# Patient Record
Sex: FEMALE | Race: WHITE | NOT HISPANIC OR LATINO | Employment: OTHER | URBAN - METROPOLITAN AREA
[De-identification: names, ages, dates, MRNs, and addresses within clinical notes are randomized per-mention and may not be internally consistent; named-entity substitution may affect disease eponyms.]

---

## 2017-01-26 ENCOUNTER — HOSPITAL ENCOUNTER (OUTPATIENT)
Dept: OTHER | Facility: HOSPITAL | Age: 57
Discharge: HOME | End: 2017-01-26
Attending: INTERNAL MEDICINE | Admitting: INTERNAL MEDICINE

## 2017-01-26 LAB
ALBUMIN SERPL-MCNC: 3.4 G/DL (ref 3.5–4.8)
ALBUMIN/GLOB SERPL: 0.9 {RATIO} (ref 1–1.7)
ALP SERPL-CCNC: 194 IU/L (ref 32–91)
ALT SERPL-CCNC: 51 IU/L (ref 14–54)
ANION GAP SERPL CALC-SCNC: 13 MMOL/L (ref 10–20)
AST SERPL-CCNC: 54 IU/L (ref 15–41)
BASOPHILS # BLD AUTO: 0.1 10*3/UL (ref 0–0.2)
BASOPHILS NFR BLD AUTO: 1 % (ref 0–2)
BILIRUB SERPL-MCNC: 0.5 MG/DL (ref 0.3–1.2)
BUN SERPL-MCNC: 15 MG/DL (ref 8–20)
BUN/CREAT SERPL: 16.7 (ref 5.4–26.2)
CALCIUM SERPL-MCNC: 9.3 MG/DL (ref 8.9–10.3)
CHLORIDE SERPL-SCNC: 100 MMOL/L (ref 101–111)
CK SERPL-CCNC: 76 IU/L (ref 38–234)
CONV CO2: 29 MMOL/L (ref 22–32)
CONV TOTAL PROTEIN: 7.1 G/DL (ref 6.1–7.9)
CREAT UR-MCNC: 0.9 MG/DL (ref 0.4–1)
CRP SERPL-MCNC: 0.38 MG/DL (ref 0–0.7)
DIFFERENTIAL METHOD BLD: (no result)
EOSINOPHIL # BLD AUTO: 0.4 10*3/UL (ref 0–0.3)
EOSINOPHIL # BLD AUTO: 5 % (ref 0–3)
ERYTHROCYTE [DISTWIDTH] IN BLOOD BY AUTOMATED COUNT: 13.5 % (ref 11.5–14.5)
ERYTHROCYTE [SEDIMENTATION RATE] IN BLOOD BY WESTERGREN METHOD: 29 MM/HR (ref 0–30)
GLOBULIN UR ELPH-MCNC: 3.7 G/DL (ref 2.5–3.8)
GLUCOSE SERPL-MCNC: 105 MG/DL (ref 65–99)
HCT VFR BLD AUTO: 38.7 % (ref 35–49)
HGB BLD-MCNC: 12.6 G/DL (ref 12–15)
LYMPHOCYTES # BLD AUTO: 3.1 10*3/UL (ref 0.8–4.8)
LYMPHOCYTES NFR BLD AUTO: 39 % (ref 18–42)
MCH RBC QN AUTO: 29.9 PG (ref 26–32)
MCHC RBC AUTO-ENTMCNC: 32.6 G/DL (ref 32–36)
MCV RBC AUTO: 91.7 FL (ref 80–94)
MONOCYTES # BLD AUTO: 1.9 10*3/UL (ref 0.1–1.3)
MONOCYTES NFR BLD AUTO: 23 % (ref 2–11)
NEUTROPHILS # BLD AUTO: 2.5 10*3/UL (ref 2.3–8.6)
NEUTROPHILS NFR BLD AUTO: 32 % (ref 50–75)
NRBC BLD AUTO-RTO: 0 /100{WBCS}
NRBC/RBC NFR BLD MANUAL: 0 10*3/UL
PLATELET # BLD AUTO: 409 10*3/UL (ref 150–450)
PMV BLD AUTO: 10.2 FL (ref 7.4–10.4)
POTASSIUM SERPL-SCNC: 4 MMOL/L (ref 3.6–5.1)
RBC # BLD AUTO: 4.22 10*6/UL (ref 4–5.4)
SODIUM SERPL-SCNC: 138 MMOL/L (ref 136–144)
WBC # BLD AUTO: 8 10*3/UL (ref 4.5–11.5)

## 2017-01-27 LAB
CENTROMERE B AB SER-ACNC: NEGATIVE
ENA RNP AB SER-ACNC: NEGATIVE
HAV IGM SERPL QL IA: NONREACTIVE
HBV CORE IGM SERPL QL IA: NONREACTIVE
HBV SURFACE AG SERPL QL IA: NONREACTIVE
HCV AB SER DONR QL: NORMAL
HCV AB SER DONR QL: NORMAL

## 2017-02-01 ENCOUNTER — HOSPITAL ENCOUNTER (OUTPATIENT)
Dept: CARDIOLOGY | Facility: HOSPITAL | Age: 57
Discharge: HOME | End: 2017-02-01
Attending: INTERNAL MEDICINE | Admitting: INTERNAL MEDICINE

## 2017-02-07 ENCOUNTER — HOSPITAL ENCOUNTER (OUTPATIENT)
Dept: CT IMAGING | Facility: HOSPITAL | Age: 57
Discharge: HOME | End: 2017-02-07
Attending: INTERNAL MEDICINE | Admitting: INTERNAL MEDICINE

## 2017-02-15 ENCOUNTER — HOSPITAL ENCOUNTER (OUTPATIENT)
Dept: CARDIOLOGY | Facility: HOSPITAL | Age: 57
Discharge: HOME | End: 2017-02-15
Attending: INTERNAL MEDICINE | Admitting: INTERNAL MEDICINE

## 2017-04-04 ENCOUNTER — CONVERSION ENCOUNTER (OUTPATIENT)
Dept: FAMILY MEDICINE CLINIC | Facility: CLINIC | Age: 57
End: 2017-04-04

## 2017-04-04 LAB
ALBUMIN SERPL-MCNC: 4.2 G/DL (ref 3.6–5.1)
ALBUMIN/GLOB SERPL: ABNORMAL {RATIO} (ref 1–2.5)
ALP SERPL-CCNC: 256 UNITS/L (ref 33–130)
ALT SERPL-CCNC: 60 UNITS/L (ref 6–29)
AST SERPL-CCNC: 53 UNITS/L (ref 10–35)
BILIRUB SERPL-MCNC: 0.4 MG/DL (ref 0.2–1.2)
BUN SERPL-MCNC: 20 MG/DL (ref 7–25)
BUN/CREAT SERPL: ABNORMAL (ref 6–22)
CALCIUM SERPL-MCNC: 9.9 MG/DL (ref 8.6–10.4)
CHLORIDE SERPL-SCNC: 104 MMOL/L (ref 98–110)
CO2 CONTENT VENOUS: 28 MMOL/L (ref 20–31)
CONV TOTAL PROTEIN: 7.5 G/DL (ref 6.1–8.1)
CREAT UR-MCNC: 0.79 MG/DL (ref 0.5–1.05)
GLOBULIN UR ELPH-MCNC: ABNORMAL G/DL (ref 1.9–3.7)
GLUCOSE SERPL-MCNC: 91 MG/DL (ref 65–99)
POTASSIUM SERPL-SCNC: 4.3 MMOL/L (ref 3.5–5.3)
SODIUM SERPL-SCNC: 142 MMOL/L (ref 135–146)

## 2017-04-26 ENCOUNTER — HOSPITAL ENCOUNTER (OUTPATIENT)
Dept: WOUND CARE | Facility: HOSPITAL | Age: 57
Discharge: HOME | End: 2017-04-26
Attending: NURSE PRACTITIONER | Admitting: NURSE PRACTITIONER

## 2017-05-03 ENCOUNTER — HOSPITAL ENCOUNTER (OUTPATIENT)
Dept: WOUND CARE | Facility: HOSPITAL | Age: 57
Discharge: HOME | End: 2017-05-03
Attending: NURSE PRACTITIONER | Admitting: NURSE PRACTITIONER

## 2017-05-10 ENCOUNTER — HOSPITAL ENCOUNTER (OUTPATIENT)
Dept: WOUND CARE | Facility: HOSPITAL | Age: 57
Discharge: HOME | End: 2017-05-10
Attending: NURSE PRACTITIONER | Admitting: NURSE PRACTITIONER

## 2017-05-17 ENCOUNTER — HOSPITAL ENCOUNTER (OUTPATIENT)
Dept: WOUND CARE | Facility: HOSPITAL | Age: 57
Discharge: HOME | End: 2017-05-17
Attending: NURSE PRACTITIONER | Admitting: NURSE PRACTITIONER

## 2017-05-24 ENCOUNTER — HOSPITAL ENCOUNTER (OUTPATIENT)
Dept: WOUND CARE | Facility: HOSPITAL | Age: 57
Discharge: HOME | End: 2017-05-24
Attending: NURSE PRACTITIONER | Admitting: NURSE PRACTITIONER

## 2017-05-31 ENCOUNTER — HOSPITAL ENCOUNTER (OUTPATIENT)
Dept: WOUND CARE | Facility: HOSPITAL | Age: 57
Discharge: HOME | End: 2017-05-31
Attending: NURSE PRACTITIONER | Admitting: NURSE PRACTITIONER

## 2017-06-07 ENCOUNTER — HOSPITAL ENCOUNTER (OUTPATIENT)
Dept: WOUND CARE | Facility: HOSPITAL | Age: 57
Discharge: HOME | End: 2017-06-07
Attending: NURSE PRACTITIONER | Admitting: NURSE PRACTITIONER

## 2017-06-21 ENCOUNTER — HOSPITAL ENCOUNTER (OUTPATIENT)
Dept: WOUND CARE | Facility: HOSPITAL | Age: 57
Discharge: HOME | End: 2017-06-21
Attending: NURSE PRACTITIONER | Admitting: NURSE PRACTITIONER

## 2017-08-15 ENCOUNTER — HOSPITAL ENCOUNTER (OUTPATIENT)
Dept: LAB | Facility: HOSPITAL | Age: 57
Discharge: HOME | End: 2017-08-15
Attending: INTERNAL MEDICINE | Admitting: INTERNAL MEDICINE

## 2017-08-15 LAB
ALBUMIN SERPL-MCNC: 3 G/DL (ref 3.5–4.8)
ALBUMIN/GLOB SERPL: 0.8 {RATIO} (ref 1–1.7)
ALP SERPL-CCNC: 160 IU/L (ref 32–91)
ALT SERPL-CCNC: 30 IU/L (ref 14–54)
ANION GAP SERPL CALC-SCNC: 12.9 MMOL/L (ref 10–20)
AST SERPL-CCNC: 32 IU/L (ref 15–41)
BASOPHILS # BLD AUTO: 0.1 10*3/UL (ref 0–0.2)
BASOPHILS NFR BLD AUTO: 1 % (ref 0–2)
BILIRUB SERPL-MCNC: 0.5 MG/DL (ref 0.3–1.2)
BILIRUB UR QL STRIP: NEGATIVE MG/DL
BUN SERPL-MCNC: 6 MG/DL (ref 8–20)
BUN/CREAT SERPL: 8.6 (ref 5.4–26.2)
CALCIUM SERPL-MCNC: 9.2 MG/DL (ref 8.9–10.3)
CASTS URNS QL MICRO: NORMAL /[LPF]
CHLORIDE SERPL-SCNC: 101 MMOL/L (ref 101–111)
COLOR UR: YELLOW
CONV BACTERIA IN URINE MICRO: NEGATIVE
CONV CLARITY OF URINE: CLEAR
CONV CO2: 31 MMOL/L (ref 22–32)
CONV HYALINE CASTS IN URINE MICRO: 5 /[LPF] (ref 0–5)
CONV PROTEIN IN URINE BY AUTOMATED TEST STRIP: NEGATIVE MG/DL
CONV SMALL ROUND CELLS: NORMAL /[HPF]
CONV TOTAL PROTEIN: 6.6 G/DL (ref 6.1–7.9)
CONV UROBILINOGEN IN URINE BY AUTOMATED TEST STRIP: 1 MG/DL
CREAT UR-MCNC: 0.7 MG/DL (ref 0.4–1)
CRP SERPL-MCNC: 1.37 MG/DL (ref 0–0.7)
CULTURE INDICATED?: NORMAL
DIFFERENTIAL METHOD BLD: (no result)
EOSINOPHIL # BLD AUTO: 0.3 10*3/UL (ref 0–0.3)
EOSINOPHIL # BLD AUTO: 3 % (ref 0–3)
ERYTHROCYTE [DISTWIDTH] IN BLOOD BY AUTOMATED COUNT: 13.4 % (ref 11.5–14.5)
ERYTHROCYTE [SEDIMENTATION RATE] IN BLOOD BY WESTERGREN METHOD: 41 MM/HR (ref 0–30)
GLOBULIN UR ELPH-MCNC: 3.6 G/DL (ref 2.5–3.8)
GLUCOSE SERPL-MCNC: 96 MG/DL (ref 65–99)
GLUCOSE UR QL: NEGATIVE MG/DL
HCT VFR BLD AUTO: 33.8 % (ref 35–49)
HGB BLD-MCNC: 11.2 G/DL (ref 12–15)
HGB UR QL STRIP: NEGATIVE
KETONES UR QL STRIP: NEGATIVE MG/DL
LEUKOCYTE ESTERASE UR QL STRIP: NEGATIVE
LYMPHOCYTES # BLD AUTO: 3.5 10*3/UL (ref 0.8–4.8)
LYMPHOCYTES NFR BLD AUTO: 29 % (ref 18–42)
MCH RBC QN AUTO: 29.9 PG (ref 26–32)
MCHC RBC AUTO-ENTMCNC: 33 G/DL (ref 32–36)
MCV RBC AUTO: 90.5 FL (ref 80–94)
MONOCYTES # BLD AUTO: 2.2 10*3/UL (ref 0.1–1.3)
MONOCYTES NFR BLD AUTO: 18 % (ref 2–11)
NEUTROPHILS # BLD AUTO: 5.8 10*3/UL (ref 2.3–8.6)
NEUTROPHILS NFR BLD AUTO: 49 % (ref 50–75)
NITRITE UR QL STRIP: NEGATIVE
NRBC BLD AUTO-RTO: 0 /100{WBCS}
NRBC/RBC NFR BLD MANUAL: 0 10*3/UL
PH UR STRIP.AUTO: 7 [PH] (ref 4.5–8)
PLATELET # BLD AUTO: 347 10*3/UL (ref 150–450)
PMV BLD AUTO: 9.4 FL (ref 7.4–10.4)
POTASSIUM SERPL-SCNC: 3.9 MMOL/L (ref 3.6–5.1)
RBC # BLD AUTO: 3.74 10*6/UL (ref 4–5.4)
RBC #/AREA URNS HPF: 1 /[HPF] (ref 0–3)
SODIUM SERPL-SCNC: 141 MMOL/L (ref 136–144)
SP GR UR: 1.01 (ref 1–1.03)
SPERM URNS QL MICRO: NORMAL /[HPF]
SQUAMOUS SPT QL MICRO: 1 /[HPF] (ref 0–5)
UNIDENT CRYS URNS QL MICRO: NORMAL /[HPF]
WBC # BLD AUTO: 11.9 10*3/UL (ref 4.5–11.5)
WBC #/AREA URNS HPF: 2 /[HPF] (ref 0–5)
YEAST SPEC QL WET PREP: NORMAL /[HPF]

## 2017-08-30 ENCOUNTER — HOSPITAL ENCOUNTER (OUTPATIENT)
Dept: FAMILY MEDICINE CLINIC | Facility: CLINIC | Age: 57
Setting detail: SPECIMEN
Discharge: HOME | End: 2017-08-30
Attending: HOSPITALIST | Admitting: HOSPITALIST

## 2017-08-30 LAB
BACTERIA SPEC AEROBE CULT: NORMAL
Lab: NORMAL
MICRO REPORT STATUS: NORMAL
SPECIMEN SOURCE: NORMAL

## 2017-08-31 ENCOUNTER — HOSPITAL ENCOUNTER (OUTPATIENT)
Dept: FAMILY MEDICINE CLINIC | Facility: CLINIC | Age: 57
Setting detail: SPECIMEN
Discharge: HOME | End: 2017-08-31
Attending: HOSPITALIST | Admitting: HOSPITALIST

## 2017-09-01 ENCOUNTER — HOSPITAL ENCOUNTER (OUTPATIENT)
Dept: FAMILY MEDICINE CLINIC | Facility: CLINIC | Age: 57
Setting detail: SPECIMEN
Discharge: HOME | End: 2017-09-01
Attending: HOSPITALIST | Admitting: HOSPITALIST

## 2017-09-06 ENCOUNTER — HOSPITAL ENCOUNTER (OUTPATIENT)
Dept: LAB | Facility: HOSPITAL | Age: 57
Discharge: HOME | End: 2017-09-06
Attending: HOSPITALIST | Admitting: HOSPITALIST

## 2017-09-21 ENCOUNTER — OFFICE (AMBULATORY)
Dept: URBAN - METROPOLITAN AREA CLINIC 64 | Facility: CLINIC | Age: 57
End: 2017-09-21

## 2017-09-21 VITALS
DIASTOLIC BLOOD PRESSURE: 67 MMHG | WEIGHT: 176 LBS | SYSTOLIC BLOOD PRESSURE: 115 MMHG | HEART RATE: 72 BPM | HEIGHT: 65 IN

## 2017-09-21 DIAGNOSIS — R10.13 EPIGASTRIC PAIN: ICD-10-CM

## 2017-09-21 DIAGNOSIS — K21.9 GASTRO-ESOPHAGEAL REFLUX DISEASE WITHOUT ESOPHAGITIS: ICD-10-CM

## 2017-09-21 DIAGNOSIS — R11.2 NAUSEA WITH VOMITING, UNSPECIFIED: ICD-10-CM

## 2017-09-21 PROCEDURE — 99214 OFFICE O/P EST MOD 30 MIN: CPT | Performed by: NURSE PRACTITIONER

## 2017-09-21 RX ORDER — ONDANSETRON HYDROCHLORIDE 4 MG/1
24 TABLET, FILM COATED ORAL
Qty: 45 | Refills: 5 | Status: COMPLETED
Start: 2017-09-21 | End: 2018-03-14

## 2017-09-25 ENCOUNTER — HOSPITAL ENCOUNTER (OUTPATIENT)
Dept: LAB | Facility: HOSPITAL | Age: 57
Discharge: HOME | End: 2017-09-25
Attending: NURSE PRACTITIONER | Admitting: NURSE PRACTITIONER

## 2017-09-27 ENCOUNTER — ON CAMPUS - OUTPATIENT (AMBULATORY)
Dept: URBAN - METROPOLITAN AREA HOSPITAL 2 | Facility: HOSPITAL | Age: 57
End: 2017-09-27

## 2017-09-27 VITALS
TEMPERATURE: 97.8 F | OXYGEN SATURATION: 96 % | SYSTOLIC BLOOD PRESSURE: 117 MMHG | HEART RATE: 73 BPM | RESPIRATION RATE: 18 BRPM | HEART RATE: 91 BPM | HEART RATE: 85 BPM | DIASTOLIC BLOOD PRESSURE: 74 MMHG | WEIGHT: 172 LBS | SYSTOLIC BLOOD PRESSURE: 130 MMHG | HEIGHT: 65 IN | DIASTOLIC BLOOD PRESSURE: 72 MMHG | OXYGEN SATURATION: 99 % | HEART RATE: 72 BPM | OXYGEN SATURATION: 97 % | DIASTOLIC BLOOD PRESSURE: 71 MMHG | SYSTOLIC BLOOD PRESSURE: 144 MMHG | RESPIRATION RATE: 16 BRPM | SYSTOLIC BLOOD PRESSURE: 136 MMHG | DIASTOLIC BLOOD PRESSURE: 85 MMHG

## 2017-09-27 DIAGNOSIS — T18.2XXA FOREIGN BODY IN STOMACH, INITIAL ENCOUNTER: ICD-10-CM

## 2017-09-27 DIAGNOSIS — R11.2 NAUSEA WITH VOMITING, UNSPECIFIED: ICD-10-CM

## 2017-09-27 DIAGNOSIS — K21.9 GASTRO-ESOPHAGEAL REFLUX DISEASE WITHOUT ESOPHAGITIS: ICD-10-CM

## 2017-09-27 PROCEDURE — 43235 EGD DIAGNOSTIC BRUSH WASH: CPT | Performed by: INTERNAL MEDICINE

## 2017-09-27 RX ORDER — ERYTHROMYCIN 250 MG/1
1000 TABLET, FILM COATED ORAL
Qty: 40 | Refills: 0 | Status: COMPLETED
Start: 2017-09-27 | End: 2017-10-24

## 2017-09-27 RX ADMIN — PROPOFOL: 10 INJECTION, EMULSION INTRAVENOUS at 09:38

## 2017-10-24 ENCOUNTER — OFFICE (AMBULATORY)
Dept: URBAN - METROPOLITAN AREA CLINIC 64 | Facility: CLINIC | Age: 57
End: 2017-10-24

## 2017-10-24 VITALS
SYSTOLIC BLOOD PRESSURE: 121 MMHG | DIASTOLIC BLOOD PRESSURE: 62 MMHG | HEIGHT: 65 IN | WEIGHT: 168 LBS | HEART RATE: 72 BPM

## 2017-10-24 DIAGNOSIS — K31.84 GASTROPARESIS: ICD-10-CM

## 2017-10-24 DIAGNOSIS — K21.9 GASTRO-ESOPHAGEAL REFLUX DISEASE WITHOUT ESOPHAGITIS: ICD-10-CM

## 2017-10-24 PROCEDURE — 99213 OFFICE O/P EST LOW 20 MIN: CPT | Performed by: INTERNAL MEDICINE

## 2017-10-24 RX ORDER — ERYTHROMYCIN 250 MG/1
500 TABLET, FILM COATED ORAL
Qty: 60 | Refills: 5 | Status: COMPLETED
Start: 2017-10-24 | End: 2018-03-14

## 2017-11-02 ENCOUNTER — HOSPITAL ENCOUNTER (OUTPATIENT)
Dept: FAMILY MEDICINE CLINIC | Facility: CLINIC | Age: 57
Setting detail: SPECIMEN
Discharge: HOME | End: 2017-11-02
Attending: HOSPITALIST | Admitting: HOSPITALIST

## 2017-11-02 LAB
ALBUMIN SERPL-MCNC: 3.5 G/DL (ref 3.5–4.8)
ALBUMIN/GLOB SERPL: 1 {RATIO} (ref 1–1.7)
ALP SERPL-CCNC: 280 IU/L (ref 32–91)
ALT SERPL-CCNC: 39 IU/L (ref 14–54)
ANION GAP SERPL CALC-SCNC: 15.2 MMOL/L (ref 10–20)
AST SERPL-CCNC: 48 IU/L (ref 15–41)
BASOPHILS # BLD AUTO: 0 10*3/UL (ref 0–0.2)
BASOPHILS NFR BLD AUTO: 1 % (ref 0–2)
BILIRUB SERPL-MCNC: 0.5 MG/DL (ref 0.3–1.2)
BUN SERPL-MCNC: 15 MG/DL (ref 8–20)
BUN/CREAT SERPL: 13.6 (ref 5.4–26.2)
CALCIUM SERPL-MCNC: 9.9 MG/DL (ref 8.9–10.3)
CHLORIDE SERPL-SCNC: 98 MMOL/L (ref 101–111)
CONV CO2: 28 MMOL/L (ref 22–32)
CONV TOTAL PROTEIN: 7 G/DL (ref 6.1–7.9)
CREAT UR-MCNC: 1.1 MG/DL (ref 0.4–1)
DIFFERENTIAL METHOD BLD: (no result)
EOSINOPHIL # BLD AUTO: 0.2 10*3/UL (ref 0–0.3)
EOSINOPHIL # BLD AUTO: 3 % (ref 0–3)
ERYTHROCYTE [DISTWIDTH] IN BLOOD BY AUTOMATED COUNT: 13.7 % (ref 11.5–14.5)
GLOBULIN UR ELPH-MCNC: 3.5 G/DL (ref 2.5–3.8)
GLUCOSE SERPL-MCNC: 111 MG/DL (ref 65–99)
HCT VFR BLD AUTO: 34.6 % (ref 35–49)
HGB BLD-MCNC: 11.5 G/DL (ref 12–15)
LYMPHOCYTES # BLD AUTO: 1.8 10*3/UL (ref 0.8–4.8)
LYMPHOCYTES NFR BLD AUTO: 25 % (ref 18–42)
MCH RBC QN AUTO: 30.5 PG (ref 26–32)
MCHC RBC AUTO-ENTMCNC: 33.1 G/DL (ref 32–36)
MCV RBC AUTO: 92.1 FL (ref 80–94)
MONOCYTES # BLD AUTO: 1.5 10*3/UL (ref 0.1–1.3)
MONOCYTES NFR BLD AUTO: 20 % (ref 2–11)
NEUTROPHILS # BLD AUTO: 3.7 10*3/UL (ref 2.3–8.6)
NEUTROPHILS NFR BLD AUTO: 51 % (ref 50–75)
NRBC BLD AUTO-RTO: 0 /100{WBCS}
NRBC/RBC NFR BLD MANUAL: 0 10*3/UL
PLATELET # BLD AUTO: 360 10*3/UL (ref 150–450)
PMV BLD AUTO: 9.8 FL (ref 7.4–10.4)
POTASSIUM SERPL-SCNC: 4.2 MMOL/L (ref 3.6–5.1)
RBC # BLD AUTO: 3.76 10*6/UL (ref 4–5.4)
SODIUM SERPL-SCNC: 137 MMOL/L (ref 136–144)
WBC # BLD AUTO: 7.3 10*3/UL (ref 4.5–11.5)

## 2018-02-19 ENCOUNTER — HOSPITAL ENCOUNTER (OUTPATIENT)
Dept: LAB | Facility: HOSPITAL | Age: 58
Discharge: HOME | End: 2018-02-19
Attending: INTERNAL MEDICINE | Admitting: INTERNAL MEDICINE

## 2018-02-19 LAB
ALBUMIN SERPL-MCNC: 3.3 G/DL (ref 3.5–4.8)
ALBUMIN/GLOB SERPL: 0.9 {RATIO} (ref 1–1.7)
ALP SERPL-CCNC: 297 IU/L (ref 32–91)
ALT SERPL-CCNC: 30 IU/L (ref 14–54)
ANION GAP SERPL CALC-SCNC: 13.4 MMOL/L (ref 10–20)
AST SERPL-CCNC: 36 IU/L (ref 15–41)
BASOPHILS # BLD AUTO: 0.1 10*3/UL (ref 0–0.2)
BASOPHILS NFR BLD AUTO: 1 % (ref 0–2)
BILIRUB SERPL-MCNC: 0.2 MG/DL (ref 0.3–1.2)
BILIRUB UR QL STRIP: NEGATIVE MG/DL
BUN SERPL-MCNC: 15 MG/DL (ref 8–20)
BUN/CREAT SERPL: 10.7 (ref 5.4–26.2)
CALCIUM SERPL-MCNC: 9.4 MG/DL (ref 8.9–10.3)
CASTS URNS QL MICRO: ABNORMAL /[LPF]
CHLORIDE SERPL-SCNC: 99 MMOL/L (ref 101–111)
COLOR UR: YELLOW
CONV BACTERIA IN URINE MICRO: NEGATIVE
CONV CLARITY OF URINE: CLEAR
CONV CO2: 30 MMOL/L (ref 22–32)
CONV HYALINE CASTS IN URINE MICRO: 3 /[LPF] (ref 0–5)
CONV PROTEIN IN URINE BY AUTOMATED TEST STRIP: NEGATIVE MG/DL
CONV SMALL ROUND CELLS: ABNORMAL /[HPF]
CONV TOTAL PROTEIN: 7.1 G/DL (ref 6.1–7.9)
CONV UROBILINOGEN IN URINE BY AUTOMATED TEST STRIP: 0.2 MG/DL
CREAT UR-MCNC: 1.4 MG/DL (ref 0.4–1)
CRP SERPL-MCNC: 1.61 MG/DL (ref 0–0.7)
CULTURE INDICATED?: ABNORMAL
DIFFERENTIAL METHOD BLD: (no result)
EOSINOPHIL # BLD AUTO: 0.9 10*3/UL (ref 0–0.3)
EOSINOPHIL # BLD AUTO: 8 % (ref 0–3)
ERYTHROCYTE [DISTWIDTH] IN BLOOD BY AUTOMATED COUNT: 13.3 % (ref 11.5–14.5)
ERYTHROCYTE [SEDIMENTATION RATE] IN BLOOD BY WESTERGREN METHOD: 49 MM/HR (ref 0–30)
GLOBULIN UR ELPH-MCNC: 3.8 G/DL (ref 2.5–3.8)
GLUCOSE SERPL-MCNC: 104 MG/DL (ref 65–99)
GLUCOSE UR QL: NEGATIVE MG/DL
HCT VFR BLD AUTO: 35.8 % (ref 35–49)
HGB BLD-MCNC: 11.6 G/DL (ref 12–15)
HGB UR QL STRIP: NEGATIVE
KETONES UR QL STRIP: NEGATIVE MG/DL
LEUKOCYTE ESTERASE UR QL STRIP: ABNORMAL
LYMPHOCYTES # BLD AUTO: 3.4 10*3/UL (ref 0.8–4.8)
LYMPHOCYTES NFR BLD AUTO: 30 % (ref 18–42)
MCH RBC QN AUTO: 29.6 PG (ref 26–32)
MCHC RBC AUTO-ENTMCNC: 32.4 G/DL (ref 32–36)
MCV RBC AUTO: 91.2 FL (ref 80–94)
MONOCYTES # BLD AUTO: 1.7 10*3/UL (ref 0.1–1.3)
MONOCYTES NFR BLD AUTO: 15 % (ref 2–11)
NEUTROPHILS # BLD AUTO: 5.2 10*3/UL (ref 2.3–8.6)
NEUTROPHILS NFR BLD AUTO: 46 % (ref 50–75)
NITRITE UR QL STRIP: NEGATIVE
NRBC BLD AUTO-RTO: 0 /100{WBCS}
NRBC/RBC NFR BLD MANUAL: 0 10*3/UL
PH UR STRIP.AUTO: 5.5 [PH] (ref 4.5–8)
PLATELET # BLD AUTO: 474 10*3/UL (ref 150–450)
PMV BLD AUTO: 8.8 FL (ref 7.4–10.4)
POTASSIUM SERPL-SCNC: 4.4 MMOL/L (ref 3.6–5.1)
RBC # BLD AUTO: 3.92 10*6/UL (ref 4–5.4)
RBC #/AREA URNS HPF: 1 /[HPF] (ref 0–3)
SODIUM SERPL-SCNC: 138 MMOL/L (ref 136–144)
SP GR UR: 1.02 (ref 1–1.03)
SPERM URNS QL MICRO: ABNORMAL /[HPF]
SQUAMOUS SPT QL MICRO: 0 /[HPF] (ref 0–5)
UNIDENT CRYS URNS QL MICRO: ABNORMAL /[HPF]
WBC # BLD AUTO: 11.3 10*3/UL (ref 4.5–11.5)
WBC #/AREA URNS HPF: 3 /[HPF] (ref 0–5)
YEAST SPEC QL WET PREP: ABNORMAL /[HPF]

## 2018-02-26 ENCOUNTER — HOSPITAL ENCOUNTER (OUTPATIENT)
Dept: LAB | Facility: HOSPITAL | Age: 58
Discharge: HOME | End: 2018-02-26
Attending: INTERNAL MEDICINE | Admitting: INTERNAL MEDICINE

## 2018-02-28 LAB
INTERPRETATION UR IFE-IMP: NORMAL
PROT PATTERN SERPL IFE-IMP: NORMAL

## 2018-03-14 ENCOUNTER — OFFICE (AMBULATORY)
Dept: URBAN - METROPOLITAN AREA CLINIC 64 | Facility: CLINIC | Age: 58
End: 2018-03-14

## 2018-03-14 VITALS
SYSTOLIC BLOOD PRESSURE: 108 MMHG | DIASTOLIC BLOOD PRESSURE: 58 MMHG | HEIGHT: 65 IN | HEART RATE: 70 BPM | WEIGHT: 171 LBS

## 2018-03-14 DIAGNOSIS — R74.8 ABNORMAL LEVELS OF OTHER SERUM ENZYMES: ICD-10-CM

## 2018-03-14 DIAGNOSIS — K59.00 CONSTIPATION, UNSPECIFIED: ICD-10-CM

## 2018-03-14 DIAGNOSIS — K21.9 GASTRO-ESOPHAGEAL REFLUX DISEASE WITHOUT ESOPHAGITIS: ICD-10-CM

## 2018-03-14 DIAGNOSIS — Z80.0 FAMILY HISTORY OF MALIGNANT NEOPLASM OF DIGESTIVE ORGANS: ICD-10-CM

## 2018-03-14 PROCEDURE — 99214 OFFICE O/P EST MOD 30 MIN: CPT | Performed by: INTERNAL MEDICINE

## 2018-03-14 RX ORDER — LUBIPROSTONE 24 UG/1
CAPSULE, GELATIN COATED ORAL
Qty: 30 | Refills: 10 | Status: COMPLETED
End: 2018-03-14

## 2018-03-14 RX ORDER — MESALAMINE 800 MG/1
TABLET, DELAYED RELEASE ORAL
Qty: 180 | Refills: 10 | Status: COMPLETED
End: 2018-03-14

## 2018-04-18 ENCOUNTER — HOSPITAL ENCOUNTER (OUTPATIENT)
Dept: CT IMAGING | Facility: HOSPITAL | Age: 58
Discharge: HOME | End: 2018-04-18
Attending: INTERNAL MEDICINE | Admitting: INTERNAL MEDICINE

## 2018-05-18 ENCOUNTER — OFFICE (AMBULATORY)
Dept: URBAN - METROPOLITAN AREA CLINIC 64 | Facility: CLINIC | Age: 58
End: 2018-05-18

## 2018-05-18 VITALS
HEIGHT: 65 IN | SYSTOLIC BLOOD PRESSURE: 110 MMHG | HEART RATE: 74 BPM | DIASTOLIC BLOOD PRESSURE: 62 MMHG | WEIGHT: 175 LBS

## 2018-05-18 DIAGNOSIS — R74.8 ABNORMAL LEVELS OF OTHER SERUM ENZYMES: ICD-10-CM

## 2018-05-18 DIAGNOSIS — K59.00 CONSTIPATION, UNSPECIFIED: ICD-10-CM

## 2018-05-18 PROCEDURE — 99212 OFFICE O/P EST SF 10 MIN: CPT | Performed by: INTERNAL MEDICINE

## 2018-07-25 ENCOUNTER — CLINICAL SUPPORT (OUTPATIENT)
Dept: ONCOLOGY | Facility: HOSPITAL | Age: 58
End: 2018-07-25

## 2018-07-25 ENCOUNTER — HOSPITAL ENCOUNTER (OUTPATIENT)
Dept: ONCOLOGY | Facility: CLINIC | Age: 58
Setting detail: INFUSION SERIES
Discharge: HOME | End: 2018-07-25
Attending: INTERNAL MEDICINE | Admitting: INTERNAL MEDICINE

## 2018-07-25 ENCOUNTER — HOSPITAL ENCOUNTER (OUTPATIENT)
Dept: ONCOLOGY | Facility: HOSPITAL | Age: 58
Discharge: HOME | End: 2018-07-25
Attending: INTERNAL MEDICINE | Admitting: INTERNAL MEDICINE

## 2018-07-25 LAB
ALBUMIN SERPL-MCNC: 3.1 G/DL (ref 3.5–4.8)
ALBUMIN SERPL-MCNC: 3.3 G/DL (ref 3.5–4.8)
ALP SERPL-CCNC: 326 IU/L (ref 32–91)
ALPHA1 GLOB FLD ELPH-MCNC: 0.3 GM/DL (ref 0.1–0.4)
ALPHA2 GLOB SERPL ELPH-MCNC: 0.8 GM/DL (ref 0.5–1)
ALT SERPL-CCNC: 40 IU/L (ref 14–54)
AST SERPL-CCNC: 57 IU/L (ref 15–41)
B-GLOBULIN SERPL ELPH-MCNC: 1 GM/DL (ref 0.7–1.4)
BILIRUB DIRECT SERPL-MCNC: 0 MG/DL (ref 0.1–0.5)
BILIRUB SERPL-MCNC: 0.2 MG/DL (ref 0.3–1.2)
CONV TOTAL PROTEIN: 6 G/DL (ref 6.1–7.9)
CONV TOTAL PROTEIN: 6.4 G/DL (ref 6.1–7.9)
GAMMA GLOB SERPL ELPH-MCNC: 0.8 GM/DL (ref 0.6–1.6)
INSULIN SERPL-ACNC: ABNORMAL U[IU]/ML
IRON SATN MFR SERPL: 38 % (ref 15–50)
IRON SERPL-MCNC: 51 UG/DL (ref 28–170)
LDH SERPL-CCNC: 194 IU/L (ref 98–192)
TIBC SERPL-MCNC: 136 UG/DL (ref 228–428)

## 2018-07-25 NOTE — PROGRESS NOTES
PATIENTS ONCOLOGY RECORD LOCATED IN [facility]      Subjective     Name:  ENRIQUE MISHRA     Date:  2018  Address:  [address] [place] [address]  Home: [phone]  :  1960 AGE:  57 y.o.        RECORDS OBTAINED:  Patients Oncology Record is located in [facility]

## 2018-07-27 LAB
HAPTOGLOB SERPL-MCNC: 179 MG/DL (ref 36–195)
PROT PATTERN SERPL IFE-IMP: NORMAL

## 2018-08-01 ENCOUNTER — HOSPITAL ENCOUNTER (OUTPATIENT)
Dept: ONCOLOGY | Facility: HOSPITAL | Age: 58
Discharge: HOME | End: 2018-08-01
Attending: INTERNAL MEDICINE | Admitting: INTERNAL MEDICINE

## 2018-08-01 ENCOUNTER — HOSPITAL ENCOUNTER (OUTPATIENT)
Dept: ONCOLOGY | Facility: CLINIC | Age: 58
Setting detail: INFUSION SERIES
Discharge: HOME | End: 2018-08-01
Attending: INTERNAL MEDICINE | Admitting: INTERNAL MEDICINE

## 2018-08-01 ENCOUNTER — CLINICAL SUPPORT (OUTPATIENT)
Dept: ONCOLOGY | Facility: HOSPITAL | Age: 58
End: 2018-08-01

## 2018-08-01 NOTE — PROGRESS NOTES
PATIENTS ONCOLOGY RECORD LOCATED IN [facility]      Subjective     Name:  ENRIQUE MISHRA     Date:  2018  Address:  [address] [place] [address]  Home: [phone]  :  1960 AGE:  57 y.o.        RECORDS OBTAINED:  Patients Oncology Record is located in [facility]

## 2018-08-08 ENCOUNTER — HOSPITAL ENCOUNTER (OUTPATIENT)
Dept: ONCOLOGY | Facility: CLINIC | Age: 58
Setting detail: INFUSION SERIES
Discharge: HOME | End: 2018-08-08
Attending: INTERNAL MEDICINE | Admitting: INTERNAL MEDICINE

## 2018-08-08 ENCOUNTER — CLINICAL SUPPORT (OUTPATIENT)
Dept: ONCOLOGY | Facility: HOSPITAL | Age: 58
End: 2018-08-08

## 2018-08-08 NOTE — PROGRESS NOTES
PATIENTS ONCOLOGY RECORD LOCATED IN [facility]      Subjective     Name:  ENRIQUE MISHRA     Date:  2018  Address:  [address] [place] [address]  Home: [phone]  :  1960 AGE:  57 y.o.        RECORDS OBTAINED:  Patients Oncology Record is located in [facility]

## 2018-08-15 ENCOUNTER — HOSPITAL ENCOUNTER (OUTPATIENT)
Dept: ONCOLOGY | Facility: CLINIC | Age: 58
Setting detail: INFUSION SERIES
Discharge: HOME | End: 2018-08-15
Attending: INTERNAL MEDICINE | Admitting: INTERNAL MEDICINE

## 2018-08-15 ENCOUNTER — CLINICAL SUPPORT (OUTPATIENT)
Dept: ONCOLOGY | Facility: HOSPITAL | Age: 58
End: 2018-08-15

## 2018-08-15 NOTE — PROGRESS NOTES
PATIENTS ONCOLOGY RECORD LOCATED IN [facility]      Subjective     Name:  ENRIQUE MISHRA     Date:  08/15/2018  Address:  [address] [place] [address]  Home: [phone]  :  1960 AGE:  57 y.o.        RECORDS OBTAINED:  Patients Oncology Record is located in [facility]

## 2018-08-22 ENCOUNTER — HOSPITAL ENCOUNTER (OUTPATIENT)
Dept: ONCOLOGY | Facility: CLINIC | Age: 58
Setting detail: INFUSION SERIES
Discharge: HOME | End: 2018-08-22
Attending: INTERNAL MEDICINE | Admitting: INTERNAL MEDICINE

## 2018-08-22 ENCOUNTER — CLINICAL SUPPORT (OUTPATIENT)
Dept: ONCOLOGY | Facility: HOSPITAL | Age: 58
End: 2018-08-22

## 2018-08-22 NOTE — PROGRESS NOTES
PATIENTS ONCOLOGY RECORD LOCATED IN [facility]      Subjective     Name:  ENRIQUE MISHRA     Date:  2018  Address:  [address] [place] [address]  Home: [phone]  :  1960 AGE:  57 y.o.        RECORDS OBTAINED:  Patients Oncology Record is located in [facility]

## 2018-08-23 ENCOUNTER — CLINICAL SUPPORT (OUTPATIENT)
Dept: ONCOLOGY | Facility: HOSPITAL | Age: 58
End: 2018-08-23

## 2018-08-23 ENCOUNTER — HOSPITAL ENCOUNTER (OUTPATIENT)
Dept: ONCOLOGY | Facility: CLINIC | Age: 58
Setting detail: INFUSION SERIES
Discharge: HOME | End: 2018-08-23
Attending: INTERNAL MEDICINE | Admitting: INTERNAL MEDICINE

## 2018-08-23 NOTE — PROGRESS NOTES
PATIENTS ONCOLOGY RECORD LOCATED IN [facility]      Subjective     Name:  ENRIQUE MISHRA     Date:  2018  Address:  [address] [place] [address]  Home: [phone]  :  1960 AGE:  57 y.o.        RECORDS OBTAINED:  Patients Oncology Record is located in [facility]

## 2018-08-29 ENCOUNTER — HOSPITAL ENCOUNTER (OUTPATIENT)
Dept: ONCOLOGY | Facility: CLINIC | Age: 58
Setting detail: INFUSION SERIES
Discharge: HOME | End: 2018-08-29
Attending: INTERNAL MEDICINE | Admitting: INTERNAL MEDICINE

## 2018-08-29 ENCOUNTER — CLINICAL SUPPORT (OUTPATIENT)
Dept: ONCOLOGY | Facility: HOSPITAL | Age: 58
End: 2018-08-29

## 2018-08-29 NOTE — PROGRESS NOTES
PATIENTS ONCOLOGY RECORD LOCATED IN [facility]      Subjective     Name:  ENRIQUE MISHRA     Date:  2018  Address:  [address] [place] [address]  Home: [phone]  :  1960 AGE:  57 y.o.        RECORDS OBTAINED:  Patients Oncology Record is located in [facility]

## 2018-09-12 ENCOUNTER — CLINICAL SUPPORT (OUTPATIENT)
Dept: ONCOLOGY | Facility: HOSPITAL | Age: 58
End: 2018-09-12

## 2018-09-12 ENCOUNTER — HOSPITAL ENCOUNTER (OUTPATIENT)
Dept: ONCOLOGY | Facility: CLINIC | Age: 58
Setting detail: INFUSION SERIES
Discharge: HOME | End: 2018-09-12
Attending: INTERNAL MEDICINE | Admitting: INTERNAL MEDICINE

## 2018-09-12 NOTE — PROGRESS NOTES
PATIENTS ONCOLOGY RECORD LOCATED IN [facility]      Subjective     Name:  ENRIQUE MISHRA     Date:  2018  Address:  [address] [place] [address]  Home: [phone]  :  1960 AGE:  57 y.o.        RECORDS OBTAINED:  Patients Oncology Record is located in [facility]

## 2018-09-19 ENCOUNTER — HOSPITAL ENCOUNTER (OUTPATIENT)
Dept: LAB | Facility: HOSPITAL | Age: 58
Discharge: HOME | End: 2018-09-19
Attending: INTERNAL MEDICINE | Admitting: INTERNAL MEDICINE

## 2018-09-19 ENCOUNTER — HOSPITAL ENCOUNTER (OUTPATIENT)
Dept: ONCOLOGY | Facility: CLINIC | Age: 58
Setting detail: INFUSION SERIES
Discharge: HOME | End: 2018-09-19
Attending: INTERNAL MEDICINE | Admitting: INTERNAL MEDICINE

## 2018-09-19 ENCOUNTER — CLINICAL SUPPORT (OUTPATIENT)
Dept: ONCOLOGY | Facility: HOSPITAL | Age: 58
End: 2018-09-19

## 2018-09-19 LAB
ALBUMIN SERPL-MCNC: 3.4 G/DL (ref 3.5–4.8)
ALBUMIN/GLOB SERPL: 1 {RATIO} (ref 1–1.7)
ALP SERPL-CCNC: 239 IU/L (ref 32–91)
ALT SERPL-CCNC: 40 IU/L (ref 14–54)
ANION GAP SERPL CALC-SCNC: 13.1 MMOL/L (ref 10–20)
AST SERPL-CCNC: 38 IU/L (ref 15–41)
BASOPHILS # BLD AUTO: 0.1 10*3/UL (ref 0–0.2)
BASOPHILS NFR BLD AUTO: 1 % (ref 0–2)
BILIRUB SERPL-MCNC: 0.3 MG/DL (ref 0.3–1.2)
BILIRUB UR QL STRIP: NEGATIVE MG/DL
BUN SERPL-MCNC: 15 MG/DL (ref 8–20)
BUN/CREAT SERPL: 15 (ref 5.4–26.2)
CALCIUM SERPL-MCNC: 9 MG/DL (ref 8.9–10.3)
CASTS URNS QL MICRO: NORMAL /[LPF]
CHLORIDE SERPL-SCNC: 101 MMOL/L (ref 101–111)
COLOR UR: YELLOW
CONV BACTERIA IN URINE MICRO: NEGATIVE
CONV CLARITY OF URINE: CLEAR
CONV CO2: 27 MMOL/L (ref 22–32)
CONV HYALINE CASTS IN URINE MICRO: 2 /[LPF] (ref 0–5)
CONV PROTEIN IN URINE BY AUTOMATED TEST STRIP: NEGATIVE MG/DL
CONV SMALL ROUND CELLS: NORMAL /[HPF]
CONV TOTAL PROTEIN: 6.7 G/DL (ref 6.1–7.9)
CONV UROBILINOGEN IN URINE BY AUTOMATED TEST STRIP: 0.2 MG/DL
CREAT UR-MCNC: 1 MG/DL (ref 0.4–1)
CRP SERPL-MCNC: 0.44 MG/DL (ref 0–0.7)
CULTURE INDICATED?: NORMAL
DIFFERENTIAL METHOD BLD: (no result)
EOSINOPHIL # BLD AUTO: 0.4 10*3/UL (ref 0–0.3)
EOSINOPHIL # BLD AUTO: 5 % (ref 0–3)
ERYTHROCYTE [DISTWIDTH] IN BLOOD BY AUTOMATED COUNT: 13.7 % (ref 11.5–14.5)
ERYTHROCYTE [SEDIMENTATION RATE] IN BLOOD BY WESTERGREN METHOD: 25 MM/HR (ref 0–30)
GLOBULIN UR ELPH-MCNC: 3.3 G/DL (ref 2.5–3.8)
GLUCOSE SERPL-MCNC: 96 MG/DL (ref 65–99)
GLUCOSE UR QL: NEGATIVE MG/DL
HCT VFR BLD AUTO: 35.1 % (ref 35–49)
HGB BLD-MCNC: 11.4 G/DL (ref 12–15)
HGB UR QL STRIP: NEGATIVE
KETONES UR QL STRIP: NEGATIVE MG/DL
LEUKOCYTE ESTERASE UR QL STRIP: NEGATIVE
LYMPHOCYTES # BLD AUTO: 3.2 10*3/UL (ref 0.8–4.8)
LYMPHOCYTES NFR BLD AUTO: 39 % (ref 18–42)
MCH RBC QN AUTO: 30 PG (ref 26–32)
MCHC RBC AUTO-ENTMCNC: 32.4 G/DL (ref 32–36)
MCV RBC AUTO: 92.4 FL (ref 80–94)
MONOCYTES # BLD AUTO: 1.3 10*3/UL (ref 0.1–1.3)
MONOCYTES NFR BLD AUTO: 16 % (ref 2–11)
NEUTROPHILS # BLD AUTO: 3.1 10*3/UL (ref 2.3–8.6)
NEUTROPHILS NFR BLD AUTO: 39 % (ref 50–75)
NITRITE UR QL STRIP: NEGATIVE
NRBC BLD AUTO-RTO: 0 /100{WBCS}
NRBC/RBC NFR BLD MANUAL: 0 10*3/UL
PH UR STRIP.AUTO: 5.5 [PH] (ref 4.5–8)
PLATELET # BLD AUTO: 318 10*3/UL (ref 150–450)
PMV BLD AUTO: 9.6 FL (ref 7.4–10.4)
POTASSIUM SERPL-SCNC: 4.1 MMOL/L (ref 3.6–5.1)
RBC # BLD AUTO: 3.8 10*6/UL (ref 4–5.4)
RBC #/AREA URNS HPF: 3 /[HPF] (ref 0–3)
SODIUM SERPL-SCNC: 137 MMOL/L (ref 136–144)
SP GR UR: 1.01 (ref 1–1.03)
SPERM URNS QL MICRO: NORMAL /[HPF]
SQUAMOUS SPT QL MICRO: 1 /[HPF] (ref 0–5)
UNIDENT CRYS URNS QL MICRO: NORMAL /[HPF]
WBC # BLD AUTO: 8.1 10*3/UL (ref 4.5–11.5)
WBC #/AREA URNS HPF: 1 /[HPF] (ref 0–5)
YEAST SPEC QL WET PREP: NORMAL /[HPF]

## 2018-09-19 NOTE — PROGRESS NOTES
PATIENTS ONCOLOGY RECORD LOCATED IN [facility]      Subjective     Name:  ENRIQUE MISHRA     Date:  2018  Address:  [address] [place] [address]  Home: [phone]  :  1960 AGE:  58 y.o.        RECORDS OBTAINED:  Patients Oncology Record is located in [facility]

## 2018-09-21 LAB
INTERPRETATION UR IFE-IMP: NORMAL
PROT PATTERN SERPL IFE-IMP: NORMAL

## 2018-09-27 ENCOUNTER — CLINICAL SUPPORT (OUTPATIENT)
Dept: ONCOLOGY | Facility: HOSPITAL | Age: 58
End: 2018-09-27

## 2018-09-27 ENCOUNTER — HOSPITAL ENCOUNTER (OUTPATIENT)
Dept: ONCOLOGY | Facility: CLINIC | Age: 58
Setting detail: INFUSION SERIES
Discharge: HOME | End: 2018-09-27
Attending: INTERNAL MEDICINE | Admitting: INTERNAL MEDICINE

## 2018-09-27 NOTE — PROGRESS NOTES
PATIENTS ONCOLOGY RECORD LOCATED IN [facility]      Subjective     Name:  ENRIQUE MISHRA     Date:  2018  Address:  [address] [place] [address]  Home: [phone]  :  1960 AGE:  58 y.o.        RECORDS OBTAINED:  Patients Oncology Record is located in [facility]

## 2018-11-07 ENCOUNTER — CLINICAL SUPPORT (OUTPATIENT)
Dept: ONCOLOGY | Facility: HOSPITAL | Age: 58
End: 2018-11-07

## 2018-11-07 ENCOUNTER — HOSPITAL ENCOUNTER (OUTPATIENT)
Dept: ONCOLOGY | Facility: CLINIC | Age: 58
Setting detail: INFUSION SERIES
Discharge: HOME | End: 2018-11-07
Attending: INTERNAL MEDICINE | Admitting: INTERNAL MEDICINE

## 2018-11-07 NOTE — PROGRESS NOTES
PATIENTS ONCOLOGY RECORD LOCATED IN [facility]      Subjective     Name:  ENRIQUE MISHRA     Date:  2018  Address:  [address] [place] [address]  Home: [phone]  :  1960 AGE:  58 y.o.        RECORDS OBTAINED:  Patients Oncology Record is located in [facility]

## 2018-11-16 ENCOUNTER — HOSPITAL ENCOUNTER (OUTPATIENT)
Dept: ONCOLOGY | Facility: CLINIC | Age: 58
Setting detail: INFUSION SERIES
Discharge: HOME | End: 2018-11-16
Attending: INTERNAL MEDICINE | Admitting: INTERNAL MEDICINE

## 2018-11-16 ENCOUNTER — CLINICAL SUPPORT (OUTPATIENT)
Dept: ONCOLOGY | Facility: HOSPITAL | Age: 58
End: 2018-11-16

## 2018-11-16 NOTE — PROGRESS NOTES
PATIENTS ONCOLOGY RECORD LOCATED IN [facility]      Subjective     Name:  ENRIQUE MISHRA     Date:  2018  Address:  [address] [place] [address]  Home: [unfilled]  :  1960 AGE:  58 y.o.        RECORDS OBTAINED:  Patients Oncology Record is located in [facility]

## 2018-11-29 ENCOUNTER — HOSPITAL ENCOUNTER (OUTPATIENT)
Dept: MAMMOGRAPHY | Facility: HOSPITAL | Age: 58
Discharge: HOME | End: 2018-11-29
Attending: INTERNAL MEDICINE | Admitting: INTERNAL MEDICINE

## 2018-12-06 ENCOUNTER — CLINICAL SUPPORT (OUTPATIENT)
Dept: ONCOLOGY | Facility: HOSPITAL | Age: 58
End: 2018-12-06

## 2018-12-06 ENCOUNTER — HOSPITAL ENCOUNTER (OUTPATIENT)
Dept: ONCOLOGY | Facility: CLINIC | Age: 58
Setting detail: INFUSION SERIES
Discharge: HOME | End: 2018-12-06
Attending: INTERNAL MEDICINE | Admitting: INTERNAL MEDICINE

## 2018-12-20 ENCOUNTER — HOSPITAL ENCOUNTER (OUTPATIENT)
Dept: ONCOLOGY | Facility: CLINIC | Age: 58
Setting detail: INFUSION SERIES
Discharge: HOME | End: 2018-12-20
Attending: INTERNAL MEDICINE | Admitting: INTERNAL MEDICINE

## 2018-12-20 ENCOUNTER — CLINICAL SUPPORT (OUTPATIENT)
Dept: ONCOLOGY | Facility: HOSPITAL | Age: 58
End: 2018-12-20

## 2018-12-20 NOTE — PROGRESS NOTES
PATIENTS ONCOLOGY RECORD LOCATED IN [facility]      Subjective     Name:  ENRIQUE MISHRA     Date:  2018  Address:  [address] [place] [address]  Home: [unfilled]  :  1960 AGE:  58 y.o.        RECORDS OBTAINED:  Patients Oncology Record is located in [facility]

## 2019-01-04 ENCOUNTER — ON CAMPUS - OUTPATIENT (AMBULATORY)
Dept: URBAN - METROPOLITAN AREA HOSPITAL 85 | Facility: HOSPITAL | Age: 59
End: 2019-01-04
Payer: COMMERCIAL

## 2019-01-04 ENCOUNTER — HOSPITAL ENCOUNTER (OUTPATIENT)
Dept: GASTROENTEROLOGY | Facility: HOSPITAL | Age: 59
Setting detail: HOSPITAL OUTPATIENT SURGERY
Discharge: HOME | End: 2019-01-04
Attending: INTERNAL MEDICINE | Admitting: INTERNAL MEDICINE

## 2019-01-04 DIAGNOSIS — Z80.0 FAMILY HISTORY OF MALIGNANT NEOPLASM OF DIGESTIVE ORGANS: ICD-10-CM

## 2019-01-04 DIAGNOSIS — K62.3 RECTAL PROLAPSE: ICD-10-CM

## 2019-01-04 PROCEDURE — G0105 COLORECTAL SCRN; HI RISK IND: HCPCS | Performed by: INTERNAL MEDICINE

## 2019-01-09 ENCOUNTER — HOSPITAL ENCOUNTER (OUTPATIENT)
Dept: ONCOLOGY | Facility: CLINIC | Age: 59
Setting detail: INFUSION SERIES
Discharge: HOME | End: 2019-01-09
Attending: INTERNAL MEDICINE | Admitting: INTERNAL MEDICINE

## 2019-01-09 ENCOUNTER — CLINICAL SUPPORT (OUTPATIENT)
Dept: ONCOLOGY | Facility: HOSPITAL | Age: 59
End: 2019-01-09

## 2019-01-21 ENCOUNTER — HOSPITAL ENCOUNTER (OUTPATIENT)
Dept: ONCOLOGY | Facility: CLINIC | Age: 59
Setting detail: INFUSION SERIES
Discharge: HOME | End: 2019-01-21
Attending: INTERNAL MEDICINE | Admitting: INTERNAL MEDICINE

## 2019-01-21 ENCOUNTER — HOSPITAL ENCOUNTER (OUTPATIENT)
Dept: ONCOLOGY | Facility: HOSPITAL | Age: 59
Discharge: HOME | End: 2019-01-21
Attending: INTERNAL MEDICINE | Admitting: INTERNAL MEDICINE

## 2019-01-21 ENCOUNTER — CLINICAL SUPPORT (OUTPATIENT)
Dept: ONCOLOGY | Facility: HOSPITAL | Age: 59
End: 2019-01-21

## 2019-01-21 NOTE — PROGRESS NOTES
PATIENTS ONCOLOGY RECORD LOCATED IN [facility]      Subjective     Name:  ENRIQUE MISHRA     Date:  2019  Address:  [address] [place] [address]  Home: [unfilled]  :  1960 AGE:  58 y.o.        RECORDS OBTAINED:  Patients Oncology Record is located in [facility]

## 2019-01-22 LAB — CYTOLOGY SPECIMEN: NORMAL

## 2019-02-07 ENCOUNTER — HOSPITAL ENCOUNTER (OUTPATIENT)
Dept: ONCOLOGY | Facility: CLINIC | Age: 59
Setting detail: INFUSION SERIES
Discharge: HOME | End: 2019-02-07
Attending: INTERNAL MEDICINE | Admitting: INTERNAL MEDICINE

## 2019-02-14 ENCOUNTER — HOSPITAL ENCOUNTER (OUTPATIENT)
Dept: LAB | Facility: HOSPITAL | Age: 59
Discharge: HOME | End: 2019-02-14
Attending: HOSPITALIST | Admitting: HOSPITALIST

## 2019-02-14 LAB
BASOPHILS # BLD AUTO: 0 10*3/UL (ref 0–0.2)
BASOPHILS NFR BLD AUTO: 0 % (ref 0–2)
CONV ANISOCYTES: SLIGHT
CONV POIKILOCYTOSIS IN BLOOD BY LIGHT MICROSCOPY: SLIGHT
CONV SMEAR REVIEW: (no result)
DIFFERENTIAL METHOD BLD: (no result)
EOSINOPHIL # BLD AUTO: 0 % (ref 0–3)
EOSINOPHIL # BLD AUTO: 0 10*3/UL (ref 0–0.3)
ERYTHROCYTE [DISTWIDTH] IN BLOOD BY AUTOMATED COUNT: 13.1 % (ref 11.5–14.5)
HCT VFR BLD AUTO: 36.4 % (ref 35–49)
HGB BLD-MCNC: 11.8 G/DL (ref 12–15)
LYMPHOCYTES # BLD AUTO: 2.7 10*3/UL (ref 0.8–4.8)
LYMPHOCYTES NFR BLD AUTO: 9 % (ref 18–42)
Lab: NORMAL
MCH RBC QN AUTO: 30.5 PG (ref 26–32)
MCHC RBC AUTO-ENTMCNC: 32.5 G/DL (ref 32–36)
MCV RBC AUTO: 93.9 FL (ref 80–94)
MONOCYTES # BLD AUTO: 2.4 10*3/UL (ref 0.1–1.3)
MONOCYTES NFR BLD AUTO: 8 % (ref 2–11)
NEUTROPHILS # BLD AUTO: 24.6 10*3/UL (ref 2.3–8.6)
NEUTROPHILS NFR BLD AUTO: 83 % (ref 50–75)
NRBC BLD AUTO-RTO: 0 /100{WBCS}
PLATELET # BLD AUTO: 297 10*3/UL (ref 150–450)
PMV BLD AUTO: 9.6 FL (ref 7.4–10.4)
RBC # BLD AUTO: 3.88 10*6/UL (ref 4–5.4)
WBC # BLD AUTO: 29.7 10*3/UL (ref 4.5–11.5)

## 2019-03-05 NOTE — PROGRESS NOTES
PATIENTS ONCOLOGY RECORD LOCATED IN [facility]      Subjective     Name:  ENRIQUE MISHRA     Date:  2018  Address:  [address] [place] [address]  Home: [unfilled]  :  1960 AGE:  58 y.o.        RECORDS OBTAINED:  Patients Oncology Record is located in [facility]   Please follow up with the diabetic educator, Lilly Hawkins, in [place] on Thursday 3/7/2019 at 10: 00 am

## 2019-03-13 ENCOUNTER — HOSPITAL ENCOUNTER (OUTPATIENT)
Dept: ONCOLOGY | Facility: HOSPITAL | Age: 59
Discharge: HOME | End: 2019-03-13
Attending: INTERNAL MEDICINE | Admitting: INTERNAL MEDICINE

## 2019-03-13 ENCOUNTER — CLINICAL SUPPORT (OUTPATIENT)
Dept: ONCOLOGY | Facility: HOSPITAL | Age: 59
End: 2019-03-13

## 2019-03-13 ENCOUNTER — HOSPITAL ENCOUNTER (OUTPATIENT)
Dept: ONCOLOGY | Facility: CLINIC | Age: 59
Setting detail: INFUSION SERIES
Discharge: HOME | End: 2019-03-13
Attending: INTERNAL MEDICINE | Admitting: INTERNAL MEDICINE

## 2019-03-13 NOTE — PROGRESS NOTES
PATIENTS ONCOLOGY RECORD LOCATED IN [facility]      Subjective     Name:  ENRIQUE MISHRA     Date:  2019  Address:  [address] [place] [address]  Home: [unfilled]  :  1960 AGE:  58 y.o.        RECORDS OBTAINED:  Patients Oncology Record is located in [facility]

## 2019-03-25 ENCOUNTER — CLINICAL SUPPORT (OUTPATIENT)
Dept: ONCOLOGY | Facility: HOSPITAL | Age: 59
End: 2019-03-25

## 2019-03-25 ENCOUNTER — HOSPITAL ENCOUNTER (OUTPATIENT)
Dept: ONCOLOGY | Facility: CLINIC | Age: 59
Setting detail: INFUSION SERIES
Discharge: HOME | End: 2019-03-25
Attending: INTERNAL MEDICINE | Admitting: INTERNAL MEDICINE

## 2019-03-25 NOTE — PROGRESS NOTES
PATIENTS ONCOLOGY RECORD LOCATED IN [facility]      Subjective     Name:  ENRIQUE MISHRA     Date:  2019  Address:  [address] [place] [address]  Home: [unfilled]  :  1960 AGE:  58 y.o.        RECORDS OBTAINED:  Patients Oncology Record is located in [facility]

## 2019-04-12 ENCOUNTER — CLINICAL SUPPORT (OUTPATIENT)
Dept: ONCOLOGY | Facility: HOSPITAL | Age: 59
End: 2019-04-12

## 2019-04-12 ENCOUNTER — HOSPITAL ENCOUNTER (OUTPATIENT)
Dept: ONCOLOGY | Facility: CLINIC | Age: 59
Setting detail: INFUSION SERIES
Discharge: HOME | End: 2019-04-12
Attending: INTERNAL MEDICINE | Admitting: INTERNAL MEDICINE

## 2019-04-12 NOTE — PROGRESS NOTES
PATIENTS ONCOLOGY RECORD LOCATED IN [facility]      Subjective     Name:  ENRIQUE MISHRA     Date:  2019  Address:  [address] [place] [address]  Home: [unfilled]  :  1960 AGE:  58 y.o.        RECORDS OBTAINED:  Patients Oncology Record is located in [facility]

## 2019-04-18 ENCOUNTER — OFFICE (AMBULATORY)
Dept: URBAN - METROPOLITAN AREA CLINIC 64 | Facility: CLINIC | Age: 59
End: 2019-04-18

## 2019-04-18 VITALS
WEIGHT: 177 LBS | DIASTOLIC BLOOD PRESSURE: 57 MMHG | HEIGHT: 65 IN | HEART RATE: 85 BPM | SYSTOLIC BLOOD PRESSURE: 95 MMHG

## 2019-04-18 DIAGNOSIS — Z15.09 GENETIC SUSCEPTIBILITY TO OTHER MALIGNANT NEOPLASM: ICD-10-CM

## 2019-04-18 DIAGNOSIS — K59.00 CONSTIPATION, UNSPECIFIED: ICD-10-CM

## 2019-04-18 PROCEDURE — 99214 OFFICE O/P EST MOD 30 MIN: CPT | Performed by: INTERNAL MEDICINE

## 2019-04-18 RX ORDER — DICYCLOMINE HYDROCHLORIDE 20 MG/1
40 TABLET ORAL
Qty: 60 | Refills: 11 | Status: COMPLETED
Start: 2019-04-18 | End: 2020-07-14

## 2019-05-16 ENCOUNTER — CLINICAL SUPPORT (OUTPATIENT)
Dept: ONCOLOGY | Facility: HOSPITAL | Age: 59
End: 2019-05-16

## 2019-05-16 ENCOUNTER — HOSPITAL ENCOUNTER (OUTPATIENT)
Dept: ONCOLOGY | Facility: CLINIC | Age: 59
Setting detail: INFUSION SERIES
Discharge: HOME | End: 2019-05-16
Attending: INTERNAL MEDICINE | Admitting: INTERNAL MEDICINE

## 2019-05-16 NOTE — PROGRESS NOTES
PATIENTS ONCOLOGY RECORD LOCATED IN [facility]      Subjective     Name:  ENRIQUE MISHRA     Date:  2019  Address:  [address] [place] [address]  Home: [unfilled]  :  1960 AGE:  58 y.o.        RECORDS OBTAINED:  Patients Oncology Record is located in [facility]

## 2019-05-22 ENCOUNTER — HOSPITAL ENCOUNTER (OUTPATIENT)
Dept: LAB | Facility: HOSPITAL | Age: 59
Discharge: HOME | End: 2019-05-22
Attending: INTERNAL MEDICINE | Admitting: INTERNAL MEDICINE

## 2019-05-22 LAB
ALBUMIN SERPL-MCNC: 3.5 G/DL (ref 3.5–4.8)
ALBUMIN/GLOB SERPL: 1.1 {RATIO} (ref 1–1.7)
ALP SERPL-CCNC: 266 IU/L (ref 32–91)
ALT SERPL-CCNC: 46 IU/L (ref 14–54)
ANION GAP SERPL CALC-SCNC: 14.3 MMOL/L (ref 10–20)
AST SERPL-CCNC: 60 IU/L (ref 15–41)
BASOPHILS # BLD AUTO: 0.1 10*3/UL (ref 0–0.2)
BASOPHILS NFR BLD AUTO: 1 % (ref 0–2)
BILIRUB SERPL-MCNC: 0.7 MG/DL (ref 0.3–1.2)
BILIRUB UR QL STRIP: NEGATIVE MG/DL
BUN SERPL-MCNC: 14 MG/DL (ref 8–20)
BUN/CREAT SERPL: 15.6 (ref 5.4–26.2)
CALCIUM SERPL-MCNC: 9 MG/DL (ref 8.9–10.3)
CASTS URNS QL MICRO: NORMAL /[LPF]
CHLORIDE SERPL-SCNC: 101 MMOL/L (ref 101–111)
COLOR UR: YELLOW
CONV BACTERIA IN URINE MICRO: NEGATIVE
CONV CLARITY OF URINE: CLEAR
CONV CO2: 28 MMOL/L (ref 22–32)
CONV HYALINE CASTS IN URINE MICRO: 0 /[LPF] (ref 0–5)
CONV PROTEIN IN URINE BY AUTOMATED TEST STRIP: NEGATIVE MG/DL
CONV SMALL ROUND CELLS: NORMAL /[HPF]
CONV TOTAL PROTEIN: 6.8 G/DL (ref 6.1–7.9)
CONV UROBILINOGEN IN URINE BY AUTOMATED TEST STRIP: 0.2 MG/DL
CREAT UR-MCNC: 0.9 MG/DL (ref 0.4–1)
CRP SERPL-MCNC: 0.35 MG/DL (ref 0–0.7)
CULTURE INDICATED?: NORMAL
DIFFERENTIAL METHOD BLD: (no result)
EOSINOPHIL # BLD AUTO: 0.5 10*3/UL (ref 0–0.3)
EOSINOPHIL # BLD AUTO: 5 % (ref 0–3)
ERYTHROCYTE [DISTWIDTH] IN BLOOD BY AUTOMATED COUNT: 12.8 % (ref 11.5–14.5)
ERYTHROCYTE [SEDIMENTATION RATE] IN BLOOD BY WESTERGREN METHOD: 23 MM/HR (ref 0–30)
GLOBULIN UR ELPH-MCNC: 3.3 G/DL (ref 2.5–3.8)
GLUCOSE SERPL-MCNC: 102 MG/DL (ref 65–99)
GLUCOSE UR QL: NEGATIVE MG/DL
HCT VFR BLD AUTO: 38.4 % (ref 35–49)
HGB BLD-MCNC: 12.5 G/DL (ref 12–15)
HGB UR QL STRIP: NEGATIVE
KETONES UR QL STRIP: NEGATIVE MG/DL
LEUKOCYTE ESTERASE UR QL STRIP: NEGATIVE
LYMPHOCYTES # BLD AUTO: 2.5 10*3/UL (ref 0.8–4.8)
LYMPHOCYTES NFR BLD AUTO: 27 % (ref 18–42)
MCH RBC QN AUTO: 30.8 PG (ref 26–32)
MCHC RBC AUTO-ENTMCNC: 32.6 G/DL (ref 32–36)
MCV RBC AUTO: 94.6 FL (ref 80–94)
MONOCYTES # BLD AUTO: 1.6 10*3/UL (ref 0.1–1.3)
MONOCYTES NFR BLD AUTO: 18 % (ref 2–11)
NEUTROPHILS # BLD AUTO: 4.5 10*3/UL (ref 2.3–8.6)
NEUTROPHILS NFR BLD AUTO: 49 % (ref 50–75)
NITRITE UR QL STRIP: NEGATIVE
NRBC BLD AUTO-RTO: 0 /100{WBCS}
NRBC/RBC NFR BLD MANUAL: 0 10*3/UL
PH UR STRIP.AUTO: 6 [PH] (ref 4.5–8)
PLATELET # BLD AUTO: 344 10*3/UL (ref 150–450)
PMV BLD AUTO: 9.1 FL (ref 7.4–10.4)
POTASSIUM SERPL-SCNC: 4.3 MMOL/L (ref 3.6–5.1)
RBC # BLD AUTO: 4.06 10*6/UL (ref 4–5.4)
RBC #/AREA URNS HPF: 2 /[HPF] (ref 0–3)
SODIUM SERPL-SCNC: 139 MMOL/L (ref 136–144)
SP GR UR: 1.01 (ref 1–1.03)
SPERM URNS QL MICRO: NORMAL /[HPF]
SQUAMOUS SPT QL MICRO: 0 /[HPF] (ref 0–5)
UNIDENT CRYS URNS QL MICRO: NORMAL /[HPF]
WBC # BLD AUTO: 9.2 10*3/UL (ref 4.5–11.5)
WBC #/AREA URNS HPF: 0 /[HPF] (ref 0–5)
YEAST SPEC QL WET PREP: NORMAL /[HPF]

## 2019-05-29 ENCOUNTER — HOSPITAL ENCOUNTER (OUTPATIENT)
Dept: GASTROENTEROLOGY | Facility: HOSPITAL | Age: 59
Discharge: HOME | End: 2019-05-29
Attending: INTERNAL MEDICINE | Admitting: INTERNAL MEDICINE

## 2019-05-29 ENCOUNTER — ON CAMPUS - OUTPATIENT (AMBULATORY)
Dept: URBAN - METROPOLITAN AREA HOSPITAL 85 | Facility: HOSPITAL | Age: 59
End: 2019-05-29

## 2019-05-29 DIAGNOSIS — K31.7 POLYP OF STOMACH AND DUODENUM: ICD-10-CM

## 2019-05-29 DIAGNOSIS — Z15.09 GENETIC SUSCEPTIBILITY TO OTHER MALIGNANT NEOPLASM: ICD-10-CM

## 2019-05-29 PROCEDURE — 43251 EGD REMOVE LESION SNARE: CPT | Performed by: INTERNAL MEDICINE

## 2019-05-31 ENCOUNTER — CONVERSION ENCOUNTER (OUTPATIENT)
Dept: FAMILY MEDICINE CLINIC | Facility: CLINIC | Age: 59
End: 2019-05-31

## 2019-05-31 ENCOUNTER — HOSPITAL ENCOUNTER (OUTPATIENT)
Dept: FAMILY MEDICINE CLINIC | Facility: CLINIC | Age: 59
Setting detail: SPECIMEN
Discharge: HOME | End: 2019-05-31
Attending: HOSPITALIST | Admitting: HOSPITALIST

## 2019-05-31 LAB
ALBUMIN SERPL-MCNC: 3.7 G/DL (ref 3.5–4.8)
ALBUMIN/GLOB SERPL: 1.1 {RATIO} (ref 1–1.7)
ALP SERPL-CCNC: 200 IU/L (ref 32–91)
ALT SERPL-CCNC: 28 IU/L (ref 14–54)
ANION GAP SERPL CALC-SCNC: 14.6 MMOL/L (ref 10–20)
AST SERPL-CCNC: 30 IU/L (ref 15–41)
BILIRUB SERPL-MCNC: 0.3 MG/DL (ref 0.3–1.2)
BUN SERPL-MCNC: 10 MG/DL (ref 8–20)
BUN/CREAT SERPL: 12.5 (ref 5.4–26.2)
CALCIUM SERPL-MCNC: 9.2 MG/DL (ref 8.9–10.3)
CHLORIDE SERPL-SCNC: 102 MMOL/L (ref 101–111)
CHOLEST SERPL-MCNC: 141 MG/DL
CHOLEST/HDLC SERPL: 2.6 {RATIO}
CONV CO2: 28 MMOL/L (ref 22–32)
CONV LDL CHOLESTEROL DIRECT: 79 MG/DL (ref 0–100)
CONV TOTAL PROTEIN: 7.1 G/DL (ref 6.1–7.9)
CREAT UR-MCNC: 0.8 MG/DL (ref 0.4–1)
GLOBULIN UR ELPH-MCNC: 3.4 G/DL (ref 2.5–3.8)
GLUCOSE SERPL-MCNC: 101 MG/DL (ref 65–99)
HDLC SERPL-MCNC: 54 MG/DL
LDLC/HDLC SERPL: 1.5 {RATIO}
LIPID INTERPRETATION: NORMAL
POTASSIUM SERPL-SCNC: 4.6 MMOL/L (ref 3.6–5.1)
SODIUM SERPL-SCNC: 140 MMOL/L (ref 136–144)
T3 SERPL-MCNC: 2.45 NG/ML (ref 0.87–1.78)
T4 FREE SERPL-MCNC: 0.88 NG/DL (ref 0.58–1.64)
TRIGL SERPL-MCNC: 64 MG/DL
TSH SERPL-ACNC: <0.02 UIU/ML (ref 0.34–5.6)
VLDLC SERPL CALC-MCNC: 7.8 MG/DL

## 2019-06-04 VITALS
BODY MASS INDEX: 28.79 KG/M2 | WEIGHT: 173 LBS | SYSTOLIC BLOOD PRESSURE: 110 MMHG | DIASTOLIC BLOOD PRESSURE: 74 MMHG | RESPIRATION RATE: 8 BRPM | HEART RATE: 68 BPM

## 2019-06-06 NOTE — PROGRESS NOTES
Vital Signs:    Patient Profile:    58 Years Old Female  Height:     65 inches (165.10 cm)  Weight:     173 pounds  BMI:        28.79     Temp:       99.0 degrees F oral  Pulse rate: 68 / minute  Pulse rhythm:   regular  Resp:       8 per minute  BP Sittin / 74  (left arm)    Cuff size:  large      Problems: Active problems were reviewed with the patient during this visit.  Medications: Medications were reviewed with the patient during this visit.  Allergies: Allergies were reviewed with the patient during this visit.        Vitals Entered By: [facility]      Visit Type:  Follow-up Visit  Referring Provider:  Sean Baig MD  Primary Provider:  Sean Baig MD      History of Present Illness:         The patient comes in today for a follow-up visit.  here for med check.  Recently diagnosed with Monahan Syndrome.           When questioned about other issues, patient has concerns about nothing in particular.           The ROS is negative for chest pain, SOA, Dizzyness, headache, fever, chills, nausea, vomiting, abdominal pain and bowel problems.        Past Medical History:     Reviewed history from 2016 and no changes required:        Hematologic / Lymphatic Hx: von Willebrand disease        Immunologic Hx: MRSA        Dermatologic Hx: hamartoma        Bipolar disorder        Breast Cancer        Depression        Deep vein thrombosis        GERD        Hypertension        Hypothyroidism        Ulcerative colitis        Heart murmur        Chronic pain    Family History Summary:      Reviewed history Last on 2019 and no changes required:2019      General Comments - FH:  FH Heart Disease  FH Kidney Disease  FH Colon Cancer  Family History of Melanoma      Social History:     Reviewed history from 2018 and no changes required:        Patient is a former smoker.        Passive Smoke: N        Alcohol Use: N        Drug Use: N        HIV/High Risk: N        Regular Exercise: Y         Hx Domestic Abuse: N        Judaism Affecting Care: N                Risk Factors:     Smoked Tobacco Use:  Former smoker     Cigarettes:  Yes        Year quit:  1994        Years Since Last Quit:  25  Smokeless Tobacco Use:  Never  Passive smoke exposure:  no  Drug use:  no  HIV high-risk behavior:  no  Caffeine use:  1 drinks per day  Alcohol use:  no  Exercise:  yes     Times per week:  3     Type of Exercise:  walk  Seatbelt use:  100 %  Sun Exposure:  rarely    Previous Tobacco Use: Signed On - 01/16/2019  Smoked Tobacco Use:  Former smoker     Cigarettes:  Yes        Year quit:  1994        Years Since Last Quit:  25 years, 4 months, 30 days  Smokeless Tobacco Use:  Never     Counseled to quit/cut down:  yes  Passive smoke exposure:  no  Drug use:  no  HIV high-risk behavior:  no  Caffeine use:  1 drinks per day    Previous Alcohol Use: Signed On - 11/28/2018  Alcohol use:  no  Exercise:  yes     Times per week:  3     Type of Exercise:  walk  Seatbelt use:  100 %  Sun Exposure:  rarely    Colonoscopy History:     Date of Last Colonoscopy:  06/19/2012    Mammogram History:     Date of Last Mammogram:  06/19/2012        Physical Exam   Weight:  168  BP:  110/74 mm HG    Medication List:  HYDROXYZINE HCL 25 MG TABS (HYDROXYZINE HCL) TAKE ONE TABLET BY MOUTH THREE TIMES A DAY AS NEEDED FOR ITCHING  HYDROXYCHLOROQUINE SULFATE 200 MG ORAL TABLET (HYDROXYCHLOROQUINE SULFATE) Take one (1) tablet by mouth twice a day  NEURONTIN 100 MG ORAL CAPSULE (GABAPENTIN) take 1-2 pills three times a day  COLESTIPOL MICRONIZED 1 GM TAB (COLESTIPOL HCL) TAKE TWO TABLETS BY MOUTH TWO TIMES A DAY  ZYRTEC ALLERGY 10 MG ORAL CAPSULE (CETIRIZINE HCL) take 1 tablet bid  BISOPROLOL-HCTZ 10-6.25 MG TAB (BISOPROLOL-HYDROCHLOROTHIAZIDE) TAKE ONE TABLET BY MOUTH DAILY  PROMETHAZINE HCL 25 MG ORAL TABLET (PROMETHAZINE HCL) take one every 4 hours as needed  PENNSAID 2 % TRANSDERMAL SOLUTION (DICLOFENAC SODIUM) apply dime size to affected  area twice daily  LISINOPRIL 5 MG ORAL TABLET (LISINOPRIL) TAKE ONE TABLET BY MOUTH DAILY  NIFEDIPINE ER 60 MG TAB, 24 HR (NIFEDIPINE) TAKE ONE TABLET BY MOUTH DAILY  ELIDEL 1 % EXTERNAL CREAM (PIMECROLIMUS) AAA prn  DEXILANT 60 MG ORAL CAPSULE DELAYED RELEASE (DEXLANSOPRAZOLE) 1 tab daily  MS CONTIN 30 MG ORAL TABLET EXTENDED RELEASE (MORPHINE SULFATE) take one twice a day    G89.29  ONDANSETRON HCL 4 MG ORAL TABLET (ONDANSETRON HCL) Take 1 tab by mouth every 6 hours as needed for nausea and/or vomiting  PROAIR  (90 Base) MCG/ACT INHALATION AEROSOL SOLUTION (ALBUTEROL SULFATE) 2 puffs 4 times daily and as needed  CYTOMEL 25 MCG ORAL TABLET (LIOTHYRONINE SODIUM) TAKE ONE TABLET BY MOUTH TWICE A DAY  CYCLOBENZAPRINE 10 MG TABLET (CYCLOBENZAPRINE HCL) TAKE ONE TABLET BY MOUTH TWICE A DAY  LEVOTHYROXINE 150 MCG TABLET (LEVOTHYROXINE SODIUM) TAKE ONE TABLET BY MOUTH DAILY  SEROQUEL  MG ORAL TABLET EXTENDED RELEASE 24 HOUR (QUETIAPINE FUMARATE) take 1 tablet by mouth once a day  NORCO  MG ORAL TABLET (HYDROCODONE-ACETAMINOPHEN) take one tablet every 4 hours as needed for pain      Surgical History   adhesionolysis(2011)  splenectomy  mastectomy - modified radical right  hysterectomy  laminectomy  oophorectomy bilateral  back surgery  Total Hip Arthroplasty right  right knee replacement 2015  rt wrist distal radius osteomies  Breast implant/removal  lasik  cervical hardware c4-c6  gallbladder  ,    Risk Factors  Tobacco Use: Former smoker  Passive smoke exposure: no  Exercise: yes  Exercise: 3 times per week  Type of Exercise: walk  Illicit Drug use: no      Physical Examination   General Appearance   In no acute distress.  Alert & oriented.  Behavior and affect appropriate to situation  HEENT   PERRLA, EOMI, TM's normal.  Pharynx clear  Cardiovascular   Regular rate and rhythm  Lungs   Clear to auscultation  Abdomen   Soft, non-tender.  Bowel sounds present.  No hepatosplenomegaly        Problem # 1:   Chronic pain (ICD-338.29) (VKE04-U16.29)  stable for the most part with occ breakthrough pain.    Problem # 2:  Monahan Syndrome  following with Dr. Jones    Problem # 3:  BIPOLAR AFFECTIVE DISORDER (ICD-296.80) (WGB63-F16.9)  continue meds    Problem # 4:  HYPOTHYROIDISM (ICD-244.9) (YNB59-C44.9)    Her updated medication list for this problem includes:     Cytomel 25 Mcg Oral Tablet (Liothyronine sodium) ..... Take one tablet by mouth twice a day     Levothyroxine 150 Mcg Tablet (Levothyroxine sodium) ..... Take one tablet by mouth daily    Labs Reviewed:  TSH: <0.01 mIU/L (05/07/2015)     Chol: 147 (05/07/2015)   HDL: 40 (05/07/2015)   LDL: 89 (05/07/2015)       Orders:  [facility] LIPID PANEL (LIPID)  [facility] COMPREHENSIVE METABOLIC PANEL (CMP) (MPC)  FM THYROID STIMULATING HORMONE (TSH) (TSH)  [facility] T4 THYROXINE FREE (FT4)  H T3 TRIODOTHYRONINE TOTAL *not to be ordered w/ T3 FREE (T3)        Patient Instructions:  1)  During this office visit we discussed the pertinent aspects of the visit and the treatment recommendations.  Patient was given the opportunity to ask questions and discuss other concerns.  2)  Please schedule a follow-up appointment as needed.                Medication Administration    Orders Added:  1)  Ofc Vst, Est Level III [59636]  2)  H LIPID PANEL [LIPID]  3)  [facility] COMPREHENSIVE METABOLIC PANEL (CMP) [MPC]  4)  H THYROID STIMULATING HORMONE (TSH) [TSH]  5)  [facility] T4 THYROXINE FREE [FT4]  6)  H T3 TRIODOTHYRONINE TOTAL *not to be ordered w/ T3 FREE [T3]        Electronically signed by Sean Baig MD on 05/31/2019 at 3:19 PM  ________________________________________________________________________       Disclaimer: Converted Note message may not contain all data elements that existed in the legacy source system. Please see [facility] for the original note details.

## 2019-06-13 ENCOUNTER — HOSPITAL ENCOUNTER (OUTPATIENT)
Dept: ONCOLOGY | Facility: CLINIC | Age: 59
Setting detail: INFUSION SERIES
Discharge: HOME | End: 2019-06-13
Attending: INTERNAL MEDICINE | Admitting: INTERNAL MEDICINE

## 2019-06-28 ENCOUNTER — DOCUMENTATION (OUTPATIENT)
Dept: FAMILY MEDICINE CLINIC | Facility: CLINIC | Age: 59
End: 2019-06-28

## 2019-06-28 RX ORDER — LISINOPRIL 5 MG/1
TABLET ORAL
Qty: 90 TABLET | Refills: 1 | Status: SHIPPED | OUTPATIENT
Start: 2019-06-28 | End: 2019-06-28 | Stop reason: SDUPTHER

## 2019-06-28 RX ORDER — LISINOPRIL 5 MG/1
TABLET ORAL
Qty: 90 TABLET | Refills: 1 | Status: SHIPPED | OUTPATIENT
Start: 2019-06-28 | End: 2020-07-13 | Stop reason: SDUPTHER

## 2019-07-17 NOTE — TELEPHONE ENCOUNTER
Previously denied due to showing not prescribed by out providers.  This medication is a medication that Dr. Jones has prescribed for the patient.  Rx sent for the patient.

## 2019-07-19 ENCOUNTER — TELEPHONE (OUTPATIENT)
Dept: ONCOLOGY | Facility: CLINIC | Age: 59
End: 2019-07-19

## 2019-07-19 NOTE — TELEPHONE ENCOUNTER
Received VM from pt's , Brian stating we had denied pt's calcium supplement and he was not sure why. I looked into this. Refill was originally denied because MA did not think it was prescribed by one of our providers, however Liliana Saenz RN sent over new rx for pt because Dr. Jones prescribes this for pt. I called pt and let her know rx was sent to her pharmacy. She showed v/u.

## 2019-07-22 RX ORDER — HYDROCODONE BITARTRATE AND ACETAMINOPHEN 10; 325 MG/1; MG/1
TABLET ORAL
COMMUNITY
Start: 2011-12-21 | End: 2019-07-22 | Stop reason: SDUPTHER

## 2019-07-22 RX ORDER — HYDROCODONE BITARTRATE AND ACETAMINOPHEN 10; 325 MG/1; MG/1
1 TABLET ORAL EVERY 4 HOURS PRN
Qty: 100 TABLET | Refills: 0 | Status: SHIPPED | OUTPATIENT
Start: 2019-07-22 | End: 2019-10-15 | Stop reason: SDUPTHER

## 2019-07-29 ENCOUNTER — TELEPHONE (OUTPATIENT)
Dept: ONCOLOGY | Facility: CLINIC | Age: 59
End: 2019-07-29

## 2019-07-29 ENCOUNTER — OFFICE VISIT (OUTPATIENT)
Dept: ONCOLOGY | Facility: CLINIC | Age: 59
End: 2019-07-29

## 2019-07-29 ENCOUNTER — APPOINTMENT (OUTPATIENT)
Dept: LAB | Facility: HOSPITAL | Age: 59
End: 2019-07-29

## 2019-07-29 VITALS
TEMPERATURE: 98 F | WEIGHT: 177.2 LBS | RESPIRATION RATE: 16 BRPM | BODY MASS INDEX: 30.25 KG/M2 | DIASTOLIC BLOOD PRESSURE: 76 MMHG | HEART RATE: 91 BPM | HEIGHT: 64 IN | SYSTOLIC BLOOD PRESSURE: 117 MMHG

## 2019-07-29 DIAGNOSIS — Z12.31 ENCOUNTER FOR SCREENING MAMMOGRAM FOR MALIGNANT NEOPLASM OF BREAST: ICD-10-CM

## 2019-07-29 DIAGNOSIS — E53.8 VITAMIN B12 DEFICIENCY: ICD-10-CM

## 2019-07-29 DIAGNOSIS — Z85.3 HISTORY OF BREAST CANCER: Primary | ICD-10-CM

## 2019-07-29 PROCEDURE — G0463 HOSPITAL OUTPT CLINIC VISIT: HCPCS | Performed by: INTERNAL MEDICINE

## 2019-07-29 PROCEDURE — 99214 OFFICE O/P EST MOD 30 MIN: CPT | Performed by: INTERNAL MEDICINE

## 2019-07-29 RX ORDER — ALPRAZOLAM 0.25 MG/1
TABLET ORAL AS NEEDED
COMMUNITY
End: 2020-04-15

## 2019-07-29 RX ORDER — CYANOCOBALAMIN 1000 UG/ML
1000 INJECTION, SOLUTION INTRAMUSCULAR; SUBCUTANEOUS
Status: CANCELLED | OUTPATIENT
Start: 2019-11-04

## 2019-07-29 RX ORDER — ONDANSETRON 4 MG/1
4 TABLET, FILM COATED ORAL EVERY 8 HOURS PRN
COMMUNITY
Start: 2015-01-30 | End: 2021-07-06 | Stop reason: SDUPTHER

## 2019-07-29 RX ORDER — CYANOCOBALAMIN 1000 UG/ML
1000 INJECTION, SOLUTION INTRAMUSCULAR; SUBCUTANEOUS
Status: CANCELLED | OUTPATIENT
Start: 2019-08-29

## 2019-07-29 RX ORDER — CYANOCOBALAMIN 1000 UG/ML
1000 INJECTION, SOLUTION INTRAMUSCULAR; SUBCUTANEOUS
Status: CANCELLED | OUTPATIENT
Start: 2019-07-31

## 2019-07-29 RX ORDER — CYANOCOBALAMIN 1000 UG/ML
1000 INJECTION, SOLUTION INTRAMUSCULAR; SUBCUTANEOUS
Status: CANCELLED | OUTPATIENT
Start: 2019-12-02

## 2019-07-29 RX ORDER — CYCLOBENZAPRINE HCL 10 MG
TABLET ORAL EVERY 12 HOURS
COMMUNITY
Start: 2016-11-17 | End: 2019-12-11 | Stop reason: SDUPTHER

## 2019-07-29 RX ORDER — HYDROXYZINE HYDROCHLORIDE 25 MG/1
25 TABLET, FILM COATED ORAL EVERY 8 HOURS PRN
COMMUNITY
Start: 2017-12-31 | End: 2021-07-06 | Stop reason: SDUPTHER

## 2019-07-29 NOTE — TELEPHONE ENCOUNTER
Pt was here today for MD visit. Dr. Jones states that it does not look like radiologist ever made a comment about pt's pancreas in the CT report from 3/2019 so she asked that I call and see if an addendum can be made. I called over to CT dept and spoke to April. She took down the request and my phone number in case any other information is needed from radiologist.

## 2019-07-29 NOTE — PROGRESS NOTES
Hematology-Oncology Follow-up Note       Tracie Gross  1960    Primary Care Physician: Sean Baig MD  Referring Physician: Sean Baig,*    Reason For Visit:  Chief Complaint   Patient presents with   • Follow-up     hx of right breast cancer   • Follow-up     burdick syndrome       HPI:   This is a 58-year-old female who was diagnosed with right breast cancer in 1985 when she was 32 years old.  Patient was originally seen on 7/25/18.  Please refer to the details of that note for more information.   • 7/25/18 - CBC:  WBC 13.6, hemoglobin 11.7, platelet count 392,000.  Differentials 65% neutrophils, 15% lymphocytes.  There was mild monocytosis at 16.  Eosinophils were 2.3 and basophils were 0.3.  B12 292.  Ferritin 88.  Folate 13.8.  AST slightly high at 57.  Alkaline phosphatase was high at 326.  .  Haptoglobin 179, normal.  SPEP with DYLAN did not show any monoclonal protein.  Flow cytometry did not show any evidence of acute leukemia or lymphoproliferative disease.  There was monocytosis measured at 21%.  Reticulocyte count normal 1.02.  Peripheral smear showed absolute monocytosis at 19%, thrombocytosis and macrocytosis.  BCR-abl was negative.  Methylmalonic acid level was elevated to 420.    • 8/1/18 - PET/CT scan showed multiple small bilateral lymph nodes in the neck without metabolic activity or change from prior CT scans and are likely due to SLE.  There were unchanged bilateral level 1 axillary lymph nodes without metabolic activity.  Multiple mediastinal nodes were also seen.  The largest 11 mm, unchanged from prior imaging with no hypermetabolic activity.  There was no evidence of metastatic disease in the abdomen or pelvis.  There was no focal lesion within the liver or biliary system.  Multiple small retroperitoneal and external iliac nodes, bilateral inguinal nodes similar to prior imaging with no PET activity.    • 8/8/18 - RICHIE-2 analysis with no mutation  identified.    • 11/29/18 - Bone density revealed osteoporosis.  Patient is currently on Fosamax.   • 12/3/18 - Bone marrow aspiration and biopsy showed normocellular bone marrow at 40%.  There was adequate iron stores and no evidence of malignancy was seen.  Flow cytometry was negative.  Cytogenetics showed normal female karyotype.  Blasts were less than 3% of nucleated cells.  There was no significant dyspoiesis.   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     • 1/22/19 - Urine cytology was negative for malignant cells.    • 2/28/19 - Patient seen by Dermatology for her annual skin evaluation.   • 3/13/19 - CT scan of the chest, abdomen and pelvis:  CT scan of chest showed chronic changes.  • 5/29/2019 patient had an EGD with polypectomy performed by Dr. Conway.  Dr. Conway has recommended follow-up MRI of the pancreas in 2 years.  And also EGD and colonoscopy in 2 years.    Subjective:   Pt is here today for a follow up appointment. She states she had a CT scan done in March 2019. She states she saw Dr. Conway and he did a upper scope. She believes he would like her to have another EGD/colonscopy in 2 years. She states she has two children. Her first child has recently finished treatments for colon cancer and her second child has recently been diagnosed with Monahan Syndrome. She states she has an older sister who tested positive for Monahan Syndrome. Her sister has 3 children, 2  have been tested and they have both came back negative for Monahan. She also states she has a twin sister that was tested and she was negative for Monahan Syndrome. Pt states she had her last mammogram around October 2018.  She denies breast lumps, nipple discharge or skin discoloration.  She also denies breast pain.          The following portions of the patient's history were reviewed and  updated as appropriate: allergies, current medications, past family history, past medical history, past social history, past surgical history and problem list.     Past Medical History:   Diagnosis Date   • Arthritis    • Asthma    • Bipolar affective disorder (CMS/HCC)    • Breast cancer (CMS/HCC)     RT   • Colitis    • Depression    • H/O breast reconstruction     SEVERAL   • H/O laminectomy    • Hamartoma (CMS/HCC)    • Hx of bilateral oophorectomy    • Hypertension    • Hypothyroidism    • Kienbock's disease, right     WRIST   • Lupus    • Monahan syndrome    • Raynaud disease    • Scleroderma (CMS/HCC)    • Von Willebrand disease (CMS/HCC)        Past Surgical History:   Procedure Laterality Date   • BREAST RECONSTRUCTION Right    • BREAST RECONSTRUCTION Right    • CHOLECYSTECTOMY     • COLONOSCOPY     • HYSTERECTOMY      PARTIAL   • LASIK      LASIK/ LASIK ENHANCEMENT   • MASTECTOMY RADICAL Right    • OOPHORECTOMY Bilateral          Current Outpatient Medications:   •  Alendronate Sodium 70 MG effervescent tablet, Take 1 tablet by mouth 1 (One) Time Per Week., Disp: , Rfl:   •  ALPRAZolam (XANAX) 0.25 MG tablet, Take  by mouth As Needed. PRN, Disp: , Rfl:   •  bisoprolol-hydrochlorothiazide (ZIAC) 10-6.25 MG per tablet, Take 1 tablet by mouth Daily., Disp: , Rfl:   •  Calcium 600-200 MG-UNIT per tablet, Take 1 tablet by mouth 2 (Two) Times a Day., Disp: 60 tablet, Rfl: 3  •  colestipol (COLESTID) 1 g tablet, Take 2 g by mouth 2 (Two) Times a Day., Disp: , Rfl:   •  cyclobenzaprine (FLEXERIL) 10 MG tablet, Every 12 (Twelve) Hours. PRN, Disp: , Rfl:   •  dexlansoprazole (DEXILANT) 60 MG capsule, Take 60 mg by mouth Daily., Disp: , Rfl:   •  ferrous sulfate 325 (65 FE) MG tablet, Take 325 mg by mouth Daily With Breakfast., Disp: , Rfl:   •  HYDROcodone-acetaminophen (NORCO)  MG per tablet, Take 1 tablet by mouth Every 4 (Four) Hours As Needed for Moderate Pain ., Disp: 100 tablet, Rfl: 0  •   hydroxychloroquine (PLAQUENIL) 200 MG tablet, Take 200 mg by mouth 2 (Two) Times a Day., Disp: , Rfl:   •  hydrOXYzine (ATARAX) 25 MG tablet, Every 8 (Eight) Hours., Disp: , Rfl:   •  lamoTRIgine (LaMICtal) 150 MG tablet, Take 150 mg by mouth Daily., Disp: , Rfl:   •  levothyroxine (SYNTHROID, LEVOTHROID) 150 MCG tablet, Take 150 mcg by mouth Daily., Disp: , Rfl:   •  liothyronine (CYTOMEL) 25 MCG tablet, Take 25 mcg by mouth 2 (Two) Times a Day., Disp: , Rfl:   •  lisinopril (PRINIVIL,ZESTRIL) 5 MG tablet, TAKE ONE TABLET BY MOUTH DAILY, Disp: 90 tablet, Rfl: 1  •  Methylnaltrexone Bromide (RELISTOR) 150 MG tablet, Take 2 tablets by mouth 2 (Two) Times a Day., Disp: , Rfl:   •  Morphine Sulfate ER 30 MG tablet extended-release 12 hour, Take 1 tablet by mouth 2 (Two) Times a Day., Disp: 60 each, Rfl: 0  •  ondansetron (ZOFRAN) 4 MG tablet, ONDANSETRON HCL 4 MG TABS, Disp: , Rfl:   •  QUETIAPINE FUMARATE ER PO, Take  by mouth., Disp: , Rfl:   •  ranitidine (ZANTAC) 300 MG capsule, Take 300 mg by mouth Every Evening., Disp: , Rfl:   •  Unable to find, 1 each 1 (One) Time. NEFEDIPINE ER 60MG, Disp: , Rfl:   •  Unable to find, 1 each 1 (One) Time. DEXAMETASONE 0.25 CREME, Disp: , Rfl:   •  vitamin B-12 (CYANOCOBALAMIN) 1000 MCG tablet, Take 1,000 mcg by mouth Every 30 (Thirty) Days., Disp: , Rfl:     Allergies   Allergen Reactions   • Penicillins Shortness Of Breath   • Aspirin Other (See Comments)     VONWILLENBRAND DISEASE    • Baclofen Hives       Family History   Problem Relation Age of Onset   • Colon cancer Mother    • Heart disease Mother    • Kidney disease Father    • Heart disease Father    • Colon cancer Sister    • Diabetes Sister    • Heart disease Sister    • Von Willebrand disease Sister    • Von Willebrand disease Brother    • Von Willebrand disease Son    • Diabetes Paternal Grandmother    • Colon cancer Other    • Colon cancer Son    • Von Willebrand disease Son        Cancer-related family history  "includes Colon cancer in her mother, other, sister, and son.    Social History     Tobacco Use   • Smoking status: Former Smoker     Last attempt to quit:      Years since quittin.5   • Smokeless tobacco: Never Used   Substance Use Topics   • Alcohol use: Yes     Comment: RARE   • Drug use: No         ROS:     Review of Systems   Constitutional: Negative for chills and fever.   HENT: Negative for ear pain, mouth sores, nosebleeds and sore throat.    Eyes: Negative for photophobia and visual disturbance.   Respiratory: Negative for wheezing and stridor.    Cardiovascular: Negative for chest pain and palpitations.   Gastrointestinal: Negative for abdominal pain, diarrhea, nausea and vomiting.   Endocrine: Negative for cold intolerance and heat intolerance.   Genitourinary: Negative for dysuria and hematuria.   Musculoskeletal: Negative for joint swelling and neck stiffness.   Skin: Negative for color change and rash.   Neurological: Negative for seizures and syncope.   Hematological: Negative for adenopathy.        No obvious bleeding   Psychiatric/Behavioral: Negative for agitation, confusion and hallucinations.       Objective:    Vitals:    19 1233   BP: 117/76   Pulse: 91   Resp: 16   Temp: 98 °F (36.7 °C)   Weight: 80.4 kg (177 lb 3.2 oz)   Height: 162.6 cm (64\")   PainSc:   2   PainLoc: Generalized       (1) Restricted in physically strenuous activity, ambulatory and able to do work of light nature    Physical Exam:     Physical Exam   Constitutional: She is oriented to person, place, and time. No distress.   HENT:   Head: Normocephalic and atraumatic.   Eyes: Conjunctivae and EOM are normal. Right eye exhibits no discharge. Left eye exhibits no discharge. No scleral icterus.   Neck: Normal range of motion. Neck supple. No thyromegaly present.   Cardiovascular: Normal rate, regular rhythm and normal heart sounds. Exam reveals no gallop and no friction rub.   Pulmonary/Chest: Effort normal. No " stridor. No respiratory distress. She has no wheezes.   Abdominal: Soft. Bowel sounds are normal. She exhibits no mass. There is no tenderness. There is no rebound and no guarding.   Musculoskeletal: Normal range of motion. She exhibits no tenderness.   Lymphadenopathy:     She has no cervical adenopathy.   Neurological: She is alert and oriented to person, place, and time. She exhibits normal muscle tone.   Skin: Skin is warm. No rash noted. She is not diaphoretic. No erythema.   Psychiatric: She has a normal mood and affect. Her behavior is normal.   Nursing note and vitals reviewed.    Right chest wall exam was normal.  Left breast exam showed chronic scar, there was no nipple discharge, skin discoloration or palpable lumps.  Bilateral axilla were negative    Lab Results - Last 18 Months   Lab Units 05/22/19  1851 02/14/19  1332 12/03/18  1235   WBC 10*3/uL 9.2 29.7* 12.1*   HEMOGLOBIN g/dL 12.5 11.8* 11.6*   HEMATOCRIT % 38.4 36.4 35.0   PLATELETS 10*3/uL 344 297 398   MCV fL 94.6* 93.9 92.4     Lab Results - Last 18 Months   Lab Units 05/31/19  1459 05/22/19  1851 09/19/18  1525   SODIUM mmol/L 140 139 137   POTASSIUM mmol/L 4.6 4.3 4.1   CHLORIDE mmol/L 102 101 101   TOTAL CO2 mmol/L 28 28 27   BUN mg/dL 10 14 15   CREATININE mg/dl 0.8 0.9 1.0   CALCIUM mg/dL 9.2 9.0 9.0   BILIRUBIN mg/dL 0.3 0.7 0.3   ALK PHOS IU/L 200* 266* 239*   ALT (SGPT) IU/L 28 46 40   AST (SGOT) IU/L 30 60* 38   GLUCOSE mg/dL 101* 102* 96       Assessment/Plan     Assessment:  1. 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  She also has DICER1 gene as well as TSC2 with variants of unknown significance.   2. History of right breast cancer, status post right mastectomy followed by axillary lymph node dissection and right chest wall reconstruction in 1985 at the age of 32.  3. Elevated liver function tests, pruritus, but no significant pathology  found on both the PET scan, MRCP, except for post cholecystectomy, biliary ductal dilatation.  On Colestipol for pruritus.   4. She has peripheral lymphadenopathy involving the neck, axilla, mediastinum and retroperitoneum that are not PET avid.  This lymphadenopathy may be due to chronic inflammatory disease [lupus].    5. Strong family history of colon cancer and personal history of breast cancer.   6. Systemic lupus erythematosus, scleroderma, Raynaud’s phenomenon.   7. Normocytic anemia, multifactorial, underlying autoimmune disease, relative iron deficiency and B12 deficiency.  On iron and B12 supplementation.    8. Persistent monocytosis, status post bone marrow aspiration and biopsy which was essentially normal.    9. Osteoporosis.  On Fosamax.  Prolia declined by her insurance company.   10. Urine cytology was negative as of January 2019.    11. Last Dermatology appointment was 2/28/19.  12. Status post complete hysterectomy reducing her risk for ovarian and uterine cancer.           Plan:  Plan is for continued observation.  She will follow up with GI for her GI endoscopies due in May,2020.  I have also requested for radiologist to review and comment on her pancreatic imaging.  So far her CT scans of the chest, abdomen and pelvis have not shown any evidence of progressive disease.  I will plan to see her again in four months.  She will follow up with the rest of her physicians for her ongoing medical needs.  Patient will be due for unilateral left screening mammogram in November 2019.  She will continue monthly breast self-exam and call for lumps nipple discharge, skin discoloration.  Breast exam today was unremarkable  Urine cytology will be done in January 2020  Dermatology appointment will be due February 2020  CT scans of the chest abdomen and pelvis will be due March 2020    All her questions have been answered to the best of my abilities          No orders of the defined types were placed in this  encounter.               Thank you very much for providing the opportunity to participate in this patient’s care. Please do not hesitate to call if there are any other questions

## 2019-07-31 ENCOUNTER — HOSPITAL ENCOUNTER (OUTPATIENT)
Dept: ONCOLOGY | Facility: HOSPITAL | Age: 59
Discharge: HOME | End: 2019-07-31
Admitting: NURSE PRACTITIONER

## 2019-07-31 VITALS
BODY MASS INDEX: 30.56 KG/M2 | HEIGHT: 64 IN | TEMPERATURE: 98.1 F | SYSTOLIC BLOOD PRESSURE: 128 MMHG | WEIGHT: 179 LBS | HEART RATE: 87 BPM | DIASTOLIC BLOOD PRESSURE: 78 MMHG | RESPIRATION RATE: 18 BRPM

## 2019-07-31 DIAGNOSIS — E53.8 VITAMIN B12 DEFICIENCY: Primary | ICD-10-CM

## 2019-07-31 PROCEDURE — 25010000002 CYANOCOBALAMIN PER 1000 MCG: Performed by: INTERNAL MEDICINE

## 2019-07-31 PROCEDURE — 96372 THER/PROPH/DIAG INJ SC/IM: CPT | Performed by: INTERNAL MEDICINE

## 2019-07-31 RX ORDER — CYANOCOBALAMIN 1000 UG/ML
1000 INJECTION, SOLUTION INTRAMUSCULAR; SUBCUTANEOUS
Status: DISCONTINUED | OUTPATIENT
Start: 2019-07-31 | End: 2019-08-01 | Stop reason: HOSPADM

## 2019-07-31 RX ADMIN — CYANOCOBALAMIN 1000 MCG: 1000 INJECTION, SOLUTION INTRAMUSCULAR; SUBCUTANEOUS at 13:23

## 2019-08-06 ENCOUNTER — TELEPHONE (OUTPATIENT)
Dept: ONCOLOGY | Facility: CLINIC | Age: 59
End: 2019-08-06

## 2019-08-06 NOTE — TELEPHONE ENCOUNTER
Phoned patient to clarify preference for mammogram to be done.  Order read  OP facility, previous mammograms at [facility].  Per patient she wants to change them to F now.  Processed order through [facility].

## 2019-08-07 ENCOUNTER — TRANSCRIBE ORDERS (OUTPATIENT)
Dept: LAB | Facility: HOSPITAL | Age: 59
End: 2019-08-07

## 2019-08-07 ENCOUNTER — LAB (OUTPATIENT)
Dept: LAB | Facility: HOSPITAL | Age: 59
End: 2019-08-07

## 2019-08-07 DIAGNOSIS — M25.561 RIGHT KNEE PAIN, UNSPECIFIED CHRONICITY: ICD-10-CM

## 2019-08-07 DIAGNOSIS — M25.561 RIGHT KNEE PAIN, UNSPECIFIED CHRONICITY: Primary | ICD-10-CM

## 2019-08-07 LAB
CRP SERPL-MCNC: 0.43 MG/DL (ref 0–0.7)
ERYTHROCYTE [SEDIMENTATION RATE] IN BLOOD: 26 MM/HR (ref 0–30)

## 2019-08-07 PROCEDURE — 85652 RBC SED RATE AUTOMATED: CPT | Performed by: ORTHOPAEDIC SURGERY

## 2019-08-07 PROCEDURE — 36415 COLL VENOUS BLD VENIPUNCTURE: CPT

## 2019-08-07 PROCEDURE — 86140 C-REACTIVE PROTEIN: CPT | Performed by: ORTHOPAEDIC SURGERY

## 2019-08-09 ENCOUNTER — TELEPHONE (OUTPATIENT)
Dept: ONCOLOGY | Facility: CLINIC | Age: 59
End: 2019-08-09

## 2019-08-09 NOTE — TELEPHONE ENCOUNTER
Received message from patient stating she needs to change her 8-28-19 B12 appt.  Attempted to contact patient and had to LM on VM asking her to call back. mercedes Aguillona

## 2019-08-10 RX ORDER — NIFEDIPINE 60 MG/1
TABLET, EXTENDED RELEASE ORAL
Qty: 90 TABLET | Refills: 0 | Status: SHIPPED | OUTPATIENT
Start: 2019-08-10 | End: 2019-11-06 | Stop reason: SDUPTHER

## 2019-08-12 RX ORDER — METHYLPREDNISOLONE 4 MG/1
TABLET ORAL
Qty: 1 EACH | Refills: 0 | Status: SHIPPED | OUTPATIENT
Start: 2019-08-12 | End: 2019-12-30

## 2019-08-21 ENCOUNTER — TELEPHONE (OUTPATIENT)
Dept: ONCOLOGY | Facility: CLINIC | Age: 59
End: 2019-08-21

## 2019-08-21 NOTE — TELEPHONE ENCOUNTER
Received call from pt stating that she needs to reschedule her b12 injection.  Appt rescheduled and pt v/u of new date and time.

## 2019-08-29 ENCOUNTER — HOSPITAL ENCOUNTER (OUTPATIENT)
Dept: ONCOLOGY | Facility: HOSPITAL | Age: 59
Discharge: HOME | End: 2019-08-29
Admitting: NURSE PRACTITIONER

## 2019-08-29 VITALS
BODY MASS INDEX: 30.39 KG/M2 | HEIGHT: 64 IN | HEART RATE: 74 BPM | SYSTOLIC BLOOD PRESSURE: 136 MMHG | WEIGHT: 178 LBS | TEMPERATURE: 98.1 F | RESPIRATION RATE: 18 BRPM | DIASTOLIC BLOOD PRESSURE: 76 MMHG

## 2019-08-29 DIAGNOSIS — E53.8 VITAMIN B12 DEFICIENCY: Primary | ICD-10-CM

## 2019-08-29 PROCEDURE — 25010000002 CYANOCOBALAMIN PER 1000 MCG: Performed by: INTERNAL MEDICINE

## 2019-08-29 PROCEDURE — 96372 THER/PROPH/DIAG INJ SC/IM: CPT | Performed by: INTERNAL MEDICINE

## 2019-08-29 RX ORDER — CYANOCOBALAMIN 1000 UG/ML
1000 INJECTION, SOLUTION INTRAMUSCULAR; SUBCUTANEOUS
Status: DISCONTINUED | OUTPATIENT
Start: 2019-08-29 | End: 2019-08-30 | Stop reason: HOSPADM

## 2019-08-29 RX ADMIN — CYANOCOBALAMIN 1000 MCG: 1000 INJECTION, SOLUTION INTRAMUSCULAR; SUBCUTANEOUS at 15:40

## 2019-09-01 RX ORDER — BISOPROLOL FUMARATE AND HYDROCHLOROTHIAZIDE 10; 6.25 MG/1; MG/1
TABLET ORAL
Qty: 90 TABLET | Refills: 1 | Status: SHIPPED | OUTPATIENT
Start: 2019-09-01 | End: 2020-03-16 | Stop reason: SDUPTHER

## 2019-09-09 RX ORDER — MONTELUKAST SODIUM 4 MG/1
TABLET, CHEWABLE ORAL
Qty: 120 TABLET | Refills: 4 | Status: SHIPPED | OUTPATIENT
Start: 2019-09-09 | End: 2020-03-18 | Stop reason: SDUPTHER

## 2019-09-10 ENCOUNTER — OFFICE VISIT (OUTPATIENT)
Dept: FAMILY MEDICINE CLINIC | Facility: CLINIC | Age: 59
End: 2019-09-10

## 2019-09-10 VITALS
WEIGHT: 174 LBS | BODY MASS INDEX: 30.83 KG/M2 | RESPIRATION RATE: 16 BRPM | SYSTOLIC BLOOD PRESSURE: 98 MMHG | HEART RATE: 76 BPM | TEMPERATURE: 98.2 F | DIASTOLIC BLOOD PRESSURE: 60 MMHG | HEIGHT: 63 IN

## 2019-09-10 DIAGNOSIS — M15.9 OSTEOARTHRITIS, GENERALIZED: ICD-10-CM

## 2019-09-10 DIAGNOSIS — F31.9 BIPOLAR AFFECTIVE DISORDER, REMISSION STATUS UNSPECIFIED (HCC): ICD-10-CM

## 2019-09-10 DIAGNOSIS — G89.29 OTHER CHRONIC PAIN: Primary | ICD-10-CM

## 2019-09-10 PROBLEM — Z79.899 OTHER LONG TERM (CURRENT) DRUG THERAPY: Status: ACTIVE | Noted: 2018-11-28

## 2019-09-10 PROBLEM — Z80.8 FAMILY HISTORY OF MELANOMA: Status: ACTIVE | Noted: 2019-09-10

## 2019-09-10 PROBLEM — M19.90 ARTHRITIS: Status: ACTIVE | Noted: 2019-09-10

## 2019-09-10 PROBLEM — I10 HYPERTENSION: Status: ACTIVE | Noted: 2019-09-10

## 2019-09-10 PROBLEM — F32.A DEPRESSION: Status: ACTIVE | Noted: 2019-09-10

## 2019-09-10 PROBLEM — K21.9 GASTROESOPHAGEAL REFLUX DISEASE: Status: ACTIVE | Noted: 2019-09-10

## 2019-09-10 PROBLEM — Z80.0 FAMILY HISTORY OF MALIGNANT NEOPLASM OF COLON: Status: ACTIVE | Noted: 2019-09-10

## 2019-09-10 PROBLEM — G47.30 SLEEP APNEA: Status: ACTIVE | Noted: 2019-09-10

## 2019-09-10 PROBLEM — I73.00 RAYNAUD'S DISEASE: Status: ACTIVE | Noted: 2018-11-28

## 2019-09-10 PROBLEM — R01.1 HEART MURMUR: Status: ACTIVE | Noted: 2019-09-10

## 2019-09-10 PROBLEM — K51.90 ULCERATIVE COLITIS: Status: ACTIVE | Noted: 2019-09-10

## 2019-09-10 PROBLEM — C50.919 BREAST CANCER: Status: ACTIVE | Noted: 2019-09-10

## 2019-09-10 PROCEDURE — 99213 OFFICE O/P EST LOW 20 MIN: CPT | Performed by: HOSPITALIST

## 2019-09-10 RX ORDER — GABAPENTIN 300 MG/1
300 CAPSULE ORAL 3 TIMES DAILY
Qty: 90 CAPSULE | Refills: 3 | Status: SHIPPED | OUTPATIENT
Start: 2019-09-10 | End: 2020-01-06

## 2019-09-10 NOTE — PROGRESS NOTES
"Rooming Tab(CC,VS,Pt Hx,Fall Screen)    Patient Care Team:  Sean Baig MD as PCP - General (Internal Medicine)  Sean Baig MD as PCP - Family Medicine    Chief Complaint   Patient presents with   • Knee Pain   • Back Pain   • Foot Pain   • Wrist Pain       Subjective     History was provided by the patient.  Here for ongoing problems with pain and breakthrough now several times a week.  Usually in the feet and legs.      Patient states other than the above problems, they are doing ok overall and has no other significant complaints    I have reviewed and updated her medications, medical/family/social history and problem list during today's office visit.       Problem List Tab  Medications Tab  Synopsis Tab  Chart Review Tab  Care Everywhere Tab  Immunizations Tab  Patient History Tab    Social History     Tobacco Use   • Smoking status: Former Smoker     Packs/day: 0.25     Years: 7.00     Pack years: 1.75     Start date: 2019     Last attempt to quit: 1994     Years since quittin.7   • Smokeless tobacco: Never Used   • Tobacco comment: Off and on for  those years   Substance Use Topics   • Alcohol use: Yes     Comment: Rarely       Review of Systems   Constitutional: Negative for chills and fever.   Respiratory: Negative for chest tightness and shortness of breath.    Cardiovascular: Negative for chest pain.   Gastrointestinal: Negative for abdominal pain.   Musculoskeletal: Negative for arthralgias and myalgias.   Neurological: Negative for headache.       Objective     Rooming Tab(CC,VS,Pt Hx,Fall Screen)  BP 98/60 (BP Location: Left arm, Patient Position: Sitting, Cuff Size: Adult)   Pulse 76   Temp 98.2 °F (36.8 °C) (Oral)   Resp 16   Ht 160 cm (63\")   Wt 78.9 kg (174 lb)   BMI 30.82 kg/m²     Wt Readings from Last 4 Encounters:   09/10/19 78.9 kg (174 lb)   19 80.7 kg (178 lb)   19 81.2 kg (179 lb)   19 80.4 kg (177 lb 3.2 oz)       Body mass index is " 30.82 kg/m².    Physical Exam   Constitutional: She appears well-developed and well-nourished.   HENT:   Head: Normocephalic.   Cardiovascular: Normal rate and regular rhythm.   No murmur heard.  Pulmonary/Chest: Breath sounds normal. She has no wheezes. She has no rales.   Abdominal: Soft. She exhibits no distension. There is no tenderness.   Musculoskeletal:   Degen. changes   Skin: Skin is warm and dry.        Statin Choice Calculator  Data Reviewed:                     Assessment/Plan   Order Review Tab  Health Maintenance Tab  Patient Plan/Order Tab  Diagnoses and all orders for this visit:    1. Other chronic pain (Primary)    2. Osteoarthritis, generalized    3. Bipolar affective disorder, remission status unspecified (CMS/HCC)    Other orders  -     gabapentin (NEURONTIN) 300 MG capsule; Take 1 capsule by mouth 3 (Three) Times a Day.  Dispense: 90 capsule; Refill: 3    They were informed of the diagnosis and treatment plan and directed to f/u for any further problems or concerns.  They were given the opportunity to ask questions related to the visit.    No orders of the defined types were placed in this encounter.    Wrapup Tab  Return in about 6 months (around 3/10/2020), or if symptoms worsen or fail to improve.

## 2019-09-12 ENCOUNTER — OFFICE VISIT (OUTPATIENT)
Dept: RHEUMATOLOGY | Facility: CLINIC | Age: 59
End: 2019-09-12

## 2019-09-12 VITALS
WEIGHT: 175 LBS | BODY MASS INDEX: 31.01 KG/M2 | HEART RATE: 61 BPM | DIASTOLIC BLOOD PRESSURE: 79 MMHG | SYSTOLIC BLOOD PRESSURE: 114 MMHG | HEIGHT: 63 IN

## 2019-09-12 DIAGNOSIS — M25.50 ARTHRALGIA, UNSPECIFIED JOINT: ICD-10-CM

## 2019-09-12 DIAGNOSIS — M34.9 SCLERODERMA (HCC): ICD-10-CM

## 2019-09-12 DIAGNOSIS — Z79.899 LONG-TERM USE OF HIGH-RISK MEDICATION: Primary | ICD-10-CM

## 2019-09-12 PROCEDURE — 99214 OFFICE O/P EST MOD 30 MIN: CPT | Performed by: INTERNAL MEDICINE

## 2019-09-12 NOTE — PROGRESS NOTES
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  She takes Plaquenil 200 mg p.o. twice daily compliantly, she is up-to-date with her eye exams. The last one was done 8 months ago and was normal.    The patient has arthralgias every now and then, denies any swelling, no major morning stiffness, except in her right knee.  She started physical therapy about 2 weeks ago, had 8 sessions so far and has been doing a lot better.    She has episodes of Raynaud's at least 3 times a week which she is able to manage conservatively, denies fingertip ulcers.  She has not noticed any major progression of the skin thickening she has not seeing a significant difference in the telangiectasis.  She has difficulty swallowing liquids and solids every once in a while but this is not significant, she has fecal incontinence that she attributes to her history of ulcerative colitis.  She has nausea at times.  Although her eyes are still dry, she feels better.  Denies shortness of breath or cough.    She had a recent ESR CRP that are normal.  She is going to have laboratories done in the following weeks with her hematologist oncologist, request to have labs done at that time.    No changes in social family history.    Active Ambulatory Problems     Diagnosis Date Noted   • Vitamin B12 deficiency 07/29/2019   • Arthritis 09/10/2019   • Sleep apnea 09/10/2019   • Bipolar affective disorder (CMS/[facility]) 09/10/2019   • Depression 09/10/2019   • Breast cancer (CMS/[facility]) 09/10/2019   • Chronic pain 09/10/2019   • Dyslipidemia 05/07/2015   • Family history of malignant neoplasm of colon 09/10/2019   • Family history of melanoma 09/10/2019   • Gastroesophageal reflux disease 09/10/2019   • Heart murmur 09/10/2019   • Hypertension 09/10/2019   • Hypothyroidism 11/14/2011   • Osteoarthritis, generalized 09/29/2016   • Other long term (current) drug therapy 11/28/2018   • Raynaud's disease 11/28/2018   • Ulcerative colitis  (CMS/[facility]) 09/10/2019     Resolved Ambulatory Problems     Diagnosis Date Noted   • No Resolved Ambulatory Problems     Past Medical History:   Diagnosis Date   • Arthritis    • Asthma    • Bipolar affective disorder (CMS/HCC)    • Breast cancer (CMS/HCC)    • Colitis    • Depression    • GERD (gastroesophageal reflux disease) 1993   • H/O breast reconstruction    • H/O laminectomy    • Hamartoma (CMS/HCC)    • Hx of bilateral oophorectomy    • Hypertension    • Hypothyroidism    • Inflammatory bowel disease 1992   • Kienbock's disease, right    • Low back pain 1991   • Lupus    • Monahan syndrome    • Osteopenia 2019   • Pneumonia    • Raynaud disease    • Scleroderma (CMS/HCC)    • Von Willebrand disease (CMS/HCC)      Physical examination:  Vitals:    09/12/19 0835   BP: 114/79   Pulse: 61     GENERAL: Well-developed, well-nourished in no acute distress. Alert and oriented x3.  HEENT: Normocephalic, atraumatic. Pupils are equal, round, and reactive to light. Extraocular muscles are intact. Mucous membranes are pink and moist. Nostrils are clear.   NECK: Supple without lymphadenopathy.  LUNGS: Clear to auscultation bilaterally.  HEART: Regular rate and rhythm without murmur, rub or gallop.  CHEST: Respirations easy and unlabored.  EXTREMITIES: No cyanosis, edema or clubbing.  SKIN: Warm, dry and intact.  Skin: Telangiectases, sclerodactyly, skin thickening proximally and somewhat distally to the MCP joints.    MSK: sclerodactyly,  tenderness in the right wrist and 2 MCPs on the right hand, no synovitis.  No friction tendon rubs.    Assessment:  #1 scleroderma.  Unchanged.  Doing well on Plaquenil..Continue with the same treatment with no changes continue with conservative measures for Raynaud's.  Keep up-to-date with her eye exams.    #2 Raynaud's. unchanged.  As above    3.  Secondary Sjogren's syndrome.  Continue with conservative measures only.    4. elevated liver function tests, followed by PCP    5. Hero  syndrome, recently diagnosed and followed by hematology oncology    Plan:  Continue with Plaquenil 200 g 1 tab p.o. twice daily  Labs to be done in November as per her request  RTC in 6 months or sooner if needed with laboratories to be done prior to her next appointment

## 2019-09-15 RX ORDER — CEFUROXIME AXETIL 500 MG/1
500 TABLET ORAL 2 TIMES DAILY
Qty: 20 TABLET | Refills: 0 | Status: SHIPPED | OUTPATIENT
Start: 2019-09-15 | End: 2019-09-25

## 2019-09-26 RX ORDER — CYANOCOBALAMIN 1000 UG/ML
INJECTION, SOLUTION INTRAMUSCULAR; SUBCUTANEOUS
Refills: 10 | OUTPATIENT
Start: 2019-09-26

## 2019-10-15 RX ORDER — HYDROCODONE BITARTRATE AND ACETAMINOPHEN 10; 325 MG/1; MG/1
1 TABLET ORAL EVERY 4 HOURS PRN
Qty: 100 TABLET | Refills: 0 | Status: SHIPPED | OUTPATIENT
Start: 2019-10-15 | End: 2019-12-26 | Stop reason: SDUPTHER

## 2019-10-16 DIAGNOSIS — Z79.899 LONG-TERM USE OF HIGH-RISK MEDICATION: Primary | ICD-10-CM

## 2019-10-16 RX ORDER — HYDROXYCHLOROQUINE SULFATE 200 MG/1
200 TABLET, FILM COATED ORAL 2 TIMES DAILY
Qty: 180 TABLET | Refills: 1 | Status: SHIPPED | OUTPATIENT
Start: 2019-10-16 | End: 2020-03-13 | Stop reason: SDUPTHER

## 2019-10-20 RX ORDER — LEVOTHYROXINE SODIUM 0.15 MG/1
TABLET ORAL
Qty: 30 TABLET | Refills: 6 | Status: SHIPPED | OUTPATIENT
Start: 2019-10-20 | End: 2020-05-15 | Stop reason: HOSPADM

## 2019-11-04 ENCOUNTER — HOSPITAL ENCOUNTER (OUTPATIENT)
Dept: ONCOLOGY | Facility: HOSPITAL | Age: 59
Discharge: HOME | End: 2019-11-04
Admitting: NURSE PRACTITIONER

## 2019-11-04 VITALS
RESPIRATION RATE: 18 BRPM | HEIGHT: 63 IN | SYSTOLIC BLOOD PRESSURE: 145 MMHG | WEIGHT: 179 LBS | DIASTOLIC BLOOD PRESSURE: 72 MMHG | TEMPERATURE: 98.1 F | HEART RATE: 89 BPM | BODY MASS INDEX: 31.71 KG/M2

## 2019-11-04 DIAGNOSIS — E53.8 VITAMIN B12 DEFICIENCY: Primary | ICD-10-CM

## 2019-11-04 PROCEDURE — 25010000002 CYANOCOBALAMIN PER 1000 MCG: Performed by: INTERNAL MEDICINE

## 2019-11-04 PROCEDURE — 96372 THER/PROPH/DIAG INJ SC/IM: CPT | Performed by: INTERNAL MEDICINE

## 2019-11-04 RX ORDER — CYANOCOBALAMIN 1000 UG/ML
1000 INJECTION, SOLUTION INTRAMUSCULAR; SUBCUTANEOUS
Status: DISCONTINUED | OUTPATIENT
Start: 2019-11-04 | End: 2019-11-05 | Stop reason: HOSPADM

## 2019-11-04 RX ADMIN — CYANOCOBALAMIN 1000 MCG: 1000 INJECTION, SOLUTION INTRAMUSCULAR; SUBCUTANEOUS at 14:41

## 2019-11-06 RX ORDER — NIFEDIPINE 60 MG/1
TABLET, EXTENDED RELEASE ORAL
Qty: 90 TABLET | Refills: 0 | Status: SHIPPED | OUTPATIENT
Start: 2019-11-06 | End: 2020-02-03 | Stop reason: SDUPTHER

## 2019-11-08 ENCOUNTER — TELEPHONE (OUTPATIENT)
Dept: FAMILY MEDICINE CLINIC | Facility: CLINIC | Age: 59
End: 2019-11-08

## 2019-11-08 NOTE — TELEPHONE ENCOUNTER
VM MESSAGE.  Mague with [facility] is requesting a call back regarding clarification on patient's medications. Please call [phone]. Thank you.

## 2019-11-13 ENCOUNTER — APPOINTMENT (OUTPATIENT)
Dept: MAMMOGRAPHY | Facility: HOSPITAL | Age: 59
End: 2019-11-13

## 2019-11-13 ENCOUNTER — HOSPITAL ENCOUNTER (OUTPATIENT)
Dept: MAMMOGRAPHY | Facility: HOSPITAL | Age: 59
Discharge: HOME | End: 2019-11-13
Admitting: INTERNAL MEDICINE

## 2019-11-13 DIAGNOSIS — Z12.31 ENCOUNTER FOR SCREENING MAMMOGRAM FOR MALIGNANT NEOPLASM OF BREAST: ICD-10-CM

## 2019-11-13 DIAGNOSIS — Z85.3 HISTORY OF BREAST CANCER: ICD-10-CM

## 2019-11-13 PROCEDURE — 77063 BREAST TOMOSYNTHESIS BI: CPT

## 2019-11-13 PROCEDURE — 77067 SCR MAMMO BI INCL CAD: CPT

## 2019-11-15 ENCOUNTER — HOSPITAL ENCOUNTER (OUTPATIENT)
Dept: OTHER | Facility: HOSPITAL | Age: 59
Discharge: HOME | End: 2019-11-15

## 2019-11-15 DIAGNOSIS — Z00.6 EXAMINATION FOR NORMAL COMPARISON OR CONTROL IN CLINICAL RESEARCH: ICD-10-CM

## 2019-11-18 ENCOUNTER — OFFICE VISIT (OUTPATIENT)
Dept: ONCOLOGY | Facility: CLINIC | Age: 59
End: 2019-11-18

## 2019-11-18 ENCOUNTER — APPOINTMENT (OUTPATIENT)
Dept: LAB | Facility: HOSPITAL | Age: 59
End: 2019-11-18

## 2019-11-18 ENCOUNTER — HOSPITAL ENCOUNTER (OUTPATIENT)
Dept: ONCOLOGY | Facility: HOSPITAL | Age: 59
Setting detail: INFUSION SERIES
Discharge: HOME | End: 2019-11-18

## 2019-11-18 VITALS
SYSTOLIC BLOOD PRESSURE: 104 MMHG | DIASTOLIC BLOOD PRESSURE: 67 MMHG | HEIGHT: 63 IN | HEART RATE: 85 BPM | WEIGHT: 180 LBS | TEMPERATURE: 98.5 F | BODY MASS INDEX: 31.89 KG/M2 | RESPIRATION RATE: 18 BRPM

## 2019-11-18 DIAGNOSIS — Z15.09 LYNCH SYNDROME: ICD-10-CM

## 2019-11-18 DIAGNOSIS — Z85.3 HISTORY OF BREAST CANCER: Primary | ICD-10-CM

## 2019-11-18 DIAGNOSIS — Z80.0 FAMILY HISTORY OF MALIGNANT NEOPLASM OF COLON: Primary | ICD-10-CM

## 2019-11-18 LAB
ALBUMIN SERPL-MCNC: 3.8 G/DL (ref 3.5–5.2)
ALBUMIN/GLOB SERPL: 1.4 G/DL
ALP SERPL-CCNC: 245 U/L (ref 39–117)
ALT SERPL W P-5'-P-CCNC: 35 U/L (ref 1–33)
ANION GAP SERPL CALCULATED.3IONS-SCNC: 9 MMOL/L (ref 5–15)
AST SERPL-CCNC: 39 U/L (ref 1–32)
BASOPHILS # BLD AUTO: 0.02 10*3/MM3 (ref 0–0.2)
BASOPHILS NFR BLD AUTO: 0.2 % (ref 0–1.5)
BILIRUB SERPL-MCNC: 0.2 MG/DL (ref 0.2–1.2)
BUN BLD-MCNC: 20 MG/DL (ref 8–23)
BUN/CREAT SERPL: 24.7 (ref 7–25)
CALCIUM SPEC-SCNC: 9.1 MG/DL (ref 8.6–10.5)
CHLORIDE SERPL-SCNC: 105 MMOL/L (ref 98–107)
CO2 SERPL-SCNC: 28 MMOL/L (ref 22–29)
CREAT BLD-MCNC: 0.81 MG/DL (ref 0.57–1)
DEPRECATED RDW RBC AUTO: 43.3 FL (ref 37–54)
EOSINOPHIL # BLD AUTO: 0.36 10*3/MM3 (ref 0–0.4)
EOSINOPHIL NFR BLD AUTO: 4.3 % (ref 0.3–6.2)
ERYTHROCYTE [DISTWIDTH] IN BLOOD BY AUTOMATED COUNT: 13.1 % (ref 12.3–15.4)
GFR SERPL CREATININE-BSD FRML MDRD: 72 ML/MIN/1.73
GLOBULIN UR ELPH-MCNC: 2.8 GM/DL
GLUCOSE BLD-MCNC: 100 MG/DL (ref 65–99)
HCT VFR BLD AUTO: 36.6 % (ref 34–46.6)
HGB BLD-MCNC: 12.1 G/DL (ref 12–15.9)
LYMPHOCYTES # BLD AUTO: 2.28 10*3/MM3 (ref 0.7–3.1)
LYMPHOCYTES NFR BLD AUTO: 27.2 % (ref 19.6–45.3)
MCH RBC QN AUTO: 30.9 PG (ref 26.6–33)
MCHC RBC AUTO-ENTMCNC: 33.1 G/DL (ref 31.5–35.7)
MCV RBC AUTO: 93.4 FL (ref 79–97)
MONOCYTES # BLD AUTO: 1.45 10*3/MM3 (ref 0.1–0.9)
MONOCYTES NFR BLD AUTO: 17.3 % (ref 5–12)
NEUTROPHILS # BLD AUTO: 4.28 10*3/MM3 (ref 1.7–7)
NEUTROPHILS NFR BLD AUTO: 51 % (ref 42.7–76)
PLATELET # BLD AUTO: 308 10*3/MM3 (ref 140–450)
PMV BLD AUTO: 10.8 FL (ref 6–12)
POTASSIUM BLD-SCNC: 4.5 MMOL/L (ref 3.5–5.2)
PROT SERPL-MCNC: 6.6 G/DL (ref 6–8.5)
RBC # BLD AUTO: 3.92 10*6/MM3 (ref 3.77–5.28)
SODIUM BLD-SCNC: 142 MMOL/L (ref 136–145)
WBC NRBC COR # BLD: 8.39 10*3/MM3 (ref 3.4–10.8)

## 2019-11-18 PROCEDURE — 90674 CCIIV4 VAC NO PRSV 0.5 ML IM: CPT | Performed by: INTERNAL MEDICINE

## 2019-11-18 PROCEDURE — 99215 OFFICE O/P EST HI 40 MIN: CPT | Performed by: INTERNAL MEDICINE

## 2019-11-18 PROCEDURE — 25010000002 INFLUENZA VAC SUBUNIT QUAD 0.5 ML SUSPENSION PREFILLED SYRINGE: Performed by: INTERNAL MEDICINE

## 2019-11-18 PROCEDURE — G0008 ADMIN INFLUENZA VIRUS VAC: HCPCS | Performed by: INTERNAL MEDICINE

## 2019-11-18 PROCEDURE — 85025 COMPLETE CBC W/AUTO DIFF WBC: CPT | Performed by: INTERNAL MEDICINE

## 2019-11-18 PROCEDURE — 80053 COMPREHEN METABOLIC PANEL: CPT | Performed by: NURSE PRACTITIONER

## 2019-11-18 RX ORDER — POLYETHYLENE GLYCOL 3350
GRANULES (GRAM) MISCELLANEOUS
COMMUNITY
End: 2020-04-15

## 2019-11-18 RX ADMIN — INFLUENZA A VIRUS A/IDAHO/07/2018 (H1N1) ANTIGEN (MDCK CELL DERIVED, PROPIOLACTONE INACTIVATED, INFLUENZA A VIRUS A/INDIANA/08/2018 (H3N2) ANTIGEN (MDCK CELL DERIVED, PROPIOLACTONE INACTIVATED), INFLUENZA B VIRUS B/SINGAPORE/INFTT-16-0610/2016 ANTIGEN (MDCK CELL DERIVED, PROPIOLACTONE INACTIVATED), INFLUENZA B VIRUS B/IOWA/06/2017 ANTIGEN (MDCK CELL DERIVED, PROPIOLACTONE INACTIVATED) 0.5 ML: 15; 15; 15; 15 INJECTION, SUSPENSION INTRAMUSCULAR at 14:05

## 2019-11-18 NOTE — PROGRESS NOTES
Hematology/Oncology Outpatient Follow Up    PATIENT NAME:Tracie Gross  :1960  MRN: [id number]  PRIMARY CARE PHYSICIAN: Sean Baig MD  REFERRING PHYSICIAN: Sean Baig,*    Chief Complaint   Patient presents with   • Follow-up     Monahan syndrome        HISTORY OF PRESENT ILLNESS:   This is a 58-year-old female who was diagnosed with right breast cancer in  when she was 32 years old.  Patient was originally seen on 18.  Please refer to the details of that note for more information.   · 18 - CBC:  WBC 13.6, hemoglobin 11.7, platelet count 392,000.  Differentials 65% neutrophils, 15% lymphocytes.  There was mild monocytosis at 16.  Eosinophils were 2.3 and basophils were 0.3.  B12 292.  Ferritin 88.  Folate 13.8.  AST slightly high at 57.  Alkaline phosphatase was high at 326.  .  Haptoglobin 179, normal.  SPEP with DYLAN did not show any monoclonal protein.  Flow cytometry did not show any evidence of acute leukemia or lymphoproliferative disease.  There was monocytosis measured at 21%.  Reticulocyte count normal 1.02.  Peripheral smear showed absolute monocytosis at 19%, thrombocytosis and macrocytosis.  BCR-abl was negative.  Methylmalonic acid level was elevated to 420.    · 18 - PET/CT scan showed multiple small bilateral lymph nodes in the neck without metabolic activity or change from prior CT scans and are likely due to SLE.  There were unchanged bilateral level 1 axillary lymph nodes without metabolic activity.  Multiple mediastinal nodes were also seen.  The largest 11 mm, unchanged from prior imaging with no hypermetabolic activity.  There was no evidence of metastatic disease in the abdomen or pelvis.  There was no focal lesion within the liver or biliary system.  Multiple small retroperitoneal and external iliac nodes, bilateral inguinal nodes similar to prior imaging with no PET activity.    · 18 - RICHIE-2 analysis with no mutation identified.     · 18 - Bone density revealed osteoporosis.  Patient is currently on Fosamax.   · 12/3/18 - Bone marrow aspiration and biopsy showed normocellular bone marrow at 40%.  There was adequate iron stores and no evidence of malignancy was seen.  Flow cytometry was negative.  Cytogenetics showed normal female karyotype.  Blasts were less than 3% of nucleated cells.  There was no significant dyspoiesis.   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     · 19 - Urine cytology was negative for malignant cells.    · 19 - Patient seen by Dermatology for her annual skin evaluation.   · 3/13/19 - CT scan of the chest, abdomen and pelvis:  CT scan of chest showed chronic changes.  · 2019 patient had an EGD with polypectomy performed by Dr. Conway.  Dr. Conway has recommended follow-up MRI of the pancreas in 2 years.  And also EGD and colonoscopy in 2 years.  · 19-left screening mammogram was negative follow-up in 1 year was recommended    Past Medical History:   Diagnosis Date   • Arthritis    • Asthma    • Bipolar affective disorder (CMS/HCC)    • Breast cancer (CMS/HCC)     RT   • Colitis    • Depression    • GERD (gastroesophageal reflux disease)    • H/O breast reconstruction     SEVERAL   • H/O laminectomy    • Hamartoma (CMS/HCC)    • Hx of bilateral oophorectomy    • Hypertension    • Hypothyroidism    • Inflammatory bowel disease    • Kienbock's disease, right     WRIST   • Low back pain    • Lupus (CMS/HCC)    • Monahan syndrome    • Osteopenia    • Pneumonia    • Raynaud disease    • Scleroderma (CMS/HCC)    • Von Willebrand disease (CMS/HCC)        Past Surgical History:   Procedure Laterality Date   • BREAST BIOPSY     • BREAST LUMPECTOMY     • BREAST RECONSTRUCTION Right    • BREAST RECONSTRUCTION Right    •  SECTION   1978, 1980   • CHOLECYSTECTOMY     • COLONOSCOPY     • COSMETIC SURGERY  [id number]   • EYE SURGERY  2000   • HYSTERECTOMY      PARTIAL   • JOINT REPLACEMENT  2012,2015   • LASIK      LASIK/ LASIK ENHANCEMENT   • MASTECTOMY RADICAL Right    • OOPHORECTOMY Bilateral    • TUBAL ABDOMINAL LIGATION  1981         Current Outpatient Medications:   •  Alendronate Sodium 70 MG effervescent tablet, Take 1 tablet by mouth 1 (One) Time Per Week., Disp: , Rfl:   •  ALPRAZolam (XANAX) 0.25 MG tablet, Take  by mouth As Needed. PRN, Disp: , Rfl:   •  bisoprolol-hydrochlorothiazide (ZIAC) 10-6.25 MG per tablet, TAKE ONE TABLET BY MOUTH DAILY, Disp: 90 tablet, Rfl: 1  •  Calcium-Vitamin D 600-200 MG-UNIT per tablet, TAKE ONE TABLET BY MOUTH TWICE A DAY, Disp: 60 tablet, Rfl: 2  •  colestipol (COLESTID) 1 g tablet, TAKE TWO TABLETS BY MOUTH TWO TIMES A DAY, Disp: 120 tablet, Rfl: 4  •  cyclobenzaprine (FLEXERIL) 10 MG tablet, Every 12 (Twelve) Hours. PRN, Disp: , Rfl:   •  dexlansoprazole (DEXILANT) 60 MG capsule, Take 60 mg by mouth Daily., Disp: , Rfl:   •  ferrous sulfate 325 (65 FE) MG tablet, Take 325 mg by mouth Daily With Breakfast., Disp: , Rfl:   •  gabapentin (NEURONTIN) 300 MG capsule, Take 1 capsule by mouth 3 (Three) Times a Day., Disp: 90 capsule, Rfl: 3  •  HYDROcodone-acetaminophen (NORCO)  MG per tablet, Take 1 tablet by mouth Every 4 (Four) Hours As Needed for Moderate Pain ., Disp: 100 tablet, Rfl: 0  •  hydroxychloroquine (PLAQUENIL) 200 MG tablet, Take 1 tablet by mouth 2 (Two) Times a Day., Disp: 180 tablet, Rfl: 1  •  hydrOXYzine (ATARAX) 25 MG tablet, Every 8 (Eight) Hours., Disp: , Rfl:   •  lamoTRIgine (LaMICtal) 150 MG tablet, Take 150 mg by mouth Daily., Disp: , Rfl:   •  levothyroxine (SYNTHROID, LEVOTHROID) 150 MCG tablet, TAKE ONE TABLET BY MOUTH DAILY, Disp: 30 tablet, Rfl: 6  •  liothyronine (CYTOMEL) 25 MCG tablet, Take 25 mcg by mouth 2 (Two) Times a Day., Disp: , Rfl:   •  lisinopril  (PRINIVIL,ZESTRIL) 5 MG tablet, TAKE ONE TABLET BY MOUTH DAILY, Disp: 90 tablet, Rfl: 1  •  Methylnaltrexone Bromide (RELISTOR) 150 MG tablet, Take 2 tablets by mouth 2 (Two) Times a Day., Disp: , Rfl:   •  Morphine Sulfate ER 30 MG tablet extended-release 12 hour, Take 1 tablet by mouth 2 (Two) Times a Day., Disp: 60 each, Rfl: 0  •  NIFEdipine XL (PROCARDIA XL) 60 MG 24 hr tablet, TAKE ONE TABLET BY MOUTH DAILY, Disp: 90 tablet, Rfl: 0  •  ondansetron (ZOFRAN) 4 MG tablet, ONDANSETRON HCL 4 MG TABS, Disp: , Rfl:   •  Polyethylene Glycol 3350 granules, , Disp: , Rfl:   •  QUETIAPINE FUMARATE ER PO, Take  by mouth., Disp: , Rfl:   •  ranitidine (ZANTAC) 300 MG capsule, Take 300 mg by mouth Every Evening., Disp: , Rfl:   •  Unable to find, 1 each 1 (One) Time. NEFEDIPINE ER 60MG, Disp: , Rfl:   •  vitamin B-12 (CYANOCOBALAMIN) 1000 MCG tablet, Take 1,000 mcg by mouth Every 30 (Thirty) Days., Disp: , Rfl:   •  methylPREDNISolone (MEDROL) 4 MG tablet, Take as directed on package instructions., Disp: 1 each, Rfl: 0  •  Unable to find, 1 each 1 (One) Time. DEXAMETASONE 0.25 CREME, Disp: , Rfl:     Allergies   Allergen Reactions   • Penicillins Shortness Of Breath   • Aspirin Other (See Comments)     VONWILLENBRAND DISEASE    • Baclofen Hives   • Tape  [Adhesive Tape] Rash       Family History   Problem Relation Age of Onset   • Colon cancer Mother    • Heart disease Mother    • Cancer Mother    • Kidney disease Father    • Heart disease Father    • Depression Father    • Hyperlipidemia Father    • Vision loss Father    • Colon cancer Sister    • Diabetes Sister    • Heart disease Sister    • Von Willebrand disease Sister    • Von Willebrand disease Brother    • Von Willebrand disease Son    • Other Son    • Breast cancer Son    • Diabetes Paternal Grandmother    • Stroke Paternal Grandmother    • Colon cancer Other    • Colon cancer Son    • Von Willebrand disease Son    • Other Son    • Breast cancer Son    • Cancer  Sister    • Vision loss Sister    • Other Sister    • Stroke Sister    • Cancer Paternal Aunt    • Cancer Maternal Aunt    • Cancer Maternal Aunt    • Hyperlipidemia Sister    • Thyroid disease Sister    • Thyroid disease Sister        Cancer-related family history includes Breast cancer in her son and son; Cancer in her maternal aunt, maternal aunt, mother, paternal aunt, and sister; Colon cancer in her mother, sister, son, and another family member.    Social History     Tobacco Use   • Smoking status: Former Smoker     Packs/day: 0.25     Years: 7.00     Pack years: 1.75     Start date: 2019     Last attempt to quit: 1994     Years since quittin.8   • Smokeless tobacco: Never Used   • Tobacco comment: Off and on for  those years   Substance Use Topics   • Alcohol use: Yes     Frequency: Monthly or less     Drinks per session: 1 or 2     Binge frequency: Never     Comment: Rarely   • Drug use: No       I have reviewed the history of present illness, past medical history, family history, social history, lab results, all notes and other records since the patient was last seen on 11/15/2019.    SUBJECTIVE:  The patient is here for a follow up appointment.  The patient states that she is doing well.  She denies any new complaints.  The patient states that she has her mammogram last week.            REVIEW OF SYSTEMS:  Review of Systems   Constitutional: Negative for chills and fever.   HENT: Negative for ear pain, mouth sores, nosebleeds and sore throat.    Eyes: Negative for photophobia and visual disturbance.   Respiratory: Negative for wheezing and stridor.    Cardiovascular: Negative for chest pain and palpitations.   Gastrointestinal: Negative for abdominal pain, diarrhea, nausea and vomiting.   Endocrine: Negative for cold intolerance and heat intolerance.   Genitourinary: Negative for dysuria and hematuria.   Musculoskeletal: Negative for joint swelling and neck stiffness.   Skin: Negative for color  "change and rash.   Neurological: Negative for seizures and syncope.   Hematological: Negative for adenopathy.        No obvious bleeding   Psychiatric/Behavioral: Negative for agitation, confusion and hallucinations.       OBJECTIVE:    Vitals:    11/18/19 1217   BP: 104/67   Pulse: 85   Resp: 18   Temp: 98.5 °F (36.9 °C)   Weight: 81.6 kg (180 lb)   Height: 160 cm (63\")   PainSc: 0-No pain       ECOG  (1) Restricted in physically strenuous activity, ambulatory and able to do work of light nature    Physical Exam   Constitutional: She is oriented to person, place, and time. No distress.   HENT:   Head: Normocephalic and atraumatic.   Eyes: Conjunctivae and EOM are normal. Right eye exhibits no discharge. Left eye exhibits no discharge. No scleral icterus.   Neck: Normal range of motion. Neck supple. No thyromegaly present.   Cardiovascular: Normal rate, regular rhythm and normal heart sounds. Exam reveals no gallop and no friction rub.   Pulmonary/Chest: Effort normal. No stridor. No respiratory distress. She has no wheezes.   Abdominal: Soft. Bowel sounds are normal. She exhibits no mass. There is no tenderness. There is no rebound and no guarding.   Musculoskeletal: Normal range of motion. She exhibits no tenderness.   Lymphadenopathy:     She has no cervical adenopathy.   Neurological: She is alert and oriented to person, place, and time. She exhibits normal muscle tone.   Skin: Skin is warm. No rash noted. She is not diaphoretic. No erythema.   Psychiatric: She has a normal mood and affect. Her behavior is normal.   Nursing note and vitals reviewed.      RECENT LABS  WBC   Date Value Ref Range Status   11/18/2019 8.39 3.40 - 10.80 10*3/mm3 Final     RBC   Date Value Ref Range Status   11/18/2019 3.92 3.77 - 5.28 10*6/mm3 Final     Hemoglobin   Date Value Ref Range Status   11/18/2019 12.1 12.0 - 15.9 g/dL Final     Hematocrit   Date Value Ref Range Status   11/18/2019 36.6 34.0 - 46.6 % Final     MCV   Date " Value Ref Range Status   11/18/2019 93.4 79.0 - 97.0 fL Final     MCH   Date Value Ref Range Status   11/18/2019 30.9 26.6 - 33.0 pg Final     MCHC   Date Value Ref Range Status   11/18/2019 33.1 31.5 - 35.7 g/dL Final     RDW   Date Value Ref Range Status   11/18/2019 13.1 12.3 - 15.4 % Final     RDW-SD   Date Value Ref Range Status   11/18/2019 43.3 37.0 - 54.0 fl Final     MPV   Date Value Ref Range Status   11/18/2019 10.8 6.0 - 12.0 fL Final     Platelets   Date Value Ref Range Status   11/18/2019 308 140 - 450 10*3/mm3 Final     Neutrophil %   Date Value Ref Range Status   11/18/2019 51.0 42.7 - 76.0 % Final     Lymphocyte %   Date Value Ref Range Status   11/18/2019 27.2 19.6 - 45.3 % Final     Monocyte %   Date Value Ref Range Status   11/18/2019 17.3 (H) 5.0 - 12.0 % Final     Eosinophil %   Date Value Ref Range Status   11/18/2019 4.3 0.3 - 6.2 % Final     Basophil %   Date Value Ref Range Status   11/18/2019 0.2 0.0 - 1.5 % Final     Neutrophils, Absolute   Date Value Ref Range Status   11/18/2019 4.28 1.70 - 7.00 10*3/mm3 Final     Lymphocytes, Absolute   Date Value Ref Range Status   11/18/2019 2.28 0.70 - 3.10 10*3/mm3 Final     Monocytes, Absolute   Date Value Ref Range Status   11/18/2019 1.45 (H) 0.10 - 0.90 10*3/mm3 Final     Eosinophils, Absolute   Date Value Ref Range Status   11/18/2019 0.36 0.00 - 0.40 10*3/mm3 Final     Basophils, Absolute   Date Value Ref Range Status   11/18/2019 0.02 0.00 - 0.20 10*3/mm3 Final     nRBC   Date Value Ref Range Status   05/22/2019 0 0 /100[WBCs] Final       Lab Results   Component Value Date    GLUCOSE 101 (H) 05/31/2019    BUN 10 05/31/2019    CREATININE 0.8 05/31/2019    EGFRIFNONA 96 04/04/2017    EGFRIFAFRI 83 04/04/2017    BCR 12.5 05/31/2019    K 4.6 05/31/2019    CO2 28 05/31/2019    CALCIUM 9.2 05/31/2019    ALBUMIN 3.7 05/31/2019    LABIL2 1.1 05/31/2019    AST 30 05/31/2019    ALT 28 05/31/2019         Assessment/Plan     History of breast cancer  -  CBC & Differential  - Comprehensive Metabolic Panel  - CBC Auto Differential      ASSESSMENT:    1. 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  She also has DICER1 gene as well as TSC2 with variants of unknown significance.   2. History of right breast cancer, status post right mastectomy followed by axillary lymph node dissection and right chest wall reconstruction in 1985 at the age of 32.  3. Elevated liver function tests, pruritus, but no significant pathology found on both the PET scan, MRCP, except for post cholecystectomy, biliary ductal dilatation.  On Colestipol for pruritus.   4. She has peripheral lymphadenopathy involving the neck, axilla, mediastinum and retroperitoneum that are not PET avid.  This lymphadenopathy may be due to chronic inflammatory disease [lupus].    5. Strong family history of colon cancer and personal history of breast cancer.   6. Systemic lupus erythematosus, scleroderma, Raynaud’s phenomenon.   7. Normocytic anemia, multifactorial, underlying autoimmune disease, relative iron deficiency and B12 deficiency.  On iron and B12 supplementation.    8. Persistent monocytosis, status post bone marrow aspiration and biopsy which was essentially normal.    9. Osteoporosis.  On Fosamax.  Prolia declined by her insurance company.   10. Urine cytology was negative as of January 2019.    11. Last Dermatology appointment was 2/28/19.  12. Status post complete hysterectomy reducing her risk for ovarian and uterine cancer.     PLANS:     Plan is for continued observation.  She will follow up with GI for her GI endoscopies due in May,2020.  I have also requested for radiologist to review and comment on her pancreatic imaging.  So far her CT scans of the chest, abdomen and pelvis have not shown any evidence of progressive disease.  I will plan to see her again in four months.  She will follow up with the rest  of her physicians for her ongoing medical needs.  Patient's screening mammogram in November 2019 was benign.  She will continue monthly breast self-exam and call for lumps nipple discharge, skin discoloration.  Breast exam today was unremarkable  Urine cytology will be done in January 2020  Dermatology appointment will be due February 2020  CT scans of the chest abdomen and pelvis will be due March 2020  Breast MRI will be due 5/19. Will schedule after breast exam at the next visit.  All above have been ordered today  Follow up in may 2020. Follow up with GI then.       I have reviewed labs results, imaging, vitals, and medications with the patient today. Will follow up in 6 months with me.      Patient verbalized understanding and is in agreement of the above plan.    Part of this document was scribed by Liliana Saenz, RN, BSN.

## 2019-11-22 ENCOUNTER — APPOINTMENT (OUTPATIENT)
Dept: MAMMOGRAPHY | Facility: HOSPITAL | Age: 59
End: 2019-11-22

## 2019-12-02 ENCOUNTER — HOSPITAL ENCOUNTER (OUTPATIENT)
Dept: ONCOLOGY | Facility: HOSPITAL | Age: 59
Discharge: HOME | End: 2019-12-02
Admitting: NURSE PRACTITIONER

## 2019-12-02 VITALS
TEMPERATURE: 98 F | DIASTOLIC BLOOD PRESSURE: 69 MMHG | HEART RATE: 69 BPM | WEIGHT: 185 LBS | HEIGHT: 63 IN | SYSTOLIC BLOOD PRESSURE: 129 MMHG | BODY MASS INDEX: 32.78 KG/M2 | RESPIRATION RATE: 18 BRPM

## 2019-12-02 DIAGNOSIS — E53.8 VITAMIN B12 DEFICIENCY: Primary | ICD-10-CM

## 2019-12-02 PROCEDURE — 25010000002 CYANOCOBALAMIN PER 1000 MCG: Performed by: INTERNAL MEDICINE

## 2019-12-02 PROCEDURE — 96372 THER/PROPH/DIAG INJ SC/IM: CPT | Performed by: INTERNAL MEDICINE

## 2019-12-02 RX ORDER — CYANOCOBALAMIN 1000 UG/ML
1000 INJECTION, SOLUTION INTRAMUSCULAR; SUBCUTANEOUS
Status: DISCONTINUED | OUTPATIENT
Start: 2019-12-02 | End: 2019-12-03 | Stop reason: HOSPADM

## 2019-12-02 RX ADMIN — CYANOCOBALAMIN 1000 MCG: 1000 INJECTION, SOLUTION INTRAMUSCULAR; SUBCUTANEOUS at 15:49

## 2019-12-11 ENCOUNTER — OFFICE VISIT (OUTPATIENT)
Dept: FAMILY MEDICINE CLINIC | Facility: CLINIC | Age: 59
End: 2019-12-11

## 2019-12-11 VITALS
BODY MASS INDEX: 31.72 KG/M2 | RESPIRATION RATE: 8 BRPM | WEIGHT: 179 LBS | TEMPERATURE: 98.3 F | HEART RATE: 76 BPM | SYSTOLIC BLOOD PRESSURE: 140 MMHG | DIASTOLIC BLOOD PRESSURE: 70 MMHG

## 2019-12-11 DIAGNOSIS — F31.9 BIPOLAR AFFECTIVE DISORDER, REMISSION STATUS UNSPECIFIED (HCC): ICD-10-CM

## 2019-12-11 DIAGNOSIS — E03.9 ACQUIRED HYPOTHYROIDISM: ICD-10-CM

## 2019-12-11 DIAGNOSIS — G89.29 OTHER CHRONIC PAIN: ICD-10-CM

## 2019-12-11 DIAGNOSIS — M79.2 NEURALGIA: Primary | ICD-10-CM

## 2019-12-11 PROBLEM — I10 BENIGN ESSENTIAL HYPERTENSION: Status: ACTIVE | Noted: 2019-12-11

## 2019-12-11 PROBLEM — Z96.659 ARTIFICIAL KNEE JOINT PRESENT: Status: ACTIVE | Noted: 2019-12-11

## 2019-12-11 PROBLEM — M19.91 PRIMARY OSTEOARTHRITIS: Status: ACTIVE | Noted: 2019-12-11

## 2019-12-11 PROCEDURE — 99213 OFFICE O/P EST LOW 20 MIN: CPT | Performed by: HOSPITALIST

## 2019-12-11 RX ORDER — CYCLOBENZAPRINE HCL 10 MG
10 TABLET ORAL 3 TIMES DAILY PRN
Qty: 100 TABLET | Refills: 3 | Status: SHIPPED | OUTPATIENT
Start: 2019-12-11 | End: 2020-12-09 | Stop reason: SDUPTHER

## 2019-12-11 NOTE — PROGRESS NOTES
Rooming Tab(CC,VS,Pt Hx,Fall Screen)    Patient Care Team:  Sean Baig MD as PCP - General (Internal Medicine)  Sean Baig MD as PCP - Family Medicine    Chief Complaint   Patient presents with   • Follow-up     meds       Subjective     History was provided by the patient.  Here for f/u on the neuropathy and chronic pain.  Neurontin seems to be helping    Patient states other than the above problems, they are doing ok overall and has no other significant complaints    I have reviewed and updated her medications, medical/family/social history and problem list during today's office visit.       Problem List Tab  Medications Tab  Synopsis Tab  Chart Review Tab  Care Everywhere Tab  Immunizations Tab  Patient History Tab    Social History     Tobacco Use   • Smoking status: Former Smoker     Packs/day: 0.25     Years: 7.00     Pack years: 1.75     Start date: 2019     Last attempt to quit:      Years since quittin.9   • Smokeless tobacco: Never Used   • Tobacco comment: Off and on for  those years   Substance Use Topics   • Alcohol use: Yes     Frequency: Monthly or less     Drinks per session: 1 or 2     Binge frequency: Never     Comment: Rarely       Review of Systems   Constitutional: Negative for chills and fever.   Respiratory: Negative for chest tightness and shortness of breath.    Cardiovascular: Negative for chest pain.   Gastrointestinal: Negative for abdominal pain.   Musculoskeletal: Negative for arthralgias and myalgias.   Neurological: Negative for headache.       Objective     Rooming Tab(CC,VS,Pt Hx,Fall Screen)  /70 (BP Location: Left arm, Patient Position: Sitting, Cuff Size: Adult)   Pulse 76   Temp 98.3 °F (36.8 °C) (Oral)   Resp 8   Wt 81.2 kg (179 lb)   LMP 1983   BMI 31.72 kg/m²     Wt Readings from Last 4 Encounters:   19 81.2 kg (179 lb)   19 83.9 kg (185 lb)   19 81.6 kg (180 lb)   19 81.2 kg (179 lb)       Body  mass index is 31.72 kg/m².    Physical Exam   Constitutional: She appears well-developed and well-nourished.   HENT:   Head: Normocephalic.   Cardiovascular: Normal rate and regular rhythm.   Murmur heard.  Pulmonary/Chest: Breath sounds normal. She has no wheezes. She has no rales.   Abdominal: Soft. She exhibits no distension. There is no tenderness.   Skin: Skin is warm and dry.        Statin Choice Calculator  Data Reviewed:    Mammo Screening Modified With Tomosynthesis Left With Cad    Result Date: 11/15/2019  Impression: No mammographic signs of malignancy and the left breast. Recommend routine mammographic screening.  BI-RADS ASSESSMENT: BI-RADS 1. Negative.  The patient's information is entered into a computerized reminder system with a targeted due date for the next mammogram.  Note:  It has been reported that there is approximately a 15% false negative in mammography.  Therefore, management of a palpable abnormality should not be deferred because of a negative mammogram.    Electronically Signed By-Michelle Arreola On:11/15/2019 1:17 PM This report was finalized on [id number] by  Michelle Arreola, .            Lab Results   Component Value Date    BUN 20 11/18/2019    CREATININE 0.81 11/18/2019    EGFRIFNONA 72 11/18/2019     11/18/2019    K 4.5 11/18/2019     11/18/2019    CALCIUM 9.1 11/18/2019    ALBUMIN 3.80 11/18/2019    BILITOT 0.2 11/18/2019    ALKPHOS 245 (H) 11/18/2019    AST 39 (H) 11/18/2019    ALT 35 (H) 11/18/2019    WBC 8.39 11/18/2019    RBC 3.92 11/18/2019    HCT 36.6 11/18/2019    MCV 93.4 11/18/2019    MCH 30.9 11/18/2019        Assessment/Plan   Order Review Tab  Health Maintenance Tab  Patient Plan/Order Tab  Diagnoses and all orders for this visit:    1. Neuralgia (Primary)    2. Acquired hypothyroidism    3. Bipolar affective disorder, remission status unspecified (CMS/HCC)    4. Other chronic pain    Other orders  -     cyclobenzaprine (FLEXERIL) 10 MG tablet; Take 1  tablet by mouth 3 (Three) Times a Day As Needed for Muscle Spasms. PRN  Dispense: 100 tablet; Refill: 3        We went over all of their concerns they brought up during this office visit and they were informed of the diagnosis and treatment plan.  They were directed to f/u for any further problems or concerns.  They were given the opportunity to ask questions related to the visit.  I reinforced the need to keep up on their routine health maintenance items and healthy lifestyle.    Can continue to increase the dose to ~2400 mg/day.  Told to take two at bedtime.      No orders of the defined types were placed in this encounter.    Wrapup Tab  Return if symptoms worsen or fail to improve.

## 2019-12-18 RX ORDER — LIOTHYRONINE SODIUM 25 UG/1
TABLET ORAL
Qty: 60 TABLET | Refills: 5 | Status: ON HOLD | OUTPATIENT
Start: 2019-12-18 | End: 2020-05-15 | Stop reason: SDUPTHER

## 2019-12-26 ENCOUNTER — TRANSCRIBE ORDERS (OUTPATIENT)
Dept: ADMINISTRATIVE | Facility: HOSPITAL | Age: 59
End: 2019-12-26

## 2019-12-26 DIAGNOSIS — M25.561 RIGHT KNEE PAIN, UNSPECIFIED CHRONICITY: Primary | ICD-10-CM

## 2019-12-26 RX ORDER — CYANOCOBALAMIN 1000 UG/ML
1000 INJECTION, SOLUTION INTRAMUSCULAR; SUBCUTANEOUS
Status: CANCELLED | OUTPATIENT
Start: 2019-12-30

## 2019-12-27 RX ORDER — HYDROCODONE BITARTRATE AND ACETAMINOPHEN 10; 325 MG/1; MG/1
1 TABLET ORAL EVERY 4 HOURS PRN
Qty: 100 TABLET | Refills: 0 | Status: SHIPPED | OUTPATIENT
Start: 2019-12-27 | End: 2020-04-15

## 2019-12-30 ENCOUNTER — HOSPITAL ENCOUNTER (OUTPATIENT)
Dept: ONCOLOGY | Facility: HOSPITAL | Age: 59
Discharge: HOME | End: 2019-12-30
Admitting: INTERNAL MEDICINE

## 2019-12-30 ENCOUNTER — HOSPITAL ENCOUNTER (OUTPATIENT)
Dept: NUCLEAR MEDICINE | Facility: HOSPITAL | Age: 59
Discharge: HOME | End: 2019-12-30

## 2019-12-30 ENCOUNTER — APPOINTMENT (OUTPATIENT)
Dept: PREADMISSION TESTING | Facility: HOSPITAL | Age: 59
End: 2019-12-30

## 2019-12-30 VITALS
RESPIRATION RATE: 18 BRPM | HEIGHT: 55 IN | WEIGHT: 181 LBS | BODY MASS INDEX: 41.89 KG/M2 | DIASTOLIC BLOOD PRESSURE: 62 MMHG | SYSTOLIC BLOOD PRESSURE: 112 MMHG | HEART RATE: 73 BPM | TEMPERATURE: 98.2 F

## 2019-12-30 VITALS
TEMPERATURE: 97.9 F | DIASTOLIC BLOOD PRESSURE: 76 MMHG | RESPIRATION RATE: 14 BRPM | BODY MASS INDEX: 29.82 KG/M2 | HEIGHT: 65 IN | SYSTOLIC BLOOD PRESSURE: 134 MMHG | HEART RATE: 91 BPM | WEIGHT: 179 LBS | OXYGEN SATURATION: 96 %

## 2019-12-30 DIAGNOSIS — E53.8 VITAMIN B12 DEFICIENCY: Primary | ICD-10-CM

## 2019-12-30 DIAGNOSIS — M25.561 RIGHT KNEE PAIN, UNSPECIFIED CHRONICITY: ICD-10-CM

## 2019-12-30 LAB
ANION GAP SERPL CALCULATED.3IONS-SCNC: 8 MMOL/L (ref 5–15)
BASOPHILS # BLD AUTO: 0.1 10*3/MM3 (ref 0–0.2)
BASOPHILS NFR BLD AUTO: 1.3 % (ref 0–1.5)
BUN BLD-MCNC: 18 MG/DL (ref 8–23)
BUN/CREAT SERPL: 22.8 (ref 7–25)
CALCIUM SPEC-SCNC: 9.8 MG/DL (ref 8.6–10.5)
CHLORIDE SERPL-SCNC: 103 MMOL/L (ref 98–107)
CO2 SERPL-SCNC: 31 MMOL/L (ref 22–29)
CREAT BLD-MCNC: 0.79 MG/DL (ref 0.57–1)
DEPRECATED RDW RBC AUTO: 42.4 FL (ref 37–54)
EOSINOPHIL # BLD AUTO: 0.5 10*3/MM3 (ref 0–0.4)
EOSINOPHIL NFR BLD AUTO: 4.1 % (ref 0.3–6.2)
ERYTHROCYTE [DISTWIDTH] IN BLOOD BY AUTOMATED COUNT: 13 % (ref 12.3–15.4)
GFR SERPL CREATININE-BSD FRML MDRD: 74 ML/MIN/1.73
GLUCOSE BLD-MCNC: 117 MG/DL (ref 65–99)
HCT VFR BLD AUTO: 36.6 % (ref 34–46.6)
HGB BLD-MCNC: 12.2 G/DL (ref 12–15.9)
LYMPHOCYTES # BLD AUTO: 2.6 10*3/MM3 (ref 0.7–3.1)
LYMPHOCYTES NFR BLD AUTO: 23.2 % (ref 19.6–45.3)
MCH RBC QN AUTO: 30.8 PG (ref 26.6–33)
MCHC RBC AUTO-ENTMCNC: 33.4 G/DL (ref 31.5–35.7)
MCV RBC AUTO: 92.4 FL (ref 79–97)
MONOCYTES # BLD AUTO: 1.4 10*3/MM3 (ref 0.1–0.9)
MONOCYTES NFR BLD AUTO: 12.2 % (ref 5–12)
NEUTROPHILS # BLD AUTO: 6.6 10*3/MM3 (ref 1.7–7)
NEUTROPHILS NFR BLD AUTO: 59.2 % (ref 42.7–76)
NRBC BLD AUTO-RTO: 0.2 /100 WBC (ref 0–0.2)
PLATELET # BLD AUTO: 355 10*3/MM3 (ref 140–450)
PMV BLD AUTO: 9.1 FL (ref 6–12)
POTASSIUM BLD-SCNC: 4.6 MMOL/L (ref 3.5–5.2)
RBC # BLD AUTO: 3.95 10*6/MM3 (ref 3.77–5.28)
SODIUM BLD-SCNC: 142 MMOL/L (ref 136–145)
WBC NRBC COR # BLD: 11.2 10*3/MM3 (ref 3.4–10.8)

## 2019-12-30 PROCEDURE — 78315 BONE IMAGING 3 PHASE: CPT

## 2019-12-30 PROCEDURE — 96372 THER/PROPH/DIAG INJ SC/IM: CPT | Performed by: INTERNAL MEDICINE

## 2019-12-30 PROCEDURE — 0 TECHNETIUM MEDRONATE KIT: Performed by: ORTHOPAEDIC SURGERY

## 2019-12-30 PROCEDURE — 80048 BASIC METABOLIC PNL TOTAL CA: CPT | Performed by: ORTHOPAEDIC SURGERY

## 2019-12-30 PROCEDURE — 87081 CULTURE SCREEN ONLY: CPT | Performed by: ORTHOPAEDIC SURGERY

## 2019-12-30 PROCEDURE — 36415 COLL VENOUS BLD VENIPUNCTURE: CPT

## 2019-12-30 PROCEDURE — 85025 COMPLETE CBC W/AUTO DIFF WBC: CPT | Performed by: ORTHOPAEDIC SURGERY

## 2019-12-30 PROCEDURE — 93005 ELECTROCARDIOGRAM TRACING: CPT

## 2019-12-30 PROCEDURE — A9503 TC99M MEDRONATE: HCPCS | Performed by: ORTHOPAEDIC SURGERY

## 2019-12-30 PROCEDURE — 25010000002 CYANOCOBALAMIN PER 1000 MCG: Performed by: INTERNAL MEDICINE

## 2019-12-30 RX ORDER — NEOMYCIN/POLYMYXIN B/PRAMOXINE 3.5-10K-1
1 CREAM (GRAM) TOPICAL 2 TIMES DAILY
COMMUNITY

## 2019-12-30 RX ORDER — CYANOCOBALAMIN 1000 UG/ML
1000 INJECTION, SOLUTION INTRAMUSCULAR; SUBCUTANEOUS
Status: DISCONTINUED | OUTPATIENT
Start: 2019-12-30 | End: 2019-12-31 | Stop reason: HOSPADM

## 2019-12-30 RX ORDER — TC 99M MEDRONATE 20 MG/10ML
25.9 INJECTION, POWDER, LYOPHILIZED, FOR SOLUTION INTRAVENOUS
Status: COMPLETED | OUTPATIENT
Start: 2019-12-30 | End: 2019-12-30

## 2019-12-30 RX ADMIN — TC 99M MEDRONATE 25.9 MILLICURIE: 20 INJECTION, POWDER, LYOPHILIZED, FOR SOLUTION INTRAVENOUS at 10:15

## 2019-12-30 RX ADMIN — CYANOCOBALAMIN 1000 MCG: 1000 INJECTION, SOLUTION INTRAMUSCULAR; SUBCUTANEOUS at 14:38

## 2019-12-31 ENCOUNTER — APPOINTMENT (OUTPATIENT)
Dept: PREADMISSION TESTING | Facility: HOSPITAL | Age: 59
End: 2019-12-31

## 2019-12-31 LAB — MRSA SPEC QL CULT: NORMAL

## 2020-01-01 PROCEDURE — 93010 ELECTROCARDIOGRAM REPORT: CPT | Performed by: INTERNAL MEDICINE

## 2020-01-06 ENCOUNTER — ANESTHESIA EVENT (OUTPATIENT)
Dept: PERIOP | Facility: HOSPITAL | Age: 60
End: 2020-01-06

## 2020-01-06 RX ORDER — GABAPENTIN 300 MG/1
CAPSULE ORAL
Qty: 90 CAPSULE | Refills: 2 | Status: SHIPPED | OUTPATIENT
Start: 2020-01-06 | End: 2020-01-15 | Stop reason: SDUPTHER

## 2020-01-07 ENCOUNTER — ANESTHESIA (OUTPATIENT)
Dept: PERIOP | Facility: HOSPITAL | Age: 60
End: 2020-01-07

## 2020-01-07 ENCOUNTER — HOSPITAL ENCOUNTER (OUTPATIENT)
Facility: HOSPITAL | Age: 60
Setting detail: HOSPITAL OUTPATIENT SURGERY
Discharge: HOME | End: 2020-01-07
Attending: ORTHOPAEDIC SURGERY | Admitting: ORTHOPAEDIC SURGERY

## 2020-01-07 ENCOUNTER — TELEPHONE (OUTPATIENT)
Dept: FAMILY MEDICINE CLINIC | Facility: CLINIC | Age: 60
End: 2020-01-07

## 2020-01-07 VITALS
OXYGEN SATURATION: 93 % | TEMPERATURE: 97.7 F | HEART RATE: 75 BPM | HEIGHT: 64 IN | WEIGHT: 180.56 LBS | SYSTOLIC BLOOD PRESSURE: 107 MMHG | RESPIRATION RATE: 15 BRPM | DIASTOLIC BLOOD PRESSURE: 50 MMHG | BODY MASS INDEX: 30.83 KG/M2

## 2020-01-07 DIAGNOSIS — M23.352 OTHER MENISCUS DERANGEMENTS, POSTERIOR HORN OF LATERAL MENISCUS, LEFT KNEE: Primary | ICD-10-CM

## 2020-01-07 DIAGNOSIS — G89.4 CHRONIC PAIN SYNDROME: Primary | ICD-10-CM

## 2020-01-07 PROCEDURE — 25010000002 DEXAMETHASONE PER 1 MG: Performed by: ANESTHESIOLOGIST ASSISTANT

## 2020-01-07 PROCEDURE — 25010000002 ONDANSETRON PER 1 MG: Performed by: ANESTHESIOLOGIST ASSISTANT

## 2020-01-07 PROCEDURE — 25010000002 PROPOFOL 10 MG/ML EMULSION: Performed by: ANESTHESIOLOGIST ASSISTANT

## 2020-01-07 PROCEDURE — 25010000002 KETOROLAC TROMETHAMINE PER 15 MG: Performed by: ANESTHESIOLOGIST ASSISTANT

## 2020-01-07 PROCEDURE — 25010000002 FENTANYL CITRATE (PF) 100 MCG/2ML SOLUTION: Performed by: ANESTHESIOLOGIST ASSISTANT

## 2020-01-07 PROCEDURE — 25010000002 MORPHINE PER 10 MG: Performed by: ORTHOPAEDIC SURGERY

## 2020-01-07 RX ORDER — LORAZEPAM 2 MG/ML
1 INJECTION INTRAMUSCULAR
Status: DISCONTINUED | OUTPATIENT
Start: 2020-01-07 | End: 2020-01-07 | Stop reason: HOSPADM

## 2020-01-07 RX ORDER — OXYCODONE HYDROCHLORIDE 5 MG/1
5 TABLET ORAL ONCE AS NEEDED
Status: DISCONTINUED | OUTPATIENT
Start: 2020-01-07 | End: 2020-01-07 | Stop reason: HOSPADM

## 2020-01-07 RX ORDER — OXYCODONE HYDROCHLORIDE 5 MG/1
10 TABLET ORAL EVERY 4 HOURS PRN
Status: DISCONTINUED | OUTPATIENT
Start: 2020-01-07 | End: 2020-01-07 | Stop reason: HOSPADM

## 2020-01-07 RX ORDER — PROMETHAZINE HYDROCHLORIDE 25 MG/1
25 SUPPOSITORY RECTAL ONCE AS NEEDED
Status: DISCONTINUED | OUTPATIENT
Start: 2020-01-07 | End: 2020-01-07 | Stop reason: HOSPADM

## 2020-01-07 RX ORDER — ONDANSETRON 2 MG/ML
4 INJECTION INTRAMUSCULAR; INTRAVENOUS ONCE AS NEEDED
Status: COMPLETED | OUTPATIENT
Start: 2020-01-07 | End: 2020-01-07

## 2020-01-07 RX ORDER — FENTANYL CITRATE 50 UG/ML
50 INJECTION, SOLUTION INTRAMUSCULAR; INTRAVENOUS
Status: DISCONTINUED | OUTPATIENT
Start: 2020-01-07 | End: 2020-01-07 | Stop reason: HOSPADM

## 2020-01-07 RX ORDER — PROMETHAZINE HYDROCHLORIDE 25 MG/1
25 TABLET ORAL ONCE AS NEEDED
Status: DISCONTINUED | OUTPATIENT
Start: 2020-01-07 | End: 2020-01-07 | Stop reason: HOSPADM

## 2020-01-07 RX ORDER — PROPOFOL 10 MG/ML
VIAL (ML) INTRAVENOUS AS NEEDED
Status: DISCONTINUED | OUTPATIENT
Start: 2020-01-07 | End: 2020-01-07 | Stop reason: SURG

## 2020-01-07 RX ORDER — LIDOCAINE HYDROCHLORIDE 10 MG/ML
INJECTION, SOLUTION EPIDURAL; INFILTRATION; INTRACAUDAL; PERINEURAL AS NEEDED
Status: DISCONTINUED | OUTPATIENT
Start: 2020-01-07 | End: 2020-01-07 | Stop reason: SURG

## 2020-01-07 RX ORDER — CLINDAMYCIN PHOSPHATE 900 MG/50ML
900 INJECTION, SOLUTION INTRAVENOUS ONCE
Status: COMPLETED | OUTPATIENT
Start: 2020-01-07 | End: 2020-01-07

## 2020-01-07 RX ORDER — SODIUM CHLORIDE, SODIUM LACTATE, POTASSIUM CHLORIDE, CALCIUM CHLORIDE 600; 310; 30; 20 MG/100ML; MG/100ML; MG/100ML; MG/100ML
9 INJECTION, SOLUTION INTRAVENOUS CONTINUOUS PRN
Status: DISCONTINUED | OUTPATIENT
Start: 2020-01-07 | End: 2020-01-07 | Stop reason: HOSPADM

## 2020-01-07 RX ORDER — KETOROLAC TROMETHAMINE 30 MG/ML
INJECTION, SOLUTION INTRAMUSCULAR; INTRAVENOUS AS NEEDED
Status: DISCONTINUED | OUTPATIENT
Start: 2020-01-07 | End: 2020-01-07 | Stop reason: SURG

## 2020-01-07 RX ORDER — SODIUM CHLORIDE 0.9 % (FLUSH) 0.9 %
10 SYRINGE (ML) INJECTION EVERY 12 HOURS SCHEDULED
Status: DISCONTINUED | OUTPATIENT
Start: 2020-01-07 | End: 2020-01-07 | Stop reason: HOSPADM

## 2020-01-07 RX ORDER — DEXAMETHASONE SODIUM PHOSPHATE 4 MG/ML
INJECTION, SOLUTION INTRA-ARTICULAR; INTRALESIONAL; INTRAMUSCULAR; INTRAVENOUS; SOFT TISSUE AS NEEDED
Status: DISCONTINUED | OUTPATIENT
Start: 2020-01-07 | End: 2020-01-07 | Stop reason: SURG

## 2020-01-07 RX ORDER — ONDANSETRON 2 MG/ML
INJECTION INTRAMUSCULAR; INTRAVENOUS AS NEEDED
Status: DISCONTINUED | OUTPATIENT
Start: 2020-01-07 | End: 2020-01-07 | Stop reason: SURG

## 2020-01-07 RX ORDER — ACETAMINOPHEN 650 MG/1
650 SUPPOSITORY RECTAL ONCE AS NEEDED
Status: DISCONTINUED | OUTPATIENT
Start: 2020-01-07 | End: 2020-01-07 | Stop reason: HOSPADM

## 2020-01-07 RX ORDER — ACETAMINOPHEN 325 MG/1
650 TABLET ORAL ONCE AS NEEDED
Status: DISCONTINUED | OUTPATIENT
Start: 2020-01-07 | End: 2020-01-07 | Stop reason: HOSPADM

## 2020-01-07 RX ORDER — EPHEDRINE SULFATE/0.9% NACL/PF 25 MG/5 ML
SYRINGE (ML) INTRAVENOUS AS NEEDED
Status: DISCONTINUED | OUTPATIENT
Start: 2020-01-07 | End: 2020-01-07 | Stop reason: SURG

## 2020-01-07 RX ORDER — SODIUM CHLORIDE 0.9 % (FLUSH) 0.9 %
10 SYRINGE (ML) INJECTION AS NEEDED
Status: DISCONTINUED | OUTPATIENT
Start: 2020-01-07 | End: 2020-01-07 | Stop reason: HOSPADM

## 2020-01-07 RX ORDER — PROMETHAZINE HYDROCHLORIDE 25 MG/ML
6.25 INJECTION, SOLUTION INTRAMUSCULAR; INTRAVENOUS ONCE AS NEEDED
Status: DISCONTINUED | OUTPATIENT
Start: 2020-01-07 | End: 2020-01-07 | Stop reason: HOSPADM

## 2020-01-07 RX ORDER — HYDROCODONE BITARTRATE AND ACETAMINOPHEN 7.5; 325 MG/1; MG/1
1 TABLET ORAL EVERY 4 HOURS PRN
Qty: 20 TABLET | Refills: 0 | Status: SHIPPED | OUTPATIENT
Start: 2020-01-07 | End: 2020-01-15

## 2020-01-07 RX ORDER — FENTANYL CITRATE 50 UG/ML
INJECTION, SOLUTION INTRAMUSCULAR; INTRAVENOUS AS NEEDED
Status: DISCONTINUED | OUTPATIENT
Start: 2020-01-07 | End: 2020-01-07 | Stop reason: SURG

## 2020-01-07 RX ADMIN — DEXAMETHASONE SODIUM PHOSPHATE 4 MG: 4 INJECTION, SOLUTION INTRAMUSCULAR; INTRAVENOUS at 10:52

## 2020-01-07 RX ADMIN — KETOROLAC TROMETHAMINE 30 MG: 30 INJECTION, SOLUTION INTRAMUSCULAR at 11:14

## 2020-01-07 RX ADMIN — FENTANYL CITRATE 50 MCG: 50 INJECTION, SOLUTION INTRAMUSCULAR; INTRAVENOUS at 10:46

## 2020-01-07 RX ADMIN — PROPOFOL 200 MG: 10 INJECTION, EMULSION INTRAVENOUS at 10:47

## 2020-01-07 RX ADMIN — ONDANSETRON 4 MG: 2 INJECTION INTRAMUSCULAR; INTRAVENOUS at 11:14

## 2020-01-07 RX ADMIN — FENTANYL CITRATE 50 MCG: 50 INJECTION, SOLUTION INTRAMUSCULAR; INTRAVENOUS at 11:57

## 2020-01-07 RX ADMIN — Medication 10 MG: at 11:07

## 2020-01-07 RX ADMIN — ONDANSETRON 4 MG: 2 INJECTION INTRAMUSCULAR; INTRAVENOUS at 11:38

## 2020-01-07 RX ADMIN — LIDOCAINE HYDROCHLORIDE 50 MG: 10 INJECTION, SOLUTION EPIDURAL; INFILTRATION; INTRACAUDAL; PERINEURAL at 10:47

## 2020-01-07 RX ADMIN — CLINDAMYCIN PHOSPHATE 900 MG: 900 INJECTION, SOLUTION INTRAVENOUS at 10:30

## 2020-01-07 RX ADMIN — SODIUM CHLORIDE, SODIUM LACTATE, POTASSIUM CHLORIDE, AND CALCIUM CHLORIDE 9 ML/HR: 600; 310; 30; 20 INJECTION, SOLUTION INTRAVENOUS at 10:30

## 2020-01-07 RX ADMIN — FENTANYL CITRATE 50 MCG: 50 INJECTION, SOLUTION INTRAMUSCULAR; INTRAVENOUS at 11:25

## 2020-01-07 RX ADMIN — Medication 10 MG: at 10:58

## 2020-01-07 NOTE — ANESTHESIA POSTPROCEDURE EVALUATION
Patient: Tracie Gross    Procedure Summary     Date:  01/07/20 Room / Location:  [facility] OR 11 / [facility] MAIN OR    Anesthesia Start:  1043 Anesthesia Stop:  1126    Procedure:  LEFT KNEE SCOPE with partial lateral meniscectomy (Left Knee) Diagnosis:       Complex tear of lateral meniscus of left knee as current injury      (Complex tear of lateral meniscus of left knee as current injury [S83.272A])    Surgeon:  Chau Perez MD Provider:  Agustin Everett MD    Anesthesia Type:  general ASA Status:  3          Anesthesia Type: general    Vitals  Vitals Value Taken Time   /51 1/7/2020 12:03 PM   Temp 97.9 °F (36.6 °C) 1/7/2020 12:03 PM   Pulse 78 1/7/2020 12:04 PM   Resp 10 1/7/2020 12:03 PM   SpO2 94 % 1/7/2020 12:04 PM   Vitals shown include unvalidated device data.        Post Anesthesia Care and Evaluation    Patient location during evaluation: PACU  Patient participation: complete - patient participated  Level of consciousness: awake  Pain scale: See nurse's notes for pain score.  Pain management: adequate  Airway patency: patent  Anesthetic complications: No anesthetic complications  PONV Status: none  Cardiovascular status: acceptable  Respiratory status: acceptable  Hydration status: acceptable    Comments: Patient seen and examined postoperatively; vital signs stable; SpO2 greater than or equal to 90%; cardiopulmonary status stable; nausea/vomiting adequately controlled; pain adequately controlled; no apparent anesthesia complications; patient discharged from anesthesia care when discharge criteria were met

## 2020-01-07 NOTE — OP NOTE
KNEE ARTHROSCOPY  Procedure Report    Patient Name:  Tracie Gross  YOB: 1960    Date of Surgery:  1/7/2020         Pre-op Diagnosis: Left lateral meniscal tear and DJD    Postop diagnosis: Same    Indication: 59-year-old white female with painful left knee refractory to conservative care she was to have this debrided for pain relief      Procedure/CPT® Codes:      Procedure(s):  LEFT KNEE SCOPE with partial lateral meniscectomy    Staff:  Surgeon(s):  Chau Perez MD         Anesthesia: General    Estimated Blood Loss: 0    Implants:    Nothing was implanted during the procedure    Specimen:          0        Findings: Grade 2 patella, normal ACL, grade 2 medial femoral condyle, normal medial meniscus, lateral meniscus had a complex tear from 2:00 to 3:00, there is a small grade 3 defect on the lateral femoral condyle    Complications: 0    Description of Procedure: Patient was seen in holding room and obtain consent.  Received preop antibiotic Cleocin 900 mg.  Patient received anesthesia then was patient's supine with the operative extremity in the arthroscopic leg evans and the nonoperative extremity in the Peterson leg evans with hip knee flexed mildly.  The operative knee was injected sterilely with 40 ml 0.025% Marcaine with epinephrine containing 2 mg of morphine.  The operative extremity was then prepped and draped sterilely.  The incisions were injected each with 2 ml of quarter percent Marcaine with epinephrine..  The vertical incisions were made lateral incision at the joint line 5 mm lateral to patella tendon 5 mm superior to the joint line.  The lateral outflow portal incision was made in the suprapatellar pouch for a formal meter plastic outflow cannula.  The blunt trocar was used to introduce a 30 degree scope through the lateral joint line portal.  Pressure is set at 65 mmHg with the irrigating fluid being normal saline with 1 ml of 1:1000 epinephrine per 3 L bag.  The medial  compartment was located and then a 18-gauge spinal needle was used to locate the medial portal about 5 mm medial to the patella tendon and 5 mm.  Superior to the joint line under direct visualization.  Probe was placed in the medial portal was used to probe the intra-articular structures with the findings as mentioned  Above.  A biting basket and a 4.5 mm angled shaver was used to debride the lateral meniscus to a smooth edge.  The knee was then thoroughly irrigated.  The portals were then closed with 4-0 Monocryl subcuticular suture Steri-Strips and Mastisol.  Sterile dressings were applied.  Patient was left stable in the operating room.    Date: 1/7/2020  Time: 11:25 AM

## 2020-01-07 NOTE — ANESTHESIA PROCEDURE NOTES
Airway  Date/Time: 1/7/2020 10:48 AM    General Information and Staff    Patient location during procedure: OR    Indications and Patient Condition  Indications for airway management: airway protection    Preoxygenated: yes  Mask difficulty assessment: 0 - not attempted    Final Airway Details  Final airway type: supraglottic airway      Successful airway: classic  Size 4    Number of attempts at approach: 1  Assessment: lips, teeth, and gum same as pre-op

## 2020-01-07 NOTE — ANESTHESIA PREPROCEDURE EVALUATION
Anesthesia Evaluation     Patient summary reviewed and Nursing notes reviewed   NPO Solid Status: > 8 hours  NPO Liquid Status: > 8 hours           Airway   Mallampati: I  TM distance: >3 FB  Neck ROM: full  No difficulty expected  Dental - normal exam     Pulmonary - normal exam   (+) asthma,sleep apnea,   Cardiovascular - normal exam    (+) hypertension, valvular problems/murmurs,       Neuro/Psych  (+) psychiatric history,     GI/Hepatic/Renal/Endo    (+)  GERD,      Musculoskeletal     Abdominal    Substance History      OB/GYN          Other   arthritis,    history of cancer        Phys Exam Other: Recent gum graft right lower jaw                Anesthesia Plan    ASA 3     general     intravenous induction     Anesthetic plan, all risks, benefits, and alternatives have been provided, discussed and informed consent has been obtained with: patient.    Plan discussed with CAA.

## 2020-01-13 RX ORDER — ALENDRONATE SODIUM 70 MG/1
TABLET ORAL
Qty: 4 TABLET | Refills: 3 | Status: SHIPPED | OUTPATIENT
Start: 2020-01-13 | End: 2020-05-13

## 2020-01-15 ENCOUNTER — OFFICE VISIT (OUTPATIENT)
Dept: FAMILY MEDICINE CLINIC | Facility: CLINIC | Age: 60
End: 2020-01-15

## 2020-01-15 VITALS
BODY MASS INDEX: 30.73 KG/M2 | DIASTOLIC BLOOD PRESSURE: 70 MMHG | SYSTOLIC BLOOD PRESSURE: 110 MMHG | TEMPERATURE: 98.7 F | RESPIRATION RATE: 16 BRPM | HEART RATE: 84 BPM | HEIGHT: 64 IN | WEIGHT: 180 LBS

## 2020-01-15 DIAGNOSIS — Z98.890 S/P LEFT KNEE SURGERY: Primary | ICD-10-CM

## 2020-01-15 DIAGNOSIS — G89.4 CHRONIC PAIN SYNDROME: ICD-10-CM

## 2020-01-15 PROCEDURE — 99213 OFFICE O/P EST LOW 20 MIN: CPT | Performed by: FAMILY MEDICINE

## 2020-01-15 RX ORDER — GABAPENTIN 300 MG/1
600 CAPSULE ORAL 3 TIMES DAILY
Qty: 270 CAPSULE | Refills: 3 | Status: SHIPPED | OUTPATIENT
Start: 2020-01-15 | End: 2020-04-15 | Stop reason: SDUPTHER

## 2020-01-15 NOTE — PROGRESS NOTES
Chief Complaint   Patient presents with   • knee surgery     fu      HPI  Tracie Gross is a 59 y.o. female that presents for knee surgery follow-up.    Patient had arthroscopic surgery on L knee 8 days ago. Denies pain, swelling. No gait difficulties or fever in regards to L knee. R knee continues to be problem    Dr Baig recommended patient see Dr Shah (ortho) for second opinion regarding R knee replacement. She has not done well w/ her replacement. Unable to straighten completely (limited to 20 degrees flexion) and can flex to 90 degrees. Goal is to get around zoo w/ grandkids. Limited by limited ROM and increased edema on feet. Dr Shah has ordered bone scan so far.     Pain controlled w/ morphine and Norco. Currently on morphine 30 Q12. Norco is not used daily. Taking gabapentin 300 TID    Review of Systems   Constitutional: Negative for fever and unexpected weight loss.   HENT: Negative for congestion.    Eyes: Negative for visual disturbance.   Respiratory: Negative for shortness of breath.    Cardiovascular: Negative for chest pain.   Gastrointestinal: Negative for abdominal pain.   Musculoskeletal: Positive for arthralgias, gait problem and joint swelling.     The following portions of the patient's history were reviewed and updated as appropriate: problem list, past medical history, past surgical history, allergies, current medications    Problem List Tab  Patient History Tab  Immunizations Tab  Medications Tab  Chart Review Tab  Care Everywhere Tab  Synopsis Tab    PE  Vitals:    01/15/20 1112   BP: 110/70   Pulse: 84   Resp: 16   Temp: 98.7 °F (37.1 °C)     Body mass index is 30.9 kg/m².  General: Well nourished, NAD  Head: AT/NC  Eyes: EOMI, anicteric sclera  Resp: CTAB, SCR, BS equal  CV: RRR w/o m/r/g; 2+ pulses  GI: Soft, NT/ND, +BS  MSK: L knee is healing well. Good ROM and not appreciable edema. R knee ROM limited- straightens to about 20 degrees flexion and can only flex to 90 degrees. It  exhibits chronic edema.  Skin: Warm, dry, intact  Neuro: Alert and oriented. No focal deficits  Psych: Appropriate mood and affect    Imaging  Nm Bone Scan 3 Phase    Result Date: 12/30/2019  Asymmetric increased right pharmaceutical uptake in the distal right femur/proximal tibia surrounding the right knee prosthesis, best depicted on delayed phase imaging, with a pattern of degenerative uptake is demonstrated within the left knee. No significant hyperemic changes are seen surrounding the right knee are identified on the flow phase.  Electronically Signed By-Dr. Barb Millan MD On:12/30/2019 2:13 PM This report was finalized on [id number] by Dr. Barb Millan MD.    Mammo Screening Modified With Tomosynthesis Left With Cad    Result Date: 11/15/2019  No mammographic signs of malignancy and the left breast. Recommend routine mammographic screening.  BI-RADS ASSESSMENT: BI-RADS 1. Negative.  The patient's information is entered into a computerized reminder system with a targeted due date for the next mammogram.  Note:  It has been reported that there is approximately a 15% false negative in mammography.  Therefore, management of a palpable abnormality should not be deferred because of a negative mammogram.    Electronically Signed By-Michelle Arreola On:11/15/2019 1:17 PM This report was finalized on [id number] by  Michelle Arreola, .      Assessment/Plan   Tracie Gross is a 59 y.o. female that presents for   Chief Complaint   Patient presents with   • knee surgery     fu      Tracie was seen today for knee surgery.    Diagnoses and all orders for this visit:    S/P left knee surgery   - Monitor   - Cont ortho f/u   - Pt also obtaining second opinion regarding R knee limitations    Chronic pain syndrome: discussed need to wean narcotics and goal of getting these down. She is maintained on morphine BID w/ [place] PRN. Patient rarely uses Norco (not everyday). We discussed need to wean and add other non-narcotic  medications help control her pain. Will consider pain mgmt if needed. Today, will increase gabapentin as below (300 to 600). Will consider duloxetine in future.  -     gabapentin (NEURONTIN) 300 MG capsule; Take 2 capsules by mouth 3 (Three) Times a Day.   - Cont other home pain medications    F/U in 3 months for various medical problems

## 2020-01-28 ENCOUNTER — HOSPITAL ENCOUNTER (OUTPATIENT)
Dept: ONCOLOGY | Facility: HOSPITAL | Age: 60
Discharge: HOME | End: 2020-01-28
Admitting: NURSE PRACTITIONER

## 2020-01-28 VITALS
HEART RATE: 87 BPM | RESPIRATION RATE: 18 BRPM | HEIGHT: 64 IN | WEIGHT: 181 LBS | BODY MASS INDEX: 30.9 KG/M2 | SYSTOLIC BLOOD PRESSURE: 135 MMHG | DIASTOLIC BLOOD PRESSURE: 78 MMHG | TEMPERATURE: 97.9 F

## 2020-01-28 DIAGNOSIS — E53.8 VITAMIN B12 DEFICIENCY: Primary | ICD-10-CM

## 2020-01-28 PROCEDURE — 96372 THER/PROPH/DIAG INJ SC/IM: CPT | Performed by: INTERNAL MEDICINE

## 2020-01-28 PROCEDURE — 25010000002 CYANOCOBALAMIN PER 1000 MCG: Performed by: INTERNAL MEDICINE

## 2020-01-28 RX ORDER — CYANOCOBALAMIN 1000 UG/ML
1000 INJECTION, SOLUTION INTRAMUSCULAR; SUBCUTANEOUS
Status: DISCONTINUED | OUTPATIENT
Start: 2020-01-28 | End: 2020-01-29 | Stop reason: HOSPADM

## 2020-01-28 RX ORDER — CYANOCOBALAMIN 1000 UG/ML
1000 INJECTION, SOLUTION INTRAMUSCULAR; SUBCUTANEOUS
Status: CANCELLED | OUTPATIENT
Start: 2020-01-28

## 2020-01-28 RX ORDER — CYANOCOBALAMIN 1000 UG/ML
1000 INJECTION, SOLUTION INTRAMUSCULAR; SUBCUTANEOUS
Status: CANCELLED | OUTPATIENT
Start: 2020-02-25

## 2020-01-28 RX ORDER — CYANOCOBALAMIN 1000 UG/ML
1000 INJECTION, SOLUTION INTRAMUSCULAR; SUBCUTANEOUS
Status: CANCELLED | OUTPATIENT
Start: 2020-03-24

## 2020-01-28 RX ADMIN — CYANOCOBALAMIN 1000 MCG: 1000 INJECTION, SOLUTION INTRAMUSCULAR; SUBCUTANEOUS at 15:34

## 2020-02-03 RX ORDER — NIFEDIPINE 60 MG/1
60 TABLET, EXTENDED RELEASE ORAL DAILY
Qty: 90 TABLET | Refills: 0 | Status: SHIPPED | OUTPATIENT
Start: 2020-02-03 | End: 2020-04-29

## 2020-02-04 DIAGNOSIS — G89.4 CHRONIC PAIN SYNDROME: Primary | ICD-10-CM

## 2020-02-04 DIAGNOSIS — G89.4 CHRONIC PAIN SYNDROME: ICD-10-CM

## 2020-02-04 RX ORDER — MORPHINE SULFATE 15 MG/1
15 TABLET, FILM COATED, EXTENDED RELEASE ORAL 3 TIMES DAILY
Qty: 90 TABLET | Refills: 0 | Status: SHIPPED | OUTPATIENT
Start: 2020-02-04 | End: 2020-03-10 | Stop reason: SDUPTHER

## 2020-02-05 NOTE — TELEPHONE ENCOUNTER
Please let patient know that I have put in to wean her morphine. Let her know that she may use more of her Norco while making this change. Please let me know if there are issues

## 2020-02-12 RX ORDER — FERROUS SULFATE 325(65) MG
TABLET ORAL
Qty: 30 TABLET | Refills: 2 | Status: SHIPPED | OUTPATIENT
Start: 2020-02-12 | End: 2020-05-18

## 2020-02-25 ENCOUNTER — HOSPITAL ENCOUNTER (OUTPATIENT)
Dept: ONCOLOGY | Facility: HOSPITAL | Age: 60
Discharge: HOME | End: 2020-02-25
Admitting: NURSE PRACTITIONER

## 2020-02-25 DIAGNOSIS — E53.8 VITAMIN B12 DEFICIENCY: Primary | ICD-10-CM

## 2020-02-25 PROCEDURE — 25010000002 CYANOCOBALAMIN PER 1000 MCG: Performed by: NURSE PRACTITIONER

## 2020-02-25 PROCEDURE — 96372 THER/PROPH/DIAG INJ SC/IM: CPT

## 2020-02-25 RX ORDER — CYANOCOBALAMIN 1000 UG/ML
1000 INJECTION, SOLUTION INTRAMUSCULAR; SUBCUTANEOUS
Status: DISCONTINUED | OUTPATIENT
Start: 2020-02-25 | End: 2020-02-26 | Stop reason: HOSPADM

## 2020-02-25 RX ADMIN — CYANOCOBALAMIN 1000 MCG: 1000 INJECTION, SOLUTION INTRAMUSCULAR at 14:08

## 2020-03-10 ENCOUNTER — TELEPHONE (OUTPATIENT)
Dept: FAMILY MEDICINE CLINIC | Facility: CLINIC | Age: 60
End: 2020-03-10

## 2020-03-10 ENCOUNTER — LAB (OUTPATIENT)
Dept: LAB | Facility: HOSPITAL | Age: 60
End: 2020-03-10

## 2020-03-10 DIAGNOSIS — G89.4 CHRONIC PAIN SYNDROME: ICD-10-CM

## 2020-03-10 DIAGNOSIS — Z79.899 LONG-TERM USE OF HIGH-RISK MEDICATION: ICD-10-CM

## 2020-03-10 DIAGNOSIS — M34.9 SCLERODERMA (HCC): ICD-10-CM

## 2020-03-10 DIAGNOSIS — M25.50 ARTHRALGIA, UNSPECIFIED JOINT: ICD-10-CM

## 2020-03-10 LAB
ACANTHOCYTES BLD QL SMEAR: ABNORMAL
ALBUMIN SERPL-MCNC: 4 G/DL (ref 3.5–5.2)
ALBUMIN/GLOB SERPL: 1.4 G/DL
ALP SERPL-CCNC: 181 U/L (ref 39–117)
ALT SERPL W P-5'-P-CCNC: 40 U/L (ref 1–33)
ANION GAP SERPL CALCULATED.3IONS-SCNC: 10.8 MMOL/L (ref 5–15)
AST SERPL-CCNC: 27 U/L (ref 1–32)
BILIRUB SERPL-MCNC: 0.2 MG/DL (ref 0.2–1.2)
BUN BLD-MCNC: 23 MG/DL (ref 8–23)
BUN/CREAT SERPL: 28.8 (ref 7–25)
CALCIUM SPEC-SCNC: 9.1 MG/DL (ref 8.6–10.5)
CHLORIDE SERPL-SCNC: 101 MMOL/L (ref 98–107)
CO2 SERPL-SCNC: 28.2 MMOL/L (ref 22–29)
CREAT BLD-MCNC: 0.8 MG/DL (ref 0.57–1)
CRP SERPL-MCNC: 0.28 MG/DL (ref 0–0.5)
DEPRECATED RDW RBC AUTO: 41.8 FL (ref 37–54)
EOSINOPHIL # BLD MANUAL: 0.19 10*3/MM3 (ref 0–0.4)
EOSINOPHIL NFR BLD MANUAL: 2 % (ref 0.3–6.2)
ERYTHROCYTE [DISTWIDTH] IN BLOOD BY AUTOMATED COUNT: 12.9 % (ref 12.3–15.4)
ERYTHROCYTE [SEDIMENTATION RATE] IN BLOOD: 20 MM/HR (ref 0–30)
GFR SERPL CREATININE-BSD FRML MDRD: 73 ML/MIN/1.73
GLOBULIN UR ELPH-MCNC: 2.8 GM/DL
GLUCOSE BLD-MCNC: 75 MG/DL (ref 65–99)
HCT VFR BLD AUTO: 37.8 % (ref 34–46.6)
HGB BLD-MCNC: 12.6 G/DL (ref 12–15.9)
HOWELL-JOLLY BOD BLD QL SMEAR: PRESENT
LYMPHOCYTES # BLD MANUAL: 1.9 10*3/MM3 (ref 0.7–3.1)
LYMPHOCYTES NFR BLD MANUAL: 13 % (ref 5–12)
LYMPHOCYTES NFR BLD MANUAL: 20 % (ref 19.6–45.3)
MCH RBC QN AUTO: 30.4 PG (ref 26.6–33)
MCHC RBC AUTO-ENTMCNC: 33.3 G/DL (ref 31.5–35.7)
MCV RBC AUTO: 91.3 FL (ref 79–97)
MONOCYTES # BLD AUTO: 1.24 10*3/MM3 (ref 0.1–0.9)
NEUTROPHILS # BLD AUTO: 6.19 10*3/MM3 (ref 1.7–7)
NEUTROPHILS NFR BLD MANUAL: 65 % (ref 42.7–76)
PLAT MORPH BLD: NORMAL
PLATELET # BLD AUTO: 339 10*3/MM3 (ref 140–450)
PMV BLD AUTO: 11.2 FL (ref 6–12)
POIKILOCYTOSIS BLD QL SMEAR: ABNORMAL
POTASSIUM BLD-SCNC: 4.5 MMOL/L (ref 3.5–5.2)
PROT SERPL-MCNC: 6.8 G/DL (ref 6–8.5)
RBC # BLD AUTO: 4.14 10*6/MM3 (ref 3.77–5.28)
SODIUM BLD-SCNC: 140 MMOL/L (ref 136–145)
TARGETS BLD QL SMEAR: ABNORMAL
WBC MORPH BLD: NORMAL
WBC NRBC COR # BLD: 9.52 10*3/MM3 (ref 3.4–10.8)

## 2020-03-10 PROCEDURE — 85652 RBC SED RATE AUTOMATED: CPT

## 2020-03-10 PROCEDURE — 36415 COLL VENOUS BLD VENIPUNCTURE: CPT

## 2020-03-10 PROCEDURE — 85007 BL SMEAR W/DIFF WBC COUNT: CPT

## 2020-03-10 PROCEDURE — 85027 COMPLETE CBC AUTOMATED: CPT

## 2020-03-10 PROCEDURE — 86140 C-REACTIVE PROTEIN: CPT

## 2020-03-10 PROCEDURE — 80053 COMPREHEN METABOLIC PANEL: CPT

## 2020-03-10 RX ORDER — MORPHINE SULFATE 15 MG/1
15 TABLET, FILM COATED, EXTENDED RELEASE ORAL 2 TIMES DAILY
Qty: 60 TABLET | Refills: 0 | Status: SHIPPED | OUTPATIENT
Start: 2020-03-10 | End: 2020-04-15

## 2020-03-10 NOTE — TELEPHONE ENCOUNTER
Spouse called requesting refill for patients     Morphine (MS CONTIN) 15 MG 12 hr tablet        none

## 2020-03-11 NOTE — TELEPHONE ENCOUNTER
Gave patient message. She states since last refill of Morphine, she is waking up in terrible pain 3-4 times/week in her back and shoulder. Some days are bad as well. She understood at her upcoming appointment in April that you were going to start the wean then and states the wean wasn't discussed at her last visit. She is also asking if the timing and weather is playing a role with her pain. She states at times it has been rough.    Pt states there was discussion of changing her bipolar meds and you were going to contact her psychiatrist about this as well.

## 2020-03-11 NOTE — TELEPHONE ENCOUNTER
Please let the patient know that I did put in to wean her morphine c/w the goals that we discussed at her last visit. I would recommend her increase her gabapentin to 900mg TID and use more Norco if needed to tolerate this wean. Please call if she is having significant issues w/ this move. Thank you

## 2020-03-12 ENCOUNTER — OFFICE VISIT (OUTPATIENT)
Dept: RHEUMATOLOGY | Facility: CLINIC | Age: 60
End: 2020-03-12

## 2020-03-12 ENCOUNTER — TELEPHONE (OUTPATIENT)
Dept: RHEUMATOLOGY | Facility: CLINIC | Age: 60
End: 2020-03-12

## 2020-03-12 VITALS
DIASTOLIC BLOOD PRESSURE: 74 MMHG | BODY MASS INDEX: 30.9 KG/M2 | SYSTOLIC BLOOD PRESSURE: 106 MMHG | HEIGHT: 64 IN | HEART RATE: 87 BPM | WEIGHT: 181 LBS

## 2020-03-12 DIAGNOSIS — M15.9 OSTEOARTHRITIS OF MULTIPLE JOINTS, UNSPECIFIED OSTEOARTHRITIS TYPE: ICD-10-CM

## 2020-03-12 DIAGNOSIS — Z79.899 LONG-TERM USE OF HIGH-RISK MEDICATION: ICD-10-CM

## 2020-03-12 DIAGNOSIS — R94.5 LIVER FUNCTION ABNORMALITY: ICD-10-CM

## 2020-03-12 DIAGNOSIS — M35.00 SECONDARY SJOGREN'S SYNDROME (HCC): ICD-10-CM

## 2020-03-12 DIAGNOSIS — M34.9 SCLERODERMA (HCC): Primary | ICD-10-CM

## 2020-03-12 PROCEDURE — 99214 OFFICE O/P EST MOD 30 MIN: CPT | Performed by: INTERNAL MEDICINE

## 2020-03-12 NOTE — TELEPHONE ENCOUNTER
I gave pt your message and she voiced understanding.     She did want me to let you know that last month when she p/u her Morphine RX, she did not even look at the bottle and just took it bid. So she feels that may account for the pain she has been having. Please let me know if there are any further instructions for her.

## 2020-03-12 NOTE — TELEPHONE ENCOUNTER
We certainly discussed the need to wean her narcotics at her last visit. Nonetheless, she may take the morphine 15mg TID instead of BID and I will fill it early when she runs out. We can further discuss narcotic wean at her next visit. I am also happy to refer her to pain mgmt. I would still recommend increasing gabapentin to 900mg TID. The discussion w/ psychiatry would be necessary if we decided to add duloxetine to her pain regimen but we did not do this at her last visit

## 2020-03-12 NOTE — PROGRESS NOTES
HPI:  This is a very pleasant 59 female with limited systemic scleroderma who comes today in follow-up.  She was seen in the office 9/12/2019.  The patient takes Plaquenil 200 mg twice daily compliantly, she keeps up-to-date with her regular eye exams, the last one was this past October 2019 and was normal.  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    The patient has pain in her right wrist at times as well as her right knee, the right knee was replaced and  has been painful for a long time.  She has stiffness the last 4 to 5 minutes.  Denies joint swelling.  She has not noticed major progression of her scleroderma, she has noticed a few more telangiectasias in the left side of the face.  There is a new onset of erythematous rash in both elbows with fine scales.  The patient denies fever or chills, no night sweats, no weight loss, she has dry eyes, she denies shortness of breath at times only.  No nausea vomiting, no heartburn.  No urinary fecal incontinence.  All other systems reviewed and they were negative.    Laboratories done for this visit dated 3/10/2020 show a ESR of 20, CRP 0.28, he has normal white cell count, hemoglobin and platelet count, normal creatinine function, there is a slight elevation in ALT as well as alkaline phosphatase which is not new.    Social and family history reviewed and unchanged.    rheumatology history:  1. Limited systemic Scleroderma. Diagnosed  2014  Plaquenil since diagnosis            1. HRCT chest date  02/ 2017 (-)  for ILD     2. PFT date                -FVC              -DLCO              -FVC/DLCO     3. ECHO date    02/2017           RVSP 36 mmhg     4. BNP                    ProBnP     5.Aldolase     01/2017  7.9            CK 01/2017 76     6. SBP <90  yes/no        2.Avascular necrosis of the femoral head, and refers that she had the same problem in one of the  carpal bones.      3. Cervical radiculopathy         CONSIDERATIONS IN HER CASE:   1.Ulcerative colitis  2. Von Willebrand's and          Past Medical History:   Diagnosis Date   • Arthritis    • Asthma    • Bipolar affective disorder (CMS/HCC)    • Breast cancer (CMS/HCC)     RT   • Colitis    • Depression    • GERD (gastroesophageal reflux disease) 1993   • H/O breast reconstruction     SEVERAL   • H/O laminectomy    • Hamartoma (CMS/HCC)    • Hx of bilateral oophorectomy    • Hypertension    • Hypothyroidism    • Inflammatory bowel disease 1992   • Kienbock's disease, right     WRIST   • Low back pain 1991   • Lupus (CMS/HCC)    • Monahan syndrome    • Osteopenia 2019   • Pneumonia    • Raynaud disease    • Scleroderma (CMS/HCC)    • Von Willebrand disease (CMS/HCC)        Current Outpatient Medications   Medication Sig Dispense Refill   • alendronate (FOSAMAX) 70 MG tablet TAKE 1 TABLET BY MOUTH ONCE WEEKLY BEFORE BREAKFAST, ON AN EMPTY STOMACH: REMAIN UPRIGHT FOR 60 MINUTES:TAKE WITH 8 OUNCES OF WATER 4 tablet 3   • ALPRAZolam (XANAX) 0.25 MG tablet Take  by mouth As Needed. PRN     • bisoprolol-hydrochlorothiazide (ZIAC) 10-6.25 MG per tablet TAKE ONE TABLET BY MOUTH DAILY 90 tablet 1   • Calcium-Vitamin D 600-200 MG-UNIT per tablet TAKE ONE TABLET BY MOUTH TWICE A DAY 60 tablet 2   • colestipol (COLESTID) 1 g tablet TAKE TWO TABLETS BY MOUTH TWO TIMES A  tablet 4   • cyclobenzaprine (FLEXERIL) 10 MG tablet Take 1 tablet by mouth 3 (Three) Times a Day As Needed for Muscle Spasms.  tablet 3   • dexlansoprazole (DEXILANT) 60 MG capsule Take 60 mg by mouth Daily.     • ferrous sulfate 325 (65 FE) MG tablet TAKE ONE TABLET BY MOUTH DAILY 30 tablet 2   • Flaxseed, Linseed, (FLAXSEED OIL MAX STR) 1300 MG capsule Take 1 tablet by mouth 2 (Two) Times a Day.     • gabapentin (NEURONTIN) 300 MG capsule Take 2 capsules by mouth 3 (Three) Times a Day. 270 capsule 3   • HYDROcodone-acetaminophen (NORCO)   MG per tablet Take 1 tablet by mouth Every 4 (Four) Hours As Needed for Moderate Pain . 100 tablet 0   • hydroxychloroquine (PLAQUENIL) 200 MG tablet Take 1 tablet by mouth 2 (Two) Times a Day. 180 tablet 1   • hydrOXYzine (ATARAX) 25 MG tablet Every 8 (Eight) Hours.     • lamoTRIgine (LaMICtal) 150 MG tablet Take 150 mg by mouth Every Night.     • levothyroxine (SYNTHROID, LEVOTHROID) 150 MCG tablet TAKE ONE TABLET BY MOUTH DAILY 30 tablet 6   • liothyronine (CYTOMEL) 25 MCG tablet TAKE ONE TABLET BY MOUTH TWICE A DAY 60 tablet 5   • lisinopril (PRINIVIL,ZESTRIL) 5 MG tablet TAKE ONE TABLET BY MOUTH DAILY 90 tablet 1   • Methylnaltrexone Bromide (RELISTOR) 150 MG tablet Take 2 tablets by mouth 2 (Two) Times a Day.     • Morphine (MS CONTIN) 15 MG 12 hr tablet Take 1 tablet by mouth 2 (Two) Times a Day. 60 tablet 0   • NIFEdipine XL (PROCARDIA XL) 60 MG 24 hr tablet Take 1 tablet by mouth Daily. 90 tablet 0   • ondansetron (ZOFRAN) 4 MG tablet ONDANSETRON HCL 4 MG TABS     • Polyethylene Glycol 3350 granules      • QUETIAPINE FUMARATE ER PO Take 100 mg by mouth Every Night.     • ranitidine (ZANTAC) 300 MG capsule Take 300 mg by mouth Every Evening.     • vitamin B-12 (CYANOCOBALAMIN) 1000 MCG tablet Take 1,000 mcg by mouth Every 30 (Thirty) Days.       No current facility-administered medications for this visit.        Physical exam:    Vitals:    03/12/20 1328   BP: 106/74   Pulse: 87     GENERAL: Well-developed, well-nourished in no acute distress. Alert and oriented x3.  HEENT: Normocephalic, atraumatic. Pupils are equal, round, and reactive to light. Extraocular muscles are intact. Mucous membranes are pink and moist. Nostrils are clear.   NECK: Supple without lymphadenopathy.  LUNGS: Clear to auscultation bilaterally.  HEART: Regular rate and rhythm without murmur, rub or gallop.  CHEST: Respirations easy and unlabored.  EXTREMITIES: No cyanosis, edema or clubbing.  SKIN: Warm, multiple telangiectases in the  face and also in the hands.  She has a sclerodactyly.  There are 2 skin thickening proximally to the MCP joints.    MSK minimal tenderness in the right wrist.  There is no joint tenderness, otherwise, no synovitis.  Right knee increase in size compared to the left.  There is tenderness with flexion extension.    Assessment:  1.  Limited systemic scleroderma, has remained stable.  Currently on Plaquenil, no side effects and tolerating the medication well.  Minor progression of scleroderma.  She is scheduled to have a CT of the chest in a month or so.  We will repeat echocardiogram.    2.  Long-term high-risk medications.  The patient is on medications for management of inflammatory arthritis.  Monitoring for side effects.  Recommend to keep her today with vaccinations and age-appropriate cancer screening.     Cancer screening:  Colonoscopy  YES 2019   ;PAP  11/2017   Mammogram;2019  Bone health: calcium and vitamin D:  YES Calcium, DXA scan;  YES 11/29/18  Vaccines: Flu:  2019  ;PNA: 10/2017 ;Zoster:  YES 2nd done 9/2018 had both doses  X-rays of the chest, Hands,  Feet: CXR 8/9/17 CT Chest 4/18/18  Hepatitis panel, HIV, QTB/PPD;  HEP 1/26/17    3.  Secondary Sjogren's syndrome.  She is on Restasis and using mouth spray with good results.    4.  Elevated liver function test.  Unchanged.  Continue to follow PCP.    5.  Monahan syndrome.  Followed by hematology oncology.    Plan:  Continue with Plaquenil 200 mg 1 tablet p.o. twice daily  CT of the chest (has been already ordered by hematology oncology)  Update echo  RTC 6 months or sooner if needed.      Orders Placed This Encounter   Procedures   • Comprehensive Metabolic Panel     Standing Status:   Future   • Sedimentation Rate     Standing Status:   Future   • C-reactive Protein     Standing Status:   Future   • Adult Transthoracic Echo Complete W/ Cont if Necessary Per Protocol     Patient with scleroderma, please evaluate for pulmonary arterial hypertension.      Standing Status:   Future     Standing Expiration Date:   3/12/2021     Order Specific Question:   Reason for exam?     Answer:   Dyspnea   • CBC With Manual Differential     Standing Status:   Future

## 2020-03-12 NOTE — TELEPHONE ENCOUNTER
Please check to see if echo requires a precert. Pt would like to have this at [facility] [place] by [facility]

## 2020-03-13 RX ORDER — HYDROXYCHLOROQUINE SULFATE 200 MG/1
200 TABLET, FILM COATED ORAL 2 TIMES DAILY
Qty: 180 TABLET | Refills: 1 | Status: SHIPPED | OUTPATIENT
Start: 2020-03-13 | End: 2020-03-31

## 2020-03-13 NOTE — TELEPHONE ENCOUNTER
That would have been a 50% reduction in her narcotic and I am sure she would have felt notable pain. I would recommend we try going slower

## 2020-03-13 NOTE — TELEPHONE ENCOUNTER
Gave pt message and told her she could take the Morphine tid if needed for pain. She voiced understanding.

## 2020-03-16 ENCOUNTER — TELEPHONE (OUTPATIENT)
Dept: FAMILY MEDICINE CLINIC | Facility: CLINIC | Age: 60
End: 2020-03-16

## 2020-03-16 RX ORDER — BISOPROLOL FUMARATE AND HYDROCHLOROTHIAZIDE 10; 6.25 MG/1; MG/1
1 TABLET ORAL DAILY
Qty: 90 TABLET | Refills: 1 | Status: SHIPPED | OUTPATIENT
Start: 2020-03-16 | End: 2020-09-11

## 2020-03-16 NOTE — TELEPHONE ENCOUNTER
PTS  IS CALLING IN REGARDS TO PTS RX bisoprolol-hydrochlorothiazide (ZIAC) 10-6.25 MG per tablet    PHARMACY STATED THIS MEDICATION WAS DENIED AND WAS TOLD TO CALL .     PLEASE CALL AND ADVISE [phone]

## 2020-03-18 RX ORDER — MONTELUKAST SODIUM 4 MG/1
2 TABLET, CHEWABLE ORAL 2 TIMES DAILY
Qty: 120 TABLET | Refills: 4 | Status: SHIPPED | OUTPATIENT
Start: 2020-03-18 | End: 2020-08-27

## 2020-03-23 NOTE — TELEPHONE ENCOUNTER
Echo - we are not currently precerting non-emergent radiology testing due to Coronavirus.  Please let patient know that this has been postponed.

## 2020-03-24 ENCOUNTER — HOSPITAL ENCOUNTER (OUTPATIENT)
Dept: ONCOLOGY | Facility: HOSPITAL | Age: 60
Discharge: HOME | End: 2020-03-24
Admitting: NURSE PRACTITIONER

## 2020-03-24 DIAGNOSIS — E53.8 VITAMIN B12 DEFICIENCY: Primary | ICD-10-CM

## 2020-03-24 PROCEDURE — 96372 THER/PROPH/DIAG INJ SC/IM: CPT

## 2020-03-24 PROCEDURE — 25010000002 CYANOCOBALAMIN PER 1000 MCG: Performed by: NURSE PRACTITIONER

## 2020-03-24 RX ORDER — CYANOCOBALAMIN 1000 UG/ML
1000 INJECTION, SOLUTION INTRAMUSCULAR; SUBCUTANEOUS
Status: DISCONTINUED | OUTPATIENT
Start: 2020-03-24 | End: 2020-03-25 | Stop reason: HOSPADM

## 2020-03-24 RX ADMIN — CYANOCOBALAMIN 1000 MCG: 1000 INJECTION INTRAMUSCULAR; SUBCUTANEOUS at 14:45

## 2020-03-31 DIAGNOSIS — Z79.899 LONG-TERM USE OF HIGH-RISK MEDICATION: ICD-10-CM

## 2020-03-31 RX ORDER — HYDROXYCHLOROQUINE SULFATE 200 MG/1
TABLET, FILM COATED ORAL
Qty: 180 TABLET | Refills: 1 | Status: SHIPPED | OUTPATIENT
Start: 2020-03-31 | End: 2020-12-09 | Stop reason: SDUPTHER

## 2020-04-08 ENCOUNTER — HOSPITAL ENCOUNTER (OUTPATIENT)
Dept: PET IMAGING | Facility: HOSPITAL | Age: 60
End: 2020-04-08

## 2020-04-13 ENCOUNTER — APPOINTMENT (OUTPATIENT)
Dept: LAB | Facility: HOSPITAL | Age: 60
End: 2020-04-13

## 2020-04-15 ENCOUNTER — OFFICE VISIT (OUTPATIENT)
Dept: FAMILY MEDICINE CLINIC | Facility: CLINIC | Age: 60
End: 2020-04-15

## 2020-04-15 VITALS
SYSTOLIC BLOOD PRESSURE: 130 MMHG | RESPIRATION RATE: 16 BRPM | WEIGHT: 184 LBS | DIASTOLIC BLOOD PRESSURE: 80 MMHG | BODY MASS INDEX: 31.41 KG/M2 | TEMPERATURE: 98 F | HEART RATE: 81 BPM | HEIGHT: 64 IN

## 2020-04-15 DIAGNOSIS — Z15.09 LYNCH SYNDROME: ICD-10-CM

## 2020-04-15 DIAGNOSIS — M34.9 SCLERODERMA (HCC): ICD-10-CM

## 2020-04-15 DIAGNOSIS — E03.9 HYPOTHYROIDISM, UNSPECIFIED TYPE: ICD-10-CM

## 2020-04-15 DIAGNOSIS — K21.9 GASTROESOPHAGEAL REFLUX DISEASE, ESOPHAGITIS PRESENCE NOT SPECIFIED: ICD-10-CM

## 2020-04-15 DIAGNOSIS — M32.9 SLE (SYSTEMIC LUPUS ERYTHEMATOSUS RELATED SYNDROME) (HCC): ICD-10-CM

## 2020-04-15 DIAGNOSIS — G89.4 CHRONIC PAIN SYNDROME: Primary | ICD-10-CM

## 2020-04-15 PROBLEM — M54.16 LUMBAR RADICULOPATHY: Status: ACTIVE | Noted: 2020-04-15

## 2020-04-15 PROBLEM — Z47.89 ENCOUNTER FOR OTHER ORTHOPEDIC AFTERCARE: Status: ACTIVE | Noted: 2020-04-15

## 2020-04-15 PROBLEM — M23.359 DERANGEMENT OF POSTERIOR HORN OF LATERAL MENISCUS: Status: ACTIVE | Noted: 2020-04-15

## 2020-04-15 PROBLEM — D68.00 VON WILLEBRAND DISEASE: Status: ACTIVE | Noted: 2020-04-15

## 2020-04-15 PROCEDURE — 99214 OFFICE O/P EST MOD 30 MIN: CPT | Performed by: FAMILY MEDICINE

## 2020-04-15 RX ORDER — CELECOXIB 100 MG/1
200 CAPSULE ORAL DAILY
Qty: 90 CAPSULE | Refills: 1 | Status: SHIPPED | OUTPATIENT
Start: 2020-04-15 | End: 2020-07-14

## 2020-04-15 RX ORDER — FAMOTIDINE 20 MG/1
20 TABLET, FILM COATED ORAL 2 TIMES DAILY PRN
Qty: 120 TABLET | Refills: 5 | Status: SHIPPED | OUTPATIENT
Start: 2020-04-15

## 2020-04-15 RX ORDER — GABAPENTIN 300 MG/1
CAPSULE ORAL
Qty: 270 CAPSULE | Refills: 3 | Status: SHIPPED | OUTPATIENT
Start: 2020-04-15 | End: 2020-05-13

## 2020-04-15 RX ORDER — OXYCODONE HYDROCHLORIDE 10 MG/1
10 TABLET ORAL EVERY 6 HOURS PRN
Qty: 120 TABLET | Refills: 0 | Status: SHIPPED | OUTPATIENT
Start: 2020-04-15 | End: 2020-09-09 | Stop reason: SDUPTHER

## 2020-04-15 RX ORDER — CLINDAMYCIN PHOSPHATE 10 UG/ML
1 LOTION TOPICAL NIGHTLY PRN
COMMUNITY
Start: 2020-03-05

## 2020-04-15 NOTE — PROGRESS NOTES
Chief Complaint   Patient presents with   • chronic pain syndrome     HPI  Tracie Gross is a 59 y.o. female that presents for f/u of chronic pain    Chronic pain: patient reports more back pain and slightly more bilateral knee pain. Her morphine has been reduced from 30mg BID to 15mg BID. She is also taking Norco 10 and using 0-2 daily. 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 CT ordered for May 4th to f/u Monahan syndrome. Follows w/ GI- Man    GERD: follows w/ GI- Dr Conway. She is taking dexilant 60mg daily. She has discontinued her ranitidine due to media concerns.     Hypothyroidism: taking levothyroxine 150mcg daily. Concerned for hair thinning. No labs in 1 year.    Review of Systems   Constitutional: Negative for chills, fever and unexpected weight loss.   HENT: Negative for congestion and rhinorrhea.    Eyes: Negative for visual disturbance.   Respiratory: Negative for cough and shortness of breath.    Cardiovascular: Negative for chest pain and palpitations.   Gastrointestinal: Negative for abdominal pain and vomiting.   Musculoskeletal: Positive for arthralgias and back pain.   Skin: Negative for rash and skin lesions.     The following portions of the patient's history were reviewed and updated as appropriate: problem list, past medical history, past surgical history, allergies, current medications    Problem List Tab  Patient History Tab  Immunizations Tab  Medications Tab  Chart Review Tab  Care Everywhere Tab  Synopsis Tab    PE  Vitals:    04/15/20 1536   BP: 130/80   Pulse: 81   Resp: 16   Temp: 98 °F (36.7 °C)     Body mass index is 31.58 kg/m².  General: Well nourished, NAD  Head: AT/NC  Eyes: EOMI, anicteric sclera  Resp: CTAB, SCR, BS equal  CV: RRR w/o m/r/g; 2+ pulses  GI: Soft, NT/ND, +BS  MSK: Gait difficulties due to arthritis and chronic pain  Skin: Warm, dry, intact.  Skin thickening of her digits consistent  with history of scleroderma.  Neuro: Alert and oriented. No focal deficits  Psych: Appropriate mood and affect    Imaging  Nm Bone Scan 3 Phase    Result Date: 12/30/2019  Asymmetric increased right pharmaceutical uptake in the distal right femur/proximal tibia surrounding the right knee prosthesis, best depicted on delayed phase imaging, with a pattern of degenerative uptake is demonstrated within the left knee. No significant hyperemic changes are seen surrounding the right knee are identified on the flow phase.  Electronically Signed By-Dr. Barb Millan MD On:12/30/2019 2:13 PM This report was finalized on [id number] by Dr. Barb Millan MD.      Assessment/Plan   Tracie Gross is a 59 y.o. female that presents for   Chief Complaint   Patient presents with   • chronic pain syndrome     Tracie was seen today for chronic pain syndrome.    Diagnoses and all orders for this visit:    Chronic pain syndrome: Discussed her chronic pain regimen in detail.  She does report increased pain today but does seem interested in trying to wean her narcotics.  Will opt to switch her morphine 15 twice daily and Norco 10 as needed to oxycodone 10 mg every 6 as needed.  This will provide 60 morphine equivalents daily, which is more than the 30-50 that she had been requiring.  Nonetheless, the every 6 dosing will allow her to wean at home.  Would also like to change her gabapentin dosing to 600 in the morning and 1200 at night and attempt to decrease sedation during the day and increase sleep at night.  We will also add Celebrex as below  -     gabapentin (NEURONTIN) 300 MG capsule; Take 600mg in AM and 1200mg at night  -     oxyCODONE (ROXICODONE) 10 MG tablet; Take 1 tablet by mouth Every 6 (Six) Hours As Needed for Severe Pain .  -     celecoxib (CeleBREX) 100 MG capsule; Take 2 capsules by mouth Daily.    SLE (systemic lupus erythematosus related syndrome) (CMS/HCC): Based on March lab work, appears to be well controlled as  inflammatory markers were normal.   -Continue rheumatology follow-up   -Continue home Plaquenil   -We will add Celebrex as above    Monahan syndrome: Upcoming CT in May for screening   -Continue oncology [id number] [place]    Gastroesophageal reflux disease, esophagitis presence not specified: Patient concerned about ranitidine use given recent media reports.  Will switch to famotidine  -     famotidine (PEPCID) 20 MG tablet; Take 1 tablet by mouth 2 (Two) Times a Day As Needed for Heartburn.   -Continue home Dexilant 60 mg daily   -Continue GI [id number]    Scleroderma (CMS/HCC)   -Continue rheumatology follow-up    Hypothyroidism, unspecified type  -     TSH; Future  -     T4, Free; Future  -Continue home levothyroxine 150 mcg daily for now    Follow-up in 3 months for multiple medical issues

## 2020-04-21 ENCOUNTER — APPOINTMENT (OUTPATIENT)
Dept: ONCOLOGY | Facility: HOSPITAL | Age: 60
End: 2020-04-21

## 2020-04-28 ENCOUNTER — HOSPITAL ENCOUNTER (OUTPATIENT)
Dept: ONCOLOGY | Facility: HOSPITAL | Age: 60
Discharge: HOME | End: 2020-04-28
Admitting: INTERNAL MEDICINE

## 2020-04-28 VITALS
SYSTOLIC BLOOD PRESSURE: 137 MMHG | RESPIRATION RATE: 18 BRPM | TEMPERATURE: 99.6 F | HEART RATE: 79 BPM | BODY MASS INDEX: 31.92 KG/M2 | DIASTOLIC BLOOD PRESSURE: 74 MMHG | HEIGHT: 64 IN | WEIGHT: 187 LBS

## 2020-04-28 DIAGNOSIS — E53.8 VITAMIN B12 DEFICIENCY: Primary | ICD-10-CM

## 2020-04-28 PROCEDURE — 25010000002 CYANOCOBALAMIN PER 1000 MCG: Performed by: INTERNAL MEDICINE

## 2020-04-28 PROCEDURE — 96372 THER/PROPH/DIAG INJ SC/IM: CPT

## 2020-04-28 RX ORDER — CYANOCOBALAMIN 1000 UG/ML
1000 INJECTION, SOLUTION INTRAMUSCULAR; SUBCUTANEOUS
Status: DISCONTINUED | OUTPATIENT
Start: 2020-04-28 | End: 2020-04-29 | Stop reason: HOSPADM

## 2020-04-28 RX ADMIN — CYANOCOBALAMIN 1000 MCG: 1000 INJECTION INTRAMUSCULAR; SUBCUTANEOUS at 14:24

## 2020-04-29 RX ORDER — NIFEDIPINE 60 MG/1
TABLET, EXTENDED RELEASE ORAL
Qty: 90 TABLET | Refills: 0 | Status: SHIPPED | OUTPATIENT
Start: 2020-04-29 | End: 2020-07-28

## 2020-05-04 ENCOUNTER — HOSPITAL ENCOUNTER (OUTPATIENT)
Dept: PET IMAGING | Facility: HOSPITAL | Age: 60
Discharge: HOME | End: 2020-05-04
Admitting: NURSE PRACTITIONER

## 2020-05-04 DIAGNOSIS — Z15.09 LYNCH SYNDROME: ICD-10-CM

## 2020-05-04 LAB — CREAT BLDA-MCNC: 0.6 MG/DL (ref 0.6–1.3)

## 2020-05-04 PROCEDURE — 74176 CT ABD & PELVIS W/O CONTRAST: CPT

## 2020-05-04 PROCEDURE — 71250 CT THORAX DX C-: CPT

## 2020-05-04 PROCEDURE — 82565 ASSAY OF CREATININE: CPT

## 2020-05-05 ENCOUNTER — TELEPHONE (OUTPATIENT)
Dept: ONCOLOGY | Facility: CLINIC | Age: 60
End: 2020-05-05

## 2020-05-05 NOTE — TELEPHONE ENCOUNTER
PT HAS A MYCHART VISIT WITH DR ANDERS ON 5/7/20 PT WANTS TO KNOW IF SHE IS SUPPOSED TO HAVE LABS DRAWN BEFORE THEN ?     PT CONTACT # [phone]

## 2020-05-05 NOTE — TELEPHONE ENCOUNTER
PT HAS A MYCHART VISIT WITH DR ANDERS ON 5/7/20 PT WANTS TO KNOW IF SHE IS SUPPOSED TO HAVE LABS DRAWN BEFORE THEN ?      PT CONTACT # [phone]

## 2020-05-05 NOTE — TELEPHONE ENCOUNTER
Returned pt's call and informed she will have visit with Dr Jones and decision will be made if labs needed and pt will be scheduled for labs if needed.  Pt v/u and agreement.

## 2020-05-07 ENCOUNTER — TELEMEDICINE (OUTPATIENT)
Dept: ONCOLOGY | Facility: CLINIC | Age: 60
End: 2020-05-07

## 2020-05-07 DIAGNOSIS — Z15.09 LYNCH SYNDROME: ICD-10-CM

## 2020-05-07 DIAGNOSIS — Z85.3 HISTORY OF BREAST CANCER: Primary | ICD-10-CM

## 2020-05-07 DIAGNOSIS — D64.9 ANEMIA, UNSPECIFIED TYPE: ICD-10-CM

## 2020-05-07 DIAGNOSIS — E53.8 VITAMIN B12 DEFICIENCY: ICD-10-CM

## 2020-05-07 PROCEDURE — 99214 OFFICE O/P EST MOD 30 MIN: CPT | Performed by: INTERNAL MEDICINE

## 2020-05-07 NOTE — PROGRESS NOTES
Hematology/Oncology Outpatient Follow Up    PATIENT NAME:Tracie Gross  :1960  MRN: [id number]  PRIMARY CARE PHYSICIAN: Floyd Silva MD  REFERRING PHYSICIAN: Floyd Silva, *    Chief Complaint   Patient presents with   • Follow-up   • Breast Cancer     burdick syndrome       This was an audio and video enabled telemedicine encounter. Consented      HISTORY OF PRESENT ILLNESS:   This is a 58-year-old female who was diagnosed with right breast cancer in  when she was 32 years old.  Patient was originally seen on 18.  Please refer to the details of that note for more information.   · 18 - CBC:  WBC 13.6, hemoglobin 11.7, platelet count 392,000.  Differentials 65% neutrophils, 15% lymphocytes.  There was mild monocytosis at 16.  Eosinophils were 2.3 and basophils were 0.3.  B12 292.  Ferritin 88.  Folate 13.8.  AST slightly high at 57.  Alkaline phosphatase was high at 326.  .  Haptoglobin 179, normal.  SPEP with DYLAN did not show any monoclonal protein.  Flow cytometry did not show any evidence of acute leukemia or lymphoproliferative disease.  There was monocytosis measured at 21%.  Reticulocyte count normal 1.02.  Peripheral smear showed absolute monocytosis at 19%, thrombocytosis and macrocytosis.  BCR-abl was negative.  Methylmalonic acid level was elevated to 420.    · 18 - PET/CT scan showed multiple small bilateral lymph nodes in the neck without metabolic activity or change from prior CT scans and are likely due to SLE.  There were unchanged bilateral level 1 axillary lymph nodes without metabolic activity.  Multiple mediastinal nodes were also seen.  The largest 11 mm, unchanged from prior imaging with no hypermetabolic activity.  There was no evidence of metastatic disease in the abdomen or pelvis.  There was no focal lesion within the liver or biliary system.  Multiple small retroperitoneal and external iliac nodes, bilateral inguinal nodes similar to prior  imaging with no PET activity.    · 8/8/18 - RICHIE-2 analysis with no mutation identified.    · 11/29/18 - Bone density revealed osteoporosis.  Patient is currently on Fosamax.   · 12/3/18 - Bone marrow aspiration and biopsy showed normocellular bone marrow at 40%.  There was adequate iron stores and no evidence of malignancy was seen.  Flow cytometry was negative.  Cytogenetics showed normal female karyotype.  Blasts were less than 3% of nucleated cells.  There was no significant dyspoiesis.   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     · 1/22/19 - Urine cytology was negative for malignant cells.    · 2/28/19 - Patient seen by Dermatology for her annual skin evaluation.   · 3/13/19 - CT scan of the chest, abdomen and pelvis:  CT scan of chest showed chronic changes.  · 5/29/2019 patient had an EGD with polypectomy performed by Dr. Conway.  Dr. Conway has recommended follow-up MRI of the pancreas in 2 years.  And also EGD and colonoscopy in 2 years.  · 11/13/19-left screening mammogram was negative follow-up in 1 year was recommended  · 5/4/2020 patient had a CT scan of the chest, abdomen and pelvis multiple borderline enlarged mediastinal lymph nodes the largest measuring 2.2 cm in the subcarinal space unchanged from prior.  Pancreas is normal.  There are few retroperitoneal mesenteric and pelvic lymph node enlargement which are similar to prior exam.  All are subcentimeter in size.    Past Medical History:   Diagnosis Date   • Arthritis    • Asthma    • Bipolar affective disorder (CMS/HCC)    • Breast cancer (CMS/HCC)     RT   • Colitis    • Depression    • GERD (gastroesophageal reflux disease) 1993   • H/O breast reconstruction     SEVERAL   • H/O laminectomy    • Hamartoma (CMS/HCC)    • Hx of bilateral oophorectomy    • Hypertension    • Hypothyroidism    •  Inflammatory bowel disease    • Kienbock's disease, right     WRIST   • Low back pain    • Lupus (CMS/HCC)    • Monahan syndrome    • Osteopenia    • Pneumonia    • Raynaud disease    • Scleroderma (CMS/HCC)    • Von Willebrand disease (CMS/HCC)        Past Surgical History:   Procedure Laterality Date   • ARM DEBRIDEMENT Right     X 3   • BACK SURGERY      cervical fusion   • BACK SURGERY      lumbar decomp   • BREAST BIOPSY     • BREAST LUMPECTOMY     • BREAST RECONSTRUCTION Right    • BREAST RECONSTRUCTION Right    • CARDIAC CATHETERIZATION      no stents placed   •  SECTION  ,    • CHOLECYSTECTOMY     • COLONOSCOPY     • COSMETIC SURGERY  [id number]   • ENDOSCOPY     • EXPLORATORY LAPAROTOMY      scar tissue removal   • EYE SURGERY     • FINGER SURGERY Right     ring finger mass excision   • HYSTERECTOMY      PARTIAL   • JOINT REPLACEMENT Right ,    hip and knee   • KNEE ARTHROSCOPY Left 2020    Procedure: LEFT KNEE SCOPE with partial lateral meniscectomy;  Surgeon: Chau Perez MD;  Location: [facility] OR;  Service: Orthopedics   • LASIK      LASIK/ LASIK ENHANCEMENT   • MASTECTOMY RADICAL Right    • OOPHORECTOMY Bilateral    • SPLENECTOMY     • TUBAL ABDOMINAL LIGATION     • WRIST SURGERY Right     distal radius head removal (bone dead)  plates and screws decomp lunate         Current Outpatient Medications:   •  alendronate (FOSAMAX) 70 MG tablet, TAKE 1 TABLET BY MOUTH ONCE WEEKLY BEFORE BREAKFAST, ON AN EMPTY STOMACH: REMAIN UPRIGHT FOR 60 MINUTES:TAKE WITH 8 OUNCES OF WATER, Disp: 4 tablet, Rfl: 3  •  bisoprolol-hydrochlorothiazide (ZIAC) 10-6.25 MG per tablet, Take 1 tablet by mouth Daily., Disp: 90 tablet, Rfl: 1  •  Calcium-Vitamin D 600-200 MG-UNIT per tablet, TAKE ONE TABLET BY MOUTH TWICE A DAY, Disp: 60 tablet, Rfl: 2  •  celecoxib (CeleBREX) 100 MG capsule, Take 2 capsules by mouth Daily., Disp: 90 capsule, Rfl: 1  •  clindamycin (CLEOCIN T) 1 %  lotion, , Disp: , Rfl:   •  colestipol (COLESTID) 1 g tablet, Take 2 tablets by mouth 2 (Two) Times a Day., Disp: 120 tablet, Rfl: 4  •  cyclobenzaprine (FLEXERIL) 10 MG tablet, Take 1 tablet by mouth 3 (Three) Times a Day As Needed for Muscle Spasms. PRN, Disp: 100 tablet, Rfl: 3  •  dexlansoprazole (DEXILANT) 60 MG capsule, Take 60 mg by mouth Daily., Disp: , Rfl:   •  famotidine (PEPCID) 20 MG tablet, Take 1 tablet by mouth 2 (Two) Times a Day As Needed for Heartburn., Disp: 120 tablet, Rfl: 5  •  ferrous sulfate 325 (65 FE) MG tablet, TAKE ONE TABLET BY MOUTH DAILY, Disp: 30 tablet, Rfl: 2  •  Flaxseed, Linseed, (FLAXSEED OIL MAX STR) 1300 MG capsule, Take 1 tablet by mouth 2 (Two) Times a Day., Disp: , Rfl:   •  gabapentin (NEURONTIN) 300 MG capsule, Take 600mg in AM and 1200mg at night, Disp: 270 capsule, Rfl: 3  •  hydroxychloroquine (PLAQUENIL) 200 MG tablet, TAKE ONE TABLET BY MOUTH TWICE A DAY, Disp: 180 tablet, Rfl: 1  •  hydrOXYzine (ATARAX) 25 MG tablet, Every 8 (Eight) Hours., Disp: , Rfl:   •  lamoTRIgine (LaMICtal) 150 MG tablet, Take 150 mg by mouth Every Night., Disp: , Rfl:   •  levothyroxine (SYNTHROID, LEVOTHROID) 150 MCG tablet, TAKE ONE TABLET BY MOUTH DAILY, Disp: 30 tablet, Rfl: 6  •  liothyronine (CYTOMEL) 25 MCG tablet, TAKE ONE TABLET BY MOUTH TWICE A DAY, Disp: 60 tablet, Rfl: 5  •  lisinopril (PRINIVIL,ZESTRIL) 5 MG tablet, TAKE ONE TABLET BY MOUTH DAILY, Disp: 90 tablet, Rfl: 1  •  Methylnaltrexone Bromide (RELISTOR) 150 MG tablet, Take 2 tablets by mouth 2 (Two) Times a Day., Disp: , Rfl:   •  NIFEdipine XL (PROCARDIA XL) 60 MG 24 hr tablet, TAKE ONE TABLET BY MOUTH DAILY, Disp: 90 tablet, Rfl: 0  •  ondansetron (ZOFRAN) 4 MG tablet, ONDANSETRON HCL 4 MG TABS, Disp: , Rfl:   •  oxyCODONE (ROXICODONE) 10 MG tablet, Take 1 tablet by mouth Every 6 (Six) Hours As Needed for Severe Pain ., Disp: 120 tablet, Rfl: 0  •  QUETIAPINE FUMARATE ER PO, Take 100 mg by mouth Every Night., Disp: ,  Rfl:   •  vitamin B-12 (CYANOCOBALAMIN) 1000 MCG tablet, Take 1,000 mcg by mouth Every 30 (Thirty) Days., Disp: , Rfl:     Allergies   Allergen Reactions   • Aspirin Other (See Comments)     VONWILLENBRAND DISEASE    • Penicillins Shortness Of Breath   • Baclofen Hives   • Tape  [Adhesive Tape] Rash       Family History   Problem Relation Age of Onset   • Colon cancer Mother    • Heart disease Mother    • Cancer Mother    • Kidney disease Father    • Heart disease Father    • Depression Father    • Hyperlipidemia Father    • Vision loss Father    • Colon cancer Sister    • Diabetes Sister    • Heart disease Sister    • Von Willebrand disease Sister    • Von Willebrand disease Brother    • Von Willebrand disease Son    • Breast cancer Son    • Other Son         burdick syndrome   • Diabetes Paternal Grandmother    • Stroke Paternal Grandmother    • Colon cancer Other    • Colon cancer Son    • Von Willebrand disease Son    • Other Son         burdick syndrome   • Breast cancer Son    • Cancer Sister    • Vision loss Sister    • Other Sister    • Stroke Sister    • Cancer Paternal Aunt    • Cancer Maternal Aunt    • Cancer Maternal Aunt    • Hyperlipidemia Sister    • Thyroid disease Sister    • Thyroid disease Sister        Cancer-related family history includes Breast cancer in her son and son; Cancer in her maternal aunt, maternal aunt, mother, paternal aunt, and sister; Colon cancer in her mother, sister, son, and another family member.    Social History     Tobacco Use   • Smoking status: Former Smoker     Packs/day: 0.25     Years: 7.00     Pack years: 1.75     Last attempt to quit:      Years since quittin.3   • Smokeless tobacco: Never Used   • Tobacco comment: Off and on for  those years   Substance Use Topics   • Alcohol use: Yes     Frequency: Monthly or less     Drinks per session: 1 or 2     Binge frequency: Never     Comment: Rarely   • Drug use: No       I have reviewed and confirmed the accuracy  of the patient's history:  as entered by the MA/LPN/RN. Suzan Dorene Jones MD 05/07/20       SUBJECTIVE:  The patient is here for a follow up appointment.  Patient has no specific complaints today.  She denies chest pain, shortness of breath, nausea, vomiting.  She tries to be isolated due to the pandemic          REVIEW OF SYSTEMS:  Review of Systems   Constitutional: Negative for chills and fever.   HENT: Negative for ear pain, mouth sores, nosebleeds and sore throat.    Eyes: Negative for photophobia and visual disturbance.   Respiratory: Negative for wheezing and stridor.    Cardiovascular: Negative for chest pain and palpitations.   Gastrointestinal: Negative for abdominal pain, diarrhea, nausea and vomiting.   Endocrine: Negative for cold intolerance and heat intolerance.   Genitourinary: Negative for dysuria and hematuria.   Musculoskeletal: Negative for joint swelling and neck stiffness.   Skin: Negative for color change and rash.   Neurological: Negative for seizures and syncope.   Hematological: Negative for adenopathy.        No obvious bleeding   Psychiatric/Behavioral: Negative for agitation, confusion and hallucinations.       OBJECTIVE:    There were no vitals filed for this visit.    ECOG  (1) Restricted in physically strenuous activity, ambulatory and able to do work of light nature    Physical Exam   Constitutional: She is oriented to person, place, and time. She appears well-developed and well-nourished.   HENT:   Head: Normocephalic.   Right Ear: External ear normal.   Nose: Nose normal.   Eyes: Pupils are equal, round, and reactive to light.   Neck: Normal range of motion.   Pulmonary/Chest: Effort normal.   Musculoskeletal: Normal range of motion.   Neurological: She is alert and oriented to person, place, and time.   Psychiatric: She has a normal mood and affect. Her behavior is normal. Judgment and thought content normal.       RECENT LABS  WBC   Date Value Ref Range Status   03/10/2020  9.52 3.40 - 10.80 10*3/mm3 Final     RBC   Date Value Ref Range Status   03/10/2020 4.14 3.77 - 5.28 10*6/mm3 Final     Hemoglobin   Date Value Ref Range Status   03/10/2020 12.6 12.0 - 15.9 g/dL Final     Hematocrit   Date Value Ref Range Status   03/10/2020 37.8 34.0 - 46.6 % Final     MCV   Date Value Ref Range Status   03/10/2020 91.3 79.0 - 97.0 fL Final     MCH   Date Value Ref Range Status   03/10/2020 30.4 26.6 - 33.0 pg Final     MCHC   Date Value Ref Range Status   03/10/2020 33.3 31.5 - 35.7 g/dL Final     RDW   Date Value Ref Range Status   03/10/2020 12.9 12.3 - 15.4 % Final     RDW-SD   Date Value Ref Range Status   03/10/2020 41.8 37.0 - 54.0 fl Final     MPV   Date Value Ref Range Status   03/10/2020 11.2 6.0 - 12.0 fL Final     Platelets   Date Value Ref Range Status   03/10/2020 339 140 - 450 10*3/mm3 Final     Neutrophil %   Date Value Ref Range Status   12/30/2019 59.2 42.7 - 76.0 % Final     Lymphocyte %   Date Value Ref Range Status   12/30/2019 23.2 19.6 - 45.3 % Final     Monocyte %   Date Value Ref Range Status   12/30/2019 12.2 (H) 5.0 - 12.0 % Final     Eosinophil %   Date Value Ref Range Status   12/30/2019 4.1 0.3 - 6.2 % Final     Basophil %   Date Value Ref Range Status   12/30/2019 1.3 0.0 - 1.5 % Final     Neutrophils Absolute   Date Value Ref Range Status   03/10/2020 6.19 1.70 - 7.00 10*3/mm3 Final     Neutrophils, Absolute   Date Value Ref Range Status   12/30/2019 6.60 1.70 - 7.00 10*3/mm3 Final     Lymphocytes, Absolute   Date Value Ref Range Status   12/30/2019 2.60 0.70 - 3.10 10*3/mm3 Final     Monocytes, Absolute   Date Value Ref Range Status   12/30/2019 1.40 (H) 0.10 - 0.90 10*3/mm3 Final     Eosinophils Absolute   Date Value Ref Range Status   03/10/2020 0.19 0.00 - 0.40 10*3/mm3 Final     Eosinophils, Absolute   Date Value Ref Range Status   12/30/2019 0.50 (H) 0.00 - 0.40 10*3/mm3 Final     Basophils, Absolute   Date Value Ref Range Status   12/30/2019 0.10 0.00 - 0.20  10*3/mm3 Final     nRBC   Date Value Ref Range Status   12/30/2019 0.2 0.0 - 0.2 /100 WBC Final       Lab Results   Component Value Date    GLUCOSE 75 03/10/2020    BUN 23 03/10/2020    CREATININE 0.60 05/04/2020    EGFRIFNONA 73 03/10/2020    EGFRIFAFRI 83 04/04/2017    BCR 28.8 (H) 03/10/2020    K 4.5 03/10/2020    CO2 28.2 03/10/2020    CALCIUM 9.1 03/10/2020    ALBUMIN 4.00 03/10/2020    LABIL2 1.1 05/31/2019    AST 27 03/10/2020    ALT 40 (H) 03/10/2020         Assessment/Plan     There are no diagnoses linked to this encounter.    ASSESSMENT:    1. 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  She also has DICER1 gene as well as TSC2 with variants of unknown significance.   2. History of right breast cancer, status post right mastectomy followed by axillary lymph node dissection and right chest wall reconstruction in 1985 at the age of 32.  3. Elevated liver function tests, pruritus, but no significant pathology found on both the PET scan, MRCP, except for post cholecystectomy, biliary ductal dilatation.  On Colestipol for pruritus.   4. She has peripheral lymphadenopathy involving the neck, axilla, mediastinum and retroperitoneum that are not PET avid.  This lymphadenopathy may be due to chronic inflammatory disease [lupus].    5. Strong family history of colon cancer and personal history of breast cancer.   6. Systemic lupus erythematosus, scleroderma, Raynaud’s phenomenon.   7. Normocytic anemia, multifactorial, underlying autoimmune disease, relative iron deficiency and B12 deficiency.  On iron and B12 supplementation.    8. Persistent monocytosis, status post bone marrow aspiration and biopsy which was essentially normal.    9. Osteoporosis.  On Fosamax.  Prolia declined by her insurance company.   10. Urine cytology was negative as of January 2019.    11. Last Dermatology appointment was 2/2020  12. Status post  complete hysterectomy reducing her risk for ovarian and uterine cancer.     PLANS:     · Plan is for continued observation.  She will follow up with GI for her GI endoscopies due in May,2020.        Referral will be given to patient  · Patient's screening mammogram in November 2019 was benign.  She will continue monthly breast self-exam and call for lumps nipple discharge, skin discoloration.    · Urine cytology will be due in January 2020: Will reorder and have patient complete testing today  · Dermatology appointment will be due February 2021  · CT scans of the chest abdomen and pelvis done May 4, 2020 is stable  · Breast MRI will be due 5/2020. Will schedule   · All above have been ordered today  · Follow up in 3 months. Breast exam then  · Patient to come in now for urine cytology, CBC with differential, B12 level and iron studies       I have reviewed labs results, imaging, vitals, and medications with the patient today. Will follow up in 3 months with me.      Patient verbalized understanding and is in agreement of the above plan.  I spent more than 25 minutes discussing with patient management recommendations, reviewing imaging studies, lab results, progress notes and formulating management decisions.  Time was also spent answering all his/ her questions to the best of my abilities.

## 2020-05-13 ENCOUNTER — APPOINTMENT (OUTPATIENT)
Dept: CT IMAGING | Facility: HOSPITAL | Age: 60
End: 2020-05-13

## 2020-05-13 ENCOUNTER — HOSPITAL ENCOUNTER (OUTPATIENT)
Facility: HOSPITAL | Age: 60
Setting detail: OBSERVATION
Discharge: HOME | End: 2020-05-15
Attending: EMERGENCY MEDICINE | Admitting: FAMILY MEDICINE

## 2020-05-13 ENCOUNTER — APPOINTMENT (OUTPATIENT)
Dept: GENERAL RADIOLOGY | Facility: HOSPITAL | Age: 60
End: 2020-05-13

## 2020-05-13 ENCOUNTER — TELEPHONE (OUTPATIENT)
Dept: FAMILY MEDICINE CLINIC | Facility: CLINIC | Age: 60
End: 2020-05-13

## 2020-05-13 DIAGNOSIS — R07.9 CHEST PAIN, UNSPECIFIED TYPE: Primary | ICD-10-CM

## 2020-05-13 LAB
ALBUMIN SERPL-MCNC: 4.2 G/DL (ref 3.5–5.2)
ALBUMIN/GLOB SERPL: 1.3 G/DL
ALP SERPL-CCNC: 140 U/L (ref 39–117)
ALT SERPL W P-5'-P-CCNC: 21 U/L (ref 1–33)
ANION GAP SERPL CALCULATED.3IONS-SCNC: 13 MMOL/L (ref 5–15)
AST SERPL-CCNC: 29 U/L (ref 1–32)
BASOPHILS # BLD AUTO: 0.1 10*3/MM3 (ref 0–0.2)
BASOPHILS NFR BLD AUTO: 0.7 % (ref 0–1.5)
BILIRUB SERPL-MCNC: 0.2 MG/DL (ref 0.2–1.2)
BUN BLD-MCNC: 19 MG/DL (ref 8–23)
BUN/CREAT SERPL: 22.9 (ref 7–25)
CALCIUM SPEC-SCNC: 9.5 MG/DL (ref 8.6–10.5)
CHLORIDE SERPL-SCNC: 103 MMOL/L (ref 98–107)
CK SERPL-CCNC: 65 U/L (ref 20–180)
CO2 SERPL-SCNC: 28 MMOL/L (ref 22–29)
CREAT BLD-MCNC: 0.83 MG/DL (ref 0.57–1)
D DIMER PPP FEU-MCNC: 4.29 MCGFEU/ML (ref 0.17–0.59)
DEPRECATED RDW RBC AUTO: 43.3 FL (ref 37–54)
EOSINOPHIL # BLD AUTO: 0.4 10*3/MM3 (ref 0–0.4)
EOSINOPHIL NFR BLD AUTO: 4 % (ref 0.3–6.2)
ERYTHROCYTE [DISTWIDTH] IN BLOOD BY AUTOMATED COUNT: 13.1 % (ref 12.3–15.4)
GFR SERPL CREATININE-BSD FRML MDRD: 70 ML/MIN/1.73
GLOBULIN UR ELPH-MCNC: 3.3 GM/DL
GLUCOSE BLD-MCNC: 112 MG/DL (ref 65–99)
HCT VFR BLD AUTO: 41.4 % (ref 34–46.6)
HGB BLD-MCNC: 14.1 G/DL (ref 12–15.9)
LYMPHOCYTES # BLD AUTO: 3.3 10*3/MM3 (ref 0.7–3.1)
LYMPHOCYTES NFR BLD AUTO: 30.4 % (ref 19.6–45.3)
MAGNESIUM SERPL-MCNC: 2 MG/DL (ref 1.6–2.4)
MCH RBC QN AUTO: 32.2 PG (ref 26.6–33)
MCHC RBC AUTO-ENTMCNC: 34.1 G/DL (ref 31.5–35.7)
MCV RBC AUTO: 94.4 FL (ref 79–97)
MONOCYTES # BLD AUTO: 1.6 10*3/MM3 (ref 0.1–0.9)
MONOCYTES NFR BLD AUTO: 14.8 % (ref 5–12)
NEUTROPHILS # BLD AUTO: 5.4 10*3/MM3 (ref 1.7–7)
NEUTROPHILS NFR BLD AUTO: 50.1 % (ref 42.7–76)
NRBC BLD AUTO-RTO: 0.1 /100 WBC (ref 0–0.2)
NT-PROBNP SERPL-MCNC: 213.9 PG/ML (ref 5–900)
PLATELET # BLD AUTO: 316 10*3/MM3 (ref 140–450)
PMV BLD AUTO: 9.3 FL (ref 6–12)
POTASSIUM BLD-SCNC: 4.4 MMOL/L (ref 3.5–5.2)
PROT SERPL-MCNC: 7.5 G/DL (ref 6–8.5)
RBC # BLD AUTO: 4.39 10*6/MM3 (ref 3.77–5.28)
SODIUM BLD-SCNC: 144 MMOL/L (ref 136–145)
TROPONIN T SERPL-MCNC: <0.01 NG/ML (ref 0–0.03)
TSH SERPL DL<=0.05 MIU/L-ACNC: 0.01 UIU/ML (ref 0.27–4.2)
WBC NRBC COR # BLD: 10.8 10*3/MM3 (ref 3.4–10.8)

## 2020-05-13 PROCEDURE — 99284 EMERGENCY DEPT VISIT MOD MDM: CPT

## 2020-05-13 PROCEDURE — G0378 HOSPITAL OBSERVATION PER HR: HCPCS

## 2020-05-13 PROCEDURE — 93005 ELECTROCARDIOGRAM TRACING: CPT | Performed by: EMERGENCY MEDICINE

## 2020-05-13 PROCEDURE — 83735 ASSAY OF MAGNESIUM: CPT | Performed by: EMERGENCY MEDICINE

## 2020-05-13 PROCEDURE — 71275 CT ANGIOGRAPHY CHEST: CPT

## 2020-05-13 PROCEDURE — 82550 ASSAY OF CK (CPK): CPT | Performed by: EMERGENCY MEDICINE

## 2020-05-13 PROCEDURE — 36415 COLL VENOUS BLD VENIPUNCTURE: CPT

## 2020-05-13 PROCEDURE — 83880 ASSAY OF NATRIURETIC PEPTIDE: CPT | Performed by: EMERGENCY MEDICINE

## 2020-05-13 PROCEDURE — 85025 COMPLETE CBC W/AUTO DIFF WBC: CPT | Performed by: EMERGENCY MEDICINE

## 2020-05-13 PROCEDURE — 71045 X-RAY EXAM CHEST 1 VIEW: CPT

## 2020-05-13 PROCEDURE — 80053 COMPREHEN METABOLIC PANEL: CPT | Performed by: EMERGENCY MEDICINE

## 2020-05-13 PROCEDURE — 84443 ASSAY THYROID STIM HORMONE: CPT | Performed by: EMERGENCY MEDICINE

## 2020-05-13 PROCEDURE — 0 IOPAMIDOL PER 1 ML: Performed by: EMERGENCY MEDICINE

## 2020-05-13 PROCEDURE — 85379 FIBRIN DEGRADATION QUANT: CPT | Performed by: EMERGENCY MEDICINE

## 2020-05-13 PROCEDURE — 84484 ASSAY OF TROPONIN QUANT: CPT | Performed by: EMERGENCY MEDICINE

## 2020-05-13 RX ORDER — GABAPENTIN 600 MG/1
1200 TABLET ORAL NIGHTLY
COMMUNITY
End: 2021-07-06

## 2020-05-13 RX ORDER — GABAPENTIN 300 MG/1
600 CAPSULE ORAL EVERY MORNING
COMMUNITY
End: 2021-04-06 | Stop reason: SDUPTHER

## 2020-05-13 RX ORDER — ASPIRIN 325 MG
325 TABLET ORAL ONCE
Status: COMPLETED | OUTPATIENT
Start: 2020-05-13 | End: 2020-05-13

## 2020-05-13 RX ORDER — SODIUM CHLORIDE 0.9 % (FLUSH) 0.9 %
10 SYRINGE (ML) INJECTION AS NEEDED
Status: DISCONTINUED | OUTPATIENT
Start: 2020-05-13 | End: 2020-05-15 | Stop reason: HOSPADM

## 2020-05-13 RX ORDER — ALENDRONATE SODIUM 70 MG/1
70 TABLET ORAL
COMMUNITY
End: 2020-06-18

## 2020-05-13 RX ADMIN — ASPIRIN 325 MG ORAL TABLET 325 MG: 325 PILL ORAL at 21:53

## 2020-05-13 RX ADMIN — SODIUM CHLORIDE 1000 ML: 900 INJECTION, SOLUTION INTRAVENOUS at 22:37

## 2020-05-13 RX ADMIN — IOPAMIDOL 72 ML: 755 INJECTION, SOLUTION INTRAVENOUS at 20:52

## 2020-05-13 RX ADMIN — NITROGLYCERIN 1 INCH: 20 OINTMENT TOPICAL at 21:53

## 2020-05-13 NOTE — TELEPHONE ENCOUNTER
Brian the patients spouse called in requesting to speak with the doctor about  Some issue the patient is having.    Please call back to advise    Patient call back [phone]

## 2020-05-14 ENCOUNTER — APPOINTMENT (OUTPATIENT)
Dept: NUCLEAR MEDICINE | Facility: HOSPITAL | Age: 60
End: 2020-05-14

## 2020-05-14 ENCOUNTER — TELEPHONE (OUTPATIENT)
Dept: ONCOLOGY | Facility: CLINIC | Age: 60
End: 2020-05-14

## 2020-05-14 PROBLEM — M34.9 SCLERODERMA (HCC): Status: ACTIVE | Noted: 2020-05-14

## 2020-05-14 LAB
ALBUMIN SERPL-MCNC: 4.5 G/DL (ref 3.5–5.2)
ALBUMIN/GLOB SERPL: 1.2 G/DL
ALP SERPL-CCNC: 123 U/L (ref 39–117)
ALT SERPL W P-5'-P-CCNC: 21 U/L (ref 1–33)
ANION GAP SERPL CALCULATED.3IONS-SCNC: 15 MMOL/L (ref 5–15)
AST SERPL-CCNC: 31 U/L (ref 1–32)
B PERT DNA SPEC QL NAA+PROBE: NOT DETECTED
BASOPHILS # BLD AUTO: 0.1 10*3/MM3 (ref 0–0.2)
BASOPHILS NFR BLD AUTO: 0.7 % (ref 0–1.5)
BH CV NUCLEAR PRIOR STUDY: 3
BH CV STRESS BP STAGE 1: NORMAL
BH CV STRESS BP STAGE 2: NORMAL
BH CV STRESS BP STAGE 3: NORMAL
BH CV STRESS COMMENTS STAGE 1: NORMAL
BH CV STRESS COMMENTS STAGE 2: NORMAL
BH CV STRESS DOSE REGADENOSON STAGE 1: 0.4
BH CV STRESS DURATION MIN STAGE 1: 0
BH CV STRESS DURATION MIN STAGE 2: 4
BH CV STRESS DURATION SEC STAGE 1: 10
BH CV STRESS DURATION SEC STAGE 2: 0
BH CV STRESS HR STAGE 1: 91
BH CV STRESS HR STAGE 2: 91
BH CV STRESS HR STAGE 3: 84
BH CV STRESS PROTOCOL 1: NORMAL
BH CV STRESS RECOVERY BP: NORMAL MMHG
BH CV STRESS RECOVERY HR: 89 BPM
BH CV STRESS STAGE 1: 1
BH CV STRESS STAGE 2: 2
BH CV STRESS STAGE 3: 3
BILIRUB SERPL-MCNC: 0.4 MG/DL (ref 0.2–1.2)
BUN BLD-MCNC: 17 MG/DL (ref 8–23)
BUN/CREAT SERPL: 19.3 (ref 7–25)
C PNEUM DNA NPH QL NAA+NON-PROBE: NOT DETECTED
CALCIUM SPEC-SCNC: 9.8 MG/DL (ref 8.6–10.5)
CHLORIDE SERPL-SCNC: 103 MMOL/L (ref 98–107)
CO2 SERPL-SCNC: 25 MMOL/L (ref 22–29)
CREAT BLD-MCNC: 0.88 MG/DL (ref 0.57–1)
DEPRECATED RDW RBC AUTO: 40.7 FL (ref 37–54)
EOSINOPHIL # BLD AUTO: 0 10*3/MM3 (ref 0–0.4)
EOSINOPHIL NFR BLD AUTO: 0.4 % (ref 0.3–6.2)
ERYTHROCYTE [DISTWIDTH] IN BLOOD BY AUTOMATED COUNT: 12.9 % (ref 12.3–15.4)
ERYTHROCYTE [SEDIMENTATION RATE] IN BLOOD: 18 MM/HR (ref 0–30)
FLUAV H1 2009 PAND RNA NPH QL NAA+PROBE: NOT DETECTED
FLUAV H1 HA GENE NPH QL NAA+PROBE: NOT DETECTED
FLUAV H3 RNA NPH QL NAA+PROBE: NOT DETECTED
FLUAV SUBTYP SPEC NAA+PROBE: NOT DETECTED
FLUBV RNA ISLT QL NAA+PROBE: NOT DETECTED
GFR SERPL CREATININE-BSD FRML MDRD: 65 ML/MIN/1.73
GLOBULIN UR ELPH-MCNC: 3.9 GM/DL
GLUCOSE BLD-MCNC: 123 MG/DL (ref 65–99)
HADV DNA SPEC NAA+PROBE: NOT DETECTED
HCOV 229E RNA SPEC QL NAA+PROBE: NOT DETECTED
HCOV HKU1 RNA SPEC QL NAA+PROBE: NOT DETECTED
HCOV NL63 RNA SPEC QL NAA+PROBE: NOT DETECTED
HCOV OC43 RNA SPEC QL NAA+PROBE: NOT DETECTED
HCT VFR BLD AUTO: 43.6 % (ref 34–46.6)
HGB BLD-MCNC: 14.8 G/DL (ref 12–15.9)
HMPV RNA NPH QL NAA+NON-PROBE: NOT DETECTED
HPIV1 RNA SPEC QL NAA+PROBE: NOT DETECTED
HPIV2 RNA SPEC QL NAA+PROBE: NOT DETECTED
HPIV3 RNA NPH QL NAA+PROBE: NOT DETECTED
HPIV4 P GENE NPH QL NAA+PROBE: NOT DETECTED
LV EF NUC BP: 59 %
LYMPHOCYTES # BLD AUTO: 1.4 10*3/MM3 (ref 0.7–3.1)
LYMPHOCYTES NFR BLD AUTO: 11.9 % (ref 19.6–45.3)
M PNEUMO IGG SER IA-ACNC: NOT DETECTED
MAXIMAL PREDICTED HEART RATE: 159 BPM
MCH RBC QN AUTO: 31.4 PG (ref 26.6–33)
MCHC RBC AUTO-ENTMCNC: 33.8 G/DL (ref 31.5–35.7)
MCV RBC AUTO: 92.6 FL (ref 79–97)
MONOCYTES # BLD AUTO: 0.1 10*3/MM3 (ref 0.1–0.9)
MONOCYTES NFR BLD AUTO: 1.1 % (ref 5–12)
NEUTROPHILS # BLD AUTO: 10.5 10*3/MM3 (ref 1.7–7)
NEUTROPHILS NFR BLD AUTO: 85.9 % (ref 42.7–76)
NRBC BLD AUTO-RTO: 0.1 /100 WBC (ref 0–0.2)
PERCENT MAX PREDICTED HR: 57.23 %
PLATELET # BLD AUTO: 314 10*3/MM3 (ref 140–450)
PMV BLD AUTO: 9.3 FL (ref 6–12)
POTASSIUM BLD-SCNC: 4.3 MMOL/L (ref 3.5–5.2)
PROCALCITONIN SERPL-MCNC: 0.04 NG/ML (ref 0.1–0.25)
PROCALCITONIN SERPL-MCNC: 0.04 NG/ML (ref 0.1–0.25)
PROT SERPL-MCNC: 8.4 G/DL (ref 6–8.5)
RBC # BLD AUTO: 4.71 10*6/MM3 (ref 3.77–5.28)
RHINOVIRUS RNA SPEC NAA+PROBE: NOT DETECTED
RSV RNA NPH QL NAA+NON-PROBE: NOT DETECTED
SARS-COV-2 RNA RESP QL NAA+PROBE: NOT DETECTED
SODIUM BLD-SCNC: 143 MMOL/L (ref 136–145)
STRESS BASELINE BP: NORMAL MMHG
STRESS BASELINE HR: 71 BPM
STRESS PERCENT HR: 67 %
STRESS POST PEAK BP: NORMAL MMHG
STRESS POST PEAK HR: 91 BPM
STRESS TARGET HR: 135 BPM
T3FREE SERPL-MCNC: 3.04 PG/ML (ref 2–4.4)
T4 FREE SERPL-MCNC: 0.67 NG/DL (ref 0.93–1.7)
TROPONIN T SERPL-MCNC: <0.01 NG/ML (ref 0–0.03)
WBC NRBC COR # BLD: 12.2 10*3/MM3 (ref 3.4–10.8)

## 2020-05-14 PROCEDURE — 84481 FREE ASSAY (FT-3): CPT | Performed by: FAMILY MEDICINE

## 2020-05-14 PROCEDURE — 78452 HT MUSCLE IMAGE SPECT MULT: CPT | Performed by: INTERNAL MEDICINE

## 2020-05-14 PROCEDURE — 78452 HT MUSCLE IMAGE SPECT MULT: CPT

## 2020-05-14 PROCEDURE — G0378 HOSPITAL OBSERVATION PER HR: HCPCS

## 2020-05-14 PROCEDURE — 93017 CV STRESS TEST TRACING ONLY: CPT

## 2020-05-14 PROCEDURE — 93005 ELECTROCARDIOGRAM TRACING: CPT | Performed by: FAMILY MEDICINE

## 2020-05-14 PROCEDURE — 87635 SARS-COV-2 COVID-19 AMP PRB: CPT | Performed by: INTERNAL MEDICINE

## 2020-05-14 PROCEDURE — 85025 COMPLETE CBC W/AUTO DIFF WBC: CPT | Performed by: FAMILY MEDICINE

## 2020-05-14 PROCEDURE — 80053 COMPREHEN METABOLIC PANEL: CPT | Performed by: FAMILY MEDICINE

## 2020-05-14 PROCEDURE — 96372 THER/PROPH/DIAG INJ SC/IM: CPT

## 2020-05-14 PROCEDURE — 99219 PR INITIAL OBSERVATION CARE/DAY 50 MINUTES: CPT | Performed by: FAMILY MEDICINE

## 2020-05-14 PROCEDURE — 96374 THER/PROPH/DIAG INJ IV PUSH: CPT

## 2020-05-14 PROCEDURE — 63710000001 DEXAMETHASONE PER 0.25 MG: Performed by: INTERNAL MEDICINE

## 2020-05-14 PROCEDURE — 63710000001 ONDANSETRON PER 8 MG: Performed by: FAMILY MEDICINE

## 2020-05-14 PROCEDURE — 84145 PROCALCITONIN (PCT): CPT | Performed by: FAMILY MEDICINE

## 2020-05-14 PROCEDURE — 25010000002 ENOXAPARIN PER 10 MG: Performed by: FAMILY MEDICINE

## 2020-05-14 PROCEDURE — 85652 RBC SED RATE AUTOMATED: CPT | Performed by: FAMILY MEDICINE

## 2020-05-14 PROCEDURE — 25010000002 REGADENOSON 0.4 MG/5ML SOLUTION: Performed by: FAMILY MEDICINE

## 2020-05-14 PROCEDURE — 0 TECHNETIUM SESTAMIBI: Performed by: FAMILY MEDICINE

## 2020-05-14 PROCEDURE — 25010000002 MORPHINE PER 10 MG: Performed by: FAMILY MEDICINE

## 2020-05-14 PROCEDURE — 84439 ASSAY OF FREE THYROXINE: CPT | Performed by: FAMILY MEDICINE

## 2020-05-14 PROCEDURE — 93018 CV STRESS TEST I&R ONLY: CPT | Performed by: NURSE PRACTITIONER

## 2020-05-14 PROCEDURE — 84484 ASSAY OF TROPONIN QUANT: CPT | Performed by: EMERGENCY MEDICINE

## 2020-05-14 PROCEDURE — A9500 TC99M SESTAMIBI: HCPCS | Performed by: FAMILY MEDICINE

## 2020-05-14 PROCEDURE — 0099U HC BIOFIRE FILMARRAY RESP PANEL 1: CPT | Performed by: FAMILY MEDICINE

## 2020-05-14 RX ORDER — BISOPROLOL FUMARATE AND HYDROCHLOROTHIAZIDE 10; 6.25 MG/1; MG/1
1 TABLET ORAL DAILY
Status: DISCONTINUED | OUTPATIENT
Start: 2020-05-14 | End: 2020-05-15 | Stop reason: HOSPADM

## 2020-05-14 RX ORDER — ACETAMINOPHEN 325 MG/1
650 TABLET ORAL EVERY 6 HOURS PRN
Status: DISCONTINUED | OUTPATIENT
Start: 2020-05-14 | End: 2020-05-15 | Stop reason: HOSPADM

## 2020-05-14 RX ORDER — MORPHINE SULFATE 4 MG/ML
1 INJECTION, SOLUTION INTRAMUSCULAR; INTRAVENOUS
Status: DISCONTINUED | OUTPATIENT
Start: 2020-05-14 | End: 2020-05-14

## 2020-05-14 RX ORDER — GABAPENTIN 400 MG/1
1200 CAPSULE ORAL NIGHTLY
Status: DISCONTINUED | OUTPATIENT
Start: 2020-05-14 | End: 2020-05-15 | Stop reason: HOSPADM

## 2020-05-14 RX ORDER — SODIUM CHLORIDE 0.9 % (FLUSH) 0.9 %
10 SYRINGE (ML) INJECTION EVERY 12 HOURS SCHEDULED
Status: DISCONTINUED | OUTPATIENT
Start: 2020-05-14 | End: 2020-05-15 | Stop reason: HOSPADM

## 2020-05-14 RX ORDER — FERROUS SULFATE TAB EC 324 MG (65 MG FE EQUIVALENT) 324 (65 FE) MG
324 TABLET DELAYED RESPONSE ORAL
Status: DISCONTINUED | OUTPATIENT
Start: 2020-05-14 | End: 2020-05-15 | Stop reason: HOSPADM

## 2020-05-14 RX ORDER — QUETIAPINE FUMARATE 50 MG/1
100 TABLET, EXTENDED RELEASE ORAL NIGHTLY
Status: DISCONTINUED | OUTPATIENT
Start: 2020-05-14 | End: 2020-05-15 | Stop reason: HOSPADM

## 2020-05-14 RX ORDER — DEXAMETHASONE 4 MG/1
4 TABLET ORAL EVERY 8 HOURS SCHEDULED
Status: DISCONTINUED | OUTPATIENT
Start: 2020-05-14 | End: 2020-05-15 | Stop reason: HOSPADM

## 2020-05-14 RX ORDER — LEVOTHYROXINE SODIUM 0.15 MG/1
150 TABLET ORAL DAILY
Status: DISCONTINUED | OUTPATIENT
Start: 2020-05-14 | End: 2020-05-15

## 2020-05-14 RX ORDER — HYDROXYZINE HYDROCHLORIDE 25 MG/1
25 TABLET, FILM COATED ORAL EVERY 8 HOURS PRN
Status: DISCONTINUED | OUTPATIENT
Start: 2020-05-14 | End: 2020-05-15 | Stop reason: HOSPADM

## 2020-05-14 RX ORDER — LAMOTRIGINE 100 MG/1
100 TABLET ORAL NIGHTLY
Status: DISCONTINUED | OUTPATIENT
Start: 2020-05-14 | End: 2020-05-15 | Stop reason: HOSPADM

## 2020-05-14 RX ORDER — CHOLESTYRAMINE LIGHT 4 G/5.7G
1 POWDER, FOR SUSPENSION ORAL DAILY
Status: DISCONTINUED | OUTPATIENT
Start: 2020-05-14 | End: 2020-05-15 | Stop reason: HOSPADM

## 2020-05-14 RX ORDER — ONDANSETRON 4 MG/1
4 TABLET, FILM COATED ORAL EVERY 8 HOURS PRN
Status: DISCONTINUED | OUTPATIENT
Start: 2020-05-14 | End: 2020-05-15 | Stop reason: HOSPADM

## 2020-05-14 RX ORDER — MORPHINE SULFATE 4 MG/ML
2 INJECTION, SOLUTION INTRAMUSCULAR; INTRAVENOUS
Status: DISCONTINUED | OUTPATIENT
Start: 2020-05-14 | End: 2020-05-15 | Stop reason: HOSPADM

## 2020-05-14 RX ORDER — GABAPENTIN 300 MG/1
600 CAPSULE ORAL EVERY MORNING
Status: DISCONTINUED | OUTPATIENT
Start: 2020-05-14 | End: 2020-05-15 | Stop reason: HOSPADM

## 2020-05-14 RX ORDER — OXYCODONE HYDROCHLORIDE 5 MG/1
10 TABLET ORAL EVERY 6 HOURS PRN
Status: DISCONTINUED | OUTPATIENT
Start: 2020-05-14 | End: 2020-05-15 | Stop reason: HOSPADM

## 2020-05-14 RX ORDER — LISINOPRIL 5 MG/1
5 TABLET ORAL DAILY
Status: DISCONTINUED | OUTPATIENT
Start: 2020-05-14 | End: 2020-05-15 | Stop reason: HOSPADM

## 2020-05-14 RX ORDER — CYCLOBENZAPRINE HCL 10 MG
10 TABLET ORAL 3 TIMES DAILY PRN
Status: DISCONTINUED | OUTPATIENT
Start: 2020-05-14 | End: 2020-05-15 | Stop reason: HOSPADM

## 2020-05-14 RX ORDER — PANTOPRAZOLE SODIUM 40 MG/1
40 TABLET, DELAYED RELEASE ORAL
Status: DISCONTINUED | OUTPATIENT
Start: 2020-05-14 | End: 2020-05-15 | Stop reason: HOSPADM

## 2020-05-14 RX ORDER — HYDROXYCHLOROQUINE SULFATE 200 MG/1
200 TABLET, FILM COATED ORAL EVERY 12 HOURS SCHEDULED
Status: DISCONTINUED | OUTPATIENT
Start: 2020-05-14 | End: 2020-05-15 | Stop reason: HOSPADM

## 2020-05-14 RX ORDER — BENZONATATE 100 MG/1
200 CAPSULE ORAL 3 TIMES DAILY PRN
Status: DISCONTINUED | OUTPATIENT
Start: 2020-05-14 | End: 2020-05-15 | Stop reason: HOSPADM

## 2020-05-14 RX ORDER — NIFEDIPINE 60 MG/1
60 TABLET, EXTENDED RELEASE ORAL DAILY
Status: DISCONTINUED | OUTPATIENT
Start: 2020-05-14 | End: 2020-05-15 | Stop reason: HOSPADM

## 2020-05-14 RX ORDER — NITROGLYCERIN 0.4 MG/1
0.4 TABLET SUBLINGUAL
Status: DISCONTINUED | OUTPATIENT
Start: 2020-05-14 | End: 2020-05-15 | Stop reason: HOSPADM

## 2020-05-14 RX ORDER — SODIUM CHLORIDE 0.9 % (FLUSH) 0.9 %
10 SYRINGE (ML) INJECTION AS NEEDED
Status: DISCONTINUED | OUTPATIENT
Start: 2020-05-14 | End: 2020-05-15 | Stop reason: HOSPADM

## 2020-05-14 RX ORDER — AZITHROMYCIN 250 MG/1
500 TABLET, FILM COATED ORAL
Status: DISCONTINUED | OUTPATIENT
Start: 2020-05-14 | End: 2020-05-15 | Stop reason: HOSPADM

## 2020-05-14 RX ORDER — LIOTHYRONINE SODIUM 25 UG/1
25 TABLET ORAL DAILY
Status: DISCONTINUED | OUTPATIENT
Start: 2020-05-14 | End: 2020-05-14

## 2020-05-14 RX ADMIN — DEXAMETHASONE 4 MG: 4 TABLET ORAL at 21:53

## 2020-05-14 RX ADMIN — AZITHROMYCIN MONOHYDRATE 500 MG: 250 TABLET ORAL at 10:25

## 2020-05-14 RX ADMIN — Medication 10 ML: at 10:34

## 2020-05-14 RX ADMIN — OXYCODONE HYDROCHLORIDE 10 MG: 5 TABLET ORAL at 16:29

## 2020-05-14 RX ADMIN — DEXAMETHASONE 4 MG: 4 TABLET ORAL at 13:54

## 2020-05-14 RX ADMIN — QUETIAPINE FUMARATE 100 MG: 50 TABLET, EXTENDED RELEASE ORAL at 21:54

## 2020-05-14 RX ADMIN — NIFEDIPINE 60 MG: 60 TABLET, FILM COATED, EXTENDED RELEASE ORAL at 09:55

## 2020-05-14 RX ADMIN — Medication 10 ML: at 21:55

## 2020-05-14 RX ADMIN — CHOLESTYRAMINE 4 G: 4 POWDER, FOR SUSPENSION ORAL at 09:55

## 2020-05-14 RX ADMIN — LAMOTRIGINE 100 MG: 100 TABLET ORAL at 21:54

## 2020-05-14 RX ADMIN — BISOPROLOL FUMARATE AND HYDROCHLOROTHIAZIDE 1 TABLET: 6.25; 1 TABLET ORAL at 10:24

## 2020-05-14 RX ADMIN — REGADENOSON 0.4 MG: 0.08 INJECTION, SOLUTION INTRAVENOUS at 08:45

## 2020-05-14 RX ADMIN — PANTOPRAZOLE SODIUM 40 MG: 40 TABLET, DELAYED RELEASE ORAL at 16:30

## 2020-05-14 RX ADMIN — TECHNETIUM TC 99M SESTAMIBI 1 DOSE: 1 INJECTION INTRAVENOUS at 06:30

## 2020-05-14 RX ADMIN — LEVOTHYROXINE SODIUM 150 MCG: 150 TABLET ORAL at 09:55

## 2020-05-14 RX ADMIN — TECHNETIUM TC 99M SESTAMIBI 1 DOSE: 1 INJECTION INTRAVENOUS at 08:45

## 2020-05-14 RX ADMIN — PANTOPRAZOLE SODIUM 40 MG: 40 TABLET, DELAYED RELEASE ORAL at 09:55

## 2020-05-14 RX ADMIN — GABAPENTIN 600 MG: 300 CAPSULE ORAL at 09:54

## 2020-05-14 RX ADMIN — HYDROXYCHLOROQUINE SULFATE 200 MG: 200 TABLET ORAL at 09:55

## 2020-05-14 RX ADMIN — ONDANSETRON HYDROCHLORIDE 4 MG: 4 TABLET, FILM COATED ORAL at 17:05

## 2020-05-14 RX ADMIN — METHYLNALTREXONE BROMIDE 300 MG: 150 TABLET ORAL at 09:55

## 2020-05-14 RX ADMIN — GABAPENTIN 1200 MG: 400 CAPSULE ORAL at 21:53

## 2020-05-14 RX ADMIN — LIOTHYRONINE SODIUM 25 MCG: 25 TABLET ORAL at 09:54

## 2020-05-14 RX ADMIN — ENOXAPARIN SODIUM 40 MG: 40 INJECTION SUBCUTANEOUS at 16:29

## 2020-05-14 RX ADMIN — Medication: at 10:33

## 2020-05-14 RX ADMIN — MORPHINE SULFATE 1 MG: 4 INJECTION INTRAVENOUS at 03:42

## 2020-05-14 RX ADMIN — HYDROXYCHLOROQUINE SULFATE 200 MG: 200 TABLET ORAL at 22:53

## 2020-05-14 RX ADMIN — LISINOPRIL 5 MG: 5 TABLET ORAL at 09:56

## 2020-05-14 RX ADMIN — FERROUS SULFATE TAB EC 324 MG (65 MG FE EQUIVALENT) 324 MG: 324 (65 FE) TABLET DELAYED RESPONSE at 09:54

## 2020-05-14 NOTE — PLAN OF CARE
Problem: Patient Care Overview  Goal: Plan of Care Review  Outcome: Ongoing (interventions implemented as appropriate)  Flowsheets (Taken 5/14/2020 7020)  Progress: no change  Plan of Care Reviewed With: patient  Outcome Summary: Pt did not sleep through the night, c/o pressure to chest and exhibited dry cough with no production. Called MD and administered a order or morphine to pt for pressure and breathing. Pt states it has helped the pressure and not SOA.

## 2020-05-14 NOTE — PLAN OF CARE
Problem: Patient Care Overview  Goal: Plan of Care Review  Outcome: Ongoing (interventions implemented as appropriate)  Flowsheets  Taken 5/14/2020 1611  Progress: improving  Outcome Summary: Pt. transfer from [facility] today to rule out covid-patient denies any pain at this time. VSS, still awaiting covid-19 results.  Taken 5/14/2020 1144  Plan of Care Reviewed With: patient  Goal: Individualization and Mutuality  Outcome: Ongoing (interventions implemented as appropriate)  Goal: Discharge Needs Assessment  Outcome: Ongoing (interventions implemented as appropriate)  Goal: Interprofessional Rounds/Family Conf  Outcome: Ongoing (interventions implemented as appropriate)     Problem: Pain, Chronic (Adult)  Goal: Identify Related Risk Factors and Signs and Symptoms  Outcome: Ongoing (interventions implemented as appropriate)  Goal: Acceptable Pain/Comfort Level and Functional Ability  Outcome: Ongoing (interventions implemented as appropriate)     Problem: Cardiac: ACS (Acute Coronary Syndrome) (Adult)  Goal: Signs and Symptoms of Listed Potential Problems Will be Absent, Minimized or Managed (Cardiac: ACS)  Outcome: Ongoing (interventions implemented as appropriate)

## 2020-05-14 NOTE — CONSULTS
Group: [facility]         CONSULT NOTE    Patient Identification:  Tracie Gross  59 y.o.  female  1960  [id number]            Requesting physician: Attending physician    Reason for Consultation: Dyspnea      History of Present Illness:  59 y.o. female w/ complex history of scleroderma, lupus, GERD, and HTN who presents with chest pressure. Patient notes that symptoms of substernal chest pressure began 4-5 days ago. Symptoms were accompanied by SOB, L shoulder pain, and fatigue. She also started with dry cough yesterday. Chest pressure/SOB was relieved by rest and exacerbated by activity. Because of persistent symptoms, she called my office yesterday for further recommendations. Given the nature of the complaints and evaluation by phone at 5PM, I directed her to the ED for further evaluation and mgmt.     D-dimer was positive and CT w/ PE protocol was performed.   CT scan was negative for PE  Alveolar disease with mosaic pattern which was not present on CAT scan 2 weeks ago     Past medical history significant for restrictive lung disease PFTs on 2/7/2017 showed FEV1 1.88 L which is 74% predicted, no change with bronchodilators, FEV1/FVC ratio 87, TLC 75%, RV 74%, DLCO 75%    Assessment:  Dyspnea cough    CT scan of the chest negative for pulmonary embolism  D-dimer elevated   alveolar lung disease with mild groundglass/mosaic pattern need to rule out  infection etiology such as COVID-19 or pulmonary edema    History of interstitial lung disease with moderate restrictive defect  Past medical history significant for restrictive lung disease PFTs on 2/7/2017 showed FEV1 1.88 L which is 74% predicted, no change with bronchodilators, FEV1/FVC ratio 87, TLC 75%, RV 74%, DLCO 75%    Need to rule out pulmonary hypertension since patient has history of scleroderma last 2D echo was in 2018        Recommendations:  Transfer to respiratory isolation with strict PPE  Rule out COVID-19  Once test results  available will need 2D echo rule out pulmonary hypertension due to scleroderma  Start empiric steroids with p.o. Decadron  Empiric antibiotics for acute bronchitis with azithromycin   DVT prophylaxis with Lovenox      Review of Sytems:  Review of Systems   HENT: Positive for congestion, rhinorrhea and sneezing.    Respiratory: Positive for cough and shortness of breath. Negative for apnea, choking, chest tightness, wheezing and stridor.    Cardiovascular: Negative for leg swelling.       Past Medical History:  Past Medical History:   Diagnosis Date   • Arthritis    • Asthma    • Bipolar affective disorder (CMS/HCC)    • Breast cancer (CMS/HCC)     RT   • Colitis    • Depression    • GERD (gastroesophageal reflux disease)    • H/O breast reconstruction     SEVERAL   • H/O laminectomy    • Hamartoma (CMS/HCC)    • Hx of bilateral oophorectomy    • Hypertension    • Hypothyroidism    • Inflammatory bowel disease    • Kienbock's disease, right     WRIST   • Low back pain    • Lupus (CMS/HCC)    • Monahan syndrome    • Osteopenia    • Pneumonia    • Raynaud disease    • Scleroderma (CMS/HCC)    • Von Willebrand disease (CMS/HCC)        Past Surgical History:  Past Surgical History:   Procedure Laterality Date   • ARM DEBRIDEMENT Right     X 3   • BACK SURGERY      cervical fusion   • BACK SURGERY      lumbar decomp   • BREAST BIOPSY     • BREAST LUMPECTOMY     • BREAST RECONSTRUCTION Right    • BREAST RECONSTRUCTION Right    • CARDIAC CATHETERIZATION      no stents placed   •  SECTION  ,    • CHOLECYSTECTOMY     • COLONOSCOPY     • COSMETIC SURGERY  [id number]   • ENDOSCOPY     • EXPLORATORY LAPAROTOMY      scar tissue removal   • EYE SURGERY     • FINGER SURGERY Right     ring finger mass excision   • HYSTERECTOMY      PARTIAL   • JOINT REPLACEMENT Right ,    hip and knee   • KNEE ARTHROSCOPY Left 2020    Procedure: LEFT KNEE SCOPE with partial lateral meniscectomy;   Surgeon: Chau Perez MD;  Location: [facility] MAIN OR;  Service: Orthopedics   • LASIK      LASIK/ LASIK ENHANCEMENT   • MASTECTOMY RADICAL Right    • OOPHORECTOMY Bilateral    • SPLENECTOMY     • TUBAL ABDOMINAL LIGATION  1981   • WRIST SURGERY Right     distal radius head removal (bone dead)  plates and screws decomp lunate        Home Meds:  Medications Prior to Admission   Medication Sig Dispense Refill Last Dose   • alendronate (FOSAMAX) 70 MG tablet Take 70 mg by mouth Every 7 (Seven) Days. Tuesday   Past Week at Unknown time   • bisoprolol-hydrochlorothiazide (ZIAC) 10-6.25 MG per tablet Take 1 tablet by mouth Daily. 90 tablet 1 Past Week at Unknown time   • Calcium-Vitamin D 600-200 MG-UNIT per tablet TAKE ONE TABLET BY MOUTH TWICE A DAY 60 tablet 2 Past Week at Unknown time   • celecoxib (CeleBREX) 100 MG capsule Take 2 capsules by mouth Daily. (Patient taking differently: Take 100 mg by mouth 2 (Two) Times a Day.) 90 capsule 1 Past Week at Unknown time   • clindamycin (CLEOCIN T) 1 % lotion Apply  topically to the appropriate area as directed Every Night. Use on face   Past Week at Unknown time   • colestipol (COLESTID) 1 g tablet Take 2 tablets by mouth 2 (Two) Times a Day. 120 tablet 4 Past Week at Unknown time   • Cyanocobalamin (VITAMIN B-12 IJ) Inject  as directed Every 30 (Thirty) Days.   Past Week at Unknown time   • cyclobenzaprine (FLEXERIL) 10 MG tablet Take 1 tablet by mouth 3 (Three) Times a Day As Needed for Muscle Spasms.  tablet 3 Past Week at Unknown time   • dexlansoprazole (DEXILANT) 60 MG capsule Take 60 mg by mouth Daily.   Past Week at Unknown time   • famotidine (PEPCID) 20 MG tablet Take 1 tablet by mouth 2 (Two) Times a Day As Needed for Heartburn. 120 tablet 5 Past Week at Unknown time   • ferrous sulfate 325 (65 FE) MG tablet TAKE ONE TABLET BY MOUTH DAILY 30 tablet 2 Past Week at Unknown time   • Flaxseed, Linseed, (FLAXSEED OIL MAX STR) 1300 MG capsule Take 1 tablet by  mouth 2 (Two) Times a Day.   Past Week at Unknown time   • gabapentin (NEURONTIN) 300 MG capsule Take 600 mg by mouth Every Morning.   Past Week at Unknown time   • gabapentin (NEURONTIN) 600 MG tablet Take 1,200 mg by mouth Every Night.   Past Week at Unknown time   • hydroxychloroquine (PLAQUENIL) 200 MG tablet TAKE ONE TABLET BY MOUTH TWICE A  tablet 1 Past Week at Unknown time   • hydrOXYzine (ATARAX) 25 MG tablet Take 25 mg by mouth Every 8 (Eight) Hours As Needed for Itching.   Past Week at Unknown time   • lamoTRIgine (LaMICtal) 150 MG tablet Take 150 mg by mouth Every Night.   Past Week at Unknown time   • levothyroxine (SYNTHROID, LEVOTHROID) 150 MCG tablet TAKE ONE TABLET BY MOUTH DAILY 30 tablet 6 Past Week at Unknown time   • liothyronine (CYTOMEL) 25 MCG tablet TAKE ONE TABLET BY MOUTH TWICE A DAY 60 tablet 5 Past Week at Unknown time   • lisinopril (PRINIVIL,ZESTRIL) 5 MG tablet TAKE ONE TABLET BY MOUTH DAILY 90 tablet 1 Past Week at Unknown time   • Methylnaltrexone Bromide (RELISTOR) 150 MG tablet Take 2 tablets by mouth Daily.   Past Week at Unknown time   • NIFEdipine XL (PROCARDIA XL) 60 MG 24 hr tablet TAKE ONE TABLET BY MOUTH DAILY 90 tablet 0 Past Week at Unknown time   • ondansetron (ZOFRAN) 4 MG tablet Take 4 mg by mouth Every 8 (Eight) Hours As Needed.   Past Week at Unknown time   • oxyCODONE (ROXICODONE) 10 MG tablet Take 1 tablet by mouth Every 6 (Six) Hours As Needed for Severe Pain . 120 tablet 0 Past Week at Unknown time   • QUEtiapine fumarate ER (SEROquel XR) 50 MG tablet sustained-release 24 hour tablet Take 100 mg by mouth Every Night.   Past Week at Unknown time       Allergies:  Allergies   Allergen Reactions   • Aspirin Other (See Comments)     VONWILLENBRAND DISEASE    • Penicillins Shortness Of Breath   • Baclofen Hives   • Tape  [Adhesive Tape] Rash       Social History:   Social History     Socioeconomic History   • Marital status:      Spouse name: Brian   •  "Number of children: 2   • Years of education: Not on file   • Highest education level: Not on file   Tobacco Use   • Smoking status: Former Smoker     Packs/day: 0.25     Years: 7.00     Pack years: 1.75     Last attempt to quit:      Years since quittin.3   • Smokeless tobacco: Never Used   • Tobacco comment: Off and on for  those years   Substance and Sexual Activity   • Alcohol use: Yes     Frequency: Monthly or less     Drinks per session: 1 or 2     Binge frequency: Never     Comment: Rarely   • Drug use: No   • Sexual activity: Defer       Family History:  Family History   Problem Relation Age of Onset   • Colon cancer Mother    • Heart disease Mother    • Cancer Mother    • Kidney disease Father    • Heart disease Father    • Depression Father    • Hyperlipidemia Father    • Vision loss Father    • Colon cancer Sister    • Diabetes Sister    • Heart disease Sister    • Von Willebrand disease Sister    • Von Willebrand disease Brother    • Von Willebrand disease Son    • Breast cancer Son    • Other Son         burdick syndrome   • Diabetes Paternal Grandmother    • Stroke Paternal Grandmother    • Colon cancer Other    • Colon cancer Son    • Von Willebrand disease Son    • Other Son         burdick syndrome   • Breast cancer Son    • Cancer Sister    • Vision loss Sister    • Other Sister    • Stroke Sister    • Cancer Paternal Aunt    • Cancer Maternal Aunt    • Cancer Maternal Aunt    • Hyperlipidemia Sister    • Thyroid disease Sister    • Thyroid disease Sister        Physical Exam:  /81 (BP Location: Left arm, Patient Position: Lying)   Pulse 79   Temp 97.9 °F (36.6 °C) (Oral)   Resp 17   Ht 162.6 cm (64\")   Wt 83.6 kg (184 lb 4.9 oz)   LMP 1983   SpO2 99%   BMI 31.64 kg/m²  Body mass index is 31.64 kg/m². 99% 83.6 kg (184 lb 4.9 oz)  Physical Exam   Cardiovascular:   Murmur heard.  Pulmonary/Chest: No respiratory distress. She has wheezes. She has rales. She exhibits no " tenderness.   Abdominal: She exhibits no distension. There is no tenderness.   Musculoskeletal: She exhibits no edema.       LABS:  Lab Results   Component Value Date    CALCIUM 9.5 05/13/2020     Results from last 7 days   Lab Units 05/13/20  1911   MAGNESIUM mg/dL 2.0   SODIUM mmol/L 144   POTASSIUM mmol/L 4.4   CHLORIDE mmol/L 103   CO2 mmol/L 28.0   BUN mg/dL 19   CREATININE mg/dL 0.83   GLUCOSE mg/dL 112*   CALCIUM mg/dL 9.5   WBC 10*3/mm3 10.80   HEMOGLOBIN g/dL 14.1   PLATELETS 10*3/mm3 316   ALT (SGPT) U/L 21   AST (SGOT) U/L 29   PROBNP pg/mL 213.9     Lab Results   Component Value Date    CKTOTAL 65 05/13/2020    TROPONINT <0.010 05/14/2020     Results from last 7 days   Lab Units 05/14/20  0331 05/14/20  0031 05/13/20 1911   CK TOTAL U/L  --   --  65   TROPONIN T ng/mL <0.010 <0.010 <0.010                             Lab Results   Component Value Date    TSH 0.007 (L) 05/13/2020     Estimated Creatinine Clearance: 74.5 mL/min (by C-G formula based on SCr of 0.83 mg/dL).         Imaging:  Imaging Results (Last 24 Hours)     Procedure Component Value Units Date/Time    CT Chest Pulmonary Embolism [[id number]] Collected:  05/13/20 2100     Updated:  05/13/20 2112    Narrative:          DATE OF EXAM:  5/13/2020 8:39 PM     PROCEDURE:  CT CHEST PULMONARY EMBOLISM-     INDICATIONS:   Rest pain short of breath elevated d-dimer     COMPARISON:   05/13/2020. Chest CT 05/04/2020 and prior.     TECHNIQUE:  Routine transaxial slices were obtained through chest after  administration of intravenous 72 ml of Isovue 370. Reconstructed coronal  and sagittal images were also obtained. Automated exposure control and  iterative reconstruction methods were used.      FINDINGS:  Opacification of the pulmonary arteries appears diagnostic. No definite  pulmonary artery filling defect is visualized at this time to indicate  an acute pulmonary embolism.     The heart appears mildly enlarged. There is mild atherosclerosis of  the  coronary arteries and aorta. No pericardial effusion. There is motion  artifact in the ascending aorta. No definite acute aortic abnormality is  identified. There are postsurgical changes in the right axilla, as  before. There is evidence of bilateral mastectomies. Left axillary lymph  nodes appear similar to the most recent prior exam. There appear to be  enlarged mediastinal and hilar lymph nodes, similar to the recent prior  chest CT. These were noted to be chronic and present on prior chest  imaging as well. A subcarinal lymph node again measures approximately 12  mm in short axis diameter.     There are postoperative changes of splenectomy and cholecystectomy. No  definite acute abnormality is visualized in the upper abdomen on this  exam.     There appears to have been interval development of mosaic attenuation of  the pulmonary parenchyma the recent prior chest CT. The central airways  appear patent. No pleural effusion or pneumothorax is seen. There are  linear opacities in the lung bases with elevation of the right diaphragm  and mild chronic consolidation in the inferior right lung. No  significant new focal consolidation is seen. There is suspicion for mild  septal thickening.     Degenerative changes are present in the thoracic spine. No definite  acute osseous abnormality is seen.        Impression:       1.No definite findings of acute pulmonary embolism.  2.New mosaic attenuation of the pulmonary parenchyma with suspicion for  mild septal thickening. These findings are nonspecific but may represent  mild edema as there appears to be mild cardiomegaly. A developing  infectious or inflammatory process could also have this appearance.  3.Mediastinal and hilar adenopathy, similar to the recent prior chest  CT. This appears to have been chronic on prior chest imaging.  4.Suspected scarring in the lung bases, as before.     Electronically Signed By-DR. Tee Guillen MD On:5/13/2020 9:10 PM  This report  was finalized on [id number] by DR. Tee Guillen MD.    XR Chest 1 View [[id number]] Collected:  05/13/20 1950     Updated:  05/13/20 1954    Narrative:       DATE OF EXAM:  5/13/2020 7:15 PM     PROCEDURE:  XR CHEST 1 VW-     INDICATIONS:  Chest pain     COMPARISON:  08/09/2017     TECHNIQUE:   Single radiographic view of the chest was obtained.     FINDINGS:  There is slight elevation of the right diaphragm with chronic bibasilar  opacities, right greater than left. Surgical clips are seen in the right  axilla. No definite effusion or pneumothorax. No definite new focal or  diffuse infiltrate is identified.  Heart size and mediastinal contours  appear within normal limits.  No acute osseous abnormality is identified  on this limited single view. Multiple surgical clips are seen in the  upper abdomen.       Impression:       1.Suspected chronic opacities in the lung bases, right greater than  left.  2.No definite radiographic findings of acute cardiopulmonary  abnormality.     Electronically Signed By-DR. Tee Guillen MD On:5/13/2020 7:51 PM  This report was finalized on [id number] by DR. Tee Guillen MD.            Current Meds:   SCHEDULE    azithromycin 500 mg Oral Q24H   bisoprolol-hydrochlorothiazide 1 tablet Oral Daily   cholestyramine light 1 packet Oral Daily   enoxaparin 40 mg Subcutaneous Q24H   ferrous sulfate 324 mg Oral Daily With Breakfast   gabapentin 1,200 mg Oral Nightly   gabapentin 600 mg Oral QAM   hydroxychloroquine 200 mg Oral Q12H   lamoTRIgine 150 mg Oral Nightly   levothyroxine 150 mcg Oral Daily   liothyronine 25 mcg Oral Daily   lisinopril 5 mg Oral Daily   Methylnaltrexone Bromide 2 tablet Oral Daily   NIFEdipine XL 60 mg Oral Daily   pantoprazole 40 mg Oral BID AC   Pharmacy Consult  Does not apply BID   QUEtiapine fumarate  mg Oral Nightly   sodium chloride 10 mL Intravenous Q12H     Infusions     PRNs  benzonatate  •  cyclobenzaprine  •  hydrOXYzine  •  [DISCONTINUED]  morphine **OR** morphine  •  nitroglycerin  •  ondansetron  •  oxyCODONE  •  [COMPLETED] Insert peripheral IV **AND** sodium chloride  •  sodium chloride        Isaac Vazquez MD  5/14/2020  09:34      Much of this encounter note is an electronic transcription/translation of spoken language to printed text using Dragon Software.

## 2020-05-14 NOTE — H&P
Patient Care Team:  Floyd Silva MD as PCP - General (Internal Medicine)    Chief complaint chest pressure    HPI  Tracie Gross is a 59 y.o. female w/ complex history of scleroderma, lupus, GERD, and HTN who presents with chest pressure. Patient notes that symptoms of substernal chest pressure began 4-5 days ago. Symptoms were accompanied by SOB, L shoulder pain, and fatigue. She also started with dry cough yesterday. Chest pressure/SOB was relieved by rest and exacerbated by activity. Because of persistent symptoms, she called my office yesterday for further recommendations. Given the nature of the complaints and evaluation by phone at 5PM, I directed her to the ED for further evaluation and mgmt.    Upon arrival to the ED, patient was evaluated w/ numerous labs. WBC# was normal and troponin negative. EKG w/o acute ischemic change. D-dimer was positive and CT w/ PE protocol was performed. Imaging was negative for PE but revealed mosaic pattern thought to potentially represent infectious etiology. Patient was subsequently admitted for chest pain r/o.    Overnight, patient had relief of chest pressure w/ administration of morphine. She did not appreciate improvement w/ nitro patch, reportedly. Patient does not increasing dry cough through the night and did have to be placed on nasal cannula. Patient denies fever, congestion, N/V, abdominal pain, leg swelling, sick contacts. No further complaints this AM. She has already been injected for nuclear    Review of Systems  Review of Systems   Constitutional: Positive for fatigue. Negative for chills, fever and unexpected weight loss.   HENT: Negative for congestion, rhinorrhea and sore throat.    Eyes: Negative for visual disturbance.   Respiratory: Positive for cough, chest tightness and shortness of breath.    Cardiovascular: Positive for chest pain. Negative for palpitations.   Gastrointestinal: Negative for abdominal pain, constipation, diarrhea, nausea and  vomiting.   Genitourinary: Negative for difficulty urinating and dysuria.   Musculoskeletal: Positive for arthralgias and back pain. Negative for joint swelling.   Skin: Negative for rash and skin lesions.   Neurological: Negative for weakness and headache.   Psychiatric/Behavioral: Negative for depressed mood. The patient is not nervous/anxious.        History  Past Medical History:   Diagnosis Date   • Arthritis    • Asthma    • Bipolar affective disorder (CMS/HCC)    • Breast cancer (CMS/HCC)     RT   • Colitis    • Depression    • GERD (gastroesophageal reflux disease)    • H/O breast reconstruction     SEVERAL   • H/O laminectomy    • Hamartoma (CMS/HCC)    • Hx of bilateral oophorectomy    • Hypertension    • Hypothyroidism    • Inflammatory bowel disease    • Kienbock's disease, right     WRIST   • Low back pain    • Lupus (CMS/HCC)    • Monahan syndrome    • Osteopenia    • Pneumonia    • Raynaud disease    • Scleroderma (CMS/HCC)    • Von Willebrand disease (CMS/HCC)      Past Surgical History:   Procedure Laterality Date   • ARM DEBRIDEMENT Right     X 3   • BACK SURGERY      cervical fusion   • BACK SURGERY      lumbar decomp   • BREAST BIOPSY     • BREAST LUMPECTOMY     • BREAST RECONSTRUCTION Right    • BREAST RECONSTRUCTION Right    • CARDIAC CATHETERIZATION      no stents placed   •  SECTION  ,    • CHOLECYSTECTOMY     • COLONOSCOPY     • COSMETIC SURGERY  [id number]   • ENDOSCOPY     • EXPLORATORY LAPAROTOMY      scar tissue removal   • EYE SURGERY     • FINGER SURGERY Right     ring finger mass excision   • HYSTERECTOMY      PARTIAL   • JOINT REPLACEMENT Right ,    hip and knee   • KNEE ARTHROSCOPY Left 2020    Procedure: LEFT KNEE SCOPE with partial lateral meniscectomy;  Surgeon: Chau Perez MD;  Location: [facility] OR;  Service: Orthopedics   • LASIK      LASIK/ LASIK ENHANCEMENT   • MASTECTOMY RADICAL Right    • OOPHORECTOMY Bilateral    •  SPLENECTOMY     • TUBAL ABDOMINAL LIGATION  1981   • WRIST SURGERY Right     distal radius head removal (bone dead)  plates and screws decomp lunate     Aspirin; Penicillins; Baclofen; and Tape  [adhesive tape]  Medications Prior to Admission   Medication Sig Dispense Refill Last Dose   • alendronate (FOSAMAX) 70 MG tablet Take 70 mg by mouth Every 7 (Seven) Days. Tuesday   Past Week at Unknown time   • bisoprolol-hydrochlorothiazide (ZIAC) 10-6.25 MG per tablet Take 1 tablet by mouth Daily. 90 tablet 1 Past Week at Unknown time   • Calcium-Vitamin D 600-200 MG-UNIT per tablet TAKE ONE TABLET BY MOUTH TWICE A DAY 60 tablet 2 Past Week at Unknown time   • celecoxib (CeleBREX) 100 MG capsule Take 2 capsules by mouth Daily. (Patient taking differently: Take 100 mg by mouth 2 (Two) Times a Day.) 90 capsule 1 Past Week at Unknown time   • clindamycin (CLEOCIN T) 1 % lotion Apply  topically to the appropriate area as directed Every Night. Use on face   Past Week at Unknown time   • colestipol (COLESTID) 1 g tablet Take 2 tablets by mouth 2 (Two) Times a Day. 120 tablet 4 Past Week at Unknown time   • Cyanocobalamin (VITAMIN B-12 IJ) Inject  as directed Every 30 (Thirty) Days.   Past Week at Unknown time   • cyclobenzaprine (FLEXERIL) 10 MG tablet Take 1 tablet by mouth 3 (Three) Times a Day As Needed for Muscle Spasms.  tablet 3 Past Week at Unknown time   • dexlansoprazole (DEXILANT) 60 MG capsule Take 60 mg by mouth Daily.   Past Week at Unknown time   • famotidine (PEPCID) 20 MG tablet Take 1 tablet by mouth 2 (Two) Times a Day As Needed for Heartburn. 120 tablet 5 Past Week at Unknown time   • ferrous sulfate 325 (65 FE) MG tablet TAKE ONE TABLET BY MOUTH DAILY 30 tablet 2 Past Week at Unknown time   • Flaxseed, Linseed, (FLAXSEED OIL MAX STR) 1300 MG capsule Take 1 tablet by mouth 2 (Two) Times a Day.   Past Week at Unknown time   • gabapentin (NEURONTIN) 300 MG capsule Take 600 mg by mouth Every Morning.    Past Week at Unknown time   • gabapentin (NEURONTIN) 600 MG tablet Take 1,200 mg by mouth Every Night.   Past Week at Unknown time   • hydroxychloroquine (PLAQUENIL) 200 MG tablet TAKE ONE TABLET BY MOUTH TWICE A  tablet 1 Past Week at Unknown time   • hydrOXYzine (ATARAX) 25 MG tablet Take 25 mg by mouth Every 8 (Eight) Hours As Needed for Itching.   Past Week at Unknown time   • lamoTRIgine (LaMICtal) 150 MG tablet Take 150 mg by mouth Every Night.   Past Week at Unknown time   • levothyroxine (SYNTHROID, LEVOTHROID) 150 MCG tablet TAKE ONE TABLET BY MOUTH DAILY 30 tablet 6 Past Week at Unknown time   • liothyronine (CYTOMEL) 25 MCG tablet TAKE ONE TABLET BY MOUTH TWICE A DAY 60 tablet 5 Past Week at Unknown time   • lisinopril (PRINIVIL,ZESTRIL) 5 MG tablet TAKE ONE TABLET BY MOUTH DAILY 90 tablet 1 Past Week at Unknown time   • Methylnaltrexone Bromide (RELISTOR) 150 MG tablet Take 2 tablets by mouth Daily.   Past Week at Unknown time   • NIFEdipine XL (PROCARDIA XL) 60 MG 24 hr tablet TAKE ONE TABLET BY MOUTH DAILY 90 tablet 0 Past Week at Unknown time   • ondansetron (ZOFRAN) 4 MG tablet Take 4 mg by mouth Every 8 (Eight) Hours As Needed.   Past Week at Unknown time   • oxyCODONE (ROXICODONE) 10 MG tablet Take 1 tablet by mouth Every 6 (Six) Hours As Needed for Severe Pain . 120 tablet 0 Past Week at Unknown time   • QUEtiapine fumarate ER (SEROquel XR) 50 MG tablet sustained-release 24 hour tablet Take 100 mg by mouth Every Night.   Past Week at Unknown time     Social History     Socioeconomic History   • Marital status:      Spouse name: Brian   • Number of children: 2   • Years of education: Not on file   • Highest education level: Not on file   Tobacco Use   • Smoking status: Former Smoker     Packs/day: 0.25     Years: 7.00     Pack years: 1.75     Last attempt to quit:      Years since quittin.3   • Smokeless tobacco: Never Used   • Tobacco comment: Off and on for  those years    Substance and Sexual Activity   • Alcohol use: Yes     Frequency: Monthly or less     Drinks per session: 1 or 2     Binge frequency: Never     Comment: Rarely   • Drug use: No   • Sexual activity: Defer     Family History   Problem Relation Age of Onset   • Colon cancer Mother    • Heart disease Mother    • Cancer Mother    • Kidney disease Father    • Heart disease Father    • Depression Father    • Hyperlipidemia Father    • Vision loss Father    • Colon cancer Sister    • Diabetes Sister    • Heart disease Sister    • Von Willebrand disease Sister    • Von Willebrand disease Brother    • Von Willebrand disease Son    • Breast cancer Son    • Other Son         burdick syndrome   • Diabetes Paternal Grandmother    • Stroke Paternal Grandmother    • Colon cancer Other    • Colon cancer Son    • Von Willebrand disease Son    • Other Son         burdick syndrome   • Breast cancer Son    • Cancer Sister    • Vision loss Sister    • Other Sister    • Stroke Sister    • Cancer Paternal Aunt    • Cancer Maternal Aunt    • Cancer Maternal Aunt    • Hyperlipidemia Sister    • Thyroid disease Sister    • Thyroid disease Sister        Vital Signs:   Temp:  [97.8 °F (36.6 °C)-98 °F (36.7 °C)] 97.9 °F (36.6 °C)  Heart Rate:  [] 79  Resp:  [16-17] 17  BP: (109-138)/(51-94) 135/81    Physical Exam:  General: NAD  Head: AT/NC  Eyes: PERRL, EOMI, anicteric sclera  ENT: MMM w/o erythema.   Neck: Supple, no thyromegaly or LAD  Lungs: CTAB, SCR, BS equal  Heart: RRR w/o m/r/g, 2+ pulses, no edema  Abd: Soft, NT/ND, Bowel sounds positive  Ext: Digits exhibit thickening/sclerosis c/w hx of scleroderma  Skin: Warm, dry, intact  Neuro: A&O. CN grossly intact, no focal deficits  Psych: Appropriate mood and affect    Results Review:   Lab Results (last 24 hours)     Procedure Component Value Units Date/Time    Procalcitonin [[id number]]  (Abnormal) Collected:  05/14/20 0331    Specimen:  Blood Updated:  05/14/20 0939     Procalcitonin  "0.04 ng/mL     Narrative:       As a Marker for Sepsis (Non-Neonates):   1. <0.5 ng/mL represents a low risk of severe sepsis and/or septic shock.  1. >2 ng/mL represents a high risk of severe sepsis and/or septic shock.    As a Marker for Lower Respiratory Tract Infections that require antibiotic therapy:  PCT on Admission     Antibiotic Therapy             6-12 Hrs later  > 0.5                Strongly Recommended            >0.25 - <0.5         Recommended  0.1 - 0.25           Discouraged                   Remeasure/reassess PCT  <0.1                 Strongly Discouraged          Remeasure/reassess PCT      As 28 day mortality risk marker: \"Change in Procalcitonin Result\" (> 80 % or <=80 %) if Day 0 (or Day 1) and Day 4 values are available. Refer to http://www.Algoregopct-calculator.com/   Change in PCT <=80 %   A decrease of PCT levels below or equal to 80 % defines a positive change in PCT test result representing a higher risk for 28-day all-cause mortality of patients diagnosed with severe sepsis or septic shock.  Change in PCT > 80 %   A decrease of PCT levels of more than 80 % defines a negative change in PCT result representing a lower risk for 28-day all-cause mortality of patients diagnosed with severe sepsis or septic shock.                Results may be falsely decreased if patient taking Biotin.     Troponin [[id number]]  (Normal) Collected:  05/14/20 0331    Specimen:  Blood Updated:  05/14/20 0511     Troponin T <0.010 ng/mL     Narrative:       Troponin T Reference Range:  <= 0.03 ng/mL-   Negative for AMI  >0.03 ng/mL-     Abnormal for myocardial necrosis.  Clinicians would have to utilize clinical acumen, EKG, Troponin and serial changes to determine if it is an Acute Myocardial Infarction or myocardial injury due to an underlying chronic condition.       Results may be falsely decreased if patient taking Biotin.      Troponin [[id number]]  (Normal) Collected:  05/14/20 0031    Specimen:  Blood " Updated:  05/14/20 0059     Troponin T <0.010 ng/mL     Narrative:       Troponin T Reference Range:  <= 0.03 ng/mL-   Negative for AMI  >0.03 ng/mL-     Abnormal for myocardial necrosis.  Clinicians would have to utilize clinical acumen, EKG, Troponin and serial changes to determine if it is an Acute Myocardial Infarction or myocardial injury due to an underlying chronic condition.       Results may be falsely decreased if patient taking Biotin.      Comprehensive Metabolic Panel [[id number]]  (Abnormal) Collected:  05/13/20 1911    Specimen:  Blood Updated:  05/13/20 1955     Glucose 112 mg/dL      BUN 19 mg/dL      Creatinine 0.83 mg/dL      Sodium 144 mmol/L      Potassium 4.4 mmol/L      Chloride 103 mmol/L      CO2 28.0 mmol/L      Calcium 9.5 mg/dL      Total Protein 7.5 g/dL      Albumin 4.20 g/dL      ALT (SGPT) 21 U/L      AST (SGOT) 29 U/L      Comment: Specimen hemolyzed.  Results may be affected.        Alkaline Phosphatase 140 U/L      Total Bilirubin 0.2 mg/dL      eGFR Non African Amer 70 mL/min/1.73      Globulin 3.3 gm/dL      A/G Ratio 1.3 g/dL      BUN/Creatinine Ratio 22.9     Anion Gap 13.0 mmol/L     Narrative:       GFR Normal >60  Chronic Kidney Disease <60  Kidney Failure <15      Troponin [[id number]]  (Normal) Collected:  05/13/20 1911    Specimen:  Blood Updated:  05/13/20 1954     Troponin T <0.010 ng/mL     Narrative:       Troponin T Reference Range:  <= 0.03 ng/mL-   Negative for AMI  >0.03 ng/mL-     Abnormal for myocardial necrosis.  Clinicians would have to utilize clinical acumen, EKG, Troponin and serial changes to determine if it is an Acute Myocardial Infarction or myocardial injury due to an underlying chronic condition.       Results may be falsely decreased if patient taking Biotin.      BNP [[id number]]  (Normal) Collected:  05/13/20 1911    Specimen:  Blood Updated:  05/13/20 1954     proBNP 213.9 pg/mL     Narrative:       Among patients with dyspnea, NT-proBNP is highly  sensitive for the detection of acute congestive heart failure. In addition NT-proBNP of <300 pg/ml effectively rules out acute congestive heart failure with 99% negative predictive value.    Results may be falsely decreased if patient taking Biotin.      TSH [[id number]]  (Abnormal) Collected:  05/13/20 1911    Specimen:  Blood Updated:  05/13/20 1954     TSH 0.007 uIU/mL      Comment: TSH results may be falsely decreased if patient taking Biotin.       CK [[id number]]  (Normal) Collected:  05/13/20 1911    Specimen:  Blood Updated:  05/13/20 1950     Creatine Kinase 65 U/L     Magnesium [[id number]]  (Normal) Collected:  05/13/20 1911    Specimen:  Blood Updated:  05/13/20 1950     Magnesium 2.0 mg/dL     D-dimer, Quantitative [[id number]]  (Abnormal) Collected:  05/13/20 1911    Specimen:  Blood Updated:  05/13/20 1927     D-Dimer, Quantitative 4.29 MCGFEU/mL     Narrative:       Reference Range  --------------------------------------------------------------------     < 0.50   Negative Predictive Value  0.50-0.59   Indeterminate    >= 0.60   Probable VTE             A very low percentage of patients with DVT may yield D-Dimer results   below the cut-off of 0.50 MCGFEU/mL.  This is known to be more   prevalent in patients with distal DVT.             Results of this test should always be interpreted in conjunction with   the patient's medical history, clinical presentation and other   findings.  Clinical diagnosis should not be based on the result of   [place] D-Dimer alone.    CBC & Differential [[id number]] Collected:  05/13/20 1911    Specimen:  Blood Updated:  05/13/20 1917    Narrative:       The following orders were created for panel order CBC & Differential.  Procedure                               Abnormality         Status                     ---------                               -----------         ------                     CBC Auto Differential[[id number]]        Abnormal            Final result                  Please view results for these tests on the individual orders.    CBC Auto Differential [[id number]]  (Abnormal) Collected:  05/13/20 1911    Specimen:  Blood Updated:  05/13/20 1917     WBC 10.80 10*3/mm3      RBC 4.39 10*6/mm3      Hemoglobin 14.1 g/dL      Hematocrit 41.4 %      MCV 94.4 fL      MCH 32.2 pg      MCHC 34.1 g/dL      RDW 13.1 %      RDW-SD 43.3 fl      MPV 9.3 fL      Platelets 316 10*3/mm3      Neutrophil % 50.1 %      Lymphocyte % 30.4 %      Monocyte % 14.8 %      Eosinophil % 4.0 %      Basophil % 0.7 %      Neutrophils, Absolute 5.40 10*3/mm3      Lymphocytes, Absolute 3.30 10*3/mm3      Monocytes, Absolute 1.60 10*3/mm3      Eosinophils, Absolute 0.40 10*3/mm3      Basophils, Absolute 0.10 10*3/mm3      nRBC 0.1 /100 WBC         Imaging Results (Last 24 Hours)     Procedure Component Value Units Date/Time    CT Chest Pulmonary Embolism [[id number]] Collected:  05/13/20 2100     Updated:  05/13/20 2112    Narrative:          DATE OF EXAM:  5/13/2020 8:39 PM     PROCEDURE:  CT CHEST PULMONARY EMBOLISM-     INDICATIONS:   Rest pain short of breath elevated d-dimer     COMPARISON:   05/13/2020. Chest CT 05/04/2020 and prior.     TECHNIQUE:  Routine transaxial slices were obtained through chest after  administration of intravenous 72 ml of Isovue 370. Reconstructed coronal  and sagittal images were also obtained. Automated exposure control and  iterative reconstruction methods were used.      FINDINGS:  Opacification of the pulmonary arteries appears diagnostic. No definite  pulmonary artery filling defect is visualized at this time to indicate  an acute pulmonary embolism.     The heart appears mildly enlarged. There is mild atherosclerosis of the  coronary arteries and aorta. No pericardial effusion. There is motion  artifact in the ascending aorta. No definite acute aortic abnormality is  identified. There are postsurgical changes in the right axilla, as  before. There is evidence of  bilateral mastectomies. Left axillary lymph  nodes appear similar to the most recent prior exam. There appear to be  enlarged mediastinal and hilar lymph nodes, similar to the recent prior  chest CT. These were noted to be chronic and present on prior chest  imaging as well. A subcarinal lymph node again measures approximately 12  mm in short axis diameter.     There are postoperative changes of splenectomy and cholecystectomy. No  definite acute abnormality is visualized in the upper abdomen on this  exam.     There appears to have been interval development of mosaic attenuation of  the pulmonary parenchyma the recent prior chest CT. The central airways  appear patent. No pleural effusion or pneumothorax is seen. There are  linear opacities in the lung bases with elevation of the right diaphragm  and mild chronic consolidation in the inferior right lung. No  significant new focal consolidation is seen. There is suspicion for mild  septal thickening.     Degenerative changes are present in the thoracic spine. No definite  acute osseous abnormality is seen.        Impression:       1.No definite findings of acute pulmonary embolism.  2.New mosaic attenuation of the pulmonary parenchyma with suspicion for  mild septal thickening. These findings are nonspecific but may represent  mild edema as there appears to be mild cardiomegaly. A developing  infectious or inflammatory process could also have this appearance.  3.Mediastinal and hilar adenopathy, similar to the recent prior chest  CT. This appears to have been chronic on prior chest imaging.  4.Suspected scarring in the lung bases, as before.     Electronically Signed By-DR. Tee Guillen MD On:5/13/2020 9:10 PM  This report was finalized on [id number] by DR. Tee Guillen MD.    XR Chest 1 View [[id number]] Collected:  05/13/20 1950     Updated:  05/13/20 1954    Narrative:       DATE OF EXAM:  5/13/2020 7:15 PM     PROCEDURE:  XR CHEST 1 VW-      INDICATIONS:  Chest pain     COMPARISON:  08/09/2017     TECHNIQUE:   Single radiographic view of the chest was obtained.     FINDINGS:  There is slight elevation of the right diaphragm with chronic bibasilar  opacities, right greater than left. Surgical clips are seen in the right  axilla. No definite effusion or pneumothorax. No definite new focal or  diffuse infiltrate is identified.  Heart size and mediastinal contours  appear within normal limits.  No acute osseous abnormality is identified  on this limited single view. Multiple surgical clips are seen in the  upper abdomen.       Impression:       1.Suspected chronic opacities in the lung bases, right greater than  left.  2.No definite radiographic findings of acute cardiopulmonary  abnormality.     Electronically Signed By-DR. Tee Guillen MD On:5/13/2020 7:51 PM  This report was finalized on [id number] by DR. Tee Guillen MD.        ECG 12 Lead   Preliminary Result   HEART RATE= 68  bpm   RR Interval= 884  ms   PA Interval= 182  ms   P Horizontal Axis= -7  deg   P Front Axis= 45  deg   QRSD Interval= 101  ms   QT Interval= 413  ms   QRS Axis= -5  deg   T Wave Axis= 21  deg   - NORMAL ECG -   Sinus rhythm   When compared with ECG of 14-May-2020 3:15:41,   No significant change   Electronically Signed By:    Date and Time of Study: 2020-05-14 09:28:02      ECG 12 Lead   Preliminary Result   HEART RATE= 67  bpm   RR Interval= 892  ms   PA Interval= 209  ms   P Horizontal Axis= 5  deg   P Front Axis= 49  deg   QRSD Interval= 101  ms   QT Interval= 411  ms   QRS Axis= 0  deg   T Wave Axis=   deg   - ABNORMAL ECG -   Sinus rhythm   Lateral infarct, age indeterminate   Electronically Signed By:    Date and Time of Study: 2020-05-14 03:15:41      ECG 12 Lead   Preliminary Result   HEART RATE= 90  bpm   RR Interval= 668  ms   PA Interval= 209  ms   P Horizontal Axis=   deg   P Front Axis= -25  deg   QRSD Interval= 100  ms   QT Interval= 378  ms   QRS Axis=  "-21  deg   T Wave Axis= -3  deg   - ABNORMAL ECG -   Sinus rhythm   Lateral infarct, old   When compared with ECG of 13-May-2020 18:58:59,   No significant change   Electronically Signed By:    Date and Time of Study: 2020-05-13 19:35:00      ECG 12 Lead   Preliminary Result   HEART RATE= 93  bpm   RR Interval= 648  ms   NE Interval= 204  ms   P Horizontal Axis= 25  deg   P Front Axis= 37  deg   QRSD Interval= 99  ms   QT Interval= 372  ms   QRS Axis= 20  deg   T Wave Axis= 31  deg   - ABNORMAL ECG -   Sinus rhythm   Ventricular premature complex   Lateral infarct, age indeterminate   When compared with ECG of 30-Dec-2019 11:25:35,   Significant rate increase   New or worsened ischemia or infarction   Electronically Signed By:    Date and Time of Study: 2020-05-13 18:58:59           Assessment/Plan:  Tracie Gross is a 59 y.o. female w/ complex history of scleroderma, lupus, GERD, and HTN who presents with chest pressure.    Principal Problem:    Chest pain: unclear etiology. History is highly concerning for cardiac etiology given complaint of acute chest pressure w/ pain radiating to the L shoulder, accompanied by SOB and fatigue.  However, troponin and EKG unremarkable. Would also consider lung pathology given unusual findings on CT and recent complaint of cough. Procalcitonin did recently return negative however.   - Nuclear stress pending   - Consider CARDS consult pending nuclear result   - Pulmonology consulted given unusual \"mosaic\" pattern on CT w/ normal WBC#, negative procal, and complex PMH. Appreciate recommendations   -RVP and COVID testing ordered   - Azithromycin and benzonatate   - Supplemental O2 PRN   - Morphine PRN pain      Bipolar affective disorder (CMS/HCC)   - Cont home lamotrigine and quetiapine 100mg daily       Chronic pain   - Cont home oxycodone 10mg Q6 PRN and gabapentin 600/1200mg   - Morphine added for acute pain as above      Gastroesophageal reflux disease   - Home Dexilant " substituted for pantoprazole 40 BID      Hypothyroidism: TSH severely depressed on admission   - Free T3 and T4 ordered   - Hold home liothyronine   - Cont home levothyroxine 150mcg for now      SLE (systemic lupus erythematosus related syndrome) (CMS/HCC)   -Cont home hydroxychloroquine 200mg BID      Scleroderma (CMS/HCC)   -Cont home nifedipine 60mg daily      HTN   - Cont home bisoprolol/HCTZ 10/6.25mg daily, lisinopril 5mg daily, and nifedipine as above    FENGI  - NPO for nuclear; heart healthy after  - Cont home relistor    PPX: Lovenox  Full Code    Dispo: Admit for further evaluation and mgmt of chest pressure      Floyd Silva MD  05/14/20  09:58

## 2020-05-14 NOTE — ED PROVIDER NOTES
Subjective   Chief complaint chest tightness shortness of breath fatigue    History of present illness this is a 59-year-old female who presents the ER with a 4-day history of intermittent tightness in her chest and shortness of breath and fatigue.  This is worse with exertion and better with rest moderate degree.  There is no neck arm or jaw pain.  No fever chills sweats cough congestion no leg pain or swelling out of the ordinary.  No recent long car ride plane or immobilization no foreign travels or ill exposures.          Review of Systems   Constitutional: Positive for fatigue. Negative for chills and fever.   HENT: Negative for congestion and sinus pressure.    Eyes: Negative for photophobia and visual disturbance.   Respiratory: Positive for chest tightness and shortness of breath. Negative for cough.    Cardiovascular: Positive for chest pain. Negative for leg swelling.   Gastrointestinal: Negative for abdominal pain and vomiting.   Endocrine: Negative for cold intolerance and heat intolerance.   Genitourinary: Negative for difficulty urinating and dysuria.   Musculoskeletal: Positive for myalgias. Negative for arthralgias and back pain.   Skin: Negative for color change and pallor.   Neurological: Negative for dizziness and light-headedness.   Psychiatric/Behavioral: Negative for agitation and behavioral problems.       Past Medical History:   Diagnosis Date   • Arthritis    • Asthma    • Bipolar affective disorder (CMS/HCC)    • Breast cancer (CMS/HCC)     RT   • Colitis    • Depression    • GERD (gastroesophageal reflux disease) 1993   • H/O breast reconstruction     SEVERAL   • H/O laminectomy    • Hamartoma (CMS/HCC)    • Hx of bilateral oophorectomy    • Hypertension    • Hypothyroidism    • Inflammatory bowel disease 1992   • Kienbock's disease, right     WRIST   • Low back pain 1991   • Lupus (CMS/HCC)    • Monahan syndrome    • Osteopenia 2019   • Pneumonia    • Raynaud disease    • Scleroderma  (CMS/HCC)    • Von Willebrand disease (CMS/HCC)        Allergies   Allergen Reactions   • Aspirin Other (See Comments)     VONWILLENBRAND DISEASE    • Penicillins Shortness Of Breath   • Baclofen Hives   • Tape  [Adhesive Tape] Rash       Past Surgical History:   Procedure Laterality Date   • ARM DEBRIDEMENT Right     X 3   • BACK SURGERY      cervical fusion   • BACK SURGERY      lumbar decomp   • BREAST BIOPSY     • BREAST LUMPECTOMY     • BREAST RECONSTRUCTION Right    • BREAST RECONSTRUCTION Right    • CARDIAC CATHETERIZATION      no stents placed   •  SECTION  ,    • CHOLECYSTECTOMY     • COLONOSCOPY     • COSMETIC SURGERY  [id number]   • ENDOSCOPY     • EXPLORATORY LAPAROTOMY      scar tissue removal   • EYE SURGERY     • FINGER SURGERY Right     ring finger mass excision   • HYSTERECTOMY      PARTIAL   • JOINT REPLACEMENT Right ,    hip and knee   • KNEE ARTHROSCOPY Left 2020    Procedure: LEFT KNEE SCOPE with partial lateral meniscectomy;  Surgeon: Chau Perez MD;  Location: [facility] MAIN OR;  Service: Orthopedics   • LASIK      LASIK/ LASIK ENHANCEMENT   • MASTECTOMY RADICAL Right    • OOPHORECTOMY Bilateral    • SPLENECTOMY     • TUBAL ABDOMINAL LIGATION     • WRIST SURGERY Right     distal radius head removal (bone dead)  plates and screws decomp lunate       Family History   Problem Relation Age of Onset   • Colon cancer Mother    • Heart disease Mother    • Cancer Mother    • Kidney disease Father    • Heart disease Father    • Depression Father    • Hyperlipidemia Father    • Vision loss Father    • Colon cancer Sister    • Diabetes Sister    • Heart disease Sister    • Von Willebrand disease Sister    • Von Willebrand disease Brother    • Von Willebrand disease Son    • Breast cancer Son    • Other Son         burdick syndrome   • Diabetes Paternal Grandmother    • Stroke Paternal Grandmother    • Colon cancer Other    • Colon cancer Son    • Von Willebrand  disease Son    • Other Son         burdick syndrome   • Breast cancer Son    • Cancer Sister    • Vision loss Sister    • Other Sister    • Stroke Sister    • Cancer Paternal Aunt    • Cancer Maternal Aunt    • Cancer Maternal Aunt    • Hyperlipidemia Sister    • Thyroid disease Sister    • Thyroid disease Sister        Social History     Socioeconomic History   • Marital status:      Spouse name: Brian   • Number of children: 2   • Years of education: Not on file   • Highest education level: Not on file   Tobacco Use   • Smoking status: Former Smoker     Packs/day: 0.25     Years: 7.00     Pack years: 1.75     Last attempt to quit:      Years since quittin.3   • Smokeless tobacco: Never Used   • Tobacco comment: Off and on for  those years   Substance and Sexual Activity   • Alcohol use: Yes     Frequency: Monthly or less     Drinks per session: 1 or 2     Binge frequency: Never     Comment: Rarely   • Drug use: No   • Sexual activity: Defer     Prior to Admission medications    Medication Sig Start Date End Date Taking? Authorizing Provider   alendronate (FOSAMAX) 70 MG tablet Take 70 mg by mouth Every 7 (Seven) Days. Tuesday   Yes Chuck Lester MD   bisoprolol-hydrochlorothiazide (ZIAC) 10-6.25 MG per tablet Take 1 tablet by mouth Daily. 3/16/20  Yes Floyd Silva MD   Calcium-Vitamin D 600-200 MG-UNIT per tablet TAKE ONE TABLET BY MOUTH TWICE A DAY 11/15/19  Yes Suzan Jones MD   celecoxib (CeleBREX) 100 MG capsule Take 2 capsules by mouth Daily.  Patient taking differently: Take 100 mg by mouth 2 (Two) Times a Day. 4/15/20  Yes Floyd Silva MD   clindamycin (CLEOCIN T) 1 % lotion Apply  topically to the appropriate area as directed Every Night. Use on face 3/5/20  Yes Chuck Lester MD   colestipol (COLESTID) 1 g tablet Take 2 tablets by mouth 2 (Two) Times a Day. 3/18/20  Yes Floyd Silva MD   Cyanocobalamin (VITAMIN B-12 IJ) Inject  as  directed Every 30 (Thirty) Days.   Yes Chuck Lester MD   cyclobenzaprine (FLEXERIL) 10 MG tablet Take 1 tablet by mouth 3 (Three) Times a Day As Needed for Muscle Spasms. PRN 12/11/19  Yes Sean Baig MD   dexlansoprazole (DEXILANT) 60 MG capsule Take 60 mg by mouth Daily.   Yes Emergency, Nurse AZUCENA Vickers   famotidine (PEPCID) 20 MG tablet Take 1 tablet by mouth 2 (Two) Times a Day As Needed for Heartburn. 4/15/20  Yes Floyd Silva MD   ferrous sulfate 325 (65 FE) MG tablet TAKE ONE TABLET BY MOUTH DAILY 2/12/20  Yes Kellee Park APRN   Flaxseed, Linseed, (FLAXSEED OIL MAX STR) 1300 MG capsule Take 1 tablet by mouth 2 (Two) Times a Day.   Yes Chuck Lester MD   gabapentin (NEURONTIN) 300 MG capsule Take 600 mg by mouth Every Morning.   Yes ProviderChuck MD   gabapentin (NEURONTIN) 600 MG tablet Take 1,200 mg by mouth Every Night.   Yes Chuck Lester MD   hydroxychloroquine (PLAQUENIL) 200 MG tablet TAKE ONE TABLET BY MOUTH TWICE A DAY 3/31/20  Yes Yanna Esposito MD   hydrOXYzine (ATARAX) 25 MG tablet Take 25 mg by mouth Every 8 (Eight) Hours As Needed for Itching. 12/31/17  Yes Chuck Lester MD   lamoTRIgine (LaMICtal) 150 MG tablet Take 150 mg by mouth Every Night.   Yes Emergency, Nurse AZUCENA Vickers   levothyroxine (SYNTHROID, LEVOTHROID) 150 MCG tablet TAKE ONE TABLET BY MOUTH DAILY 10/20/19  Yes Sean Baig MD   liothyronine (CYTOMEL) 25 MCG tablet TAKE ONE TABLET BY MOUTH TWICE A DAY 12/18/19  Yes Sean Baig MD   lisinopril (PRINIVIL,ZESTRIL) 5 MG tablet TAKE ONE TABLET BY MOUTH DAILY 6/28/19  Yes Sean Baig MD   Methylnaltrexone Bromide (RELISTOR) 150 MG tablet Take 2 tablets by mouth 2 (Two) Times a Day.   Yes Emergency, Nurse Epic, RN   NIFEdipine XL (PROCARDIA XL) 60 MG 24 hr tablet TAKE ONE TABLET BY MOUTH DAILY 4/29/20  Yes Floyd Silva MD   ondansetron (ZOFRAN) 4 MG tablet Take 4 mg by  mouth Every 8 (Eight) Hours As Needed. 1/30/15  Yes Provider, MD Chuck   oxyCODONE (ROXICODONE) 10 MG tablet Take 1 tablet by mouth Every 6 (Six) Hours As Needed for Severe Pain . 4/15/20  Yes Floyd Silva MD   QUEtiapine fumarate ER (SEROquel XR) 50 MG tablet sustained-release 24 hour tablet Take 100 mg by mouth Every Night.   Yes Emergency, Nurse Epic, RN   alendronate (FOSAMAX) 70 MG tablet TAKE 1 TABLET BY MOUTH ONCE WEEKLY BEFORE BREAKFAST, ON AN EMPTY STOMACH: REMAIN UPRIGHT FOR 60 MINUTES:TAKE WITH 8 OUNCES OF WATER 1/13/20 5/13/20  Pat Lake APRN   gabapentin (NEURONTIN) 300 MG capsule Take 600mg in AM and 1200mg at night 4/15/20 5/13/20  Floyd Silva MD   vitamin B-12 (CYANOCOBALAMIN) 1000 MCG tablet Take 1,000 mcg by mouth Every 30 (Thirty) Days.  5/13/20  Emergency, Nurse Epic, RN       Objective   Physical Exam  59-year-old female awake alert no acute distress HEENT extraocular muscles intact pupils equal round reactive mouth clear neck supple no adenopathy no JVD no bruits lungs clear no retractions heart regular without murmur abdomen soft without tenderness no masses extremities pulses are equal upper and lower extremities no edema cords or Homans sign or evidence of DVT.  Patient's awake alert follows commands no facial asymmetry normal speech without focal weakness  Procedures           ED Course      Results for orders placed or performed during the hospital encounter of 05/13/20   Comprehensive Metabolic Panel   Result Value Ref Range    Glucose 112 (H) 65 - 99 mg/dL    BUN 19 8 - 23 mg/dL    Creatinine 0.83 0.57 - 1.00 mg/dL    Sodium 144 136 - 145 mmol/L    Potassium 4.4 3.5 - 5.2 mmol/L    Chloride 103 98 - 107 mmol/L    CO2 28.0 22.0 - 29.0 mmol/L    Calcium 9.5 8.6 - 10.5 mg/dL    Total Protein 7.5 6.0 - 8.5 g/dL    Albumin 4.20 3.50 - 5.20 g/dL    ALT (SGPT) 21 1 - 33 U/L    AST (SGOT) 29 1 - 32 U/L    Alkaline Phosphatase 140 (H) 39 - 117 U/L    Total  Bilirubin 0.2 0.2 - 1.2 mg/dL    eGFR Non African Amer 70 >60 mL/min/1.73    Globulin 3.3 gm/dL    A/G Ratio 1.3 g/dL    BUN/Creatinine Ratio 22.9 7.0 - 25.0    Anion Gap 13.0 5.0 - 15.0 mmol/L   D-dimer, Quantitative   Result Value Ref Range    D-Dimer, Quantitative 4.29 (H) 0.17 - 0.59 MCGFEU/mL   Troponin   Result Value Ref Range    Troponin T <0.010 0.000 - 0.030 ng/mL   BNP   Result Value Ref Range    proBNP 213.9 5.0 - 900.0 pg/mL   CK   Result Value Ref Range    Creatine Kinase 65 20 - 180 U/L   Magnesium   Result Value Ref Range    Magnesium 2.0 1.6 - 2.4 mg/dL   TSH   Result Value Ref Range    TSH 0.007 (L) 0.270 - 4.200 uIU/mL   CBC Auto Differential   Result Value Ref Range    WBC 10.80 3.40 - 10.80 10*3/mm3    RBC 4.39 3.77 - 5.28 10*6/mm3    Hemoglobin 14.1 12.0 - 15.9 g/dL    Hematocrit 41.4 34.0 - 46.6 %    MCV 94.4 79.0 - 97.0 fL    MCH 32.2 26.6 - 33.0 pg    MCHC 34.1 31.5 - 35.7 g/dL    RDW 13.1 12.3 - 15.4 %    RDW-SD 43.3 37.0 - 54.0 fl    MPV 9.3 6.0 - 12.0 fL    Platelets 316 140 - 450 10*3/mm3    Neutrophil % 50.1 42.7 - 76.0 %    Lymphocyte % 30.4 19.6 - 45.3 %    Monocyte % 14.8 (H) 5.0 - 12.0 %    Eosinophil % 4.0 0.3 - 6.2 %    Basophil % 0.7 0.0 - 1.5 %    Neutrophils, Absolute 5.40 1.70 - 7.00 10*3/mm3    Lymphocytes, Absolute 3.30 (H) 0.70 - 3.10 10*3/mm3    Monocytes, Absolute 1.60 (H) 0.10 - 0.90 10*3/mm3    Eosinophils, Absolute 0.40 0.00 - 0.40 10*3/mm3    Basophils, Absolute 0.10 0.00 - 0.20 10*3/mm3    nRBC 0.1 0.0 - 0.2 /100 WBC     Xr Chest 1 View    Result Date: 5/13/2020  1.Suspected chronic opacities in the lung bases, right greater than left. 2.No definite radiographic findings of acute cardiopulmonary abnormality.  Electronically Signed By-DR. Tee Guillen MD On:5/13/2020 7:51 PM This report was finalized on [id number] by DR. Tee Guillen MD.    Ct Chest Pulmonary Embolism    Result Date: 5/13/2020  1.No definite findings of acute pulmonary embolism. 2.New mosaic  attenuation of the pulmonary parenchyma with suspicion for mild septal thickening. These findings are nonspecific but may represent mild edema as there appears to be mild cardiomegaly. A developing infectious or inflammatory process could also have this appearance. 3.Mediastinal and hilar adenopathy, similar to the recent prior chest CT. This appears to have been chronic on prior chest imaging. 4.Suspected scarring in the lung bases, as before.  Electronically Signed By-DR. Tee Guillen MD On:5/13/2020 9:10 PM This report was finalized on [id number] by DR. Tee Guillen MD.    Medications   sodium chloride 0.9 % flush 10 mL (has no administration in time range)   aspirin tablet 325 mg (has no administration in time range)   nitroglycerin (NITROSTAT) ointment 1 inch (has no administration in time range)   iopamidol (ISOVUE-370) 76 % injection 100 mL (72 mL Intravenous Given 5/13/20 2052)          EKG my interpretation normal sinus rhythm rate of 93 normal axis and partially patient does have significantly inverted T waves in the lead V1 no acute ST elevation  no significant change from previous EKG abnormal                                  MDM  Number of Diagnoses or Management Options  Chest pain, unspecified type:   Diagnosis management comments: Medical decision making.  Patient IV established placed on a monitor and had the above exam and evaluation.  Patient CBC and electrolytes unremarkable TSH is 0.007 patient's BMP CK proBNP magnesium troponin all normal.  Chest x-ray chronic looking changes.  D-dimer was elevated.  CT scan revealed no pulmonary embolism there is some septal thickening mosaic attenuation in the pulmonary parenchyma which is changed from previous CT scan.  Patient repeat exam was resting comfortably sats 98% on room air normal vital signs he is not allergic to aspirin she had initially not taken that because she had von Willebrand's but she was okay taking an aspirin she was given  1 aspirin p.o. 1 inch Nitropaste she was resting comfortably without pain or discomfort lying in the bed.  Her symptoms been ongoing for 4 days or exertional in nature.  Has an abnormal EKG.  Dr. Myers has been paged for Dr. dawson and the patient be placed in the hospital for further care stable otherwise unremarkable improved ER course I see no evidence of pulmonary embolism aortic dissection or acute myocardial infarction currently.  Dr. Myers did call back.       Amount and/or Complexity of Data Reviewed  Clinical lab tests: reviewed  Tests in the radiology section of CPT®: reviewed  Tests in the medicine section of CPT®: reviewed        Final diagnoses:   Chest pain, unspecified type            Chau Lazo MD  05/13/20 4288       Chau Lazo MD  05/13/20 0342

## 2020-05-14 NOTE — PROGRESS NOTES
Discharge Planning Assessment  [facility]     Patient Name: Tracie Gross  MRN: [id number]  Today's Date: 5/14/2020    Admit Date: 5/13/2020    Discharge Needs Assessment     Row Name 05/14/20 1104       Living Environment    Lives With  spouse    Current Living Arrangements  home/apartment/condo    Primary Care Provided by  self    Provides Primary Care For  no one    Family Caregiver if Needed  spouse    Quality of Family Relationships  helpful;supportive    Able to Return to Prior Arrangements  yes       Resource/Environmental Concerns    Resource/Environmental Concerns  none    Transportation Concerns  car, none       Transition Planning    Patient/Family Anticipates Transition to  home with family    Patient/Family Anticipated Services at Transition  none    Transportation Anticipated  family or friend will provide       Discharge Needs Assessment    Readmission Within the Last 30 Days  no previous admission in last 30 days    Concerns to be Addressed  denies needs/concerns at this time    Equipment Currently Used at Home  none    Anticipated Changes Related to Illness  none    Equipment Needed After Discharge  none        Discharge Plan     Row Name 05/14/20 1104       Plan    Plan  D/C Plan: Home with family    Patient/Family in Agreement with Plan  yes    Plan Comments  Due to COVID-19 pandemic,  working off site. Called and spoke to patient in room. Patient IADLs and drives. Pharmacy verified. Denies any difficulty affording meds. PCP EMANI Silva. Denies any d/c needs at this time. Barrier to D/C: COVID-19 pending, stress test pending.           Expected Discharge Date and Time     Expected Discharge Date Expected Discharge Time    May 15, 2020         Demographic Summary     Row Name 05/14/20 1103       General Information    Admission Type  observation    Arrived From  emergency department    Referral Source  admission list    Reason for Consult  discharge planning    Preferred Language  English      Used During This Interaction  no        Functional Status     Row Name 05/14/20 1103       Functional Status    Usual Activity Tolerance  good    Current Activity Tolerance  good       Functional Status, IADL    Medications  independent    Meal Preparation  independent    Housekeeping  independent    Laundry  independent    Shopping  independent       Mental Status Summary    Recent Changes in Mental Status/Cognitive Functioning  no changes        Edelmira Hunter

## 2020-05-15 ENCOUNTER — APPOINTMENT (OUTPATIENT)
Dept: CARDIOLOGY | Facility: HOSPITAL | Age: 60
End: 2020-05-15

## 2020-05-15 ENCOUNTER — TELEPHONE (OUTPATIENT)
Dept: FAMILY MEDICINE CLINIC | Facility: CLINIC | Age: 60
End: 2020-05-15

## 2020-05-15 ENCOUNTER — APPOINTMENT (OUTPATIENT)
Dept: LAB | Facility: HOSPITAL | Age: 60
End: 2020-05-15

## 2020-05-15 ENCOUNTER — READMISSION MANAGEMENT (OUTPATIENT)
Dept: CALL CENTER | Facility: HOSPITAL | Age: 60
End: 2020-05-15

## 2020-05-15 VITALS
SYSTOLIC BLOOD PRESSURE: 120 MMHG | HEART RATE: 74 BPM | WEIGHT: 180 LBS | TEMPERATURE: 97.9 F | OXYGEN SATURATION: 98 % | RESPIRATION RATE: 18 BRPM | DIASTOLIC BLOOD PRESSURE: 74 MMHG | BODY MASS INDEX: 30.73 KG/M2 | HEIGHT: 64 IN

## 2020-05-15 LAB
ANION GAP SERPL CALCULATED.3IONS-SCNC: 16 MMOL/L (ref 5–15)
BASOPHILS # BLD AUTO: 0 10*3/MM3 (ref 0–0.2)
BASOPHILS NFR BLD AUTO: 0.3 % (ref 0–1.5)
BH CV ECHO MEAS - ACS: 2 CM
BH CV ECHO MEAS - AO MAX PG (FULL): 3.1 MMHG
BH CV ECHO MEAS - AO MAX PG: 7.6 MMHG
BH CV ECHO MEAS - AO MEAN PG (FULL): 1.2 MMHG
BH CV ECHO MEAS - AO MEAN PG: 3.6 MMHG
BH CV ECHO MEAS - AO ROOT AREA (BSA CORRECTED): 1.5
BH CV ECHO MEAS - AO ROOT AREA: 6.6 CM^2
BH CV ECHO MEAS - AO ROOT DIAM: 2.9 CM
BH CV ECHO MEAS - AO V2 MAX: 137.9 CM/SEC
BH CV ECHO MEAS - AO V2 MEAN: 87.4 CM/SEC
BH CV ECHO MEAS - AO V2 VTI: 28.6 CM
BH CV ECHO MEAS - AORTIC HR: 66.8 BPM
BH CV ECHO MEAS - AORTIC R-R: 0.9 SEC
BH CV ECHO MEAS - ASC AORTA: 3.7 CM
BH CV ECHO MEAS - AVA(I,A): 3.3 CM^2
BH CV ECHO MEAS - AVA(I,D): 3.3 CM^2
BH CV ECHO MEAS - AVA(V,A): 2.8 CM^2
BH CV ECHO MEAS - AVA(V,D): 2.8 CM^2
BH CV ECHO MEAS - BSA(HAYCOCK): 1.9 M^2
BH CV ECHO MEAS - BSA: 1.9 M^2
BH CV ECHO MEAS - BZI_BMI: 30.9 KILOGRAMS/M^2
BH CV ECHO MEAS - BZI_METRIC_HEIGHT: 162.6 CM
BH CV ECHO MEAS - BZI_METRIC_WEIGHT: 81.6 KG
BH CV ECHO MEAS - CI(AO): 6.7 L/MIN/M^2
BH CV ECHO MEAS - CI(LVOT): 3.4 L/MIN/M^2
BH CV ECHO MEAS - CO(AO): 12.6 L/MIN
BH CV ECHO MEAS - CO(LVOT): 6.4 L/MIN
BH CV ECHO MEAS - EDV(CUBED): 150.5 ML
BH CV ECHO MEAS - EDV(MOD-SP4): 79.1 ML
BH CV ECHO MEAS - EDV(TEICH): 136.5 ML
BH CV ECHO MEAS - EF(CUBED): 71.9 %
BH CV ECHO MEAS - EF(MOD-BP): 65 %
BH CV ECHO MEAS - EF(MOD-SP4): 65.3 %
BH CV ECHO MEAS - EF(TEICH): 63.1 %
BH CV ECHO MEAS - ESV(CUBED): 42.3 ML
BH CV ECHO MEAS - ESV(MOD-SP4): 27.5 ML
BH CV ECHO MEAS - ESV(TEICH): 50.3 ML
BH CV ECHO MEAS - FS: 34.5 %
BH CV ECHO MEAS - IVS/LVPW: 0.75
BH CV ECHO MEAS - IVSD: 0.63 CM
BH CV ECHO MEAS - LA DIMENSION(2D): 4.1 CM
BH CV ECHO MEAS - LV DIASTOLIC VOL/BSA (35-75): 42.3 ML/M^2
BH CV ECHO MEAS - LV MASS(C)D: 135.1 GRAMS
BH CV ECHO MEAS - LV MASS(C)DI: 72.3 GRAMS/M^2
BH CV ECHO MEAS - LV MAX PG: 4.5 MMHG
BH CV ECHO MEAS - LV MEAN PG: 2.5 MMHG
BH CV ECHO MEAS - LV SYSTOLIC VOL/BSA (12-30): 14.7 ML/M^2
BH CV ECHO MEAS - LV V1 MAX: 105.8 CM/SEC
BH CV ECHO MEAS - LV V1 MEAN: 74.7 CM/SEC
BH CV ECHO MEAS - LV V1 VTI: 26.2 CM
BH CV ECHO MEAS - LVIDD: 5.3 CM
BH CV ECHO MEAS - LVIDS: 3.5 CM
BH CV ECHO MEAS - LVOT AREA: 3.6 CM^2
BH CV ECHO MEAS - LVOT DIAM: 2.2 CM
BH CV ECHO MEAS - LVPWD: 0.84 CM
BH CV ECHO MEAS - MR MAX PG: 97.7 MMHG
BH CV ECHO MEAS - MR MAX VEL: 494.3 CM/SEC
BH CV ECHO MEAS - MV A MAX VEL: 82.1 CM/SEC
BH CV ECHO MEAS - MV DEC SLOPE: 565.8 CM/SEC^2
BH CV ECHO MEAS - MV DEC TIME: 0.21 SEC
BH CV ECHO MEAS - MV E MAX VEL: 120.4 CM/SEC
BH CV ECHO MEAS - MV E/A: 1.5
BH CV ECHO MEAS - MV MAX PG: 4.4 MMHG
BH CV ECHO MEAS - MV MEAN PG: 2 MMHG
BH CV ECHO MEAS - MV V2 MAX: 104.6 CM/SEC
BH CV ECHO MEAS - MV V2 MEAN: 66.8 CM/SEC
BH CV ECHO MEAS - MV V2 VTI: 29.8 CM
BH CV ECHO MEAS - MVA(VTI): 3.2 CM^2
BH CV ECHO MEAS - PA ACC TIME: 0.09 SEC
BH CV ECHO MEAS - PA MAX PG (FULL): 2 MMHG
BH CV ECHO MEAS - PA MAX PG: 4.3 MMHG
BH CV ECHO MEAS - PA MEAN PG (FULL): 0.92 MMHG
BH CV ECHO MEAS - PA MEAN PG: 2.5 MMHG
BH CV ECHO MEAS - PA PR(ACCEL): 37.9 MMHG
BH CV ECHO MEAS - PA V2 MAX: 103.4 CM/SEC
BH CV ECHO MEAS - PA V2 MEAN: 74 CM/SEC
BH CV ECHO MEAS - PA V2 VTI: 24.8 CM
BH CV ECHO MEAS - PULM A REVS DUR: 0.13 SEC
BH CV ECHO MEAS - PULM A REVS VEL: 34.6 CM/SEC
BH CV ECHO MEAS - PULM DIAS VEL: 50.4 CM/SEC
BH CV ECHO MEAS - PULM S/D: 1.1
BH CV ECHO MEAS - PULM SYS VEL: 57.7 CM/SEC
BH CV ECHO MEAS - PVA(I,A): 3.3 CM^2
BH CV ECHO MEAS - PVA(I,D): 3.3 CM^2
BH CV ECHO MEAS - PVA(V,A): 3 CM^2
BH CV ECHO MEAS - PVA(V,D): 3 CM^2
BH CV ECHO MEAS - QP/QS: 0.87
BH CV ECHO MEAS - RAP SYSTOLE: 3 MMHG
BH CV ECHO MEAS - RV MAX PG: 2.3 MMHG
BH CV ECHO MEAS - RV MEAN PG: 1.5 MMHG
BH CV ECHO MEAS - RV V1 MAX: 75.8 CM/SEC
BH CV ECHO MEAS - RV V1 MEAN: 60.1 CM/SEC
BH CV ECHO MEAS - RV V1 VTI: 20.1 CM
BH CV ECHO MEAS - RVDD: 2.1 CM
BH CV ECHO MEAS - RVOT AREA: 4.1 CM^2
BH CV ECHO MEAS - RVOT DIAM: 2.3 CM
BH CV ECHO MEAS - RVSP: 18.6 MMHG
BH CV ECHO MEAS - SI(AO): 100.8 ML/M^2
BH CV ECHO MEAS - SI(CUBED): 57.8 ML/M^2
BH CV ECHO MEAS - SI(LVOT): 51 ML/M^2
BH CV ECHO MEAS - SI(MOD-SP4): 27.6 ML/M^2
BH CV ECHO MEAS - SI(TEICH): 46.1 ML/M^2
BH CV ECHO MEAS - SV(AO): 188.5 ML
BH CV ECHO MEAS - SV(CUBED): 108.2 ML
BH CV ECHO MEAS - SV(LVOT): 95.3 ML
BH CV ECHO MEAS - SV(MOD-SP4): 51.7 ML
BH CV ECHO MEAS - SV(RVOT): 82.6 ML
BH CV ECHO MEAS - SV(TEICH): 86.2 ML
BH CV ECHO MEAS - TR MAX VEL: 197.3 CM/SEC
BUN BLD-MCNC: 21 MG/DL (ref 8–23)
BUN/CREAT SERPL: 27.3 (ref 7–25)
CALCIUM SPEC-SCNC: 9.1 MG/DL (ref 8.6–10.5)
CHLORIDE SERPL-SCNC: 101 MMOL/L (ref 98–107)
CO2 SERPL-SCNC: 24 MMOL/L (ref 22–29)
CREAT BLD-MCNC: 0.77 MG/DL (ref 0.57–1)
DEPRECATED RDW RBC AUTO: 41.6 FL (ref 37–54)
EOSINOPHIL # BLD AUTO: 0 10*3/MM3 (ref 0–0.4)
EOSINOPHIL NFR BLD AUTO: 0.3 % (ref 0.3–6.2)
ERYTHROCYTE [DISTWIDTH] IN BLOOD BY AUTOMATED COUNT: 12.9 % (ref 12.3–15.4)
GFR SERPL CREATININE-BSD FRML MDRD: 76 ML/MIN/1.73
GLUCOSE BLD-MCNC: 189 MG/DL (ref 65–99)
HCT VFR BLD AUTO: 39.3 % (ref 34–46.6)
HGB BLD-MCNC: 14.1 G/DL (ref 12–15.9)
LARGE PLATELETS: NORMAL
LYMPHOCYTES # BLD AUTO: 1.7 10*3/MM3 (ref 0.7–3.1)
LYMPHOCYTES NFR BLD AUTO: 15.1 % (ref 19.6–45.3)
MCH RBC QN AUTO: 32.7 PG (ref 26.6–33)
MCHC RBC AUTO-ENTMCNC: 35.8 G/DL (ref 31.5–35.7)
MCV RBC AUTO: 91.4 FL (ref 79–97)
MONOCYTES # BLD AUTO: 0.2 10*3/MM3 (ref 0.1–0.9)
MONOCYTES NFR BLD AUTO: 1.8 % (ref 5–12)
NEUTROPHILS # BLD AUTO: 9.3 10*3/MM3 (ref 1.7–7)
NEUTROPHILS NFR BLD AUTO: 82.5 % (ref 42.7–76)
NRBC BLD AUTO-RTO: 0.1 /100 WBC (ref 0–0.2)
PLATELET # BLD AUTO: 299 10*3/MM3 (ref 140–450)
PMV BLD AUTO: 9.7 FL (ref 6–12)
POTASSIUM BLD-SCNC: 4.5 MMOL/L (ref 3.5–5.2)
RBC # BLD AUTO: 4.3 10*6/MM3 (ref 3.77–5.28)
RBC MORPH BLD: NORMAL
SODIUM BLD-SCNC: 141 MMOL/L (ref 136–145)
WBC MORPH BLD: NORMAL
WBC NRBC COR # BLD: 11.3 10*3/MM3 (ref 3.4–10.8)

## 2020-05-15 PROCEDURE — 85025 COMPLETE CBC W/AUTO DIFF WBC: CPT | Performed by: FAMILY MEDICINE

## 2020-05-15 PROCEDURE — 63710000001 DEXAMETHASONE PER 0.25 MG: Performed by: INTERNAL MEDICINE

## 2020-05-15 PROCEDURE — 85007 BL SMEAR W/DIFF WBC COUNT: CPT | Performed by: FAMILY MEDICINE

## 2020-05-15 PROCEDURE — 93306 TTE W/DOPPLER COMPLETE: CPT

## 2020-05-15 PROCEDURE — 99217 PR OBSERVATION CARE DISCHARGE MANAGEMENT: CPT | Performed by: FAMILY MEDICINE

## 2020-05-15 PROCEDURE — G0378 HOSPITAL OBSERVATION PER HR: HCPCS

## 2020-05-15 PROCEDURE — 93306 TTE W/DOPPLER COMPLETE: CPT | Performed by: INTERNAL MEDICINE

## 2020-05-15 PROCEDURE — 80048 BASIC METABOLIC PNL TOTAL CA: CPT | Performed by: FAMILY MEDICINE

## 2020-05-15 RX ORDER — AZITHROMYCIN 500 MG/1
500 TABLET, FILM COATED ORAL DAILY
Qty: 2 TABLET | Refills: 0 | Status: SHIPPED | OUTPATIENT
Start: 2020-05-15 | End: 2020-05-17

## 2020-05-15 RX ORDER — LEVOTHYROXINE SODIUM 0.1 MG/1
200 TABLET ORAL DAILY
Status: DISCONTINUED | OUTPATIENT
Start: 2020-05-15 | End: 2020-05-15 | Stop reason: HOSPADM

## 2020-05-15 RX ORDER — LEVOTHYROXINE SODIUM 0.1 MG/1
200 TABLET ORAL DAILY
Status: DISCONTINUED | OUTPATIENT
Start: 2020-05-15 | End: 2020-05-15

## 2020-05-15 RX ORDER — LEVOTHYROXINE SODIUM 0.2 MG/1
200 TABLET ORAL DAILY
Qty: 90 TABLET | Refills: 1 | Status: SHIPPED | OUTPATIENT
Start: 2020-05-16 | End: 2021-01-07 | Stop reason: SDUPTHER

## 2020-05-15 RX ORDER — BENZONATATE 200 MG/1
200 CAPSULE ORAL 3 TIMES DAILY PRN
Qty: 30 CAPSULE | Refills: 1 | Status: SHIPPED | OUTPATIENT
Start: 2020-05-15 | End: 2020-07-06 | Stop reason: ALTCHOICE

## 2020-05-15 RX ORDER — DEXAMETHASONE 4 MG/1
TABLET ORAL
Qty: 18 TABLET | Refills: 0 | Status: SHIPPED | OUTPATIENT
Start: 2020-05-15 | End: 2020-07-06

## 2020-05-15 RX ORDER — LIOTHYRONINE SODIUM 25 UG/1
25 TABLET ORAL DAILY
Qty: 60 TABLET | Refills: 5
Start: 2020-05-15 | End: 2020-09-09 | Stop reason: SDUPTHER

## 2020-05-15 RX ADMIN — LISINOPRIL 5 MG: 5 TABLET ORAL at 08:04

## 2020-05-15 RX ADMIN — LEVOTHYROXINE SODIUM 200 MCG: 100 TABLET ORAL at 08:04

## 2020-05-15 RX ADMIN — BISOPROLOL FUMARATE AND HYDROCHLOROTHIAZIDE 1 TABLET: 6.25; 1 TABLET ORAL at 09:24

## 2020-05-15 RX ADMIN — HYDROXYZINE HYDROCHLORIDE 25 MG: 25 TABLET, FILM COATED ORAL at 13:06

## 2020-05-15 RX ADMIN — GABAPENTIN 600 MG: 300 CAPSULE ORAL at 05:39

## 2020-05-15 RX ADMIN — METHYLNALTREXONE BROMIDE 300 MG: 150 TABLET ORAL at 08:04

## 2020-05-15 RX ADMIN — OXYCODONE HYDROCHLORIDE 10 MG: 5 TABLET ORAL at 00:18

## 2020-05-15 RX ADMIN — PANTOPRAZOLE SODIUM 40 MG: 40 TABLET, DELAYED RELEASE ORAL at 05:40

## 2020-05-15 RX ADMIN — FERROUS SULFATE TAB EC 324 MG (65 MG FE EQUIVALENT) 324 MG: 324 (65 FE) TABLET DELAYED RESPONSE at 08:05

## 2020-05-15 RX ADMIN — AZITHROMYCIN MONOHYDRATE 500 MG: 250 TABLET ORAL at 08:04

## 2020-05-15 RX ADMIN — CHOLESTYRAMINE 4 G: 4 POWDER, FOR SUSPENSION ORAL at 08:05

## 2020-05-15 RX ADMIN — DEXAMETHASONE 4 MG: 4 TABLET ORAL at 05:40

## 2020-05-15 RX ADMIN — BENZONATATE 200 MG: 100 CAPSULE ORAL at 00:18

## 2020-05-15 RX ADMIN — NIFEDIPINE 60 MG: 60 TABLET, FILM COATED, EXTENDED RELEASE ORAL at 08:11

## 2020-05-15 RX ADMIN — HYDROXYCHLOROQUINE SULFATE 200 MG: 200 TABLET ORAL at 08:04

## 2020-05-15 RX ADMIN — DEXAMETHASONE 4 MG: 4 TABLET ORAL at 13:06

## 2020-05-15 RX ADMIN — Medication 10 ML: at 08:05

## 2020-05-15 NOTE — PLAN OF CARE
Problem: Patient Care Overview  Goal: Plan of Care Review  Outcome: Ongoing (interventions implemented as appropriate)  Flowsheets (Taken 5/15/2020 0519)  Progress: improving  Plan of Care Reviewed With: patient  Outcome Summary: Pt had trouble sleeping most of the night. Was transferred after covid tests came back negative. Pt will have a 2D echo today.

## 2020-05-15 NOTE — PROGRESS NOTES
"Daily Progress Note        Chest pain    Bipolar affective disorder (CMS/HCC)    Chronic pain    Gastroesophageal reflux disease    Hypothyroidism    SLE (systemic lupus erythematosus related syndrome) (CMS/HCC)    Scleroderma (CMS/HCC)      Assessment:  Dyspnea cough improving improved       CT scan of the chest negative for pulmonary embolism  D-dimer elevated   alveolar lung disease with mild groundglass/mosaic pattern    pulmonary edema     History of interstitial lung disease with moderate restrictive defect  Past medical history significant for restrictive lung disease PFTs on 2/7/2017 showed FEV1 1.88 L which is 74% predicted, no change with bronchodilators, FEV1/FVC ratio 87, TLC 75%, RV 74%, DLCO 75%     Need to rule out pulmonary hypertension since patient has history of scleroderma last 2D echo was in 2018     COVID-19 test is negative on 5/14/2020     Recommendations:   2D echo rule out pulmonary hypertension due to scleroderma   steroids wean p.o. Decadron  Empiric antibiotics for acute bronchitis with azithromycin             LOS: 0 days     Subjective         Objective     Vital signs for last 24 hours:  Vitals:    05/14/20 2233 05/15/20 0300 05/15/20 0757 05/15/20 1057   BP: 122/82 141/81 125/71 125/71   BP Location: Left arm Left arm Left arm    Patient Position: Sitting Lying Lying    Pulse: 70 65 64    Resp: 18 16 16    Temp: 98.5 °F (36.9 °C) 97.8 °F (36.6 °C)     TempSrc: Oral Oral     SpO2: 96% 93% 93%    Weight:  81.7 kg (180 lb 1.9 oz)  81.6 kg (180 lb)   Height:    162.6 cm (64\")       Intake/Output last 3 shifts:  I/O last 3 completed shifts:  In: 540 [P.O.:540]  Out: 500 [Urine:500]  Intake/Output this shift:  I/O this shift:  In: 480 [P.O.:480]  Out: 500 [Urine:500]      Radiology  Imaging Results (Last 24 Hours)     ** No results found for the last 24 hours. **          Labs:  Results from last 7 days   Lab Units 05/15/20  0410   WBC 10*3/mm3 11.30*   HEMOGLOBIN g/dL 14.1   HEMATOCRIT % " 39.3   PLATELETS 10*3/mm3 299     Results from last 7 days   Lab Units 05/15/20  0410 05/14/20  1746   SODIUM mmol/L 141 143   POTASSIUM mmol/L 4.5 4.3   CHLORIDE mmol/L 101 103   CO2 mmol/L 24.0 25.0   BUN mg/dL 21 17   CREATININE mg/dL 0.77 0.88   CALCIUM mg/dL 9.1 9.8   BILIRUBIN mg/dL  --  0.4   ALK PHOS U/L  --  123*   ALT (SGPT) U/L  --  21   AST (SGOT) U/L  --  31   GLUCOSE mg/dL 189* 123*         Results from last 7 days   Lab Units 05/14/20 1746 05/13/20 1911   ALBUMIN g/dL 4.50 4.20     Results from last 7 days   Lab Units 05/14/20 1746 05/14/20 0331 05/14/20 0031 05/13/20 1911   CK TOTAL U/L  --   --   --  65   TROPONIN T ng/mL <0.010 <0.010 <0.010 <0.010         Results from last 7 days   Lab Units 05/13/20 1911   MAGNESIUM mg/dL 2.0         Results from last 7 days   Lab Units 05/14/20 0331 05/14/20 0031 05/13/20 1911   TSH uIU/mL  --   --  0.007*   T3 FREE pg/mL 3.04  --   --    FREE T4 ng/dL  --  0.67*  --            Meds:   SCHEDULE    azithromycin 500 mg Oral Q24H   bisoprolol-hydrochlorothiazide 1 tablet Oral Daily   cholestyramine light 1 packet Oral Daily   dexamethasone 4 mg Oral Q8H   enoxaparin 40 mg Subcutaneous Q24H   ferrous sulfate 324 mg Oral Daily With Breakfast   gabapentin 1,200 mg Oral Nightly   gabapentin 600 mg Oral QAM   hydroxychloroquine 200 mg Oral Q12H   lamoTRIgine 100 mg Oral Nightly   levothyroxine 200 mcg Oral Daily   lisinopril 5 mg Oral Daily   Methylnaltrexone Bromide 2 tablet Oral Daily   NIFEdipine XL 60 mg Oral Daily   pantoprazole 40 mg Oral BID AC   Pharmacy Consult  Does not apply BID   QUEtiapine fumarate  mg Oral Nightly   sodium chloride 10 mL Intravenous Q12H     Infusions     PRNs  •  acetaminophen  •  benzonatate  •  cyclobenzaprine  •  hydrOXYzine  •  [DISCONTINUED] morphine **OR** morphine  •  nitroglycerin  •  ondansetron  •  oxyCODONE  •  [COMPLETED] Insert peripheral IV **AND** sodium chloride  •  sodium chloride    Physical  Exam:  Physical Exam   Cardiovascular:   Murmur heard.  Pulmonary/Chest: No respiratory distress. She has no wheezes. She has no rales. She exhibits no tenderness.   Abdominal: She exhibits no distension. There is no tenderness.   Musculoskeletal: She exhibits no edema.       ROS  Review of Systems   HENT: Positive for congestion. Negative for rhinorrhea and sneezing.    Respiratory: Negative for apnea, cough, choking, chest tightness, shortness of breath, wheezing and stridor.    Cardiovascular: Negative for leg swelling.             Total time spent with patient greater than: 1 Hour

## 2020-05-15 NOTE — DISCHARGE SUMMARY
Date of Discharge:  5/15/2020    Discharge Diagnosis: chest pressure likely secondary to acute bronchitis    Presenting Problem/History of Present Illness  Active Hospital Problems    Diagnosis  POA   • **Chest pain [R07.9]  Yes     Priority: Medium   • Scleroderma (CMS/HCC) [M34.9]  Yes   • SLE (systemic lupus erythematosus related syndrome) (CMS/HCC) [M32.9]  Yes   • Bipolar affective disorder (CMS/[facility]) [F31.9]  Yes   • Chronic pain [G89.29]  Yes   • Gastroesophageal reflux disease [K21.9]  Yes   • Hypothyroidism [E03.9]  Yes      Resolved Hospital Problems   No resolved problems to display.      Please see admission H&P for full details. In brief, patient called her PCP w/ complaints of acute-onset chest pressure, SOB, pain radiating to the L shoulder, and fatigue. Due to concern for cardiac etiology, patient was directed to ED where initial work-up was negative. She was nonetheless admitted for chest pain rule-out.    Hospital Course  Patient is a 59 y.o. female w/ complex PMH including scleroderma, lupus, GERD, and HTN that presented with chest pressure and SOB. Initial evaluation revealed new mosaic pattern of lung tissue on CT chest. There was concern for infectious etiology vs pulmonary HTN. RVP, COVID-19, and procalcitonin all returned negative. Echo was ordered to further assess pulmonary HTN. Nuclear stress was ordered to further investigate cardiac etiology and was found to be negative. Ultimately, cause for patient's symptoms was unclear but had resolved after 2 hospital days w/ little intervention aside from azithromycin and dexamethasone. Etiology felt to be acute bronchitis but echocardiogram interpretation was pending at the time of discharge to further assess pulmonary hypertension.    While inpatient, thyroid labs were performed and did reveal severely depressed TSH and low free T4 w/ normal free T3. The patient's liothyronine was reduced from 25mg BID to 25mg daily and the her levothyroxine was  increased from 150mcg to 200mcg daily. Labs will need to be followed-up in a couple months.    Procedures Performed     Consults:   Consults     Date and Time Order Name Status Description    5/14/2020 0920 Inpatient Pulmonology Consult Completed     5/13/2020 2146 Family Medicine Consult Completed           Pertinent Test Results:   Lab Results (last 48 hours)     Procedure Component Value Units Date/Time    CBC & Differential [[id number]] Collected:  05/15/20 0410    Specimen:  Blood Updated:  05/15/20 0639    Narrative:       The following orders were created for panel order CBC & Differential.  Procedure                               Abnormality         Status                     ---------                               -----------         ------                     CBC Auto Differential[[id number]]        Abnormal            Final result                 Please view results for these tests on the individual orders.    CBC Auto Differential [[id number]]  (Abnormal) Collected:  05/15/20 0410    Specimen:  Blood Updated:  05/15/20 0639     WBC 11.30 10*3/mm3      RBC 4.30 10*6/mm3      Hemoglobin 14.1 g/dL      Hematocrit 39.3 %      MCV 91.4 fL      MCH 32.7 pg      MCHC 35.8 g/dL      RDW 12.9 %      RDW-SD 41.6 fl      MPV 9.7 fL      Platelets 299 10*3/mm3      Neutrophil % 82.5 %      Lymphocyte % 15.1 %      Monocyte % 1.8 %      Eosinophil % 0.3 %      Basophil % 0.3 %      Neutrophils, Absolute 9.30 10*3/mm3      Lymphocytes, Absolute 1.70 10*3/mm3      Monocytes, Absolute 0.20 10*3/mm3      Eosinophils, Absolute 0.00 10*3/mm3      Basophils, Absolute 0.00 10*3/mm3      nRBC 0.1 /100 WBC     Scan Slide [[id number]] Collected:  05/15/20 0410    Specimen:  Blood Updated:  05/15/20 0639     RBC Morphology Normal     WBC Morphology Normal     Large Platelets Slight/1+    Basic Metabolic Panel [[id number]]  (Abnormal) Collected:  05/15/20 0410    Specimen:  Blood Updated:  05/15/20 0516     Glucose 189 mg/dL      BUN  21 mg/dL      Creatinine 0.77 mg/dL      Sodium 141 mmol/L      Potassium 4.5 mmol/L      Chloride 101 mmol/L      CO2 24.0 mmol/L      Calcium 9.1 mg/dL      eGFR Non African Amer 76 mL/min/1.73      BUN/Creatinine Ratio 27.3     Anion Gap 16.0 mmol/L     Narrative:       GFR Normal >60  Chronic Kidney Disease <60  Kidney Failure <15      Sedimentation Rate [[id number]]  (Normal) Collected:  05/14/20 1746    Specimen:  Blood Updated:  05/14/20 1844     Sed Rate 18 mm/hr     Comprehensive Metabolic Panel [[id number]]  (Abnormal) Collected:  05/14/20 1746    Specimen:  Blood Updated:  05/14/20 1836     Glucose 123 mg/dL      BUN 17 mg/dL      Creatinine 0.88 mg/dL      Sodium 143 mmol/L      Potassium 4.3 mmol/L      Chloride 103 mmol/L      CO2 25.0 mmol/L      Calcium 9.8 mg/dL      Total Protein 8.4 g/dL      Albumin 4.50 g/dL      ALT (SGPT) 21 U/L      AST (SGOT) 31 U/L      Alkaline Phosphatase 123 U/L      Total Bilirubin 0.4 mg/dL      eGFR Non African Amer 65 mL/min/1.73      Globulin 3.9 gm/dL      A/G Ratio 1.2 g/dL      BUN/Creatinine Ratio 19.3     Anion Gap 15.0 mmol/L     Narrative:       GFR Normal >60  Chronic Kidney Disease <60  Kidney Failure <15      Troponin [[id number]]  (Normal) Collected:  05/14/20 1746    Specimen:  Blood Updated:  05/14/20 1836     Troponin T <0.010 ng/mL     Narrative:       Troponin T Reference Range:  <= 0.03 ng/mL-   Negative for AMI  >0.03 ng/mL-     Abnormal for myocardial necrosis.  Clinicians would have to utilize clinical acumen, EKG, Troponin and serial changes to determine if it is an Acute Myocardial Infarction or myocardial injury due to an underlying chronic condition.       Results may be falsely decreased if patient taking Biotin.      SARS-CoV-2 PCR ([facility] PERFORMED), NP SWAB IN TRANSPORT MEDIA - Swab, Nasopharynx [[id number]]  (Normal) Collected:  05/14/20 1026    Specimen:  Swab from Nasopharynx Updated:  05/14/20 1814     COVID19 Not Detected     CBC & Differential [[id number]] Collected:  05/14/20 1746    Specimen:  Blood Updated:  05/14/20 1810    Narrative:       The following orders were created for panel order CBC & Differential.  Procedure                               Abnormality         Status                     ---------                               -----------         ------                     CBC Auto Differential[[id number]]        Abnormal            Final result                 Please view results for these tests on the individual orders.    CBC Auto Differential [[id number]]  (Abnormal) Collected:  05/14/20 1746    Specimen:  Blood Updated:  05/14/20 1810     WBC 12.20 10*3/mm3      RBC 4.71 10*6/mm3      Hemoglobin 14.8 g/dL      Hematocrit 43.6 %      MCV 92.6 fL      MCH 31.4 pg      MCHC 33.8 g/dL      RDW 12.9 %      RDW-SD 40.7 fl      MPV 9.3 fL      Platelets 314 10*3/mm3      Neutrophil % 85.9 %      Lymphocyte % 11.9 %      Monocyte % 1.1 %      Eosinophil % 0.4 %      Basophil % 0.7 %      Neutrophils, Absolute 10.50 10*3/mm3      Lymphocytes, Absolute 1.40 10*3/mm3      Monocytes, Absolute 0.10 10*3/mm3      Eosinophils, Absolute 0.00 10*3/mm3      Basophils, Absolute 0.10 10*3/mm3      nRBC 0.1 /100 WBC     T3, Free [[id number]]  (Normal) Collected:  05/14/20 0331    Specimen:  Blood Updated:  05/14/20 1632     T3, Free 3.04 pg/mL     Narrative:       Results may be falsely increased if patient taking Biotin.      Procalcitonin [[id number]]  (Abnormal) Collected:  05/14/20 1347    Specimen:  Blood Updated:  05/14/20 1508     Procalcitonin 0.04 ng/mL     Narrative:       As a Marker for Sepsis (Non-Neonates):   1. <0.5 ng/mL represents a low risk of severe sepsis and/or septic shock.  1. >2 ng/mL represents a high risk of severe sepsis and/or septic shock.    As a Marker for Lower Respiratory Tract Infections that require antibiotic therapy:  PCT on Admission     Antibiotic Therapy             6-12 Hrs later  > 0.5                 "Strongly Recommended            >0.25 - <0.5         Recommended  0.1 - 0.25           Discouraged                   Remeasure/reassess PCT  <0.1                 Strongly Discouraged          Remeasure/reassess PCT      As 28 day mortality risk marker: \"Change in Procalcitonin Result\" (> 80 % or <=80 %) if Day 0 (or Day 1) and Day 4 values are available. Refer to [email or web address]   Change in PCT <=80 %   A decrease of PCT levels below or equal to 80 % defines a positive change in PCT test result representing a higher risk for 28-day all-cause mortality of patients diagnosed with severe sepsis or septic shock.  Change in PCT > 80 %   A decrease of PCT levels of more than 80 % defines a negative change in PCT result representing a lower risk for 28-day all-cause mortality of patients diagnosed with severe sepsis or septic shock.                Results may be falsely decreased if patient taking Biotin.     Respiratory Panel, PCR - Swab, Nasopharynx [[id number]]  (Normal) Collected:  05/14/20 1037    Specimen:  Swab from Nasopharynx Updated:  05/14/20 1151     ADENOVIRUS, PCR Not Detected     Coronavirus 229E Not Detected     Coronavirus HKU1 Not Detected     Coronavirus NL63 Not Detected     Coronavirus OC43 Not Detected     Human Metapneumovirus Not Detected     Human Rhinovirus/Enterovirus Not Detected     Influenza B PCR Not Detected     Parainfluenza Virus 1 Not Detected     Parainfluenza Virus 2 Not Detected     Parainfluenza Virus 3 Not Detected     Parainfluenza Virus 4 Not Detected     Bordetella pertussis pcr Not Detected     Influenza A H1 2009 PCR Not Detected     Chlamydophila pneumoniae PCR Not Detected     Mycoplasma pneumo by PCR Not Detected     Influenza A PCR Not Detected     Influenza A H3 Not Detected     Influenza A H1 Not Detected     RSV, PCR Not Detected    Narrative:       The coronavirus on the RVP is NOT COVID-19 and is NOT indicative of infection with COVID-19.     T4, " "Free [[id number]]  (Abnormal) Collected:  05/14/20 0031    Specimen:  Blood Updated:  05/14/20 1115     Free T4 0.67 ng/dL     Narrative:       Results may be falsely increased if patient taking Biotin.      Procalcitonin [[id number]]  (Abnormal) Collected:  05/14/20 0331    Specimen:  Blood Updated:  05/14/20 0939     Procalcitonin 0.04 ng/mL     Narrative:       As a Marker for Sepsis (Non-Neonates):   1. <0.5 ng/mL represents a low risk of severe sepsis and/or septic shock.  1. >2 ng/mL represents a high risk of severe sepsis and/or septic shock.    As a Marker for Lower Respiratory Tract Infections that require antibiotic therapy:  PCT on Admission     Antibiotic Therapy             6-12 Hrs later  > 0.5                Strongly Recommended            >0.25 - <0.5         Recommended  0.1 - 0.25           Discouraged                   Remeasure/reassess PCT  <0.1                 Strongly Discouraged          Remeasure/reassess PCT      As 28 day mortality risk marker: \"Change in Procalcitonin Result\" (> 80 % or <=80 %) if Day 0 (or Day 1) and Day 4 values are available. Refer to [email or web address]   Change in PCT <=80 %   A decrease of PCT levels below or equal to 80 % defines a positive change in PCT test result representing a higher risk for 28-day all-cause mortality of patients diagnosed with severe sepsis or septic shock.  Change in PCT > 80 %   A decrease of PCT levels of more than 80 % defines a negative change in PCT result representing a lower risk for 28-day all-cause mortality of patients diagnosed with severe sepsis or septic shock.                Results may be falsely decreased if patient taking Biotin.     Troponin [[id number]]  (Normal) Collected:  05/14/20 0331    Specimen:  Blood Updated:  05/14/20 0511     Troponin T <0.010 ng/mL     Narrative:       Troponin T Reference Range:  <= 0.03 ng/mL-   Negative for AMI  >0.03 ng/mL-     Abnormal for myocardial necrosis.  Clinicians " would have to utilize clinical acumen, EKG, Troponin and serial changes to determine if it is an Acute Myocardial Infarction or myocardial injury due to an underlying chronic condition.       Results may be falsely decreased if patient taking Biotin.      Troponin [[id number]]  (Normal) Collected:  05/14/20 0031    Specimen:  Blood Updated:  05/14/20 0059     Troponin T <0.010 ng/mL     Narrative:       Troponin T Reference Range:  <= 0.03 ng/mL-   Negative for AMI  >0.03 ng/mL-     Abnormal for myocardial necrosis.  Clinicians would have to utilize clinical acumen, EKG, Troponin and serial changes to determine if it is an Acute Myocardial Infarction or myocardial injury due to an underlying chronic condition.       Results may be falsely decreased if patient taking Biotin.      Comprehensive Metabolic Panel [[id number]]  (Abnormal) Collected:  05/13/20 1911    Specimen:  Blood Updated:  05/13/20 1955     Glucose 112 mg/dL      BUN 19 mg/dL      Creatinine 0.83 mg/dL      Sodium 144 mmol/L      Potassium 4.4 mmol/L      Chloride 103 mmol/L      CO2 28.0 mmol/L      Calcium 9.5 mg/dL      Total Protein 7.5 g/dL      Albumin 4.20 g/dL      ALT (SGPT) 21 U/L      AST (SGOT) 29 U/L      Comment: Specimen hemolyzed.  Results may be affected.        Alkaline Phosphatase 140 U/L      Total Bilirubin 0.2 mg/dL      eGFR Non African Amer 70 mL/min/1.73      Globulin 3.3 gm/dL      A/G Ratio 1.3 g/dL      BUN/Creatinine Ratio 22.9     Anion Gap 13.0 mmol/L     Narrative:       GFR Normal >60  Chronic Kidney Disease <60  Kidney Failure <15      Troponin [[id number]]  (Normal) Collected:  05/13/20 1911    Specimen:  Blood Updated:  05/13/20 1954     Troponin T <0.010 ng/mL     Narrative:       Troponin T Reference Range:  <= 0.03 ng/mL-   Negative for AMI  >0.03 ng/mL-     Abnormal for myocardial necrosis.  Clinicians would have to utilize clinical acumen, EKG, Troponin and serial changes to determine if it is an Acute Myocardial  Infarction or myocardial injury due to an underlying chronic condition.       Results may be falsely decreased if patient taking Biotin.      BNP [[id number]]  (Normal) Collected:  05/13/20 1911    Specimen:  Blood Updated:  05/13/20 1954     proBNP 213.9 pg/mL     Narrative:       Among patients with dyspnea, NT-proBNP is highly sensitive for the detection of acute congestive heart failure. In addition NT-proBNP of <300 pg/ml effectively rules out acute congestive heart failure with 99% negative predictive value.    Results may be falsely decreased if patient taking Biotin.      TSH [[id number]]  (Abnormal) Collected:  05/13/20 1911    Specimen:  Blood Updated:  05/13/20 1954     TSH 0.007 uIU/mL      Comment: TSH results may be falsely decreased if patient taking Biotin.       CK [[id number]]  (Normal) Collected:  05/13/20 1911    Specimen:  Blood Updated:  05/13/20 1950     Creatine Kinase 65 U/L     Magnesium [[id number]]  (Normal) Collected:  05/13/20 1911    Specimen:  Blood Updated:  05/13/20 1950     Magnesium 2.0 mg/dL     D-dimer, Quantitative [[id number]]  (Abnormal) Collected:  05/13/20 1911    Specimen:  Blood Updated:  05/13/20 1927     D-Dimer, Quantitative 4.29 MCGFEU/mL     Narrative:       Reference Range  --------------------------------------------------------------------     < 0.50   Negative Predictive Value  0.50-0.59   Indeterminate    >= 0.60   Probable VTE             A very low percentage of patients with DVT may yield D-Dimer results   below the cut-off of 0.50 MCGFEU/mL.  This is known to be more   prevalent in patients with distal DVT.             Results of this test should always be interpreted in conjunction with   the patient's medical history, clinical presentation and other   findings.  Clinical diagnosis should not be based on the result of   [place] D-Dimer alone.    CBC & Differential [[id number]] Collected:  05/13/20 1911    Specimen:  Blood Updated:  05/13/20 1917    Narrative:        The following orders were created for panel order CBC & Differential.  Procedure                               Abnormality         Status                     ---------                               -----------         ------                     CBC Auto Differential[[id number]]        Abnormal            Final result                 Please view results for these tests on the individual orders.    CBC Auto Differential [[id number]]  (Abnormal) Collected:  05/13/20 1911    Specimen:  Blood Updated:  05/13/20 1917     WBC 10.80 10*3/mm3      RBC 4.39 10*6/mm3      Hemoglobin 14.1 g/dL      Hematocrit 41.4 %      MCV 94.4 fL      MCH 32.2 pg      MCHC 34.1 g/dL      RDW 13.1 %      RDW-SD 43.3 fl      MPV 9.3 fL      Platelets 316 10*3/mm3      Neutrophil % 50.1 %      Lymphocyte % 30.4 %      Monocyte % 14.8 %      Eosinophil % 4.0 %      Basophil % 0.7 %      Neutrophils, Absolute 5.40 10*3/mm3      Lymphocytes, Absolute 3.30 10*3/mm3      Monocytes, Absolute 1.60 10*3/mm3      Eosinophils, Absolute 0.40 10*3/mm3      Basophils, Absolute 0.10 10*3/mm3      nRBC 0.1 /100 WBC         Imaging Results (Last 7 Days)     Procedure Component Value Units Date/Time    CT Chest Pulmonary Embolism [[id number]] Collected:  05/13/20 2100     Updated:  05/13/20 2112    Narrative:          DATE OF EXAM:  5/13/2020 8:39 PM     PROCEDURE:  CT CHEST PULMONARY EMBOLISM-     INDICATIONS:   Rest pain short of breath elevated d-dimer     COMPARISON:   05/13/2020. Chest CT 05/04/2020 and prior.     TECHNIQUE:  Routine transaxial slices were obtained through chest after  administration of intravenous 72 ml of Isovue 370. Reconstructed coronal  and sagittal images were also obtained. Automated exposure control and  iterative reconstruction methods were used.      FINDINGS:  Opacification of the pulmonary arteries appears diagnostic. No definite  pulmonary artery filling defect is visualized at this time to indicate  an acute pulmonary  embolism.     The heart appears mildly enlarged. There is mild atherosclerosis of the  coronary arteries and aorta. No pericardial effusion. There is motion  artifact in the ascending aorta. No definite acute aortic abnormality is  identified. There are postsurgical changes in the right axilla, as  before. There is evidence of bilateral mastectomies. Left axillary lymph  nodes appear similar to the most recent prior exam. There appear to be  enlarged mediastinal and hilar lymph nodes, similar to the recent prior  chest CT. These were noted to be chronic and present on prior chest  imaging as well. A subcarinal lymph node again measures approximately 12  mm in short axis diameter.     There are postoperative changes of splenectomy and cholecystectomy. No  definite acute abnormality is visualized in the upper abdomen on this  exam.     There appears to have been interval development of mosaic attenuation of  the pulmonary parenchyma the recent prior chest CT. The central airways  appear patent. No pleural effusion or pneumothorax is seen. There are  linear opacities in the lung bases with elevation of the right diaphragm  and mild chronic consolidation in the inferior right lung. No  significant new focal consolidation is seen. There is suspicion for mild  septal thickening.     Degenerative changes are present in the thoracic spine. No definite  acute osseous abnormality is seen.        Impression:       1.No definite findings of acute pulmonary embolism.  2.New mosaic attenuation of the pulmonary parenchyma with suspicion for  mild septal thickening. These findings are nonspecific but may represent  mild edema as there appears to be mild cardiomegaly. A developing  infectious or inflammatory process could also have this appearance.  3.Mediastinal and hilar adenopathy, similar to the recent prior chest  CT. This appears to have been chronic on prior chest imaging.  4.Suspected scarring in the lung bases, as before.      Electronically Signed By-DR. Tee Guillen MD On:5/13/2020 9:10 PM  This report was finalized on [id number] by DR. Tee Guillen MD.    XR Chest 1 View [[id number]] Collected:  05/13/20 1950     Updated:  05/13/20 1954    Narrative:       DATE OF EXAM:  5/13/2020 7:15 PM     PROCEDURE:  XR CHEST 1 VW-     INDICATIONS:  Chest pain     COMPARISON:  08/09/2017     TECHNIQUE:   Single radiographic view of the chest was obtained.     FINDINGS:  There is slight elevation of the right diaphragm with chronic bibasilar  opacities, right greater than left. Surgical clips are seen in the right  axilla. No definite effusion or pneumothorax. No definite new focal or  diffuse infiltrate is identified.  Heart size and mediastinal contours  appear within normal limits.  No acute osseous abnormality is identified  on this limited single view. Multiple surgical clips are seen in the  upper abdomen.       Impression:       1.Suspected chronic opacities in the lung bases, right greater than  left.  2.No definite radiographic findings of acute cardiopulmonary  abnormality.     Electronically Signed By-DR. Tee Guillen MD On:5/13/2020 7:51 PM  This report was finalized on [id number] by DR. Tee Guillen MD.        ECG 12 Lead   Preliminary Result   HEART RATE= 68  bpm   RR Interval= 884  ms   FL Interval= 182  ms   P Horizontal Axis= -7  deg   P Front Axis= 45  deg   QRSD Interval= 101  ms   QT Interval= 413  ms   QRS Axis= -5  deg   T Wave Axis= 21  deg   - NORMAL ECG -   Sinus rhythm   When compared with ECG of 14-May-2020 3:15:41,   No significant change   Electronically Signed By:    Date and Time of Study: 2020-05-14 09:28:02      ECG 12 Lead   Preliminary Result   HEART RATE= 67  bpm   RR Interval= 892  ms   FL Interval= 209  ms   P Horizontal Axis= 5  deg   P Front Axis= 49  deg   QRSD Interval= 101  ms   QT Interval= 411  ms   QRS Axis= 0  deg   T Wave Axis=   deg   - ABNORMAL ECG -   Sinus rhythm   Lateral infarct, age  indeterminate   Electronically Signed By:    Date and Time of Study: 2020-05-14 03:15:41      ECG 12 Lead   Preliminary Result   HEART RATE= 90  bpm   RR Interval= 668  ms   MO Interval= 209  ms   P Horizontal Axis=   deg   P Front Axis= -25  deg   QRSD Interval= 100  ms   QT Interval= 378  ms   QRS Axis= -21  deg   T Wave Axis= -3  deg   - ABNORMAL ECG -   Sinus rhythm   Lateral infarct, old   When compared with ECG of 13-May-2020 18:58:59,   No significant change   Electronically Signed By:    Date and Time of Study: 2020-05-13 19:35:00      ECG 12 Lead   Preliminary Result   HEART RATE= 93  bpm   RR Interval= 648  ms   MO Interval= 204  ms   P Horizontal Axis= 25  deg   P Front Axis= 37  deg   QRSD Interval= 99  ms   QT Interval= 372  ms   QRS Axis= 20  deg   T Wave Axis= 31  deg   - ABNORMAL ECG -   Sinus rhythm   Ventricular premature complex   Lateral infarct, age indeterminate   When compared with ECG of 30-Dec-2019 11:25:35,   Significant rate increase   New or worsened ischemia or infarction   Electronically Signed By:    Date and Time of Study: 2020-05-13 18:58:59          Condition on Discharge: Stable, improved    Vital Signs  Temp:  [97.8 °F (36.6 °C)-98.6 °F (37 °C)] 97.9 °F (36.6 °C)  Heart Rate:  [63-82] 74  Resp:  [16-18] 18  BP: (120-141)/(71-82) 120/74    Physical Exam:  Please see daily note from 5/15/20    Discharge Disposition  Home or Self Care    Discharge Medications     Discharge Medications      New Medications      Instructions Start Date   azithromycin 500 MG tablet  Commonly known as:  ZITHROMAX   500 mg, Oral, Daily      benzonatate 200 MG capsule  Commonly known as:  TESSALON   200 mg, Oral, 3 Times Daily PRN      dexamethasone 4 MG tablet  Commonly known as:  DECADRON   Take 3x/day for 3 days, then 2x/day for 3 days, then 1x/day for 3 days         Changes to Medications      Instructions Start Date   celecoxib 100 MG capsule  Commonly known as:  CeleBREX  What changed:    · how much  to take  · when to take this   200 mg, Oral, Daily      levothyroxine 200 MCG tablet  Commonly known as:  SYNTHROID, LEVOTHROID  What changed:    · medication strength  · how much to take   200 mcg, Oral, Daily   Start Date:  May 16, 2020     liothyronine 25 MCG tablet  Commonly known as:  CYTOMEL  What changed:  when to take this   25 mcg, Oral, Daily         Continue These Medications      Instructions Start Date   alendronate 70 MG tablet  Commonly known as:  FOSAMAX   70 mg, Oral, Every 7 Days, Tuesday       bisoprolol-hydrochlorothiazide 10-6.25 MG per tablet  Commonly known as:  ZIAC   1 tablet, Oral, Daily      Calcium-Vitamin D 600-200 MG-UNIT per tablet   TAKE ONE TABLET BY MOUTH TWICE A DAY      clindamycin 1 % lotion  Commonly known as:  CLEOCIN T   Topical, Nightly, Use on face      colestipol 1 g tablet  Commonly known as:  COLESTID   2 g, Oral, 2 Times Daily      cyclobenzaprine 10 MG tablet  Commonly known as:  FLEXERIL   10 mg, Oral, 3 Times Daily PRN, PRN      Dexilant 60 MG capsule  Generic drug:  dexlansoprazole   60 mg, Oral, Daily      famotidine 20 MG tablet  Commonly known as:  PEPCID   20 mg, Oral, 2 Times Daily PRN      ferrous sulfate 325 (65 FE) MG tablet   TAKE ONE TABLET BY MOUTH DAILY      Flaxseed Oil Max Str 1300 MG capsule   1 tablet, Oral, 2 Times Daily      gabapentin 300 MG capsule  Commonly known as:  NEURONTIN   600 mg, Oral, Every Morning      gabapentin 600 MG tablet  Commonly known as:  NEURONTIN   1,200 mg, Oral, Nightly      hydroxychloroquine 200 MG tablet  Commonly known as:  PLAQUENIL   TAKE ONE TABLET BY MOUTH TWICE A DAY      hydrOXYzine 25 MG tablet  Commonly known as:  ATARAX   25 mg, Oral, Every 8 Hours PRN      lamoTRIgine 150 MG tablet  Commonly known as:  LaMICtal   150 mg, Oral, Nightly      lisinopril 5 MG tablet  Commonly known as:  PRINIVIL,ZESTRIL   TAKE ONE TABLET BY MOUTH DAILY      NIFEdipine XL 60 MG 24 hr tablet  Commonly known as:  PROCARDIA XL   TAKE  ONE TABLET BY MOUTH DAILY      ondansetron 4 MG tablet  Commonly known as:  ZOFRAN   4 mg, Oral, Every 8 Hours PRN      oxyCODONE 10 MG tablet  Commonly known as:  ROXICODONE   10 mg, Oral, Every 6 Hours PRN      QUEtiapine fumarate ER 50 MG tablet sustained-release 24 hour tablet  Commonly known as:  SEROquel XR   100 mg, Oral, Nightly      Relistor 150 MG tablet  Generic drug:  Methylnaltrexone Bromide   2 tablets, Oral, Daily      VITAMIN B-12 IJ   Injection, Every 30 Days             Discharge Diet: Regular diet    Activity at Discharge: As tolerated    Follow-up Appointments  Future Appointments   Date Time Provider Department Center   5/20/2020  1:00 PM [facility] MRI 1 [facility] MRI [facility]   5/26/2020  2:00 PM RN REVIEW ROOM  [facility] CC NA [facility]   7/6/2020  3:30 PM Floyd Silva MD [facility] None   8/10/2020  1:55 PM LAB MD [facility] ONC LAB NA [facility]   8/10/2020  2:00 PM Suzan Jones MD [facility] ONC NA [facility]   9/15/2020  1:40 PM Yanna Esposito MD [facility] NA None     Test Results Pending at Discharge    Floyd Silva MD  05/15/20  14:41    Time Spent: 45 minutes

## 2020-05-15 NOTE — SIGNIFICANT NOTE
05/15/20 1455   Discharge of Care   Discharge Mode wheel chair   Discharge Destination home   Discharged Accompanied by spouse   Discharge Contact Information if Applicable [phone]   Discharge Teaching Done  Yes   Learning Method Explanation;Teach Back     Pt called spouse Brian to provide transportation home for pt.

## 2020-05-15 NOTE — DISCHARGE INSTRUCTIONS
You were admitted due to chest pressure and SOB. There was concern that this could be due to your heart. However, all of your heart tests returned normal. A CT scan of your lungs was concerning for some abnormal lung tissue. This could be due to a viral infection that was not picked up our tests or elevated blood pressure in your lungs. It is important that you pick-up new prescriptions from your pharmacy and take them as prescribed. This will include 3 medicines for your breathing/cough. Depending on the results of your echocardiogram, you may need to follow-up sooner with your PCP than the previously planned 2 months.    Changes were made to your thyroid medications due to abnormal thyroid levels. Your liothyronine has been decreased to 25mg daily and your levothyroxine has been increased to 200mcg daily.

## 2020-05-15 NOTE — TELEPHONE ENCOUNTER
ERMA FROM [facility] STATES THAT azithromycin (ZITHROMAX) 500 MG tablet INTERACTS WITH PLAQUENIL THAT PATIENT IS ALREADY TAKEN.      PLEASE ADVISE

## 2020-05-15 NOTE — PROGRESS NOTES
LOS: 0 days   Patient Care Team:  Floyd Silva MD as PCP - General (Internal Medicine)    Chief Complaint: chest pressure and SOB    Subjective:  Tracie Gross is a 59 y.o. female w/ complex history of scleroderma, lupus, GERD, and HTN who presents with chest pressure.     No acute events reported overnight. Patient states that SOB and chest pressure have resolved. She has no new complaints this AM and is ready to get home.    Review of Systems   Constitutional: Negative for chills, fever and unexpected weight loss.   HENT: Negative for congestion, rhinorrhea and sore throat.    Eyes: Negative for visual disturbance.   Respiratory: Positive for cough. Negative for chest tightness and shortness of breath.    Cardiovascular: Negative for chest pain and palpitations.   Gastrointestinal: Negative for abdominal pain, constipation, diarrhea, nausea and vomiting.   Endocrine: Positive for cold intolerance.   Genitourinary: Negative for difficulty urinating and dysuria.   Musculoskeletal: Negative for arthralgias and joint swelling.   Skin: Negative for rash and skin lesions.   Neurological: Negative for weakness and headache.   Psychiatric/Behavioral: Negative for depressed mood. The patient is not nervous/anxious.        Vital Signs  Temp:  [97.8 °F (36.6 °C)-98.6 °F (37 °C)] 97.8 °F (36.6 °C)  Heart Rate:  [63-82] 65  Resp:  [16-18] 16  BP: (122-141)/(77-84) 141/81     Physical Exam:  General: NAD  Head: AT/NC  Eyes: PERRL, EOMI, anicteric sclera  ENT: MMM w/o erythema.   Neck: Supple, no thyromegaly or LAD  Lungs: CTAB, SCR, BS equal  Heart: RRR w/o m/r/g, 2+ pulses, no edema  Abd: Soft, NT/ND, Bowel sounds positive  Ext: Digits exhibit thickening/sclerosis c/w hx of scleroderma  Skin: Warm, dry, intact  Neuro: A&O. CN grossly intact, no focal deficits  Psych: Appropriate mood and affect    Results Review   Lab Results (last 24 hours)     Procedure Component Value Units Date/Time    CBC & Differential  [[id number]] Collected:  05/15/20 0410    Specimen:  Blood Updated:  05/15/20 0639    Narrative:       The following orders were created for panel order CBC & Differential.  Procedure                               Abnormality         Status                     ---------                               -----------         ------                     CBC Auto Differential[[id number]]        Abnormal            Final result                 Please view results for these tests on the individual orders.    CBC Auto Differential [[id number]]  (Abnormal) Collected:  05/15/20 0410    Specimen:  Blood Updated:  05/15/20 0639     WBC 11.30 10*3/mm3      RBC 4.30 10*6/mm3      Hemoglobin 14.1 g/dL      Hematocrit 39.3 %      MCV 91.4 fL      MCH 32.7 pg      MCHC 35.8 g/dL      RDW 12.9 %      RDW-SD 41.6 fl      MPV 9.7 fL      Platelets 299 10*3/mm3      Neutrophil % 82.5 %      Lymphocyte % 15.1 %      Monocyte % 1.8 %      Eosinophil % 0.3 %      Basophil % 0.3 %      Neutrophils, Absolute 9.30 10*3/mm3      Lymphocytes, Absolute 1.70 10*3/mm3      Monocytes, Absolute 0.20 10*3/mm3      Eosinophils, Absolute 0.00 10*3/mm3      Basophils, Absolute 0.00 10*3/mm3      nRBC 0.1 /100 WBC     Scan Slide [[id number]] Collected:  05/15/20 0410    Specimen:  Blood Updated:  05/15/20 0639     RBC Morphology Normal     WBC Morphology Normal     Large Platelets Slight/1+    Basic Metabolic Panel [[id number]]  (Abnormal) Collected:  05/15/20 0410    Specimen:  Blood Updated:  05/15/20 0516     Glucose 189 mg/dL      BUN 21 mg/dL      Creatinine 0.77 mg/dL      Sodium 141 mmol/L      Potassium 4.5 mmol/L      Chloride 101 mmol/L      CO2 24.0 mmol/L      Calcium 9.1 mg/dL      eGFR Non African Amer 76 mL/min/1.73      BUN/Creatinine Ratio 27.3     Anion Gap 16.0 mmol/L     Narrative:       GFR Normal >60  Chronic Kidney Disease <60  Kidney Failure <15      Sedimentation Rate [[id number]]  (Normal) Collected:  05/14/20 1746    Specimen:  Blood  Updated:  05/14/20 1844     Sed Rate 18 mm/hr     Comprehensive Metabolic Panel [[id number]]  (Abnormal) Collected:  05/14/20 1746    Specimen:  Blood Updated:  05/14/20 1836     Glucose 123 mg/dL      BUN 17 mg/dL      Creatinine 0.88 mg/dL      Sodium 143 mmol/L      Potassium 4.3 mmol/L      Chloride 103 mmol/L      CO2 25.0 mmol/L      Calcium 9.8 mg/dL      Total Protein 8.4 g/dL      Albumin 4.50 g/dL      ALT (SGPT) 21 U/L      AST (SGOT) 31 U/L      Alkaline Phosphatase 123 U/L      Total Bilirubin 0.4 mg/dL      eGFR Non African Amer 65 mL/min/1.73      Globulin 3.9 gm/dL      A/G Ratio 1.2 g/dL      BUN/Creatinine Ratio 19.3     Anion Gap 15.0 mmol/L     Narrative:       GFR Normal >60  Chronic Kidney Disease <60  Kidney Failure <15      Troponin [[id number]]  (Normal) Collected:  05/14/20 1746    Specimen:  Blood Updated:  05/14/20 1836     Troponin T <0.010 ng/mL     Narrative:       Troponin T Reference Range:  <= 0.03 ng/mL-   Negative for AMI  >0.03 ng/mL-     Abnormal for myocardial necrosis.  Clinicians would have to utilize clinical acumen, EKG, Troponin and serial changes to determine if it is an Acute Myocardial Infarction or myocardial injury due to an underlying chronic condition.       Results may be falsely decreased if patient taking Biotin.      SARS-CoV-2 PCR ([facility] IN-HOUSE PERFORMED), NP SWAB IN TRANSPORT MEDIA - Swab, Nasopharynx [[id number]]  (Normal) Collected:  05/14/20 1026    Specimen:  Swab from Nasopharynx Updated:  05/14/20 1814     COVID19 Not Detected    CBC & Differential [[id number]] Collected:  05/14/20 1746    Specimen:  Blood Updated:  05/14/20 1810    Narrative:       The following orders were created for panel order CBC & Differential.  Procedure                               Abnormality         Status                     ---------                               -----------         ------                     CBC Auto Differential[[id number]]        Abnormal             "Final result                 Please view results for these tests on the individual orders.    CBC Auto Differential [[id number]]  (Abnormal) Collected:  05/14/20 1746    Specimen:  Blood Updated:  05/14/20 1810     WBC 12.20 10*3/mm3      RBC 4.71 10*6/mm3      Hemoglobin 14.8 g/dL      Hematocrit 43.6 %      MCV 92.6 fL      MCH 31.4 pg      MCHC 33.8 g/dL      RDW 12.9 %      RDW-SD 40.7 fl      MPV 9.3 fL      Platelets 314 10*3/mm3      Neutrophil % 85.9 %      Lymphocyte % 11.9 %      Monocyte % 1.1 %      Eosinophil % 0.4 %      Basophil % 0.7 %      Neutrophils, Absolute 10.50 10*3/mm3      Lymphocytes, Absolute 1.40 10*3/mm3      Monocytes, Absolute 0.10 10*3/mm3      Eosinophils, Absolute 0.00 10*3/mm3      Basophils, Absolute 0.10 10*3/mm3      nRBC 0.1 /100 WBC     T3, Free [[id number]]  (Normal) Collected:  05/14/20 0331    Specimen:  Blood Updated:  05/14/20 1632     T3, Free 3.04 pg/mL     Narrative:       Results may be falsely increased if patient taking Biotin.      Procalcitonin [[id number]]  (Abnormal) Collected:  05/14/20 1347    Specimen:  Blood Updated:  05/14/20 1508     Procalcitonin 0.04 ng/mL     Narrative:       As a Marker for Sepsis (Non-Neonates):   1. <0.5 ng/mL represents a low risk of severe sepsis and/or septic shock.  1. >2 ng/mL represents a high risk of severe sepsis and/or septic shock.    As a Marker for Lower Respiratory Tract Infections that require antibiotic therapy:  PCT on Admission     Antibiotic Therapy             6-12 Hrs later  > 0.5                Strongly Recommended            >0.25 - <0.5         Recommended  0.1 - 0.25           Discouraged                   Remeasure/reassess PCT  <0.1                 Strongly Discouraged          Remeasure/reassess PCT      As 28 day mortality risk marker: \"Change in Procalcitonin Result\" (> 80 % or <=80 %) if Day 0 (or Day 1) and Day 4 values are available. Refer to http://www.[facility]-pct-calculator.com/   Change in PCT <=80 % "   A decrease of PCT levels below or equal to 80 % defines a positive change in PCT test result representing a higher risk for 28-day all-cause mortality of patients diagnosed with severe sepsis or septic shock.  Change in PCT > 80 %   A decrease of PCT levels of more than 80 % defines a negative change in PCT result representing a lower risk for 28-day all-cause mortality of patients diagnosed with severe sepsis or septic shock.                Results may be falsely decreased if patient taking Biotin.     Respiratory Panel, PCR - Swab, Nasopharynx [[id number]]  (Normal) Collected:  05/14/20 1037    Specimen:  Swab from Nasopharynx Updated:  05/14/20 1151     ADENOVIRUS, PCR Not Detected     Coronavirus 229E Not Detected     Coronavirus HKU1 Not Detected     Coronavirus NL63 Not Detected     Coronavirus OC43 Not Detected     Human Metapneumovirus Not Detected     Human Rhinovirus/Enterovirus Not Detected     Influenza B PCR Not Detected     Parainfluenza Virus 1 Not Detected     Parainfluenza Virus 2 Not Detected     Parainfluenza Virus 3 Not Detected     Parainfluenza Virus 4 Not Detected     Bordetella pertussis pcr Not Detected     Influenza A H1 2009 PCR Not Detected     Chlamydophila pneumoniae PCR Not Detected     Mycoplasma pneumo by PCR Not Detected     Influenza A PCR Not Detected     Influenza A H3 Not Detected     Influenza A H1 Not Detected     RSV, PCR Not Detected    Narrative:       The coronavirus on the RVP is NOT COVID-19 and is NOT indicative of infection with COVID-19.     T4, Free [[id number]]  (Abnormal) Collected:  05/14/20 0031    Specimen:  Blood Updated:  05/14/20 1115     Free T4 0.67 ng/dL     Narrative:       Results may be falsely increased if patient taking Biotin.      Procalcitonin [[id number]]  (Abnormal) Collected:  05/14/20 0331    Specimen:  Blood Updated:  05/14/20 0939     Procalcitonin 0.04 ng/mL     Narrative:       As a Marker for Sepsis (Non-Neonates):   1. <0.5 ng/mL  "represents a low risk of severe sepsis and/or septic shock.  1. >2 ng/mL represents a high risk of severe sepsis and/or septic shock.    As a Marker for Lower Respiratory Tract Infections that require antibiotic therapy:  PCT on Admission     Antibiotic Therapy             6-12 Hrs later  > 0.5                Strongly Recommended            >0.25 - <0.5         Recommended  0.1 - 0.25           Discouraged                   Remeasure/reassess PCT  <0.1                 Strongly Discouraged          Remeasure/reassess PCT      As 28 day mortality risk marker: \"Change in Procalcitonin Result\" (> 80 % or <=80 %) if Day 0 (or Day 1) and Day 4 values are available. Refer to http://www.[facility] Centerpct-calculator.com/   Change in PCT <=80 %   A decrease of PCT levels below or equal to 80 % defines a positive change in PCT test result representing a higher risk for 28-day all-cause mortality of patients diagnosed with severe sepsis or septic shock.  Change in PCT > 80 %   A decrease of PCT levels of more than 80 % defines a negative change in PCT result representing a lower risk for 28-day all-cause mortality of patients diagnosed with severe sepsis or septic shock.                Results may be falsely decreased if patient taking Biotin.         Imaging Results (Last 24 Hours)     ** No results found for the last 24 hours. **          Assessment/Plan:  Tracie Gross is a 59 y.o. female w/ complex history of scleroderma, lupus, GERD, and HTN who presents with chest pressure.     Principal Problem:    Chest pain: unclear etiology. History is highly concerning for cardiac etiology given complaint of acute chest pressure w/ pain radiating to the L shoulder, accompanied by SOB and fatigue but nuclear stress reassuring. Procalcitonin, RVP and COVID testing negative. Pulm concerned about possible pulmonary HTN given hx scleroderma. She has responded well to low dose dexamethasone. Stable on RA              - Pulmonology consulted, " appreciate recs               -- Dexamethasone 4mg TID              - Azithromycin and benzonatate     Bipolar affective disorder (CMS/HCC)              - Cont home lamotrigine and quetiapine 100mg daily       Chronic pain              - Cont home oxycodone 10mg Q6 PRN and gabapentin 600/1200mg       Gastroesophageal reflux disease              - Home Dexilant substituted for pantoprazole 40 BID       Hypothyroidism: TSH severely depressed on admission. Free T3 normal, free T4 quite low. Pattern unusual. Will try reducing liothyronine and increasing levothyroxine. Consider absorption issue w/ home colestipol. Consider endo referral as outpatient in future              - Decrease home liothyronine 25mg to daily              - Increase home levothyroxine to 200mcg for now       SLE (systemic lupus erythematosus related syndrome) (CMS/HCC)              -Cont home hydroxychloroquine 200mg BID       Scleroderma (CMS/HCC)              -Cont home nifedipine 60mg daily       HTN              - Cont home bisoprolol/HCTZ 10/6.25mg daily, lisinopril 5mg daily, and nifedipine as above     FENGI  - Regular diet  - Cont home relistor     PPX: Lovenox  Full Code     Dispo: Plan to discharge home today following echocardiogram and input form pulmonology- Dr George Silva MD  05/15/20  07:27

## 2020-05-15 NOTE — OUTREACH NOTE
Prep Survey      Responses   [facility] patient discharged from?  [facility]   Is LACE score < 7 ?  No   Eligibility  [facility]   [facility]   Date of Admission  05/13/20   Date of Discharge  05/15/20   Discharge Disposition  Home or Self Care   Discharge diagnosis  Chest pain sec to bronchitis   COVID-19 Test Status  Negative   Does the patient have one of the following disease processes/diagnoses(primary or secondary)?  Other   Does the patient have Home health ordered?  No   Is there a DME ordered?  No   Prep survey completed?  Yes          Jackie Winkler RN

## 2020-05-16 ENCOUNTER — READMISSION MANAGEMENT (OUTPATIENT)
Dept: CALL CENTER | Facility: HOSPITAL | Age: 60
End: 2020-05-16

## 2020-05-16 NOTE — OUTREACH NOTE
Medical Week 1 Survey      Responses   [facility] patient discharged from?  [facility]   COVID-19 Test Status  Negative   Does the patient have one of the following disease processes/diagnoses(primary or secondary)?  Other   Is there a successful TCM telephone encounter documented?  No   Week 1 attempt successful?  Yes   Call start time  0952   Call end time  0956   Discharge diagnosis  Chest pain sec to bronchitis   Meds reviewed with patient/caregiver?  Yes   Is the patient having any side effects they believe may be caused by any medication additions or changes?  No   Does the patient have all medications ordered at discharge?  Yes   Is the patient taking all medications as directed (includes completed medication regime)?  Yes   Does the patient have a primary care provider?   Yes   Does the patient have an appointment with their PCP within 7 days of discharge?  No   What is preventing the patient from scheduling follow up appointments within 7 days of discharge?  Haven't had time   Nursing Interventions  Educated patient on importance of making appointment   Has the patient kept scheduled appointments due by today?  N/A   Has home health visited the patient within 72 hours of discharge?  N/A   Pulse Ox monitoring  None   Psychosocial issues?  No   Did the patient receive a copy of their discharge instructions?  Yes   Nursing interventions  Reviewed instructions with patient   What is the patient's perception of their health status since discharge?  Improving   Is the patient/caregiver able to teach back signs and symptoms related to disease process for when to call PCP?  Yes   Is the patient/caregiver able to teach back signs and symptoms related to disease process for when to call 911?  Yes   Is the patient/caregiver able to teach back the hierarchy of who to call/visit for symptoms/problems? PCP, Specialist, Home health nurse, Urgent Care, ED, 911  Yes   Week 1 call completed?  Yes          Hussein Morillo,  RN

## 2020-05-17 ENCOUNTER — READMISSION MANAGEMENT (OUTPATIENT)
Dept: CALL CENTER | Facility: HOSPITAL | Age: 60
End: 2020-05-17

## 2020-05-17 NOTE — PROGRESS NOTES
Continued Stay Note  JESI Murphy     Patient Name: Tracie Gross  MRN: [id number]  Today's Date: 5/17/2020    Admit Date: 5/13/2020    Discharge Plan     Row Name 05/17/20 1801       Plan    Final Discharge Disposition Code  01 - home or self-care            Expected Discharge Date and Time     Expected Discharge Date Expected Discharge Time    May 15, 2020             Callie Ferrell RN

## 2020-05-17 NOTE — OUTREACH NOTE
Medical Week 1 Survey      Responses   [facility] patient discharged from?  [facility]   COVID-19 Test Status  Negative   Does the patient have one of the following disease processes/diagnoses(primary or secondary)?  Other   Is there a successful TCM telephone encounter documented?  No   Week 1 attempt successful?  No          Stefany Hutchinson LPN

## 2020-05-18 ENCOUNTER — TRANSITIONAL CARE MANAGEMENT TELEPHONE ENCOUNTER (OUTPATIENT)
Dept: CALL CENTER | Facility: HOSPITAL | Age: 60
End: 2020-05-18

## 2020-05-18 RX ORDER — FERROUS SULFATE 325(65) MG
TABLET ORAL
Qty: 30 TABLET | Refills: 1 | Status: SHIPPED | OUTPATIENT
Start: 2020-05-18 | End: 2020-08-03

## 2020-05-18 NOTE — OUTREACH NOTE
Call Center TCM Note      Responses   [facility] patient discharged from?  [facility]   COVID-19 Test Status  Negative   Does the patient have one of the following disease processes/diagnoses(primary or secondary)?  Other   TCM attempt successful?  Yes   Call start time  0932   Call end time  0934   Discharge diagnosis  Chest pain sec to bronchitis   Meds reviewed with patient/caregiver?  Yes   Is the patient having any side effects they believe may be caused by any medication additions or changes?  No   Does the patient have all medications ordered at discharge?  Yes   Is the patient taking all medications as directed (includes completed medication regime)?  Yes   Does the patient have a primary care provider?   Yes   Does the patient have an appointment with their PCP within 7 days of discharge?  No   Comments  she reports talking to PCP office and July appt is good   Has home health visited the patient within 72 hours of discharge?  N/A   Did the patient receive a copy of their discharge instructions?  Yes   Nursing interventions  Reviewed instructions with patient   What is the patient's perception of their health status since discharge?  Improving   Is the patient/caregiver able to teach back signs and symptoms related to disease process for when to call PCP?  Yes   Is the patient/caregiver able to teach back signs and symptoms related to disease process for when to call 911?  Yes   Is the patient/caregiver able to teach back the hierarchy of who to call/visit for symptoms/problems? PCP, Specialist, Home health nurse, Urgent Care, ED, 911  Yes   Wrap up additional comments  she is feeling better, no questions/concerns          Diamond Merlos RN    5/18/2020, 09:35

## 2020-05-19 PROCEDURE — 93010 ELECTROCARDIOGRAM REPORT: CPT | Performed by: INTERNAL MEDICINE

## 2020-05-20 ENCOUNTER — HOSPITAL ENCOUNTER (OUTPATIENT)
Dept: MRI IMAGING | Facility: HOSPITAL | Age: 60
Discharge: HOME | End: 2020-05-20
Admitting: INTERNAL MEDICINE

## 2020-05-20 DIAGNOSIS — Z85.3 HISTORY OF BREAST CANCER: ICD-10-CM

## 2020-05-20 DIAGNOSIS — Z15.09 LYNCH SYNDROME: ICD-10-CM

## 2020-05-20 LAB — CREAT BLDA-MCNC: 1.1 MG/DL (ref 0.6–1.3)

## 2020-05-20 PROCEDURE — 82565 ASSAY OF CREATININE: CPT

## 2020-05-20 PROCEDURE — 25010000002 GADOTERIDOL PER 1 ML: Performed by: INTERNAL MEDICINE

## 2020-05-20 PROCEDURE — C8937 CAD BREAST MRI: HCPCS

## 2020-05-20 PROCEDURE — A9579 GAD-BASE MR CONTRAST NOS,1ML: HCPCS | Performed by: INTERNAL MEDICINE

## 2020-05-20 PROCEDURE — C8908 MRI W/O FOL W/CONT, BREAST,: HCPCS

## 2020-05-20 RX ADMIN — GADOTERIDOL 20 ML: 279.3 INJECTION, SOLUTION INTRAVENOUS at 13:45

## 2020-05-22 ENCOUNTER — TELEPHONE (OUTPATIENT)
Dept: ONCOLOGY | Facility: CLINIC | Age: 60
End: 2020-05-22

## 2020-05-22 RX ORDER — CYANOCOBALAMIN 1000 UG/ML
1000 INJECTION, SOLUTION INTRAMUSCULAR; SUBCUTANEOUS
Status: CANCELLED | OUTPATIENT
Start: 2020-05-26

## 2020-05-22 NOTE — TELEPHONE ENCOUNTER
Suzan Jones MD     Pt called wanting to get labs done when she comes in on 05/26.    Can you add the pt a lab appt?    Thanks

## 2020-05-26 ENCOUNTER — HOSPITAL ENCOUNTER (OUTPATIENT)
Dept: ONCOLOGY | Facility: HOSPITAL | Age: 60
Discharge: HOME | End: 2020-05-26
Admitting: NURSE PRACTITIONER

## 2020-05-26 ENCOUNTER — LAB (OUTPATIENT)
Dept: LAB | Facility: HOSPITAL | Age: 60
End: 2020-05-26

## 2020-05-26 DIAGNOSIS — E53.8 VITAMIN B12 DEFICIENCY: Primary | ICD-10-CM

## 2020-05-26 DIAGNOSIS — E53.8 VITAMIN B12 DEFICIENCY: ICD-10-CM

## 2020-05-26 DIAGNOSIS — Z15.09 LYNCH SYNDROME: ICD-10-CM

## 2020-05-26 DIAGNOSIS — D64.9 ANEMIA, UNSPECIFIED TYPE: ICD-10-CM

## 2020-05-26 LAB
BASOPHILS # BLD AUTO: 0.02 10*3/MM3 (ref 0–0.2)
BASOPHILS NFR BLD AUTO: 0.1 % (ref 0–1.5)
DEPRECATED RDW RBC AUTO: 47.8 FL (ref 37–54)
EOSINOPHIL # BLD AUTO: 0.57 10*3/MM3 (ref 0–0.4)
EOSINOPHIL NFR BLD AUTO: 2.7 % (ref 0.3–6.2)
ERYTHROCYTE [DISTWIDTH] IN BLOOD BY AUTOMATED COUNT: 14.7 % (ref 12.3–15.4)
FERRITIN SERPL-MCNC: 269.6 NG/ML (ref 13–150)
HCT VFR BLD AUTO: 38.3 % (ref 34–46.6)
HGB BLD-MCNC: 12.6 G/DL (ref 12–15.9)
IRON 24H UR-MRATE: 45 MCG/DL (ref 37–145)
IRON SATN MFR SERPL: 15 % (ref 20–50)
LYMPHOCYTES # BLD AUTO: 5.53 10*3/MM3 (ref 0.7–3.1)
LYMPHOCYTES NFR BLD AUTO: 26.1 % (ref 19.6–45.3)
MCH RBC QN AUTO: 31.1 PG (ref 26.6–33)
MCHC RBC AUTO-ENTMCNC: 32.9 G/DL (ref 31.5–35.7)
MCV RBC AUTO: 94.6 FL (ref 79–97)
MONOCYTES # BLD AUTO: 2.88 10*3/MM3 (ref 0.1–0.9)
MONOCYTES NFR BLD AUTO: 13.6 % (ref 5–12)
NEUTROPHILS # BLD AUTO: 12.2 10*3/MM3 (ref 1.7–7)
NEUTROPHILS NFR BLD AUTO: 57.5 % (ref 42.7–76)
PLATELET # BLD AUTO: 242 10*3/MM3 (ref 140–450)
PMV BLD AUTO: 10.3 FL (ref 6–12)
RBC # BLD AUTO: 4.05 10*6/MM3 (ref 3.77–5.28)
TIBC SERPL-MCNC: 310 MCG/DL (ref 298–536)
TRANSFERRIN SERPL-MCNC: 208 MG/DL (ref 200–360)
VIT B12 BLD-MCNC: >2000 PG/ML (ref 211–946)
WBC NRBC COR # BLD: 21.2 10*3/MM3 (ref 3.4–10.8)

## 2020-05-26 PROCEDURE — 82728 ASSAY OF FERRITIN: CPT | Performed by: INTERNAL MEDICINE

## 2020-05-26 PROCEDURE — 84466 ASSAY OF TRANSFERRIN: CPT | Performed by: INTERNAL MEDICINE

## 2020-05-26 PROCEDURE — 25010000002 CYANOCOBALAMIN PER 1000 MCG: Performed by: NURSE PRACTITIONER

## 2020-05-26 PROCEDURE — 82607 VITAMIN B-12: CPT | Performed by: INTERNAL MEDICINE

## 2020-05-26 PROCEDURE — 96372 THER/PROPH/DIAG INJ SC/IM: CPT

## 2020-05-26 PROCEDURE — 36415 COLL VENOUS BLD VENIPUNCTURE: CPT

## 2020-05-26 PROCEDURE — 85025 COMPLETE CBC W/AUTO DIFF WBC: CPT

## 2020-05-26 PROCEDURE — 83540 ASSAY OF IRON: CPT | Performed by: INTERNAL MEDICINE

## 2020-05-26 RX ORDER — CYANOCOBALAMIN 1000 UG/ML
1000 INJECTION, SOLUTION INTRAMUSCULAR; SUBCUTANEOUS
Status: DISCONTINUED | OUTPATIENT
Start: 2020-05-26 | End: 2020-05-27 | Stop reason: HOSPADM

## 2020-05-26 RX ADMIN — CYANOCOBALAMIN 1000 MCG: 1000 INJECTION, SOLUTION INTRAMUSCULAR at 13:59

## 2020-05-27 ENCOUNTER — READMISSION MANAGEMENT (OUTPATIENT)
Dept: CALL CENTER | Facility: HOSPITAL | Age: 60
End: 2020-05-27

## 2020-05-27 ENCOUNTER — TELEPHONE (OUTPATIENT)
Dept: FAMILY MEDICINE CLINIC | Facility: CLINIC | Age: 60
End: 2020-05-27

## 2020-05-27 NOTE — TELEPHONE ENCOUNTER
Patient is calling because when she saw her Oncologist, her white blood count was elevated and she was told to contact her PCP.  She states that she still does not have any energy.     Please advise.    [facility] [address] confirmed.    Patient call back [phone]

## 2020-05-27 NOTE — PROGRESS NOTES
Pt states that she was recently in the hospital with URI and was on an antibiotic for 4 days and a steroid.  She is still having issues with SOA but denies any fevers or cough.

## 2020-05-27 NOTE — OUTREACH NOTE
"Medical Week 2 Survey      Responses   [facility] patient discharged from?  [facility]   COVID-19 Test Status  Negative   Does the patient have one of the following disease processes/diagnoses(primary or secondary)?  Other   Week 2 attempt successful?  Yes   Call start time  1747   Discharge diagnosis  Chest pain sec to bronchitis   Call end time  1757   Meds reviewed with patient/caregiver?  Yes   Is the patient having any side effects they believe may be caused by any medication additions or changes?  No   Does the patient have all medications ordered at discharge?  Yes   Is the patient taking all medications as directed (includes completed medication regime)?  Yes   Does the patient have a primary care provider?   Yes   Does the patient have an appointment with their PCP within 7 days of discharge?  Greater than 7 days   Has the patient kept scheduled appointments due by today?  N/A   Has home health visited the patient within 72 hours of discharge?  N/A   Pulse Ox monitoring  None   Psychosocial issues?  No   Did the patient receive a copy of their discharge instructions?  Yes   Nursing interventions  Reviewed instructions with patient   What is the patient's perception of their health status since discharge?  Improving   Is the patient/caregiver able to teach back signs and symptoms related to disease process for when to call PCP?  Yes   Is the patient/caregiver able to teach back signs and symptoms related to disease process for when to call 911?  Yes   Is the patient/caregiver able to teach back the hierarchy of who to call/visit for symptoms/problems? PCP, Specialist, Home health nurse, Urgent Care, ED, 911  Yes   Additional teach back comments  Patient states she is doing \"ok\" just waiting to hear back from her PCP.  She had a complaint regarding her MRI and email sent to Patient Relations regarding this.   Week 2 Call Completed?  Yes          Stefany Hutchinson LPN  "

## 2020-05-28 ENCOUNTER — HOSPITAL ENCOUNTER (OUTPATIENT)
Dept: GENERAL RADIOLOGY | Facility: HOSPITAL | Age: 60
Discharge: HOME | End: 2020-05-28
Admitting: FAMILY MEDICINE

## 2020-05-28 ENCOUNTER — LAB (OUTPATIENT)
Dept: LAB | Facility: HOSPITAL | Age: 60
End: 2020-05-28

## 2020-05-28 DIAGNOSIS — D72.829 LEUKOCYTOSIS, UNSPECIFIED TYPE: Primary | ICD-10-CM

## 2020-05-28 DIAGNOSIS — D72.829 LEUKOCYTOSIS, UNSPECIFIED TYPE: ICD-10-CM

## 2020-05-28 DIAGNOSIS — E03.9 HYPOTHYROIDISM, UNSPECIFIED TYPE: ICD-10-CM

## 2020-05-28 PROCEDURE — 81001 URINALYSIS AUTO W/SCOPE: CPT

## 2020-05-28 PROCEDURE — 71046 X-RAY EXAM CHEST 2 VIEWS: CPT

## 2020-05-28 PROCEDURE — 84443 ASSAY THYROID STIM HORMONE: CPT

## 2020-05-29 ENCOUNTER — TELEPHONE (OUTPATIENT)
Dept: FAMILY MEDICINE CLINIC | Facility: CLINIC | Age: 60
End: 2020-05-29

## 2020-05-29 DIAGNOSIS — R53.83 FATIGUE, UNSPECIFIED TYPE: Primary | ICD-10-CM

## 2020-05-29 LAB
BACTERIA UR QL AUTO: NORMAL /HPF
BILIRUB UR QL STRIP: NEGATIVE
CLARITY UR: CLEAR
COLOR UR: YELLOW
GLUCOSE UR STRIP-MCNC: NEGATIVE MG/DL
HGB UR QL STRIP.AUTO: NEGATIVE
HYALINE CASTS UR QL AUTO: NORMAL /LPF
KETONES UR QL STRIP: NEGATIVE
LEUKOCYTE ESTERASE UR QL STRIP.AUTO: ABNORMAL
NITRITE UR QL STRIP: NEGATIVE
PH UR STRIP.AUTO: 6 [PH] (ref 5–8)
PROT UR QL STRIP: NEGATIVE
RBC # UR: NORMAL /HPF
REF LAB TEST METHOD: NORMAL
SP GR UR STRIP: 1.01 (ref 1–1.03)
SQUAMOUS #/AREA URNS HPF: NORMAL /HPF
TSH SERPL DL<=0.05 MIU/L-ACNC: <0.005 UIU/ML (ref 0.27–4.2)
UROBILINOGEN UR QL STRIP: ABNORMAL
WBC UR QL AUTO: NORMAL /HPF

## 2020-06-01 ENCOUNTER — LAB (OUTPATIENT)
Dept: LAB | Facility: HOSPITAL | Age: 60
End: 2020-06-01

## 2020-06-01 DIAGNOSIS — E03.9 HYPOTHYROIDISM, UNSPECIFIED TYPE: ICD-10-CM

## 2020-06-01 DIAGNOSIS — R53.83 FATIGUE, UNSPECIFIED TYPE: ICD-10-CM

## 2020-06-01 LAB
BASOPHILS # BLD AUTO: 0.06 10*3/MM3 (ref 0–0.2)
BASOPHILS NFR BLD AUTO: 0.6 % (ref 0–1.5)
DEPRECATED RDW RBC AUTO: 43.2 FL (ref 37–54)
EOSINOPHIL # BLD AUTO: 0.31 10*3/MM3 (ref 0–0.4)
EOSINOPHIL NFR BLD AUTO: 3.2 % (ref 0.3–6.2)
ERYTHROCYTE [DISTWIDTH] IN BLOOD BY AUTOMATED COUNT: 12.8 % (ref 12.3–15.4)
ERYTHROCYTE [SEDIMENTATION RATE] IN BLOOD: 28 MM/HR (ref 0–30)
HCT VFR BLD AUTO: 34.8 % (ref 34–46.6)
HGB BLD-MCNC: 11.9 G/DL (ref 12–15.9)
IMM GRANULOCYTES # BLD AUTO: 0.04 10*3/MM3 (ref 0–0.05)
IMM GRANULOCYTES NFR BLD AUTO: 0.4 % (ref 0–0.5)
LYMPHOCYTES # BLD AUTO: 2 10*3/MM3 (ref 0.7–3.1)
LYMPHOCYTES NFR BLD AUTO: 20.4 % (ref 19.6–45.3)
MCH RBC QN AUTO: 31.9 PG (ref 26.6–33)
MCHC RBC AUTO-ENTMCNC: 34.2 G/DL (ref 31.5–35.7)
MCV RBC AUTO: 93.3 FL (ref 79–97)
MONOCYTES # BLD AUTO: 1.25 10*3/MM3 (ref 0.1–0.9)
MONOCYTES NFR BLD AUTO: 12.7 % (ref 5–12)
NEUTROPHILS # BLD AUTO: 6.15 10*3/MM3 (ref 1.7–7)
NEUTROPHILS NFR BLD AUTO: 62.7 % (ref 42.7–76)
NRBC BLD AUTO-RTO: 0 /100 WBC (ref 0–0.2)
PLATELET # BLD AUTO: 277 10*3/MM3 (ref 140–450)
PMV BLD AUTO: 11.3 FL (ref 6–12)
RBC # BLD AUTO: 3.73 10*6/MM3 (ref 3.77–5.28)
WBC NRBC COR # BLD: 9.81 10*3/MM3 (ref 3.4–10.8)

## 2020-06-01 PROCEDURE — 86140 C-REACTIVE PROTEIN: CPT

## 2020-06-01 PROCEDURE — 84443 ASSAY THYROID STIM HORMONE: CPT

## 2020-06-01 PROCEDURE — 85025 COMPLETE CBC W/AUTO DIFF WBC: CPT

## 2020-06-01 PROCEDURE — 84439 ASSAY OF FREE THYROXINE: CPT

## 2020-06-01 PROCEDURE — 80053 COMPREHEN METABOLIC PANEL: CPT

## 2020-06-01 PROCEDURE — 84481 FREE ASSAY (FT-3): CPT

## 2020-06-01 PROCEDURE — 85652 RBC SED RATE AUTOMATED: CPT

## 2020-06-02 ENCOUNTER — TELEPHONE (OUTPATIENT)
Dept: FAMILY MEDICINE CLINIC | Facility: CLINIC | Age: 60
End: 2020-06-02

## 2020-06-02 ENCOUNTER — READMISSION MANAGEMENT (OUTPATIENT)
Dept: CALL CENTER | Facility: HOSPITAL | Age: 60
End: 2020-06-02

## 2020-06-02 LAB
ALBUMIN SERPL-MCNC: 3.8 G/DL (ref 3.5–5.2)
ALBUMIN/GLOB SERPL: 1.5 G/DL
ALP SERPL-CCNC: 145 U/L (ref 39–117)
ALT SERPL W P-5'-P-CCNC: 63 U/L (ref 1–33)
ANION GAP SERPL CALCULATED.3IONS-SCNC: 9.6 MMOL/L (ref 5–15)
AST SERPL-CCNC: 27 U/L (ref 1–32)
BILIRUB SERPL-MCNC: 0.2 MG/DL (ref 0.2–1.2)
BUN BLD-MCNC: 18 MG/DL (ref 8–23)
BUN/CREAT SERPL: 20 (ref 7–25)
CALCIUM SPEC-SCNC: 9.5 MG/DL (ref 8.6–10.5)
CHLORIDE SERPL-SCNC: 106 MMOL/L (ref 98–107)
CO2 SERPL-SCNC: 27.4 MMOL/L (ref 22–29)
CREAT BLD-MCNC: 0.9 MG/DL (ref 0.57–1)
CRP SERPL-MCNC: 1.12 MG/DL (ref 0–0.5)
GFR SERPL CREATININE-BSD FRML MDRD: 64 ML/MIN/1.73
GLOBULIN UR ELPH-MCNC: 2.5 GM/DL
GLUCOSE BLD-MCNC: 129 MG/DL (ref 65–99)
POTASSIUM BLD-SCNC: 5.4 MMOL/L (ref 3.5–5.2)
PROT SERPL-MCNC: 6.3 G/DL (ref 6–8.5)
SODIUM BLD-SCNC: 143 MMOL/L (ref 136–145)
T3FREE SERPL-MCNC: 5.57 PG/ML (ref 2–4.4)
T4 FREE SERPL-MCNC: 1.53 NG/DL (ref 0.93–1.7)
TSH SERPL DL<=0.05 MIU/L-ACNC: <0.005 UIU/ML (ref 0.27–4.2)

## 2020-06-02 NOTE — OUTREACH NOTE
Medical Week 3 Survey      Responses   [facility] patient discharged from?  [facility]   COVID-19 Test Status  Negative   Does the patient have one of the following disease processes/diagnoses(primary or secondary)?  Other   Week 3 attempt successful?  Yes   Call start time  1850   Call end time  1853   Meds reviewed with patient/caregiver?  Yes   Is the patient having any side effects they believe may be caused by any medication additions or changes?  No   Does the patient have all medications ordered at discharge?  Yes   Is the patient taking all medications as directed (includes completed medication regime)?  Yes   Does the patient have a primary care provider?   Yes   Comments regarding PCP  PATIENT DOES NOT HAVE AN APPOINTMENT WITH HER PCP UNTIL JULY, BUT HAS TALKED WITH HIM BY PHONE   Has the patient kept scheduled appointments due by today?  Yes   Has home health visited the patient within 72 hours of discharge?  N/A   Pulse Ox monitoring  None   Did the patient receive a copy of their discharge instructions?  Yes   Nursing interventions  Reviewed instructions with patient   What is the patient's perception of their health status since discharge?  Improving   Is the patient/caregiver able to teach back signs and symptoms related to disease process for when to call PCP?  Yes   Is the patient/caregiver able to teach back signs and symptoms related to disease process for when to call 911?  Yes   Is the patient/caregiver able to teach back the hierarchy of who to call/visit for symptoms/problems? PCP, Specialist, Home health nurse, Urgent Care, ED, 911  Yes   Week 3 Call Completed?  Yes          Brittany Shetty LPN

## 2020-06-09 ENCOUNTER — READMISSION MANAGEMENT (OUTPATIENT)
Dept: CALL CENTER | Facility: HOSPITAL | Age: 60
End: 2020-06-09

## 2020-06-10 DIAGNOSIS — R60.0 LOCALIZED EDEMA: ICD-10-CM

## 2020-06-10 DIAGNOSIS — R60.9 SWELLING: Primary | ICD-10-CM

## 2020-06-10 NOTE — OUTREACH NOTE
Medical Week 4 Survey      Responses   [facility] patient discharged from?  [facility]   COVID-19 Test Status  Negative   Does the patient have one of the following disease processes/diagnoses(primary or secondary)?  Other   Week 4 attempt successful?  No          Stefany Hutchinson LPN

## 2020-06-16 ENCOUNTER — HOSPITAL ENCOUNTER (OUTPATIENT)
Dept: CARDIOLOGY | Facility: HOSPITAL | Age: 60
Discharge: HOME | End: 2020-06-16
Admitting: FAMILY MEDICINE

## 2020-06-16 DIAGNOSIS — R60.0 LOCALIZED EDEMA: ICD-10-CM

## 2020-06-16 DIAGNOSIS — R60.9 SWELLING: ICD-10-CM

## 2020-06-16 LAB
BH CV LOWER VASCULAR LEFT COMMON FEMORAL AUGMENT: NORMAL
BH CV LOWER VASCULAR LEFT COMMON FEMORAL COMPETENT: NORMAL
BH CV LOWER VASCULAR LEFT COMMON FEMORAL COMPRESS: NORMAL
BH CV LOWER VASCULAR LEFT COMMON FEMORAL PHASIC: NORMAL
BH CV LOWER VASCULAR LEFT COMMON FEMORAL SPONT: NORMAL
BH CV LOWER VASCULAR LEFT DISTAL FEMORAL COMPRESS: NORMAL
BH CV LOWER VASCULAR LEFT GASTRONEMIUS COMPRESS: NORMAL
BH CV LOWER VASCULAR LEFT GREATER SAPH AK COMPRESS: NORMAL
BH CV LOWER VASCULAR LEFT GREATER SAPH BK COMPRESS: NORMAL
BH CV LOWER VASCULAR LEFT LESSER SAPH COMPRESS: NORMAL
BH CV LOWER VASCULAR LEFT MID FEMORAL AUGMENT: NORMAL
BH CV LOWER VASCULAR LEFT MID FEMORAL COMPETENT: NORMAL
BH CV LOWER VASCULAR LEFT MID FEMORAL COMPRESS: NORMAL
BH CV LOWER VASCULAR LEFT MID FEMORAL PHASIC: NORMAL
BH CV LOWER VASCULAR LEFT MID FEMORAL SPONT: NORMAL
BH CV LOWER VASCULAR LEFT PERONEAL COMPRESS: NORMAL
BH CV LOWER VASCULAR LEFT POPLITEAL AUGMENT: NORMAL
BH CV LOWER VASCULAR LEFT POPLITEAL COMPETENT: NORMAL
BH CV LOWER VASCULAR LEFT POPLITEAL COMPRESS: NORMAL
BH CV LOWER VASCULAR LEFT POPLITEAL PHASIC: NORMAL
BH CV LOWER VASCULAR LEFT POPLITEAL SPONT: NORMAL
BH CV LOWER VASCULAR LEFT POSTERIOR TIBIAL COMPRESS: NORMAL
BH CV LOWER VASCULAR LEFT PROXIMAL FEMORAL COMPRESS: NORMAL
BH CV LOWER VASCULAR LEFT SAPHENOFEMORAL JUNCTION AUGMENT: NORMAL
BH CV LOWER VASCULAR LEFT SAPHENOFEMORAL JUNCTION COMPETENT: NORMAL
BH CV LOWER VASCULAR LEFT SAPHENOFEMORAL JUNCTION COMPRESS: NORMAL
BH CV LOWER VASCULAR LEFT SAPHENOFEMORAL JUNCTION PHASIC: NORMAL
BH CV LOWER VASCULAR LEFT SAPHENOFEMORAL JUNCTION SPONT: NORMAL
BH CV LOWER VASCULAR RIGHT COMMON FEMORAL AUGMENT: NORMAL
BH CV LOWER VASCULAR RIGHT COMMON FEMORAL COMPETENT: NORMAL
BH CV LOWER VASCULAR RIGHT COMMON FEMORAL COMPRESS: NORMAL
BH CV LOWER VASCULAR RIGHT COMMON FEMORAL PHASIC: NORMAL
BH CV LOWER VASCULAR RIGHT COMMON FEMORAL SPONT: NORMAL

## 2020-06-16 PROCEDURE — 93971 EXTREMITY STUDY: CPT

## 2020-06-16 NOTE — PROGRESS NOTES
Spoke with pt. Swelling is still there. Not getting better. Sometimes it is not as bad but then it gets worse the more the day goes on. No pattern to the swelling as far as when the swelling is down. Sometimes it isgood in the mornings then gets worse as the day goes on other times she wakes up and it very swollen. Anything further the patient needs to do?

## 2020-06-17 DIAGNOSIS — R60.0 BILATERAL LEG EDEMA: Primary | ICD-10-CM

## 2020-06-18 RX ORDER — ALENDRONATE SODIUM 70 MG/1
TABLET ORAL
Qty: 4 TABLET | Refills: 2 | Status: SHIPPED | OUTPATIENT
Start: 2020-06-18 | End: 2020-09-28

## 2020-06-23 ENCOUNTER — HOSPITAL ENCOUNTER (OUTPATIENT)
Dept: ONCOLOGY | Facility: HOSPITAL | Age: 60
Setting detail: INFUSION SERIES
Discharge: HOME | End: 2020-06-23

## 2020-06-23 VITALS
RESPIRATION RATE: 18 BRPM | DIASTOLIC BLOOD PRESSURE: 88 MMHG | WEIGHT: 200 LBS | TEMPERATURE: 97.5 F | HEIGHT: 64 IN | SYSTOLIC BLOOD PRESSURE: 135 MMHG | HEART RATE: 90 BPM | BODY MASS INDEX: 34.15 KG/M2

## 2020-06-23 DIAGNOSIS — E53.8 VITAMIN B12 DEFICIENCY: Primary | ICD-10-CM

## 2020-06-23 PROCEDURE — 96372 THER/PROPH/DIAG INJ SC/IM: CPT

## 2020-06-23 PROCEDURE — 25010000002 CYANOCOBALAMIN PER 1000 MCG: Performed by: INTERNAL MEDICINE

## 2020-06-23 RX ORDER — CYANOCOBALAMIN 1000 UG/ML
1000 INJECTION, SOLUTION INTRAMUSCULAR; SUBCUTANEOUS
Status: DISCONTINUED | OUTPATIENT
Start: 2020-06-23 | End: 2020-06-24 | Stop reason: HOSPADM

## 2020-06-23 RX ADMIN — CYANOCOBALAMIN 1000 MCG: 1000 INJECTION INTRAMUSCULAR; SUBCUTANEOUS at 14:47

## 2020-07-02 DIAGNOSIS — E03.9 HYPOTHYROIDISM, UNSPECIFIED TYPE: Primary | ICD-10-CM

## 2020-07-03 ENCOUNTER — LAB (OUTPATIENT)
Dept: LAB | Facility: HOSPITAL | Age: 60
End: 2020-07-03

## 2020-07-03 DIAGNOSIS — E03.9 HYPOTHYROIDISM, UNSPECIFIED TYPE: ICD-10-CM

## 2020-07-03 LAB
ALBUMIN SERPL-MCNC: 4.5 G/DL (ref 3.5–5.2)
ALBUMIN/GLOB SERPL: 2 G/DL
ALP SERPL-CCNC: 91 U/L (ref 39–117)
ALT SERPL W P-5'-P-CCNC: 22 U/L (ref 1–33)
ANION GAP SERPL CALCULATED.3IONS-SCNC: 8.3 MMOL/L (ref 5–15)
AST SERPL-CCNC: 28 U/L (ref 1–32)
BASOPHILS # BLD AUTO: 0.12 10*3/MM3 (ref 0–0.2)
BASOPHILS NFR BLD AUTO: 1.3 % (ref 0–1.5)
BILIRUB SERPL-MCNC: 0.2 MG/DL (ref 0.2–1.2)
BUN SERPL-MCNC: 24 MG/DL (ref 8–23)
BUN/CREAT SERPL: 22.9 (ref 7–25)
CALCIUM SPEC-SCNC: 9.6 MG/DL (ref 8.6–10.5)
CHLORIDE SERPL-SCNC: 100 MMOL/L (ref 98–107)
CO2 SERPL-SCNC: 29.7 MMOL/L (ref 22–29)
CREAT SERPL-MCNC: 1.05 MG/DL (ref 0.57–1)
CRP SERPL-MCNC: 0.46 MG/DL (ref 0–0.5)
DEPRECATED RDW RBC AUTO: 42.5 FL (ref 37–54)
EOSINOPHIL # BLD AUTO: 0.39 10*3/MM3 (ref 0–0.4)
EOSINOPHIL NFR BLD AUTO: 4.1 % (ref 0.3–6.2)
ERYTHROCYTE [DISTWIDTH] IN BLOOD BY AUTOMATED COUNT: 12.6 % (ref 12.3–15.4)
ERYTHROCYTE [SEDIMENTATION RATE] IN BLOOD: 11 MM/HR (ref 0–30)
GFR SERPL CREATININE-BSD FRML MDRD: 53 ML/MIN/1.73
GLOBULIN UR ELPH-MCNC: 2.3 GM/DL
GLUCOSE SERPL-MCNC: 80 MG/DL (ref 65–99)
HCT VFR BLD AUTO: 38 % (ref 34–46.6)
HGB BLD-MCNC: 12.9 G/DL (ref 12–15.9)
IMM GRANULOCYTES # BLD AUTO: 0.04 10*3/MM3 (ref 0–0.05)
IMM GRANULOCYTES NFR BLD AUTO: 0.4 % (ref 0–0.5)
LYMPHOCYTES # BLD AUTO: 3.61 10*3/MM3 (ref 0.7–3.1)
LYMPHOCYTES NFR BLD AUTO: 38 % (ref 19.6–45.3)
MCH RBC QN AUTO: 31.4 PG (ref 26.6–33)
MCHC RBC AUTO-ENTMCNC: 33.9 G/DL (ref 31.5–35.7)
MCV RBC AUTO: 92.5 FL (ref 79–97)
MONOCYTES # BLD AUTO: 1.68 10*3/MM3 (ref 0.1–0.9)
MONOCYTES NFR BLD AUTO: 17.7 % (ref 5–12)
NEUTROPHILS NFR BLD AUTO: 3.67 10*3/MM3 (ref 1.7–7)
NEUTROPHILS NFR BLD AUTO: 38.5 % (ref 42.7–76)
NRBC BLD AUTO-RTO: 0 /100 WBC (ref 0–0.2)
PLATELET # BLD AUTO: 268 10*3/MM3 (ref 140–450)
PMV BLD AUTO: 11.7 FL (ref 6–12)
POTASSIUM SERPL-SCNC: 4.4 MMOL/L (ref 3.5–5.2)
PROT SERPL-MCNC: 6.8 G/DL (ref 6–8.5)
RBC # BLD AUTO: 4.11 10*6/MM3 (ref 3.77–5.28)
SODIUM SERPL-SCNC: 138 MMOL/L (ref 136–145)
T3FREE SERPL-MCNC: 4.35 PG/ML (ref 2–4.4)
T4 FREE SERPL-MCNC: 1.61 NG/DL (ref 0.93–1.7)
TSH SERPL DL<=0.05 MIU/L-ACNC: <0.005 UIU/ML (ref 0.27–4.2)
WBC # BLD AUTO: 9.51 10*3/MM3 (ref 3.4–10.8)

## 2020-07-03 PROCEDURE — 80053 COMPREHEN METABOLIC PANEL: CPT | Performed by: FAMILY MEDICINE

## 2020-07-03 PROCEDURE — 36415 COLL VENOUS BLD VENIPUNCTURE: CPT | Performed by: FAMILY MEDICINE

## 2020-07-03 PROCEDURE — 84439 ASSAY OF FREE THYROXINE: CPT | Performed by: FAMILY MEDICINE

## 2020-07-03 PROCEDURE — 84481 FREE ASSAY (FT-3): CPT

## 2020-07-03 PROCEDURE — 84443 ASSAY THYROID STIM HORMONE: CPT | Performed by: FAMILY MEDICINE

## 2020-07-03 PROCEDURE — 86140 C-REACTIVE PROTEIN: CPT | Performed by: FAMILY MEDICINE

## 2020-07-03 PROCEDURE — 85025 COMPLETE CBC W/AUTO DIFF WBC: CPT | Performed by: FAMILY MEDICINE

## 2020-07-03 PROCEDURE — 85652 RBC SED RATE AUTOMATED: CPT | Performed by: FAMILY MEDICINE

## 2020-07-06 ENCOUNTER — OFFICE VISIT (OUTPATIENT)
Dept: FAMILY MEDICINE CLINIC | Facility: CLINIC | Age: 60
End: 2020-07-06

## 2020-07-06 VITALS
OXYGEN SATURATION: 99 % | DIASTOLIC BLOOD PRESSURE: 84 MMHG | RESPIRATION RATE: 16 BRPM | HEART RATE: 91 BPM | TEMPERATURE: 98.4 F | BODY MASS INDEX: 34.31 KG/M2 | SYSTOLIC BLOOD PRESSURE: 130 MMHG | WEIGHT: 201 LBS | HEIGHT: 64 IN

## 2020-07-06 DIAGNOSIS — E03.9 HYPOTHYROIDISM, UNSPECIFIED TYPE: Primary | ICD-10-CM

## 2020-07-06 DIAGNOSIS — M32.9 SLE (SYSTEMIC LUPUS ERYTHEMATOSUS RELATED SYNDROME) (HCC): ICD-10-CM

## 2020-07-06 DIAGNOSIS — M15.9 OSTEOARTHRITIS, GENERALIZED: ICD-10-CM

## 2020-07-06 PROCEDURE — 99214 OFFICE O/P EST MOD 30 MIN: CPT | Performed by: FAMILY MEDICINE

## 2020-07-06 RX ORDER — LAMOTRIGINE 200 MG/1
TABLET ORAL
COMMUNITY
Start: 2020-06-17 | End: 2020-07-06

## 2020-07-06 NOTE — PROGRESS NOTES
Chief Complaint   Patient presents with   • Hypothyroidism     HPI  Tracie Gross is a 59 y.o. female that presents for f/u of multiple medical problems    Hypothyroidism: maintained on levothyroxine 200mcg daily and liothyronine 25mcg daily.  She has been feeling fine on this regimen but her thyroid function test have been difficult to manage.    Fatigue: patient reports difficulty getting to sleep at night- asleep by 1:30-2A and up at 7A. Subsequently having daytime somnolence. Pain has been keeping her awake. This is mostly L neck and shoulder pain. She is taking oxycodone 10mg Q6 but avoid nighttime dose. Also taking gabapentin 600 qAM and 1200mg nightly and quetiapine 50-100mg nightly. Seroquel is managed by Dr Nicolas, who she last saw 4 weeks ago    SLE: inflammatory markers reviewed today and unremarkable. Maintained on Celebrex 100mg BID, hydroxychloroquine 200 BID.  Still having joint pain likely degenerative component.  Patient previously following with Dr. Bowers but she is no longer practicing.  She will need a new rheumatologist    Joint pain: spinous process tenderness to thoracic spine new in past couple months.  She continues to have chronic knee pain as well.  Patient is status post right knee replacement that has not gone well.  Patient has seen orthopedics for left knee repair in the past.  They have previously performed steroid injection with moderate relief    Review of Systems   Constitutional: Positive for fatigue. Negative for chills, fever and unexpected weight loss.   HENT: Negative for congestion, rhinorrhea and sore throat.    Eyes: Negative for visual disturbance.   Respiratory: Negative for cough and shortness of breath.    Cardiovascular: Negative for chest pain and palpitations.   Gastrointestinal: Negative for abdominal pain, constipation, diarrhea, nausea and vomiting.   Genitourinary: Negative for difficulty urinating and dysuria.   Musculoskeletal: Positive for arthralgias, back  pain, gait problem and joint swelling.   Skin: Positive for rash. Negative for skin lesions.   Neurological: Negative for weakness and headache.   Psychiatric/Behavioral: Negative for depressed mood. The patient is not nervous/anxious.      The following portions of the patient's history were reviewed and updated as appropriate: problem list, past medical history, past surgical history, allergies, current medications    Problem List Tab  Patient History Tab  Immunizations Tab  Medications Tab  Chart Review Tab  Care Everywhere Tab  Synopsis Tab    PE  Vitals:    07/06/20 1537   BP: 130/84   Pulse: 91   Resp: 16   Temp: 98.4 °F (36.9 °C)   SpO2: 99%     Body mass index is 34.5 kg/m².  General: Well nourished, NAD  Head: AT/NC  Eyes: EOMI, anicteric sclera  ENT: MMM w/o erythema  Neck: Supple, no thyromegaly or LAD  Resp: CTAB, SCR, BS equal  CV: RRR w/o m/r/g; 2+ pulses  GI: Soft, NT/ND, +BS.  Obese  MSK: FROM, no deformity, trace LE edema. +thoracic spinous process tenderness  Skin: Warm, dry, intact  Neuro: Alert and oriented. No focal deficits  Psych: Appropriate mood and affect    Imaging  Ct Abdomen Pelvis Without Contrast    Result Date: 5/4/2020   1. There are lymph nodes within the chest, abdomen, and pelvis which are stable and presumed to be benign. 2. Chronic changes in the lungs as described. 3. No definite metastatic disease. 4. Postsurgical changes.  Electronically Signed ByAbdon Soria On:5/4/2020 1:22 PM This report was finalized on [id number] by  Terrance Soria .    Ct Chest Without Contrast    Result Date: 5/4/2020   1. There are lymph nodes within the chest, abdomen, and pelvis which are stable and presumed to be benign. 2. Chronic changes in the lungs as described. 3. No definite metastatic disease. 4. Postsurgical changes.  Electronically Signed ByAbdon Soria On:5/4/2020 1:22 PM This report was finalized on [id number] by  Charleen Mccracken    Xr Chest 1 View    Result Date: 5/13/2020  1.Suspected  chronic opacities in the lung bases, right greater than left. 2.No definite radiographic findings of acute cardiopulmonary abnormality.  Electronically Signed By-DR. Tee Guillen MD On:5/13/2020 7:51 PM This report was finalized on [id number] by DR. Tee Guillen MD.    Ct Chest Pulmonary Embolism    Result Date: 5/13/2020  1.No definite findings of acute pulmonary embolism. 2.New mosaic attenuation of the pulmonary parenchyma with suspicion for mild septal thickening. These findings are nonspecific but may represent mild edema as there appears to be mild cardiomegaly. A developing infectious or inflammatory process could also have this appearance. 3.Mediastinal and hilar adenopathy, similar to the recent prior chest CT. This appears to have been chronic on prior chest imaging. 4.Suspected scarring in the lung bases, as before.  Electronically Signed By-DR. Tee Guillen MD On:5/13/2020 9:10 PM This report was finalized on [id number] by DR. Tee Guillen MD.    Mri Breast Bilateral With & Without Contrast    Result Date: 5/20/2020  IMPRESSION : No MRI evidence of malignancy.   ASSESSMENT: BI-RADS: Category 2-benign findings.  BI-RADS category Key: Category 0-incomplete-needs additional imaging and/or prior images for comparison. Category 1-negative. Category 2-benign findings. Category 3-probably benign-short interval follow-up suggested. Category 4-suspicious abnormality-biopsy should be considered. Category 5-highly suggestive for malignancy-appropriate action should be taken. Category 6-known biopsy-proven malignancy-appropriate action should be taken.   Electronically Signed By-Simran Riojas On:5/20/2020 2:52 PM This report was finalized on [id number] by  Simran Riojas, .    Xr Chest Pa & Lateral    Result Date: 5/28/2020  1. No acute cardiopulmonary abnormality. 2. Chronic mild elevation of the right hemidiaphragm with linear scarring or atelectasis.  Electronically Signed By-Piotr Yu  On:5/28/2020 5:04 PM This report was finalized on [id number] by  Piotr Yu, .      Assessment/Plan   Tracie Gross is a 59 y.o. female that presents for   Chief Complaint   Patient presents with   • Hypothyroidism     Tracie was seen today for hypothyroidism.    Diagnoses and all orders for this visit:    Hypothyroidism, unspecified type: Thyroid labs have been difficult to manage.  We have weaned liothyronine from 25 mcg twice a day to once a day due to elevated levels of free T3.  Levothyroxine was increased from 150 to 200 mcg daily due to low free T4.  Both those levels have now normalized however TSH is still undetectable.  -     C-reactive Protein; Future  -     Sedimentation Rate; Future  -     Comprehensive Metabolic Panel; Future  -     T4, Free; Future  -     TSH; Future  - Labs reviewed  - Discontinue liothyronine 25 daily  - Continue levothyroxine 200 mcg daily    Osteoarthritis, generalized: Patient complains of neck and low back pain and has thoracic spinous process tenderness on exam today.  Will obtain full spine x-rays and refer to physical therapy.  Her left knee is also troublesome.  She has received steroid injection in the past this was last done several months ago.  Will repeat today  -     XR Spine Cervical Complete 4 or 5 View; Future  -     XR Spine Lumbar Complete With Flex & Ext; Future  -     XR Spine Thoracic 3 View; Future  -     Ambulatory Referral to Physical Therapy Evaluate and treat  - 80 mg Depo-Medrol to the left knee (see separate procedure note)  - Continue oxycodone 10 mg every 6 as needed    SLE (systemic lupus erythematosus related syndrome) (CMS/[facility]): Inflammatory markers reviewed today and normal.  Given normal inflammatory markers, will not change her medication regimen at this time.  She will need a new rheumatologist.  Patient prefers to stay in the [facility] system.  -     Ambulatory Referral to Rheumatology  -     C-reactive Protein; Future  -     Sedimentation  Rate; Future  -     Comprehensive Metabolic Panel; Future  -     T4, Free; Future  - Continue home Celebrex 100 mg twice daily and hydroxychloroquine 200 mg twice daily  -     TSH; Future    F/u 2 months

## 2020-07-08 PROCEDURE — 20610 DRAIN/INJ JOINT/BURSA W/O US: CPT | Performed by: FAMILY MEDICINE

## 2020-07-08 RX ORDER — LIDOCAINE HYDROCHLORIDE 10 MG/ML
2 INJECTION, SOLUTION INFILTRATION; PERINEURAL
Status: COMPLETED | OUTPATIENT
Start: 2020-07-08 | End: 2020-07-08

## 2020-07-08 RX ORDER — METHYLPREDNISOLONE ACETATE 80 MG/ML
80 INJECTION, SUSPENSION INTRA-ARTICULAR; INTRALESIONAL; INTRAMUSCULAR; SOFT TISSUE
Status: COMPLETED | OUTPATIENT
Start: 2020-07-08 | End: 2020-07-08

## 2020-07-08 RX ORDER — BUPIVACAINE HYDROCHLORIDE 5 MG/ML
2 INJECTION, SOLUTION PERINEURAL
Status: COMPLETED | OUTPATIENT
Start: 2020-07-08 | End: 2020-07-08

## 2020-07-08 RX ADMIN — BUPIVACAINE HYDROCHLORIDE 2 ML: 5 INJECTION, SOLUTION PERINEURAL at 06:29

## 2020-07-08 RX ADMIN — METHYLPREDNISOLONE ACETATE 80 MG: 80 INJECTION, SUSPENSION INTRA-ARTICULAR; INTRALESIONAL; INTRAMUSCULAR; SOFT TISSUE at 06:29

## 2020-07-08 RX ADMIN — LIDOCAINE HYDROCHLORIDE 2 ML: 10 INJECTION, SOLUTION INFILTRATION; PERINEURAL at 06:29

## 2020-07-08 NOTE — PROGRESS NOTES
Procedure   Arthrocentesis  Date/Time: 7/8/2020 6:29 AM  Performed by: Floyd Silva MD  Authorized by: Floyd Silva MD   Indications: pain   Body area: knee  Joint: left knee  Local anesthesia used: no    Anesthesia:  Local anesthesia used: no    Sedation:  Patient sedated: no    Needle size: 22 G  Ultrasound guidance: no  Approach: anterior  Aspirate amount: 0 mL  Patient tolerance: Patient tolerated the procedure well with no immediate complications

## 2020-07-13 RX ORDER — LISINOPRIL 5 MG/1
5 TABLET ORAL DAILY
Qty: 90 TABLET | Refills: 1 | Status: SHIPPED | OUTPATIENT
Start: 2020-07-13 | End: 2020-11-12

## 2020-07-13 NOTE — TELEPHONE ENCOUNTER
Caller: Brian Gross    Relationship: Emergency Contact    Best call back number:[phone] (H   Medication needed:   Requested Prescriptions     Pending Prescriptions Disp Refills   • lisinopril (PRINIVIL,ZESTRIL) 5 MG tablet 90 tablet 1     Sig: Take 1 tablet by mouth Daily.       When do you need the refill by:TODAY   What details did the patient provide when requesting the medication:TRYING TO GET FILLED SINCE June 16  Does the patient have less than a 3 day supply:  [x] Yes  [] No    What is the patient's preferred pharmacy: [facility] [address] [place] [address] [address] [place] [address] - [phone] [facility] [phone]

## 2020-07-14 ENCOUNTER — OFFICE (AMBULATORY)
Dept: URBAN - METROPOLITAN AREA CLINIC 64 | Facility: CLINIC | Age: 60
End: 2020-07-14
Payer: COMMERCIAL

## 2020-07-14 ENCOUNTER — TREATMENT (OUTPATIENT)
Dept: PHYSICAL THERAPY | Facility: CLINIC | Age: 60
End: 2020-07-14

## 2020-07-14 VITALS — WEIGHT: 200 LBS | HEIGHT: 65 IN

## 2020-07-14 DIAGNOSIS — G89.29 CHRONIC MIDLINE LOW BACK PAIN WITHOUT SCIATICA: ICD-10-CM

## 2020-07-14 DIAGNOSIS — K62.3 RECTAL PROLAPSE: ICD-10-CM

## 2020-07-14 DIAGNOSIS — G89.4 CHRONIC PAIN SYNDROME: ICD-10-CM

## 2020-07-14 DIAGNOSIS — M15.9 OSTEOARTHRITIS, GENERALIZED: Primary | ICD-10-CM

## 2020-07-14 DIAGNOSIS — R15.9 FULL INCONTINENCE OF FECES: ICD-10-CM

## 2020-07-14 DIAGNOSIS — M54.2 PAIN, NECK: ICD-10-CM

## 2020-07-14 DIAGNOSIS — M25.512 ACUTE PAIN OF LEFT SHOULDER: ICD-10-CM

## 2020-07-14 DIAGNOSIS — Z15.09 GENETIC SUSCEPTIBILITY TO OTHER MALIGNANT NEOPLASM: ICD-10-CM

## 2020-07-14 DIAGNOSIS — M54.50 CHRONIC MIDLINE LOW BACK PAIN WITHOUT SCIATICA: ICD-10-CM

## 2020-07-14 DIAGNOSIS — M32.9 SYSTEMIC LUPUS ERYTHEMATOSUS, UNSPECIFIED: ICD-10-CM

## 2020-07-14 PROCEDURE — 99213 OFFICE O/P EST LOW 20 MIN: CPT | Performed by: INTERNAL MEDICINE

## 2020-07-14 PROCEDURE — 97163 PT EVAL HIGH COMPLEX 45 MIN: CPT | Performed by: PHYSICAL THERAPIST

## 2020-07-14 RX ORDER — CELECOXIB 100 MG/1
CAPSULE ORAL
Qty: 90 CAPSULE | Refills: 0 | Status: SHIPPED | OUTPATIENT
Start: 2020-07-14 | End: 2020-08-27

## 2020-07-14 NOTE — PROGRESS NOTES
Physical Therapy Initial Evaluation and Plan of Care    Patient: Tracie Gross   : 1960  Diagnosis/ICD-10 Code:  Osteoarthritis, generalized [M15.9]  Referring practitioner: Floyd Silva MD  Date of Initial Visit: 2020  Today's Date: 2020  Patient seen for 1 sessions           Subjective Questionnaire: Oswestry: 40% limited      Subjective Evaluation    History of Present Illness  Mechanism of injury: Pt reports long hx of back & neck issues with more recent L shoulder pain. Pt states she saw MD 2 weeks ago and was told the neck was causing the shoulder pain. Pt was referred for OPPT. Pt has not had any recent imaging done. Pt has had recent L knee injex 2 weeks ago, but no injex in these other areas. RMD in 4 weeks. Pt denies n/t or weakness in UEs, but feels some weakness in legs. Pt denies bowel/bladder dysfct, visual changes or dizziness.     PMH: bipolar affective disorder, allergies meds/adhesive tape & seasonal/environmental, arthritis multi jt, asthma/SOB, back injury, cancer R breast , depression, OP, GERD, lupus, scleroderma, reynaud's disease, bipolar, inflammatory bowel disease, HTN, and Von Willebrand,     Meds & Surg hx: see med hx (includes VADMI R, TKA R, wrist plates)    Pain: 4/10 current, 2/10 at best, 8/10 at worst    Aggravating/functional factors: standing, walking, sleep, pushing/pulling, heavy lifting, driving long distance, rising, house work (sweeper), bending, twisting, stairs (more from knees)    Relieving factors: sitting    Social Hx: lives with spouse, stairs w/rails, disabled    Hand dominance: right    Patient Goals  Patient goals for therapy: decreased pain, improved balance, increased strength, independence with ADLs/IADLs, return to sport/leisure activities and increased motion  Patient goal: walking to take grandchildren to zoo      Objective          Neurological Testing     Sensation   Cervical/Thoracic   Left   Intact: light touch    Right   Intact:  light touch    Comments   Left light touch: B UE/LE dermatomes.     Reflexes   Left   Brachioradialis (C6): normal (2+)  Triceps (C7): normal (2+)  Patellar (L4): normal (2+)  Achilles (S1): normal (2+)  Park's reflex: negative    Right   Brachioradialis (C6): normal (2+)  Triceps (C7): normal (2+)  Patellar (L4): absent (0)  Achilles (S1): trace (1+)  Park's reflex: negative    Active Range of Motion   Cervical/Thoracic Spine   Cervical    Flexion: 40 degrees   Extension: 35 degrees   Left lateral flexion: 12 degrees with pain  Right lateral flexion: 20 degrees   Left rotation: 35 degrees with pain  Right rotation: 50 degrees     Lumbar   Extension: 10 degrees   Left lateral flexion: 27 degrees   Right lateral flexion: 15 degrees       Vitals: HR 83, SpO2 96%    Knee ext la L/28 R  Shoulder flex 123 L/140 R  R Wrist flex/ext 30 surgery with plate put in it (bone was necrotic)    Palpation: TTP L cerv/UT    Posture: head fwd & lat tilted, rounded shoulders,     Strength: UEs grossly 4+ to 5 except R wrist flex/ext 3- (did not test finger flex/abd), LEs grossly 4- to 4 (R side weaker than L)    Balance: SLS <2 s L/3 s R on level ground, SBA, UE to set up & catch self     Gait: I; no AD, trunk forward & lat flex R, trendelenburg (each hip drops with stance on opposite side), lacks TKE, reduced step length & gt speed    Special Tests: Spurling's L (+) for L cerv to upper arm pain; deferred further testing due to time constraints     Flexibility: tight hams/hip flexors noted with standing/walking; will assess further in future visits prn    Assessment & Plan     Assessment  Impairments: abnormal coordination, abnormal gait, abnormal muscle firing, abnormal muscle tone, abnormal or restricted ROM, activity intolerance, impaired balance, impaired physical strength, lacks appropriate home exercise program, pain with function, safety issue and weight-bearing intolerance  Assessment details: The patient is a 59  y.o. female who presents to physical therapy today for OA generalized, LBP & neck/L shoulder pain. Upon initial evaluation, the patient demonstrates the above & the following impairments: pain, reduced posture, SI/IS dysfunction, decreased ROM/flexibility, strength, gait, balance and function. Due to these impairments, the patient is unable to/limited with: standing, walking, sleep, pushing/pulling, heavy lifting, driving long distance, rising, house work (sweeper), bending, twisting, and stairs (more from knees). The patient would benefit from skilled PT services to address functional limitations and impairments and to improve patient quality of life.      Barriers to therapy: bipolar, allergies, arthritis multi jt, asthma/SOB, back issues, cancer R breast 1984, depression, OP, GERD, lupus, scleroderma, reynaud's disease, bipolar, inflammatory bowel disease, HTN could all affect PT Rx/progress/outcomes if exacerbated, unregulated or if cancer recurs   Prognosis: good  Functional Limitations: carrying objects, lifting, sleeping, walking, pulling, pushing, uncomfortable because of pain, moving in bed, sitting, standing, stooping, reaching behind back, reaching overhead and unable to perform repetitive tasks  Goals  Plan Goals: STGs in 4 weeks:  Decrease pain to 6/10 on average  Increase trunk/knee ext/shld flex ROM by 5-10 degrees   Increase LE strength to /5    LTGs by discharge  Increase trunk/LE ROM to WFL/WNL  Increase LE strength to 5/5   Pt will be able to ascend/descend stairs reciprocally with or without use of rail(s) and with minimal difficulty or pain  Pt will be able to sit/drive/ride for 30-60 mins without difficulty or pain  Pt will be able to stand 30-60 mins for basic ADLs without difficulty or pain  Pt will be able to walk 30-60 mins for grocery shopping without difficulty, pain or LOB  Pt will be able to wash/dress/groom without difficulty or increased pain  Pt will be able to lift/carry laundry  baskets, pots/pans, garbage or grocery bags without difficulty or increased pain      Plan  Therapy options: will be seen for skilled physical therapy services  Planned modality interventions: cryotherapy, thermotherapy (hydrocollator packs), electrical stimulation/[place]an stimulation and ultrasound  Other planned modality interventions: Dry Needling  Planned therapy interventions: manual therapy, neuromuscular re-education, postural training, soft tissue mobilization, spinal/joint mobilization, strengthening, stretching, therapeutic activities, transfer training, abdominal trunk stabilization, ADL retraining, body mechanics training, home exercise program, gait training, functional ROM exercises and flexibility  Duration in visits: 20  Treatment plan discussed with: patient        History # of Personal Factors and/or Comorbidities: HIGH (3+)  Examination of Body System(s): # of elements: HIGH (4+)  Clinical Presentation: UNSTABLE   Clinical Decision Making: HIGH      Timed:         Manual Therapy:         mins  41024;     Therapeutic Exercise:   2      mins  51374;     Neuromuscular Walter:        mins  37724;    Therapeutic Activity:          mins  46733;     Gait Training:           mins  57659;     Ultrasound:          mins  91099;    Ionto                                   mins   93786  Self Care                            mins   61536  Canalith Repos         mins 05571      Un-Timed:  Electrical Stimulation:         mins  84141 ( );  Dry Needling          mins self-pay  Traction          mins 94399  Low Eval          Mins  96851  Mod Eval          Mins  59444  High Eval                       38     Mins  33812  Re-Eval                               mins  36393        Timed Treatment:  2 mins  Total Treatment time:  40   mins    PT SIGNATURE: Megan Singleton PT   [place] Lic# [id number]  DATE TREATMENT INITIATED: 7/14/2020    Medicare Initial Certification  Certification Period: 10/12/2020  I certify that the  therapy services are furnished while this patient is under my care.  The services outlined above are required by this patient, and will be reviewed every 90 days.     PHYSICIAN: Floyd Silva MD      DATE:     Please sign and return via fax to [phone]. Thank you, [facility].

## 2020-07-20 ENCOUNTER — TREATMENT (OUTPATIENT)
Dept: PHYSICAL THERAPY | Facility: CLINIC | Age: 60
End: 2020-07-20

## 2020-07-20 DIAGNOSIS — M25.512 ACUTE PAIN OF LEFT SHOULDER: ICD-10-CM

## 2020-07-20 DIAGNOSIS — G89.29 CHRONIC MIDLINE LOW BACK PAIN WITHOUT SCIATICA: ICD-10-CM

## 2020-07-20 DIAGNOSIS — M54.2 PAIN, NECK: ICD-10-CM

## 2020-07-20 DIAGNOSIS — M54.50 CHRONIC MIDLINE LOW BACK PAIN WITHOUT SCIATICA: ICD-10-CM

## 2020-07-20 DIAGNOSIS — M15.9 OSTEOARTHRITIS, GENERALIZED: Primary | ICD-10-CM

## 2020-07-20 PROCEDURE — 97140 MANUAL THERAPY 1/> REGIONS: CPT | Performed by: PHYSICAL THERAPIST

## 2020-07-20 PROCEDURE — 97110 THERAPEUTIC EXERCISES: CPT | Performed by: PHYSICAL THERAPIST
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
## 2020-07-21 ENCOUNTER — HOSPITAL ENCOUNTER (OUTPATIENT)
Dept: ONCOLOGY | Facility: HOSPITAL | Age: 60
Setting detail: INFUSION SERIES
Discharge: HOME | End: 2020-07-21

## 2020-07-21 VITALS — HEIGHT: 64 IN | WEIGHT: 202.2 LBS | BODY MASS INDEX: 34.52 KG/M2

## 2020-07-21 DIAGNOSIS — E53.8 VITAMIN B12 DEFICIENCY: Primary | ICD-10-CM

## 2020-07-21 PROCEDURE — 25010000002 CYANOCOBALAMIN PER 1000 MCG: Performed by: INTERNAL MEDICINE

## 2020-07-21 PROCEDURE — 96372 THER/PROPH/DIAG INJ SC/IM: CPT

## 2020-07-21 RX ORDER — CYANOCOBALAMIN 1000 UG/ML
1000 INJECTION, SOLUTION INTRAMUSCULAR; SUBCUTANEOUS
Status: DISCONTINUED | OUTPATIENT
Start: 2020-07-21 | End: 2020-07-22 | Stop reason: HOSPADM

## 2020-07-21 RX ADMIN — CYANOCOBALAMIN 1000 MCG: 1000 INJECTION INTRAMUSCULAR; SUBCUTANEOUS at 14:34

## 2020-07-23 ENCOUNTER — HOSPITAL ENCOUNTER (OUTPATIENT)
Dept: GENERAL RADIOLOGY | Facility: HOSPITAL | Age: 60
Discharge: HOME | End: 2020-07-23

## 2020-07-23 ENCOUNTER — HOSPITAL ENCOUNTER (OUTPATIENT)
Dept: GENERAL RADIOLOGY | Facility: HOSPITAL | Age: 60
Discharge: HOME | End: 2020-07-23
Admitting: FAMILY MEDICINE

## 2020-07-23 DIAGNOSIS — M15.9 OSTEOARTHRITIS, GENERALIZED: ICD-10-CM

## 2020-07-23 PROCEDURE — 72114 X-RAY EXAM L-S SPINE BENDING: CPT

## 2020-07-23 PROCEDURE — 72050 X-RAY EXAM NECK SPINE 4/5VWS: CPT

## 2020-07-23 PROCEDURE — 72072 X-RAY EXAM THORAC SPINE 3VWS: CPT

## 2020-07-27 ENCOUNTER — TREATMENT (OUTPATIENT)
Dept: PHYSICAL THERAPY | Facility: CLINIC | Age: 60
End: 2020-07-27

## 2020-07-27 DIAGNOSIS — M15.9 OSTEOARTHRITIS, GENERALIZED: Primary | ICD-10-CM

## 2020-07-27 DIAGNOSIS — M54.50 CHRONIC MIDLINE LOW BACK PAIN WITHOUT SCIATICA: ICD-10-CM

## 2020-07-27 DIAGNOSIS — M54.2 PAIN, NECK: ICD-10-CM

## 2020-07-27 DIAGNOSIS — M25.512 ACUTE PAIN OF LEFT SHOULDER: ICD-10-CM

## 2020-07-27 DIAGNOSIS — G89.29 CHRONIC MIDLINE LOW BACK PAIN WITHOUT SCIATICA: ICD-10-CM

## 2020-07-27 PROCEDURE — 97140 MANUAL THERAPY 1/> REGIONS: CPT | Performed by: PHYSICAL THERAPIST

## 2020-07-27 PROCEDURE — 97110 THERAPEUTIC EXERCISES: CPT | Performed by: PHYSICAL THERAPIST

## 2020-07-27 NOTE — PROGRESS NOTES
Physical Therapy Daily Progress Note    VISIT#: 3/5 auth    Subjective   Tracie Gross reports she can now move the neck a little more and thinks she can raise the arm a little more. Pt notes her mid thor region hurts with standing and cooking. LB has been feeling better. Pt states she had xrays taken, but hasn't been able to see results on my chart yet.     Pain Rating (0-10): 2    Objective Xrays 7/23/20: Lumb showed slight destroscoliosis & evidence for degen change; cerv showed post surgical changes & underlying degen changes C6-7, B facet arthopathy, narrowing IVF C3-4 thru C4-5 & C6-7 on R with mild IVF narrowing at same areas on L; thor some underlying degen changes    See Exercise, Manual, and Modality Logs for complete treatment.     Patient Education: cues for therex; discussed limiting reps or making modifications with exs prn to avoid increased pain; discussed how to adjust resistance with Tband exs at home prn      Assessment/Plan Pt with some tailbone discomfort with marches. Added belt with some of the stretches so pt could see how to do it with the belt/towel/strap. Pt gets easily confused with stretches and requires VC/TC & demo for proper performance. Pt tolerated Tband retr fairly well and seemed to understand tband ext for home. Pt declined heat/ice at end of session.     Progress per Plan of Care and Progress strengthening /stabilization /functional activity        Timed:         Manual Therapy:   13      mins  51992;     Therapeutic Exercise:    28     mins  62556;     Neuromuscular Walter:        mins  07239;    Therapeutic Activity:         mins  66133;     Gait Training:           mins  18365;     Ultrasound:          mins  41236;    Ionto                                   mins   84069  Self Care                            mins   22765  Canalith Repos                   mins  27731    Un-Timed:  Electrical Stimulation:         mins  19902 ( );  Dry Needling          mins  self-pay  Traction          mins 99349  Low Eval          Mins  33705  Mod Eval          Mins  28608  High Eval                            Mins  38545  Re-Eval                               mins  46331    Timed Treatment:  41    mins   Total Treatment:     41   mins    Megan Singleton PT  [place] License # [id number]  Physical Therapist

## 2020-07-28 RX ORDER — NIFEDIPINE 60 MG/1
TABLET, EXTENDED RELEASE ORAL
Qty: 90 TABLET | Refills: 0 | Status: SHIPPED | OUTPATIENT
Start: 2020-07-28 | End: 2020-10-23

## 2020-08-03 RX ORDER — FERROUS SULFATE 325(65) MG
TABLET ORAL
Qty: 30 TABLET | Refills: 0 | Status: SHIPPED | OUTPATIENT
Start: 2020-08-03 | End: 2020-08-27

## 2020-08-04 ENCOUNTER — TREATMENT (OUTPATIENT)
Dept: PHYSICAL THERAPY | Facility: CLINIC | Age: 60
End: 2020-08-04

## 2020-08-04 DIAGNOSIS — M54.50 CHRONIC MIDLINE LOW BACK PAIN WITHOUT SCIATICA: ICD-10-CM

## 2020-08-04 DIAGNOSIS — M15.9 OSTEOARTHRITIS, GENERALIZED: Primary | ICD-10-CM

## 2020-08-04 DIAGNOSIS — M25.512 ACUTE PAIN OF LEFT SHOULDER: ICD-10-CM

## 2020-08-04 DIAGNOSIS — G89.29 CHRONIC MIDLINE LOW BACK PAIN WITHOUT SCIATICA: ICD-10-CM

## 2020-08-04 DIAGNOSIS — M54.2 PAIN, NECK: ICD-10-CM

## 2020-08-04 PROCEDURE — 97110 THERAPEUTIC EXERCISES: CPT | Performed by: PHYSICAL THERAPIST

## 2020-08-04 PROCEDURE — 97140 MANUAL THERAPY 1/> REGIONS: CPT | Performed by: PHYSICAL THERAPIST

## 2020-08-04 NOTE — PROGRESS NOTES
Physical Therapy Daily Progress Note    VISIT#: 4    Subjective Evaluation    History of Present Illness    Subjective comment: Pt reports a 1/10 today, but arm pain woke her up from sleep last night. States anterior shldr pain about the size of a half dollar. States she has been doing HEP. Had a bad headadche ranging from posterior neck to posterior shoulder after last visit       Objective     See Exercise and Manual Logs for complete treatment.     Patient Education: UT stretching, chin tucks to relieve upper neck tension    Assessment & Plan     Assessment  Assessment details: Mrs. Gross tolerated new ex well. Tightness noted in UT along w/ anterior shldr pain. Educated on chin tucks and stretching UT and levator to relieve tension in upper C-spine. Added cable rows, sit to stands and modified dead bugs. No increased neck or shoulder pain w/ ex.    Plan  Plan details: Next visit, ask about headaches. Add scapular muscle strengthening exs.      Progress per Plan of Care            Timed:         Manual Therapy:    13     mins  22896;     Therapeutic Exercise:   30      mins  17965;     Neuromuscular Walter:        mins  77877;    Therapeutic Activity:          mins  34172;     Gait Training:           mins  86212;     Ultrasound:          mins  14413;    Ionto                                   mins   49558  Self Care                            mins   64219  Canalith Repos                   mins  06770    Un-Timed:  Electrical Stimulation:         mins  37454 ( );  Dry Needling          mins self-pay  Traction          mins 79251  Low Eval          Mins  08564  Mod Eval          Mins  81903  High Eval                            Mins  13029  Re-Eval                               mins  64661      Timed Treatment:   43   mins   Total Treatment:    45    mins    Tanmay Vaughan Student PT    Megan Singleton, PT  Physical Therapist    I was present in the room guiding the student, directing the service, and making the  skilled judgement for all services rendered.

## 2020-08-05 NOTE — PROGRESS NOTES
Hematology/Oncology Outpatient Follow Up    PATIENT NAME:Tracie Gross  :1960  MRN: [id number]  PRIMARY CARE PHYSICIAN: Floyd Silva MD  REFERRING PHYSICIAN: Floyd Silva, *    Chief Complaint   Patient presents with   • Follow-up     history of breast cancer       HISTORY OF PRESENT ILLNESS:   This is a 58-year-old female who was diagnosed with right breast cancer in  when she was 32 years old.  Patient was originally seen on 18.  Please refer to the details of that note for more information.   · 18 - CBC:  WBC 13.6, hemoglobin 11.7, platelet count 392,000.  Differentials 65% neutrophils, 15% lymphocytes.  There was mild monocytosis at 16.  Eosinophils were 2.3 and basophils were 0.3.  B12 292.  Ferritin 88.  Folate 13.8.  AST slightly high at 57.  Alkaline phosphatase was high at 326.  .  Haptoglobin 179, normal.  SPEP with DYLAN did not show any monoclonal protein.  Flow cytometry did not show any evidence of acute leukemia or lymphoproliferative disease.  There was monocytosis measured at 21%.  Reticulocyte count normal 1.02.  Peripheral smear showed absolute monocytosis at 19%, thrombocytosis and macrocytosis.  BCR-abl was negative.  Methylmalonic acid level was elevated to 420.    · 18 - PET/CT scan showed multiple small bilateral lymph nodes in the neck without metabolic activity or change from prior CT scans and are likely due to SLE.  There were unchanged bilateral level 1 axillary lymph nodes without metabolic activity.  Multiple mediastinal nodes were also seen.  The largest 11 mm, unchanged from prior imaging with no hypermetabolic activity.  There was no evidence of metastatic disease in the abdomen or pelvis.  There was no focal lesion within the liver or biliary system.  Multiple small retroperitoneal and external iliac nodes, bilateral inguinal nodes similar to prior imaging with no PET activity.    · 18 - RICHIE-2 analysis with no mutation  identified.    · 11/29/18 - Bone density revealed osteoporosis.  Patient is currently on Fosamax.   · 12/3/18 - Bone marrow aspiration and biopsy showed normocellular bone marrow at 40%.  There was adequate iron stores and no evidence of malignancy was seen.  Flow cytometry was negative.  Cytogenetics showed normal female karyotype.  Blasts were less than 3% of nucleated cells.  There was no significant dyspoiesis.   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     · 1/22/19 - Urine cytology was negative for malignant cells.    · 2/28/19 - Patient seen by Dermatology for her annual skin evaluation.   · 3/13/19 - CT scan of the chest, abdomen and pelvis:  CT scan of chest showed chronic changes.  · 5/29/2019 patient had an EGD with polypectomy performed by Dr. Conway.  Dr. Conway has recommended follow-up MRI of the pancreas in 2 years.  And also EGD and colonoscopy in 2 years.  · 11/13/19-left screening mammogram was negative follow-up in 1 year was recommended  · 5/4/2020 patient had a CT scan of the chest, abdomen and pelvis multiple borderline enlarged mediastinal lymph nodes the largest measuring 2.2 cm in the subcarinal space unchanged from prior.  Pancreas is normal.  There are few retroperitoneal mesenteric and pelvic lymph node enlargement which are similar to prior exam.  All are subcentimeter in size.  · 5/20/2020 patient had bilateral breast MRI did not show any evidence of malignancy.    Past Medical History:   Diagnosis Date   • Arthritis    • Asthma    • Bipolar affective disorder (CMS/HCC)    • Breast cancer (CMS/HCC)     RT   • Colitis    • Depression    • GERD (gastroesophageal reflux disease) 1993   • H/O breast reconstruction     SEVERAL   • H/O laminectomy    • Hamartoma (CMS/HCC)    • Hx of bilateral oophorectomy    • Hypertension    •  Hypothyroidism    • Inflammatory bowel disease    • Kienbock's disease, right     WRIST   • Low back pain    • Lupus (CMS/HCC)    • Monahan syndrome    • Osteopenia    • Pneumonia    • Raynaud disease    • Scleroderma (CMS/HCC)    • Von Willebrand disease (CMS/HCC)        Past Surgical History:   Procedure Laterality Date   • ARM DEBRIDEMENT Right     X 3   • BACK SURGERY      Vetas- cervical fusion   • BACK SURGERY      lumbar decomp   • BREAST BIOPSY     • BREAST LUMPECTOMY     • BREAST RECONSTRUCTION Right    • BREAST RECONSTRUCTION Right    • CARDIAC CATHETERIZATION      no stents placed   •  SECTION  1980   • CHOLECYSTECTOMY     • COLONOSCOPY     • COSMETIC SURGERY  [id number]   • ENDOSCOPY     • EXPLORATORY LAPAROTOMY      scar tissue removal   • EYE SURGERY     • FINGER SURGERY Right     ring finger mass excision   • HYSTERECTOMY      PARTIAL   • JOINT REPLACEMENT Right ,    hip and knee   • KNEE ARTHROSCOPY Left 2020    Procedure: LEFT KNEE SCOPE with partial lateral meniscectomy;  Surgeon: Chau Perez MD;  Location: [facility] OR;  Service: Orthopedics   • LASIK      LASIK/ LASIK ENHANCEMENT   • MASTECTOMY RADICAL Right    • OOPHORECTOMY Bilateral    • SPLENECTOMY     • TUBAL ABDOMINAL LIGATION     • WRIST SURGERY Right     distal radius head removal (bone dead)  plates and screws decomp lunate         Current Outpatient Medications:   •  alendronate (FOSAMAX) 70 MG tablet, TAKE 1 TABLET BY MOUTH ONCE WEEKLY BEFORE BREAKFAST, ON AN EMPTY STOMACH: REMAIN UPRIGHT FOR 30 MINUTES:TAKE WITH 8 OUNCES OF WATER (Patient taking differently: 70 mg Every 7 (Seven) Days. ), Disp: 4 tablet, Rfl: 2  •  bisoprolol-hydrochlorothiazide (ZIAC) 10-6.25 MG per tablet, Take 1 tablet by mouth Daily. (Patient taking differently: Take 1 tablet by mouth Daily. Hold DOS), Disp: 90 tablet, Rfl: 1  •  Calcium-Vitamin D 600-200 MG-UNIT per tablet, TAKE ONE TABLET BY MOUTH TWICE  A DAY, Disp: 60 tablet, Rfl: 2  •  celecoxib (CeleBREX) 100 MG capsule, TAKE TWO CAPSULES BY MOUTH DAILY (Patient taking differently: Take 100 mg by mouth 2 (Two) Times a Day.), Disp: 90 capsule, Rfl: 0  •  clindamycin (CLEOCIN T) 1 % lotion, Apply  topically to the appropriate area as directed Every Night. Use on face, Disp: , Rfl:   •  colestipol (COLESTID) 1 g tablet, Take 2 tablets by mouth 2 (Two) Times a Day., Disp: 120 tablet, Rfl: 4  •  Cyanocobalamin (VITAMIN B-12 IJ), Inject  as directed Every 30 (Thirty) Days., Disp: , Rfl:   •  cyclobenzaprine (FLEXERIL) 10 MG tablet, Take 1 tablet by mouth 3 (Three) Times a Day As Needed for Muscle Spasms. PRN, Disp: 100 tablet, Rfl: 3  •  dexlansoprazole (DEXILANT) 60 MG capsule, Take 60 mg by mouth Daily., Disp: , Rfl:   •  famotidine (PEPCID) 20 MG tablet, Take 1 tablet by mouth 2 (Two) Times a Day As Needed for Heartburn., Disp: 120 tablet, Rfl: 5  •  ferrous sulfate 325 (65 FE) MG tablet, TAKE ONE TABLET BY MOUTH DAILY, Disp: 30 tablet, Rfl: 0  •  Flaxseed, Linseed, (FLAXSEED OIL MAX STR) 1300 MG capsule, Take 1 tablet by mouth 2 (Two) Times a Day., Disp: , Rfl:   •  gabapentin (NEURONTIN) 300 MG capsule, Take 600 mg by mouth Every Morning., Disp: , Rfl:   •  gabapentin (NEURONTIN) 600 MG tablet, Take 1,200 mg by mouth Every Night., Disp: , Rfl:   •  hydroxychloroquine (PLAQUENIL) 200 MG tablet, TAKE ONE TABLET BY MOUTH TWICE A DAY (Patient taking differently: Take 200 mg by mouth 2 (Two) Times a Day.), Disp: 180 tablet, Rfl: 1  •  hydrOXYzine (ATARAX) 25 MG tablet, Take 25 mg by mouth Every 8 (Eight) Hours As Needed for Itching., Disp: , Rfl:   •  lamoTRIgine (LaMICtal) 150 MG tablet, Take 150 mg by mouth Every Night., Disp: , Rfl:   •  levothyroxine (SYNTHROID, LEVOTHROID) 200 MCG tablet, Take 1 tablet by mouth Daily., Disp: 90 tablet, Rfl: 1  •  lisinopril (PRINIVIL,ZESTRIL) 5 MG tablet, Take 1 tablet by mouth Daily., Disp: 90 tablet, Rfl: 1  •  Methylnaltrexone  Bromide (RELISTOR) 150 MG tablet, Take 2 tablets by mouth Daily., Disp: , Rfl:   •  NIFEdipine XL (PROCARDIA XL) 60 MG 24 hr tablet, TAKE ONE TABLET BY MOUTH DAILY (Patient taking differently: Take 60 mg by mouth Daily.), Disp: 90 tablet, Rfl: 0  •  ondansetron (ZOFRAN) 4 MG tablet, Take 4 mg by mouth Every 8 (Eight) Hours As Needed., Disp: , Rfl:   •  oxyCODONE (ROXICODONE) 10 MG tablet, Take 1 tablet by mouth Every 6 (Six) Hours As Needed for Severe Pain ., Disp: 120 tablet, Rfl: 0  •  QUEtiapine (SEROquel) 100 MG tablet, Take 100 mg by mouth Every Night., Disp: , Rfl:   •  liothyronine (CYTOMEL) 25 MCG tablet, Take 1 tablet by mouth Daily., Disp: 60 tablet, Rfl: 5    Allergies   Allergen Reactions   • Aspirin Other (See Comments)     VONWILLENBRAND DISEASE    • Penicillins Shortness Of Breath   • Baclofen Hives   • Tape  [Adhesive Tape] Rash       Family History   Problem Relation Age of Onset   • Colon cancer Mother    • Heart disease Mother    • Cancer Mother    • Kidney disease Father    • Heart disease Father    • Depression Father    • Hyperlipidemia Father    • Vision loss Father    • Colon cancer Sister    • Diabetes Sister    • Heart disease Sister    • Von Willebrand disease Sister    • Von Willebrand disease Brother    • Von Willebrand disease Son    • Breast cancer Son    • Other Son         burdick syndrome   • Diabetes Paternal Grandmother    • Stroke Paternal Grandmother    • Colon cancer Other    • Colon cancer Son    • Von Willebrand disease Son    • Other Son         burdick syndrome   • Breast cancer Son    • Cancer Sister    • Vision loss Sister    • Other Sister    • Stroke Sister    • Cancer Paternal Aunt    • Cancer Maternal Aunt    • Cancer Maternal Aunt    • Hyperlipidemia Sister    • Thyroid disease Sister    • Thyroid disease Sister        Cancer-related family history includes Breast cancer in her son and son; Cancer in her maternal aunt, maternal aunt, mother, paternal aunt, and sister;  Colon cancer in her mother, sister, son, and another family member.    Social History     Tobacco Use   • Smoking status: Former Smoker     Packs/day: 0.25     Years: 7.00     Pack years: 1.75     Start date:      Last attempt to quit:      Years since quittin.6   • Smokeless tobacco: Never Used   • Tobacco comment: Off and on for  those years   Substance Use Topics   • Alcohol use: Yes     Frequency: Monthly or less     Drinks per session: 1 or 2     Binge frequency: Never     Comment: Rarely   • Drug use: No       I have reviewed and confirmed the accuracy of the patient's history:  as entered by the MA/LPN/RN. Suzan Jones MD 08/10/20       SUBJECTIVE:  The patient is here for a follow up appointment.  He denies any specific complaints today.  Patient is scheduled for colonoscopy and EGD this week.  She denies any changes to her bowel habits.  She denies any abdominal issues also she denies any breast symptoms today.          REVIEW OF SYSTEMS:  Review of Systems   Constitutional: Negative for chills and fever.   HENT: Negative for ear pain, mouth sores, nosebleeds and sore throat.    Eyes: Negative for photophobia and visual disturbance.   Respiratory: Negative for wheezing and stridor.    Cardiovascular: Negative for chest pain and palpitations.   Gastrointestinal: Negative for abdominal pain, diarrhea, nausea and vomiting.   Endocrine: Negative for cold intolerance and heat intolerance.   Genitourinary: Negative for dysuria and hematuria.   Musculoskeletal: Negative for joint swelling and neck stiffness.   Skin: Negative for color change and rash.   Neurological: Negative for seizures and syncope.   Hematological: Negative for adenopathy.        No obvious bleeding   Psychiatric/Behavioral: Negative for agitation, confusion and hallucinations.   ROS is as documented above    OBJECTIVE:    Vitals:    08/10/20 1409   BP: 153/95   Pulse: 88   Resp: 18   Temp: 98.9 °F (37.2 °C)   TempSrc:  "Oral   Weight: 92.1 kg (203 lb)   Height: 162.6 cm (64\")   PainSc: 0-No pain       ECOG  (1) Restricted in physically strenuous activity, ambulatory and able to do work of light nature    Physical Exam   Constitutional: She is oriented to person, place, and time. She appears well-developed and well-nourished. No distress.   HENT:   Head: Normocephalic and atraumatic.   Right Ear: External ear normal.   Nose: Nose normal.   Eyes: Pupils are equal, round, and reactive to light. Conjunctivae and EOM are normal. Right eye exhibits no discharge. Left eye exhibits no discharge. No scleral icterus.   Neck: Normal range of motion. Neck supple. No thyromegaly present.   Cardiovascular: Normal rate, regular rhythm and normal heart sounds. Exam reveals no gallop and no friction rub.   Pulmonary/Chest: Effort normal. No stridor. No respiratory distress. She has no wheezes. Left breast exhibits no inverted nipple, no mass, no nipple discharge, no skin change and no tenderness. No breast swelling, tenderness, discharge or bleeding.   Right chest wall does not reveal any palpable masses.  Bilateral axilla was negative   Abdominal: Soft. Bowel sounds are normal. She exhibits no mass. There is no tenderness. There is no rebound and no guarding.   Musculoskeletal: Normal range of motion. She exhibits no tenderness.   Lymphadenopathy:     She has no cervical adenopathy.   Neurological: She is alert and oriented to person, place, and time. She exhibits normal muscle tone.   Skin: Skin is warm. No rash noted. She is not diaphoretic. No erythema.   Psychiatric: She has a normal mood and affect. Her behavior is normal. Judgment and thought content normal.   Nursing note and vitals reviewed.      RECENT LABS  WBC   Date Value Ref Range Status   08/10/2020 10.98 (H) 3.40 - 10.80 10*3/mm3 Final     RBC   Date Value Ref Range Status   08/10/2020 4.37 3.77 - 5.28 10*6/mm3 Final     Hemoglobin   Date Value Ref Range Status   08/10/2020 13.9 " 12.0 - 15.9 g/dL Final     Hematocrit   Date Value Ref Range Status   08/10/2020 41.8 34.0 - 46.6 % Final     MCV   Date Value Ref Range Status   08/10/2020 95.7 79.0 - 97.0 fL Final     MCH   Date Value Ref Range Status   08/10/2020 31.8 26.6 - 33.0 pg Final     MCHC   Date Value Ref Range Status   08/10/2020 33.3 31.5 - 35.7 g/dL Final     RDW   Date Value Ref Range Status   08/10/2020 13.5 12.3 - 15.4 % Final     RDW-SD   Date Value Ref Range Status   08/10/2020 45.7 37.0 - 54.0 fl Final     MPV   Date Value Ref Range Status   08/10/2020 11.4 6.0 - 12.0 fL Final     Platelets   Date Value Ref Range Status   08/10/2020 171 140 - 450 10*3/mm3 Final     Neutrophil %   Date Value Ref Range Status   08/10/2020 55.2 42.7 - 76.0 % Final     Lymphocyte %   Date Value Ref Range Status   08/10/2020 27.9 19.6 - 45.3 % Final     Monocyte %   Date Value Ref Range Status   08/10/2020 12.8 (H) 5.0 - 12.0 % Final     Eosinophil %   Date Value Ref Range Status   08/10/2020 3.6 0.3 - 6.2 % Final     Basophil %   Date Value Ref Range Status   08/10/2020 0.5 0.0 - 1.5 % Final     Immature Grans %   Date Value Ref Range Status   07/03/2020 0.4 0.0 - 0.5 % Final     Neutrophils, Absolute   Date Value Ref Range Status   08/10/2020 6.06 1.70 - 7.00 10*3/mm3 Final     Lymphocytes, Absolute   Date Value Ref Range Status   08/10/2020 3.06 0.70 - 3.10 10*3/mm3 Final     Monocytes, Absolute   Date Value Ref Range Status   08/10/2020 1.41 (H) 0.10 - 0.90 10*3/mm3 Final     Eosinophils, Absolute   Date Value Ref Range Status   08/10/2020 0.40 0.00 - 0.40 10*3/mm3 Final     Basophils, Absolute   Date Value Ref Range Status   08/10/2020 0.05 0.00 - 0.20 10*3/mm3 Final     Immature Grans, Absolute   Date Value Ref Range Status   07/03/2020 0.04 0.00 - 0.05 10*3/mm3 Final     nRBC   Date Value Ref Range Status   07/03/2020 0.0 0.0 - 0.2 /100 WBC Final       Lab Results   Component Value Date    GLUCOSE 80 07/03/2020    BUN 24 (H) 07/03/2020     CREATININE 1.05 (H) 07/03/2020    EGFRIFNONA 53 (L) 07/03/2020    EGFRIFAFRI 83 04/04/2017    BCR 22.9 07/03/2020    K 4.4 07/03/2020    CO2 29.7 (H) 07/03/2020    CALCIUM 9.6 07/03/2020    ALBUMIN 4.50 07/03/2020    LABIL2 1.1 05/31/2019    AST 28 07/03/2020    ALT 22 07/03/2020         Assessment/Plan     Anemia, unspecified type   - CBC & Differential  - Cytology, Urine  - Cytology, Urine    Von Willebrand disease (CMS/HCC)  - Cytology, Urine  - Cytology, Urine    Family history of malignant neoplasm of colon  - Cytology, Urine  - Cytology, Urine    Breast screening  - Mammo Screening Modified With Tomosynthesis Left With CAD    Encounter for screening mammogram for malignant neoplasm of breast   - Mammo Screening Modified With Tomosynthesis Left With CAD    Osteoporosis, unspecified osteoporosis type, unspecified pathological fracture presence  - DEXA Bone Density Axial      ASSESSMENT:    1. 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  She also has DICER1 gene as well as TSC2 with variants of unknown significance.   2. History of right breast cancer, status post right mastectomy followed by axillary lymph node dissection and right chest wall reconstruction in 1985 at the age of 32.  3. Elevated liver function tests, pruritus, but no significant pathology found on both the PET scan, MRCP, except for post cholecystectomy, biliary ductal dilatation.  On Colestipol for pruritus.   4. She has peripheral lymphadenopathy involving the neck, axilla, mediastinum and retroperitoneum that are not PET avid.  This lymphadenopathy may be due to chronic inflammatory disease [lupus].    5. Strong family history of colon cancer and personal history of breast cancer.   6. Systemic lupus erythematosus, scleroderma, Raynaud’s phenomenon.   7. Normocytic anemia, multifactorial, underlying autoimmune disease, relative iron deficiency and B12  deficiency.  On iron and B12 supplementation.  Stable   8. Persistent monocytosis, status post bone marrow aspiration and biopsy which was essentially normal.  Stable monocytosis   9. Osteoporosis.  On Fosamax.  Prolia declined by her insurance company.   10. Urine cytology was negative as of January 2019.  Scheduled today for urine cytology   11. Last Dermatology appointment was 2/2020: Patient to follow-up February 2021  12. Status post complete hysterectomy reducing her risk for ovarian and uterine cancer.     PLANS:     · Plan is for continued observation.  She will follow up with GI for her GI endoscopies  scheduled for August 14, 2020         · Patient's screening mammogram is due November 2020 .  We will go ahead and schedule.  ·  She will continue monthly breast self-exam and call for lumps nipple discharge, skin discoloration.    · Urine cytology was due in January 2020: Will reorder today  · Dermatology appointment will be due February 2021  · CT scans of the chest abdomen and pelvis done May 4, 2020 is stable  · Follow-up with GI for pancreatic cancer screening.  MRI pancreas alternating with EUS on a yearly basis  · Breast MRI will be due 5/2021.          I have reviewed labs results, imaging, vitals, and medications with the patient today. Will follow up in 6 months with me.      Patient verbalized understanding and is in agreement of the above plan.     nl

## 2020-08-07 RX ORDER — QUETIAPINE FUMARATE 100 MG/1
100 TABLET, FILM COATED ORAL NIGHTLY
COMMUNITY
End: 2022-01-14 | Stop reason: SDUPTHER

## 2020-08-10 ENCOUNTER — LAB (OUTPATIENT)
Dept: LAB | Facility: HOSPITAL | Age: 60
End: 2020-08-10

## 2020-08-10 ENCOUNTER — OFFICE VISIT (OUTPATIENT)
Dept: ONCOLOGY | Facility: CLINIC | Age: 60
End: 2020-08-10

## 2020-08-10 VITALS
BODY MASS INDEX: 34.66 KG/M2 | HEIGHT: 64 IN | RESPIRATION RATE: 18 BRPM | SYSTOLIC BLOOD PRESSURE: 153 MMHG | WEIGHT: 203 LBS | HEART RATE: 88 BPM | DIASTOLIC BLOOD PRESSURE: 95 MMHG | TEMPERATURE: 98.9 F

## 2020-08-10 DIAGNOSIS — D64.9 ANEMIA, UNSPECIFIED TYPE: ICD-10-CM

## 2020-08-10 DIAGNOSIS — Z80.0 FAMILY HISTORY OF MALIGNANT NEOPLASM OF COLON: ICD-10-CM

## 2020-08-10 DIAGNOSIS — D64.9 ANEMIA, UNSPECIFIED TYPE: Primary | ICD-10-CM

## 2020-08-10 DIAGNOSIS — D68.00 VON WILLEBRAND DISEASE (HCC): ICD-10-CM

## 2020-08-10 DIAGNOSIS — Z12.39 BREAST SCREENING: ICD-10-CM

## 2020-08-10 DIAGNOSIS — Z12.31 ENCOUNTER FOR SCREENING MAMMOGRAM FOR MALIGNANT NEOPLASM OF BREAST: ICD-10-CM

## 2020-08-10 DIAGNOSIS — M81.0 OSTEOPOROSIS, UNSPECIFIED OSTEOPOROSIS TYPE, UNSPECIFIED PATHOLOGICAL FRACTURE PRESENCE: ICD-10-CM

## 2020-08-10 LAB
BASOPHILS # BLD AUTO: 0.05 10*3/MM3 (ref 0–0.2)
BASOPHILS NFR BLD AUTO: 0.5 % (ref 0–1.5)
DEPRECATED RDW RBC AUTO: 45.7 FL (ref 37–54)
EOSINOPHIL # BLD AUTO: 0.4 10*3/MM3 (ref 0–0.4)
EOSINOPHIL NFR BLD AUTO: 3.6 % (ref 0.3–6.2)
ERYTHROCYTE [DISTWIDTH] IN BLOOD BY AUTOMATED COUNT: 13.5 % (ref 12.3–15.4)
HCT VFR BLD AUTO: 41.8 % (ref 34–46.6)
HGB BLD-MCNC: 13.9 G/DL (ref 12–15.9)
LYMPHOCYTES # BLD AUTO: 3.06 10*3/MM3 (ref 0.7–3.1)
LYMPHOCYTES NFR BLD AUTO: 27.9 % (ref 19.6–45.3)
MCH RBC QN AUTO: 31.8 PG (ref 26.6–33)
MCHC RBC AUTO-ENTMCNC: 33.3 G/DL (ref 31.5–35.7)
MCV RBC AUTO: 95.7 FL (ref 79–97)
MONOCYTES # BLD AUTO: 1.41 10*3/MM3 (ref 0.1–0.9)
MONOCYTES NFR BLD AUTO: 12.8 % (ref 5–12)
NEUTROPHILS NFR BLD AUTO: 55.2 % (ref 42.7–76)
NEUTROPHILS NFR BLD AUTO: 6.06 10*3/MM3 (ref 1.7–7)
PLATELET # BLD AUTO: 171 10*3/MM3 (ref 140–450)
PMV BLD AUTO: 11.4 FL (ref 6–12)
RBC # BLD AUTO: 4.37 10*6/MM3 (ref 3.77–5.28)
WBC # BLD AUTO: 10.98 10*3/MM3 (ref 3.4–10.8)

## 2020-08-10 PROCEDURE — 85025 COMPLETE CBC W/AUTO DIFF WBC: CPT

## 2020-08-10 PROCEDURE — 99214 OFFICE O/P EST MOD 30 MIN: CPT | Performed by: INTERNAL MEDICINE

## 2020-08-10 PROCEDURE — 36415 COLL VENOUS BLD VENIPUNCTURE: CPT

## 2020-08-10 PROCEDURE — 88108 CYTOPATH CONCENTRATE TECH: CPT | Performed by: INTERNAL MEDICINE

## 2020-08-11 LAB
LAB AP CASE REPORT: NORMAL
LAB AP NON-GYN INTERPRETATION: NORMAL
PATH REPORT.FINAL DX SPEC: NORMAL
PATH REPORT.GROSS SPEC: NORMAL

## 2020-08-12 ENCOUNTER — TREATMENT (OUTPATIENT)
Dept: PHYSICAL THERAPY | Facility: CLINIC | Age: 60
End: 2020-08-12

## 2020-08-12 ENCOUNTER — LAB (OUTPATIENT)
Dept: LAB | Facility: HOSPITAL | Age: 60
End: 2020-08-12

## 2020-08-12 DIAGNOSIS — M25.512 ACUTE PAIN OF LEFT SHOULDER: ICD-10-CM

## 2020-08-12 DIAGNOSIS — M54.50 CHRONIC MIDLINE LOW BACK PAIN WITHOUT SCIATICA: ICD-10-CM

## 2020-08-12 DIAGNOSIS — M15.9 OSTEOARTHRITIS, GENERALIZED: Primary | ICD-10-CM

## 2020-08-12 DIAGNOSIS — M54.2 PAIN, NECK: ICD-10-CM

## 2020-08-12 DIAGNOSIS — G89.29 CHRONIC MIDLINE LOW BACK PAIN WITHOUT SCIATICA: ICD-10-CM

## 2020-08-12 PROCEDURE — 97110 THERAPEUTIC EXERCISES: CPT | Performed by: PHYSICAL THERAPIST

## 2020-08-12 PROCEDURE — U0002 COVID-19 LAB TEST NON-CDC: HCPCS

## 2020-08-12 PROCEDURE — C9803 HOPD COVID-19 SPEC COLLECT: HCPCS

## 2020-08-12 PROCEDURE — U0004 COV-19 TEST NON-CDC HGH THRU: HCPCS

## 2020-08-12 NOTE — PROGRESS NOTES
Physical Therapy Daily Progress Note/Progress Note       Patient: Tracie Gross   : 1960  Diagnosis/ICD-10 Code:  Osteoarthritis, generalized [M15.9]  Referring practitioner: Floyd Silva MD  Date of Initial Visit: 2020  Today's Date: 2020  Patient seen for 5 sessions    Subjective Questionnaire: Oswestry 38% limited (was 40% limited at eval)     Subjective   Tracie Gross reports 40% improved since began PT. Pt reports continued pain at the cerv/L shld/ MB/LB. Pt reports the neck is worse than the back. The shoulder is more sore today after having her hair done with tilting the head back.     Pt continues to have difficulty standing, walking, stairs, sleeping, turning the head and reaching up and out.     Pain averages 4/10 and can reach 6/10 at worst.     Objective     Active Range of Motion   Cervical/Thoracic Spine   Cervical     Flexion: 50 degrees   Extension: 40 degrees   Left lateral flexion: 23 degrees with pain  Right lateral flexion: 30 degrees   Left rotation: 60 degrees with discomfort  Right rotation: 70 degrees      Lumbar   Extension: 10 degrees   Left lateral flexion: 27 degrees   Right lateral flexion: 35 degrees       Knee ext la L/12 R  Shoulder flex 145 L/145 R  Shoulder scaption 70 L/155 R  R Wrist flex/ext 30 surgery with plate put in it (bone was necrotic)      Strength: UEs grossly 5 except R wrist flex/ext 3- (did not test finger flex/abd),     L UE:  Shoulder flex 4+  Shoulder abd 3-  Shoulder IR/ER 4-   Elbow flex/ext 5    Hip flex 4-4+ L/4+ R  Knee ext 5- B   Ankle DF 5 B      Balance: SLS 5 s L/4 s R on level ground, SBA, UE to set up & catch self      Gait: I; no AD, trunk forward & lat flex R, trendelenburg (each hip drops with stance on opposite side), lacks TKE, reduced step length & gt speed     Special Tests: Spurling's L mild discomfort L UT, Pierce Nakul & Cross arm mild discomfort ant L shld, Neers painful L    See Exercise Log for complete  treatment.     Assessment/Plan Pt is demonstrating improved mobility, strength and some improved function overall. Special tests indicate some irritation with impingement testing. Spurling's was not as provocative today as at eval. Pt is still limited in all areas and still c/o pain, neck/L shld > MB/LB. Pt is making gradual progress toward treatment goals with 8 goals met or partially met. All others are progressing except stairs which is more due to knee pain than back pain. Continue with current interventions/POC for the remaining 15 visits on original POC after today pending further authorization.     Goals  Plan Goals: STGs in 4 weeks:  Decrease pain to 6/10 on average Met  Increase trunk/knee ext/shld flex ROM by 5-10 degrees Partially Met  Increase LE strength to 4+/5 Partially Met    LTGs by discharge  Increase trunk/LE ROM to WFL/WNL Partially Met  Increase LE strength to 5/5 Partially Met  Pt will be able to ascend/descend stairs reciprocally with or without use of rail(s) and with minimal difficulty or pain Not Met mod difficulty and one step at a time due to knees more than the back  Pt will be able to sit/drive/ride for 30-60 mins without difficulty or pain Met  Pt will be able to stand 30-60 mins for basic ADLs without difficulty or pain Progressing 15-20'  Pt will be able to walk 30-60 mins for grocery shopping without difficulty, pain or LOB Progressing 15-20'  Pt will be able to wash/dress/groom without difficulty or increased pain Met  Pt will be able to lift/carry laundry baskets, pots/pans, garbage or grocery bags without difficulty or increased pain Partially Met as long as not very heavy    Progress per Plan of Care and Progress strengthening /stabilization /functional activity         Timed:         Manual Therapy:         mins  59531;     Therapeutic Exercise:   15      mins  50639;     Neuromuscular Walter:        mins  44294;    Therapeutic Activity:         mins  11023;     Gait Training:            mins  30668;     Ultrasound:          mins  53721;    Ionto                                   mins   23610  Self Care                            mins   28175  Canalith Repos                   mins  36112    Un-Timed:  Electrical Stimulation:        mins  74817 ( );  Dry Needling          mins self-pay  Traction          mins 13373  Low Eval          Mins  51744  Mod Eval          Mins  18201  High Eval                            Mins  36433  Re-Eval                              mins  69563    Timed Treatment:  15    mins   Total Treatment:     50   mins    Megan Singleton PT  [place] License # [id number]  Physical Therapist

## 2020-08-13 ENCOUNTER — ANESTHESIA EVENT (OUTPATIENT)
Dept: GASTROENTEROLOGY | Facility: HOSPITAL | Age: 60
End: 2020-08-13

## 2020-08-13 LAB
REF LAB TEST METHOD: NORMAL
SARS-COV-2 RNA RESP QL NAA+PROBE: NOT DETECTED

## 2020-08-14 ENCOUNTER — HOSPITAL ENCOUNTER (OUTPATIENT)
Facility: HOSPITAL | Age: 60
Setting detail: HOSPITAL OUTPATIENT SURGERY
Discharge: HOME | End: 2020-08-14
Attending: INTERNAL MEDICINE | Admitting: INTERNAL MEDICINE

## 2020-08-14 ENCOUNTER — ON CAMPUS - OUTPATIENT (AMBULATORY)
Dept: URBAN - METROPOLITAN AREA HOSPITAL 85 | Facility: HOSPITAL | Age: 60
End: 2020-08-14
Payer: COMMERCIAL

## 2020-08-14 ENCOUNTER — ANESTHESIA (OUTPATIENT)
Dept: GASTROENTEROLOGY | Facility: HOSPITAL | Age: 60
End: 2020-08-14

## 2020-08-14 VITALS
WEIGHT: 206.35 LBS | RESPIRATION RATE: 20 BRPM | TEMPERATURE: 98.2 F | DIASTOLIC BLOOD PRESSURE: 81 MMHG | HEIGHT: 64 IN | SYSTOLIC BLOOD PRESSURE: 145 MMHG | BODY MASS INDEX: 35.23 KG/M2 | OXYGEN SATURATION: 92 % | HEART RATE: 86 BPM

## 2020-08-14 VITALS — HEART RATE: 90 BPM | OXYGEN SATURATION: 100 % | SYSTOLIC BLOOD PRESSURE: 130 MMHG | DIASTOLIC BLOOD PRESSURE: 62 MMHG

## 2020-08-14 DIAGNOSIS — Z80.0 FAMILY HISTORY OF MALIGNANT NEOPLASM OF DIGESTIVE ORGANS: ICD-10-CM

## 2020-08-14 DIAGNOSIS — Z15.09 GENETIC SUSCEPTIBILITY TO OTHER MALIGNANT NEOPLASM: ICD-10-CM

## 2020-08-14 DIAGNOSIS — R11.2 NAUSEA WITH VOMITING, UNSPECIFIED: ICD-10-CM

## 2020-08-14 DIAGNOSIS — K62.3 RECTAL PROLAPSE: ICD-10-CM

## 2020-08-14 DIAGNOSIS — K62.6 ULCER OF ANUS AND RECTUM: ICD-10-CM

## 2020-08-14 PROCEDURE — 25010000002 PROPOFOL 10 MG/ML EMULSION: Performed by: ANESTHESIOLOGY

## 2020-08-14 PROCEDURE — 45378 DIAGNOSTIC COLONOSCOPY: CPT | Performed by: INTERNAL MEDICINE

## 2020-08-14 PROCEDURE — 43235 EGD DIAGNOSTIC BRUSH WASH: CPT | Performed by: INTERNAL MEDICINE

## 2020-08-14 RX ORDER — SODIUM CHLORIDE 0.9 % (FLUSH) 0.9 %
10 SYRINGE (ML) INJECTION EVERY 12 HOURS SCHEDULED
Status: DISCONTINUED | OUTPATIENT
Start: 2020-08-14 | End: 2020-08-14 | Stop reason: HOSPADM

## 2020-08-14 RX ORDER — SODIUM CHLORIDE 0.9 % (FLUSH) 0.9 %
10 SYRINGE (ML) INJECTION AS NEEDED
Status: DISCONTINUED | OUTPATIENT
Start: 2020-08-14 | End: 2020-08-14 | Stop reason: HOSPADM

## 2020-08-14 RX ORDER — SODIUM CHLORIDE 0.9 % (FLUSH) 0.9 %
3 SYRINGE (ML) INJECTION EVERY 12 HOURS SCHEDULED
Status: DISCONTINUED | OUTPATIENT
Start: 2020-08-14 | End: 2020-08-14 | Stop reason: HOSPADM

## 2020-08-14 RX ORDER — ONDANSETRON 2 MG/ML
4 INJECTION INTRAMUSCULAR; INTRAVENOUS ONCE AS NEEDED
Status: DISCONTINUED | OUTPATIENT
Start: 2020-08-14 | End: 2020-08-14 | Stop reason: HOSPADM

## 2020-08-14 RX ORDER — PROPOFOL 10 MG/ML
VIAL (ML) INTRAVENOUS AS NEEDED
Status: DISCONTINUED | OUTPATIENT
Start: 2020-08-14 | End: 2020-08-14 | Stop reason: SURG

## 2020-08-14 RX ORDER — MAGNESIUM CARB/ALUMINUM HYDROX 105-160MG
296 TABLET,CHEWABLE ORAL ONCE
Status: DISCONTINUED | OUTPATIENT
Start: 2020-08-14 | End: 2020-08-14 | Stop reason: HOSPADM

## 2020-08-14 RX ORDER — SODIUM CHLORIDE 9 MG/ML
9 INJECTION, SOLUTION INTRAVENOUS CONTINUOUS PRN
Status: DISCONTINUED | OUTPATIENT
Start: 2020-08-14 | End: 2020-08-14 | Stop reason: HOSPADM

## 2020-08-14 RX ADMIN — PROPOFOL 25 MG: 10 INJECTION, EMULSION INTRAVENOUS at 09:50

## 2020-08-14 RX ADMIN — PROPOFOL 25 MG: 10 INJECTION, EMULSION INTRAVENOUS at 09:43

## 2020-08-14 RX ADMIN — PROPOFOL 50 MG: 10 INJECTION, EMULSION INTRAVENOUS at 09:34

## 2020-08-14 RX ADMIN — SODIUM CHLORIDE 9 ML/HR: 900 INJECTION, SOLUTION INTRAVENOUS at 08:57

## 2020-08-14 RX ADMIN — PROPOFOL 25 MG: 10 INJECTION, EMULSION INTRAVENOUS at 09:46

## 2020-08-14 RX ADMIN — PROPOFOL 25 MG: 10 INJECTION, EMULSION INTRAVENOUS at 09:31

## 2020-08-14 RX ADMIN — PROPOFOL 50 MG: 10 INJECTION, EMULSION INTRAVENOUS at 09:30

## 2020-08-14 RX ADMIN — PROPOFOL 25 MG: 10 INJECTION, EMULSION INTRAVENOUS at 09:40

## 2020-08-14 RX ADMIN — PROPOFOL 25 MG: 10 INJECTION, EMULSION INTRAVENOUS at 09:37

## 2020-08-14 RX ADMIN — PROPOFOL 100 MG: 10 INJECTION, EMULSION INTRAVENOUS at 09:28

## 2020-08-14 NOTE — ANESTHESIA PREPROCEDURE EVALUATION
Anesthesia Evaluation     Patient summary reviewed and Nursing notes reviewed   NPO Solid Status: > 8 hours  NPO Liquid Status: > 8 hours           Airway   Mallampati: II  TM distance: >3 FB  Neck ROM: limited  No difficulty expected  Dental - normal exam         Pulmonary - normal exam   (+) pneumonia , asthma,sleep apnea,   Cardiovascular - normal exam    (+) hypertension, valvular problems/murmurs,       Neuro/Psych  (+) numbness, psychiatric history,     GI/Hepatic/Renal/Endo    (+) obesity,  GERD,      Musculoskeletal     Abdominal  - normal exam    Bowel sounds: normal.   Substance History      OB/GYN          Other   arthritis,    history of cancer                  Anesthesia Plan    ASA 3     MAC     intravenous induction     Anesthetic plan, all risks, benefits, and alternatives have been provided, discussed and informed consent has been obtained with: patient.

## 2020-08-14 NOTE — H&P
Patient Care Team:  Floyd Silva MD as PCP - General (Internal Medicine)    Chief complaint: Gastroesophageal reflux, dysphagia, scleroderma, Monahan syndrome    Subjective .     History of present illness: Gastroesophageal reflux, dysphagia, scleroderma, Monahan syndrome    Review of Systems      Endo History:  EGD colonoscopy       Past Medical History:  Past Medical History:   Diagnosis Date   • Arthritis    • Asthma    • Bipolar affective disorder (CMS/HCC)    • Breast cancer (CMS/HCC)     RT   • Colitis    • Depression    • GERD (gastroesophageal reflux disease)    • H/O breast reconstruction     SEVERAL   • H/O laminectomy    • Hamartoma (CMS/HCC)    • Hx of bilateral oophorectomy    • Hypertension    • Hypothyroidism    • Inflammatory bowel disease    • Kienbock's disease, right     WRIST   • Low back pain    • Lupus (CMS/HCC)    • Monahan syndrome    • Osteopenia    • Pneumonia    • Raynaud disease    • Scleroderma (CMS/HCC)    • Von Willebrand disease (CMS/HCC)        Past Surgical History:  Past Surgical History:   Procedure Laterality Date   • ARM DEBRIDEMENT Right     X 3   • BACK SURGERY      Vetas- cervical fusion   • BACK SURGERY      lumbar decomp   • BREAST BIOPSY     • BREAST LUMPECTOMY     • BREAST RECONSTRUCTION Right    • BREAST RECONSTRUCTION Right    • CARDIAC CATHETERIZATION      no stents placed   •  SECTION  ,    • CHOLECYSTECTOMY     • COLONOSCOPY     • COSMETIC SURGERY  [id number]   • ENDOSCOPY     • EXPLORATORY LAPAROTOMY      scar tissue removal   • EYE SURGERY     • FINGER SURGERY Right     ring finger mass excision   • HYSTERECTOMY      PARTIAL   • JOINT REPLACEMENT Right ,    hip and knee   • KNEE ARTHROSCOPY Left 2020    Procedure: LEFT KNEE SCOPE with partial lateral meniscectomy;  Surgeon: Chau Perez MD;  Location: [facility] OR;  Service: Orthopedics   • LASIK      LASIK/ LASIK ENHANCEMENT   • MASTECTOMY RADICAL  Right    • OOPHORECTOMY Bilateral    • SPLENECTOMY     • TUBAL ABDOMINAL LIGATION     • WRIST SURGERY Right     distal radius head removal (bone dead)  plates and screws decomp lunate       Social History:  Social History     Tobacco Use   • Smoking status: Former Smoker     Packs/day: 0.25     Years: 7.00     Pack years: 1.75     Start date:      Last attempt to quit:      Years since quittin.6   • Smokeless tobacco: Never Used   • Tobacco comment: Off and on for  those years   Substance Use Topics   • Alcohol use: Yes     Frequency: Monthly or less     Drinks per session: 1 or 2     Binge frequency: Never     Comment: Rarely   • Drug use: No       Family History:  Family History   Problem Relation Age of Onset   • Colon cancer Mother    • Heart disease Mother    • Cancer Mother    • Kidney disease Father    • Heart disease Father    • Depression Father    • Hyperlipidemia Father    • Vision loss Father    • Colon cancer Sister    • Diabetes Sister    • Heart disease Sister    • Von Willebrand disease Sister    • Von Willebrand disease Brother    • Von Willebrand disease Son    • Breast cancer Son    • Other Son         burdick syndrome   • Diabetes Paternal Grandmother    • Stroke Paternal Grandmother    • Colon cancer Other    • Colon cancer Son    • Von Willebrand disease Son    • Other Son         burdick syndrome   • Breast cancer Son    • Cancer Sister    • Vision loss Sister    • Other Sister    • Stroke Sister    • Cancer Paternal Aunt    • Cancer Maternal Aunt    • Cancer Maternal Aunt    • Hyperlipidemia Sister    • Thyroid disease Sister    • Thyroid disease Sister        Medications:  No medications prior to admission.       Scheduled Meds:  Continuous Infusions:  No current facility-administered medications for this encounter.   PRN Meds:.    ALLERGIES:  Aspirin; Penicillins; Baclofen; and Tape  [adhesive tape]        Objective     Vital Signs:        Physical Exam:      General  Appearance:    Awake and alert, in no acute distress   Head:    Normocephalic, without obvious abnormality, atraumatic   Eyes:            Conjunctivae normal, anicteric sclera   Ears:    Ears appear intact with no abnormalities noted   Throat:   No oral lesions, no thrush, oral mucosa moist   Neck:   No adenopathy, supple, no thyromegaly, no JVD   Lungs:     Clear to auscultation bilaterally, respirations regular, even and unlabored    Heart:    Regular rhythm and normal rate, normal S1 and S2, no            murmur, no gallop, no rub   Chest Wall:    No abnormalities observed   Abdomen:     Normal bowel sounds, soft, non-tender, no rebound or guarding, non-distended, no hepatosplenomegaly   Rectal:     Deferred   Extremities:   Moves all extremities well, no edema, no cyanosis, no             redness   Pulses:   Pulses palpable and equal bilaterally   Skin:   No bleeding, bruising or rash, no jaundice   Lymph nodes:   No palpable adenopathy   Neurologic:   Cranial nerves 2 - 12 grossly intact, no asterixis, sensation intact       Results Review:   I reviewed the patient's labs and imaging.  Lab Results (last 24 hours)     ** No results found for the last 24 hours. **          Imaging Results (Last 24 Hours)     ** No results found for the last 24 hours. **             ASSESSMENT:    Gastroesophageal reflux, dysphagia, Monahan syndrome      Active Problems:    * No active hospital problems. *       PLAN:    EGD and colonoscopy      I discussed the patients findings and my recommendations with the patient.  Cayden Conway MD  08/14/20  07:00

## 2020-08-14 NOTE — OP NOTE
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  Normal EGD  2.  Rectal ulcer consistent with rectal prolapse    Procedure/CPT® Codes:        Staff:  Surgeon(s):  Cayden Conway MD         Anesthesia: Monitored Anesthesia Care    Implants:    Nothing was implanted during the procedure    Specimen:        See Below    Complications:  None    Description of Procedure:  Informed consent was obtained for the procedure, including sedation.  Risks of perforation, hemorrhage, adverse drug reaction and aspiration were discussed.  The patient was brought into the endoscopy suite. Continuous cardiopulmonary monitoring was performed. The patient was placed in the left lateral decubitus position.  The bite block was inserted into the patient's mouth. After adequate sedation was attained, the Olympus gastroscope was inserted into the patient's mouth and advanced to the second portion of the duodenum without difficulty.  Circumferential examination was performed. A retroflex exam was performed in the patient's stomach.  On completion of the exam, the bowel was decompressed, the scope was removed from the patient, the patient tolerated the procedure well, there were no immediate post-operative complications.  Blood loss was minimal.    Examination of the esophagus: Normal  Examination of the stomach: Normal  Examination of the duodenum: Normal    Subsequently,  the Olympus colonoscope was inserted into the patient's rectum and advanced to the level of the cecum and terminal ileum without difficulty.  The bowel prep was good.  Copious irrigation with the endoscopic .   Circumferential examination of the patient's colon was performed on scope withdrawal.  The cecum, ascending colon, and hepatic flexure were examined twice.  The transverse colon, splenic flexure, descending colon, and rectum were examined.  A retroflex exam was performed in the rectum and revealed a solitary rectal ulcer suggestive of rectal prolapse.  The bowel was decompressed, the scope was withdrawn from the patient, and the patient tolerated the procedure well. There were no immediate post-operative complications.  Blood loss was minimal.    Findings:   Terminal ileum: Normal  Colon: Rectal ulcer consistent with rectal prolapse    Impression:  Normal EGD.  Rectal ulcer consistent with rectal prolapse    Recommendations:  1.  Schedule endoscopic ultrasound  2.  Plan EGD and MRI of the pancreas in 1 year  3.  Colonoscopy in 1 year   4.  Follow-up in my office as needed      Cayden Conway MD     Date: 8/14/2020  Time: 10:01

## 2020-08-14 NOTE — DISCHARGE INSTRUCTIONS
A responsible adult should stay with you and you should rest quietly for the rest of the day.    Do not drink alcohol, drive, operate any heavy machinery or power tools or make any legal/important decisions for the next 24 hours.     Progress your diet as tolerated.  If you begin to experience severe pain, increased shortness of breath, racing heartbeat or a fever above 101 F, seek immediate medical attention.     Follow up with MD as instructed. Call office for results in 3 to 5 days if needed.    Recommendations:  1.  Schedule endoscopic ultrasound  2.  Plan EGD and MRI of the pancreas in 1 year  3.  Colonoscopy in 1 year   4.  Follow-up in my office as needed

## 2020-08-14 NOTE — ANESTHESIA POSTPROCEDURE EVALUATION
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        Post Anesthesia Care and Evaluation    Patient location during evaluation: PACU  Patient participation: complete - patient participated  Level of consciousness: awake  Pain scale: See nurse's notes for pain score.  Pain management: adequate  Airway patency: patent  Anesthetic complications: No anesthetic complications  PONV Status: none  Cardiovascular status: acceptable  Respiratory status: acceptable  Hydration status: acceptable    Comments: Patient seen and examined postoperatively; vital signs stable; SpO2 greater than or equal to 90%; cardiopulmonary status stable; nausea/vomiting adequately controlled; pain adequately controlled; no apparent anesthesia complications; patient discharged from anesthesia care when discharge criteria were met

## 2020-08-27 DIAGNOSIS — G89.4 CHRONIC PAIN SYNDROME: ICD-10-CM

## 2020-08-27 RX ORDER — FERROUS SULFATE 325(65) MG
TABLET ORAL
Qty: 30 TABLET | Refills: 0 | Status: SHIPPED | OUTPATIENT
Start: 2020-08-27 | End: 2020-09-28

## 2020-08-27 RX ORDER — CELECOXIB 100 MG/1
CAPSULE ORAL
Qty: 90 CAPSULE | Refills: 0 | Status: SHIPPED | OUTPATIENT
Start: 2020-08-27 | End: 2020-10-12

## 2020-08-27 RX ORDER — MONTELUKAST SODIUM 4 MG/1
TABLET, CHEWABLE ORAL
Qty: 120 TABLET | Refills: 3 | Status: SHIPPED | OUTPATIENT
Start: 2020-08-27 | End: 2021-01-15 | Stop reason: SDUPTHER

## 2020-09-03 ENCOUNTER — LAB (OUTPATIENT)
Dept: LAB | Facility: HOSPITAL | Age: 60
End: 2020-09-03

## 2020-09-03 DIAGNOSIS — M32.9 SLE (SYSTEMIC LUPUS ERYTHEMATOSUS RELATED SYNDROME) (HCC): ICD-10-CM

## 2020-09-03 DIAGNOSIS — E03.9 HYPOTHYROIDISM, UNSPECIFIED TYPE: ICD-10-CM

## 2020-09-03 LAB — ERYTHROCYTE [SEDIMENTATION RATE] IN BLOOD: 8 MM/HR (ref 0–30)

## 2020-09-03 PROCEDURE — 80053 COMPREHEN METABOLIC PANEL: CPT

## 2020-09-03 PROCEDURE — 86140 C-REACTIVE PROTEIN: CPT

## 2020-09-03 PROCEDURE — 85652 RBC SED RATE AUTOMATED: CPT

## 2020-09-03 PROCEDURE — 84443 ASSAY THYROID STIM HORMONE: CPT

## 2020-09-03 PROCEDURE — 84439 ASSAY OF FREE THYROXINE: CPT

## 2020-09-04 LAB
ALBUMIN SERPL-MCNC: 4.4 G/DL (ref 3.5–5.2)
ALBUMIN/GLOB SERPL: 1.6 G/DL
ALP SERPL-CCNC: 90 U/L (ref 39–117)
ALT SERPL W P-5'-P-CCNC: 23 U/L (ref 1–33)
ANION GAP SERPL CALCULATED.3IONS-SCNC: 12.2 MMOL/L (ref 5–15)
AST SERPL-CCNC: 38 U/L (ref 1–32)
BILIRUB SERPL-MCNC: 0.3 MG/DL (ref 0–1.2)
BUN SERPL-MCNC: 21 MG/DL (ref 8–23)
BUN/CREAT SERPL: 16.7 (ref 7–25)
CALCIUM SPEC-SCNC: 9.9 MG/DL (ref 8.6–10.5)
CHLORIDE SERPL-SCNC: 100 MMOL/L (ref 98–107)
CO2 SERPL-SCNC: 29.8 MMOL/L (ref 22–29)
CREAT SERPL-MCNC: 1.26 MG/DL (ref 0.57–1)
CRP SERPL-MCNC: 0.53 MG/DL (ref 0–0.5)
GFR SERPL CREATININE-BSD FRML MDRD: 43 ML/MIN/1.73
GLOBULIN UR ELPH-MCNC: 2.7 GM/DL
GLUCOSE SERPL-MCNC: 82 MG/DL (ref 65–99)
POTASSIUM SERPL-SCNC: 4.6 MMOL/L (ref 3.5–5.2)
PROT SERPL-MCNC: 7.1 G/DL (ref 6–8.5)
SODIUM SERPL-SCNC: 142 MMOL/L (ref 136–145)
T4 FREE SERPL-MCNC: 0.88 NG/DL (ref 0.93–1.7)
TSH SERPL DL<=0.05 MIU/L-ACNC: 0.22 UIU/ML (ref 0.27–4.2)

## 2020-09-09 ENCOUNTER — OFFICE VISIT (OUTPATIENT)
Dept: FAMILY MEDICINE CLINIC | Facility: CLINIC | Age: 60
End: 2020-09-09

## 2020-09-09 VITALS
OXYGEN SATURATION: 96 % | SYSTOLIC BLOOD PRESSURE: 118 MMHG | BODY MASS INDEX: 35.3 KG/M2 | WEIGHT: 206.8 LBS | TEMPERATURE: 97.7 F | DIASTOLIC BLOOD PRESSURE: 78 MMHG | HEART RATE: 85 BPM | RESPIRATION RATE: 16 BRPM | HEIGHT: 64 IN

## 2020-09-09 DIAGNOSIS — G89.4 CHRONIC PAIN SYNDROME: ICD-10-CM

## 2020-09-09 DIAGNOSIS — G47.00 INSOMNIA, UNSPECIFIED TYPE: ICD-10-CM

## 2020-09-09 DIAGNOSIS — F31.9 BIPOLAR AFFECTIVE DISORDER, REMISSION STATUS UNSPECIFIED (HCC): ICD-10-CM

## 2020-09-09 DIAGNOSIS — E03.9 HYPOTHYROIDISM, UNSPECIFIED TYPE: ICD-10-CM

## 2020-09-09 DIAGNOSIS — K62.3 RECTAL PROLAPSE: ICD-10-CM

## 2020-09-09 DIAGNOSIS — M32.9 SLE (SYSTEMIC LUPUS ERYTHEMATOSUS RELATED SYNDROME) (HCC): Primary | ICD-10-CM

## 2020-09-09 DIAGNOSIS — Z15.09 LYNCH SYNDROME: ICD-10-CM

## 2020-09-09 PROCEDURE — 99214 OFFICE O/P EST MOD 30 MIN: CPT | Performed by: FAMILY MEDICINE

## 2020-09-09 RX ORDER — OXYCODONE HYDROCHLORIDE 10 MG/1
10 TABLET ORAL EVERY 6 HOURS PRN
Qty: 120 TABLET | Refills: 0 | Status: SHIPPED | OUTPATIENT
Start: 2020-09-09 | End: 2021-02-24 | Stop reason: SDUPTHER

## 2020-09-09 RX ORDER — LAMOTRIGINE 200 MG/1
200 TABLET ORAL NIGHTLY
COMMUNITY
Start: 2020-08-16

## 2020-09-09 RX ORDER — ALPRAZOLAM 0.5 MG/1
0.5 TABLET ORAL NIGHTLY PRN
Qty: 30 TABLET | Refills: 2 | Status: SHIPPED | OUTPATIENT
Start: 2020-09-09 | End: 2021-01-25

## 2020-09-09 NOTE — PROGRESS NOTES
Chief Complaint   Patient presents with   • Hypothyroidism     2 month follow up      HPI  Tracie Gross is a 60 y.o. female that presents for f/u of multiple medical problems    SLE/scleroderma: met w/ new rheumatologist today. No changes to medication at this time.  Reasonably controlled she is still on hydroxychloroquine 200 BID, celecoxib 200mg daily, nifedipine 60    Rectal prolapse: More bleeding recently and pain up to 6/10. GI agrees that something should be done. She feels the looser the stool, the great the prolapse. Patient is scheduling an appt w/ colorectal surgeon- Dr Rider ([facility]) regarding surgical correction    Monahan syndrome: recent EGD and colonoscopy w/ GI- Man. Repeat in 1 year. Plan for endoscopic US per oncology recommendation per Karen. Plan for alternating MRI abd w/ EGD w/ EUS annually (each every other year). Colonoscopy annually    Chronic pain: pain reasonable at this time, rated 5/10. L ankle and L lower back bothering her the most at this time. Averaging less than one oxycodone daily and gabapentin 600 qam and 1200 qpm.    Bipolar: follows w/ psychiatry- Steven. She has recently doubled quetiapine at night to aid w/ sleep but patient unable to tolerate. Has not tolerated Ambien, Lunesta, trazodone.     Review of Systems   Constitutional: Negative for chills, fever and unexpected weight loss.   HENT: Negative for congestion and rhinorrhea.    Eyes: Negative for visual disturbance.   Respiratory: Negative for cough and shortness of breath.    Cardiovascular: Negative for chest pain and palpitations.   Gastrointestinal: Negative for abdominal pain and vomiting.   Genitourinary: Negative for dysuria.   Musculoskeletal: Positive for arthralgias and gait problem.   Skin: Positive for color change. Negative for rash.     The following portions of the patient's history were reviewed and updated as appropriate: problem list, past medical history, past surgical history, allergies, current  medication    Problem List Tab  Patient History Tab  Immunizations Tab  Medications Tab  Chart Review Tab  Care Everywhere Tab  Synopsis Tab    PE  Vitals:    09/09/20 1601   BP: 118/78   Pulse: 85   Resp: 16   Temp: 97.7 °F (36.5 °C)   SpO2: 96%     Body mass index is 35.5 kg/m².  General: Well nourished, NAD  Head: AT/NC  Eyes: EOMI, anicteric sclera  Resp: CTAB, SCR, BS equal  CV: RRR w/o m/r/g; 2+ pulses  GI: Soft, NT/ND, +BS.  Obese  MSK: FROM, no deformity, swelling ankles  Skin: Warm, dry, intact  Neuro: Alert and oriented. No focal deficits  Psych: Appropriate mood and affect    Imaging  Xr Spine Cervical Complete 4 Or 5 View    Result Date: 7/23/2020  1.Postsurgical changes are noted. There are underlying degenerative changes as well.  Electronically Signed By-Alen Rasmussen On:7/23/2020 3:50 PM This report was finalized on [id number] by  Alen Rasmussen, .    Xr Spine Thoracic 3 View    Result Date: 7/23/2020  1.There are some underlying degenerative changes.  Electronically Signed ByAlysia Rasmussen On:7/23/2020 4:06 PM This report was finalized on [id number] by  Alen Rasmussen, .    Xr Spine Lumbar Complete With Flex & Ext    Result Date: 7/23/2020  1.Slight dextroscoliosis. 2.Evidence for degenerative change.  Electronically Signed By-Alen Rasmussen On:7/23/2020 4:05 PM This report was finalized on [id number] by  Alen Rasmussen, .    Xr Chest 1 View    Result Date: 5/13/2020  1.Suspected chronic opacities in the lung bases, right greater than left. 2.No definite radiographic findings of acute cardiopulmonary abnormality.  Electronically Signed By-DR. Tee Guillen MD On:5/13/2020 7:51 PM This report was finalized on [id number] by DR. Tee Guillen MD.    Ct Chest Pulmonary Embolism    Result Date: 5/13/2020  1.No definite findings of acute pulmonary embolism. 2.New mosaic attenuation of the pulmonary parenchyma with suspicion for mild septal thickening. These findings are nonspecific but may represent mild  edema as there appears to be mild cardiomegaly. A developing infectious or inflammatory process could also have this appearance. 3.Mediastinal and hilar adenopathy, similar to the recent prior chest CT. This appears to have been chronic on prior chest imaging. 4.Suspected scarring in the lung bases, as before.  Electronically Signed By-DR. Tee Guillen MD On:5/13/2020 9:10 PM This report was finalized on [id number] by DR. Tee Guillen MD.    Mri Breast Bilateral With & Without Contrast    Result Date: 5/20/2020  IMPRESSION : No MRI evidence of malignancy.   ASSESSMENT: BI-RADS: Category 2-benign findings.  BI-RADS category Key: Category 0-incomplete-needs additional imaging and/or prior images for comparison. Category 1-negative. Category 2-benign findings. Category 3-probably benign-short interval follow-up suggested. Category 4-suspicious abnormality-biopsy should be considered. Category 5-highly suggestive for malignancy-appropriate action should be taken. Category 6-known biopsy-proven malignancy-appropriate action should be taken.   Electronically Signed By-Simran Riojas On:5/20/2020 2:52 PM This report was finalized on [id number] by  Simran Riojas, .    Xr Chest Pa & Lateral    Result Date: 5/28/2020  1. No acute cardiopulmonary abnormality. 2. Chronic mild elevation of the right hemidiaphragm with linear scarring or atelectasis.  Electronically Signed By-Piotr Yu On:5/28/2020 5:04 PM This report was finalized on [id number] by  Piotr Yu, .      Assessment/Plan   Tracie Gross is a 60 y.o. female that presents for   Chief Complaint   Patient presents with   • Hypothyroidism     2 month follow up      Tracie was seen today for hypothyroidism.    Diagnoses and all orders for this visit:    SLE (systemic lupus erythematosus related syndrome) (CMS/[facility])   -Continue rheumatology follow-up   -Continue home hydroxychloroquine 200 BID, celecoxib 200mg daily, nifedipine 60    Chronic pain syndrome:  Patient doing well with reducing her narcotics and using as needed.  -     oxyCODONE (ROXICODONE) 10 MG tablet; Take 1 tablet by mouth Every 6 (Six) Hours As Needed for Severe Pain .  - Continue home gabapentin 600 every morning and 1200 every afternoon  - Celebrex as above  - Flexeril as needed    Hypothyroidism, unspecified type   -Recent thyroid labs reviewed   -Continue levothyroxine 200 mcg daily    Rectal prolapse   -Continue follow-up with colorectal surgeon    Monahan syndrome   -Continue GI (Man) and oncology (Karen) follow-up    --Plan to alternate MRI abdomen and EGD with EUS every other year    Bipolar affective disorder, remission status unspecified (CMS/[facility])   -Continue psychiatry [id number]   -Continue Lamictal 200 nightly per psychiatry   -Recommend trialing 150 mg Seroquel nightly    Insomnia, unspecified type  -     ALPRAZolam (XANAX) 0.5 MG tablet; Take 1 tablet by mouth At Night As Needed for Sleep.  - Recommend trialing 150 mg Seroquel nightly    Follow-up in 3 months for Medicare wellness visit

## 2020-09-11 RX ORDER — BISOPROLOL FUMARATE AND HYDROCHLOROTHIAZIDE 10; 6.25 MG/1; MG/1
TABLET ORAL
Qty: 90 TABLET | Refills: 0 | Status: SHIPPED | OUTPATIENT
Start: 2020-09-11 | End: 2020-12-08

## 2020-09-18 ENCOUNTER — TELEPHONE (OUTPATIENT)
Dept: ONCOLOGY | Facility: CLINIC | Age: 60
End: 2020-09-18

## 2020-09-18 NOTE — TELEPHONE ENCOUNTER
Caller: ENRIQUE MISHRA     Relationship to patient: SELF    Best call back number:[phone]    Type of visit: B12 INJECTON    PT NEEDS TO BE SCHEDULED FOR A B-12 INJECTION. PLESE REACH OUT TO PT WHEN POSSIBLE

## 2020-09-28 RX ORDER — ALENDRONATE SODIUM 70 MG/1
TABLET ORAL
Qty: 4 TABLET | Refills: 1 | Status: SHIPPED | OUTPATIENT
Start: 2020-09-28 | End: 2020-11-30

## 2020-09-28 RX ORDER — FERROUS SULFATE 325(65) MG
TABLET ORAL
Qty: 30 TABLET | Refills: 0 | Status: SHIPPED | OUTPATIENT
Start: 2020-09-28 | End: 2020-10-28

## 2020-10-10 DIAGNOSIS — G89.4 CHRONIC PAIN SYNDROME: ICD-10-CM

## 2020-10-12 RX ORDER — CELECOXIB 100 MG/1
CAPSULE ORAL
Qty: 90 CAPSULE | Refills: 1 | Status: SHIPPED | OUTPATIENT
Start: 2020-10-12 | End: 2021-01-07

## 2020-10-23 RX ORDER — NIFEDIPINE 60 MG/1
60 TABLET, EXTENDED RELEASE ORAL DAILY
Qty: 90 TABLET | Refills: 2 | Status: SHIPPED | OUTPATIENT
Start: 2020-10-23 | End: 2021-07-19

## 2020-10-28 RX ORDER — FERROUS SULFATE 325(65) MG
TABLET ORAL
Qty: 30 TABLET | Refills: 0 | Status: SHIPPED | OUTPATIENT
Start: 2020-10-28 | End: 2020-11-30

## 2020-11-12 RX ORDER — LISINOPRIL 5 MG/1
TABLET ORAL
Qty: 90 TABLET | Refills: 3 | Status: SHIPPED | OUTPATIENT
Start: 2020-11-12 | End: 2021-11-22

## 2020-11-23 ENCOUNTER — DOCUMENTATION (OUTPATIENT)
Dept: PHYSICAL THERAPY | Facility: CLINIC | Age: 60
End: 2020-11-23

## 2020-11-30 RX ORDER — FERROUS SULFATE 325(65) MG
TABLET ORAL
Qty: 30 TABLET | Refills: 5 | Status: SHIPPED | OUTPATIENT
Start: 2020-11-30 | End: 2021-06-02

## 2020-11-30 RX ORDER — ALENDRONATE SODIUM 70 MG/1
TABLET ORAL
Qty: 4 TABLET | Refills: 5 | Status: SHIPPED | OUTPATIENT
Start: 2020-11-30 | End: 2021-04-06

## 2020-12-07 ENCOUNTER — HOSPITAL ENCOUNTER (OUTPATIENT)
Dept: MAMMOGRAPHY | Facility: HOSPITAL | Age: 60
Discharge: HOME | End: 2020-12-07

## 2020-12-07 ENCOUNTER — HOSPITAL ENCOUNTER (OUTPATIENT)
Dept: BONE DENSITY | Facility: HOSPITAL | Age: 60
Discharge: HOME | End: 2020-12-07

## 2020-12-07 DIAGNOSIS — M81.0 OSTEOPOROSIS, UNSPECIFIED OSTEOPOROSIS TYPE, UNSPECIFIED PATHOLOGICAL FRACTURE PRESENCE: ICD-10-CM

## 2020-12-07 DIAGNOSIS — Z12.39 BREAST SCREENING: ICD-10-CM

## 2020-12-07 DIAGNOSIS — Z12.31 ENCOUNTER FOR SCREENING MAMMOGRAM FOR MALIGNANT NEOPLASM OF BREAST: ICD-10-CM

## 2020-12-07 PROCEDURE — 77067 SCR MAMMO BI INCL CAD: CPT

## 2020-12-07 PROCEDURE — 77063 BREAST TOMOSYNTHESIS BI: CPT

## 2020-12-07 PROCEDURE — 77080 DXA BONE DENSITY AXIAL: CPT

## 2020-12-08 RX ORDER — BISOPROLOL FUMARATE AND HYDROCHLOROTHIAZIDE 10; 6.25 MG/1; MG/1
TABLET ORAL
Qty: 90 TABLET | Refills: 3 | Status: SHIPPED | OUTPATIENT
Start: 2020-12-08 | End: 2021-11-24

## 2020-12-09 ENCOUNTER — LAB (OUTPATIENT)
Dept: FAMILY MEDICINE CLINIC | Facility: CLINIC | Age: 60
End: 2020-12-09

## 2020-12-09 ENCOUNTER — OFFICE VISIT (OUTPATIENT)
Dept: FAMILY MEDICINE CLINIC | Facility: CLINIC | Age: 60
End: 2020-12-09

## 2020-12-09 VITALS
OXYGEN SATURATION: 97 % | HEIGHT: 64 IN | HEART RATE: 91 BPM | WEIGHT: 211 LBS | DIASTOLIC BLOOD PRESSURE: 80 MMHG | RESPIRATION RATE: 16 BRPM | TEMPERATURE: 97.8 F | SYSTOLIC BLOOD PRESSURE: 130 MMHG | BODY MASS INDEX: 36.02 KG/M2

## 2020-12-09 DIAGNOSIS — Z15.09 LYNCH SYNDROME: ICD-10-CM

## 2020-12-09 DIAGNOSIS — F31.9 BIPOLAR AFFECTIVE DISORDER, REMISSION STATUS UNSPECIFIED (HCC): ICD-10-CM

## 2020-12-09 DIAGNOSIS — M32.9 SLE (SYSTEMIC LUPUS ERYTHEMATOSUS RELATED SYNDROME) (HCC): ICD-10-CM

## 2020-12-09 DIAGNOSIS — K51.919 ULCERATIVE COLITIS WITH COMPLICATION, UNSPECIFIED LOCATION (HCC): ICD-10-CM

## 2020-12-09 DIAGNOSIS — B35.1 ONYCHOMYCOSIS: ICD-10-CM

## 2020-12-09 DIAGNOSIS — I10 ESSENTIAL HYPERTENSION: ICD-10-CM

## 2020-12-09 DIAGNOSIS — M81.0 OSTEOPOROSIS, UNSPECIFIED OSTEOPOROSIS TYPE, UNSPECIFIED PATHOLOGICAL FRACTURE PRESENCE: Primary | ICD-10-CM

## 2020-12-09 DIAGNOSIS — K62.3 RECTAL PROLAPSE: ICD-10-CM

## 2020-12-09 DIAGNOSIS — M34.9 SCLERODERMA (HCC): ICD-10-CM

## 2020-12-09 DIAGNOSIS — E03.9 HYPOTHYROIDISM, UNSPECIFIED TYPE: ICD-10-CM

## 2020-12-09 LAB
DEPRECATED RDW RBC AUTO: 47.1 FL (ref 37–54)
ERYTHROCYTE [DISTWIDTH] IN BLOOD BY AUTOMATED COUNT: 13.1 % (ref 12.3–15.4)
HCT VFR BLD AUTO: 39.8 % (ref 34–46.6)
HGB BLD-MCNC: 13.1 G/DL (ref 12–15.9)
MCH RBC QN AUTO: 32.4 PG (ref 26.6–33)
MCHC RBC AUTO-ENTMCNC: 32.9 G/DL (ref 31.5–35.7)
MCV RBC AUTO: 98.5 FL (ref 79–97)
PLATELET # BLD AUTO: 290 10*3/MM3 (ref 140–450)
PMV BLD AUTO: 11.9 FL (ref 6–12)
RBC # BLD AUTO: 4.04 10*6/MM3 (ref 3.77–5.28)
WBC # BLD AUTO: 9.3 10*3/MM3 (ref 3.4–10.8)

## 2020-12-09 PROCEDURE — 85027 COMPLETE CBC AUTOMATED: CPT | Performed by: FAMILY MEDICINE

## 2020-12-09 PROCEDURE — 85652 RBC SED RATE AUTOMATED: CPT | Performed by: FAMILY MEDICINE

## 2020-12-09 PROCEDURE — 80053 COMPREHEN METABOLIC PANEL: CPT | Performed by: FAMILY MEDICINE

## 2020-12-09 PROCEDURE — 84443 ASSAY THYROID STIM HORMONE: CPT | Performed by: FAMILY MEDICINE

## 2020-12-09 PROCEDURE — 86140 C-REACTIVE PROTEIN: CPT | Performed by: FAMILY MEDICINE

## 2020-12-09 PROCEDURE — 99214 OFFICE O/P EST MOD 30 MIN: CPT | Performed by: FAMILY MEDICINE

## 2020-12-09 PROCEDURE — 84481 FREE ASSAY (FT-3): CPT | Performed by: FAMILY MEDICINE

## 2020-12-09 PROCEDURE — 84439 ASSAY OF FREE THYROXINE: CPT | Performed by: FAMILY MEDICINE

## 2020-12-09 PROCEDURE — 36415 COLL VENOUS BLD VENIPUNCTURE: CPT | Performed by: FAMILY MEDICINE

## 2020-12-09 RX ORDER — CYCLOBENZAPRINE HCL 10 MG
10 TABLET ORAL 3 TIMES DAILY PRN
Qty: 100 TABLET | Refills: 3 | Status: SHIPPED | OUTPATIENT
Start: 2020-12-09 | End: 2021-08-08

## 2020-12-09 RX ORDER — HYDROXYCHLOROQUINE SULFATE 200 MG/1
200 TABLET, FILM COATED ORAL 2 TIMES DAILY
Qty: 180 TABLET | Refills: 3 | Status: SHIPPED | OUTPATIENT
Start: 2020-12-09 | End: 2021-04-06 | Stop reason: SDUPTHER

## 2020-12-09 NOTE — PROGRESS NOTES
Chief Complaint   Patient presents with   • Lupus     3mo     HPI  Tracie Gross is a 60 y.o. female that presents for f/u of multiple medical problems    Osteoporosis: recently repeat DEXA scan that revealed osteoporosis. Still on Fosamax and has been on this for 1.5 years. Also taking Ca/VitD.    Hypothyroidism: patient has had some abnormal labs in the past year and medication has been adjusted. Currently on levothyroxine 200 daily and no liothyronine. She notes recent weight gain.    SLE/scleroderma: following w/ rheumatology and has f/u in 3 months. Maintained on Celebrex 100 BID, Plaquenil 200 BID, nifedipine 60. L shoulder and wrist have been more bothersome. Pain managed w/ celebrex 100 BID, gabapentin 600/1200, and oxycodone 10 PRN- hasn't taken anything for the last week.     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 Last EGD/colonoscopy 8/2020, obtaining annually. She reports bloody stool a few weeks ago that has since improved. No associated abdominal pain or vomiting. Now improved. Unclear if this is due to rectal prolapse or IBD.    HTN: 130/80 today. Maintained on Ziac 10/6.25mg, lisinopril 5, and nifedipine 60 daily.  No lightheadedness/dizziness, chest pain or shortness of breath    Bipolar: maintained on quetiapine 100, lamotrigine 200. Follows w/ psych- Steven. No depression or garret. Well controlled.     Review of Systems   Constitutional: Positive for unexpected weight gain. Negative for chills and fever.   HENT: Negative for congestion.    Eyes: Negative for visual disturbance.   Respiratory: Negative for cough and shortness of breath.    Cardiovascular: Negative for chest pain and palpitations.   Gastrointestinal: Negative for abdominal pain and vomiting.   Genitourinary: Negative for dysuria.   Musculoskeletal: Positive for arthralgias and back pain.   Skin: Positive  for dry skin and pallor.   Neurological: Negative for dizziness and light-headedness.   Psychiatric/Behavioral: Negative for depressed mood and stress. The patient is not nervous/anxious.      The following portions of the patient's history were reviewed and updated as appropriate: problem list, past medical history, past surgical history, allergies, current medications    Problem List Tab  Patient History Tab  Immunizations Tab  Medications Tab  Chart Review Tab  Care Everywhere Tab  Synopsis Tab    PE  Vitals:    12/09/20 1443   BP: 130/80   Pulse: 91   Resp: 16   Temp: 97.8 °F (36.6 °C)   SpO2: 97%     Body mass index is 36.22 kg/m².  General: Well nourished, NAD  Head: AT/NC  Eyes: EOMI, anicteric sclera  Resp: CTAB, SCR, BS equal  CV: RRR w/o m/r/g; 2+ pulses  GI: Soft, NT/ND, +BS.  Obese  MSK: FROM, no edema.  Sclerotic digits with no ulceration  Skin: Warm, dry, intact.  Pale digits.  Left great toe with darkening of the nail  Neuro: Alert and oriented. No focal deficits  Psych: Appropriate mood and affect    Hands w/ scleroderma  Imaging  Dexa Bone Density Axial    Result Date: 12/7/2020  Osteoporosis.  Copies of the computerized graph sheet can be obtained in the [facility] PACS or from Epic via the [employer] information department.  Electronically Signed By-Michelle Arreola MD On:12/7/2020 12:14 PM This report was finalized on [id number] by  Michelle Arreola MD.    Mammo Screening Modified With Tomosynthesis Left With Cad    Result Date: 12/7/2020  No mammographic signs of malignancy in the left breast. Recommend routine screening with mammogram and breast MRI, given her increased risk for breast cancer.  BI-RADS ASSESSMENT: BI-RADS 1. Negative.  The patient's information is entered into a computerized reminder system with a targeted due date for the next mammogram.  Note:  It has been reported that there is approximately a 15% false negative in mammography.  Therefore, management of a palpable  abnormality should not be deferred because of a negative mammogram.    Electronically Signed By-Michelle Arreola MD On:12/7/2020 2:29 PM This report was finalized on [id number] by  Michelle Arreola MD.      Assessment/Plan   Tracie Gross is a 60 y.o. female that presents for   Chief Complaint   Patient presents with   • Lupus     3mo     Diagnoses and all orders for this visit:    1. Osteoporosis, unspecified osteoporosis type, unspecified pathological fracture presence (Primary)   -Continue home Fosamax    2. Hypothyroidism, unspecified type  -     T4, Free  -     TSH  - Continue home levothyroxine 200 mcg daily    3. SLE (systemic lupus erythematosus related syndrome) (CMS/HCC)  -     Start Diclofenac Sodium (VOLTAREN) 1 % gel gel; Apply 4 g topically to the appropriate area as directed 4 (Four) Times a Day As Needed (joint pain).  Dispense: 100 g; Refill: 3  -     Comprehensive Metabolic Panel  -     C-reactive Protein  -     Sedimentation Rate  -     Continue hydroxychloroquine (PLAQUENIL) 200 MG tablet; Take 1 tablet by mouth 2 (Two) Times a Day. Indications: Systemic Lupus Erythematosus  Dispense: 180 tablet; Refill: 3  -     Continue cyclobenzaprine (FLEXERIL) 10 MG tablet; Take 1 tablet by mouth 3 (Three) Times a Day As Needed for Muscle Spasms. PRN  Dispense: 100 tablet; Refill: 3  - Continue rheumatology follow-up    4. Scleroderma (CMS/HCC)  -     Diclofenac Sodium (VOLTAREN) 1 % gel gel; Apply 4 g topically to the appropriate area as directed 4 (Four) Times a Day As Needed (joint pain).  Dispense: 100 g; Refill: 3  -     Comprehensive Metabolic Panel  -     C-reactive Protein  -     Sedimentation Rate  -     hydroxychloroquine (PLAQUENIL) 200 MG tablet; Take 1 tablet by mouth 2 (Two) Times a Day. Indications: Systemic Lupus Erythematosus  Dispense: 180 tablet; Refill: 3  -     cyclobenzaprine (FLEXERIL) 10 MG tablet; Take 1 tablet by mouth 3 (Three) Times a Day As Needed for Muscle Spasms. PRN  Dispense:  100 tablet; Refill: 3  - Continue home nifedipine  - Continue rheumatology follow-up    5. Rectal prolapse  -     CBC (No Diff)  - Continue follow-up with colorectal surgery as well as GI  - Continue PT per colorectal surgery    6. Bipolar affective disorder, remission status unspecified (CMS/HCC)   -Continue psychiatry follow-up   -Continue home quetiapine 100 nightly and Lamictal 200 daily    7. Essential hypertension: 130/80 today   -Continue home lisinopril 5, nifedipine 60, Ziac 10/6.25    8. Ulcerative colitis with complication, unspecified location (CMS/HCC)  -     CBC (No Diff)  - Continue GI [id number]    9. Monahan syndrome: Last EGD/colonoscopy 8/2020   -Continue GI [id number]   -Continue oncology [id number]    10. Onychomycosis  -     Start ciclopirox (PENLAC) 8 % solution; Apply  topically to the appropriate area as directed Every Night.  Dispense: 6 mL; Refill: 1

## 2020-12-10 LAB
ALBUMIN SERPL-MCNC: 4.5 G/DL (ref 3.5–5.2)
ALBUMIN/GLOB SERPL: 1.6 G/DL
ALP SERPL-CCNC: 76 U/L (ref 39–117)
ALT SERPL W P-5'-P-CCNC: 21 U/L (ref 1–33)
ANION GAP SERPL CALCULATED.3IONS-SCNC: 12.2 MMOL/L (ref 5–15)
AST SERPL-CCNC: 35 U/L (ref 1–32)
BILIRUB SERPL-MCNC: 0.3 MG/DL (ref 0–1.2)
BUN SERPL-MCNC: 24 MG/DL (ref 8–23)
BUN/CREAT SERPL: 21.6 (ref 7–25)
CALCIUM SPEC-SCNC: 9.8 MG/DL (ref 8.6–10.5)
CHLORIDE SERPL-SCNC: 101 MMOL/L (ref 98–107)
CO2 SERPL-SCNC: 27.8 MMOL/L (ref 22–29)
CREAT SERPL-MCNC: 1.11 MG/DL (ref 0.57–1)
CRP SERPL-MCNC: 0.46 MG/DL (ref 0–0.5)
ERYTHROCYTE [SEDIMENTATION RATE] IN BLOOD: 10 MM/HR (ref 0–30)
GFR SERPL CREATININE-BSD FRML MDRD: 50 ML/MIN/1.73
GLOBULIN UR ELPH-MCNC: 2.8 GM/DL
GLUCOSE SERPL-MCNC: 86 MG/DL (ref 65–99)
POTASSIUM SERPL-SCNC: 4.4 MMOL/L (ref 3.5–5.2)
PROT SERPL-MCNC: 7.3 G/DL (ref 6–8.5)
SODIUM SERPL-SCNC: 141 MMOL/L (ref 136–145)
T4 FREE SERPL-MCNC: 1.7 NG/DL (ref 0.93–1.7)
TSH SERPL DL<=0.05 MIU/L-ACNC: 0.01 UIU/ML (ref 0.27–4.2)

## 2020-12-10 NOTE — TELEPHONE ENCOUNTER
----- Message from Suzan Jones MD sent at 12/9/2020  6:15 PM EST -----  Her bone density continues to show osteoporosis despite being on Fosamax.  Please schedule follow-up visit to further discuss.  She should continue Os-Darrian D 600 mg twice a day

## 2020-12-10 NOTE — TELEPHONE ENCOUNTER
I called and spoke to the patient. I moved her follow up to 1/21/20 with Dr Jones to discuss bone scan and ongoing osteoporosis. She is to continue calcium supplements, and she requested a refill. I pended the RX.

## 2020-12-11 LAB — T3FREE SERPL-MCNC: 2.79 PG/ML (ref 2–4.4)

## 2020-12-14 RX ORDER — VALACYCLOVIR HYDROCHLORIDE 1 G/1
1000 TABLET, FILM COATED ORAL 3 TIMES DAILY
Qty: 21 TABLET | Refills: 0 | Status: SHIPPED | OUTPATIENT
Start: 2020-12-14 | End: 2020-12-21

## 2021-01-06 DIAGNOSIS — G89.4 CHRONIC PAIN SYNDROME: ICD-10-CM

## 2021-01-07 ENCOUNTER — LAB (OUTPATIENT)
Dept: LAB | Facility: HOSPITAL | Age: 61
End: 2021-01-07

## 2021-01-07 DIAGNOSIS — R50.9 FEVER, UNSPECIFIED FEVER CAUSE: ICD-10-CM

## 2021-01-07 DIAGNOSIS — R50.9 FEVER, UNSPECIFIED FEVER CAUSE: Primary | ICD-10-CM

## 2021-01-07 PROCEDURE — U0004 COV-19 TEST NON-CDC HGH THRU: HCPCS

## 2021-01-07 PROCEDURE — C9803 HOPD COVID-19 SPEC COLLECT: HCPCS

## 2021-01-07 RX ORDER — CELECOXIB 100 MG/1
CAPSULE ORAL
Qty: 90 CAPSULE | Refills: 1 | Status: SHIPPED | OUTPATIENT
Start: 2021-01-07 | End: 2021-04-05

## 2021-01-07 RX ORDER — PREDNISONE 20 MG/1
TABLET ORAL
Qty: 15 TABLET | Refills: 0 | Status: SHIPPED | OUTPATIENT
Start: 2021-01-07 | End: 2021-01-21

## 2021-01-08 PROBLEM — U07.1 COVID-19: Status: ACTIVE | Noted: 2021-01-08

## 2021-01-08 LAB — SARS-COV-2 ORF1AB RESP QL NAA+PROBE: DETECTED

## 2021-01-08 RX ORDER — DIPHENHYDRAMINE HYDROCHLORIDE 50 MG/ML
50 INJECTION INTRAMUSCULAR; INTRAVENOUS AS NEEDED
Status: CANCELLED | OUTPATIENT
Start: 2021-01-11

## 2021-01-08 RX ORDER — AZITHROMYCIN 500 MG/1
500 TABLET, FILM COATED ORAL DAILY
Qty: 3 TABLET | Refills: 0 | Status: SHIPPED | OUTPATIENT
Start: 2021-01-08 | End: 2021-01-21

## 2021-01-08 RX ORDER — METHYLPREDNISOLONE SODIUM SUCCINATE 125 MG/2ML
125 INJECTION, POWDER, LYOPHILIZED, FOR SOLUTION INTRAMUSCULAR; INTRAVENOUS AS NEEDED
Status: CANCELLED | OUTPATIENT
Start: 2021-01-11

## 2021-01-08 RX ORDER — LEVOTHYROXINE SODIUM 0.2 MG/1
200 TABLET ORAL DAILY
Qty: 90 TABLET | Refills: 1 | Status: SHIPPED | OUTPATIENT
Start: 2021-01-08 | End: 2021-03-31 | Stop reason: SDUPTHER

## 2021-01-08 RX ORDER — EPINEPHRINE 1 MG/ML
0.3 INJECTION, SOLUTION INTRAMUSCULAR; SUBCUTANEOUS AS NEEDED
Status: CANCELLED | OUTPATIENT
Start: 2021-01-11

## 2021-01-08 RX ORDER — SODIUM CHLORIDE 9 MG/ML
250 INJECTION, SOLUTION INTRAVENOUS ONCE
Status: CANCELLED | OUTPATIENT
Start: 2021-01-11

## 2021-01-08 NOTE — PROGRESS NOTES
Spoke with pt She said if you think it is best she will do the infusion. Not sure how I go about ordering this do you? I dont mind ordering as long as you let me know exactly what to order Please advise

## 2021-01-08 NOTE — PROGRESS NOTES
Spoke with pt and let her know that ambulatory care will be calling her to schedule infusion. Faxed paper work down with face sheet .

## 2021-01-11 ENCOUNTER — HOSPITAL ENCOUNTER (OUTPATIENT)
Dept: INFUSION THERAPY | Facility: HOSPITAL | Age: 61
Discharge: HOME | End: 2021-01-11
Admitting: FAMILY MEDICINE

## 2021-01-11 VITALS
TEMPERATURE: 98.1 F | HEART RATE: 55 BPM | SYSTOLIC BLOOD PRESSURE: 150 MMHG | RESPIRATION RATE: 16 BRPM | DIASTOLIC BLOOD PRESSURE: 81 MMHG | OXYGEN SATURATION: 98 %

## 2021-01-11 DIAGNOSIS — U07.1 COVID-19: ICD-10-CM

## 2021-01-11 DIAGNOSIS — U07.1 COVID-19 VIRUS DETECTED: Primary | ICD-10-CM

## 2021-01-11 PROCEDURE — M0239 BAMLANIVIMAB-XXXX INFUSION: HCPCS | Performed by: FAMILY MEDICINE

## 2021-01-11 PROCEDURE — 96365 THER/PROPH/DIAG IV INF INIT: CPT

## 2021-01-11 PROCEDURE — 25010000006 BAMLANIVIMAB 700 MG/20ML SOLUTION 20 ML VIAL: Performed by: FAMILY MEDICINE

## 2021-01-11 PROCEDURE — 36415 COLL VENOUS BLD VENIPUNCTURE: CPT

## 2021-01-11 RX ORDER — EPINEPHRINE 0.1 MG/ML
SYRINGE (ML) INJECTION
Status: DISCONTINUED
Start: 2021-01-11 | End: 2021-01-11 | Stop reason: WASHOUT

## 2021-01-11 RX ORDER — DIPHENHYDRAMINE HYDROCHLORIDE 50 MG/ML
50 INJECTION INTRAMUSCULAR; INTRAVENOUS AS NEEDED
Status: CANCELLED | OUTPATIENT
Start: 2021-01-11

## 2021-01-11 RX ORDER — EPINEPHRINE 1 MG/ML
0.3 INJECTION, SOLUTION, CONCENTRATE INTRAVENOUS AS NEEDED
Status: DISCONTINUED | OUTPATIENT
Start: 2021-01-11 | End: 2021-01-13 | Stop reason: HOSPADM

## 2021-01-11 RX ORDER — EPINEPHRINE 1 MG/ML
0.3 INJECTION, SOLUTION, CONCENTRATE INTRAVENOUS AS NEEDED
Status: CANCELLED | OUTPATIENT
Start: 2021-01-11

## 2021-01-11 RX ORDER — SODIUM CHLORIDE 9 MG/ML
250 INJECTION, SOLUTION INTRAVENOUS ONCE
Status: CANCELLED | OUTPATIENT
Start: 2021-01-11

## 2021-01-11 RX ORDER — DIPHENHYDRAMINE HYDROCHLORIDE 50 MG/ML
50 INJECTION INTRAMUSCULAR; INTRAVENOUS AS NEEDED
Status: DISCONTINUED | OUTPATIENT
Start: 2021-01-11 | End: 2021-01-13 | Stop reason: HOSPADM

## 2021-01-11 RX ORDER — METHYLPREDNISOLONE SODIUM SUCCINATE 125 MG/2ML
125 INJECTION, POWDER, LYOPHILIZED, FOR SOLUTION INTRAMUSCULAR; INTRAVENOUS AS NEEDED
Status: CANCELLED | OUTPATIENT
Start: 2021-01-11

## 2021-01-11 RX ORDER — SODIUM CHLORIDE 9 MG/ML
250 INJECTION, SOLUTION INTRAVENOUS ONCE
Status: DISCONTINUED | OUTPATIENT
Start: 2021-01-11 | End: 2021-01-13 | Stop reason: HOSPADM

## 2021-01-11 RX ORDER — METHYLPREDNISOLONE SODIUM SUCCINATE 125 MG/2ML
125 INJECTION, POWDER, LYOPHILIZED, FOR SOLUTION INTRAMUSCULAR; INTRAVENOUS AS NEEDED
Status: DISCONTINUED | OUTPATIENT
Start: 2021-01-11 | End: 2021-01-13 | Stop reason: HOSPADM

## 2021-01-11 RX ADMIN — SODIUM CHLORIDE 700 MG: 9 INJECTION, SOLUTION INTRAVENOUS at 09:05

## 2021-01-15 RX ORDER — MONTELUKAST SODIUM 4 MG/1
2 TABLET, CHEWABLE ORAL 2 TIMES DAILY
Qty: 120 TABLET | Refills: 3 | Status: SHIPPED | OUTPATIENT
Start: 2021-01-15 | End: 2021-07-12

## 2021-01-15 NOTE — TELEPHONE ENCOUNTER
Pharmacist requesting a refill of colestipol (COLESTID) 1 g tablet for the patient.     [facility] - [place], [place] - [address] [place] [address] [address] [facility] [address] - [phone]  - [phone] FX

## 2021-01-21 ENCOUNTER — OFFICE VISIT (OUTPATIENT)
Dept: ONCOLOGY | Facility: CLINIC | Age: 61
End: 2021-01-21

## 2021-01-21 ENCOUNTER — LAB (OUTPATIENT)
Dept: LAB | Facility: HOSPITAL | Age: 61
End: 2021-01-21

## 2021-01-21 VITALS
BODY MASS INDEX: 36.74 KG/M2 | DIASTOLIC BLOOD PRESSURE: 66 MMHG | SYSTOLIC BLOOD PRESSURE: 105 MMHG | HEIGHT: 64 IN | RESPIRATION RATE: 18 BRPM | HEART RATE: 79 BPM | TEMPERATURE: 97.3 F | WEIGHT: 215.2 LBS

## 2021-01-21 DIAGNOSIS — R92.8 OTHER ABNORMAL AND INCONCLUSIVE FINDINGS ON DIAGNOSTIC IMAGING OF BREAST: ICD-10-CM

## 2021-01-21 DIAGNOSIS — D68.00 VON WILLEBRAND DISEASE (HCC): ICD-10-CM

## 2021-01-21 DIAGNOSIS — D64.9 ANEMIA, UNSPECIFIED TYPE: ICD-10-CM

## 2021-01-21 DIAGNOSIS — Z80.0 FAMILY HISTORY OF MALIGNANT NEOPLASM OF COLON: Primary | ICD-10-CM

## 2021-01-21 DIAGNOSIS — Z15.09 LYNCH SYNDROME: ICD-10-CM

## 2021-01-21 PROCEDURE — 99214 OFFICE O/P EST MOD 30 MIN: CPT | Performed by: INTERNAL MEDICINE

## 2021-01-21 NOTE — PROGRESS NOTES
Hematology/Oncology Outpatient Follow Up    PATIENT NAME:Tracie Gross  :1960  MRN: [id number]  PRIMARY CARE PHYSICIAN: Floyd Silva MD  REFERRING PHYSICIAN: Floyd Silva, *    Chief Complaint   Patient presents with   • Follow-up     History of breast cancer       HISTORY OF PRESENT ILLNESS:     This is a 58-year-old female who was diagnosed with right breast cancer in  when she was 32 years old.  Patient was originally seen on 18.  Please refer to the details of that note for more information.   · 18 - CBC:  WBC 13.6, hemoglobin 11.7, platelet count 392,000.  Differentials 65% neutrophils, 15% lymphocytes.  There was mild monocytosis at 16.  Eosinophils were 2.3 and basophils were 0.3.  B12 292.  Ferritin 88.  Folate 13.8.  AST slightly high at 57.  Alkaline phosphatase was high at 326.  .  Haptoglobin 179, normal.  SPEP with DYLAN did not show any monoclonal protein.  Flow cytometry did not show any evidence of acute leukemia or lymphoproliferative disease.  There was monocytosis measured at 21%.  Reticulocyte count normal 1.02.  Peripheral smear showed absolute monocytosis at 19%, thrombocytosis and macrocytosis.  BCR-abl was negative.  Methylmalonic acid level was elevated to 420.    · 18 - PET/CT scan showed multiple small bilateral lymph nodes in the neck without metabolic activity or change from prior CT scans and are likely due to SLE.  There were unchanged bilateral level 1 axillary lymph nodes without metabolic activity.  Multiple mediastinal nodes were also seen.  The largest 11 mm, unchanged from prior imaging with no hypermetabolic activity.  There was no evidence of metastatic disease in the abdomen or pelvis.  There was no focal lesion within the liver or biliary system.  Multiple small retroperitoneal and external iliac nodes, bilateral inguinal nodes similar to prior imaging with no PET activity.    · 18 - RICHIE-2 analysis with no mutation  identified.    · 11/29/18 - Bone density revealed osteoporosis.  Patient is currently on Fosamax.   · 12/3/18 - Bone marrow aspiration and biopsy showed normocellular bone marrow at 40%.  There was adequate iron stores and no evidence of malignancy was seen.  Flow cytometry was negative.  Cytogenetics showed normal female karyotype.  Blasts were less than 3% of nucleated cells.  There was no significant dyspoiesis.   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     · 1/22/19 - Urine cytology was negative for malignant cells.    · 2/28/19 - Patient seen by Dermatology for her annual skin evaluation.   · 3/13/19 - CT scan of the chest, abdomen and pelvis:  CT scan of chest showed chronic changes.  · 5/29/2019 patient had an EGD with polypectomy performed by Dr. Conway.  Dr. Conway has recommended follow-up MRI of the pancreas in 2 years.  And also EGD and colonoscopy in 2 years.  · 11/13/19-left screening mammogram was negative follow-up in 1 year was recommended  · 5/4/2020 patient had a CT scan of the chest, abdomen and pelvis multiple borderline enlarged mediastinal lymph nodes the largest measuring 2.2 cm in the subcarinal space unchanged from prior.  Pancreas is normal.  There are few retroperitoneal mesenteric and pelvic lymph node enlargement which are similar to prior exam.  All are subcentimeter in size.  · 5/20/2020 patient had bilateral breast MRI did not show any evidence of malignancy.  · 12/7/2020 patient DEXA scan showed persistent and worsened osteoporosis    Past Medical History:   Diagnosis Date   • Arthritis    • Asthma    • Bipolar affective disorder (CMS/HCC)    • Breast cancer (CMS/HCC)     RT   • GERD (gastroesophageal reflux disease) 1993   • H/O breast reconstruction     SEVERAL   • H/O laminectomy    • Hamartoma (CMS/HCC)    • Hx of bilateral  oophorectomy    • Hypertension    • Hypothyroidism    • Inflammatory bowel disease    • Kienbock's disease, right     WRIST   • Low back pain    • Lupus (CMS/HCC)    • Monahan syndrome    • Osteoporosis    • Raynaud disease    • Scleroderma (CMS/HCC)    • Von Willebrand disease (CMS/HCC)        Past Surgical History:   Procedure Laterality Date   • ARM DEBRIDEMENT Right     X 3   • BACK SURGERY      Vetas- cervical fusion   • BACK SURGERY      lumbar decomp   • BREAST BIOPSY     • BREAST LUMPECTOMY     • BREAST RECONSTRUCTION Right    • BREAST RECONSTRUCTION Right    • CARDIAC CATHETERIZATION      no stents placed   •  SECTION  1980   • CHOLECYSTECTOMY     • COLONOSCOPY     • COLONOSCOPY N/A 2020    Procedure: COLONOSCOPY;  Surgeon: Cayden Conway MD;  Location: [facility] ENDOSCOPY;  Service: Gastroenterology;  Laterality: N/A;  rectal ulcer   • COSMETIC SURGERY  [id number]   • ENDOSCOPY     • ENDOSCOPY N/A 2020    Procedure: ESOPHAGOGASTRODUODENOSCOPY;  Surgeon: Cayden Conway MD;  Location: [facility] ENDOSCOPY;  Service: Gastroenterology;  Laterality: N/A;  Normal EGD   • EXPLORATORY LAPAROTOMY      scar tissue removal   • EYE SURGERY     • FINGER SURGERY Right     ring finger mass excision   • HYSTERECTOMY      PARTIAL   • JOINT REPLACEMENT Right     hip and knee   • KNEE ARTHROSCOPY Left 2020    Procedure: LEFT KNEE SCOPE with partial lateral meniscectomy;  Surgeon: Chau Perez MD;  Location: [facility] MAIN OR;  Service: Orthopedics   • LASIK      LASIK/ LASIK ENHANCEMENT   • MASTECTOMY RADICAL Right    • OOPHORECTOMY Bilateral    • SPLENECTOMY     • TUBAL ABDOMINAL LIGATION     • WRIST SURGERY Right     distal radius head removal (bone dead)  plates and screws decomp lunate         Current Outpatient Medications:   •  alendronate (FOSAMAX) 70 MG tablet, TAKE 1 TABLET BY MOUTH ONCE WEEKLY ON AN EMPTY STOMACH BEFORE BREAKFAST. REMAIN UPRIGHT FOR 30  MINUTES & TAKE WITH 8 OUNCES OF WATER, Disp: 4 tablet, Rfl: 5  •  ALPRAZolam (XANAX) 0.5 MG tablet, Take 1 tablet by mouth At Night As Needed for Sleep., Disp: 30 tablet, Rfl: 2  •  bisoprolol-hydrochlorothiazide (ZIAC) 10-6.25 MG per tablet, TAKE ONE TABLET BY MOUTH DAILY, Disp: 90 tablet, Rfl: 3  •  Calcium-Vitamin D 600-200 MG-UNIT per tablet, Take 1 tablet by mouth 2 (Two) Times a Day., Disp: 60 tablet, Rfl: 0  •  celecoxib (CeleBREX) 100 MG capsule, TAKE TWO CAPSULES BY MOUTH DAILY, Disp: 90 capsule, Rfl: 1  •  ciclopirox (PENLAC) 8 % solution, Apply  topically to the appropriate area as directed Every Night., Disp: 6 mL, Rfl: 1  •  clindamycin (CLEOCIN T) 1 % lotion, Apply  topically to the appropriate area as directed Every Night. Use on face, Disp: , Rfl:   •  colestipol (COLESTID) 1 g tablet, Take 2 tablets by mouth 2 (Two) Times a Day., Disp: 120 tablet, Rfl: 3  •  cyclobenzaprine (FLEXERIL) 10 MG tablet, Take 1 tablet by mouth 3 (Three) Times a Day As Needed for Muscle Spasms. PRN, Disp: 100 tablet, Rfl: 3  •  dexlansoprazole (DEXILANT) 60 MG capsule, Take 60 mg by mouth Daily., Disp: , Rfl:   •  Diclofenac Sodium (VOLTAREN) 1 % gel gel, Apply 4 g topically to the appropriate area as directed 4 (Four) Times a Day As Needed (joint pain)., Disp: 100 g, Rfl: 3  •  famotidine (PEPCID) 20 MG tablet, Take 1 tablet by mouth 2 (Two) Times a Day As Needed for Heartburn., Disp: 120 tablet, Rfl: 5  •  ferrous sulfate 325 (65 FE) MG tablet, TAKE ONE TABLET BY MOUTH DAILY, Disp: 30 tablet, Rfl: 5  •  Flaxseed, Linseed, (FLAXSEED OIL MAX STR) 1300 MG capsule, Take 1 tablet by mouth 2 (Two) Times a Day., Disp: , Rfl:   •  gabapentin (NEURONTIN) 300 MG capsule, Take 600 mg by mouth Every Morning., Disp: , Rfl:   •  gabapentin (NEURONTIN) 600 MG tablet, Take 1,200 mg by mouth Every Night., Disp: , Rfl:   •  hydroxychloroquine (PLAQUENIL) 200 MG tablet, Take 1 tablet by mouth 2 (Two) Times a Day. Indications: Systemic  Lupus Erythematosus, Disp: 180 tablet, Rfl: 3  •  hydrOXYzine (ATARAX) 25 MG tablet, Take 25 mg by mouth Every 8 (Eight) Hours As Needed for Itching., Disp: , Rfl:   •  lamoTRIgine (LaMICtal) 200 MG tablet, Take 1 tablet by mouth Daily., Disp: , Rfl:   •  levothyroxine (SYNTHROID, LEVOTHROID) 200 MCG tablet, Take 1 tablet by mouth Daily., Disp: 90 tablet, Rfl: 1  •  lisinopril (PRINIVIL,ZESTRIL) 5 MG tablet, TAKE ONE TABLET BY MOUTH DAILY, Disp: 90 tablet, Rfl: 3  •  Methylnaltrexone Bromide (RELISTOR) 150 MG tablet, Take 2 tablets by mouth Daily., Disp: , Rfl:   •  NIFEdipine XL (PROCARDIA XL) 60 MG 24 hr tablet, Take 1 tablet by mouth Daily., Disp: 90 tablet, Rfl: 2  •  ondansetron (ZOFRAN) 4 MG tablet, Take 4 mg by mouth Every 8 (Eight) Hours As Needed., Disp: , Rfl:   •  oxyCODONE (ROXICODONE) 10 MG tablet, Take 1 tablet by mouth Every 6 (Six) Hours As Needed for Severe Pain ., Disp: 120 tablet, Rfl: 0  •  QUEtiapine (SEROquel) 100 MG tablet, Take 100 mg by mouth Every Night., Disp: , Rfl:     Allergies   Allergen Reactions   • Aspirin Other (See Comments)     VONWILLENBRAND DISEASE    • Penicillins Shortness Of Breath   • Baclofen Hives   • Tape  [Adhesive Tape] Rash       Family History   Problem Relation Age of Onset   • Colon cancer Mother    • Heart disease Mother    • Cancer Mother    • Kidney disease Father    • Heart disease Father    • Depression Father    • Hyperlipidemia Father    • Vision loss Father    • Colon cancer Sister    • Diabetes Sister    • Heart disease Sister    • Von Willebrand disease Sister    • Von Willebrand disease Brother    • Von Willebrand disease Son    • Breast cancer Son    • Other Son         burdick syndrome   • Diabetes Paternal Grandmother    • Stroke Paternal Grandmother    • Colon cancer Other    • Colon cancer Son    • Von Willebrand disease Son    • Other Son         burdick syndrome   • Breast cancer Son    • Cancer Sister    • Vision loss Sister    • Other Sister    •  Stroke Sister    • Cancer Paternal Aunt    • Cancer Maternal Aunt    • Cancer Maternal Aunt    • Hyperlipidemia Sister    • Thyroid disease Sister    • Thyroid disease Sister        Cancer-related family history includes Breast cancer in her son and son; Cancer in her maternal aunt, maternal aunt, mother, paternal aunt, and sister; Colon cancer in her mother, sister, son, and another family member.    Social History     Tobacco Use   • Smoking status: Former Smoker     Packs/day: 0.25     Years: 7.00     Pack years: 1.75     Start date:      Quit date:      Years since quittin.0   • Smokeless tobacco: Never Used   • Tobacco comment: Off and on for  those years   Substance Use Topics   • Alcohol use: Yes     Frequency: Monthly or less     Drinks per session: 1 or 2     Binge frequency: Never     Comment: Rarely   • Drug use: No       I have reviewed and confirmed the accuracy of the patient's history:  as entered by the MA/LPN/RN. Suzan Jones MD 21       SUBJECTIVE:    Has no new issues          REVIEW OF SYSTEMS:  Review of Systems   Constitutional: Negative for chills and fever.   HENT: Negative for ear pain, mouth sores, nosebleeds and sore throat.    Eyes: Negative for photophobia and visual disturbance.   Respiratory: Negative for wheezing and stridor.    Cardiovascular: Negative for chest pain and palpitations.   Gastrointestinal: Negative for abdominal pain, diarrhea, nausea and vomiting.   Endocrine: Negative for cold intolerance and heat intolerance.   Genitourinary: Negative for dysuria and hematuria.   Musculoskeletal: Negative for joint swelling and neck stiffness.   Skin: Negative for color change and rash.   Neurological: Negative for seizures and syncope.   Hematological: Negative for adenopathy.        No obvious bleeding   Psychiatric/Behavioral: Negative for agitation, confusion and hallucinations.     I have reviewed and confirmed the accuracy of the ROS as documented  "by the MA/LPN/RN Suzan Jones MD      OBJECTIVE:    Vitals:    01/21/21 1316   BP: 105/66   Pulse: 79   Resp: 18   Temp: 97.3 °F (36.3 °C)   Weight: 97.6 kg (215 lb 3.2 oz)   Height: 162.6 cm (64\")   PainSc: 0-No pain       ECOG  (1) Restricted in physically strenuous activity, ambulatory and able to do work of light nature    Physical Exam   Constitutional: She is oriented to person, place, and time. She appears well-developed. No distress.   HENT:   Head: Normocephalic and atraumatic.   Right Ear: External ear normal.   Nose: Nose normal.   Eyes: Pupils are equal, round, and reactive to light. Conjunctivae are normal. Right eye exhibits no discharge. Left eye exhibits no discharge. No scleral icterus.   Neck: Normal range of motion. Neck supple. No thyromegaly present.   Cardiovascular: Normal rate, regular rhythm and normal heart sounds. Exam reveals no gallop and no friction rub.   Pulmonary/Chest: Effort normal. No stridor. No respiratory distress. She has no wheezes. Left breast exhibits no inverted nipple, no mass, no nipple discharge, no skin change and no tenderness. No breast swelling, tenderness, discharge or bleeding.   Right chest wall does not reveal any palpable masses.  Bilateral axilla was negative   Abdominal: Soft. Bowel sounds are normal. She exhibits no mass. There is no abdominal tenderness. There is no rebound and no guarding.   Musculoskeletal: Normal range of motion. No tenderness.   Lymphadenopathy:     She has no cervical adenopathy.   Neurological: She is alert and oriented to person, place, and time. She exhibits normal muscle tone.   Skin: Skin is warm. No rash noted. She is not diaphoretic. No erythema.   Psychiatric: Her behavior is normal. Judgment and thought content normal.   Nursing note and vitals reviewed.    I have reexamined the patient and the results are consistent with the previously documented exam. Suzan Jones MD     RECENT LABS    WBC   Date Value " Ref Range Status   12/09/2020 9.30 3.40 - 10.80 10*3/mm3 Final     RBC   Date Value Ref Range Status   12/09/2020 4.04 3.77 - 5.28 10*6/mm3 Final     Hemoglobin   Date Value Ref Range Status   12/09/2020 13.1 12.0 - 15.9 g/dL Final     Hematocrit   Date Value Ref Range Status   12/09/2020 39.8 34.0 - 46.6 % Final     MCV   Date Value Ref Range Status   12/09/2020 98.5 (H) 79.0 - 97.0 fL Final     MCH   Date Value Ref Range Status   12/09/2020 32.4 26.6 - 33.0 pg Final     MCHC   Date Value Ref Range Status   12/09/2020 32.9 31.5 - 35.7 g/dL Final     RDW   Date Value Ref Range Status   12/09/2020 13.1 12.3 - 15.4 % Final     RDW-SD   Date Value Ref Range Status   12/09/2020 47.1 37.0 - 54.0 fl Final     MPV   Date Value Ref Range Status   12/09/2020 11.9 6.0 - 12.0 fL Final     Platelets   Date Value Ref Range Status   12/09/2020 290 140 - 450 10*3/mm3 Final     Neutrophil %   Date Value Ref Range Status   08/10/2020 55.2 42.7 - 76.0 % Final     Lymphocyte %   Date Value Ref Range Status   08/10/2020 27.9 19.6 - 45.3 % Final     Monocyte %   Date Value Ref Range Status   08/10/2020 12.8 (H) 5.0 - 12.0 % Final     Eosinophil %   Date Value Ref Range Status   08/10/2020 3.6 0.3 - 6.2 % Final     Basophil %   Date Value Ref Range Status   08/10/2020 0.5 0.0 - 1.5 % Final     Immature Grans %   Date Value Ref Range Status   07/03/2020 0.4 0.0 - 0.5 % Final     Neutrophils, Absolute   Date Value Ref Range Status   08/10/2020 6.06 1.70 - 7.00 10*3/mm3 Final     Lymphocytes, Absolute   Date Value Ref Range Status   08/10/2020 3.06 0.70 - 3.10 10*3/mm3 Final     Monocytes, Absolute   Date Value Ref Range Status   08/10/2020 1.41 (H) 0.10 - 0.90 10*3/mm3 Final     Eosinophils, Absolute   Date Value Ref Range Status   08/10/2020 0.40 0.00 - 0.40 10*3/mm3 Final     Basophils, Absolute   Date Value Ref Range Status   08/10/2020 0.05 0.00 - 0.20 10*3/mm3 Final     Immature Grans, Absolute   Date Value Ref Range Status    07/03/2020 0.04 0.00 - 0.05 10*3/mm3 Final     [facility]   Date Value Ref Range Status   07/03/2020 0.0 0.0 - 0.2 /100 WBC Final       Lab Results   Component Value Date    GLUCOSE 86 12/09/2020    BUN 24 (H) 12/09/2020    CREATININE 1.11 (H) 12/09/2020    EGFRIFNONA 50 (L) 12/09/2020    EGFRIFAFRI 83 04/04/2017    BCR 21.6 12/09/2020    K 4.4 12/09/2020    CO2 27.8 12/09/2020    CALCIUM 9.8 12/09/2020    ALBUMIN 4.50 12/09/2020    LABIL2 1.1 05/31/2019    AST 35 (H) 12/09/2020    ALT 21 12/09/2020         Assessment/Plan     Family history of malignant neoplasm of colon  - MRI Breast Bilateral With & Without Contrast    Other abnormal and inconclusive findings on diagnostic imaging of breast   - MRI Breast Bilateral With & Without Contrast    Von Willebrand disease (CMS/HCC)  - Cytology, Urine    Anemia, unspecified type   - Cytology, Urine    Monahan syndrome  - Cytology, Urine      ASSESSMENT:    1. 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  She also has DICER1 gene as well as TSC2 with variants of unknown significance.   2. History of right breast cancer, status post right mastectomy followed by axillary lymph node dissection and right chest wall reconstruction in 1985 at the age of 32.  3. Elevated liver function tests, pruritus, but no significant pathology found on both the PET scan, MRCP, except for post cholecystectomy, biliary ductal dilatation.  On Colestipol for pruritus.   4. She has peripheral lymphadenopathy involving the neck, axilla, mediastinum and retroperitoneum that are not PET avid.  This lymphadenopathy may be due to chronic inflammatory disease [lupus].    5. Strong family history of colon cancer and personal history of breast cancer.   6. Systemic lupus erythematosus, scleroderma, Raynaud’s phenomenon.   7. Normocytic anemia, multifactorial, underlying autoimmune disease, relative iron deficiency and B12  deficiency.  On iron and B12 supplementation.  Stable   8. Persistent monocytosis, status post bone marrow aspiration and biopsy which was essentially normal.  Stable monocytosis   9. Progressive osteoporosis.  On Fosamax and recent DEXA scan from 12/7/2020 shows progression.  Prolia will be recommended  10. Urine cytology was negative as of January 2019.  Scheduled today for urine cytology   11. Last Dermatology appointment was 2/2020: Patient to follow-up February 2021  12. Status post complete hysterectomy reducing her risk for ovarian and uterine cancer.  13. Assessment has been reviewed and updated     PLANS:     · Plan is for continued surveillance for Monahan syndrome diagnosis.    To follow-up with GI for pancreatic cancer screening.  Reviewed last EGD and colonoscopy completed 8/14/2020  · Reauthorize Prolia due to progressive osteoporosis  · Continue calcium and vitamin D  · Bone density will be repeated December 2022    · Patient's screening mammogram will be due December 7, 2021  · Bilateral breast MRI will be due in May 2021: We will go ahead and schedule  ·  She will continue monthly breast self-exam and call for lumps nipple discharge, skin discoloration.    · Urine cytology was due in August 2021:   · Dermatology appointment will be due February 2021  · CT scans of the chest abdomen and pelvis done May 4, 2020 is stable  · Follow-up with GI for pancreatic cancer screening.  MRI pancreas alternating with EUS on a yearly basis  · Breast MRI will be due 5/2021.          I have reviewed labs results, imaging, vitals, and medications with the patient today. Will follow up in 6 months with me.      Patient verbalized understanding and is in agreement of the above plan.

## 2021-01-24 DIAGNOSIS — G47.00 INSOMNIA, UNSPECIFIED TYPE: ICD-10-CM

## 2021-01-25 RX ORDER — ALPRAZOLAM 0.5 MG/1
TABLET ORAL
Qty: 30 TABLET | Refills: 1 | Status: SHIPPED | OUTPATIENT
Start: 2021-01-25 | End: 2021-02-28 | Stop reason: SDUPTHER

## 2021-01-29 ENCOUNTER — HOSPITAL ENCOUNTER (OUTPATIENT)
Dept: ONCOLOGY | Facility: HOSPITAL | Age: 61
Setting detail: INFUSION SERIES
Discharge: HOME | End: 2021-01-29

## 2021-01-29 VITALS
BODY MASS INDEX: 36.97 KG/M2 | SYSTOLIC BLOOD PRESSURE: 104 MMHG | TEMPERATURE: 97.7 F | HEART RATE: 82 BPM | DIASTOLIC BLOOD PRESSURE: 58 MMHG | RESPIRATION RATE: 18 BRPM | WEIGHT: 215.4 LBS

## 2021-01-29 DIAGNOSIS — D68.00 VON WILLEBRAND DISEASE (HCC): ICD-10-CM

## 2021-01-29 DIAGNOSIS — M81.0 OSTEOPOROSIS, UNSPECIFIED OSTEOPOROSIS TYPE, UNSPECIFIED PATHOLOGICAL FRACTURE PRESENCE: Primary | ICD-10-CM

## 2021-01-29 DIAGNOSIS — D64.9 ANEMIA, UNSPECIFIED TYPE: ICD-10-CM

## 2021-01-29 DIAGNOSIS — Z15.09 LYNCH SYNDROME: ICD-10-CM

## 2021-01-29 DIAGNOSIS — C50.919 MALIGNANT NEOPLASM OF FEMALE BREAST, UNSPECIFIED ESTROGEN RECEPTOR STATUS, UNSPECIFIED LATERALITY, UNSPECIFIED SITE OF BREAST (HCC): ICD-10-CM

## 2021-01-29 LAB
ALBUMIN SERPL-MCNC: 3.9 G/DL (ref 3.5–5.2)
ALBUMIN/GLOB SERPL: 1.4 G/DL
ALP SERPL-CCNC: 107 U/L (ref 39–117)
ALT SERPL W P-5'-P-CCNC: 16 U/L (ref 1–33)
ANION GAP SERPL CALCULATED.3IONS-SCNC: 8 MMOL/L (ref 5–15)
AST SERPL-CCNC: 25 U/L (ref 1–32)
BASOPHILS # BLD AUTO: 0.05 10*3/MM3 (ref 0–0.2)
BASOPHILS NFR BLD AUTO: 0.5 % (ref 0–1.5)
BILIRUB SERPL-MCNC: 0.2 MG/DL (ref 0–1.2)
BUN SERPL-MCNC: 22 MG/DL (ref 8–23)
BUN/CREAT SERPL: 18.2 (ref 7–25)
CALCIUM SPEC-SCNC: 9.2 MG/DL (ref 8.6–10.5)
CHLORIDE SERPL-SCNC: 106 MMOL/L (ref 98–107)
CO2 SERPL-SCNC: 29 MMOL/L (ref 22–29)
CREAT SERPL-MCNC: 1.21 MG/DL (ref 0.57–1)
DEPRECATED RDW RBC AUTO: 51 FL (ref 37–54)
EOSINOPHIL # BLD AUTO: 0.41 10*3/MM3 (ref 0–0.4)
EOSINOPHIL NFR BLD AUTO: 4 % (ref 0.3–6.2)
ERYTHROCYTE [DISTWIDTH] IN BLOOD BY AUTOMATED COUNT: 14.4 % (ref 12.3–15.4)
GFR SERPL CREATININE-BSD FRML MDRD: 45 ML/MIN/1.73
GLOBULIN UR ELPH-MCNC: 2.8 GM/DL
GLUCOSE SERPL-MCNC: 92 MG/DL (ref 65–99)
HCT VFR BLD AUTO: 37.9 % (ref 34–46.6)
HGB BLD-MCNC: 12.4 G/DL (ref 12–15.9)
LYMPHOCYTES # BLD AUTO: 1.74 10*3/MM3 (ref 0.7–3.1)
LYMPHOCYTES NFR BLD AUTO: 16.9 % (ref 19.6–45.3)
MAGNESIUM SERPL-MCNC: 1.9 MG/DL (ref 1.6–2.4)
MCH RBC QN AUTO: 32.5 PG (ref 26.6–33)
MCHC RBC AUTO-ENTMCNC: 32.7 G/DL (ref 31.5–35.7)
MCV RBC AUTO: 99.5 FL (ref 79–97)
MONOCYTES # BLD AUTO: 1.53 10*3/MM3 (ref 0.1–0.9)
MONOCYTES NFR BLD AUTO: 14.9 % (ref 5–12)
NEUTROPHILS NFR BLD AUTO: 6.56 10*3/MM3 (ref 1.7–7)
NEUTROPHILS NFR BLD AUTO: 63.7 % (ref 42.7–76)
PHOSPHATE SERPL-MCNC: 3.3 MG/DL (ref 2.5–4.5)
PLATELET # BLD AUTO: 287 10*3/MM3 (ref 140–450)
PMV BLD AUTO: 11.2 FL (ref 6–12)
POTASSIUM SERPL-SCNC: 4.3 MMOL/L (ref 3.5–5.2)
PROT SERPL-MCNC: 6.7 G/DL (ref 6–8.5)
RBC # BLD AUTO: 3.81 10*6/MM3 (ref 3.77–5.28)
SODIUM SERPL-SCNC: 143 MMOL/L (ref 136–145)
WBC # BLD AUTO: 10.29 10*3/MM3 (ref 3.4–10.8)

## 2021-01-29 PROCEDURE — 83735 ASSAY OF MAGNESIUM: CPT | Performed by: INTERNAL MEDICINE

## 2021-01-29 PROCEDURE — 25010000002 DENOSUMAB 60 MG/ML SOLUTION PREFILLED SYRINGE: Performed by: INTERNAL MEDICINE

## 2021-01-29 PROCEDURE — 36415 COLL VENOUS BLD VENIPUNCTURE: CPT

## 2021-01-29 PROCEDURE — 84100 ASSAY OF PHOSPHORUS: CPT | Performed by: INTERNAL MEDICINE

## 2021-01-29 PROCEDURE — 96372 THER/PROPH/DIAG INJ SC/IM: CPT

## 2021-01-29 PROCEDURE — 88108 CYTOPATH CONCENTRATE TECH: CPT | Performed by: INTERNAL MEDICINE

## 2021-01-29 PROCEDURE — 85025 COMPLETE CBC W/AUTO DIFF WBC: CPT | Performed by: INTERNAL MEDICINE

## 2021-01-29 PROCEDURE — 80053 COMPREHEN METABOLIC PANEL: CPT | Performed by: INTERNAL MEDICINE

## 2021-01-29 RX ADMIN — DENOSUMAB 60 MG: 60 INJECTION SUBCUTANEOUS at 10:59

## 2021-01-29 NOTE — PROGRESS NOTES
Pt. Here at clinic for C1 prolia injection.   Pt. Education performed and handouts given.   Pt. Is currently taking a calcium plus D but needed a refill.  Medication refilled and routed to Dr. Jones for signing.   Pt. Tolerated treatment well. Pt. Discharged from clinic with no complaints and AVS was given.

## 2021-01-29 NOTE — PATIENT INSTRUCTIONS
Bone Health  Bones protect organs, store calcium, anchor muscles, and support the whole body. Keeping your bones strong is important, especially as you get older. You can take actions to help keep your bones strong and healthy.  Why is keeping my bones healthy important?    Keeping your bones healthy is important because your body constantly replaces bone cells. Cells get old, and new cells take their place. As we age, we lose bone cells because the body may not be able to make enough new cells to replace the old cells. The amount of bone cells and bone tissue you have is referred to as bone mass. The higher your bone mass, the stronger your bones.  The aging process leads to an overall loss of bone mass in the body, which can increase the likelihood of:  · Joint pain and stiffness.  · Broken bones.  · A condition in which the bones become weak and brittle (osteoporosis).  A large decline in bone mass occurs in older adults. In women, it occurs about the time of menopause.  What actions can I take to keep my bones healthy?  Good health habits are important for maintaining healthy bones. This includes eating nutritious foods and exercising regularly. To have healthy bones, you need to get enough of the right minerals and vitamins. Most nutrition experts recommend getting these nutrients from the foods that you eat. In some cases, taking supplements may also be recommended. Doing certain types of exercise is also important for bone health.  What are the nutritional recommendations for healthy bones?    Eating a well-balanced diet with plenty of calcium and vitamin D will help to protect your bones. Nutritional recommendations vary from person to person. Ask your health care provider what is healthy for you. Here are some general guidelines.  Get enough calcium  Calcium is the most important (essential) mineral for bone health. Most people can get enough calcium from their diet, but supplements may be recommended for  people who are at risk for osteoporosis. Good sources of calcium include:  · Dairy products, such as low-fat or nonfat milk, cheese, and yogurt.  · Dark green leafy vegetables, such as bok fausto and broccoli.  · Calcium-fortified foods, such as orange juice, cereal, bread, soy beverages, and tofu products.  · Nuts, such as almonds.  Follow these recommended amounts for daily calcium intake:  · Children, age 1-3: 700 mg.  · Children, age 4-8: 1,000 mg.  · Children, age 9-13: 1,300 mg.  · Teens, age 14-18: 1,300 mg.  · Adults, age 19-50: 1,000 mg.  · Adults, age 51-70:  ? Men: 1,000 mg.  ? Women: 1,200 mg.  · Adults, age 71 or older: 1,200 mg.  · Pregnant and breastfeeding females:  ? Teens: 1,300 mg.  ? Adults: 1,000 mg.  Get enough vitamin D  Vitamin D is the most essential vitamin for bone health. It helps the body absorb calcium. Sunlight stimulates the skin to make vitamin D, so be sure to get enough sunlight. If you live in a cold climate or you do not get outside often, your health care provider may recommend that you take vitamin D supplements. Good sources of vitamin D in your diet include:  · Egg yolks.  · Saltwater fish.  · Milk and cereal fortified with vitamin D.  Follow these recommended amounts for daily vitamin D intake:  · Children and teens, age 1-18: 600 international units.  · Adults, age 50 or younger: 400-800 international units.  · Adults, age 51 or older: 800-1,000 international units.  Get other important nutrients  Other nutrients that are important for bone health include:  · Phosphorus. This mineral is found in meat, poultry, dairy foods, nuts, and legumes. The recommended daily intake for adult men and adult women is 700 mg.  · Magnesium. This mineral is found in seeds, nuts, dark green vegetables, and legumes. The recommended daily intake for adult men is 400-420 mg. For adult women, it is 310-320 mg.  · Vitamin K. This vitamin is found in green leafy vegetables. The recommended daily  intake is 120 mg for adult men and 90 mg for adult women.  What type of physical activity is best for building and maintaining healthy bones?  Weight-bearing and strength-building activities are important for building and maintaining healthy bones. Weight-bearing activities cause muscles and bones to work against gravity. Strength-building activities increase the strength of the muscles that support bones. Weight-bearing and muscle-building activities include:  · Walking and hiking.  · Jogging and running.  · Dancing.  · Gym exercises.  · Lifting weights.  · Tennis and racquetball.  · Climbing stairs.  · Aerobics.  Adults should get at least 30 minutes of moderate physical activity on most days. Children should get at least 60 minutes of moderate physical activity on most days. Ask your health care provider what type of exercise is best for you.  How can I find out if my bone mass is low?  Bone mass can be measured with an X-ray test called a bone mineral density (BMD) test. This test is recommended for all women who are age 65 or older. It may also be recommended for:  · Men who are age 70 or older.  · People who are at risk for osteoporosis because of:  ? Having bones that break easily.  ? Having a long-term disease that weakens bones, such as kidney disease or rheumatoid arthritis.  ? Having menopause earlier than normal.  ? Taking medicine that weakens bones, such as steroids, thyroid hormones, or hormone treatment for breast cancer or prostate cancer.  ? Smoking.  ? Drinking three or more alcoholic drinks a day.  If you find that you have a low bone mass, you may be able to prevent osteoporosis or further bone loss by changing your diet and lifestyle.  Where can I find more information?  For more information, check out the following websites:  · National Osteoporosis Foundation: www.nof.org/patients  · National Institutes of Health: www.bones.nih.gov  · International Osteoporosis Foundation:  www.iofbonehealth.org  Summary  · The aging process leads to an overall loss of bone mass in the body, which can increase the likelihood of broken bones and osteoporosis.  · Eating a well-balanced diet with plenty of calcium and vitamin D will help to protect your bones.  · Weight-bearing and strength-building activities are also important for building and maintaining strong bones.  · Bone mass can be measured with an X-ray test called a bone mineral density (BMD) test.  This information is not intended to replace advice given to you by your health care provider. Make sure you discuss any questions you have with your health care provider.  Document Revised: 01/14/2019 Document Reviewed: 01/14/2019  [employer] Patient Education © 2020 [employer]    Preventing Osteoporosis, Adult  Osteoporosis is a condition that causes the bones to lose density. This means that the bones become thinner, and the normal spaces in bone tissue become larger. Low bone density can make the bones weak and cause them to break more easily.  Osteoporosis cannot always be prevented, but you can take steps to lower your risk of developing this condition.  How can this condition affect me?  If you develop osteoporosis, you will be more likely to break bones in your wrist, spine, or hip. Even a minor accident or injury can be enough to break weak bones. The bones will also be slower to heal. Osteoporosis can cause other problems as well, such as a stooped posture or trouble with movement.  Osteoporosis can occur with aging. As you get older, you may lose bone tissue more quickly, or it may be replaced more slowly. Osteoporosis is more likely to develop if you have poor nutrition or do not get enough calcium or vitamin D. Other lifestyle factors can also play a role. By eating a well-balanced diet and making lifestyle changes, you can help keep your bones strong and healthy, lowering your chances of developing osteoporosis.  What can increase my  risk?  The following factors may make you more likely to develop osteoporosis:  · Having a family history of the condition.  · Having poor nutrition or not getting enough calcium or vitamin D.  · Using certain medicines, such as steroid medicines or antiseizure medicines.  · Being any of the following:  ? 50 years of age or older.  ? Female.  ? A woman who has gone through menopause (is postmenopausal).  ? White () or of  descent.  · Smoking or having a history of smoking.  · Not being physically active (being sedentary).  · Having a small body frame.  What actions can I take to prevent this?    Get enough calcium    · Make sure you get enough calcium every day. Calcium is the most important mineral for bone health. Most people can get enough calcium from their diet, but supplements may be recommended for people who are at risk for osteoporosis. Follow these guidelines:  ? If you are age 50 or younger, aim to get 1,000 mg of calcium every day.  ? If you are older than age 50, aim to get 1,200 mg of calcium every day.  · Good sources of calcium include:  ? Dairy products, such as low-fat or nonfat milk, cheese, and yogurt.  ? Dark green leafy vegetables, such as bok fausto and broccoli.  ? Foods that have had calcium added to them (calcium-fortified foods), such as orange juice, cereal, bread, soy beverages, and tofu products.  ? Nuts, such as almonds.  · Check nutrition labels to see how much calcium is in a food or drink.  Get enough vitamin D  · Try to get enough vitamin D every day. Vitamin D is the most essential vitamin for bone health. It helps the body absorb calcium. Follow these guidelines for how much vitamin D to get from food:  ? If you are age 70 or younger, aim to get at least 600 international units (IU) every day. Your health care provider may suggest more.  ? If you are older than age 70, aim to get at least 800 international units every day. Your health care provider may suggest  more.  · Good sources of vitamin D in your diet include:  ? Egg yolks.  ? Oily fish, such as salmon, sardines, and tuna.  ? Milk and cereal fortified with vitamin D.  · Your body also makes vitamin D when you are out in the sun. Exposing the bare skin on your face, arms, legs, or back to the sun for no more than 30 minutes a day, 2 times a week is more than enough. Beyond that, make sure you use sunblock to protect your skin from sunburn, which increases your risk for skin cancer.  Exercise  · Stay active and get exercise every day.  · Ask your health care provider what types of exercise are best for you. Weight-bearing and strength-building activities are important for building and maintaining healthy bones. Some examples of these types of activities include:  ? Walking and hiking.  ? Jogging and running.  ? Dancing.  ? Gym exercises.  ? Lifting weights.  ? Tennis and racquetball.  ? Climbing stairs.  ? Aerobics.  Make other lifestyle changes  · Do not use any products that contain nicotine or tobacco, such as cigarettes, e-cigarettes, and chewing tobacco. If you need help quitting, ask your health care provider.  · Lose weight if you are overweight.  · If you drink alcohol:  ? Limit how much you use to:  § 0-1 drink a day for nonpregnant women.  § 0-2 drinks a day for men.  ? Be aware of how much alcohol is in your drink. In the [place] one drink equals one 12 oz bottle of beer (355 mL), one 5 oz glass of wine (148 mL), or one 1½ oz glass of hard liquor (44 mL).  Where to find support  If you need help making changes to prevent osteoporosis, talk with your health care provider. You can ask for a referral to a diet and nutrition specialist (dietitian) and a physical therapist.  Where to find more information  Learn more about osteoporosis from:  · NIH Osteoporosis and Related Bone Diseases National Resource Center: www.bones.nih.gov  · [place] Office on Women's Health: www.womenshealth.gov  · National Osteoporosis  Foundation: www.nof.org  Summary  · Osteoporosis is a condition that causes weak bones that are more likely to break.  · Eat a healthy diet, making sure you get enough calcium and vitamin D, and stay active by getting regular exercise to help prevent osteoporosis.  · Other ways to reduce your risk of osteoporosis include maintaining a healthy weight and avoiding alcohol and products that contain nicotine or tobacco.  This information is not intended to replace advice given to you by your health care provider. Make sure you discuss any questions you have with your health care provider.  Document Revised: 07/17/2020 Document Reviewed: 07/17/2020  [employer] Patient Education © 2020 [employer]

## 2021-02-01 LAB
LAB AP CASE REPORT: NORMAL
LAB AP CLINICAL INFORMATION: NORMAL
LAB AP NON-GYN INTERPRETATION: NORMAL
PATH REPORT.GROSS SPEC: NORMAL

## 2021-02-08 ENCOUNTER — LAB (OUTPATIENT)
Dept: LAB | Facility: HOSPITAL | Age: 61
End: 2021-02-08

## 2021-02-08 DIAGNOSIS — Z20.822 ENCOUNTER FOR SCREENING LABORATORY TESTING FOR COVID-19 VIRUS: Primary | ICD-10-CM

## 2021-02-08 DIAGNOSIS — Z20.822 ENCOUNTER FOR SCREENING LABORATORY TESTING FOR COVID-19 VIRUS: ICD-10-CM

## 2021-02-08 PROCEDURE — U0004 COV-19 TEST NON-CDC HGH THRU: HCPCS

## 2021-02-08 PROCEDURE — C9803 HOPD COVID-19 SPEC COLLECT: HCPCS

## 2021-02-09 LAB — SARS-COV-2 ORF1AB RESP QL NAA+PROBE: DETECTED

## 2021-02-24 DIAGNOSIS — G89.4 CHRONIC PAIN SYNDROME: ICD-10-CM

## 2021-02-24 RX ORDER — OXYCODONE HYDROCHLORIDE 10 MG/1
10 TABLET ORAL EVERY 6 HOURS PRN
Qty: 120 TABLET | Refills: 0 | Status: SHIPPED | OUTPATIENT
Start: 2021-02-24 | End: 2021-06-15 | Stop reason: SDUPTHER

## 2021-02-28 DIAGNOSIS — G47.00 INSOMNIA, UNSPECIFIED TYPE: ICD-10-CM

## 2021-03-02 RX ORDER — ALPRAZOLAM 0.5 MG/1
0.5 TABLET ORAL
Qty: 30 TABLET | Refills: 2 | Status: SHIPPED | OUTPATIENT
Start: 2021-03-02 | End: 2021-03-31 | Stop reason: SDUPTHER

## 2021-03-31 DIAGNOSIS — G47.00 INSOMNIA, UNSPECIFIED TYPE: ICD-10-CM

## 2021-04-02 RX ORDER — LEVOTHYROXINE SODIUM 0.2 MG/1
200 TABLET ORAL DAILY
Qty: 90 TABLET | Refills: 1 | Status: SHIPPED | OUTPATIENT
Start: 2021-04-02 | End: 2021-12-13

## 2021-04-02 RX ORDER — ALPRAZOLAM 0.5 MG/1
0.5 TABLET ORAL
Qty: 30 TABLET | Refills: 2 | Status: SHIPPED | OUTPATIENT
Start: 2021-04-02 | End: 2021-07-06 | Stop reason: SDUPTHER

## 2021-04-03 DIAGNOSIS — G89.4 CHRONIC PAIN SYNDROME: ICD-10-CM

## 2021-04-05 RX ORDER — CELECOXIB 100 MG/1
CAPSULE ORAL
Qty: 90 CAPSULE | Refills: 1 | Status: SHIPPED | OUTPATIENT
Start: 2021-04-05 | End: 2021-07-06

## 2021-04-06 ENCOUNTER — LAB (OUTPATIENT)
Dept: FAMILY MEDICINE CLINIC | Facility: CLINIC | Age: 61
End: 2021-04-06

## 2021-04-06 ENCOUNTER — OFFICE VISIT (OUTPATIENT)
Dept: FAMILY MEDICINE CLINIC | Facility: CLINIC | Age: 61
End: 2021-04-06

## 2021-04-06 VITALS
RESPIRATION RATE: 19 BRPM | SYSTOLIC BLOOD PRESSURE: 126 MMHG | WEIGHT: 215.8 LBS | DIASTOLIC BLOOD PRESSURE: 92 MMHG | OXYGEN SATURATION: 97 % | HEART RATE: 93 BPM | HEIGHT: 64 IN | BODY MASS INDEX: 36.84 KG/M2 | TEMPERATURE: 97.8 F

## 2021-04-06 DIAGNOSIS — C50.919 MALIGNANT NEOPLASM OF FEMALE BREAST, UNSPECIFIED ESTROGEN RECEPTOR STATUS, UNSPECIFIED LATERALITY, UNSPECIFIED SITE OF BREAST (HCC): ICD-10-CM

## 2021-04-06 DIAGNOSIS — M25.562 CHRONIC PAIN OF LEFT KNEE: ICD-10-CM

## 2021-04-06 DIAGNOSIS — Z00.00 MEDICARE ANNUAL WELLNESS VISIT, SUBSEQUENT: Primary | ICD-10-CM

## 2021-04-06 DIAGNOSIS — G89.29 CHRONIC PAIN OF LEFT KNEE: ICD-10-CM

## 2021-04-06 DIAGNOSIS — I10 ESSENTIAL HYPERTENSION: ICD-10-CM

## 2021-04-06 DIAGNOSIS — Z15.09 LYNCH SYNDROME: ICD-10-CM

## 2021-04-06 DIAGNOSIS — B35.1 ONYCHOMYCOSIS: ICD-10-CM

## 2021-04-06 DIAGNOSIS — E03.9 HYPOTHYROIDISM, UNSPECIFIED TYPE: ICD-10-CM

## 2021-04-06 DIAGNOSIS — M32.9 SLE (SYSTEMIC LUPUS ERYTHEMATOSUS RELATED SYNDROME) (HCC): ICD-10-CM

## 2021-04-06 DIAGNOSIS — M79.7 FIBROMYALGIA: ICD-10-CM

## 2021-04-06 DIAGNOSIS — E55.9 VITAMIN D DEFICIENCY, UNSPECIFIED: ICD-10-CM

## 2021-04-06 DIAGNOSIS — M34.9 SCLERODERMA (HCC): ICD-10-CM

## 2021-04-06 PROBLEM — U07.1 COVID-19 VIRUS DETECTED: Status: RESOLVED | Noted: 2021-01-11 | Resolved: 2021-04-06

## 2021-04-06 PROCEDURE — 82306 VITAMIN D 25 HYDROXY: CPT | Performed by: FAMILY MEDICINE

## 2021-04-06 PROCEDURE — 1170F FXNL STATUS ASSESSED: CPT | Performed by: FAMILY MEDICINE

## 2021-04-06 PROCEDURE — 90471 IMMUNIZATION ADMIN: CPT | Performed by: FAMILY MEDICINE

## 2021-04-06 PROCEDURE — 80061 LIPID PANEL: CPT | Performed by: FAMILY MEDICINE

## 2021-04-06 PROCEDURE — 85652 RBC SED RATE AUTOMATED: CPT | Performed by: FAMILY MEDICINE

## 2021-04-06 PROCEDURE — 84481 FREE ASSAY (FT-3): CPT | Performed by: FAMILY MEDICINE

## 2021-04-06 PROCEDURE — 83036 HEMOGLOBIN GLYCOSYLATED A1C: CPT | Performed by: FAMILY MEDICINE

## 2021-04-06 PROCEDURE — 90715 TDAP VACCINE 7 YRS/> IM: CPT | Performed by: FAMILY MEDICINE

## 2021-04-06 PROCEDURE — 85025 COMPLETE CBC W/AUTO DIFF WBC: CPT | Performed by: FAMILY MEDICINE

## 2021-04-06 PROCEDURE — 80053 COMPREHEN METABOLIC PANEL: CPT | Performed by: FAMILY MEDICINE

## 2021-04-06 PROCEDURE — 1160F RVW MEDS BY RX/DR IN RCRD: CPT | Performed by: FAMILY MEDICINE

## 2021-04-06 PROCEDURE — 84439 ASSAY OF FREE THYROXINE: CPT | Performed by: FAMILY MEDICINE

## 2021-04-06 PROCEDURE — G0439 PPPS, SUBSEQ VISIT: HCPCS | Performed by: FAMILY MEDICINE

## 2021-04-06 PROCEDURE — 86140 C-REACTIVE PROTEIN: CPT | Performed by: FAMILY MEDICINE

## 2021-04-06 PROCEDURE — 99214 OFFICE O/P EST MOD 30 MIN: CPT | Performed by: FAMILY MEDICINE

## 2021-04-06 PROCEDURE — 20610 DRAIN/INJ JOINT/BURSA W/O US: CPT | Performed by: FAMILY MEDICINE

## 2021-04-06 PROCEDURE — 82607 VITAMIN B-12: CPT | Performed by: FAMILY MEDICINE

## 2021-04-06 PROCEDURE — 84443 ASSAY THYROID STIM HORMONE: CPT | Performed by: FAMILY MEDICINE

## 2021-04-06 PROCEDURE — 36415 COLL VENOUS BLD VENIPUNCTURE: CPT | Performed by: FAMILY MEDICINE

## 2021-04-06 RX ORDER — LIDOCAINE HYDROCHLORIDE 10 MG/ML
2 INJECTION, SOLUTION INFILTRATION; PERINEURAL
Status: COMPLETED | OUTPATIENT
Start: 2021-04-06 | End: 2021-04-06

## 2021-04-06 RX ORDER — HYDROXYCHLOROQUINE SULFATE 200 MG/1
200 TABLET, FILM COATED ORAL 2 TIMES DAILY
Qty: 180 TABLET | Refills: 3 | Status: SHIPPED | OUTPATIENT
Start: 2021-04-06 | End: 2022-06-20 | Stop reason: SDUPTHER

## 2021-04-06 RX ORDER — BUPIVACAINE HYDROCHLORIDE 5 MG/ML
2 INJECTION, SOLUTION PERINEURAL
Status: COMPLETED | OUTPATIENT
Start: 2021-04-06 | End: 2021-04-06

## 2021-04-06 RX ORDER — METHYLPREDNISOLONE ACETATE 80 MG/ML
80 INJECTION, SUSPENSION INTRA-ARTICULAR; INTRALESIONAL; INTRAMUSCULAR; SOFT TISSUE
Status: COMPLETED | OUTPATIENT
Start: 2021-04-06 | End: 2021-04-06

## 2021-04-06 RX ADMIN — LIDOCAINE HYDROCHLORIDE 2 ML: 10 INJECTION, SOLUTION INFILTRATION; PERINEURAL at 16:03

## 2021-04-06 RX ADMIN — BUPIVACAINE HYDROCHLORIDE 2 ML: 5 INJECTION, SOLUTION PERINEURAL at 16:03

## 2021-04-06 RX ADMIN — METHYLPREDNISOLONE ACETATE 80 MG: 80 INJECTION, SUSPENSION INTRA-ARTICULAR; INTRALESIONAL; INTRAMUSCULAR; SOFT TISSUE at 16:03

## 2021-04-06 NOTE — PROGRESS NOTES
Procedure   Arthrocentesis    Date/Time: 4/6/2021 4:03 PM  Performed by: Floyd Silva MD  Authorized by: Floyd Silva MD   Indications: pain   Body area: knee  Joint: left knee  Local anesthesia used: no    Anesthesia:  Local anesthesia used: no    Sedation:  Patient sedated: no    Preparation: Patient was prepped and draped in the usual sterile fashion.  Needle size: 22 G  Ultrasound guidance: no  Approach: anterior  Aspirate amount: 0 mL  Patient tolerance: patient tolerated the procedure well with no immediate complications

## 2021-04-06 NOTE — PROGRESS NOTES
The ABCs of the Annual Wellness Visit  Subsequent Medicare Wellness Visit    Chief Complaint   Patient presents with   • Medicare Wellness-subsequent       Subjective   History of Present Illness:  Tracie Gross is a 60 y.o. female who presents for a Subsequent Medicare Wellness Visit.    Onychomycosis: patient was given ciclopirox 8% solution.  She has been using this for the last 3 months and feels that it is starting to improve the nail to the left great toe    Fibromyalgia: patient last seen in 9/2020 by rheumatology (Martin). Dr Salazar was concerned for fibromyalgia and is interested in starting Cymbalta.  However, this medication was not started and wanted to have pain we will discuss it further with me today    SLE/scleroderma: patient follows w/ rheum (Martin). She recently referred to pulmonology and PFTs due to concern for restrictive lung disease 2/2 scleroderma. Patient endorses some SOB, mostly w/ exertion/activity. Patient was given inhaler (NOS) by pulmonology (Draw) but she was too concerned to start this. Maintained on hydroxychloroquine 200 BID, Celebrex 200 daily, nifedipine 60 daily, and oxycodone 10 PRN    Knee pain: Patient continues to complain of bilateral knee pain.  She has had the right knee replaced and still has considerable pain and limited range of motion.  Patient reports improvement to L knee pain w/ steroid injection last July.  She is interested in repeat injection today    HTN: 126/92 today.  Maintained on Ziac 10/6.25 mg daily.  Well-controlled at this time    Breast cancer: follows regularly w/ oncology- Karen.  Recent mammogram from December negative.  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 Plan for EGD and either MRI or EUS of pancreas plus colonoscopy 8/2021.     Hypothyroidism: maintained on levothyroxine 200mcg daily. Patient has had recent weight gain but no further  complaints/concerns.      HEALTH RISK ASSESSMENT    Recent Hospitalizations:  No hospitalization(s) within the last year.    Current Medical Providers:  Patient Care Team:  Floyd Silva MD as PCP - General (Internal Medicine)    Smoking Status:  Social History     Tobacco Use   Smoking Status Former Smoker   • Packs/day: 0.25   • Years: 7.00   • Pack years: 1.75   • Start date:    • Quit date:    • Years since quittin.2   Smokeless Tobacco Never Used   Tobacco Comment    Off and on for  those years     Alcohol Consumption:  Social History     Substance and Sexual Activity   Alcohol Use Yes    Comment: Rarely   1 drink 2x/month    Depression Screen:   PHQ-2/PHQ-9 Depression Screening 2021   Little interest or pleasure in doing things 0   Feeling down, depressed, or hopeless 0   Total Score 0     Fall Risk Screen:  STEADI Fall Risk Assessment has not been completed.  No falls in the last year    Health Habits and Functional and Cognitive Screening:  No flowsheet data found.  Totally independent in all ADLs and IADLs    Does the patient have evidence of cognitive impairment? No    Asprin use counseling:Does not need ASA (and currently is not on it)    Age-appropriate Screening Schedule:  Refer to the list below for future screening recommendations based on patient's age, sex and/or medical conditions. Orders for these recommended tests are listed in the plan section. The patient has been provided with a written plan.    Health Maintenance   Topic Date Due   • PAP SMEAR  2021 (Originally 2018)   • INFLUENZA VACCINE  2021   • MAMMOGRAM  2021   • DXA SCAN  2022   • COLONOSCOPY  2030   • TDAP/TD VACCINES (2 - Td) 2031   • ZOSTER VACCINE  Completed          The following portions of the patient's history were reviewed and updated as appropriate: allergies, current medications, past family history, past medical history, past social history, past surgical  history and problem list.    Outpatient Medications Prior to Visit   Medication Sig Dispense Refill   • ALPRAZolam (XANAX) 0.5 MG tablet Take 1 tablet by mouth every night at bedtime. 30 tablet 2   • bisoprolol-hydrochlorothiazide (ZIAC) 10-6.25 MG per tablet TAKE ONE TABLET BY MOUTH DAILY 90 tablet 3   • celecoxib (CeleBREX) 100 MG capsule TAKE TWO CAPSULES BY MOUTH DAILY 90 capsule 1   • ciclopirox (PENLAC) 8 % solution Apply  topically to the appropriate area as directed Every Night. 6 mL 1   • clindamycin (CLEOCIN T) 1 % lotion Apply  topically to the appropriate area as directed Every Night. Use on face     • colestipol (COLESTID) 1 g tablet Take 2 tablets by mouth 2 (Two) Times a Day. 120 tablet 3   • cyclobenzaprine (FLEXERIL) 10 MG tablet Take 1 tablet by mouth 3 (Three) Times a Day As Needed for Muscle Spasms.  tablet 3   • Denosumab (PROLIA SC) Inject  under the skin into the appropriate area as directed.     • dexlansoprazole (DEXILANT) 60 MG capsule Take 60 mg by mouth Daily.     • Diclofenac Sodium (VOLTAREN) 1 % gel gel Apply 4 g topically to the appropriate area as directed 4 (Four) Times a Day As Needed (joint pain). 100 g 3   • famotidine (PEPCID) 20 MG tablet Take 1 tablet by mouth 2 (Two) Times a Day As Needed for Heartburn. 120 tablet 5   • ferrous sulfate 325 (65 FE) MG tablet TAKE ONE TABLET BY MOUTH DAILY 30 tablet 5   • Flaxseed, Linseed, (FLAXSEED OIL MAX STR) 1300 MG capsule Take 1 tablet by mouth 2 (Two) Times a Day.     • gabapentin (NEURONTIN) 600 MG tablet Take 1,200 mg by mouth Every Night.     • hydrOXYzine (ATARAX) 25 MG tablet Take 25 mg by mouth Every 8 (Eight) Hours As Needed for Itching.     • lamoTRIgine (LaMICtal) 200 MG tablet Take 1 tablet by mouth Daily.     • levothyroxine (SYNTHROID, LEVOTHROID) 200 MCG tablet Take 1 tablet by mouth Daily. 90 tablet 1   • lisinopril (PRINIVIL,ZESTRIL) 5 MG tablet TAKE ONE TABLET BY MOUTH DAILY 90 tablet 3   • Methylnaltrexone  Bromide (RELISTOR) 150 MG tablet Take 2 tablets by mouth Daily.     • NIFEdipine XL (PROCARDIA XL) 60 MG 24 hr tablet Take 1 tablet by mouth Daily. 90 tablet 2   • ondansetron (ZOFRAN) 4 MG tablet Take 4 mg by mouth Every 8 (Eight) Hours As Needed.     • oxyCODONE (ROXICODONE) 10 MG tablet Take 1 tablet by mouth Every 6 (Six) Hours As Needed for Severe Pain . 120 tablet 0   • QUEtiapine (SEROquel) 100 MG tablet Take 100 mg by mouth Every Night.     • Calcium-Vitamin D 600-200 MG-UNIT per tablet Take 1 tablet by mouth 2 (Two) Times a Day. 60 tablet 1   • hydroxychloroquine (PLAQUENIL) 200 MG tablet Take 1 tablet by mouth 2 (Two) Times a Day. Indications: Systemic Lupus Erythematosus 180 tablet 3   • alendronate (FOSAMAX) 70 MG tablet TAKE 1 TABLET BY MOUTH ONCE WEEKLY ON AN EMPTY STOMACH BEFORE BREAKFAST. REMAIN UPRIGHT FOR 30 MINUTES & TAKE WITH 8 OUNCES OF WATER 4 tablet 5   • gabapentin (NEURONTIN) 300 MG capsule Take 600 mg by mouth Every Morning.       No facility-administered medications prior to visit.       Patient Active Problem List   Diagnosis   • Vitamin B12 deficiency   • Arthritis   • Sleep apnea   • Bipolar affective disorder (CMS/HCC)   • Depression   • Breast cancer (CMS/HCC)   • Chronic pain   • Dyslipidemia   • Family history of malignant neoplasm of colon   • Family history of melanoma   • Gastroesophageal reflux disease   • Heart murmur   • Hypertension   • Hypothyroidism   • Osteoarthritis, generalized   • Other long term (current) drug therapy   • Raynaud's disease   • Ulcerative colitis (CMS/HCC)   • Artificial knee joint present   • Neuralgia   • Primary osteoarthritis   • Presence of artificial hip joint, right   • Lumbar radiculopathy   • Encounter for other orthopedic aftercare   • Derangement of posterior horn of lateral meniscus   • Von Willebrand disease (CMS/HCC)   • SLE (systemic lupus erythematosus related syndrome) (CMS/HCC)   • Chest pain   • Scleroderma (CMS/HCC)   • Hero  "syndrome   • Rectal prolapse   • Osteoporosis   • COVID-19   • Fibromyalgia   • Onychomycosis       Advanced Care Planning:  ACP discussion was held with the patient during this visit. Patient has an advance directive in EMR which is still valid.     Review of Systems   Constitutional: Positive for unexpected weight change. Negative for chills and fatigue.   HENT: Negative for congestion and rhinorrhea.    Eyes: Negative for visual disturbance.   Respiratory: Negative for cough and shortness of breath.    Cardiovascular: Negative for chest pain and palpitations.   Gastrointestinal: Negative for abdominal pain, constipation, diarrhea, nausea and vomiting.   Genitourinary: Negative for difficulty urinating and dysuria.   Musculoskeletal: Positive for arthralgias, back pain, gait problem and myalgias. Negative for joint swelling.   Skin: Positive for rash.   Neurological: Negative for weakness and headaches.   Psychiatric/Behavioral: Negative for dysphoric mood. The patient is not nervous/anxious.      Compared to one year ago, the patient feels her physical health is the same.  Compared to one year ago, the patient feels her mental health is the same.    Reviewed chart for potential of high risk medication in the elderly: yes  Reviewed chart for potential of harmful drug interactions in the elderly:yes    Objective    Vitals:    04/06/21 1501   BP: 126/92   BP Location: Left arm   Patient Position: Sitting   Cuff Size: Adult   Pulse: 93   Resp: 19   Temp: 97.8 °F (36.6 °C)   TempSrc: Temporal   SpO2: 97%   Weight: 97.9 kg (215 lb 12.8 oz)   Height: 162.6 cm (64\")       Body mass index is 37.04 kg/m².  Discussed the patient's BMI with her. The BMI is above average; BMI management plan is completed.    Physical Exam  Constitutional:       General: She is not in acute distress.     Appearance: She is well-developed. She is obese.   HENT:      Head: Normocephalic and atraumatic.      Right Ear: Tympanic membrane and " external ear normal. There is no impacted cerumen.      Left Ear: Tympanic membrane and external ear normal. There is no impacted cerumen.      Nose: Nose normal.      Mouth/Throat:      Mouth: Mucous membranes are moist.      Pharynx: No oropharyngeal exudate or posterior oropharyngeal erythema.   Eyes:      General: No scleral icterus.        Right eye: No discharge.         Left eye: No discharge.      Extraocular Movements: Extraocular movements intact.      Conjunctiva/sclera: Conjunctivae normal.      Pupils: Pupils are equal, round, and reactive to light.   Neck:      Thyroid: No thyromegaly.      Vascular: No carotid bruit.   Cardiovascular:      Rate and Rhythm: Normal rate and regular rhythm.      Heart sounds: Normal heart sounds. No murmur heard.     Pulmonary:      Effort: Pulmonary effort is normal. No respiratory distress.      Breath sounds: Normal breath sounds. No wheezing or rales.   Abdominal:      General: Bowel sounds are normal. There is no distension.      Palpations: Abdomen is soft.      Tenderness: There is no abdominal tenderness.   Musculoskeletal:         General: Swelling and deformity present.      Cervical back: Normal range of motion and neck supple.      Right lower leg: Edema present.      Left lower leg: Edema present.   Lymphadenopathy:      Cervical: No cervical adenopathy.   Skin:     General: Skin is warm and dry.      Capillary Refill: Capillary refill takes less than 2 seconds.      Findings: Lesion and rash present.      Comments: Sclerotic digits with Gottron's papules to bilateral hands.  Thickening of the nail of the left great toe consistent with onychomycosis   Neurological:      Mental Status: She is alert and oriented to person, place, and time.      Cranial Nerves: No cranial nerve deficit.      Sensory: No sensory deficit.   Psychiatric:         Behavior: Behavior normal.         Thought Content: Thought content normal.           Assessment/Plan   Medicare Risks  and Personalized Health Plan  CMS Preventative Services Quick Reference  Advance Directive Discussion  Breast Cancer/Mammogram Screening  Cardiovascular risk  Chronic Pain   Colon Cancer Screening  Dementia/Memory   Depression/Dysphoria  Diabetic Lab Screening   Fall Risk  Immunizations Discussed/Encouraged (specific immunizations; adacel Tdap )  Obesity/Overweight   Osteoprorosis Risk  Polypharmacy    The above risks/problems have been discussed with the patient.  Pertinent information has been shared with the patient in the After Visit Summary.  Follow up plans and orders are seen below in the Assessment/Plan Section.    Diagnoses and all orders for this visit:    1. Medicare annual wellness visit, subsequent (Primary)  -     Calcium-Vitamin D 600-200 MG-UNIT per tablet; Take 1 tablet by mouth 2 (Two) Times a Day.  Dispense: 60 tablet; Refill: 1  -     Tdap Vaccine Greater Than or Equal To 8yo IM  -     CBC & Differential  -     Comprehensive Metabolic Panel  -     Hemoglobin A1c  -     Lipid Panel  -     TSH  -     T4, Free  -     Vitamin D 25 Hydroxy  -     Vitamin B12  -     T3, free  -  Counseled regarding diet, exercise, weight loss, and preventative health maintenance items/immunizations below    2. Scleroderma (CMS/[facility])  3. SLE (systemic lupus erythematosus related syndrome) (CMS/[facility]): Patient is currently following with Dr. Salazar of  rheumatology.  Recent concern raised for fibromyalgia.  See discussion below.  Appreciate rheumatology recommendations regarding her scleroderma and SLE.  Gottron's papules appreciated today, consider dermatomyositis versus fibromyalgia given numerous other autoimmune diseases.  -     Continue hydroxychloroquine (PLAQUENIL) 200 MG tablet; Take 1 tablet by mouth 2 (Two) Times a Day. Indications: Systemic Lupus Erythematosus  Dispense: 180 tablet; Refill: 3  -     C-reactive Protein  -     Sedimentation Rate  - Continue home Celebrex 100 twice daily    4. Chronic pain of  left knee  -     Arthrocentesis  - Continue home Celebrex 100 twice daily  - Continue home gabapentin 1200 mg nightly    5. Onychomycosis   -Continue home Penlac    6. Fibromyalgia: Recent concern raised for fibromyalgia by rheumatologist. Gottron's papules appreciated today, consider dermatomyositis versus fibromyalgia given numerous other autoimmune diseases.  Nonetheless, Cymbalta was discussed but ultimately did not feel that it will be helpful for this patient.  She is on numerous medications already, including NSAID and gabapentin as well as as needed oxycodone.  She has numerous illnesses including lupus, scleroderma and severe degenerative arthritis causing chronic pain.  I am not confident that adding duloxetine will be helpful for this patient who is already taking far more medications than she desires   -Continue home Celebrex 100 twice daily and gabapentin 1200 nightly    7. Malignant neoplasm of female breast, unspecified estrogen receptor status, unspecified laterality, unspecified site of breast (CMS/[facility]): 12/2020 mammogram unremarkable   -Upcoming MRI breast per oncology   -Continue oncology [id number]    8. Essential hypertension: 126/92 today  -     Comprehensive Metabolic Panel  - Continue home Ziac 10/6.25 mg daily, nifedipine 60 and lisinopril 5 daily    9. 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 Hypothyroidism, unspecified type  -     TSH  -     T4, Free  -     T3, free  - Continue levothyroxine 200 mcg daily for now    11. Vitamin D deficiency, unspecified   -     Vitamin D 25 Hydroxy  - Continue home calcium/vitamin D supplement daily    Follow Up:  Return in about 3 months (around 7/6/2021) for Recheck- 30min.     An After Visit Summary and PPPS were given to the patient.     Preventative  Colonoscopy: 8/2020, due 8/2021- follows w/  Man  Mammogram: 12/2020- follows w/ Karen. MRI scheduled  Pap smear: s/p hysterectomy  DEXA: 12/2020 w/ osteoporosis. Maintained on Prolia  Shingles: Completed 10/2018  Pneumonia: completed Prevnar 11/2017  Tdap: 2010, ordered today  Influenza: 11/2020, recommended  COVID: completed 3/2021    73 minutes spent with patient and documentation today.  Over 55 minutes spent directly in front of the patient

## 2021-04-07 LAB
25(OH)D3 SERPL-MCNC: 38.4 NG/ML
ALBUMIN SERPL-MCNC: 4.6 G/DL (ref 3.5–5.2)
ALBUMIN/GLOB SERPL: 1.6 G/DL
ALP SERPL-CCNC: 162 U/L (ref 39–117)
ALT SERPL W P-5'-P-CCNC: 97 U/L (ref 1–33)
ANION GAP SERPL CALCULATED.3IONS-SCNC: 10.7 MMOL/L (ref 5–15)
AST SERPL-CCNC: 73 U/L (ref 1–32)
BASOPHILS # BLD AUTO: 0.11 10*3/MM3 (ref 0–0.2)
BASOPHILS NFR BLD AUTO: 1.1 % (ref 0–1.5)
BILIRUB SERPL-MCNC: 0.3 MG/DL (ref 0–1.2)
BUN SERPL-MCNC: 19 MG/DL (ref 8–23)
BUN/CREAT SERPL: 18.6 (ref 7–25)
CALCIUM SPEC-SCNC: 10.3 MG/DL (ref 8.6–10.5)
CHLORIDE SERPL-SCNC: 98 MMOL/L (ref 98–107)
CHOLEST SERPL-MCNC: 181 MG/DL (ref 0–200)
CO2 SERPL-SCNC: 31.3 MMOL/L (ref 22–29)
CREAT SERPL-MCNC: 1.02 MG/DL (ref 0.57–1)
CRP SERPL-MCNC: 0.43 MG/DL (ref 0–0.5)
DEPRECATED RDW RBC AUTO: 41.8 FL (ref 37–54)
EOSINOPHIL # BLD AUTO: 0.32 10*3/MM3 (ref 0–0.4)
EOSINOPHIL NFR BLD AUTO: 3.2 % (ref 0.3–6.2)
ERYTHROCYTE [DISTWIDTH] IN BLOOD BY AUTOMATED COUNT: 12.2 % (ref 12.3–15.4)
ERYTHROCYTE [SEDIMENTATION RATE] IN BLOOD: 12 MM/HR (ref 0–30)
GFR SERPL CREATININE-BSD FRML MDRD: 55 ML/MIN/1.73
GLOBULIN UR ELPH-MCNC: 2.9 GM/DL
GLUCOSE SERPL-MCNC: 99 MG/DL (ref 65–99)
HBA1C MFR BLD: 5.4 % (ref 3.5–5.6)
HCT VFR BLD AUTO: 42.9 % (ref 34–46.6)
HDLC SERPL-MCNC: 62 MG/DL (ref 40–60)
HGB BLD-MCNC: 14.5 G/DL (ref 12–15.9)
IMM GRANULOCYTES # BLD AUTO: 0.03 10*3/MM3 (ref 0–0.05)
IMM GRANULOCYTES NFR BLD AUTO: 0.3 % (ref 0–0.5)
LDLC SERPL CALC-MCNC: 99 MG/DL (ref 0–100)
LDLC/HDLC SERPL: 1.55 {RATIO}
LYMPHOCYTES # BLD AUTO: 2.99 10*3/MM3 (ref 0.7–3.1)
LYMPHOCYTES NFR BLD AUTO: 29.5 % (ref 19.6–45.3)
MCH RBC QN AUTO: 32.4 PG (ref 26.6–33)
MCHC RBC AUTO-ENTMCNC: 33.8 G/DL (ref 31.5–35.7)
MCV RBC AUTO: 96 FL (ref 79–97)
MONOCYTES # BLD AUTO: 1.3 10*3/MM3 (ref 0.1–0.9)
MONOCYTES NFR BLD AUTO: 12.8 % (ref 5–12)
NEUTROPHILS NFR BLD AUTO: 5.37 10*3/MM3 (ref 1.7–7)
NEUTROPHILS NFR BLD AUTO: 53.1 % (ref 42.7–76)
NRBC BLD AUTO-RTO: 0.1 /100 WBC (ref 0–0.2)
PLATELET # BLD AUTO: 345 10*3/MM3 (ref 140–450)
PMV BLD AUTO: 11.9 FL (ref 6–12)
POTASSIUM SERPL-SCNC: 4.6 MMOL/L (ref 3.5–5.2)
PROT SERPL-MCNC: 7.5 G/DL (ref 6–8.5)
RBC # BLD AUTO: 4.47 10*6/MM3 (ref 3.77–5.28)
SODIUM SERPL-SCNC: 140 MMOL/L (ref 136–145)
T3FREE SERPL-MCNC: 2.43 PG/ML (ref 2–4.4)
T4 FREE SERPL-MCNC: 1.44 NG/DL (ref 0.93–1.7)
TRIGL SERPL-MCNC: 115 MG/DL (ref 0–150)
TSH SERPL DL<=0.05 MIU/L-ACNC: 0.02 UIU/ML (ref 0.27–4.2)
VIT B12 BLD-MCNC: 924 PG/ML (ref 211–946)
VLDLC SERPL-MCNC: 20 MG/DL (ref 5–40)
WBC # BLD AUTO: 10.12 10*3/MM3 (ref 3.4–10.8)

## 2021-05-05 DIAGNOSIS — R74.8 ELEVATED LIVER ENZYMES: Primary | ICD-10-CM

## 2021-05-07 ENCOUNTER — LAB (OUTPATIENT)
Dept: LAB | Facility: HOSPITAL | Age: 61
End: 2021-05-07

## 2021-05-07 DIAGNOSIS — R74.8 ELEVATED LIVER ENZYMES: ICD-10-CM

## 2021-05-07 LAB
ALBUMIN SERPL-MCNC: 4.1 G/DL (ref 3.5–5.2)
ALBUMIN/GLOB SERPL: 1.4 G/DL
ALP SERPL-CCNC: 117 U/L (ref 39–117)
ALT SERPL W P-5'-P-CCNC: 30 U/L (ref 1–33)
ANION GAP SERPL CALCULATED.3IONS-SCNC: 8.8 MMOL/L (ref 5–15)
AST SERPL-CCNC: 30 U/L (ref 1–32)
BILIRUB SERPL-MCNC: 0.2 MG/DL (ref 0–1.2)
BUN SERPL-MCNC: 26 MG/DL (ref 8–23)
BUN/CREAT SERPL: 21.5 (ref 7–25)
CALCIUM SPEC-SCNC: 9.8 MG/DL (ref 8.6–10.5)
CHLORIDE SERPL-SCNC: 103 MMOL/L (ref 98–107)
CO2 SERPL-SCNC: 31.2 MMOL/L (ref 22–29)
CREAT SERPL-MCNC: 1.21 MG/DL (ref 0.57–1)
GFR SERPL CREATININE-BSD FRML MDRD: 45 ML/MIN/1.73
GLOBULIN UR ELPH-MCNC: 3 GM/DL
GLUCOSE SERPL-MCNC: 76 MG/DL (ref 65–99)
POTASSIUM SERPL-SCNC: 5.1 MMOL/L (ref 3.5–5.2)
PROT SERPL-MCNC: 7.1 G/DL (ref 6–8.5)
SODIUM SERPL-SCNC: 143 MMOL/L (ref 136–145)

## 2021-05-07 PROCEDURE — 80053 COMPREHEN METABOLIC PANEL: CPT

## 2021-05-10 ENCOUNTER — HOSPITAL ENCOUNTER (OUTPATIENT)
Dept: MRI IMAGING | Facility: HOSPITAL | Age: 61
Discharge: HOME | End: 2021-05-10
Admitting: INTERNAL MEDICINE

## 2021-05-10 DIAGNOSIS — R92.8 OTHER ABNORMAL AND INCONCLUSIVE FINDINGS ON DIAGNOSTIC IMAGING OF BREAST: ICD-10-CM

## 2021-05-10 DIAGNOSIS — Z80.0 FAMILY HISTORY OF MALIGNANT NEOPLASM OF COLON: ICD-10-CM

## 2021-05-10 PROCEDURE — C8908 MRI W/O FOL W/CONT, BREAST,: HCPCS

## 2021-05-10 PROCEDURE — C8937 CAD BREAST MRI: HCPCS

## 2021-05-10 PROCEDURE — A9579 GAD-BASE MR CONTRAST NOS,1ML: HCPCS | Performed by: INTERNAL MEDICINE

## 2021-05-10 PROCEDURE — 25010000002 GADOTERIDOL PER 1 ML: Performed by: INTERNAL MEDICINE

## 2021-05-10 RX ADMIN — GADOTERIDOL 20 ML: 279.3 INJECTION, SOLUTION INTRAVENOUS at 14:40

## 2021-06-02 RX ORDER — FERROUS SULFATE 325(65) MG
TABLET ORAL
Qty: 90 TABLET | Refills: 4 | Status: SHIPPED | OUTPATIENT
Start: 2021-06-02 | End: 2022-09-28 | Stop reason: SDUPTHER

## 2021-06-03 ENCOUNTER — TELEPHONE (OUTPATIENT)
Dept: FAMILY MEDICINE CLINIC | Facility: CLINIC | Age: 61
End: 2021-06-03

## 2021-06-03 NOTE — TELEPHONE ENCOUNTER
Caller: Tracie Gross    Relationship to patient: Self    Best call back number: [phone]    Chief complaint: COUGH AND FEVER    Type of visit: SAME DAY    Requested date: TODAY    Additional notes:ATTEMPTED TO WARM TRANSFER

## 2021-06-11 RX ORDER — METHYLPREDNISOLONE 4 MG/1
TABLET ORAL
Qty: 21 TABLET | Refills: 0 | Status: SHIPPED | OUTPATIENT
Start: 2021-06-11 | End: 2021-07-06

## 2021-06-11 RX ORDER — BENZONATATE 100 MG/1
200 CAPSULE ORAL 3 TIMES DAILY PRN
Qty: 40 CAPSULE | Refills: 1 | Status: SHIPPED | OUTPATIENT
Start: 2021-06-11 | End: 2021-07-06

## 2021-06-15 DIAGNOSIS — G89.4 CHRONIC PAIN SYNDROME: ICD-10-CM

## 2021-06-16 RX ORDER — OXYCODONE HYDROCHLORIDE 10 MG/1
10 TABLET ORAL EVERY 6 HOURS PRN
Qty: 120 TABLET | Refills: 0 | Status: SHIPPED | OUTPATIENT
Start: 2021-06-16 | End: 2021-08-07 | Stop reason: SDUPTHER

## 2021-06-29 DIAGNOSIS — G89.4 CHRONIC PAIN SYNDROME: ICD-10-CM

## 2021-06-30 RX ORDER — GABAPENTIN 300 MG/1
CAPSULE ORAL
Qty: 360 CAPSULE | Refills: 2 | Status: SHIPPED | OUTPATIENT
Start: 2021-06-30 | End: 2022-02-17

## 2021-07-03 DIAGNOSIS — G89.4 CHRONIC PAIN SYNDROME: ICD-10-CM

## 2021-07-06 ENCOUNTER — OFFICE VISIT (OUTPATIENT)
Dept: FAMILY MEDICINE CLINIC | Facility: CLINIC | Age: 61
End: 2021-07-06

## 2021-07-06 ENCOUNTER — LAB (OUTPATIENT)
Dept: FAMILY MEDICINE CLINIC | Facility: CLINIC | Age: 61
End: 2021-07-06

## 2021-07-06 VITALS
HEIGHT: 64 IN | BODY MASS INDEX: 37.9 KG/M2 | WEIGHT: 222 LBS | RESPIRATION RATE: 16 BRPM | OXYGEN SATURATION: 98 % | TEMPERATURE: 98.2 F | DIASTOLIC BLOOD PRESSURE: 80 MMHG | HEART RATE: 88 BPM | SYSTOLIC BLOOD PRESSURE: 132 MMHG

## 2021-07-06 DIAGNOSIS — N18.30 STAGE 3 CHRONIC KIDNEY DISEASE, UNSPECIFIED WHETHER STAGE 3A OR 3B CKD (HCC): ICD-10-CM

## 2021-07-06 DIAGNOSIS — M15.9 OSTEOARTHRITIS, GENERALIZED: ICD-10-CM

## 2021-07-06 DIAGNOSIS — M34.9 SCLERODERMA (HCC): ICD-10-CM

## 2021-07-06 DIAGNOSIS — I10 ESSENTIAL HYPERTENSION: ICD-10-CM

## 2021-07-06 DIAGNOSIS — R63.5 WEIGHT GAIN: Primary | ICD-10-CM

## 2021-07-06 DIAGNOSIS — J30.9 ALLERGIC RHINITIS, UNSPECIFIED SEASONALITY, UNSPECIFIED TRIGGER: ICD-10-CM

## 2021-07-06 DIAGNOSIS — F31.9 BIPOLAR AFFECTIVE DISORDER, REMISSION STATUS UNSPECIFIED (HCC): ICD-10-CM

## 2021-07-06 DIAGNOSIS — G47.00 INSOMNIA, UNSPECIFIED TYPE: ICD-10-CM

## 2021-07-06 DIAGNOSIS — E03.9 HYPOTHYROIDISM, UNSPECIFIED TYPE: ICD-10-CM

## 2021-07-06 PROCEDURE — 84439 ASSAY OF FREE THYROXINE: CPT | Performed by: FAMILY MEDICINE

## 2021-07-06 PROCEDURE — 36415 COLL VENOUS BLD VENIPUNCTURE: CPT | Performed by: FAMILY MEDICINE

## 2021-07-06 PROCEDURE — 80053 COMPREHEN METABOLIC PANEL: CPT | Performed by: FAMILY MEDICINE

## 2021-07-06 PROCEDURE — 99214 OFFICE O/P EST MOD 30 MIN: CPT | Performed by: FAMILY MEDICINE

## 2021-07-06 PROCEDURE — 84443 ASSAY THYROID STIM HORMONE: CPT | Performed by: FAMILY MEDICINE

## 2021-07-06 RX ORDER — ALPRAZOLAM 1 MG/1
1 TABLET ORAL NIGHTLY PRN
Qty: 30 TABLET | Refills: 2 | Status: SHIPPED | OUTPATIENT
Start: 2021-07-06 | End: 2021-09-07 | Stop reason: SDUPTHER

## 2021-07-06 RX ORDER — ONDANSETRON 4 MG/1
4 TABLET, FILM COATED ORAL EVERY 8 HOURS PRN
Qty: 30 TABLET | Refills: 3 | Status: SHIPPED | OUTPATIENT
Start: 2021-07-06

## 2021-07-06 RX ORDER — CETIRIZINE HYDROCHLORIDE 10 MG/1
10 TABLET ORAL DAILY
Qty: 90 TABLET | Refills: 1 | Status: SHIPPED | OUTPATIENT
Start: 2021-07-06 | End: 2022-03-09

## 2021-07-06 RX ORDER — FLUTICASONE PROPIONATE 50 MCG
2 SPRAY, SUSPENSION (ML) NASAL DAILY
Qty: 16 G | Refills: 3 | Status: SHIPPED | OUTPATIENT
Start: 2021-07-06 | End: 2022-04-19

## 2021-07-06 RX ORDER — HYDROXYZINE HYDROCHLORIDE 25 MG/1
25 TABLET, FILM COATED ORAL EVERY 8 HOURS PRN
Qty: 30 TABLET | Refills: 3 | Status: SHIPPED | OUTPATIENT
Start: 2021-07-06

## 2021-07-06 RX ORDER — CELECOXIB 100 MG/1
CAPSULE ORAL
Qty: 90 CAPSULE | Refills: 1 | Status: SHIPPED | OUTPATIENT
Start: 2021-07-06 | End: 2021-10-05

## 2021-07-06 NOTE — PROGRESS NOTES
Chief Complaint   Patient presents with   • Lupus     HPI  Tracie Gross is a 60 y.o. female that presents for   Chief Complaint   Patient presents with   • Lupus     Weight gain: patient reports slowly increasing weight. She notes this occurred when her Seroquel was increased to 200 about 18 months ago. This was done for sleep. From a bipolar standpoint, she was well controlled on 100mg. She has tried melatonin, Benadryl, Lunesta, Ambien, and trazodone w/o success.     Scleroderma: patient has f/u w/ rheumatology ( Rheumatology). They have requested pulmonary function tests and will not see her until this is completed. Will order today. Joint pain stable. She is maintained nifedipine 60 daily but continues to have issue w/ Raynaud's    CKD3: last eGFR 45. Maintained on Celebrex 100 BID.     HTN: 132/80 today. Maintainedon on Ziac 10/6.25 mg daily, nifedipine 60 and lisinopril 5 daily. No LH/dizziness, CP or SOB    OA: patient w/ chronic knee pain. S/p R knee replacement. Had L knee injection at last visit (3 months ago) that was helpful. Maintained on Celebrex 100 BID.     Review of Systems   Constitutional: Positive for unexpected weight gain. Negative for fever and unexpected weight loss.   HENT: Positive for congestion and rhinorrhea.    Respiratory: Negative for cough and shortness of breath.    Cardiovascular: Negative for chest pain and palpitations.   Musculoskeletal: Positive for arthralgias (Stable) and gait problem.   Skin: Positive for color change.   Neurological: Negative for dizziness and light-headedness.   Psychiatric/Behavioral: Positive for sleep disturbance.     The following portions of the patient's history were reviewed and updated as appropriate: problem list, past medical history, past surgical history, allergies, current medication    Problem List Tab  Patient History Tab  Immunizations Tab  Medications Tab  Chart Review Tab  Care Everywhere Tab  Synopsis Tab    PE  Vitals:    07/06/21  1557   BP: 132/80   Pulse: 88   Resp: 16   Temp: 98.2 °F (36.8 °C)   SpO2: 98%     Body mass index is 38.11 kg/m².  General: Obese, NAD  Head: AT/NC  Eyes: EOMI, anicteric sclera  Resp: CTAB, SCR, BS equal  CV: RRR w/o m/r/g; 2+ pulses  GI: Soft, NT/ND, +BS  MSK: FROM, no deformity, no edema  Skin: Warm, dry, intact.  Stable, thickened, sclerotic digits  Neuro: Alert and oriented. No focal deficits  Psych: Appropriate mood and affect    Imaging  MRI Breast Bilateral With & Without Contrast    Result Date: 5/10/2021  No MRI signs of malignancy in either breast.  Recommend continued annual screening mammography and breast MRI.   ASSESSMENT: BI-RADS 2. Benign findings.    Electronically Signed By-Michelle Arreola MD On:5/10/2021 4:41 PM This report was finalized on [id number] by  Michelle Arreola MD.      Assessment/Plan   Tracie Gross is a 60 y.o. female that presents for   Chief Complaint   Patient presents with   • Lupus     Diagnoses and all orders for this visit:    1. Weight gain (Primary): Felt to be secondary to increased dose of Seroquel.  This was done purely for sleep and not bipolar.  Will reduce Seroquel back to 100 mg daily and increase Xanax to 1 mg nightly as needed.  We will also repeat thyroid labs as below  -     Increase ALPRAZolam (XANAX) 1 MG tablet; Take 1 tablet by mouth At Night As Needed for Anxiety or Sleep.  Dispense: 30 tablet; Refill: 2  - Reduce Seroquel to 100 mg daily  - TSH and free T4 ordered today    2. Insomnia, unspecified type  -     Increase ALPRAZolam (XANAX) 1 MG tablet; Take 1 tablet by mouth At Night As Needed for Anxiety or Sleep.  Dispense: 30 tablet; Refill: 2    3. Bipolar affective disorder, remission status unspecified (CMS/HCC)   -Reduce Seroquel to 100 mg daily   -Continue home lamotrigine 200 mg daily    4. Scleroderma (CMS/HCC)  -     Full Pulmonary Function Test With Bronchodilator & ABG; Future  - Continue home nifedipine 60 daily, hydroxychloroquine 200 twice  daily, Celebrex 100 twice daily for now    5. Stage 3 chronic kidney disease, unspecified whether stage 3a or 3b CKD (CMS/[facility]): Most recent EGFR 45.  Currently maintained on Celebrex 100 twice daily.  Will repeat kidney function today.  If any worse, we will have to reduce Celebrex to daily  -     Comprehensive Metabolic Panel    6. Essential hypertension: 132/80 today   -Continue home Ziac 10/6.25 daily, lisinopril 5, nifedipine 60 daily    7. Hypothyroidism, unspecified type  -     TSH  -     T4, Free  - Continue home levothyroxine 200 mcg daily for now    8. Osteoarthritis, generalized   -Consider repeat steroid injection in 1 month   -Continue Celebrex 100 twice daily as needed   -Continue oxycodone 10 mg every 6 as needed    9. Allergic rhinitis, unspecified seasonality, unspecified trigger  -     Start cetirizine (zyrTEC) 10 MG tablet; Take 1 tablet by mouth Daily.  Dispense: 90 tablet; Refill: 1  -     Start fluticasone (FLONASE) 50 MCG/ACT nasal spray; 2 sprays into the nostril(s) as directed by provider Daily.  Dispense: 16 g; Refill: 3    Other orders  -     hydrOXYzine (ATARAX) 25 MG tablet; Take 1 tablet by mouth Every 8 (Eight) Hours As Needed for Itching.  Dispense: 30 tablet; Refill: 3  -     ondansetron (ZOFRAN) 4 MG tablet; Take 1 tablet by mouth Every 8 (Eight) Hours As Needed for Nausea or Vomiting.  Dispense: 30 tablet; Refill: 3     Return in about 3 months (around 10/6/2021) for Recheck- 30min.

## 2021-07-07 LAB
ALBUMIN SERPL-MCNC: 4.2 G/DL (ref 3.5–5.2)
ALBUMIN/GLOB SERPL: 1.6 G/DL
ALP SERPL-CCNC: 140 U/L (ref 39–117)
ALT SERPL W P-5'-P-CCNC: 32 U/L (ref 1–33)
ANION GAP SERPL CALCULATED.3IONS-SCNC: 8.5 MMOL/L (ref 5–15)
AST SERPL-CCNC: 27 U/L (ref 1–32)
BILIRUB SERPL-MCNC: 0.2 MG/DL (ref 0–1.2)
BUN SERPL-MCNC: 19 MG/DL (ref 8–23)
BUN/CREAT SERPL: 19 (ref 7–25)
CALCIUM SPEC-SCNC: 9.5 MG/DL (ref 8.6–10.5)
CHLORIDE SERPL-SCNC: 104 MMOL/L (ref 98–107)
CO2 SERPL-SCNC: 29.5 MMOL/L (ref 22–29)
CREAT SERPL-MCNC: 1 MG/DL (ref 0.57–1)
GFR SERPL CREATININE-BSD FRML MDRD: 56 ML/MIN/1.73
GLOBULIN UR ELPH-MCNC: 2.6 GM/DL
GLUCOSE SERPL-MCNC: 89 MG/DL (ref 65–99)
POTASSIUM SERPL-SCNC: 4.7 MMOL/L (ref 3.5–5.2)
PROT SERPL-MCNC: 6.8 G/DL (ref 6–8.5)
SODIUM SERPL-SCNC: 142 MMOL/L (ref 136–145)
T4 FREE SERPL-MCNC: 1.38 NG/DL (ref 0.93–1.7)
TSH SERPL DL<=0.05 MIU/L-ACNC: 0.01 UIU/ML (ref 0.27–4.2)

## 2021-07-12 ENCOUNTER — TRANSCRIBE ORDERS (OUTPATIENT)
Dept: ADMINISTRATIVE | Facility: HOSPITAL | Age: 61
End: 2021-07-12

## 2021-07-12 DIAGNOSIS — Z01.818 OTHER SPECIFIED PRE-OPERATIVE EXAMINATION: Primary | ICD-10-CM

## 2021-07-12 RX ORDER — MONTELUKAST SODIUM 4 MG/1
TABLET, CHEWABLE ORAL
Qty: 180 TABLET | Refills: 2 | Status: SHIPPED | OUTPATIENT
Start: 2021-07-12 | End: 2021-11-24

## 2021-07-19 RX ORDER — NIFEDIPINE 60 MG/1
TABLET, EXTENDED RELEASE ORAL
Qty: 90 TABLET | Refills: 1 | Status: SHIPPED | OUTPATIENT
Start: 2021-07-19 | End: 2021-10-12 | Stop reason: SDUPTHER

## 2021-07-20 ENCOUNTER — APPOINTMENT (OUTPATIENT)
Dept: LAB | Facility: HOSPITAL | Age: 61
End: 2021-07-20

## 2021-07-21 ENCOUNTER — LAB (OUTPATIENT)
Dept: LAB | Facility: HOSPITAL | Age: 61
End: 2021-07-21

## 2021-07-21 DIAGNOSIS — Z01.818 OTHER SPECIFIED PRE-OPERATIVE EXAMINATION: ICD-10-CM

## 2021-07-21 LAB — SARS-COV-2 ORF1AB RESP QL NAA+PROBE: NOT DETECTED

## 2021-07-21 PROCEDURE — C9803 HOPD COVID-19 SPEC COLLECT: HCPCS

## 2021-07-21 PROCEDURE — U0004 COV-19 TEST NON-CDC HGH THRU: HCPCS

## 2021-07-22 ENCOUNTER — TRANSCRIBE ORDERS (OUTPATIENT)
Dept: ADMINISTRATIVE | Facility: HOSPITAL | Age: 61
End: 2021-07-22

## 2021-07-22 DIAGNOSIS — K86.89 ATROPHY OF PANCREAS: Primary | ICD-10-CM

## 2021-07-23 ENCOUNTER — HOSPITAL ENCOUNTER (OUTPATIENT)
Dept: RESPIRATORY THERAPY | Facility: HOSPITAL | Age: 61
Discharge: HOME | End: 2021-07-23
Admitting: FAMILY MEDICINE

## 2021-07-23 VITALS — RESPIRATION RATE: 17 BRPM | HEART RATE: 83 BPM | OXYGEN SATURATION: 98 %

## 2021-07-23 DIAGNOSIS — M34.9 SCLERODERMA (HCC): ICD-10-CM

## 2021-07-23 LAB
ARTERIAL PATENCY WRIST A: POSITIVE
ATMOSPHERIC PRESS: ABNORMAL MM[HG]
BASE EXCESS BLDA CALC-SCNC: 2.9 MMOL/L (ref 0–3)
BDY SITE: ABNORMAL
CO2 BLDA-SCNC: 30.4 MMOL/L (ref 22–29)
HCO3 BLDA-SCNC: 28.9 MMOL/L (ref 21–28)
HEMODILUTION: NO
INHALED O2 CONCENTRATION: 21 %
MODALITY: ABNORMAL
PCO2 BLDA: 48.5 MM HG (ref 35–48)
PH BLDA: 7.38 PH UNITS (ref 7.35–7.45)
PO2 BLDA: 75.5 MM HG (ref 83–108)
SAO2 % BLDCOA: 94.5 % (ref 94–98)

## 2021-07-23 PROCEDURE — 94729 DIFFUSING CAPACITY: CPT

## 2021-07-23 PROCEDURE — 94060 EVALUATION OF WHEEZING: CPT

## 2021-07-23 PROCEDURE — 94726 PLETHYSMOGRAPHY LUNG VOLUMES: CPT

## 2021-07-23 PROCEDURE — 82803 BLOOD GASES ANY COMBINATION: CPT

## 2021-07-23 PROCEDURE — 36600 WITHDRAWAL OF ARTERIAL BLOOD: CPT

## 2021-07-23 PROCEDURE — A9270 NON-COVERED ITEM OR SERVICE: HCPCS | Performed by: FAMILY MEDICINE

## 2021-07-23 PROCEDURE — 63710000001 ALBUTEROL SULFATE HFA 108 (90 BASE) MCG/ACT AEROSOL SOLUTION 6.7 G INHALER: Performed by: FAMILY MEDICINE

## 2021-07-23 RX ORDER — ALBUTEROL SULFATE 90 UG/1
2 AEROSOL, METERED RESPIRATORY (INHALATION) ONCE
Status: COMPLETED | OUTPATIENT
Start: 2021-07-23 | End: 2021-07-23

## 2021-07-23 RX ADMIN — ALBUTEROL SULFATE 2 PUFF: 108 AEROSOL, METERED RESPIRATORY (INHALATION) at 13:30

## 2021-07-26 NOTE — PROCEDURES
Pulmonary Function Test Interpretation  Tracie Gross  [id number]    07/26/21  16:15 EDT    Spirometry  Spirometry demonstrates no airway obstruction.    Post Bronchodilator  Following the inhalation of a bronchodilator, there is no significant change in airway mechanics.    MVV  The reduction in maximum voluntary ventilation reflects the reduced FEV1.    Lung Volume  Lung volumes are consistent with mild restrictive lung disease.    Diffusion  The diffusing capacity for carbon monoxide, a reflection of alveolar-capillary gas transport, is substantially reduced.    Study Comparison  N/A    Further Work-Up  N/A        Study date: 7/23/2021

## 2021-07-30 ENCOUNTER — HOSPITAL ENCOUNTER (OUTPATIENT)
Dept: ONCOLOGY | Facility: HOSPITAL | Age: 61
Setting detail: INFUSION SERIES
Discharge: HOME | End: 2021-07-30

## 2021-07-30 VITALS
TEMPERATURE: 97.5 F | WEIGHT: 220 LBS | HEIGHT: 64 IN | HEART RATE: 69 BPM | SYSTOLIC BLOOD PRESSURE: 107 MMHG | BODY MASS INDEX: 37.56 KG/M2 | RESPIRATION RATE: 18 BRPM | DIASTOLIC BLOOD PRESSURE: 67 MMHG

## 2021-07-30 DIAGNOSIS — M81.0 OSTEOPOROSIS, UNSPECIFIED OSTEOPOROSIS TYPE, UNSPECIFIED PATHOLOGICAL FRACTURE PRESENCE: Primary | ICD-10-CM

## 2021-07-30 DIAGNOSIS — C50.919 MALIGNANT NEOPLASM OF FEMALE BREAST, UNSPECIFIED ESTROGEN RECEPTOR STATUS, UNSPECIFIED LATERALITY, UNSPECIFIED SITE OF BREAST (HCC): ICD-10-CM

## 2021-07-30 LAB
ALBUMIN SERPL-MCNC: 3.8 G/DL (ref 3.5–5.2)
ALBUMIN/GLOB SERPL: 1.4 G/DL
ALP SERPL-CCNC: 222 U/L (ref 39–117)
ALT SERPL W P-5'-P-CCNC: 72 U/L (ref 1–33)
ANION GAP SERPL CALCULATED.3IONS-SCNC: 8 MMOL/L (ref 5–15)
AST SERPL-CCNC: 40 U/L (ref 1–32)
BASOPHILS # BLD AUTO: 0.06 10*3/MM3 (ref 0–0.2)
BASOPHILS NFR BLD AUTO: 0.7 % (ref 0–1.5)
BILIRUB SERPL-MCNC: 0.2 MG/DL (ref 0–1.2)
BUN SERPL-MCNC: 23 MG/DL (ref 8–23)
BUN/CREAT SERPL: 18.9 (ref 7–25)
CALCIUM SPEC-SCNC: 9.2 MG/DL (ref 8.6–10.5)
CHLORIDE SERPL-SCNC: 103 MMOL/L (ref 98–107)
CO2 SERPL-SCNC: 31 MMOL/L (ref 22–29)
CREAT SERPL-MCNC: 1.22 MG/DL (ref 0.57–1)
DEPRECATED RDW RBC AUTO: 47.5 FL (ref 37–54)
EOSINOPHIL # BLD AUTO: 0.54 10*3/MM3 (ref 0–0.4)
EOSINOPHIL NFR BLD AUTO: 6.3 % (ref 0.3–6.2)
ERYTHROCYTE [DISTWIDTH] IN BLOOD BY AUTOMATED COUNT: 13.8 % (ref 12.3–15.4)
GFR SERPL CREATININE-BSD FRML MDRD: 45 ML/MIN/1.73
GLOBULIN UR ELPH-MCNC: 2.7 GM/DL
GLUCOSE SERPL-MCNC: 86 MG/DL (ref 65–99)
HCT VFR BLD AUTO: 38.9 % (ref 34–46.6)
HGB BLD-MCNC: 12.5 G/DL (ref 12–15.9)
LYMPHOCYTES # BLD AUTO: 3.1 10*3/MM3 (ref 0.7–3.1)
LYMPHOCYTES NFR BLD AUTO: 35.9 % (ref 19.6–45.3)
MAGNESIUM SERPL-MCNC: 1.7 MG/DL (ref 1.6–2.4)
MCH RBC QN AUTO: 31.3 PG (ref 26.6–33)
MCHC RBC AUTO-ENTMCNC: 32.1 G/DL (ref 31.5–35.7)
MCV RBC AUTO: 97.5 FL (ref 79–97)
MONOCYTES # BLD AUTO: 1.19 10*3/MM3 (ref 0.1–0.9)
MONOCYTES NFR BLD AUTO: 13.8 % (ref 5–12)
NEUTROPHILS NFR BLD AUTO: 3.74 10*3/MM3 (ref 1.7–7)
NEUTROPHILS NFR BLD AUTO: 43.3 % (ref 42.7–76)
PHOSPHATE SERPL-MCNC: 3.1 MG/DL (ref 2.5–4.5)
PLATELET # BLD AUTO: 289 10*3/MM3 (ref 140–450)
PMV BLD AUTO: 11.6 FL (ref 6–12)
POTASSIUM SERPL-SCNC: 4.3 MMOL/L (ref 3.5–5.2)
PROT SERPL-MCNC: 6.5 G/DL (ref 6–8.5)
RBC # BLD AUTO: 3.99 10*6/MM3 (ref 3.77–5.28)
SODIUM SERPL-SCNC: 142 MMOL/L (ref 136–145)
WBC # BLD AUTO: 8.63 10*3/MM3 (ref 3.4–10.8)

## 2021-07-30 PROCEDURE — 25010000002 DENOSUMAB 60 MG/ML SOLUTION PREFILLED SYRINGE: Performed by: INTERNAL MEDICINE

## 2021-07-30 PROCEDURE — 85025 COMPLETE CBC W/AUTO DIFF WBC: CPT | Performed by: INTERNAL MEDICINE

## 2021-07-30 PROCEDURE — 80053 COMPREHEN METABOLIC PANEL: CPT | Performed by: INTERNAL MEDICINE

## 2021-07-30 PROCEDURE — 83735 ASSAY OF MAGNESIUM: CPT | Performed by: INTERNAL MEDICINE

## 2021-07-30 PROCEDURE — 84100 ASSAY OF PHOSPHORUS: CPT | Performed by: INTERNAL MEDICINE

## 2021-07-30 PROCEDURE — 96372 THER/PROPH/DIAG INJ SC/IM: CPT

## 2021-07-30 RX ADMIN — DENOSUMAB 60 MG: 60 INJECTION SUBCUTANEOUS at 11:32

## 2021-07-30 NOTE — PROGRESS NOTES
Pt here for Prolia with out complaints she admits taking her calcium plus vitamin D as listed on medication record. She is questioning a consult by I DR Conway, Dr. 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 Pt states all that is ordered is a CT of abdomen. Inbox sent to Dr. Jones's assistants to follow up with [facility].

## 2021-08-03 ENCOUNTER — TELEPHONE (OUTPATIENT)
Dept: ONCOLOGY | Facility: CLINIC | Age: 61
End: 2021-08-03

## 2021-08-03 NOTE — TELEPHONE ENCOUNTER
----- Message from Lashawn Brooks RN sent at 8/3/2021  4:11 PM EDT -----    ----- Message -----  From: Suzan Jones MD  Sent: 8/3/2021  11:49 AM EDT  To: Lashawn Brooks RN    Her creatinine remains elevated.  She needs to follow-up with her PCP.  Also her liver function tests have increased compared to the last labs that she had.  She is to follow-up with GI.  Patient is already established with GI.

## 2021-08-07 DIAGNOSIS — M32.9 SLE (SYSTEMIC LUPUS ERYTHEMATOSUS RELATED SYNDROME) (HCC): ICD-10-CM

## 2021-08-07 DIAGNOSIS — M34.9 SCLERODERMA (HCC): ICD-10-CM

## 2021-08-07 DIAGNOSIS — G89.4 CHRONIC PAIN SYNDROME: ICD-10-CM

## 2021-08-08 RX ORDER — CYCLOBENZAPRINE HCL 10 MG
TABLET ORAL
Qty: 100 TABLET | Refills: 3 | Status: SHIPPED | OUTPATIENT
Start: 2021-08-08 | End: 2022-05-09

## 2021-08-09 ENCOUNTER — HOSPITAL ENCOUNTER (OUTPATIENT)
Dept: MRI IMAGING | Facility: HOSPITAL | Age: 61
Discharge: HOME | End: 2021-08-09
Admitting: INTERNAL MEDICINE

## 2021-08-09 DIAGNOSIS — K86.89 ATROPHY OF PANCREAS: ICD-10-CM

## 2021-08-09 PROCEDURE — 25010000002 GADOTERIDOL PER 1 ML: Performed by: INTERNAL MEDICINE

## 2021-08-09 PROCEDURE — 74183 MRI ABD W/O CNTR FLWD CNTR: CPT

## 2021-08-09 PROCEDURE — A9579 GAD-BASE MR CONTRAST NOS,1ML: HCPCS | Performed by: INTERNAL MEDICINE

## 2021-08-09 RX ORDER — OXYCODONE HYDROCHLORIDE 10 MG/1
10 TABLET ORAL EVERY 6 HOURS PRN
Qty: 120 TABLET | Refills: 0 | Status: SHIPPED | OUTPATIENT
Start: 2021-08-09 | End: 2022-01-18 | Stop reason: SDUPTHER

## 2021-08-09 RX ADMIN — GADOTERIDOL 20 ML: 279.3 INJECTION, SOLUTION INTRAVENOUS at 12:59

## 2021-08-23 ENCOUNTER — APPOINTMENT (OUTPATIENT)
Dept: LAB | Facility: HOSPITAL | Age: 61
End: 2021-08-23

## 2021-08-24 ENCOUNTER — TELEPHONE (OUTPATIENT)
Dept: ONCOLOGY | Facility: CLINIC | Age: 61
End: 2021-08-24

## 2021-08-24 ENCOUNTER — APPOINTMENT (OUTPATIENT)
Dept: LAB | Facility: HOSPITAL | Age: 61
End: 2021-08-24

## 2021-08-24 NOTE — TELEPHONE ENCOUNTER
Caller: Tracie Gross    Relationship to patient: Self    Best call back number: [phone]    Chief complaint: HAS A FEVER AND BODY ACHES    Type of visit: LAB AND FOLLOW UP    Requested date: PLEASE CALL TO R/S.    If rescheduling, when is the original appointment: 08/24    Additional notes: HUB UNABLE TO REACH NON-CLINICAL.

## 2021-08-26 ENCOUNTER — TELEPHONE (OUTPATIENT)
Dept: ONCOLOGY | Facility: CLINIC | Age: 61
End: 2021-08-26

## 2021-09-07 DIAGNOSIS — R63.5 WEIGHT GAIN: ICD-10-CM

## 2021-09-07 DIAGNOSIS — G47.00 INSOMNIA, UNSPECIFIED TYPE: ICD-10-CM

## 2021-09-08 RX ORDER — ALPRAZOLAM 1 MG/1
1 TABLET ORAL NIGHTLY PRN
Qty: 30 TABLET | Refills: 2 | Status: SHIPPED | OUTPATIENT
Start: 2021-09-08 | End: 2022-01-18 | Stop reason: SDUPTHER

## 2021-09-15 ENCOUNTER — LAB (OUTPATIENT)
Dept: LAB | Facility: HOSPITAL | Age: 61
End: 2021-09-15

## 2021-09-15 LAB — SARS-COV-2 ORF1AB RESP QL NAA+PROBE: NOT DETECTED

## 2021-09-15 PROCEDURE — C9803 HOPD COVID-19 SPEC COLLECT: HCPCS

## 2021-09-15 PROCEDURE — U0004 COV-19 TEST NON-CDC HGH THRU: HCPCS

## 2021-09-16 ENCOUNTER — ANESTHESIA EVENT (OUTPATIENT)
Dept: GASTROENTEROLOGY | Facility: HOSPITAL | Age: 61
End: 2021-09-16

## 2021-09-16 RX ORDER — SODIUM CHLORIDE 0.9 % (FLUSH) 0.9 %
10 SYRINGE (ML) INJECTION EVERY 12 HOURS SCHEDULED
Status: CANCELLED | OUTPATIENT
Start: 2021-09-16

## 2021-09-16 RX ORDER — SODIUM CHLORIDE 0.9 % (FLUSH) 0.9 %
10 SYRINGE (ML) INJECTION AS NEEDED
Status: CANCELLED | OUTPATIENT
Start: 2021-09-16

## 2021-09-16 RX ORDER — SODIUM CHLORIDE 9 MG/ML
9 INJECTION, SOLUTION INTRAVENOUS CONTINUOUS PRN
Status: CANCELLED | OUTPATIENT
Start: 2021-09-16

## 2021-09-17 ENCOUNTER — ANESTHESIA (OUTPATIENT)
Dept: GASTROENTEROLOGY | Facility: HOSPITAL | Age: 61
End: 2021-09-17

## 2021-09-17 ENCOUNTER — HOSPITAL ENCOUNTER (OUTPATIENT)
Facility: HOSPITAL | Age: 61
Setting detail: HOSPITAL OUTPATIENT SURGERY
Discharge: HOME | End: 2021-09-17
Attending: INTERNAL MEDICINE | Admitting: INTERNAL MEDICINE

## 2021-09-17 ENCOUNTER — ON CAMPUS - OUTPATIENT (AMBULATORY)
Dept: URBAN - METROPOLITAN AREA HOSPITAL 85 | Facility: HOSPITAL | Age: 61
End: 2021-09-17
Payer: COMMERCIAL

## 2021-09-17 VITALS
SYSTOLIC BLOOD PRESSURE: 138 MMHG | BODY MASS INDEX: 37.15 KG/M2 | OXYGEN SATURATION: 96 % | TEMPERATURE: 97.7 F | DIASTOLIC BLOOD PRESSURE: 66 MMHG | HEART RATE: 87 BPM | HEIGHT: 64 IN | RESPIRATION RATE: 13 BRPM | WEIGHT: 217.59 LBS

## 2021-09-17 DIAGNOSIS — R15.9 FULL INCONTINENCE OF FECES: ICD-10-CM

## 2021-09-17 DIAGNOSIS — R15.9 FECAL INCONTINENCE: ICD-10-CM

## 2021-09-17 DIAGNOSIS — K21.9 GASTRO-ESOPHAGEAL REFLUX DISEASE WITHOUT ESOPHAGITIS: ICD-10-CM

## 2021-09-17 DIAGNOSIS — K62.3 RECTAL PROLAPSE: ICD-10-CM

## 2021-09-17 DIAGNOSIS — Z15.09 GENETIC SUSCEPTIBILITY TO OTHER MALIGNANT NEOPLASM: ICD-10-CM

## 2021-09-17 DIAGNOSIS — Z15.09 LYNCH SYNDROME: ICD-10-CM

## 2021-09-17 DIAGNOSIS — K63.5 POLYP OF COLON: ICD-10-CM

## 2021-09-17 PROCEDURE — 25010000002 FENTANYL CITRATE (PF) 100 MCG/2ML SOLUTION: Performed by: ANESTHESIOLOGY

## 2021-09-17 PROCEDURE — 45385 COLONOSCOPY W/LESION REMOVAL: CPT | Performed by: INTERNAL MEDICINE

## 2021-09-17 PROCEDURE — 25010000002 PROPOFOL 10 MG/ML EMULSION: Performed by: ANESTHESIOLOGY

## 2021-09-17 PROCEDURE — 43235 EGD DIAGNOSTIC BRUSH WASH: CPT | Performed by: INTERNAL MEDICINE

## 2021-09-17 PROCEDURE — 88305 TISSUE EXAM BY PATHOLOGIST: CPT | Performed by: INTERNAL MEDICINE

## 2021-09-17 RX ORDER — PROPOFOL 10 MG/ML
VIAL (ML) INTRAVENOUS AS NEEDED
Status: DISCONTINUED | OUTPATIENT
Start: 2021-09-17 | End: 2021-09-17 | Stop reason: SURG

## 2021-09-17 RX ORDER — ONDANSETRON 2 MG/ML
4 INJECTION INTRAMUSCULAR; INTRAVENOUS ONCE AS NEEDED
Status: DISCONTINUED | OUTPATIENT
Start: 2021-09-17 | End: 2021-09-17 | Stop reason: HOSPADM

## 2021-09-17 RX ORDER — SODIUM CHLORIDE 0.9 % (FLUSH) 0.9 %
3 SYRINGE (ML) INJECTION EVERY 12 HOURS SCHEDULED
Status: CANCELLED | OUTPATIENT
Start: 2021-09-17

## 2021-09-17 RX ORDER — FENTANYL CITRATE 50 UG/ML
INJECTION, SOLUTION INTRAMUSCULAR; INTRAVENOUS AS NEEDED
Status: DISCONTINUED | OUTPATIENT
Start: 2021-09-17 | End: 2021-09-17 | Stop reason: SURG

## 2021-09-17 RX ORDER — SODIUM CHLORIDE 0.9 % (FLUSH) 0.9 %
3-10 SYRINGE (ML) INJECTION AS NEEDED
Status: CANCELLED | OUTPATIENT
Start: 2021-09-17

## 2021-09-17 RX ORDER — SODIUM CHLORIDE 9 MG/ML
INJECTION, SOLUTION INTRAVENOUS CONTINUOUS PRN
Status: DISCONTINUED | OUTPATIENT
Start: 2021-09-17 | End: 2021-09-17 | Stop reason: SURG

## 2021-09-17 RX ORDER — MAGNESIUM CARB/ALUMINUM HYDROX 105-160MG
296 TABLET,CHEWABLE ORAL ONCE
Status: DISCONTINUED | OUTPATIENT
Start: 2021-09-17 | End: 2021-09-17 | Stop reason: HOSPADM

## 2021-09-17 RX ORDER — SODIUM CHLORIDE 9 MG/ML
10 INJECTION, SOLUTION INTRAVENOUS ONCE
Status: COMPLETED | OUTPATIENT
Start: 2021-09-17 | End: 2021-09-17

## 2021-09-17 RX ADMIN — PROPOFOL 50 MG: 10 INJECTION, EMULSION INTRAVENOUS at 11:53

## 2021-09-17 RX ADMIN — PROPOFOL 50 MG: 10 INJECTION, EMULSION INTRAVENOUS at 12:00

## 2021-09-17 RX ADMIN — FENTANYL CITRATE 50 MCG: 50 INJECTION, SOLUTION INTRAMUSCULAR; INTRAVENOUS at 11:44

## 2021-09-17 RX ADMIN — SODIUM CHLORIDE: 0.9 INJECTION, SOLUTION INTRAVENOUS at 11:32

## 2021-09-17 RX ADMIN — PROPOFOL 100 MG: 10 INJECTION, EMULSION INTRAVENOUS at 11:44

## 2021-09-17 RX ADMIN — PROPOFOL 50 MG: 10 INJECTION, EMULSION INTRAVENOUS at 11:48

## 2021-09-17 RX ADMIN — SODIUM CHLORIDE 10 ML/HR: 9 INJECTION, SOLUTION INTRAVENOUS at 11:22

## 2021-09-17 NOTE — DISCHARGE INSTRUCTIONS
A responsible adult should stay with you and you should rest quietly for the rest of the day.    Do not drink alcohol, drive, operate any heavy machinery or power tools or make any legal/important decisions for the next 24 hours.     Progress your diet as tolerated.  If you begin to experience severe pain, increased shortness of breath, racing heartbeat or a fever above 101 F, seek immediate medical attention.     Follow up with MD as instructed. Call office for results in 3 to 5 days if needed. [phone]    Findings:   Terminal ileum: Normal  Colon: 6 mm polyp in the sigmoid colon removed with cold snare polypectomy     Impression:  1.  Normal EGD  2.  Sigmoid colon polyp     Recommendations:  1.  High-fiber diet  2.  Repeat colonoscopy in 1 year  3.  Follow my office as needed

## 2021-09-17 NOTE — H&P
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     History of present illness:      Tracie Gross is a 61 y.o. female who presents today for Procedure(s):  COLONOSCOPY  ESOPHAGOGASTRODUODENOSCOPY for the indications listed below.     GERD, Monahan syndrome, family history of colon cancer    The updated Patient Profile was reviewed prior to the procedure, in conjunction with the Physical Exam, including medical conditions, surgical procedures, medications, allergies, family history and social history.     Pre-operatively, I reviewed the indication(s) for the procedure, the risks of the procedure [including but not limited to: unexpected bleeding possibly requiring hospitalization and/or unplanned repeat procedures, perforation possibly requiring surgical treatment, missed lesions and complications of sedation/MAC (also explained by anesthesia staff)].     I have evaluated the patient for risks associated with the planned anesthesia and the procedure to be performed and find the patient an acceptable candidate for IV sedation.    Multiple opportunities were provided for any questions or concerns, and all questions were answered satisfactorily before any anesthesia was administered. We will proceed with the planned procedure.    Past Medical History:  Past Medical History:   Diagnosis Date   • Arthritis    • Asthma    • Bipolar affective disorder (CMS/HCC)    • Breast cancer (CMS/HCC)     RT   • GERD (gastroesophageal reflux disease) 1993   • H/O breast reconstruction     SEVERAL   • H/O laminectomy    • Hamartoma (CMS/HCC)    • Hx of bilateral oophorectomy    • Hypertension    • Hypothyroidism    • Inflammatory bowel disease 1992   • Kienbock's disease, right     WRIST   • Low back pain 1991   • Lupus (CMS/HCC)    • Monahan syndrome    • Osteoporosis    • Raynaud disease    • Scleroderma  (CMS/HCC)    • Von Willebrand disease (CMS/HCC)        Past Surgical History:  Past Surgical History:   Procedure Laterality Date   • ARM DEBRIDEMENT Right     X 3   • BACK SURGERY      [facility]- cervical fusion   • BACK SURGERY      lumbar decomp   • BREAST BIOPSY     • BREAST LUMPECTOMY     • BREAST RECONSTRUCTION Right    • BREAST RECONSTRUCTION Right    • CARDIAC CATHETERIZATION      no stents placed   •  SECTION  ,    • CHOLECYSTECTOMY     • COLONOSCOPY     • COLONOSCOPY N/A 2020    Procedure: COLONOSCOPY;  Surgeon: Cayden Conway MD;  Location: [facility] ENDOSCOPY;  Service: Gastroenterology;  Laterality: N/A;  rectal ulcer   • COSMETIC SURGERY  [id number]   • ENDOSCOPY     • ENDOSCOPY N/A 2020    Procedure: ESOPHAGOGASTRODUODENOSCOPY;  Surgeon: Cayden Conway MD;  Location: [facility] ENDOSCOPY;  Service: Gastroenterology;  Laterality: N/A;  Normal EGD   • EXPLORATORY LAPAROTOMY      scar tissue removal   • EYE SURGERY     • FINGER SURGERY Right     ring finger mass excision   • HYSTERECTOMY      PARTIAL   • JOINT REPLACEMENT Right ,    hip and knee   • KNEE ARTHROSCOPY Left 2020    Procedure: LEFT KNEE SCOPE with partial lateral meniscectomy;  Surgeon: Chau Perez MD;  Location: [facility] MAIN OR;  Service: Orthopedics   • LASIK      LASIK/ LASIK ENHANCEMENT   • MASTECTOMY RADICAL Right    • OOPHORECTOMY Bilateral    • SPLENECTOMY     • TUBAL ABDOMINAL LIGATION     • WRIST SURGERY Right     distal radius head removal (bone dead)  plates and screws decomp lunate       Social History:  Social History     Tobacco Use   • Smoking status: Former Smoker     Packs/day: 0.25     Years: 7.00     Pack years: 1.75     Start date:      Quit date:      Years since quittin.7   • Smokeless tobacco: Never Used   • Tobacco comment: Off and on for  those years   Vaping Use   • Vaping Use: Never used   Substance Use Topics   • Alcohol use: Yes     Comment:  "Rarely   • Drug use: No       Family History:  Family History   Problem Relation Age of Onset   • Colon cancer Mother    • Heart disease Mother    • Cancer Mother    • Kidney disease Father    • Heart disease Father    • Depression Father    • Hyperlipidemia Father    • Vision loss Father    • Colon cancer Sister    • Diabetes Sister    • Heart disease Sister    • Von Willebrand disease Sister    • Von Willebrand disease Brother    • Von Willebrand disease Son    • Breast cancer Son    • Other Son         burdick syndrome   • Diabetes Paternal Grandmother    • Stroke Paternal Grandmother    • Colon cancer Other    • Colon cancer Son    • Von Willebrand disease Son    • Other Son         burdick syndrome   • Breast cancer Son    • Cancer Sister    • Vision loss Sister    • Other Sister    • Stroke Sister    • Cancer Paternal Aunt    • Cancer Maternal Aunt    • Cancer Maternal Aunt    • Hyperlipidemia Sister    • Thyroid disease Sister    • Thyroid disease Sister        Medications:  No medications prior to admission.       Scheduled Meds:  Continuous Infusions:No current facility-administered medications for this encounter.    PRN Meds:.    ALLERGIES:  Aspirin, Penicillins, Baclofen, and Tape  [adhesive tape]    ROS:  The following systems were reviewed and negative;   Constitution:  No fevers, chills, no unintentional weight loss  Skin: no rash, no jaundice  Eyes:  No blurry vision, no eye pain  HENT:  No change in hearing or smell  Resp:  No dyspnea or cough  CV:  No chest pain or palpitations  :  No dysuria, hematuria  Musculoskeletal:  No leg cramps or arthralgias  Neuro:  No tremor, no numbness  Psych:  No depression or confsuion    Objective     Vital Signs:   Vitals:    09/10/21 1508   Weight: 99.8 kg (220 lb)   Height: 162.6 cm (64\")       Physical Exam:       General Appearance:    Awake and alert, in no acute distress   Head:    Normocephalic, without obvious abnormality, atraumatic   Throat:   No oral " lesions, no thrush, oral mucosa moist   Lungs  Cardiac:  Abdomen:  Extremities:     Respirations regular, even and unlabored    Regular rate and rhythm, no murmur, gallop, rub    Non-distended, good bowel sounds, non tender, no masses     No edema, pulses 2+   Skin:   No rash, no jaundice       Results Review:  Lab Results (last 24 hours)     ** No results found for the last 24 hours. **          Imaging Results (Last 24 Hours)     ** No results found for the last 24 hours. **           I reviewed the patient's labs and imaging.    ASSESSMENT AND PLAN:  GERD, Monahan syndrome, family history of colon cancer    Active Problems:    * No active hospital problems. *       Procedure(s):  COLONOSCOPY  ESOPHAGOGASTRODUODENOSCOPY      I discussed the patients findings and my recommendations with the patient.    Cayden Conway MD  09/17/21  07:44 EDT

## 2021-09-17 NOTE — OP NOTE
COLONOSCOPY, ESOPHAGOGASTRODUODENOSCOPY Procedure Report    Patient Name:  Tracie Gross  YOB: 1960    Date of Surgery:  9/17/2021     Pre-Op Diagnosis:  Rectal prolapse [K62.3]  Fecal incontinence [R15.9]  Monahan syndrome [Z15.09]   GERD       Post-Op Diagnosis Codes:     * Rectal prolapse [K62.3]     * Fecal incontinence [R15.9]     * Monahan syndrome [Z15.09]    Post-Op Diagnosis:  1.  Normal EGD  2.  Sigmoid colon polyp status post cold snare polypectomy    Procedure/CPT® Codes:        Staff:  Surgeon(s):  Cayden Conway MD         Anesthesia: Monitored Anesthesia Care    Implants:    Nothing was implanted during the procedure    Specimen:        See Below    Complications:  None    Description of Procedure:  Informed consent was obtained for the procedure, including sedation.  Risks of perforation, hemorrhage, adverse drug reaction and aspiration were discussed.  The patient was brought into the endoscopy suite. Continuous cardiopulmonary monitoring was performed. The patient was placed in the left lateral decubitus position.  The bite block was inserted into the patient's mouth. After adequate sedation was attained, the Olympus gastroscope was inserted into the patient's mouth and advanced to the second portion of the duodenum without difficulty.  Circumferential examination was performed. A retroflex exam was performed in the patient's stomach.  On completion of the exam, the bowel was decompressed, the scope was removed from the patient, the patient tolerated the procedure well, there were no immediate post-operative complications.  There was no blood loss.      Examination of the esophagus: Normal  Examination of the stomach: Normal  Examination of the duodenum: Normal    Subsequently,  the Olympus colonoscope was inserted into the patient's rectum and advanced to the level of the cecum and terminal ileum without difficulty.  The bowel prep was excellent.  Circumferential examination of  the patient's colon was performed on scope withdrawal.  The cecum, ascending colon, and hepatic flexure were examined twice.  The transverse colon, splenic flexure, descending colon, and rectum were examined.  A retroflex exam was performed in the rectum.  The bowel was decompressed, the scope was withdrawn from the patient, and the patient tolerated the procedure well. There were no immediate post-operative complications.  There was no blood loss.     Findings:   Terminal ileum: Normal  Colon: 6 mm polyp in the sigmoid colon removed with cold snare polypectomy    Impression:  1.  Normal EGD  2.  Sigmoid colon polyp    Recommendations:  1.  High-fiber diet  2.  Repeat colonoscopy in 1 year  3.  Follow my office as needed      Cayden Conway MD     Date: 9/17/2021  Time: 12:09 EDT

## 2021-09-17 NOTE — ANESTHESIA POSTPROCEDURE EVALUATION
Patient: Tracie Gross    Procedure Summary     Date: 09/17/21 Room / Location: [facility] ENDOSCOPY 1 / [facility] ENDOSCOPY    Anesthesia Start: 1139 Anesthesia Stop: 1209    Procedures:       COLONOSCOPY WITH POLYPECTOMY X 1 (N/A )      ESOPHAGOGASTRODUODENOSCOPY (N/A ) Diagnosis:       Rectal prolapse      Fecal incontinence      Monahan syndrome      (Rectal prolapse [K62.3])      (Fecal incontinence [R15.9])      (Monahan syndrome [Z15.09])    Surgeons: Cayden Conway MD Provider: Tristin Golden DO    Anesthesia Type: MAC ASA Status: 3          Anesthesia Type: MAC    Vitals  No vitals data found for the desired time range.          Post Anesthesia Care and Evaluation    Patient location during evaluation: PACU  Patient participation: complete - patient participated  Level of consciousness: awake  Pain scale: See nurse's notes for pain score.  Pain management: adequate  Airway patency: patent  Anesthetic complications: No anesthetic complications  PONV Status: none  Cardiovascular status: acceptable  Respiratory status: acceptable  Hydration status: acceptable    Comments: Patient seen and examined postoperatively; vital signs stable; SpO2 greater than or equal to 90%; cardiopulmonary status stable; nausea/vomiting adequately controlled; pain adequately controlled; no apparent anesthesia complications; patient discharged from anesthesia care when discharge criteria were met

## 2021-09-17 NOTE — ANESTHESIA PREPROCEDURE EVALUATION
Anesthesia Evaluation     Patient summary reviewed and Nursing notes reviewed   NPO Solid Status: > 8 hours  NPO Liquid Status: > 2 hours           Airway   Mallampati: I  TM distance: >3 FB  Neck ROM: full  No difficulty expected  Dental - normal exam   (+) partials    Pulmonary - normal exam   (+) asthma,sleep apnea,   Cardiovascular - normal exam    (+) hypertension well controlled 2 medications or greater, valvular problems/murmurs,       Neuro/Psych  (+) numbness, psychiatric history Depression,     GI/Hepatic/Renal/Endo    (+)  GERD well controlled,  renal disease,     Musculoskeletal (-) negative ROS    Abdominal  - normal exam    Bowel sounds: normal.   Substance History - negative use     OB/GYN negative ob/gyn ROS         Other                        Anesthesia Plan    ASA 3     MAC     intravenous induction     Anesthetic plan, all risks, benefits, and alternatives have been provided, discussed and informed consent has been obtained with: patient.    Plan discussed with CAA and CRNA.

## 2021-09-20 LAB
LAB AP CASE REPORT: NORMAL
PATH REPORT.FINAL DX SPEC: NORMAL
PATH REPORT.GROSS SPEC: NORMAL

## 2021-09-28 ENCOUNTER — TELEPHONE (OUTPATIENT)
Dept: ONCOLOGY | Facility: OTHER | Age: 61
End: 2021-09-28

## 2021-09-28 ENCOUNTER — APPOINTMENT (OUTPATIENT)
Dept: LAB | Facility: HOSPITAL | Age: 61
End: 2021-09-28

## 2021-09-28 NOTE — TELEPHONE ENCOUNTER
Caller: Tracie Gross    Relationship to patient: Self    Best call back number: [phone]    Chief complaint: GISEL.    Type of visit: LAB/FOLLOW UP 1    If rescheduling, when is the original appointment: 9/28/2021    Additional notes: PATIENT OVERSLEPT & CANNOT GET THERE IN TIME, TRIED TO WT BUT NO CONNECTION.

## 2021-10-01 ENCOUNTER — TELEPHONE (OUTPATIENT)
Dept: ONCOLOGY | Facility: CLINIC | Age: 61
End: 2021-10-01

## 2021-10-01 NOTE — TELEPHONE ENCOUNTER
S/w Pt who stated it was fall break during her 10/7 appt, she would have her grandchildren. Moved appt to 10/14 @ 3. Pt v/u

## 2021-10-01 NOTE — TELEPHONE ENCOUNTER
Caller: Tracie Gross    Relationship to patient: Self    Best call back number: [phone]    Chief complaint: R/S F/U APPT FROM 10/07    Type of visit: F/U AND LAB    Requested date: 10/14, PREFERRED     AND OR CAN DO 10/11, 10/13, 10/15    CAN DO LATE MORNING OR AFTERNOON    If rescheduling, when is the original appointment: 10/07    Additional notes:  PLEASE CALL PT TO ADVISE NEW APPT DATE AND TIME AND IF CANNOT R/S FOR ONE OF THOSE DAYS TO CALL AND WORK OUT BEST DAY TO R/S    CAN LEAVE VOICEMAIL WITH INFORMATION IF MISSES THE CALL

## 2021-10-05 DIAGNOSIS — G89.4 CHRONIC PAIN SYNDROME: ICD-10-CM

## 2021-10-05 RX ORDER — CELECOXIB 100 MG/1
CAPSULE ORAL
Qty: 90 CAPSULE | Refills: 1 | Status: SHIPPED | OUTPATIENT
Start: 2021-10-05 | End: 2022-01-04

## 2021-10-07 ENCOUNTER — APPOINTMENT (OUTPATIENT)
Dept: LAB | Facility: HOSPITAL | Age: 61
End: 2021-10-07

## 2021-10-08 ENCOUNTER — TELEPHONE (OUTPATIENT)
Dept: FAMILY MEDICINE CLINIC | Facility: CLINIC | Age: 61
End: 2021-10-08

## 2021-10-08 DIAGNOSIS — Z00.00 MEDICARE ANNUAL WELLNESS VISIT, SUBSEQUENT: ICD-10-CM

## 2021-10-08 NOTE — TELEPHONE ENCOUNTER
----- Message from Floyd Silva MD sent at 10/6/2021  7:36 AM EDT -----  Regarding: FW: Prescription Question  Contact: [phone]    ----- Message -----  From: Lashawn Brooks RN  Sent: 10/5/2021   4:46 PM EDT  To: Floyd Silva MD  Subject: FW: Prescription Question                          ----- Message -----  From: Tracie Gross  Sent: 10/4/2021  12:11 AM EDT  To: [facility]  Subject: Prescription Question                            Would you please send a refill for my calcium 600-VIT D3 to [facility] on [address] in [place].  Thanks

## 2021-10-12 ENCOUNTER — OFFICE VISIT (OUTPATIENT)
Dept: FAMILY MEDICINE CLINIC | Facility: CLINIC | Age: 61
End: 2021-10-12

## 2021-10-12 ENCOUNTER — LAB (OUTPATIENT)
Dept: FAMILY MEDICINE CLINIC | Facility: CLINIC | Age: 61
End: 2021-10-12

## 2021-10-12 VITALS
TEMPERATURE: 97.3 F | BODY MASS INDEX: 37.56 KG/M2 | DIASTOLIC BLOOD PRESSURE: 84 MMHG | WEIGHT: 220 LBS | HEART RATE: 76 BPM | HEIGHT: 64 IN | SYSTOLIC BLOOD PRESSURE: 124 MMHG

## 2021-10-12 DIAGNOSIS — G47.00 INSOMNIA, UNSPECIFIED TYPE: ICD-10-CM

## 2021-10-12 DIAGNOSIS — M34.9 SCLERODERMA (HCC): ICD-10-CM

## 2021-10-12 DIAGNOSIS — I73.00 RAYNAUD'S DISEASE WITHOUT GANGRENE: Primary | ICD-10-CM

## 2021-10-12 DIAGNOSIS — M15.9 OSTEOARTHRITIS, GENERALIZED: ICD-10-CM

## 2021-10-12 DIAGNOSIS — F31.9 BIPOLAR AFFECTIVE DISORDER, REMISSION STATUS UNSPECIFIED (HCC): ICD-10-CM

## 2021-10-12 LAB
ALBUMIN SERPL-MCNC: 4.6 G/DL (ref 3.5–5.2)
ALBUMIN/GLOB SERPL: 1.6 G/DL
ALP SERPL-CCNC: 128 U/L (ref 39–117)
ALT SERPL W P-5'-P-CCNC: 29 U/L (ref 1–33)
ANION GAP SERPL CALCULATED.3IONS-SCNC: 15 MMOL/L (ref 5–15)
AST SERPL-CCNC: 33 U/L (ref 1–32)
BILIRUB SERPL-MCNC: 0.2 MG/DL (ref 0–1.2)
BUN SERPL-MCNC: 17 MG/DL (ref 8–23)
BUN/CREAT SERPL: 13.4 (ref 7–25)
CALCIUM SPEC-SCNC: 10.3 MG/DL (ref 8.6–10.5)
CHLORIDE SERPL-SCNC: 105 MMOL/L (ref 98–107)
CO2 SERPL-SCNC: 27 MMOL/L (ref 22–29)
CREAT SERPL-MCNC: 1.27 MG/DL (ref 0.57–1)
CRP SERPL-MCNC: 0.57 MG/DL (ref 0–0.5)
DEPRECATED RDW RBC AUTO: 41.2 FL (ref 37–54)
ERYTHROCYTE [DISTWIDTH] IN BLOOD BY AUTOMATED COUNT: 12.2 % (ref 12.3–15.4)
ERYTHROCYTE [SEDIMENTATION RATE] IN BLOOD: 21 MM/HR (ref 0–30)
GFR SERPL CREATININE-BSD FRML MDRD: 42 ML/MIN/1.73
GLOBULIN UR ELPH-MCNC: 2.9 GM/DL
GLUCOSE SERPL-MCNC: 83 MG/DL (ref 65–99)
HCT VFR BLD AUTO: 39.1 % (ref 34–46.6)
HGB BLD-MCNC: 13.3 G/DL (ref 12–15.9)
MCH RBC QN AUTO: 31.5 PG (ref 26.6–33)
MCHC RBC AUTO-ENTMCNC: 34 G/DL (ref 31.5–35.7)
MCV RBC AUTO: 92.7 FL (ref 79–97)
PLATELET # BLD AUTO: 323 10*3/MM3 (ref 140–450)
PMV BLD AUTO: 11.7 FL (ref 6–12)
POTASSIUM SERPL-SCNC: 4.1 MMOL/L (ref 3.5–5.2)
PROT SERPL-MCNC: 7.5 G/DL (ref 6–8.5)
RBC # BLD AUTO: 4.22 10*6/MM3 (ref 3.77–5.28)
SODIUM SERPL-SCNC: 147 MMOL/L (ref 136–145)
WBC # BLD AUTO: 9.65 10*3/MM3 (ref 3.4–10.8)

## 2021-10-12 PROCEDURE — G0008 ADMIN INFLUENZA VIRUS VAC: HCPCS | Performed by: FAMILY MEDICINE

## 2021-10-12 PROCEDURE — 85027 COMPLETE CBC AUTOMATED: CPT | Performed by: FAMILY MEDICINE

## 2021-10-12 PROCEDURE — 90686 IIV4 VACC NO PRSV 0.5 ML IM: CPT | Performed by: FAMILY MEDICINE

## 2021-10-12 PROCEDURE — 80053 COMPREHEN METABOLIC PANEL: CPT | Performed by: FAMILY MEDICINE

## 2021-10-12 PROCEDURE — 36415 COLL VENOUS BLD VENIPUNCTURE: CPT | Performed by: FAMILY MEDICINE

## 2021-10-12 PROCEDURE — 85652 RBC SED RATE AUTOMATED: CPT | Performed by: FAMILY MEDICINE

## 2021-10-12 PROCEDURE — 86140 C-REACTIVE PROTEIN: CPT | Performed by: FAMILY MEDICINE

## 2021-10-12 PROCEDURE — 99214 OFFICE O/P EST MOD 30 MIN: CPT | Performed by: FAMILY MEDICINE

## 2021-10-12 RX ORDER — NIFEDIPINE 90 MG/1
90 TABLET, EXTENDED RELEASE ORAL DAILY
Qty: 90 TABLET | Refills: 1 | Status: SHIPPED | OUTPATIENT
Start: 2021-10-12 | End: 2022-04-12

## 2021-10-12 NOTE — PROGRESS NOTES
Chief Complaint   Patient presents with   • 3 month follow up     HPI  Tracie Gross is a 61 y.o. female that presents for   Chief Complaint   Patient presents with   • 3 month follow up     L knee pain: s/p R knee replacement but still having issues/pain. She is maintained on Celebrex 200 daily and diclofenac cream. These things are helpful but doesn't totally alleviate pain. We did a steroid injection of that knee in April w/ good results. This provided relief for nearly 5 months. She is interested in doing this again today. Patient will use oxycodone 10mg anywhere from zero to 2-3x/day depending on activity. Interested in stopping Relistor 300 daily given limited narcotic use    Foot discomfort: patient reports feet get cold but sweat. She further reports numbness in bilateral feet as well over the last few months. Patient feels this does feel similar to the Raynaud's that she gets in her hands. She is maintained on nifedipine 60 daily and gabapentin 1200mg nightly. Patient had to discontinue 600mg gabapentin in the morning due to side effects. She has f/u w/ rheumatology in December    Insomnia: Seroquel was reduced to 100mg daily at last visit due to concern for weight gain. Xanax 1mg nightly PRN is working well. NO complaints regarding sleep.    Bipolar: seroquel was reduced to 100mg daily at last visit. Mood has been stable and she reports nothing has changed in regards to mood since the medication change. Still taking lamotrigine 200mg daily.    Scleroderma: patient follows w/ rheumatology and PFTs were recently performed revealing potential concern for pulmonary involvement. She has pulmonary f/u in 2 weeks w/ Dr Draw. Patient reports mild SOB w/ activity.     Review of Systems   Constitutional: Negative for fever and unexpected weight loss.   Respiratory: Positive for shortness of breath. Negative for cough.    Cardiovascular: Negative for chest pain and palpitations.   Musculoskeletal: Positive for  arthralgias.   Skin: Positive for color change.   Neurological: Positive for numbness.   Psychiatric/Behavioral: Negative for sleep disturbance and depressed mood. The patient is not nervous/anxious.      The following portions of the patient's history were reviewed and updated as appropriate: problem list, past medical history, past surgical history, allergies, current medications, past social history and past family history.    Problem List Tab  Patient History Tab  Immunizations Tab  Medications Tab  Chart Review Tab  Care Everywhere Tab  Synopsis Tab    PE  Vitals:    10/12/21 0849   BP: 124/84   Pulse: 76   Temp: 97.3 °F (36.3 °C)     Body mass index is 37.76 kg/m².  General: Obese, NAD  Head: AT/NC  Eyes: EOMI, anicteric sclera  Resp: CTAB, SCR, BS equal  CV: RRR w/o m/r/g; 2+ pulses  GI: Soft, NT/ND, +BS  MSK: FROM, thickened/sclerotic digits bilaterally  Skin: Warm, dry, intact  Neuro: Alert and oriented. No focal deficits  Psych: Appropriate mood and affect    Imaging  MRI Abdomen With & Without Contrast    Result Date: 8/9/2021   1. Mild pancreatic atrophy. There are no suspicious of pancreatic lesions or evidence of active pancreatic inflammation. 2. Pancreatic duct caliber is within normal limits, 3 mm or less. No evidence of pancreatic divisum. 3. Intrahepatic and extra hepatic biliary ductal dilation with tapered narrowing new level ampulla, raising the possibility of a biliary stenosis. No high-grade biliary stricture or choledocholithiasis. 4. Cholecystectomy, splenectomy.   Electronically Signed By-Barb Millan MD On:8/9/2021 1:22 PM This report was finalized on [id number] by  Barb Millan MD.      Assessment/Plan   Tracie Gross is a 61 y.o. female that presents for   Chief Complaint   Patient presents with   • 3 month follow up     Diagnoses and all orders for this visit:    1. Raynaud's disease without gangrene (Primary): Felt to be etiology of foot pain  -     Increase NIFEdipine XL  (PROCARDIA XL) 90 MG 24 hr tablet; Take 1 tablet by mouth Daily.  Dispense: 90 tablet; Refill: 1  - Recommend wearing socks to bed    2. Osteoarthritis, generalized  -     Arthrocentesis  - **Reduce Celebrex to 100 mg daily based on BMP today  - Start gabapentin 300 in AM  - Continue gabapentin 1200 mg nightly    3. Bipolar affective disorder, remission status unspecified (HCC)   -Continue home lamotrigine 200 daily and Seroquel 100 nightly    4. Scleroderma (HCC): Recent PFTs reviewed with concern for reduced diffusion  -     CBC (No Diff)  -     Comprehensive Metabolic Panel  -     C-reactive Protein  -     Sedimentation Rate  - Continue rheumatology and pulmonology follow-up    5. Insomnia, unspecified type   -Continue home Xanax 1 mg nightly and Seroquel 100 mg nightly  Other orders  -     FluLaval/Fluarix >6 Months ([id number])     Return in about 3 months (around 1/12/2022) for Recheck- 30min.

## 2021-10-14 ENCOUNTER — OFFICE VISIT (OUTPATIENT)
Dept: ONCOLOGY | Facility: CLINIC | Age: 61
End: 2021-10-14

## 2021-10-14 ENCOUNTER — APPOINTMENT (OUTPATIENT)
Dept: LAB | Facility: HOSPITAL | Age: 61
End: 2021-10-14

## 2021-10-14 VITALS
SYSTOLIC BLOOD PRESSURE: 129 MMHG | HEIGHT: 64 IN | TEMPERATURE: 97.1 F | RESPIRATION RATE: 18 BRPM | HEART RATE: 61 BPM | BODY MASS INDEX: 37.76 KG/M2 | DIASTOLIC BLOOD PRESSURE: 84 MMHG

## 2021-10-14 DIAGNOSIS — C50.919 MALIGNANT NEOPLASM OF FEMALE BREAST, UNSPECIFIED ESTROGEN RECEPTOR STATUS, UNSPECIFIED LATERALITY, UNSPECIFIED SITE OF BREAST (HCC): Primary | ICD-10-CM

## 2021-10-14 DIAGNOSIS — D68.00 VON WILLEBRAND DISEASE (HCC): ICD-10-CM

## 2021-10-14 DIAGNOSIS — Z12.31 ENCOUNTER FOR SCREENING MAMMOGRAM FOR MALIGNANT NEOPLASM OF BREAST: ICD-10-CM

## 2021-10-14 DIAGNOSIS — Z15.09 LYNCH SYNDROME: ICD-10-CM

## 2021-10-14 PROBLEM — E66.9 CLASS 2 OBESITY: Status: ACTIVE | Noted: 2021-10-14

## 2021-10-14 PROBLEM — D84.9 IMMUNOCOMPROMISED: Status: ACTIVE | Noted: 2021-10-14

## 2021-10-14 PROBLEM — D84.9 IMMUNODEFICIENCY DISORDER: Status: ACTIVE | Noted: 2021-10-14

## 2021-10-14 PROBLEM — E66.812 CLASS 2 OBESITY: Status: ACTIVE | Noted: 2021-10-14

## 2021-10-14 PROCEDURE — 20610 DRAIN/INJ JOINT/BURSA W/O US: CPT | Performed by: FAMILY MEDICINE

## 2021-10-14 PROCEDURE — 88108 CYTOPATH CONCENTRATE TECH: CPT | Performed by: INTERNAL MEDICINE

## 2021-10-14 PROCEDURE — 99214 OFFICE O/P EST MOD 30 MIN: CPT | Performed by: INTERNAL MEDICINE

## 2021-10-14 RX ORDER — QUETIAPINE FUMARATE 100 MG/1
100 TABLET, FILM COATED ORAL NIGHTLY
COMMUNITY

## 2021-10-14 RX ORDER — LISINOPRIL 5 MG/1
TABLET ORAL EVERY 24 HOURS
COMMUNITY
End: 2022-01-14

## 2021-10-14 RX ORDER — ALBUTEROL SULFATE 90 UG/1
AEROSOL, METERED RESPIRATORY (INHALATION) EVERY 6 HOURS SCHEDULED
COMMUNITY
End: 2022-01-14 | Stop reason: SDUPTHER

## 2021-10-14 RX ORDER — PIMECROLIMUS 10 MG/G
CREAM TOPICAL EVERY 12 HOURS SCHEDULED
COMMUNITY
End: 2022-01-14 | Stop reason: SDUPTHER

## 2021-10-14 RX ORDER — LAMOTRIGINE 150 MG/1
TABLET ORAL EVERY 12 HOURS SCHEDULED
COMMUNITY
End: 2022-01-14 | Stop reason: SDUPTHER

## 2021-10-14 RX ORDER — BUPIVACAINE HYDROCHLORIDE 5 MG/ML
2 INJECTION, SOLUTION PERINEURAL
Status: COMPLETED | OUTPATIENT
Start: 2021-10-14 | End: 2021-10-14

## 2021-10-14 RX ORDER — DESOXIMETASONE 2.5 MG/ML
SPRAY TOPICAL EVERY 12 HOURS SCHEDULED
COMMUNITY
End: 2022-05-19

## 2021-10-14 RX ORDER — TRIAMCINOLONE ACETONIDE 40 MG/ML
20 INJECTION, SUSPENSION INTRA-ARTICULAR; INTRAMUSCULAR
Status: COMPLETED | OUTPATIENT
Start: 2021-10-14 | End: 2021-10-14

## 2021-10-14 RX ORDER — DEXTROMETHORPHAN HYDROBROMIDE AND PROMETHAZINE HYDROCHLORIDE 15; 6.25 MG/5ML; MG/5ML
SOLUTION ORAL EVERY 6 HOURS SCHEDULED
COMMUNITY
End: 2022-05-19

## 2021-10-14 RX ORDER — MONTELUKAST SODIUM 4 MG/1
TABLET, CHEWABLE ORAL EVERY 12 HOURS SCHEDULED
COMMUNITY
End: 2022-01-14 | Stop reason: SDUPTHER

## 2021-10-14 RX ORDER — RANITIDINE 300 MG/1
CAPSULE ORAL
COMMUNITY
End: 2022-05-18

## 2021-10-14 RX ORDER — HYDROXYCHLOROQUINE SULFATE 200 MG/1
TABLET, FILM COATED ORAL EVERY 24 HOURS
COMMUNITY
End: 2022-01-14 | Stop reason: SDUPTHER

## 2021-10-14 RX ORDER — MORPHINE SULFATE 60 MG/1
CAPSULE, EXTENDED RELEASE ORAL EVERY 24 HOURS
COMMUNITY
End: 2022-01-14 | Stop reason: SDUPTHER

## 2021-10-14 RX ORDER — ALPRAZOLAM 0.5 MG/1
TABLET ORAL EVERY 8 HOURS SCHEDULED
COMMUNITY
End: 2022-01-14 | Stop reason: SDUPTHER

## 2021-10-14 RX ORDER — LIDOCAINE HYDROCHLORIDE 10 MG/ML
2 INJECTION, SOLUTION INFILTRATION; PERINEURAL
Status: COMPLETED | OUTPATIENT
Start: 2021-10-14 | End: 2021-10-14

## 2021-10-14 RX ORDER — DEXLANSOPRAZOLE 60 MG/1
CAPSULE, DELAYED RELEASE ORAL EVERY 24 HOURS
COMMUNITY
End: 2022-01-14 | Stop reason: SDUPTHER

## 2021-10-14 RX ORDER — NIFEDIPINE 60 MG/1
TABLET, EXTENDED RELEASE ORAL EVERY 24 HOURS
COMMUNITY
End: 2022-01-14 | Stop reason: SDUPTHER

## 2021-10-14 RX ORDER — LIOTHYRONINE SODIUM 25 UG/1
TABLET ORAL EVERY 24 HOURS
COMMUNITY
End: 2022-01-14 | Stop reason: SDUPTHER

## 2021-10-14 RX ORDER — AMOXICILLIN AND CLAVULANATE POTASSIUM 875; 125 MG/1; MG/1
TABLET, FILM COATED ORAL EVERY 12 HOURS SCHEDULED
COMMUNITY
End: 2022-01-14 | Stop reason: SDUPTHER

## 2021-10-14 RX ORDER — MESALAMINE 800 MG/1
TABLET, DELAYED RELEASE ORAL
COMMUNITY
End: 2022-01-14 | Stop reason: SDUPTHER

## 2021-10-14 RX ORDER — BISOPROLOL FUMARATE AND HYDROCHLOROTHIAZIDE 10; 6.25 MG/1; MG/1
TABLET ORAL EVERY 24 HOURS
COMMUNITY
End: 2022-01-14 | Stop reason: SDUPTHER

## 2021-10-14 RX ORDER — LEVOTHYROXINE SODIUM 0.15 MG/1
TABLET ORAL EVERY 24 HOURS
COMMUNITY
End: 2022-01-14 | Stop reason: SDUPTHER

## 2021-10-14 RX ORDER — HYDROCODONE BITARTRATE AND ACETAMINOPHEN 10; 300 MG/1; MG/1
TABLET ORAL EVERY 6 HOURS SCHEDULED
COMMUNITY
End: 2022-01-14 | Stop reason: SDUPTHER

## 2021-10-14 RX ORDER — LUBIPROSTONE 24 UG/1
CAPSULE ORAL EVERY 12 HOURS SCHEDULED
COMMUNITY
End: 2022-04-14

## 2021-10-14 RX ADMIN — BUPIVACAINE HYDROCHLORIDE 2 ML: 5 INJECTION, SOLUTION PERINEURAL at 14:46

## 2021-10-14 RX ADMIN — LIDOCAINE HYDROCHLORIDE 2 ML: 10 INJECTION, SOLUTION INFILTRATION; PERINEURAL at 14:46

## 2021-10-14 RX ADMIN — TRIAMCINOLONE ACETONIDE 20 MG: 40 INJECTION, SUSPENSION INTRA-ARTICULAR; INTRAMUSCULAR at 14:46

## 2021-10-14 NOTE — PROGRESS NOTES
Hematology/Oncology Outpatient Follow Up    PATIENT NAME:Tracie Gross  :1960  MRN: [id number]  PRIMARY CARE PHYSICIAN: Floyd Silva MD  REFERRING PHYSICIAN: Floyd Silva, *    Chief Complaint   Patient presents with   • Follow-up     History of breast cancer, family history of malignant neoplasm of colon       HISTORY OF PRESENT ILLNESS:     This is a 58-year-old female who was diagnosed with right breast cancer in  when she was 32 years old.  Patient was originally seen on 18.  Please refer to the details of that note for more information.   · 18 - CBC:  WBC 13.6, hemoglobin 11.7, platelet count 392,000.  Differentials 65% neutrophils, 15% lymphocytes.  There was mild monocytosis at 16.  Eosinophils were 2.3 and basophils were 0.3.  B12 292.  Ferritin 88.  Folate 13.8.  AST slightly high at 57.  Alkaline phosphatase was high at 326.  .  Haptoglobin 179, normal.  SPEP with DYLAN did not show any monoclonal protein.  Flow cytometry did not show any evidence of acute leukemia or lymphoproliferative disease.  There was monocytosis measured at 21%.  Reticulocyte count normal 1.02.  Peripheral smear showed absolute monocytosis at 19%, thrombocytosis and macrocytosis.  BCR-abl was negative.  Methylmalonic acid level was elevated to 420.    · 18 - PET/CT scan showed multiple small bilateral lymph nodes in the neck without metabolic activity or change from prior CT scans and are likely due to SLE.  There were unchanged bilateral level 1 axillary lymph nodes without metabolic activity.  Multiple mediastinal nodes were also seen.  The largest 11 mm, unchanged from prior imaging with no hypermetabolic activity.  There was no evidence of metastatic disease in the abdomen or pelvis.  There was no focal lesion within the liver or biliary system.  Multiple small retroperitoneal and external iliac nodes, bilateral inguinal nodes similar to prior imaging with no PET activity.     · 8/8/18 - RICHIE-2 analysis with no mutation identified.    · 11/29/18 - Bone density revealed osteoporosis.  Patient is currently on Fosamax.   · 12/3/18 - Bone marrow aspiration and biopsy showed normocellular bone marrow at 40%.  There was adequate iron stores and no evidence of malignancy was seen.  Flow cytometry was negative.  Cytogenetics showed normal female karyotype.  Blasts were less than 3% of nucleated cells.  There was no significant dyspoiesis.   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     · 1/22/19 - Urine cytology was negative for malignant cells.    · 2/28/19 - Patient seen by Dermatology for her annual skin evaluation.   · 3/13/19 - CT scan of the chest, abdomen and pelvis:  CT scan of chest showed chronic changes.  · 5/29/2019 patient had an EGD with polypectomy performed by Dr. Conway.  Dr. Conway has recommended follow-up MRI of the pancreas in 2 years.  And also EGD and colonoscopy in 2 years.  · 11/13/19-left screening mammogram was negative follow-up in 1 year was recommended  · 5/4/2020 patient had a CT scan of the chest, abdomen and pelvis multiple borderline enlarged mediastinal lymph nodes the largest measuring 2.2 cm in the subcarinal space unchanged from prior.  Pancreas is normal.  There are few retroperitoneal mesenteric and pelvic lymph node enlargement which are similar to prior exam.  All are subcentimeter in size.  · 5/20/2020 patient had bilateral breast MRI did not show any evidence of malignancy.  · 12/7/2020 patient DEXA scan showed persistent and worsened osteoporosis    Past Medical History:   Diagnosis Date   • Arthritis    • Asthma    • Bipolar affective disorder (HCC)    • Breast cancer (HCC)     RT   • GERD (gastroesophageal reflux disease) 1993   • H/O breast reconstruction     SEVERAL   • H/O laminectomy    •  Hamartoma (HCC)    • Hx of bilateral oophorectomy    • Hypertension    • Hypothyroidism    • Inflammatory bowel disease    • Kienbock's disease, right     WRIST   • Low back pain    • Lupus (HCC)    • Monahan syndrome    • Osteoporosis    • Raynaud disease    • Scleroderma (HCC)    • Von Willebrand disease (HCC)        Past Surgical History:   Procedure Laterality Date   • ARM DEBRIDEMENT Right     X 3   • BACK SURGERY      [facility]etas- cervical fusion   • BACK SURGERY      lumbar decomp   • BREAST BIOPSY     • BREAST LUMPECTOMY     • BREAST RECONSTRUCTION Right    • BREAST RECONSTRUCTION Right    • CARDIAC CATHETERIZATION      no stents placed   •  SECTION  ,    • CHOLECYSTECTOMY     • COLONOSCOPY     • COLONOSCOPY N/A 2020    Procedure: COLONOSCOPY;  Surgeon: Cayden Conway MD;  Location: [facility] ENDOSCOPY;  Service: Gastroenterology;  Laterality: N/A;  rectal ulcer   • COLONOSCOPY N/A 2021    Procedure: COLONOSCOPY WITH POLYPECTOMY X 1;  Surgeon: Cayden Conway MD;  Location: [facility] ENDOSCOPY;  Service: Gastroenterology;  Laterality: N/A;  Post: COLON POLYP   • COSMETIC SURGERY  [id number]   • ENDOSCOPY     • ENDOSCOPY N/A 2020    Procedure: ESOPHAGOGASTRODUODENOSCOPY;  Surgeon: Cayden Conway MD;  Location: [facility] ENDOSCOPY;  Service: Gastroenterology;  Laterality: N/A;  Normal EGD   • ENDOSCOPY N/A 2021    Procedure: ESOPHAGOGASTRODUODENOSCOPY;  Surgeon: Cayden Conway MD;  Location: [facility] ENDOSCOPY;  Service: Gastroenterology;  Laterality: N/A;  Post: normal egd   • EXPLORATORY LAPAROTOMY      scar tissue removal   • EYE SURGERY     • FINGER SURGERY Right     ring finger mass excision   • HYSTERECTOMY      PARTIAL   • JOINT REPLACEMENT Right     hip and knee   • KNEE ARTHROSCOPY Left 2020    Procedure: LEFT KNEE SCOPE with partial lateral meniscectomy;  Surgeon: Chau Perez MD;  Location: [facility] MAIN OR;  Service:  Orthopedics   • LASIK      LASIK/ LASIK ENHANCEMENT   • MASTECTOMY RADICAL Right    • OOPHORECTOMY Bilateral    • SPLENECTOMY     • TUBAL ABDOMINAL LIGATION  1981   • WRIST SURGERY Right     distal radius head removal (bone dead)  plates and screws decomp lunate         Current Outpatient Medications:   •  albuterol sulfate HFA (ProAir HFA) 108 (90 Base) MCG/ACT inhaler, Every 6 (Six) Hours., Disp: , Rfl:   •  ALPRAZolam (Xanax) 0.5 MG tablet, Every 8 (Eight) Hours., Disp: , Rfl:   •  ALPRAZolam (XANAX) 1 MG tablet, Take 1 tablet by mouth At Night As Needed for Anxiety or Sleep., Disp: 30 tablet, Rfl: 2  •  amoxicillin-clavulanate (AUGMENTIN) 875-125 MG per tablet, Every 12 (Twelve) Hours., Disp: , Rfl:   •  bisoprolol-hydrochlorothiazide (ZIAC) 10-6.25 MG per tablet, TAKE ONE TABLET BY MOUTH DAILY (Patient taking differently: Take 1 tablet by mouth Daily.), Disp: 90 tablet, Rfl: 3  •  bisoprolol-hydrochlorothiazide (ZIAC) 10-6.25 MG per tablet, Daily., Disp: , Rfl:   •  Calcium-Vitamin D 600-200 MG-UNIT per tablet, Take 1 tablet by mouth 2 (Two) Times a Day., Disp: 60 tablet, Rfl: 1  •  celecoxib (CeleBREX) 100 MG capsule, TAKE TWO CAPSULES BY MOUTH DAILY, Disp: 90 capsule, Rfl: 1  •  cetirizine (zyrTEC) 10 MG tablet, Take 1 tablet by mouth Daily., Disp: 90 tablet, Rfl: 1  •  ciclopirox (PENLAC) 8 % solution, Apply  topically to the appropriate area as directed Every Night., Disp: 6 mL, Rfl: 1  •  clindamycin (CLEOCIN T) 1 % lotion, Apply  topically to the appropriate area as directed Every Night. Use on face, Disp: , Rfl:   •  colestipol (COLESTID) 1 g tablet, TAKE TWO TABLETS BY MOUTH TWICE A DAY (Patient taking differently: Take 2 g by mouth 2 (Two) Times a Day.), Disp: 180 tablet, Rfl: 2  •  colestipol (COLESTID) 1 g tablet, Every 12 (Twelve) Hours., Disp: , Rfl:   •  cyclobenzaprine (FLEXERIL) 10 MG tablet, TAKE ONE TABLET BY MOUTH THREE TIMES A DAY AS NEEDED FOR MUSCLE SPASMS AS NEEDED (Patient taking  differently: Take 10 mg by mouth 3 (Three) Times a Day As Needed.), Disp: 100 tablet, Rfl: 3  •  Denosumab (PROLIA SC), Inject  under the skin into the appropriate area as directed., Disp: , Rfl:   •  Desoximetasone 0.25 % liquid, Every 12 (Twelve) Hours., Disp: , Rfl:   •  dexlansoprazole (DEXILANT) 60 MG capsule, Take 60 mg by mouth Daily., Disp: , Rfl:   •  dexlansoprazole (Dexilant) 60 MG capsule, Daily., Disp: , Rfl:   •  Diclofenac Sodium (VOLTAREN) 1 % gel gel, Apply 4 g topically to the appropriate area as directed 4 (Four) Times a Day As Needed (joint pain)., Disp: 100 g, Rfl: 3  •  famotidine (PEPCID) 20 MG tablet, Take 1 tablet by mouth 2 (Two) Times a Day As Needed for Heartburn., Disp: 120 tablet, Rfl: 5  •  FeroSul 325 (65 Fe) MG tablet, TAKE ONE TABLET BY MOUTH DAILY (Patient taking differently: Take 325 mg by mouth Daily With Breakfast.), Disp: 90 tablet, Rfl: 4  •  Flaxseed, Linseed, (FLAXSEED OIL MAX STR) 1300 MG capsule, Take 1 tablet by mouth 2 (Two) Times a Day., Disp: , Rfl:   •  fluticasone (FLONASE) 50 MCG/ACT nasal spray, 2 sprays into the nostril(s) as directed by provider Daily., Disp: 16 g, Rfl: 3  •  gabapentin (NEURONTIN) 300 MG capsule, TAKE 2 CAPSULES BY MOUTH IN THE MORNING AND TAKE 4 CAPSULES BY MOUTH AT NIGHT., Disp: 360 capsule, Rfl: 2  •  HYDROcodone-Acetaminophen (XODOL )  MG per tablet, Every 6 (Six) Hours., Disp: , Rfl:   •  hydroxychloroquine (PLAQUENIL) 200 MG tablet, Take 1 tablet by mouth 2 (Two) Times a Day. Indications: Systemic Lupus Erythematosus, Disp: 180 tablet, Rfl: 3  •  hydroxychloroquine (Plaquenil) 200 MG tablet, Daily., Disp: , Rfl:   •  hydrOXYzine (ATARAX) 25 MG tablet, Take 1 tablet by mouth Every 8 (Eight) Hours As Needed for Itching., Disp: 30 tablet, Rfl: 3  •  lamoTRIgine (LaMICtal) 150 MG tablet, Every 12 (Twelve) Hours., Disp: , Rfl:   •  lamoTRIgine (LaMICtal) 200 MG tablet, Take 200 mg by mouth Daily., Disp: , Rfl:   •  levothyroxine  (SYNTHROID, LEVOTHROID) 150 MCG tablet, Daily., Disp: , Rfl:   •  levothyroxine (SYNTHROID, LEVOTHROID) 200 MCG tablet, Take 1 tablet by mouth Daily., Disp: 90 tablet, Rfl: 1  •  liothyronine (CYTOMEL) 25 MCG tablet, Daily., Disp: , Rfl:   •  lisinopril (PRINIVIL,ZESTRIL) 5 MG tablet, TAKE ONE TABLET BY MOUTH DAILY (Patient taking differently: Take 5 mg by mouth Daily.), Disp: 90 tablet, Rfl: 3  •  lisinopril (PRINIVIL,ZESTRIL) 5 MG tablet, Daily., Disp: , Rfl:   •  lubiprostone (Amitiza) 24 MCG capsule, Every 12 (Twelve) Hours., Disp: , Rfl:   •  mesalamine (Asacol HD) 800 MG EC tablet, , Disp: , Rfl:   •  Morphine (CROW) 60 MG 24 hr capsule, Daily., Disp: , Rfl:   •  NIFEdipine XL (Nifedical XL) 60 MG 24 hr tablet, Daily., Disp: , Rfl:   •  NIFEdipine XL (PROCARDIA XL) 90 MG 24 hr tablet, Take 1 tablet by mouth Daily., Disp: 90 tablet, Rfl: 1  •  ondansetron (ZOFRAN) 4 MG tablet, Take 1 tablet by mouth Every 8 (Eight) Hours As Needed for Nausea or Vomiting., Disp: 30 tablet, Rfl: 3  •  oxyCODONE (ROXICODONE) 10 MG tablet, Take 1 tablet by mouth Every 6 (Six) Hours As Needed for Severe Pain ., Disp: 120 tablet, Rfl: 0  •  pimecrolimus (Elidel) 1 % cream, Every 12 (Twelve) Hours., Disp: , Rfl:   •  promethazine-dextromethorphan (PROMETHAZINE-DM) 6.25-15 MG/5ML solution, Every 6 (Six) Hours., Disp: , Rfl:   •  QUEtiapine (SEROquel) 100 MG tablet, Take 100 mg by mouth Every Night., Disp: , Rfl:   •  QUEtiapine (SEROquel) 100 MG tablet, Every 12 (Twelve) Hours., Disp: , Rfl:   •  raNITIdine (ZANTAC) 300 MG capsule, 1 cap(s), Disp: , Rfl:   No current facility-administered medications for this visit.    Allergies   Allergen Reactions   • Aspirin Other (See Comments)     VONWILLENBRAND DISEASE    • Penicillins Anaphylaxis   • Baclofen Hives   • Tape  [Adhesive Tape] Rash       Family History   Problem Relation Age of Onset   • Colon cancer Mother    • Heart disease Mother    • Cancer Mother    • Kidney disease Father     • Heart disease Father    • Depression Father    • Hyperlipidemia Father    • Vision loss Father    • Colon cancer Sister    • Diabetes Sister    • Heart disease Sister    • Von Willebrand disease Sister    • Von Willebrand disease Brother    • Von Willebrand disease Son    • Breast cancer Son    • Other Son         burdick syndrome   • Diabetes Paternal Grandmother    • Stroke Paternal Grandmother    • Colon cancer Other    • Colon cancer Son    • Von Willebrand disease Son    • Other Son         burdick syndrome   • Breast cancer Son    • Cancer Sister    • Vision loss Sister    • Other Sister    • Stroke Sister    • Cancer Paternal Aunt    • Cancer Maternal Aunt    • Cancer Maternal Aunt    • Hyperlipidemia Sister    • Thyroid disease Sister    • Thyroid disease Sister        Cancer-related family history includes Breast cancer in her son and son; Cancer in her maternal aunt, maternal aunt, mother, paternal aunt, and sister; Colon cancer in her mother, sister, son, and another family member.    Social History     Tobacco Use   • Smoking status: Former Smoker     Packs/day: 0.25     Years: 7.00     Pack years: 1.75     Start date:      Quit date:      Years since quittin.8   • Smokeless tobacco: Never Used   • Tobacco comment: Off and on for  those years   Vaping Use   • Vaping Use: Never used   Substance Use Topics   • Alcohol use: Yes     Comment: Rarely   • Drug use: No       I have reviewed and confirmed the accuracy of the patient's history:  as entered by the MA/LPN/RN. Suzan Jones MD 10/14/21       SUBJECTIVE:    She is here today for routine follow-up.          REVIEW OF SYSTEMS:  Review of Systems   Constitutional: Negative for chills and fever.   HENT: Negative for ear pain, mouth sores, nosebleeds and sore throat.    Eyes: Negative for photophobia and visual disturbance.   Respiratory: Negative for wheezing and stridor.    Cardiovascular: Negative for chest pain and palpitations.  "  Gastrointestinal: Negative for abdominal pain, diarrhea, nausea and vomiting.   Endocrine: Negative for cold intolerance and heat intolerance.   Genitourinary: Negative for dysuria and hematuria.   Musculoskeletal: Negative for joint swelling and neck stiffness.   Skin: Negative for color change and rash.   Neurological: Negative for seizures and syncope.   Hematological: Negative for adenopathy.        No obvious bleeding   Psychiatric/Behavioral: Negative for agitation, confusion and hallucinations.     I have reviewed and confirmed the accuracy of the ROS as documented by the MA/LPN/RN Suzanania Jones MD      OBJECTIVE:    Vitals:    10/14/21 1532   BP: 129/84   Pulse: 61   Resp: 18   Temp: 97.1 °F (36.2 °C)   TempSrc: Infrared   Height: 162.6 cm (64\")   PainSc: 0-No pain       ECOG  (1) Restricted in physically strenuous activity, ambulatory and able to do work of light nature    Physical Exam   Constitutional: She is oriented to person, place, and time. She appears well-developed. No distress.   HENT:   Head: Normocephalic and atraumatic.   Right Ear: External ear normal.   Nose: Nose normal.   Eyes: Pupils are equal, round, and reactive to light. Conjunctivae are normal. Right eye exhibits no discharge. Left eye exhibits no discharge. No scleral icterus.   Neck: No thyromegaly present.   Cardiovascular: Normal rate, regular rhythm and normal heart sounds. Exam reveals no gallop and no friction rub.   Pulmonary/Chest: Effort normal. No stridor. No respiratory distress. She has no wheezes. Left breast exhibits no inverted nipple, no mass, no nipple discharge, no skin change and no tenderness. No breast swelling, tenderness, discharge or bleeding.   Right chest wall does not reveal any palpable masses.  Bilateral axilla was negative   Abdominal: Soft. Bowel sounds are normal. She exhibits no mass. There is no abdominal tenderness. There is no rebound and no guarding.   Musculoskeletal: Normal range of " motion. No tenderness.   Lymphadenopathy:     She has no cervical adenopathy.   Neurological: She is alert and oriented to person, place, and time. She exhibits normal muscle tone.   Skin: Skin is warm. No rash noted. She is not diaphoretic. No erythema.   Psychiatric: Her behavior is normal. Judgment and thought content normal.   Nursing note and vitals reviewed.    I have reexamined the patient and the results are consistent with the previously documented exam. Suzanania Jones MD     RECENT LABS    WBC   Date Value Ref Range Status   10/12/2021 9.65 3.40 - 10.80 10*3/mm3 Final     RBC   Date Value Ref Range Status   10/12/2021 4.22 3.77 - 5.28 10*6/mm3 Final     Hemoglobin   Date Value Ref Range Status   10/12/2021 13.3 12.0 - 15.9 g/dL Final     Hematocrit   Date Value Ref Range Status   10/12/2021 39.1 34.0 - 46.6 % Final     MCV   Date Value Ref Range Status   10/12/2021 92.7 79.0 - 97.0 fL Final     MCH   Date Value Ref Range Status   10/12/2021 31.5 26.6 - 33.0 pg Final     MCHC   Date Value Ref Range Status   10/12/2021 34.0 31.5 - 35.7 g/dL Final     RDW   Date Value Ref Range Status   10/12/2021 12.2 (L) 12.3 - 15.4 % Final     RDW-SD   Date Value Ref Range Status   10/12/2021 41.2 37.0 - 54.0 fl Final     MPV   Date Value Ref Range Status   10/12/2021 11.7 6.0 - 12.0 fL Final     Platelets   Date Value Ref Range Status   10/12/2021 323 140 - 450 10*3/mm3 Final     Neutrophil %   Date Value Ref Range Status   07/30/2021 43.3 42.7 - 76.0 % Final     Lymphocyte %   Date Value Ref Range Status   07/30/2021 35.9 19.6 - 45.3 % Final     Monocyte %   Date Value Ref Range Status   07/30/2021 13.8 (H) 5.0 - 12.0 % Final     Eosinophil %   Date Value Ref Range Status   07/30/2021 6.3 (H) 0.3 - 6.2 % Final     Basophil %   Date Value Ref Range Status   07/30/2021 0.7 0.0 - 1.5 % Final     Immature Grans %   Date Value Ref Range Status   04/06/2021 0.3 0.0 - 0.5 % Final     Neutrophils, Absolute   Date Value  Ref Range Status   07/30/2021 3.74 1.70 - 7.00 10*3/mm3 Final     Lymphocytes, Absolute   Date Value Ref Range Status   07/30/2021 3.10 0.70 - 3.10 10*3/mm3 Final     Monocytes, Absolute   Date Value Ref Range Status   07/30/2021 1.19 (H) 0.10 - 0.90 10*3/mm3 Final     Eosinophils, Absolute   Date Value Ref Range Status   07/30/2021 0.54 (H) 0.00 - 0.40 10*3/mm3 Final     Basophils, Absolute   Date Value Ref Range Status   07/30/2021 0.06 0.00 - 0.20 10*3/mm3 Final     Immature Grans, Absolute   Date Value Ref Range Status   04/06/2021 0.03 0.00 - 0.05 10*3/mm3 Final     nRBC   Date Value Ref Range Status   04/06/2021 0.1 0.0 - 0.2 /100 WBC Final       Lab Results   Component Value Date    GLUCOSE 83 10/12/2021    BUN 17 10/12/2021    CREATININE 1.27 (H) 10/12/2021    EGFRIFNONA 42 (L) 10/12/2021    EGFRIFAFRI 83 04/04/2017    BCR 13.4 10/12/2021    K 4.1 10/12/2021    CO2 27.0 10/12/2021    CALCIUM 10.3 10/12/2021    ALBUMIN 4.60 10/12/2021    LABIL2 1.1 05/31/2019    AST 33 (H) 10/12/2021    ALT 29 10/12/2021           ASSESSMENT:    1. 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  She also has DICER1 gene as well as TSC2 with variants of unknown significance.   2. History of right breast cancer, status post right mastectomy followed by axillary lymph node dissection and right chest wall reconstruction in 1985 at the age of 32.  3. Elevated liver function tests, pruritus, but no significant pathology found on both the PET scan, MRCP, except for post cholecystectomy, biliary ductal dilatation.  On Colestipol for pruritus.   4. She has peripheral lymphadenopathy involving the neck, axilla, mediastinum and retroperitoneum that are not PET avid.  This lymphadenopathy may be due to chronic inflammatory disease [lupus].    5. Strong family history of colon cancer and personal history of breast cancer.   6. Systemic lupus  erythematosus, scleroderma, Raynaud’s phenomenon.   7. Normocytic anemia, multifactorial, underlying autoimmune disease, relative iron deficiency and B12 deficiency.  On iron and B12 supplementation.  Stable   8. Persistent monocytosis, status post bone marrow aspiration and biopsy which was essentially normal.  Stable monocytosis   9. Progressive osteoporosis.  On Fosamax and recent DEXA scan from 12/7/2020 shows progression.  Patient is currently on Prolia  10. Urine cytology was negative as of January 2019.  Scheduled today for urine cytology   11. Last Dermatology appointment was 2/2020: Patient to follow-up February 2021  12. Status post complete hysterectomy reducing her risk for ovarian and uterine cancer.  13. Assessment has been reviewed and updated     PLANS:     · Plan is for continued surveillance for Monahan syndrome diagnosis.    To follow-up with GI for pancreatic cancer screening.  Reviewed last EGD and colonoscopy completed 8/14/2020.  Patient also had pancreatic MRI completed by Dr. Conway.  Patient to have EUS as well per history  · Continue Prolia: This will be done every 6 months  · She will continue calcium and vitamin D  · Bone density is due December 2022  · MRI of the abdomen completed 8/9/2021 the pancreas appeared mildly atrophic.  Followed by Dr. Conway  · Bone density will be repeated December 2022    · Patient's screening mammogram will be due December 7, 2021.  We will go ahead and schedule at this time  · Bilateral breast MRI was done May 2021.  Reviewed this was normal.  MRI of the breast will be due May 2022  ·  She will continue monthly breast self-exam and call for lumps nipple discharge, skin discoloration.    · Urine cytology was due in August 2021: Will order today  · Dermatology appointment will be due February 2021  · CT scans of the chest abdomen and pelvis done May 4, 2020 is stable  · Follow-up with GI for pancreatic cancer screening.  MRI pancreas alternating with EUS on  a yearly basis  ·          I have reviewed labs results, imaging, vitals, and medications with the patient today. Will follow up in 6 months with me.      Patient verbalized understanding and is in agreement of the above plan.    I spent 30 total minutes, face-to-face, caring for Tracie today.  90% of this time involved counseling and/or coordination of care as documented within this note.

## 2021-10-14 NOTE — PROGRESS NOTES
Procedure   Arthrocentesis    Date/Time: 10/14/2021 2:46 PM  Performed by: Floyd Sivla MD  Authorized by: Floyd Silva MD   Indications: pain   Body area: knee  Joint: left knee  Local anesthesia used: no    Anesthesia:  Local anesthesia used: no    Sedation:  Patient sedated: no    Preparation: Patient was prepped and draped in the usual sterile fashion.  Needle size: 22 G  Ultrasound guidance: no  Approach: anterior  Aspirate amount: 0 mL  Patient tolerance: patient tolerated the procedure well with no immediate complications

## 2021-10-27 ENCOUNTER — TRANSCRIBE ORDERS (OUTPATIENT)
Dept: ADMINISTRATIVE | Facility: HOSPITAL | Age: 61
End: 2021-10-27

## 2021-10-27 DIAGNOSIS — R06.02 SHORTNESS OF BREATH: Primary | ICD-10-CM

## 2021-10-27 DIAGNOSIS — J84.115 RESPIRATORY BRONCHIOLITIS INTERSTITIAL LUNG DISEASE (HCC): ICD-10-CM

## 2021-11-05 ENCOUNTER — HOSPITAL ENCOUNTER (OUTPATIENT)
Dept: CT IMAGING | Facility: HOSPITAL | Age: 61
Discharge: HOME | End: 2021-11-05

## 2021-11-05 ENCOUNTER — HOSPITAL ENCOUNTER (OUTPATIENT)
Dept: CARDIOLOGY | Facility: HOSPITAL | Age: 61
Discharge: HOME | End: 2021-11-05

## 2021-11-05 VITALS
HEIGHT: 64 IN | SYSTOLIC BLOOD PRESSURE: 125 MMHG | BODY MASS INDEX: 37.56 KG/M2 | DIASTOLIC BLOOD PRESSURE: 82 MMHG | WEIGHT: 220 LBS

## 2021-11-05 DIAGNOSIS — J84.115 RESPIRATORY BRONCHIOLITIS INTERSTITIAL LUNG DISEASE (HCC): ICD-10-CM

## 2021-11-05 DIAGNOSIS — R06.02 SHORTNESS OF BREATH: ICD-10-CM

## 2021-11-05 LAB
BH CV ECHO MEAS - ACS: 2 CM
BH CV ECHO MEAS - AO MAX PG (FULL): 2.4 MMHG
BH CV ECHO MEAS - AO MAX PG: 7.5 MMHG
BH CV ECHO MEAS - AO MEAN PG (FULL): 1 MMHG
BH CV ECHO MEAS - AO MEAN PG: 3.6 MMHG
BH CV ECHO MEAS - AO ROOT AREA (BSA CORRECTED): 1.5
BH CV ECHO MEAS - AO ROOT AREA: 7 CM^2
BH CV ECHO MEAS - AO ROOT DIAM: 3 CM
BH CV ECHO MEAS - AO V2 MAX: 136.5 CM/SEC
BH CV ECHO MEAS - AO V2 MEAN: 87.2 CM/SEC
BH CV ECHO MEAS - AO V2 VTI: 25.9 CM
BH CV ECHO MEAS - AORTIC HR: 77.3 BPM
BH CV ECHO MEAS - AORTIC R-R: 0.78 SEC
BH CV ECHO MEAS - ASC AORTA: 2.5 CM
BH CV ECHO MEAS - AVA(I,A): 3.6 CM^2
BH CV ECHO MEAS - AVA(I,D): 3.6 CM^2
BH CV ECHO MEAS - AVA(V,A): 3.1 CM^2
BH CV ECHO MEAS - AVA(V,D): 3.1 CM^2
BH CV ECHO MEAS - BSA(HAYCOCK): 2.2 M^2
BH CV ECHO MEAS - BSA: 2 M^2
BH CV ECHO MEAS - BZI_BMI: 37.8 KILOGRAMS/M^2
BH CV ECHO MEAS - BZI_METRIC_HEIGHT: 162.6 CM
BH CV ECHO MEAS - BZI_METRIC_WEIGHT: 99.8 KG
BH CV ECHO MEAS - CI(AO): 6.9 L/MIN/M^2
BH CV ECHO MEAS - CI(LVOT): 3.6 L/MIN/M^2
BH CV ECHO MEAS - CO(AO): 14.1 L/MIN
BH CV ECHO MEAS - CO(LVOT): 7.2 L/MIN
BH CV ECHO MEAS - EDV(CUBED): 127.2 ML
BH CV ECHO MEAS - EDV(MOD-SP4): 89.2 ML
BH CV ECHO MEAS - EDV(TEICH): 119.9 ML
BH CV ECHO MEAS - EF(CUBED): 70 %
BH CV ECHO MEAS - EF(MOD-BP): 70 %
BH CV ECHO MEAS - EF(MOD-SP4): 69.8 %
BH CV ECHO MEAS - EF(TEICH): 61.3 %
BH CV ECHO MEAS - ESV(CUBED): 38.2 ML
BH CV ECHO MEAS - ESV(MOD-SP4): 26.9 ML
BH CV ECHO MEAS - ESV(TEICH): 46.3 ML
BH CV ECHO MEAS - FS: 33.1 %
BH CV ECHO MEAS - IVS/LVPW: 0.63
BH CV ECHO MEAS - IVSD: 0.7 CM
BH CV ECHO MEAS - LA DIMENSION(2D): 3.2 CM
BH CV ECHO MEAS - LV DIASTOLIC VOL/BSA (35-75): 43.8 ML/M^2
BH CV ECHO MEAS - LV MASS(C)D: 161.2 GRAMS
BH CV ECHO MEAS - LV MASS(C)DI: 79.1 GRAMS/M^2
BH CV ECHO MEAS - LV MAX PG: 5.1 MMHG
BH CV ECHO MEAS - LV MEAN PG: 2.5 MMHG
BH CV ECHO MEAS - LV SYSTOLIC VOL/BSA (12-30): 13.2 ML/M^2
BH CV ECHO MEAS - LV V1 MAX: 113 CM/SEC
BH CV ECHO MEAS - LV V1 MEAN: 73.4 CM/SEC
BH CV ECHO MEAS - LV V1 VTI: 25.4 CM
BH CV ECHO MEAS - LVIDD: 5 CM
BH CV ECHO MEAS - LVIDS: 3.4 CM
BH CV ECHO MEAS - LVOT AREA: 3.7 CM^2
BH CV ECHO MEAS - LVOT DIAM: 2.2 CM
BH CV ECHO MEAS - LVPWD: 1.1 CM
BH CV ECHO MEAS - MR MAX PG: 79.7 MMHG
BH CV ECHO MEAS - MR MAX VEL: 446.5 CM/SEC
BH CV ECHO MEAS - MV A MAX VEL: 87.9 CM/SEC
BH CV ECHO MEAS - MV DEC SLOPE: 638.2 CM/SEC^2
BH CV ECHO MEAS - MV DEC TIME: 0.16 SEC
BH CV ECHO MEAS - MV E MAX VEL: 100.1 CM/SEC
BH CV ECHO MEAS - MV E/A: 1.1
BH CV ECHO MEAS - MV MAX PG: 5.4 MMHG
BH CV ECHO MEAS - MV MEAN PG: 2.7 MMHG
BH CV ECHO MEAS - MV V2 MAX: 116.1 CM/SEC
BH CV ECHO MEAS - MV V2 MEAN: 77.7 CM/SEC
BH CV ECHO MEAS - MV V2 VTI: 26.9 CM
BH CV ECHO MEAS - MVA(VTI): 3.5 CM^2
BH CV ECHO MEAS - PA ACC TIME: 0.09 SEC
BH CV ECHO MEAS - PA MAX PG (FULL): 0.96 MMHG
BH CV ECHO MEAS - PA MAX PG: 3.1 MMHG
BH CV ECHO MEAS - PA MEAN PG (FULL): 0.7 MMHG
BH CV ECHO MEAS - PA MEAN PG: 2 MMHG
BH CV ECHO MEAS - PA PR(ACCEL): 37.9 MMHG
BH CV ECHO MEAS - PA V2 MAX: 88.4 CM/SEC
BH CV ECHO MEAS - PA V2 MEAN: 68 CM/SEC
BH CV ECHO MEAS - PA V2 VTI: 23 CM
BH CV ECHO MEAS - PULM A REVS DUR: 0.12 SEC
BH CV ECHO MEAS - PULM A REVS VEL: 19.1 CM/SEC
BH CV ECHO MEAS - PULM DIAS VEL: 33.8 CM/SEC
BH CV ECHO MEAS - PULM S/D: 1.3
BH CV ECHO MEAS - PULM SYS VEL: 43.3 CM/SEC
BH CV ECHO MEAS - PVA(I,A): 3.1 CM^2
BH CV ECHO MEAS - PVA(I,D): 3.1 CM^2
BH CV ECHO MEAS - PVA(V,A): 2.8 CM^2
BH CV ECHO MEAS - PVA(V,D): 2.8 CM^2
BH CV ECHO MEAS - QP/QS: 0.76
BH CV ECHO MEAS - RAP SYSTOLE: 3 MMHG
BH CV ECHO MEAS - RV MAX PG: 2.2 MMHG
BH CV ECHO MEAS - RV MEAN PG: 1.3 MMHG
BH CV ECHO MEAS - RV V1 MAX: 73.6 CM/SEC
BH CV ECHO MEAS - RV V1 MEAN: 55.4 CM/SEC
BH CV ECHO MEAS - RV V1 VTI: 21.3 CM
BH CV ECHO MEAS - RVDD: 2.6 CM
BH CV ECHO MEAS - RVOT AREA: 3.3 CM^2
BH CV ECHO MEAS - RVOT DIAM: 2.1 CM
BH CV ECHO MEAS - RVSP: 30.3 MMHG
BH CV ECHO MEAS - SI(AO): 89.3 ML/M^2
BH CV ECHO MEAS - SI(CUBED): 43.7 ML/M^2
BH CV ECHO MEAS - SI(LVOT): 46 ML/M^2
BH CV ECHO MEAS - SI(MOD-SP4): 30.6 ML/M^2
BH CV ECHO MEAS - SI(TEICH): 36.1 ML/M^2
BH CV ECHO MEAS - SV(AO): 181.8 ML
BH CV ECHO MEAS - SV(CUBED): 89 ML
BH CV ECHO MEAS - SV(LVOT): 93.7 ML
BH CV ECHO MEAS - SV(MOD-SP4): 62.3 ML
BH CV ECHO MEAS - SV(RVOT): 70.9 ML
BH CV ECHO MEAS - SV(TEICH): 73.5 ML
BH CV ECHO MEAS - TR MAX VEL: 261.3 CM/SEC

## 2021-11-05 PROCEDURE — 93306 TTE W/DOPPLER COMPLETE: CPT

## 2021-11-05 PROCEDURE — 71250 CT THORAX DX C-: CPT

## 2021-11-05 PROCEDURE — 93306 TTE W/DOPPLER COMPLETE: CPT | Performed by: INTERNAL MEDICINE

## 2021-11-22 RX ORDER — LISINOPRIL 5 MG/1
TABLET ORAL
Qty: 90 TABLET | Refills: 3 | Status: SHIPPED | OUTPATIENT
Start: 2021-11-22 | End: 2022-11-03

## 2021-11-24 RX ORDER — BISOPROLOL FUMARATE AND HYDROCHLOROTHIAZIDE 10; 6.25 MG/1; MG/1
TABLET ORAL
Qty: 90 TABLET | Refills: 1 | Status: SHIPPED | OUTPATIENT
Start: 2021-11-24 | End: 2022-05-09

## 2021-11-24 RX ORDER — MONTELUKAST SODIUM 4 MG/1
TABLET, CHEWABLE ORAL
Qty: 120 TABLET | Refills: 1 | Status: SHIPPED | OUTPATIENT
Start: 2021-11-24 | End: 2022-01-21

## 2021-12-07 ENCOUNTER — LAB (OUTPATIENT)
Dept: LAB | Facility: HOSPITAL | Age: 61
End: 2021-12-07

## 2021-12-07 LAB — SARS-COV-2 ORF1AB RESP QL NAA+PROBE: NOT DETECTED

## 2021-12-07 PROCEDURE — U0004 COV-19 TEST NON-CDC HGH THRU: HCPCS

## 2021-12-07 PROCEDURE — C9803 HOPD COVID-19 SPEC COLLECT: HCPCS

## 2021-12-08 DIAGNOSIS — Z00.00 MEDICARE ANNUAL WELLNESS VISIT, SUBSEQUENT: ICD-10-CM

## 2021-12-09 ENCOUNTER — HOSPITAL ENCOUNTER (OUTPATIENT)
Facility: HOSPITAL | Age: 61
Setting detail: HOSPITAL OUTPATIENT SURGERY
Discharge: HOME | End: 2021-12-09
Attending: INTERNAL MEDICINE | Admitting: INTERNAL MEDICINE

## 2021-12-09 ENCOUNTER — ANESTHESIA (OUTPATIENT)
Dept: GASTROENTEROLOGY | Facility: HOSPITAL | Age: 61
End: 2021-12-09

## 2021-12-09 ENCOUNTER — ON CAMPUS - OUTPATIENT (AMBULATORY)
Dept: URBAN - METROPOLITAN AREA HOSPITAL 85 | Facility: HOSPITAL | Age: 61
End: 2021-12-09

## 2021-12-09 ENCOUNTER — ANESTHESIA EVENT (OUTPATIENT)
Dept: GASTROENTEROLOGY | Facility: HOSPITAL | Age: 61
End: 2021-12-09

## 2021-12-09 VITALS
HEART RATE: 74 BPM | WEIGHT: 221.8 LBS | BODY MASS INDEX: 37.87 KG/M2 | TEMPERATURE: 97.8 F | DIASTOLIC BLOOD PRESSURE: 69 MMHG | OXYGEN SATURATION: 96 % | RESPIRATION RATE: 16 BRPM | HEIGHT: 64 IN | SYSTOLIC BLOOD PRESSURE: 133 MMHG

## 2021-12-09 DIAGNOSIS — K31.84 GASTROPARESIS: ICD-10-CM

## 2021-12-09 DIAGNOSIS — T18.2XXA FOREIGN BODY IN STOMACH, INITIAL ENCOUNTER: ICD-10-CM

## 2021-12-09 DIAGNOSIS — R74.01 ELEVATION OF LEVELS OF LIVER TRANSAMINASE LEVELS: ICD-10-CM

## 2021-12-09 DIAGNOSIS — K44.9 DIAPHRAGMATIC HERNIA WITHOUT OBSTRUCTION OR GANGRENE: ICD-10-CM

## 2021-12-09 DIAGNOSIS — K83.1 OBSTRUCTION OF BILE DUCT: ICD-10-CM

## 2021-12-09 PROCEDURE — 25010000002 PROPOFOL 200 MG/20ML EMULSION: Performed by: ANESTHESIOLOGY

## 2021-12-09 PROCEDURE — 43259 EGD US EXAM DUODENUM/JEJUNUM: CPT | Performed by: INTERNAL MEDICINE

## 2021-12-09 RX ORDER — CALCIUM CARBONATE/VITAMIN D3 600MG-5MCG
TABLET ORAL
Qty: 60 TABLET | Refills: 6 | Status: SHIPPED | OUTPATIENT
Start: 2021-12-09 | End: 2022-07-28

## 2021-12-09 RX ORDER — PROPOFOL 10 MG/ML
INJECTION, EMULSION INTRAVENOUS AS NEEDED
Status: DISCONTINUED | OUTPATIENT
Start: 2021-12-09 | End: 2021-12-09 | Stop reason: SURG

## 2021-12-09 RX ORDER — LEVOFLOXACIN 5 MG/ML
INJECTION, SOLUTION INTRAVENOUS
Status: DISCONTINUED
Start: 2021-12-09 | End: 2021-12-09 | Stop reason: WASHOUT

## 2021-12-09 RX ORDER — METRONIDAZOLE 500 MG/1
TABLET ORAL
Status: DISCONTINUED
Start: 2021-12-09 | End: 2021-12-09 | Stop reason: WASHOUT

## 2021-12-09 RX ORDER — SODIUM CHLORIDE 9 MG/ML
INJECTION, SOLUTION INTRAVENOUS CONTINUOUS PRN
Status: DISCONTINUED | OUTPATIENT
Start: 2021-12-09 | End: 2021-12-09 | Stop reason: SURG

## 2021-12-09 RX ORDER — ONDANSETRON 2 MG/ML
4 INJECTION INTRAMUSCULAR; INTRAVENOUS ONCE AS NEEDED
Status: DISCONTINUED | OUTPATIENT
Start: 2021-12-09 | End: 2021-12-09 | Stop reason: HOSPADM

## 2021-12-09 RX ORDER — SODIUM CHLORIDE 9 MG/ML
10 INJECTION, SOLUTION INTRAVENOUS ONCE
Status: COMPLETED | OUTPATIENT
Start: 2021-12-09 | End: 2021-12-09

## 2021-12-09 RX ADMIN — SODIUM CHLORIDE 10 ML/HR: 9 INJECTION, SOLUTION INTRAVENOUS at 07:25

## 2021-12-09 RX ADMIN — PROPOFOL 300 MG: 10 INJECTION, EMULSION INTRAVENOUS at 08:58

## 2021-12-09 RX ADMIN — SODIUM CHLORIDE: 0.9 INJECTION, SOLUTION INTRAVENOUS at 08:52

## 2021-12-09 NOTE — ANESTHESIA POSTPROCEDURE EVALUATION
Patient: Tracie Gross    Procedure Summary     Date: 12/09/21 Room / Location: [facility] ENDOSCOPY 2 / [facility] ENDOSCOPY    Anesthesia Start: 0852 Anesthesia Stop: 0916    Procedure: ENDOSCOPIC ULTRASOUND WITH ESOPHAGOGASTRODUODENOSCOPY (N/A ) Diagnosis:       Biliary obstruction      Elevated liver function tests      (Biliary obstruction [K83.1])      (Elevated liver function tests [R79.89])    Surgeons: Luis E Negron MD Provider: Roverto Dorado MD    Anesthesia Type: MAC ASA Status: 3          Anesthesia Type: MAC    Vitals  Vitals Value Taken Time   /69 12/09/21 0930   Temp     Pulse 74 12/09/21 0930   Resp 16 12/09/21 0923   SpO2 96 % 12/09/21 0930           Post Anesthesia Care and Evaluation    Patient location during evaluation: PACU  Patient participation: complete - patient participated  Level of consciousness: awake  Pain scale: See nurse's notes for pain score.  Pain management: adequate  Airway patency: patent  Anesthetic complications: No anesthetic complications  PONV Status: none  Cardiovascular status: acceptable  Respiratory status: acceptable  Hydration status: acceptable    Comments: Patient seen and examined postoperatively; vital signs stable; SpO2 greater than or equal to 90%; cardiopulmonary status stable; nausea/vomiting adequately controlled; pain adequately controlled; no apparent anesthesia complications; patient discharged from anesthesia care when discharge criteria were met

## 2021-12-09 NOTE — DISCHARGE INSTRUCTIONS
A responsible adult should stay with you and you should rest quietly for the rest of the day.    Do not drink alcohol, drive, operate any heavy machinery or power tools or make any legal/important decisions for the next 24 hours.     Progress your diet as tolerated.  If you begin to experience severe pain, increased shortness of breath, racing heartbeat or a fever above 101 F, seek immediate medical attention.     Follow up with MD as instructed. Call office for results in 3 to 5 days if needed.    [phone]

## 2021-12-09 NOTE — OP NOTE
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       Post-Op Diagnosis Codes:     * Biliary obstruction [K83.1]     * Elevated liver function tests [R79.89]      Procedure/CPT® Codes:      Procedure(s):  ENDOSCOPIC ULTRASOUND WITH ESOPHAGOGASTRODUODENOSCOPY    Staff:  Surgeon(s):  Luis E Negron MD      Anesthesia: Monitored Anesthesia Care    Description of Procedure:  A description of the procedure as well as risks, benefits and alternative methods were explained to the patient who voiced understanding and signed the corresponding consent form. A physical exam was performed and vital signs were monitored throughout the procedure.    Initially Pentax radial scope was advanced under direct realization from oropharynx, esophagus stomach and the second part duodenum.  Limited evaluation of the esophageal gastric antral mucosa looks normal.    Scanning of the body and the tail of the pancreas showed normal pancreatic parenchyma in the body and the tail area with the pancreatic duct around 1.5 mm in the body of the pancreas area.  There was a minimal bright foci was noticed may be only 1 criteria in the body area.  Scanning of the head of the pancreas showed common bile duct was traced from the hilum all the way to the ampulla it was dilated close to 8.8 to 9 mm.  No obvious stone or mass lesion was seen.  No obvious stone or mass was seen at the ampulla.  There was a very smooth tapering of the common bile duct at the very distal part.  Pancreatic was only 2.9 to 3 mm head of the pancreas.  No evidence of pancreas with some was noticed.  No obvious mass was seen at the head of the pancreas.  Subsequently, side-viewing scope was advanced which did  show normal-looking ampulla without any mass or lesion.  No obvious polyp was seen in the second part of the duodenum      Impression:  1.  Normal pancreatic parenchymal EUS without any finding of chronic pancreatitis except very rare bright foci in the body area not enough to call chronic pancreatitis.  2.  Common bile duct was dilated close to 9 mm with a smooth tapering without any filling defect stone or mass.  Normal pancreatic duct without any dilation.  3.  Small hiatal hernia with some retained food in the stomach consistent with possible gastroparesis.  Minimal changes of duodenitis noticed.     Recommendations:  1.   Patient to follow the GI clinic for any persistent abdominal pain as scheduled  2.   Continue with the PPI.  3. Advised low residual diet.  4.  If patient has a recurrent elevation of liver enzyme with right upper quadrant pain, consider ERCP with sphincterotomy.  No obvious mass or lesion was seen around ampulla with a side-viewing duodenoscope.  Luis E Negron MD     Date: 12/9/2021    Time: 10:21 EST

## 2021-12-09 NOTE — ANESTHESIA PREPROCEDURE EVALUATION
Anesthesia Evaluation     Patient summary reviewed and Nursing notes reviewed   no history of anesthetic complications:  NPO Solid Status: > 8 hours  NPO Liquid Status: > 2 hours           Airway   Mallampati: I  TM distance: >3 FB  Neck ROM: full  No difficulty expected  Dental - normal exam   (+) partials    Pulmonary - normal exam   (+) asthma,sleep apnea,   Cardiovascular - normal exam    (+) hypertension well controlled 2 medications or greater, valvular problems/murmurs,       Neuro/Psych  (+) numbness, psychiatric history Depression and Bipolar,     GI/Hepatic/Renal/Endo    (+)  GERD well controlled,  renal disease CRI, thyroid problem hypothyroidism    Musculoskeletal     (+) chronic pain,   Abdominal  - normal exam    Bowel sounds: normal.   Substance History - negative use     OB/GYN negative ob/gyn ROS         Other   arthritis, autoimmune disease , blood dyscrasia (vW - pt reports it does not require treatment),   history of cancer                    Anesthesia Plan    ASA 3     MAC     intravenous induction     Anesthetic plan, all risks, benefits, and alternatives have been provided, discussed and informed consent has been obtained with: patient.

## 2021-12-09 NOTE — H&P
Patient Care Team:  Floyd Silva MD as PCP - General (Internal Medicine)      Subjective .     History of present illness:    Tracie Gross is a 61 y.o. female who presents today for Procedure(s):  ENDOSCOPIC ULTRASOUND WITH ESOPHAGOGASTRODUODENOSCOPY for the indications listed below.     The updated Patient Profile was reviewed prior to the procedure, in conjunction with the Physical Exam, including medical conditions, surgical procedures, medications, allergies, family history and social history.     Pre-operatively, I reviewed the indication(s) for the procedure, the risks of the procedure [including but not limited to: unexpected bleeding possibly requiring hospitalization and/or unplanned repeat procedures, perforation possibly requiring surgical treatment, missed lesions and complications of sedation/MAC (also explained by anesthesia staff)].     I have evaluated the patient for risks associated with the planned anesthesia and the procedure to be performed and find the patient an acceptable candidate for IV sedation.    Multiple opportunities were provided for any questions or concerns, and all questions were answered satisfactorily before any anesthesia was administered. We will proceed with the planned procedure.      ASSESSMENT/PLAN:  Patient with history of Monahan syndrome for evaluation of the common bile duct and ampulla      Past Medical History:  Past Medical History:   Diagnosis Date   • Arthritis    • Asthma    • Bipolar affective disorder (HCC)    • Breast cancer (HCC)     RT   • Elevated liver enzymes 12/2021   • GERD (gastroesophageal reflux disease) 1993   • H/O breast reconstruction     SEVERAL   • H/O laminectomy    • Hamartoma (HCC)    • Hx of bilateral oophorectomy    • Hypertension    • Hypothyroidism    • Inflammatory bowel disease 1992   • Kienbock's disease, right     WRIST   • Low back pain 1991   • Lupus (HCC)    • Monahan syndrome    • Osteoporosis    • Raynaud disease    •  Scleroderma (HCC)    • Von Willebrand disease (HCC)        Past Surgical History:  Past Surgical History:   Procedure Laterality Date   • ARM DEBRIDEMENT Right     X 3   • BACK SURGERY      [facility]- cervical fusion   • BACK SURGERY      lumbar decomp   • BREAST BIOPSY     • BREAST LUMPECTOMY     • BREAST RECONSTRUCTION Right    • BREAST RECONSTRUCTION Right    • CARDIAC CATHETERIZATION      no stents placed   •  SECTION  ,    • CHOLECYSTECTOMY     • COLONOSCOPY     • COLONOSCOPY N/A 2020    Procedure: COLONOSCOPY;  Surgeon: Cayden Conway MD;  Location: [facility] ENDOSCOPY;  Service: Gastroenterology;  Laterality: N/A;  rectal ulcer   • COLONOSCOPY N/A 2021    Procedure: COLONOSCOPY WITH POLYPECTOMY X 1;  Surgeon: Cayden Conway MD;  Location: [facility] ENDOSCOPY;  Service: Gastroenterology;  Laterality: N/A;  Post: COLON POLYP   • COSMETIC SURGERY  [id number]   • ENDOSCOPY     • ENDOSCOPY N/A 2020    Procedure: ESOPHAGOGASTRODUODENOSCOPY;  Surgeon: Cayden Conway MD;  Location: [facility] ENDOSCOPY;  Service: Gastroenterology;  Laterality: N/A;  Normal EGD   • ENDOSCOPY N/A 2021    Procedure: ESOPHAGOGASTRODUODENOSCOPY;  Surgeon: Cayden Conway MD;  Location: [facility] ENDOSCOPY;  Service: Gastroenterology;  Laterality: N/A;  Post: normal egd   • EXPLORATORY LAPAROTOMY      scar tissue removal   • EYE SURGERY     • FINGER SURGERY Right     ring finger mass excision   • HYSTERECTOMY      PARTIAL   • JOINT REPLACEMENT Right     hip and knee   • KNEE ARTHROSCOPY Left 2020    Procedure: LEFT KNEE SCOPE with partial lateral meniscectomy;  Surgeon: Chau Perez MD;  Location: [facility] MAIN OR;  Service: Orthopedics   • LASIK      LASIK/ LASIK ENHANCEMENT   • MASTECTOMY RADICAL Right    • OOPHORECTOMY Bilateral    • SPLENECTOMY     • TUBAL ABDOMINAL LIGATION     • WRIST SURGERY Right     distal radius head removal (bone dead)  plates and screws  decomp lunate       Social History:  Social History     Tobacco Use   • Smoking status: Former Smoker     Packs/day: 0.25     Years: 7.00     Pack years: 1.75     Start date:      Quit date:      Years since quittin.9   • Smokeless tobacco: Never Used   • Tobacco comment: Off and on for  those years   Vaping Use   • Vaping Use: Never used   Substance Use Topics   • Alcohol use: Yes     Comment: Rarely   • Drug use: No       Family History:  Family History   Problem Relation Age of Onset   • Colon cancer Mother    • Heart disease Mother    • Cancer Mother    • Kidney disease Father    • Heart disease Father    • Depression Father    • Hyperlipidemia Father    • Vision loss Father    • Colon cancer Sister    • Diabetes Sister    • Heart disease Sister    • Von Willebrand disease Sister    • Von Willebrand disease Brother    • Von Willebrand disease Son    • Breast cancer Son    • Other Son         burdick syndrome   • Diabetes Paternal Grandmother    • Stroke Paternal Grandmother    • Colon cancer Other    • Colon cancer Son    • Von Willebrand disease Son    • Other Son         burdick syndrome   • Breast cancer Son    • Cancer Sister    • Vision loss Sister    • Other Sister    • Stroke Sister    • Cancer Paternal Aunt    • Cancer Maternal Aunt    • Cancer Maternal Aunt    • Hyperlipidemia Sister    • Thyroid disease Sister    • Thyroid disease Sister        Medications:  No medications prior to admission.       Scheduled Meds:  Continuous Infusions:No current facility-administered medications for this encounter.    PRN Meds:.    ALLERGIES:  Aspirin, Penicillins, Baclofen, and Tape  [adhesive tape]        Objective     Vital Signs:   Temp:  [97.8 °F (36.6 °C)] 97.8 °F (36.6 °C)  Heart Rate:  [74-85] 74  Resp:  [16-19] 16  BP: (116-133)/(58-69) 133/69    Physical Exam:      General Appearance:    Awake and alert, in no acute distress   Lungs:     Clear to auscultation bilaterally, respirations regular, even  and unlabored    Heart:    Regular rhythm and normal rate, normal S1 and S2, no            murmur, no gallop, no rub   Abdomen:     Normal bowel sounds, soft, non-tender, no rebound or guarding, non-distended, no hepatosplenomegaly        Results Review:   I reviewed the patient's labs and imaging.  Lab Results (last 24 hours)     ** No results found for the last 24 hours. **          Imaging Results (Last 24 Hours)     ** No results found for the last 24 hours. **             I discussed the patients findings and my recommendations with the patient.  Luis E Negron MD  12/09/21  12:05 EST

## 2021-12-13 RX ORDER — LEVOTHYROXINE SODIUM 0.2 MG/1
200 TABLET ORAL DAILY
Qty: 90 TABLET | Refills: 1 | Status: SHIPPED | OUTPATIENT
Start: 2021-12-13 | End: 2022-05-20 | Stop reason: SDUPTHER

## 2021-12-30 ENCOUNTER — APPOINTMENT (OUTPATIENT)
Dept: MAMMOGRAPHY | Facility: HOSPITAL | Age: 61
End: 2021-12-30

## 2022-01-04 ENCOUNTER — APPOINTMENT (OUTPATIENT)
Dept: MAMMOGRAPHY | Facility: HOSPITAL | Age: 62
End: 2022-01-04

## 2022-01-04 DIAGNOSIS — M32.9 SLE (SYSTEMIC LUPUS ERYTHEMATOSUS RELATED SYNDROME): ICD-10-CM

## 2022-01-04 DIAGNOSIS — G89.4 CHRONIC PAIN SYNDROME: ICD-10-CM

## 2022-01-04 DIAGNOSIS — M34.9 SCLERODERMA: ICD-10-CM

## 2022-01-04 RX ORDER — CELECOXIB 100 MG/1
CAPSULE ORAL
Qty: 90 CAPSULE | Refills: 1 | Status: SHIPPED | OUTPATIENT
Start: 2022-01-04 | End: 2022-04-14

## 2022-01-14 ENCOUNTER — OFFICE VISIT (OUTPATIENT)
Dept: FAMILY MEDICINE CLINIC | Facility: CLINIC | Age: 62
End: 2022-01-14

## 2022-01-14 ENCOUNTER — LAB (OUTPATIENT)
Dept: FAMILY MEDICINE CLINIC | Facility: CLINIC | Age: 62
End: 2022-01-14

## 2022-01-14 VITALS
SYSTOLIC BLOOD PRESSURE: 114 MMHG | HEIGHT: 64 IN | DIASTOLIC BLOOD PRESSURE: 68 MMHG | OXYGEN SATURATION: 93 % | TEMPERATURE: 97.1 F | HEART RATE: 78 BPM | BODY MASS INDEX: 36.7 KG/M2 | RESPIRATION RATE: 16 BRPM | WEIGHT: 215 LBS

## 2022-01-14 DIAGNOSIS — G47.00 INSOMNIA, UNSPECIFIED TYPE: ICD-10-CM

## 2022-01-14 DIAGNOSIS — M19.91 PRIMARY OSTEOARTHRITIS, UNSPECIFIED SITE: ICD-10-CM

## 2022-01-14 DIAGNOSIS — M34.9 SCLERODERMA: ICD-10-CM

## 2022-01-14 DIAGNOSIS — I73.00 RAYNAUD'S DISEASE WITHOUT GANGRENE: Primary | ICD-10-CM

## 2022-01-14 PROBLEM — Z79.899 OTHER LONG TERM (CURRENT) DRUG THERAPY: Status: RESOLVED | Noted: 2018-11-28 | Resolved: 2022-01-14

## 2022-01-14 PROBLEM — Z47.89 ENCOUNTER FOR OTHER ORTHOPEDIC AFTERCARE: Status: RESOLVED | Noted: 2020-04-15 | Resolved: 2022-01-14

## 2022-01-14 PROCEDURE — 36415 COLL VENOUS BLD VENIPUNCTURE: CPT | Performed by: FAMILY MEDICINE

## 2022-01-14 PROCEDURE — 99214 OFFICE O/P EST MOD 30 MIN: CPT | Performed by: FAMILY MEDICINE

## 2022-01-14 PROCEDURE — 85652 RBC SED RATE AUTOMATED: CPT | Performed by: FAMILY MEDICINE

## 2022-01-14 PROCEDURE — 80053 COMPREHEN METABOLIC PANEL: CPT | Performed by: FAMILY MEDICINE

## 2022-01-14 PROCEDURE — 86140 C-REACTIVE PROTEIN: CPT | Performed by: FAMILY MEDICINE

## 2022-01-14 RX ORDER — METHYLPREDNISOLONE 4 MG/1
TABLET ORAL
Qty: 21 TABLET | Refills: 0 | Status: SHIPPED | OUTPATIENT
Start: 2022-01-14 | End: 2022-04-14

## 2022-01-14 NOTE — PROGRESS NOTES
"Chief Complaint   Patient presents with   • reynaud's   • Weight Gain     HPI  Tracie Gross is a 61 y.o. female that presents for   Chief Complaint   Patient presents with   • reynaud's   • Weight Gain     Raynaud's: maintained on nifedipine 90 daily. This has been poorly controlled. Reports flare of white/bluish digits about 3x/week. Gloves don't seem to be effective. Follows w/ rheumatology- [facility]. Rheum does not have further recommendations    Scleroderma: previous PFTs w/ reduced diffusion capacity concerning for pulmonary involvement. Now following w/ Draw. Recently completed home sleep study. Results pending    L knee pain: s/p R knee replacement but still having issues/pain. She is maintained on Celebrex 100 daily due to CKD. Gabapentin was added in the AM, which is helpful for pain. Creates a bit of a \"fog.\" Steroid shot from last visit was helpful but really pushed the activity in the last month or two.  Patient will use oxycodone 10mg anywhere from zero to 2-3x/day depending on activity.      Insomnia: maintained on Seroquel 100mg daily and Xanax 1mg nightly PRN is working well. NO complaints regarding sleep.    Review of Systems   Constitutional: Negative for unexpected weight loss.   Respiratory: Positive for shortness of breath. Negative for cough.    Gastrointestinal: Positive for abdominal pain.   Musculoskeletal: Positive for arthralgias and gait problem.   Skin: Positive for color change and pallor.   Psychiatric/Behavioral: Negative for sleep disturbance.     The following portions of the patient's history were reviewed and updated as appropriate: problem list, past medical history, past surgical history, allergies, current medication    Problem List Tab  Patient History Tab  Immunizations Tab  Medications Tab  Chart Review Tab  Care Everywhere Tab  Synopsis Tab    PE  Vitals:    01/14/22 1535   BP: 114/68   Pulse: 78   Resp: 16   Temp: 97.1 °F (36.2 °C)   SpO2: 93%     Body mass index is 36.9 " kg/m².  General: Well nourished, NAD  Head: AT/NC  Eyes: EOMI, anicteric sclera  ENT: MMM w/o erythema. TM clear bilaterally  Neck: Supple, no thyromegaly or LAD  Resp: CTAB, SCR, BS equal  CV: RRR w/o m/r/g; 2+ pulses  GI: Soft, NT/ND, +BS  MSK: FROM, no deformity, no edema  Skin: Warm, dry, intact  Neuro: Alert and oriented. No focal deficits  Psych: Appropriate mood and affect    Imaging  CT Chest Without Contrast Diagnostic    Result Date: 11/5/2021   1. Mild chronic linear scarring or interstitial thickening/fibrotic changes in the lung bases. No wolf honeycombing fibrosis. 2. No acute chest findings. There is minimal chronic passive atelectasis in the right middle lobe secondary to chronic right hemidiaphragm elevation. 3. Right mastectomy. No evidence of disease recurrence or metastatic disease within the chest. 4. Cholecystectomy. Splenectomy.    Electronically Signed By-Barb Millan MD On:11/5/2021 1:47 PM This report was finalized on [id number] by  Barb Millan MD.      Assessment/Plan   Tracie Gross is a 61 y.o. female that presents for   Chief Complaint   Patient presents with   • reynaud's   • Weight Gain     Diagnoses and all orders for this visit:    1. Raynaud's disease without gangrene (Primary)  -     C-reactive Protein  -     Sedimentation Rate  - Continue home nifedipine 90 daily  - Continue rheumatology follow-up    2. Scleroderma (HCC)  -     C-reactive Protein  -     Sedimentation Rate  - Continue rheumatology follow-up    3. Primary osteoarthritis, unspecified site: Doing well with steroid injections every 4 months.  Not quite due at this time.  Recently active on vacation to [place].  Knee mildly flared  -     Start methylPREDNISolone (MEDROL) 4 MG dose pack; Take as directed on package instructions. Take 6 tablets on first day and one fewer tablet on each subsequent day  Dispense: 21 tablet; Refill: 0  -     Comprehensive Metabolic Panel  - Continue Celebrex 100 daily pending  renal function today  - Continue oxycodone as needed  - Consider increasing gabapentin to 300 mg in the afternoon as well as 300 mg in the morning and 1200 mg at night  - Can repeat steroid injection in 1 more month    4. Insomnia, unspecified type   -Continue home Seroquel 100 nightly with alprazolam 1 mg nightly as needed     Return in about 4 months (around 5/14/2022) for Medicare Wellness.

## 2022-01-15 LAB
ALBUMIN SERPL-MCNC: 4.5 G/DL (ref 3.5–5.2)
ALBUMIN/GLOB SERPL: 1.5 G/DL
ALP SERPL-CCNC: 107 U/L (ref 39–117)
ALT SERPL W P-5'-P-CCNC: 24 U/L (ref 1–33)
ANION GAP SERPL CALCULATED.3IONS-SCNC: 18.3 MMOL/L (ref 5–15)
AST SERPL-CCNC: 29 U/L (ref 1–32)
BILIRUB SERPL-MCNC: 0.2 MG/DL (ref 0–1.2)
BUN SERPL-MCNC: 20 MG/DL (ref 8–23)
BUN/CREAT SERPL: 15.4 (ref 7–25)
CALCIUM SPEC-SCNC: 10.3 MG/DL (ref 8.6–10.5)
CHLORIDE SERPL-SCNC: 100 MMOL/L (ref 98–107)
CO2 SERPL-SCNC: 27.7 MMOL/L (ref 22–29)
CREAT SERPL-MCNC: 1.3 MG/DL (ref 0.57–1)
CRP SERPL-MCNC: 0.6 MG/DL (ref 0–0.5)
ERYTHROCYTE [SEDIMENTATION RATE] IN BLOOD: 10 MM/HR (ref 0–30)
GFR SERPL CREATININE-BSD FRML MDRD: 41 ML/MIN/1.73
GLOBULIN UR ELPH-MCNC: 3 GM/DL
GLUCOSE SERPL-MCNC: 36 MG/DL (ref 65–99)
POTASSIUM SERPL-SCNC: 4.8 MMOL/L (ref 3.5–5.2)
PROT SERPL-MCNC: 7.5 G/DL (ref 6–8.5)
SODIUM SERPL-SCNC: 146 MMOL/L (ref 136–145)

## 2022-01-17 ENCOUNTER — TELEPHONE (OUTPATIENT)
Dept: FAMILY MEDICINE CLINIC | Facility: CLINIC | Age: 62
End: 2022-01-17

## 2022-01-18 ENCOUNTER — HOSPITAL ENCOUNTER (OUTPATIENT)
Dept: MAMMOGRAPHY | Facility: HOSPITAL | Age: 62
Discharge: HOME | End: 2022-01-18
Admitting: INTERNAL MEDICINE

## 2022-01-18 ENCOUNTER — LAB (OUTPATIENT)
Dept: LAB | Facility: HOSPITAL | Age: 62
End: 2022-01-18

## 2022-01-18 ENCOUNTER — TRANSCRIBE ORDERS (OUTPATIENT)
Dept: ADMINISTRATIVE | Facility: HOSPITAL | Age: 62
End: 2022-01-18

## 2022-01-18 DIAGNOSIS — Z12.31 ENCOUNTER FOR SCREENING MAMMOGRAM FOR MALIGNANT NEOPLASM OF BREAST: ICD-10-CM

## 2022-01-18 DIAGNOSIS — G47.00 INSOMNIA, UNSPECIFIED TYPE: ICD-10-CM

## 2022-01-18 DIAGNOSIS — M34.9 SYSTEMIC SCLEROSIS: ICD-10-CM

## 2022-01-18 DIAGNOSIS — M06.4 INFLAMMATORY POLYARTHROPATHY: Primary | ICD-10-CM

## 2022-01-18 DIAGNOSIS — M85.9 DISORDER OF BONE DENSITY AND STRUCTURE, UNSPECIFIED: ICD-10-CM

## 2022-01-18 DIAGNOSIS — R63.5 WEIGHT GAIN: ICD-10-CM

## 2022-01-18 DIAGNOSIS — R53.83 TIREDNESS: ICD-10-CM

## 2022-01-18 DIAGNOSIS — M79.672 LEFT FOOT PAIN: ICD-10-CM

## 2022-01-18 DIAGNOSIS — C50.919 MALIGNANT NEOPLASM OF FEMALE BREAST, UNSPECIFIED ESTROGEN RECEPTOR STATUS, UNSPECIFIED LATERALITY, UNSPECIFIED SITE OF BREAST: ICD-10-CM

## 2022-01-18 DIAGNOSIS — G89.4 CHRONIC PAIN SYNDROME: ICD-10-CM

## 2022-01-18 DIAGNOSIS — M06.4 INFLAMMATORY POLYARTHROPATHY: ICD-10-CM

## 2022-01-18 LAB
ALBUMIN SERPL-MCNC: 4.3 G/DL (ref 3.5–5.2)
ALBUMIN/GLOB SERPL: 1.3 G/DL
ALP SERPL-CCNC: 113 U/L (ref 39–117)
ALT SERPL W P-5'-P-CCNC: 19 U/L (ref 1–33)
ANION GAP SERPL CALCULATED.3IONS-SCNC: 9.5 MMOL/L (ref 5–15)
AST SERPL-CCNC: 22 U/L (ref 1–32)
BACTERIA UR QL AUTO: NORMAL /HPF
BASOPHILS # BLD AUTO: 0.11 10*3/MM3 (ref 0–0.2)
BASOPHILS NFR BLD AUTO: 0.8 % (ref 0–1.5)
BILIRUB SERPL-MCNC: 0.2 MG/DL (ref 0–1.2)
BILIRUB UR QL STRIP: NEGATIVE
BUN SERPL-MCNC: 24 MG/DL (ref 8–23)
BUN/CREAT SERPL: 23.3 (ref 7–25)
C3 SERPL-MCNC: 142 MG/DL (ref 82–167)
C4 SERPL-MCNC: 24 MG/DL (ref 14–44)
CALCIUM SPEC-SCNC: 10.1 MG/DL (ref 8.6–10.5)
CHLORIDE SERPL-SCNC: 102 MMOL/L (ref 98–107)
CK SERPL-CCNC: 63 U/L (ref 20–180)
CLARITY UR: CLEAR
CO2 SERPL-SCNC: 30.5 MMOL/L (ref 22–29)
COLOR UR: YELLOW
CREAT SERPL-MCNC: 1.03 MG/DL (ref 0.57–1)
CREAT UR-MCNC: 101.1 MG/DL
CRP SERPL-MCNC: <0.3 MG/DL (ref 0–0.5)
DEPRECATED RDW RBC AUTO: 44.4 FL (ref 37–54)
EOSINOPHIL # BLD AUTO: 0.04 10*3/MM3 (ref 0–0.4)
EOSINOPHIL NFR BLD AUTO: 0.3 % (ref 0.3–6.2)
ERYTHROCYTE [DISTWIDTH] IN BLOOD BY AUTOMATED COUNT: 13.2 % (ref 12.3–15.4)
ERYTHROCYTE [SEDIMENTATION RATE] IN BLOOD: 29 MM/HR (ref 0–30)
GFR SERPL CREATININE-BSD FRML MDRD: 54 ML/MIN/1.73
GLOBULIN UR ELPH-MCNC: 3.3 GM/DL
GLUCOSE SERPL-MCNC: 81 MG/DL (ref 65–99)
GLUCOSE UR STRIP-MCNC: NEGATIVE MG/DL
HCT VFR BLD AUTO: 39.8 % (ref 34–46.6)
HGB BLD-MCNC: 13.4 G/DL (ref 12–15.9)
HGB UR QL STRIP.AUTO: NEGATIVE
HYALINE CASTS UR QL AUTO: NORMAL /LPF
IMM GRANULOCYTES # BLD AUTO: 0.06 10*3/MM3 (ref 0–0.05)
IMM GRANULOCYTES NFR BLD AUTO: 0.4 % (ref 0–0.5)
KETONES UR QL STRIP: NEGATIVE
LEUKOCYTE ESTERASE UR QL STRIP.AUTO: ABNORMAL
LYMPHOCYTES # BLD AUTO: 2.74 10*3/MM3 (ref 0.7–3.1)
LYMPHOCYTES NFR BLD AUTO: 20.3 % (ref 19.6–45.3)
MCH RBC QN AUTO: 31.5 PG (ref 26.6–33)
MCHC RBC AUTO-ENTMCNC: 33.7 G/DL (ref 31.5–35.7)
MCV RBC AUTO: 93.4 FL (ref 79–97)
MONOCYTES # BLD AUTO: 1.36 10*3/MM3 (ref 0.1–0.9)
MONOCYTES NFR BLD AUTO: 10.1 % (ref 5–12)
NEUTROPHILS NFR BLD AUTO: 68.1 % (ref 42.7–76)
NEUTROPHILS NFR BLD AUTO: 9.17 10*3/MM3 (ref 1.7–7)
NITRITE UR QL STRIP: NEGATIVE
NRBC BLD AUTO-RTO: 0 /100 WBC (ref 0–0.2)
PH UR STRIP.AUTO: 6.5 [PH] (ref 5–8)
PLATELET # BLD AUTO: 359 10*3/MM3 (ref 140–450)
PMV BLD AUTO: 11.9 FL (ref 6–12)
POTASSIUM SERPL-SCNC: 4.4 MMOL/L (ref 3.5–5.2)
PROT ?TM UR-MCNC: 8 MG/DL
PROT SERPL-MCNC: 7.6 G/DL (ref 6–8.5)
PROT UR QL STRIP: ABNORMAL
RBC # BLD AUTO: 4.26 10*6/MM3 (ref 3.77–5.28)
RBC # UR STRIP: NORMAL /HPF
REF LAB TEST METHOD: NORMAL
SODIUM SERPL-SCNC: 142 MMOL/L (ref 136–145)
SP GR UR STRIP: 1.02 (ref 1–1.03)
SQUAMOUS #/AREA URNS HPF: NORMAL /HPF
UROBILINOGEN UR QL STRIP: ABNORMAL
WBC # UR STRIP: NORMAL /HPF
WBC NRBC COR # BLD: 13.48 10*3/MM3 (ref 3.4–10.8)

## 2022-01-18 PROCEDURE — 82550 ASSAY OF CK (CPK): CPT

## 2022-01-18 PROCEDURE — 82570 ASSAY OF URINE CREATININE: CPT

## 2022-01-18 PROCEDURE — 86160 COMPLEMENT ANTIGEN: CPT

## 2022-01-18 PROCEDURE — 85025 COMPLETE CBC W/AUTO DIFF WBC: CPT

## 2022-01-18 PROCEDURE — 86215 DEOXYRIBONUCLEASE ANTIBODY: CPT

## 2022-01-18 PROCEDURE — 81001 URINALYSIS AUTO W/SCOPE: CPT

## 2022-01-18 PROCEDURE — 80053 COMPREHEN METABOLIC PANEL: CPT

## 2022-01-18 PROCEDURE — 84156 ASSAY OF PROTEIN URINE: CPT

## 2022-01-18 PROCEDURE — 77063 BREAST TOMOSYNTHESIS BI: CPT

## 2022-01-18 PROCEDURE — 85652 RBC SED RATE AUTOMATED: CPT

## 2022-01-18 PROCEDURE — 86140 C-REACTIVE PROTEIN: CPT

## 2022-01-18 PROCEDURE — 36415 COLL VENOUS BLD VENIPUNCTURE: CPT

## 2022-01-18 PROCEDURE — 77067 SCR MAMMO BI INCL CAD: CPT

## 2022-01-18 RX ORDER — ALPRAZOLAM 1 MG/1
1 TABLET ORAL NIGHTLY PRN
Qty: 30 TABLET | Refills: 2 | Status: SHIPPED | OUTPATIENT
Start: 2022-01-18 | End: 2022-04-19

## 2022-01-18 RX ORDER — OXYCODONE HYDROCHLORIDE 10 MG/1
10 TABLET ORAL EVERY 6 HOURS PRN
Qty: 120 TABLET | Refills: 0 | Status: SHIPPED | OUTPATIENT
Start: 2022-01-18 | End: 2022-05-27 | Stop reason: SDUPTHER

## 2022-01-19 RX ORDER — NIFEDIPINE 60 MG/1
TABLET, EXTENDED RELEASE ORAL
Qty: 90 TABLET | Refills: 1 | Status: SHIPPED | OUTPATIENT
Start: 2022-01-19 | End: 2022-04-14

## 2022-01-20 LAB — STREP DNASE B SER-ACNC: 84 U/ML (ref 0–120)

## 2022-01-21 RX ORDER — MONTELUKAST SODIUM 4 MG/1
TABLET, CHEWABLE ORAL
Qty: 240 TABLET | Refills: 2 | Status: SHIPPED | OUTPATIENT
Start: 2022-01-21 | End: 2022-08-29

## 2022-01-26 DIAGNOSIS — M32.9 SLE (SYSTEMIC LUPUS ERYTHEMATOSUS RELATED SYNDROME): ICD-10-CM

## 2022-01-26 DIAGNOSIS — M34.9 SCLERODERMA: ICD-10-CM

## 2022-01-31 ENCOUNTER — HOSPITAL ENCOUNTER (OUTPATIENT)
Dept: ONCOLOGY | Facility: HOSPITAL | Age: 62
Setting detail: INFUSION SERIES
Discharge: HOME | End: 2022-01-31

## 2022-01-31 VITALS
HEART RATE: 92 BPM | DIASTOLIC BLOOD PRESSURE: 76 MMHG | SYSTOLIC BLOOD PRESSURE: 114 MMHG | TEMPERATURE: 97.5 F | OXYGEN SATURATION: 99 % | RESPIRATION RATE: 18 BRPM

## 2022-01-31 DIAGNOSIS — M81.0 OSTEOPOROSIS, UNSPECIFIED OSTEOPOROSIS TYPE, UNSPECIFIED PATHOLOGICAL FRACTURE PRESENCE: Primary | ICD-10-CM

## 2022-01-31 DIAGNOSIS — C50.919 MALIGNANT NEOPLASM OF FEMALE BREAST, UNSPECIFIED ESTROGEN RECEPTOR STATUS, UNSPECIFIED LATERALITY, UNSPECIFIED SITE OF BREAST: ICD-10-CM

## 2022-01-31 PROCEDURE — 25010000002 DENOSUMAB 60 MG/ML SOLUTION PREFILLED SYRINGE: Performed by: NURSE PRACTITIONER

## 2022-01-31 PROCEDURE — 96372 THER/PROPH/DIAG INJ SC/IM: CPT

## 2022-01-31 RX ADMIN — DENOSUMAB 60 MG: 60 INJECTION SUBCUTANEOUS at 13:15

## 2022-02-17 DIAGNOSIS — G89.4 CHRONIC PAIN SYNDROME: ICD-10-CM

## 2022-02-17 RX ORDER — GABAPENTIN 300 MG/1
CAPSULE ORAL
Qty: 270 CAPSULE | Refills: 0 | Status: SHIPPED | OUTPATIENT
Start: 2022-02-17 | End: 2022-04-19

## 2022-03-08 DIAGNOSIS — J30.9 ALLERGIC RHINITIS, UNSPECIFIED SEASONALITY, UNSPECIFIED TRIGGER: ICD-10-CM

## 2022-03-09 RX ORDER — CETIRIZINE HYDROCHLORIDE 10 MG/1
TABLET ORAL
Qty: 90 TABLET | Refills: 1 | Status: SHIPPED | OUTPATIENT
Start: 2022-03-09 | End: 2022-09-27

## 2022-04-01 ENCOUNTER — TELEPHONE (OUTPATIENT)
Dept: FAMILY MEDICINE CLINIC | Facility: CLINIC | Age: 62
End: 2022-04-01

## 2022-04-01 NOTE — TELEPHONE ENCOUNTER
Caller: Tracie Gross    Relationship to patient: Self    Best call back number:[phone]    Chief complaint: LEFT KNEE PAIN    Type of visit: IN OFFICE PROCEDURE/STEROID INJECTION

## 2022-04-05 ENCOUNTER — LAB (OUTPATIENT)
Dept: LAB | Facility: HOSPITAL | Age: 62
End: 2022-04-05

## 2022-04-05 ENCOUNTER — PROCEDURE VISIT (OUTPATIENT)
Dept: FAMILY MEDICINE CLINIC | Facility: CLINIC | Age: 62
End: 2022-04-05

## 2022-04-05 ENCOUNTER — TRANSCRIBE ORDERS (OUTPATIENT)
Dept: ADMINISTRATIVE | Facility: HOSPITAL | Age: 62
End: 2022-04-05

## 2022-04-05 ENCOUNTER — HOSPITAL ENCOUNTER (OUTPATIENT)
Dept: GENERAL RADIOLOGY | Facility: HOSPITAL | Age: 62
Discharge: HOME | End: 2022-04-05

## 2022-04-05 VITALS
WEIGHT: 224 LBS | BODY MASS INDEX: 38.24 KG/M2 | OXYGEN SATURATION: 96 % | RESPIRATION RATE: 16 BRPM | HEART RATE: 82 BPM | SYSTOLIC BLOOD PRESSURE: 114 MMHG | TEMPERATURE: 97.9 F | DIASTOLIC BLOOD PRESSURE: 78 MMHG | HEIGHT: 64 IN

## 2022-04-05 DIAGNOSIS — M06.4 INFLAMMATORY POLYARTHROPATHY: ICD-10-CM

## 2022-04-05 DIAGNOSIS — M75.41 IMPINGEMENT SYNDROME OF BOTH SHOULDERS: ICD-10-CM

## 2022-04-05 DIAGNOSIS — M75.42 IMPINGEMENT SYNDROME OF BOTH SHOULDERS: ICD-10-CM

## 2022-04-05 DIAGNOSIS — R53.83 OTHER FATIGUE: ICD-10-CM

## 2022-04-05 DIAGNOSIS — M15.9 OSTEOARTHRITIS, GENERALIZED: ICD-10-CM

## 2022-04-05 DIAGNOSIS — M34.9 SYSTEMIC SCLEROSIS: ICD-10-CM

## 2022-04-05 DIAGNOSIS — M85.9 LOW BONE DENSITY FOR AGE: ICD-10-CM

## 2022-04-05 DIAGNOSIS — M06.4 INFLAMMATORY POLYARTHROPATHY: Primary | ICD-10-CM

## 2022-04-05 DIAGNOSIS — M79.672 PAIN IN LEFT FOOT: ICD-10-CM

## 2022-04-05 DIAGNOSIS — M75.42 IMPINGEMENT SYNDROME OF BOTH SHOULDERS: Primary | ICD-10-CM

## 2022-04-05 DIAGNOSIS — M75.41 IMPINGEMENT SYNDROME OF BOTH SHOULDERS: Primary | ICD-10-CM

## 2022-04-05 LAB
ALBUMIN SERPL-MCNC: 4.2 G/DL (ref 3.5–5.2)
ALBUMIN/GLOB SERPL: 1.3 G/DL
ALP SERPL-CCNC: 333 U/L (ref 39–117)
ALT SERPL W P-5'-P-CCNC: 272 U/L (ref 1–33)
ANION GAP SERPL CALCULATED.3IONS-SCNC: 12.4 MMOL/L (ref 5–15)
AST SERPL-CCNC: 249 U/L (ref 1–32)
BACTERIA UR QL AUTO: ABNORMAL /HPF
BASOPHILS # BLD AUTO: 0.11 10*3/MM3 (ref 0–0.2)
BASOPHILS NFR BLD AUTO: 1.3 % (ref 0–1.5)
BILIRUB SERPL-MCNC: 0.6 MG/DL (ref 0–1.2)
BILIRUB UR QL STRIP: NEGATIVE
BUN SERPL-MCNC: 23 MG/DL (ref 8–23)
BUN/CREAT SERPL: 20 (ref 7–25)
C3 SERPL-MCNC: 144 MG/DL (ref 82–167)
C4 SERPL-MCNC: 24 MG/DL (ref 14–44)
CALCIUM SPEC-SCNC: 9.7 MG/DL (ref 8.6–10.5)
CHLORIDE SERPL-SCNC: 98 MMOL/L (ref 98–107)
CK SERPL-CCNC: 110 U/L (ref 20–180)
CLARITY UR: CLEAR
CO2 SERPL-SCNC: 26.6 MMOL/L (ref 22–29)
COLOR UR: ABNORMAL
CREAT SERPL-MCNC: 1.15 MG/DL (ref 0.57–1)
CREAT UR-MCNC: 103.7 MG/DL
CRP SERPL-MCNC: 1.04 MG/DL (ref 0–0.5)
DEPRECATED RDW RBC AUTO: 42.3 FL (ref 37–54)
EGFRCR SERPLBLD CKD-EPI 2021: 53.6 ML/MIN/1.73
EOSINOPHIL # BLD AUTO: 0.39 10*3/MM3 (ref 0–0.4)
EOSINOPHIL NFR BLD AUTO: 4.4 % (ref 0.3–6.2)
ERYTHROCYTE [DISTWIDTH] IN BLOOD BY AUTOMATED COUNT: 12.4 % (ref 12.3–15.4)
ERYTHROCYTE [SEDIMENTATION RATE] IN BLOOD: 30 MM/HR (ref 0–30)
GLOBULIN UR ELPH-MCNC: 3.2 GM/DL
GLUCOSE SERPL-MCNC: 119 MG/DL (ref 65–99)
GLUCOSE UR STRIP-MCNC: NEGATIVE MG/DL
HCT VFR BLD AUTO: 39.1 % (ref 34–46.6)
HGB BLD-MCNC: 13.3 G/DL (ref 12–15.9)
HGB UR QL STRIP.AUTO: NEGATIVE
HYALINE CASTS UR QL AUTO: ABNORMAL /LPF
IMM GRANULOCYTES # BLD AUTO: 0.04 10*3/MM3 (ref 0–0.05)
IMM GRANULOCYTES NFR BLD AUTO: 0.5 % (ref 0–0.5)
KETONES UR QL STRIP: NEGATIVE
LEUKOCYTE ESTERASE UR QL STRIP.AUTO: ABNORMAL
LYMPHOCYTES # BLD AUTO: 1.99 10*3/MM3 (ref 0.7–3.1)
LYMPHOCYTES NFR BLD AUTO: 22.6 % (ref 19.6–45.3)
MCH RBC QN AUTO: 31.6 PG (ref 26.6–33)
MCHC RBC AUTO-ENTMCNC: 34 G/DL (ref 31.5–35.7)
MCV RBC AUTO: 92.9 FL (ref 79–97)
MONOCYTES # BLD AUTO: 1.13 10*3/MM3 (ref 0.1–0.9)
MONOCYTES NFR BLD AUTO: 12.8 % (ref 5–12)
NEUTROPHILS NFR BLD AUTO: 5.14 10*3/MM3 (ref 1.7–7)
NEUTROPHILS NFR BLD AUTO: 58.4 % (ref 42.7–76)
NITRITE UR QL STRIP: NEGATIVE
NRBC BLD AUTO-RTO: 0 /100 WBC (ref 0–0.2)
PH UR STRIP.AUTO: 6.5 [PH] (ref 5–8)
PLATELET # BLD AUTO: 342 10*3/MM3 (ref 140–450)
PMV BLD AUTO: 11.2 FL (ref 6–12)
POTASSIUM SERPL-SCNC: 4.5 MMOL/L (ref 3.5–5.2)
PROT ?TM UR-MCNC: 11.1 MG/DL
PROT SERPL-MCNC: 7.4 G/DL (ref 6–8.5)
PROT UR QL STRIP: ABNORMAL
PROT/CREAT UR: 107 MG/G CREA (ref 0–200)
RBC # BLD AUTO: 4.21 10*6/MM3 (ref 3.77–5.28)
RBC # UR STRIP: ABNORMAL /HPF
REF LAB TEST METHOD: ABNORMAL
SODIUM SERPL-SCNC: 137 MMOL/L (ref 136–145)
SP GR UR STRIP: 1.02 (ref 1–1.03)
SQUAMOUS #/AREA URNS HPF: ABNORMAL /HPF
UROBILINOGEN UR QL STRIP: ABNORMAL
WBC # UR STRIP: ABNORMAL /HPF
WBC NRBC COR # BLD: 8.8 10*3/MM3 (ref 3.4–10.8)

## 2022-04-05 PROCEDURE — 86225 DNA ANTIBODY NATIVE: CPT

## 2022-04-05 PROCEDURE — 36415 COLL VENOUS BLD VENIPUNCTURE: CPT

## 2022-04-05 PROCEDURE — 84156 ASSAY OF PROTEIN URINE: CPT

## 2022-04-05 PROCEDURE — 86160 COMPLEMENT ANTIGEN: CPT

## 2022-04-05 PROCEDURE — 80053 COMPREHEN METABOLIC PANEL: CPT

## 2022-04-05 PROCEDURE — 81001 URINALYSIS AUTO W/SCOPE: CPT

## 2022-04-05 PROCEDURE — 85652 RBC SED RATE AUTOMATED: CPT

## 2022-04-05 PROCEDURE — 73030 X-RAY EXAM OF SHOULDER: CPT

## 2022-04-05 PROCEDURE — 20610 DRAIN/INJ JOINT/BURSA W/O US: CPT | Performed by: FAMILY MEDICINE

## 2022-04-05 PROCEDURE — 85025 COMPLETE CBC W/AUTO DIFF WBC: CPT

## 2022-04-05 PROCEDURE — 82550 ASSAY OF CK (CPK): CPT

## 2022-04-05 PROCEDURE — 99214 OFFICE O/P EST MOD 30 MIN: CPT | Performed by: FAMILY MEDICINE

## 2022-04-05 PROCEDURE — 82570 ASSAY OF URINE CREATININE: CPT

## 2022-04-05 PROCEDURE — 86140 C-REACTIVE PROTEIN: CPT

## 2022-04-05 RX ADMIN — LIDOCAINE HYDROCHLORIDE 2 ML: 10 INJECTION, SOLUTION INFILTRATION; PERINEURAL at 09:52

## 2022-04-05 RX ADMIN — TRIAMCINOLONE ACETONIDE 20 MG: 40 INJECTION, SUSPENSION INTRA-ARTICULAR; INTRAMUSCULAR at 09:52

## 2022-04-05 RX ADMIN — TRIAMCINOLONE ACETONIDE 40 MG: 40 INJECTION, SUSPENSION INTRA-ARTICULAR; INTRAMUSCULAR at 09:40

## 2022-04-05 RX ADMIN — LIDOCAINE HYDROCHLORIDE 2 ML: 10 INJECTION, SOLUTION INFILTRATION; PERINEURAL at 09:50

## 2022-04-05 RX ADMIN — LIDOCAINE HYDROCHLORIDE 5 ML: 10 INJECTION, SOLUTION INFILTRATION; PERINEURAL at 09:40

## 2022-04-05 RX ADMIN — TRIAMCINOLONE ACETONIDE 20 MG: 40 INJECTION, SUSPENSION INTRA-ARTICULAR; INTRAMUSCULAR at 09:50

## 2022-04-05 NOTE — PROGRESS NOTES
Chief Complaint   Patient presents with   • knee injection     Left knee     HPI  Tracie Gross is a 61 y.o. female that presents for   Chief Complaint   Patient presents with   • knee injection     Left knee     L knee pain: patient reports increasing L knee pain today. This is a chronic problem but has discontinued her Celebrex due to kidney concerns. She is requesting another steroid injection today. Last was done about 6-7 months ago    Bilateral shoulder pain: New complaint.  She reports bilateral shoulder pain, L>R, for the last 3-4 months ago. She quit taking her Celebrex about 3-4 months ago due to concern for renal function. Pain awakens her from sleep at night    Review of Systems   Musculoskeletal: Positive for arthralgias and gait problem.     The following portions of the patient's history were reviewed and updated as appropriate: problem list, past medical history, past surgical history, allergies, current medication    Problem List Tab  Patient History Tab  Immunizations Tab  Medications Tab  Chart Review Tab  Care Everywhere Tab  Synopsis Tab    PE  Vitals:    04/05/22 0928   BP: 114/78   Pulse: 82   Resp: 16   Temp: 97.9 °F (36.6 °C)   SpO2: 96%     Body mass index is 38.43 kg/m².  General: Obese, NAD  Head: AT/NC  Eyes: EOMI, anicteric sclera  Resp: CTAB, SCR, BS equal  CV: RRR w/o m/r/g; 2+ pulses  GI: Soft, NT/ND, +BS  MSK: Pain w/ 90 flexion of shoulders bilaterally. +empty can. TTP of subacromial space bilaterally. No deformity, no edema  Skin: Warm, dry, intact  Neuro: Alert and oriented. No focal deficits  Psych: Appropriate mood and affect    Imaging  Mammo Screening Modified With Tomosynthesis Left With CAD    Result Date: 1/18/2022  No mammographic signs of malignancy. Recommend routine mammographic screening.  BI-RADS ASSESSMENT: BI-RADS 1. Negative.  The patient's information is entered into a computerized reminder system with a targeted due date for the next mammogram.  Note:  It has  been reported that there is approximately a 15% false negative in mammography.  Therefore, management of a palpable abnormality should not be deferred because of a negative mammogram.    Electronically Signed By-Simran Riojas MD On:1/18/2022 2:41 PM This report was finalized on [id number] by  Simran Riojas MD.      Assessment/Plan   Tracie Gross is a 61 y.o. female that presents for   Chief Complaint   Patient presents with   • knee injection     Left knee     Diagnoses and all orders for this visit:    1. Impingement syndrome of both shoulders (Primary): New complaint.  Considerable tenderness to the distal acromion bilaterally.  Reduced range of motion.  Will perform bilateral subacromial bursa injections and refer to PT as well as obtain baseline x-rays  -     XR Shoulder 2+ View Bilateral; Future  -     Ambulatory Referral to Physical Therapy  -     Arthrocentesis  -     Arthrocentesis    2. Osteoarthritis, generalized  -     Arthrocentesis    Bilateral subacromial injection- 3mL/0.5mL  L knee injection- 4mL/1mL     Return if symptoms worsen or fail to improve.

## 2022-04-06 LAB — DSDNA AB SER-ACNC: <1 IU/ML (ref 0–9)

## 2022-04-08 RX ORDER — TRIAMCINOLONE ACETONIDE 40 MG/ML
20 INJECTION, SUSPENSION INTRA-ARTICULAR; INTRAMUSCULAR
Status: COMPLETED | OUTPATIENT
Start: 2022-04-05 | End: 2022-04-05

## 2022-04-08 RX ORDER — TRIAMCINOLONE ACETONIDE 40 MG/ML
40 INJECTION, SUSPENSION INTRA-ARTICULAR; INTRAMUSCULAR
Status: COMPLETED | OUTPATIENT
Start: 2022-04-05 | End: 2022-04-05

## 2022-04-08 RX ORDER — LIDOCAINE HYDROCHLORIDE 10 MG/ML
2 INJECTION, SOLUTION INFILTRATION; PERINEURAL
Status: COMPLETED | OUTPATIENT
Start: 2022-04-05 | End: 2022-04-05

## 2022-04-08 RX ORDER — LIDOCAINE HYDROCHLORIDE 10 MG/ML
5 INJECTION, SOLUTION INFILTRATION; PERINEURAL
Status: COMPLETED | OUTPATIENT
Start: 2022-04-05 | End: 2022-04-05

## 2022-04-08 NOTE — PROGRESS NOTES
Procedure   Arthrocentesis    Date/Time: 4/5/2022 9:40 AM  Performed by: Floyd Silva MD  Authorized by: Floyd Silva MD   Indications: pain   Body area: knee  Joint: left knee  Local anesthesia used: no    Anesthesia:  Local anesthesia used: no    Sedation:  Patient sedated: no    Preparation: Patient was prepped and draped in the usual sterile fashion.  Needle size: 22 G  Ultrasound guidance: no  Approach: anterior  Patient tolerance: patient tolerated the procedure well with no immediate complications    Arthrocentesis    Date/Time: 4/5/2022 9:50 AM  Performed by: Floyd Silva MD  Authorized by: Floyd Silva MD   Indications: pain   Body area: shoulder  Joint: right subacromial bursa  Local anesthesia used: no    Anesthesia:  Local anesthesia used: no    Sedation:  Patient sedated: no    Preparation: Patient was prepped and draped in the usual sterile fashion.  Needle size: 22 G  Ultrasound guidance: no  Approach: lateral  Patient tolerance: patient tolerated the procedure well with no immediate complications    Arthrocentesis    Date/Time: 4/5/2022 9:52 AM  Performed by: Floyd Silva MD  Authorized by: Floyd Silva MD   Indications: pain   Body area: shoulder  Joint: left subacromial bursa  Local anesthesia used: no    Anesthesia:  Local anesthesia used: no    Sedation:  Patient sedated: no    Preparation: Patient was prepped and draped in the usual sterile fashion.  Needle size: 22 G  Ultrasound guidance: no  Approach: lateral  Patient tolerance: patient tolerated the procedure well with no immediate complications

## 2022-04-12 DIAGNOSIS — I73.00 RAYNAUD'S DISEASE WITHOUT GANGRENE: ICD-10-CM

## 2022-04-12 RX ORDER — NIFEDIPINE 90 MG/1
TABLET, EXTENDED RELEASE ORAL
Qty: 90 TABLET | Refills: 1 | Status: SHIPPED | OUTPATIENT
Start: 2022-04-12 | End: 2022-10-11

## 2022-04-13 NOTE — PROGRESS NOTES
Hematology/Oncology Outpatient Follow Up    PATIENT NAME:Tracie Gross  :1960  MRN: [id number]  PRIMARY CARE PHYSICIAN: Floyd Silva MD  REFERRING PHYSICIAN: Floyd Silva, *    Chief Complaint   Patient presents with   • Follow-up     Malignant neoplasm of female breast, unspecified estrogen receptor status, unspecified laterality, unspecified site of breast (HCC)       HISTORY OF PRESENT ILLNESS:     This is a 61 y.o.  female who was diagnosed with right breast cancer in  when she was 32 years old.  Patient was originally seen on 18.  Please refer to the details of that note for more information.   · 18 - CBC:  WBC 13.6, hemoglobin 11.7, platelet count 392,000.  Differentials 65% neutrophils, 15% lymphocytes.  There was mild monocytosis at 16.  Eosinophils were 2.3 and basophils were 0.3.  B12 292.  Ferritin 88.  Folate 13.8.  AST slightly high at 57.  Alkaline phosphatase was high at 326.  .  Haptoglobin 179, normal.  SPEP with DYLAN did not show any monoclonal protein.  Flow cytometry did not show any evidence of acute leukemia or lymphoproliferative disease.  There was monocytosis measured at 21%.  Reticulocyte count normal 1.02.  Peripheral smear showed absolute monocytosis at 19%, thrombocytosis and macrocytosis.  BCR-abl was negative.  Methylmalonic acid level was elevated to 420.    · 18 - PET/CT scan showed multiple small bilateral lymph nodes in the neck without metabolic activity or change from prior CT scans and are likely due to SLE.  There were unchanged bilateral level 1 axillary lymph nodes without metabolic activity.  Multiple mediastinal nodes were also seen.  The largest 11 mm, unchanged from prior imaging with no hypermetabolic activity.  There was no evidence of metastatic disease in the abdomen or pelvis.  There was no focal lesion within the liver or biliary system.  Multiple small retroperitoneal and external iliac nodes, bilateral inguinal  nodes similar to prior imaging with no PET activity.    · 8/8/18 - RICHIE-2 analysis with no mutation identified.    · 11/29/18 - Bone density revealed osteoporosis.  Patient is currently on Fosamax.   · 12/3/18 - Bone marrow aspiration and biopsy showed normocellular bone marrow at 40%.  There was adequate iron stores and no evidence of malignancy was seen.  Flow cytometry was negative.  Cytogenetics showed normal female karyotype.  Blasts were less than 3% of nucleated cells.  There was no significant dyspoiesis.   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     · 1/22/19 - Urine cytology was negative for malignant cells.    · 2/28/19 - Patient seen by Dermatology for her annual skin evaluation.   · 3/13/19 - CT scan of the chest, abdomen and pelvis:  CT scan of chest showed chronic changes.  · 5/29/2019 patient had an EGD with polypectomy performed by Dr. Conway.  Dr. Conway has recommended follow-up MRI of the pancreas in 2 years.  And also EGD and colonoscopy in 2 years.  · 11/13/19-left screening mammogram was negative follow-up in 1 year was recommended  · 5/4/2020 patient had a CT scan of the chest, abdomen and pelvis multiple borderline enlarged mediastinal lymph nodes the largest measuring 2.2 cm in the subcarinal space unchanged from prior.  Pancreas is normal.  There are few retroperitoneal mesenteric and pelvic lymph node enlargement which are similar to prior exam.  All are subcentimeter in size.  · 5/20/2020 patient had bilateral breast MRI did not show any evidence of malignancy.  · 12/7/2020 patient DEXA scan showed persistent and worsened osteoporosis    Past Medical History:   Diagnosis Date   • Arthritis    • Asthma    • Bipolar affective disorder (HCC)    • Breast cancer (HCC) 1985    s/p R mastectomy   • GERD (gastroesophageal reflux disease) 1993    • H/O breast reconstruction     SEVERAL   • H/O laminectomy    • Hamartoma (HCC)    • Hx of bilateral oophorectomy    • Hypertension    • Hypothyroidism    • Inflammatory bowel disease     Man. EGD/colonoscopy annually (2021)   • Kienbock's disease, right     WRIST   • Low back pain    • Lupus (HCC)     Mateuszenell   • Monahan syndrome     Man. EGD/colonoscopy annually (2021). MRI alternating w/ EUS annually for pancreatic screen   • Osteoporosis     maintained on Prolia through Karen   • Raynaud disease    • Scleroderma (HCC)    • Von Willebrand disease (HCC)        Past Surgical History:   Procedure Laterality Date   • ARM DEBRIDEMENT Right     X 3   • BACK SURGERY      Vetas- cervical fusion   • BACK SURGERY      lumbar decomp   • BREAST BIOPSY     • BREAST LUMPECTOMY     • BREAST RECONSTRUCTION Right    • BREAST RECONSTRUCTION Right    • CARDIAC CATHETERIZATION      no stents placed   •  SECTION  ,    • CHOLECYSTECTOMY     • COLONOSCOPY     • COLONOSCOPY N/A 2020    Procedure: COLONOSCOPY;  Surgeon: Cayden Conway MD;  Location: [facility] ENDOSCOPY;  Service: Gastroenterology;  Laterality: N/A;  rectal ulcer   • COLONOSCOPY N/A 2021    Procedure: COLONOSCOPY WITH POLYPECTOMY X 1;  Surgeon: Cayden Conway MD;  Location: [facility] ENDOSCOPY;  Service: Gastroenterology;  Laterality: N/A;  Post: COLON POLYP   • COSMETIC SURGERY  [id number]   • ENDOSCOPY     • ENDOSCOPY N/A 2020    Procedure: ESOPHAGOGASTRODUODENOSCOPY;  Surgeon: Cayden Conway MD;  Location: [facility] ENDOSCOPY;  Service: Gastroenterology;  Laterality: N/A;  Normal EGD   • ENDOSCOPY N/A 2021    Procedure: ESOPHAGOGASTRODUODENOSCOPY;  Surgeon: Cayden Conway MD;  Location: [facility] ENDOSCOPY;  Service: Gastroenterology;  Laterality: N/A;  Post: normal egd   • EXPLORATORY LAPAROTOMY      scar tissue removal   • EYE SURGERY     • FINGER SURGERY Right     ring finger mass  excision   • HYSTERECTOMY      PARTIAL   • JOINT REPLACEMENT Right 2012,2015    hip and knee   • KNEE ARTHROSCOPY Left 1/7/2020    Procedure: LEFT KNEE SCOPE with partial lateral meniscectomy;  Surgeon: Chau Perez MD;  Location: [facility] MAIN OR;  Service: Orthopedics   • LASIK      LASIK/ LASIK ENHANCEMENT   • MASTECTOMY RADICAL Right    • OOPHORECTOMY Bilateral    • SPLENECTOMY     • TUBAL ABDOMINAL LIGATION  1981   • UPPER ENDOSCOPIC ULTRASOUND W/ FNA N/A 12/9/2021    Procedure: ENDOSCOPIC ULTRASOUND WITH ESOPHAGOGASTRODUODENOSCOPY;  Surgeon: Luis E Negron MD;  Location: [facility] ENDOSCOPY;  Service: Gastroenterology;  Laterality: N/A;  Post: GASTROPARESIS, DILATED COMMON BILE DUCT, FUNDIC POLYP, DUODENITIS, NORMAL PANCREAS, HIATAL HERNIA   • WRIST SURGERY Right     distal radius head removal (bone dead)  plates and screws decomp lunate         Current Outpatient Medications:   •  ALPRAZolam (XANAX) 1 MG tablet, Take 1 tablet by mouth At Night As Needed for Anxiety or Sleep., Disp: 30 tablet, Rfl: 2  •  bisoprolol-hydrochlorothiazide (ZIAC) 10-6.25 MG per tablet, TAKE ONE TABLET BY MOUTH DAILY (Patient taking differently: Do not take dos), Disp: 90 tablet, Rfl: 1  •  Calcium + Vitamin D3 600-5 MG-MCG tablet, ONE BY MOUTH TWICE A DAY, Disp: 60 tablet, Rfl: 6  •  cetirizine (zyrTEC) 10 MG tablet, TAKE ONE TABLET BY MOUTH DAILY, Disp: 90 tablet, Rfl: 1  •  ciclopirox (PENLAC) 8 % solution, Apply  topically to the appropriate area as directed Every Night., Disp: 6 mL, Rfl: 1  •  clindamycin (CLEOCIN T) 1 % lotion, Apply  topically to the appropriate area as directed At Night As Needed. Use on face, Disp: , Rfl:   •  colestipol (COLESTID) 1 g tablet, TAKE TWO TABLETS BY MOUTH TWICE A DAY, Disp: 240 tablet, Rfl: 2  •  cyclobenzaprine (FLEXERIL) 10 MG tablet, TAKE ONE TABLET BY MOUTH THREE TIMES A DAY AS NEEDED FOR MUSCLE SPASMS AS NEEDED (Patient taking differently: Take 10 mg by mouth 3 (Three) Times a Day As  Needed.), Disp: 100 tablet, Rfl: 3  •  Denosumab (PROLIA SC), Inject  under the skin into the appropriate area as directed Every 6 (Six) Months., Disp: , Rfl:   •  Desoximetasone 0.25 % liquid, Every 12 (Twelve) Hours., Disp: , Rfl:   •  dexlansoprazole (DEXILANT) 60 MG capsule, Take 60 mg by mouth Daily., Disp: , Rfl:   •  Diclofenac Sodium (VOLTAREN) 1 % gel gel, APPLY 4 GRAMS TOPICALLY TO THE APPROPRIATE AREA AS DIRECTED FOUR TIMES DAILY AS NEEDED FOR JOINT PAIN, Disp: 100 g, Rfl: 3  •  famotidine (PEPCID) 20 MG tablet, Take 1 tablet by mouth 2 (Two) Times a Day As Needed for Heartburn., Disp: 120 tablet, Rfl: 5  •  FeroSul 325 (65 Fe) MG tablet, TAKE ONE TABLET BY MOUTH DAILY (Patient taking differently: Take 325 mg by mouth Daily With Breakfast.), Disp: 90 tablet, Rfl: 4  •  Flaxseed, Linseed, (FLAXSEED OIL MAX STR) 1300 MG capsule, Take 1 tablet by mouth 2 (Two) Times a Day., Disp: , Rfl:   •  fluticasone (FLONASE) 50 MCG/ACT nasal spray, 2 sprays into the nostril(s) as directed by provider Daily., Disp: 16 g, Rfl: 3  •  gabapentin (NEURONTIN) 300 MG capsule, TAKE TWO CAPSULES BY MOUTH EVERY MORNING AND TAKE FOUR CAPSULES BY MOUTH AT NIGHT, Disp: 270 capsule, Rfl: 0  •  hydrOXYzine (ATARAX) 25 MG tablet, Take 1 tablet by mouth Every 8 (Eight) Hours As Needed for Itching., Disp: 30 tablet, Rfl: 3  •  lamoTRIgine (LaMICtal) 200 MG tablet, Take 200 mg by mouth Daily., Disp: , Rfl:   •  levothyroxine (SYNTHROID, LEVOTHROID) 200 MCG tablet, TAKE 1 TABLET BY MOUTH DAILY, Disp: 90 tablet, Rfl: 1  •  lisinopril (PRINIVIL,ZESTRIL) 5 MG tablet, TAKE ONE TABLET BY MOUTH DAILY, Disp: 90 tablet, Rfl: 3  •  NIFEdipine XL (PROCARDIA XL) 90 MG 24 hr tablet, TAKE ONE TABLET BY MOUTH DAILY, Disp: 90 tablet, Rfl: 1  •  ondansetron (ZOFRAN) 4 MG tablet, Take 1 tablet by mouth Every 8 (Eight) Hours As Needed for Nausea or Vomiting., Disp: 30 tablet, Rfl: 3  •  oxyCODONE (ROXICODONE) 10 MG tablet, Take 1 tablet by mouth Every 6  (Six) Hours As Needed for Severe Pain ., Disp: 120 tablet, Rfl: 0  •  promethazine-dextromethorphan (PROMETHAZINE-DM) 6.25-15 MG/5ML solution, Every 6 (Six) Hours., Disp: , Rfl:   •  QUEtiapine (SEROquel) 100 MG tablet, Every 12 (Twelve) Hours., Disp: , Rfl:   •  raNITIdine (ZANTAC) 300 MG capsule, 1 cap(s), Disp: , Rfl:     Allergies   Allergen Reactions   • Aspirin Other (See Comments)     VONWILLENBRAND DISEASE    • Penicillins Anaphylaxis   • Baclofen Hives   • Tape  [Adhesive Tape] Rash       Family History   Problem Relation Age of Onset   • Colon cancer Mother    • Heart disease Mother    • Cancer Mother    • Kidney disease Father    • Heart disease Father    • Depression Father    • Hyperlipidemia Father    • Vision loss Father    • Colon cancer Sister    • Diabetes Sister    • Heart disease Sister    • Von Willebrand disease Sister    • Von Willebrand disease Brother    • Von Willebrand disease Son    • Breast cancer Son    • Other Son         burdick syndrome   • Diabetes Paternal Grandmother    • Stroke Paternal Grandmother    • Colon cancer Other    • Colon cancer Son    • Von Willebrand disease Son    • Other Son         burdick syndrome   • Breast cancer Son    • Cancer Sister    • Vision loss Sister    • Other Sister    • Stroke Sister    • Cancer Paternal Aunt    • Cancer Maternal Aunt    • Cancer Maternal Aunt    • Hyperlipidemia Sister    • Thyroid disease Sister    • Thyroid disease Sister        Cancer-related family history includes Breast cancer in her son and son; Cancer in her maternal aunt, maternal aunt, mother, paternal aunt, and sister; Colon cancer in her mother, sister, son, and another family member.    Social History     Tobacco Use   • Smoking status: Former Smoker     Packs/day: 0.25     Years: 7.00     Pack years: 1.75     Start date:      Quit date:      Years since quittin.3   • Smokeless tobacco: Never Used   • Tobacco comment: Off and on for  those years   Vaping Use  "  • Vaping Use: Never used   Substance Use Topics   • Alcohol use: Yes     Comment: Rarely   • Drug use: No       I have reviewed and confirmed the accuracy of the patient's history:  as entered by the MA/SAMMY/AZUCENA. Suzan Jones MD 04/14/22       SUBJECTIVE:    She is here today for routine follow-up.  She was seen by her rheumatologist and was found to have significantly elevated liver function tests otherwise she denies any specific breast issues or any abdominal symptoms          REVIEW OF SYSTEMS:  Review of Systems   Constitutional: Negative for chills and fever.   HENT: Negative for ear pain, mouth sores, nosebleeds and sore throat.    Eyes: Negative for photophobia and visual disturbance.   Respiratory: Negative for wheezing and stridor.    Cardiovascular: Negative for chest pain and palpitations.   Gastrointestinal: Negative for abdominal pain, diarrhea, nausea and vomiting.   Endocrine: Negative for cold intolerance and heat intolerance.   Genitourinary: Negative for dysuria and hematuria.   Musculoskeletal: Negative for joint swelling and neck stiffness.   Skin: Negative for color change and rash.   Neurological: Negative for seizures and syncope.   Hematological: Negative for adenopathy.        No obvious bleeding   Psychiatric/Behavioral: Negative for agitation, confusion and hallucinations.     I have reviewed and confirmed the accuracy of the ROS as documented by the MA/SAMMY/RN Suzan Jones MD      OBJECTIVE:    Vitals:    04/14/22 1438   BP: 128/86   Pulse: 78   Resp: 18   Temp: 96.8 °F (36 °C)   SpO2: 99%   Weight: 102 kg (224 lb)   Height: 162.6 cm (64.02\")   PainSc:   4       ECOG  (1) Restricted in physically strenuous activity, ambulatory and able to do work of light nature    Physical Exam   Constitutional: She is oriented to person, place, and time. She appears well-developed. No distress.   HENT:   Head: Normocephalic and atraumatic.   Right Ear: External ear normal.   Nose: " Nose normal.   Eyes: Pupils are equal, round, and reactive to light. Conjunctivae are normal. Right eye exhibits no discharge. Left eye exhibits no discharge. No scleral icterus.   Neck: No thyromegaly present.   Cardiovascular: Normal rate, regular rhythm and normal heart sounds. Exam reveals no gallop and no friction rub.   Pulmonary/Chest: Effort normal. No stridor. No respiratory distress. She has no wheezes. Left breast exhibits no inverted nipple, no mass, no nipple discharge, no skin change and no tenderness. No breast swelling, tenderness, discharge or bleeding.   Right chest wall does not reveal any palpable masses.  Bilateral axilla was negative   Abdominal: Soft. Bowel sounds are normal. She exhibits no mass. There is no abdominal tenderness. There is no rebound and no guarding.   Musculoskeletal: Normal range of motion. No tenderness.   Lymphadenopathy:     She has no cervical adenopathy.   Neurological: She is alert and oriented to person, place, and time. She exhibits normal muscle tone.   Skin: Skin is warm. No rash noted. She is not diaphoretic. No erythema.   Psychiatric: Her behavior is normal. Judgment and thought content normal.   Nursing note and vitals reviewed.    I have reexamined the patient and the results are consistent with the previously documented exam. Suzan Jones MD     RECENT LABS    WBC   Date Value Ref Range Status   04/14/2022 11.83 (H) 3.40 - 10.80 10*3/mm3 Final     RBC   Date Value Ref Range Status   04/14/2022 4.50 3.77 - 5.28 10*6/mm3 Final     Hemoglobin   Date Value Ref Range Status   04/14/2022 14.4 12.0 - 15.9 g/dL Final     Hematocrit   Date Value Ref Range Status   04/14/2022 45.0 34.0 - 46.6 % Final     MCV   Date Value Ref Range Status   04/14/2022 100.0 (H) 79.0 - 97.0 fL Final     MCH   Date Value Ref Range Status   04/14/2022 32.0 26.6 - 33.0 pg Final     MCHC   Date Value Ref Range Status   04/14/2022 32.0 31.5 - 35.7 g/dL Final     RDW   Date Value Ref  Range Status   04/14/2022 14.5 12.3 - 15.4 % Final     RDW-SD   Date Value Ref Range Status   04/14/2022 51.7 37.0 - 54.0 fl Final     MPV   Date Value Ref Range Status   04/14/2022 11.2 6.0 - 12.0 fL Final     Platelets   Date Value Ref Range Status   04/14/2022 281 140 - 450 10*3/mm3 Final     Neutrophil %   Date Value Ref Range Status   04/14/2022 52.3 42.7 - 76.0 % Final     Lymphocyte %   Date Value Ref Range Status   04/14/2022 28.1 19.6 - 45.3 % Final     Monocyte %   Date Value Ref Range Status   04/14/2022 14.7 (H) 5.0 - 12.0 % Final     Eosinophil %   Date Value Ref Range Status   04/14/2022 4.3 0.3 - 6.2 % Final     Basophil %   Date Value Ref Range Status   04/14/2022 0.6 0.0 - 1.5 % Final     Immature Grans %   Date Value Ref Range Status   04/05/2022 0.5 0.0 - 0.5 % Final     Neutrophils, Absolute   Date Value Ref Range Status   04/14/2022 6.19 1.70 - 7.00 10*3/mm3 Final     Lymphocytes, Absolute   Date Value Ref Range Status   04/14/2022 3.32 (H) 0.70 - 3.10 10*3/mm3 Final     Monocytes, Absolute   Date Value Ref Range Status   04/14/2022 1.74 (H) 0.10 - 0.90 10*3/mm3 Final     Eosinophils, Absolute   Date Value Ref Range Status   04/14/2022 0.51 (H) 0.00 - 0.40 10*3/mm3 Final     Basophils, Absolute   Date Value Ref Range Status   04/14/2022 0.07 0.00 - 0.20 10*3/mm3 Final     Immature Grans, Absolute   Date Value Ref Range Status   04/05/2022 0.04 0.00 - 0.05 10*3/mm3 Final     nRBC   Date Value Ref Range Status   04/05/2022 0.0 0.0 - 0.2 /100 WBC Final       Lab Results   Component Value Date    GLUCOSE 119 (H) 04/05/2022    BUN 23 04/05/2022    CREATININE 1.15 (H) 04/05/2022    EGFRIFNONA 54 (L) 01/18/2022    EGFRIFAFRI 83 04/04/2017    BCR 20.0 04/05/2022    K 4.5 04/05/2022    CO2 26.6 04/05/2022    CALCIUM 9.7 04/05/2022    ALBUMIN 4.20 04/05/2022    LABIL2 1.1 05/31/2019     (H) 04/05/2022     (H) 04/05/2022           ASSESSMENT:    1. 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  She also has DICER1 gene as well as TSC2 with variants of unknown significance.   2. History of right breast cancer, status post right mastectomy followed by axillary lymph node dissection and right chest wall reconstruction in 1985 at the age of 32.  3. Recurrent elevated liver function tests, pruritus, but no significant pathology found on both the PET scan, MRCP, except for post cholecystectomy, biliary ductal dilatation.    She was on on Colestipol for pruritus.  We will plan to repeat her CMP today, will obtain CT scan of the abdomen and pelvis and have her referred back to Dr. Conway as soon as possible.  Also will call for records of her EUS findings from December 2021 performed by Dr. Arias.  Her abdominal symptoms are benign  4. She has peripheral lymphadenopathy involving the neck, axilla, mediastinum and retroperitoneum that are not PET avid.  This lymphadenopathy may be due to chronic inflammatory disease [lupus].    5. Strong family history of colon cancer and personal history of breast cancer.   6. Systemic lupus erythematosus, scleroderma, Raynaud’s phenomenon.   7. Normocytic anemia, multifactorial, underlying autoimmune disease, relative iron deficiency and B12 deficiency.  On iron and B12 supplementation.  Stable   8. Persistent monocytosis, status post bone marrow aspiration and biopsy which was essentially normal.  Stable monocytosis   9. Progressive osteoporosis.  On Fosamax and recent DEXA scan from 12/7/2020 shows progression.  Patient is currently on Prolia  10. Urine cytology was negative as of January 2019.  Scheduled today for urine cytology   11. Last Dermatology appointment was 2/2020: Patient to follow-up February 2021  12. Status post complete hysterectomy reducing her risk for ovarian and uterine cancer.  13. Assessment has been reviewed and updated     PLANS:     · Repeat CMP today, urgent  referral to GI for elevated liver function tests, dilated biliary ducts.  Patient is established with Dr. Conway.  Also will obtain CT scan of the abdomen and pelvis due to elevated liver function test.  · Plan is for continued surveillance for Monahan syndrome diagnosis.    To follow-up with GI for pancreatic cancer screening.  Reviewed last EGD and colonoscopy completed 8/14/2020.  Patient also had pancreatic MRI completed by Dr. Conway.  Patient had EUS in December 2021  · Continue Prolia: This will be done every 6 months: Reviewed  · She will continue calcium and vitamin D  · Bone density is due December 2022  · MRI of the abdomen completed 8/9/2021 the pancreas appeared mildly atrophic.  Followed by Dr. Conway.  · Bone density will be repeated December 2022    · Patient's screening mammogram will be due January 2023.  We will go ahead and schedule at this time  · Bilateral breast MRI will be done July 2022.  We will order at the next visit  ·  She will continue monthly breast self-exam and call for lumps nipple discharge, skin discoloration.    · Urine cytology was due in August 2021: Will order today  · Dermatology appointment will be due February 2021  · CT scans of the chest abdomen and pelvis done May 4, 2020 is stable  · Follow-up with GI for pancreatic cancer screening.  MRI pancreas alternating with EUS on a yearly basis  · Follow-up with me second week in May         I have reviewed labs results, imaging, vitals, and medications with the patient today. Will follow up in 6 months with me.      Patient verbalized understanding and is in agreement of the above plan.      I spent 40 total minutes, face-to-face, caring for Tracie today.  90% of this time involved counseling and/or coordination of care as documented within this note.

## 2022-04-14 ENCOUNTER — OFFICE VISIT (OUTPATIENT)
Dept: ONCOLOGY | Facility: CLINIC | Age: 62
End: 2022-04-14

## 2022-04-14 ENCOUNTER — APPOINTMENT (OUTPATIENT)
Dept: LAB | Facility: HOSPITAL | Age: 62
End: 2022-04-14

## 2022-04-14 VITALS
SYSTOLIC BLOOD PRESSURE: 128 MMHG | WEIGHT: 224 LBS | HEART RATE: 78 BPM | BODY MASS INDEX: 38.24 KG/M2 | OXYGEN SATURATION: 99 % | RESPIRATION RATE: 18 BRPM | DIASTOLIC BLOOD PRESSURE: 86 MMHG | HEIGHT: 64 IN | TEMPERATURE: 96.8 F

## 2022-04-14 DIAGNOSIS — C50.919 MALIGNANT NEOPLASM OF FEMALE BREAST, UNSPECIFIED ESTROGEN RECEPTOR STATUS, UNSPECIFIED LATERALITY, UNSPECIFIED SITE OF BREAST: Primary | ICD-10-CM

## 2022-04-14 DIAGNOSIS — R79.89 ELEVATED LIVER FUNCTION TESTS: ICD-10-CM

## 2022-04-14 DIAGNOSIS — R92.8 OTHER ABNORMAL AND INCONCLUSIVE FINDINGS ON DIAGNOSTIC IMAGING OF BREAST: ICD-10-CM

## 2022-04-14 DIAGNOSIS — Z15.09 LYNCH SYNDROME: ICD-10-CM

## 2022-04-14 DIAGNOSIS — D68.00 VON WILLEBRAND DISEASE: ICD-10-CM

## 2022-04-14 DIAGNOSIS — D64.9 ANEMIA, UNSPECIFIED TYPE: ICD-10-CM

## 2022-04-14 LAB
ALBUMIN SERPL-MCNC: 4.2 G/DL (ref 3.5–5.2)
ALBUMIN/GLOB SERPL: 1.4 G/DL
ALP SERPL-CCNC: 187 U/L (ref 39–117)
ALT SERPL W P-5'-P-CCNC: 34 U/L (ref 1–33)
ANION GAP SERPL CALCULATED.3IONS-SCNC: 9 MMOL/L (ref 5–15)
AST SERPL-CCNC: 32 U/L (ref 1–32)
BASOPHILS # BLD AUTO: 0.07 10*3/MM3 (ref 0–0.2)
BASOPHILS NFR BLD AUTO: 0.6 % (ref 0–1.5)
BILIRUB SERPL-MCNC: 0.2 MG/DL (ref 0–1.2)
BUN SERPL-MCNC: 25 MG/DL (ref 8–23)
BUN/CREAT SERPL: 22.7 (ref 7–25)
CALCIUM SPEC-SCNC: 9.4 MG/DL (ref 8.6–10.5)
CHLORIDE SERPL-SCNC: 99 MMOL/L (ref 98–107)
CO2 SERPL-SCNC: 31 MMOL/L (ref 22–29)
CREAT SERPL-MCNC: 1.1 MG/DL (ref 0.57–1)
DEPRECATED RDW RBC AUTO: 51.7 FL (ref 37–54)
EGFRCR SERPLBLD CKD-EPI 2021: 56.6 ML/MIN/1.73
EOSINOPHIL # BLD AUTO: 0.51 10*3/MM3 (ref 0–0.4)
EOSINOPHIL NFR BLD AUTO: 4.3 % (ref 0.3–6.2)
ERYTHROCYTE [DISTWIDTH] IN BLOOD BY AUTOMATED COUNT: 14.5 % (ref 12.3–15.4)
GLOBULIN UR ELPH-MCNC: 3.1 GM/DL
GLUCOSE SERPL-MCNC: 79 MG/DL (ref 65–99)
HCT VFR BLD AUTO: 45 % (ref 34–46.6)
HGB BLD-MCNC: 14.4 G/DL (ref 12–15.9)
LYMPHOCYTES # BLD AUTO: 3.32 10*3/MM3 (ref 0.7–3.1)
LYMPHOCYTES NFR BLD AUTO: 28.1 % (ref 19.6–45.3)
MCH RBC QN AUTO: 32 PG (ref 26.6–33)
MCHC RBC AUTO-ENTMCNC: 32 G/DL (ref 31.5–35.7)
MCV RBC AUTO: 100 FL (ref 79–97)
MONOCYTES # BLD AUTO: 1.74 10*3/MM3 (ref 0.1–0.9)
MONOCYTES NFR BLD AUTO: 14.7 % (ref 5–12)
NEUTROPHILS NFR BLD AUTO: 52.3 % (ref 42.7–76)
NEUTROPHILS NFR BLD AUTO: 6.19 10*3/MM3 (ref 1.7–7)
PLATELET # BLD AUTO: 281 10*3/MM3 (ref 140–450)
PMV BLD AUTO: 11.2 FL (ref 6–12)
POTASSIUM SERPL-SCNC: 4.8 MMOL/L (ref 3.5–5.2)
PROT SERPL-MCNC: 7.3 G/DL (ref 6–8.5)
RBC # BLD AUTO: 4.5 10*6/MM3 (ref 3.77–5.28)
SODIUM SERPL-SCNC: 139 MMOL/L (ref 136–145)
WBC NRBC COR # BLD: 11.83 10*3/MM3 (ref 3.4–10.8)

## 2022-04-14 PROCEDURE — 36415 COLL VENOUS BLD VENIPUNCTURE: CPT | Performed by: INTERNAL MEDICINE

## 2022-04-14 PROCEDURE — 80053 COMPREHEN METABOLIC PANEL: CPT | Performed by: INTERNAL MEDICINE

## 2022-04-14 PROCEDURE — 85025 COMPLETE CBC W/AUTO DIFF WBC: CPT | Performed by: INTERNAL MEDICINE

## 2022-04-14 PROCEDURE — 99215 OFFICE O/P EST HI 40 MIN: CPT | Performed by: INTERNAL MEDICINE

## 2022-04-15 ENCOUNTER — OFFICE (AMBULATORY)
Dept: URBAN - METROPOLITAN AREA CLINIC 64 | Facility: CLINIC | Age: 62
End: 2022-04-15

## 2022-04-15 VITALS
WEIGHT: 220 LBS | HEART RATE: 85 BPM | DIASTOLIC BLOOD PRESSURE: 73 MMHG | HEIGHT: 65 IN | SYSTOLIC BLOOD PRESSURE: 102 MMHG

## 2022-04-15 DIAGNOSIS — R94.5 ABNORMAL RESULTS OF LIVER FUNCTION STUDIES: ICD-10-CM

## 2022-04-15 PROCEDURE — 99213 OFFICE O/P EST LOW 20 MIN: CPT | Performed by: NURSE PRACTITIONER

## 2022-04-18 ENCOUNTER — TELEPHONE (OUTPATIENT)
Dept: PHYSICAL THERAPY | Facility: OTHER | Age: 62
End: 2022-04-18

## 2022-04-19 DIAGNOSIS — G47.00 INSOMNIA, UNSPECIFIED TYPE: ICD-10-CM

## 2022-04-19 DIAGNOSIS — G89.4 CHRONIC PAIN SYNDROME: ICD-10-CM

## 2022-04-19 DIAGNOSIS — J30.9 ALLERGIC RHINITIS, UNSPECIFIED SEASONALITY, UNSPECIFIED TRIGGER: ICD-10-CM

## 2022-04-19 DIAGNOSIS — R63.5 WEIGHT GAIN: ICD-10-CM

## 2022-04-19 RX ORDER — GABAPENTIN 300 MG/1
CAPSULE ORAL
Qty: 270 CAPSULE | Refills: 5 | Status: SHIPPED | OUTPATIENT
Start: 2022-04-19 | End: 2023-04-02

## 2022-04-19 RX ORDER — FLUTICASONE PROPIONATE 50 MCG
SPRAY, SUSPENSION (ML) NASAL
Qty: 16 ML | Refills: 5 | Status: SHIPPED | OUTPATIENT
Start: 2022-04-19 | End: 2023-01-09

## 2022-04-19 RX ORDER — ALPRAZOLAM 1 MG/1
TABLET ORAL
Qty: 30 TABLET | Refills: 2 | Status: SHIPPED | OUTPATIENT
Start: 2022-04-19 | End: 2022-08-03

## 2022-04-27 ENCOUNTER — APPOINTMENT (OUTPATIENT)
Dept: CT IMAGING | Facility: HOSPITAL | Age: 62
End: 2022-04-27

## 2022-04-27 ENCOUNTER — TREATMENT (OUTPATIENT)
Dept: PHYSICAL THERAPY | Facility: CLINIC | Age: 62
End: 2022-04-27

## 2022-04-27 DIAGNOSIS — M25.511 ACUTE PAIN OF BOTH SHOULDERS: Primary | ICD-10-CM

## 2022-04-27 DIAGNOSIS — M25.512 ACUTE PAIN OF BOTH SHOULDERS: Primary | ICD-10-CM

## 2022-04-27 DIAGNOSIS — M75.41 IMPINGEMENT SYNDROME OF RIGHT SHOULDER: ICD-10-CM

## 2022-04-27 DIAGNOSIS — M75.42 IMPINGEMENT SYNDROME OF LEFT SHOULDER: ICD-10-CM

## 2022-04-27 PROCEDURE — 97162 PT EVAL MOD COMPLEX 30 MIN: CPT | Performed by: PHYSICAL THERAPIST

## 2022-04-27 NOTE — PROGRESS NOTES
Physical Therapy Initial Evaluation and Plan of Care    Patient: Tracie Gross   : 1960  Diagnosis/ICD-10 Code:  Acute pain of both shoulders [M25.511, M25.512]  Referring practitioner: Floyd Silva MD  Date of Initial Visit: 2022  Today's Date: 2022  Patient seen for 1 sessions           QSubjective Questionnaire: Quick DASH        Subjective Evaluation    History of Present Illness  Mechanism of injury: CHIEF C/O   Lack of sleep due to the pain.   CURRENT EPISODE   Insidious onset of left shldr and then the right shoulder  She did see her PCP, received leonard injections and referral to PT.  The pain is less; but still present  X-ray taken.   She is to return to [place] in 4 weeks. She did fall on  tripped over a sand band, landed backwards on her hands. Back and shldrs have been worse since then;  She has been on a heating pad since due to the back pain.   ONSET   Early March.   LOCATION   Top and front of both shldrs  DESCRIPTION:  Jabbing and pinching ( [facility]  PAIN LEVELS:    4-6/10   1ST AM:   stiff  TIME OF DAY:   NA  BEST:   Tried heat/ice   WORSE:  Gardening, dog grooming, reaching up to get things out of the cabinet, can't hold a plate. Side lying ( either).  Putting shirt on.   SLEEP:   Normal is BSL.   She is not sleeping more than 2 hr intervals due to the pain In the shldrs.   EX PROGRAM/ACTIVITES   None   PAST MEDICAL HISTORY:     (+) breast cancer     Monahan syndrome,  Stage 3 kidney disease.  - Breast CA . followed by Dr. Ohara. hypertension, hypothyroidism, IBD, kienbock's disease/R, LBP, Lupus, scleroderma, VonWillebrand disease     (-) Pregnancy, pacemaker, DM, CA, latex allergy, metal implants  SURGICAL HISTORY:  R THR, R TKA, lumbar fusion, R wrist fracture/ORIF, cervical fusion,  R mastectomy, splenectomy,        Quality of life: fair             Objective          Postural Observations    Additional Postural Observation Details  Leonard shldr elevation,  rounding of the shoulders and thoracic spine.     Tenderness     Left Shoulder   Tenderness in the biceps tendon (proximal), subscapularis tendon and supraspinatus tendon.     Right Shoulder  Tenderness in the AC joint, biceps tendon (proximal) and supraspinatus tendon.     Active Range of Motion   Left Shoulder   Flexion: 120 degrees with pain  Extension: 60 degrees with pain  Abduction: 59 degrees with pain  External rotation 0°: 49 degrees with pain  External rotation BTH: Active external rotation behind the head: top of the head.   Internal rotation BTB: T10     Right Shoulder   Flexion: 150 degrees with pain  Extension: 45 degrees with pain  Abduction: 147 degrees with pain  External rotation 0°: 55 degrees with pain  External rotation BTH: C3   Internal rotation BTB: T11     Additional Active Range of Motion Details  R painful arc from 110 - 160   Catches on the return of flex/abd @ 90  Must lower the arm with elbow flex vs extended.  No crepitus noted today    Passive Range of Motion   Left Shoulder   Flexion: 168 degrees   Extension: 60 degrees   Abduction: 156 degrees   External rotation 0°: 60 degrees     Right Shoulder   Flexion: 175 degrees   Extension: 60 degrees   Abduction: 169 degrees   External rotation 0°: 57 degrees     Scapular Mobility   Left Shoulder   Scapular mobility: fair    Right Shoulder   Scapular mobility: fair    Joint Play   Left Shoulder  Hypomobile in the posterior capsule, AC joint, SC joint, cervical spine and thoracic spine.    Right Shoulder  Hypomobile in the posterior capsule, AC joint, SC joint, cervical spine and thoracic spine.     Strength/Myotome Testing     Additional Strength Details  leonard shldr flex, abd unable to complete a valid test due to increase pain with mild resistance.  moark substitution observed  Leonard shdlr ext - 4- with pain  ER:  L =3+,   R 3+ with pain  IR:  L = 3-,   R = 3  Supraspinatus:  3- leonard  Greater shldr hike on the R    Tests     Left Shoulder    Positive belly press, empty can, lift-off, Neer's and painful arc.     Right Shoulder   Positive apprehension, empty can, Neer's and painful arc.           Assessment & Plan     Assessment  Impairments: abnormal muscle firing, abnormal or restricted ROM, activity intolerance, impaired physical strength, lacks appropriate home exercise program and pain with function  Functional Limitations: carrying objects, lifting, sleeping, pulling, pushing, uncomfortable because of pain, moving in bed, reaching behind back and reaching overhead  Assessment details: Tracie Gross is a 61 y.o. yr old female referred to PT due to ongoing and worsening of bilateral shoulder pain.  The initial PT evaluation was completed today with evidence consist of RC injury with probable arthritic changes.  These do appear chronic in nature with an recent insidious onset.  Frtther imaging may be indicated if PT is unsuccessful.  Discussion with pnt regarding ortho referral. .  Skilled PT intervention is indicated in order to achieve the stated goals and achieve maximal functional capacity.  The Quick DASH score 28/55.    Barriers to therapy: multiple dx,    Prognosis: good    Goals  Plan Goals: ST visits     1)  Reduce pain level by 2-3 points     2)  Increase AROM to =/> 70% norm values wo greater pain     3)  Sleep with < 25% interruption due to shldr pain     4)  Tolerate strengthening of gravity resisted ex wo greater pain    LTG:   DC     1)  0-2/10 pain level in order to complete ADLs, including dressing wo greater pain     2)  MMT =/> 4+/5 with improved ability to lift objects for household, yard and daily living tasks wo greater pain     3)  Improve the functional survey =/> 8 points     4)  Normal sleep pattern in relationship to the shldr injury     5)  Resume prior activities and/or work     6)  IHEP     Plan  Therapy options: will be seen for skilled therapy services  Planned modality interventions: cryotherapy, dry needling,  electrical stimulation/Russian stimulation, high voltage pulsed current (pain management), iontophoresis, TENS, thermotherapy (hydrocollator packs) and ultrasound  Planned therapy interventions: manual therapy, neuromuscular re-education, motor coordination training, postural training, therapeutic activities, stretching, strengthening, soft tissue mobilization, joint mobilization, home exercise program, functional ROM exercises and flexibility  Frequency: 2x week  Duration in weeks: 13  Treatment plan discussed with: patient        Timed:         Manual Therapy:         mins  61542;     Therapeutic Exercise:    5     mins  12444;     Neuromuscular Walter:        mins  23619;    Therapeutic Activity:          mins  01538;     Gait Training:           mins  40261;     Ultrasound:          mins  45047;    Ionto                                   mins   83694  Self Care                       3     mins   05473  Canalith Repos         mins 57431      Un-Timed:  Electrical Stimulation:         mins  58568 ( );  Dry Needling          mins self-pay  Traction          mins 88844  Low Eval          Mins  34861  Mod Eval     40     Mins  28468  High Eval                            Mins  38133  Re-Eval                               mins  63653        Timed Treatment:   9   mins   Total Treatment:     49   mins    PT SIGNATURE: Jocelynn Matos PT   DATE TREATMENT INITIATED: 4/27/2022    Initial Certification  Certification Period: 7/26/2022  I certify that the therapy services are furnished while this patient is under my care.  The services outlined above are required by this patient, and will be reviewed every 90 days.     PHYSICIAN: Floyd Silva MD      DATE:     Please sign and return via fax to [phone].. Thank you, [facility].

## 2022-04-29 ENCOUNTER — HOSPITAL ENCOUNTER (OUTPATIENT)
Dept: PET IMAGING | Facility: HOSPITAL | Age: 62
Discharge: HOME | End: 2022-04-29
Admitting: INTERNAL MEDICINE

## 2022-04-29 DIAGNOSIS — R79.89 ELEVATED LIVER FUNCTION TESTS: ICD-10-CM

## 2022-04-29 PROCEDURE — 74176 CT ABD & PELVIS W/O CONTRAST: CPT

## 2022-05-02 ENCOUNTER — TREATMENT (OUTPATIENT)
Dept: PHYSICAL THERAPY | Facility: CLINIC | Age: 62
End: 2022-05-02

## 2022-05-02 PROCEDURE — 97140 MANUAL THERAPY 1/> REGIONS: CPT | Performed by: PHYSICAL THERAPIST

## 2022-05-02 PROCEDURE — 97110 THERAPEUTIC EXERCISES: CPT | Performed by: PHYSICAL THERAPIST

## 2022-05-02 NOTE — PROGRESS NOTES
Physical Therapy Daily Progress Note    VISIT#: 2    Subjective   Tracie Gross reports sore post the eval.  L shldr remains the most painful and catches at certain angles.   Evelyn also reports that she had a 6 month CT.  Results of avascular necrosis of the L hip.        Objective   Palpation with noted crepitus of left shdlr during flex > 90 degrees.     See Exercise, Manual, and Modality Logs for complete treatment.     Patient Education:  Review of home ex.     Assessment/Plan  Evelyn completed AAROM, PROM ex today.  She was able to complete rotational isometrics.  She has been referred to ortho due to the current presentation and also due to the AVN of the left hip     Plan:  Cont with current ex/POC as tolerated.  Discussion of possibility of IFC usage to reduce pain.            Timed:         Manual Therapy:    11     mins  80694;     Therapeutic Exercise:    18     mins  04979;     Neuromuscular Walter:        mins  58118;    Therapeutic Activity:          mins  87871;     Gait Training:           mins  27509;     Ultrasound:          mins  63614;    Ionto                                   mins   63209  Self Care                      3      mins   15761  Canalith Repos                   mins  69969    Un-Timed:  Electrical Stimulation:         mins  96564 ( );  Dry Needling          mins self-pay  Traction          mins 94986  Low Eval          Mins  68194  Mod Eval          Mins  35791  High Eval                            Mins  24081  Re-Eval                               mins  02960    Timed Treatment:   32   mins   Total Treatment:     35   mins    Jocelynn Matos PT    Physical Therapist

## 2022-05-05 ENCOUNTER — TREATMENT (OUTPATIENT)
Dept: PHYSICAL THERAPY | Facility: CLINIC | Age: 62
End: 2022-05-05

## 2022-05-05 DIAGNOSIS — G89.29 CHRONIC MIDLINE LOW BACK PAIN WITHOUT SCIATICA: Primary | ICD-10-CM

## 2022-05-05 DIAGNOSIS — M25.512 ACUTE PAIN OF LEFT SHOULDER: ICD-10-CM

## 2022-05-05 DIAGNOSIS — M54.50 CHRONIC MIDLINE LOW BACK PAIN WITHOUT SCIATICA: Primary | ICD-10-CM

## 2022-05-05 PROCEDURE — 97530 THERAPEUTIC ACTIVITIES: CPT | Performed by: PHYSICAL THERAPIST

## 2022-05-05 PROCEDURE — 97140 MANUAL THERAPY 1/> REGIONS: CPT | Performed by: PHYSICAL THERAPIST

## 2022-05-05 PROCEDURE — 97110 THERAPEUTIC EXERCISES: CPT | Performed by: PHYSICAL THERAPIST

## 2022-05-05 NOTE — PROGRESS NOTES
Physical Therapy Daily Progress Note    VISIT#: 3    Subjective     Tracie Gross reports sore post the eval.  L shldr remains the most painful and catches at certain angles.   Evelyn also reports that she had a 6 month CT.  Results of avascular necrosis of the L hip.        Objective     Palpation with noted crepitus of left shdlr during flex > 90 degrees.     See Exercise, Manual, and Modality Logs for complete treatment.     Patient Education:  Review of home ex.     Assessment/Plan    Evelyn completed AAROM, PROM ex today.  She was able to complete rotational isometrics.  She has been referred to ortho due to the current presentation and also due to the AVN of the left hip     Plan:  Cont with current ex/POC as tolerated.  Discussion of possibility of IFC usage to reduce pain.            Timed:         Manual Therapy:    11     mins  23712;     Therapeutic Exercise:    18     mins  11374;     Neuromuscular Walter:        mins  04863;    Therapeutic Activity:   7     mins  10029;     Gait Training:           mins  96667;     Ultrasound:          mins  68714;    Ionto                                   mins   09061  Self Care                      3      mins   60231  Canalith Repos                   mins  13532    Un-Timed:  Electrical Stimulation:         mins  77610 ( );  Dry Needling          mins self-pay  Traction          mins 39770  Low Eval          Mins  72925  Mod Eval          Mins  18637  High Eval                            Mins  60366  Re-Eval                               mins  17375    Timed Treatment:   38   mins   Total Treatment:     40   mins    Jocelynn Matos PT    Physical Therapist

## 2022-05-08 DIAGNOSIS — M32.9 SLE (SYSTEMIC LUPUS ERYTHEMATOSUS RELATED SYNDROME): ICD-10-CM

## 2022-05-08 DIAGNOSIS — M34.9 SCLERODERMA: ICD-10-CM

## 2022-05-09 RX ORDER — BISOPROLOL FUMARATE AND HYDROCHLOROTHIAZIDE 10; 6.25 MG/1; MG/1
TABLET ORAL
Qty: 90 TABLET | Refills: 1 | Status: SHIPPED | OUTPATIENT
Start: 2022-05-09 | End: 2022-11-03

## 2022-05-09 RX ORDER — CYCLOBENZAPRINE HCL 10 MG
TABLET ORAL
Qty: 100 TABLET | Refills: 3 | Status: SHIPPED | OUTPATIENT
Start: 2022-05-09

## 2022-05-10 ENCOUNTER — TREATMENT (OUTPATIENT)
Dept: PHYSICAL THERAPY | Facility: CLINIC | Age: 62
End: 2022-05-10

## 2022-05-10 DIAGNOSIS — M54.50 CHRONIC MIDLINE LOW BACK PAIN WITHOUT SCIATICA: Primary | ICD-10-CM

## 2022-05-10 DIAGNOSIS — M25.511 ACUTE PAIN OF BOTH SHOULDERS: ICD-10-CM

## 2022-05-10 DIAGNOSIS — M75.42 IMPINGEMENT SYNDROME OF LEFT SHOULDER: ICD-10-CM

## 2022-05-10 DIAGNOSIS — M25.512 ACUTE PAIN OF LEFT SHOULDER: ICD-10-CM

## 2022-05-10 DIAGNOSIS — M75.41 IMPINGEMENT SYNDROME OF RIGHT SHOULDER: ICD-10-CM

## 2022-05-10 DIAGNOSIS — M15.9 OSTEOARTHRITIS, GENERALIZED: ICD-10-CM

## 2022-05-10 DIAGNOSIS — M25.512 ACUTE PAIN OF BOTH SHOULDERS: ICD-10-CM

## 2022-05-10 DIAGNOSIS — G89.29 CHRONIC MIDLINE LOW BACK PAIN WITHOUT SCIATICA: Primary | ICD-10-CM

## 2022-05-10 PROCEDURE — 97140 MANUAL THERAPY 1/> REGIONS: CPT | Performed by: PHYSICAL THERAPIST

## 2022-05-10 PROCEDURE — 97110 THERAPEUTIC EXERCISES: CPT | Performed by: PHYSICAL THERAPIST

## 2022-05-10 PROCEDURE — 97530 THERAPEUTIC ACTIVITIES: CPT | Performed by: PHYSICAL THERAPIST

## 2022-05-10 NOTE — PROGRESS NOTES
Physical Therapy Daily Progress Note    VISIT#: 4    Subjective   Tracie Gross reports the ortho appt was rs from yesterday til next week (for the left hip)  States that the shldr pain is not unbearable at this moment.   She did pull some weeds and groomed 1 dog yesterday; pain during and after.       Objective   Focus for ROM, and strengthening of the UEs.   See Exercise, Manual, and Modality Logs for complete treatment.   MHP both shldrs post ex x's 6 min  Patient Education:  Review of current ROM and isometrics ex for home    Assessment/Plan  Evelyn was able to complete the ex today wo noticeably greater pain.   Weakness in ER and forward flexion.  She is to see ortho next week for the AVN of the let hip; she is to inquire with the  if the shoulders could be looked at the same appt or a separate appt.     Plan:  Possible bands for home.             Timed:         Manual Therapy:    10     mins  54444;     Therapeutic Exercise:    16     mins  70615;     Neuromuscular Walter:        mins  00489;    Therapeutic Activity:     12     mins  90139;     Gait Training:           mins  07062;     Ultrasound:          mins  01952;    Ionto                                   mins   03875  Self Care                            mins   60475  Canalith Repos                   mins  33887    Un-Timed:  Electrical Stimulation:         mins  49001 ( );  Dry Needling          mins self-pay  Traction          mins 23742  Low Eval          Mins  67643  Mod Eval          Mins  69461  High Eval                            Mins  09205  Re-Eval                               mins  66081    Timed Treatment:   38   mins   Total Treatment:     38   mins    Jocelynn Matos, PT    Physical Therapist

## 2022-05-11 ENCOUNTER — OFFICE VISIT (OUTPATIENT)
Dept: ONCOLOGY | Facility: CLINIC | Age: 62
End: 2022-05-11

## 2022-05-11 ENCOUNTER — LAB (OUTPATIENT)
Dept: LAB | Facility: HOSPITAL | Age: 62
End: 2022-05-11

## 2022-05-11 VITALS
SYSTOLIC BLOOD PRESSURE: 114 MMHG | BODY MASS INDEX: 38.07 KG/M2 | DIASTOLIC BLOOD PRESSURE: 82 MMHG | HEIGHT: 64 IN | RESPIRATION RATE: 18 BRPM | WEIGHT: 223 LBS | HEART RATE: 81 BPM | TEMPERATURE: 97.1 F

## 2022-05-11 DIAGNOSIS — M06.4 INFLAMMATORY POLYARTHROPATHY: Primary | ICD-10-CM

## 2022-05-11 DIAGNOSIS — C50.919 MALIGNANT NEOPLASM OF FEMALE BREAST, UNSPECIFIED ESTROGEN RECEPTOR STATUS, UNSPECIFIED LATERALITY, UNSPECIFIED SITE OF BREAST: ICD-10-CM

## 2022-05-11 DIAGNOSIS — C50.919 MALIGNANT NEOPLASM OF FEMALE BREAST, UNSPECIFIED ESTROGEN RECEPTOR STATUS, UNSPECIFIED LATERALITY, UNSPECIFIED SITE OF BREAST: Primary | ICD-10-CM

## 2022-05-11 LAB
BASOPHILS # BLD AUTO: 0.07 10*3/MM3 (ref 0–0.2)
BASOPHILS NFR BLD AUTO: 0.7 % (ref 0–1.5)
DEPRECATED RDW RBC AUTO: 47.4 FL (ref 37–54)
EOSINOPHIL # BLD AUTO: 0.35 10*3/MM3 (ref 0–0.4)
EOSINOPHIL NFR BLD AUTO: 3.4 % (ref 0.3–6.2)
ERYTHROCYTE [DISTWIDTH] IN BLOOD BY AUTOMATED COUNT: 13.6 % (ref 12.3–15.4)
HCT VFR BLD AUTO: 40.2 % (ref 34–46.6)
HGB BLD-MCNC: 13.3 G/DL (ref 12–15.9)
HOLD SPECIMEN: NORMAL
HOLD SPECIMEN: NORMAL
LYMPHOCYTES # BLD AUTO: 2.5 10*3/MM3 (ref 0.7–3.1)
LYMPHOCYTES NFR BLD AUTO: 24.4 % (ref 19.6–45.3)
MCH RBC QN AUTO: 32.2 PG (ref 26.6–33)
MCHC RBC AUTO-ENTMCNC: 33.1 G/DL (ref 31.5–35.7)
MCV RBC AUTO: 97.3 FL (ref 79–97)
MONOCYTES # BLD AUTO: 1.41 10*3/MM3 (ref 0.1–0.9)
MONOCYTES NFR BLD AUTO: 13.8 % (ref 5–12)
NEUTROPHILS NFR BLD AUTO: 5.91 10*3/MM3 (ref 1.7–7)
NEUTROPHILS NFR BLD AUTO: 57.7 % (ref 42.7–76)
PLATELET # BLD AUTO: 345 10*3/MM3 (ref 140–450)
PMV BLD AUTO: 10.4 FL (ref 6–12)
RBC # BLD AUTO: 4.13 10*6/MM3 (ref 3.77–5.28)
WBC NRBC COR # BLD: 10.24 10*3/MM3 (ref 3.4–10.8)

## 2022-05-11 PROCEDURE — 85025 COMPLETE CBC W/AUTO DIFF WBC: CPT

## 2022-05-11 PROCEDURE — 36415 COLL VENOUS BLD VENIPUNCTURE: CPT

## 2022-05-11 PROCEDURE — 99214 OFFICE O/P EST MOD 30 MIN: CPT | Performed by: INTERNAL MEDICINE

## 2022-05-11 NOTE — PROGRESS NOTES
Hematology/Oncology Outpatient Follow Up    PATIENT NAME:Tracie Gross  :1960  MRN: [id number]  PRIMARY CARE PHYSICIAN: Floyd Silva MD  REFERRING PHYSICIAN: Floyd Silva, *    Chief Complaint   Patient presents with   • Follow-up     Malignant neoplasm of female breast, unspecified estrogen receptor status, unspecified laterality, unspecified site of breast (HCC)       HISTORY OF PRESENT ILLNESS:     This is a 61 y.o.  female who was diagnosed with right breast cancer in  when she was 32 years old.  Patient was originally seen on 18.  Please refer to the details of that note for more information.   · 18 - CBC:  WBC 13.6, hemoglobin 11.7, platelet count 392,000.  Differentials 65% neutrophils, 15% lymphocytes.  There was mild monocytosis at 16.  Eosinophils were 2.3 and basophils were 0.3.  B12 292.  Ferritin 88.  Folate 13.8.  AST slightly high at 57.  Alkaline phosphatase was high at 326.  .  Haptoglobin 179, normal.  SPEP with DYLAN did not show any monoclonal protein.  Flow cytometry did not show any evidence of acute leukemia or lymphoproliferative disease.  There was monocytosis measured at 21%.  Reticulocyte count normal 1.02.  Peripheral smear showed absolute monocytosis at 19%, thrombocytosis and macrocytosis.  BCR-abl was negative.  Methylmalonic acid level was elevated to 420.    · 18 - PET/CT scan showed multiple small bilateral lymph nodes in the neck without metabolic activity or change from prior CT scans and are likely due to SLE.  There were unchanged bilateral level 1 axillary lymph nodes without metabolic activity.  Multiple mediastinal nodes were also seen.  The largest 11 mm, unchanged from prior imaging with no hypermetabolic activity.  There was no evidence of metastatic disease in the abdomen or pelvis.  There was no focal lesion within the liver or biliary system.  Multiple small retroperitoneal and external iliac nodes, bilateral inguinal  nodes similar to prior imaging with no PET activity.    · 8/8/18 - RICHIE-2 analysis with no mutation identified.    · 11/29/18 - Bone density revealed osteoporosis.  Patient is currently on Fosamax.   · 12/3/18 - Bone marrow aspiration and biopsy showed normocellular bone marrow at 40%.  There was adequate iron stores and no evidence of malignancy was seen.  Flow cytometry was negative.  Cytogenetics showed normal female karyotype.  Blasts were less than 3% of nucleated cells.  There was no significant dyspoiesis.   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     · 1/22/19 - Urine cytology was negative for malignant cells.    · 2/28/19 - Patient seen by Dermatology for her annual skin evaluation.   · 3/13/19 - CT scan of the chest, abdomen and pelvis:  CT scan of chest showed chronic changes.  · 5/29/2019 patient had an EGD with polypectomy performed by Dr. Conway.  Dr. Conway has recommended follow-up MRI of the pancreas in 2 years.  And also EGD and colonoscopy in 2 years.  · 11/13/19-left screening mammogram was negative follow-up in 1 year was recommended  · 5/4/2020 patient had a CT scan of the chest, abdomen and pelvis multiple borderline enlarged mediastinal lymph nodes the largest measuring 2.2 cm in the subcarinal space unchanged from prior.  Pancreas is normal.  There are few retroperitoneal mesenteric and pelvic lymph node enlargement which are similar to prior exam.  All are subcentimeter in size.  · 5/20/2020 patient had bilateral breast MRI did not show any evidence of malignancy.  · 12/7/2020 patient DEXA scan showed persistent and worsened osteoporosis    Past Medical History:   Diagnosis Date   • Arthritis    • Asthma    • Bipolar affective disorder (HCC)    • Breast cancer (HCC) 1985    s/p R mastectomy   • GERD (gastroesophageal reflux disease) 1993    • H/O breast reconstruction     SEVERAL   • H/O laminectomy    • Hamartoma (HCC)    • Hx of bilateral oophorectomy    • Hypertension    • Hypothyroidism    • Inflammatory bowel disease     Man. EGD/colonoscopy annually (2021)   • Kienbock's disease, right     WRIST   • Low back pain    • Lupus (HCC)     Mateuszenell   • Monahan syndrome     Man. EGD/colonoscopy annually (2021). MRI alternating w/ EUS annually for pancreatic screen   • Osteoporosis     maintained on Prolia through Karen   • Raynaud disease    • Scleroderma (HCC)    • Von Willebrand disease (HCC)        Past Surgical History:   Procedure Laterality Date   • ARM DEBRIDEMENT Right     X 3   • BACK SURGERY      Vetas- cervical fusion   • BACK SURGERY      lumbar decomp   • BREAST BIOPSY     • BREAST LUMPECTOMY     • BREAST RECONSTRUCTION Right    • BREAST RECONSTRUCTION Right    • CARDIAC CATHETERIZATION      no stents placed   •  SECTION  ,    • CHOLECYSTECTOMY     • COLONOSCOPY     • COLONOSCOPY N/A 2020    Procedure: COLONOSCOPY;  Surgeon: Cayden Conway MD;  Location: [facility] ENDOSCOPY;  Service: Gastroenterology;  Laterality: N/A;  rectal ulcer   • COLONOSCOPY N/A 2021    Procedure: COLONOSCOPY WITH POLYPECTOMY X 1;  Surgeon: Cayden Conway MD;  Location: [facility] ENDOSCOPY;  Service: Gastroenterology;  Laterality: N/A;  Post: COLON POLYP   • COSMETIC SURGERY  [id number]   • ENDOSCOPY     • ENDOSCOPY N/A 2020    Procedure: ESOPHAGOGASTRODUODENOSCOPY;  Surgeon: Cayden Conway MD;  Location: [facility] ENDOSCOPY;  Service: Gastroenterology;  Laterality: N/A;  Normal EGD   • ENDOSCOPY N/A 2021    Procedure: ESOPHAGOGASTRODUODENOSCOPY;  Surgeon: Cayden Conway MD;  Location: [facility] ENDOSCOPY;  Service: Gastroenterology;  Laterality: N/A;  Post: normal egd   • EXPLORATORY LAPAROTOMY      scar tissue removal   • EYE SURGERY     • FINGER SURGERY Right     ring finger mass  excision   • HYSTERECTOMY      PARTIAL   • JOINT REPLACEMENT Right 2012,2015    hip and knee   • KNEE ARTHROSCOPY Left 1/7/2020    Procedure: LEFT KNEE SCOPE with partial lateral meniscectomy;  Surgeon: Chau Perez MD;  Location: [facility] MAIN OR;  Service: Orthopedics   • LASIK      LASIK/ LASIK ENHANCEMENT   • MASTECTOMY RADICAL Right    • OOPHORECTOMY Bilateral    • SPLENECTOMY     • TUBAL ABDOMINAL LIGATION  1981   • UPPER ENDOSCOPIC ULTRASOUND W/ FNA N/A 12/9/2021    Procedure: ENDOSCOPIC ULTRASOUND WITH ESOPHAGOGASTRODUODENOSCOPY;  Surgeon: Luis E Negron MD;  Location: [facility] ENDOSCOPY;  Service: Gastroenterology;  Laterality: N/A;  Post: GASTROPARESIS, DILATED COMMON BILE DUCT, FUNDIC POLYP, DUODENITIS, NORMAL PANCREAS, HIATAL HERNIA   • WRIST SURGERY Right     distal radius head removal (bone dead)  plates and screws decomp lunate         Current Outpatient Medications:   •  ALPRAZolam (XANAX) 1 MG tablet, TAKE ONE TABLET BY MOUTH EVERY NIGHT AT BEDTIME AS NEEDED FOR ANXIETY OR SLEEP, Disp: 30 tablet, Rfl: 2  •  bisoprolol-hydrochlorothiazide (ZIAC) 10-6.25 MG per tablet, TAKE ONE TABLET BY MOUTH DAILY, Disp: 90 tablet, Rfl: 1  •  Calcium + Vitamin D3 600-5 MG-MCG tablet, ONE BY MOUTH TWICE A DAY, Disp: 60 tablet, Rfl: 6  •  cetirizine (zyrTEC) 10 MG tablet, TAKE ONE TABLET BY MOUTH DAILY, Disp: 90 tablet, Rfl: 1  •  ciclopirox (PENLAC) 8 % solution, Apply  topically to the appropriate area as directed Every Night., Disp: 6 mL, Rfl: 1  •  clindamycin (CLEOCIN T) 1 % lotion, Apply  topically to the appropriate area as directed At Night As Needed. Use on face, Disp: , Rfl:   •  colestipol (COLESTID) 1 g tablet, TAKE TWO TABLETS BY MOUTH TWICE A DAY, Disp: 240 tablet, Rfl: 2  •  cyclobenzaprine (FLEXERIL) 10 MG tablet, TAKE ONE TABLET BY MOUTH THREE TIMES A DAY AS NEEDED FOR MUSCLE SPASMS AS NEEDED, Disp: 100 tablet, Rfl: 3  •  Denosumab (PROLIA SC), Inject  under the skin into the appropriate area as  directed Every 6 (Six) Months., Disp: , Rfl:   •  Desoximetasone 0.25 % liquid, Every 12 (Twelve) Hours., Disp: , Rfl:   •  dexlansoprazole (DEXILANT) 60 MG capsule, Take 60 mg by mouth Daily., Disp: , Rfl:   •  Diclofenac Sodium (VOLTAREN) 1 % gel gel, APPLY 4 GRAMS TOPICALLY TO THE APPROPRIATE AREA AS DIRECTED FOUR TIMES DAILY AS NEEDED FOR JOINT PAIN, Disp: 100 g, Rfl: 3  •  famotidine (PEPCID) 20 MG tablet, Take 1 tablet by mouth 2 (Two) Times a Day As Needed for Heartburn., Disp: 120 tablet, Rfl: 5  •  FeroSul 325 (65 Fe) MG tablet, TAKE ONE TABLET BY MOUTH DAILY (Patient taking differently: Take 325 mg by mouth Daily With Breakfast.), Disp: 90 tablet, Rfl: 4  •  Flaxseed, Linseed, (FLAXSEED OIL MAX STR) 1300 MG capsule, Take 1 tablet by mouth 2 (Two) Times a Day., Disp: , Rfl:   •  fluticasone (FLONASE) 50 MCG/ACT nasal spray, SPRAY TWO SPRAYS IN EACH NOSTRIL ONCE DAILY, Disp: 16 mL, Rfl: 5  •  gabapentin (NEURONTIN) 300 MG capsule, TAKE TWO CAPSULES BY MOUTH EVERY MORNING AND FOUR CAPSULES EVERY EVENING, Disp: 270 capsule, Rfl: 5  •  hydrOXYzine (ATARAX) 25 MG tablet, Take 1 tablet by mouth Every 8 (Eight) Hours As Needed for Itching., Disp: 30 tablet, Rfl: 3  •  lamoTRIgine (LaMICtal) 200 MG tablet, Take 200 mg by mouth Daily., Disp: , Rfl:   •  levothyroxine (SYNTHROID, LEVOTHROID) 200 MCG tablet, TAKE 1 TABLET BY MOUTH DAILY, Disp: 90 tablet, Rfl: 1  •  lisinopril (PRINIVIL,ZESTRIL) 5 MG tablet, TAKE ONE TABLET BY MOUTH DAILY, Disp: 90 tablet, Rfl: 3  •  NIFEdipine XL (PROCARDIA XL) 90 MG 24 hr tablet, TAKE ONE TABLET BY MOUTH DAILY, Disp: 90 tablet, Rfl: 1  •  ondansetron (ZOFRAN) 4 MG tablet, Take 1 tablet by mouth Every 8 (Eight) Hours As Needed for Nausea or Vomiting., Disp: 30 tablet, Rfl: 3  •  oxyCODONE (ROXICODONE) 10 MG tablet, Take 1 tablet by mouth Every 6 (Six) Hours As Needed for Severe Pain ., Disp: 120 tablet, Rfl: 0  •  promethazine-dextromethorphan (PROMETHAZINE-DM) 6.25-15 MG/5ML  solution, Every 6 (Six) Hours., Disp: , Rfl:   •  QUEtiapine (SEROquel) 100 MG tablet, Every 12 (Twelve) Hours., Disp: , Rfl:   •  raNITIdine (ZANTAC) 300 MG capsule, 1 cap(s), Disp: , Rfl:     Allergies   Allergen Reactions   • Aspirin Other (See Comments)     VONWILLENBRAND DISEASE    • Penicillins Anaphylaxis   • Baclofen Hives   • Tape  [Adhesive Tape] Rash       Family History   Problem Relation Age of Onset   • Colon cancer Mother    • Heart disease Mother    • Cancer Mother    • Kidney disease Father    • Heart disease Father    • Depression Father    • Hyperlipidemia Father    • Vision loss Father    • Colon cancer Sister    • Diabetes Sister    • Heart disease Sister    • Von Willebrand disease Sister    • Von Willebrand disease Brother    • Von Willebrand disease Son    • Breast cancer Son    • Other Son         burdick syndrome   • Diabetes Paternal Grandmother    • Stroke Paternal Grandmother    • Colon cancer Other    • Colon cancer Son    • Von Willebrand disease Son    • Other Son         burdick syndrome   • Breast cancer Son    • Cancer Sister    • Vision loss Sister    • Other Sister    • Stroke Sister    • Cancer Paternal Aunt    • Cancer Maternal Aunt    • Cancer Maternal Aunt    • Hyperlipidemia Sister    • Thyroid disease Sister    • Thyroid disease Sister        Cancer-related family history includes Breast cancer in her son and son; Cancer in her maternal aunt, maternal aunt, mother, paternal aunt, and sister; Colon cancer in her mother, sister, son, and another family member.    Social History     Tobacco Use   • Smoking status: Former Smoker     Packs/day: 0.25     Years: 7.00     Pack years: 1.75     Start date:      Quit date:      Years since quittin.3   • Smokeless tobacco: Never Used   • Tobacco comment: Off and on for  those years   Vaping Use   • Vaping Use: Never used   Substance Use Topics   • Alcohol use: Yes     Comment: Rarely   • Drug use: No       I have reviewed and  "confirmed the accuracy of the patient's history:  as entered by the MA/SAMMY/AZUCENA. Suzan Jones MD 05/11/22       SUBJECTIVE:    She is here today for routine follow-up.  She was seen by her rheumatologist and was found to have significantly elevated liver function tests otherwise she denies any specific breast issues or any abdominal symptoms      Patient is here today for routine follow-up.  She does not have any specific complaints          REVIEW OF SYSTEMS:    Review of Systems   Constitutional: Negative for chills and fever.   HENT: Negative for ear pain, mouth sores, nosebleeds and sore throat.    Eyes: Negative for photophobia and visual disturbance.   Respiratory: Negative for wheezing and stridor.    Cardiovascular: Negative for chest pain and palpitations.   Gastrointestinal: Negative for abdominal pain, diarrhea, nausea and vomiting.   Endocrine: Negative for cold intolerance and heat intolerance.   Genitourinary: Negative for dysuria and hematuria.   Musculoskeletal: Negative for joint swelling and neck stiffness.   Skin: Negative for color change and rash.   Neurological: Negative for seizures and syncope.   Hematological: Negative for adenopathy.        No obvious bleeding   Psychiatric/Behavioral: Negative for agitation, confusion and hallucinations.     I have reviewed and confirmed the accuracy of the ROS as documented by the MA/GETACHEWN/RN Suzan Jones MD      OBJECTIVE:    Vitals:    05/11/22 1442   BP: 114/82   Pulse: 81   Resp: 18   Temp: 97.1 °F (36.2 °C)   TempSrc: Infrared   Weight: 101 kg (223 lb)   Height: 162.6 cm (64\")   PainSc: 0-No pain       ECOG  (1) Restricted in physically strenuous activity, ambulatory and able to do work of light nature    Physical Exam   Constitutional: She is oriented to person, place, and time. She appears well-developed. No distress.   HENT:   Head: Normocephalic and atraumatic.   Right Ear: External ear normal.   Nose: Nose normal.   Eyes: Pupils " are equal, round, and reactive to light. Conjunctivae are normal. Right eye exhibits no discharge. Left eye exhibits no discharge. No scleral icterus.   Neck: No thyromegaly present.   Cardiovascular: Normal rate, regular rhythm and normal heart sounds. Exam reveals no gallop and no friction rub.   Pulmonary/Chest: Effort normal. No stridor. No respiratory distress. She has no wheezes. Left breast exhibits no inverted nipple, no mass, no nipple discharge, no skin change and no tenderness. No breast swelling, tenderness, discharge or bleeding.   Right chest wall does not reveal any palpable masses.  Bilateral axilla was negative   Abdominal: Soft. Bowel sounds are normal. She exhibits no mass. There is no abdominal tenderness. There is no rebound and no guarding.   Musculoskeletal: Normal range of motion. No tenderness.   Lymphadenopathy:     She has no cervical adenopathy.   Neurological: She is alert and oriented to person, place, and time. She exhibits normal muscle tone.   Skin: Skin is warm. No rash noted. She is not diaphoretic. No erythema.   Psychiatric: Her behavior is normal. Judgment and thought content normal.   Nursing note and vitals reviewed.    I have reexamined the patient and the results are consistent with the previously documented exam. Suzan Jones MD     RECENT LABS    WBC   Date Value Ref Range Status   05/11/2022 10.24 3.40 - 10.80 10*3/mm3 Final     RBC   Date Value Ref Range Status   05/11/2022 4.13 3.77 - 5.28 10*6/mm3 Final     Hemoglobin   Date Value Ref Range Status   05/11/2022 13.3 12.0 - 15.9 g/dL Final     Hematocrit   Date Value Ref Range Status   05/11/2022 40.2 34.0 - 46.6 % Final     MCV   Date Value Ref Range Status   05/11/2022 97.3 (H) 79.0 - 97.0 fL Final     MCH   Date Value Ref Range Status   05/11/2022 32.2 26.6 - 33.0 pg Final     MCHC   Date Value Ref Range Status   05/11/2022 33.1 31.5 - 35.7 g/dL Final     RDW   Date Value Ref Range Status   05/11/2022 13.6  12.3 - 15.4 % Final     RDW-SD   Date Value Ref Range Status   05/11/2022 47.4 37.0 - 54.0 fl Final     MPV   Date Value Ref Range Status   05/11/2022 10.4 6.0 - 12.0 fL Final     Platelets   Date Value Ref Range Status   05/11/2022 345 140 - 450 10*3/mm3 Final     Neutrophil %   Date Value Ref Range Status   05/11/2022 57.7 42.7 - 76.0 % Final     Lymphocyte %   Date Value Ref Range Status   05/11/2022 24.4 19.6 - 45.3 % Final     Monocyte %   Date Value Ref Range Status   05/11/2022 13.8 (H) 5.0 - 12.0 % Final     Eosinophil %   Date Value Ref Range Status   05/11/2022 3.4 0.3 - 6.2 % Final     Basophil %   Date Value Ref Range Status   05/11/2022 0.7 0.0 - 1.5 % Final     Immature Grans %   Date Value Ref Range Status   04/05/2022 0.5 0.0 - 0.5 % Final     Neutrophils, Absolute   Date Value Ref Range Status   05/11/2022 5.91 1.70 - 7.00 10*3/mm3 Final     Lymphocytes, Absolute   Date Value Ref Range Status   05/11/2022 2.50 0.70 - 3.10 10*3/mm3 Final     Monocytes, Absolute   Date Value Ref Range Status   05/11/2022 1.41 (H) 0.10 - 0.90 10*3/mm3 Final     Eosinophils, Absolute   Date Value Ref Range Status   05/11/2022 0.35 0.00 - 0.40 10*3/mm3 Final     Basophils, Absolute   Date Value Ref Range Status   05/11/2022 0.07 0.00 - 0.20 10*3/mm3 Final     Immature Grans, Absolute   Date Value Ref Range Status   04/05/2022 0.04 0.00 - 0.05 10*3/mm3 Final     nRBC   Date Value Ref Range Status   04/05/2022 0.0 0.0 - 0.2 /100 WBC Final       Lab Results   Component Value Date    GLUCOSE 79 04/14/2022    BUN 25 (H) 04/14/2022    CREATININE 1.10 (H) 04/14/2022    EGFRIFNONA 54 (L) 01/18/2022    EGFRIFAFRI 83 04/04/2017    BCR 22.7 04/14/2022    K 4.8 04/14/2022    CO2 31.0 (H) 04/14/2022    CALCIUM 9.4 04/14/2022    ALBUMIN 4.20 04/14/2022    LABIL2 1.1 05/31/2019    AST 32 04/14/2022    ALT 34 (H) 04/14/2022           ASSESSMENT:    1. 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  She also has DICER1 gene as well as TSC2 with variants of unknown significance.   2. History of right breast cancer, status post right mastectomy followed by axillary lymph node dissection and right chest wall reconstruction in 1985 at the age of 32.  3. Recurrent elevated liver function tests, pruritus, but no significant pathology found on both the PET scan, MRCP, except for post cholecystectomy, biliary ductal dilatation.    She was on on Colestipol for pruritus.  We will plan to repeat her CMP today, will obtain CT scan of the abdomen and pelvis and have her referred back to Dr. Conway as soon as possible.  Also will call for records of her EUS findings from December 2021 performed by Dr. Arias.  Her abdominal symptoms are benign.  Reviewed her recent CT abdomen and pelvis which was essentially unremarkable  4. Incidental finding of left hip joint with avascular necrosis: Patient is established with Dr. Mar will refer to him  5. She has peripheral lymphadenopathy involving the neck, axilla, mediastinum and retroperitoneum that are not PET avid.  This lymphadenopathy may be due to chronic inflammatory disease [lupus].    6. Strong family history of colon cancer and personal history of breast cancer.   7. Systemic lupus erythematosus, scleroderma, Raynaud’s phenomenon.   8. Normocytic anemia, multifactorial, underlying autoimmune disease, relative iron deficiency and B12 deficiency.  On iron and B12 supplementation.  Stable   9. Persistent monocytosis, status post bone marrow aspiration and biopsy which was essentially normal.  Stable monocytosis   10. Progressive osteoporosis.  On Fosamax and recent DEXA scan from 12/7/2020 shows progression.  Patient is currently on Prolia  11. Urine cytology was negative as of January 2019.  Scheduled today for urine cytology   12. Last Dermatology appointment was 2/2020: Patient to follow-up February 2021  13. Status post  complete hysterectomy reducing her risk for ovarian and uterine cancer.  14. Assessment has been reviewed and updated     PLANS:     · Refer to Dr Mar  For avascular necrosis left hip  · Reviewed results of her CMP and also CT scan of abdomen and pelvis  · Plan is for continued surveillance for Monahan syndrome diagnosis.    To follow-up with GI for pancreatic cancer screening.  Reviewed last EGD and colonoscopy completed 8/14/2020.  Patient also had pancreatic MRI completed by Dr. Conway.  Patient had EUS in December 2021  · Continue Prolia: This will be done every 6 months: Reviewed  · She will continue calcium and vitamin D  · Bone density is due December 2022  · MRI of the abdomen completed 8/9/2021 the pancreas appeared mildly atrophic.  Followed by Dr. Conway. Colonoscopy is due September 2022  · Bone density will be repeated December 2022    · Patient's screening mammogram will be due January 2023.  We will go ahead and schedule at this time  · Bilateral breast MRI will be done July 2022.  This has been ordered  ·  She will continue monthly breast self-exam and call for lumps nipple discharge, skin discoloration.    · Urine cytology was due in August 2021: This has been scheduled  · Dermatology appointment will be due February 2021  · CT scans of the chest abdomen and pelvis   · Follow-up with GI for pancreatic cancer screening.  MRI pancreas alternating with EUS on a yearly basis  · Follow-up with me second week in May         I have reviewed labs results, imaging, vitals, and medications with the patient today. Will follow up in 6 months with me.      Patient verbalized understanding and is in agreement of the above plan.      I spent 30 total minutes, face-to-face, caring for Tracie today.  90% of this time involved counseling and/or coordination of care as documented within this note.

## 2022-05-17 ENCOUNTER — TREATMENT (OUTPATIENT)
Dept: PHYSICAL THERAPY | Facility: CLINIC | Age: 62
End: 2022-05-17

## 2022-05-17 DIAGNOSIS — M54.50 CHRONIC MIDLINE LOW BACK PAIN WITHOUT SCIATICA: Primary | ICD-10-CM

## 2022-05-17 DIAGNOSIS — M75.42 IMPINGEMENT SYNDROME OF LEFT SHOULDER: ICD-10-CM

## 2022-05-17 DIAGNOSIS — G89.29 CHRONIC MIDLINE LOW BACK PAIN WITHOUT SCIATICA: Primary | ICD-10-CM

## 2022-05-17 DIAGNOSIS — M25.512 ACUTE PAIN OF LEFT SHOULDER: ICD-10-CM

## 2022-05-17 DIAGNOSIS — M25.511 ACUTE PAIN OF BOTH SHOULDERS: ICD-10-CM

## 2022-05-17 DIAGNOSIS — M75.41 IMPINGEMENT SYNDROME OF RIGHT SHOULDER: ICD-10-CM

## 2022-05-17 DIAGNOSIS — M25.512 ACUTE PAIN OF BOTH SHOULDERS: ICD-10-CM

## 2022-05-17 PROCEDURE — 97110 THERAPEUTIC EXERCISES: CPT | Performed by: PHYSICAL THERAPIST

## 2022-05-17 PROCEDURE — 97140 MANUAL THERAPY 1/> REGIONS: CPT | Performed by: PHYSICAL THERAPIST

## 2022-05-17 NOTE — PROGRESS NOTES
Physical Therapy Daily Treatment Note      Patient: Tracie Gross   : 1960  Referring practitioner: Floyd Silva MD  Date of Initial Visit: Type: THERAPY  Noted: 2022  Today's Date: 2022  Patient seen for 5 sessions       Visit Diagnoses:    ICD-10-CM ICD-9-CM   1. Chronic midline low back pain without sciatica  M54.50 724.2    G89.29 338.29   2. Acute pain of left shoulder  M25.512 719.41   3. Acute pain of both shoulders  M25.511 719.41    M25.512    4. Impingement syndrome of right shoulder  M75.41 726.2   5. Impingement syndrome of left shoulder  M75.42 726.2       Subjective Evaluation    History of Present Illness    Subjective comment: recent fall onto L hip with CT of AVN and will see ortho this Thursday. does have night pain. not sure if she's headed to urgent VADIM or not? might consider a treatment vs. surgery but this would require 3 months of NWB which would be very hard to do with her shoulder pains.        Objective   See Exercise, Manual, and Modality Logs for complete treatment.       Assessment & Plan     Assessment    Assessment details: Goals  Plan Goals: ST visits     1)  Reduce pain level by 2-3 points. Partially met. More 1-2 reduction.      2)  Increase AROM to =/> 70% norm values wo greater pain. Met. 90% AROM but still some pain L > R. Serg painful arc L UE mid range.      3)  Sleep with < 25% interruption due to shldr pain. Partially met. 10% better still waking every 3-4 hours vs. Every 2.      4)  Tolerate strengthening of gravity resisted ex wo greater pain. NOT MET. Just starting resistance in supine.     LTG:   DC     1)  0-2/10 pain level in order to complete ADLs, including dressing wo greater pain. NOT MET     2)  MMT =/> 4+/5 with improved ability to lift objects for household, yard and daily living tasks wo greater pain. NOT MET. 4- R to 3+ L sh     3)  Improve the functional survey =/> 8 points. NOT MET. 51 to 48%     4)  Normal sleep pattern in  relationship to the shldr injury. NOT MET     5)  Resume prior activities and/or work. Partially met. Not able to garden as before.      6)  IHEP. Ongoing.     Pt seen for only her 5th session today. Improved R sh AROM since eval while L shoulder still guarded with painful arc. Great full PROM but occasional report of crepitus with ROM ex. Better supine pendulum vs. Press due to click sensation posterior L sh.    A: needs progressive strength program with potential to improve rapidly being poor. Should however benefit from ongoing therapy on a weekly basis to upgrade HEP.   P: discuss L hip plan after ortho later this week. May need VADIM and PT, thus shoulder rehab may need to be postponed.           Timed:         Manual Therapy:    8     mins  44266;     Therapeutic Exercise:    30     mins  87125;     Neuromuscular Walter:        mins  41918;    Therapeutic Activity:          mins  47109;     Gait Training:           mins  02341;     Ultrasound:          mins  62717;    Ionto                                   mins   28192  Self Care                            mins   76136  Canalith Repos         mins 77611      Un-Timed:  Electrical Stimulation:         mins  39650 ( );  Dry Needling          mins self-pay  Traction          mins 21436      Timed Treatment:   38   mins   Total Treatment:     38   mins    Zorre Zeno Kimura, PT  [place] License: [id number]    [place] License:  [id number]

## 2022-05-18 ENCOUNTER — LAB (OUTPATIENT)
Dept: FAMILY MEDICINE CLINIC | Facility: CLINIC | Age: 62
End: 2022-05-18

## 2022-05-18 ENCOUNTER — OFFICE VISIT (OUTPATIENT)
Dept: FAMILY MEDICINE CLINIC | Facility: CLINIC | Age: 62
End: 2022-05-18

## 2022-05-18 VITALS
WEIGHT: 218 LBS | HEART RATE: 82 BPM | BODY MASS INDEX: 37.22 KG/M2 | SYSTOLIC BLOOD PRESSURE: 125 MMHG | RESPIRATION RATE: 18 BRPM | HEIGHT: 64 IN | DIASTOLIC BLOOD PRESSURE: 65 MMHG | TEMPERATURE: 97.6 F | OXYGEN SATURATION: 97 %

## 2022-05-18 DIAGNOSIS — Z15.09 LYNCH SYNDROME: ICD-10-CM

## 2022-05-18 DIAGNOSIS — M71.9 BURSITIS, UNSPECIFIED SITE: ICD-10-CM

## 2022-05-18 DIAGNOSIS — M32.9 SLE (SYSTEMIC LUPUS ERYTHEMATOSUS RELATED SYNDROME): ICD-10-CM

## 2022-05-18 DIAGNOSIS — Z00.00 MEDICARE ANNUAL WELLNESS VISIT, SUBSEQUENT: Primary | ICD-10-CM

## 2022-05-18 DIAGNOSIS — E55.9 VITAMIN D DEFICIENCY, UNSPECIFIED: ICD-10-CM

## 2022-05-18 DIAGNOSIS — K21.9 GASTROESOPHAGEAL REFLUX DISEASE, UNSPECIFIED WHETHER ESOPHAGITIS PRESENT: ICD-10-CM

## 2022-05-18 DIAGNOSIS — E03.9 HYPOTHYROIDISM, UNSPECIFIED TYPE: ICD-10-CM

## 2022-05-18 DIAGNOSIS — M34.9 SCLERODERMA: ICD-10-CM

## 2022-05-18 DIAGNOSIS — I10 PRIMARY HYPERTENSION: ICD-10-CM

## 2022-05-18 DIAGNOSIS — C50.919 MALIGNANT NEOPLASM OF FEMALE BREAST, UNSPECIFIED ESTROGEN RECEPTOR STATUS, UNSPECIFIED LATERALITY, UNSPECIFIED SITE OF BREAST: ICD-10-CM

## 2022-05-18 DIAGNOSIS — M87.052 AVASCULAR NECROSIS OF FEMORAL HEAD, LEFT: ICD-10-CM

## 2022-05-18 DIAGNOSIS — M15.9 OSTEOARTHRITIS, GENERALIZED: ICD-10-CM

## 2022-05-18 DIAGNOSIS — K51.919 ULCERATIVE COLITIS WITH COMPLICATION, UNSPECIFIED LOCATION: ICD-10-CM

## 2022-05-18 LAB — HBA1C MFR BLD: 5.5 % (ref 3.5–5.6)

## 2022-05-18 PROCEDURE — 83036 HEMOGLOBIN GLYCOSYLATED A1C: CPT | Performed by: FAMILY MEDICINE

## 2022-05-18 PROCEDURE — 82607 VITAMIN B-12: CPT | Performed by: FAMILY MEDICINE

## 2022-05-18 PROCEDURE — 1170F FXNL STATUS ASSESSED: CPT | Performed by: FAMILY MEDICINE

## 2022-05-18 PROCEDURE — 84439 ASSAY OF FREE THYROXINE: CPT | Performed by: FAMILY MEDICINE

## 2022-05-18 PROCEDURE — 36415 COLL VENOUS BLD VENIPUNCTURE: CPT | Performed by: FAMILY MEDICINE

## 2022-05-18 PROCEDURE — 84443 ASSAY THYROID STIM HORMONE: CPT | Performed by: FAMILY MEDICINE

## 2022-05-18 PROCEDURE — 82306 VITAMIN D 25 HYDROXY: CPT | Performed by: FAMILY MEDICINE

## 2022-05-18 PROCEDURE — G0439 PPPS, SUBSEQ VISIT: HCPCS | Performed by: FAMILY MEDICINE

## 2022-05-18 PROCEDURE — 99214 OFFICE O/P EST MOD 30 MIN: CPT | Performed by: FAMILY MEDICINE

## 2022-05-18 PROCEDURE — 80053 COMPREHEN METABOLIC PANEL: CPT | Performed by: FAMILY MEDICINE

## 2022-05-18 PROCEDURE — 80061 LIPID PANEL: CPT | Performed by: FAMILY MEDICINE

## 2022-05-18 PROCEDURE — 1160F RVW MEDS BY RX/DR IN RCRD: CPT | Performed by: FAMILY MEDICINE

## 2022-05-18 PROCEDURE — 85025 COMPLETE CBC W/AUTO DIFF WBC: CPT | Performed by: FAMILY MEDICINE

## 2022-05-18 RX ORDER — PREDNISONE 20 MG/1
40 TABLET ORAL DAILY
Qty: 10 TABLET | Refills: 0 | Status: SHIPPED | OUTPATIENT
Start: 2022-05-18 | End: 2022-05-31

## 2022-05-18 NOTE — PROGRESS NOTES
The ABCs of the Annual Wellness Visit  Subsequent Medicare Wellness Visit    Chief Complaint   Patient presents with   • Medicare Wellness-subsequent     Subjective    History of Present Illness:  Tracie Gross is a 61 y.o. female who presents for a Subsequent Medicare Wellness Visit.    Monahan syndrome: also w/ UC. Follows w/ [facility] and [facility]. Last EGD and colonoscopy 9/2021, completing annually. Had EUS in 12/2021 and MRI abd/pel in 8/2021. Studies reassuring. No concerns today     Breast cancer: hx R breast cancer s/p R mastectomy w/ lymph node dissection and reconstruction in 1985. Follows regularly w/ oncology- [facility]. Alternating mammogram and breast MRI every 6 months, largely because of Monahan syndrome hx. No concerns at this time.     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 Patient endorses some joint pain in hands bilaterally as well and notable Raynaud's. SOB, mostly w/ exertion/activity and when lying down at night. Patient was given inhaler (NOS) by pulmonology (Draw) but this did not help. Maintained on hydroxychloroquine 200 BID, nifedipine 60 daily. Planning to start CPAP per pulm recs.     GERD: maintained on dexlansoprazole 60mg daily w/ famotidine 20mg PRN. Rare heartburn/reflux on this medicine. No dypshagia. Last EGD 9/2021. Follows w/ GI-[facility]     Knee pain: Patient continues to complain of bilateral knee pain.  She has had the right knee replaced and still has considerable pain and limited range of motion.  Patient reports improvement to L knee pain w/ steroid injection 4/2022. Using oxycodone 10 PRN (1-2/day)    AVN: patient w/ hx R femoral AVN s/p replacement. Recent CT abd/pel w/ concern for AVN of L hip. Reports intermittent L hip pain w/ walking/activity.      HTN: 125/65 today. Maintained on Ziac 10/6.25 mg daily and lisinopril 5mg daily.      Hypothyroidism: maintained on  levothyroxine 200mcg daily. Weight stable. No further complaints/concerns.    The following portions of the patient's history were reviewed and updated as appropriate: allergies, current medications, past family history, past medical history, past social history, past surgical history and problem list.    Compared to one year ago, the patient feels her physical   health is worse.    Compared to one year ago, the patient feels her mental   health is the same.    Recent Hospitalizations:  She was not admitted to the hospital during the last year.     Current Medical Providers:  Patient Care Team:  Floyd Silva MD as PCP - General (Internal Medicine)    Outpatient Medications Prior to Visit   Medication Sig Dispense Refill   • ALPRAZolam (XANAX) 1 MG tablet TAKE ONE TABLET BY MOUTH EVERY NIGHT AT BEDTIME AS NEEDED FOR ANXIETY OR SLEEP 30 tablet 2   • bisoprolol-hydrochlorothiazide (ZIAC) 10-6.25 MG per tablet TAKE ONE TABLET BY MOUTH DAILY 90 tablet 1   • Calcium + Vitamin D3 600-5 MG-MCG tablet ONE BY MOUTH TWICE A DAY 60 tablet 6   • cetirizine (zyrTEC) 10 MG tablet TAKE ONE TABLET BY MOUTH DAILY 90 tablet 1   • clindamycin (CLEOCIN T) 1 % lotion Apply  topically to the appropriate area as directed At Night As Needed. Use on face     • colestipol (COLESTID) 1 g tablet TAKE TWO TABLETS BY MOUTH TWICE A  tablet 2   • cyclobenzaprine (FLEXERIL) 10 MG tablet TAKE ONE TABLET BY MOUTH THREE TIMES A DAY AS NEEDED FOR MUSCLE SPASMS AS NEEDED 100 tablet 3   • Denosumab (PROLIA SC) Inject  under the skin into the appropriate area as directed Every 6 (Six) Months.     • dexlansoprazole (DEXILANT) 60 MG capsule Take 60 mg by mouth Daily.     • Diclofenac Sodium (VOLTAREN) 1 % gel gel APPLY 4 GRAMS TOPICALLY TO THE APPROPRIATE AREA AS DIRECTED FOUR TIMES DAILY AS NEEDED FOR JOINT PAIN 100 g 3   • famotidine (PEPCID) 20 MG tablet Take 1 tablet by mouth 2 (Two) Times a Day As Needed for Heartburn. 120 tablet 5   •  FeroSul 325 (65 Fe) MG tablet TAKE ONE TABLET BY MOUTH DAILY (Patient taking differently: Take 325 mg by mouth Daily With Breakfast.) 90 tablet 4   • Flaxseed, Linseed, (FLAXSEED OIL MAX STR) 1300 MG capsule Take 1 tablet by mouth 2 (Two) Times a Day.     • fluticasone (FLONASE) 50 MCG/ACT nasal spray SPRAY TWO SPRAYS IN EACH NOSTRIL ONCE DAILY 16 mL 5   • gabapentin (NEURONTIN) 300 MG capsule TAKE TWO CAPSULES BY MOUTH EVERY MORNING AND FOUR CAPSULES EVERY EVENING 270 capsule 5   • hydrOXYzine (ATARAX) 25 MG tablet Take 1 tablet by mouth Every 8 (Eight) Hours As Needed for Itching. 30 tablet 3   • lamoTRIgine (LaMICtal) 200 MG tablet Take 200 mg by mouth Daily.     • lisinopril (PRINIVIL,ZESTRIL) 5 MG tablet TAKE ONE TABLET BY MOUTH DAILY 90 tablet 3   • NIFEdipine XL (PROCARDIA XL) 90 MG 24 hr tablet TAKE ONE TABLET BY MOUTH DAILY 90 tablet 1   • ondansetron (ZOFRAN) 4 MG tablet Take 1 tablet by mouth Every 8 (Eight) Hours As Needed for Nausea or Vomiting. 30 tablet 3   • QUEtiapine (SEROquel) 100 MG tablet Every 12 (Twelve) Hours.     • ciclopirox (PENLAC) 8 % solution Apply  topically to the appropriate area as directed Every Night. 6 mL 1   • Desoximetasone 0.25 % liquid Every 12 (Twelve) Hours.     • levothyroxine (SYNTHROID, LEVOTHROID) 200 MCG tablet TAKE 1 TABLET BY MOUTH DAILY 90 tablet 1   • oxyCODONE (ROXICODONE) 10 MG tablet Take 1 tablet by mouth Every 6 (Six) Hours As Needed for Severe Pain . 120 tablet 0   • promethazine-dextromethorphan (PROMETHAZINE-DM) 6.25-15 MG/5ML solution Every 6 (Six) Hours.     • raNITIdine (ZANTAC) 300 MG capsule 1 cap(s)       No facility-administered medications prior to visit.     Opioid medication/s are on active medication list.  and I have evaluated her active treatment plan and pain score trends (see table).  There were no vitals filed for this visit.  I have reviewed the chart for potential of high risk medication and harmful drug interactions in the  elderly.          Aspirin is not on active medication list.  Aspirin use is not indicated based on review of current medical condition/s. Risk of harm outweighs potential benefits.  .    Patient Active Problem List   Diagnosis   • Vitamin B12 deficiency   • Arthritis   • Sleep apnea   • Bipolar affective disorder (HCC)   • Depression   • Breast cancer (HCC)   • Chronic pain   • Dyslipidemia   • Family history of malignant neoplasm of colon   • Family history of melanoma   • Gastroesophageal reflux disease   • Heart murmur   • Hypertension   • Hypothyroidism   • Osteoarthritis, generalized   • Raynaud's disease   • Ulcerative colitis (HCC)   • Artificial knee joint present   • Neuralgia   • Presence of artificial hip joint, right   • Lumbar radiculopathy   • Derangement of posterior horn of lateral meniscus   • Von Willebrand disease (HCC)   • SLE (systemic lupus erythematosus related syndrome) (HCC)   • Chest pain   • Scleroderma (HCC)   • Monahan syndrome   • Rectal prolapse   • Osteoporosis   • COVID-19   • Fibromyalgia   • Onychomycosis   • Stage 3 chronic kidney disease (HCC)   • Class 2 obesity   • Immunocompromised (HCC)   • Avascular necrosis of femoral head, left (HCC)   • Morbid obesity (HCC)   • Trochanteric bursitis of left hip     Advance Care Planning  Advance Directive is not on file.  ACP discussion was held with the patient during this visit. Patient has an advance directive (not in EMR), copy requested.  Review of Systems   Constitutional: Negative for chills and fever.   HENT: Negative for congestion, rhinorrhea and trouble swallowing.    Eyes: Negative for visual disturbance.   Respiratory: Positive for shortness of breath. Negative for cough.    Cardiovascular: Negative for chest pain and palpitations.   Gastrointestinal: Negative for abdominal pain, blood in stool and vomiting.   Genitourinary: Negative for difficulty urinating and dysuria.   Musculoskeletal: Positive for arthralgias and gait  "problem. Negative for back pain.   Skin: Positive for color change. Negative for rash.   Neurological: Negative for dizziness and light-headedness.   Psychiatric/Behavioral: Negative for dysphoric mood and sleep disturbance. The patient is not nervous/anxious.         Objective    Vitals:    05/18/22 1501   BP: 125/65   BP Location: Left arm   Patient Position: Sitting   Cuff Size: Adult   Pulse: 82   Resp: 18   Temp: 97.6 °F (36.4 °C)   TempSrc: Temporal   SpO2: 97%   Weight: 98.9 kg (218 lb)   Height: 162.6 cm (64.02\")     Class 2 Severe Obesity (BMI >=35 and <=39.9). Obesity-related health conditions include the following: GERD and osteoarthritis. Obesity is unchanged. BMI is is above average; BMI management plan is completed. We discussed portion control and increasing exercise.  Does the patient have evidence of cognitive impairment? No    Physical Exam  Constitutional:       General: She is not in acute distress.     Appearance: She is well-developed. She is obese.   HENT:      Head: Normocephalic and atraumatic.      Right Ear: Tympanic membrane and external ear normal. There is no impacted cerumen.      Left Ear: Tympanic membrane and external ear normal. There is no impacted cerumen.      Nose: Nose normal.      Mouth/Throat:      Mouth: Mucous membranes are moist.      Pharynx: No oropharyngeal exudate or posterior oropharyngeal erythema.   Eyes:      General: No scleral icterus.        Right eye: No discharge.         Left eye: No discharge.      Extraocular Movements: Extraocular movements intact.      Conjunctiva/sclera: Conjunctivae normal.      Pupils: Pupils are equal, round, and reactive to light.   Neck:      Thyroid: No thyromegaly.      Vascular: No carotid bruit.   Cardiovascular:      Rate and Rhythm: Normal rate and regular rhythm.      Heart sounds: Normal heart sounds. No murmur heard.  Pulmonary:      Effort: Pulmonary effort is normal. No respiratory distress.      Breath sounds: Normal " breath sounds. No wheezing or rales.   Abdominal:      General: Bowel sounds are normal. There is no distension.      Palpations: Abdomen is soft.      Tenderness: There is no abdominal tenderness.   Musculoskeletal:         General: No deformity. Normal range of motion.      Cervical back: Normal range of motion and neck supple.   Lymphadenopathy:      Cervical: No cervical adenopathy.   Skin:     General: Skin is warm and dry.      Findings: No rash.   Neurological:      General: No focal deficit present.      Mental Status: She is alert and oriented to person, place, and time. Mental status is at baseline.      Cranial Nerves: No cranial nerve deficit.      Sensory: No sensory deficit.   Psychiatric:         Behavior: Behavior normal.         Thought Content: Thought content normal.       Lab Results   Component Value Date    TRIG 78 2022    HDL 56 2022    LDL 68 2022    VLDL 15 2022    HGBA1C 5.5 2022        HEALTH RISK ASSESSMENT    Smoking Status:  Social History     Tobacco Use   Smoking Status Former Smoker   • Packs/day: 0.25   • Years: 7.00   • Pack years: 1.75   • Start date:    • Quit date:    • Years since quittin.4   Smokeless Tobacco Never Used   Tobacco Comment    Off and on for  those years     Alcohol Consumption:  Social History     Substance and Sexual Activity   Alcohol Use Yes    Comment: Rarely     [facility] Risk Screen:  [facility]  Single fall last month. Not generally unsteady on feet    Depression Screening:  PHQ-2/PHQ-9 Depression Screening 2022   Retired PHQ-9 Total Score -   Retired Total Score -   Little Interest or Pleasure in Doing Things 0-->not at all   Feeling Down, Depressed or Hopeless 0-->not at all   PHQ-9: Brief Depression Severity Measure Score 0     Health Habits and Functional and Cognitive Screening:  Functional & Cognitive Status 2022   Do you have difficulty preparing food and  eating? No   Do you have difficulty bathing yourself, getting dressed or grooming yourself? No   Do you have difficulty using the toilet? No   Do you have difficulty moving around from place to place? No   Do you have trouble with steps or getting out of a bed or a chair? No   Current Diet Well Balanced Diet   Dental Exam Up to date   Eye Exam Up to date   Exercise (times per week) 4 times per week   Current Exercises Include House Cleaning   Do you need help using the phone?  No   Are you deaf or do you have serious difficulty hearing?  No   Do you need help with transportation? No   Do you need help shopping? No   Do you need help preparing meals?  No   Do you need help with housework?  No   Do you need help with laundry? No   Do you need help taking your medications? No   Do you need help managing money? No   Do you ever drive or ride in a car without wearing a seat belt? No   Have you felt unusual stress, anger or loneliness in the last month? No   Who do you live with? Spouse   If you need help, do you have trouble finding someone available to you? No   Have you been bothered in the last four weeks by sexual problems? No   Do you have difficulty concentrating, remembering or making decisions? No     Age-appropriate Screening Schedule:  Refer to the list below for future screening recommendations based on patient's age, sex and/or medical conditions. Orders for these recommended tests are listed in the plan section. The patient has been provided with a written plan.    Health Maintenance   Topic Date Due   • PAP SMEAR  Never done   • INFLUENZA VACCINE  08/01/2022   • DXA SCAN  12/07/2022   • MAMMOGRAM  01/18/2023   • TDAP/TD VACCINES (2 - Td or Tdap) 04/06/2031   • ZOSTER VACCINE  Completed        Assessment & Plan   CMS Preventative Services Quick Reference  Risk Factors Identified During Encounter  Chronic Pain   Immunizations Discussed/Encouraged (specific Immunizations; Influenza  Obesity/Overweight   The  above risks/problems have been discussed with the patient.  Follow up actions/plans if indicated are seen below in the Assessment/Plan Section.  Pertinent information has been shared with the patient in the After Visit Summary.    Diagnoses and all orders for this visit:    1. Medicare annual wellness visit, subsequent (Primary)  -     CBC & Differential  -     Comprehensive Metabolic Panel  -     Hemoglobin A1c  -     Lipid Panel  -     TSH  -     T4, Free  -     Vitamin D 25 Hydroxy  -     Vitamin B12  -  Counseled regarding diet, exercise, weight loss, and preventative health maintenance items/immunizations below    2. Bursitis, unspecified site: Not ideal in setting of AVN but unable to tolerate NSAIDs.  However, likely needs surgical management of that hip.  Appreciate Ortho input.  Physical therapy is a consideration.  Weight loss would be helpful  -     predniSONE (DELTASONE) 20 MG tablet; Take 2 tablets by mouth Daily.  Dispense: 10 tablet; Refill: 0    3. Vitamin D deficiency, unspecified   -     Vitamin D 25 Hydroxy    4. Monahan syndrome: Last EGD and colonoscopy 9/2021, completing annually. Had EUS in 12/2021 and MRI abd/pel in 8/2021   -Continue oncology and GI follow-up    5. Malignant neoplasm of female breast, unspecified estrogen receptor status, unspecified laterality, unspecified site of breast (HCC): hx R breast cancer s/p R mastectomy w/ lymph node dissection and reconstruction in 1985.  Because of history of Monahan, patient has been monitored every 6 months with alternating mammogram and MRI   -Continue oncology follow-up- [facility]   -Continue regular screening per oncology    6. Ulcerative colitis with complication, unspecified location (HCC): Well-controlled at this time   -Continue GI [id number]    7. SLE (systemic lupus erythematosus related syndrome) (HCC)  8. Scleroderma (HCC): Now with recent concern for restrictive lung disease associated with scleroderma   -Continue rheumatology and  pulmonology follow-up   -Continue home hydroxychloroquine 200 BID, nifedipine 60 daily    9. Gastroesophageal reflux disease, unspecified whether esophagitis present: Last EGD September 2021   -Continue home dexlansoprazole 60mg daily w/ famotidine 20mg PRN    10. Osteoarthritis, generalized: Unable to tolerate NSAIDs secondary to CKD.  Receiving steroid injections to the left knee.  Last April 2022   -Recommend regular exercise and weight loss   -Continue home oxycodone 10 as needed (1 to 2/day)    11. Avascular necrosis of femoral head, left (HCC): History of right femoral head AVN in the setting of obesity and numerous autoimmune conditions   -Continue Ortho follow-up    12. Primary hypertension: 125/65 today   -Continue home Ziac 10/625, lisinopril 5 daily, and nifedipine 60 daily    13. Hypothyroidism, unspecified type   -Reduce levothyroxine to 175 mcg daily based on labs    Preventative  Colonoscopy: 9/2021, due 9/2022- follows w/ [facility]  Mammogram: 1/2022, MRI scheduled 7/6/22. Follows w/ Karen.  Pap smear: s/p hysterectomy  DEXA: 12/2020 w/ osteoporosis. Maintained on Prolia  Shingles: Completed 10/2018  Pneumonia: completed Prevnar 11/2017  Tdap: 4/2021  Influenza: 10/2021  COVID: Completed 3 shots Pfizer (1/2022)    Follow Up:   Return in about 4 months (around 9/18/2022) for Recheck- 30min.     An After Visit Summary and PPPS were made available to the patient.

## 2022-05-19 ENCOUNTER — OFFICE VISIT (OUTPATIENT)
Dept: ORTHOPEDIC SURGERY | Facility: CLINIC | Age: 62
End: 2022-05-19

## 2022-05-19 VITALS
HEART RATE: 88 BPM | BODY MASS INDEX: 37.76 KG/M2 | WEIGHT: 221.2 LBS | HEIGHT: 64 IN | DIASTOLIC BLOOD PRESSURE: 75 MMHG | SYSTOLIC BLOOD PRESSURE: 112 MMHG

## 2022-05-19 DIAGNOSIS — M70.62 TROCHANTERIC BURSITIS OF LEFT HIP: Primary | ICD-10-CM

## 2022-05-19 DIAGNOSIS — M87.052 AVASCULAR NECROSIS OF FEMORAL HEAD, LEFT: ICD-10-CM

## 2022-05-19 DIAGNOSIS — E66.01 MORBID OBESITY: ICD-10-CM

## 2022-05-19 LAB
25(OH)D3 SERPL-MCNC: 41.8 NG/ML (ref 30–100)
ALBUMIN SERPL-MCNC: 4.5 G/DL (ref 3.5–5.2)
ALBUMIN/GLOB SERPL: 1.5 G/DL
ALP SERPL-CCNC: 126 U/L (ref 39–117)
ALT SERPL W P-5'-P-CCNC: 26 U/L (ref 1–33)
ANION GAP SERPL CALCULATED.3IONS-SCNC: 11.5 MMOL/L (ref 5–15)
AST SERPL-CCNC: 28 U/L (ref 1–32)
BASOPHILS # BLD AUTO: 0.09 10*3/MM3 (ref 0–0.2)
BASOPHILS NFR BLD AUTO: 0.9 % (ref 0–1.5)
BILIRUB SERPL-MCNC: 0.2 MG/DL (ref 0–1.2)
BUN SERPL-MCNC: 23 MG/DL (ref 8–23)
BUN/CREAT SERPL: 20.7 (ref 7–25)
CALCIUM SPEC-SCNC: 10 MG/DL (ref 8.6–10.5)
CHLORIDE SERPL-SCNC: 100 MMOL/L (ref 98–107)
CHOLEST SERPL-MCNC: 139 MG/DL (ref 0–200)
CO2 SERPL-SCNC: 26.5 MMOL/L (ref 22–29)
CREAT SERPL-MCNC: 1.11 MG/DL (ref 0.57–1)
DEPRECATED RDW RBC AUTO: 42.9 FL (ref 37–54)
EGFRCR SERPLBLD CKD-EPI 2021: 56 ML/MIN/1.73
EOSINOPHIL # BLD AUTO: 0.27 10*3/MM3 (ref 0–0.4)
EOSINOPHIL NFR BLD AUTO: 2.6 % (ref 0.3–6.2)
ERYTHROCYTE [DISTWIDTH] IN BLOOD BY AUTOMATED COUNT: 12.5 % (ref 12.3–15.4)
GLOBULIN UR ELPH-MCNC: 3 GM/DL
GLUCOSE SERPL-MCNC: 87 MG/DL (ref 65–99)
HCT VFR BLD AUTO: 42.2 % (ref 34–46.6)
HDLC SERPL-MCNC: 56 MG/DL (ref 40–60)
HGB BLD-MCNC: 14 G/DL (ref 12–15.9)
IMM GRANULOCYTES # BLD AUTO: 0.05 10*3/MM3 (ref 0–0.05)
IMM GRANULOCYTES NFR BLD AUTO: 0.5 % (ref 0–0.5)
LDLC SERPL CALC-MCNC: 68 MG/DL (ref 0–100)
LDLC/HDLC SERPL: 1.2 {RATIO}
LYMPHOCYTES # BLD AUTO: 2.72 10*3/MM3 (ref 0.7–3.1)
LYMPHOCYTES NFR BLD AUTO: 26.7 % (ref 19.6–45.3)
MCH RBC QN AUTO: 31.3 PG (ref 26.6–33)
MCHC RBC AUTO-ENTMCNC: 33.2 G/DL (ref 31.5–35.7)
MCV RBC AUTO: 94.2 FL (ref 79–97)
MONOCYTES # BLD AUTO: 1.31 10*3/MM3 (ref 0.1–0.9)
MONOCYTES NFR BLD AUTO: 12.8 % (ref 5–12)
NEUTROPHILS NFR BLD AUTO: 5.76 10*3/MM3 (ref 1.7–7)
NEUTROPHILS NFR BLD AUTO: 56.5 % (ref 42.7–76)
NRBC BLD AUTO-RTO: 0 /100 WBC (ref 0–0.2)
PLATELET # BLD AUTO: 367 10*3/MM3 (ref 140–450)
PMV BLD AUTO: 10.9 FL (ref 6–12)
POTASSIUM SERPL-SCNC: 4.3 MMOL/L (ref 3.5–5.2)
PROT SERPL-MCNC: 7.5 G/DL (ref 6–8.5)
RBC # BLD AUTO: 4.48 10*6/MM3 (ref 3.77–5.28)
SODIUM SERPL-SCNC: 138 MMOL/L (ref 136–145)
T4 FREE SERPL-MCNC: 1.8 NG/DL (ref 0.93–1.7)
TRIGL SERPL-MCNC: 78 MG/DL (ref 0–150)
TSH SERPL DL<=0.05 MIU/L-ACNC: 0.05 UIU/ML (ref 0.27–4.2)
VIT B12 BLD-MCNC: 633 PG/ML (ref 211–946)
VLDLC SERPL-MCNC: 15 MG/DL (ref 5–40)
WBC NRBC COR # BLD: 10.2 10*3/MM3 (ref 3.4–10.8)

## 2022-05-19 PROCEDURE — 99214 OFFICE O/P EST MOD 30 MIN: CPT | Performed by: PHYSICIAN ASSISTANT

## 2022-05-19 NOTE — PROGRESS NOTES
ORTHOPEDIC VISIT    Referring Provider: Referring  Primary Care Provider: Floyd Silva MD         Subjective:  Chief Complaint:  Chief Complaint   Patient presents with   • Left Hip - Initial Evaluation     X 2 months, nki; CT showed AVN       HPI:  Tracie Gross is a 61 y.o. female who presents for her initial visit for left hip pain ongoing for the last 2 months.  She denies any specific injury.  She describes a dull pain, mainly in the buttocks area.  She denies any radiation, numbness, or tingling.  She is unable to take anti-inflammatories due to her kidneys.  She denies any previous history of surgery or injections in the hip.    Past Medical History:   Diagnosis Date   • Arthritis    • Asthma    • Bipolar affective disorder (HCC)    • Breast cancer (HCC)     s/p R mastectomy   • GERD (gastroesophageal reflux disease)    • H/O breast reconstruction     SEVERAL   • H/O laminectomy    • Hamartoma (HCC)    • Hx of bilateral oophorectomy    • Hypertension    • Hypothyroidism    • Inflammatory bowel disease     Man. 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 EGD/colonoscopy annually (2021). MRI alternating w/ EUS annually for pancreatic screen   • Osteoporosis     maintained on Prolia through Karen   • Raynaud disease    • Scleroderma (HCC)    • Von Willebrand disease (HCC)        Past Surgical History:   Procedure Laterality Date   • ARM DEBRIDEMENT Right     X 3   • BACK SURGERY      [facility]etas- cervical fusion   • BACK SURGERY      lumbar decomp   • BREAST BIOPSY     • BREAST LUMPECTOMY     • BREAST RECONSTRUCTION Right    • BREAST RECONSTRUCTION Right    • CARDIAC CATHETERIZATION      no stents placed   •  SECTION  ,    • CHOLECYSTECTOMY     • COLONOSCOPY     • COLONOSCOPY N/A 2020    Procedure: COLONOSCOPY;  Surgeon: Cayden Conway MD;   Location: [facility] ENDOSCOPY;  Service: Gastroenterology;  Laterality: N/A;  rectal ulcer   • COLONOSCOPY N/A 9/17/2021    Procedure: COLONOSCOPY WITH POLYPECTOMY X 1;  Surgeon: Cayden Conway MD;  Location: [facility] ENDOSCOPY;  Service: Gastroenterology;  Laterality: N/A;  Post: COLON POLYP   • COSMETIC SURGERY  [id number]   • ENDOSCOPY     • ENDOSCOPY N/A 8/14/2020    Procedure: ESOPHAGOGASTRODUODENOSCOPY;  Surgeon: Cayden Conway MD;  Location: [facility] ENDOSCOPY;  Service: Gastroenterology;  Laterality: N/A;  Normal EGD   • ENDOSCOPY N/A 9/17/2021    Procedure: ESOPHAGOGASTRODUODENOSCOPY;  Surgeon: Cayden Conway MD;  Location: [facility] ENDOSCOPY;  Service: Gastroenterology;  Laterality: N/A;  Post: normal egd   • EXPLORATORY LAPAROTOMY      scar tissue removal   • EYE SURGERY  2000   • FINGER SURGERY Right     ring finger mass excision   • HYSTERECTOMY      PARTIAL   • JOINT REPLACEMENT Right 2012,2015    hip and knee   • KNEE ARTHROSCOPY Left 1/7/2020    Procedure: LEFT KNEE SCOPE with partial lateral meniscectomy;  Surgeon: Chau Perez MD;  Location: [facility] MAIN OR;  Service: Orthopedics   • LASIK      LASIK/ LASIK ENHANCEMENT   • MASTECTOMY RADICAL Right    • OOPHORECTOMY Bilateral    • SPLENECTOMY     • TUBAL ABDOMINAL LIGATION  1981   • UPPER ENDOSCOPIC ULTRASOUND W/ FNA N/A 12/9/2021    Procedure: ENDOSCOPIC ULTRASOUND WITH ESOPHAGOGASTRODUODENOSCOPY;  Surgeon: Luis E Negron MD;  Location: [facility] ENDOSCOPY;  Service: Gastroenterology;  Laterality: N/A;  Post: GASTROPARESIS, DILATED COMMON BILE DUCT, FUNDIC POLYP, DUODENITIS, NORMAL PANCREAS, HIATAL HERNIA   • WRIST SURGERY Right     distal radius head removal (bone dead)  plates and screws decomp lunate       Family History   Problem Relation Age of Onset   • Colon cancer Mother    • Heart disease Mother    • Cancer Mother    • Kidney disease Father    • Heart disease Father    • Depression Father    • Hyperlipidemia Father    • Vision  loss Father    • Colon cancer Sister    • Diabetes Sister    • Heart disease Sister    • Von Willebrand disease Sister    • Von Willebrand disease Brother    • Von Willebrand disease Son    • Breast cancer Son    • Other Son         burdick syndrome   • Diabetes Paternal Grandmother    • Stroke Paternal Grandmother    • Colon cancer Other    • Colon cancer Son    • Von Willebrand disease Son    • Other Son         burdick syndrome   • Breast cancer Son    • Cancer Sister    • Vision loss Sister    • Other Sister    • Stroke Sister    • Cancer Paternal Aunt    • Cancer Maternal Aunt    • Cancer Maternal Aunt    • Hyperlipidemia Sister    • Thyroid disease Sister    • Thyroid disease Sister        Social History     Occupational History   • Not on file   Tobacco Use   • Smoking status: Former Smoker     Packs/day: 0.25     Years: 7.00     Pack years: 1.75     Start date:      Quit date:      Years since quittin.3   • Smokeless tobacco: Never Used   • Tobacco comment: Off and on for  those years   Vaping Use   • Vaping Use: Never used   Substance and Sexual Activity   • Alcohol use: Yes     Comment: Rarely   • Drug use: No   • Sexual activity: Defer        Medications:    Current Outpatient Medications:   •  ALPRAZolam (XANAX) 1 MG tablet, TAKE ONE TABLET BY MOUTH EVERY NIGHT AT BEDTIME AS NEEDED FOR ANXIETY OR SLEEP, Disp: 30 tablet, Rfl: 2  •  bisoprolol-hydrochlorothiazide (ZIAC) 10-6.25 MG per tablet, TAKE ONE TABLET BY MOUTH DAILY, Disp: 90 tablet, Rfl: 1  •  Calcium + Vitamin D3 600-5 MG-MCG tablet, ONE BY MOUTH TWICE A DAY, Disp: 60 tablet, Rfl: 6  •  cetirizine (zyrTEC) 10 MG tablet, TAKE ONE TABLET BY MOUTH DAILY, Disp: 90 tablet, Rfl: 1  •  clindamycin (CLEOCIN T) 1 % lotion, Apply  topically to the appropriate area as directed At Night As Needed. Use on face, Disp: , Rfl:   •  colestipol (COLESTID) 1 g tablet, TAKE TWO TABLETS BY MOUTH TWICE A DAY, Disp: 240 tablet, Rfl: 2  •  cyclobenzaprine  (FLEXERIL) 10 MG tablet, TAKE ONE TABLET BY MOUTH THREE TIMES A DAY AS NEEDED FOR MUSCLE SPASMS AS NEEDED, Disp: 100 tablet, Rfl: 3  •  Denosumab (PROLIA SC), Inject  under the skin into the appropriate area as directed Every 6 (Six) Months., Disp: , Rfl:   •  dexlansoprazole (DEXILANT) 60 MG capsule, Take 60 mg by mouth Daily., Disp: , Rfl:   •  Diclofenac Sodium (VOLTAREN) 1 % gel gel, APPLY 4 GRAMS TOPICALLY TO THE APPROPRIATE AREA AS DIRECTED FOUR TIMES DAILY AS NEEDED FOR JOINT PAIN, Disp: 100 g, Rfl: 3  •  famotidine (PEPCID) 20 MG tablet, Take 1 tablet by mouth 2 (Two) Times a Day As Needed for Heartburn., Disp: 120 tablet, Rfl: 5  •  FeroSul 325 (65 Fe) MG tablet, TAKE ONE TABLET BY MOUTH DAILY (Patient taking differently: Take 325 mg by mouth Daily With Breakfast.), Disp: 90 tablet, Rfl: 4  •  Flaxseed, Linseed, (FLAXSEED OIL MAX STR) 1300 MG capsule, Take 1 tablet by mouth 2 (Two) Times a Day., Disp: , Rfl:   •  fluticasone (FLONASE) 50 MCG/ACT nasal spray, SPRAY TWO SPRAYS IN EACH NOSTRIL ONCE DAILY, Disp: 16 mL, Rfl: 5  •  gabapentin (NEURONTIN) 300 MG capsule, TAKE TWO CAPSULES BY MOUTH EVERY MORNING AND FOUR CAPSULES EVERY EVENING, Disp: 270 capsule, Rfl: 5  •  hydrOXYzine (ATARAX) 25 MG tablet, Take 1 tablet by mouth Every 8 (Eight) Hours As Needed for Itching., Disp: 30 tablet, Rfl: 3  •  lamoTRIgine (LaMICtal) 200 MG tablet, Take 200 mg by mouth Daily., Disp: , Rfl:   •  levothyroxine (SYNTHROID, LEVOTHROID) 200 MCG tablet, TAKE 1 TABLET BY MOUTH DAILY, Disp: 90 tablet, Rfl: 1  •  lisinopril (PRINIVIL,ZESTRIL) 5 MG tablet, TAKE ONE TABLET BY MOUTH DAILY, Disp: 90 tablet, Rfl: 3  •  NIFEdipine XL (PROCARDIA XL) 90 MG 24 hr tablet, TAKE ONE TABLET BY MOUTH DAILY, Disp: 90 tablet, Rfl: 1  •  ondansetron (ZOFRAN) 4 MG tablet, Take 1 tablet by mouth Every 8 (Eight) Hours As Needed for Nausea or Vomiting., Disp: 30 tablet, Rfl: 3  •  oxyCODONE (ROXICODONE) 10 MG tablet, Take 1 tablet by mouth Every 6  "(Six) Hours As Needed for Severe Pain ., Disp: 120 tablet, Rfl: 0  •  predniSONE (DELTASONE) 20 MG tablet, Take 2 tablets by mouth Daily., Disp: 10 tablet, Rfl: 0  •  QUEtiapine (SEROquel) 100 MG tablet, Every 12 (Twelve) Hours., Disp: , Rfl:     Allergies:  Allergies   Allergen Reactions   • Aspirin Other (See Comments)     VONWILLENBRAND DISEASE    • Penicillins Anaphylaxis   • Baclofen Hives   • Tape  [Adhesive Tape] Rash         Review of Systems:  Gen -no fever, chills , sweats, headache   Eyes - no irritation or discharge   ENT -  no ear pain , runny nose , sore throat , difficulty swallowing   Resp - no cough , congestion , excessive expectoration   CVS - no chest pain , palpitations.   Abd - no pain , nausea , vomiting , diarrhea   Skin - no rash , lesions.   Neuro - no dizziness    Please see HPI for any other pertinent positives.  All other systems were reviewed and are negative.       Objective   Objective:    /75 (BP Location: Left arm, Patient Position: Sitting, Cuff Size: Large Adult)   Pulse 88   Ht 162.6 cm (64\")   Wt 100 kg (221 lb 3.2 oz)   LMP 05/08/1983   BMI 37.97 kg/m²     Physical Examination:  Alert, oriented, obese individual in no acute distress, ambulating unassisted  Left lower extremity shows no appreciable swelling in the thigh area. It is grossly well aligned, and the patient is neurovascularly intact distally. Plantar and dorsiflexion is 5/5. There is mild tenderness to palpation over the greater troch but not with range of motion, which is smooth and WNL.  Negative Tee.         Imaging:  xrays obtained today  left Hip X-Ray    Date of exam: 5/19/2022    Indication: Left hip pain    AP and lateral    Findings:Shows minimal to mild DJD with signs of avascular necrosis with no signs of subchondral collapse.  The femoral head remains round and intact and No fractures or dislocations appreciated.    subnormal joint spaces    Hardware appropriately positioned not " "applicable    no prior studies available for comparison.    X-RAY was ordered and reviewed by BARBIE Wright          Assessment:  1. Trochanteric bursitis of left hip    2. Avascular necrosis of femoral head, left (HCC)    3. Morbid obesity (HCC)                 Plan:  I would like to avoid steroids in this patient, as she already has avascular necrosis.  Since she is unable to take oral anti-inflammatories, we will try prescription compounded topical cream.  I have given her home exercises to begin.  Weight loss is highly recommended.  She should follow-up with Dr. Mar in 6 weeks for recheck, and to also discuss if she may be a candidate for a core decompression in the future.  Discussed possible off label treatment with Fosamax today, however the patient is currently on Prolia and has previously failed Fosamax.  She should call with any questions or concerns.  Natural history and expected course discussed. Questions answered.  Educational materials distributed.  OTC analgesics as needed.  physical therapy  weight loss  activtiy modification               BARBIE Wright  05/19/22  14:23 EDT    \"Please note that portions of this note were completed with a voice recognition program\".   "

## 2022-05-20 RX ORDER — LEVOTHYROXINE SODIUM 175 UG/1
175 TABLET ORAL DAILY
Qty: 90 TABLET | Refills: 1 | Status: SHIPPED | OUTPATIENT
Start: 2022-05-20 | End: 2022-11-03

## 2022-05-24 ENCOUNTER — TREATMENT (OUTPATIENT)
Dept: PHYSICAL THERAPY | Facility: CLINIC | Age: 62
End: 2022-05-24

## 2022-05-24 DIAGNOSIS — M25.511 ACUTE PAIN OF BOTH SHOULDERS: ICD-10-CM

## 2022-05-24 DIAGNOSIS — M75.41 IMPINGEMENT SYNDROME OF RIGHT SHOULDER: ICD-10-CM

## 2022-05-24 DIAGNOSIS — G89.29 CHRONIC MIDLINE LOW BACK PAIN WITHOUT SCIATICA: Primary | ICD-10-CM

## 2022-05-24 DIAGNOSIS — M25.512 ACUTE PAIN OF LEFT SHOULDER: ICD-10-CM

## 2022-05-24 DIAGNOSIS — M54.50 CHRONIC MIDLINE LOW BACK PAIN WITHOUT SCIATICA: Primary | ICD-10-CM

## 2022-05-24 DIAGNOSIS — M75.42 IMPINGEMENT SYNDROME OF LEFT SHOULDER: ICD-10-CM

## 2022-05-24 DIAGNOSIS — M15.9 OSTEOARTHRITIS, GENERALIZED: ICD-10-CM

## 2022-05-24 DIAGNOSIS — M25.512 ACUTE PAIN OF BOTH SHOULDERS: ICD-10-CM

## 2022-05-24 PROCEDURE — 97140 MANUAL THERAPY 1/> REGIONS: CPT | Performed by: PHYSICAL THERAPIST

## 2022-05-24 NOTE — PROGRESS NOTES
Physical Therapy Daily Treatment Note      Patient: Tracie Gorss   : 1960  Referring practitioner: Floyd Silva MD  Date of Initial Visit: Type: THERAPY  Noted: 2022  Today's Date: 2022  Patient seen for 6 sessions       Visit Diagnoses:    ICD-10-CM ICD-9-CM   1. Chronic midline low back pain without sciatica  M54.50 724.2    G89.29 338.29   2. Acute pain of left shoulder  M25.512 719.41   3. Acute pain of both shoulders  M25.511 719.41    M25.512    4. Impingement syndrome of right shoulder  M75.41 726.2   5. Impingement syndrome of left shoulder  M75.42 726.2   6. Osteoarthritis, generalized  M15.9 715.00       Subjective Evaluation    History of Present Illness    Subjective comment: saw PA last week to discuss hip x-ray but no plans for VADIM at this time and will do some bursitis HEP she was given, but has trouble with ITB ex in standing and can't lay prone well either. won't fill her compound lotion cream as she has celebrex. goes back in 6 weeks to discuss VADIM for AVN vs. decompression approach, but knows this is 3 months of NWB on bad shoulders.        Objective   See Exercise, Manual, and Modality Logs for complete treatment.       Assessment & Plan     Assessment    Assessment details: Very tender to PROM and palpation with pt revealing she got down on the floor this weekend to try and retrieve a mint under the bed in the middle of the night. Unable to get back up to her feet thus had to wake her . Feels that this strained the back of her left shoulder thus set back today preventing exercise and deeper soft tissue work. Did well with estim          Timed:         Manual Therapy:    13     mins  04896;     Therapeutic Exercise:         mins  59660;     Neuromuscular Walter:        mins  96300;    Therapeutic Activity:          mins  21271;     Gait Training:           mins  68639;     Ultrasound:          mins  84976;    Ionto                                   mins    70084  Self Care                            mins   29810  Canalith Repos         mins 88528      Un-Timed:  Electrical Stimulation:    15     mins  13506 ( );  Dry Needling          mins self-pay  Traction          mins 15572      Timed Treatment:   23   mins   Total Treatment:     23   mins    Zorre Zeno Kimura, PT  [place] License: [id number]    [place] License:  [id number]

## 2022-05-27 DIAGNOSIS — G89.4 CHRONIC PAIN SYNDROME: ICD-10-CM

## 2022-05-28 RX ORDER — OXYCODONE HYDROCHLORIDE 10 MG/1
10 TABLET ORAL EVERY 6 HOURS PRN
Qty: 120 TABLET | Refills: 0 | Status: SHIPPED | OUTPATIENT
Start: 2022-05-28 | End: 2022-09-20 | Stop reason: SDUPTHER

## 2022-05-31 PROBLEM — M19.91 PRIMARY OSTEOARTHRITIS: Status: RESOLVED | Noted: 2019-12-11 | Resolved: 2022-05-31

## 2022-05-31 PROBLEM — D84.9 IMMUNODEFICIENCY DISORDER (HCC): Status: RESOLVED | Noted: 2021-10-14 | Resolved: 2022-05-31

## 2022-06-03 ENCOUNTER — TREATMENT (OUTPATIENT)
Dept: PHYSICAL THERAPY | Facility: CLINIC | Age: 62
End: 2022-06-03

## 2022-06-03 DIAGNOSIS — M25.512 ACUTE PAIN OF BOTH SHOULDERS: Primary | ICD-10-CM

## 2022-06-03 DIAGNOSIS — M15.9 OSTEOARTHRITIS, GENERALIZED: ICD-10-CM

## 2022-06-03 DIAGNOSIS — M75.41 IMPINGEMENT SYNDROME OF RIGHT SHOULDER: ICD-10-CM

## 2022-06-03 DIAGNOSIS — M25.511 ACUTE PAIN OF BOTH SHOULDERS: Primary | ICD-10-CM

## 2022-06-03 PROCEDURE — 97140 MANUAL THERAPY 1/> REGIONS: CPT | Performed by: PHYSICAL THERAPIST

## 2022-06-03 PROCEDURE — 97110 THERAPEUTIC EXERCISES: CPT | Performed by: PHYSICAL THERAPIST

## 2022-06-03 PROCEDURE — 97530 THERAPEUTIC ACTIVITIES: CPT | Performed by: PHYSICAL THERAPIST

## 2022-06-05 NOTE — PROGRESS NOTES
Physical Therapy Daily Progress Note    VISIT#: 7    Subjective   Tracie Gross reports the shdlr pain is basically unchanged.   Ortho fu for the AVN of the hip is in 6 weeks. She has not scheduled an ortho appt for the shldrs.  Today, Tracie reports that she will fu with her PCP as he did give her injections in the shldr before.       Objective   Painful and limited shldr ROM.  Weakness in the shldrs and scapula.    See Exercise, Manual, and Modality Logs for complete treatment.   MHP kristen medina at end of session.     Patient Education:  Review of home ex.  encouraged pnt to seek ortho consult due to the current presentation    Assessment/Plan  Evelyn was able to tolerate the ex today with slightly less shldr hike.  Continues to require verbal cues for ex completion.     Plan:  Cont x's 2 more wks,  If no significant change, will dc to HEP.          Timed:         Manual Therapy:    12     mins  43180;     Therapeutic Exercise:    16     mins  00477;     Neuromuscular Walter:        mins  67417;    Therapeutic Activity:     11     mins  37822;     Gait Training:           mins  84113;     Ultrasound:          mins  70458;    Ionto                                   mins   82504  Self Care                            mins   90152  Canalith Repos                   mins  41970    Un-Timed:  Electrical Stimulation:         mins  74890 ( );  Dry Needling          mins self-pay  Traction          mins 15191  Low Eval          Mins  56289  Mod Eval          Mins  90932  High Eval                            Mins  77524  Re-Eval                               mins  86901    Timed Treatment:   39   mins   Total Treatment:     50   mins    Jocelynn Matos, PT    Physical Therapist

## 2022-06-08 ENCOUNTER — TREATMENT (OUTPATIENT)
Dept: PHYSICAL THERAPY | Facility: CLINIC | Age: 62
End: 2022-06-08

## 2022-06-08 DIAGNOSIS — M75.41 IMPINGEMENT SYNDROME OF RIGHT SHOULDER: ICD-10-CM

## 2022-06-08 DIAGNOSIS — M25.512 ACUTE PAIN OF BOTH SHOULDERS: Primary | ICD-10-CM

## 2022-06-08 DIAGNOSIS — M25.511 ACUTE PAIN OF BOTH SHOULDERS: Primary | ICD-10-CM

## 2022-06-08 DIAGNOSIS — M15.9 OSTEOARTHRITIS, GENERALIZED: ICD-10-CM

## 2022-06-08 PROCEDURE — 97110 THERAPEUTIC EXERCISES: CPT | Performed by: PHYSICAL THERAPIST

## 2022-06-08 PROCEDURE — 97140 MANUAL THERAPY 1/> REGIONS: CPT | Performed by: PHYSICAL THERAPIST

## 2022-06-08 PROCEDURE — 97530 THERAPEUTIC ACTIVITIES: CPT | Performed by: PHYSICAL THERAPIST

## 2022-06-09 NOTE — PROGRESS NOTES
Physical Therapy Daily Progress Note    VISIT#: 8    Subjective   Tracie Gross reports the shldr pain was terrible over the weekend.  Overall, the shldr varies.  She states that she has chosen not to seek an ortho consult at this time as she feels she has a lot of doctor appts lately and does not want to add another       Objective   PROM leonard shldrs to increase motion.  TherEx and there Act in order to improve functional motion and strength for daily tasks and living.   See Exercise, Manual, and Modality Logs for complete treatment.     Patient Education:  Review of home ex    Assessment/Plan  Tracie has been treated x's 8 sessions since the eval date due to leonard shldr pain. She continues to experience consistent and pain levels to the point of affecting her function and sleep.  She has chosen not to seek orthopedic care. PT will cont up to another couple of weeks, if no change, will dc to HEP at that time.       Plan:  Cont with current ex.           Timed:         Manual Therapy:    11     mins  61003;     Therapeutic Exercise:    13     mins  22011;     Neuromuscular Walter:    6    mins  31277;    Therapeutic Activity:     12     mins  19189;     Gait Training:           mins  81388;     Ultrasound:          mins  31539;    Ionto                                   mins   95715  Self Care                            mins   99043  Canalith Repos                   mins  87152    Un-Timed:  Electrical Stimulation:         mins  47996 ( );  Dry Needling          mins self-pay  Traction          mins 04144  Low Eval          Mins  53430  Mod Eval          Mins  04806  High Eval                            Mins  83960  Re-Eval                               mins  21253    Timed Treatment:   42   mins   Total Treatment:     45   mins    Jocelynn Matos, PT    Physical Therapist

## 2022-06-13 ENCOUNTER — TREATMENT (OUTPATIENT)
Dept: PHYSICAL THERAPY | Facility: CLINIC | Age: 62
End: 2022-06-13

## 2022-06-13 DIAGNOSIS — M15.9 OSTEOARTHRITIS, GENERALIZED: ICD-10-CM

## 2022-06-13 DIAGNOSIS — M25.511 ACUTE PAIN OF BOTH SHOULDERS: Primary | ICD-10-CM

## 2022-06-13 DIAGNOSIS — M25.512 ACUTE PAIN OF BOTH SHOULDERS: Primary | ICD-10-CM

## 2022-06-13 DIAGNOSIS — M75.41 IMPINGEMENT SYNDROME OF RIGHT SHOULDER: ICD-10-CM

## 2022-06-13 PROCEDURE — 97110 THERAPEUTIC EXERCISES: CPT | Performed by: PHYSICAL THERAPIST

## 2022-06-13 PROCEDURE — 97530 THERAPEUTIC ACTIVITIES: CPT | Performed by: PHYSICAL THERAPIST

## 2022-06-13 PROCEDURE — 97140 MANUAL THERAPY 1/> REGIONS: CPT | Performed by: PHYSICAL THERAPIST

## 2022-06-13 RX ORDER — LEVOTHYROXINE SODIUM 0.2 MG/1
TABLET ORAL
Qty: 90 TABLET | Refills: 2 | Status: SHIPPED | OUTPATIENT
Start: 2022-06-13 | End: 2023-01-06 | Stop reason: HOSPADM

## 2022-06-13 NOTE — PROGRESS NOTES
Physical Therapy 30-Day Progress Note    VISIT#: 9    Subjective   Tracie Gross reports the pain levels ar from 2 - 6/10   Today 10 with the R being the most shldr to hurt.  She reports that she is wanting to try the ex at home for a while.       Objective   Quick DASH  28/55  ROM  Left Shoulder   Flexion: 151 degrees  Extension: 60 degrees   Abduction: 153 degrees   External rotation 0°:   52  degrees   External rotation BTH: C1  Internal rotation BTB: T10     Right Shoulder   Flexion: 155 degrees with pain  Extension: 45 degrees with pain  Abduction: 139 degrees with pain and a catch  External rotation 0°: 60 degrees with pain  External rotation BTH: C3   Internal rotation BTB: T11     MMT:   Flex 3+,  Ext 4-,  ER/IR  3+.  Pain with all planes.      See Exercise, Manual, and Modality Logs for complete treatment.     Patient Education:  Time spent review all home ex, including handouts.    Assessment/Plan  Tracie has been to PT x's 8 sessions since the eval date of 22.  She has made progress in her active motion and strength of both shldrs.  Pain levels have also decreased.in the lowest point, as well as the frequency of the highest pain level. All STGs have been achieved.  PT remains indicated; however, pnt wants to try doing the ex at home for a while and see how she does.     ST visits     1)  Reduce pain level by 2-3 points  Met 22     2)  Increase AROM to =/> 70% norm values wo greater pain  Met 22     3)  Sleep with < 25% interruption due to shldr pain  Met 22     4)  Tolerate strengthening of gravity resisted ex wo greater pain  Met 22    LTG:   DC     1)  0-2/10 pain level in order to complete ADLs, including dressing wo greater pain     2)  MMT =/> 4+/5 with improved ability to lift objects for household, yard and daily living tasks wo greater pain     3)  Improve the functional survey =/> 8 points     4)  Normal sleep pattern in relationship to the shldr injury     5)  Resume  prior activities and/or work     6)  IHEP   Plan:  Hold PT per pnt request.   She will cont with ex at home            Timed:         Manual Therapy:    11     mins  08478;     Therapeutic Exercise:    16     mins  96171;     Neuromuscular Walter:        mins  93554;    Therapeutic Activity:     12     mins  39195;     Gait Training:           mins  82427;     Ultrasound:          mins  73636;    Ionto                                   mins   77208  Self Care                        5    mins   52403  Canalith Repos                   mins  19381    Un-Timed:  Electrical Stimulation:         mins  43328 ( );  Dry Needling          mins self-pay  Traction          mins 36698  Low Eval          Mins  22588  Mod Eval          Mins  13241  High Eval                            Mins  71375  Re-Eval                               mins  22140    Timed Treatment:   44   mins   Total Treatment:     44   mins    Jocelynn Matos, PT    Physical Therapist

## 2022-06-16 ENCOUNTER — TELEPHONE (OUTPATIENT)
Dept: ORTHOPEDIC SURGERY | Facility: CLINIC | Age: 62
End: 2022-06-16

## 2022-06-16 NOTE — TELEPHONE ENCOUNTER
UNABLE TO WARM TRANSFER    Caller: ENRIQUE MISHRA    Relationship to patient: SELF    Best call back number: [phone]    Type of visit: 6 WK FOLLOW UP     Requested date: ASAP    If rescheduling, when is the original appointment: 07/01/22     Additional notes: PATIENT RETUNING CALL TO RESCHEDULE APPOINTMENT 07/01/22. HUB UNABLE TO SCHEDULE BEFORE 08/03/22. PLEASE CALL HER TO SCHEDULE.

## 2022-06-19 DIAGNOSIS — M32.9 SLE (SYSTEMIC LUPUS ERYTHEMATOSUS RELATED SYNDROME): ICD-10-CM

## 2022-06-19 DIAGNOSIS — M34.9 SCLERODERMA: ICD-10-CM

## 2022-06-19 RX ORDER — HYDROXYCHLOROQUINE SULFATE 200 MG/1
TABLET, FILM COATED ORAL
Qty: 180 TABLET | Refills: 3 | Status: CANCELLED | OUTPATIENT
Start: 2022-06-19

## 2022-06-20 ENCOUNTER — TELEPHONE (OUTPATIENT)
Dept: FAMILY MEDICINE CLINIC | Facility: CLINIC | Age: 62
End: 2022-06-20

## 2022-06-20 RX ORDER — HYDROXYCHLOROQUINE SULFATE 200 MG/1
200 TABLET, FILM COATED ORAL DAILY
Qty: 360 TABLET | Refills: 0 | Status: SHIPPED | OUTPATIENT
Start: 2022-06-20 | End: 2022-06-20 | Stop reason: SDUPTHER

## 2022-06-20 RX ORDER — HYDROXYCHLOROQUINE SULFATE 200 MG/1
200 TABLET, FILM COATED ORAL 2 TIMES DAILY
Qty: 180 TABLET | Refills: 0 | Status: CANCELLED | OUTPATIENT
Start: 2022-06-20 | End: 2023-06-20

## 2022-06-20 RX ORDER — HYDROXYCHLOROQUINE SULFATE 200 MG/1
200 TABLET, FILM COATED ORAL 2 TIMES DAILY
Qty: 180 TABLET | Refills: 1 | Status: SHIPPED | OUTPATIENT
Start: 2022-06-20 | End: 2022-12-08 | Stop reason: SDUPTHER

## 2022-06-20 NOTE — TELEPHONE ENCOUNTER
Pharmacy Name:  [facility]     Pharmacy representative name: ERMA    Pharmacy representative phone number: [phone]    What medication are you calling in regards to: HYDROXYCHLORQUINE     What question does the pharmacy have: DIRECTIONS     Who is the provider that prescribed the medication: DR. RIVAS    Additional notes:     PHARMACY WANTING TO MAKE SURE INSTRUCTIONS ARE CORRECT , HYDROXYCHLORQUINE    1 TIMES DAILY INSTEAD OF  2 TIMES DAILY? CLARIFICATION NEEDED

## 2022-06-24 ENCOUNTER — TELEPHONE (OUTPATIENT)
Dept: FAMILY MEDICINE CLINIC | Facility: CLINIC | Age: 62
End: 2022-06-24

## 2022-06-24 NOTE — TELEPHONE ENCOUNTER
Caller: Tracie Gross    Relationship to patient: Self    Best call back number: [phone]    Chief complaint: CORTIZONE SHOT FOR SHOULDER     Type of visit: OFFICE     Requested date: ASAP     If rescheduling, when is the original appointment: N/A     Additional notes:PATIENT SAID SHE HAS SEEN [facility] BEFORE ABOUT THIS PAIN AND HE TOLD HER WHEN IT STARTED BOTHERING HER AGAIN TO CALL THE OFFICE FOR AN APPOINTMENT    SHE SAID SHE'S PREVIOUSLY HAD CORTIZONE SHOTS WITH DR. RIVAS AND WOULD LIKE TO HAVE THAT DONE AGAIN

## 2022-06-28 ENCOUNTER — OFFICE VISIT (OUTPATIENT)
Dept: FAMILY MEDICINE CLINIC | Facility: CLINIC | Age: 62
End: 2022-06-28

## 2022-06-28 VITALS
OXYGEN SATURATION: 98 % | TEMPERATURE: 97.6 F | HEART RATE: 96 BPM | SYSTOLIC BLOOD PRESSURE: 126 MMHG | DIASTOLIC BLOOD PRESSURE: 80 MMHG

## 2022-06-28 DIAGNOSIS — M75.52 SUBACROMIAL BURSITIS OF BOTH SHOULDERS: Primary | ICD-10-CM

## 2022-06-28 DIAGNOSIS — M70.62 TROCHANTERIC BURSITIS OF LEFT HIP: ICD-10-CM

## 2022-06-28 DIAGNOSIS — M75.51 SUBACROMIAL BURSITIS OF BOTH SHOULDERS: Primary | ICD-10-CM

## 2022-06-28 DIAGNOSIS — Z96.641 PRESENCE OF ARTIFICIAL HIP JOINT, RIGHT: ICD-10-CM

## 2022-06-28 PROCEDURE — 99213 OFFICE O/P EST LOW 20 MIN: CPT | Performed by: FAMILY MEDICINE

## 2022-06-28 PROCEDURE — 20610 DRAIN/INJ JOINT/BURSA W/O US: CPT | Performed by: FAMILY MEDICINE

## 2022-06-28 RX ADMIN — TRIAMCINOLONE ACETONIDE 20 MG: 40 INJECTION, SUSPENSION INTRA-ARTICULAR; INTRAMUSCULAR at 15:17

## 2022-06-28 RX ADMIN — LIDOCAINE HYDROCHLORIDE 5 ML: 10 INJECTION, SOLUTION INFILTRATION; PERINEURAL at 15:17

## 2022-06-28 RX ADMIN — TRIAMCINOLONE ACETONIDE 20 MG: 40 INJECTION, SUSPENSION INTRA-ARTICULAR; INTRAMUSCULAR at 15:16

## 2022-06-28 RX ADMIN — LIDOCAINE HYDROCHLORIDE 5 ML: 10 INJECTION, SOLUTION INFILTRATION; PERINEURAL at 15:16

## 2022-06-28 RX ADMIN — TRIAMCINOLONE ACETONIDE 20 MG: 40 INJECTION, SUSPENSION INTRA-ARTICULAR; INTRAMUSCULAR at 15:15

## 2022-06-28 RX ADMIN — LIDOCAINE HYDROCHLORIDE 5 ML: 10 INJECTION, SOLUTION INFILTRATION; PERINEURAL at 15:15

## 2022-06-29 RX ORDER — LIDOCAINE HYDROCHLORIDE 10 MG/ML
5 INJECTION, SOLUTION INFILTRATION; PERINEURAL
Status: COMPLETED | OUTPATIENT
Start: 2022-06-28 | End: 2022-06-28

## 2022-06-29 RX ORDER — TRIAMCINOLONE ACETONIDE 40 MG/ML
20 INJECTION, SUSPENSION INTRA-ARTICULAR; INTRAMUSCULAR
Status: COMPLETED | OUTPATIENT
Start: 2022-06-28 | End: 2022-06-28

## 2022-06-29 NOTE — PROGRESS NOTES
Chief Complaint   Patient presents with   • Shoulder Pain     HPI  Tracie Gross is a 61 y.o. female that presents for   Chief Complaint   Patient presents with   • Shoulder Pain     Shoulder pain: Patient was seen nearly 3 months ago for complaint of bilateral shoulder pain.  Consider point tenderness just inferior to bilateral acromion's, this was felt to represent subacromial bursitis.  Patient responded well to steroid injection but pain is since returned.  She is interested in repeat steroid injection today    Hip pain: Patient did receive injection for left greater trochanteric bursitis at last visit with improvement in symptoms.  She is interested in repeating this today.  Patient also complains of right hip pain that radiates to the right knee.  Most of this right hip pain is posterior.  Of note, patient is status post right hip and right knee replacement.  She had responded well to physical therapy in the past and is open to repeating this today    Review of Systems   Musculoskeletal: Positive for arthralgias, gait problem and bursitis.     The following portions of the patient's history were reviewed and updated as appropriate: problem list, past medical history, past surgical history, allergies, current medication    Problem List Tab  Patient History Tab  Immunizations Tab  Medications Tab  Chart Review Tab  Care Everywhere Tab  Synopsis Tab    PE  Vitals:    06/28/22 1444   BP: 126/80   Pulse: 96   Temp: 97.6 °F (36.4 °C)   SpO2: 98%     There is no height or weight on file to calculate BMI.  General: Obese, NAD  Head: AT/NC  Eyes: EOMI, anicteric sclera  Resp: CTAB, SCR, BS equal  CV: RRR w/o m/r/g; 2+ pulses  GI: Soft, NT/ND, +BS  MSK: FROM, no deformity, no edema  Skin: Warm, dry, intact  Neuro: Alert and oriented. No focal deficits  Psych: Appropriate mood and affect    Imaging  CT Abdomen Pelvis Without Contrast    Result Date: 4/29/2022   1. Common bile duct caliber on today's examination appears  within normal limits. Mild prominence of the downstream and hepatic bile ducts is a stable finding since 5/4/2020. 2. Normal noncontrast appearance of the liver. 3. No acute findings in the abdomen or pelvis. No evidence of metastatic disease in the abdomen or pelvis. 4. Right mastectomy with reconstruction. Cholecystectomy. Splenectomy. Hysterectomy.. Right hip replacement. 5. Chronic fibrotic changes in the lung bases. 6. Avascular necrosis of the left femoral head without subchondral collapse. This is new since 5/4/2020.  Electronically Signed By-Barb Millan MD On:4/29/2022 9:48 AM This report was finalized on [id number] by  Barb Millan MD.    XR Shoulder 2+ View Bilateral    Result Date: 4/5/2022  Osteopenia. No fractures or dislocations of the glenohumeral joints or shoulders. Mild right and moderate left acromioclavicular joint arthritis.  Electronically Signed By-Lashawn Arboleda MD On:4/5/2022 12:46 PM This report was finalized on [id number] by  Lashawn Arboleda MD.      Assessment & Plan   Tracie Gross is a 61 y.o. female that presents for   Chief Complaint   Patient presents with   • Shoulder Pain     Diagnoses and all orders for this visit:    1. Subacromial bursitis of both shoulders (Primary)  -     Arthrocentesis  -     Arthrocentesis    2. Trochanteric bursitis of left hip  -     Arthrocentesis    3. Presence of artificial hip joint, right  -     Ambulatory Referral to Physical Therapy Evaluate and treat           No follow-ups on file.

## 2022-06-29 NOTE — PROGRESS NOTES
Procedure   Arthrocentesis    Date/Time: 6/28/2022 3:15 PM  Performed by: Floyd Silva MD  Authorized by: Floyd Silva MD   Indications: pain   Body area: shoulder  Joint: right subacromial bursa  Local anesthesia used: no    Anesthesia:  Local anesthesia used: no    Sedation:  Patient sedated: no    Preparation: Patient was prepped and draped in the usual sterile fashion.  Needle size: 22 G  Ultrasound guidance: no  Approach: lateral  Aspirate amount: 0 mL  Meds administered: 5 mL lidocaine 1 %; 20 mg triamcinolone acetonide 40 MG/ML  Patient tolerance: patient tolerated the procedure well with no immediate complications    Arthrocentesis    Date/Time: 6/28/2022 3:16 PM  Performed by: Floyd Silva MD  Authorized by: Floyd Silva MD   Indications: pain   Body area: shoulder  Joint: left subacromial bursa  Local anesthesia used: no    Anesthesia:  Local anesthesia used: no    Sedation:  Patient sedated: no    Preparation: Patient was prepped and draped in the usual sterile fashion.  Needle size: 22 G  Ultrasound guidance: no  Approach: lateral  Aspirate amount: 0 mL  Meds administered: 5 mL lidocaine 1 %; 20 mg triamcinolone acetonide 40 MG/ML  Patient tolerance: patient tolerated the procedure well with no immediate complications    Arthrocentesis    Date/Time: 6/28/2022 3:17 PM  Performed by: Floyd Silva MD  Authorized by: Floyd Silva MD   Indications: pain   Body area: hip  Joint: left hip  Local anesthesia used: no    Anesthesia:  Local anesthesia used: no    Sedation:  Patient sedated: no    Preparation: Patient was prepped and draped in the usual sterile fashion.  Needle size: 22 G  Ultrasound guidance: no  Approach: lateral  Aspirate amount: 0 mL  Meds administered: 5 mL lidocaine 1 %; 20 mg triamcinolone acetonide 40 MG/ML  Patient tolerance: patient tolerated the procedure well with no immediate complications

## 2022-06-30 DIAGNOSIS — Z96.641 PRESENCE OF ARTIFICIAL HIP JOINT, RIGHT: Primary | ICD-10-CM

## 2022-06-30 DIAGNOSIS — M25.561 RIGHT KNEE PAIN, UNSPECIFIED CHRONICITY: ICD-10-CM

## 2022-07-06 ENCOUNTER — HOSPITAL ENCOUNTER (OUTPATIENT)
Dept: MRI IMAGING | Facility: HOSPITAL | Age: 62
Discharge: HOME | End: 2022-07-06
Admitting: INTERNAL MEDICINE

## 2022-07-06 DIAGNOSIS — C50.919 MALIGNANT NEOPLASM OF FEMALE BREAST, UNSPECIFIED ESTROGEN RECEPTOR STATUS, UNSPECIFIED LATERALITY, UNSPECIFIED SITE OF BREAST: ICD-10-CM

## 2022-07-06 DIAGNOSIS — R92.8 OTHER ABNORMAL AND INCONCLUSIVE FINDINGS ON DIAGNOSTIC IMAGING OF BREAST: ICD-10-CM

## 2022-07-06 LAB
CREAT BLDA-MCNC: 1.2 MG/DL (ref 0.6–1.3)
EGFRCR SERPLBLD CKD-EPI 2021: 51 ML/MIN/1.73

## 2022-07-06 PROCEDURE — 82565 ASSAY OF CREATININE: CPT

## 2022-07-06 PROCEDURE — A9579 GAD-BASE MR CONTRAST NOS,1ML: HCPCS | Performed by: INTERNAL MEDICINE

## 2022-07-06 PROCEDURE — 25010000002 GADOTERIDOL PER 1 ML: Performed by: INTERNAL MEDICINE

## 2022-07-06 PROCEDURE — C8937 CAD BREAST MRI: HCPCS

## 2022-07-06 PROCEDURE — C8908 MRI W/O FOL W/CONT, BREAST,: HCPCS

## 2022-07-06 RX ADMIN — GADOTERIDOL 20 ML: 279.3 INJECTION, SOLUTION INTRAVENOUS at 15:06

## 2022-07-11 ENCOUNTER — TREATMENT (OUTPATIENT)
Dept: PHYSICAL THERAPY | Facility: CLINIC | Age: 62
End: 2022-07-11

## 2022-07-11 DIAGNOSIS — M25.661 DECREASED RANGE OF MOTION (ROM) OF RIGHT KNEE: ICD-10-CM

## 2022-07-11 DIAGNOSIS — M25.561 ACUTE PAIN OF RIGHT KNEE: ICD-10-CM

## 2022-07-11 DIAGNOSIS — M25.551 RIGHT HIP PAIN: Primary | ICD-10-CM

## 2022-07-11 DIAGNOSIS — R29.898 WEAKNESS OF RIGHT LEG: ICD-10-CM

## 2022-07-11 DIAGNOSIS — Z96.641 HISTORY OF TOTAL RIGHT HIP REPLACEMENT: ICD-10-CM

## 2022-07-11 DIAGNOSIS — Z96.651 HISTORY OF RIGHT KNEE JOINT REPLACEMENT: ICD-10-CM

## 2022-07-11 PROCEDURE — 97162 PT EVAL MOD COMPLEX 30 MIN: CPT | Performed by: PHYSICAL THERAPIST

## 2022-07-11 PROCEDURE — 97110 THERAPEUTIC EXERCISES: CPT | Performed by: PHYSICAL THERAPIST

## 2022-07-11 NOTE — PROGRESS NOTES
"Physical Therapy Initial Evaluation and Plan of Care    Patient: Tracie Gross   : 1960  Diagnosis/ICD-10 Code:  Right hip pain [M25.551]  Referring practitioner: Floyd Silva MD  Date of Initial Visit: 2022  Today's Date: 2022  Patient seen for 1 sessions           Subjective Questionnaire: [place] knee ,  [place] hip       Subjective Evaluation    History of Present Illness  Mechanism of injury: CHIEF C/O   Acute, severe onset of R hip pain,  R knee the pain has gotten greater and she is losing more motion.   CURRENT EPISODE   Patient reports that on Memorial day weekend she developed intense sudden onset of R leg pain.  To the point she had to have her  lift the leg into the bed.  She couldn't lift the leg into the car.  This inability to move the leg lasted x's 3 days to the point her  assisted.  The intense pain lasted x's 5 days.  To her PCP, no x-ray taken, PT ordered.    She also reports inferior patellar and lateral joint pain of the R knee.  She does not have good movement in the knee since her knee replacement   She did have PT several yrs ago due to pain and lack of motion.  She was referred to Dr. Shah for a second opinion for a manipulation; however he did not recommend such.   This past Sat, she had to sit on the floor to do dogs nails and then kneel to get up.  That night she rotated ice packs due to the pain. She must slide into the floor from her chair vs stooping.   ONSET   Memorial Day weekend  LOCATION   Posterior lateral R hip pain with reference into the thigh.  DESCRIPTION:  R hip -   Sharp, stabbing.   R knee - \"it did something weird and it now hurts\"   PAIN LEVELS:  R hip 3-9/10,   R knee 0-4/10  1ST AM:   Stiff unless it is a bad night   TIME OF DAY:   Depends on the activity.   AGGRAVATING:   Hip - getting into the car ( passenger and 's side), steps ( up < down), limited BSL after \"a bit\" . Getting into the bed ( L leg leads into " the bed)   R knee - stooping, kneeling, steps ( up > down),   RELIEVING FACTORS:   Ice hip and knee  SLEEP:     EX PROGRAM/ACTIVITES   Has her dog (54#) sit on the knee when the leg is on the ottoman/ feels this helps.  Knee flex via sitting and scooting forward with the R foot fixed,  Step-stretch for increased knee motion. And focus on isolated step up with one leg.   PAST MEDICAL HISTORY:     (+) breast cancer     Monahan syndrome,  Stage 3 kidney disease.  - Breast CA 1985. followed by Dr. Ohara. hypertension, hypothyroidism, IBD, kienbock's disease/R, LBP, Lupus, scleroderma, VonWillebrand disease,  Recent imaging with L hip AVR dx/seeing Dr. Mar on 7/13/22 for such.      (-) Pregnancy, pacemaker, DM, CA, latex allergy, metal implants  SURGICAL HISTORY:  R THR 2012,, R TKA 2015, L TKA 2020,  lumbar fusion, R wrist fracture/ORIF, cervical fusion,  R mastectomy, splenectomy,       (-) Pregnancy, pacemaker, DM, CA, latex allergy, metal implants        Quality of life: good             Objective          Tenderness     Right Knee   Tenderness in the lateral joint line, MCL (proximal) and medial joint line.     Active Range of Motion     Right Hip   Flexion: 99 degrees   Extension: 11 (in standing ) degrees   Abduction: 21 degrees with pain    Additional Active Range of Motion Details  R knee flex  Standing 68, seated 84A/ 89AA  HL 89A      R knee ext:  (-) 13   R hip ext:  0 in prone    Patellar Mobility     Right Knee Hypomobile in the superior and inferior patellar tendon(s).     Joint Play     Right Hip     Hypomobile in the posterior hip capsule, anterior hip capsule and lateral hip capsule    Comments  Right long axis distraction comments: not tested.     Strength/Myotome Testing     Right Hip   Planes of Motion   Flexion: 3+  Extension: 2+  Abduction: 3-  Adduction: 3  External rotation: 3+  Internal rotation: 3+    Right Knee   Flexion: 4-  Extension: 3+    Tests     Right Hip   Positive IVETH.     Right  "Knee Flexibility Comments:   Moderate to severe limitations:  Hamstrings, quad, gastroc  Psoas     Functional Assessment     Single Leg Stance   Right: 0 seconds          Assessment & Plan     Assessment  Impairments: abnormal coordination, abnormal gait, abnormal muscle tone, abnormal or restricted ROM, activity intolerance, impaired balance, impaired physical strength, lacks appropriate home exercise program, pain with function, safety issue and weight-bearing intolerance  Functional Limitations: carrying objects, lifting, walking, uncomfortable because of pain, moving in bed, sitting, standing and stooping  Assessment details: Tracie Gross is a 61 y.o. female referred to PT for rehab after experiencing a sudden, intense onset of R leg/hip pain in late May.  She reports the inability to lift the leg x's 3 days requiring the assistance of her .  The intense pain last 5+ days.  She does report less pain at this time.  The R knee motion is quite restricted into flex; ext is limited to (-)13 with pain at MJL and LJL.   She would benefit from  Patient was seen in the clinic today for the initial evaluation and treatment.  There are noted deficits in ROM, flexibility and strength with subsequent gait and balance impairments.  Skilled physical therapy is indicated in order to achieve the stated goals and attain maximal functional mobility/capacity.      Eval complexity:  low due to # areas assessed, medical history and decision making.       Prognosis: good    Goals  Plan Goals: Patient goals:  1) don't lose more R knee motion, 2) get off the floor, 3) get into the car/bed.   ST weeks     1)  Improve knee ext = (-)7 and flexion =/> 96     2)  Patient to complete SLR wo extensor lag     3)  Gait on level surfaces w AD,  as indicated, 75% equal step length, wt bearing and good heel strike     4)  Patient will complete a 4\" step up with UE for balance only.     5)  Pain level in the knee 0-5/10     LTGs   DC, " 12 weeks     1)   0-2 pain in the knee     2)  Normal gait on level and unlevel surfaces     3)  SLS balance =/> 30 seconds for reduction of falls and complete muti-level tasks      4)  Ascend/descend steps in normal sequencing      5)  MMT 5/5 LE for completion of lifting, squatting, bending      6)  Normal knee ROM in order to complete dressing, bathing and positional changes     7)  Improve OKI score =/> 9 points for improved functional tasks     8)  Independent in final advance HEP for management.    Plan  Therapy options: will be seen for skilled therapy services  Planned modality interventions: cryotherapy, dry needling, electrical stimulation/Russian stimulation, high voltage pulsed current (pain management), TENS, thermotherapy (hydrocollator packs), ultrasound and iontophoresis  Planned therapy interventions: manual therapy, neuromuscular re-education, soft tissue mobilization, strengthening, stretching, therapeutic activities, joint mobilization, home exercise program, gait training, functional ROM exercises, flexibility, balance/weight-bearing training, body mechanics training and transfer training  Treatment plan discussed with: patient  Plan details: 2-3x's per week, up to 36 visits within 12 weeks from the eval date.        Timed:         Manual Therapy:         mins  51452;     Therapeutic Exercise:    14     mins  30175;     Neuromuscular Walter:        mins  80236;    Therapeutic Activity:          mins  17334;     Gait Training:           mins  66159;     Ultrasound:          mins  49204;    Ionto                                   mins   20608  Self Care                       4     mins   17247  Canalith Repos         mins 64529      Un-Timed:  Electrical Stimulation:         mins  04517 ( );  Dry Needling          mins self-pay  Traction          mins 09686  Low Eval          Mins  76499  Mod Eval     36     Mins  03168  High Eval                            Mins  70631  Re-Eval                                mins  88940        Timed Treatment:   18   mins   Total Treatment:     54   mins    PT SIGNATURE: Joeclynn Matos, VANITA   DATE TREATMENT INITIATED: 7/11/2022    Initial Certification  Certification Period:  From 7/11/2022 to  10/9/2022  I certify that the therapy services are furnished while this patient is under my care.  The services outlined above are required by this patient, and will be reviewed every 90 days.     PHYSICIAN: Floyd Silva MD      DATE:     Please sign and return via fax to [phone].. Thank you, [facility].

## 2022-07-13 ENCOUNTER — OFFICE VISIT (OUTPATIENT)
Dept: ORTHOPEDIC SURGERY | Facility: CLINIC | Age: 62
End: 2022-07-13

## 2022-07-13 DIAGNOSIS — Z96.641 PRESENCE OF ARTIFICIAL HIP JOINT, RIGHT: ICD-10-CM

## 2022-07-13 DIAGNOSIS — M25.50 ARTHRALGIA, UNSPECIFIED JOINT: Primary | ICD-10-CM

## 2022-07-13 DIAGNOSIS — M25.561 PAIN IN BOTH KNEES, UNSPECIFIED CHRONICITY: Primary | ICD-10-CM

## 2022-07-13 DIAGNOSIS — Z96.641 STATUS POST TOTAL HIP REPLACEMENT, RIGHT: ICD-10-CM

## 2022-07-13 DIAGNOSIS — M25.562 PAIN IN BOTH KNEES, UNSPECIFIED CHRONICITY: Primary | ICD-10-CM

## 2022-07-13 DIAGNOSIS — M87.052 AVASCULAR NECROSIS OF FEMORAL HEAD, LEFT: ICD-10-CM

## 2022-07-13 PROCEDURE — 99213 OFFICE O/P EST LOW 20 MIN: CPT | Performed by: ORTHOPAEDIC SURGERY

## 2022-07-13 NOTE — PROGRESS NOTES
Chief Complaint  Follow-up of the Right Knee and Follow-up of the Right Hip    Subjective    History of Present Illness      Tracie Gross is a 61 y.o. female who presents to [facility] for follow-up on right total hip and total knee replacement and left hip and left knee pain.  History of Present Illness the patient states that she is doing reasonably well as far as the right hip and the right knee are concerned.  The patient developed arthrofibrosis in the right knee after her surgery in 2015.  Unfortunately she can only flex her knee from 0 to 90 degrees.  She continues to have some pain and discomfort in that knee and was not fully satisfied with the outcome from the surgical intervention.  She has also had a right total hip arthroplasty performed through this office.  Apparently that joint replacement has done fairly well for the patient.  She has had continued left knee pain and discomfort.  She underwent knee arthroscopy on the left side performed by Dr. NUBIA Costello in 2020.  She states that her symptoms never really improved after the knee arthroscopy.  She states that she does have avascular necrosis in her left hip.  She would like to continue with nonoperative management for the avascular necrosis because she is able to function adequately at this point with her activities of daily living.  Pain Location:  BILATERAL knee and BILATERAL hip  Radiation: none  Quality: aching, stabbing  Intensity/Severity: varies between mild and severe  Duration: several years  Progression of symptoms: yes, progressive worsening  Onset quality: gradual   Timing: constant  Aggravating Factors: going up and down stairs, kneeling, rising after sitting, walking, squatting, standing  Alleviating Factors: NSAIDs, steroid injection (intra-articular), rest, stretching/PT/OT  Previous Episodes: yes  Associated Symptoms: pain, swelling, decreased ROM, decreased strength  ADLs Affected: dressing, ambulating,  recreational activities/sports, meal preparation  Previous Treatment: NSAIDs, physical/occupational therapy and prior surgery       Objective   Vital Signs:   There were no vitals taken for this visit.    Physical Exam  Physical Exam  Vitals signs and nursing note reviewed.   Constitutional:       Appearance: Normal appearance.   Pulmonary:      Effort: Pulmonary effort is normal.   Skin:     General: Skin is warm and dry.      Capillary Refill: Capillary refill takes less than 2 seconds.   Neurological:      General: No focal deficit present.      Mental Status: He is alert and oriented to person, place, and time. Mental status is at baseline.   Psychiatric:         Mood and Affect: Mood normal.         Behavior: Behavior normal.         Thought Content: Thought content normal.         Judgment: Judgment normal.     Ortho Exam   Left knee (varus). Patient has crepitus throughout range of motion. Positive patellar grind test. Mild effusion. Lachman is negative. Pivot shift is negative. Anterior and posterior drawer signs are negative. Significant joint line tenderness is noted on the medial aspect of the knee. Patient has a varus orientation of the knee. There is fullness and tenderness in the Popliteal fossa. Mild distention of a Popliteal cyst is noted in this location. Range of motion in flexion is from 0-110 degrees. Neurovascular status is intact.  Dorsalis pedis and posterior tibial artery pulses are palpable. Common peroneal nerve function is well preserved. Patient's gait is cautious and antalgic. Skin and soft tissues are mildly swollen, consistent with synovitis and effusion. The patient has a significant limp with the first few steps after starting the gait cycle. Getting out of a chair takes a lot of effort due to pain on knee flexion.   Left hip (AN): Patient has a very significant limp on the affected side. There is half inch of shortening. There is a positive Figure of 4 sign. Anterior joint line is  exquisitely painful and tender for the patient. Greater trochanter is tender. Hip Flexion restricted from 0-60 degrees. Internal and external rotations are both associated with very significant pain and discomfort. Trendelenburg limp with gait. Skin and soft tissues are essentially normal. Dorsalis pedis and posterior tibial artery pulses are palpable distally. Common peroneal nerve function is well preserved.    Right knee.The patient is status post total knee arthroplasty postoperative 7 year(s). Incision is clean. Calf is soft and nontender. Homans sign is negative. There is no clicking, popping or catching. Anterior and posterior drawer signs are negative.  There is no instability of the components. Appropriate amounts of swelling and bruising are noted. Dorsalis pedis and posterior tibial artery pulses are palpable. Common peroneal nerve function is well preserved. Range of motion is from 0-90 degrees of flexion. Gait is cautious but otherwise fairly normal. There is no evidence of a deep seated joint infection.     Right hip. Patient is post op 7 year(s) from total hip arthoplasty, direct anterior. Incision is clean and healing well. Calf is soft and nontender. Homans sign is negative. Skin and soft tissues are appropriate for postoperative status of the patient. Hip flexion is 0-60 degrees, hip abduction is 0-30 degrees. No limb lenth discrepancy. Neurovascular status is intact. Patients gait is significantly improved. The pain relief is extremely dramatic for the patient. Dorsalis pedis and posterior tibial artery pulses palpable. Common peroneal function is well preserved. There is no evidence of cellulitis or erythema or deep seated joint infection.      Result Review :   The following data was reviewed by: Fransisco Mar MD on 07/13/2022:  Radiologic studies - see below for interpretation  BILATERAL knee and LEFT hip with pelvis xrays  weightbearing/standing ap/lateral views were performed at [facility]  on 07/13/2022. Images were independently viewed and interpreted by myself, my impression as follows:      bilateral Knee X-Ray  Indication: Evaluation of implant position on the right side and source of pain on the left knee.  AP, Lateral views  Findings: Moderately advanced osteoarthritis on the medial compartment of the left knee.  The right knee implants are in good position.  no bony lesion  Soft tissues within normal limits  decreased joint spaces  Hardware appropriately positioned yes      no prior studies available for comparison.    This patient's x-ray report was graded according to the Kellgren and Christian classification.  This took into account the joint space narrowing, osteophyte formation, sclerosis of the distal femur/proximal tibia along with deformity of those bones.  The findings were indicative of K L grade 2 on the left knee.    X-RAY was ordered and reviewed by Fransisco Mar MD     bilateral Hip X-Ray  Indication: Evaluation of implant position after right total hip arthroplasty and persistent left hip pain.  AP and lateral  Findings: Appropriate position of the hip implants without any subsidence or periprosthetic fractures.  no bony lesion  Soft tissues within normal limits  decreased joint spaces  Hardware appropriately positioned yes      yes prior studies available for comparison.    X-RAY was ordered and reviewed by Fransisco Mar MD          Procedures           Assessment   Assessment and Plan    Diagnoses and all orders for this visit:    1. Pain in both knees, unspecified chronicity (Primary)  -     XR Knee 3 View Bilateral    2. Avascular necrosis of femoral head, left (HCC)    3. Presence of artificial hip joint, right    4. Status post total hip replacement, right          Follow Up   · Compression/brace to the nonoperative knee to prevent it from giving away and buckling out.  · Calcium and vitamin D for bone health.  · Intra-articular steroid injection and viscosupplementation  injections discussed with the patient.  · Falls and dislocation precautions discussed with the patient at length.  · She will let me know when she is ready for hip replacement surgery on the left side although most of her symptoms are really of greater trochanteric bursitis which is generally nonoperative management condition.  · The avascular necrosis pathology has been discussed with the patient on the left hip.  At this point she is tolerating her symptoms well and therefore I do not recommend hip replacement surgery.  We will keep a very close eye on her symptoms and radiographic changes and recommend that surgery.  · Rest, ice, compression, and elevation (RICE) therapy  · Stretching and strengthening exercises of the quads and the hamstrings.  · OTC Alternate Ibuprofen and Tylenol as needed  · Follow up in 12 month(s)  • Patient was given instructions and counseling regarding her condition or for health maintenance advice. Please see specific information pulled into the AVS if appropriate.     Fransisco Mar MD   Date of Encounter: 7/13/2022   Electronically signed by Fransisco Mar MD, 07/13/22, 2:29 PM EDT.     EMR Dragon/Transcription disclaimer:  Much of this encounter note is an electronic transcription/translation of spoken language to printed text. The electronic translation of spoken language may permit erroneous, or at times, nonsensical words or phrases to be inadvertently transcribed; Although I have reviewed the note for such errors, some may still exist.

## 2022-07-19 ENCOUNTER — TELEPHONE (OUTPATIENT)
Dept: PHYSICAL THERAPY | Facility: CLINIC | Age: 62
End: 2022-07-19

## 2022-07-21 ENCOUNTER — TREATMENT (OUTPATIENT)
Dept: PHYSICAL THERAPY | Facility: CLINIC | Age: 62
End: 2022-07-21

## 2022-07-21 DIAGNOSIS — Z96.651 HISTORY OF RIGHT KNEE JOINT REPLACEMENT: ICD-10-CM

## 2022-07-21 DIAGNOSIS — R29.898 WEAKNESS OF RIGHT LEG: ICD-10-CM

## 2022-07-21 DIAGNOSIS — M25.561 ACUTE PAIN OF RIGHT KNEE: ICD-10-CM

## 2022-07-21 DIAGNOSIS — M25.551 RIGHT HIP PAIN: Primary | ICD-10-CM

## 2022-07-21 DIAGNOSIS — M25.661 DECREASED RANGE OF MOTION (ROM) OF RIGHT KNEE: ICD-10-CM

## 2022-07-21 PROCEDURE — 97110 THERAPEUTIC EXERCISES: CPT | Performed by: PHYSICAL THERAPIST

## 2022-07-21 PROCEDURE — 97530 THERAPEUTIC ACTIVITIES: CPT | Performed by: PHYSICAL THERAPIST

## 2022-07-21 PROCEDURE — 97140 MANUAL THERAPY 1/> REGIONS: CPT | Performed by: PHYSICAL THERAPIST

## 2022-07-21 NOTE — PROGRESS NOTES
"Physical Therapy Daily Note    VISIT#: 2    Subjective   Traciejuancho Gross reports that she did see Dr. Mar, he felt the R knee was limtied due to scar tissue.  No treatment recommendations except to return in 1 yr; sooner if the L hip starts hurting more due to the AVN. Stated the hardware of the R hip looked \"good\".       Objective   R knee flex 88 seated off edge of table. 95AA post stretching in seated off edge of table.   See Exercise, Manual, and Modality Logs for complete treatment.     Patient Education:    Assessment/Plan  Evelyn presented today with slightly greater motion into R knee flexion as compared to the eval visit.   She was seen by ortho, knee orthosis is in alignment.      Plan:   Cont with the current treatment plan for R knee motion and RLE strength.             Timed:         Manual Therapy:    11     mins  12839;     Therapeutic Exercise:    17    mins  35086;     Neuromuscular Walter:        mins  48967;    Therapeutic Activity:     12     mins  07814;     Gait Training:           mins  88133;     Ultrasound:          mins  79656;    Ionto                                   mins   00409  Self Care                       3     mins   37086  Canalith Repos                   mins  03393    Un-Timed:  Electrical Stimulation:         mins  00219 ( );  Dry Needling          mins self-pay  Traction          mins 84991  Low Eval          Mins  82557  Mod Eval          Mins  32804  High Eval                            Mins  43382  Re-Eval                               mins  23268    Timed Treatment:   43   mins   Total Treatment:     43   mins    Jocelynn Matos, PT    Physical Therapist  "

## 2022-07-24 PROBLEM — Z96.641 STATUS POST TOTAL HIP REPLACEMENT, RIGHT: Status: ACTIVE | Noted: 2022-07-24

## 2022-07-24 PROBLEM — M25.561 PAIN IN BOTH KNEES: Status: ACTIVE | Noted: 2022-07-24

## 2022-07-24 PROBLEM — M25.562 PAIN IN BOTH KNEES: Status: ACTIVE | Noted: 2022-07-24

## 2022-07-25 ENCOUNTER — HOSPITAL ENCOUNTER (OUTPATIENT)
Dept: ONCOLOGY | Facility: HOSPITAL | Age: 62
Setting detail: INFUSION SERIES
Discharge: HOME | End: 2022-07-25

## 2022-07-26 ENCOUNTER — TREATMENT (OUTPATIENT)
Dept: PHYSICAL THERAPY | Facility: CLINIC | Age: 62
End: 2022-07-26

## 2022-07-26 DIAGNOSIS — M25.551 RIGHT HIP PAIN: Primary | ICD-10-CM

## 2022-07-26 DIAGNOSIS — M25.661 DECREASED RANGE OF MOTION (ROM) OF RIGHT KNEE: ICD-10-CM

## 2022-07-26 DIAGNOSIS — R29.898 WEAKNESS OF RIGHT LEG: ICD-10-CM

## 2022-07-26 DIAGNOSIS — Z96.651 HISTORY OF RIGHT KNEE JOINT REPLACEMENT: ICD-10-CM

## 2022-07-26 PROCEDURE — 97530 THERAPEUTIC ACTIVITIES: CPT | Performed by: PHYSICAL THERAPIST

## 2022-07-26 PROCEDURE — 97110 THERAPEUTIC EXERCISES: CPT | Performed by: PHYSICAL THERAPIST

## 2022-07-26 PROCEDURE — 97140 MANUAL THERAPY 1/> REGIONS: CPT | Performed by: PHYSICAL THERAPIST

## 2022-07-26 NOTE — PROGRESS NOTES
"Physical Therapy Progress Note    VISIT#: 3    Subjective   Tracie Gross reports that she fell on Fri getting out of bed and tripped over a dog toy; hit the side of the nightstand; has a large bruise on the R side. Denies any SOB or pain with deep breaths. She also fell getting out of bed yesterday ( wrapped in the sheets).   Pain levels 0-5/10.       Objective   Dauphin knee    (eval )   Active Range of Motion      Right Hip   Flexion: 100 degrees   Extension: 11 (in standing ) degrees   Abduction: 19 with mild pain at end range    Additional Active Range of Motion Details  R knee flex  HL  88A, 95AA post       R knee ext:  (-) 10  R hip ext:  0 in prone  Gait:  In clinic/solid surface.  Step length equal, mild ipsilateral trunk lean, equal heel strike with early heel off with reduced hip extension.   See Exercise, Manual, and Modality Logs for complete treatment.     Patient Education:  Cont with current ex at home.       Assessment/Plan  Evelyn has been to PT x's 3 sessions, including the 22 eval date.  Knee ROM and strength ( benoit in gravity resisted positions) have improved.  She has reported 2 falls over the last week, when getting out of bed.  She has achieved 3 of the 5 STGS with partial improvement of the remaining 2 goals.  OPPT remains indicated in order to achieve the remaining goals and achieve maximal functional capacity. She does have complicating factors of leonard shldr arthritis, lupus and hx of AVN.  Progress is expected to be slow.     Patient goals:  1) don't lose more R knee motion, 2) get off the floor, 3) get into the car/bed.   ST weeks     1)  Improve knee ext = (-)7 and flexion =/> 96  Met 22     2)  Patient to complete SLR wo extensor lag  Met 22     3)  Gait on level surfaces w AD,  as indicated, 75% equal step length, wt bearing and good heel strike  Partial met 22     4)  Patient will complete a 4\" step up with UE for balance only. Met 22     5)  Pain " level in the knee 0-5/10  Partial met 7/26/22    LTGs   DC, 12 weeks     1)   0-2 pain in the knee     2)  Normal gait on level and unlevel surfaces     3)  SLS balance =/> 30 seconds for reduction of falls and complete muti-level tasks      4)  Ascend/descend steps in normal sequencing      5)  MMT 5/5 LE for completion of lifting, squatting, bending      6)  Normal knee ROM in order to complete dressing, bathing and positional changes     7)  Improve OKI score =/> 9 points for improved functional tasks     8)  Independent in final advance [facility] for management.    Plan:   Await ins auth.             Timed:         Manual Therapy:    12   mins  47658;     Therapeutic Exercise:    17     mins  46560;     Neuromuscular Walter:        mins  40452;    Therapeutic Activity:     11     mins  91221;     Gait Training:           mins  89605;     Ultrasound:          mins  51891;    Ionto                                   mins   75079  Self Care                        5    mins   27178  Canalith Repos                   mins  44832    Un-Timed:  Electrical Stimulation:         mins  20854 ( );  Dry Needling          mins self-pay  Traction          mins 63285  Low Eval          Mins  96086  Mod Eval          Mins  49542  High Eval                            Mins  69030  Re-Eval                               mins  58066    Timed Treatment:   44   mins   Total Treatment:     55   mins    Jocelynn Matos PT    Physical Therapist

## 2022-07-28 ENCOUNTER — TREATMENT (OUTPATIENT)
Dept: PHYSICAL THERAPY | Facility: CLINIC | Age: 62
End: 2022-07-28

## 2022-07-28 DIAGNOSIS — M25.661 DECREASED RANGE OF MOTION (ROM) OF RIGHT KNEE: ICD-10-CM

## 2022-07-28 DIAGNOSIS — M25.561 ACUTE PAIN OF RIGHT KNEE: ICD-10-CM

## 2022-07-28 DIAGNOSIS — M25.551 RIGHT HIP PAIN: Primary | ICD-10-CM

## 2022-07-28 DIAGNOSIS — R29.898 WEAKNESS OF RIGHT LEG: ICD-10-CM

## 2022-07-28 DIAGNOSIS — Z00.00 MEDICARE ANNUAL WELLNESS VISIT, SUBSEQUENT: ICD-10-CM

## 2022-07-28 DIAGNOSIS — Z96.651 HISTORY OF RIGHT KNEE JOINT REPLACEMENT: ICD-10-CM

## 2022-07-28 PROCEDURE — 97110 THERAPEUTIC EXERCISES: CPT | Performed by: PHYSICAL THERAPIST

## 2022-07-28 PROCEDURE — 97140 MANUAL THERAPY 1/> REGIONS: CPT | Performed by: PHYSICAL THERAPIST

## 2022-07-28 RX ORDER — CALCIUM CARBONATE/VITAMIN D3 600MG-5MCG
TABLET ORAL
Qty: 60 TABLET | Refills: 6 | Status: SHIPPED | OUTPATIENT
Start: 2022-07-28 | End: 2023-03-12

## 2022-07-28 NOTE — PROGRESS NOTES
Physical Therapy Daily Progress Note        Patient: Tracie Gross   : 1960  Diagnosis/ICD-10 Code:  Right hip pain [M25.551]  Referring practitioner: Floyd Silva MD  Date of Initial Visit: Type: THERAPY  Noted: 2022  Today's Date: 2022  Patient seen for 4 sessions         Tracie Gross reports: her hip and knee have really been hurting since Tuesday.  She rated them 5/10.  The left is probably a 3-4/10.        Subjective     Objective   See Exercise, Manual, and Modality Logs for complete treatment.       Assessment/Plan  Increased pain since Tuesday thus maintained exercises with modification to lunge stretch and nustep only.  She reported improved ability to stretch at lower height.  Continued hip flexor, quad, and knee flexion tightness noted.  Increased time on nustep for additional warm up and loosening up.  She reported no complaints with exercises and required minimal cueing for technique.  Will monitor tolerance and progress exercises next session as tolerated.     Progress per Plan of Care           Manual Therapy:    12     mins  03490;  Therapeutic Exercise:    28     mins  13149;     Neuromuscular Walter:        mins  73638;    Therapeutic Activity:          mins  81395;     Gait Training:           mins  39779;     Ultrasound:          mins  90547;    Electrical Stimulation:         mins  24378 ( );  Dry Needling          mins self-pay    Timed Treatment:   40   mins   Total Treatment:     55   mins    Stacey Moreno PTA  Physical Therapist Assistant

## 2022-08-01 ENCOUNTER — TREATMENT (OUTPATIENT)
Dept: PHYSICAL THERAPY | Facility: CLINIC | Age: 62
End: 2022-08-01

## 2022-08-01 ENCOUNTER — HOSPITAL ENCOUNTER (OUTPATIENT)
Dept: ONCOLOGY | Facility: HOSPITAL | Age: 62
Setting detail: INFUSION SERIES
Discharge: HOME | End: 2022-08-01

## 2022-08-01 VITALS
SYSTOLIC BLOOD PRESSURE: 102 MMHG | BODY MASS INDEX: 44.76 KG/M2 | WEIGHT: 262.2 LBS | RESPIRATION RATE: 18 BRPM | TEMPERATURE: 97.1 F | HEART RATE: 90 BPM | HEIGHT: 64 IN | DIASTOLIC BLOOD PRESSURE: 70 MMHG

## 2022-08-01 DIAGNOSIS — C50.919 MALIGNANT NEOPLASM OF FEMALE BREAST, UNSPECIFIED ESTROGEN RECEPTOR STATUS, UNSPECIFIED LATERALITY, UNSPECIFIED SITE OF BREAST: ICD-10-CM

## 2022-08-01 DIAGNOSIS — R29.898 WEAKNESS OF RIGHT LEG: ICD-10-CM

## 2022-08-01 DIAGNOSIS — M25.661 DECREASED RANGE OF MOTION (ROM) OF RIGHT KNEE: ICD-10-CM

## 2022-08-01 DIAGNOSIS — Z96.651 HISTORY OF RIGHT KNEE JOINT REPLACEMENT: ICD-10-CM

## 2022-08-01 DIAGNOSIS — M25.561 ACUTE PAIN OF RIGHT KNEE: ICD-10-CM

## 2022-08-01 DIAGNOSIS — M25.551 RIGHT HIP PAIN: Primary | ICD-10-CM

## 2022-08-01 DIAGNOSIS — Z96.641 HISTORY OF TOTAL RIGHT HIP REPLACEMENT: ICD-10-CM

## 2022-08-01 DIAGNOSIS — M81.0 OSTEOPOROSIS, UNSPECIFIED OSTEOPOROSIS TYPE, UNSPECIFIED PATHOLOGICAL FRACTURE PRESENCE: Primary | ICD-10-CM

## 2022-08-01 PROCEDURE — 96372 THER/PROPH/DIAG INJ SC/IM: CPT

## 2022-08-01 PROCEDURE — 97110 THERAPEUTIC EXERCISES: CPT | Performed by: PHYSICAL THERAPIST

## 2022-08-01 PROCEDURE — 25010000002 DENOSUMAB 60 MG/ML SOLUTION PREFILLED SYRINGE: Performed by: INTERNAL MEDICINE

## 2022-08-01 RX ADMIN — DENOSUMAB 60 MG: 60 INJECTION SUBCUTANEOUS at 14:09

## 2022-08-01 NOTE — PROGRESS NOTES
Patient here for Prolia injection. Patient voiced no complaints or having any dental issues. Patient reports taking supplements as ordered. After I reviewed last CMP I gave warmed Prolia 60mg SQ slowly. Patient tolerated Prolia injection well. [facility] Prolia [facility].

## 2022-08-01 NOTE — PROGRESS NOTES
Physical Therapy Daily Treatment Note        Patient: Tracie Gross   : 1960  Diagnosis/ICD-10 Code:  Right hip pain [M25.551]  Referring practitioner: Floyd Silva MD  Date of Initial Visit: Type: THERAPY  Noted: 2022  Today's Date: 2022  Patient seen for 5 sessions         Tracie Gross reports: she feels really stiff today.  She said her calf has been cramping this morning.         Subjective     Objective   See Exercise, Manual, and Modality Logs for complete treatment.     AROM knee flexion=95 deg (after exercise)    Assessment/Plan  Improved knee flexion ROM.  Added gastroc stretch to address cramping.  Progressed strengthening without complaints of pain. Minimal cues required for technique.  Will continue to progress strength and ROM to improve functional mobility and decrease risk of falls.        Progress per Plan of Care           Manual Therapy:         mins  27676;  Therapeutic Exercise:    30     mins  10658;     Neuromuscular Walter:        mins  56402;    Therapeutic Activity:          mins  80015;     Gait Training:           mins  93246;     Ultrasound:          mins  70142;    Electrical Stimulation:         mins  65502 ( );  Dry Needling          mins self-pay    Timed Treatment:   30   mins   Total Treatment:     50   mins    Stacey Moreno PTA  Physical Therapist Assistant

## 2022-08-02 DIAGNOSIS — R63.5 WEIGHT GAIN: ICD-10-CM

## 2022-08-02 DIAGNOSIS — G47.00 INSOMNIA, UNSPECIFIED TYPE: ICD-10-CM

## 2022-08-03 ENCOUNTER — TREATMENT (OUTPATIENT)
Dept: PHYSICAL THERAPY | Facility: CLINIC | Age: 62
End: 2022-08-03

## 2022-08-03 DIAGNOSIS — M25.551 RIGHT HIP PAIN: ICD-10-CM

## 2022-08-03 DIAGNOSIS — M25.661 DECREASED RANGE OF MOTION (ROM) OF RIGHT KNEE: ICD-10-CM

## 2022-08-03 DIAGNOSIS — R29.898 WEAKNESS OF RIGHT LEG: ICD-10-CM

## 2022-08-03 DIAGNOSIS — Z96.651 HISTORY OF RIGHT KNEE JOINT REPLACEMENT: Primary | ICD-10-CM

## 2022-08-03 PROCEDURE — 97530 THERAPEUTIC ACTIVITIES: CPT | Performed by: PHYSICAL THERAPIST

## 2022-08-03 PROCEDURE — 97140 MANUAL THERAPY 1/> REGIONS: CPT | Performed by: PHYSICAL THERAPIST

## 2022-08-03 PROCEDURE — 97110 THERAPEUTIC EXERCISES: CPT | Performed by: PHYSICAL THERAPIST

## 2022-08-03 RX ORDER — ALPRAZOLAM 1 MG/1
TABLET ORAL
Qty: 30 TABLET | Refills: 2 | Status: SHIPPED | OUTPATIENT
Start: 2022-08-03 | End: 2022-11-08 | Stop reason: SDUPTHER

## 2022-08-03 NOTE — PROGRESS NOTES
Physical Therapy Daily Treatment  Note    VISIT#: 6    Subjective   Tracie Gross reports the medial R knee has been hurting since the treatment on Monday.   Up to 6/10 now a 4/10.  Difficulty with steps, up = down.  Also difficulty with sleeping.       Objective   R hip flex 95,  R knee flex 90   Ext (-) 8;  Pre stretch.    Flex post stretch 100AA,    SLR with R medial knee pain and R lateral hip pain   Hip abd with gravitation into flexion with hip retraction.   Glut med - unable to hold static position in LSL.  Palpation with tenderness at distal 1/3 of R ITB.    Valgus stress test at R knee produced R medial knee pain.   See Exercise, Manual, and Modality Logs for complete treatment.     Patient Education:  Not to work through pain with strengthening ex.     Assessment/Plan  Tracie presents to the clinic today reporting R medial knee pain since the last session.   States difficulty in sleeping and step climbing due to the pain.   Today the pain is slightly less ( 4 vs 6).       Plan:   Cont with current POC.             Timed:         Manual Therapy:    12     mins  54079;     Therapeutic Exercise:    18     mins  28364;     Neuromuscular Walter:        mins  68701;    Therapeutic Activity:     11     mins  54636;     Gait Training:           mins  20197;     Ultrasound:          mins  28026;    Ionto                                   mins   00376  Self Care                       3     mins   04117  Canalith Repos                   mins  12647    Un-Timed:  Electrical Stimulation:         mins  13312 ( );  Dry Needling          mins self-pay  Traction          mins 79735  Low Eval          Mins  85675  Mod Eval          Mins  97233  High Eval                            Mins  02835  Re-Eval                               mins  10069    Timed Treatment:   44   mins   Total Treatment:     54   mins    Jocelynn Matos PT    Physical Therapist

## 2022-08-08 ENCOUNTER — TREATMENT (OUTPATIENT)
Dept: PHYSICAL THERAPY | Facility: CLINIC | Age: 62
End: 2022-08-08

## 2022-08-08 DIAGNOSIS — M25.661 DECREASED RANGE OF MOTION (ROM) OF RIGHT KNEE: ICD-10-CM

## 2022-08-08 DIAGNOSIS — R29.898 WEAKNESS OF RIGHT LEG: ICD-10-CM

## 2022-08-08 DIAGNOSIS — Z96.651 HISTORY OF RIGHT KNEE JOINT REPLACEMENT: Primary | ICD-10-CM

## 2022-08-08 DIAGNOSIS — M25.551 RIGHT HIP PAIN: ICD-10-CM

## 2022-08-08 DIAGNOSIS — Z96.641 HISTORY OF TOTAL RIGHT HIP REPLACEMENT: ICD-10-CM

## 2022-08-08 DIAGNOSIS — M25.561 ACUTE PAIN OF RIGHT KNEE: ICD-10-CM

## 2022-08-08 PROCEDURE — 97530 THERAPEUTIC ACTIVITIES: CPT | Performed by: PHYSICAL THERAPIST

## 2022-08-08 PROCEDURE — 97140 MANUAL THERAPY 1/> REGIONS: CPT | Performed by: PHYSICAL THERAPIST

## 2022-08-08 PROCEDURE — 97110 THERAPEUTIC EXERCISES: CPT | Performed by: PHYSICAL THERAPIST

## 2022-08-08 NOTE — PROGRESS NOTES
"Physical Therapy Daily Treatment  Note    VISIT#: 7    Subjective   Tracie Gross reports that she feel this past Sat am due to the Left hip giving out.  She woke up had difficulty getting the left leg/hip to work.  She was holding onto the wall and when she ran out of the wall the leg gave way.,      Objective   R hip flex 95,    R knee flex 80 seated pre ex,   Supine 85 AA pre;  Post stretch 95AA  Left hip:   Small silver dollar sized purple bruise on the lateral hip.  She could SLS, hip flex and abd wo greater pain.   SLR with R medial knee pain and R lateral hip pain   Hip abd - able to complete with set up more for glut med.  However limited amount of motion in SL    See Exercise, Manual, and Modality Logs for complete treatment.     Patient Education: encouraged pnt to use a walker, benoit at night as this makes the 3rd time she has fallen relating to bed/night time. Also review for use of cane depending on which leg/hip isgiving her the most trouble.     Assessment/Plan  Evelyn presents today reporting another fall.  This time, she reports the left hip giving out. A small bruise noted at the left lateral hip; however, no overt signs prompting the need for an x-ray of the hip.  Use of a walker was encouraged.  Focus of treatment on strengthening and ROM of the RLE.      ST weeks     1)  Improve knee ext = (-)7 and flexion =/> 96  Met 22     2)  Patient to complete SLR wo extensor lag  Met 22     3)  Gait on level surfaces w AD,  as indicated, 75% equal step length, wt bearing and good heel strike  Partial met 22     4)  Patient will complete a 4\" step up with UE for balance only. Met 22     5)  Pain level in the knee 0-5/10  Partial met 22    LTGs   DC, 12 weeks     1)   0-2 pain in the knee     2)  Normal gait on level and unlevel surfaces     3)  SLS balance =/> 30 seconds for reduction of falls and complete muti-level tasks      4)  Ascend/descend steps in normal sequencing      5) "  MMT 5/5 LE for completion of lifting, squatting, bending      6)  Normal knee ROM in order to complete dressing, bathing and positional changes     7)  Improve OKI score =/> 9 points for improved functional tasks     8)  Independent in final advance [facility] for management    Plan:  Insurance auth next visit.           Timed:         Manual Therapy:    9    mins  78738;     Therapeutic Exercise:    18     mins  20113;     Neuromuscular Walter:        mins  48463;    Therapeutic Activity:     13    mins  82656;     Gait Training:           mins  52148;     Ultrasound:          mins  26495;    Ionto                                   mins   80566  Self Care                      5      mins   15405  Canalith Repos                   mins  63550    Un-Timed:  Electrical Stimulation:         mins  04901 ( );  Dry Needling          mins self-pay  Traction          mins 95956  Low Eval          Mins  13580  Mod Eval          Mins  13899  High Eval                            Mins  39159  Re-Eval                               mins  33359    Timed Treatment:   45   mins   Total Treatment:     56   mins    Jocelynn Matos, PT    Physical Therapist

## 2022-08-19 ENCOUNTER — TREATMENT (OUTPATIENT)
Dept: PHYSICAL THERAPY | Facility: CLINIC | Age: 62
End: 2022-08-19

## 2022-08-19 DIAGNOSIS — Z96.641 HISTORY OF TOTAL RIGHT HIP REPLACEMENT: ICD-10-CM

## 2022-08-19 DIAGNOSIS — R29.898 WEAKNESS OF RIGHT LEG: ICD-10-CM

## 2022-08-19 DIAGNOSIS — Z96.651 HISTORY OF RIGHT KNEE JOINT REPLACEMENT: Primary | ICD-10-CM

## 2022-08-19 DIAGNOSIS — M25.661 DECREASED RANGE OF MOTION (ROM) OF RIGHT KNEE: ICD-10-CM

## 2022-08-19 DIAGNOSIS — M25.561 ACUTE PAIN OF RIGHT KNEE: ICD-10-CM

## 2022-08-19 DIAGNOSIS — M25.551 RIGHT HIP PAIN: ICD-10-CM

## 2022-08-19 PROCEDURE — 97110 THERAPEUTIC EXERCISES: CPT | Performed by: PHYSICAL THERAPIST

## 2022-08-19 NOTE — PROGRESS NOTES
Physical Therapy Daily Progress Note        Patient: Tracie Gross   : 1960  Diagnosis/ICD-10 Code:  History of right knee joint replacement [Z96.651]  Referring practitioner: Floyd Silva MD  Date of Initial Visit: Type: THERAPY  Noted: 2022  Today's Date: 2022  Patient seen for 8 sessions         Tracie Gross reports: overall she is getting better.  She did a lot of walking when out of town over the weekend and before that she wouldn't have been able to do what she did.  She had a couple days when she could get in/out of the car without difficulty but after she pushed it too hard and got tired she wasn't able to do it anymore.  Her hip hasn't given out since her last fall ~2 weeks ago.  She is mainly doing stairs 1 at a time but will try step over step 1-2 steps each time she does them.  She feels weak and it hurts to do stairs.  She has felt a little more unbalanced this week but thinks it is from fatigue.        Subjective     Objective          Active Range of Motion     Right Hip   Flexion: 103 degrees   External rotation (90/90): 12 degrees   Internal rotation (90/90): WFL    Right Knee   Flexion: 90 degrees   Extension: Right knee active extension: lacking 4 deg.     Strength/Myotome Testing     Right Hip   Planes of Motion   Flexion: 4+  Abduction: 3+  External rotation: 4-  Internal rotation: 4    Right Knee   Flexion: 4+  Extension: 4+    Additional Strength Details  Within available range    Functional Assessment     Single Leg Stance - Eyes Open     Right  Trial 1: 3 seconds  Trial 2: 4 seconds  Trial 3: 4 seconds  Average: 3.67 seconds       See Exercise, Manual, and Modality Logs for complete treatment.       Assessment/Plan  Pt reported fatigue and demonstrated difficulty with exercises.  No progressions made this date d/t difficulty.  Utilized contract-relax technique on NK table to promote increased flexion ROM.  Increased strength with MMT and improved function reported  "with being able to walk further when out of town and take a couple stairs reciprocally.  She continued to demonstrate knee ROM deficits, hip strength deficits, and poor balance.    Progress per Plan of Care    ST weeks     1)  Improve knee ext = (-)7 and flexion =/> 96  Met 22     2)  Patient to complete SLR wo extensor lag  Met 22     3)  Gait on level surfaces w AD,  as indicated, 75% equal step length, wt bearing and good heel strike  Partial met 22     4)  Patient will complete a 4\" step up with UE for balance only. Met 22     5)  Pain level in the knee 0-5/10  Partial met 22    LTGs   DC, 12 weeks     1)   0-2 pain in the knee     2)  Normal gait on level and unlevel surfaces     3)  SLS balance =/> 30 seconds for reduction of falls and complete muti-level tasks      4)  Ascend/descend steps in normal sequencing       5)  MMT 5/5 LE for completion of lifting, squatting, bending      6)  Normal knee ROM in order to complete dressing, bathing and positional changes     7)  Improve OKI score =/> 9 points for improved functional tasks     8)  Independent in final advance HEP for management       Manual Therapy:         mins  23004;  Therapeutic Exercise:    40     mins  43223;     Neuromuscular Walter:        mins  40778;    Therapeutic Activity:          mins  35834;     Gait Training:           mins  37732;     Ultrasound:          mins  22048;    Electrical Stimulation:         mins  46748 ( );  Dry Needling          mins self-pay    Timed Treatment:   40   mins   Total Treatment:     40   mins    Stacey Moreno PTA  Physical Therapist Assistant  "

## 2022-08-24 ENCOUNTER — DOCUMENTATION (OUTPATIENT)
Dept: PHYSICAL THERAPY | Facility: CLINIC | Age: 62
End: 2022-08-24

## 2022-08-24 NOTE — PROGRESS NOTES
Tracie entered the clinic today reporting R lower leg swelling that started yesterday.  She reports the skin feels like it's gonna burst as there is no more room to stretch.  She reports increased pain in the calf with wt bearing and walking.   Resting BP:  118/78   HR 85  PO2 97%  Girth:          RLE        LLE  Mid foot   21.5 cm 20.0 cm  malleolus   29.8 cm 28.3 cm  10 cm above lateral mall 36.4 cm 32.0 cm  15 cm above lateral mall 42.5 cm 35.0 cm  20 cm above lateral mall 47.5 cm 42.0 cm    Squeeze test pain, active DF painful in the R calf.  No redness or heat noted in the lower leg.  pnt instructed to contact PCP and/or go to the [facility] facility.   She stated she was able to drive.  No distress noted.

## 2022-08-26 ENCOUNTER — OFFICE VISIT (OUTPATIENT)
Dept: FAMILY MEDICINE CLINIC | Facility: CLINIC | Age: 62
End: 2022-08-26

## 2022-08-26 VITALS
RESPIRATION RATE: 15 BRPM | TEMPERATURE: 96.6 F | HEART RATE: 74 BPM | SYSTOLIC BLOOD PRESSURE: 113 MMHG | HEIGHT: 64 IN | OXYGEN SATURATION: 94 % | DIASTOLIC BLOOD PRESSURE: 77 MMHG | BODY MASS INDEX: 39.09 KG/M2 | WEIGHT: 229 LBS

## 2022-08-26 DIAGNOSIS — M79.89 LEG SWELLING: Primary | ICD-10-CM

## 2022-08-26 PROCEDURE — 99213 OFFICE O/P EST LOW 20 MIN: CPT | Performed by: FAMILY MEDICINE

## 2022-08-26 RX ORDER — FUROSEMIDE 20 MG/1
20 TABLET ORAL DAILY PRN
Qty: 30 TABLET | Refills: 2 | Status: SHIPPED | OUTPATIENT
Start: 2022-08-26

## 2022-08-26 NOTE — PROGRESS NOTES
Chief Complaint   Patient presents with   • Foot Swelling   • Leg Swelling     HPI  Tracie Gross is a 61 y.o. female that presents for   Chief Complaint   Patient presents with   • Foot Swelling   • Leg Swelling     Leg/foot swelling: patient reports leg/foot swelling that started Wednesday. This seems improved today but still w/ more than usual. She reports ankle pain, R>L. Patient does have compression stockings that don't help enough. Swelling is better in the morning and worsens as day goes on. She is maintained on nifedipine 90 daily for Raynaud's     Review of Systems   Respiratory: Negative for cough and shortness of breath.    Cardiovascular: Positive for leg swelling. Negative for chest pain and palpitations.     The following portions of the patient's history were reviewed and updated as appropriate: problem list, past medical history, past surgical history, allergies, current medication    Problem List Tab  Patient History Tab  Immunizations Tab  Medications Tab  Chart Review Tab  Care Everywhere Tab  Synopsis Tab    PE  Vitals:    08/26/22 0901   BP: 113/77   Pulse: 74   Resp: 15   Temp: 96.6 °F (35.9 °C)   SpO2: 94%     Body mass index is 39.31 kg/m².  General: Obese, NAD  Head: AT/NC  Eyes: EOMI, anicteric sclera  Resp: CTAB, SCR, BS equal  CV: RRR w/o m/r/g; 2+ pulses  GI: Soft, NT/ND, +BS  MSK: FROM, no deformity, 2+ LE edema  Skin: Warm, dry, intact  Neuro: Alert and oriented. No focal deficits  Psych: Appropriate mood and affect    Imaging  CT Abdomen Pelvis Without Contrast    Result Date: 4/29/2022   1. Common bile duct caliber on today's examination appears within normal limits. Mild prominence of the downstream and hepatic bile ducts is a stable finding since 5/4/2020. 2. Normal noncontrast appearance of the liver. 3. No acute findings in the abdomen or pelvis. No evidence of metastatic disease in the abdomen or pelvis. 4. Right mastectomy with reconstruction. Cholecystectomy. Splenectomy.  Hysterectomy.. Right hip replacement. 5. Chronic fibrotic changes in the lung bases. 6. Avascular necrosis of the left femoral head without subchondral collapse. This is new since 5/4/2020.  Electronically Signed By-Barb Millan MD On:4/29/2022 9:48 AM This report was finalized on [id number] by  Barb Millan MD.    MRI Breast Bilateral With & Without Contrast    Result Date: 7/7/2022  IMPRESSION :  1. No MR findings to indicate breast malignancy. 2. Status post a right mastectomy with surgical reconstruction. There are also changes of a previous right axillary lymph node dissection procedure. 3. I would recommend routine annual screening mammographic and MR follow-up.   ASSESSMENT: BI-RADS: 2-benign findings.  BI-RADS category Key: Category 0-incomplete-needs additional imaging and/or prior images for comparison. Category 1-negative. Category 2-benign findings. Category 3-probably benign-short interval follow-up suggested. Category 4-suspicious abnormality-biopsy should be considered. Category 5-highly suggestive for malignancy-appropriate action should be taken. Category 6-known biopsy-proven malignancy-appropriate action should be taken.   Electronically Signed By-Terrance Soria MD On:7/7/2022 2:07 PM This report was finalized on [id number] by  Terrance Soria MD.      Assessment & Plan   Tracie Gross is a 61 y.o. female that presents for   Chief Complaint   Patient presents with   • Foot Swelling   • Leg Swelling     Diagnoses and all orders for this visit:    1. Leg swelling (Primary): Unlikely DVT given bilateral lower extremity edema.  Patient did have recent echo (within the last year) but did not show any evidence of heart failure.  Ultimately, feel this is most likely due to heat and use of nifedipine.  We talked about reducing nifedipine but will opt for as needed Lasix use for the time being.  We will consider reducing nifedipine to 60 mg during the summer and increasing to 90 during colder parts of  the year  -     Start furosemide (Lasix) 20 MG tablet; Take 1 tablet by mouth Daily As Needed (leg swelling).  Dispense: 30 tablet; Refill: 2  - Recommend elevating legs and use of compression stockings     Return if symptoms worsen or fail to improve.

## 2022-08-29 RX ORDER — MONTELUKAST SODIUM 4 MG/1
TABLET, CHEWABLE ORAL
Qty: 120 TABLET | Refills: 0 | Status: SHIPPED | OUTPATIENT
Start: 2022-08-29 | End: 2022-10-07

## 2022-09-20 ENCOUNTER — LAB (OUTPATIENT)
Dept: FAMILY MEDICINE CLINIC | Facility: CLINIC | Age: 62
End: 2022-09-20

## 2022-09-20 ENCOUNTER — OFFICE VISIT (OUTPATIENT)
Dept: FAMILY MEDICINE CLINIC | Facility: CLINIC | Age: 62
End: 2022-09-20

## 2022-09-20 VITALS
RESPIRATION RATE: 16 BRPM | OXYGEN SATURATION: 95 % | BODY MASS INDEX: 39.14 KG/M2 | SYSTOLIC BLOOD PRESSURE: 122 MMHG | HEART RATE: 70 BPM | DIASTOLIC BLOOD PRESSURE: 80 MMHG | WEIGHT: 228 LBS

## 2022-09-20 DIAGNOSIS — M25.512 CHRONIC PAIN OF BOTH SHOULDERS: Primary | ICD-10-CM

## 2022-09-20 DIAGNOSIS — M79.89 LEG SWELLING: ICD-10-CM

## 2022-09-20 DIAGNOSIS — E03.9 HYPOTHYROIDISM, UNSPECIFIED TYPE: ICD-10-CM

## 2022-09-20 DIAGNOSIS — G89.29 CHRONIC PAIN OF BOTH SHOULDERS: Primary | ICD-10-CM

## 2022-09-20 DIAGNOSIS — M25.511 CHRONIC PAIN OF BOTH SHOULDERS: Primary | ICD-10-CM

## 2022-09-20 DIAGNOSIS — G89.4 CHRONIC PAIN SYNDROME: ICD-10-CM

## 2022-09-20 DIAGNOSIS — K21.9 GASTROESOPHAGEAL REFLUX DISEASE, UNSPECIFIED WHETHER ESOPHAGITIS PRESENT: ICD-10-CM

## 2022-09-20 PROBLEM — E66.9 CLASS 2 OBESITY: Status: RESOLVED | Noted: 2021-10-14 | Resolved: 2022-09-20

## 2022-09-20 PROBLEM — E66.812 CLASS 2 OBESITY: Status: RESOLVED | Noted: 2021-10-14 | Resolved: 2022-09-20

## 2022-09-20 PROCEDURE — 99214 OFFICE O/P EST MOD 30 MIN: CPT | Performed by: FAMILY MEDICINE

## 2022-09-20 PROCEDURE — 84443 ASSAY THYROID STIM HORMONE: CPT | Performed by: FAMILY MEDICINE

## 2022-09-20 PROCEDURE — 84439 ASSAY OF FREE THYROXINE: CPT | Performed by: FAMILY MEDICINE

## 2022-09-20 PROCEDURE — 36415 COLL VENOUS BLD VENIPUNCTURE: CPT | Performed by: FAMILY MEDICINE

## 2022-09-20 RX ORDER — OXYCODONE HYDROCHLORIDE 10 MG/1
10 TABLET ORAL EVERY 6 HOURS PRN
Qty: 120 TABLET | Refills: 0 | Status: SHIPPED | OUTPATIENT
Start: 2022-09-20 | End: 2022-12-20 | Stop reason: SDUPTHER

## 2022-09-20 NOTE — PROGRESS NOTES
Chief Complaint   Patient presents with   • Follow-up     HPI  Tracie Gross is a 62 y.o. female that presents for   Chief Complaint   Patient presents with   • Follow-up     L shoulder pain: patient previously treated for subacromial bursitis. L now bothering her more than R. She had steroid injection 3 months ago that improved symptoms for about 6 weeks. Also had injection and PT 6 months ago. Both helped some but symptoms returned. Having quite a bit of pain today.     Leg swelling: nifedipine reduced to 60mg daily one month ago due to leg swelling. She has only used a couple doses of lasix in the last month. Raynaud's is worse but so far, benefits of leg swelling outweigh worsened Raynaud's. Cant get compression stockings on    GERD: maintained on Dexilant 60mg daily. No heartburn or reflux on this medicine. Has upcoming EGD for hx Monahan upcoming. Follows w/ GI- Man    Hypothyroidism: levothyroxine was reduced to 175mcg daily 4 months ago. She has not noticed any difference w/ this med change.    Has upcoming colonoscopy/EGD- working on getting scheduled.   Review of Systems   Constitutional: Negative for unexpected weight gain and unexpected weight loss.   Cardiovascular: Negative for leg swelling.   Gastrointestinal: Negative for GERD and indigestion.   Endocrine: Negative for cold intolerance and heat intolerance.   Musculoskeletal: Positive for arthralgias.     The following portions of the patient's history were reviewed and updated as appropriate: problem list, past medical history, past surgical history, allergies, current medication    Problem List Tab  Patient History Tab  Immunizations Tab  Medications Tab  Chart Review Tab  Care Everywhere Tab  Synopsis Tab    PE  Vitals:    09/20/22 1402   BP: 122/80   Pulse: 70   Resp: 16   SpO2: 95%     Body mass index is 39.14 kg/m².  General: Well nourished, NAD  Head: AT/NC  Eyes: EOMI, anicteric sclera  Resp: CTAB, SCR, BS equal  CV: RRR w/o m/r/g; 2+  pulses  GI: Soft, NT/ND, +BS  MSK: Left subacromial space tender to palpation, flexion of the left shoulder limited to 100 degrees. No deformity, no edema  Skin: Warm, dry, intact  Neuro: Alert and oriented. No focal deficits  Psych: Appropriate mood and affect    Imaging  MRI Breast Bilateral With & Without Contrast    Result Date: 7/7/2022  IMPRESSION :  1. No MR findings to indicate breast malignancy. 2. Status post a right mastectomy with surgical reconstruction. There are also changes of a previous right axillary lymph node dissection procedure. 3. I would recommend routine annual screening mammographic and MR follow-up.   ASSESSMENT: BI-RADS: 2-benign findings.  BI-RADS category Key: Category 0-incomplete-needs additional imaging and/or prior images for comparison. Category 1-negative. Category 2-benign findings. Category 3-probably benign-short interval follow-up suggested. Category 4-suspicious abnormality-biopsy should be considered. Category 5-highly suggestive for malignancy-appropriate action should be taken. Category 6-known biopsy-proven malignancy-appropriate action should be taken.   Electronically Signed By-Terrance Soria MD On:7/7/2022 2:07 PM This report was finalized on [id number] by  Terrance Soria MD.      Assessment & Plan   Tracie Gross is a 62 y.o. female that presents for   Chief Complaint   Patient presents with   • Follow-up     Diagnoses and all orders for this visit:    1. Chronic pain of both shoulders (Primary): Patient has received 2 subacromial injections in the last 6 months.  Benefit is short-lived and pain returns.  She has also tried physical therapy with limited benefit.  X-ray with mild to moderate acromioclavicular degenerative change.  We will move forward with obtaining MRI and refer to Ortho for further recommendations  -     Ambulatory Referral to Orthopedic Surgery  -     MRI Shoulder Left Without Contrast; Future    2. Leg swelling: Improved following reduction of  nifedipine to 60 mg daily.  We will balance leg swelling with worsening Raynaud's, especially as weather gets cooler   -Continue nifedipine 60 mg daily with Lasix as needed   -Consider Ace wraps for compression    3. Gastroesophageal reflux disease, unspecified whether esophagitis present   -Continue home Dexilant 60 mg daily    4. Hypothyroidism, unspecified type  -     TSH  -     T4, Free  - Continue levothyroxine 175mcg daily pending labs today     Return in about 4 months (around 1/20/2023) for Recheck- 30min.

## 2022-09-21 LAB
T4 FREE SERPL-MCNC: 1.27 NG/DL (ref 0.93–1.7)
TSH SERPL DL<=0.05 MIU/L-ACNC: 0.25 UIU/ML (ref 0.27–4.2)

## 2022-09-21 NOTE — PROGRESS NOTES
Spoke with Tracie and she expressed understanding that her Thyroid labs have improved and to keep the Levothyroxine as is for now.

## 2022-09-26 DIAGNOSIS — J30.9 ALLERGIC RHINITIS, UNSPECIFIED SEASONALITY, UNSPECIFIED TRIGGER: ICD-10-CM

## 2022-09-27 RX ORDER — CETIRIZINE HYDROCHLORIDE 10 MG/1
TABLET ORAL
Qty: 90 TABLET | Refills: 1 | Status: SHIPPED | OUTPATIENT
Start: 2022-09-27 | End: 2023-01-06 | Stop reason: HOSPADM

## 2022-09-28 RX ORDER — FERROUS SULFATE 325(65) MG
325 TABLET ORAL
Qty: 30 TABLET | Refills: 5 | Status: SHIPPED | OUTPATIENT
Start: 2022-09-28

## 2022-09-28 NOTE — TELEPHONE ENCOUNTER
Rx Refill Note  Requested Prescriptions     Signed Prescriptions Disp Refills   • ferrous sulfate (FeroSul) 325 (65 FE) MG tablet 30 tablet 5     Sig: Take 1 tablet by mouth Daily With Breakfast.     Authorizing Provider: XIAO ANDERS     Ordering User: LASHAWN ALEGRE      Last office visit with prescribing clinician: 5/11/2022      Next office visit with prescribing clinician: 11/8/2022            Lashawn Alegre RN  09/28/22, 16:21 EDT

## 2022-10-07 RX ORDER — MONTELUKAST SODIUM 4 MG/1
TABLET, CHEWABLE ORAL
Qty: 120 TABLET | Refills: 0 | Status: SHIPPED | OUTPATIENT
Start: 2022-10-07 | End: 2022-11-03

## 2022-10-11 DIAGNOSIS — I73.00 RAYNAUD'S DISEASE WITHOUT GANGRENE: ICD-10-CM

## 2022-10-11 RX ORDER — NIFEDIPINE 90 MG/1
TABLET, EXTENDED RELEASE ORAL
Qty: 90 TABLET | Refills: 1 | Status: SHIPPED | OUTPATIENT
Start: 2022-10-11 | End: 2023-01-06 | Stop reason: HOSPADM

## 2022-10-18 ENCOUNTER — HOSPITAL ENCOUNTER (OUTPATIENT)
Dept: MRI IMAGING | Facility: HOSPITAL | Age: 62
Discharge: HOME | End: 2022-10-18
Admitting: FAMILY MEDICINE

## 2022-10-18 DIAGNOSIS — G89.29 CHRONIC PAIN OF BOTH SHOULDERS: ICD-10-CM

## 2022-10-18 DIAGNOSIS — M25.512 CHRONIC PAIN OF BOTH SHOULDERS: ICD-10-CM

## 2022-10-18 DIAGNOSIS — M25.511 CHRONIC PAIN OF BOTH SHOULDERS: ICD-10-CM

## 2022-10-18 PROCEDURE — 73221 MRI JOINT UPR EXTREM W/O DYE: CPT

## 2022-10-24 ENCOUNTER — OFFICE VISIT (OUTPATIENT)
Dept: ORTHOPEDIC SURGERY | Facility: CLINIC | Age: 62
End: 2022-10-24

## 2022-10-24 ENCOUNTER — PREP FOR SURGERY (OUTPATIENT)
Dept: OTHER | Facility: HOSPITAL | Age: 62
End: 2022-10-24

## 2022-10-24 VITALS — HEART RATE: 67 BPM | BODY MASS INDEX: 38.93 KG/M2 | WEIGHT: 228 LBS | HEIGHT: 64 IN

## 2022-10-24 DIAGNOSIS — R94.120 ABNORMAL AUDITORY FUNCTION STUDY: ICD-10-CM

## 2022-10-24 DIAGNOSIS — M19.012 OSTEOARTHRITIS OF LEFT GLENOHUMERAL JOINT: ICD-10-CM

## 2022-10-24 DIAGNOSIS — M75.122 COMPLETE TEAR OF LEFT ROTATOR CUFF, UNSPECIFIED WHETHER TRAUMATIC: Primary | ICD-10-CM

## 2022-10-24 DIAGNOSIS — R79.1 ABNORMAL COAGULATION PROFILE: ICD-10-CM

## 2022-10-24 DIAGNOSIS — M25.511 RIGHT SHOULDER PAIN, UNSPECIFIED CHRONICITY: Primary | ICD-10-CM

## 2022-10-24 DIAGNOSIS — R79.9 ABNORMAL FINDING OF BLOOD CHEMISTRY, UNSPECIFIED: ICD-10-CM

## 2022-10-24 PROCEDURE — 97760 ORTHOTIC MGMT&TRAING 1ST ENC: CPT | Performed by: ORTHOPAEDIC SURGERY

## 2022-10-24 PROCEDURE — 99214 OFFICE O/P EST MOD 30 MIN: CPT | Performed by: ORTHOPAEDIC SURGERY

## 2022-10-24 PROCEDURE — 20610 DRAIN/INJ JOINT/BURSA W/O US: CPT | Performed by: ORTHOPAEDIC SURGERY

## 2022-10-24 RX ORDER — OXYCODONE HCL 10 MG/1
10 TABLET, FILM COATED, EXTENDED RELEASE ORAL ONCE
Status: CANCELLED | OUTPATIENT
Start: 2022-10-24 | End: 2022-10-24

## 2022-10-24 RX ORDER — NIFEDIPINE 60 MG/1
60 TABLET, EXTENDED RELEASE ORAL DAILY
COMMUNITY
Start: 2022-08-29 | End: 2023-02-27

## 2022-10-24 RX ORDER — PREGABALIN 75 MG/1
150 CAPSULE ORAL ONCE
Status: CANCELLED | OUTPATIENT
Start: 2022-10-24 | End: 2022-10-24

## 2022-10-24 RX ORDER — CLINDAMYCIN PHOSPHATE 900 MG/50ML
900 INJECTION, SOLUTION INTRAVENOUS ONCE
Status: CANCELLED | OUTPATIENT
Start: 2022-10-24 | End: 2022-10-24

## 2022-10-24 RX ORDER — MELOXICAM 15 MG/1
15 TABLET ORAL ONCE
Status: CANCELLED | OUTPATIENT
Start: 2022-10-24 | End: 2022-10-24

## 2022-10-24 RX ORDER — TRIAMCINOLONE ACETONIDE 40 MG/ML
40 INJECTION, SUSPENSION INTRA-ARTICULAR; INTRAMUSCULAR ONCE
Status: COMPLETED | OUTPATIENT
Start: 2022-10-24 | End: 2022-10-24

## 2022-10-24 RX ORDER — TRANEXAMIC ACID 10 MG/ML
1000 INJECTION, SOLUTION INTRAVENOUS ONCE
Status: CANCELLED | OUTPATIENT
Start: 2022-10-24 | End: 2022-10-24

## 2022-10-24 RX ADMIN — TRIAMCINOLONE ACETONIDE 40 MG: 40 INJECTION, SUSPENSION INTRA-ARTICULAR; INTRAMUSCULAR at 11:41

## 2022-10-24 NOTE — PROGRESS NOTES
Patient ID: Tracie Gross is a 62 y.o. female.    Chief Complaint:    Chief Complaint   Patient presents with   • Right Shoulder - Initial Evaluation, Pain     Pain 3    • Left Shoulder - Initial Evaluation, Pain     Pain 6       HPI:  This is a 62-year-old female with longstanding bilateral shoulder pain.  She has had previous treatment with injections 4 to 5 months ago with little long-term relief.  She has a history of mastectomy and lymph node dissection on the right side.  She has tried physical therapy ice and heat as well as medication without relief  Past Medical History:   Diagnosis Date   • Arthritis    • Asthma    • Bipolar affective disorder (HCC)    • Breast cancer (HCC)     s/p R mastectomy   • GERD (gastroesophageal reflux disease)    • H/O breast reconstruction     SEVERAL   • H/O laminectomy    • Hamartoma (HCC)    • Hx of bilateral oophorectomy    • Hypertension    • Hypothyroidism    • Inflammatory bowel disease     Man. 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 EGD/colonoscopy annually (2021). MRI alternating w/ EUS annually for pancreatic screen   • Osteoporosis     maintained on Prolia through Karen   • Raynaud disease    • Scleroderma (HCC)    • Von Willebrand disease        Past Surgical History:   Procedure Laterality Date   • ARM DEBRIDEMENT Right     X 3   • BACK SURGERY      [facility]- cervical fusion   • BACK SURGERY      lumbar decomp   • BREAST BIOPSY     • BREAST LUMPECTOMY     • BREAST RECONSTRUCTION Right    • BREAST RECONSTRUCTION Right    • CARDIAC CATHETERIZATION      no stents placed   •  SECTION  ,    • CHOLECYSTECTOMY     • COLONOSCOPY     • COLONOSCOPY N/A 2020    Procedure: COLONOSCOPY;  Surgeon: Cayden Conway MD;  Location: [facility] ENDOSCOPY;  Service: Gastroenterology;  Laterality: N/A;  rectal ulcer   •  COLONOSCOPY N/A 9/17/2021    Procedure: COLONOSCOPY WITH POLYPECTOMY X 1;  Surgeon: Cayden Conway MD;  Location: [facility] ENDOSCOPY;  Service: Gastroenterology;  Laterality: N/A;  Post: COLON POLYP   • COSMETIC SURGERY  [id number]   • ENDOSCOPY     • ENDOSCOPY N/A 8/14/2020    Procedure: ESOPHAGOGASTRODUODENOSCOPY;  Surgeon: Cayden Conway MD;  Location: [facility] ENDOSCOPY;  Service: Gastroenterology;  Laterality: N/A;  Normal EGD   • ENDOSCOPY N/A 9/17/2021    Procedure: ESOPHAGOGASTRODUODENOSCOPY;  Surgeon: Cayden Conway MD;  Location: [facility] ENDOSCOPY;  Service: Gastroenterology;  Laterality: N/A;  Post: normal egd   • EXPLORATORY LAPAROTOMY      scar tissue removal   • EYE SURGERY  2000   • FINGER SURGERY Right     ring finger mass excision   • HYSTERECTOMY      PARTIAL   • JOINT REPLACEMENT Right 2012,2015    hip and knee   • KNEE ARTHROSCOPY Left 1/7/2020    Procedure: LEFT KNEE SCOPE with partial lateral meniscectomy;  Surgeon: Chau Perez MD;  Location: [facility] MAIN OR;  Service: Orthopedics   • LASIK      LASIK/ LASIK ENHANCEMENT   • MASTECTOMY RADICAL Right    • OOPHORECTOMY Bilateral    • SPLENECTOMY     • TUBAL ABDOMINAL LIGATION  1981   • UPPER ENDOSCOPIC ULTRASOUND W/ FNA N/A 12/9/2021    Procedure: ENDOSCOPIC ULTRASOUND WITH ESOPHAGOGASTRODUODENOSCOPY;  Surgeon: Luis E Negron MD;  Location: [facility] ENDOSCOPY;  Service: Gastroenterology;  Laterality: N/A;  Post: GASTROPARESIS, DILATED COMMON BILE DUCT, FUNDIC POLYP, DUODENITIS, NORMAL PANCREAS, HIATAL HERNIA   • WRIST SURGERY Right     distal radius head removal (bone dead)  plates and screws decomp lunate       Family History   Problem Relation Age of Onset   • Colon cancer Mother    • Heart disease Mother    • Cancer Mother    • Kidney disease Father    • Heart disease Father    • Depression Father    • Hyperlipidemia Father    • Vision loss Father    • Colon cancer Sister    • Diabetes Sister    • Heart disease Sister    •  "Von Willebrand disease Sister    • Von Willebrand disease Brother    • Von Willebrand disease Son    • Breast cancer Son    • Other Son         burdick syndrome   • Diabetes Paternal Grandmother    • Stroke Paternal Grandmother    • Colon cancer Other    • Colon cancer Son    • Von Willebrand disease Son    • Other Son         burdick syndrome   • Breast cancer Son    • Cancer Sister    • Vision loss Sister    • Other Sister    • Stroke Sister    • Cancer Paternal Aunt    • Cancer Maternal Aunt    • Cancer Maternal Aunt    • Hyperlipidemia Sister    • Thyroid disease Sister    • Thyroid disease Sister           Social History     Occupational History   • Not on file   Tobacco Use   • Smoking status: Former     Packs/day: 0.25     Years: 7.00     Pack years: 1.75     Types: Cigarettes     Start date:      Quit date:      Years since quittin.8   • Smokeless tobacco: Never   • Tobacco comments:     Off and on for  those years   Vaping Use   • Vaping Use: Never used   Substance and Sexual Activity   • Alcohol use: Yes     Comment: Rarely   • Drug use: No   • Sexual activity: Defer      Review of Systems   Cardiovascular: Negative for chest pain.   Musculoskeletal: Positive for arthralgias.       Objective:    Pulse 67   Ht 162.6 cm (64\")   Wt 103 kg (228 lb)   LMP 1983   BMI 39.14 kg/m²     Physical Examination:  Both shoulders demonstrate intact skin mild supraspinatus atrophy bilateral passive elevation bilateral 170 abduction 140 external rotation 50 internal rotation S1 bilateral.  She has pain and weakness on [place], [place], supraspinatus worse on the left than the right.  Belly press and lift off are 4/5 bilateral    Imaging:  Previous x-rays of both shoulder demonstrate mild joint space narrowing bilateral  MRI of the left shoulder demonstrates full-thickness supraspinatus infraspinatus tear with about 2 cm of retraction significant thinning and moderate supraspinatus mild infraspinatus " atrophy    Assessment:  Bilateral shoulder pain with moderate arthritis and a full-thickness irreparable cuff tear on the left likely cuff tear on the right    Plan:  Options discussed she would like proceed left reverse total shoulder arthroplasty.  Postop sling dispensed.  Greater than 15 minutes was spent demonstrating proper fit and use of the device and signs to monitor for complications  Discussed the risks including bleeding, scar, infection, stiffness, nerve, artery, vein and tendon damage, instability, fracture, DVT, loss of life or limb. Issues of scapular notching and component revision were discussed. Questions were answered and addressed.  In the meantime for the right shoulder I recommend injection after todays evaluation.  Risks and benefits were discussed. Under sterile technique and after timeout and verbal consent I injected 40 mg of Kenalog and 1 mL of 1% Lidocaine plain into the subacromial space. It was well tolerated.      Procedures         Disclaimer: Part of this note may be an electronic transcription/translation of spoken language to printed text using the Dragon Dictation System

## 2022-10-25 ENCOUNTER — PATIENT ROUNDING (BHMG ONLY) (OUTPATIENT)
Dept: ORTHOPEDIC SURGERY | Facility: CLINIC | Age: 62
End: 2022-10-25

## 2022-10-25 NOTE — PROGRESS NOTES
A my chart message has been sent to the patient for PATIENT ROUNDING with [facility] [place]

## 2022-10-26 ENCOUNTER — FLU SHOT (OUTPATIENT)
Dept: FAMILY MEDICINE CLINIC | Facility: CLINIC | Age: 62
End: 2022-10-26

## 2022-10-26 DIAGNOSIS — Z23 NEED FOR INFLUENZA VACCINATION: Primary | ICD-10-CM

## 2022-10-26 PROCEDURE — G0008 ADMIN INFLUENZA VIRUS VAC: HCPCS | Performed by: INTERNAL MEDICINE

## 2022-10-26 PROCEDURE — 90686 IIV4 VACC NO PRSV 0.5 ML IM: CPT | Performed by: INTERNAL MEDICINE

## 2022-11-02 PROBLEM — M75.122 COMPLETE TEAR OF LEFT ROTATOR CUFF: Status: ACTIVE | Noted: 2022-11-02

## 2022-11-03 RX ORDER — BISOPROLOL FUMARATE AND HYDROCHLOROTHIAZIDE 10; 6.25 MG/1; MG/1
TABLET ORAL
Qty: 90 TABLET | Refills: 1 | Status: SHIPPED | OUTPATIENT
Start: 2022-11-03

## 2022-11-03 RX ORDER — LISINOPRIL 5 MG/1
TABLET ORAL
Qty: 90 TABLET | Refills: 3 | Status: SHIPPED | OUTPATIENT
Start: 2022-11-03

## 2022-11-03 RX ORDER — MONTELUKAST SODIUM 4 MG/1
TABLET, CHEWABLE ORAL
Qty: 120 TABLET | Refills: 0 | Status: SHIPPED | OUTPATIENT
Start: 2022-11-03 | End: 2022-12-08

## 2022-11-03 RX ORDER — LEVOTHYROXINE SODIUM 175 UG/1
TABLET ORAL
Qty: 90 TABLET | Refills: 1 | Status: SHIPPED | OUTPATIENT
Start: 2022-11-03

## 2022-11-08 ENCOUNTER — APPOINTMENT (OUTPATIENT)
Dept: LAB | Facility: HOSPITAL | Age: 62
End: 2022-11-08

## 2022-11-08 ENCOUNTER — TELEPHONE (OUTPATIENT)
Dept: ONCOLOGY | Facility: CLINIC | Age: 62
End: 2022-11-08

## 2022-11-08 DIAGNOSIS — G47.00 INSOMNIA, UNSPECIFIED TYPE: ICD-10-CM

## 2022-11-08 DIAGNOSIS — R63.5 WEIGHT GAIN: ICD-10-CM

## 2022-11-08 NOTE — TELEPHONE ENCOUNTER
Caller: Tracie Gross    Relationship to patient: Self    Best call back number: [phone]    Chief complaint: PATIENT NOT FEELING WELL, SHE WOULD LIKE TO RESCHEDULE     Type of visit: LAB AND FOLLOW UP    Requested date: AFTERNOON, BUT CAN NOT DO November 10, 14, AND 17     If rescheduling, when is the original appointment: 11-8-22    Additional notes:PLEASE CALL PATIENT TO ADVISE

## 2022-11-09 ENCOUNTER — TELEPHONE (OUTPATIENT)
Dept: ONCOLOGY | Facility: CLINIC | Age: 62
End: 2022-11-09

## 2022-11-09 RX ORDER — ALPRAZOLAM 1 MG/1
1 TABLET ORAL NIGHTLY PRN
Qty: 30 TABLET | Refills: 2 | Status: SHIPPED | OUTPATIENT
Start: 2022-11-09 | End: 2023-02-15

## 2022-12-07 RX ORDER — HYDROXYCHLOROQUINE SULFATE 200 MG/1
TABLET, FILM COATED ORAL
Qty: 180 TABLET | Refills: 1 | Status: CANCELLED | OUTPATIENT
Start: 2022-12-07

## 2022-12-08 RX ORDER — HYDROXYCHLOROQUINE SULFATE 200 MG/1
200 TABLET, FILM COATED ORAL 2 TIMES DAILY
Qty: 180 TABLET | Refills: 3 | Status: SHIPPED | OUTPATIENT
Start: 2022-12-08 | End: 2023-12-08

## 2022-12-08 RX ORDER — MONTELUKAST SODIUM 4 MG/1
TABLET, CHEWABLE ORAL
Qty: 120 TABLET | Refills: 6 | Status: SHIPPED | OUTPATIENT
Start: 2022-12-08

## 2022-12-16 NOTE — PROGRESS NOTES
Hematology/Oncology Outpatient Follow Up    PATIENT NAME:Tracie Gross  :1960  MRN: [id number]  PRIMARY CARE PHYSICIAN: Floyd Silva MD  REFERRING PHYSICIAN: Floyd Silva, *    Chief Complaint   Patient presents with   • Follow-up     Malignant neoplasm of female breast, unspecified estrogen receptor status, unspecified laterality, unspecified site of breast (HCC)       HISTORY OF PRESENT ILLNESS:     This is a 62 y.o.  female who was diagnosed with right breast cancer in  when she was 32 years old.  Patient was originally seen on 18.  Please refer to the details of that note for more information.   · 18 - CBC:  WBC 13.6, hemoglobin 11.7, platelet count 392,000.  Differentials 65% neutrophils, 15% lymphocytes.  There was mild monocytosis at 16.  Eosinophils were 2.3 and basophils were 0.3.  B12 292.  Ferritin 88.  Folate 13.8.  AST slightly high at 57.  Alkaline phosphatase was high at 326.  .  Haptoglobin 179, normal.  SPEP with DYLAN did not show any monoclonal protein.  Flow cytometry did not show any evidence of acute leukemia or lymphoproliferative disease.  There was monocytosis measured at 21%.  Reticulocyte count normal 1.02.  Peripheral smear showed absolute monocytosis at 19%, thrombocytosis and macrocytosis.  BCR-abl was negative.  Methylmalonic acid level was elevated to 420.    · 18 - PET/CT scan showed multiple small bilateral lymph nodes in the neck without metabolic activity or change from prior CT scans and are likely due to SLE.  There were unchanged bilateral level 1 axillary lymph nodes without metabolic activity.  Multiple mediastinal nodes were also seen.  The largest 11 mm, unchanged from prior imaging with no hypermetabolic activity.  There was no evidence of metastatic disease in the abdomen or pelvis.  There was no focal lesion within the liver or biliary system.  Multiple small retroperitoneal and external iliac nodes, bilateral inguinal  nodes similar to prior imaging with no PET activity.    · 8/8/18 - RICHIE-2 analysis with no mutation identified.    · 11/29/18 - Bone density revealed osteoporosis.  Patient is currently on Fosamax.   · 12/3/18 - Bone marrow aspiration and biopsy showed normocellular bone marrow at 40%.  There was adequate iron stores and no evidence of malignancy was seen.  Flow cytometry was negative.  Cytogenetics showed normal female karyotype.  Blasts were less than 3% of nucleated cells.  There was no significant dyspoiesis.   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     · 1/22/19 - Urine cytology was negative for malignant cells.    · 2/28/19 - Patient seen by Dermatology for her annual skin evaluation.   · 3/13/19 - CT scan of the chest, abdomen and pelvis:  CT scan of chest showed chronic changes.  · 5/29/2019 patient had an EGD with polypectomy performed by Dr. Conway.  Dr. Conway has recommended follow-up MRI of the pancreas in 2 years.  And also EGD and colonoscopy in 2 years.  · 11/13/19-left screening mammogram was negative follow-up in 1 year was recommended  · 5/4/2020 patient had a CT scan of the chest, abdomen and pelvis multiple borderline enlarged mediastinal lymph nodes the largest measuring 2.2 cm in the subcarinal space unchanged from prior.  Pancreas is normal.  There are few retroperitoneal mesenteric and pelvic lymph node enlargement which are similar to prior exam.  All are subcentimeter in size.  · 5/20/2020 patient had bilateral breast MRI did not show any evidence of malignancy.  · 12/7/2020 patient DEXA scan showed persistent and worsened osteoporosis    Past Medical History:   Diagnosis Date   • Arthritis    • Asthma    • Bipolar affective disorder (HCC)    • Breast cancer (HCC) 1985    s/p R mastectomy   • GERD (gastroesophageal reflux disease) 1993    • H/O breast reconstruction     SEVERAL   • H/O laminectomy    • Hamartoma (HCC)    • Hx of bilateral oophorectomy    • Hypertension    • Hypothyroidism    • Inflammatory bowel disease     Man. EGD/colonoscopy annually (2021)   • Kienbock's disease, right     WRIST   • Low back pain    • Lupus (HCC)     Mateuszenell   • Monahan syndrome     Man. EGD/colonoscopy annually (2021). MRI alternating w/ EUS annually for pancreatic screen   • Osteoporosis     maintained on Prolia through Karen   • Raynaud disease    • Scleroderma (HCC)    • Von Willebrand disease        Past Surgical History:   Procedure Laterality Date   • ARM DEBRIDEMENT Right     X 3   • BACK SURGERY      [facility]- cervical fusion   • BACK SURGERY      lumbar decomp   • BREAST BIOPSY     • BREAST LUMPECTOMY     • BREAST RECONSTRUCTION Right    • BREAST RECONSTRUCTION Right    • CARDIAC CATHETERIZATION      no stents placed   •  SECTION  ,    • CHOLECYSTECTOMY     • COLONOSCOPY     • COLONOSCOPY N/A 2020    Procedure: COLONOSCOPY;  Surgeon: Cayden Conway MD;  Location: [facility] ENDOSCOPY;  Service: Gastroenterology;  Laterality: N/A;  rectal ulcer   • COLONOSCOPY N/A 2021    Procedure: COLONOSCOPY WITH POLYPECTOMY X 1;  Surgeon: Cayden Conway MD;  Location: [facility] ENDOSCOPY;  Service: Gastroenterology;  Laterality: N/A;  Post: COLON POLYP   • COSMETIC SURGERY  [id number]   • ENDOSCOPY     • ENDOSCOPY N/A 2020    Procedure: ESOPHAGOGASTRODUODENOSCOPY;  Surgeon: Cayden Conway MD;  Location: [facility] ENDOSCOPY;  Service: Gastroenterology;  Laterality: N/A;  Normal EGD   • ENDOSCOPY N/A 2021    Procedure: ESOPHAGOGASTRODUODENOSCOPY;  Surgeon: Cayden Conway MD;  Location: [facility] ENDOSCOPY;  Service: Gastroenterology;  Laterality: N/A;  Post: normal egd   • EXPLORATORY LAPAROTOMY      scar tissue removal   • EYE SURGERY     • FINGER SURGERY Right     ring finger mass  excision   • HYSTERECTOMY      PARTIAL   • JOINT REPLACEMENT Right 2012,2015    hip and knee   • KNEE ARTHROSCOPY Left 1/7/2020    Procedure: LEFT KNEE SCOPE with partial lateral meniscectomy;  Surgeon: Chau Perez MD;  Location: [facility] MAIN OR;  Service: Orthopedics   • LASIK      LASIK/ LASIK ENHANCEMENT   • MASTECTOMY RADICAL Right    • OOPHORECTOMY Bilateral    • SPLENECTOMY     • TUBAL ABDOMINAL LIGATION  1981   • UPPER ENDOSCOPIC ULTRASOUND W/ FNA N/A 12/9/2021    Procedure: ENDOSCOPIC ULTRASOUND WITH ESOPHAGOGASTRODUODENOSCOPY;  Surgeon: Luis E Negron MD;  Location: [facility] ENDOSCOPY;  Service: Gastroenterology;  Laterality: N/A;  Post: GASTROPARESIS, DILATED COMMON BILE DUCT, FUNDIC POLYP, DUODENITIS, NORMAL PANCREAS, HIATAL HERNIA   • WRIST SURGERY Right     distal radius head removal (bone dead)  plates and screws decomp lunate         Current Outpatient Medications:   •  ALPRAZolam (Xanax) 0.5 MG tablet, Every 8 (Eight) Hours., Disp: , Rfl:   •  ALPRAZolam (XANAX) 1 MG tablet, Take 1 tablet by mouth At Night As Needed for Anxiety or Sleep., Disp: 30 tablet, Rfl: 2  •  bisoprolol-hydrochlorothiazide (ZIAC) 10-6.25 MG per tablet, TAKE ONE TABLET BY MOUTH DAILY, Disp: 90 tablet, Rfl: 1  •  bisoprolol-hydrochlorothiazide (ZIAC) 10-6.25 MG per tablet, Daily., Disp: , Rfl:   •  Calcium + Vitamin D3 600-5 MG-MCG tablet, TAKE ONE TABLET BY MOUTH TWICE A DAY, Disp: 60 tablet, Rfl: 6  •  cetirizine (zyrTEC) 10 MG tablet, TAKE ONE TABLET BY MOUTH DAILY, Disp: 90 tablet, Rfl: 1  •  clindamycin (CLEOCIN T) 1 % lotion, Apply  topically to the appropriate area as directed At Night As Needed. Use on face, Disp: , Rfl:   •  colestipol (COLESTID) 1 g tablet, TAKE TWO TABLETS BY MOUTH TWICE A DAY, Disp: 120 tablet, Rfl: 6  •  colestipol (COLESTID) 1 g tablet, Every 12 (Twelve) Hours., Disp: , Rfl:   •  cyclobenzaprine (FLEXERIL) 10 MG tablet, TAKE ONE TABLET BY MOUTH THREE TIMES A DAY AS NEEDED FOR MUSCLE SPASMS AS  NEEDED, Disp: 100 tablet, Rfl: 3  •  Denosumab (PROLIA SC), Inject  under the skin into the appropriate area as directed Every 6 (Six) Months., Disp: , Rfl:   •  dexlansoprazole (DEXILANT) 60 MG capsule, Take 60 mg by mouth Daily., Disp: , Rfl:   •  dexlansoprazole (Dexilant) 60 MG capsule, Daily., Disp: , Rfl:   •  Diclofenac Sodium (VOLTAREN) 1 % gel gel, APPLY 4 GRAMS TOPICALLY TO THE APPROPRIATE AREA AS DIRECTED FOUR TIMES DAILY AS NEEDED FOR JOINT PAIN, Disp: 100 g, Rfl: 3  •  famotidine (PEPCID) 20 MG tablet, Take 1 tablet by mouth 2 (Two) Times a Day As Needed for Heartburn., Disp: 120 tablet, Rfl: 5  •  ferrous sulfate (FeroSul) 325 (65 FE) MG tablet, Take 1 tablet by mouth Daily With Breakfast., Disp: 30 tablet, Rfl: 5  •  Flaxseed, Linseed, (FLAXSEED OIL MAX STR) 1300 MG capsule, Take 1 tablet by mouth 2 (Two) Times a Day., Disp: , Rfl:   •  fluticasone (FLONASE) 50 MCG/ACT nasal spray, SPRAY TWO SPRAYS IN EACH NOSTRIL ONCE DAILY, Disp: 16 mL, Rfl: 5  •  furosemide (Lasix) 20 MG tablet, Take 1 tablet by mouth Daily As Needed (leg swelling)., Disp: 30 tablet, Rfl: 2  •  gabapentin (NEURONTIN) 300 MG capsule, TAKE TWO CAPSULES BY MOUTH EVERY MORNING AND FOUR CAPSULES EVERY EVENING, Disp: 270 capsule, Rfl: 5  •  HYDROcodone-Acetaminophen (ZOLVIT)  MG/15ML solution, Every 6 (Six) Hours., Disp: , Rfl:   •  hydroxychloroquine (PLAQUENIL) 200 MG tablet, Take 1 tablet by mouth 2 (Two) Times a Day. Indications: Systemic Lupus Erythematosus, Disp: 180 tablet, Rfl: 3  •  hydrOXYzine (ATARAX) 25 MG tablet, Take 1 tablet by mouth Every 8 (Eight) Hours As Needed for Itching., Disp: 30 tablet, Rfl: 3  •  lamoTRIgine (LaMICtal) 200 MG tablet, Take 200 mg by mouth Daily., Disp: , Rfl:   •  levothyroxine (SYNTHROID, LEVOTHROID) 175 MCG tablet, TAKE ONE TABLET BY MOUTH DAILY, Disp: 90 tablet, Rfl: 1  •  levothyroxine (SYNTHROID, LEVOTHROID) 200 MCG tablet, TAKE ONE TABLET BY MOUTH DAILY, Disp: 90 tablet, Rfl: 2  •   lisinopril (PRINIVIL,ZESTRIL) 5 MG tablet, TAKE ONE TABLET BY MOUTH DAILY, Disp: 90 tablet, Rfl: 3  •  lubiprostone (Amitiza) 24 MCG capsule, Every 12 (Twelve) Hours., Disp: , Rfl:   •  Morphine (CROW) 60 MG 24 hr capsule, Daily., Disp: , Rfl:   •  NIFEdipine XL (PROCARDIA XL) 60 MG 24 hr tablet, , Disp: , Rfl:   •  NIFEdipine XL (PROCARDIA XL) 90 MG 24 hr tablet, TAKE ONE TABLET BY MOUTH DAILY, Disp: 90 tablet, Rfl: 1  •  ondansetron (ZOFRAN) 4 MG tablet, Take 1 tablet by mouth Every 8 (Eight) Hours As Needed for Nausea or Vomiting., Disp: 30 tablet, Rfl: 3  •  oxyCODONE (ROXICODONE) 10 MG tablet, Take 1 tablet by mouth Every 6 (Six) Hours As Needed for Severe Pain., Disp: 120 tablet, Rfl: 0  •  pimecrolimus (Elidel) 1 % cream, Every 12 (Twelve) Hours., Disp: , Rfl:   •  QUEtiapine (SEROquel) 100 MG tablet, Every 12 (Twelve) Hours., Disp: , Rfl:     Allergies   Allergen Reactions   • Aspirin Other (See Comments)     VONWILLENBRAND DISEASE    • Penicillins Anaphylaxis   • Baclofen Hives   • Tape  [Adhesive Tape] Rash       Family History   Problem Relation Age of Onset   • Colon cancer Mother    • Heart disease Mother    • Cancer Mother    • Kidney disease Father    • Heart disease Father    • Depression Father    • Hyperlipidemia Father    • Vision loss Father    • Colon cancer Sister    • Diabetes Sister    • Heart disease Sister    • Von Willebrand disease Sister    • Von Willebrand disease Brother    • Von Willebrand disease Son    • Breast cancer Son    • Other Son         burdick syndrome   • Diabetes Paternal Grandmother    • Stroke Paternal Grandmother    • Colon cancer Other    • Colon cancer Son    • Von Willebrand disease Son    • Other Son         burdick syndrome   • Breast cancer Son    • Cancer Sister    • Vision loss Sister    • Other Sister    • Stroke Sister    • Cancer Paternal Aunt    • Cancer Maternal Aunt    • Cancer Maternal Aunt    • Hyperlipidemia Sister    • Thyroid disease Sister    •  Thyroid disease Sister        Cancer-related family history includes Breast cancer in her son and son; Cancer in her maternal aunt, maternal aunt, mother, paternal aunt, and sister; Colon cancer in her mother, sister, son, and another family member.    Social History     Tobacco Use   • Smoking status: Former     Packs/day: 0.25     Years: 7.00     Pack years: 1.75     Types: Cigarettes     Start date:      Quit date:      Years since quittin.9   • Smokeless tobacco: Never   • Tobacco comments:     Off and on for  those years   Vaping Use   • Vaping Use: Never used   Substance Use Topics   • Alcohol use: Yes     Comment: Rarely   • Drug use: No       I have reviewed and confirmed the accuracy of the patient's history: Chief complaint, HPI, ROS and Subjective as entered by the MA/LPN/RN. Suzan Jones MD 22       SUBJECTIVE:    She is here today for routine follow-up.  She was seen by her rheumatologist and was found to have significantly elevated liver function tests otherwise she denies any specific breast issues or any abdominal symptoms      Patient is here for follow-up.  She is scheduled for left shoulder rotator cuff repair in a few weeks          REVIEW OF SYSTEMS:    Review of Systems   Constitutional: Negative for chills and fever.   HENT: Negative for ear pain, mouth sores, nosebleeds and sore throat.    Eyes: Negative for photophobia and visual disturbance.   Respiratory: Negative for wheezing and stridor.    Cardiovascular: Negative for chest pain and palpitations.   Gastrointestinal: Negative for abdominal pain, diarrhea, nausea and vomiting.   Endocrine: Negative for cold intolerance and heat intolerance.   Genitourinary: Negative for dysuria and hematuria.   Musculoskeletal: Negative for joint swelling and neck stiffness.   Skin: Negative for color change and rash.   Neurological: Negative for seizures and syncope.   Hematological: Negative for adenopathy.        No obvious  "bleeding   Psychiatric/Behavioral: Negative for agitation, confusion and hallucinations.         OBJECTIVE:    Vitals:    12/19/22 1456   BP: 135/94   Pulse: 90   Resp: 18   Temp: 97 °F (36.1 °C)   TempSrc: Infrared   Weight: 101 kg (222 lb)   Height: 162.6 cm (64\")   PainSc:   6   PainLoc: Shoulder       ECOG  (1) Restricted in physically strenuous activity, ambulatory and able to do work of light nature    Physical Exam   Constitutional: She is oriented to person, place, and time. She appears well-developed. No distress.   HENT:   Head: Normocephalic and atraumatic.   Right Ear: External ear normal.   Nose: Nose normal.   Eyes: Pupils are equal, round, and reactive to light. Conjunctivae are normal. Right eye exhibits no discharge. Left eye exhibits no discharge. No scleral icterus.   Neck: No thyromegaly present.   Cardiovascular: Normal rate, regular rhythm and normal heart sounds. Exam reveals no gallop and no friction rub.   Pulmonary/Chest: Effort normal. No stridor. No respiratory distress. She has no wheezes. Left breast exhibits no inverted nipple, no mass, no nipple discharge, no skin change and no tenderness. No breast swelling, tenderness, discharge or bleeding.   Right chest wall does not reveal any palpable masses.  Bilateral axilla was negative   Abdominal: Soft. Bowel sounds are normal. She exhibits no mass. There is no abdominal tenderness. There is no rebound and no guarding.   Musculoskeletal: Normal range of motion. No tenderness.   Lymphadenopathy:     She has no cervical adenopathy.   Neurological: She is alert and oriented to person, place, and time. She exhibits normal muscle tone.   Skin: Skin is warm. No rash noted. She is not diaphoretic. No erythema.   Psychiatric: Her behavior is normal. Judgment and thought content normal.   Nursing note and vitals reviewed.    I have reexamined the patient and the results are consistent with the previously documented exam. Suzan Jones MD "     RECENT LABS    WBC   Date Value Ref Range Status   12/19/2022 11.44 (H) 3.40 - 10.80 10*3/mm3 Final     RBC   Date Value Ref Range Status   12/19/2022 4.19 3.77 - 5.28 10*6/mm3 Final     Hemoglobin   Date Value Ref Range Status   12/19/2022 13.9 12.0 - 15.9 g/dL Final     Hematocrit   Date Value Ref Range Status   12/19/2022 41.1 34.0 - 46.6 % Final     MCV   Date Value Ref Range Status   12/19/2022 98.1 (H) 79.0 - 97.0 fL Final     MCH   Date Value Ref Range Status   12/19/2022 33.2 (H) 26.6 - 33.0 pg Final     MCHC   Date Value Ref Range Status   12/19/2022 33.8 31.5 - 35.7 g/dL Final     RDW   Date Value Ref Range Status   12/19/2022 14.0 12.3 - 15.4 % Final     RDW-SD   Date Value Ref Range Status   12/19/2022 48.5 37.0 - 54.0 fl Final     MPV   Date Value Ref Range Status   12/19/2022 11.4 6.0 - 12.0 fL Final     Platelets   Date Value Ref Range Status   12/19/2022 309 140 - 450 10*3/mm3 Final     Neutrophil %   Date Value Ref Range Status   12/19/2022 55.4 42.7 - 76.0 % Final     Lymphocyte %   Date Value Ref Range Status   12/19/2022 26.8 19.6 - 45.3 % Final     Monocyte %   Date Value Ref Range Status   12/19/2022 12.2 (H) 5.0 - 12.0 % Final     Eosinophil %   Date Value Ref Range Status   12/19/2022 5.0 0.3 - 6.2 % Final     Basophil %   Date Value Ref Range Status   12/19/2022 0.6 0.0 - 1.5 % Final     Immature Grans %   Date Value Ref Range Status   05/18/2022 0.5 0.0 - 0.5 % Final     Neutrophils, Absolute   Date Value Ref Range Status   12/19/2022 6.34 1.70 - 7.00 10*3/mm3 Final     Lymphocytes, Absolute   Date Value Ref Range Status   12/19/2022 3.07 0.70 - 3.10 10*3/mm3 Final     Monocytes, Absolute   Date Value Ref Range Status   12/19/2022 1.39 (H) 0.10 - 0.90 10*3/mm3 Final     Eosinophils, Absolute   Date Value Ref Range Status   12/19/2022 0.57 (H) 0.00 - 0.40 10*3/mm3 Final     Basophils, Absolute   Date Value Ref Range Status   12/19/2022 0.07 0.00 - 0.20 10*3/mm3 Final     Immature Grans,  Absolute   Date Value Ref Range Status   05/18/2022 0.05 0.00 - 0.05 10*3/mm3 Final     nRBC   Date Value Ref Range Status   05/18/2022 0.0 0.0 - 0.2 /100 WBC Final       Lab Results   Component Value Date    GLUCOSE 87 05/18/2022    BUN 23 05/18/2022    CREATININE 1.20 07/06/2022    EGFRIFNONA 54 (L) 01/18/2022    EGFRIFAFRI 83 04/04/2017    BCR 20.7 05/18/2022    K 4.3 05/18/2022    CO2 26.5 05/18/2022    CALCIUM 10.0 05/18/2022    ALBUMIN 4.50 05/18/2022    LABIL2 1.1 05/31/2019    AST 28 05/18/2022    ALT 26 05/18/2022           ASSESSMENT:    1. 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  She also has DICER1 gene as well as TSC2 with variants of unknown significance.  Reviewed  2. History of right breast cancer, status post right mastectomy followed by axillary lymph node dissection and right chest wall reconstruction in 1985 at the age of 32.  3. Recurrent elevated liver function tests, pruritus, but no significant pathology found on both the PET scan, MRCP, except for post cholecystectomy, biliary ductal dilatation.    She was on on Colestipol for pruritus.  We will plan to repeat her CMP today, will obtain CT scan of the abdomen and pelvis and have her referred back to Dr. Conway as soon as possible.  Also will call for records of her EUS findings from December 2021 performed by Dr. Arias.  Her abdominal symptoms are benign.  Reviewed her recent CT abdomen and pelvis which was essentially unremarkable  4. Incidental finding of left hip joint with avascular necrosis: Patient is established with Dr. Mar will refer to him.  Observation has been recommended by Dr. Mar  5. She has peripheral lymphadenopathy involving the neck, axilla, mediastinum and retroperitoneum that are not PET avid.  This lymphadenopathy may be due to chronic inflammatory disease [lupus].    6. Strong family history of colon cancer and personal  history of breast cancer.   7. Systemic lupus erythematosus, scleroderma, Raynaud’s phenomenon.   8. Normocytic anemia, multifactorial, underlying autoimmune disease, relative iron deficiency and B12 deficiency.  On iron and B12 supplementation.  Stable  9. Persistent monocytosis, status post bone marrow aspiration and biopsy which was essentially normal.  Stable  10. Progressive osteoporosis.  On Fosamax and recent DEXA scan from 12/7/2020 shows progression.  Patient is currently on Prolia.  She will continue the same  11. Urine cytology checked annually  12. Annual dermatology appointments reviewed with patient  13. Status post complete hysterectomy reducing her risk for ovarian and uterine cancer.  14. Assessment has been reviewed and updated     PLANS:     · Follow-up with Dr Mar  For avascular necrosis left hip.  For now she is on observation  · Reviewed results of her CMP and also CT scan of abdomen and pelvis  · Plan is for continued surveillance for Monahan syndrome diagnosis.    To follow-up with GI for pancreatic cancer screening.  Reviewed last EGD and colonoscopy completed 8/14/2020.  Patient also had pancreatic MRI completed by Dr. Conway.  Patient had EUS in December 2021.  She has an appointment coming up in January 2023 with GI for EGD colonoscopy and EUS of the pancreas  · Continue Prolia: This will be done every 6 months: Reviewed  · She will continue calcium and vitamin D  · Bone density is due December 2022.  This has been ordered today   · Patient's screening mammogram will be due January 2023.  We will go ahead and schedule at this time  · Bilateral breast MRI will be due July 2023.    ·  She will continue monthly breast self-exam and call for lumps nipple discharge, skin discoloration.    · Urine cytology ordered today 12/19/2022  · Annual dermatology appointments  · CT scans of the chest abdomen and pelvis   · Discussed with patient  · Follow-up in 6 months  · CMP today         I have  reviewed labs results, imaging, vitals, and medications with the patient today. Will follow up in 6 months with me.      Patient verbalized understanding and is in agreement of the above plan.      I spent 40 total minutes, face-to-face, caring for Tracie today.  90% of this time involved counseling and/or coordination of care as documented within this note.

## 2022-12-19 ENCOUNTER — LAB (OUTPATIENT)
Dept: LAB | Facility: HOSPITAL | Age: 62
End: 2022-12-19

## 2022-12-19 ENCOUNTER — OFFICE VISIT (OUTPATIENT)
Dept: ONCOLOGY | Facility: CLINIC | Age: 62
End: 2022-12-19

## 2022-12-19 VITALS
SYSTOLIC BLOOD PRESSURE: 135 MMHG | TEMPERATURE: 97 F | BODY MASS INDEX: 37.9 KG/M2 | HEART RATE: 90 BPM | WEIGHT: 222 LBS | HEIGHT: 64 IN | RESPIRATION RATE: 18 BRPM | DIASTOLIC BLOOD PRESSURE: 94 MMHG

## 2022-12-19 DIAGNOSIS — Z15.09 LYNCH SYNDROME: ICD-10-CM

## 2022-12-19 DIAGNOSIS — D68.00 VON WILLEBRAND DISEASE: ICD-10-CM

## 2022-12-19 DIAGNOSIS — R92.8 OTHER ABNORMAL AND INCONCLUSIVE FINDINGS ON DIAGNOSTIC IMAGING OF BREAST: ICD-10-CM

## 2022-12-19 DIAGNOSIS — C50.919 MALIGNANT NEOPLASM OF FEMALE BREAST, UNSPECIFIED ESTROGEN RECEPTOR STATUS, UNSPECIFIED LATERALITY, UNSPECIFIED SITE OF BREAST: Primary | ICD-10-CM

## 2022-12-19 DIAGNOSIS — Z78.0 POST-MENOPAUSAL: ICD-10-CM

## 2022-12-19 DIAGNOSIS — Z12.31 ENCOUNTER FOR SCREENING MAMMOGRAM FOR MALIGNANT NEOPLASM OF BREAST: ICD-10-CM

## 2022-12-19 DIAGNOSIS — R79.89 ELEVATED LIVER FUNCTION TESTS: ICD-10-CM

## 2022-12-19 LAB
ALBUMIN SERPL-MCNC: 4.4 G/DL (ref 3.5–5.2)
ALBUMIN/GLOB SERPL: 1.3 G/DL
ALP SERPL-CCNC: 127 U/L (ref 39–117)
ALT SERPL W P-5'-P-CCNC: 21 U/L (ref 1–33)
ANION GAP SERPL CALCULATED.3IONS-SCNC: 11 MMOL/L (ref 5–15)
AST SERPL-CCNC: 28 U/L (ref 1–32)
BASOPHILS # BLD AUTO: 0.07 10*3/MM3 (ref 0–0.2)
BASOPHILS NFR BLD AUTO: 0.6 % (ref 0–1.5)
BILIRUB SERPL-MCNC: 0.2 MG/DL (ref 0–1.2)
BUN SERPL-MCNC: 27 MG/DL (ref 8–23)
BUN/CREAT SERPL: 21.3 (ref 7–25)
CALCIUM SPEC-SCNC: 9.7 MG/DL (ref 8.6–10.5)
CHLORIDE SERPL-SCNC: 101 MMOL/L (ref 98–107)
CO2 SERPL-SCNC: 30 MMOL/L (ref 22–29)
CREAT SERPL-MCNC: 1.27 MG/DL (ref 0.57–1)
DEPRECATED RDW RBC AUTO: 48.5 FL (ref 37–54)
EGFRCR SERPLBLD CKD-EPI 2021: 47.3 ML/MIN/1.73
EOSINOPHIL # BLD AUTO: 0.57 10*3/MM3 (ref 0–0.4)
EOSINOPHIL NFR BLD AUTO: 5 % (ref 0.3–6.2)
ERYTHROCYTE [DISTWIDTH] IN BLOOD BY AUTOMATED COUNT: 14 % (ref 12.3–15.4)
GLOBULIN UR ELPH-MCNC: 3.3 GM/DL
GLUCOSE SERPL-MCNC: 86 MG/DL (ref 65–99)
HCT VFR BLD AUTO: 41.1 % (ref 34–46.6)
HGB BLD-MCNC: 13.9 G/DL (ref 12–15.9)
HOLD SPECIMEN: NORMAL
HOLD SPECIMEN: NORMAL
LYMPHOCYTES # BLD AUTO: 3.07 10*3/MM3 (ref 0.7–3.1)
LYMPHOCYTES NFR BLD AUTO: 26.8 % (ref 19.6–45.3)
MCH RBC QN AUTO: 33.2 PG (ref 26.6–33)
MCHC RBC AUTO-ENTMCNC: 33.8 G/DL (ref 31.5–35.7)
MCV RBC AUTO: 98.1 FL (ref 79–97)
MONOCYTES # BLD AUTO: 1.39 10*3/MM3 (ref 0.1–0.9)
MONOCYTES NFR BLD AUTO: 12.2 % (ref 5–12)
NEUTROPHILS NFR BLD AUTO: 55.4 % (ref 42.7–76)
NEUTROPHILS NFR BLD AUTO: 6.34 10*3/MM3 (ref 1.7–7)
PLATELET # BLD AUTO: 309 10*3/MM3 (ref 140–450)
PMV BLD AUTO: 11.4 FL (ref 6–12)
POTASSIUM SERPL-SCNC: 4.4 MMOL/L (ref 3.5–5.2)
PROT SERPL-MCNC: 7.7 G/DL (ref 6–8.5)
RBC # BLD AUTO: 4.19 10*6/MM3 (ref 3.77–5.28)
SODIUM SERPL-SCNC: 142 MMOL/L (ref 136–145)
WBC NRBC COR # BLD: 11.44 10*3/MM3 (ref 3.4–10.8)

## 2022-12-19 PROCEDURE — 99215 OFFICE O/P EST HI 40 MIN: CPT | Performed by: INTERNAL MEDICINE

## 2022-12-19 PROCEDURE — 36415 COLL VENOUS BLD VENIPUNCTURE: CPT

## 2022-12-19 PROCEDURE — 85025 COMPLETE CBC W/AUTO DIFF WBC: CPT

## 2022-12-19 PROCEDURE — 80053 COMPREHEN METABOLIC PANEL: CPT

## 2022-12-19 RX ORDER — BISOPROLOL FUMARATE AND HYDROCHLOROTHIAZIDE 10; 6.25 MG/1; MG/1
TABLET ORAL EVERY 24 HOURS
COMMUNITY
End: 2022-12-28

## 2022-12-19 RX ORDER — ALPRAZOLAM 0.5 MG/1
TABLET ORAL EVERY 8 HOURS SCHEDULED
COMMUNITY
End: 2022-12-28

## 2022-12-19 RX ORDER — LUBIPROSTONE 24 UG/1
CAPSULE ORAL EVERY 12 HOURS SCHEDULED
COMMUNITY
End: 2023-01-06 | Stop reason: HOSPADM

## 2022-12-19 RX ORDER — PIMECROLIMUS 10 MG/G
CREAM TOPICAL EVERY 12 HOURS SCHEDULED
COMMUNITY
End: 2023-01-23

## 2022-12-19 RX ORDER — MONTELUKAST SODIUM 4 MG/1
TABLET, CHEWABLE ORAL EVERY 12 HOURS SCHEDULED
COMMUNITY
End: 2022-12-28

## 2022-12-19 RX ORDER — MORPHINE SULFATE 60 MG/1
CAPSULE, EXTENDED RELEASE ORAL EVERY 24 HOURS
COMMUNITY
End: 2023-01-06 | Stop reason: HOSPADM

## 2022-12-19 RX ORDER — DEXLANSOPRAZOLE 60 MG/1
CAPSULE, DELAYED RELEASE ORAL EVERY 24 HOURS
COMMUNITY
End: 2022-12-28

## 2022-12-20 ENCOUNTER — TELEPHONE (OUTPATIENT)
Dept: ORTHOPEDIC SURGERY | Facility: CLINIC | Age: 62
End: 2022-12-20
Payer: MEDICARE

## 2022-12-20 DIAGNOSIS — G89.4 CHRONIC PAIN SYNDROME: ICD-10-CM

## 2022-12-20 DIAGNOSIS — M25.511 RIGHT SHOULDER PAIN, UNSPECIFIED CHRONICITY: Primary | ICD-10-CM

## 2022-12-20 NOTE — TELEPHONE ENCOUNTER
PATIENT IS SCHEDULED FOR SURGERY ON 1/17 FOR LEFT REVERSE TOTAL. SHE SAID HER RIGHT IS NOW WORSE AND WOULD LIKE TO SWITCH IT TO THE RIGHT SIDE. HER LAST OFFICE VISIT WAS 10/24 AND YOU GAVE A STEROID INJECTION IN THE RIGHT SHOULDER. SHE HAS NOT HAD AN MRI ON THE RIGHT JUST THE LEFT.HOW WOULD YOU LIKE TO PROCEED?

## 2022-12-21 RX ORDER — OXYCODONE HYDROCHLORIDE 10 MG/1
10 TABLET ORAL EVERY 6 HOURS PRN
Qty: 120 TABLET | Refills: 0 | Status: SHIPPED | OUTPATIENT
Start: 2022-12-21 | End: 2023-03-12 | Stop reason: SDUPTHER

## 2022-12-22 ENCOUNTER — TELEPHONE (OUTPATIENT)
Dept: ONCOLOGY | Facility: CLINIC | Age: 62
End: 2022-12-22

## 2022-12-22 NOTE — TELEPHONE ENCOUNTER
----- Message from JADEN Tom sent at 12/20/2022  8:57 AM EST -----  Please let the patient know she has had a slight decrease in her kidney function.  She should increase her fluid intake and also avoid NSAIDs.  She is on multiple diuretics so it would also be a good idea for her to follow-up with whoever prescribes these to ensure that nothing is needed as diuretics can also affect kidney function.  Thanks!

## 2022-12-22 NOTE — TELEPHONE ENCOUNTER
Called pt to let her know that her kidney function had a slight decrease and that she should increase her fluid intake and avoid NSAIDs. I also discussed her diuretics. She stated that she only takes them as needed. I told her that if she is using them, she should follow-up with the doctor that prescribes them since they can affect kidney function. Pt verbalized understanding.

## 2022-12-27 NOTE — TELEPHONE ENCOUNTER
I left message for patient to call Flor back once she knows date and time of Right shoulder MRI.

## 2022-12-28 NOTE — TELEPHONE ENCOUNTER
MRI FOR RIGHT SHOULDER IS SCHEDULED FOR 1/3. DOES PATIENT NEED AN APPOINTMENT FOR YOU TO REVIEW FOR SURGERY OR OK TO REVIEW AND DECIDE BETWEEN RIGHT OR LEFT SHOULDER.

## 2022-12-29 NOTE — TELEPHONE ENCOUNTER
My note appears to say we are going to proceed with the shoulder arthroplasty on the left regardless of what the right side says so I would confirm that plan with the patient if possible please

## 2022-12-29 NOTE — TELEPHONE ENCOUNTER
SHES STATES HER RIGHT IS BOTHERING HER WORSE THAN THE LEFT AND SHE WANTED TO DO THE MRI TO SEE WHICH SHOULDER YOU WOULD SUGGEST DOING FIRST DEPENDING ON THE RIGHT MRI RESULTS.

## 2022-12-30 NOTE — TELEPHONE ENCOUNTER
Financial dept called to state MRI is not approved and they are calling patient to postpone appt.

## 2023-01-03 ENCOUNTER — APPOINTMENT (OUTPATIENT)
Dept: MRI IMAGING | Facility: HOSPITAL | Age: 63
End: 2023-01-03

## 2023-01-03 NOTE — TELEPHONE ENCOUNTER
PATIENT CX SURGERY FOR LEFT SHOULDER. SHE HAS HER MRI FOR RIGHT SHOULDER ON 1/25. F/U WITH DR. PIERCE ON 1/26 TO REVIEW AND DECIDE WHICH SHOULDER THEY WANT TO PROCEED WITH.

## 2023-01-06 ENCOUNTER — HOSPITAL ENCOUNTER (OUTPATIENT)
Facility: HOSPITAL | Age: 63
Setting detail: HOSPITAL OUTPATIENT SURGERY
Discharge: HOME | End: 2023-01-06
Attending: INTERNAL MEDICINE | Admitting: INTERNAL MEDICINE
Payer: MEDICARE

## 2023-01-06 ENCOUNTER — ANESTHESIA (OUTPATIENT)
Dept: GASTROENTEROLOGY | Facility: HOSPITAL | Age: 63
End: 2023-01-06
Payer: MEDICARE

## 2023-01-06 ENCOUNTER — ANESTHESIA EVENT (OUTPATIENT)
Dept: GASTROENTEROLOGY | Facility: HOSPITAL | Age: 63
End: 2023-01-06
Payer: MEDICARE

## 2023-01-06 ENCOUNTER — ON CAMPUS - OUTPATIENT (AMBULATORY)
Dept: URBAN - METROPOLITAN AREA HOSPITAL 85 | Facility: HOSPITAL | Age: 63
End: 2023-01-06
Payer: COMMERCIAL

## 2023-01-06 VITALS
BODY MASS INDEX: 39.29 KG/M2 | SYSTOLIC BLOOD PRESSURE: 115 MMHG | OXYGEN SATURATION: 94 % | DIASTOLIC BLOOD PRESSURE: 58 MMHG | HEIGHT: 64 IN | TEMPERATURE: 97.9 F | HEART RATE: 65 BPM | RESPIRATION RATE: 14 BRPM | WEIGHT: 230.16 LBS

## 2023-01-06 DIAGNOSIS — Z12.11 ENCOUNTER FOR SCREENING FOR MALIGNANT NEOPLASM OF COLON: ICD-10-CM

## 2023-01-06 DIAGNOSIS — Z86.010 PERSONAL HISTORY OF COLONIC POLYPS: ICD-10-CM

## 2023-01-06 DIAGNOSIS — Z15.09 LYNCH SYNDROME: ICD-10-CM

## 2023-01-06 DIAGNOSIS — K63.5 POLYP OF COLON: ICD-10-CM

## 2023-01-06 PROCEDURE — C1769 GUIDE WIRE: HCPCS | Performed by: INTERNAL MEDICINE

## 2023-01-06 PROCEDURE — 45385 COLONOSCOPY W/LESION REMOVAL: CPT | Mod: PT | Performed by: INTERNAL MEDICINE

## 2023-01-06 PROCEDURE — 25010000002 PROPOFOL 200 MG/20ML EMULSION

## 2023-01-06 PROCEDURE — 88305 TISSUE EXAM BY PATHOLOGIST: CPT | Performed by: INTERNAL MEDICINE

## 2023-01-06 DEVICE — DEV CLIP ENDO RESOLUTION360 CONTRL ROT 235CM: Type: IMPLANTABLE DEVICE | Site: COLON | Status: FUNCTIONAL

## 2023-01-06 RX ORDER — LIDOCAINE HYDROCHLORIDE 20 MG/ML
INJECTION, SOLUTION INFILTRATION; PERINEURAL AS NEEDED
Status: DISCONTINUED | OUTPATIENT
Start: 2023-01-06 | End: 2023-01-06 | Stop reason: SURG

## 2023-01-06 RX ORDER — ONDANSETRON 2 MG/ML
4 INJECTION INTRAMUSCULAR; INTRAVENOUS ONCE AS NEEDED
Status: DISCONTINUED | OUTPATIENT
Start: 2023-01-06 | End: 2023-01-06 | Stop reason: HOSPADM

## 2023-01-06 RX ORDER — SODIUM CHLORIDE 9 MG/ML
40 INJECTION, SOLUTION INTRAVENOUS AS NEEDED
Status: DISCONTINUED | OUTPATIENT
Start: 2023-01-06 | End: 2023-01-06 | Stop reason: HOSPADM

## 2023-01-06 RX ORDER — SODIUM CHLORIDE 0.9 % (FLUSH) 0.9 %
3-10 SYRINGE (ML) INJECTION AS NEEDED
Status: CANCELLED | OUTPATIENT
Start: 2023-01-06

## 2023-01-06 RX ORDER — SODIUM CHLORIDE 0.9 % (FLUSH) 0.9 %
3 SYRINGE (ML) INJECTION EVERY 12 HOURS SCHEDULED
Status: CANCELLED | OUTPATIENT
Start: 2023-01-06

## 2023-01-06 RX ORDER — PROPOFOL 10 MG/ML
INJECTION, EMULSION INTRAVENOUS AS NEEDED
Status: DISCONTINUED | OUTPATIENT
Start: 2023-01-06 | End: 2023-01-06 | Stop reason: SURG

## 2023-01-06 RX ADMIN — SODIUM CHLORIDE 100 ML/HR: 9 INJECTION, SOLUTION INTRAVENOUS at 10:55

## 2023-01-06 RX ADMIN — LIDOCAINE HYDROCHLORIDE 100 MG: 20 INJECTION, SOLUTION INFILTRATION; PERINEURAL at 10:57

## 2023-01-06 RX ADMIN — PROPOFOL 250 MG: 10 INJECTION, EMULSION INTRAVENOUS at 10:57

## 2023-01-06 NOTE — ANESTHESIA PREPROCEDURE EVALUATION
Anesthesia Evaluation     Patient summary reviewed and Nursing notes reviewed   no history of anesthetic complications:  NPO Solid Status: > 8 hours  NPO Liquid Status: > 8 hours           Airway   Dental      Pulmonary    (+) a smoker Former, asthma,sleep apnea,   Cardiovascular     ECG reviewed    (+) hypertension, valvular problems/murmurs,       Neuro/Psych  (+) numbness, psychiatric history Depression and Bipolar,    GI/Hepatic/Renal/Endo    (+) obesity,  GERD,  renal disease CRI, thyroid problem hypothyroidism    Musculoskeletal     (+) back pain, chronic pain, myalgias, radiculopathy  Abdominal    Substance History      OB/GYN          Other   arthritis, autoimmune disease lupus and scleroderma,    history of cancer    ROS/Med Hx Other: Breast cancer, burdick syndrome, allergies, UC, fibromyalgia, von Willebrand dz, Raynaud's chest pain, neuralgia, osteoporosis    Echo  Normal LV size and contractility EF of 60 to 65%  Normal RV size  Normal atrial size  Aortic valve, mitral valve, tricuspid valve appears structurally normal, moderate mitral regurgitation seen.  No pericardial effusion seen.  Proximal aorta appears normal in size.    Stress  ? Findings consistent with a normal ECG stress test.  ? Diaphragmatic attenuation and GI artifacts are present.  ? Left ventricular ejection fraction is normal (Calculated EF = 59%).  ? Impressions are consistent with a low risk study.  ? Severe large inferoapical reverse redistribution gut uptake artifact noted no ischemia EF 59%.     EKG without changes during Lexiscan.  No chest pain      Correlation with myocardial perfusion images recommended          PSH  HYSTERECTOMY MASTECTOMY RADICAL  CHOLECYSTECTOMY COLONOSCOPY  LASIK BREAST RECONSTRUCTION  BREAST RECONSTRUCTION OOPHORECTOMY  COSMETIC SURGERY  SECTION  EYE SURGERY TUBAL ABDOMINAL LIGATION  BREAST BIOPSY BREAST LUMPECTOMY  JOINT REPLACEMENT WRIST SURGERY  ENDOSCOPY FINGER SURGERY  ARM  DEBRIDEMENT SPLENECTOMY  BACK SURGERY BACK SURGERY  EXPLORATORY LAPAROTOMY CARDIAC CATHETERIZATION  KNEE ARTHROSCOPY ENDOSCOPY  COLONOSCOPY COLONOSCOPY  ENDOSCOPY UPPER ENDOSCOPIC ULTRASOUND W/ FNA                Anesthesia Plan    ASA 3     MAC     (Patient identified; pre-operative vital signs, all relevant labs/studies, complete medical/surgical/anesthetic history, full medication list, full allergy list, and NPO status obtained/reviewed; physical assessment performed; anesthetic options, side effects, potential complications, risks, and benefits discussed; questions answered; written anesthesia consent obtained; patient cleared for procedure; anesthesia machine and equipment checked and functioning)    Anesthetic plan, risks, benefits, and alternatives have been provided, discussed and informed consent has been obtained with: patient.    Plan discussed with CRNA and CAA.        CODE STATUS:

## 2023-01-06 NOTE — ANESTHESIA POSTPROCEDURE EVALUATION
Patient: Tracie Gross    Procedure Summary     Date: 01/06/23 Room / Location: [facility] ENDOSCOPY 1 / [facility] ENDOSCOPY    Anesthesia Start: 1055 Anesthesia Stop: 1127    Procedure: COLONOSCOPY with polypectomy x 1, endoscopic clipping x 1 Diagnosis:       Personal history of colonic polyps      Monahan syndrome      (Personal history of colonic polyps [Z86.010])      (Monahan syndrome [Z15.09])    Surgeons: Cayden Conway MD Provider: Carlo Burleson MD    Anesthesia Type: MAC ASA Status: 3          Anesthesia Type: MAC    Vitals  Vitals Value Taken Time   /58 01/06/23 1132   Temp     Pulse 65 01/06/23 1132   Resp 14 01/06/23 1128   SpO2 93 % 01/06/23 1132   Vitals shown include unvalidated device data.        Post Anesthesia Care and Evaluation    Patient location during evaluation: PACU  Patient participation: complete - patient participated  Level of consciousness: awake  Pain scale: See nurse's notes for pain score.  Pain management: adequate    Airway patency: patent  Anesthetic complications: No anesthetic complications  PONV Status: none  Cardiovascular status: acceptable  Respiratory status: acceptable and spontaneous ventilation  Hydration status: acceptable    Comments: Patient seen and examined postoperatively; vital signs stable; SpO2 greater than or equal to 90%; cardiopulmonary status stable; nausea/vomiting adequately controlled; pain adequately controlled; no apparent anesthesia complications; patient discharged from anesthesia care when discharge criteria were met

## 2023-01-09 DIAGNOSIS — J30.9 ALLERGIC RHINITIS, UNSPECIFIED SEASONALITY, UNSPECIFIED TRIGGER: ICD-10-CM

## 2023-01-09 LAB
LAB AP CASE REPORT: NORMAL
PATH REPORT.FINAL DX SPEC: NORMAL
PATH REPORT.GROSS SPEC: NORMAL

## 2023-01-09 RX ORDER — FLUTICASONE PROPIONATE 50 MCG
SPRAY, SUSPENSION (ML) NASAL
Qty: 16 ML | Refills: 5 | Status: SHIPPED | OUTPATIENT
Start: 2023-01-09

## 2023-01-17 ENCOUNTER — TELEPHONE (OUTPATIENT)
Dept: ORTHOPEDIC SURGERY | Facility: CLINIC | Age: 63
End: 2023-01-17
Payer: MEDICARE

## 2023-01-17 NOTE — TELEPHONE ENCOUNTER
PATIENT CALLED STATING SHE WAS SUPPOSE TO HAVE A MRI OF HER RIGHT SHOULDER THEN HAVE SURGERY WITH DR PIERCE. INSURANCE DENIED MRI AND PATIENT WOULD LIKE TO KNOW WHAT THE NEXT STEP IS? PLEASE ADVISE

## 2023-01-23 ENCOUNTER — OFFICE VISIT (OUTPATIENT)
Dept: FAMILY MEDICINE CLINIC | Facility: CLINIC | Age: 63
End: 2023-01-23
Payer: MEDICARE

## 2023-01-23 ENCOUNTER — LAB (OUTPATIENT)
Dept: FAMILY MEDICINE CLINIC | Facility: CLINIC | Age: 63
End: 2023-01-23
Payer: MEDICARE

## 2023-01-23 VITALS
RESPIRATION RATE: 16 BRPM | WEIGHT: 229 LBS | BODY MASS INDEX: 39.09 KG/M2 | DIASTOLIC BLOOD PRESSURE: 88 MMHG | TEMPERATURE: 97.5 F | HEART RATE: 87 BPM | HEIGHT: 64 IN | SYSTOLIC BLOOD PRESSURE: 132 MMHG | OXYGEN SATURATION: 98 %

## 2023-01-23 DIAGNOSIS — M19.012 OSTEOARTHRITIS OF LEFT SHOULDER, UNSPECIFIED OSTEOARTHRITIS TYPE: ICD-10-CM

## 2023-01-23 DIAGNOSIS — M79.89 LEG SWELLING: Primary | ICD-10-CM

## 2023-01-23 DIAGNOSIS — I73.00 RAYNAUD'S DISEASE WITHOUT GANGRENE: ICD-10-CM

## 2023-01-23 DIAGNOSIS — Z15.09 LYNCH SYNDROME: ICD-10-CM

## 2023-01-23 DIAGNOSIS — E03.9 HYPOTHYROIDISM, UNSPECIFIED TYPE: ICD-10-CM

## 2023-01-23 PROCEDURE — 84439 ASSAY OF FREE THYROXINE: CPT | Performed by: FAMILY MEDICINE

## 2023-01-23 PROCEDURE — 84481 FREE ASSAY (FT-3): CPT | Performed by: FAMILY MEDICINE

## 2023-01-23 PROCEDURE — 84443 ASSAY THYROID STIM HORMONE: CPT | Performed by: FAMILY MEDICINE

## 2023-01-23 PROCEDURE — 99214 OFFICE O/P EST MOD 30 MIN: CPT | Performed by: FAMILY MEDICINE

## 2023-01-23 PROCEDURE — 36415 COLL VENOUS BLD VENIPUNCTURE: CPT | Performed by: FAMILY MEDICINE

## 2023-01-23 PROCEDURE — 80053 COMPREHEN METABOLIC PANEL: CPT | Performed by: FAMILY MEDICINE

## 2023-01-23 RX ORDER — TRIAMCINOLONE ACETONIDE 0.25 MG/G
1 CREAM TOPICAL DAILY PRN
COMMUNITY

## 2023-01-23 NOTE — PROGRESS NOTES
Chief Complaint   Patient presents with   • Shoulder Pain     HPI  Tracie Gross is a 62 y.o. female that presents for   Chief Complaint   Patient presents with   • Shoulder Pain     Ankle swelling: patient reports persistent, bilateral ankle pain. She reports use of compression stockings on PRN basis and using lasix PRN. Patient is maintained on nifedipine for Raynaud's     Raynaud's: worse recently. Now having symptoms in her feet. Maintained on nifedipine 60 daily. Reluctant to increase dose due to leg swelling above.    Shoulder pain: following w/ [facility]. Was planning to have L shoulder replaced but she decided R shoulder was bothering her more. Reports being unable to rollover in bed due to pain and limited ROM. Had multiple injection to both shoulders last year as well as PT. Now trying to get R shoulder MRI (scheduled for 1/25/23). Has ortho f/u in 3 days (1/26/23)    Hypothyroidism: Now maintained on levothyroxine 175mcg daily. Last labs w/ slightly depressed TSH. Denies issues w/ this med. 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 Man states this needs done every 2 years but Karen states this needs done annually, reportedly.     Review of Systems   Constitutional: Negative for fever and unexpected weight loss.   Respiratory: Negative for cough and shortness of breath.    Cardiovascular: Positive for leg swelling. Negative for chest pain.   Musculoskeletal: Positive for arthralgias, back pain and gait problem.     The following portions of the patient's history were reviewed and updated as appropriate: problem list, past medical history, past surgical history, allergies, current medication    Problem List Tab  Patient History Tab  Immunizations Tab  Medications Tab  Chart Review Tab  Care Everywhere Tab  Synopsis Tab    PE  Vitals:    01/23/23 1400   BP: 132/88   Pulse: 87   Resp: 16   Temp: 97.5  °F (36.4 °C)   SpO2: 98%     Body mass index is 39.31 kg/m².  General: Obese, NAD  Head: AT/NC  Eyes: EOMI, anicteric sclera  Resp: CTAB, SCR, BS equal  CV: RRR w/o m/r/g; 2+ pulses  GI: Soft, NT/ND, +BS  MSK:  no deformity, no edema. Limited ROM to bilateral shoulders (60 degrees felxion and abduction of R shoulder)  Skin: Warm, dry, intact  Neuro: Alert and oriented. No focal deficits  Psych: Appropriate mood and affect    Imaging  MRI Shoulder Left Without Contrast    Result Date: 10/18/2022  1.     Supraspinatus and infraspinatus tendinopathy with suspected diffuse broad partial-thickness tendon tears and small full-thickness tear at the posterior supraspinatus/anterior infraspinatus insertion. 2.     Fluid in the subacromial/subdeltoid bursa which could be related to bursitis or full-thickness rotator cuff tendon defect. 3.     Mild to moderate glenohumeral osteoarthritis with suspected degeneration and tear of the superior labrum. 4.     Moderate acromioclavicular joint degeneration with small joint effusion.   Electronically Signed By-Tee Guillen MD On:10/18/2022 4:02 PM This report was finalized on [id number] by  Tee Guillen MD.    Assessment & Plan   Tracie Gross is a 62 y.o. female that presents for   Chief Complaint   Patient presents with   • Shoulder Pain     Diagnoses and all orders for this visit:    1. Leg swelling (Primary): Likely secondary to chronic venous stasis as well as complicated by nifedipine use  -     Comprehensive Metabolic Panel  - Recommend compression stockings daily and Lasix 20 daily as needed    2. Raynaud's disease without gangrene   -Increase nifedipine to 90 mg daily    3. Osteoarthritis of left shoulder, unspecified osteoarthritis type: Significantly reduced range of motion of bilateral shoulders.  Left shoulder with significant rotator cuff tear/partial tear on MRI.  Right shoulder with more limited range of motion on exam today.  Planning for upcoming MRI of the  right shoulder   -Plan for MRI right shoulder   -Continue Ortho [id number]    4. Hypothyroidism, unspecified type  -     Comprehensive Metabolic Panel  -     TSH  -     T4, Free  -     T3, Free  - Continue home levothyroxine 175 mcg daily pending labs    5. 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 Jones.  She clarifies that EGD is needed every other year but she also needs pancreatic cancer screening annually.  Pancreatic cancer screening should alternate between EUS and MRI pancreas so that she is getting one or the other every year   -Continue oncology and GI follow-up       Return in about 4 months (around 5/23/2023) for Medicare Wellness- last appt of day.

## 2023-01-24 ENCOUNTER — TELEPHONE (OUTPATIENT)
Dept: ORTHOPEDIC SURGERY | Facility: CLINIC | Age: 63
End: 2023-01-24
Payer: MEDICARE

## 2023-01-24 LAB
ALBUMIN SERPL-MCNC: 4.5 G/DL (ref 3.5–5.2)
ALBUMIN/GLOB SERPL: 1.6 G/DL
ALP SERPL-CCNC: 107 U/L (ref 39–117)
ALT SERPL W P-5'-P-CCNC: 16 U/L (ref 1–33)
ANION GAP SERPL CALCULATED.3IONS-SCNC: 9 MMOL/L (ref 5–15)
AST SERPL-CCNC: 25 U/L (ref 1–32)
BILIRUB SERPL-MCNC: 0.3 MG/DL (ref 0–1.2)
BUN SERPL-MCNC: 22 MG/DL (ref 8–23)
BUN/CREAT SERPL: 17.7 (ref 7–25)
CALCIUM SPEC-SCNC: 10 MG/DL (ref 8.6–10.5)
CHLORIDE SERPL-SCNC: 100 MMOL/L (ref 98–107)
CO2 SERPL-SCNC: 31 MMOL/L (ref 22–29)
CREAT SERPL-MCNC: 1.24 MG/DL (ref 0.57–1)
EGFRCR SERPLBLD CKD-EPI 2021: 48.7 ML/MIN/1.73
GLOBULIN UR ELPH-MCNC: 2.9 GM/DL
GLUCOSE SERPL-MCNC: 101 MG/DL (ref 65–99)
POTASSIUM SERPL-SCNC: 4.2 MMOL/L (ref 3.5–5.2)
PROT SERPL-MCNC: 7.4 G/DL (ref 6–8.5)
SODIUM SERPL-SCNC: 140 MMOL/L (ref 136–145)
T3FREE SERPL-MCNC: 2.14 PG/ML (ref 2–4.4)
T4 FREE SERPL-MCNC: 1.17 NG/DL (ref 0.93–1.7)
TSH SERPL DL<=0.05 MIU/L-ACNC: 0.91 UIU/ML (ref 0.27–4.2)

## 2023-01-25 ENCOUNTER — HOSPITAL ENCOUNTER (OUTPATIENT)
Dept: BONE DENSITY | Facility: HOSPITAL | Age: 63
Discharge: HOME | End: 2023-01-25
Payer: MEDICARE

## 2023-01-26 ENCOUNTER — OFFICE VISIT (OUTPATIENT)
Dept: ORTHOPEDIC SURGERY | Facility: CLINIC | Age: 63
End: 2023-01-26
Payer: MEDICARE

## 2023-01-26 VITALS — BODY MASS INDEX: 39.09 KG/M2 | WEIGHT: 229 LBS | HEIGHT: 64 IN

## 2023-01-26 DIAGNOSIS — M25.511 RIGHT SHOULDER PAIN, UNSPECIFIED CHRONICITY: Primary | ICD-10-CM

## 2023-01-26 PROCEDURE — 99213 OFFICE O/P EST LOW 20 MIN: CPT | Performed by: ORTHOPAEDIC SURGERY

## 2023-01-26 NOTE — PROGRESS NOTES
"     Patient ID: Tracie Gross is a 62 y.o. female.  Right shoulder pain  Returns with continued right shoulder pain she is stated she had therapy and an injection last year through her primary care physician this is resulted in continued pain the right is now worse than the left    Review of Systems:        Objective:    Ht 162.6 cm (64\")   Wt 104 kg (229 lb)   LMP 05/08/1983   BMI 39.31 kg/m²     Physical Examination:  Right shoulder intact skin mild atrophy of the supraspinatus bilateral passive elevation 160 abduction 130 external rotation 50 internal rotation S1 active elevation 110 degrees with pain and weakness on Speed [place] supraspinatus testing.       Imaging:       Assessment:    Right shoulder pain with degenerative joint disease possible cuff tear    Plan:   I recommend MRI      Procedures          Disclaimer: Part of this note may be an electronic transcription/translation of spoken language to printed text using the Dragon Dictation System  "

## 2023-01-26 NOTE — PATIENT INSTRUCTIONS
MRI follow-up instructions    Today at your office visit, Dr. Ruiz recommended an MRI (magnetic resonance imaging) to evaluate your joint pain.  This requires a precertification process, which our office will do, and then we will contact you when it is approved and go over scheduling options.  We typically recommend these to be performed at [facility] or [facility].  If for some reason it is performed elsewhere please arrange to have that facility give you a disc with your images on it so Dr. Ruiz can review it at your follow-up visit.    When checking out today we recommend making an appointment to go over your results in approximately two weeks.  If your MRI is done sooner than that we would be happy to schedule you sooner to go over your results, just contact us at [phone] or through the [employer] portal to let us know your MRI is completed.  Seeing you in person for the results gives us the best opportunity to look at your images together and explain the diagnosis and treatment options to best help you.

## 2023-02-01 ENCOUNTER — HOSPITAL ENCOUNTER (OUTPATIENT)
Dept: BONE DENSITY | Facility: HOSPITAL | Age: 63
Discharge: HOME | End: 2023-02-01
Payer: MEDICARE

## 2023-02-01 ENCOUNTER — HOSPITAL ENCOUNTER (OUTPATIENT)
Dept: MAMMOGRAPHY | Facility: HOSPITAL | Age: 63
Discharge: HOME | End: 2023-02-01
Payer: MEDICARE

## 2023-02-01 ENCOUNTER — HOSPITAL ENCOUNTER (OUTPATIENT)
Dept: ONCOLOGY | Facility: HOSPITAL | Age: 63
Discharge: HOME | End: 2023-02-01
Admitting: INTERNAL MEDICINE
Payer: MEDICARE

## 2023-02-01 VITALS — DIASTOLIC BLOOD PRESSURE: 72 MMHG | HEART RATE: 88 BPM | SYSTOLIC BLOOD PRESSURE: 118 MMHG

## 2023-02-01 DIAGNOSIS — D68.00 VON WILLEBRAND DISEASE: ICD-10-CM

## 2023-02-01 DIAGNOSIS — Z78.0 POST-MENOPAUSAL: ICD-10-CM

## 2023-02-01 DIAGNOSIS — Z12.31 ENCOUNTER FOR SCREENING MAMMOGRAM FOR MALIGNANT NEOPLASM OF BREAST: ICD-10-CM

## 2023-02-01 DIAGNOSIS — Z12.31 SCREENING MAMMOGRAM FOR BREAST CANCER: ICD-10-CM

## 2023-02-01 DIAGNOSIS — C50.919 MALIGNANT NEOPLASM OF FEMALE BREAST, UNSPECIFIED ESTROGEN RECEPTOR STATUS, UNSPECIFIED LATERALITY, UNSPECIFIED SITE OF BREAST: ICD-10-CM

## 2023-02-01 DIAGNOSIS — R79.89 ELEVATED LIVER FUNCTION TESTS: ICD-10-CM

## 2023-02-01 DIAGNOSIS — Z15.09 LYNCH SYNDROME: ICD-10-CM

## 2023-02-01 DIAGNOSIS — M81.0 OSTEOPOROSIS, UNSPECIFIED OSTEOPOROSIS TYPE, UNSPECIFIED PATHOLOGICAL FRACTURE PRESENCE: Primary | ICD-10-CM

## 2023-02-01 DIAGNOSIS — R92.8 OTHER ABNORMAL AND INCONCLUSIVE FINDINGS ON DIAGNOSTIC IMAGING OF BREAST: ICD-10-CM

## 2023-02-01 PROCEDURE — 77067 SCR MAMMO BI INCL CAD: CPT

## 2023-02-01 PROCEDURE — 25010000002 DENOSUMAB 60 MG/ML SOLUTION PREFILLED SYRINGE: Performed by: INTERNAL MEDICINE

## 2023-02-01 PROCEDURE — 96372 THER/PROPH/DIAG INJ SC/IM: CPT

## 2023-02-01 PROCEDURE — 77080 DXA BONE DENSITY AXIAL: CPT

## 2023-02-01 PROCEDURE — 77063 BREAST TOMOSYNTHESIS BI: CPT

## 2023-02-01 RX ADMIN — DENOSUMAB 60 MG: 60 INJECTION SUBCUTANEOUS at 13:43

## 2023-02-01 NOTE — PROGRESS NOTES
Patient here for Prolia injection. Patient received Prolia injection and tolerated injection well.Patient aware of next appointment

## 2023-02-03 RX ORDER — PREDNISONE 20 MG/1
40 TABLET ORAL DAILY
Qty: 10 TABLET | Refills: 0 | Status: SHIPPED | OUTPATIENT
Start: 2023-02-03

## 2023-02-10 ENCOUNTER — TELEPHONE (OUTPATIENT)
Dept: ORTHOPEDIC SURGERY | Facility: CLINIC | Age: 63
End: 2023-02-10
Payer: MEDICARE

## 2023-02-10 NOTE — TELEPHONE ENCOUNTER
Patient is scheduled for MRI right shoulder next Wednesday 2/15/2023 insurance is requesting a peer to peer for this. Can one of you please call [phone] Monday to do a peer to peer so the patient can have her MRI Wednesday? Case# [id number] Please let me know if you have any questions

## 2023-02-13 ENCOUNTER — TELEPHONE (OUTPATIENT)
Dept: ORTHOPEDIC SURGERY | Facility: CLINIC | Age: 63
End: 2023-02-13
Payer: MEDICARE

## 2023-02-13 NOTE — TELEPHONE ENCOUNTER
CALLED AND WANTED TO KNOW IF A PEER TO PEER HAS BEEN STARTED OR IF ONE COULD BE STARTED SINCE [facility] DENIED PATIENTS MRI ON 2/15/23/. PLEASE CALL  BACK [phone]. AFTER 4PM CALL [phone] CASE ID#[id number] PEER TO PEER PHONE NUMBER [phone] IF NEEDED.     PLEASE ADVISE.

## 2023-02-14 DIAGNOSIS — R63.5 WEIGHT GAIN: ICD-10-CM

## 2023-02-14 DIAGNOSIS — G47.00 INSOMNIA, UNSPECIFIED TYPE: ICD-10-CM

## 2023-02-14 NOTE — TELEPHONE ENCOUNTER
I spoke to Insurance company and no matter what was discussed today for peer to peer the MRI is denied.  Per insurance MRI can't be re-ordered until after 3/13/23.  I discussed this with patient and she is keeping appointment 2/20/23 to discuss options.

## 2023-02-14 NOTE — TELEPHONE ENCOUNTER
FC CALLED BACK IN TO CHECK STATUS OF P2P. INFORMED HER THAT PER NOTES IT WILL BE DONE TODAY. IF NOT DONE BY 2 PM SHE WILL CANCEL APPOINTMENT FOR NOW.

## 2023-02-15 RX ORDER — ALPRAZOLAM 1 MG/1
TABLET ORAL
Qty: 30 TABLET | Refills: 2 | Status: SHIPPED | OUTPATIENT
Start: 2023-02-15

## 2023-02-20 ENCOUNTER — OFFICE VISIT (OUTPATIENT)
Dept: ORTHOPEDIC SURGERY | Facility: CLINIC | Age: 63
End: 2023-02-20
Payer: MEDICARE

## 2023-02-20 VITALS — WEIGHT: 229 LBS | HEIGHT: 64 IN | HEART RATE: 87 BPM | BODY MASS INDEX: 39.09 KG/M2 | OXYGEN SATURATION: 96 %

## 2023-02-20 DIAGNOSIS — M19.012 OSTEOARTHRITIS OF LEFT GLENOHUMERAL JOINT: Primary | ICD-10-CM

## 2023-02-20 DIAGNOSIS — M25.511 RIGHT SHOULDER PAIN, UNSPECIFIED CHRONICITY: ICD-10-CM

## 2023-02-20 PROCEDURE — 20610 DRAIN/INJ JOINT/BURSA W/O US: CPT | Performed by: ORTHOPAEDIC SURGERY

## 2023-02-20 PROCEDURE — 99213 OFFICE O/P EST LOW 20 MIN: CPT | Performed by: ORTHOPAEDIC SURGERY

## 2023-02-20 RX ORDER — TRIAMCINOLONE ACETONIDE 40 MG/ML
80 INJECTION, SUSPENSION INTRA-ARTICULAR; INTRAMUSCULAR ONCE
Status: COMPLETED | OUTPATIENT
Start: 2023-02-20 | End: 2023-02-20

## 2023-02-20 RX ADMIN — TRIAMCINOLONE ACETONIDE 80 MG: 40 INJECTION, SUSPENSION INTRA-ARTICULAR; INTRAMUSCULAR at 09:51

## 2023-02-20 NOTE — PROGRESS NOTES
"     Patient ID: Tracie Gross is a 62 y.o. female.  Bilateral shoulder pain  MRI has been denied for the right shoulder.  She has continued to do her therapy and use oral medication ice and heat but continues with right shoulder pain.  Her left shoulder is also giving her increasing trouble    Review of Systems:        Objective:    Pulse 87   Ht 162.6 cm (64\")   Wt 104 kg (229 lb)   LMP 05/08/1983   SpO2 96%   BMI 39.31 kg/m²     Physical Examination:     Both shoulders intact skin mild atrophy of the supraspinatus bilateral passive elevation 160 abduction 130 external rotation 50 internal rotation S1 active elevation 110 degrees with pain and weakness on Speed [place] supraspinatus testing.        Imaging:       Assessment:    Right shoulder pain possible cuff tear  Left shoulder massive cuff tear with arthritis  Plan:    I again recommend proceeding with MRI of the right shoulder to evaluate for rotator cuff tear    For the left shoulder I recommend injection after today's evaluation. Risks and benefits of the injection were discussed. Under sterile technique and after timeout and verbal consent I injected 80 mg of Kenalog and 2 mL of 1% Lidocaine in the shoulder and subacromial space. It was well tolerated.      Procedures          Disclaimer: Part of this note may be an electronic transcription/translation of spoken language to printed text using the Dragon Dictation System  "

## 2023-02-27 RX ORDER — NIFEDIPINE 60 MG/1
TABLET, EXTENDED RELEASE ORAL
Qty: 90 TABLET | Refills: 2 | Status: SHIPPED | OUTPATIENT
Start: 2023-02-27

## 2023-03-12 DIAGNOSIS — G89.4 CHRONIC PAIN SYNDROME: ICD-10-CM

## 2023-03-12 DIAGNOSIS — Z00.00 MEDICARE ANNUAL WELLNESS VISIT, SUBSEQUENT: ICD-10-CM

## 2023-03-12 RX ORDER — CALCIUM CARBONATE/VITAMIN D3 600MG-5MCG
TABLET ORAL
Qty: 60 TABLET | Refills: 6 | Status: SHIPPED | OUTPATIENT
Start: 2023-03-12

## 2023-03-13 ENCOUNTER — DOCUMENTATION (OUTPATIENT)
Dept: PHYSICAL THERAPY | Facility: CLINIC | Age: 63
End: 2023-03-13
Payer: MEDICARE

## 2023-03-13 RX ORDER — OXYCODONE HYDROCHLORIDE 10 MG/1
10 TABLET ORAL EVERY 6 HOURS PRN
Qty: 120 TABLET | Refills: 0 | Status: SHIPPED | OUTPATIENT
Start: 2023-03-13 | End: 2023-04-06

## 2023-03-13 NOTE — PROGRESS NOTES
Discharge Summary  Discharge Summary from Physical Therapy Report      Dates  PT visit: 7/11/2022 - 8/19/2022  Number of Visits: 8 visits.   8/24/22 pnt not treated as she entered the clinic reported R  leg swelling.     Discharge Status of Patient: See MD Note dated   8/24/2022    Goals: Partially Met    Discharge Plan: Continue with current home exercise program as instructed    Comments   Treatment for R hip pain with interventions including therEx, therAct, neuro and manual.    Pnt was referred to PCP due to R leg swelling.  The insurance auth was up and needed to be requested; however, client did not return to PT.       Jocelynn Matos, PT  Physical Therapist

## 2023-03-14 DIAGNOSIS — M25.511 RIGHT SHOULDER PAIN, UNSPECIFIED CHRONICITY: Primary | ICD-10-CM

## 2023-03-21 ENCOUNTER — HOSPITAL ENCOUNTER (OUTPATIENT)
Dept: MRI IMAGING | Facility: HOSPITAL | Age: 63
Discharge: HOME | End: 2023-03-21
Admitting: ORTHOPAEDIC SURGERY
Payer: MEDICARE

## 2023-03-21 DIAGNOSIS — M25.511 RIGHT SHOULDER PAIN, UNSPECIFIED CHRONICITY: ICD-10-CM

## 2023-03-21 PROCEDURE — 73221 MRI JOINT UPR EXTREM W/O DYE: CPT

## 2023-03-23 ENCOUNTER — OFFICE VISIT (OUTPATIENT)
Dept: ORTHOPEDIC SURGERY | Facility: CLINIC | Age: 63
End: 2023-03-23
Payer: MEDICARE

## 2023-03-23 ENCOUNTER — PREP FOR SURGERY (OUTPATIENT)
Dept: OTHER | Facility: HOSPITAL | Age: 63
End: 2023-03-23
Payer: MEDICARE

## 2023-03-23 VITALS — WEIGHT: 226.2 LBS | BODY MASS INDEX: 38.83 KG/M2

## 2023-03-23 DIAGNOSIS — R94.5 ABNORMAL RESULTS OF LIVER FUNCTION STUDIES: ICD-10-CM

## 2023-03-23 DIAGNOSIS — M19.011 OSTEOARTHRITIS OF RIGHT GLENOHUMERAL JOINT: Primary | ICD-10-CM

## 2023-03-23 DIAGNOSIS — R94.120 ABNORMAL AUDITORY FUNCTION STUDY: ICD-10-CM

## 2023-03-23 DIAGNOSIS — R79.9 ABNORMAL FINDING OF BLOOD CHEMISTRY, UNSPECIFIED: ICD-10-CM

## 2023-03-23 DIAGNOSIS — M75.121 COMPLETE TEAR OF RIGHT ROTATOR CUFF, UNSPECIFIED WHETHER TRAUMATIC: ICD-10-CM

## 2023-03-23 DIAGNOSIS — R79.1 ABNORMAL COAGULATION PROFILE: ICD-10-CM

## 2023-03-23 PROCEDURE — 97760 ORTHOTIC MGMT&TRAING 1ST ENC: CPT | Performed by: ORTHOPAEDIC SURGERY

## 2023-03-23 PROCEDURE — 99214 OFFICE O/P EST MOD 30 MIN: CPT | Performed by: ORTHOPAEDIC SURGERY

## 2023-03-23 RX ORDER — PREGABALIN 75 MG/1
150 CAPSULE ORAL ONCE
OUTPATIENT
Start: 2023-03-23 | End: 2023-03-23

## 2023-03-23 RX ORDER — OXYCODONE HCL 10 MG/1
10 TABLET, FILM COATED, EXTENDED RELEASE ORAL ONCE
OUTPATIENT
Start: 2023-03-23 | End: 2023-03-23

## 2023-03-23 RX ORDER — MELOXICAM 15 MG/1
15 TABLET ORAL ONCE
OUTPATIENT
Start: 2023-03-23 | End: 2023-03-23

## 2023-03-23 RX ORDER — TRANEXAMIC ACID 10 MG/ML
1000 INJECTION, SOLUTION INTRAVENOUS ONCE
OUTPATIENT
Start: 2023-03-23 | End: 2023-03-23

## 2023-03-23 NOTE — PROGRESS NOTES
Patient ID: Tracie Gross is a 62 y.o. female.  Bilateral shoulder pain    Injection has given mild to moderate left shoulder relief  Right shoulder continues to be the most problematic side    Review of Systems:        Objective:    Wt 103 kg (226 lb 3.2 oz)   LMP 05/08/1983   BMI 38.83 kg/m²     Physical Examination:  Right shoulder intact skin passive elevation 160 abduction 130 external rotation 50 internal rotation S1 with active elevation of 110 and pain and weakness on Speed [place] supraspinatus testing with intact deltoid function.  Sensory and motor exam are intact all distributions. Radial pulse is palpable and capillary refill is less than two seconds to all digits.       Imaging:   MRI reviewed my interpretation demonstrates moderate arthritis of the massive 2 to 3 cm retracted tear of the supraspinatus infraspinatus and upper subscapularis    Assessment:    Right shoulder degenerative joint disease with massive cuff tear  Left shoulder pain    Plan:   Treatment options discussed she would like to proceed with reverse shoulder arthroplasty on the right side.  Continue observation for the left    I discussed the risks including bleeding, scar, infection, stiffness, nerve artery vein and tendon damage, instability, dvt, loss of life or limb. Issues of scapular notching and component revision were also discussed. All questions were answered and addressed. Rehabilitation restrictions and timeframes were discussed. Use of cement or cementless fixation was discussed.    Postop sling dispensed  Greater than 15 minutes was spent demonstrating proper fit and use of the device and signs to monitor for complications    Procedures          Disclaimer: Part of this note may be an electronic transcription/translation of spoken language to printed text using the Dragon Dictation System

## 2023-04-01 DIAGNOSIS — G89.4 CHRONIC PAIN SYNDROME: ICD-10-CM

## 2023-04-02 RX ORDER — GABAPENTIN 300 MG/1
CAPSULE ORAL
Qty: 270 CAPSULE | Refills: 5 | Status: SHIPPED | OUTPATIENT
Start: 2023-04-02

## 2023-04-06 ENCOUNTER — OFFICE VISIT (OUTPATIENT)
Dept: FAMILY MEDICINE CLINIC | Facility: CLINIC | Age: 63
End: 2023-04-06
Payer: MEDICARE

## 2023-04-06 VITALS
OXYGEN SATURATION: 99 % | HEART RATE: 85 BPM | SYSTOLIC BLOOD PRESSURE: 112 MMHG | BODY MASS INDEX: 38.58 KG/M2 | HEIGHT: 64 IN | DIASTOLIC BLOOD PRESSURE: 80 MMHG | WEIGHT: 226 LBS

## 2023-04-06 DIAGNOSIS — M87.052 AVASCULAR NECROSIS OF FEMORAL HEAD, LEFT: Primary | ICD-10-CM

## 2023-04-06 DIAGNOSIS — G89.4 CHRONIC PAIN SYNDROME: ICD-10-CM

## 2023-04-06 PROCEDURE — 3079F DIAST BP 80-89 MM HG: CPT | Performed by: FAMILY MEDICINE

## 2023-04-06 PROCEDURE — 99214 OFFICE O/P EST MOD 30 MIN: CPT | Performed by: FAMILY MEDICINE

## 2023-04-06 PROCEDURE — 3074F SYST BP LT 130 MM HG: CPT | Performed by: FAMILY MEDICINE

## 2023-04-06 RX ORDER — OXYCODONE AND ACETAMINOPHEN 7.5; 325 MG/1; MG/1
1 TABLET ORAL EVERY 6 HOURS PRN
Qty: 120 TABLET | Refills: 0 | Status: SHIPPED | OUTPATIENT
Start: 2023-04-06

## 2023-04-06 RX ORDER — GABAPENTIN 600 MG/1
TABLET ORAL
Qty: 450 TABLET | Refills: 1 | Status: SHIPPED | OUTPATIENT
Start: 2023-04-06

## 2023-04-06 NOTE — PROGRESS NOTES
Chief Complaint   Patient presents with   • Hip Pain     Left hip pain, 6 months ago she had MRI & XR and was diagnosed with Vascular Necrosis.     HPI  Tracie Gross is a 62 y.o. female that presents for   Chief Complaint   Patient presents with   • Hip Pain     Left hip pain, 6 months ago she had MRI & XR and was diagnosed with Vascular Necrosis.     L hip pain: patient reports chronic L hip pain but this has been worse in the last 10 days or so. She does have a history of bursitis but also AVN was seen on CT abd/pel from 4/2022. She reports 10/10 anterior hip pain. Patient has been taking Tylenol 650mg every 4-5 hrs, gabapentin 900/1200, oxycodone 10mg 1-2x/day. This has postponed R shoulder replacement    Review of Systems   Musculoskeletal: Positive for arthralgias.     The following portions of the patient's history were reviewed and updated as appropriate: problem list, past medical history, past surgical history, allergies, current medication    Problem List Tab  Patient History Tab  Immunizations Tab  Medications Tab  Chart Review Tab  Care Everywhere Tab  Synopsis Tab    PE  Vitals:    04/06/23 1458   BP: 112/80   Pulse: 85   SpO2: 99%     Body mass index is 38.79 kg/m².  General: Well nourished, NAD  Head: AT/NC  Eyes: EOMI, anicteric sclera  Resp: CTAB, SCR, BS equal  CV: RRR w/o m/r/g; 2+ pulses  GI: Soft, NT/ND, +BS  MSK: No deformity, limited ROM in all extremities. Antalgic gait  Skin: Warm, dry, intact  Neuro: Alert and oriented. No focal deficits  Psych: Appropriate mood and affect    Imaging  MRI Shoulder Right Without Contrast    Result Date: 3/21/2023  Impression: 1. Rotator cuff tendinosis with complete retracted tears of the supraspinatus and infraspinatus tendons with partial articular sided tearing of the superior fibers of the subscapularis tendon. 2. Mild to moderate degenerative changes of the acromial clavicular joint with findings consistent with distal clavicular osteolysis. 3. Moderate  glenohumeral osteoarthritis. 4. Mild pan labral degenerative tearing with no evidence of a focal displaced tear. 5. Biceps tenosynovitis. Electronically Signed: Simran Riojas  3/21/2023 10:32 AM EDT  Workstation ID: [id number]    DEXA Bone Density Axial    Result Date: 2/1/2023  Osteopenia  Copies of the computerized graph sheet can be obtained in the [facility] PACS or from Epic via the [employer] information department.  Electronically Signed By-Alen Rasmussen MD On:2/1/2023 3:42 PM This report was finalized on [id number] by  Alen Rasmussen MD.    Mammo Screening Modified With Tomosynthesis Left With CAD    Result Date: 2/2/2023  No mammographic signs of malignancy. Recommend routine mammographic screening.  BI-RADS ASSESSMENT: BI-RADS 1. Negative.  The patient's information is entered into a computerized reminder system with a targeted due date for the next mammogram.  Note:  It has been reported that there is approximately a 15% false negative in mammography.  Therefore, management of a palpable abnormality should not be deferred because of a negative mammogram.  Electronically Signed By-Simran Riojas MD On:2/2/2023 9:26 AM This report was finalized on [id number] by  Simran Riojas MD.     Assessment & Plan   Tracie Gross is a 62 y.o. female that presents for   Chief Complaint   Patient presents with   • Hip Pain     Left hip pain, 6 months ago she had MRI & XR and was diagnosed with Vascular Necrosis.     Diagnoses and all orders for this visit:    1. Avascular necrosis of femoral head, left (Primary): very concerned that 10+/10 rate pain is secondary to AVN-related changes of L hip. Will work to optimize pain control w/ meds as below. She is requesting opinion from different ortho as she has been unable to get an appt for over 1 month  -     Increase gabapentin (NEURONTIN) 600 MG tablet; Take 2 tablets in the morning, 1 tablet in the afternoon, and 2 tablets at night  Dispense: 450 tablet; Refill: 1  -      Increase oxyCODONE-acetaminophen (Percocet) 7.5-325 MG per tablet; Take 1 tablet by mouth Every 6 (Six) Hours As Needed for Severe Pain.  Dispense: 120 tablet; Refill: 0  -     Ambulatory Referral to Orthopedic Surgery  - Cont Tylenol 1000mg BID       No follow-ups on file.

## 2023-04-10 ENCOUNTER — HOSPITAL ENCOUNTER (OUTPATIENT)
Facility: HOSPITAL | Age: 63
Setting detail: SURGERY ADMIT
End: 2023-04-10
Attending: ORTHOPAEDIC SURGERY | Admitting: ORTHOPAEDIC SURGERY
Payer: MEDICARE

## 2023-04-10 DIAGNOSIS — M19.011 OSTEOARTHRITIS OF RIGHT GLENOHUMERAL JOINT: Primary | ICD-10-CM

## 2023-04-12 RX ORDER — BISOPROLOL FUMARATE AND HYDROCHLOROTHIAZIDE 10; 6.25 MG/1; MG/1
TABLET ORAL
Qty: 90 TABLET | Refills: 1 | Status: SHIPPED | OUTPATIENT
Start: 2023-04-12

## 2023-04-12 RX ORDER — LEVOTHYROXINE SODIUM 175 UG/1
TABLET ORAL
Qty: 90 TABLET | Refills: 1 | Status: SHIPPED | OUTPATIENT
Start: 2023-04-12

## 2023-04-24 DIAGNOSIS — M19.90 ARTHRITIS: Primary | ICD-10-CM

## 2023-04-24 RX ORDER — FERROUS SULFATE 325(65) MG
TABLET ORAL
Qty: 30 TABLET | Refills: 5 | Status: SHIPPED | OUTPATIENT
Start: 2023-04-24

## 2023-04-24 RX ORDER — OXYCODONE AND ACETAMINOPHEN 10; 325 MG/1; MG/1
1 TABLET ORAL EVERY 6 HOURS PRN
Qty: 120 TABLET | Refills: 0 | Status: SHIPPED | OUTPATIENT
Start: 2023-04-24

## 2023-04-25 ENCOUNTER — HOSPITAL ENCOUNTER (OUTPATIENT)
Dept: CARDIOLOGY | Facility: HOSPITAL | Age: 63
Discharge: HOME | End: 2023-04-25
Payer: MEDICARE

## 2023-04-25 ENCOUNTER — LAB (OUTPATIENT)
Dept: LAB | Facility: HOSPITAL | Age: 63
End: 2023-04-25
Payer: MEDICARE

## 2023-04-25 ENCOUNTER — TRANSCRIBE ORDERS (OUTPATIENT)
Dept: ADMINISTRATIVE | Facility: HOSPITAL | Age: 63
End: 2023-04-25
Payer: MEDICARE

## 2023-04-25 DIAGNOSIS — Z01.818 OTHER SPECIFIED PRE-OPERATIVE EXAMINATION: ICD-10-CM

## 2023-04-25 DIAGNOSIS — Z01.818 OTHER SPECIFIED PRE-OPERATIVE EXAMINATION: Primary | ICD-10-CM

## 2023-04-25 LAB
ALBUMIN SERPL-MCNC: 4 G/DL (ref 3.5–5.2)
ANION GAP SERPL CALCULATED.3IONS-SCNC: 11.2 MMOL/L (ref 5–15)
BASOPHILS # BLD AUTO: 0.12 10*3/MM3 (ref 0–0.2)
BASOPHILS NFR BLD AUTO: 1 % (ref 0–1.5)
BUN SERPL-MCNC: 20 MG/DL (ref 8–23)
BUN/CREAT SERPL: 15.7 (ref 7–25)
CALCIUM SPEC-SCNC: 10.8 MG/DL (ref 8.6–10.5)
CHLORIDE SERPL-SCNC: 102 MMOL/L (ref 98–107)
CO2 SERPL-SCNC: 29.8 MMOL/L (ref 22–29)
CREAT SERPL-MCNC: 1.27 MG/DL (ref 0.57–1)
DEPRECATED RDW RBC AUTO: 45.2 FL (ref 37–54)
EGFRCR SERPLBLD CKD-EPI 2021: 47.3 ML/MIN/1.73
EOSINOPHIL # BLD AUTO: 0.51 10*3/MM3 (ref 0–0.4)
EOSINOPHIL NFR BLD AUTO: 4.3 % (ref 0.3–6.2)
ERYTHROCYTE [DISTWIDTH] IN BLOOD BY AUTOMATED COUNT: 13 % (ref 12.3–15.4)
GLUCOSE SERPL-MCNC: 105 MG/DL (ref 65–99)
HBA1C MFR BLD: 5.9 % (ref 4.8–5.6)
HCT VFR BLD AUTO: 39.6 % (ref 34–46.6)
HGB BLD-MCNC: 13.4 G/DL (ref 12–15.9)
IMM GRANULOCYTES # BLD AUTO: 0.06 10*3/MM3 (ref 0–0.05)
IMM GRANULOCYTES NFR BLD AUTO: 0.5 % (ref 0–0.5)
LYMPHOCYTES # BLD AUTO: 2.47 10*3/MM3 (ref 0.7–3.1)
LYMPHOCYTES NFR BLD AUTO: 20.6 % (ref 19.6–45.3)
MCH RBC QN AUTO: 32.3 PG (ref 26.6–33)
MCHC RBC AUTO-ENTMCNC: 33.8 G/DL (ref 31.5–35.7)
MCV RBC AUTO: 95.4 FL (ref 79–97)
MONOCYTES # BLD AUTO: 1.2 10*3/MM3 (ref 0.1–0.9)
MONOCYTES NFR BLD AUTO: 10 % (ref 5–12)
MRSA DNA SPEC QL NAA+PROBE: NORMAL
NEUTROPHILS NFR BLD AUTO: 63.6 % (ref 42.7–76)
NEUTROPHILS NFR BLD AUTO: 7.64 10*3/MM3 (ref 1.7–7)
NRBC BLD AUTO-RTO: 0.2 /100 WBC (ref 0–0.2)
PLATELET # BLD AUTO: 339 10*3/MM3 (ref 140–450)
PMV BLD AUTO: 11.8 FL (ref 6–12)
POTASSIUM SERPL-SCNC: 4.3 MMOL/L (ref 3.5–5.2)
RBC # BLD AUTO: 4.15 10*6/MM3 (ref 3.77–5.28)
SODIUM SERPL-SCNC: 143 MMOL/L (ref 136–145)
WBC NRBC COR # BLD: 12 10*3/MM3 (ref 3.4–10.8)

## 2023-04-25 PROCEDURE — 83036 HEMOGLOBIN GLYCOSYLATED A1C: CPT

## 2023-04-25 PROCEDURE — 82040 ASSAY OF SERUM ALBUMIN: CPT

## 2023-04-25 PROCEDURE — 85025 COMPLETE CBC W/AUTO DIFF WBC: CPT

## 2023-04-25 PROCEDURE — 80048 BASIC METABOLIC PNL TOTAL CA: CPT

## 2023-04-25 PROCEDURE — 93005 ELECTROCARDIOGRAM TRACING: CPT | Performed by: ORTHOPAEDIC SURGERY

## 2023-04-25 PROCEDURE — 36415 COLL VENOUS BLD VENIPUNCTURE: CPT

## 2023-04-25 PROCEDURE — 87641 MR-STAPH DNA AMP PROBE: CPT

## 2023-04-28 LAB — QT INTERVAL: 394 MS

## 2023-05-11 ENCOUNTER — TRANSCRIBE ORDERS (OUTPATIENT)
Dept: PHYSICAL THERAPY | Facility: CLINIC | Age: 63
End: 2023-05-11
Payer: MEDICARE

## 2023-05-11 DIAGNOSIS — Z96.642 STATUS POST LEFT HIP REPLACEMENT: Primary | ICD-10-CM

## 2023-05-24 ENCOUNTER — OFFICE VISIT (OUTPATIENT)
Dept: FAMILY MEDICINE CLINIC | Facility: CLINIC | Age: 63
End: 2023-05-24

## 2023-05-24 ENCOUNTER — LAB (OUTPATIENT)
Dept: FAMILY MEDICINE CLINIC | Facility: CLINIC | Age: 63
End: 2023-05-24
Payer: MEDICARE

## 2023-05-24 ENCOUNTER — TREATMENT (OUTPATIENT)
Dept: PHYSICAL THERAPY | Facility: CLINIC | Age: 63
End: 2023-05-24
Payer: MEDICARE

## 2023-05-24 VITALS
OXYGEN SATURATION: 96 % | HEART RATE: 71 BPM | SYSTOLIC BLOOD PRESSURE: 132 MMHG | DIASTOLIC BLOOD PRESSURE: 98 MMHG | BODY MASS INDEX: 38.93 KG/M2 | WEIGHT: 228 LBS | HEIGHT: 64 IN

## 2023-05-24 DIAGNOSIS — C50.919 MALIGNANT NEOPLASM OF FEMALE BREAST, UNSPECIFIED ESTROGEN RECEPTOR STATUS, UNSPECIFIED LATERALITY, UNSPECIFIED SITE OF BREAST: ICD-10-CM

## 2023-05-24 DIAGNOSIS — K21.9 GASTROESOPHAGEAL REFLUX DISEASE, UNSPECIFIED WHETHER ESOPHAGITIS PRESENT: ICD-10-CM

## 2023-05-24 DIAGNOSIS — M34.9 SCLERODERMA: ICD-10-CM

## 2023-05-24 DIAGNOSIS — M87.052 AVASCULAR NECROSIS OF FEMORAL HEAD, LEFT: ICD-10-CM

## 2023-05-24 DIAGNOSIS — M75.122 COMPLETE TEAR OF LEFT ROTATOR CUFF, UNSPECIFIED WHETHER TRAUMATIC: ICD-10-CM

## 2023-05-24 DIAGNOSIS — Z00.00 MEDICARE ANNUAL WELLNESS VISIT, SUBSEQUENT: Primary | ICD-10-CM

## 2023-05-24 DIAGNOSIS — F31.9 BIPOLAR AFFECTIVE DISORDER, REMISSION STATUS UNSPECIFIED: ICD-10-CM

## 2023-05-24 DIAGNOSIS — Z15.09 LYNCH SYNDROME: ICD-10-CM

## 2023-05-24 DIAGNOSIS — M32.9 SLE (SYSTEMIC LUPUS ERYTHEMATOSUS RELATED SYNDROME): ICD-10-CM

## 2023-05-24 DIAGNOSIS — R26.2 DIFFICULTY WALKING: ICD-10-CM

## 2023-05-24 DIAGNOSIS — K51.919 ULCERATIVE COLITIS WITH COMPLICATION, UNSPECIFIED LOCATION: ICD-10-CM

## 2023-05-24 DIAGNOSIS — E03.9 HYPOTHYROIDISM, UNSPECIFIED TYPE: ICD-10-CM

## 2023-05-24 DIAGNOSIS — Z96.642 STATUS POST LEFT HIP REPLACEMENT: Primary | ICD-10-CM

## 2023-05-24 DIAGNOSIS — M25.552 LEFT HIP PAIN: ICD-10-CM

## 2023-05-24 DIAGNOSIS — E55.9 VITAMIN D DEFICIENCY, UNSPECIFIED: ICD-10-CM

## 2023-05-24 DIAGNOSIS — M25.562 PAIN IN BOTH KNEES, UNSPECIFIED CHRONICITY: ICD-10-CM

## 2023-05-24 DIAGNOSIS — M25.561 PAIN IN BOTH KNEES, UNSPECIFIED CHRONICITY: ICD-10-CM

## 2023-05-24 DIAGNOSIS — M81.0 OSTEOPOROSIS, UNSPECIFIED OSTEOPOROSIS TYPE, UNSPECIFIED PATHOLOGICAL FRACTURE PRESENCE: ICD-10-CM

## 2023-05-24 DIAGNOSIS — I10 PRIMARY HYPERTENSION: ICD-10-CM

## 2023-05-24 PROCEDURE — 82306 VITAMIN D 25 HYDROXY: CPT | Performed by: FAMILY MEDICINE

## 2023-05-24 PROCEDURE — 97110 THERAPEUTIC EXERCISES: CPT | Performed by: PHYSICAL THERAPIST

## 2023-05-24 PROCEDURE — 80061 LIPID PANEL: CPT | Performed by: FAMILY MEDICINE

## 2023-05-24 PROCEDURE — 84439 ASSAY OF FREE THYROXINE: CPT | Performed by: FAMILY MEDICINE

## 2023-05-24 PROCEDURE — 36415 COLL VENOUS BLD VENIPUNCTURE: CPT | Performed by: FAMILY MEDICINE

## 2023-05-24 PROCEDURE — 80053 COMPREHEN METABOLIC PANEL: CPT | Performed by: FAMILY MEDICINE

## 2023-05-24 PROCEDURE — 84443 ASSAY THYROID STIM HORMONE: CPT | Performed by: FAMILY MEDICINE

## 2023-05-24 PROCEDURE — 97161 PT EVAL LOW COMPLEX 20 MIN: CPT | Performed by: PHYSICAL THERAPIST

## 2023-05-24 PROCEDURE — 82607 VITAMIN B-12: CPT | Performed by: FAMILY MEDICINE

## 2023-05-24 PROCEDURE — 83036 HEMOGLOBIN GLYCOSYLATED A1C: CPT | Performed by: FAMILY MEDICINE

## 2023-05-24 PROCEDURE — 86160 COMPLEMENT ANTIGEN: CPT | Performed by: FAMILY MEDICINE

## 2023-05-24 PROCEDURE — 85025 COMPLETE CBC W/AUTO DIFF WBC: CPT | Performed by: FAMILY MEDICINE

## 2023-05-24 PROCEDURE — 85652 RBC SED RATE AUTOMATED: CPT | Performed by: FAMILY MEDICINE

## 2023-05-24 PROCEDURE — 86140 C-REACTIVE PROTEIN: CPT | Performed by: FAMILY MEDICINE

## 2023-05-24 RX ORDER — NIFEDIPINE 90 MG/1
90 TABLET, EXTENDED RELEASE ORAL DAILY
Qty: 90 TABLET | Refills: 1 | Status: SHIPPED | OUTPATIENT
Start: 2023-05-24

## 2023-05-24 RX ORDER — ERGOCALCIFEROL (VITAMIN D2) 10 MCG
5000 TABLET ORAL DAILY
COMMUNITY

## 2023-05-24 RX ORDER — ASPIRIN 81 MG/1
81 TABLET ORAL DAILY
COMMUNITY

## 2023-05-24 RX ORDER — PREDNISONE 5 MG/1
5 TABLET ORAL DAILY
COMMUNITY

## 2023-05-24 NOTE — PROGRESS NOTES
Physical Therapy Initial Evaluation and Plan of Care  [address] [address] [address]  [place] [address]    Patient: Tracie Gross   : 1960  Diagnosis/ICD-10 Code:  Status post left hip replacement [Z96.642]  Referring practitioner: Rashel Shah MD  Date of Initial Visit: 2023  Today's Date: 2023  Patient seen for 1 sessions           Subjective Questionnaire: OHS =  = 12.5% ability/87.5% limited      Subjective Evaluation    History of Present Illness  Mechanism of injury: Pt saw Dr. Shah for L hip pain which was severely limiting function for about 3-4 weeks before her appt with him. Xray showed AVN 5 months ago and that worsened to severe avascular necrosis. Pt had L VADIM on 23. Pt went home and had no home PT, but was given ankle pumps, glute squeezes, quad sets and knee ext straightening with heel prop for home. Pt notes she's been doing ok with those at home.  2 days ago pt has been trying to put more weight through the L LE with standing and notes that's been rough. Pt arrives today to initiate OPPT.     Pt was using a walker briefly before surgery due to severity of pain.  Pt notes she has to have B shoulder replaced as well and was about to have one done before this happened. MD told her she'd have to wait 3 months.     Pt notes no falls in the last year, but 3 possibly the year before and notes there were reasons for all of them (slip on dog's toy, foot caught in sheets getting up to pee and can't recall the third).     PMH: allergies (aspirin, Penicillin), arthritis, asthma, bipolar affective disorder, breast cancer , GERD, hamartoma, HTN, hypothyroidism, inflammatory bowel disease, Kienbock's disease R wrist, LBP, lupus, burdick syndrome, OP, Raynaud disease, scleroderma, sleep apnea, squamous cell cancer of lip, Von Willebrand disease    PSH: arm debridement x3, back surg lumb decomp, cerv fusion, breast biopsy/lumpectomy & multi reconstruction R, cardiac  cath, Csect x2, cholecystectomy, colonoscopy, cosmetic surg, endoscopy, exploratory laparotomy, eye surg, finger surg R, hyste, R hip/knee replacement, L knee scope, Lasik, mastectomy radical R, oophorectomy B, splenectomy, tubal abdominal ligation, upper endoscopic US w/FNA, wrist surg R    Denies hx: pacemaker, CVA, seizures, MI, DM, latex allergies    Pain: 7/10 current, 4/10 at best, 10+/10 at worst    Aggravating/functional factors: standing, walking, bending, twisting without pivoting the foot, squatting, lifting, carrying, washing, dressing, grooming, step in/out of house,  sleeping, house work, in/out of tub/shower (taking sponge baths now); getting in bed & in/out of car, rising  (requires A of spouse to move the leg or to help her rise up out of chair)     PLOF: some prior issues with the above functional activities due to shoulder issues and has had prior R hip/knee issues    Relieving factors: ice    Social Hx: lives with spouse, 4 dogs, 1 step into house (has upstairs & down stairs but doesn't need to use stairs right now, bed room is on first floor), pt has a high tub to get into for showering; disabled      Quality of life: excellent    Pain  Quality: throbbing and dull ache  Progression: improved    Hand dominance: right    Patient Goals  Patient goals for therapy: decreased pain, improved balance, increased strength, independence with ADLs/IADLs, increased motion and decreased edema  Patient goal: walk without pain, drive, get in bed, independent            Objective          Active Range of Motion   Left Hip   Flexion: 75 degrees with pain  External rotation (90/90): 10 degrees with pain  Internal rotation (90/90): 25 degrees     Right Hip   Flexion: 90 degrees   External rotation (90/90): 25 degrees   Internal rotation (90/90): 35 degrees   Left Knee   Extensor la degrees     Right Knee   Flexion: 90 degrees   Extensor la degrees     Strength/Myotome Testing     Left Hip   Planes of  Motion   Flexion: 2+ (based on AROM)  External rotation: 2+ (based on AROM)  Internal rotation: 2+ (based on AROM)    Right Hip   Planes of Motion   Flexion: 4  External rotation: 4  Internal rotation: 4-    Right Knee   Flexion: 4  Extension: 4      Observation: ecchymosis noted L hip/thigh ant/med/lat & post lat, glutes; zip stitch in place with dried blood along & mild erythema noted along incision    Palpation: TTP @ L hip/thigh throughout except non tender at distal hams     Sensation: intact to LT B LEs, but diminished L lateral thigh vs R    Posture: head fwd/rounded shoulders    Gait: I with RW with significantly reduced gait speed (.38 m/s)     Balance: appears fair/good with walking through clinic with RW and immediate standing once pt stands up; will assess further in future visits    Transfers: required UE A pushing off and PT or spouse A holding onto their arm to rise, then A to lift L LE onto/off of plinth       Assessment & Plan     Assessment  Impairments: abnormal coordination, abnormal gait, abnormal muscle firing, abnormal muscle tone, abnormal or restricted ROM, activity intolerance, impaired balance, impaired physical strength, lacks appropriate home exercise program, pain with function, safety issue and weight-bearing intolerance    Assessment details: The patient is a 62 y.o. female who presents to physical therapy today for Status post left hip replacement (Z96.642). Upon initial evaluation, the patient demonstrates the above & following impairments: pain, reduced posture, SI/IS dysfunction, decreased ROM/flexibility, strength, gait, balance and function. Due to these impairments, the patient is unable to/limited with: standing, walking, bending, twisting without pivoting the foot, squatting, lifting, carrying, washing, dressing, grooming, step in/out of house,  sleeping, house work, in/out of tub/shower (taking sponge baths now), getting in bed,  in/out of car, and rising (requires A of  spouse to move the leg or to help her rise up out of chair). The patient would benefit from skilled PT services to address functional limitations and impairments and to improve patient quality of life.        Barriers to therapy: GERD, B shld need TSA, inflammatory bowel disease, hx breast & squamous cell cancer, HTN, lupus, LBP, OP, scleroderma, and arthritis could affect PT Rx/progress/outcomes if exacerbated/unregulated or if cancer recurs which could affect tolerance to PT/exs or delay healing  Prognosis: good    Goals  Plan Goals: STGs in 4 weeks:  Decrease pain to 5-6/10 on average  Increase L hip/knee AROM by 5-10 degrees where limited as much  Increase LE strength to 3/5    LTGs by discharge  Increase L LE ROM to WFL/WNL with min/no pain  Increase LE strength to 4+/5   Pt will be able to ascend/descend stairs reciprocally with or without use of rail(s) and with minimal difficulty or pain  Pt will be able to sit/drive/ride for 30-60 mins without difficulty or pain  Pt will be able to stand 20-40 mins for basic ADLs & cleaning with min difficulty or pain  Pt will be able to walk 30-60 mins for grocery shopping without difficulty, pain or LOB  Pt will be able to get in/out of the car/tub and wash/dress/groom without difficulty or increased pain  Pt will be able to lift/carry laundry baskets, pots/pans, garbage or grocery bags without difficulty or increased pain  Pt will be able to sleep full nights most nights without waking from L hip/LE symptoms      Plan  Therapy options: will be seen for skilled therapy services  Planned modality interventions: cryotherapy, thermotherapy (hydrocollator packs) and electrical stimulation/Russian stimulation  Planned therapy interventions: manual therapy, neuromuscular re-education, postural training, soft tissue mobilization, strengthening, stretching, therapeutic activities, transfer training, abdominal trunk stabilization, ADL retraining, body mechanics training, home  exercise program, gait training, functional ROM exercises, flexibility, motor coordination training and balance/weight-bearing training  Frequency: 2x week  Duration in weeks: 13  Treatment plan discussed with: patient        History # of Personal Factors and/or Comorbidities: HIGH (3+)  Examination of Body System(s): # of elements: HIGH (4+)  Clinical Presentation: STABLE   Clinical Decision Making: LOW       Timed:         Manual Therapy:         mins  45192;     Therapeutic Exercise:    12     mins  86407;     Neuromuscular Walter:        mins  51450;    Therapeutic Activity:          mins  21341;     Gait Training:           mins  11602;     Ultrasound:          mins  99475;    Ionto                                   mins   48168  Self Care                            mins   22778  Canalith Repos         mins 65248      Un-Timed:  Electrical Stimulation:         mins  06841 ( );  Dry Needling          mins self-pay  Traction          mins 56092  Low Eval    28      Mins  77513  Mod Eval          Mins  04153  High Eval                            Mins  04248  Re-Eval                               mins  54743        Timed Treatment:   40   mins   Total Treatment:     40   mins            PT SIGNATURE: Megan Singleton, PT   [place] Lic# [id number]  DATE TREATMENT INITIATED: 5/24/2023    Medicare Initial Certification  Certification Period: 5/24/20239776GYJQRLT4/21/2023  I certify that the therapy services are furnished while this patient is under my care.  The services outlined above are required by this patient, and will be reviewed every 90 days.     PHYSICIAN: Rashel Shah MD      DATE:     Please sign and return via fax to [phone]. Thank you, [facility].

## 2023-05-24 NOTE — PROGRESS NOTES
The ABCs of the Annual Wellness Visit  Subsequent Medicare Wellness Visit    Subjective    Tracie Gross is a 62 y.o. female who presents for a Subsequent Medicare Wellness Visit.    The following portions of the patient's history were reviewed and   updated as appropriate: allergies, current medications, past family history, past medical history, past social history, past surgical history and problem list.    Compared to one year ago, the patient feels her physical   health is worse.    Compared to one year ago, the patient feels her mental   health is better.    Recent Hospitalizations:  She was not admitted to the hospital during the last year.     Current Medical Providers:  Patient Care Team:  Floyd Silva MD as PCP - General (Internal Medicine)  Suzan Jones MD as Consulting Physician (Hematology and Oncology)    Outpatient Medications Prior to Visit   Medication Sig Dispense Refill   • aspirin 81 MG EC tablet Take 1 tablet by mouth Daily.     • bisoprolol-hydrochlorothiazide (ZIAC) 10-6.25 MG per tablet TAKE ONE TABLET BY MOUTH DAILY 90 tablet 1   • Calcium + Vitamin D3 600-5 MG-MCG tablet TAKE ONE TABLET BY MOUTH TWICE A DAY 60 tablet 6   • colestipol (COLESTID) 1 g tablet TAKE TWO TABLETS BY MOUTH TWICE A DAY (Patient taking differently: Take 2 tablets by mouth 2 (Two) Times a Day.) 120 tablet 6   • cyclobenzaprine (FLEXERIL) 10 MG tablet TAKE ONE TABLET BY MOUTH THREE TIMES A DAY AS NEEDED FOR MUSCLE SPASMS AS NEEDED (Patient taking differently: Take 1 tablet by mouth 3 (Three) Times a Day As Needed.) 100 tablet 3   • Denosumab (PROLIA SC) Inject  under the skin into the appropriate area as directed Every 6 (Six) Months.     • dexlansoprazole (DEXILANT) 60 MG capsule Take 1 capsule by mouth Daily.     • Flaxseed, Linseed, (FLAXSEED OIL MAX STR) 1300 MG capsule Take 1 tablet by mouth 2 (Two) Times a Day.     • fluticasone (FLONASE) 50 MCG/ACT nasal spray SPRAY TWO SPRAYS IN EACH NOSTRIL  ONCE DAILY 16 mL 5   • furosemide (Lasix) 20 MG tablet Take 1 tablet by mouth Daily As Needed (leg swelling). 30 tablet 2   • gabapentin (NEURONTIN) 600 MG tablet Take 2 tablets in the morning, 1 tablet in the afternoon, and 2 tablets at night 450 tablet 1   • hydroxychloroquine (PLAQUENIL) 200 MG tablet Take 1 tablet by mouth 2 (Two) Times a Day. Indications: Systemic Lupus Erythematosus 180 tablet 3   • hydrOXYzine (ATARAX) 25 MG tablet Take 1 tablet by mouth Every 8 (Eight) Hours As Needed for Itching. 30 tablet 3   • lamoTRIgine (LaMICtal) 200 MG tablet Take 1 tablet by mouth Every Night.     • levothyroxine (SYNTHROID, LEVOTHROID) 175 MCG tablet TAKE ONE TABLET BY MOUTH DAILY 90 tablet 1   • lisinopril (PRINIVIL,ZESTRIL) 5 MG tablet TAKE ONE TABLET BY MOUTH DAILY (Patient taking differently: Take 1 tablet by mouth Daily.) 90 tablet 3   • ondansetron (ZOFRAN) 4 MG tablet Take 1 tablet by mouth Every 8 (Eight) Hours As Needed for Nausea or Vomiting. 30 tablet 3   • oxyCODONE-acetaminophen (Percocet)  MG per tablet Take 1 tablet by mouth Every 6 (Six) Hours As Needed for Severe Pain. 120 tablet 0   • predniSONE (DELTASONE) 5 MG tablet Take 1 tablet by mouth Daily.     • PROTEIN PO Take  by mouth.     • QUEtiapine (SEROquel) 100 MG tablet Take 1 tablet by mouth Every Night.     • Vitamin D, Cholecalciferol, (CHOLECALCIFEROL) 10 MCG (400 UNIT) tablet Take 12.5 tablets by mouth Daily.     • ALPRAZolam (XANAX) 1 MG tablet TAKE ONE TABLET BY MOUTH AT BEDTIME AS NEEDED FOR ANXIETY OR SLEEP 30 tablet 2   • FeroSul 325 (65 Fe) MG tablet TAKE ONE TABLET BY MOUTH DAILY WITH BREAKFAST 30 tablet 5   • NIFEdipine XL (PROCARDIA XL) 60 MG 24 hr tablet TAKE ONE TABLET BY MOUTH DAILY 90 tablet 2   • clindamycin (CLEOCIN T) 1 % lotion Apply 1 application topically to the appropriate area as directed At Night As Needed. Use on face (Patient not taking: Reported on 5/24/2023)     • Diclofenac Sodium (VOLTAREN) 1 % gel gel APPLY  4 GRAMS TOPICALLY TO THE APPROPRIATE AREA AS DIRECTED FOUR TIMES DAILY AS NEEDED FOR JOINT PAIN (Patient not taking: Reported on 5/24/2023) 100 g 3   • famotidine (PEPCID) 20 MG tablet Take 1 tablet by mouth 2 (Two) Times a Day As Needed for Heartburn. (Patient not taking: Reported on 5/24/2023) 120 tablet 5   • gabapentin (NEURONTIN) 300 MG capsule TAKE 2 CAPSULES BY MOUTH EVERY MORNING AND TAKE 4 CAPSULES BY MOUTH EVERY EVENING 270 capsule 5   • halobetasol (ULTRAVATE) 0.05 % cream Apply 1 application topically to the appropriate area as directed Daily As Needed. (Patient not taking: Reported on 5/24/2023)     • predniSONE (DELTASONE) 20 MG tablet Take 2 tablets by mouth Daily. 10 tablet 0   • triamcinolone (KENALOG) 0.025 % cream Apply 1 application topically to the appropriate area as directed Daily As Needed. (Patient not taking: Reported on 5/24/2023)       No facility-administered medications prior to visit.     Opioid medication/s are on active medication list.  and I have evaluated her active treatment plan and pain score trends (see table).  There were no vitals filed for this visit.  I have reviewed the chart for potential of high risk medication and harmful drug interactions in the elderly.          Aspirin is on active medication list. Aspirin use is indicated based on review of current medical condition/s. Pros and cons of this therapy have been discussed today. Benefits of this medication outweigh potential harm.  Patient has been encouraged to continue taking this medication.  .    Patient Active Problem List   Diagnosis   • Vitamin B12 deficiency   • Arthritis   • Sleep apnea   • Bipolar affective disorder   • Depression   • Breast cancer   • Chronic pain   • Dyslipidemia   • Family history of malignant neoplasm of colon   • Family history of melanoma   • Gastroesophageal reflux disease   • Heart murmur   • Hypertension   • Hypothyroidism   • Osteoarthritis, generalized   • Raynaud's disease   •  "Ulcerative colitis   • Artificial knee joint present   • Neuralgia   • Presence of artificial hip joint, right   • Lumbar radiculopathy   • Derangement of posterior horn of lateral meniscus   • Von Willebrand disease   • SLE (systemic lupus erythematosus related syndrome)   • Chest pain   • Scleroderma   • Monahan syndrome   • Rectal prolapse   • Osteoporosis   • COVID-19   • Fibromyalgia   • Onychomycosis   • Stage 3 chronic kidney disease   • Immunocompromised   • Avascular necrosis of femoral head, left   • Morbid obesity   • Trochanteric bursitis of left hip   • Pain in both knees   • Status post total hip replacement, right   • DJD of left shoulder   • Complete tear of left rotator cuff   • DJD of right shoulder     Advance Care Planning   Advance Care Planning     Advance Directive is not on file.  ACP discussion was held with the patient during this visit. Patient has an advance directive (not in EMR), copy requested.     Objective    Vitals:    05/24/23 1513   BP: 132/98   Pulse: 71   SpO2: 96%   Weight: 103 kg (228 lb)   Height: 162.6 cm (64\")     Estimated body mass index is 39.14 kg/m² as calculated from the following:    Height as of this encounter: 162.6 cm (64\").    Weight as of this encounter: 103 kg (228 lb).    Class 2 Severe Obesity (BMI >=35 and <=39.9). Obesity-related health conditions include the following: hypertension, dyslipidemias, GERD and osteoarthritis. Obesity is unchanged. BMI is is above average; BMI management plan is completed. We discussed portion control and increasing exercise.    Does the patient have evidence of cognitive impairment? No    Lab Results   Component Value Date    TRIG 99 05/24/2023    HDL 58 05/24/2023    LDL 70 05/24/2023    VLDL 18 05/24/2023    HGBA1C 5.30 05/24/2023        HEALTH RISK ASSESSMENT    Smoking Status:  Social History     Tobacco Use   Smoking Status Former   • Packs/day: 0.25   • Years: 7.00   • Pack years: 1.75   • Types: Cigarettes   • Start date: " 1984   • Quit date: 1994   • Years since quittin.4   • Passive exposure: Past   Smokeless Tobacco Never   Tobacco Comments    Off and on for  those years     Alcohol Consumption:  Social History     Substance and Sexual Activity   Alcohol Use Not Currently    Comment: Rarely     Fall Risk Screen:  RONEL Fall Risk Assessment has not been completed.   No falls in the last year    Depression Screenin/23/2023     1:59 PM   PHQ-2/PHQ-9 Depression Screening   Little Interest or Pleasure in Doing Things 0-->not at all   Feeling Down, Depressed or Hopeless 0-->not at all   PHQ-9: Brief Depression Severity Measure Score 0     Health Habits and Functional and Cognitive Screenin/24/2023     3:11 PM   Functional & Cognitive Status   Do you have difficulty preparing food and eating? Yes   Do you have difficulty bathing yourself, getting dressed or grooming yourself? Yes   Do you have difficulty using the toilet? Yes   Do you have difficulty moving around from place to place? Yes   Do you have trouble with steps or getting out of a bed or a chair? Yes   Current Diet Well Balanced Diet   Dental Exam Up to date   Eye Exam Up to date   Exercise (times per week) 0 times per week   Current Exercises Include No Regular Exercise   Do you need help using the phone?  No   Are you deaf or do you have serious difficulty hearing?  No   Do you need help with transportation? No   Do you need help shopping? No   Do you need help preparing meals?  Yes   Do you need help with housework?  Yes   Do you need help with laundry? Yes   Do you need help taking your medications? No   Do you need help managing money? No   Do you ever drive or ride in a car without wearing a seat belt? No   Have you felt unusual stress, anger or loneliness in the last month? No   Who do you live with? Spouse   If you need help, do you have trouble finding someone available to you? No   Do you have difficulty concentrating, remembering or  making decisions? No   Ordinarily able to complete all ADLs (recent surgery). Limited w/ housework, cooking, long driving due to shoulder pain. Manages medicine and finances. Has help from  at home.     Age-appropriate Screening Schedule:  Refer to the list below for future screening recommendations based on patient's age, sex and/or medical conditions. Orders for these recommended tests are listed in the plan section. The patient has been provided with a written plan.    Health Maintenance   Topic Date Due   • PAP SMEAR  Never done   • COVID-19 Vaccine (4 - Booster for Pfizer series) 03/04/2022   • INFLUENZA VACCINE  08/01/2023   • MAMMOGRAM  02/01/2024   • ANNUAL WELLNESS VISIT  05/24/2024   • DXA SCAN  02/01/2025   • TDAP/TD VACCINES (2 - Td or Tdap) 04/06/2031   • COLORECTAL CANCER SCREENING  01/06/2033   • HEPATITIS C SCREENING  Completed   • ZOSTER VACCINE  Completed   • Pneumococcal Vaccine 0-64  Aged Out        CMS Preventative Services Quick Reference  Risk Factors Identified During Encounter  Immunizations Discussed/Encouraged: Influenza  The above risks/problems have been discussed with the patient.  Pertinent information has been shared with the patient in the After Visit Summary.  An After Visit Summary and PPPS were made available to the patient.    Preventative  Colonoscopy: 1/2023, due 1/2024- follows w/ Man  Mammogram: 2/2023. Follows w/ Karen. Alternative mammogram and MRI  Pap smear: s/p hysterectomy  DEXA: 2/2023 w/ osteopenia. Maintained on Prolia since late 2021 (Karen)  Shingles: Shingrix 10/2018  Pneumonia: completed Prevnar 11/2017  Tdap: 4/2021  Influenza: 10/2022, recommended  COVID: Completed 3 shots Pfizer (1/2022) and had illness    Follow Up:   Next Medicare Wellness visit to be scheduled in 1 year.       Additional E&M Note during same encounter follows:  Patient has multiple medical problems which are significant and separately identifiable that require additional work  above and beyond the Medicare Wellness Visit.      Chief Complaint  Medicare Wellness-subsequent    Subjective        HPI  Tracie Gross is also being seen today for multiple medical problems    Avascular necrosis of L hip: s/p hip replacment 5/17/2023 for AVN. Pain totally different but overall improved. Rated 3/10 now, will increase to 8/10 w/ activity. Improved from 11/10 before surgery. Started w/ PT today- OP 2x/week, planning for 12 weeks. Overall going well. Maintained on gabapentin 600/600/1200, prednisone 5 daily and oxycodone 10mg 2x/day. Following w/ ortho (Alyssa).     Monahan syndrome: also w/ UC. Follows w/ Man and Karen. Last EGD 9/2021 (completing every other year) and colonoscopy 1/2023 (completing annually). Had EUS in 12/2021 and MRI abd/pel in 8/2021. Plan to obtain alternating EUS and MRI pancreas every other year so that she is obtaining screening annually. No concerns today      Breast cancer: hx R breast cancer s/p R mastectomy w/ lymph node dissection and reconstruction in 1985. Follows regularly w/ oncology- Karen. Alternating mammogram (2/2023) and breast MRI (7/2022) every 6 months, largely because of Monahan syndrome hx. No concerns at this time.      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 Patient endorses worsening joint pain in hands bilaterally as well and notable Raynaud's. SOB, mostly w/ exertion/activity and when lying down at night. Patient was given inhaler (NOS) by pulmonology (Draw) but this did not help. Maintained on hydroxychloroquine 200 BID, nifedipine 60 daily, lisinopril 5 daily. Maintained on CPAP per pulm recs, which does seem to help breathing.     GERD: maintained on dexlansoprazole 60mg daily. Very rare heartburn/reflux on this medicine. No dypshagia. Last EGD 9/2021. Follows w/ GI-Man     Knee pain: Patient continues to complain of bilateral knee pain.  She  "has had the right knee replaced and still has considerable pain and limited range of motion.  Patient reports improvement to L knee pain w/ steroid injection 4/2022. Doing ok w/o recent injection. Using gabapentin 600/600/1200, oxycodone 10 PRN (2/day)     HTN: 132/98 today. Maintained on Ziac 10/6.25 mg daily, nifedipine 60 daily and lisinopril 5mg daily. Rare LH/dizziness w/ standing but quickly resolves. No CP     Hypothyroidism: maintained on levothyroxine 175mcg daily. Weight stable. No further complaints/concerns.    Osteoporosis: as seen on 12/2020 DEXA. Improved on 2/2023 DEXA. Maintained on Prolia since last 2021 and Ca/VitD daily    Bipolar disorder: maintained on lamotrigine 200mg nightly and seroquel 100mg nightly. Has been well controlled on this regimen for years. Psychiatrist (Steven) is retiring. Will use Xanax 1mg nightly for sleep    Review of Systems   Constitutional: Negative for chills and fever.   HENT: Negative for congestion and rhinorrhea.    Eyes: Negative for visual disturbance.   Respiratory: Positive for shortness of breath. Negative for cough.    Cardiovascular: Negative for chest pain and palpitations.   Gastrointestinal: Negative for abdominal pain and vomiting.   Genitourinary: Negative for difficulty urinating and dysuria.   Musculoskeletal: Positive for arthralgias and gait problem. Negative for back pain.   Skin: Positive for color change. Negative for rash.   Neurological: Positive for dizziness and light-headedness.   Psychiatric/Behavioral: Negative for dysphoric mood and sleep disturbance. The patient is not nervous/anxious.      Objective   Vital Signs:  /98   Pulse 71   Ht 162.6 cm (64\")   Wt 103 kg (228 lb)   SpO2 96%   BMI 39.14 kg/m²     Physical Exam  Constitutional:       General: She is not in acute distress.     Appearance: She is well-developed. She is obese.   HENT:      Head: Normocephalic and atraumatic.      Right Ear: Tympanic membrane and external " ear normal. There is no impacted cerumen.      Left Ear: Tympanic membrane and external ear normal. There is no impacted cerumen.      Nose: Nose normal.      Mouth/Throat:      Mouth: Mucous membranes are moist.      Pharynx: No oropharyngeal exudate or posterior oropharyngeal erythema.   Eyes:      General: No scleral icterus.        Right eye: No discharge.         Left eye: No discharge.      Extraocular Movements: Extraocular movements intact.      Conjunctiva/sclera: Conjunctivae normal.      Pupils: Pupils are equal, round, and reactive to light.   Neck:      Thyroid: No thyromegaly.      Vascular: No carotid bruit.   Cardiovascular:      Rate and Rhythm: Normal rate and regular rhythm.      Heart sounds: Normal heart sounds. No murmur heard.  Pulmonary:      Effort: Pulmonary effort is normal. No respiratory distress.      Breath sounds: Normal breath sounds. No wheezing or rales.   Abdominal:      General: Bowel sounds are normal. There is no distension.      Palpations: Abdomen is soft.      Tenderness: There is no abdominal tenderness.   Musculoskeletal:         General: Tenderness and deformity (sclerotic digits) present. Normal range of motion.      Cervical back: Normal range of motion and neck supple.   Lymphadenopathy:      Cervical: No cervical adenopathy.   Skin:     General: Skin is warm and dry.      Findings: No rash.   Neurological:      General: No focal deficit present.      Mental Status: She is alert and oriented to person, place, and time. Mental status is at baseline.      Cranial Nerves: No cranial nerve deficit.      Sensory: No sensory deficit.      Gait: Gait abnormal.   Psychiatric:         Behavior: Behavior normal.         Thought Content: Thought content normal.             Assessment and Plan   Diagnoses and all orders for this visit:    1. Medicare annual wellness visit, subsequent (Primary)  -     CBC & Differential  -     Comprehensive Metabolic Panel  -     Hemoglobin A1c  -      Lipid Panel  -     TSH  -     T4, Free  -     Vitamin D,25-Hydroxy  -     Vitamin B12  -  Counseled regarding diet, exercise, weight loss, and preventative health maintenance items/immunizations below    2. Avascular necrosis of femoral head, left: s/p hip replacment 5/17/2023 for AVN   - Cont PT 2x/week   - Cont home gabapentin 600/600/1200, prednisone 5 daily and oxycodone 10mg 2x/day   - Cont ortho f/u- Alyssa    3. 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 Had EUS in 12/2021 and MRI abd/pel in 8/2021. Plan to obtain alternating EUS and MRI pancreas every other year so that she is obtaining screening annually.   - Cont oncology and GI f/u    4. Ulcerative colitis with complication, unspecified location: last colonoscopy 1/2023   - Cont GI f/u    5. SLE (systemic lupus erythematosus related syndrome)  -     C-reactive Protein  -     Sedimentation Rate  -     C3 Complement    6. Scleroderma  -     Increase NIFEdipine XL (PROCARDIA XL) 90 MG 24 hr tablet; Take 1 tablet by mouth Daily.  Dispense: 90 tablet; Refill: 1  - Cont home hydroxychloroquine 200 BID, lisinopril 5 dimas  -     C-reactive Protein  -     Sedimentation Rate  -     C3 Complement  - Cont rheumatology and pulmonology f/u  - Cont home CPAP per pulm (Draw)    7. Malignant neoplasm of female breast, unspecified estrogen receptor status, unspecified laterality, unspecified site of breast: hx R breast cancer s/p R mastectomy w/ lymph node dissection and reconstruction in 1985. Alternating mammogram (2/2023) and breast MRI (7/2022) every 6 months, largely because of Monahan syndrome hx   - Cont oncology f/u- Karen    8. Gastroesophageal reflux disease, unspecified whether esophagitis present: Last EGD 9/2021   - Cont home Dexilant 60mg daily   - Cont GI f/u    9. Pain in both knees, unspecified chronicity: s/p R knee replacement but continues to have considerable knee pain bilaterally. Last  steroid injection of L knee 4/2022   - Cont home gabapentin 600/600/1200, oxycodone 10 PRN (2/day)   - Consider repeat steroid injection once further out from hip replacement    10. Complete tear of left rotator cuff, unspecified whether traumatic   - Cont ortho f/u- Abeln    -- Considering L shoulder replacement    11. Primary hypertension: 132/98 today  -     Increase NIFEdipine XL (PROCARDIA XL) 90 MG 24 hr tablet; Take 1 tablet by mouth Daily.  Dispense: 90 tablet; Refill: 1    12. Hypothyroidism, unspecified type  -     TSH  -     T4, Free  - Cont home levothyroxine 175mcg daily pending labs    13. Osteoporosis, unspecified osteoporosis type, unspecified pathological fracture presence: as seen on 12/2020 DEXA. Improved on 2/2023 DEXA   - Cont Prolia every 6 months and Ca/VitD daily    14. Bipolar affective disorder, remission status unspecified: psychiatrist is about to retire (Steven). Will continue to write meds but may need new psychiatrist if needing medication changes   - Cont home lamotrigine 200mg nightly and seroquel 100mg nightly   - Cont Xanax 1mg nightly PRN    15. Vitamin D deficiency, unspecified  -     Vitamin D,25-Hydroxy    Do for MRI abd for pancreatic cancer screening. Pt will await Karen f/u given hip repalcment last week     I spent 59 minutes caring for Tracie on this date of service. This time includes time spent by me in the following activities:reviewing tests, performing a medically appropriate examination and/or evaluation , counseling and educating the patient/family/caregiver, ordering medications, tests, or procedures and documenting information in the medical record  Follow Up   Return in about 3 months (around 8/24/2023) for Recheck- 30min.  Patient was given instructions and counseling regarding her condition or for health maintenance advice. Please see specific information pulled into the AVS if appropriate.

## 2023-05-25 ENCOUNTER — TELEPHONE (OUTPATIENT)
Dept: FAMILY MEDICINE CLINIC | Facility: CLINIC | Age: 63
End: 2023-05-25
Payer: MEDICARE

## 2023-05-25 LAB
25(OH)D3 SERPL-MCNC: 40.5 NG/ML (ref 30–100)
ALBUMIN SERPL-MCNC: 4.1 G/DL (ref 3.5–5.2)
ALBUMIN/GLOB SERPL: 1.2 G/DL
ALP SERPL-CCNC: 162 U/L (ref 39–117)
ALT SERPL W P-5'-P-CCNC: 28 U/L (ref 1–33)
ANION GAP SERPL CALCULATED.3IONS-SCNC: 12.5 MMOL/L (ref 5–15)
AST SERPL-CCNC: 24 U/L (ref 1–32)
BASOPHILS # BLD AUTO: 0.12 10*3/MM3 (ref 0–0.2)
BASOPHILS NFR BLD AUTO: 0.7 % (ref 0–1.5)
BILIRUB SERPL-MCNC: 0.3 MG/DL (ref 0–1.2)
BUN SERPL-MCNC: 26 MG/DL (ref 8–23)
BUN/CREAT SERPL: 25.7 (ref 7–25)
C3 SERPL-MCNC: 208 MG/DL (ref 82–167)
CALCIUM SPEC-SCNC: 10.6 MG/DL (ref 8.6–10.5)
CHLORIDE SERPL-SCNC: 100 MMOL/L (ref 98–107)
CHOLEST SERPL-MCNC: 146 MG/DL (ref 0–200)
CO2 SERPL-SCNC: 29.5 MMOL/L (ref 22–29)
CREAT SERPL-MCNC: 1.01 MG/DL (ref 0.57–1)
CRP SERPL-MCNC: 2.76 MG/DL (ref 0–0.5)
DEPRECATED RDW RBC AUTO: 46.3 FL (ref 37–54)
EGFRCR SERPLBLD CKD-EPI 2021: 62.3 ML/MIN/1.73
EOSINOPHIL # BLD AUTO: 0.26 10*3/MM3 (ref 0–0.4)
EOSINOPHIL NFR BLD AUTO: 1.5 % (ref 0.3–6.2)
ERYTHROCYTE [DISTWIDTH] IN BLOOD BY AUTOMATED COUNT: 12.9 % (ref 12.3–15.4)
ERYTHROCYTE [SEDIMENTATION RATE] IN BLOOD: 49 MM/HR (ref 0–30)
GLOBULIN UR ELPH-MCNC: 3.4 GM/DL
GLUCOSE SERPL-MCNC: 108 MG/DL (ref 65–99)
HBA1C MFR BLD: 5.3 % (ref 4.8–5.6)
HCT VFR BLD AUTO: 38.5 % (ref 34–46.6)
HDLC SERPL-MCNC: 58 MG/DL (ref 40–60)
HGB BLD-MCNC: 13 G/DL (ref 12–15.9)
IMM GRANULOCYTES # BLD AUTO: 0.23 10*3/MM3 (ref 0–0.05)
IMM GRANULOCYTES NFR BLD AUTO: 1.3 % (ref 0–0.5)
LDLC SERPL CALC-MCNC: 70 MG/DL (ref 0–100)
LDLC/HDLC SERPL: 1.18 {RATIO}
LYMPHOCYTES # BLD AUTO: 3.33 10*3/MM3 (ref 0.7–3.1)
LYMPHOCYTES NFR BLD AUTO: 19.4 % (ref 19.6–45.3)
MCH RBC QN AUTO: 32.7 PG (ref 26.6–33)
MCHC RBC AUTO-ENTMCNC: 33.8 G/DL (ref 31.5–35.7)
MCV RBC AUTO: 97 FL (ref 79–97)
MONOCYTES # BLD AUTO: 1.4 10*3/MM3 (ref 0.1–0.9)
MONOCYTES NFR BLD AUTO: 8.2 % (ref 5–12)
NEUTROPHILS NFR BLD AUTO: 11.81 10*3/MM3 (ref 1.7–7)
NEUTROPHILS NFR BLD AUTO: 68.9 % (ref 42.7–76)
NRBC BLD AUTO-RTO: 0.1 /100 WBC (ref 0–0.2)
PLATELET # BLD AUTO: 356 10*3/MM3 (ref 140–450)
PMV BLD AUTO: 11.4 FL (ref 6–12)
POTASSIUM SERPL-SCNC: 4.9 MMOL/L (ref 3.5–5.2)
PROT SERPL-MCNC: 7.5 G/DL (ref 6–8.5)
RBC # BLD AUTO: 3.97 10*6/MM3 (ref 3.77–5.28)
SODIUM SERPL-SCNC: 142 MMOL/L (ref 136–145)
T4 FREE SERPL-MCNC: 1.38 NG/DL (ref 0.93–1.7)
TRIGL SERPL-MCNC: 99 MG/DL (ref 0–150)
TSH SERPL DL<=0.05 MIU/L-ACNC: 0.86 UIU/ML (ref 0.27–4.2)
VIT B12 BLD-MCNC: 652 PG/ML (ref 211–946)
VLDLC SERPL-MCNC: 18 MG/DL (ref 5–40)
WBC NRBC COR # BLD: 17.15 10*3/MM3 (ref 3.4–10.8)

## 2023-05-25 NOTE — TELEPHONE ENCOUNTER
----- Message from Floyd Silva MD sent at 5/25/2023  6:51 AM EDT -----  Labs look good except for elevated inflammatory markers and white blood count. These are a bit difficult to interpret at the moment due to your recent surgery. I think the low dose of daily prednisone is a good thing at this point. It is probably ideal to repeat your inflammatory markers and white count in a month once recovered from the surgery and prednisone stops. If you don't have follow-up with rheumatology, let me know and I can order

## 2023-05-25 NOTE — TELEPHONE ENCOUNTER
I spoke with Tracie and relayed Dr. Silva result note. She said she is on a low dose steroid from her surgery for 3 weeks and she will check on her appointments and let us know if she needs a referral.

## 2023-05-29 DIAGNOSIS — R63.5 WEIGHT GAIN: ICD-10-CM

## 2023-05-29 DIAGNOSIS — G47.00 INSOMNIA, UNSPECIFIED TYPE: ICD-10-CM

## 2023-05-30 ENCOUNTER — TREATMENT (OUTPATIENT)
Dept: PHYSICAL THERAPY | Facility: CLINIC | Age: 63
End: 2023-05-30

## 2023-05-30 DIAGNOSIS — M25.552 LEFT HIP PAIN: ICD-10-CM

## 2023-05-30 DIAGNOSIS — R26.2 DIFFICULTY WALKING: ICD-10-CM

## 2023-05-30 DIAGNOSIS — Z96.642 STATUS POST LEFT HIP REPLACEMENT: Primary | ICD-10-CM

## 2023-05-30 PROCEDURE — 97530 THERAPEUTIC ACTIVITIES: CPT | Performed by: PHYSICAL THERAPIST

## 2023-05-30 PROCEDURE — 97110 THERAPEUTIC EXERCISES: CPT | Performed by: PHYSICAL THERAPIST

## 2023-05-30 PROCEDURE — 97112 NEUROMUSCULAR REEDUCATION: CPT | Performed by: PHYSICAL THERAPIST

## 2023-05-30 RX ORDER — ALPRAZOLAM 1 MG/1
TABLET ORAL
Qty: 30 TABLET | Refills: 1 | Status: SHIPPED | OUTPATIENT
Start: 2023-05-30

## 2023-05-30 NOTE — PROGRESS NOTES
Physical Therapy Daily Treatment Note  [address] [address] [address]  [place] [address]      Patient: Tracie Gross  : 1960  Referring Practitioner: Rashel Shah MD  Date of Initial Visit: Type: THERAPY  Noted: 2023  Today's Date: 2023  Patient seen for: 2 sessions      Visit Diagnoses:     ICD-10-CM ICD-9-CM   1. Status post left hip replacement  Z96.642 V43.64   2. Left hip pain  M25.552 719.45   3. Difficulty walking  R26.2 719.7       Subjective   Tracie Gross reports that she is hurting today.  The L hip pain was 7/10.  States that she almost fell in the bathroom when getting dressed; was able to grab the sink counter and stablize.   Evelyn also reports the hip is red and more tight than normal.       Objective   Inspection:   Zipline in place, redness around the distal 1/2 of the incision and 3 finger breath lateral.  Heat also noted wo drainage. PT called ortho office to discuss.  Pnt is to send in a picture of the area to the ortho #.    Gait with use of FWWX;  At the start of the session, she was using the UEs for moderate assist and the sangeeta was slow.  At the end of the session, less use of UEs and more smooth sangeeta.   See Exercise, Manual, and Modality Logs for complete treatment.     Patient Education: review of the home ex to complete.     Assessment/Plan  Tracie is 2 weeks post L THR surgery.   The Shldr replacement surgery has been postponed. She is progressing with her mobility.  Presents to the clinic today with some redness at the distal incision.  Ortho office called.     Progress per Plan of Care          Timed:         Manual Therapy:         mins  82457;     Therapeutic Exercise:    16     mins  25132;     Neuromuscular Walter:    10    mins  84910;    Therapeutic Activity:     14     mins  00089;     Gait Training:           mins  92171;     Ultrasound:          mins  04746;    Ionto                                   mins   42250  Self Care                        2     mins   94256      Un-Timed:  Electrical Stimulation:         mins  02489 ( );  Traction          mins 17699        Timed Treatment:   42   mins   Total Treatment:     42   mins              Jocelynn Matos PT    Physical Therapist

## 2023-06-01 ENCOUNTER — TELEPHONE (OUTPATIENT)
Dept: PHYSICAL THERAPY | Facility: CLINIC | Age: 63
End: 2023-06-01

## 2023-06-06 ENCOUNTER — TREATMENT (OUTPATIENT)
Dept: PHYSICAL THERAPY | Facility: CLINIC | Age: 63
End: 2023-06-06
Payer: MEDICARE

## 2023-06-06 DIAGNOSIS — M25.552 LEFT HIP PAIN: ICD-10-CM

## 2023-06-06 DIAGNOSIS — R26.2 DIFFICULTY WALKING: ICD-10-CM

## 2023-06-06 DIAGNOSIS — Z96.642 STATUS POST LEFT HIP REPLACEMENT: Primary | ICD-10-CM

## 2023-06-06 DIAGNOSIS — R29.898 WEAKNESS OF RIGHT LEG: ICD-10-CM

## 2023-06-06 PROCEDURE — 97112 NEUROMUSCULAR REEDUCATION: CPT | Performed by: PHYSICAL THERAPIST

## 2023-06-06 PROCEDURE — 97110 THERAPEUTIC EXERCISES: CPT | Performed by: PHYSICAL THERAPIST

## 2023-06-06 NOTE — PROGRESS NOTES
Physical Therapy Daily Treatment Note  [address] [address] [address]  [place] [address]      Patient: Tracie Gross  : 1960  Referring Practitioner: Rashel Shah MD  Date of Initial Visit: Type: THERAPY  Noted: 2023  Today's Date: 2023  Patient seen for: 3 sessions      Visit Diagnoses:     ICD-10-CM ICD-9-CM   1. Status post left hip replacement  Z96.642 V43.64   2. Left hip pain  M25.552 719.45   3. Difficulty walking  R26.2 719.7   4. Weakness of right leg  R29.898 729.89       Subjective   Tracie Gross reports that she did see the NP from ortho last Thurs.  The Zipline was removed that day.  When patient got home she had drainage from the incision.  Contacted ortho office, no return call as of this time. States the drainage has mostly stopped.  Pnt reports the pain in intense in the lateral leg /10.  She drove to therapy today, however, had difficulty getting down the 2 steps and then unable to get leg into the car which delayed her by 15 mins getting to her PT appt.     Pain Level (0-10): 7    Objective   Gait into the clinic with use of a walker.  Gait was slow with moderate use of UE for support during LLE wt bearing.  Wincing noted during gait.  INSPECTION:  dime size area of scab at the proximal aspect of the incision with dried drainage around the remaining incision.  Bright red noted throughout the incision and 2-3 FB and medial, lateral areas to the incision.  Heat noted.   Ortho office contacted. @ 3:35pm.  Message left for the medical team.   See Exercise, Manual, and Modality Logs for complete treatment.     Patient Education: no heat to the leg.  Contact ortho office in am.  Discussion about home health vs outpnt PT due to current mobility issues.     Assessment/Plan  Tracie presents to the clinic today with increased L hip pain.  Redness, heat and history of drainage observed.  Ortho office contacted. Messaged left for the medical team.      Other see outcome of  ortho response to current hip presentation.           Timed:         Manual Therapy:         mins  38196;     Therapeutic Exercise:    16     mins  45002;     Neuromuscular Walter:    10    mins  06721;    Therapeutic Activity:          mins  67463;     Gait Training:           mins  67064;     Ultrasound:          mins  33810;    Ionto                                   mins   27421  Self Care                            mins   73606      Un-Timed:  Electrical Stimulation:         mins  25739 ( );  Traction          mins 62341        Timed Treatment:   26   mins   Total Treatment:     26   mins              Jocelynn Matos, PT    Physical Therapist

## 2023-06-08 ENCOUNTER — TELEPHONE (OUTPATIENT)
Dept: PHYSICAL THERAPY | Facility: CLINIC | Age: 63
End: 2023-06-08

## 2023-06-12 RX ORDER — CETIRIZINE HYDROCHLORIDE 10 MG/1
TABLET ORAL
Qty: 90 TABLET | Refills: 1 | Status: SHIPPED | OUTPATIENT
Start: 2023-06-12

## 2023-06-15 ENCOUNTER — TREATMENT (OUTPATIENT)
Dept: PHYSICAL THERAPY | Facility: CLINIC | Age: 63
End: 2023-06-15
Payer: MEDICARE

## 2023-06-15 DIAGNOSIS — Z96.642 STATUS POST LEFT HIP REPLACEMENT: Primary | ICD-10-CM

## 2023-06-15 DIAGNOSIS — R26.2 DIFFICULTY WALKING: ICD-10-CM

## 2023-06-15 DIAGNOSIS — M25.552 LEFT HIP PAIN: ICD-10-CM

## 2023-06-15 PROCEDURE — 97110 THERAPEUTIC EXERCISES: CPT | Performed by: PHYSICAL THERAPIST

## 2023-06-15 PROCEDURE — 97530 THERAPEUTIC ACTIVITIES: CPT | Performed by: PHYSICAL THERAPIST

## 2023-06-15 PROCEDURE — 97112 NEUROMUSCULAR REEDUCATION: CPT | Performed by: PHYSICAL THERAPIST

## 2023-06-15 NOTE — PROGRESS NOTES
Physical Therapy Daily Treatment Note  [id number] [address] [address]  [place] [address]      Patient: Tracie Gross  : 1960  Referring Practitioner: Rashel Shah MD  Date of Initial Visit: Type: THERAPY  Noted: 2023  Today's Date: 6/15/2023  Patient seen for: 4 sessions      Visit Diagnoses:     ICD-10-CM ICD-9-CM   1. Status post left hip replacement  Z96.642 V43.64   2. Left hip pain  M25.552 719.45   3. Difficulty walking  R26.2 719.7       Subjective  Tracie Gross reports that she did start on an oral antibiotic per surgeon.  Then that Friday she has an infusion as per the surgeon.  She does report the hip pain is less at the last session.  She was able to drive for the appt today and can use a SPC to walk for short distance.   FU with ortho is in approx 6-8 weeks.    Pain Level (0-10): 3    Objective   Gait with use of SPC.  Deficits noted of reduced LLE stance time, decreased hip extension, early L heel off and L lateral trunk lean.  I sit <> supine with pnt assist, slight, for the LLE.  * completed hip abd in SL wo assit  Inspection of the surgical incision - redness still present throughout the entire incision and approx 1-2 FB medial/lateral aspect.  The area at the superior aspect that had scabbed/necrotic tissue is now open with a pink wound bed.  The incision does appear pulled apart not open.   No heat or drainage noted  See Exercise, Manual, and Modality Logs for complete treatment.     Patient Education: instructed clinic to continue to inspect the incision.  Communicate with MD if there are any negative changes as her follow up ortho appt is not in the near future.     Assessment/Plan  Mrs. Gross presents to the clinic today with improved movement, LLE wt bearing and ability to ambulate.  Sit <> supine transitions more independent, still requires some LLE self-assist.  The incision is improved; however still red with several open areas.      Progress per Plan of Care           Timed:         Manual Therapy:         mins  18762;     Therapeutic Exercise:    18     mins  36604;     Neuromuscular Walter:    11    mins  42094;    Therapeutic Activity:     13     mins  22355;     Gait Training:           mins  35943;     Ultrasound:          mins  89299;    Ionto                                   mins   63803  Self Care                       3     mins   63428      Un-Timed:  Electrical Stimulation:         mins  76831 ( );  Traction          mins 23404        Timed Treatment:   46   mins   Total Treatment:     51   mins              Jocelynn Matos, PT    Physical Therapist

## 2023-07-24 ENCOUNTER — HOSPITAL ENCOUNTER (OUTPATIENT)
Dept: MRI IMAGING | Facility: HOSPITAL | Age: 63
Discharge: HOME | End: 2023-07-24
Payer: MEDICARE

## 2023-07-26 ENCOUNTER — TREATMENT (OUTPATIENT)
Dept: PHYSICAL THERAPY | Facility: CLINIC | Age: 63
End: 2023-07-26
Payer: MEDICARE

## 2023-07-26 DIAGNOSIS — Z96.642 STATUS POST LEFT HIP REPLACEMENT: Primary | ICD-10-CM

## 2023-07-26 DIAGNOSIS — M25.552 LEFT HIP PAIN: ICD-10-CM

## 2023-07-26 DIAGNOSIS — R26.2 DIFFICULTY WALKING: ICD-10-CM

## 2023-07-26 PROCEDURE — 97530 THERAPEUTIC ACTIVITIES: CPT | Performed by: PHYSICAL THERAPIST

## 2023-07-26 PROCEDURE — 97112 NEUROMUSCULAR REEDUCATION: CPT | Performed by: PHYSICAL THERAPIST

## 2023-07-26 PROCEDURE — 97110 THERAPEUTIC EXERCISES: CPT | Performed by: PHYSICAL THERAPIST

## 2023-07-26 NOTE — PROGRESS NOTES
"Physical Therapy Daily Treatment Note  [address] [address] [address]  [place] [address]      Patient: Tracie Gross  : 1960  Referring Practitioner: Rashel Shah MD  Date of Initial Visit: Type: THERAPY  Noted: 2023  Today's Date: 2023  Patient seen for: 11 sessions      Visit Diagnoses:     ICD-10-CM ICD-9-CM   1. Status post left hip replacement  Z96.642 V43.64   2. Left hip pain  M25.552 719.45   3. Difficulty walking  R26.2 719.7       Subjective   Tracie Gross reports that she did see Dr. Shah yesterday.  \"He stated everything was good\".   She states that she has not felt good lately due to the shldr pain.  Tracie reports that she feels using the walker is aggravating the shldrs.  Tracie also reports that she took several items to the car the other day and was unable to get back into the house due to fatigue.  Plans to schedule appt with ortho due to the shldr pain.     Pain Level (0-10): 3    Objective   Gait smooth with use of the FWWX.  Kathi even and step length mostly even.  L hip abd in SL requires assist from therapist.    See Exercise, Manual, and Modality Logs for complete treatment.     Patient Education: cont with ex at home.      Assessment/Plan  Tracie was able to complete more ex today in standing before requiring a rest.  She also completed SLR wo assist; however, still required assist for hip abd in a gravity resisted position.     Other  Insurance auth note as next session is the 11th approved.           Timed:         Manual Therapy:         mins  08840;     Therapeutic Exercise:    18     mins  71049;     Neuromuscular Walter:    13    mins  37711;    Therapeutic Activity:     11     mins  54216;     Gait Training:           mins  72245;     Ultrasound:         mins  22361;    Ionto                                   mins   31141  Self Care                            mins   35331      Un-Timed:  Electrical Stimulation:         mins  56717 ( " );  Traction          mins 81495        Timed Treatment:   42   mins   Total Treatment:     50   mins              Jocelynn Matos, PT    Physical Therapist

## 2023-07-28 ENCOUNTER — TELEPHONE (OUTPATIENT)
Dept: PHYSICAL THERAPY | Facility: OTHER | Age: 63
End: 2023-07-28
Payer: MEDICARE

## 2023-07-28 NOTE — TELEPHONE ENCOUNTER
Caller: Tracie Gross    Relationship: Self    What was the call regarding: PATIENT CANCELLED APPT TODAY BECAUSE THEY CANT MAKE IT

## 2023-08-01 ENCOUNTER — TELEPHONE (OUTPATIENT)
Dept: PHYSICAL THERAPY | Facility: CLINIC | Age: 63
End: 2023-08-01

## 2023-08-03 ENCOUNTER — TREATMENT (OUTPATIENT)
Dept: PHYSICAL THERAPY | Facility: CLINIC | Age: 63
End: 2023-08-03
Payer: MEDICARE

## 2023-08-03 DIAGNOSIS — Z96.642 STATUS POST LEFT HIP REPLACEMENT: Primary | ICD-10-CM

## 2023-08-03 DIAGNOSIS — M25.552 LEFT HIP PAIN: ICD-10-CM

## 2023-08-03 DIAGNOSIS — R26.2 DIFFICULTY WALKING: ICD-10-CM

## 2023-08-03 PROCEDURE — 97112 NEUROMUSCULAR REEDUCATION: CPT | Performed by: PHYSICAL THERAPIST

## 2023-08-03 PROCEDURE — 97530 THERAPEUTIC ACTIVITIES: CPT | Performed by: PHYSICAL THERAPIST

## 2023-08-03 PROCEDURE — 97110 THERAPEUTIC EXERCISES: CPT | Performed by: PHYSICAL THERAPIST

## 2023-08-07 DIAGNOSIS — R63.5 WEIGHT GAIN: ICD-10-CM

## 2023-08-07 DIAGNOSIS — G47.00 INSOMNIA, UNSPECIFIED TYPE: ICD-10-CM

## 2023-08-08 RX ORDER — ALPRAZOLAM 1 MG/1
TABLET ORAL
Qty: 30 TABLET | Refills: 3 | Status: SHIPPED | OUTPATIENT
Start: 2023-08-08

## 2023-08-11 ENCOUNTER — TELEPHONE (OUTPATIENT)
Dept: ORTHOPEDIC SURGERY | Facility: CLINIC | Age: 63
End: 2023-08-11

## 2023-08-11 NOTE — TELEPHONE ENCOUNTER
Hub staff attempted to follow warm transfer process and was unsuccessful     Caller: VICTORINA      Best call back number: [phone]  Patient is needing: PT WAS RETURNING A CALL TO THE SX .

## 2023-08-17 ENCOUNTER — LAB (OUTPATIENT)
Dept: LAB | Facility: HOSPITAL | Age: 63
End: 2023-08-17
Payer: MEDICARE

## 2023-08-17 ENCOUNTER — HOSPITAL ENCOUNTER (OUTPATIENT)
Dept: GENERAL RADIOLOGY | Facility: HOSPITAL | Age: 63
Discharge: HOME | End: 2023-08-17
Payer: MEDICARE

## 2023-08-17 DIAGNOSIS — M32.9 SLE (SYSTEMIC LUPUS ERYTHEMATOSUS RELATED SYNDROME): ICD-10-CM

## 2023-08-17 LAB — CRP SERPL-MCNC: 1.12 MG/DL (ref 0–0.5)

## 2023-08-17 PROCEDURE — 72050 X-RAY EXAM NECK SPINE 4/5VWS: CPT

## 2023-08-17 PROCEDURE — 86160 COMPLEMENT ANTIGEN: CPT

## 2023-08-17 PROCEDURE — 85652 RBC SED RATE AUTOMATED: CPT

## 2023-08-17 PROCEDURE — 86140 C-REACTIVE PROTEIN: CPT

## 2023-08-17 PROCEDURE — 80048 BASIC METABOLIC PNL TOTAL CA: CPT

## 2023-08-17 PROCEDURE — 85027 COMPLETE CBC AUTOMATED: CPT

## 2023-08-17 PROCEDURE — 36415 COLL VENOUS BLD VENIPUNCTURE: CPT

## 2023-08-18 LAB
ANION GAP SERPL CALCULATED.3IONS-SCNC: 10.7 MMOL/L (ref 5–15)
BUN SERPL-MCNC: 21 MG/DL (ref 8–23)
BUN/CREAT SERPL: 13.3 (ref 7–25)
C3 SERPL-MCNC: 159 MG/DL (ref 82–167)
CALCIUM SPEC-SCNC: 10.4 MG/DL (ref 8.6–10.5)
CHLORIDE SERPL-SCNC: 98 MMOL/L (ref 98–107)
CO2 SERPL-SCNC: 30.3 MMOL/L (ref 22–29)
CREAT SERPL-MCNC: 1.58 MG/DL (ref 0.57–1)
DEPRECATED RDW RBC AUTO: 42.8 FL (ref 37–54)
EGFRCR SERPLBLD CKD-EPI 2021: 36.4 ML/MIN/1.73
ERYTHROCYTE [DISTWIDTH] IN BLOOD BY AUTOMATED COUNT: 12.3 % (ref 12.3–15.4)
ERYTHROCYTE [SEDIMENTATION RATE] IN BLOOD: 9 MM/HR (ref 0–30)
GLUCOSE SERPL-MCNC: 81 MG/DL (ref 65–99)
HCT VFR BLD AUTO: 39.1 % (ref 34–46.6)
HGB BLD-MCNC: 13.2 G/DL (ref 12–15.9)
MCH RBC QN AUTO: 32.4 PG (ref 26.6–33)
MCHC RBC AUTO-ENTMCNC: 33.8 G/DL (ref 31.5–35.7)
MCV RBC AUTO: 96.1 FL (ref 79–97)
PLATELET # BLD AUTO: 297 10*3/MM3 (ref 140–450)
PMV BLD AUTO: 12.1 FL (ref 6–12)
POTASSIUM SERPL-SCNC: 4.8 MMOL/L (ref 3.5–5.2)
RBC # BLD AUTO: 4.07 10*6/MM3 (ref 3.77–5.28)
SODIUM SERPL-SCNC: 139 MMOL/L (ref 136–145)
WBC NRBC COR # BLD: 9.46 10*3/MM3 (ref 3.4–10.8)

## 2023-08-29 ENCOUNTER — LAB (OUTPATIENT)
Dept: FAMILY MEDICINE CLINIC | Facility: CLINIC | Age: 63
End: 2023-08-29
Payer: MEDICARE

## 2023-08-29 ENCOUNTER — OFFICE VISIT (OUTPATIENT)
Dept: FAMILY MEDICINE CLINIC | Facility: CLINIC | Age: 63
End: 2023-08-29
Payer: MEDICARE

## 2023-08-29 VITALS
RESPIRATION RATE: 18 BRPM | HEART RATE: 82 BPM | SYSTOLIC BLOOD PRESSURE: 120 MMHG | WEIGHT: 223 LBS | DIASTOLIC BLOOD PRESSURE: 80 MMHG | OXYGEN SATURATION: 93 % | BODY MASS INDEX: 38.07 KG/M2 | HEIGHT: 64 IN

## 2023-08-29 DIAGNOSIS — G47.00 INSOMNIA, UNSPECIFIED TYPE: ICD-10-CM

## 2023-08-29 DIAGNOSIS — M75.122 COMPLETE TEAR OF LEFT ROTATOR CUFF, UNSPECIFIED WHETHER TRAUMATIC: ICD-10-CM

## 2023-08-29 DIAGNOSIS — I10 PRIMARY HYPERTENSION: ICD-10-CM

## 2023-08-29 DIAGNOSIS — Z15.09 LYNCH SYNDROME: ICD-10-CM

## 2023-08-29 DIAGNOSIS — R63.5 WEIGHT GAIN: ICD-10-CM

## 2023-08-29 DIAGNOSIS — M25.511 CHRONIC RIGHT SHOULDER PAIN: Primary | ICD-10-CM

## 2023-08-29 DIAGNOSIS — M87.052 AVASCULAR NECROSIS OF FEMORAL HEAD, LEFT: ICD-10-CM

## 2023-08-29 DIAGNOSIS — G89.29 CHRONIC RIGHT SHOULDER PAIN: Primary | ICD-10-CM

## 2023-08-29 DIAGNOSIS — M25.562 PAIN IN BOTH KNEES, UNSPECIFIED CHRONICITY: ICD-10-CM

## 2023-08-29 DIAGNOSIS — N17.9 AKI (ACUTE KIDNEY INJURY): ICD-10-CM

## 2023-08-29 DIAGNOSIS — M25.561 PAIN IN BOTH KNEES, UNSPECIFIED CHRONICITY: ICD-10-CM

## 2023-08-29 PROCEDURE — 80048 BASIC METABOLIC PNL TOTAL CA: CPT | Performed by: FAMILY MEDICINE

## 2023-08-29 PROCEDURE — 36415 COLL VENOUS BLD VENIPUNCTURE: CPT | Performed by: FAMILY MEDICINE

## 2023-08-29 RX ORDER — GABAPENTIN 600 MG/1
TABLET ORAL
Qty: 450 TABLET | Refills: 1 | Status: SHIPPED | OUTPATIENT
Start: 2023-08-29

## 2023-08-29 RX ORDER — MONTELUKAST SODIUM 4 MG/1
1 TABLET, CHEWABLE ORAL 2 TIMES DAILY
Qty: 120 TABLET | Refills: 6 | Status: SHIPPED | OUTPATIENT
Start: 2023-08-29

## 2023-08-29 RX ORDER — TRIAMCINOLONE ACETONIDE 1 MG/ML
LOTION TOPICAL 3 TIMES DAILY
COMMUNITY

## 2023-08-29 RX ORDER — ALPRAZOLAM 1 MG/1
1 TABLET ORAL NIGHTLY PRN
Qty: 30 TABLET | Refills: 3 | Status: SHIPPED | OUTPATIENT
Start: 2023-08-29

## 2023-08-29 RX ADMIN — BUPIVACAINE HYDROCHLORIDE 2 ML: 5 INJECTION, SOLUTION PERINEURAL at 16:01

## 2023-08-29 RX ADMIN — TRIAMCINOLONE ACETONIDE 40 MG: 40 INJECTION, SUSPENSION INTRA-ARTICULAR; INTRAMUSCULAR at 16:01

## 2023-08-29 RX ADMIN — LIDOCAINE HYDROCHLORIDE 2 ML: 10 INJECTION, SOLUTION INFILTRATION; PERINEURAL at 16:01

## 2023-08-29 RX ADMIN — BUPIVACAINE HYDROCHLORIDE 2 ML: 5 INJECTION, SOLUTION PERINEURAL at 16:00

## 2023-08-29 RX ADMIN — LIDOCAINE HYDROCHLORIDE 2 ML: 10 INJECTION, SOLUTION INFILTRATION; PERINEURAL at 16:00

## 2023-08-29 RX ADMIN — TRIAMCINOLONE ACETONIDE 40 MG: 40 INJECTION, SUSPENSION INTRA-ARTICULAR; INTRAMUSCULAR at 16:00

## 2023-08-29 NOTE — PROGRESS NOTES
Procedure   Arthrocentesis    Date/Time: 8/29/2023 4:00 PM  Performed by: Floyd Silva MD  Authorized by: Floyd Silva MD   Indications: pain   Body area: shoulder  Joint: left subacromial bursa  Local anesthesia used: no    Anesthesia:  Local anesthesia used: no    Sedation:  Patient sedated: no    Preparation: Patient was prepped and draped in the usual sterile fashion.  Needle size: 22 G  Ultrasound guidance: no  Approach: lateral  Meds administered: 2 mL lidocaine 1 %; 2 mL bupivacaine 0.5 %; 40 mg triamcinolone acetonide 40 MG/ML  Patient tolerance: patient tolerated the procedure well with no immediate complications      Arthrocentesis    Date/Time: 8/29/2023 4:01 PM  Performed by: Floyd Silva MD  Authorized by: Floyd Silva MD   Indications: pain   Body area: knee  Joint: left knee  Local anesthesia used: no    Anesthesia:  Local anesthesia used: no    Sedation:  Patient sedated: no    Preparation: Patient was prepped and draped in the usual sterile fashion.  Needle size: 22 G  Ultrasound guidance: no  Approach: anterior  Meds administered: 2 mL lidocaine 1 %; 2 mL bupivacaine 0.5 %; 40 mg triamcinolone acetonide 40 MG/ML  Patient tolerance: patient tolerated the procedure well with no immediate complications

## 2023-08-29 NOTE — PROGRESS NOTES
Chief Complaint   Patient presents with    Follow-up     Blood work     HPI  Tracie Gross is a 62 y.o. female that presents for   Chief Complaint   Patient presents with    Follow-up     Blood work     AVN: s/p L hip replacement 5/2023 c/b infection. No longer doing PT as saving visits for R shoulder replacement that is upcoming 11/1/23. She has good ROM but still learning to trust hip. Pain is markedly improved and reports no considerable pain at this time.     R rotator cuff tear: MRI w/ moderate degenerative change and severe rotator cuff tear of supra, infra and subscap. Following w/ ortho ([facility]) and has surgery (replacement) scheduled for 11/1/23.    L knee OA: patient reports flare of L knee pain w/ L hip PT. 7/2022 XR w/ moderate degenerative disease. She is using oxycodone 10mg about once per day, sometimes twice. Also taking gabapentin 600/600/1200    L rotator cuff tear: L shoulder has been more bothersome recently. Interested in injection today. 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 Needs repeat EUS vs MRI pancreas now.     HTN: 120/80 today. Nifedipine increased at last visit and now doing well. Maintained on nifedipine 90 daily, lisinopril 5 daily, Ziac 10/6.25 daily    Review of Systems   Musculoskeletal:  Positive for arthralgias and gait problem.     The following portions of the patient's history were reviewed and updated as appropriate: problem list, past medical history, past surgical history, allergies, current medication    Problem List Tab  Patient History Tab  Immunizations Tab  Medications Tab  Chart Review Tab  Care Everywhere Tab  Synopsis Tab    PE  Vitals:    08/29/23 1504   BP: 120/80   Pulse: 82   Resp: 18   SpO2: 93%     Body mass index is 38.26 kg/m².  General: Obese, NAD  Head: AT/NC  Eyes: EOMI, anicteric sclera  Resp: CTAB, SCR, BS equal  CV: RRR w/o m/r/g; 2+ pulses  GI: Soft, NT/ND, +BS  MSK: No  deformity, no edema  Skin: Warm, dry, intact  Neuro: Alert and oriented. No focal deficits  Psych: Appropriate mood and affect    Imaging  XR Spine Cervical Complete 4 or 5 View    Result Date: 8/18/2023  Impression: 1. Status post C4-C6 spinal fusion. There is no hardware loosening or failure. 2. Degenerative changes. Electronically Signed: Terrance Soria MD  8/18/2023 3:29 PM EDT  Workstation ID: [id number]    Assessment & Plan   Tracie Gross is a 62 y.o. female that presents for   Chief Complaint   Patient presents with    Follow-up     Blood work     Diagnoses and all orders for this visit:    1. Chronic right shoulder pain (Primary): MRI w/ moderate degenerative change and severe rotator cuff tear of supra, infra and subscap   - Cont ortho f/u- [facility]    -- Plan for shoulder replacement 11/1/23    2. Pain in both knees, unspecified chronicity: s/p R knee replacement. Requesting L knee injection today  -     Arthrocentesis  - Cont oxycodone 10mg PRN    3. Complete tear of left rotator cuff, unspecified whether traumatic:   -     Arthrocentesis    4. Avascular necrosis of femoral head, left: s/p L hip replacement w/ [facility].  -     Cont gabapentin (NEURONTIN) 600 MG tablet; Take 2 tablets in the morning, 1 tablet in the afternoon, and 2 tablets at night  Dispense: 450 tablet; Refill: 1  - Cont oxycodone PRN  - Cont therapeutic exercise    5. 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 Due for MRI abd.   -     MRI Abdomen With Contrast; Future    6. Primary hypertension: 120/80 today   - Cont home nifedipine 90 daily, lisinopril 5 daily, Ziac 10/6.25 daily    7. DANIELLE (acute kidney injury)  -     Basic Metabolic Panel    8. Insomnia, unspecified type  -     ALPRAZolam (XANAX) 1 MG tablet; Take 1 tablet by mouth At Night As Needed for Anxiety.  Dispense: 30 tablet; Refill: 3    Other orders  -     colestipol (COLESTID) 1 g tablet; Take 1 tablet by mouth 2 (Two) Times a Day.  Dispense: 120 tablet; Refill: 6     Return in about 3 months  (around 11/29/2023) for Recheck- 30min.  43 minutes spent on the day of service face-to-face with the patient obtaining history, performing physical, reviewing chart/data, placing orders, and documenting.  Additional time spent in documentation outside the day of service.

## 2023-08-30 LAB
ANION GAP SERPL CALCULATED.3IONS-SCNC: 13 MMOL/L (ref 5–15)
BUN SERPL-MCNC: 16 MG/DL (ref 8–23)
BUN/CREAT SERPL: 12.3 (ref 7–25)
CALCIUM SPEC-SCNC: 10.4 MG/DL (ref 8.6–10.5)
CHLORIDE SERPL-SCNC: 101 MMOL/L (ref 98–107)
CO2 SERPL-SCNC: 30 MMOL/L (ref 22–29)
CREAT SERPL-MCNC: 1.3 MG/DL (ref 0.57–1)
EGFRCR SERPLBLD CKD-EPI 2021: 46 ML/MIN/1.73
GLUCOSE SERPL-MCNC: 103 MG/DL (ref 65–99)
POTASSIUM SERPL-SCNC: 4.3 MMOL/L (ref 3.5–5.2)
SODIUM SERPL-SCNC: 144 MMOL/L (ref 136–145)

## 2023-09-04 DIAGNOSIS — M87.052 AVASCULAR NECROSIS OF FEMORAL HEAD, LEFT: ICD-10-CM

## 2023-09-05 RX ORDER — OXYCODONE HYDROCHLORIDE 10 MG/1
10 TABLET ORAL EVERY 6 HOURS PRN
Qty: 120 TABLET | Refills: 0 | Status: SHIPPED | OUTPATIENT
Start: 2023-09-05

## 2023-09-07 RX ORDER — LIDOCAINE HYDROCHLORIDE 10 MG/ML
2 INJECTION, SOLUTION INFILTRATION; PERINEURAL
Status: COMPLETED | OUTPATIENT
Start: 2023-08-29 | End: 2023-08-29

## 2023-09-07 RX ORDER — BUPIVACAINE HYDROCHLORIDE 5 MG/ML
2 INJECTION, SOLUTION PERINEURAL
Status: COMPLETED | OUTPATIENT
Start: 2023-08-29 | End: 2023-08-29

## 2023-09-07 RX ORDER — TRIAMCINOLONE ACETONIDE 40 MG/ML
40 INJECTION, SUSPENSION INTRA-ARTICULAR; INTRAMUSCULAR
Status: COMPLETED | OUTPATIENT
Start: 2023-08-29 | End: 2023-08-29

## 2023-09-09 RX ORDER — MECLIZINE HYDROCHLORIDE 25 MG/1
25 TABLET ORAL 3 TIMES DAILY PRN
Qty: 30 TABLET | Refills: 1 | Status: SHIPPED | OUTPATIENT
Start: 2023-09-09

## 2023-09-10 ENCOUNTER — DOCUMENTATION (OUTPATIENT)
Dept: PHYSICAL THERAPY | Facility: CLINIC | Age: 63
End: 2023-09-10
Payer: MEDICARE

## 2023-09-11 NOTE — PROGRESS NOTES
Physical Therapy Discharge Summary  [address] [address]  [place] [address]    Patient:Tracie Gross  : 1960       Dates  PT visit: 23-8/3/23  Number of Visits: 12      Discharge Status of Patient: Pt no showed her 8/10/23 visit and her POC  23.     STGs in 4 weeks:  1)  Decrease pain to 5-6/10 on average met 23  2)  Increase L hip/knee AROM by 5-10 degrees  where limited as much  met 23  3)  Increase LE strength to 3/5  met 23     LTGs by discharge  1)  Increase L LE ROM to WFL/WNL with min/no pain  met 8/3/23  2)  Increase LE strength to 4+/5  progressing 8/3/23   3)  Pt will be able to ascend/descend stairs reciprocally with or without use of rail(s) and with minimal difficulty or pain  4)  Pt will be able to sit/drive/ride for 30-60 mins without difficulty or pain  Partial met 8/3/23   5)  Pt will be able to stand 20-40 mins for basic ADLs & cleaning with min difficulty or pain  partial met 8/3/23  6)  Pt will be able to walk 30-60 mins for grocery shopping without difficulty, pain or LOB  7)  Pt will be able to get in/out of the car/tub and wash/dress/groom without difficulty or increased pain  met 8/3/23   8)  Pt will be able to lift/carry laundry baskets, pots/pans, garbage or grocery bags without difficulty or increased pain  9)  Pt will be able to sleep full nights most nights without waking from L hip/LE symptom       Discharge Plan: Continue with current home exercise program as instructed     Comments: pt was given HEP during sessions.      Date of Discharge 9/10/23            Megan Singleton, PT  Physical Therapist  [place] License: [id number]

## 2023-09-19 ENCOUNTER — HOSPITAL ENCOUNTER (OUTPATIENT)
Dept: MRI IMAGING | Facility: HOSPITAL | Age: 63
Discharge: HOME | End: 2023-09-19
Admitting: INTERNAL MEDICINE
Payer: MEDICARE

## 2023-09-19 DIAGNOSIS — C50.919 MALIGNANT NEOPLASM OF FEMALE BREAST, UNSPECIFIED ESTROGEN RECEPTOR STATUS, UNSPECIFIED LATERALITY, UNSPECIFIED SITE OF BREAST: ICD-10-CM

## 2023-09-19 DIAGNOSIS — R92.8 OTHER ABNORMAL AND INCONCLUSIVE FINDINGS ON DIAGNOSTIC IMAGING OF BREAST: ICD-10-CM

## 2023-09-19 DIAGNOSIS — Z15.09 LYNCH SYNDROME: ICD-10-CM

## 2023-09-19 PROCEDURE — 25010000002 GADOTERIDOL PER 1 ML: Performed by: INTERNAL MEDICINE

## 2023-09-19 PROCEDURE — A9579 GAD-BASE MR CONTRAST NOS,1ML: HCPCS | Performed by: INTERNAL MEDICINE

## 2023-09-19 PROCEDURE — C8908 MRI W/O FOL W/CONT, BREAST,: HCPCS

## 2023-09-19 PROCEDURE — C8937 CAD BREAST MRI: HCPCS

## 2023-09-19 RX ADMIN — GADOTERIDOL 20 ML: 279.3 INJECTION, SOLUTION INTRAVENOUS at 13:37

## 2023-10-09 RX ORDER — BISOPROLOL FUMARATE AND HYDROCHLOROTHIAZIDE 10; 6.25 MG/1; MG/1
TABLET ORAL
Qty: 90 TABLET | Refills: 1 | Status: SHIPPED | OUTPATIENT
Start: 2023-10-09

## 2023-10-09 RX ORDER — LEVOTHYROXINE SODIUM 175 UG/1
TABLET ORAL
Qty: 90 TABLET | Refills: 1 | Status: SHIPPED | OUTPATIENT
Start: 2023-10-09

## 2023-10-20 RX ORDER — FERROUS SULFATE 325(65) MG
1 TABLET ORAL
Qty: 30 TABLET | OUTPATIENT
Start: 2023-10-20

## 2023-10-24 ENCOUNTER — APPOINTMENT (OUTPATIENT)
Dept: CARDIOLOGY | Facility: HOSPITAL | Age: 63
End: 2023-10-24
Payer: MEDICARE

## 2023-10-24 ENCOUNTER — HOSPITAL ENCOUNTER (OUTPATIENT)
Dept: GENERAL RADIOLOGY | Facility: HOSPITAL | Age: 63
Discharge: HOME | End: 2023-10-24
Payer: MEDICARE

## 2023-10-24 ENCOUNTER — HOSPITAL ENCOUNTER (OUTPATIENT)
Dept: CARDIOLOGY | Facility: HOSPITAL | Age: 63
Discharge: HOME | End: 2023-10-24
Payer: MEDICARE

## 2023-10-24 ENCOUNTER — LAB (OUTPATIENT)
Dept: LAB | Facility: HOSPITAL | Age: 63
End: 2023-10-24
Payer: MEDICARE

## 2023-10-24 ENCOUNTER — PRE-ADMISSION TESTING (OUTPATIENT)
Dept: PREADMISSION TESTING | Facility: HOSPITAL | Age: 63
End: 2023-10-24
Payer: MEDICARE

## 2023-10-24 VITALS
TEMPERATURE: 97.9 F | BODY MASS INDEX: 39.16 KG/M2 | SYSTOLIC BLOOD PRESSURE: 103 MMHG | OXYGEN SATURATION: 99 % | HEIGHT: 63 IN | DIASTOLIC BLOOD PRESSURE: 61 MMHG | HEART RATE: 81 BPM | RESPIRATION RATE: 22 BRPM | WEIGHT: 221 LBS

## 2023-10-24 DIAGNOSIS — M75.122 COMPLETE TEAR OF LEFT ROTATOR CUFF, UNSPECIFIED WHETHER TRAUMATIC: ICD-10-CM

## 2023-10-24 DIAGNOSIS — M19.011 OSTEOARTHRITIS OF RIGHT GLENOHUMERAL JOINT: ICD-10-CM

## 2023-10-24 LAB
ABO GROUP BLD: NORMAL
ANION GAP SERPL CALCULATED.3IONS-SCNC: 9 MMOL/L (ref 5–15)
APTT PPP: 29.3 SECONDS (ref 24–31)
BACTERIA UR QL AUTO: NORMAL /HPF
BASOPHILS # BLD AUTO: 0.09 10*3/MM3 (ref 0–0.2)
BASOPHILS NFR BLD AUTO: 1 % (ref 0–1.5)
BILIRUB UR QL STRIP: NEGATIVE
BLD GP AB SCN SERPL QL: NEGATIVE
BUN SERPL-MCNC: 18 MG/DL (ref 8–23)
BUN/CREAT SERPL: 15.5 (ref 7–25)
CALCIUM SPEC-SCNC: 10.3 MG/DL (ref 8.6–10.5)
CHLORIDE SERPL-SCNC: 101 MMOL/L (ref 98–107)
CLARITY UR: CLEAR
CO2 SERPL-SCNC: 33 MMOL/L (ref 22–29)
COLOR UR: YELLOW
CREAT SERPL-MCNC: 1.16 MG/DL (ref 0.57–1)
DEPRECATED RDW RBC AUTO: 44.6 FL (ref 37–54)
EGFRCR SERPLBLD CKD-EPI 2021: 52.4 ML/MIN/1.73
EOSINOPHIL # BLD AUTO: 0.42 10*3/MM3 (ref 0–0.4)
EOSINOPHIL NFR BLD AUTO: 4.7 % (ref 0.3–6.2)
ERYTHROCYTE [DISTWIDTH] IN BLOOD BY AUTOMATED COUNT: 12.9 % (ref 12.3–15.4)
GLUCOSE SERPL-MCNC: 92 MG/DL (ref 65–99)
GLUCOSE UR STRIP-MCNC: NEGATIVE MG/DL
HBA1C MFR BLD: 5.6 % (ref 4.8–5.6)
HCT VFR BLD AUTO: 38.7 % (ref 34–46.6)
HGB BLD-MCNC: 13.3 G/DL (ref 12–15.9)
HGB UR QL STRIP.AUTO: NEGATIVE
HOLD SPECIMEN: NORMAL
HYALINE CASTS UR QL AUTO: NORMAL /LPF
IMM GRANULOCYTES # BLD AUTO: 0.05 10*3/MM3 (ref 0–0.05)
IMM GRANULOCYTES NFR BLD AUTO: 0.6 % (ref 0–0.5)
INR PPP: 0.97 (ref 0.93–1.1)
KETONES UR QL STRIP: NEGATIVE
LEUKOCYTE ESTERASE UR QL STRIP.AUTO: ABNORMAL
LYMPHOCYTES # BLD AUTO: 2.09 10*3/MM3 (ref 0.7–3.1)
LYMPHOCYTES NFR BLD AUTO: 23.1 % (ref 19.6–45.3)
MCH RBC QN AUTO: 32.4 PG (ref 26.6–33)
MCHC RBC AUTO-ENTMCNC: 34.4 G/DL (ref 31.5–35.7)
MCV RBC AUTO: 94.2 FL (ref 79–97)
MONOCYTES # BLD AUTO: 1.18 10*3/MM3 (ref 0.1–0.9)
MONOCYTES NFR BLD AUTO: 13.1 % (ref 5–12)
MRSA DNA SPEC QL NAA+PROBE: NORMAL
NEUTROPHILS NFR BLD AUTO: 5.2 10*3/MM3 (ref 1.7–7)
NEUTROPHILS NFR BLD AUTO: 57.5 % (ref 42.7–76)
NITRITE UR QL STRIP: NEGATIVE
NRBC BLD AUTO-RTO: 0.1 /100 WBC (ref 0–0.2)
PH UR STRIP.AUTO: 6.5 [PH] (ref 5–8)
PLATELET # BLD AUTO: 303 10*3/MM3 (ref 140–450)
PMV BLD AUTO: 12.1 FL (ref 6–12)
POTASSIUM SERPL-SCNC: 4.4 MMOL/L (ref 3.5–5.2)
PROT UR QL STRIP: NEGATIVE
PROTHROMBIN TIME: 10.6 SECONDS (ref 9.6–11.7)
RBC # BLD AUTO: 4.11 10*6/MM3 (ref 3.77–5.28)
RBC # UR STRIP: NORMAL /HPF
REF LAB TEST METHOD: NORMAL
RH BLD: POSITIVE
SODIUM SERPL-SCNC: 143 MMOL/L (ref 136–145)
SP GR UR STRIP: 1.01 (ref 1–1.03)
SQUAMOUS #/AREA URNS HPF: NORMAL /HPF
T&S EXPIRATION DATE: NORMAL
UROBILINOGEN UR QL STRIP: ABNORMAL
WBC # UR STRIP: NORMAL /HPF
WBC NRBC COR # BLD: 9.03 10*3/MM3 (ref 3.4–10.8)

## 2023-10-24 PROCEDURE — 71046 X-RAY EXAM CHEST 2 VIEWS: CPT

## 2023-10-24 PROCEDURE — 97165 OT EVAL LOW COMPLEX 30 MIN: CPT

## 2023-10-24 PROCEDURE — 86900 BLOOD TYPING SEROLOGIC ABO: CPT

## 2023-10-24 PROCEDURE — 87641 MR-STAPH DNA AMP PROBE: CPT

## 2023-10-24 PROCEDURE — 86850 RBC ANTIBODY SCREEN: CPT

## 2023-10-24 PROCEDURE — 85730 THROMBOPLASTIN TIME PARTIAL: CPT

## 2023-10-24 PROCEDURE — 93005 ELECTROCARDIOGRAM TRACING: CPT | Performed by: ORTHOPAEDIC SURGERY

## 2023-10-24 PROCEDURE — 86901 BLOOD TYPING SEROLOGIC RH(D): CPT

## 2023-10-24 PROCEDURE — 83036 HEMOGLOBIN GLYCOSYLATED A1C: CPT

## 2023-10-24 PROCEDURE — 36415 COLL VENOUS BLD VENIPUNCTURE: CPT

## 2023-10-24 PROCEDURE — 85610 PROTHROMBIN TIME: CPT

## 2023-10-24 PROCEDURE — 80048 BASIC METABOLIC PNL TOTAL CA: CPT

## 2023-10-24 PROCEDURE — 81001 URINALYSIS AUTO W/SCOPE: CPT

## 2023-10-24 PROCEDURE — 85025 COMPLETE CBC W/AUTO DIFF WBC: CPT

## 2023-10-24 NOTE — THERAPY EVALUATION
Patient Name: Tracie Gross  : 1960    MRN: [id number]                              Today's Date: 10/24/2023       Admit Date: (Not on file)    Visit Dx: No diagnosis found.  Patient Active Problem List   Diagnosis    Vitamin B12 deficiency    Arthritis    Sleep apnea    Bipolar affective disorder    Depression    Breast cancer    Chronic pain    Dyslipidemia    Family history of malignant neoplasm of colon    Family history of melanoma    Gastroesophageal reflux disease    Heart murmur    Hypertension    Hypothyroidism    Osteoarthritis, generalized    Raynaud's disease    Ulcerative colitis    Artificial knee joint present    Neuralgia    Presence of artificial hip joint, right    Lumbar radiculopathy    Derangement of posterior horn of lateral meniscus    Von Willebrand disease    SLE (systemic lupus erythematosus related syndrome)    Chest pain    Scleroderma    Monahan syndrome    Rectal prolapse    Osteoporosis    COVID-19    Fibromyalgia    Onychomycosis    Stage 3 chronic kidney disease    Immunocompromised    Avascular necrosis of femoral head, left    Morbid obesity    Trochanteric bursitis of left hip    Pain in both knees    Status post total hip replacement, right    DJD of left shoulder    Complete tear of left rotator cuff    DJD of right shoulder    Osteoarthritis of right glenohumeral joint     Past Medical History:   Diagnosis Date    Allergic 1998    Arthritis     Asthma     Bipolar affective disorder     Breast cancer 1985    s/p R mastectomy    Depression 2000    GERD (gastroesophageal reflux disease) 1993    H/O breast reconstruction     SEVERAL    H/O laminectomy     Hamartoma     History of medical problems     Hx of bilateral oophorectomy     Hypertension     Hypothyroidism     Inflammatory bowel disease 1992    Man. EGD/colonoscopy annually (2021)    Irritable bowel syndrome     Kienbock's disease, right     WRIST    Low back pain 1991    Lupus     Martin Monahan  syndrome     Man. EGD/colonoscopy annually (2021). MRI alternating w/ EUS annually for pancreatic screen    MRSA infection     Obesity     Osteopenia     Osteoporosis     maintained on Prolia through Karen    Pneumonia     Raynaud disease     Renal insufficiency     Scleroderma     Sleep apnea     cpap    Squamous cell cancer of lip     Von Willebrand disease      Past Surgical History:   Procedure Laterality Date    ARM DEBRIDEMENT Right     X 3    BACK SURGERY      Vetas- cervical fusion    BACK SURGERY      lumbar decomp    BREAST BIOPSY      BREAST LUMPECTOMY      BREAST RECONSTRUCTION Right     BREAST RECONSTRUCTION Right     CARDIAC CATHETERIZATION      no stents placed     SECTION  ,     CHOLECYSTECTOMY      COLONOSCOPY      COLONOSCOPY N/A 2020    Procedure: COLONOSCOPY;  Surgeon: Cayden Conway MD;  Location: [facility] ENDOSCOPY;  Service: Gastroenterology;  Laterality: N/A;  rectal ulcer    COLONOSCOPY N/A 2021    Procedure: COLONOSCOPY WITH POLYPECTOMY X 1;  Surgeon: Cayden Conway MD;  Location: [facility] ENDOSCOPY;  Service: Gastroenterology;  Laterality: N/A;  Post: COLON POLYP    COLONOSCOPY N/A 2023    Procedure: COLONOSCOPY with polypectomy x 1, endoscopic clipping x 1;  Surgeon: Cayden Conway MD;  Location: [facility] ENDOSCOPY;  Service: Gastroenterology;  Laterality: N/A;    COSMETIC SURGERY  [id number]    ENDOSCOPY      ENDOSCOPY N/A 2020    Procedure: ESOPHAGOGASTRODUODENOSCOPY;  Surgeon: Cayden Conway MD;  Location: [facility] ENDOSCOPY;  Service: Gastroenterology;  Laterality: N/A;  Normal EGD    ENDOSCOPY N/A 2021    Procedure: ESOPHAGOGASTRODUODENOSCOPY;  Surgeon: Cayden Conway MD;  Location: [facility] ENDOSCOPY;  Service: Gastroenterology;  Laterality: N/A;  Post: normal egd    EXPLORATORY LAPAROTOMY      scar tissue removal    EYE SURGERY      FINGER SURGERY Right     ring finger mass excision     HYSTERECTOMY      PARTIAL    JOINT REPLACEMENT Right 2012,2015    hip and knee    KNEE ARTHROSCOPY Left 01/07/2020    Procedure: LEFT KNEE SCOPE with partial lateral meniscectomy;  Surgeon: Chau Perez MD;  Location: [facility] MAIN OR;  Service: Orthopedics    LASIK      LASIK/ LASIK ENHANCEMENT    MASTECTOMY RADICAL Right     OOPHORECTOMY Bilateral     SPINE SURGERY      SPLENECTOMY      TUBAL ABDOMINAL LIGATION  1981    UPPER ENDOSCOPIC ULTRASOUND W/ FNA N/A 12/09/2021    Procedure: ENDOSCOPIC ULTRASOUND WITH ESOPHAGOGASTRODUODENOSCOPY;  Surgeon: Luis E Negron MD;  Location: [facility] ENDOSCOPY;  Service: Gastroenterology;  Laterality: N/A;  Post: GASTROPARESIS, DILATED COMMON BILE DUCT, FUNDIC POLYP, DUODENITIS, NORMAL PANCREAS, HIATAL HERNIA    WRIST SURGERY Right     distal radius head removal (bone dead)  plates and screws decomp lunate      General Information       Row Name 10/24/23 1601          OT Time and Intention    Document Type evaluation  -ES (r) PS (t) ES (c)     Mode of Treatment occupational therapy  -ES (r) PS (t) ES (c)       Row Name 10/24/23 1601          General Information    Patient Profile Reviewed yes  -ES (r) PS (t) ES (c)     Prior Level of Function independent:;all household mobility;ADL's  -ES (r) PS (t) ES (c)       Row Name 10/24/23 1601          Occupational Profile    Reason for Services/Referral (Occupational Profile) Pt is a 63 F presenting for pre-op education for rTSA in [facility]. At baseline pt lives with spouse in in [facility] and uses a cane for mobility. She gets assistance in and out of the tub. But is otherwise independent with ADLs.  -ES (r) PS (t) ES (c)       Row Name 10/24/23 1601          Living Environment    People in Home spouse  -ES (r) PS (t) ES (c)       Row Name 10/24/23 1601          Cognition    Orientation Status (Cognition) oriented x 4  -ES (r) PS (t) ES (c)               User Key  (r) = Recorded By, (t) = Taken By, (c) = Cosigned By      Initials Name  Provider Type    ES Pippa Carbajal OT Occupational Therapist    Domitila Jimenez, OT Student OT Student                     Mobility/ADL's       Row Name 10/24/23 1602          Transfers    Transfers sit-stand transfer  -ES (r) PS (t) ES (c)       Row Name 10/24/23 1602          Sit-Stand Transfer    Sit-Stand [place] (Transfers) moderate assist (50% patient effort)  -ES (r) PS (t) ES (c)       Row Name 10/24/23 1602          Functional Mobility    Functional Mobility- Ind. Level independent  -ES (r) PS (t) ES (c)     Functional Mobility- Device cane, straight  -ES (r) PS (t) ES (c)               User Key  (r) = Recorded By, (t) = Taken By, (c) = Cosigned By      Initials Name Provider Type    ES Pippa Carbajal OT Occupational Therapist    Domitila Jimenez, OT Student OT Student                   Obj/Interventions       Row Name 10/24/23 1604          Range of Motion Comprehensive    General Range of Motion upper extremity range of motion deficits identified  -ES (r) PS (t) ES (c)     Comment, General Range of Motion BUE 50%  -ES (r) PS (t) ES (c)       Row Name 10/24/23 1604          Strength Comprehensive (MMT)    General Manual Muscle Testing (MMT) Assessment upper extremity strength deficits identified  -ES (r) PS (t) ES (c)     Comment, General Manual Muscle Testing (MMT) Assessment BUE strength 2+/5  -ES (r) PS (t) ES (c)       Row Name 10/24/23 1604          Balance    Balance Assessment sitting static balance;sitting dynamic balance;sit to stand dynamic balance;standing static balance;standing dynamic balance  -ES (r) PS (t) ES (c)     Static Sitting Balance independent  -ES (r) PS (t) ES (c)     Dynamic Sitting Balance independent  -ES (r) PS (t) ES (c)     Position, Sitting Balance sitting in chair  -ES (r) PS (t) ES (c)     Sit to Stand Dynamic Balance moderate assist  -ES (r) PS (t) ES (c)     Static Standing Balance modified independence  -ES (r) PS (t) ES (c)     Dynamic Standing  Balance modified independence  -ES (r) PS (t) ES (c)     Position/Device Used, Standing Balance cane, straight  -ES (r) PS (t) ES (c)               User Key  (r) = Recorded By, (t) = Taken By, (c) = Cosigned By      Initials Name Provider Type    ES Pippa Carbajal OT Occupational Therapist    Domitila Jimenez, OT Student OT Student                   Goals/Plan    No documentation.                  Clinical Impression       Row Name 10/24/23 1607          Pain Assessment    Additional Documentation Pain Scale: FACES Pre/Post-Treatment (Group)  -ES (r) PS (t) ES (c)       Row Name 10/24/23 1609          Pain Scale: FACES Pre/Post-Treatment    Pain: FACES Scale, Pretreatment 0-->no hurt  -ES (r) PS (t) ES (c)     Posttreatment Pain Rating 2-->hurts little bit  -ES (r) PS (t) ES (c)     Pre/Posttreatment Pain Comment pain in shoulder with movemnt  -ES (r) PS (t) ES (c)       Row Name 10/24/23 1607          Plan of Care Review    Progress no change  -ES (r) PS (t) ES (c)     Outcome Evaluation Pt is a 63 F presenting for pre-op education for rTSA in R shoulder. At baseline pt lives with spouse in in [facility] and uses a cane for mobility. Mod I for ADLs with exception for bathing. Pt required Mod A for Sit<>stand transfer but independent for functional mobility. Pt educated on don/doff abduction sling, cold therapy and precaution following surgery. Will follow post op day zero. (P)   Simultaneous filing. User may not have seen previous data.  -PS       Row Name 10/24/23 1607          Therapy Assessment/Plan (OT)    Therapy Frequency (OT) evaluation only  -ES (r) PS (t) ES (c)               User Key  (r) = Recorded By, (t) = Taken By, (c) = Cosigned By      Initials Name Provider Type    Pippa Billy OT Occupational Therapist    Domitila Jimenez, OT Student OT Student                   Outcome Measures    No documentation.                   Occupational Therapy Education       Title: PT OT SLP Therapies (Done)        Topic: Occupational Therapy (Done)       Point: ADL training (Done)       Description:   Instruct learner(s) on proper safety adaptation and remediation techniques during self care or transfers.   Instruct in proper use of assistive devices.                  Learning Progress Summary             Patient Acceptance, E,TB, VU by PS at 10/24/2023 1613   Significant Other Acceptance, E,TB, VU by PS at 10/24/2023 1613                         Point: Home exercise program (Done)       Description:   Instruct learner(s) on appropriate technique for monitoring, assisting and/or progressing therapeutic exercises/activities.                  Learning Progress Summary             Patient Acceptance, E,TB, VU by PS at 10/24/2023 1613   Significant Other Acceptance, E,TB, VU by PS at 10/24/2023 1613                         Point: Precautions (Done)       Description:   Instruct learner(s) on prescribed precautions during self-care and functional transfers.                  Learning Progress Summary             Patient Acceptance, E,TB, VU by PS at 10/24/2023 1613   Significant Other Acceptance, E,TB, VU by PS at 10/24/2023 1613                         Point: Body mechanics (Done)       Description:   Instruct learner(s) on proper positioning and spine alignment during self-care, functional mobility activities and/or exercises.                  Learning Progress Summary             Patient Acceptance, E,TB, VU by PS at 10/24/2023 1613   Significant Other Acceptance, E,TB, VU by PS at 10/24/2023 1613                                         User Key       Initials Effective Dates Name Provider Type Discipline    PS 08/16/23 -  Domitila Tang OT Student OT Student OT                  OT Recommendation and Plan  Therapy Frequency (OT): evaluation only  Plan of Care Review  Progress: no change  Outcome Evaluation: (P) Pt is a 63 F presenting for pre-op education for rTSA in R shoulder. At baseline pt lives with spouse in in [facility]  and uses a cane for mobility. Mod I for ADLs with exception for bathing. Pt required Mod A for Sit<>stand transfer but independent for functional mobility. Pt educated on don/doff abduction sling, cold therapy and precaution following surgery. Will follow post op day zero. (Simultaneous filing. User may not have seen previous data.)     Time Calculation:         Time Calculation- OT       Row Name 10/24/23 1612             Time Calculation- OT    OT Start Time 1240  -ES (r) PS (t) ES (c)      OT Stop Time 1256  -ES (r) PS (t) ES (c)      OT Time Calculation (min) 16 min  -ES (r) PS (t)      Total Timed Code Minutes- OT 0 minute(s)  -ES (r) PS (t) ES (c)      OT Received On 10/24/23  -ES (r) PS (t) ES (c)      OT Goal Re-Cert Due Date 11/01/23  -ES (r) PS (t) ES (c)                User Key  (r) = Recorded By, (t) = Taken By, (c) = Cosigned By      Initials Name Provider Type    Pippa Billy OT Occupational Therapist    PS Domitila Tang, MIN Student OT Student                  Therapy Charges for Today       Code Description Service Date Service Provider Modifiers Qty    [id number] HC OT EVAL LOW COMPLEXITY 3 10/24/2023 Domitila Tang OT Student GO 1                 MIN Monroy  10/24/2023

## 2023-10-24 NOTE — PLAN OF CARE
Goal Outcome Evaluation:           Progress: no change  Outcome Evaluation: (P) Pt is a 63 F presenting for pre-op education for rTSA in R shoulder. At baseline pt lives with spouse in in [facility] and uses a cane for mobility. Mod I for ADLs with exception for bathing. Pt required Mod A for Sit<>stand transfer but independent for functional mobility. Pt educated on don/doff abduction sling, cold therapy and precaution following surgery. Will follow post op day zero. (Simultaneous filing. User may not have seen previous data.)

## 2023-10-25 LAB
QT INTERVAL: 399 MS
QTC INTERVAL: 465 MS

## 2023-10-26 RX ORDER — LISINOPRIL 5 MG/1
TABLET ORAL
Qty: 90 TABLET | Refills: 0 | Status: SHIPPED | OUTPATIENT
Start: 2023-10-26

## 2023-10-30 NOTE — H&P
[facility]   HISTORY AND PHYSICAL    Patient Name: Tracie Gross  : 1960  MRN: [id number]  Primary Care Physician:  Floyd Silva MD  Date of admission: (Not on file)    Subjective   Subjective     Chief Complaint: Right shoulder pain    This is a 63-year-old female with longstanding right shoulder pain presenting for shoulder arthroplasty        Review of Systems   Cardiovascular:  Negative for chest pain.   Musculoskeletal:  Positive for arthralgias.        Personal History     Past Medical History:   Diagnosis Date    Allergic 1998    Arthritis     Asthma     Bipolar affective disorder     Breast cancer 1985    s/p R mastectomy    Depression 2000    GERD (gastroesophageal reflux disease)     H/O breast reconstruction     SEVERAL    H/O laminectomy     Hamartoma     History of medical problems     Hx of bilateral oophorectomy     Hypertension     Hypothyroidism     Inflammatory bowel disease     Man. EGD/colonoscopy annually (2021)    Irritable bowel syndrome     Kienbock's disease, right     WRIST    Low back pain     Lupus     [place]    Monahan syndrome     Man. EGD/colonoscopy annually (2021). MRI alternating w/ EUS annually for pancreatic screen    MRSA infection     Obesity     Osteopenia     Osteoporosis     maintained on Prolia through [facility]    Pneumonia     Raynaud disease     Renal insufficiency     Scleroderma     Sleep apnea     cpap    Squamous cell cancer of lip     Von Willebrand disease        Past Surgical History:   Procedure Laterality Date    ARM DEBRIDEMENT Right     X 3    BACK SURGERY      Vetas- cervical fusion    BACK SURGERY      lumbar decomp    BREAST BIOPSY      BREAST LUMPECTOMY      BREAST RECONSTRUCTION Right     BREAST RECONSTRUCTION Right     CARDIAC CATHETERIZATION      no stents placed     SECTION  ,     CHOLECYSTECTOMY      COLONOSCOPY      COLONOSCOPY N/A 2020    Procedure: COLONOSCOPY;  Surgeon:  Cayden Conway MD;  Location: [facility] ENDOSCOPY;  Service: Gastroenterology;  Laterality: N/A;  rectal ulcer    COLONOSCOPY N/A 09/17/2021    Procedure: COLONOSCOPY WITH POLYPECTOMY X 1;  Surgeon: Cayden Conway MD;  Location: [facility] ENDOSCOPY;  Service: Gastroenterology;  Laterality: N/A;  Post: COLON POLYP    COLONOSCOPY N/A 01/06/2023    Procedure: COLONOSCOPY with polypectomy x 1, endoscopic clipping x 1;  Surgeon: Cayden Conway MD;  Location: [facility] ENDOSCOPY;  Service: Gastroenterology;  Laterality: N/A;    COSMETIC SURGERY  [id number]    ENDOSCOPY      ENDOSCOPY N/A 08/14/2020    Procedure: ESOPHAGOGASTRODUODENOSCOPY;  Surgeon: Cayden Conway MD;  Location: [facility] ENDOSCOPY;  Service: Gastroenterology;  Laterality: N/A;  Normal EGD    ENDOSCOPY N/A 09/17/2021    Procedure: ESOPHAGOGASTRODUODENOSCOPY;  Surgeon: Cayden Conway MD;  Location: [facility] ENDOSCOPY;  Service: Gastroenterology;  Laterality: N/A;  Post: normal egd    EXPLORATORY LAPAROTOMY      scar tissue removal    EYE SURGERY  2000    FINGER SURGERY Right     ring finger mass excision    HYSTERECTOMY      PARTIAL    JOINT REPLACEMENT Right 2012,2015    hip and knee    KNEE ARTHROSCOPY Left 01/07/2020    Procedure: LEFT KNEE SCOPE with partial lateral meniscectomy;  Surgeon: Chau Perez MD;  Location: [facility] MAIN OR;  Service: Orthopedics    LASIK      LASIK/ LASIK ENHANCEMENT    MASTECTOMY RADICAL Right     OOPHORECTOMY Bilateral     SPINE SURGERY      SPLENECTOMY      TUBAL ABDOMINAL LIGATION  1981    UPPER ENDOSCOPIC ULTRASOUND W/ FNA N/A 12/09/2021    Procedure: ENDOSCOPIC ULTRASOUND WITH ESOPHAGOGASTRODUODENOSCOPY;  Surgeon: Luis E Negron MD;  Location: [facility] ENDOSCOPY;  Service: Gastroenterology;  Laterality: N/A;  Post: GASTROPARESIS, DILATED COMMON BILE DUCT, FUNDIC POLYP, DUODENITIS, NORMAL PANCREAS, HIATAL HERNIA    WRIST SURGERY Right     distal radius head removal (bone dead)  plates and screws  kennedy stanley       Family History: family history includes Arthritis in her mother, sister, and sister; Asthma in her sister; Breast cancer in her son and son; Cancer in her maternal aunt, maternal aunt, mother, paternal aunt, and sister; Colon cancer in her mother, sister, son, and another family member; Depression in her father; Diabetes in her paternal grandmother, sister, and sister; Heart disease in her father, mother, sister, and sister; Hyperlipidemia in her father, sister, and sister; Hypertension in her father and sister; Kidney disease in her father, sister, and sister; Other in her sister, son, and son; Stroke in her paternal grandmother and sister; Thyroid disease in her sister and sister; Vision loss in her father and sister; Von Willebrand disease in her brother, sister, son, and son. Otherwise pertinent FHx was reviewed and not pertinent to current issue.    Social History:  reports that she quit smoking about 29 years ago. Her smoking use included cigarettes. She started smoking about 39 years ago. She has a 1.75 pack-year smoking history. She has been exposed to tobacco smoke. She has never used smokeless tobacco. She reports that she does not currently use alcohol. She reports that she does not use drugs.    Home Medications:  ALPRAZolam, Calcium Carb-Cholecalciferol, Denosumab, Flaxseed Oil Max Str, NIFEdipine XL, QUEtiapine, Vitamin D (Cholecalciferol), bisoprolol-hydrochlorothiazide, cetirizine, colestipol, cyclobenzaprine, dexlansoprazole, famotidine, fluticasone, furosemide, gabapentin, hydrOXYzine, hydroxychloroquine, lamoTRIgine, levothyroxine, lisinopril, meclizine, ondansetron, oxyCODONE, and triamcinolone    Allergies:  Allergies   Allergen Reactions    Aspirin Other (See Comments)     VONWILLENBRAND DISEASE     Penicillins Anaphylaxis    Baclofen Hives    Tape  [Adhesive Tape] Rash       Objective    Objective   Right shoulder intact skin passive elevation 160 abduction 130 external  rotation 50 internal rotation S1 with active elevation of 110 and pain and weakness on Speed Box Elder supraspinatus testing with intact deltoid function.  Sensory and motor exam are intact all distributions. Radial pulse is palpable and capillary refill is less than two seconds to all digits.        Imaging:   MRI reviewed my interpretation demonstrates moderate arthritis of the massive 2 to 3 cm retracted tear of the supraspinatus infraspinatus and upper subscapularis     Assessment:    Right shoulder degenerative joint disease with massive cuff tear  Left shoulder pain     Plan:   Treatment options discussed she would like to proceed with reverse shoulder arthroplasty on the right side.  Continue observation for the left     I discussed the risks including bleeding, scar, infection, stiffness, nerve artery vein and tendon damage, instability, dvt, loss of life or limb. Issues of scapular notching and component revision were also discussed. All questions were answered and addressed. Rehabilitation restrictions and timeframes were discussed. Use of cement or cementless fixation was discussed      Floyd Ruiz MD

## 2023-11-01 ENCOUNTER — ANESTHESIA (OUTPATIENT)
Dept: PERIOP | Facility: HOSPITAL | Age: 63
End: 2023-11-01
Payer: MEDICARE

## 2023-11-01 ENCOUNTER — ANESTHESIA EVENT (OUTPATIENT)
Dept: PERIOP | Facility: HOSPITAL | Age: 63
End: 2023-11-01
Payer: MEDICARE

## 2023-11-01 ENCOUNTER — APPOINTMENT (OUTPATIENT)
Dept: GENERAL RADIOLOGY | Facility: HOSPITAL | Age: 63
End: 2023-11-01
Payer: MEDICARE

## 2023-11-01 PROCEDURE — A9270 NON-COVERED ITEM OR SERVICE: HCPCS | Performed by: ORTHOPAEDIC SURGERY

## 2023-11-01 PROCEDURE — 25010000002 SUGAMMADEX 200 MG/2ML SOLUTION: Performed by: NURSE ANESTHETIST, CERTIFIED REGISTERED

## 2023-11-01 PROCEDURE — 63710000001 BISOPROLOL-HYDROCHLOROTHIAZIDE 10-6.25 MG TABLET: Performed by: ORTHOPAEDIC SURGERY

## 2023-11-01 PROCEDURE — 25010000002 MIDAZOLAM PER 1 MG: Performed by: ANESTHESIOLOGY

## 2023-11-01 PROCEDURE — 63710000001 LISINOPRIL 5 MG TABLET: Performed by: ORTHOPAEDIC SURGERY

## 2023-11-01 PROCEDURE — 25010000002 DEXAMETHASONE PER 1 MG: Performed by: NURSE ANESTHETIST, CERTIFIED REGISTERED

## 2023-11-01 PROCEDURE — 0 BUPIVACAINE LIPOSOME 1.3 % SUSPENSION: Performed by: ANESTHESIOLOGY

## 2023-11-01 PROCEDURE — 25810000003 SODIUM CHLORIDE 0.9 % SOLUTION 250 ML FLEX CONT: Performed by: NURSE ANESTHETIST, CERTIFIED REGISTERED

## 2023-11-01 PROCEDURE — 63710000001 LAMOTRIGINE 100 MG TABLET: Performed by: ORTHOPAEDIC SURGERY

## 2023-11-01 PROCEDURE — G0378 HOSPITAL OBSERVATION PER HR: HCPCS

## 2023-11-01 PROCEDURE — C1776 JOINT DEVICE (IMPLANTABLE): HCPCS | Performed by: ORTHOPAEDIC SURGERY

## 2023-11-01 PROCEDURE — 25010000002 VANCOMYCIN 1 G RECONSTITUTED SOLUTION 1 EACH VIAL: Performed by: ORTHOPAEDIC SURGERY

## 2023-11-01 PROCEDURE — 25010000002 CEFAZOLIN PER 500 MG: Performed by: ORTHOPAEDIC SURGERY

## 2023-11-01 PROCEDURE — 25010000002 BUPIVACAINE (PF) 0.25 % SOLUTION 30 ML VIAL: Performed by: ORTHOPAEDIC SURGERY

## 2023-11-01 PROCEDURE — 25010000002 PROPOFOL 200 MG/20ML EMULSION: Performed by: NURSE ANESTHETIST, CERTIFIED REGISTERED

## 2023-11-01 PROCEDURE — 25010000002 PHENYLEPHRINE 10 MG/ML SOLUTION 5 ML VIAL: Performed by: NURSE ANESTHETIST, CERTIFIED REGISTERED

## 2023-11-01 PROCEDURE — 25010000002 CEFTRIAXONE PER 250 MG: Performed by: ORTHOPAEDIC SURGERY

## 2023-11-01 PROCEDURE — 25010000002 FENTANYL CITRATE (PF) 50 MCG/ML SOLUTION: Performed by: ANESTHESIOLOGY

## 2023-11-01 PROCEDURE — 63710000001 GABAPENTIN 600 MG TABLET: Performed by: ORTHOPAEDIC SURGERY

## 2023-11-01 PROCEDURE — 63710000001 QUETIAPINE 100 MG TABLET: Performed by: ORTHOPAEDIC SURGERY

## 2023-11-01 PROCEDURE — 25010000002 ONDANSETRON PER 1 MG: Performed by: NURSE ANESTHETIST, CERTIFIED REGISTERED

## 2023-11-01 PROCEDURE — C1713 ANCHOR/SCREW BN/BN,TIS/BN: HCPCS | Performed by: ORTHOPAEDIC SURGERY

## 2023-11-01 PROCEDURE — 25010000002 BUPIVACAINE (PF) 0.5 % SOLUTION: Performed by: ANESTHESIOLOGY

## 2023-11-01 PROCEDURE — 25010000002 DROPERIDOL PER 5 MG: Performed by: NURSE ANESTHETIST, CERTIFIED REGISTERED

## 2023-11-01 PROCEDURE — 73030 X-RAY EXAM OF SHOULDER: CPT

## 2023-11-01 PROCEDURE — 63710000001 HYDROXYCHLOROQUINE 200 MG TABLET: Performed by: ORTHOPAEDIC SURGERY

## 2023-11-01 PROCEDURE — 25010000002 HYDROMORPHONE 1 MG/ML SOLUTION: Performed by: NURSE ANESTHETIST, CERTIFIED REGISTERED

## 2023-11-01 PROCEDURE — 25810000003 LACTATED RINGERS PER 1000 ML: Performed by: ORTHOPAEDIC SURGERY

## 2023-11-01 PROCEDURE — 25810000003 SODIUM CHLORIDE 0.9 % SOLUTION: Performed by: ORTHOPAEDIC SURGERY

## 2023-11-01 PROCEDURE — C9290 INJ, BUPIVACAINE LIPOSOME: HCPCS | Performed by: ANESTHESIOLOGY

## 2023-11-01 RX ORDER — DROPERIDOL 2.5 MG/ML
0.62 INJECTION, SOLUTION INTRAMUSCULAR; INTRAVENOUS ONCE AS NEEDED
Status: COMPLETED | OUTPATIENT
Start: 2023-11-01 | End: 2023-11-01

## 2023-11-01 RX ORDER — SODIUM CHLORIDE 9 MG/ML
9 INJECTION, SOLUTION INTRAVENOUS CONTINUOUS PRN
Status: DISCONTINUED | OUTPATIENT
Start: 2023-11-01 | End: 2023-11-02 | Stop reason: HOSPADM

## 2023-11-01 RX ORDER — SODIUM CHLORIDE 0.9 % (FLUSH) 0.9 %
10 SYRINGE (ML) INJECTION AS NEEDED
Status: DISCONTINUED | OUTPATIENT
Start: 2023-11-01 | End: 2023-11-01 | Stop reason: HOSPADM

## 2023-11-01 RX ORDER — MECLIZINE HYDROCHLORIDE 25 MG/1
25 TABLET ORAL 3 TIMES DAILY PRN
Status: DISCONTINUED | OUTPATIENT
Start: 2023-11-01 | End: 2023-11-02 | Stop reason: HOSPADM

## 2023-11-01 RX ORDER — MORPHINE SULFATE 2 MG/ML
2 INJECTION, SOLUTION INTRAMUSCULAR; INTRAVENOUS EVERY 4 HOURS PRN
Status: DISCONTINUED | OUTPATIENT
Start: 2023-11-01 | End: 2023-11-02 | Stop reason: HOSPADM

## 2023-11-01 RX ORDER — FENTANYL CITRATE 50 UG/ML
50 INJECTION, SOLUTION INTRAMUSCULAR; INTRAVENOUS
Status: DISCONTINUED | OUTPATIENT
Start: 2023-11-01 | End: 2023-11-01 | Stop reason: HOSPADM

## 2023-11-01 RX ORDER — MELOXICAM 15 MG/1
15 TABLET ORAL DAILY
Status: DISCONTINUED | OUTPATIENT
Start: 2023-11-01 | End: 2023-11-01 | Stop reason: HOSPADM

## 2023-11-01 RX ORDER — IPRATROPIUM BROMIDE AND ALBUTEROL SULFATE 2.5; .5 MG/3ML; MG/3ML
3 SOLUTION RESPIRATORY (INHALATION) ONCE AS NEEDED
Status: DISCONTINUED | OUTPATIENT
Start: 2023-11-01 | End: 2023-11-01 | Stop reason: HOSPADM

## 2023-11-01 RX ORDER — QUETIAPINE FUMARATE 100 MG/1
100 TABLET, FILM COATED ORAL NIGHTLY
Status: DISCONTINUED | OUTPATIENT
Start: 2023-11-01 | End: 2023-11-02 | Stop reason: HOSPADM

## 2023-11-01 RX ORDER — DIPHENHYDRAMINE HCL 25 MG
25 CAPSULE ORAL NIGHTLY PRN
Status: DISCONTINUED | OUTPATIENT
Start: 2023-11-01 | End: 2023-11-02 | Stop reason: HOSPADM

## 2023-11-01 RX ORDER — LABETALOL HYDROCHLORIDE 5 MG/ML
5 INJECTION, SOLUTION INTRAVENOUS
Status: DISCONTINUED | OUTPATIENT
Start: 2023-11-01 | End: 2023-11-01 | Stop reason: HOSPADM

## 2023-11-01 RX ORDER — SODIUM CHLORIDE 9 MG/ML
75 INJECTION, SOLUTION INTRAVENOUS CONTINUOUS
Status: DISCONTINUED | OUTPATIENT
Start: 2023-11-01 | End: 2023-11-02 | Stop reason: HOSPADM

## 2023-11-01 RX ORDER — LISINOPRIL 5 MG/1
5 TABLET ORAL DAILY
Status: DISCONTINUED | OUTPATIENT
Start: 2023-11-01 | End: 2023-11-02 | Stop reason: HOSPADM

## 2023-11-01 RX ORDER — PREGABALIN 75 MG/1
150 CAPSULE ORAL ONCE
Status: COMPLETED | OUTPATIENT
Start: 2023-11-01 | End: 2023-11-01

## 2023-11-01 RX ORDER — FENTANYL CITRATE 50 UG/ML
INJECTION, SOLUTION INTRAMUSCULAR; INTRAVENOUS
Status: COMPLETED | OUTPATIENT
Start: 2023-11-01 | End: 2023-11-01

## 2023-11-01 RX ORDER — GABAPENTIN 600 MG/1
300 TABLET ORAL EVERY 8 HOURS SCHEDULED
Status: DISCONTINUED | OUTPATIENT
Start: 2023-11-01 | End: 2023-11-02 | Stop reason: HOSPADM

## 2023-11-01 RX ORDER — NIFEDIPINE 30 MG/1
90 TABLET, EXTENDED RELEASE ORAL DAILY
Status: DISCONTINUED | OUTPATIENT
Start: 2023-11-02 | End: 2023-11-02 | Stop reason: HOSPADM

## 2023-11-01 RX ORDER — LEVOTHYROXINE SODIUM 175 UG/1
175 TABLET ORAL
Status: DISCONTINUED | OUTPATIENT
Start: 2023-11-02 | End: 2023-11-02 | Stop reason: HOSPADM

## 2023-11-01 RX ORDER — ACETAMINOPHEN 500 MG
1000 TABLET ORAL ONCE
Status: COMPLETED | OUTPATIENT
Start: 2023-11-01 | End: 2023-11-01

## 2023-11-01 RX ORDER — NALOXONE HCL 0.4 MG/ML
0.4 VIAL (ML) INJECTION
Status: DISCONTINUED | OUTPATIENT
Start: 2023-11-01 | End: 2023-11-02 | Stop reason: HOSPADM

## 2023-11-01 RX ORDER — BISACODYL 10 MG
10 SUPPOSITORY, RECTAL RECTAL DAILY PRN
Status: DISCONTINUED | OUTPATIENT
Start: 2023-11-01 | End: 2023-11-02 | Stop reason: HOSPADM

## 2023-11-01 RX ORDER — ONDANSETRON 4 MG/1
4 TABLET, FILM COATED ORAL EVERY 6 HOURS PRN
Status: DISCONTINUED | OUTPATIENT
Start: 2023-11-01 | End: 2023-11-02 | Stop reason: HOSPADM

## 2023-11-01 RX ORDER — OXYCODONE HCL 10 MG/1
10 TABLET, FILM COATED, EXTENDED RELEASE ORAL EVERY 12 HOURS SCHEDULED
Status: DISCONTINUED | OUTPATIENT
Start: 2023-11-01 | End: 2023-11-02 | Stop reason: HOSPADM

## 2023-11-01 RX ORDER — TRAMADOL HYDROCHLORIDE 50 MG/1
50 TABLET ORAL EVERY 6 HOURS PRN
Status: DISCONTINUED | OUTPATIENT
Start: 2023-11-01 | End: 2023-11-02 | Stop reason: HOSPADM

## 2023-11-01 RX ORDER — ALPRAZOLAM 1 MG/1
1 TABLET ORAL NIGHTLY PRN
Status: DISCONTINUED | OUTPATIENT
Start: 2023-11-01 | End: 2023-11-02 | Stop reason: HOSPADM

## 2023-11-01 RX ORDER — MIDAZOLAM HYDROCHLORIDE 1 MG/ML
1 INJECTION INTRAMUSCULAR; INTRAVENOUS
Status: DISCONTINUED | OUTPATIENT
Start: 2023-11-01 | End: 2023-11-01 | Stop reason: HOSPADM

## 2023-11-01 RX ORDER — PHENYLEPHRINE HCL IN 0.9% NACL 1 MG/10 ML
SYRINGE (ML) INTRAVENOUS AS NEEDED
Status: DISCONTINUED | OUTPATIENT
Start: 2023-11-01 | End: 2023-11-01 | Stop reason: SURG

## 2023-11-01 RX ORDER — ACETAMINOPHEN 325 MG/1
650 TABLET ORAL ONCE AS NEEDED
Status: DISCONTINUED | OUTPATIENT
Start: 2023-11-01 | End: 2023-11-01 | Stop reason: HOSPADM

## 2023-11-01 RX ORDER — BISOPROLOL FUMARATE AND HYDROCHLOROTHIAZIDE 10; 6.25 MG/1; MG/1
1 TABLET ORAL DAILY
Status: DISCONTINUED | OUTPATIENT
Start: 2023-11-01 | End: 2023-11-02 | Stop reason: HOSPADM

## 2023-11-01 RX ORDER — DIPHENHYDRAMINE HCL 25 MG
25 CAPSULE ORAL EVERY 6 HOURS PRN
Status: DISCONTINUED | OUTPATIENT
Start: 2023-11-01 | End: 2023-11-02 | Stop reason: HOSPADM

## 2023-11-01 RX ORDER — NALOXONE HCL 0.4 MG/ML
0.4 VIAL (ML) INJECTION AS NEEDED
Status: DISCONTINUED | OUTPATIENT
Start: 2023-11-01 | End: 2023-11-01 | Stop reason: HOSPADM

## 2023-11-01 RX ORDER — HYDRALAZINE HYDROCHLORIDE 20 MG/ML
5 INJECTION INTRAMUSCULAR; INTRAVENOUS
Status: DISCONTINUED | OUTPATIENT
Start: 2023-11-01 | End: 2023-11-01 | Stop reason: HOSPADM

## 2023-11-01 RX ORDER — SODIUM CHLORIDE 0.9 % (FLUSH) 0.9 %
10 SYRINGE (ML) INJECTION EVERY 12 HOURS SCHEDULED
Status: DISCONTINUED | OUTPATIENT
Start: 2023-11-01 | End: 2023-11-01 | Stop reason: HOSPADM

## 2023-11-01 RX ORDER — EPHEDRINE SULFATE 5 MG/ML
5 INJECTION INTRAVENOUS ONCE AS NEEDED
Status: DISCONTINUED | OUTPATIENT
Start: 2023-11-01 | End: 2023-11-01 | Stop reason: HOSPADM

## 2023-11-01 RX ORDER — TRANEXAMIC ACID 10 MG/ML
1000 INJECTION, SOLUTION INTRAVENOUS ONCE
Status: COMPLETED | OUTPATIENT
Start: 2023-11-01 | End: 2023-11-01

## 2023-11-01 RX ORDER — ACETAMINOPHEN 650 MG/1
650 SUPPOSITORY RECTAL EVERY 4 HOURS PRN
Status: DISCONTINUED | OUTPATIENT
Start: 2023-11-01 | End: 2023-11-02 | Stop reason: HOSPADM

## 2023-11-01 RX ORDER — ROCURONIUM BROMIDE 10 MG/ML
INJECTION, SOLUTION INTRAVENOUS AS NEEDED
Status: DISCONTINUED | OUTPATIENT
Start: 2023-11-01 | End: 2023-11-01 | Stop reason: SURG

## 2023-11-01 RX ORDER — BUPIVACAINE HYDROCHLORIDE 5 MG/ML
INJECTION, SOLUTION EPIDURAL; INTRACAUDAL
Status: COMPLETED | OUTPATIENT
Start: 2023-11-01 | End: 2023-11-01

## 2023-11-01 RX ORDER — PROPOFOL 10 MG/ML
INJECTION, EMULSION INTRAVENOUS AS NEEDED
Status: DISCONTINUED | OUTPATIENT
Start: 2023-11-01 | End: 2023-11-01 | Stop reason: SURG

## 2023-11-01 RX ORDER — OXYCODONE HCL 10 MG/1
10 TABLET, FILM COATED, EXTENDED RELEASE ORAL ONCE
Status: COMPLETED | OUTPATIENT
Start: 2023-11-01 | End: 2023-11-01

## 2023-11-01 RX ORDER — OXYCODONE HYDROCHLORIDE 5 MG/1
10 TABLET ORAL EVERY 4 HOURS PRN
Status: DISCONTINUED | OUTPATIENT
Start: 2023-11-01 | End: 2023-11-02 | Stop reason: HOSPADM

## 2023-11-01 RX ORDER — MIDAZOLAM HYDROCHLORIDE 1 MG/ML
0.5 INJECTION INTRAMUSCULAR; INTRAVENOUS
Status: DISCONTINUED | OUTPATIENT
Start: 2023-11-01 | End: 2023-11-01 | Stop reason: HOSPADM

## 2023-11-01 RX ORDER — DIPHENHYDRAMINE HYDROCHLORIDE 50 MG/ML
25 INJECTION INTRAMUSCULAR; INTRAVENOUS NIGHTLY PRN
Status: DISCONTINUED | OUTPATIENT
Start: 2023-11-01 | End: 2023-11-02 | Stop reason: HOSPADM

## 2023-11-01 RX ORDER — HYDROXYCHLOROQUINE SULFATE 200 MG/1
200 TABLET, FILM COATED ORAL 2 TIMES DAILY
Status: DISCONTINUED | OUTPATIENT
Start: 2023-11-01 | End: 2023-11-02 | Stop reason: HOSPADM

## 2023-11-01 RX ORDER — MIDAZOLAM HYDROCHLORIDE 1 MG/ML
INJECTION INTRAMUSCULAR; INTRAVENOUS
Status: COMPLETED | OUTPATIENT
Start: 2023-11-01 | End: 2023-11-01

## 2023-11-01 RX ORDER — ACETAMINOPHEN 325 MG/1
650 TABLET ORAL EVERY 4 HOURS PRN
Status: DISCONTINUED | OUTPATIENT
Start: 2023-11-01 | End: 2023-11-02 | Stop reason: HOSPADM

## 2023-11-01 RX ORDER — SODIUM CHLORIDE, SODIUM LACTATE, POTASSIUM CHLORIDE, CALCIUM CHLORIDE 600; 310; 30; 20 MG/100ML; MG/100ML; MG/100ML; MG/100ML
1000 INJECTION, SOLUTION INTRAVENOUS CONTINUOUS
Status: DISCONTINUED | OUTPATIENT
Start: 2023-11-01 | End: 2023-11-01

## 2023-11-01 RX ORDER — FLUMAZENIL 0.1 MG/ML
0.2 INJECTION INTRAVENOUS AS NEEDED
Status: DISCONTINUED | OUTPATIENT
Start: 2023-11-01 | End: 2023-11-01 | Stop reason: HOSPADM

## 2023-11-01 RX ORDER — LAMOTRIGINE 100 MG/1
200 TABLET ORAL NIGHTLY
Status: DISCONTINUED | OUTPATIENT
Start: 2023-11-01 | End: 2023-11-02 | Stop reason: HOSPADM

## 2023-11-01 RX ORDER — FUROSEMIDE 20 MG/1
20 TABLET ORAL DAILY PRN
Status: DISCONTINUED | OUTPATIENT
Start: 2023-11-01 | End: 2023-11-02 | Stop reason: HOSPADM

## 2023-11-01 RX ORDER — DEXAMETHASONE SODIUM PHOSPHATE 4 MG/ML
INJECTION, SOLUTION INTRA-ARTICULAR; INTRALESIONAL; INTRAMUSCULAR; INTRAVENOUS; SOFT TISSUE AS NEEDED
Status: DISCONTINUED | OUTPATIENT
Start: 2023-11-01 | End: 2023-11-01 | Stop reason: SURG

## 2023-11-01 RX ORDER — SODIUM CHLORIDE 9 MG/ML
40 INJECTION, SOLUTION INTRAVENOUS AS NEEDED
Status: DISCONTINUED | OUTPATIENT
Start: 2023-11-01 | End: 2023-11-01 | Stop reason: HOSPADM

## 2023-11-01 RX ORDER — OXYCODONE HYDROCHLORIDE 5 MG/1
5 TABLET ORAL ONCE AS NEEDED
Status: DISCONTINUED | OUTPATIENT
Start: 2023-11-01 | End: 2023-11-01 | Stop reason: HOSPADM

## 2023-11-01 RX ORDER — CYCLOBENZAPRINE HCL 10 MG
10 TABLET ORAL 3 TIMES DAILY PRN
Status: DISCONTINUED | OUTPATIENT
Start: 2023-11-01 | End: 2023-11-02 | Stop reason: HOSPADM

## 2023-11-01 RX ORDER — ONDANSETRON 2 MG/ML
4 INJECTION INTRAMUSCULAR; INTRAVENOUS EVERY 6 HOURS PRN
Status: DISCONTINUED | OUTPATIENT
Start: 2023-11-01 | End: 2023-11-02 | Stop reason: HOSPADM

## 2023-11-01 RX ORDER — LIDOCAINE HYDROCHLORIDE 10 MG/ML
0.5 INJECTION, SOLUTION INFILTRATION; PERINEURAL ONCE AS NEEDED
Status: DISCONTINUED | OUTPATIENT
Start: 2023-11-01 | End: 2023-11-01 | Stop reason: HOSPADM

## 2023-11-01 RX ORDER — ONDANSETRON 2 MG/ML
4 INJECTION INTRAMUSCULAR; INTRAVENOUS ONCE AS NEEDED
Status: DISCONTINUED | OUTPATIENT
Start: 2023-11-01 | End: 2023-11-01 | Stop reason: HOSPADM

## 2023-11-01 RX ORDER — ONDANSETRON 2 MG/ML
INJECTION INTRAMUSCULAR; INTRAVENOUS AS NEEDED
Status: DISCONTINUED | OUTPATIENT
Start: 2023-11-01 | End: 2023-11-01 | Stop reason: SURG

## 2023-11-01 RX ADMIN — PHENYLEPHRINE HYDROCHLORIDE 0.5 MCG/KG/MIN: 10 INJECTION INTRAVENOUS at 16:28

## 2023-11-01 RX ADMIN — Medication 100 MCG: at 16:17

## 2023-11-01 RX ADMIN — SUGAMMADEX 200 MG: 100 INJECTION, SOLUTION INTRAVENOUS at 17:34

## 2023-11-01 RX ADMIN — Medication 100 MCG: at 16:24

## 2023-11-01 RX ADMIN — HYDROXYCHLOROQUINE SULFATE 200 MG: 200 TABLET ORAL at 21:15

## 2023-11-01 RX ADMIN — ACETAMINOPHEN 1000 MG: 500 TABLET, FILM COATED ORAL at 14:53

## 2023-11-01 RX ADMIN — ROCURONIUM BROMIDE 50 MG: 10 INJECTION, SOLUTION INTRAVENOUS at 16:09

## 2023-11-01 RX ADMIN — LAMOTRIGINE 200 MG: 100 TABLET ORAL at 21:32

## 2023-11-01 RX ADMIN — CEFAZOLIN 2000 MG: 2 INJECTION, POWDER, FOR SOLUTION INTRAMUSCULAR; INTRAVENOUS at 16:20

## 2023-11-01 RX ADMIN — MELOXICAM 15 MG: 15 TABLET ORAL at 14:54

## 2023-11-01 RX ADMIN — BUPIVACAINE HYDROCHLORIDE 10 ML: 5 INJECTION, SOLUTION EPIDURAL; INTRACAUDAL; PERINEURAL at 15:12

## 2023-11-01 RX ADMIN — SODIUM CHLORIDE, POTASSIUM CHLORIDE, SODIUM LACTATE AND CALCIUM CHLORIDE 1000 ML: 600; 310; 30; 20 INJECTION, SOLUTION INTRAVENOUS at 14:54

## 2023-11-01 RX ADMIN — HYDROMORPHONE HYDROCHLORIDE 1 MG: 1 INJECTION, SOLUTION INTRAMUSCULAR; INTRAVENOUS; SUBCUTANEOUS at 18:06

## 2023-11-01 RX ADMIN — PROPOFOL 150 MG: 10 INJECTION, EMULSION INTRAVENOUS at 16:09

## 2023-11-01 RX ADMIN — FENTANYL CITRATE 100 MCG: 50 INJECTION, SOLUTION INTRAMUSCULAR; INTRAVENOUS at 15:06

## 2023-11-01 RX ADMIN — DROPERIDOL 0.62 MG: 2.5 INJECTION, SOLUTION INTRAMUSCULAR; INTRAVENOUS at 18:06

## 2023-11-01 RX ADMIN — TRANEXAMIC ACID 1000 MG: 10 INJECTION, SOLUTION INTRAVENOUS at 16:20

## 2023-11-01 RX ADMIN — GABAPENTIN 300 MG: 600 TABLET, FILM COATED ORAL at 21:15

## 2023-11-01 RX ADMIN — LISINOPRIL 5 MG: 5 TABLET ORAL at 21:15

## 2023-11-01 RX ADMIN — PREGABALIN 150 MG: 75 CAPSULE ORAL at 14:53

## 2023-11-01 RX ADMIN — ONDANSETRON 4 MG: 2 INJECTION INTRAMUSCULAR; INTRAVENOUS at 17:12

## 2023-11-01 RX ADMIN — PROPOFOL 125 MCG/KG/MIN: 10 INJECTION, EMULSION INTRAVENOUS at 16:12

## 2023-11-01 RX ADMIN — DEXAMETHASONE SODIUM PHOSPHATE 4 MG: 4 INJECTION, SOLUTION INTRAMUSCULAR; INTRAVENOUS at 16:20

## 2023-11-01 RX ADMIN — SODIUM CHLORIDE 75 ML/HR: 9 INJECTION, SOLUTION INTRAVENOUS at 20:00

## 2023-11-01 RX ADMIN — BUPIVACAINE 10 ML: 13.3 INJECTION, SUSPENSION, LIPOSOMAL INFILTRATION at 15:12

## 2023-11-01 RX ADMIN — MIDAZOLAM 2 MG: 1 INJECTION INTRAMUSCULAR; INTRAVENOUS at 15:06

## 2023-11-01 RX ADMIN — QUETIAPINE FUMARATE 100 MG: 100 TABLET ORAL at 21:15

## 2023-11-01 RX ADMIN — LIDOCAINE HYDROCHLORIDE 100 MG: 20 INJECTION, SOLUTION EPIDURAL; INFILTRATION; INTRACAUDAL; PERINEURAL at 16:09

## 2023-11-01 RX ADMIN — OXYCODONE HYDROCHLORIDE 10 MG: 10 TABLET, FILM COATED, EXTENDED RELEASE ORAL at 14:54

## 2023-11-01 RX ADMIN — BISOPROLOL FUMARATE AND HYDROCHLOROTHIAZIDE 1 TABLET: 10; 6.25 TABLET, FILM COATED ORAL at 22:57

## 2023-11-01 NOTE — ANESTHESIA PREPROCEDURE EVALUATION
Anesthesia Evaluation     Patient summary reviewed and Nursing notes reviewed   no history of anesthetic complications:   NPO Solid Status: > 8 hours  NPO Liquid Status: > 8 hours           Airway   Mallampati: II  TM distance: >3 FB  Neck ROM: full  No difficulty expected  Dental - normal exam     Pulmonary - normal exam   (+) a smoker Former, asthma,sleep apnea  Cardiovascular - normal exam    ECG reviewed    (+) hypertension, valvular problems/murmurs      Neuro/Psych  (+) numbness, psychiatric history Depression and Bipolar  GI/Hepatic/Renal/Endo    (+) obesity, GERD, renal disease- CRI, thyroid problem hypothyroidism    Musculoskeletal     (+) back pain, chronic pain, myalgias, radiculopathy  Abdominal  - normal exam   Substance History      OB/GYN          Other   arthritis, autoimmune disease lupus and scleroderma, blood dyscrasia (vWD - denies bleeding complications),   history of cancer    ROS/Med Hx Other: Breast cancer, burdick syndrome, allergies, UC, fibromyalgia, von Willebrand dz, Raynaud's chest pain, neuralgia, osteoporosis    Echo  Normal LV size and contractility EF of 60 to 65%  Normal RV size  Normal atrial size  Aortic valve, mitral valve, tricuspid valve appears structurally normal, moderate mitral regurgitation seen.  No pericardial effusion seen.  Proximal aorta appears normal in size.    Stress  ? Findings consistent with a normal ECG stress test.  ? Diaphragmatic attenuation and GI artifacts are present.  ? Left ventricular ejection fraction is normal (Calculated EF = 59%).  ? Impressions are consistent with a low risk study.  ? Severe large inferoapical reverse redistribution gut uptake artifact noted no ischemia EF 59%.     EKG without changes during Lexiscan.  No chest pain      Correlation with myocardial perfusion images recommended          PS  HYSTERECTOMY MASTECTOMY RADICAL  CHOLECYSTECTOMY COLONOSCOPY  LASIK BREAST RECONSTRUCTION  BREAST RECONSTRUCTION OOPHORECTOMY  COSMETIC  SURGERY  SECTION  EYE SURGERY TUBAL ABDOMINAL LIGATION  BREAST BIOPSY BREAST LUMPECTOMY  JOINT REPLACEMENT WRIST SURGERY  ENDOSCOPY FINGER SURGERY  ARM DEBRIDEMENT SPLENECTOMY  BACK SURGERY BACK SURGERY  EXPLORATORY LAPAROTOMY CARDIAC CATHETERIZATION  KNEE ARTHROSCOPY ENDOSCOPY  COLONOSCOPY COLONOSCOPY  ENDOSCOPY UPPER ENDOSCOPIC ULTRASOUND W/ FNA                    Anesthesia Plan    ASA 3     general with block and ERAS Protocol   total IV anesthesia  (Patient identified; pre-operative vital signs, all relevant labs/studies, complete medical/surgical/anesthetic history, full medication list, full allergy list, and NPO status obtained/reviewed; physical assessment performed; anesthetic options, side effects, potential complications, risks, and benefits discussed; questions answered; written anesthesia consent obtained; patient cleared for procedure; anesthesia machine and equipment checked and functioning)  intravenous induction     Anesthetic plan, risks, benefits, and alternatives have been provided, discussed and informed consent has been obtained with: patient.    Plan discussed with CRNA.        CODE STATUS:

## 2023-11-01 NOTE — ANESTHESIA PROCEDURE NOTES
Airway  Urgency: elective    Date/Time: 11/1/2023 4:12 PM  Airway not difficult    General Information and Staff    Patient location during procedure: OR  Anesthesiologist: Agustin Everett MD  CRNA/CAA: Megan Mac CRNA    Indications and Patient Condition  Indications for airway management: airway protection    Preoxygenated: yes (pt pre-O2 with 100% O2)  Mask difficulty assessment: 2 - vent by mask + OA or adjuvant +/- NMBA (easy BMV )    Final Airway Details  Final airway type: endotracheal airway      Successful airway: ETT  Cuffed: yes   Successful intubation technique: direct laryngoscopy  Endotracheal tube insertion site: oral  Blade: Dandy  Blade size: 4  ETT size (mm): 7.0  Cormack-Lehane Classification: grade I - full view of glottis  Placement verified by: chest auscultation and capnometry   Cuff volume (mL): 7  Measured from: lips  ETT/EBT  to lips (cm): 22  Number of attempts at approach: 1  Assessment: lips, teeth, and gum same as pre-op and atraumatic intubation    Additional Comments  ATOETx1. No change in dentition.

## 2023-11-01 NOTE — ANESTHESIA PROCEDURE NOTES
Peripheral Block      Patient reassessed immediately prior to procedure    Patient location during procedure: pre-op  Reason for block: at surgeon's request and post-op pain management  Performed by  Anesthesiologist: Roverto Dorado MD  Preanesthetic Checklist  Completed: patient identified, IV checked, site marked, risks and benefits discussed, surgical consent, monitors and equipment checked, pre-op evaluation and timeout performed  Prep:  Pt Position: sitting  Sterile barriers:cap, gloves, sterile barriers and mask  Prep: ChloraPrep  Patient monitoring: blood pressure monitoring, continuous pulse oximetry and EKG  Procedure    Sedation: yes  Performed under: MAC  Guidance:ultrasound guided    ULTRASOUND INTERPRETATION.  Using ultrasound guidance a 20 G gauge needle was placed in close proximity to the brachial plexus nerve, at which point, under ultrasound guidance anesthetic was injected in the area of the nerve and spread of the anesthesia was seen on ultrasound in close proximity thereto.  There were no abnormalities seen on ultrasound; a digital image was taken; and the patient tolerated the procedure with no complications. Images:still images obtained, printed/placed on chart    Laterality:right  Block Type:interscalene  Injection Technique:single-shot  Needle Type:echogenic  Needle Gauge:20 G  Resistance on Injection: none  Sedation medications used: midazolam (VERSED) injection - Intravenous   2 mg - 11/1/2023 3:06:00 PM  fentaNYL citrate (PF) (SUBLIMAZE) injection - Intravenous   100 mcg - 11/1/2023 3:06:00 PM  Medications Used: bupivacaine liposome (EXPAREL) injection 1.3% - Perineural   10 mL - 11/1/2023 3:12:00 PM  bupivacaine PF (MARCAINE) injection 0.5% - Perineural   10 mL - 11/1/2023 3:12:00 PM      Post Assessment  Injection Assessment: negative aspiration for heme, no paresthesia on injection and incremental injection  Patient Tolerance:comfortable throughout  block  Complications:no

## 2023-11-02 VITALS
WEIGHT: 220 LBS | OXYGEN SATURATION: 96 % | HEART RATE: 68 BPM | RESPIRATION RATE: 16 BRPM | DIASTOLIC BLOOD PRESSURE: 87 MMHG | SYSTOLIC BLOOD PRESSURE: 123 MMHG | TEMPERATURE: 98.1 F | HEIGHT: 63 IN | BODY MASS INDEX: 38.98 KG/M2

## 2023-11-02 LAB
ANION GAP SERPL CALCULATED.3IONS-SCNC: 9 MMOL/L (ref 5–15)
BUN SERPL-MCNC: 14 MG/DL (ref 8–23)
BUN/CREAT SERPL: 12.6 (ref 7–25)
CALCIUM SPEC-SCNC: 9.3 MG/DL (ref 8.6–10.5)
CHLORIDE SERPL-SCNC: 100 MMOL/L (ref 98–107)
CO2 SERPL-SCNC: 29 MMOL/L (ref 22–29)
CREAT SERPL-MCNC: 1.11 MG/DL (ref 0.57–1)
EGFRCR SERPLBLD CKD-EPI 2021: 55.3 ML/MIN/1.73
GLUCOSE SERPL-MCNC: 97 MG/DL (ref 65–99)
HCT VFR BLD AUTO: 36.7 % (ref 34–46.6)
HGB BLD-MCNC: 11.9 G/DL (ref 12–15.9)
POTASSIUM SERPL-SCNC: 5.3 MMOL/L (ref 3.5–5.2)
SODIUM SERPL-SCNC: 138 MMOL/L (ref 136–145)

## 2023-11-02 PROCEDURE — A9270 NON-COVERED ITEM OR SERVICE: HCPCS | Performed by: ORTHOPAEDIC SURGERY

## 2023-11-02 PROCEDURE — 97535 SELF CARE MNGMENT TRAINING: CPT

## 2023-11-02 PROCEDURE — 63710000001 LISINOPRIL 5 MG TABLET: Performed by: ORTHOPAEDIC SURGERY

## 2023-11-02 PROCEDURE — 97168 OT RE-EVAL EST PLAN CARE: CPT

## 2023-11-02 PROCEDURE — 97165 OT EVAL LOW COMPLEX 30 MIN: CPT

## 2023-11-02 PROCEDURE — 80048 BASIC METABOLIC PNL TOTAL CA: CPT | Performed by: ORTHOPAEDIC SURGERY

## 2023-11-02 PROCEDURE — 25010000002 CEFAZOLIN PER 500 MG: Performed by: ORTHOPAEDIC SURGERY

## 2023-11-02 PROCEDURE — 63710000001 OXYCODONE 10 MG TABLET EXTENDED-RELEASE 12 HOUR: Performed by: ORTHOPAEDIC SURGERY

## 2023-11-02 PROCEDURE — 85018 HEMOGLOBIN: CPT | Performed by: ORTHOPAEDIC SURGERY

## 2023-11-02 PROCEDURE — 63710000001 GABAPENTIN 600 MG TABLET: Performed by: ORTHOPAEDIC SURGERY

## 2023-11-02 PROCEDURE — 85014 HEMATOCRIT: CPT | Performed by: ORTHOPAEDIC SURGERY

## 2023-11-02 PROCEDURE — 63710000001 LEVOTHYROXINE 175 MCG TABLET: Performed by: ORTHOPAEDIC SURGERY

## 2023-11-02 PROCEDURE — 63710000001 HYDROXYCHLOROQUINE 200 MG TABLET: Performed by: ORTHOPAEDIC SURGERY

## 2023-11-02 PROCEDURE — 63710000001 NIFEDIPINE XL 30 MG TABLET SUSTAINED-RELEASE 24 HOUR: Performed by: ORTHOPAEDIC SURGERY

## 2023-11-02 PROCEDURE — 63710000001 OXYCODONE 5 MG TABLET: Performed by: ORTHOPAEDIC SURGERY

## 2023-11-02 RX ADMIN — CEFAZOLIN 2 G: 2 INJECTION, POWDER, FOR SOLUTION INTRAMUSCULAR; INTRAVENOUS at 07:16

## 2023-11-02 RX ADMIN — CEFAZOLIN 2 G: 2 INJECTION, POWDER, FOR SOLUTION INTRAMUSCULAR; INTRAVENOUS at 00:08

## 2023-11-02 RX ADMIN — HYDROXYCHLOROQUINE SULFATE 200 MG: 200 TABLET ORAL at 08:00

## 2023-11-02 RX ADMIN — NIFEDIPINE 90 MG: 30 TABLET, EXTENDED RELEASE ORAL at 08:00

## 2023-11-02 RX ADMIN — OXYCODONE HYDROCHLORIDE 10 MG: 10 TABLET, FILM COATED, EXTENDED RELEASE ORAL at 00:08

## 2023-11-02 RX ADMIN — LISINOPRIL 5 MG: 5 TABLET ORAL at 08:00

## 2023-11-02 RX ADMIN — LEVOTHYROXINE SODIUM 175 MCG: 0.17 TABLET ORAL at 05:03

## 2023-11-02 RX ADMIN — OXYCODONE 10 MG: 5 TABLET ORAL at 05:03

## 2023-11-02 RX ADMIN — OXYCODONE HYDROCHLORIDE 10 MG: 10 TABLET, FILM COATED, EXTENDED RELEASE ORAL at 08:00

## 2023-11-02 RX ADMIN — GABAPENTIN 300 MG: 600 TABLET, FILM COATED ORAL at 05:03

## 2023-11-02 NOTE — CONSULTS
[facility]   Consult Note    Patient Name: Tracie Gross  : 1960  MRN: [id number]  Primary Care Physician:  Floyd Silva MD  Referring Physician: Floyd Ruiz, *  Date of admission: 2023  Date and Time of Care: 2023 at 0015    Inpatient Hospitalist Consult  Consult performed by: Lawanda Guzman APRN  Consult ordered by: Floyd Ruiz MD          Subjective      Reason for Consult/ Chief Complaint: Medical management    Consult Requested By: Joseph    History of Present Illness: Tracie Gross is a 63 y.o. female with PMH of hypertension, lupus, Monahan syndrome, bipolar affective disorder, asthma, chronic right shoulder pain who underwent right shoulder arthroplasty 2023 by Dr. Ruiz. Peripheral nerve block placed by anesthesia. The hospitalist group was consulted for medical management late evening 2023. Patient is doing well postoperatively. She was able to ambulate to the bathroom without difficulty per bedside RN. She continues to have some numbness to the right upper extremity.     Review of Systems   Constitutional: Negative.   HENT: Negative.     Eyes: Negative.    Cardiovascular: Negative.    Respiratory: Negative.     Endocrine: Negative.    Hematologic/Lymphatic: Negative.    Skin: Negative.    Musculoskeletal:         Right shoulder pain   Gastrointestinal: Negative.    Genitourinary: Negative.    Neurological: Negative.    Psychiatric/Behavioral: Negative.     Allergic/Immunologic: Negative.    All other systems reviewed and are negative.       Personal History     Past Medical History:   Diagnosis Date    Allergic 1998    Arthritis     Asthma     Bipolar affective disorder     Breast cancer 1985    s/p R mastectomy    Depression 2000    GERD (gastroesophageal reflux disease)     H/O breast reconstruction     SEVERAL    H/O laminectomy     Hamartoma     History of medical problems     Hx of bilateral  oophorectomy     Hypertension     Hypothyroidism     Inflammatory bowel disease     Man. EGD/colonoscopy annually (2021)    Irritable bowel syndrome     Kienbock's disease, right     WRIST    Low back pain     Lupus     [place]    Monahan syndrome     Man. EGD/colonoscopy annually (2021). MRI alternating w/ EUS annually for pancreatic screen    MRSA infection     Obesity     Osteopenia     Osteoporosis     maintained on Prolia through Karen    Pneumonia     Raynaud disease     Renal insufficiency     Scleroderma     Sleep apnea     cpap    Squamous cell cancer of lip     Von Willebrand disease        Past Surgical History:   Procedure Laterality Date    ARM DEBRIDEMENT Right     X 3    BACK SURGERY      Vetas- cervical fusion    BACK SURGERY      lumbar decomp    BREAST BIOPSY      BREAST LUMPECTOMY      BREAST RECONSTRUCTION Right     BREAST RECONSTRUCTION Right     CARDIAC CATHETERIZATION      no stents placed     SECTION  ,     CHOLECYSTECTOMY      COLONOSCOPY      COLONOSCOPY N/A 2020    Procedure: COLONOSCOPY;  Surgeon: Cayden Conway MD;  Location: [facility] ENDOSCOPY;  Service: Gastroenterology;  Laterality: N/A;  rectal ulcer    COLONOSCOPY N/A 2021    Procedure: COLONOSCOPY WITH POLYPECTOMY X 1;  Surgeon: Cayden Conway MD;  Location: [facility] ENDOSCOPY;  Service: Gastroenterology;  Laterality: N/A;  Post: COLON POLYP    COLONOSCOPY N/A 2023    Procedure: COLONOSCOPY with polypectomy x 1, endoscopic clipping x 1;  Surgeon: Cayden Conway MD;  Location: [facility] ENDOSCOPY;  Service: Gastroenterology;  Laterality: N/A;    COSMETIC SURGERY  [id number]    ENDOSCOPY      ENDOSCOPY N/A 2020    Procedure: ESOPHAGOGASTRODUODENOSCOPY;  Surgeon: Cayden Conway MD;  Location: [facility] ENDOSCOPY;  Service: Gastroenterology;  Laterality: N/A;  Normal EGD    ENDOSCOPY N/A 2021    Procedure: ESOPHAGOGASTRODUODENOSCOPY;  Surgeon:  Cayden Conway MD;  Location: [facility] ENDOSCOPY;  Service: Gastroenterology;  Laterality: N/A;  Post: normal egd    EXPLORATORY LAPAROTOMY      scar tissue removal    EYE SURGERY  2000    FINGER SURGERY Right     ring finger mass excision    HYSTERECTOMY      PARTIAL    JOINT REPLACEMENT Right 2012,2015    hip and knee    KNEE ARTHROSCOPY Left 01/07/2020    Procedure: LEFT KNEE SCOPE with partial lateral meniscectomy;  Surgeon: Chau Perez MD;  Location: [facility] MAIN OR;  Service: Orthopedics    LASIK      LASIK/ LASIK ENHANCEMENT    MASTECTOMY RADICAL Right     OOPHORECTOMY Bilateral     SPINE SURGERY      SPLENECTOMY      TUBAL ABDOMINAL LIGATION  1981    UPPER ENDOSCOPIC ULTRASOUND W/ FNA N/A 12/09/2021    Procedure: ENDOSCOPIC ULTRASOUND WITH ESOPHAGOGASTRODUODENOSCOPY;  Surgeon: Luis E Negron MD;  Location: [facility] ENDOSCOPY;  Service: Gastroenterology;  Laterality: N/A;  Post: GASTROPARESIS, DILATED COMMON BILE DUCT, FUNDIC POLYP, DUODENITIS, NORMAL PANCREAS, HIATAL HERNIA    WRIST SURGERY Right     distal radius head removal (bone dead)  plates and screws decomp lunate       Family History: family history includes Arthritis in her mother, sister, and sister; Asthma in her sister; Breast cancer in her son and son; Cancer in her maternal aunt, maternal aunt, mother, paternal aunt, and sister; Colon cancer in her mother, sister, son, and another family member; Depression in her father; Diabetes in her paternal grandmother, sister, and sister; Heart disease in her father, mother, sister, and sister; Hyperlipidemia in her father, sister, and sister; Hypertension in her father and sister; Kidney disease in her father, sister, and sister; Other in her sister, son, and son; Stroke in her paternal grandmother and sister; Thyroid disease in her sister and sister; Vision loss in her father and sister; Von Willebrand disease in her brother, sister, son, and son. Otherwise pertinent FHx was reviewed and not  pertinent to current issue.    Social History:  reports that she quit smoking about 29 years ago. Her smoking use included cigarettes. She started smoking about 39 years ago. She has a 1.75 pack-year smoking history. She has been exposed to tobacco smoke. She has never used smokeless tobacco. She reports that she does not currently use alcohol. She reports that she does not use drugs.    Home Medications:   ALPRAZolam, Calcium Carb-Cholecalciferol, Denosumab, Flaxseed Oil Max Str, NIFEdipine XL, QUEtiapine, Vitamin D (Cholecalciferol), bisoprolol-hydrochlorothiazide, cetirizine, colestipol, cyclobenzaprine, dexlansoprazole, famotidine, fluticasone, furosemide, gabapentin, hydrOXYzine, hydroxychloroquine, lamoTRIgine, levothyroxine, lisinopril, meclizine, ondansetron, oxyCODONE, and triamcinolone    Allergies:  Allergies   Allergen Reactions    Aspirin Other (See Comments)     VONWILLENBRAND DISEASE     Penicillins Anaphylaxis    Baclofen Hives    Tape  [Adhesive Tape] Rash         Objective      Vitals:  Temp:  [96.9 °F (36.1 °C)-97.7 °F (36.5 °C)] 97.1 °F (36.2 °C)  Heart Rate:  [62-94] 62  Resp:  [9-18] 12  BP: (101-128)/(53-75) 117/75  Flow (L/min):  [4-6] 5    Physical Exam  Vitals and nursing note reviewed.   HENT:      Head: Normocephalic and atraumatic.   Eyes:      Extraocular Movements: Extraocular movements intact.      Pupils: Pupils are equal, round, and reactive to light.   Cardiovascular:      Rate and Rhythm: Normal rate and regular rhythm.      Pulses: Normal pulses.      Heart sounds: Normal heart sounds.   Pulmonary:      Effort: Pulmonary effort is normal.      Breath sounds: Normal breath sounds.   Abdominal:      General: Bowel sounds are normal.      Palpations: Abdomen is soft.      Tenderness: There is no abdominal tenderness.   Musculoskeletal:      Comments: RUE not assessed due to surgical site  All other extremities with normal ROM   Skin:     General: Skin is warm and dry.    Neurological:      Mental Status: She is alert and oriented to person, place, and time.   Psychiatric:         Mood and Affect: Mood normal.         Behavior: Behavior normal.          Result Review    Result Review:  I have personally reviewed the results from the time of this admission to 11/2/2023 00:31 EDT and agree with these findings:  [x]  Laboratory  []  Microbiology  [x]  Radiology  []  EKG/Telemetry   []  Cardiology/Vascular   []  Pathology  [x]  Old records      Assessment & Plan        Active Hospital Problems:  Active Hospital Problems    Diagnosis     **DJD of right shoulder     Osteoarthritis of right glenohumeral joint     Monahan syndrome     Chronic pain     Bipolar affective disorder     Hypertension     Raynaud's disease      Assessment/Plan:    DJD right shoulder  OA of right glenohumeral joint   Status post right shoulder arthroplasty 11/1/2023 by Dr. Ruiz  - Peripheral nerve block placed by anesthesia  - on IV cefazolin every 8 hours x3 doses for surgical prophylaxis  - pain control, IVFs per ortho  - activity per ortho    Hypertension  - controlled  - cont home procardia, lisinopril, ZIAC    Bipolar disorder  - on seroquel, lamictal, prn xanax     Lupus  - on plaquenil     Hypothyroidism  - on synthroid     Asthma   - not in exacerbation    H/o breast CA     Chronic pain     Signature: Electronically signed by JADEN Zapata, 11/02/23, 00:31 EDT.  [facility]ist Team

## 2023-11-02 NOTE — DISCHARGE SUMMARY
"  Date of Discharge:  11/2/2023    Discharge Diagnosis: Right shoulder degenerative disease    Presenting Problem/History of Present Illness  Active Hospital Problems    Diagnosis  POA    **DJD of right shoulder [M19.011]  Unknown    Osteoarthritis of right glenohumeral joint [M19.011]  Unknown    Monahan syndrome [Z15.09]  Yes    Chronic pain [G89.29]  Yes    Bipolar affective disorder [F31.9]  Yes    Hypertension [I10]  Yes    Raynaud's disease [I73.00]  Yes      Resolved Hospital Problems   No resolved problems to display.        Hospital Course  Patient is a 63 y.o. female presented with right shoulder degenerative joint disease.  They underwent shoulder arthroplasty during admission and postop day 1 were tolerating diet and oral medication and pain was controlled    Procedures Performed: Right reverse total shoulder      Consults:   Consults       Date and Time Order Name Status Description    11/1/2023  7:24 PM Inpatient Hospitalist Consult Completed               Condition on Discharge:  Improved    Vital Signs  Vitals:    11/01/23 1941 11/02/23 0037 11/02/23 0457 11/02/23 0748   BP:  121/84 125/76 123/87   BP Location:  Left arm Left arm Left arm   Patient Position:  Lying Lying Sitting   Pulse:  57 59 68   Resp:  12 12 16   Temp:  97.4 °F (36.3 °C) 98.1 °F (36.7 °C) 98.1 °F (36.7 °C)   TempSrc:  Oral Oral Oral   SpO2:  94% 96% 96%   Weight: 99.8 kg (220 lb)      Height: 160 cm (62.99\")          Physical Exam:  Dry dressing, Sensory and motor exam are intact all distributions. Radial pulse is palpable and capillary refill is less than two seconds to all digits       Discharge Disposition  Home    Discharge Medications     Discharge Medications        Changes to Medications        Instructions Start Date   cyclobenzaprine 10 MG tablet  Commonly known as: FLEXERIL  What changed: See the new instructions.   TAKE ONE TABLET BY MOUTH THREE TIMES A DAY AS NEEDED FOR MUSCLE SPASMS AS NEEDED             Continue These " Medications        Instructions Start Date   ALPRAZolam 1 MG tablet  Commonly known as: XANAX   1 mg, Oral, Nightly PRN      bisoprolol-hydrochlorothiazide 10-6.25 MG per tablet  Commonly known as: ZIAC   TAKE ONE TABLET BY MOUTH DAILY      Calcium + Vitamin D3 600-5 MG-MCG tablet  Generic drug: Calcium Carb-Cholecalciferol   TAKE ONE TABLET BY MOUTH TWICE A DAY      cetirizine 10 MG tablet  Commonly known as: zyrTEC   TAKE ONE TABLET BY MOUTH DAILY      colestipol 1 g tablet  Commonly known as: COLESTID   1 g, Oral, 2 Times Daily      dexlansoprazole 60 MG capsule  Commonly known as: DEXILANT   60 mg, Oral, Daily      famotidine 10 MG tablet  Commonly known as: PEPCID       Flaxseed Oil Max Str 1300 MG capsule   1 tablet, Oral, 2 Times Daily      fluticasone 50 MCG/ACT nasal spray  Commonly known as: FLONASE   SPRAY TWO SPRAYS IN EACH NOSTRIL ONCE DAILY      furosemide 20 MG tablet  Commonly known as: Lasix   20 mg, Oral, Daily PRN      gabapentin 600 MG tablet  Commonly known as: NEURONTIN   Take 2 tablets in the morning, 1 tablet in the afternoon, and 2 tablets at night      hydroxychloroquine 200 MG tablet  Commonly known as: PLAQUENIL   200 mg, Oral, 2 Times Daily      hydrOXYzine 25 MG tablet  Commonly known as: ATARAX   25 mg, Oral, Every 8 Hours PRN      lamoTRIgine 200 MG tablet  Commonly known as: LaMICtal   200 mg, Oral, Nightly      levothyroxine 175 MCG tablet  Commonly known as: SYNTHROID, LEVOTHROID   TAKE ONE TABLET BY MOUTH DAILY      lisinopril 5 MG tablet  Commonly known as: PRINIVIL,ZESTRIL   TAKE ONE TABLET BY MOUTH DAILY      meclizine 25 MG tablet  Commonly known as: ANTIVERT   25 mg, Oral, 3 Times Daily PRN      NIFEdipine XL 90 MG 24 hr tablet  Commonly known as: PROCARDIA XL   90 mg, Oral, Daily      ondansetron 4 MG tablet  Commonly known as: ZOFRAN   4 mg, Oral, Every 8 Hours PRN      oxyCODONE 10 MG tablet  Commonly known as: ROXICODONE   10 mg, Oral, Every 6 Hours PRN      PROLIA SC    Subcutaneous, Every 6 Months      QUEtiapine 100 MG tablet  Commonly known as: SEROquel   100 mg, Oral, Nightly      triamcinolone 0.1 % lotion  Commonly known as: KENALOG   Topical, 3 Times Daily PRN      Vitamin D (Cholecalciferol) 10 MCG (400 UNIT) tablet  Commonly known as: CHOLECALCIFEROL   5,000 Units, Oral, Daily                 Discharge instructions:  Continue sling.  Keep dressing on.  Okay to shower and perform home exercises.  Continue polar care.    Follow-up Appointments  Future Appointments   Date Time Provider Department Center   11/16/2023  9:45 AM Floyd Ruiz MD [facility]O [facility]   11/18/2023 10:00 AM [facility]   11/29/2023  3:30 PM Floyd Silva MD [facility] PC [facility] [place]   12/28/2023  2:45 PM Suzan Jones MD [facility] ONC [facility] [place]   12/28/2023  2:45 PM LAB MD  [facility] NA [facility] [place]              Floyd Ruiz MD  11/02/23  08:20 EDT      Disclaimer: Part of this note may be an electronic transcription/translation of spoken language to printed text using the Dragon Dictation System

## 2023-11-02 NOTE — THERAPY EVALUATION
Patient Name: Tracie Gross  : 1960    MRN: [id number]                              Today's Date: 2023       Admit Date: 2023    Visit Dx:     ICD-10-CM ICD-9-CM   1. Osteoarthritis of right glenohumeral joint  M19.011 715.91     Patient Active Problem List   Diagnosis    Vitamin B12 deficiency    Arthritis    Sleep apnea    Bipolar affective disorder    Depression    Breast cancer    Chronic pain    Dyslipidemia    Family history of malignant neoplasm of colon    Family history of melanoma    Gastroesophageal reflux disease    Heart murmur    Hypertension    Hypothyroidism    Osteoarthritis, generalized    Raynaud's disease    Ulcerative colitis    Artificial knee joint present    Neuralgia    Presence of artificial hip joint, right    Lumbar radiculopathy    Derangement of posterior horn of lateral meniscus    Von Willebrand disease    SLE (systemic lupus erythematosus related syndrome)    Chest pain    Scleroderma    Monahan syndrome    Rectal prolapse    Osteoporosis    COVID-19    Fibromyalgia    Onychomycosis    Stage 3 chronic kidney disease    Immunocompromised    Avascular necrosis of femoral head, left    Morbid obesity    Trochanteric bursitis of left hip    Pain in both knees    Status post total hip replacement, right    DJD of left shoulder    Complete tear of left rotator cuff    DJD of right shoulder    Osteoarthritis of right glenohumeral joint     Past Medical History:   Diagnosis Date    Allergic 1998    Arthritis     Asthma     Bipolar affective disorder     Breast cancer 1985    s/p R mastectomy    Depression 2000    GERD (gastroesophageal reflux disease)     H/O breast reconstruction     SEVERAL    H/O laminectomy     Hamartoma     History of medical problems     Hx of bilateral oophorectomy     Hypertension     Hypothyroidism     Inflammatory bowel disease     Man. EGD/colonoscopy annually (2021)    Irritable bowel syndrome     Kienbock's disease, right      WRIST    Low back pain     Lupus     [place]    Monahan syndrome     Man. EGD/colonoscopy annually (2021). MRI alternating w/ EUS annually for pancreatic screen    MRSA infection     Obesity     Osteopenia     Osteoporosis     maintained on Prolia through Karen    Pneumonia     Raynaud disease     Renal insufficiency     Scleroderma     Sleep apnea     cpap    Squamous cell cancer of lip     Von Willebrand disease      Past Surgical History:   Procedure Laterality Date    ARM DEBRIDEMENT Right     X 3    BACK SURGERY      Vetas- cervical fusion    BACK SURGERY      lumbar decomp    BREAST BIOPSY      BREAST LUMPECTOMY      BREAST RECONSTRUCTION Right     BREAST RECONSTRUCTION Right     CARDIAC CATHETERIZATION      no stents placed     SECTION  ,     CHOLECYSTECTOMY      COLONOSCOPY      COLONOSCOPY N/A 2020    Procedure: COLONOSCOPY;  Surgeon: Cayden Conway MD;  Location: [facility] ENDOSCOPY;  Service: Gastroenterology;  Laterality: N/A;  rectal ulcer    COLONOSCOPY N/A 2021    Procedure: COLONOSCOPY WITH POLYPECTOMY X 1;  Surgeon: Cayden Conway MD;  Location: [facility] ENDOSCOPY;  Service: Gastroenterology;  Laterality: N/A;  Post: COLON POLYP    COLONOSCOPY N/A 2023    Procedure: COLONOSCOPY with polypectomy x 1, endoscopic clipping x 1;  Surgeon: Cayden Conway MD;  Location: [facility] ENDOSCOPY;  Service: Gastroenterology;  Laterality: N/A;    COSMETIC SURGERY  [id number]    ENDOSCOPY      ENDOSCOPY N/A 2020    Procedure: ESOPHAGOGASTRODUODENOSCOPY;  Surgeon: Cayden Conway MD;  Location: [facility] ENDOSCOPY;  Service: Gastroenterology;  Laterality: N/A;  Normal EGD    ENDOSCOPY N/A 2021    Procedure: ESOPHAGOGASTRODUODENOSCOPY;  Surgeon: Cayden Conway MD;  Location: [facility] ENDOSCOPY;  Service: Gastroenterology;  Laterality: N/A;  Post: normal egd    EXPLORATORY LAPAROTOMY      scar tissue removal    EYE SURGERY       FINGER SURGERY Right     ring finger mass excision    HYSTERECTOMY      PARTIAL    JOINT REPLACEMENT Right 2012,2015    hip and knee    KNEE ARTHROSCOPY Left 01/07/2020    Procedure: LEFT KNEE SCOPE with partial lateral meniscectomy;  Surgeon: Chau Perez MD;  Location: [facility] MAIN OR;  Service: Orthopedics    LASIK      LASIK/ LASIK ENHANCEMENT    MASTECTOMY RADICAL Right     OOPHORECTOMY Bilateral     SPINE SURGERY      SPLENECTOMY      TUBAL ABDOMINAL LIGATION  1981    UPPER ENDOSCOPIC ULTRASOUND W/ FNA N/A 12/09/2021    Procedure: ENDOSCOPIC ULTRASOUND WITH ESOPHAGOGASTRODUODENOSCOPY;  Surgeon: Luis E Negron MD;  Location: [facility] ENDOSCOPY;  Service: Gastroenterology;  Laterality: N/A;  Post: GASTROPARESIS, DILATED COMMON BILE DUCT, FUNDIC POLYP, DUODENITIS, NORMAL PANCREAS, HIATAL HERNIA    WRIST SURGERY Right     distal radius head removal (bone dead)  plates and screws decomp lunate      General Information       Row Name 11/02/23 0948          OT Time and Intention    Document Type evaluation (P)   -PS     Mode of Treatment occupational therapy (P)   -PS       Row Name 11/02/23 0948          General Information    Patient Profile Reviewed yes (P)   -PS     Prior Level of Function independent:;all household mobility (P)   Pt Mod I for ADLs  -PS     Existing Precautions/Restrictions brace on at all times;shoulder (P)   -PS       Row Name 11/02/23 0948          Occupational Profile    Reason for Services/Referral (Occupational Profile) Pt is a 63 F presenting for elective rTSH in [facility] on 11/1/23. At baseline pt lives with spouse in in [facility] and uses a cane for mobility. Mod I for ADLs with exception for bathing. (P)   -PS       Row Name 11/02/23 0948          Living Environment    People in Home spouse (P)   -PS       Row Name 11/02/23 0948          Cognition    Orientation Status (Cognition) oriented x 4 (P)   -PS       Row Name 11/02/23 0948          Safety Issues, Functional Mobility    Impairments  Affecting Function (Mobility) balance;strength;pain;range of motion (ROM) (P)   -PS               User Key  (r) = Recorded By, (t) = Taken By, (c) = Cosigned By      Initials Name Provider Type    PS Domitila Tang OT Student OT Student                     Mobility/ADL's       Row Name 11/02/23 0956          Transfers    Transfers toilet transfer;sit-stand transfer (P)   -PS       Row Name 11/02/23 0956          Sit-Stand Transfer    Sit-Stand [place] (Transfers) minimum assist (75% patient effort) (P)   -PS       Row Name 11/02/23 09          Toilet Transfer    Type (Toilet Transfer) sit-stand (P)   -PS     [place] Level (Toilet Transfer) minimum assist (75% patient effort) (P)   -PS     Assistive Device (Toilet Transfer) commode (P)   -PS       Row Name 11/02/23 0956          Functional Mobility    Functional Mobility- Ind. Level contact guard assist (P)   -PS       Row Name 11/02/23 0956          Activities of Daily Living    BADL Assessment/Intervention upper body dressing;lower body dressing;toileting;grooming (P)   -PS       Row Name 11/02/23 0956          Upper Body Dressing Assessment/Training    [place] Level (Upper Body Dressing) upper body dressing skills;minimum assist (75% patient effort) (P)   -PS     Position (Upper Body Dressing) supported sitting (P)   -PS       Row Name 11/02/23 0956          Lower Body Dressing Assessment/Training    [place] Level (Lower Body Dressing) lower body dressing skills;minimum assist (75% patient effort);moderate assist (50% patient effort) (P)   -PS     Position (Lower Body Dressing) supported sitting (P)   -PS       Row Name 11/02/23 0956          Toileting Assessment/Training    [place] Level (Toileting) independent (P)   -PS     Assistive Devices (Toileting) commode (P)   -PS     Position (Toileting) unsupported sitting (P)   -PS       Row Name 11/02/23 0956          Grooming Assessment/Training    [place] Level (Grooming) hair care,  combing/brushing;independent (P)   -PS     Position (Grooming) unsupported standing (P)   -PS               User Key  (r) = Recorded By, (t) = Taken By, (c) = Cosigned By      Initials Name Provider Type    Domitila Jimenez OT Student OT Student                   Obj/Interventions       Row Name 11/02/23 1000          Vision Assessment/Intervention    Visual Impairment/Limitations corrective lenses full-time (P)   -PS       Row Name 11/02/23 1000          Range of Motion Comprehensive    General Range of Motion other (see comments) (P)   -PS     Comment, General Range of Motion AROM RUE 90degress,PROM LUE 100degrees (P)   -PS       Row Name 11/02/23 1000          Strength Comprehensive (MMT)    General Manual Muscle Testing (MMT) Assessment other (see comments) (P)   -PS     Comment, General Manual Muscle Testing (MMT) Assessment LUE 2+/5, RUE not assessed due to shoulder precautions (P)   -PS       Row Name 11/02/23 1000          Balance    Balance Assessment sitting static balance;sitting dynamic balance;sit to stand dynamic balance;standing static balance;standing dynamic balance (P)   -PS     Static Sitting Balance independent (P)   -PS     Dynamic Sitting Balance independent (P)   -PS     Position, Sitting Balance sitting in chair (P)   -PS     Sit to Stand Dynamic Balance moderate assist;minimal assist (P)   -PS     Static Standing Balance independent (P)   -PS     Dynamic Standing Balance contact guard (P)   -PS     Position/Device Used, Standing Balance unsupported (P)   -PS               User Key  (r) = Recorded By, (t) = Taken By, (c) = Cosigned By      Initials Name Provider Type    Domitila Jimenez OT Student OT Student                   Goals/Plan    No documentation.                  Clinical Impression       Row Name 11/02/23 1004          Pain Assessment    Pretreatment Pain Rating 3/10 (P)   -PS     Posttreatment Pain Rating 4/10 (P)   -PS       Row Name 11/02/23 1004          Plan of Care  Review    Plan of Care Reviewed With patient (P)   -PS     Progress improving (P)   -PS     Outcome Evaluation Pt is a 63 F presenting for elective rTSH in [facility] [place] on 11/1/23. At baseline pt [place] in [place] and uses a cane for community mobility and long distances but uses is intermittently in the home. Mod I for ADLs with exception for bathing. Sit<>stand Min A. Pt Independent with perihygeine. Mod/Min A for lower/upper body dressing. CGA for functional mobility with no use of AD. Independent with grooming. Pt educated on precautions and  don/doff abductions sling, cold therapy following surgery. Pt safe to return home with family assist and home health when medically stable for discharge. (P)   -PS       Row Name 11/02/23 1004          Therapy Assessment/Plan (OT)    Criteria for Skilled Therapeutic Interventions Met (OT) no;no problems identified which require skilled intervention (P)   -PS     Therapy Frequency (OT) evaluation only (P)   -PS       Row Name 11/02/23 1004          Therapy Plan Review/Discharge Plan (OT)    Anticipated Discharge Disposition (OT) home with assist;home with home health (P)   -PS       Row Name 11/02/23 1004          Vital Signs    O2 Delivery Pre Treatment room air (P)   -PS     O2 Delivery Intra Treatment room air (P)   -PS     O2 Delivery Post Treatment room air (P)   -PS     Pre Patient Position Sitting (P)   -PS     Intra Patient Position Standing (P)   -PS     Post Patient Position Sitting (P)   -PS       Row Name 11/02/23 1004          Positioning and Restraints    Pre-Treatment Position sitting in chair/recliner (P)   -PS     Post Treatment Position chair (P)   -PS     In Chair reclined;call light within reach;encouraged to call for assist;notified nsg;with brace (P)   -PS               User Key  (r) = Recorded By, (t) = Taken By, (c) = Cosigned By      Initials Name Provider Type    Domitila Jimenez, OT Student OT Student                   Outcome Measures        Row Name 11/02/23 1013          How much help from another is currently needed...    Putting on and taking off regular lower body clothing? 3 (P)   -PS     Bathing (including washing, rinsing, and drying) 3 (P)   -PS     Toileting (which includes using toilet bed pan or urinal) 4 (P)   -PS     Putting on and taking off regular upper body clothing 3 (P)   -PS       Row Name 11/02/23 0800          How much help from another person do you currently need...    Turning from your back to your side while in flat bed without using bedrails? 3  -SM     Moving from lying on back to sitting on the side of a flat bed without bedrails? 3  -SM     Moving to and from a bed to a chair (including a wheelchair)? 3  -SM     Standing up from a chair using your arms (e.g., wheelchair, bedside chair)? 3  -SM     Climbing 3-5 steps with a railing? 3  -SM     To walk in hospital room? 3  -SM     AM-PAC 6 Clicks Score (PT) 18  -SM     Highest level of mobility 6 --> Walked 10 steps or more  -SM       Row Name 11/02/23 1013          Functional Assessment    Outcome Measure Options AM-PAC 6 Clicks Daily Activity (OT) (P)   -PS               User Key  (r) = Recorded By, (t) = Taken By, (c) = Cosigned By      Initials Name Provider Type    Diamond Delaney, RN Registered Nurse    Domitila Jimenez, OT Student OT Student                    Occupational Therapy Education       Title: PT OT SLP Therapies (Done)       Topic: Occupational Therapy (Done)       Point: ADL training (Done)       Description:   Instruct learner(s) on proper safety adaptation and remediation techniques during self care or transfers.   Instruct in proper use of assistive devices.                  Learning Progress Summary             Patient Acceptance, E, VU by PS at 11/2/2023 1044    Acceptance, E,TB, VU by PS at 10/24/2023 1613   Significant Other Acceptance, E,TB, VU by PS at 10/24/2023 1613                         Point: Home exercise program (Done)        Description:   Instruct learner(s) on appropriate technique for monitoring, assisting and/or progressing therapeutic exercises/activities.                  Learning Progress Summary             Patient Acceptance, E, VU by PS at 11/2/2023 1044    Acceptance, E,TB, VU by PS at 10/24/2023 1613   Significant Other Acceptance, E,TB, VU by PS at 10/24/2023 1613                         Point: Precautions (Done)       Description:   Instruct learner(s) on prescribed precautions during self-care and functional transfers.                  Learning Progress Summary             Patient Acceptance, E, VU by PS at 11/2/2023 1044    Acceptance, E,TB, VU by PS at 10/24/2023 1613   Significant Other Acceptance, E,TB, VU by PS at 10/24/2023 1613                         Point: Body mechanics (Done)       Description:   Instruct learner(s) on proper positioning and spine alignment during self-care, functional mobility activities and/or exercises.                  Learning Progress Summary             Patient Acceptance, E, VU by PS at 11/2/2023 1044    Acceptance, E,TB, VU by PS at 10/24/2023 1613   Significant Other Acceptance, E,TB, VU by PS at 10/24/2023 1613                                         User Key       Initials Effective Dates Name Provider Type Discipline    PS 08/16/23 -  Domitila Tang OT Student OT Student OT                  OT Recommendation and Plan  Therapy Frequency (OT): (P) evaluation only  Plan of Care Review  Plan of Care Reviewed With: (P) patient  Progress: (P) improving  Outcome Evaluation: (P) Pt is a 63 F presenting for elective rTSH in [facility] on 11/1/23. At baseline pt lives with spouse in in [facility] and uses a cane for community mobility and long distances but uses is intermittently in the home. Mod I for ADLs with exception for bathing. Sit<>stand Min A. Pt Independent with perihygeine. Mod/Min A for lower/upper body dressing. CGA for functional mobility with no use of AD. Independent with grooming.  Pt educated on precautions and  don/doff abductions sling, cold therapy following surgery. Pt safe to return home with family assist and home health when medically stable for discharge.     Time Calculation:         Time Calculation- OT       Row Name 11/02/23 1045             Time Calculation- OT    OT Start Time 0824 (P)   -PS      OT Stop Time 0901 (P)   -PS      OT Time Calculation (min) 37 min (P)   -PS      Total Timed Code Minutes- OT 0 minute(s) (P)   -PS      OT Received On 11/02/23 (P)   -PS      OT Goal Re-Cert Due Date 11/16/23 (P)   -PS                User Key  (r) = Recorded By, (t) = Taken By, (c) = Cosigned By      Initials Name Provider Type    PS Domitila Tang, OT Student OT Student                  Therapy Charges for Today       Code Description Service Date Service Provider Modifiers Qty    03081092792 HC OT EVAL LOW COMPLEXITY 4 11/2/2023 Domitila Tang OT Student GO 1    [id number] HC OT SELF CARE/MGMT/TRAIN EA 15 MIN 11/2/2023 Domitila Tang OT Student GO 1                 Domitila Tang OT Student  11/2/2023

## 2023-11-02 NOTE — PLAN OF CARE
Goal Outcome Evaluation:      Ambulated to bathroom several times, tolerated well. Numbness still noted in right arm. Slowly weaning off O2 since she does not wear at home, down from 5L to 3L. No complaints of pain, been giving scheduled pain med. Vss, call light within reach, plan of care on going.

## 2023-11-02 NOTE — PLAN OF CARE
Goal Outcome Evaluation:  Plan of Care Reviewed With: (P) patient        Progress: (P) improving  Outcome Evaluation: (P) Pt is a 63 F presenting for elective rTSH in [facility] on 11/1/23. At baseline pt lives with spouse in in [place] and uses a cane for community mobility and long distances but uses is intermittently in the home. Mod I for ADLs with exception for bathing. Sit<>stand Min A. Pt Independent with perihygeine. Mod/Min A for lower/upper body dressing. CGA for functional mobility with no use of AD. Independent with grooming. Pt educated on precautions and  don/doff abductions sling, cold therapy following surgery. Pt safe to return home with family assist and home health when medically stable for discharge.      Anticipated Discharge Disposition (OT): (P) home with assist, home with home health

## 2023-11-02 NOTE — CASE MANAGEMENT/SOCIAL WORK
Case Management Discharge Note      Final Note: Routine home           Discharged Prior to case management assessment      Transportation Services  Private: Car    Final Discharge Disposition Code: 01 - home or self-care

## 2023-11-02 NOTE — ANESTHESIA POSTPROCEDURE EVALUATION
Patient: Tracie Gross    Procedure Summary       Date: 11/01/23 Room / Location: [facility] OR 11 / [facility] MAIN OR    Anesthesia Start: 1553 Anesthesia Stop: 1755    Procedure: TOTAL SHOULDER REVERSE ARTHROPLASTY (Right: Shoulder) Diagnosis:       Osteoarthritis of right glenohumeral joint      (Osteoarthritis of right glenohumeral joint [M19.011])    Surgeons: Floyd Ruiz MD Provider: Agustin Everett MD    Anesthesia Type: general with block, ERAS Protocol ASA Status: 3            Anesthesia Type: general with block, ERAS Protocol    Vitals  Vitals Value Taken Time   /55 11/01/23 1902   Temp 96.9 °F (36.1 °C) 11/01/23 1855   Pulse 60 11/01/23 1902   Resp 9 11/01/23 1855   SpO2 98 % 11/01/23 1902   Vitals shown include unfiled device data.        Post Anesthesia Care and Evaluation    Patient location during evaluation: PACU  Patient participation: complete - patient participated  Level of consciousness: awake  Pain scale: See nurse's notes for pain score.  Pain management: adequate    Airway patency: patent  Anesthetic complications: No anesthetic complications  PONV Status: none  Cardiovascular status: acceptable  Respiratory status: acceptable and spontaneous ventilation  Hydration status: acceptable    Comments: Patient seen and examined postoperatively; vital signs stable; SpO2 greater than or equal to 90%; cardiopulmonary status stable; nausea/vomiting adequately controlled; pain adequately controlled; no apparent anesthesia complications; patient discharged from anesthesia care when discharge criteria were met

## 2023-11-02 NOTE — PLAN OF CARE
Problem: Pain Acute  Goal: Acceptable Pain Control and Functional Ability  Outcome: Ongoing, Progressing     Problem: Adjustment to Surgery (Shoulder Arthroplasty)  Goal: Optimal Coping  Outcome: Ongoing, Progressing     Problem: Bowel Motility Impaired (Shoulder Arthroplasty)  Goal: Effective Bowel Elimination  Outcome: Ongoing, Progressing   Goal Outcome Evaluation:  Plan of Care Reviewed With: patient        Progress: improving

## 2023-11-02 NOTE — OP NOTE
TOTAL SHOULDER REVERSE ARTHROPLASTY  Procedure Report    Patient Name:  Tracie Gross  YOB: 1960    Date of Surgery:  11/1/2023     Indications: This is a 63 y.o. female with right shoulder pain.  Imaging demonstrated degenerative joint disease.  Treatment options were discussed.  They desired to proceed with reverse shoulder arthroplasty after discussing the risks including bleeding, scarring, infection, stiffness, nerve damage, tendon damage, artery damage, continued  pain, DVT, loss of life or limb, fracture, dislocation, and a need for further surgery.      Pre-op Diagnosis:   Osteoarthritis of right glenohumeral joint [M19.011]       Post-op Diagnosis:    Post-Op Diagnosis Codes:     * Osteoarthritis of right glenohumeral joint [M19.011]    Procedure/CPT® Codes: 97632    Procedure(s): Right  TOTAL SHOULDER REVERSE ARTHROPLASTY    Staff: Phi Wilkins physician assistant    was responsible for performing the following activities: Retraction, Suction, Irrigation, Suturing, Closing, and Placing Dressing and their skilled assistance was necessary for the success of this case.       Anesthesia: General with Block    Estimated Blood Loss: 100ml    Implants:    Implant Name Type Inv. Item Serial No.  Lot No. LRB No. Used Action   SUT NONABS MAXBRAID/PE NMBR2 HC5 38IN BRAYAN [id number] - [id number] Implant SUT NONABS MAXBRAID/PE NMBR2 HC5 38IN BRAYAN [id number]  [employer] [id number] Right 2 Implanted   SUT NONABS MAXBRAID/PE NMBR2 HC5 38IN BRAYAN [id number] - [id number] Implant SUT NONABS MAXBRAID/PE NMBR2 HC5 38IN BRAYAN [id number]  [employer] [id number] Right 1 Implanted   GLENSPHR TRABECULARMETAL REV 36MM - [id number] Implant GLENSPHR TRABECULARMETAL REV 36MM  [employer] [id number] Right 1 Implanted   BASEPLT KIAH TRABECULARMETAL REV 15MM - [id number] Implant BASEPLT KIAH TRABECULARMETAL REV 15MM  [email or web address] [id number] Right 1 Implanted   STEM HUM/SHLDR TRABECULARMETAL REV 46Q935CG - [id number] Implant  STEM HUM/SHLDR TRABECULARMETAL REV [id number]  [employer] [id number] Right 1 Implanted   SCRW CANC INV/REV 4.5X36MM - [id number] Implant SCRW CANC INV/REV 4.5X36MM  [employer] [id number] Right 1 Implanted   SCRW CANC INV/REV 4.5X42MM - [id number] Implant SCRW CANC INV/REV 4.5X42MM  [employer] [id number] Right 1 Implanted   LINER HUM REV TRABECULAR REV PA 7D 36 PL - [id number] Implant LINER HUM REV TRABECULAR REV PA 7D 36 PL  [employer] [id number] Right 1 Implanted       IVF: see anesthesia      Complications: None    Specimens:none    Description of Procedure: The patient's operative site was marked in the  preoperative holding area.  They received regional anesthesia and were brought to  the operating room and placed supine on a well-padded operating room table.  General anesthesia was administered.  Antibiotics were dosed.  A timeout was  taken, confirming the correct operative site and procedure.  They were placed in  the beach chair position.  The right shoulder was prepped and draped in the  standard surgical fashion.  SCDs were placed. A deltopectoral incision was marked and injected with local anesthetic.  The skin was opened.  Flaps were raised.  The interval was opened and the cephalic vein was protected.  Adhesions were released from the deltoid.  The circumflex vessels were identified and ligated with suture and cautery.  The rotator interval was opened and a retractor was placed.  The subscapularis was  Tenotomized and the capsule was released down to the 6 o'clock position on the  humeral head protecting the axillary nerve.  A Fukuda retractor was placed and an inferior and anterior capsular release was performed palpating and protecting the axillary  nerve with my finger at all times.  The shoulder was dislocated.  The biceps  was tenodesed to the upper biceps groove and circumferential osteophytes  were removed to identify the native head-neck junction.  Reaming was started  behind the biceps groove  up to a size 12.  The cut was made in 20  degrees of retroversion and the final two reamers were used to prepare the  proximal humerus.  A trial was placed.  The glenoid was exposed after placing retractors.  The soft tissue was removed circumferentially.  The center point was marked and the glenoid was prepared in standard fashion.  The base plate was placed with good press-fit.  Two screws were placed with good purchase.  The locking caps were  placed.  The glenosphere was placed with good purchase.  The shoulder was  dislocated and the trial was removed from the canal and irrigated.  The true  stem was placed with good press-fit and trialing demonstrated +6 thickness to offer the best restoration of joint stability, muscle tension and range of motion.  The true polyethylene was placed with similar range of motion and stability.  A 3-minute Betadine wash performed.  The wound was closed in layers with absorbable suture and skin glue.  A sterile dressing and sling were applied.  They were awakened and taken to the recovery room.  There were no complications.  I was present for all portions.  All counts were correct.   Good capillary refill was noted to the hand.      Floyd Ruiz MD     Date: 11/2/2023  Time: 08:15 EDT

## 2023-11-03 ENCOUNTER — TREATMENT (OUTPATIENT)
Dept: PHYSICAL THERAPY | Facility: CLINIC | Age: 63
End: 2023-11-03
Payer: MEDICARE

## 2023-11-03 DIAGNOSIS — Z96.611 S/P REVERSE TOTAL SHOULDER ARTHROPLASTY, RIGHT: ICD-10-CM

## 2023-11-03 DIAGNOSIS — M19.011 OSTEOARTHRITIS OF RIGHT GLENOHUMERAL JOINT: Primary | ICD-10-CM

## 2023-11-03 DIAGNOSIS — M25.619 LIMITED RANGE OF MOTION (ROM) OF SHOULDER: ICD-10-CM

## 2023-11-03 NOTE — PROGRESS NOTES
Physical Therapy Initial Evaluation and Plan of Care    Patient: Tracie Gross   : 1960  Diagnosis/ICD-10 Code:  Osteoarthritis of right glenohumeral joint [M19.011]  Referring practitioner: Floyd Ruiz, *  Date of Initial Visit: 11/3/2023  Today's Date: 11/3/2023  Patient seen for 1 sessions  PT Clinic location:  [address] [address] [address]  [place] [address]         Subjective Questionnaire: QuickDASH: 95%    Subjective Evaluation    History of Present Illness  Mechanism of injury: History of current condition: Presents s/p right shoulder reverse total shoulder replacement on 23. Having quite a bit of pain today but doing ok. Significant difficulty sleeping, sitting up in recliner.     Reported functional limitation: needs assist to bath, dress, cook, clean, can't use RUE for any of those tasks. Can't groom dogs anymore. Difficulty sleeping.       Quality of life: good    Pain  Current pain ratin    Patient Goals  Patient goals for therapy: decreased edema, decreased pain, increased motion, increased strength, independence with ADLs/IADLs and return to sport/leisure activities         Medical history: asthma, bipolar, breast cancer in , depression, HTN, IBS, LBP, lupus, burdick syndrome, MRSA infection, obesity, osteopenia, raynaud disease, Von Willebrand disease. See chart for further detail.   Surgical history: arm debridement x3, back surg lumb decomp, cerv fusion, bilateral hips and right knee replacement, mastectomy radical R, wrist surg R  Therapy Precautions: follow protocol & instructions in order      Objective          Postural Observations    Additional Postural Observation Details  Forward head and shoulders    Observations     Additional Shoulder Observation Details  Wearing abduction brace with ice pack.     Neurological Testing     Sensation     Shoulder   Left Shoulder   Intact: light touch    Right Shoulder   Intact: Light touch    Passive Range of  Motion     Right Shoulder   Flexion: 80 degrees with pain  Abduction: 45 degrees with pain  External rotation 45°: 20 degrees with pain    Additional Passive Range of Motion Details  No IR per order    Strength/Myotome Testing     Additional Strength Details  NT due to surgical precautions  Elbow and wrist at least 3/5          Assessment & Plan       Assessment  Impairments: abnormal muscle firing, abnormal or restricted ROM, activity intolerance, impaired physical strength, lacks appropriate home exercise program and pain with function   Functional limitations: carrying objects, lifting, sleeping, pulling, pushing, uncomfortable because of pain, moving in bed, reaching behind back, reaching overhead and unable to perform repetitive tasks   Assessment details: The patient is a 63 y.o. female who presents to physical therapy today for therapy s/p right reverse total shoulder replacement on 23. Upon initial evaluation, the patient demonstrates the following impairments: significantly limited shoulder ROM, RUE weakness, high pain level, postural impairments. Due to these impairments, the patient is unable to perform or has difficulty with the following functional tasks: dressing, bathing, don/doff brace, cooking, cleaning, sleeping. The patient would benefit from skilled PT services to address functional limitations and impairments and to improve patient quality of life.      Prognosis: good    Goals  Plan Goals: ST. Pt will be independent and compliant with initial HEP and surgical precautions in 2 weeks.  2. Pt will report pain level at worst <4 in 4 weeks.   3. Pt will demonstrate an increase in shoulder flexion PROM to 120 degrees within 4 weeks.   4. Pt will demonstrate an increase in shoulder ER PROM to 40 degrees within 4 weeks.     LTG: by DC (12 weeks)  1. Pt will be independent with final HEP for self-management of condition by DC.  2. Pt will improve score on QuickDASH to less than 40% by DC.    3. Pt will demonstrate shoulder AROM flexion to >130 degrees without compensation in order to be able to get cup or plate out of cabinet by DC.  4. Pt will demonstrate shoulder flexion and ER strength to >4/5 in order to be able to clean home and cook by DC.        Plan  Therapy options: will be seen for skilled therapy services  Planned modality interventions: cryotherapy, electrical stimulation/Russian stimulation, TENS, thermotherapy (hydrocollator packs) and ultrasound  Planned therapy interventions: ADL retraining, body mechanics training, fine motor coordination training, flexibility, functional ROM exercises, home exercise program, joint mobilization, manual therapy, motor coordination training, neuromuscular re-education, postural training, soft tissue mobilization, strengthening, stretching and therapeutic activities  Frequency: 2x week  Duration in weeks: 12  Treatment plan discussed with: patient        See flowsheets for treatment detail.    History # of Personal Factors and/or Comorbidities: HIGH (3+)  Examination of Body System(s): # of elements: LOW (1-2)  Clinical Presentation: EVOLVING  Clinical Decision Making: LOW       Timed:         Manual Therapy:    15     mins  71382;     Therapeutic Exercise:    10     mins  29758;     Neuromuscular Walter:        mins  10262;    Therapeutic Activity:     15     mins  05697;     Gait Training:           mins  13051;     Ultrasound:          mins  74453;    Ionto                                   mins   84975  Self Care                            mins   77519      Un-Timed:  Electrical Stimulation:         mins  36122 ( );  Dry Needling          mins self-pay  Traction          mins 22227  Low Eval     20     Mins  60572  Mod Eval          Mins  22089  High Eval                            Mins  16718  Re-Eval                               mins  10474      Timed Treatment:   40   mins   Total Treatment:     60   mins    PT SIGNATURE: Phuong Yarbrough  PT   [place] PT license #: [id number]  [place] PT license #: [id number]  DATE TREATMENT INITIATED: 11/3/2023    Initial Certification  Certification Period: 1/31/2024  I certify that the therapy services are furnished while this patient is under my care.  The services outlined above are required by this patient, and will be reviewed every 90 days.    PHYSICIAN: Floyd Ruiz MD  NPI: [id number]                                      DATE:     Please sign and return via fax to  [address] [address] [address]  [place] [address] . Thank you, [facility].

## 2023-11-06 ENCOUNTER — TREATMENT (OUTPATIENT)
Dept: PHYSICAL THERAPY | Facility: CLINIC | Age: 63
End: 2023-11-06
Payer: MEDICARE

## 2023-11-06 DIAGNOSIS — M19.011 OSTEOARTHRITIS OF RIGHT GLENOHUMERAL JOINT: Primary | ICD-10-CM

## 2023-11-06 DIAGNOSIS — M25.619 LIMITED RANGE OF MOTION (ROM) OF SHOULDER: ICD-10-CM

## 2023-11-06 DIAGNOSIS — Z96.611 S/P REVERSE TOTAL SHOULDER ARTHROPLASTY, RIGHT: ICD-10-CM

## 2023-11-06 PROCEDURE — 97110 THERAPEUTIC EXERCISES: CPT | Performed by: PHYSICAL THERAPIST

## 2023-11-06 PROCEDURE — 97140 MANUAL THERAPY 1/> REGIONS: CPT | Performed by: PHYSICAL THERAPIST

## 2023-11-06 NOTE — PROGRESS NOTES
Physical Therapy Daily Treatment Note      [facility] [place]              [address], [address]                [facility] [place]  [address]        Patient: Tracie Gross   : 1960  Diagnosis/ICD-10 Code:  Osteoarthritis of right glenohumeral joint [M19.011]  Referring practitioner: Floyd Ruiz,   Date of Initial Visit: Type: THERAPY  Noted: 11/3/2023  Today's Date: 2023  Patient seen for 2 sessions         Subjective    Tracie Gross reports: her shoulder is feeling bad today.  She rated it 6-7/10. She thinks she went too long without pain pills.           Objective   See Exercise, Manual, and Modality Logs for complete treatment.     SR=356/84mmHg    Assessment/Plan  Dizziness with initial stance in [facility] thus assessed BP which was WNL.  Reviewed exercises from previous session with no changes made today d/t being ~1 week out from surgery and high pain level.  Continued with PROM within tolerance with pain noted throughout.  Will monitor tolerance and continue to progress as tolerated within protocol.     Progress per Plan of Care           Timed:  Manual Therapy:    15     mins  75309;  Therapeutic Exercise:    15     mins  53514;     Neuromuscular Walter:        mins  97018;    Therapeutic Activity:          mins  46494;     Gait Training:           mins  02043;     Ultrasound:          mins  55401;     Work Conditioning/Hardening (initial 2 hours)        mins  88094  Work Conditioning/Hardening (each add'l hour)        mins  67576    Untimed:   Electrical Stimulation:         mins  08090 (MC );  Traction          mins 84479    Timed Treatment:   30   mins   Total Treatment:     30   mins    Stacey Moreno PTA  Physical Therapist Assistant

## 2023-11-08 ENCOUNTER — TREATMENT (OUTPATIENT)
Dept: PHYSICAL THERAPY | Facility: CLINIC | Age: 63
End: 2023-11-08
Payer: MEDICARE

## 2023-11-08 DIAGNOSIS — M32.9 SLE (SYSTEMIC LUPUS ERYTHEMATOSUS RELATED SYNDROME): ICD-10-CM

## 2023-11-08 DIAGNOSIS — M34.9 SCLERODERMA: ICD-10-CM

## 2023-11-08 DIAGNOSIS — M25.619 LIMITED RANGE OF MOTION (ROM) OF SHOULDER: ICD-10-CM

## 2023-11-08 DIAGNOSIS — M19.011 OSTEOARTHRITIS OF RIGHT GLENOHUMERAL JOINT: Primary | ICD-10-CM

## 2023-11-08 DIAGNOSIS — Z96.611 S/P REVERSE TOTAL SHOULDER ARTHROPLASTY, RIGHT: ICD-10-CM

## 2023-11-08 RX ORDER — CYCLOBENZAPRINE HCL 10 MG
10 TABLET ORAL 3 TIMES DAILY PRN
Qty: 45 TABLET | Refills: 1 | Status: SHIPPED | OUTPATIENT
Start: 2023-11-08

## 2023-11-08 NOTE — PROGRESS NOTES
Physical Therapy Daily Treatment Note      [facility] [place]              [address], [address]                [facility] [place]  [address]        Patient: Tracie Gross   : 1960  Diagnosis/ICD-10 Code:  Osteoarthritis of right glenohumeral joint [M19.011]  Referring practitioner: Floyd Ruiz, *  Date of Initial Visit: Type: THERAPY  Noted: 11/3/2023  Today's Date: 2023  Patient seen for 3 sessions         Subjective    Tracie Gross reports: she thinks it is a little better.  She has had it out of her sling a little more.  She worked on stretching the elbow and she thinks that helped.            Objective   See Exercise, Manual, and Modality Logs for complete treatment.       Assessment/Plan  Improved tolerance to session today compared to last session.  Increased ROM flexion and ER as well as met ABD restriction per observation.  Able to progress to AAROM and increase reps with previous exercises.  Bicep tightness noted thus added bicep stretch with STM and neck stretches with pt reporting relief.  Issued initial HEP with exercises from today.  Reviewed protocol to with instructions to avoid AROM.  Will monitor tolerance and progress exercises as able.      Progress per Plan of Care           Timed:  Manual Therapy:    13     mins  01294;  Therapeutic Exercise:    15    mins  69163;     Neuromuscular Walter:        mins  79827;    Therapeutic Activity:     10     mins  70388;     Gait Training:           mins  14344;     Ultrasound:          mins  21200;     Work Conditioning/Hardening (initial 2 hours)        mins  75844  Work Conditioning/Hardening (each add'l hour)        mins  98171    Untimed:   Electrical Stimulation:         mins  68286 ( );  Traction          mins 52319    Timed Treatment:   38   mins   Total Treatment:     38   mins    Stacey Moreno PTA  Physical Therapist Assistant

## 2023-11-08 NOTE — PATIENT INSTRUCTIONS
Access Code: [email or web address]  URL: [email or web address]  Date: 11/08/2023  Prepared by: Stacey Moreno    Exercises  - Seated Upper Trapezius Stretch  - 2 x daily - 7 x weekly - 1 sets - 3 reps - 20 hold  - Gentle Levator Scapulae Stretch  - 2 x daily - 7 x weekly - 1 sets - 3 reps - 20 hold  - Seated Scapular Retraction  - 1 x daily - 7 x weekly - 1 sets - 10 reps - 5 hold  - Standing Backward Shoulder Rolls  - 1 x daily - 7 x weekly - 1 sets - 10 reps  - Seated Elbow Flexion and Extension AROM  - 1 x daily - 7 x weekly - 1 sets - 20 reps  - Seated Forearm Pronation and Supination AROM  - 2 x daily - 7 x weekly - 1 sets - 20 reps - 2-3 hold  - Wrist Extension AROM  - 1 x daily - 7 x weekly - 1 sets - 20 reps  - Wrist Flexion AROM  - 1 x daily - 7 x weekly - 1 sets - 20 reps  - Wrist AROM Radial Ulnar Deviation  - 1 x daily - 7 x weekly - 1 sets - 20 reps  - Supine Shoulder Flexion AAROM with Hands Clasped  - 1 x daily - 7 x weekly - 1 sets - 10 reps  - Supine Shoulder External Rotation with Dowel  - 1 x daily - 7 x weekly - 1 sets - 10 reps   1191

## 2023-11-14 ENCOUNTER — TREATMENT (OUTPATIENT)
Dept: PHYSICAL THERAPY | Facility: CLINIC | Age: 63
End: 2023-11-14
Payer: MEDICARE

## 2023-11-14 DIAGNOSIS — M25.619 LIMITED RANGE OF MOTION (ROM) OF SHOULDER: ICD-10-CM

## 2023-11-14 DIAGNOSIS — M19.011 OSTEOARTHRITIS OF RIGHT GLENOHUMERAL JOINT: Primary | ICD-10-CM

## 2023-11-14 DIAGNOSIS — Z96.611 S/P REVERSE TOTAL SHOULDER ARTHROPLASTY, RIGHT: ICD-10-CM

## 2023-11-14 PROCEDURE — 97140 MANUAL THERAPY 1/> REGIONS: CPT | Performed by: PHYSICAL THERAPIST

## 2023-11-14 PROCEDURE — 97110 THERAPEUTIC EXERCISES: CPT | Performed by: PHYSICAL THERAPIST

## 2023-11-16 ENCOUNTER — OFFICE VISIT (OUTPATIENT)
Dept: ORTHOPEDIC SURGERY | Facility: CLINIC | Age: 63
End: 2023-11-16
Payer: MEDICARE

## 2023-11-16 VITALS — HEIGHT: 63 IN | HEART RATE: 85 BPM | WEIGHT: 220 LBS | BODY MASS INDEX: 38.98 KG/M2

## 2023-11-16 DIAGNOSIS — M87.052 AVASCULAR NECROSIS OF FEMORAL HEAD, LEFT: ICD-10-CM

## 2023-11-16 DIAGNOSIS — Z47.89 ORTHOPEDIC AFTERCARE: Primary | ICD-10-CM

## 2023-11-16 PROCEDURE — 99024 POSTOP FOLLOW-UP VISIT: CPT | Performed by: ORTHOPAEDIC SURGERY

## 2023-11-16 RX ORDER — OXYCODONE HYDROCHLORIDE 10 MG/1
10 TABLET ORAL EVERY 6 HOURS PRN
Qty: 120 TABLET | Refills: 0 | Status: SHIPPED | OUTPATIENT
Start: 2023-11-16

## 2023-11-16 NOTE — PATIENT INSTRUCTIONS
Shoulder Arthroplasty: Post- Operative Visit Objectives    Review the operative findings, procedures and photos.  Make sure medications are effective and not causing problems.  Pain: Oxycodone or hydrocodone is for pain. You may take 1 tablet every 6 hours as necessary.  Some patients don’t require the use of these…in those circumstances just use Tylenol extra-strength 1 or 2 tablets every 4-6 hours.   NSAIDs. For pain and inflammation.  You can take an over the counter anti-inflammatory of choice such as Aleve, Ibuprofen, Motrin or Advil during this time.  If you have had any problems stop taking these medicines and please tell us!  Wound Care:  Today we will remove your dressings.  There may be some residual glue on your incision.  It is now ok to shower without covering it. Please avoid submerging the incision for another two weeks.  Continue ice pack as needed.  Exercises and Physical Therapy   Continue your physical therapy and daily home exercises focusing especially on overhead motion.  Continue the sling and remove for activities such as showering and bringing you hand to your face or hair, no motion behind your back for the next 4 weeks.  Some shoulder replacements with a rotator cuff repair will have more restrictions and we will go over those.   Follow Up appointments Schedule Follow up visits as directed usually in 4 weeks..  Notes  Make sure you have all necessary notes and documentation for school or work.  Issues: Remember our goal is to make this process smooth and easy if there is any thing you need please ask us or call back [phone]

## 2023-11-16 NOTE — PROGRESS NOTES
"     Patient ID: Tracie Gross is a 63 y.o. female.  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    Pain minimal  Objective:    Pulse 85   Ht 160 cm (62.99\")   Wt 99.8 kg (220 lb)   LMP 05/08/1983   BMI 38.98 kg/m²     Physical Examination:  Incision well approximated no sign of infection  Sensory and motor exam are intact all distributions. Radial pulse is palpable and capillary refill is less than two seconds to all digits.       Imaging:  right Shoulder X-Ray  Indication: Postop total shoulder  AP Y and Lateral views  Findings: Reverse total shoulder position  no bony lesion  Soft tissues normal  not examined joint spaces  Hardware appropriately positioned yes      yes prior studies available for comparison.    Assessment:  Doing well after shoulder arthroplasty    Plan:  Continue sling and therapy see me in a month  "

## 2023-11-18 ENCOUNTER — HOSPITAL ENCOUNTER (OUTPATIENT)
Dept: MRI IMAGING | Facility: HOSPITAL | Age: 63
Discharge: HOME | End: 2023-11-18
Admitting: FAMILY MEDICINE
Payer: MEDICARE

## 2023-11-18 DIAGNOSIS — Z15.09 LYNCH SYNDROME: ICD-10-CM

## 2023-11-18 PROCEDURE — A9579 GAD-BASE MR CONTRAST NOS,1ML: HCPCS | Performed by: FAMILY MEDICINE

## 2023-11-18 PROCEDURE — 74183 MRI ABD W/O CNTR FLWD CNTR: CPT

## 2023-11-18 PROCEDURE — 25010000002 GADOTERIDOL PER 1 ML: Performed by: FAMILY MEDICINE

## 2023-11-18 RX ADMIN — GADOTERIDOL 20 ML: 279.3 INJECTION, SOLUTION INTRAVENOUS at 11:29

## 2023-11-20 ENCOUNTER — TREATMENT (OUTPATIENT)
Dept: PHYSICAL THERAPY | Facility: CLINIC | Age: 63
End: 2023-11-20
Payer: MEDICARE

## 2023-11-20 DIAGNOSIS — M25.619 LIMITED RANGE OF MOTION (ROM) OF SHOULDER: ICD-10-CM

## 2023-11-20 DIAGNOSIS — Z96.611 S/P REVERSE TOTAL SHOULDER ARTHROPLASTY, RIGHT: ICD-10-CM

## 2023-11-20 DIAGNOSIS — M19.011 OSTEOARTHRITIS OF RIGHT GLENOHUMERAL JOINT: Primary | ICD-10-CM

## 2023-11-20 PROCEDURE — 97140 MANUAL THERAPY 1/> REGIONS: CPT | Performed by: PHYSICAL THERAPIST

## 2023-11-20 PROCEDURE — 97110 THERAPEUTIC EXERCISES: CPT | Performed by: PHYSICAL THERAPIST

## 2023-11-20 NOTE — PROGRESS NOTES
Physical Therapy Daily Treatment Note  [address] [address] [address]  [place] [address]      Patient: Tracie Gross  : 1960  Referring Practitioner: Floyd Ruiz, *  Date of Initial Visit: Type: THERAPY  Noted: 11/3/2023  Today's Date: 2023  Patient seen for: 5 sessions      Visit Diagnoses:     ICD-10-CM ICD-9-CM   1. Osteoarthritis of right glenohumeral joint  M19.011 715.91   2. S/P reverse total shoulder arthroplasty, right  Z96.611 V43.61   3. Limited range of motion (ROM) of shoulder  M25.619 719.51       Subjective   Tracie Gross reports that she is doing good.    Not sleeping in the bed.   Still using sling at night and when outside the home.        Objective   AA/PROM as per protocol   See Exercise, Manual, and Modality Logs for complete treatment.     No change of HEP     Assessment/Plan  Tracie does have difficulty with the hands clasped due to the elft shldr pain.   ROM is progressing.     Progress per Plan of Care          Timed:         Manual Therapy:    16     mins  83608;     Therapeutic Exercise:    14     mins  83411;     Neuromuscular aWlter:        mins  63757;    Therapeutic Activity:          mins  81768;     Gait Training:          mins  77794;     Ultrasound:          mins  10306;    Ionto                                   mins   93696  Self Care                            mins   71005      Un-Timed:  Electrical Stimulation:         mins  87091 ( );  Traction          mins 83133    No Charge:   Cryopack:        mins  Hydrocollator MHP:         mins      Timed Treatment:   30   mins   Total Treatment:     30   mins              Jocelynn Matos PT    Physical Therapist

## 2023-11-22 ENCOUNTER — TREATMENT (OUTPATIENT)
Dept: PHYSICAL THERAPY | Facility: CLINIC | Age: 63
End: 2023-11-22
Payer: MEDICARE

## 2023-11-22 DIAGNOSIS — M25.619 LIMITED RANGE OF MOTION (ROM) OF SHOULDER: ICD-10-CM

## 2023-11-22 DIAGNOSIS — Z96.611 S/P REVERSE TOTAL SHOULDER ARTHROPLASTY, RIGHT: ICD-10-CM

## 2023-11-22 DIAGNOSIS — M19.011 OSTEOARTHRITIS OF RIGHT GLENOHUMERAL JOINT: Primary | ICD-10-CM

## 2023-11-22 NOTE — PROGRESS NOTES
Physical Therapy Daily Treatment Note  [address] [address] [address]  [place] [address]      Patient: Tracie Gross  : 1960  Referring Practitioner: Floyd Ruiz, *  Date of Initial Visit: Type: THERAPY  Noted: 11/3/2023  Today's Date: 2023  Patient seen for: 6 sessions      Visit Diagnoses:     ICD-10-CM ICD-9-CM   1. Osteoarthritis of right glenohumeral joint  M19.011 715.91   2. S/P reverse total shoulder arthroplasty, right  Z96.611 V43.61   3. Limited range of motion (ROM) of shoulder  M25.619 719.51       Subjective   Tracie Gross reports that she wasn't as sore as she thought she would be after the last session.     Pain Level (0-10): 4    Objective     See Exercise, Manual, and Modality Logs for complete treatment.     Patient Education: cont with home ex and  assisting with ROM     Assessment/Plan  Tracie is progressing with her tolerance to ex. ROM of flex and ER is progressing     Progress per Plan of Care  add henry, kirillk ex.           Timed:         Manual Therapy:    16     mins  93213;     Therapeutic Exercise:    13    mins  27677;     Neuromuscular Walter:        mins  66043;    Therapeutic Activity:      12    mins  22576;     Gait Training:           mins  40906;     Ultrasound:          mins  75135;    Ionto                                   mins   83455  Self Care                            mins   96555      Un-Timed:  Electrical Stimulation:         mins  04038 (MC );  Traction          mins 99040    No Charge:   Cryopack:        mins  Hydrocollator MHP:         mins      Timed Treatment:   41   mins   Total Treatment:     41   mins              Jocelynn Matos PT    Physical Therapist

## 2023-11-29 ENCOUNTER — OFFICE VISIT (OUTPATIENT)
Dept: FAMILY MEDICINE CLINIC | Facility: CLINIC | Age: 63
End: 2023-11-29
Payer: MEDICARE

## 2023-11-29 ENCOUNTER — TREATMENT (OUTPATIENT)
Dept: PHYSICAL THERAPY | Facility: CLINIC | Age: 63
End: 2023-11-29
Payer: MEDICARE

## 2023-11-29 VITALS
DIASTOLIC BLOOD PRESSURE: 66 MMHG | OXYGEN SATURATION: 96 % | WEIGHT: 230.4 LBS | BODY MASS INDEX: 42.4 KG/M2 | HEIGHT: 62 IN | RESPIRATION RATE: 18 BRPM | HEART RATE: 66 BPM | SYSTOLIC BLOOD PRESSURE: 114 MMHG

## 2023-11-29 DIAGNOSIS — L60.0 INGROWN NAIL: ICD-10-CM

## 2023-11-29 DIAGNOSIS — M19.011 OSTEOARTHRITIS OF RIGHT GLENOHUMERAL JOINT: Primary | ICD-10-CM

## 2023-11-29 DIAGNOSIS — Z96.611 S/P REVERSE TOTAL SHOULDER ARTHROPLASTY, RIGHT: ICD-10-CM

## 2023-11-29 DIAGNOSIS — Z15.09 LYNCH SYNDROME: ICD-10-CM

## 2023-11-29 DIAGNOSIS — I73.00 RAYNAUD'S DISEASE WITHOUT GANGRENE: ICD-10-CM

## 2023-11-29 DIAGNOSIS — M19.011 PRIMARY OSTEOARTHRITIS OF RIGHT SHOULDER: Primary | ICD-10-CM

## 2023-11-29 DIAGNOSIS — M25.619 LIMITED RANGE OF MOTION (ROM) OF SHOULDER: ICD-10-CM

## 2023-11-29 DIAGNOSIS — K59.00 CONSTIPATION, UNSPECIFIED CONSTIPATION TYPE: ICD-10-CM

## 2023-11-29 NOTE — PROGRESS NOTES
Chief Complaint   Patient presents with    Shoulder Pain     Left shoulder pain  Right reverse shoulder replacement     HPI  Tracie Gross is a 63 y.o. female that presents for   Chief Complaint   Patient presents with    Shoulder Pain     Left shoulder pain  Right reverse shoulder replacement     Shoulder pain: s/p R shoulder replacement 11/1/23 w/ Abeln for rotator cuff tear. Wearing sling most all the time and now released to drive. Currently doing PT twice weekly. Pain rated 4/10, already improved from where she was prior to surgery. Currently taking oxycodone 10mg 1-2x/day and Tylenol 500mg 2-3x/day. Patient has ortho f/u in 3 weeks and she is hoping to replace L shoulder next year.    Constipation: patient reports issue w/ constipation post-operatively. We had reduced her colestipol to daily at her last visit. She reports having to stop altogether due to it taking 2 weeks after her surgery to have a BM. Currently using doc-senna. Bisacodyl not effective, Linzess too unpredictable.    Raynaud's phenomenon: maintained on nifedipine 90 daily. Reports poor control at this time. Not ready to increase medication    MRI reviewed- negative for malignancy given hx Monahan    Review of Systems   Gastrointestinal:  Positive for constipation.   Musculoskeletal:  Positive for arthralgias.   Skin:  Positive for color change.     The following portions of the patient's history were reviewed and updated as appropriate: problem list, past medical history, past surgical history, allergies, current medication    Problem List Tab  Patient History Tab  Immunizations Tab  Medications Tab  Chart Review Tab  Care Everywhere Tab  Synopsis Tab    PE  Vitals:    11/29/23 1541   BP: 114/66   Pulse: 66   Resp: 18   SpO2: 96%     Body mass index is 42.14 kg/m².  General: Obese, NAD  Head: AT/NC  Eyes: EOMI, anicteric sclera  Resp: CTAB, SCR, BS equal  CV: RRR w/o m/r/g; 2+ pulses  GI: Soft, NT/ND, +BS  MSK: no deformity, no edema. RUE in  sling  Skin: Warm, dry, intact  Neuro: Alert and oriented. No focal deficits  Psych: Appropriate mood and affect    Imaging  MRI Abdomen With & Without Contrast    Result Date: 11/20/2023  Impression: 1. Chronic dilation of the common bile duct with similar appearance to the prior MRI from 2021. There is mildly blunted appearance of the ampulla which could relate to ampullary stenosis or chronic physiologic changes after cholecystectomy. 2. Pancreatic parenchymal atrophy with fatty interdigitation. No evidence of pancreatic mass by MRI. Electronically Signed: Piotr Yu  11/20/2023 10:13 AM EST  Workstation ID: [id number] [employer]    XR Shoulder 2+ View Right    Result Date: 11/1/2023  Impression: Postsurgical changes consistent with right shoulder arthroplasty with adequate position of the hardware. Electronically Signed: Rafael Archer DO  11/1/2023 6:44 PM EDT  Workstation ID: [id number]    XR Chest PA & Lateral    Result Date: 10/25/2023  Impression: No acute process. Electronically Signed: Tomy Orellana MD  10/25/2023 8:44 AM EDT  Workstation ID: [id number]    MRI Breast Bilateral With & Without Contrast    Result Date: 9/19/2023  No MR signs of malignancy in either breast. Continued annual surveillance with screening mammography and breast MRI is recommended.   ASSESSMENT: BI-RADS 2. Benign findings.      Electronically Signed By-Oliverio Betancourt DO On:9/19/2023 2:34 PM      XR Spine Cervical Complete 4 or 5 View    Result Date: 8/18/2023  Impression: 1. Status post C4-C6 spinal fusion. There is no hardware loosening or failure. 2. Degenerative changes. Electronically Signed: Terrance Soria MD  8/18/2023 3:29 PM EDT  Workstation ID: [id number]      Assessment & Plan   Tracie Gross is a 63 y.o. female that presents for   Chief Complaint   Patient presents with    Shoulder Pain     Left shoulder pain  Right reverse shoulder replacement     Diagnoses and all orders for this visit:    1. Primary osteoarthritis of right  shoulder (Primary): s/p R shoulder replacement. Pain well controlled   - Increase Tylenol to 1000mg 2-3x/day   - Cont oxycodone 10mg PRN   - Cont PT 2x/week   - Cont ortho f/u- Abeln    2. Constipation, unspecified constipation type   - Cont home doc/senna   - Start Miralax BID   - D/C colestipol    3. 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 Ingrown nail  -     Ambulatory Referral to Podiatry     Return in about 3 months (around 2/29/2024) for Recheck- 30min.

## 2023-11-29 NOTE — PROGRESS NOTES
Physical Therapy Daily Treatment Note  [address] [address]  [place] [address]      Patient: Tracie Gross  : 1960  Referring Practitioner: Floyd Ruiz, *  Date of Initial Visit: Type: THERAPY  Noted: 11/3/2023  Today's Date: 2023  Patient seen for: 7 sessions      Visit Diagnoses:     ICD-10-CM ICD-9-CM   1. Osteoarthritis of right glenohumeral joint  M19.011 715.91   2. S/P reverse total shoulder arthroplasty, right  Z96.611 V43.61   3. Limited range of motion (ROM) of shoulder  M25.619 719.51       Subjective   Tracie Gross reports reports that she is doing good.   However,  out of the sling x's 3 hrs today and really rough.   She now has a ramp to get into the house.     Pain Level (0-10): 3    Objective     See Exercise, Manual, and Modality Logs for complete treatment.     Patient Education: review of seated table AA ex for home.     Assessment/Plan  Mrs. Gross was able to tolerate and complete hte new ex with noted fatigue vs pain.   Shldr hike happens with elevation.   Worked on manual resist scapula.     Progress per Plan of Care  If tolerated the new ex, pulley for home.           Timed:         Manual Therapy:    16     mins  49898;     Therapeutic Exercise:    14     mins  18215;     Neuromuscular Walter:        mins  41883;    Therapeutic Activity:     15     mins  90261;     Gait Training:           mins  12637;     Ultrasound:          mins  53423;    Ionto                                   mins   05874  Self Care                            mins   22723      Un-Timed:  Electrical Stimulation:         mins  98689 ( );  Traction          mins 83202    No Charge:   Cryopack:       mins  Hydrocollator MHP:         mins      Timed Treatment:   45   mins   Total Treatment:     45   mins              Jocelynn Matos, VANITA    Physical Therapist

## 2023-12-01 ENCOUNTER — TREATMENT (OUTPATIENT)
Dept: PHYSICAL THERAPY | Facility: CLINIC | Age: 63
End: 2023-12-01
Payer: MEDICARE

## 2023-12-01 DIAGNOSIS — M19.011 OSTEOARTHRITIS OF RIGHT GLENOHUMERAL JOINT: Primary | ICD-10-CM

## 2023-12-01 DIAGNOSIS — M25.619 LIMITED RANGE OF MOTION (ROM) OF SHOULDER: ICD-10-CM

## 2023-12-01 DIAGNOSIS — Z96.611 S/P REVERSE TOTAL SHOULDER ARTHROPLASTY, RIGHT: ICD-10-CM

## 2023-12-01 RX ORDER — HYDROXYCHLOROQUINE SULFATE 200 MG/1
200 TABLET, FILM COATED ORAL 2 TIMES DAILY
Qty: 180 TABLET | Refills: 3 | Status: SHIPPED | OUTPATIENT
Start: 2023-12-01 | End: 2024-11-30

## 2023-12-01 RX ORDER — HYDROXYCHLOROQUINE SULFATE 200 MG/1
200 TABLET, FILM COATED ORAL 2 TIMES DAILY
Qty: 180 TABLET | Refills: 3 | Status: CANCELLED | OUTPATIENT
Start: 2023-12-01 | End: 2024-11-30

## 2023-12-01 NOTE — PROGRESS NOTES
"Physical Therapy 30-Day Progress and  Treatment Note  [address] [address] [address]  [place] [address]      Patient: Tracie Gross  : 1960  Referring Practitioner: Floyd Ruiz, *  Date of Initial Visit: Type: THERAPY  Noted: 11/3/2023  Today's Date: 2023  Patient seen for: 8 sessions      Visit Diagnoses:     ICD-10-CM ICD-9-CM   1. Osteoarthritis of right glenohumeral joint  M19.011 715.91   2. S/P reverse total shoulder arthroplasty, right  Z96.611 V43.61   3. Limited range of motion (ROM) of shoulder  M25.619 719.51       Subjective   Tracie Gross reports that today is not a good day, benoit referencing the left shldr pain.  States that she did not sleep much, mostly due to the left shldr pain vs R.      Pain Level (0-10):  3/10 R   9/10 L     Objective   R shldr:  ROM  AAROM flex 115,  ER 49  R elbow flexion WNLs  See Exercise, Manual, and Modality Logs for complete treatment.     Patient Education: cont with ex at home as tolerated.  Cautious of L shldr     Assessment/Plan  Evelyn has been to PT x's 8 sessions, including the 2023 eval date for treatment after having R TSA surgery on 2023.   She has achieved the established STGs and making progressing in the remaining goals.  The most limiting factor is L shldr pain that restricts the ability to assist in her home ex.  Her  is also currently assisting in the AAROM when the L shldr is too painful to assist. She does anticipate to have the L shldr also \"fixed\" in the near future.      STG: all STG met 23   1. Pt will be independent and compliant with initial HEP and surgical precautions in 2 weeks.  2. Pt will report pain level at worst <4 in 4 weeks.   3. Pt will demonstrate an increase in shoulder flexion PROM to 120 degrees within 4 weeks.   4. Pt will demonstrate an increase in shoulder ER PROM to 40 degrees within 4 weeks.       LTG: by DC (12 weeks)  1. Pt will be independent with final HEP for " self-management of condition by DC.  2. Pt will improve score on QuickDASH to less than 40% by DC.   3. Pt will demonstrate shoulder AROM flexion to >130 degrees without compensation in order to be able to get cup or plate out of cabinet by DC.  4. Pt will demonstrate shoulder flexion and ER strength to >4/5 in order to be able to clean home and cook by DC.    Progress per Plan of Care          Timed:         Manual Therapy:    16     mins  26985;     Therapeutic Exercise:    12     mins  65942;     Neuromuscular Walter:        mins  77897;    Therapeutic Activity:     15     mins  30381;     Gait Training:           mins  59866;     Ultrasound:          mins  83673;    Ionto                                   mins   76294  Self Care                            mins   24248      Un-Timed:  Electrical Stimulation:         mins  50574 ( );  Traction          mins 97089    Timed Treatment:   43   mins   Total Treatment:     43   mins              Jocelynn Matos, PT    Physical Therapist

## 2023-12-01 NOTE — TELEPHONE ENCOUNTER
Had to switch to different hydroxychloroquine just in the system. Not the medicine.  The other was in [facility] as a covid medication and would not let me fill it.     Sent a year to [facility].

## 2023-12-03 RX ORDER — FERROUS SULFATE 325(65) MG
1 TABLET ORAL
Qty: 30 TABLET | Refills: 1 | Status: SHIPPED | OUTPATIENT
Start: 2023-12-03

## 2023-12-06 ENCOUNTER — TREATMENT (OUTPATIENT)
Dept: PHYSICAL THERAPY | Facility: CLINIC | Age: 63
End: 2023-12-06
Payer: MEDICARE

## 2023-12-06 DIAGNOSIS — Z96.611 S/P REVERSE TOTAL SHOULDER ARTHROPLASTY, RIGHT: ICD-10-CM

## 2023-12-06 DIAGNOSIS — M25.619 LIMITED RANGE OF MOTION (ROM) OF SHOULDER: ICD-10-CM

## 2023-12-06 DIAGNOSIS — M19.011 OSTEOARTHRITIS OF RIGHT GLENOHUMERAL JOINT: Primary | ICD-10-CM

## 2023-12-06 DIAGNOSIS — R29.898 WEAKNESS OF RIGHT ARM: ICD-10-CM

## 2023-12-06 NOTE — PROGRESS NOTES
Physical Therapy Daily Treatment Note  [address] [address]  [place] [address]      Patient: Tracie Gross  : 1960  Referring Practitioner: Floyd Ruiz, *  Date of Initial Visit: Type: THERAPY  Noted: 11/3/2023  Today's Date: 2023  Patient seen for: 9 sessions      Visit Diagnoses:     ICD-10-CM ICD-9-CM   1. Osteoarthritis of right glenohumeral joint  M19.011 715.91   2. S/P reverse total shoulder arthroplasty, right  Z96.611 V43.61   3. Limited range of motion (ROM) of shoulder  M25.619 719.51   4. Weakness of right arm  R29.898 729.89       Subjective   Tracie Gross reports stilll cannot fix her hair.    States that she did something yesterday and had a lot of pain at the anterior R chest wall and under the armpit that lasted throughout the evening.  She did  ice both areas with some relief.     Pain Level (0-10): 2    Objective     See Exercise, Manual, and Modality Logs for complete treatment.     Patient Education: add the isometrics and wall slides for home.     Assessment/Plan  AAROM and strength of RUE is improving.  The L shldr is painful and is only able to assist in a few of the ex;  thus PT assist and/or resist for most all the ex.     Progress per Plan of Care          Timed:         Manual Therapy:    16     mins  87861;     Therapeutic Exercise:    14     mins  17881;     Neuromuscular Walter:        mins  84093;    Therapeutic Activity:     12     mins  33445;     Gait Training:           mins  55756;     Ultrasound:          mins  39648;    Ionto                                   mins   93205  Self Care                            mins   41814      Un-Timed:  Electrical Stimulation:         mins  70792 ( );  Traction          mins 24466    Timed Treatment:   42   mins   Total Treatment:     42   mins              Jocelynn Matos, PT    Physical Therapist

## 2023-12-07 DIAGNOSIS — M79.89 LEG SWELLING: ICD-10-CM

## 2023-12-07 RX ORDER — FUROSEMIDE 20 MG/1
20 TABLET ORAL DAILY PRN
Qty: 30 TABLET | Refills: 2 | Status: SHIPPED | OUTPATIENT
Start: 2023-12-07

## 2023-12-08 ENCOUNTER — TELEPHONE (OUTPATIENT)
Dept: PHYSICAL THERAPY | Facility: CLINIC | Age: 63
End: 2023-12-08

## 2023-12-11 ENCOUNTER — TREATMENT (OUTPATIENT)
Dept: PHYSICAL THERAPY | Facility: CLINIC | Age: 63
End: 2023-12-11
Payer: MEDICARE

## 2023-12-11 DIAGNOSIS — M19.011 OSTEOARTHRITIS OF RIGHT GLENOHUMERAL JOINT: Primary | ICD-10-CM

## 2023-12-11 DIAGNOSIS — Z96.611 S/P REVERSE TOTAL SHOULDER ARTHROPLASTY, RIGHT: ICD-10-CM

## 2023-12-11 DIAGNOSIS — M25.619 LIMITED RANGE OF MOTION (ROM) OF SHOULDER: ICD-10-CM

## 2023-12-11 DIAGNOSIS — R29.898 WEAKNESS OF RIGHT ARM: ICD-10-CM

## 2023-12-11 NOTE — PROGRESS NOTES
Physical Therapy Daily Treatment Note  [address] [address]  [place] [address]      Patient: Tracie Gross  : 1960  Referring Practitioner: Floyd Ruiz, *  Date of Initial Visit: Type: THERAPY  Noted: 11/3/2023  Today's Date: 2023  Patient seen for: 10 sessions      Visit Diagnoses:     ICD-10-CM ICD-9-CM   1. Osteoarthritis of right glenohumeral joint  M19.011 715.91   2. S/P reverse total shoulder arthroplasty, right  Z96.611 V43.61   3. Limited range of motion (ROM) of shoulder  M25.619 719.51   4. Weakness of right arm  R29.898 729.89       Subjective   Tracie Gross reports the sx shldr is doing better than the left.  Able to feed herself in normal fashion and doing much better with fixing her hair.  Still sleeping in the recliner.   Wearing brace with outside the home as per her understanding from ortho.     Pain Level (0-10): 2    Objective   PROM:   Flex 147,  ER 50   See Exercise, Manual, and Modality Logs for complete treatment.     Patient Education: cont with isometrics and AA ex a    Assessment/Plan  Evelyn is progressing with active and passive R shldr motion. She reports an improvement in her ability to do a few ADLs      Progress per Plan of Care          Timed:         Manual Therapy:    18     mins  31871;     Therapeutic Exercise:    13     mins  16970;     Neuromuscular Walter:        mins  25708;    Therapeutic Activity:     12     mins  69159;     Gait Training:           mins  02014;     Ultrasound:          mins  31083;    Ionto                                   mins   11834  Self Care                            mins   45319      Un-Timed:  Electrical Stimulation:         mins  26501 ( );  Traction          mins 33089      Timed Treatment:   43   mins   Total Treatment:     43   mins              Jocelynn Matos, PT    Physical Therapist

## 2023-12-14 ENCOUNTER — TREATMENT (OUTPATIENT)
Dept: PHYSICAL THERAPY | Facility: CLINIC | Age: 63
End: 2023-12-14
Payer: MEDICARE

## 2023-12-14 DIAGNOSIS — R29.898 WEAKNESS OF RIGHT ARM: ICD-10-CM

## 2023-12-14 DIAGNOSIS — M25.619 LIMITED RANGE OF MOTION (ROM) OF SHOULDER: ICD-10-CM

## 2023-12-14 DIAGNOSIS — Z96.611 S/P REVERSE TOTAL SHOULDER ARTHROPLASTY, RIGHT: ICD-10-CM

## 2023-12-14 DIAGNOSIS — M19.011 OSTEOARTHRITIS OF RIGHT GLENOHUMERAL JOINT: Primary | ICD-10-CM

## 2023-12-17 NOTE — PROGRESS NOTES
Physical Therapy Daily Treatment Note  [address] [address] [address]  [place] [address]      Patient: Tracie Gross  : 1960  Referring Practitioner: Floyd Ruiz, *  Date of Initial Visit: Type: THERAPY  Noted: 11/3/2023  Today's Date: 2023  Patient seen for: 11 sessions      Visit Diagnoses:     ICD-10-CM ICD-9-CM   1. Osteoarthritis of right glenohumeral joint  M19.011 715.91   2. S/P reverse total shoulder arthroplasty, right  Z96.611 V43.61   3. Limited range of motion (ROM) of shoulder  M25.619 719.51   4. Weakness of right arm  R29.898 729.89       Subjective   Tracie Gross reports the shldr is feeling better.  States that she is out of the sling all day within the home.  She is to see ortho next week.   Left arm remains more painful than the R.   States that she is happy at her progress and how the shldr feels as compared to prior to sx.     Pain Level (0-10): 2    Objective     See Exercise, Manual, and Modality Logs for complete treatment.     Patient Education: cont with ex as tolerated as home.     Assessment/Plan  Evelyn is 5 wks post surgery.  The active assist component of the home ex is limited due to the left shldr pain.  AA in the clinic is via PT as required.  Motion has improved.     Progress per Plan of Care          Timed:         Manual Therapy:    14     mins  22733;     Therapeutic Exercise:    13     mins  13254;     Neuromuscular Walter:        mins  93809;    Therapeutic Activity:     12     mins  55695;     Gait Training:           mins  34976;     Ultrasound:          mins  25364;    Ionto                                   mins   17381  Self Care                            mins   43517      Un-Timed:  Electrical Stimulation:         mins  36975 (MC );  Traction          mins 50849    Timed Treatment:   39   mins   Total Treatment:     39   mins              Jocelynn Matos, PT    Physical Therapist

## 2023-12-18 ENCOUNTER — TELEPHONE (OUTPATIENT)
Dept: PHYSICAL THERAPY | Facility: CLINIC | Age: 63
End: 2023-12-18

## 2023-12-18 ENCOUNTER — OFFICE VISIT (OUTPATIENT)
Dept: ORTHOPEDIC SURGERY | Facility: CLINIC | Age: 63
End: 2023-12-18
Payer: MEDICARE

## 2023-12-18 VITALS — HEIGHT: 62 IN | BODY MASS INDEX: 42.33 KG/M2 | WEIGHT: 230 LBS

## 2023-12-18 DIAGNOSIS — Z47.89 ORTHOPEDIC AFTERCARE: Primary | ICD-10-CM

## 2023-12-18 PROCEDURE — 99024 POSTOP FOLLOW-UP VISIT: CPT | Performed by: ORTHOPAEDIC SURGERY

## 2023-12-18 NOTE — PROGRESS NOTES
Patient ID: Tracie Gross is a 63 y.o. female.  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   Pain improving    Objective:    LMP 05/08/1983     Physical Examination:    Right shoulder healed incision passive elevation 150 abduction 90 external rotation 30    Imaging:      Assessment:  Doing well after shoulder arthroplasty    Plan:  Discontinue sling finish out formal therapy gradual activity as tolerated see me with x-ray in 4 months

## 2023-12-20 ENCOUNTER — TREATMENT (OUTPATIENT)
Dept: PHYSICAL THERAPY | Facility: CLINIC | Age: 63
End: 2023-12-20
Payer: MEDICARE

## 2023-12-20 DIAGNOSIS — M25.619 LIMITED RANGE OF MOTION (ROM) OF SHOULDER: ICD-10-CM

## 2023-12-20 DIAGNOSIS — Z96.611 S/P REVERSE TOTAL SHOULDER ARTHROPLASTY, RIGHT: ICD-10-CM

## 2023-12-20 DIAGNOSIS — M19.011 OSTEOARTHRITIS OF RIGHT GLENOHUMERAL JOINT: Primary | ICD-10-CM

## 2023-12-20 DIAGNOSIS — R29.898 WEAKNESS OF RIGHT ARM: ICD-10-CM

## 2023-12-21 NOTE — PROGRESS NOTES
"Physical Therapy Daily Treatment Note  [address] [address] [address]  [place] [address]      Patient: Tracie Gross  : 1960  Referring Practitioner: Floyd Ruiz, *  Date of Initial Visit: Type: THERAPY  Noted: 11/3/2023  Today's Date: 2023  Patient seen for: 12 sessions      Visit Diagnoses:     ICD-10-CM ICD-9-CM   1. Osteoarthritis of right glenohumeral joint  M19.011 715.91   2. S/P reverse total shoulder arthroplasty, right  Z96.611 V43.61   3. Limited range of motion (ROM) of shoulder  M25.619 719.51   4. Weakness of right arm  R29.898 729.89       Subjective   Tracie Gross reports that she did see ortho this past Monday.  Sling was removed for all time, including sleep.  She is to cont with PT x's 6 more wks.  Evelyn does plan to not address the left shldr at this time due to the number of surgeries she has had in the last yr and \"needs a break\" .   She did drive to PT for the first time today.     Objective   Shldr flex with deviation into abd with IR noted.   Multi angle isometrics to address this and rotational control.   See Exercise, Manual, and Modality Logs for complete treatment.     Patient Education: no change of current HEP     Assessment/Plan  Evelyn's overall R shldr motion has improved with patient able to raise arm to approx 150 with some abd and IR noted.  The LUE is limited in assistance it can provide for AA during home ex due the pain in the shldr.     Progress per Plan of Care          Timed:         Manual Therapy:    14     mins  75269;     Therapeutic Exercise:    15     mins  10671;     Neuromuscular Walter:        mins  02138;    Therapeutic Activity:     11     mins  23765;     Gait Training:           mins  00208;     Ultrasound:          mins  44591;    Ionto                                   mins   10717  Self Care                            mins   12359      Un-Timed:  Electrical Stimulation:         mins  65174 ( );  Traction          mins " 40376    No Charge:   Cryopack:   10     mins  Hydrocollator MHP:         mins      Timed Treatment:   40   mins   Total Treatment:     50   mins              Jocelynn Matos PT    Physical Therapist

## 2023-12-22 RX ORDER — LAMOTRIGINE 200 MG/1
200 TABLET ORAL NIGHTLY
Qty: 90 TABLET | Refills: 1 | Status: SHIPPED | OUTPATIENT
Start: 2023-12-22

## 2023-12-22 RX ORDER — QUETIAPINE FUMARATE 100 MG/1
100 TABLET, FILM COATED ORAL NIGHTLY
Qty: 90 TABLET | Refills: 1 | Status: SHIPPED | OUTPATIENT
Start: 2023-12-22

## 2023-12-26 ENCOUNTER — TREATMENT (OUTPATIENT)
Dept: PHYSICAL THERAPY | Facility: CLINIC | Age: 63
End: 2023-12-26
Payer: MEDICARE

## 2023-12-26 DIAGNOSIS — R29.898 WEAKNESS OF RIGHT ARM: ICD-10-CM

## 2023-12-26 DIAGNOSIS — M25.619 LIMITED RANGE OF MOTION (ROM) OF SHOULDER: ICD-10-CM

## 2023-12-26 DIAGNOSIS — Z96.611 S/P REVERSE TOTAL SHOULDER ARTHROPLASTY, RIGHT: ICD-10-CM

## 2023-12-26 DIAGNOSIS — M19.011 OSTEOARTHRITIS OF RIGHT GLENOHUMERAL JOINT: Primary | ICD-10-CM

## 2023-12-26 NOTE — PROGRESS NOTES
Physical Therapy Daily Treatment Note  [address] [address] [address]  [place] [address]      Patient: Tracie Gross  : 1960  Referring Practitioner: Floyd Ruiz, *  Date of Initial Visit: Type: THERAPY  Noted: 11/3/2023  Today's Date: 2023  Patient seen for: 13 sessions      Visit Diagnoses:     ICD-10-CM ICD-9-CM   1. Osteoarthritis of right glenohumeral joint  M19.011 715.91   2. S/P reverse total shoulder arthroplasty, right  Z96.611 V43.61   3. Weakness of right arm  R29.898 729.89   4. Limited range of motion (ROM) of shoulder  M25.619 719.51       Subjective   Tracie Gross reports the right shldr is doing more and more daily tasks.  She was able to get her deodorant on this morning.     Left shldr and lumbar are quite painful today.        Pain Level (0-10): 2    Objective   Flexion to 125 A but fatigues quickly.       See Exercise, Manual, and Modality Logs for complete treatment.     Patient Education: cont with home ex 2x per day     Assessment/Plan  Evelyn is progressing with active motion with translation into use of the arm for ADLS     Progress per Plan of Care          Timed:         Manual Therapy:    14     mins  47805;     Therapeutic Exercise:    15     mins  55850;     Neuromuscular Walter:        mins  30671;    Therapeutic Activity:     13     mins  01425;     Gait Training:           mins  42949;     Ultrasound:          mins  60273;    Ionto                                   mins   37937  Self Care                            mins   02020      Un-Timed:  Electrical Stimulation:         mins  69725 ( );  Traction          mins 77864    Timed Treatment:   42   mins   Total Treatment:     42   mins              Jocelynn Matos, VANITA    Physical Therapist

## 2023-12-29 ENCOUNTER — TREATMENT (OUTPATIENT)
Dept: PHYSICAL THERAPY | Facility: CLINIC | Age: 63
End: 2023-12-29
Payer: MEDICARE

## 2023-12-29 DIAGNOSIS — M25.619 LIMITED RANGE OF MOTION (ROM) OF SHOULDER: ICD-10-CM

## 2023-12-29 DIAGNOSIS — M19.011 OSTEOARTHRITIS OF RIGHT GLENOHUMERAL JOINT: Primary | ICD-10-CM

## 2023-12-29 DIAGNOSIS — R29.898 WEAKNESS OF RIGHT ARM: ICD-10-CM

## 2023-12-29 DIAGNOSIS — Z96.611 S/P REVERSE TOTAL SHOULDER ARTHROPLASTY, RIGHT: ICD-10-CM

## 2023-12-29 NOTE — PROGRESS NOTES
Physical Therapy Daily Progress and Treatment Note  [address] [address] [address]  [place] [address]      Patient: Tracie Gross  : 1960  Referring Practitioner: Floyd Ruiz, *  Date of Initial Visit: Type: THERAPY  Noted: 11/3/2023  Today's Date: 2023  Patient seen for: 14 sessions      Visit Diagnoses:     ICD-10-CM ICD-9-CM   1. Osteoarthritis of right glenohumeral joint  M19.011 715.91   2. S/P reverse total shoulder arthroplasty, right  Z96.611 V43.61       Subjective   Tracie Gross reports states the left shldr and lumbar are really painful.    Right shldr pain is 3/10.  States that she is now able to fix her hair and feed herself.   Tried to raise arm into the cabinet for her meds.  Still unable to complete that task.  Her goal includes greater strength in order to dress, complete self ADLs, kitchen tasks.     Quick DASH 39 of 55;   last PN   Objective   R shldr ROM:  flex 101A/135AA,     abd 90A/ 130AA  Mod shldr hike with flex/abd.  ER 37A/52AA  MMT:  elbow flex 3+, ext 3+   See Exercise, Manual, and Modality Logs for complete treatment.     Patient Education:  add the 4 SL ex to HEP of the R shldr.      Assessment/Plan  Mrs. Gross has been to PT x's 14 sessions, including the 11/3/23 eval date, for treatment post having a RTSA on 23.  The left shldr also has OA with significant pain and reduced motion and strength.  Thus the LUE has not been able to assist in the AAROM ex at home.  AAROM is in the functional range; however, active motion and strength are not at that level as of this session.  There has been progress in function as client is now able to feed herself, lift arm high enough for hair care.  She is unable to lift covers in the bed or reach into cabinet to get her meds.    The Quick DASH score improved from 46 to 39.   Cont'd PT is indicated in order to achieve the stated goals and functional mobility of the R arm.      STG: all STG met 23   1. Pt  will be independent and compliant with initial HEP and surgical precautions in 2 weeks.  2. Pt will report pain level at worst <4 in 4 weeks.   3. Pt will demonstrate an increase in shoulder flexion PROM to 120 degrees within 4 weeks.   4. Pt will demonstrate an increase in shoulder ER PROM to 40 degrees within 4 weeks.       LTG: by DC (12 weeks)  1. Pt will be independent with final HEP for self-management of condition by DC. Progressing 12/29/23  2. Pt will improve score on QuickDASH to less than 40% by DC.  Progressing 12/29/23  3. Pt will demonstrate shoulder AROM flexion to >130 degrees without compensation in order to be able to get cup or plate out of cabinet by DC.  Progressing 12/29/23  4. Pt will demonstrate shoulder flexion and ER strength to >4/5 in order to be able to clean home and cook by DC.Progressing 12/29/23    Plan:  need insurance auth.           Timed:         Manual Therapy:    16     mins  27312;     Therapeutic Exercise:    14     mins  26739;     Neuromuscular Walter:        mins  26402;    Therapeutic Activity:     13     mins  48565;     Gait Training:           mins  30233;     Ultrasound:          mins  05830;    Ionto                                   mins   80958  Self Care                            mins   12907      Un-Timed:  Electrical Stimulation:         mins  61445 ( );  Traction          mins 56072      Timed Treatment:   43   mins   Total Treatment:     43   mins              Jocelynn Matos, PT    Physical Therapist

## 2024-01-03 ENCOUNTER — TELEPHONE (OUTPATIENT)
Dept: PHYSICAL THERAPY | Facility: CLINIC | Age: 64
End: 2024-01-03

## 2024-01-04 ENCOUNTER — OFFICE VISIT (OUTPATIENT)
Dept: FAMILY MEDICINE CLINIC | Facility: CLINIC | Age: 64
End: 2024-01-04
Payer: MEDICARE

## 2024-01-04 VITALS
HEIGHT: 62 IN | DIASTOLIC BLOOD PRESSURE: 82 MMHG | BODY MASS INDEX: 42.14 KG/M2 | WEIGHT: 229 LBS | OXYGEN SATURATION: 99 % | HEART RATE: 81 BPM | SYSTOLIC BLOOD PRESSURE: 120 MMHG | RESPIRATION RATE: 18 BRPM

## 2024-01-04 DIAGNOSIS — M19.011 OSTEOARTHRITIS OF RIGHT GLENOHUMERAL JOINT: ICD-10-CM

## 2024-01-04 DIAGNOSIS — J01.90 SUBACUTE SINUSITIS, UNSPECIFIED LOCATION: Primary | ICD-10-CM

## 2024-01-04 DIAGNOSIS — M79.7 FIBROMYALGIA: ICD-10-CM

## 2024-01-04 RX ORDER — DOXYCYCLINE HYCLATE 100 MG/1
100 CAPSULE ORAL 2 TIMES DAILY
Qty: 10 CAPSULE | Refills: 0 | Status: SHIPPED | OUTPATIENT
Start: 2024-01-04

## 2024-01-04 RX ORDER — ALBUTEROL SULFATE 90 UG/1
2 AEROSOL, METERED RESPIRATORY (INHALATION) EVERY 4 HOURS PRN
Qty: 8 G | Refills: 0 | Status: SHIPPED | OUTPATIENT
Start: 2024-01-04

## 2024-01-04 RX ORDER — PREDNISONE 20 MG/1
40 TABLET ORAL DAILY
Qty: 10 TABLET | Refills: 0 | Status: SHIPPED | OUTPATIENT
Start: 2024-01-04

## 2024-01-04 NOTE — PATIENT INSTRUCTIONS
- Prednisone 40 mg daily for five days  - Doxycycline 100 mg twice daily for 5 days  - Albuterol as needed  - Stay well hydrated, get plenty of rest  - Symptomatic treatment including over the counter nasal decongestant, cough suppressant, Tylenol as needed  - Hold on imaging for now; consider if persistent/worsening symptoms  - Follow up if new/worsening symptoms    [FreeTextEntry1] : GEN: AAOx3, NAD,  mild R>L anterior neck swelling firm to palpation, nontender\par well healed anterior neck incision \par PSYCH: Normal mood and affect. Responds appropriately to commands.\par EYES: Sclerae Anicteric. No discharge. EOMI.\par RESP: Breathing unlabored. \par EXT: No C/C/E. \par SKIN: Incisions well healed, no erythema \par GAIT: hemiparetic \par STRENGTH: 5/5 RUE and RLE, 4+/5 LUE and 4+/5 L hip flexion, 3/5 L ankle dorsiflexion. able to stand from seated position without using hands to push off.\par SENSATION: Grossly intact to light touch bilateral upper extremities. \par PALP: there is no tenderness to palpation b/l masseter, mandible \par MOUTH OPENING: 3.25 cm \par . \par

## 2024-01-04 NOTE — PROGRESS NOTES
Chief Complaint  Cough, Nasal Congestion, and Shortness of Breath    HPI:    Tracie Gross presents to [facility]    Patient is a 63-year-old female with a history of hypertension, hyperlipidemia, von Willebrand's disease, morbid obesity, hypothyroidism, GERD, CKD, breast cancer, Monahan syndrome, depression, SLE/scleroderma, sleep apnea presenting for evaluation of upper respiratory symptoms.    Patient has been having cough, runny nose, congestion, sinus pain/pressure, ear pain/pressure that has been ongoing over the past month. She had persistent symptoms that have worsened over the past week, especially the cough, sinus congestion. Denies sore throat, lymphadenopathy, myalgias, headache, and fatigue. Denies fever, chills, nausea, vomiting. Denies recent sick contact or travel. Patient has been trying over the counter Flonase, Zyrtec, and Tylenol without improvement. History of asthma but does not take any current medications for it. Denies history of bronchitis, recurrent pneumonia, or tobacco use. Up to date with flu vaccine and previously has had COVID vaccines and prior infusion treatment.     Patient status post right reverse total shoulder on 11/1/2023.  Follows with orthopedics, most recently 12/18/2023 and had been doing well at time of visit.  Patient was recommended to discontinue sling and complete formal physical therapy as tolerated.  Recommended follow-up with orthopedics in 4 weeks with repeat x-ray. Due to have left shoulder replaced in the future.     Review of Systems:  ROS negative unless otherwise noted in HPI above.    Past Medical History:   Diagnosis Date    Allergic 01/01/1998    Ankle sprain     Anxiety     Arthritis     Arthritis of back 0ll1/01/2013    Arthritis of neck 01/01/2005    Asthma     Bipolar affective disorder     Breast cancer 1985    s/p R mastectomy    Bursitis of hip [id number]    Cervical disc disorder 01/01/2006    CTS (carpal tunnel  syndrome)     Depression 09/01/2000    Fracture of wrist 01/01/1995    Fracture, foot     GERD (gastroesophageal reflux disease) 1993    H/O breast reconstruction     SEVERAL    H/O laminectomy     Hamartoma     Hip arthrosis 01/01/2013    History of medical problems     Hx of bilateral oophorectomy     Hypertension     Hypothyroidism     Inflammatory bowel disease 1992    Man. EGD/colonoscopy annually (9/2021)    Irritable bowel syndrome     Kienbock's disease, right     WRIST    Knee swelling 01/01/2007    Low back pain 1991    Lumbosacral disc disease 01/01/1995    Had 2 lumber surgeries    Lupus     [place]avenell    Monahan syndrome     Man. EGD/colonoscopy annually (9/2021). MRI alternating w/ EUS annually for pancreatic screen    MRSA infection     Obesity     Osteopenia     Osteoporosis     maintained on Prolia through [facility]    Periarthritis of shoulder 04/01/2022    Pneumonia     Raynaud disease     Renal insufficiency     Rotator cuff syndrome [id number]    Scleroderma     Sleep apnea     cpap    Squamous cell cancer of lip     Tear of meniscus of knee     Tendinitis of knee     Thoracic disc disorder     Von Willebrand disease          Current Outpatient Medications:     ALPRAZolam (XANAX) 1 MG tablet, Take 1 tablet by mouth At Night As Needed for Anxiety., Disp: 30 tablet, Rfl: 3    bisoprolol-hydrochlorothiazide (ZIAC) 10-6.25 MG per tablet, TAKE ONE TABLET BY MOUTH DAILY, Disp: 90 tablet, Rfl: 1    Calcium + Vitamin D3 600-5 MG-MCG tablet, TAKE ONE TABLET BY MOUTH TWICE A DAY, Disp: 60 tablet, Rfl: 6    cetirizine (zyrTEC) 10 MG tablet, TAKE ONE TABLET BY MOUTH DAILY, Disp: 90 tablet, Rfl: 1    cyclobenzaprine (FLEXERIL) 10 MG tablet, Take 1 tablet by mouth 3 (Three) Times a Day As Needed for Muscle Spasms., Disp: 45 tablet, Rfl: 1    Denosumab (PROLIA SC), Inject  under the skin into the appropriate area as directed Every 6 (Six) Months., Disp: , Rfl:     dexlansoprazole (DEXILANT) 60 MG capsule, Take 1  capsule by mouth Daily., Disp: , Rfl:     famotidine (PEPCID) 10 MG tablet, , Disp: , Rfl:     ferrous sulfate (FeroSul) 325 (65 FE) MG tablet, TAKE ONE TABLET BY MOUTH DAILY WITH BREAKFAST, Disp: 30 tablet, Rfl: 1    Flaxseed, Linseed, (FLAXSEED OIL MAX STR) 1300 MG capsule, Take 1 tablet by mouth 2 (Two) Times a Day., Disp: , Rfl:     fluticasone (FLONASE) 50 MCG/ACT nasal spray, SPRAY TWO SPRAYS IN EACH NOSTRIL ONCE DAILY, Disp: 16 mL, Rfl: 5    furosemide (Lasix) 20 MG tablet, Take 1 tablet by mouth Daily As Needed (leg swelling)., Disp: 30 tablet, Rfl: 2    gabapentin (NEURONTIN) 600 MG tablet, Take 2 tablets in the morning, 1 tablet in the afternoon, and 2 tablets at night, Disp: 450 tablet, Rfl: 1    hydroxychloroquine (PLAQUENIL) 200 MG tablet, Take 1 tablet by mouth 2 (Two) Times a Day. Indications: Systemic Lupus Erythematosus, Disp: 180 tablet, Rfl: 3    hydrOXYzine (ATARAX) 25 MG tablet, Take 1 tablet by mouth Every 8 (Eight) Hours As Needed for Itching., Disp: 30 tablet, Rfl: 3    lamoTRIgine (LaMICtal) 200 MG tablet, Take 1 tablet by mouth Every Night., Disp: 90 tablet, Rfl: 1    levothyroxine (SYNTHROID, LEVOTHROID) 175 MCG tablet, TAKE ONE TABLET BY MOUTH DAILY (Patient taking differently: Take 1 tablet by mouth Daily.), Disp: 90 tablet, Rfl: 1    lisinopril (PRINIVIL,ZESTRIL) 5 MG tablet, TAKE ONE TABLET BY MOUTH DAILY, Disp: 90 tablet, Rfl: 0    meclizine (ANTIVERT) 25 MG tablet, Take 1 tablet by mouth 3 (Three) Times a Day As Needed for Dizziness., Disp: 30 tablet, Rfl: 1    NIFEdipine XL (PROCARDIA XL) 90 MG 24 hr tablet, Take 1 tablet by mouth Daily., Disp: 90 tablet, Rfl: 1    ondansetron (ZOFRAN) 4 MG tablet, Take 1 tablet by mouth Every 8 (Eight) Hours As Needed for Nausea or Vomiting., Disp: 30 tablet, Rfl: 3    oxyCODONE (ROXICODONE) 10 MG tablet, Take 1 tablet by mouth Every 6 (Six) Hours As Needed for Moderate Pain., Disp: 120 tablet, Rfl: 0    QUEtiapine (SEROquel) 100 MG tablet, Take 1  "tablet by mouth Every Night., Disp: 90 tablet, Rfl: 1    triamcinolone (KENALOG) 0.1 % lotion, Apply  topically to the appropriate area as directed 3 (Three) Times a Day As Needed., Disp: , Rfl:     Social History     Socioeconomic History    Marital status:      Spouse name: Brian    Number of children: 2   Tobacco Use    Smoking status: Former     Packs/day: 0.25     Years: 7.00     Additional pack years: 0.00     Total pack years: 1.75     Types: Cigarettes     Start date: 1984     Quit date: 1994     Years since quittin.0     Passive exposure: Past    Smokeless tobacco: Never    Tobacco comments:     Off and on for  those years   Vaping Use    Vaping Use: Never used   Substance and Sexual Activity    Alcohol use: Not Currently     Comment: Rarely    Drug use: No    Sexual activity: Never        Objective   Vital Signs:  There were no vitals taken for this visit.  Estimated body mass index is 42.07 kg/m² as calculated from the following:    Height as of 23: 157.5 cm (62\").    Weight as of 23: 104 kg (230 lb).    Physical Exam:  General: Well-appearing patient, no apparent distress  HEENT: No posterior pharynx erythema, no tonsillar erythema or exudates, normal external auditory canals, TM with small effusions bilaterally otherwise without bulging or erythema, maxillary and frontal sinus tenderness  Neck: No cervical lymphadenopathy  Cardiac: Regular rate and rhythm, normal S1/S2, no murmur, rubs or gallops, no lower extremity edema  Lungs: Clear to auscultation bilaterally, no crackles or wheezes  Skin: No significant rashes or lesions  MSK: Grossly normal tone and strength  Neuro: Alert and oriented x3, CN II-XII grossly intact  Psych: Appropriate mood and affect    Assessment and Plan:    (J01.90) Subacute sinusitis, unspecified location  Assessment: Patient present with symptoms consistent with persistent sinusitis.  Likely had preceding viral illness and concern for current " superimposed bacterial infection requiring antibiotics given recent worsening and duration of symptoms.  Plan:  - Prednisone 40 mg daily for five days  - Doxycycline 100 mg twice daily for 5 days  - Albuterol as needed; history of asthma  - Stay well hydrated, get plenty of rest  - Symptomatic treatment including over the counter nasal decongestant, cough suppressant, Tylenol as needed  - Hold on imaging for now; consider if persistent/worsening symptoms  - Follow up if new/worsening symptoms     (M19.011) Osteoarthritis of right glenohumeral joint   Assessment: Status post recent total shoulder with orthopedics.  Continues to have pain somewhat which is likely secondary to fibromyalgia.  Patient previously has benefited from steroids.  Will use benefit of steroids for treatment of sinusitis to help aid with ongoing pain.  Plan:  - Continue therapies  - Follow up with ortho as schedule  - Prednisone as above    Patient was given instructions and counseling regarding her condition or for health maintenance advice. Please see specific information pulled into the AVS if appropriate.       Dr Butch Brown   Internal Medicine Physician  [facility]  [address], [address]  [place] [address]

## 2024-01-06 DIAGNOSIS — G47.00 INSOMNIA, UNSPECIFIED TYPE: ICD-10-CM

## 2024-01-06 DIAGNOSIS — R63.5 WEIGHT GAIN: ICD-10-CM

## 2024-01-08 RX ORDER — ALPRAZOLAM 1 MG/1
1 TABLET ORAL NIGHTLY PRN
Qty: 30 TABLET | Refills: 3 | Status: SHIPPED | OUTPATIENT
Start: 2024-01-08

## 2024-01-09 ENCOUNTER — TREATMENT (OUTPATIENT)
Dept: PHYSICAL THERAPY | Facility: CLINIC | Age: 64
End: 2024-01-09
Payer: MEDICARE

## 2024-01-09 DIAGNOSIS — M19.011 OSTEOARTHRITIS OF RIGHT GLENOHUMERAL JOINT: Primary | ICD-10-CM

## 2024-01-09 DIAGNOSIS — M25.619 LIMITED RANGE OF MOTION (ROM) OF SHOULDER: ICD-10-CM

## 2024-01-09 DIAGNOSIS — R29.898 WEAKNESS OF RIGHT ARM: ICD-10-CM

## 2024-01-09 DIAGNOSIS — Z96.611 S/P REVERSE TOTAL SHOULDER ARTHROPLASTY, RIGHT: ICD-10-CM

## 2024-01-09 NOTE — PROGRESS NOTES
Physical Therapy Daily Treatment Note  [address] [address] [address]  [place] [address]      Patient: Tracie Gross  : 1960  Referring Practitioner: Floyd Ruiz, *  Date of Initial Visit: Type: THERAPY  Noted: 11/3/2023  Today's Date: 2024  Patient seen for: 15 sessions      Visit Diagnoses:     ICD-10-CM ICD-9-CM   1. Osteoarthritis of right glenohumeral joint  M19.011 715.91   2. S/P reverse total shoulder arthroplasty, right  Z96.611 V43.61   3. Weakness of right arm  R29.898 729.89   4. Limited range of motion (ROM) of shoulder  M25.619 719.51       Subjective   Tracie Gross reports that she was able to get started on prednisone and is feeling better.   She did verify with ortho prior to starting the med.  Started the dose pack last Tu and an antibotic.     Objective   Shldr flex and chest press completed in  today I through most of the range  See Exercise, Manual, and Modality Logs for complete treatment.     Patient Education:     Assessment/Plan  Mrs. Gross is progressing with her motion of the right arm in gravity resisted positions.  She also presents to the clinic today with less pain in the lumbar and left shldr areas after starting a steroid dose pack last week.     Progress per Plan of Care          Timed:         Manual Therapy:    16     mins  41999;     Therapeutic Exercise:    15     mins  90531;     Neuromuscular Walter:        mins  21970;    Therapeutic Activity:     13     mins  72727;     Gait Training:          mins  61288;     Ultrasound:          mins  04885;    Ionto                                   mins   68013  Self Care                            mins   10739      Un-Timed:  Electrical Stimulation:         mins  41880 ( );  Traction          mins 50828      Timed Treatment:   44   mins   Total Treatment:     44   mins              Jocelynn Matos, PT    Physical Therapist

## 2024-01-10 ENCOUNTER — TELEPHONE (OUTPATIENT)
Dept: ONCOLOGY | Facility: CLINIC | Age: 64
End: 2024-01-10

## 2024-01-10 NOTE — TELEPHONE ENCOUNTER
"    Caller: Tracie Gross \"Evelyn\"    Relationship to patient: Self    Best call back number: [phone]    Type of visit: LAB, F/U, AND INJECTION    Requested date: PREFERS AFTERNOON APPT TIME    If rescheduling, when is the original appointment: 1/9/24  "

## 2024-01-12 ENCOUNTER — TELEPHONE (OUTPATIENT)
Dept: PHYSICAL THERAPY | Facility: CLINIC | Age: 64
End: 2024-01-12

## 2024-01-15 DIAGNOSIS — C50.919 MALIGNANT NEOPLASM OF FEMALE BREAST, UNSPECIFIED ESTROGEN RECEPTOR STATUS, UNSPECIFIED LATERALITY, UNSPECIFIED SITE OF BREAST: ICD-10-CM

## 2024-01-15 DIAGNOSIS — M81.0 OSTEOPOROSIS, UNSPECIFIED OSTEOPOROSIS TYPE, UNSPECIFIED PATHOLOGICAL FRACTURE PRESENCE: Primary | ICD-10-CM

## 2024-01-16 ENCOUNTER — LAB (OUTPATIENT)
Dept: LAB | Facility: HOSPITAL | Age: 64
End: 2024-01-16
Payer: MEDICARE

## 2024-01-16 ENCOUNTER — HOSPITAL ENCOUNTER (OUTPATIENT)
Dept: ONCOLOGY | Facility: HOSPITAL | Age: 64
Discharge: HOME | End: 2024-01-16
Payer: MEDICARE

## 2024-01-16 ENCOUNTER — OFFICE VISIT (OUTPATIENT)
Dept: ONCOLOGY | Facility: CLINIC | Age: 64
End: 2024-01-16
Payer: MEDICARE

## 2024-01-16 VITALS
RESPIRATION RATE: 20 BRPM | DIASTOLIC BLOOD PRESSURE: 85 MMHG | HEIGHT: 62 IN | OXYGEN SATURATION: 95 % | BODY MASS INDEX: 40.12 KG/M2 | SYSTOLIC BLOOD PRESSURE: 123 MMHG | HEART RATE: 70 BPM | WEIGHT: 218 LBS | TEMPERATURE: 98 F

## 2024-01-16 DIAGNOSIS — C50.919 MALIGNANT NEOPLASM OF FEMALE BREAST, UNSPECIFIED ESTROGEN RECEPTOR STATUS, UNSPECIFIED LATERALITY, UNSPECIFIED SITE OF BREAST: Primary | ICD-10-CM

## 2024-01-16 DIAGNOSIS — M81.0 OSTEOPOROSIS, UNSPECIFIED OSTEOPOROSIS TYPE, UNSPECIFIED PATHOLOGICAL FRACTURE PRESENCE: Primary | ICD-10-CM

## 2024-01-16 DIAGNOSIS — M81.0 OSTEOPOROSIS, UNSPECIFIED OSTEOPOROSIS TYPE, UNSPECIFIED PATHOLOGICAL FRACTURE PRESENCE: ICD-10-CM

## 2024-01-16 DIAGNOSIS — Z00.00 MEDICARE ANNUAL WELLNESS VISIT, SUBSEQUENT: ICD-10-CM

## 2024-01-16 DIAGNOSIS — Z12.31 ENCOUNTER FOR SCREENING MAMMOGRAM FOR MALIGNANT NEOPLASM OF BREAST: ICD-10-CM

## 2024-01-16 DIAGNOSIS — C50.919 MALIGNANT NEOPLASM OF FEMALE BREAST, UNSPECIFIED ESTROGEN RECEPTOR STATUS, UNSPECIFIED LATERALITY, UNSPECIFIED SITE OF BREAST: ICD-10-CM

## 2024-01-16 LAB
ALBUMIN SERPL-MCNC: 4.1 G/DL (ref 3.5–5.2)
ALBUMIN/GLOB SERPL: 1.2 G/DL
ALP SERPL-CCNC: 176 U/L (ref 39–117)
ALT SERPL W P-5'-P-CCNC: 25 U/L (ref 1–33)
ANION GAP SERPL CALCULATED.3IONS-SCNC: 14 MMOL/L (ref 5–15)
AST SERPL-CCNC: 26 U/L (ref 1–32)
BASOPHILS # BLD AUTO: 0.08 10*3/MM3 (ref 0–0.2)
BASOPHILS NFR BLD AUTO: 0.7 % (ref 0–1.5)
BILIRUB SERPL-MCNC: 0.3 MG/DL (ref 0–1.2)
BUN SERPL-MCNC: 17 MG/DL (ref 8–23)
BUN/CREAT SERPL: 13.3 (ref 7–25)
CALCIUM SPEC-SCNC: 10.2 MG/DL (ref 8.6–10.5)
CHLORIDE SERPL-SCNC: 98 MMOL/L (ref 98–107)
CO2 SERPL-SCNC: 28 MMOL/L (ref 22–29)
CREAT SERPL-MCNC: 1.28 MG/DL (ref 0.57–1)
DEPRECATED RDW RBC AUTO: 53.4 FL (ref 37–54)
EGFRCR SERPLBLD CKD-EPI 2021: 46.6 ML/MIN/1.73
EOSINOPHIL # BLD AUTO: 0.74 10*3/MM3 (ref 0–0.4)
EOSINOPHIL NFR BLD AUTO: 6.3 % (ref 0.3–6.2)
ERYTHROCYTE [DISTWIDTH] IN BLOOD BY AUTOMATED COUNT: 14.8 % (ref 12.3–15.4)
GLOBULIN UR ELPH-MCNC: 3.3 GM/DL
GLUCOSE SERPL-MCNC: 115 MG/DL (ref 65–99)
HCT VFR BLD AUTO: 42.6 % (ref 34–46.6)
HGB BLD-MCNC: 13.6 G/DL (ref 12–15.9)
HOLD SPECIMEN: NORMAL
LYMPHOCYTES # BLD AUTO: 2.93 10*3/MM3 (ref 0.7–3.1)
LYMPHOCYTES NFR BLD AUTO: 25.1 % (ref 19.6–45.3)
MAGNESIUM SERPL-MCNC: 1.7 MG/DL (ref 1.6–2.4)
MCH RBC QN AUTO: 32 PG (ref 26.6–33)
MCHC RBC AUTO-ENTMCNC: 31.9 G/DL (ref 31.5–35.7)
MCV RBC AUTO: 100.2 FL (ref 79–97)
MONOCYTES # BLD AUTO: 1.66 10*3/MM3 (ref 0.1–0.9)
MONOCYTES NFR BLD AUTO: 14.2 % (ref 5–12)
NEUTROPHILS NFR BLD AUTO: 53.7 % (ref 42.7–76)
NEUTROPHILS NFR BLD AUTO: 6.25 10*3/MM3 (ref 1.7–7)
PHOSPHATE SERPL-MCNC: 3.5 MG/DL (ref 2.5–4.5)
PLATELET # BLD AUTO: 280 10*3/MM3 (ref 140–450)
PMV BLD AUTO: 11.3 FL (ref 6–12)
POTASSIUM SERPL-SCNC: 3.7 MMOL/L (ref 3.5–5.2)
PROT SERPL-MCNC: 7.4 G/DL (ref 6–8.5)
RBC # BLD AUTO: 4.25 10*6/MM3 (ref 3.77–5.28)
SODIUM SERPL-SCNC: 140 MMOL/L (ref 136–145)
WBC NRBC COR # BLD AUTO: 11.66 10*3/MM3 (ref 3.4–10.8)

## 2024-01-16 PROCEDURE — 85025 COMPLETE CBC W/AUTO DIFF WBC: CPT

## 2024-01-16 PROCEDURE — 25010000002 DENOSUMAB 60 MG/ML SOLUTION PREFILLED SYRINGE: Performed by: INTERNAL MEDICINE

## 2024-01-16 PROCEDURE — 80053 COMPREHEN METABOLIC PANEL: CPT | Performed by: INTERNAL MEDICINE

## 2024-01-16 PROCEDURE — 83735 ASSAY OF MAGNESIUM: CPT | Performed by: INTERNAL MEDICINE

## 2024-01-16 PROCEDURE — 96372 THER/PROPH/DIAG INJ SC/IM: CPT

## 2024-01-16 PROCEDURE — 84100 ASSAY OF PHOSPHORUS: CPT | Performed by: INTERNAL MEDICINE

## 2024-01-16 PROCEDURE — 36415 COLL VENOUS BLD VENIPUNCTURE: CPT

## 2024-01-16 RX ORDER — CALCIUM CARBONATE/VITAMIN D3 600MG-5MCG
1 TABLET ORAL 2 TIMES DAILY
Qty: 60 TABLET | Refills: 6 | Status: SHIPPED | OUTPATIENT
Start: 2024-01-16

## 2024-01-16 RX ADMIN — DENOSUMAB 60 MG: 60 INJECTION SUBCUTANEOUS at 16:13

## 2024-01-16 NOTE — PROGRESS NOTES
Patient here for Prolia injection and M.D. visit. Patient received Prolia injection and tolerated injection well. Patient gave AVS per M.D. staff.

## 2024-01-16 NOTE — PROGRESS NOTES
Hematology/Oncology Outpatient Follow Up    PATIENT NAME:Tracie Gross  :1960  MRN: [id number]  PRIMARY CARE PHYSICIAN: Floyd Silva MD  REFERRING PHYSICIAN: No ref. provider found      Chief Complaint   Patient presents with    Follow-up     Malignant neoplasm of female breast, unspecified estrogen receptor status, unspecified laterality, unspecified site of breast       HISTORY OF PRESENT ILLNESS:     This is a 63 y.o.  female who was diagnosed with right breast cancer in  when she was 32 years old.  Patient was originally seen on 18.  Please refer to the details of that note for more information.   18 - CBC:  WBC 13.6, hemoglobin 11.7, platelet count 392,000.  Differentials 65% neutrophils, 15% lymphocytes.  There was mild monocytosis at 16.  Eosinophils were 2.3 and basophils were 0.3.  B12 292.  Ferritin 88.  Folate 13.8.  AST slightly high at 57.  Alkaline phosphatase was high at 326.  .  Haptoglobin 179, normal.  SPEP with DYLAN did not show any monoclonal protein.  Flow cytometry did not show any evidence of acute leukemia or lymphoproliferative disease.  There was monocytosis measured at 21%.  Reticulocyte count normal 1.02.  Peripheral smear showed absolute monocytosis at 19%, thrombocytosis and macrocytosis.  BCR-abl was negative.  Methylmalonic acid level was elevated to 420.    18 - PET/CT scan showed multiple small bilateral lymph nodes in the neck without metabolic activity or change from prior CT scans and are likely due to SLE.  There were unchanged bilateral level 1 axillary lymph nodes without metabolic activity.  Multiple mediastinal nodes were also seen.  The largest 11 mm, unchanged from prior imaging with no hypermetabolic activity.  There was no evidence of metastatic disease in the abdomen or pelvis.  There was no focal lesion within the liver or biliary system.  Multiple small retroperitoneal and external iliac nodes, bilateral inguinal nodes similar  to prior imaging with no PET activity.    8/8/18 - RICHIE-2 analysis with no mutation identified.    11/29/18 - Bone density revealed osteoporosis.  Patient is currently on Fosamax.   12/3/18 - Bone marrow aspiration and biopsy showed normocellular bone marrow at 40%.  There was adequate iron stores and no evidence of malignancy was seen.  Flow cytometry was negative.  Cytogenetics showed normal female karyotype.  Blasts were less than 3% of nucleated cells.  There was no significant dyspoiesis.   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     1/22/19 - Urine cytology was negative for malignant cells.    2/28/19 - Patient seen by Dermatology for her annual skin evaluation.   3/13/19 - CT scan of the chest, abdomen and pelvis:  CT scan of chest showed chronic changes.  5/29/2019 patient had an EGD with polypectomy performed by Dr. Conway.  Dr. Conway has recommended follow-up MRI of the pancreas in 2 years.  And also EGD and colonoscopy in 2 years.  11/13/19-left screening mammogram was negative follow-up in 1 year was recommended  5/4/2020 patient had a CT scan of the chest, abdomen and pelvis multiple borderline enlarged mediastinal lymph nodes the largest measuring 2.2 cm in the subcarinal space unchanged from prior.  Pancreas is normal.  There are few retroperitoneal mesenteric and pelvic lymph node enlargement which are similar to prior exam.  All are subcentimeter in size.  5/20/2020 patient had bilateral breast MRI did not show any evidence of malignancy.  12/7/2020 patient DEXA scan showed persistent and worsened osteoporosis    Past Medical History:   Diagnosis Date    Allergic 01/01/1998    Ankle sprain     Anxiety     Arthritis     Arthritis of back 0ll1/01/2013    Arthritis of neck 01/01/2005    Asthma     Bipolar affective disorder     Breast cancer 1985     s/p R mastectomy    Bursitis of hip [id number]    Cervical disc disorder 2006    CTS (carpal tunnel syndrome)     Depression 2000    Fracture of wrist 1995    Fracture, foot     GERD (gastroesophageal reflux disease)     H/O breast reconstruction     SEVERAL    H/O laminectomy     Hamartoma     Hip arthrosis 2013    History of medical problems     Hx of bilateral oophorectomy     Hypertension     Hypothyroidism     Inflammatory bowel disease     Man. EGD/colonoscopy annually (2021)    Irritable bowel syndrome     Kienbock's disease, right     WRIST    Knee swelling 2007    Low back pain 1991    Lumbosacral disc disease 1995    Had 2 lumber surgeries    Lupus     [place]    Monahan syndrome     Man. EGD/colonoscopy annually (2021). MRI alternating w/ EUS annually for pancreatic screen    MRSA infection     Obesity     Osteopenia     Osteoporosis     maintained on Prolia through Karen    Periarthritis of shoulder 2022    Pneumonia     Raynaud disease     Renal insufficiency     Rotator cuff syndrome [id number]    Scleroderma     Sleep apnea     cpap    Squamous cell cancer of lip     Tear of meniscus of knee     Tendinitis of knee     Thoracic disc disorder     Von Willebrand disease        Past Surgical History:   Procedure Laterality Date    ARM DEBRIDEMENT Right     X 3    BACK SURGERY      Vetas- cervical fusion    BACK SURGERY      lumbar decomp    BREAST BIOPSY      BREAST LUMPECTOMY      BREAST RECONSTRUCTION Right     BREAST RECONSTRUCTION Right     CARDIAC CATHETERIZATION      no stents placed     SECTION  ,     CHOLECYSTECTOMY      COLONOSCOPY      COLONOSCOPY N/A 2020    Procedure: COLONOSCOPY;  Surgeon: Cayden Conway MD;  Location: [facility] ENDOSCOPY;  Service: Gastroenterology;  Laterality: N/A;  rectal ulcer    COLONOSCOPY N/A 2021    Procedure: COLONOSCOPY WITH POLYPECTOMY X 1;  Surgeon: Cayden Conway  MD Steevnson;  Location: [facility] ENDOSCOPY;  Service: Gastroenterology;  Laterality: N/A;  Post: COLON POLYP    COLONOSCOPY N/A 01/06/2023    Procedure: COLONOSCOPY with polypectomy x 1, endoscopic clipping x 1;  Surgeon: Cayden Conway MD;  Location: [facility] ENDOSCOPY;  Service: Gastroenterology;  Laterality: N/A;    COSMETIC SURGERY  [id number]    ENDOSCOPY      ENDOSCOPY N/A 08/14/2020    Procedure: ESOPHAGOGASTRODUODENOSCOPY;  Surgeon: Cayden Conway MD;  Location: [facility] ENDOSCOPY;  Service: Gastroenterology;  Laterality: N/A;  Normal EGD    ENDOSCOPY N/A 09/17/2021    Procedure: ESOPHAGOGASTRODUODENOSCOPY;  Surgeon: Cayden Conway MD;  Location: [facility] ENDOSCOPY;  Service: Gastroenterology;  Laterality: N/A;  Post: normal egd    EXPLORATORY LAPAROTOMY      scar tissue removal    EYE SURGERY  2000    FINGER SURGERY Right     ring finger mass excision    HAND SURGERY  Rt wrist  10/15    HIP SURGERY  1/12    HYSTERECTOMY      PARTIAL    JOINT REPLACEMENT Right 2012,2015    hip and knee    KNEE ARTHROSCOPY Left 01/07/2020    Procedure: LEFT KNEE SCOPE with partial lateral meniscectomy;  Surgeon: Chau Perez MD;  Location: [facility] MAIN OR;  Service: Orthopedics    KNEE SURGERY  Rtf knee  2015    LASIK      LASIK/ LASIK ENHANCEMENT    MASTECTOMY RADICAL Right     NECK SURGERY  01/01/06    OOPHORECTOMY Bilateral     SHOULDER SURGERY  11/01/2023    SPINE SURGERY      SPLENECTOMY      TOTAL SHOULDER ARTHROPLASTY W/ DISTAL CLAVICLE EXCISION Right 11/01/2023    Procedure: TOTAL SHOULDER REVERSE ARTHROPLASTY;  Surgeon: Floyd Ruiz MD;  Location: [facility] MAIN OR;  Service: Orthopedics;  Laterality: Right;    TRIGGER POINT INJECTION  7/23    TUBAL ABDOMINAL LIGATION  1981    UPPER ENDOSCOPIC ULTRASOUND W/ FNA N/A 12/09/2021    Procedure: ENDOSCOPIC ULTRASOUND WITH ESOPHAGOGASTRODUODENOSCOPY;  Surgeon: Luis E Negron MD;  Location: [facility] ENDOSCOPY;  Service: Gastroenterology;  Laterality:  N/A;  Post: GASTROPARESIS, DILATED COMMON BILE DUCT, FUNDIC POLYP, DUODENITIS, NORMAL PANCREAS, HIATAL HERNIA    WRIST SURGERY Right     distal radius head removal (bone dead)  plates and screws decomp lunate         Current Outpatient Medications:     albuterol sulfate  (90 Base) MCG/ACT inhaler, Inhale 2 puffs Every 4 (Four) Hours As Needed for Wheezing., Disp: 8 g, Rfl: 0    ALPRAZolam (XANAX) 1 MG tablet, Take 1 tablet by mouth At Night As Needed for Anxiety., Disp: 30 tablet, Rfl: 3    bisoprolol-hydrochlorothiazide (ZIAC) 10-6.25 MG per tablet, TAKE ONE TABLET BY MOUTH DAILY, Disp: 90 tablet, Rfl: 1    Calcium + Vitamin D3 600-5 MG-MCG tablet, TAKE 1 TABLET BY MOUTH TWO TIMES A DAY, Disp: 60 tablet, Rfl: 6    cetirizine (zyrTEC) 10 MG tablet, TAKE ONE TABLET BY MOUTH DAILY, Disp: 90 tablet, Rfl: 1    cyclobenzaprine (FLEXERIL) 10 MG tablet, Take 1 tablet by mouth 3 (Three) Times a Day As Needed for Muscle Spasms., Disp: 45 tablet, Rfl: 1    Denosumab (PROLIA SC), Inject  under the skin into the appropriate area as directed Every 6 (Six) Months., Disp: , Rfl:     dexlansoprazole (DEXILANT) 60 MG capsule, Take 1 capsule by mouth Daily., Disp: , Rfl:     famotidine (PEPCID) 10 MG tablet, , Disp: , Rfl:     ferrous sulfate (FeroSul) 325 (65 FE) MG tablet, TAKE ONE TABLET BY MOUTH DAILY WITH BREAKFAST, Disp: 30 tablet, Rfl: 1    Flaxseed, Linseed, (FLAXSEED OIL MAX STR) 1300 MG capsule, Take 1 tablet by mouth 2 (Two) Times a Day., Disp: , Rfl:     fluticasone (FLONASE) 50 MCG/ACT nasal spray, SPRAY TWO SPRAYS IN EACH NOSTRIL ONCE DAILY, Disp: 16 mL, Rfl: 5    furosemide (Lasix) 20 MG tablet, Take 1 tablet by mouth Daily As Needed (leg swelling)., Disp: 30 tablet, Rfl: 2    gabapentin (NEURONTIN) 600 MG tablet, Take 2 tablets in the morning, 1 tablet in the afternoon, and 2 tablets at night, Disp: 450 tablet, Rfl: 1    hydroxychloroquine (PLAQUENIL) 200 MG tablet, Take 1 tablet by mouth 2 (Two) Times a Day.  Indications: Systemic Lupus Erythematosus, Disp: 180 tablet, Rfl: 3    hydrOXYzine (ATARAX) 25 MG tablet, Take 1 tablet by mouth Every 8 (Eight) Hours As Needed for Itching., Disp: 30 tablet, Rfl: 3    lamoTRIgine (LaMICtal) 200 MG tablet, Take 1 tablet by mouth Every Night., Disp: 90 tablet, Rfl: 1    levothyroxine (SYNTHROID, LEVOTHROID) 175 MCG tablet, TAKE ONE TABLET BY MOUTH DAILY (Patient taking differently: Take 1 tablet by mouth Daily.), Disp: 90 tablet, Rfl: 1    lisinopril (PRINIVIL,ZESTRIL) 5 MG tablet, TAKE ONE TABLET BY MOUTH DAILY, Disp: 90 tablet, Rfl: 0    meclizine (ANTIVERT) 25 MG tablet, Take 1 tablet by mouth 3 (Three) Times a Day As Needed for Dizziness. (Patient not taking: Reported on 1/4/2024), Disp: 30 tablet, Rfl: 1    NIFEdipine XL (PROCARDIA XL) 90 MG 24 hr tablet, Take 1 tablet by mouth Daily., Disp: 90 tablet, Rfl: 1    ondansetron (ZOFRAN) 4 MG tablet, Take 1 tablet by mouth Every 8 (Eight) Hours As Needed for Nausea or Vomiting., Disp: 30 tablet, Rfl: 3    oxyCODONE (ROXICODONE) 10 MG tablet, Take 1 tablet by mouth Every 6 (Six) Hours As Needed for Moderate Pain., Disp: 120 tablet, Rfl: 0    QUEtiapine (SEROquel) 100 MG tablet, Take 1 tablet by mouth Every Night., Disp: 90 tablet, Rfl: 1    triamcinolone (KENALOG) 0.1 % lotion, Apply  topically to the appropriate area as directed 3 (Three) Times a Day As Needed., Disp: , Rfl:   No current facility-administered medications for this visit.    Facility-Administered Medications Ordered in Other Visits:     denosumab (PROLIA) syringe 60 mg, 60 mg, Subcutaneous, Once, Suzan Jones MD    Allergies   Allergen Reactions    Aspirin Other (See Comments)     VONWILLENBRAND DISEASE     Penicillins Anaphylaxis    Baclofen Hives    Tape  [Adhesive Tape] Rash       Family History   Problem Relation Age of Onset    Colon cancer Mother     Heart disease Mother     Cancer Mother         Colon    Arthritis Mother     Kidney disease Father      Heart disease Father     Depression Father         Bladder cancer    Hyperlipidemia Father     Vision loss Father     Hypertension Father     Colon cancer Sister     Diabetes Sister     Heart disease Sister     Von Willebrand disease Sister     Von Willebrand disease Brother     Cancer Brother     Von Willebrand disease Son     Breast cancer Son     Other Son         burdick syndrome    Diabetes Paternal Grandmother     Stroke Paternal Grandmother     Colon cancer Other     Colon cancer Son     Von Willebrand disease Son     Other Son         burdick syndrome    Breast cancer Son     Cancer Sister         Melanoma, colon, bladder    Vision loss Sister     Other Sister     Stroke Sister     Arthritis Sister     Asthma Sister     Diabetes Sister     Heart disease Sister     Hyperlipidemia Sister     Kidney disease Sister     Cancer Paternal Aunt     Cancer Maternal Aunt     Cancer Maternal Aunt     Hyperlipidemia Sister     Thyroid disease Sister     Arthritis Sister     Hypertension Sister     Kidney disease Sister     Broken bones Sister     Rheumatologic disease Sister     Thyroid disease Sister     Cancer Sister     Clotting disorder Sister        Cancer-related family history includes Breast cancer in her son and son; Cancer in her brother, maternal aunt, maternal aunt, mother, paternal aunt, sister, and sister; Colon cancer in her mother, sister, son, and another family member.    Social History     Tobacco Use    Smoking status: Former     Packs/day: 0.25     Years: 7.00     Additional pack years: 0.00     Total pack years: 1.75     Types: Cigarettes     Start date: 1984     Quit date: 1994     Years since quittin.0     Passive exposure: Past    Smokeless tobacco: Never    Tobacco comments:     Off and on for  those years   Vaping Use    Vaping Use: Never used   Substance Use Topics    Alcohol use: Not Currently     Comment: Rarely    Drug use: No       I have reviewed and confirmed the accuracy of the  "patient's history: Chief complaint, HPI, ROS, and Subjective as entered by the MA/LPN/RN. Suzan Jones MD 01/16/24        SUBJECTIVE:    She is here today for routine follow-up.  She was seen by her rheumatologist and was found to have significantly elevated liver function tests otherwise she denies any specific breast issues or any abdominal symptoms      Patient is here for follow-up.  She is scheduled for left shoulder rotator cuff repair in a few weeks      Patient had left hip replacement surgery a few months ago      Patient denies any new issues          REVIEW OF SYSTEMS:    Review of Systems   Constitutional:  Negative for chills and fever.   HENT:  Negative for ear pain, mouth sores, nosebleeds and sore throat.    Eyes:  Negative for photophobia and visual disturbance.   Respiratory:  Negative for wheezing and stridor.    Cardiovascular:  Negative for chest pain and palpitations.   Gastrointestinal:  Negative for abdominal pain, diarrhea, nausea and vomiting.   Endocrine: Negative for cold intolerance and heat intolerance.   Genitourinary:  Negative for dysuria and hematuria.   Musculoskeletal:  Negative for joint swelling and neck stiffness.   Skin:  Negative for color change and rash.   Neurological:  Negative for seizures and syncope.   Hematological:  Negative for adenopathy.        No obvious bleeding   Psychiatric/Behavioral:  Negative for agitation, confusion and hallucinations.          OBJECTIVE:    Vitals:    01/16/24 1539   BP: 123/85   Pulse: 70   Resp: 20   Temp: 98 °F (36.7 °C)   TempSrc: Infrared   SpO2: 95%   Weight: 98.9 kg (218 lb)   Height: 157.5 cm (62\")   PainSc:   4   PainLoc: Generalized         ECOG  (1) Restricted in physically strenuous activity, ambulatory and able to do work of light nature    Physical Exam   Constitutional: She is oriented to person, place, and time. She appears well-developed. No distress.   HENT:   Head: Normocephalic and atraumatic.   Right Ear: " External ear normal.   Nose: Nose normal.   Eyes: Pupils are equal, round, and reactive to light. Conjunctivae are normal. Right eye exhibits no discharge. Left eye exhibits no discharge. No scleral icterus.   Neck: No thyromegaly present.   Cardiovascular: Normal rate, regular rhythm and normal heart sounds. Exam reveals no gallop and no friction rub.   Pulmonary/Chest: Effort normal. No stridor. No respiratory distress. She has no wheezes. Left breast exhibits no inverted nipple, no mass, no nipple discharge, no skin change and no tenderness. No breast swelling, tenderness, discharge or bleeding.   Right chest wall does not reveal any palpable masses.  Bilateral axilla was negative   Abdominal: Soft. Bowel sounds are normal. She exhibits no mass. There is no abdominal tenderness. There is no rebound and no guarding.   Genitourinary: No breast swelling, tenderness, discharge or bleeding.   Musculoskeletal: Normal range of motion. No tenderness.   Lymphadenopathy:     She has no cervical adenopathy.   Neurological: She is alert and oriented to person, place, and time. She exhibits normal muscle tone.   Skin: Skin is warm. No rash noted. She is not diaphoretic. No erythema.   Psychiatric: Her behavior is normal. Judgment and thought content normal.   Nursing note and vitals reviewed.    I have reexamined the patient and the results are consistent with the previously documented exam. Suzan Jones MD      RECENT LABS    WBC   Date Value Ref Range Status   01/16/2024 11.66 (H) 3.40 - 10.80 10*3/mm3 Final     RBC   Date Value Ref Range Status   01/16/2024 4.25 3.77 - 5.28 10*6/mm3 Final     Hemoglobin   Date Value Ref Range Status   01/16/2024 13.6 12.0 - 15.9 g/dL Final     Hematocrit   Date Value Ref Range Status   01/16/2024 42.6 34.0 - 46.6 % Final     MCV   Date Value Ref Range Status   01/16/2024 100.2 (H) 79.0 - 97.0 fL Final     MCH   Date Value Ref Range Status   01/16/2024 32.0 26.6 - 33.0 pg Final      MCHC   Date Value Ref Range Status   01/16/2024 31.9 31.5 - 35.7 g/dL Final     RDW   Date Value Ref Range Status   01/16/2024 14.8 12.3 - 15.4 % Final     RDW-SD   Date Value Ref Range Status   01/16/2024 53.4 37.0 - 54.0 fl Final     MPV   Date Value Ref Range Status   01/16/2024 11.3 6.0 - 12.0 fL Final     Platelets   Date Value Ref Range Status   01/16/2024 280 140 - 450 10*3/mm3 Final     Neutrophil %   Date Value Ref Range Status   01/16/2024 53.7 42.7 - 76.0 % Final     Lymphocyte %   Date Value Ref Range Status   01/16/2024 25.1 19.6 - 45.3 % Final     Monocyte %   Date Value Ref Range Status   01/16/2024 14.2 (H) 5.0 - 12.0 % Final     Eosinophil %   Date Value Ref Range Status   01/16/2024 6.3 (H) 0.3 - 6.2 % Final     Basophil %   Date Value Ref Range Status   01/16/2024 0.7 0.0 - 1.5 % Final     Immature Grans %   Date Value Ref Range Status   10/24/2023 0.6 (H) 0.0 - 0.5 % Final     Neutrophils, Absolute   Date Value Ref Range Status   01/16/2024 6.25 1.70 - 7.00 10*3/mm3 Final     Lymphocytes, Absolute   Date Value Ref Range Status   01/16/2024 2.93 0.70 - 3.10 10*3/mm3 Final     Monocytes, Absolute   Date Value Ref Range Status   01/16/2024 1.66 (H) 0.10 - 0.90 10*3/mm3 Final     Eosinophils, Absolute   Date Value Ref Range Status   01/16/2024 0.74 (H) 0.00 - 0.40 10*3/mm3 Final     Basophils, Absolute   Date Value Ref Range Status   01/16/2024 0.08 0.00 - 0.20 10*3/mm3 Final     Immature Grans, Absolute   Date Value Ref Range Status   10/24/2023 0.05 0.00 - 0.05 10*3/mm3 Final     nRBC   Date Value Ref Range Status   10/24/2023 0.1 0.0 - 0.2 /100 WBC Final       Lab Results   Component Value Date    GLUCOSE 97 11/02/2023    BUN 14 11/02/2023    CREATININE 1.11 (H) 11/02/2023    EGFRIFNONA 54 (L) 01/18/2022    EGFRIFAFRI 83 04/04/2017    BCR 12.6 11/02/2023    K 5.3 (H) 11/02/2023    CO2 29.0 11/02/2023    CALCIUM 9.3 11/02/2023    ALBUMIN 4.0 06/20/2023    LABIL2 1.1 05/31/2019    AST 26  06/20/2023    ALT 23 06/20/2023           ASSESSMENT:    Comprehensive gene analysis with [employer] technology returned with 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  She also has DICER1 gene as well as TSC2 with variants of unknown significance.  Reviewed  History of right breast cancer, status post right mastectomy followed by axillary lymph node dissection and right chest wall reconstruction in 1985 at the age of 32.  Recurrent elevated liver function tests, pruritus, but no significant pathology found on both the PET scan, MRCP, except for post cholecystectomy, biliary ductal dilatation.    She was on on Colestipol for pruritus.  We will plan to repeat her CMP today, will obtain CT scan of the abdomen and pelvis and have her referred back to Dr. Conway as soon as possible.  Also will call for records of her EUS findings from December 2021 performed by Dr. Arias.  Her abdominal symptoms are benign.  Reviewed her recent CT abdomen and pelvis which was essentially unremarkable  Left hip avascular necrosis: Status post left hip replacement surgery  She has peripheral lymphadenopathy involving the neck, axilla, mediastinum and retroperitoneum that are not PET avid.  This lymphadenopathy may be due to chronic inflammatory disease [lupus].    Strong family history of colon cancer and personal history of breast cancer.   Systemic lupus erythematosus, scleroderma, Raynaud’s phenomenon.   Normocytic anemia, multifactorial, underlying autoimmune disease, relative iron deficiency and B12 deficiency.  On iron and B12 supplementation.  Stable  Persistent monocytosis, status post bone marrow aspiration and biopsy which was essentially normal.  Stable  Osteoporosis: Now on Prolia most recent DEXA scan 2/1/2023 showing osteopenia.  Continue Prolia  Urine cytology checked annually: Due now 6/20/2023  Annual dermatology appointments reviewed with patient  Status post complete hysterectomy reducing her  risk for ovarian and uterine cancer.  Assessment has been reviewed and updated     PLANS:     Continue follow-up surveillance for Monahan syndrome  Reviewed results of her CMP and also CT scan of abdomen and pelvis  Plan is for continued surveillance for Monahan syndrome diagnosis.    To follow-up with GI for pancreatic cancer screening.   Abdominal MRI completed in November 2023.  She has colonoscopy coming up with Dr. Conway January 2024  Continue Prolia every 6 months.  Reviewed her bone density results.  She will receive Prolia today  Continue calcium with vitamin D  Bone density is due February 2025  Bilateral screening mammogram will be due January 2, 2024.  Ordered today  Bilateral breast MRI was completed in sept 2023. Next will be due 6 months from her mammogram.  Order at the next visit   She will continue monthly breast self-exam and call for lumps nipple discharge, skin discoloration.    Urine cytology today 6/20/2023.  Will order at the next visit  Annual dermatology appointments, now goes to [facility] now. She is seen q 6 months.  CT scans of the chest abdomen and pelvis   Discussed with patient  Follow-up 6 months  CMP today         I have reviewed labs results, imaging, vitals, and medications with the patient today. Will follow up in 6 months with me.      Patient verbalized understanding and is in agreement of the above plan.       I spent 40 total minutes, face-to-face, caring for Tracie boggs. 90% of this time involved counseling and/or coordination of care as documented within this note.

## 2024-01-17 ENCOUNTER — TREATMENT (OUTPATIENT)
Dept: PHYSICAL THERAPY | Facility: CLINIC | Age: 64
End: 2024-01-17
Payer: MEDICARE

## 2024-01-17 DIAGNOSIS — Z96.611 S/P REVERSE TOTAL SHOULDER ARTHROPLASTY, RIGHT: ICD-10-CM

## 2024-01-17 DIAGNOSIS — M25.619 LIMITED RANGE OF MOTION (ROM) OF SHOULDER: ICD-10-CM

## 2024-01-17 DIAGNOSIS — M19.011 OSTEOARTHRITIS OF RIGHT GLENOHUMERAL JOINT: Primary | ICD-10-CM

## 2024-01-17 DIAGNOSIS — R29.898 WEAKNESS OF RIGHT ARM: ICD-10-CM

## 2024-01-17 NOTE — PROGRESS NOTES
"Physical Therapy Daily Treatment Note  [address] [address] [address]  [place] [address]      Patient: Tracie Gross  : 1960  Referring Practitioner: Floyd Ruiz, *  Date of Initial Visit: Type: THERAPY  Noted: 11/3/2023  Today's Date: 2024  Patient seen for: 16 sessions      Visit Diagnoses:     ICD-10-CM ICD-9-CM   1. Osteoarthritis of right glenohumeral joint  M19.011 715.91   2. S/P reverse total shoulder arthroplasty, right  Z96.611 V43.61   3. Weakness of right arm  R29.898 729.89   4. Limited range of motion (ROM) of shoulder  M25.619 719.51       Subjective   Tracie Gross reports that she could almost put her hair in a pony.   The pain is much less.   Notices that she can do \"little\" things now with the R shldr vs L shldr.       Objective   Flex 112A in sitting.  121 in supine,  ER @ 0 abd,   17A,  45AA   See Exercise, Manual, and Modality Logs for complete treatment.     Patient Education: handout of the home ex with the weight amount and band color provided to client.     Assessment/Plan  Evelyn reports the shldr pain is less and that she is doing more movement with the R arm than prior to surgery.     Progress per Plan of Care          Timed:         Manual Therapy:    14     mins  34943;     Therapeutic Exercise:    16     mins  53509;     Neuromuscular Walter:    9    mins  70728;    Therapeutic Activity:     15     mins  26301;     Gait Training:           mins  41767;     Ultrasound:          mins  52658;    Ionto                                   mins   08664  Self Care                            mins   88055      Un-Timed:  Electrical Stimulation:         mins  45936 ( );  Traction          mins 64545      Timed Treatment:   54   mins   Total Treatment:     54   mins              Jocelynn Matos, VANITA    Physical Therapist  "

## 2024-01-19 RX ORDER — GUAIFENESIN 600 MG/1
TABLET, EXTENDED RELEASE ORAL
Qty: 30 TABLET | Refills: 1 | Status: SHIPPED | OUTPATIENT
Start: 2024-01-19

## 2024-01-22 RX ORDER — LISINOPRIL 5 MG/1
5 TABLET ORAL DAILY
Qty: 90 TABLET | Refills: 0 | Status: SHIPPED | OUTPATIENT
Start: 2024-01-22

## 2024-01-23 RX ORDER — MECLIZINE HYDROCHLORIDE 25 MG/1
25 TABLET ORAL 3 TIMES DAILY PRN
Qty: 30 TABLET | Refills: 1 | Status: SHIPPED | OUTPATIENT
Start: 2024-01-23

## 2024-01-25 DIAGNOSIS — M87.052 AVASCULAR NECROSIS OF FEMORAL HEAD, LEFT: ICD-10-CM

## 2024-01-26 ENCOUNTER — TELEPHONE (OUTPATIENT)
Dept: PHYSICAL THERAPY | Facility: OTHER | Age: 64
End: 2024-01-26
Payer: MEDICARE

## 2024-01-26 RX ORDER — OXYCODONE HYDROCHLORIDE 10 MG/1
10 TABLET ORAL EVERY 6 HOURS PRN
Qty: 120 TABLET | Refills: 0 | Status: SHIPPED | OUTPATIENT
Start: 2024-01-26

## 2024-01-26 NOTE — TELEPHONE ENCOUNTER
"  Caller: Tracie Gross \"Evelyn\"    Relationship: Self    What was the call regarding: PATIENT CANCELLED APPT TODAY BECAUSE THEY HAVE VERTIGO      "

## 2024-01-29 ENCOUNTER — TREATMENT (OUTPATIENT)
Dept: PHYSICAL THERAPY | Facility: CLINIC | Age: 64
End: 2024-01-29
Payer: MEDICARE

## 2024-01-29 DIAGNOSIS — R29.898 WEAKNESS OF RIGHT ARM: ICD-10-CM

## 2024-01-29 DIAGNOSIS — M25.619 LIMITED RANGE OF MOTION (ROM) OF SHOULDER: ICD-10-CM

## 2024-01-29 DIAGNOSIS — M19.011 OSTEOARTHRITIS OF RIGHT GLENOHUMERAL JOINT: Primary | ICD-10-CM

## 2024-01-29 NOTE — PROGRESS NOTES
Re-Assessment / Re-Certification  [facility] [place]  [address],  [address]  [facility] [place]   [address]         Patient: Tracie Gross   : 1960  Diagnosis/ICD-10 Code:  Osteoarthritis of right glenohumeral joint [M19.011]  Referring practitioner: Floyd Ruiz, *  Date of Initial Evaluation:  Type: THERAPY  Noted: 11/3/2023  Patient seen for 17 sessions      Subjective:     Subjective Questionnaire: QuickDASH: 23   Clinical Progress: improved  Home Program Compliance: Yes  Treatment has included: therapeutic exercise, neuromuscular re-education, manual therapy, therapeutic activity, and electrical stimulation    Subjective   difficulty  Quick DASH (23 39 of 55;   last PN 46 )    Objective   R shldr ROM:   flex 111/153AA, abd 110A/150AA,  ER  40A/ 53AA    MMT:  shldr 3-.    Elbow 4- flex/ext    23:     R shldr ROM:  flex 101A/135AA,     abd 90A/ 130AA  Mod shldr hike with flex/abd.  ER 37A/52AA  MMT:  elbow flex 3+, ext 3+   See Exercise, Manual, and Modality Logs for complete treatment.     Assessment/Plan  Mrs. Gross has been to PT x's 17 sessions, since the 11/3/23 eval date.  Treatment for rehab post a R TSA.  Over the last month Evelyn has only been to PT x's 3 sessions with cancellations mostly due to illness or lumbar pain.   At today's  eval, her active motion has increase; while her function has also improved.   There remains weakness in the R arm with patient reporting inability to put deodorant on the left side, reach out to the side and behind her.  She would benefit from furhter PT x's 1 month in order to maximize her function.    Progress toward previous goals: Partially Met    STG: all STG met 23   1. Pt will be independent and compliant with initial HEP and surgical precautions in 2 weeks.  2. Pt will report pain level at worst <4 in 4 weeks.   3. Pt will demonstrate an increase in shoulder flexion PROM to 120 degrees within 4 weeks.   4. Pt will demonstrate an increase  in shoulder ER PROM to 40 degrees within 4 weeks.       LTG: by DC (12 weeks)  1. Pt will be independent with final HEP for self-management of condition by DC. Progressing 1/29/24  2. Pt will improve score on QuickDASH to less than 40% by DC.  Progressing 12/29/23  3. Pt will demonstrate shoulder AROM flexion to >130 degrees without compensation in order to be able to get cup or plate out of cabinet by DC.  Progressing 1/29/24  4. Pt will demonstrate shoulder flexion and ER strength to >4/5 in order to be able to clean home and cook by DC.Progressing 1/29/24    2x per week   Timeframe: 1 month  Prognosis to achieve goals: good    PT Signature: Jocelynn Matos PT      Based upon review of the patient's progress and continued therapy plan, it is my medical opinion that Tracie Gross should continue physical therapy treatment at [facility]  [address]  [facility] [address] [phone].    Signature: __________________________________  Floyd Ruiz MD      Timed:         Manual Therapy:    14     mins  88876;     Therapeutic Exercise:    16     mins  22013;     Neuromuscular Walter:        mins  62957;    Therapeutic Activity:     13     mins  60102;     Gait Training:           mins  35784;     Ultrasound:          mins  13396;    Ionto                                   mins   98151  Self Care                            mins   14742  Canalith Repos         mins 38423      Un-Timed:  Electrical Stimulation:         mins  91249 ( );  Dry Needling          mins self-pay  Traction          mins 41384  Low Eval          Mins  77789  Mod Eval          Mins  29654  High Eval                            Mins  25163  Re-Eval                         10      mins  10007        Timed Treatment:   43   mins   Total Treatment:     53   mins

## 2024-02-01 ENCOUNTER — TREATMENT (OUTPATIENT)
Dept: PHYSICAL THERAPY | Facility: CLINIC | Age: 64
End: 2024-02-01
Payer: MEDICARE

## 2024-02-01 DIAGNOSIS — M25.619 LIMITED RANGE OF MOTION (ROM) OF SHOULDER: ICD-10-CM

## 2024-02-01 DIAGNOSIS — M19.011 OSTEOARTHRITIS OF RIGHT GLENOHUMERAL JOINT: Primary | ICD-10-CM

## 2024-02-01 DIAGNOSIS — R29.898 WEAKNESS OF RIGHT ARM: ICD-10-CM

## 2024-02-01 DIAGNOSIS — Z96.611 S/P REVERSE TOTAL SHOULDER ARTHROPLASTY, RIGHT: ICD-10-CM

## 2024-02-01 PROCEDURE — 97112 NEUROMUSCULAR REEDUCATION: CPT | Performed by: PHYSICAL THERAPIST

## 2024-02-01 PROCEDURE — 97110 THERAPEUTIC EXERCISES: CPT | Performed by: PHYSICAL THERAPIST

## 2024-02-01 PROCEDURE — 97140 MANUAL THERAPY 1/> REGIONS: CPT | Performed by: PHYSICAL THERAPIST

## 2024-02-02 NOTE — PROGRESS NOTES
Physical Therapy Daily Treatment Note  [address] [address] [address]  [place] [address]      Patient: Tracie Gross  : 1960  Referring Practitioner: Floyd Ruiz, *  Date of Initial Visit: Type: THERAPY  Noted: 11/3/2023  Today's Date: 2024  Patient seen for: 18 sessions      Visit Diagnoses:     ICD-10-CM ICD-9-CM   1. Osteoarthritis of right glenohumeral joint  M19.011 715.91   2. Weakness of right arm  R29.898 729.89   3. Limited range of motion (ROM) of shoulder  M25.619 719.51   4. S/P reverse total shoulder arthroplasty, right  Z96.611 V43.61       Subjective   Tracie Gross reports that she was almost able to make a correct ponytail today with use of the right shdlr.   States the R shldr has only very slight pain.   Getting stronger and more use of the arm.     Today Evelyn reports the back has continued to worsen with limited ability to stand    Objective     See Exercise, Manual, and Modality Logs for complete treatment.     Patient Education: cont with the right shdlr ex for motion and strength    Assessment/Plan  Evelyn has made good progress over the last month regarding her motion and function of the R shdlr.       Plan:  ins auth required post next visit          Timed:         Manual Therapy:    14     mins  75610;     Therapeutic Exercise:    15     mins  37436;     Neuromuscular Walter:        mins  26459;    Therapeutic Activity:     13     mins  92850;     Gait Training:           mins  10838;     Ultrasound:          mins  32852;    Ionto                                   mins   53373  Self Care                            mins   26556      Un-Timed:  Electrical Stimulation:         mins  61755 ( );  Traction          mins 38925      Timed Treatment:   42   mins   Total Treatment:     42   mins              Jocelynn Matos, PT    Physical Therapist

## 2024-02-07 ENCOUNTER — OFFICE VISIT (OUTPATIENT)
Dept: FAMILY MEDICINE CLINIC | Facility: CLINIC | Age: 64
End: 2024-02-07
Payer: MEDICARE

## 2024-02-07 VITALS
WEIGHT: 223 LBS | HEART RATE: 70 BPM | BODY MASS INDEX: 41.04 KG/M2 | HEIGHT: 62 IN | RESPIRATION RATE: 18 BRPM | SYSTOLIC BLOOD PRESSURE: 126 MMHG | DIASTOLIC BLOOD PRESSURE: 84 MMHG | OXYGEN SATURATION: 96 %

## 2024-02-07 DIAGNOSIS — J06.9 VIRAL UPPER RESPIRATORY TRACT INFECTION: Primary | ICD-10-CM

## 2024-02-07 LAB
EXPIRATION DATE: NORMAL
FLUAV AG UPPER RESP QL IA.RAPID: NOT DETECTED
FLUBV AG UPPER RESP QL IA.RAPID: NOT DETECTED
INTERNAL CONTROL: NORMAL
Lab: NORMAL
SARS-COV-2 AG UPPER RESP QL IA.RAPID: NOT DETECTED

## 2024-02-07 RX ORDER — BENZONATATE 200 MG/1
200 CAPSULE ORAL 3 TIMES DAILY PRN
Qty: 30 CAPSULE | Refills: 1 | Status: SHIPPED | OUTPATIENT
Start: 2024-02-07 | End: 2024-02-20

## 2024-02-07 RX ORDER — AZITHROMYCIN 250 MG/1
TABLET, FILM COATED ORAL
Qty: 6 TABLET | Refills: 0 | Status: SHIPPED | OUTPATIENT
Start: 2024-02-07

## 2024-02-07 RX ORDER — TRIAMCINOLONE ACETONIDE 40 MG/ML
80 INJECTION, SUSPENSION INTRA-ARTICULAR; INTRAMUSCULAR ONCE
Status: COMPLETED | OUTPATIENT
Start: 2024-02-07 | End: 2024-02-07

## 2024-02-07 RX ADMIN — TRIAMCINOLONE ACETONIDE 80 MG: 40 INJECTION, SUSPENSION INTRA-ARTICULAR; INTRAMUSCULAR at 11:52

## 2024-02-07 NOTE — PROGRESS NOTES
"Chief Complaint  Generalized Body Aches, Cough (Going on for 6 weeks), Shortness of Breath, and Headache  Subjective        Tracie Gross presents to [facility]  History of Present Illness  Pt comes in today with c/o cough, congestion, HA, body aches.  No fever, but has had chills.  Started yesterday.  Had been having some ongoing symptoms for 6 weeks, but these symptoms flared up yesterday.  Was seen on 1/4 and diagnosed with sinusitis and started on doxy and prednisone. Got better, but then symptoms flared up a few weeks after and called Dr. Silva and he had recommended mucinex otc.   Currently taking tylenol otc.   Cough is non productive.   Cough  Associated symptoms include headaches and shortness of breath.   Shortness of Breath  Associated symptoms include headaches.   Headache    Review of Systems   Respiratory:  Positive for cough and shortness of breath.      Objective     Vital Signs:   /84   Pulse 70   Resp 18   Ht 157.5 cm (62.01\")   Wt 101 kg (223 lb)   SpO2 96%   BMI 40.78 kg/m²       BP Readings from Last 3 Encounters:   02/07/24 126/84   01/16/24 123/85   01/04/24 120/82       Wt Readings from Last 3 Encounters:   02/07/24 101 kg (223 lb)   01/16/24 98.9 kg (218 lb)   01/04/24 104 kg (229 lb)     Physical Exam  Constitutional:       Appearance: She is well-developed.   Eyes:      Pupils: Pupils are equal, round, and reactive to light.   Cardiovascular:      Rate and Rhythm: Normal rate and regular rhythm.   Pulmonary:      Effort: Pulmonary effort is normal.      Breath sounds: Normal breath sounds.   Neurological:      Mental Status: She is alert and oriented to person, place, and time.        Result Review :                 Assessment and Plan    Diagnoses and all orders for this visit:    1. Viral upper respiratory tract infection (Primary)  -     azithromycin (Zithromax Z-Gonzalo) 250 MG tablet; Take 2 tablets by mouth on day 1, then 1 tablet daily on " days 2-5  Dispense: 6 tablet; Refill: 0  -     triamcinolone acetonide (KENALOG-40) injection 80 mg  -     benzonatate (TESSALON) 200 MG capsule; Take 1 capsule by mouth 3 (Three) Times a Day As Needed for Cough for up to 13 days.  Dispense: 30 capsule; Refill: 1  -     POCT SARS-CoV-2 Antigen OJVI + Flu    COVID/FLU neg  Most likely 2/2 infection. Will treat with anbx.   Kenalog inj today  Push fluids  Mucinex otc  During this office visit, we discussed the pertinent aspects of the visit and treatment recommendations. Pt verbalizes understanding. Follow up was discussed. Patient was given the opportunity to ask questions and discuss other concerns.         Follow Up   Return if symptoms worsen or fail to improve.  Patient was given instructions and counseling regarding her condition or for health maintenance advice. Please see specific information pulled into the AVS if appropriate.

## 2024-02-08 RX ORDER — CEFDINIR 300 MG/1
300 CAPSULE ORAL 2 TIMES DAILY
Qty: 20 CAPSULE | Refills: 0 | Status: SHIPPED | OUTPATIENT
Start: 2024-02-08

## 2024-02-09 ENCOUNTER — TELEPHONE (OUTPATIENT)
Dept: PHYSICAL THERAPY | Facility: CLINIC | Age: 64
End: 2024-02-09

## 2024-02-09 NOTE — TELEPHONE ENCOUNTER
"  Caller: Tracie Gross \"Evelyn\"    Relationship: Self    What was the call regarding: PATIENT WILL NOT BE AT TODAY'S APPT SHE IS STILL NOT FEELING WELL.    "

## 2024-02-12 ENCOUNTER — TREATMENT (OUTPATIENT)
Dept: PHYSICAL THERAPY | Facility: CLINIC | Age: 64
End: 2024-02-12
Payer: MEDICARE

## 2024-02-12 DIAGNOSIS — Z96.611 S/P REVERSE TOTAL SHOULDER ARTHROPLASTY, RIGHT: ICD-10-CM

## 2024-02-12 DIAGNOSIS — R29.898 WEAKNESS OF RIGHT ARM: ICD-10-CM

## 2024-02-12 DIAGNOSIS — M25.619 LIMITED RANGE OF MOTION (ROM) OF SHOULDER: ICD-10-CM

## 2024-02-12 DIAGNOSIS — M19.011 OSTEOARTHRITIS OF RIGHT GLENOHUMERAL JOINT: Primary | ICD-10-CM

## 2024-02-12 PROCEDURE — 97140 MANUAL THERAPY 1/> REGIONS: CPT | Performed by: PHYSICAL THERAPIST

## 2024-02-12 PROCEDURE — 97110 THERAPEUTIC EXERCISES: CPT | Performed by: PHYSICAL THERAPIST

## 2024-02-12 PROCEDURE — 97530 THERAPEUTIC ACTIVITIES: CPT | Performed by: PHYSICAL THERAPIST

## 2024-02-14 ENCOUNTER — TREATMENT (OUTPATIENT)
Dept: PHYSICAL THERAPY | Facility: CLINIC | Age: 64
End: 2024-02-14
Payer: MEDICARE

## 2024-02-14 DIAGNOSIS — M19.011 OSTEOARTHRITIS OF RIGHT GLENOHUMERAL JOINT: Primary | ICD-10-CM

## 2024-02-14 DIAGNOSIS — R29.898 WEAKNESS OF RIGHT ARM: ICD-10-CM

## 2024-02-14 DIAGNOSIS — Z96.611 S/P REVERSE TOTAL SHOULDER ARTHROPLASTY, RIGHT: ICD-10-CM

## 2024-02-14 DIAGNOSIS — M25.619 LIMITED RANGE OF MOTION (ROM) OF SHOULDER: ICD-10-CM

## 2024-02-17 DIAGNOSIS — M32.9 SLE (SYSTEMIC LUPUS ERYTHEMATOSUS RELATED SYNDROME): ICD-10-CM

## 2024-02-17 DIAGNOSIS — M34.9 SCLERODERMA: ICD-10-CM

## 2024-02-18 RX ORDER — CYCLOBENZAPRINE HCL 10 MG
10 TABLET ORAL 3 TIMES DAILY PRN
Qty: 45 TABLET | Refills: 1 | Status: SHIPPED | OUTPATIENT
Start: 2024-02-18

## 2024-02-22 ENCOUNTER — HOSPITAL ENCOUNTER (OUTPATIENT)
Dept: MAMMOGRAPHY | Facility: HOSPITAL | Age: 64
Discharge: HOME | End: 2024-02-22
Admitting: INTERNAL MEDICINE
Payer: MEDICARE

## 2024-02-22 DIAGNOSIS — Z12.31 ENCOUNTER FOR SCREENING MAMMOGRAM FOR MALIGNANT NEOPLASM OF BREAST: ICD-10-CM

## 2024-02-22 DIAGNOSIS — C50.919 MALIGNANT NEOPLASM OF FEMALE BREAST, UNSPECIFIED ESTROGEN RECEPTOR STATUS, UNSPECIFIED LATERALITY, UNSPECIFIED SITE OF BREAST: ICD-10-CM

## 2024-02-22 PROCEDURE — 77063 BREAST TOMOSYNTHESIS BI: CPT

## 2024-02-22 PROCEDURE — 77067 SCR MAMMO BI INCL CAD: CPT

## 2024-03-02 DIAGNOSIS — I10 PRIMARY HYPERTENSION: ICD-10-CM

## 2024-03-02 DIAGNOSIS — M34.9 SCLERODERMA: ICD-10-CM

## 2024-03-03 RX ORDER — NIFEDIPINE 90 MG/1
90 TABLET, EXTENDED RELEASE ORAL DAILY
Qty: 90 TABLET | Refills: 1 | Status: SHIPPED | OUTPATIENT
Start: 2024-03-03

## 2024-03-06 DIAGNOSIS — M79.89 LEG SWELLING: ICD-10-CM

## 2024-03-06 RX ORDER — FUROSEMIDE 20 MG/1
TABLET ORAL
Qty: 30 TABLET | Refills: 2 | Status: SHIPPED | OUTPATIENT
Start: 2024-03-06

## 2024-03-18 RX ORDER — FERROUS SULFATE 325(65) MG
1 TABLET ORAL
Qty: 30 TABLET | Refills: 1 | Status: SHIPPED | OUTPATIENT
Start: 2024-03-18

## 2024-03-29 ENCOUNTER — OFFICE (AMBULATORY)
Dept: URBAN - METROPOLITAN AREA CLINIC 64 | Facility: CLINIC | Age: 64
End: 2024-03-29
Payer: MEDICARE

## 2024-03-29 VITALS
HEART RATE: 67 BPM | SYSTOLIC BLOOD PRESSURE: 118 MMHG | HEIGHT: 65 IN | DIASTOLIC BLOOD PRESSURE: 70 MMHG | WEIGHT: 218 LBS

## 2024-03-29 DIAGNOSIS — K21.9 GASTRO-ESOPHAGEAL REFLUX DISEASE WITHOUT ESOPHAGITIS: ICD-10-CM

## 2024-03-29 DIAGNOSIS — D68.00 VON WILLEBRAND DISEASE, UNSPECIFIED: ICD-10-CM

## 2024-03-29 DIAGNOSIS — Z15.09 GENETIC SUSCEPTIBILITY TO OTHER MALIGNANT NEOPLASM: ICD-10-CM

## 2024-03-29 DIAGNOSIS — R74.8 ABNORMAL LEVELS OF OTHER SERUM ENZYMES: ICD-10-CM

## 2024-03-29 DIAGNOSIS — Z80.0 FAMILY HISTORY OF MALIGNANT NEOPLASM OF DIGESTIVE ORGANS: ICD-10-CM

## 2024-03-29 DIAGNOSIS — Z86.010 PERSONAL HISTORY OF COLONIC POLYPS: ICD-10-CM

## 2024-03-29 PROCEDURE — 99214 OFFICE O/P EST MOD 30 MIN: CPT | Performed by: NURSE PRACTITIONER

## 2024-03-31 NOTE — PROGRESS NOTES
Discharge Summary  Discharge Summary from Physical Therapy Report      Dates  PT visit: 7/14/20-8/12/20  Number of Visits: 5    Discharge Status of Patient: Pt had surgery on 8/14/20. Pt never called back to reschedule and is therefore D/C'd for not being seen in >30 days.     Goals: per 8/12 progress report, pt had met or partially met 8 visits and did not meet 1 goal.     Discharge Plan: No specific D/C instr were given as pt never returned to PT after 8/12/20.     Comments: pt was given HEP during sessions    Date of Discharge: 11/23/20    Megan Singleton, PT  Physical Therapist  [place] Lic# [id number]                       Patient: Misael Diaz    Procedure Summary       Date: 24 Room / Location:     Anesthesia Start: 950 Anesthesia Stop:     Procedure: Labor Analgesia Diagnosis:     Scheduled Providers:  Responsible Provider: Marie Osborne MD    Anesthesia Type: epidural ASA Status: 2            Anesthesia Type: epidural    Vitals:    24 0700   BP:    Pulse:    Resp:    Temp: 36.9 °C (98.4 °F)   SpO2:       Anesthesia Post Evaluation    Patient location during evaluation: bedside  Patient participation: complete - patient participated  Level of consciousness: awake and alert  Pain management: adequate  Airway patency: patent  Cardiovascular status: acceptable  Respiratory status: acceptable  Hydration status: acceptable  Postoperative Nausea and Vomiting: none        No notable events documented.    Misael Diaz is a 22 y.o., , who had a Vaginal, Spontaneous  delivery on 3/30/2024  at 38w5d and is now POD.    She had Neuraxial Anesthesia without immediate complications noted.       Pain well controlled    Vitals:    24 0700   BP:    Pulse:    Resp:    Temp: 36.9 °C (98.4 °F)   SpO2:        Neuraxial site assessed. No visible redness or swelling or drainage. Patient able to ambulate and move all extremities without difficulty. Able to void. No complaints of nausea/vomiting. Tolerating PO intake well. No s/sx of PDPH.     Anesthesia will sign off     Jermaine Akins MD

## 2024-04-01 RX ORDER — CETIRIZINE HYDROCHLORIDE 10 MG/1
10 TABLET ORAL DAILY
Qty: 90 TABLET | Refills: 1 | Status: SHIPPED | OUTPATIENT
Start: 2024-04-01

## 2024-04-02 RX ORDER — BISOPROLOL FUMARATE AND HYDROCHLOROTHIAZIDE 10; 6.25 MG/1; MG/1
1 TABLET ORAL DAILY
Qty: 90 TABLET | Refills: 1 | Status: SHIPPED | OUTPATIENT
Start: 2024-04-02

## 2024-04-02 RX ORDER — LEVOTHYROXINE SODIUM 175 UG/1
175 TABLET ORAL DAILY
Qty: 90 TABLET | Refills: 1 | Status: SHIPPED | OUTPATIENT
Start: 2024-04-02

## 2024-04-08 ENCOUNTER — OFFICE VISIT (OUTPATIENT)
Dept: FAMILY MEDICINE CLINIC | Facility: CLINIC | Age: 64
End: 2024-04-08
Payer: MEDICARE

## 2024-04-08 VITALS
RESPIRATION RATE: 16 BRPM | HEIGHT: 62 IN | SYSTOLIC BLOOD PRESSURE: 124 MMHG | HEART RATE: 67 BPM | BODY MASS INDEX: 39.38 KG/M2 | OXYGEN SATURATION: 96 % | DIASTOLIC BLOOD PRESSURE: 86 MMHG | WEIGHT: 214 LBS

## 2024-04-08 DIAGNOSIS — K59.00 CONSTIPATION, UNSPECIFIED CONSTIPATION TYPE: ICD-10-CM

## 2024-04-08 DIAGNOSIS — M79.89 LEG SWELLING: ICD-10-CM

## 2024-04-08 DIAGNOSIS — I10 PRIMARY HYPERTENSION: ICD-10-CM

## 2024-04-08 DIAGNOSIS — R42 VERTIGO: Primary | ICD-10-CM

## 2024-04-08 DIAGNOSIS — M75.122 COMPLETE TEAR OF LEFT ROTATOR CUFF, UNSPECIFIED WHETHER TRAUMATIC: ICD-10-CM

## 2024-04-08 PROCEDURE — 1160F RVW MEDS BY RX/DR IN RCRD: CPT | Performed by: FAMILY MEDICINE

## 2024-04-08 PROCEDURE — 3074F SYST BP LT 130 MM HG: CPT | Performed by: FAMILY MEDICINE

## 2024-04-08 PROCEDURE — 1159F MED LIST DOCD IN RCRD: CPT | Performed by: FAMILY MEDICINE

## 2024-04-08 PROCEDURE — 99214 OFFICE O/P EST MOD 30 MIN: CPT | Performed by: FAMILY MEDICINE

## 2024-04-08 PROCEDURE — 3079F DIAST BP 80-89 MM HG: CPT | Performed by: FAMILY MEDICINE

## 2024-04-08 RX ORDER — MECLIZINE HYDROCHLORIDE 25 MG/1
25 TABLET ORAL 3 TIMES DAILY PRN
Qty: 60 TABLET | Refills: 2 | Status: SHIPPED | OUTPATIENT
Start: 2024-04-08

## 2024-04-08 RX ORDER — HYDROCODONE BITARTRATE AND ACETAMINOPHEN 10; 300 MG/1; MG/1
TABLET ORAL EVERY 6 HOURS SCHEDULED
COMMUNITY

## 2024-04-08 RX ORDER — LIOTHYRONINE SODIUM 25 UG/1
TABLET ORAL EVERY 24 HOURS
COMMUNITY

## 2024-04-08 NOTE — PROGRESS NOTES
Chief Complaint   Patient presents with    Osteoarthritis     HPI  Tracie Gross is a 63 y.o. female that presents for   Chief Complaint   Patient presents with    Osteoarthritis     Dizziness: patient reports dizziness when lying down. Ongoing for 6 months. Reports she can't get the room to stop spinning. She has tried meclizine 25mg TID x1 month. This improved symptoms but didn't resolve them. No hearing loss but reports tinnitus in L ear. This has only been going on for a few months.     Constipation: colestipol was discontinued at last visit. She is now taking Dulcolax 10-15mg daily. This is working well. Happy w/ current control.     Leg swelling: wearing compression stockings most everyday. Also using lasix 20mg daily. This provides adequate control.     Shoulder pain: s/p R shoulder replacement. R shoulder pain is markedly improved (1-2/10) but ROM remains limited. No improving w/ PT. Still following w/ ortho- [facility] and has appt next week. L shoulder pain rated 5-6/10. She is considering replacement but not quite ready to do this until R shoulder ROM has improved. Maintained on Tylenol 1000mg 2-3x/day, gabapentin 1200/600/1200 w/ oxycodone 10mg BID PRN (generally no more than once)    HTN: 124/86 today. Maintained on nifedipine 90mg daily. Dizziness as above. Raynaud's reasonably controlled. No CP or SOB    Review of Systems  Pertinent positives of ROS documented in HPI    The following portions of the patient's history were reviewed and updated as appropriate: problem list, past medical history, past surgical history, allergies, current medication    Problem List Tab  Patient History Tab  Immunizations Tab  Medications Tab  Chart Review Tab  Care Everywhere Tab  Synopsis Tab    PE  Vitals:    04/08/24 1527   BP: 124/86   Pulse: 67   Resp: 16   SpO2: 96%     Body mass index is 39.13 kg/m².  General: Obese, NAD  Head: AT/NC  Eyes: EOMI, anicteric sclera  Resp: CTAB, SCR, BS equal  CV: RRR w/o m/r/g; 2+  pulses  GI: Soft, NT/ND, +BS  MSK: No deformity, no edema. Has 120 degrees of flexion and abduction in R shoulder. Very little internal rotation  Skin: Warm, dry, intact  Neuro: Alert and oriented. No focal deficits  Psych: Appropriate mood and affect    Imaging  Mammo Screening Modified With Tomosynthesis Left With CAD    Result Date: 2/26/2024  No mammographic signs of malignancy in the left breast. Recommend routine mammographic screening.  BI-RADS ASSESSMENT: BI-RADS 1. Negative.  The patient's information is entered into a computerized reminder system with a targeted due date for the next mammogram.  Note:  It has been reported that there is approximately a 15% false negative rate in mammography.  Therefore, management of a palpable abnormality should not be deferred because of a negative mammogram.   Electronically Signed By-Michelle Arreola MD On:2/26/2024 11:48 AM      Assessment & Plan   Tracie Gross is a 63 y.o. female that presents for   Chief Complaint   Patient presents with    Osteoarthritis     Diagnoses and all orders for this visit:    1. Vertigo (Primary): hx c/w BPPV. Will restart meclizine and refer for vestibular rehab  -     Start meclizine (ANTIVERT) 25 MG tablet; Take 1 tablet by mouth 3 (Three) Times a Day As Needed for Dizziness.  Dispense: 60 tablet; Refill: 2  -     Ambulatory Referral to Physical Therapy Vestibular, Evaluate and treat    2. Constipation, unspecified constipation type: well controlled   - Cont home bisacodyl 10-15mg daily PRN    3. Leg swelling   - Cont home compression stockings and lasix 20 daily    4. Complete tear of left rotator cuff, unspecified whether traumatic   - Cont ortho f/u- [facility]    -- Tentative plan for L shoulder replacement    - Cont home Tylenol 1g 2-3x/day, gabapentin 1200/600/1200, oxycodone 10mg PRN    5. Primary hypertension: 124/86 today   - Cont home nifedipine 90mg daily and lisinopril 5mg daily       Return in about 3 months (around 7/8/2024) for  Medicare Wellness.

## 2024-04-14 ENCOUNTER — DOCUMENTATION (OUTPATIENT)
Dept: PHYSICAL THERAPY | Facility: CLINIC | Age: 64
End: 2024-04-14
Payer: MEDICARE

## 2024-04-14 NOTE — PROGRESS NOTES
Discharge Summary  Discharge Summary from Physical Therapy Report      Dates  PT visit: 11/3/23 - 2/14/24  Number of Visits: 20     Discharge Status of Patient: See MD Note dated 2/14/24    Goals: Partially Met    Discharge Plan: Continue with current home exercise program as instructed    Comments    treatment for RTSA.   Interventions included manual therapy, therEx, therAct, ice           Jocelynn Matos, PT  Physical Therapist

## 2024-04-15 ENCOUNTER — TELEPHONE (OUTPATIENT)
Dept: PHYSICAL THERAPY | Facility: CLINIC | Age: 64
End: 2024-04-15

## 2024-04-19 ENCOUNTER — TREATMENT (OUTPATIENT)
Dept: PHYSICAL THERAPY | Facility: CLINIC | Age: 64
End: 2024-04-19
Payer: MEDICARE

## 2024-04-19 DIAGNOSIS — R42 VERTIGO: ICD-10-CM

## 2024-04-19 DIAGNOSIS — H81.10 BENIGN PAROXYSMAL POSITIONAL VERTIGO, UNSPECIFIED LATERALITY: Primary | ICD-10-CM

## 2024-04-19 NOTE — PROGRESS NOTES
Physical Therapy Initial Evaluation and Plan of Care  [address] [address] [address]  [place] [address]    Patient: Tracie Gross   : 1960  Diagnosis/ICD-10 Code:  Benign paroxysmal positional vertigo, unspecified laterality [H81.10]  Referring practitioner: Floyd Silva MD  Date of Initial Visit: 2024  Today's Date: 2024  Patient seen for 1 sessions           Visit Diagnoses:     ICD-10-CM ICD-9-CM   1. Benign paroxysmal positional vertigo, unspecified laterality  H81.10 386.11   2. Vertigo  R42 780.4       Subjective Questionnaire: DHI: 40/50      Subjective Evaluation    History of Present Illness  Mechanism of injury: Patent reports ongoing,with varying levels of dizziness.  States that she has been on antivert somewhat maintained.    Over the last several weeks that dizziness is present most of the time.    She had to cancel a few appts this week due to the dizziness.   Nausea has happened randomly x's 3 days.      Slight headache today at the base of the head.    Slight ringing in the L ear x's several months.     Increases:  sit > stand,  standing.  Notes rolling from SL to her back when in the recliner.  Currently sleeping in a recliner.   She is not using the walker or any assistive device.     Currently taking meclizine on a consistent basis.              Objective          Ambulation     Comments   VESTIBULAR:  Vertigo / hypofunction    VOMS:      Baseline symptoms-            Saccades vertical   mild+           Saccades horizontal   + mod           Convergence (near point)    mod +             VOR - horizontal   mod+           VOR - vertical    mild ++    Jahaira SOP:     Condition 1 -  Romberg open stance/ eyes open   slight sway leonard     Condition 2 - Romberg open open stance/ eyes closed    mod sway, needed hands assist     Condition 3 - Tandem Romberg with eyes open    step forward vs true tandem    mild sway     Condition 4 - Tandem Romberg with eyes closed   as  noted above   mod sway leonard     Condition 5 - Standing on foam/ eyes open   mod sway     Condition 6 - Standing on foam/ eyes closed     mod to sever sway with use of hands for assist     Condition 7  - Stepping Fukuda (march in place) eyes closed    mod+ sway    Vertebral AA test:     neg       SL Hallpike test:    moderate nystagmus and some nausea L     Horizontal Roll test:   neg            Assessment & Plan       Assessment  Impairments: abnormal gait, impaired balance and safety issue   Functional limitations: sleeping, walking and moving in bed   Assessment details: Tracie Gross is a 63 y.o. female referred to PT due to onset of dizziness, loss of balance and vertigo symptoms.     Client was seen in the office today for the initial evaluation.   S/S consistent with vestibular hypofunction, including (+) L DHP .   Skilled PT is indicated in order to reduce the symptoms and achieve the stated goals for client to return to prior function.   Prognosis: good    Goals  Plan Goals: ST visits     1)  Client to report =/> 50% reduction of symptoms when changing positions     2)  Client will understand and complete home ex/positioning as indicated.      3)  Client will report no further NV relating to current episode    LTGs:   DC     1)  Client will report > 80% symptom free during change of positions and with sleep     2)   negative [place] SOP testing     3)  normal gait and balance testing            Plan  Therapy options: will be seen for skilled therapy services  Planned therapy interventions: manual therapy, neuromuscular re-education, strengthening, therapeutic activities and home exercise program  Other planned therapy interventions: canalith repositioning  Frequency: 2x week  Duration in weeks: 12  Treatment plan discussed with: patient        Timed:         Manual Therapy:         mins  07176;     Therapeutic Exercise:         mins  04922;     Neuromuscular Walter:    13    mins  12448;    Therapeutic  Activity:     12     mins  38107;     Gait Training:           mins  37109;     Ultrasound:          mins  75487;    Ionto                                   mins   06739  Self Care                            mins   30727        Un-Timed:  Canalith Repos    25     mins 71179  Electrical Stimulation:         mins  71269 ( );  Dry Needling          mins self-pay  Traction          mins 19845  Low Eval          Mins  35932  Mod Eval     26    Mins  24319  High Eval                            Mins  50254  Re-Eval                               mins  91099        Timed Treatment:   25   mins   Total Treatment:     76   mins        PT SIGNATURE: Jocelynn Matos, PT   DATE TREATMENT INITIATED: 4/19/2024    Initial Certification  Certification Period: from 7/18/2024  I certify that the therapy services are furnished while this patient is under my care.  The services outlined above are required by this patient, and will be reviewed every 90 days.     PHYSICIAN: Floyd Silva MD      DATE:     Please sign and return via fax to [phone].. Thank you, [facility].

## 2024-04-22 RX ORDER — LISINOPRIL 5 MG/1
5 TABLET ORAL DAILY
Qty: 90 TABLET | Refills: 0 | Status: SHIPPED | OUTPATIENT
Start: 2024-04-22

## 2024-04-24 ENCOUNTER — TREATMENT (OUTPATIENT)
Dept: PHYSICAL THERAPY | Facility: CLINIC | Age: 64
End: 2024-04-24
Payer: MEDICARE

## 2024-04-24 DIAGNOSIS — H81.10 BENIGN PAROXYSMAL POSITIONAL VERTIGO, UNSPECIFIED LATERALITY: Primary | ICD-10-CM

## 2024-04-24 DIAGNOSIS — H83.2X3 VESTIBULAR HYPOFUNCTION OF BOTH EARS: ICD-10-CM

## 2024-04-24 DIAGNOSIS — R42 VERTIGO: ICD-10-CM

## 2024-04-24 NOTE — PROGRESS NOTES
"Physical Therapy Daily Treatment Note  [address] [address] [address]  [place] [address]      Patient: Tracie Gross  : 1960  Referring Practitioner: Floyd Silva MD  Date of Initial Visit: Type: THERAPY  Noted: 2024  Today's Date: 2024  Patient seen for: 2 sessions      Visit Diagnoses:     ICD-10-CM ICD-9-CM   1. Benign paroxysmal positional vertigo, unspecified laterality  H81.10 386.11   2. Vertigo  R42 780.4   3. Vestibular hypofunction of both ears  H83.2X3 386.50       Subjective   Tracie Gross reports that she has a \"fuss\" feeling.   Has had some good days over the weekend with less symptoms.   Has been doing the ex at home     Objective   Mild + R DHP.     See Exercise, Manual, and Modality Logs for complete treatment.     Patient Education: cont with current ex at home.     Assessment/Plan  Evelyn had less symptoms today than on eval date with canalithiasis.     Balance and vestibular hypofunction still present.     Plan:   cont with current treatment with progression of balance and VOR as tolerated.           Timed:         Manual Therapy:         mins  98366;     Therapeutic Exercise:         mins  36592;     Neuromuscular Walter:    13    mins  12140;    Therapeutic Activity:     12     mins  21604;     Gait Training:           mins  56868;     Ultrasound:          mins  82474;    Ionto                                   mins   61841  Self Care                            mins   96542      Un-Timed:  Electrical Stimulation:         mins  58394 ( );  Traction          mins 07159  Canalith repositioning     15 min  38741         Timed Treatment:   40   mins   Total Treatment:     40   mins              Jocelynn Matos PT    Physical Therapist  "

## 2024-04-26 ENCOUNTER — TREATMENT (OUTPATIENT)
Dept: PHYSICAL THERAPY | Facility: CLINIC | Age: 64
End: 2024-04-26
Payer: MEDICARE

## 2024-04-26 DIAGNOSIS — R42 VERTIGO: ICD-10-CM

## 2024-04-26 DIAGNOSIS — H81.10 BENIGN PAROXYSMAL POSITIONAL VERTIGO, UNSPECIFIED LATERALITY: Primary | ICD-10-CM

## 2024-04-26 DIAGNOSIS — H83.2X3 VESTIBULAR HYPOFUNCTION OF BOTH EARS: ICD-10-CM

## 2024-04-26 DIAGNOSIS — H81.11 BPPV (BENIGN PAROXYSMAL POSITIONAL VERTIGO), RIGHT: ICD-10-CM

## 2024-04-26 NOTE — PROGRESS NOTES
Physical Therapy Daily Treatment Note  [address] [address] [address]  [place] [address]      Patient: Tracie Gross  : 1960  Referring Practitioner: Floyd Silva MD  Date of Initial Visit: Type: THERAPY  Noted: 2024  Today's Date: 2024  Patient seen for: 3 sessions      Visit Diagnoses:     ICD-10-CM ICD-9-CM   1. Benign paroxysmal positional vertigo, unspecified laterality  H81.10 386.11   2. Vertigo  R42 780.4   3. Vestibular hypofunction of both ears  H83.2X3 386.50   4. BPPV (benign paroxysmal positional vertigo), right  H81.11 386.11       Subjective   Tracie Gross reports that her symptoms were less x's 1 day.  Then notes that she woke up really dizzy this morning.  She had to cancel lunch with one of friends as she did not feel comfortable driving.   She did take a meclizine several hours before her PT appt.   Denies any nausea    Objective   Mod + symptoms with L DHP test and horizontal.  Lesser symptoms with VOR and saccades today.    See Exercise, Manual, and Modality Logs for complete treatment.     Patient Education: x's 1 day of sleeping slightly elevated and no SL.     Assessment/Plan  Evelyn's BPPV symptoms were increased today.  She did report reduction of the symptoms x's 1 day.      Plan:  cont with current tx plan.             Timed:         Manual Therapy:         mins  72084;     Therapeutic Exercise:         mins  24977;     Neuromuscular Walter:    15    mins  12252;    Therapeutic Activity:          mins  43729;     Gait Training:           mins  50370;     Ultrasound:          mins  96980;    Ionto                                   mins   90200  Self Care                            mins   41180      Un-Timed:  Electrical Stimulation:         mins  37478 ( );  Traction          mins 67024  Canalith repositioning    24 min   04926        Timed Treatment:   15   mins   Total Treatment:     39   mins              Jocelynn Matos PT    Physical  Therapist

## 2024-04-30 ENCOUNTER — TREATMENT (OUTPATIENT)
Dept: PHYSICAL THERAPY | Facility: CLINIC | Age: 64
End: 2024-04-30
Payer: MEDICARE

## 2024-04-30 DIAGNOSIS — H81.10 BENIGN PAROXYSMAL POSITIONAL VERTIGO, UNSPECIFIED LATERALITY: Primary | ICD-10-CM

## 2024-04-30 DIAGNOSIS — R42 VERTIGO: ICD-10-CM

## 2024-04-30 DIAGNOSIS — H81.11 BPPV (BENIGN PAROXYSMAL POSITIONAL VERTIGO), RIGHT: ICD-10-CM

## 2024-04-30 DIAGNOSIS — H83.2X3 VESTIBULAR HYPOFUNCTION OF BOTH EARS: ICD-10-CM

## 2024-04-30 PROCEDURE — 97530 THERAPEUTIC ACTIVITIES: CPT | Performed by: PHYSICAL THERAPIST

## 2024-04-30 PROCEDURE — 97112 NEUROMUSCULAR REEDUCATION: CPT | Performed by: PHYSICAL THERAPIST

## 2024-04-30 PROCEDURE — 95992 CANALITH REPOSITIONING PROC: CPT | Performed by: PHYSICAL THERAPIST

## 2024-04-30 NOTE — PROGRESS NOTES
Physical Therapy Daily Treatment Note  [address] [address]  [place] [address]      Patient: Tracie Gross  : 1960  Referring Practitioner: No ref. provider found  Date of Initial Visit: Type: THERAPY  Noted: 2024  Today's Date: 2024  Patient seen for: 4 sessions      Visit Diagnoses:     ICD-10-CM ICD-9-CM   1. Benign paroxysmal positional vertigo, unspecified laterality  H81.10 386.11   2. Vertigo  R42 780.4   3. Vestibular hypofunction of both ears  H83.2X3 386.50   4. BPPV (benign paroxysmal positional vertigo), right  H81.11 386.11       Subjective   Tracie Gross reports that she was really dizzy yesterday.   Today she is some better.   States that her  walking in front of her even made her dizzy.     Objective   Mild L DHP with immediate onset x's 20 sec.   2nd maneuver wo symptoms.    See Exercise, Manual, and Modality Logs for complete treatment.     Patient Education: no change of home ex     Assessment/Plan  Evelyn had mild BPPV today with a left DHP and VOR, saccades.        Progress per Plan of Care          Timed:         Manual Therapy:         mins  14562;     Therapeutic Exercise:         mins  09963;     Neuromuscular Walter:    11    mins  59570;    Therapeutic Activity:     12     mins  55250;     Gait Training:           mins  78094;     Ultrasound:          mins  48009;    Ionto                                   mins   17756  Self Care                            mins   94923      Un-Timed:  Electrical Stimulation:         mins  49341 (MC );  Traction          mins 64205  Canalith repositioning    16 min  68717    Timed Treatment:   23   mins   Total Treatment:     39   mins              Jocelynn Matos, PT    Physical Therapist

## 2024-05-01 ENCOUNTER — TREATMENT (OUTPATIENT)
Dept: PHYSICAL THERAPY | Facility: CLINIC | Age: 64
End: 2024-05-01
Payer: MEDICARE

## 2024-05-01 DIAGNOSIS — H81.10 BENIGN PAROXYSMAL POSITIONAL VERTIGO, UNSPECIFIED LATERALITY: Primary | ICD-10-CM

## 2024-05-01 DIAGNOSIS — H83.2X3 VESTIBULAR HYPOFUNCTION OF BOTH EARS: ICD-10-CM

## 2024-05-01 DIAGNOSIS — H81.11 BPPV (BENIGN PAROXYSMAL POSITIONAL VERTIGO), RIGHT: ICD-10-CM

## 2024-05-01 DIAGNOSIS — R42 VERTIGO: ICD-10-CM

## 2024-05-01 PROCEDURE — 97112 NEUROMUSCULAR REEDUCATION: CPT | Performed by: PHYSICAL THERAPIST

## 2024-05-01 PROCEDURE — 97530 THERAPEUTIC ACTIVITIES: CPT | Performed by: PHYSICAL THERAPIST

## 2024-05-01 PROCEDURE — 95992 CANALITH REPOSITIONING PROC: CPT | Performed by: PHYSICAL THERAPIST

## 2024-05-01 NOTE — PROGRESS NOTES
Physical Therapy Daily Treatment Note  [address] [address]  [place] [address]      Patient: Tracie Gross  : 1960  Referring Practitioner: Floyd Silva MD  Date of Initial Visit: Type: THERAPY  Noted: 2024  Today's Date: 2024  Patient seen for: 5 sessions      Visit Diagnoses:     ICD-10-CM ICD-9-CM   1. Benign paroxysmal positional vertigo, unspecified laterality  H81.10 386.11   2. Vertigo  R42 780.4   3. Vestibular hypofunction of both ears  H83.2X3 386.50   4. BPPV (benign paroxysmal positional vertigo), right  H81.11 386.11       Subjective   Tracie Gross reports today has note been as good as yesterday.     Objective   (+) VOR and saccades  Work on vestibular hypofunction in standing and seated with reading while turning head.   L Epley completed.   See Exercise, Manual, and Modality Logs for complete treatment.     Patient Education: cont with her current ex at home    Assessment/Plan  Evelyn presents today with less canalithiasis.  Vestibular hypofunction still present.  Thus the reasoning of continued symptoms.     Progress per Plan of Care          Timed:         Manual Therapy:         mins  29177;     Therapeutic Exercise:         mins  99914;     Neuromuscular Walter:    15    mins  41096;    Therapeutic Activity:     14     mins  76849;     Gait Training:           mins  59411;     Ultrasound:          mins  98052;    Ionto                                   mins   21256  Self Care                            mins   66675      Un-Timed:  Electrical Stimulation:         mins  73024 (MC );  Traction          mins 91160  Canalith repositioning   11 min  06436         Timed Treatment:   29   mins   Total Treatment:     40   mins              Jocelynn Matos PT    Physical Therapist

## 2024-05-04 DIAGNOSIS — M34.9 SCLERODERMA: ICD-10-CM

## 2024-05-04 DIAGNOSIS — M32.9 SLE (SYSTEMIC LUPUS ERYTHEMATOSUS RELATED SYNDROME): ICD-10-CM

## 2024-05-05 DIAGNOSIS — M87.052 AVASCULAR NECROSIS OF FEMORAL HEAD, LEFT: ICD-10-CM

## 2024-05-06 RX ORDER — CYCLOBENZAPRINE HCL 10 MG
10 TABLET ORAL 3 TIMES DAILY PRN
Qty: 45 TABLET | Refills: 1 | Status: SHIPPED | OUTPATIENT
Start: 2024-05-06

## 2024-05-06 RX ORDER — OXYCODONE HYDROCHLORIDE 10 MG/1
10 TABLET ORAL EVERY 6 HOURS PRN
Qty: 120 TABLET | Refills: 0 | Status: SHIPPED | OUTPATIENT
Start: 2024-05-06

## 2024-05-09 ENCOUNTER — TREATMENT (OUTPATIENT)
Dept: PHYSICAL THERAPY | Facility: CLINIC | Age: 64
End: 2024-05-09
Payer: MEDICARE

## 2024-05-09 DIAGNOSIS — R42 VERTIGO: ICD-10-CM

## 2024-05-09 DIAGNOSIS — H83.2X3 VESTIBULAR HYPOFUNCTION OF BOTH EARS: ICD-10-CM

## 2024-05-09 DIAGNOSIS — H81.11 BPPV (BENIGN PAROXYSMAL POSITIONAL VERTIGO), RIGHT: ICD-10-CM

## 2024-05-09 DIAGNOSIS — H81.10 BENIGN PAROXYSMAL POSITIONAL VERTIGO, UNSPECIFIED LATERALITY: Primary | ICD-10-CM

## 2024-05-15 DIAGNOSIS — R63.5 WEIGHT GAIN: ICD-10-CM

## 2024-05-15 DIAGNOSIS — G47.00 INSOMNIA, UNSPECIFIED TYPE: ICD-10-CM

## 2024-05-15 RX ORDER — ALPRAZOLAM 1 MG/1
1 TABLET ORAL NIGHTLY PRN
Qty: 30 TABLET | Refills: 3 | Status: SHIPPED | OUTPATIENT
Start: 2024-05-15

## 2024-05-20 ENCOUNTER — OFFICE VISIT (OUTPATIENT)
Dept: ORTHOPEDIC SURGERY | Facility: CLINIC | Age: 64
End: 2024-05-20
Payer: MEDICARE

## 2024-05-20 VITALS — WEIGHT: 214 LBS | HEART RATE: 71 BPM | HEIGHT: 62 IN | BODY MASS INDEX: 39.38 KG/M2

## 2024-05-20 DIAGNOSIS — M25.512 ACUTE PAIN OF LEFT SHOULDER: ICD-10-CM

## 2024-05-20 DIAGNOSIS — M25.512 ACUTE PAIN OF BOTH SHOULDERS: ICD-10-CM

## 2024-05-20 DIAGNOSIS — M19.011 OSTEOARTHRITIS OF RIGHT GLENOHUMERAL JOINT: Primary | ICD-10-CM

## 2024-05-20 DIAGNOSIS — M25.511 ACUTE PAIN OF BOTH SHOULDERS: ICD-10-CM

## 2024-05-20 PROCEDURE — 20610 DRAIN/INJ JOINT/BURSA W/O US: CPT | Performed by: ORTHOPAEDIC SURGERY

## 2024-05-20 PROCEDURE — 99213 OFFICE O/P EST LOW 20 MIN: CPT | Performed by: ORTHOPAEDIC SURGERY

## 2024-05-20 RX ORDER — TRIAMCINOLONE ACETONIDE 40 MG/ML
40 INJECTION, SUSPENSION INTRA-ARTICULAR; INTRAMUSCULAR ONCE
Status: COMPLETED | OUTPATIENT
Start: 2024-05-20 | End: 2024-05-20

## 2024-05-20 RX ADMIN — TRIAMCINOLONE ACETONIDE 40 MG: 40 INJECTION, SUSPENSION INTRA-ARTICULAR; INTRAMUSCULAR at 15:27

## 2024-05-20 NOTE — PROGRESS NOTES
Patient ID: Tracie Gross is a 63 y.o. female.    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   Doing well on the right but having some left shoulder pain  Review of Systems:        Objective:    LMP 05/08/1983     Physical Examination:  Right shoulder healed incision no bony tenderness active elevation 160 abduction 130 external rotation 40 internal rotation L5, left shoulder demonstrates mild pain impingement area passive elevation 170 abduction 130 external rotation 40 with pain and weakness on Speed, [place], supraspinatus testing       Imaging:   bilateral Shoulder X-Ray  Indication: Postop right total shoulder with left shoulder pain  AP Y and Lateral views  Findings: Reverse total shoulder in position with unchanged calcification in the axillary space on the right mild joint space narrowing on the left  no bony lesion  Soft tissues normal  decreased joint spaces  Hardware appropriately positioned yes      yes prior studies available for comparison.    Assessment:    Doing well after right total shoulder  Left shoulder pain with cuff tear    Plan:     Activity as tolerated for the right side, for the left side I recommend injection after todays evaluation.  Risks and benefits were discussed. Under sterile technique and after timeout and verbal consent I injected 40 mg of Kenalog and 1 mL of 1% Lidocaine plain into the subacromial space. It was well tolerated.  See me in 6 months for the right shoulder    Procedures          Disclaimer: Part of this note may be an electronic transcription/translation of spoken language to printed text using the Dragon Dictation System

## 2024-05-27 DIAGNOSIS — M87.052 AVASCULAR NECROSIS OF FEMORAL HEAD, LEFT: ICD-10-CM

## 2024-05-28 RX ORDER — GABAPENTIN 600 MG/1
TABLET ORAL
Qty: 450 TABLET | Refills: 1 | Status: SHIPPED | OUTPATIENT
Start: 2024-05-28

## 2024-05-29 RX ORDER — FERROUS SULFATE 325(65) MG
1 TABLET ORAL
Qty: 30 TABLET | Refills: 1 | Status: SHIPPED | OUTPATIENT
Start: 2024-05-29

## 2024-05-29 RX ORDER — FERROUS SULFATE 325(65) MG
1 TABLET ORAL
Qty: 30 TABLET | Refills: 1 | OUTPATIENT
Start: 2024-05-29

## 2024-06-03 DIAGNOSIS — G89.29 CHRONIC LOW BACK PAIN, UNSPECIFIED BACK PAIN LATERALITY, UNSPECIFIED WHETHER SCIATICA PRESENT: Primary | ICD-10-CM

## 2024-06-03 DIAGNOSIS — M54.50 CHRONIC LOW BACK PAIN, UNSPECIFIED BACK PAIN LATERALITY, UNSPECIFIED WHETHER SCIATICA PRESENT: Primary | ICD-10-CM

## 2024-06-04 ENCOUNTER — TREATMENT (OUTPATIENT)
Dept: PHYSICAL THERAPY | Facility: CLINIC | Age: 64
End: 2024-06-04
Payer: MEDICARE

## 2024-06-04 DIAGNOSIS — M25.619 LIMITED RANGE OF MOTION (ROM) OF SHOULDER: ICD-10-CM

## 2024-06-04 DIAGNOSIS — M25.511 CHRONIC RIGHT SHOULDER PAIN: ICD-10-CM

## 2024-06-04 DIAGNOSIS — G89.29 CHRONIC RIGHT SHOULDER PAIN: ICD-10-CM

## 2024-06-04 DIAGNOSIS — M19.011 OSTEOARTHRITIS OF RIGHT GLENOHUMERAL JOINT: Primary | ICD-10-CM

## 2024-06-04 PROCEDURE — 97110 THERAPEUTIC EXERCISES: CPT | Performed by: PHYSICAL THERAPIST

## 2024-06-04 PROCEDURE — 97161 PT EVAL LOW COMPLEX 20 MIN: CPT | Performed by: PHYSICAL THERAPIST

## 2024-06-04 NOTE — PROGRESS NOTES
Physical Therapy Initial Evaluation and Plan of Care    Patient: Tracie Gross   : 1960  Diagnosis/ICD-10 Code:  Osteoarthritis of right glenohumeral joint [M19.011]  Referring practitioner: Floyd Ruiz, *  Date of Initial Visit: 2024  Today's Date: 2024  Patient seen for 1 sessions  PT Clinic location:  [address] [address]  [place] [address]         Subjective Questionnaire: QuickDASH: 43%    Subjective Evaluation    History of Present Illness  Mechanism of injury: History of current condition: Presents today with reports of limited function of her right RUE. Says she had a reverse total shoulder replacement in 2023. Says she has been doing ok since the surgery, but still has trouble reaching behind her back and is lacking strength. She needs to get her left shoulder replaced but doesn't feel that her right shoulder is functional enough to do the surgery yet (would like to have it done by October of this year because it is so painful).     Reported functional limitation: can't reach behind her back for self care, has to modify how she does it, difficulty pulling pants up and down, has to assist elbow to reach left shoulder, difficulty fixing hair.     Quality of life: good    Pain  No pain reported  Location: does have occasional shoulder soreness but not currently    Patient Goals  Patient goals for therapy: increased motion, increased strength, independence with ADLs/IADLs and return to sport/leisure activities  Patient goal: get right UE functional enough to be able replace left shoulder         Medical history: arthritis, asthma, bipolar affective disorder, breast cancer in , carpal tunnel syndrome, HTN, lupus, Monahan syndrome, MRSA, Obesity, osteoporosis, Von Willebrand disease (clotting disorder), hx right reverse TSA. See chart for further detail.   Therapy Precautions: osteoporosis, clotting disorder, right shoulder replacement    Objective           Active Range of Motion     Right Shoulder   Flexion: 110 (min/mod scapular compensation) degrees   Abduction: 105 (min/mod scapular compensation) degrees   External rotation BTH: Active external rotation behind the head: can reach to back of head.   Internal rotation BTB: Active internal rotation behind the back: just posterior to greater trochanter.     Additional Active Range of Motion Details  Cross body reach w/ RUE: can reach to left clavicle    Passive Range of Motion     Right Shoulder   Flexion: 150 degrees   Abduction: 125 degrees   External rotation 45°: 70 degrees   Internal rotation 45°: 80 degrees     Strength/Myotome Testing     Right Shoulder     Planes of Motion   Flexion: 4-   Abduction: 4   External rotation at 0°: 3+   Internal rotation at 0°: 4-     Isolated Muscles   Biceps: 4-   Lower trapezius: 3+   Middle trapezius: 3+   Serratus anterior: 3+   Triceps: 4-     Additional Strength Details  R : 35#  L : 38#          Assessment & Plan       Assessment  Impairments: abnormal or restricted ROM, impaired physical strength, lacks appropriate home exercise program and pain with function   Functional limitations: carrying objects, lifting, pulling, pushing, reaching behind back, reaching overhead and unable to perform repetitive tasks   Assessment details: The patient is a 63 y.o. female who presents to physical therapy today for therapy for right shoulder due to limited ROM and strength following reverse TSA in November 2023. Upon initial evaluation, the patient demonstrates the following impairments: limited shoulder ROM elevation, limited cross body abduction, limited behind the back and head mobility, RUE weakness. Due to these impairments, the patient is unable to perform or has difficulty with the following functional tasks: fixing/washing hair, self care/hygiene with RUE, reaching opposite shoulder for dressing and self care. The patient would benefit from skilled PT services to  address functional limitations and impairments and to improve patient quality of life.      Prognosis: good    Goals  Plan Goals: ST. Pt will be independent and compliant with initial HEP in 4 weeks.  2. Pt will report a 50% improvement in symptoms since starting therapy in 4 weeks.  3. Pt will demonstrate an increase in right shoulder AROM flexion to 130 degrees within 4 weeks.   4. Pt will improve shdr functional IR BTB to right SIJ.    LTG: - by DC (12 weeks)  1. Pt will be independent with final HEP for self-management of condition by DC.  2. Pt will improve score on QuickDASH to less than 25% impairment by DC.   3. Pt will report a 75% improvement in symptoms by DC in order to allow return to PLOF.  4. Pt will improve right shoulder functional ER behind the head  to at least C3  in order to be able to wash and fix hair by DC.  5. Pt will improve right shoulder strength to at least 4/5 in order to be able to compete ADLs with RUE by DC.         Plan  Therapy options: will be seen for skilled therapy services  Planned modality interventions: cryotherapy, electrical stimulation/Russian stimulation, TENS, thermotherapy (hydrocollator packs) and ultrasound  Planned therapy interventions: body mechanics training, ADL retraining, flexibility, functional ROM exercises, home exercise program, joint mobilization, manual therapy, motor coordination training, neuromuscular re-education, postural training, soft tissue mobilization, spinal/joint mobilization, strengthening, stretching and therapeutic activities  Frequency: 2x week  Duration in weeks: 12  Treatment plan discussed with: patient        See flowsheets for treatment detail.    History # of Personal Factors and/or Comorbidities: HIGH (3+)  Examination of Body System(s): # of elements: MODERATE (3)  Clinical Presentation: STABLE   Clinical Decision Making: LOW       Timed:         Manual Therapy:         mins  25960;     Therapeutic Exercise:    25     mins   69404;     Neuromuscular Walter:        mins  87061;    Therapeutic Activity:          mins  52228;     Gait Training:           mins  16098;     Ultrasound:          mins  11580;    Ionto                                   mins   21950  Self Care                            mins   45139      Un-Timed:  Electrical Stimulation:         mins  52318 ( );  Dry Needling          mins self-pay  Traction          mins 50835  Low Eval      25    Mins  48436  Mod Eval          Mins  94096  High Eval                            Mins  13759  Re-Eval                               mins  70345      Timed Treatment:   25   mins   Total Treatment:     50   mins    PT SIGNATURE: Phuong Yarbrough PT   [place] PT license #: [id number]  [place] PT license #: [id number]  DATE TREATMENT INITIATED: 6/4/2024    Initial Certification  Certification Period: 9/1/2024  I certify that the therapy services are furnished while this patient is under my care.  The services outlined above are required by this patient, and will be reviewed every 90 days.    PHYSICIAN: Floyd Ruiz MD  NPI: [id number]                                      DATE:     Please sign and return via fax to  [address] [address] [address]  [place] [address] . Thank you, [facility].

## 2024-06-06 ENCOUNTER — TREATMENT (OUTPATIENT)
Dept: PHYSICAL THERAPY | Facility: CLINIC | Age: 64
End: 2024-06-06
Payer: MEDICARE

## 2024-06-06 DIAGNOSIS — G89.29 CHRONIC RIGHT SHOULDER PAIN: ICD-10-CM

## 2024-06-06 DIAGNOSIS — M25.511 CHRONIC RIGHT SHOULDER PAIN: ICD-10-CM

## 2024-06-06 DIAGNOSIS — M19.011 OSTEOARTHRITIS OF RIGHT GLENOHUMERAL JOINT: Primary | ICD-10-CM

## 2024-06-06 DIAGNOSIS — M25.619 LIMITED RANGE OF MOTION (ROM) OF SHOULDER: ICD-10-CM

## 2024-06-06 NOTE — PROGRESS NOTES
Physical Therapy Daily Treatment Note      [facility] [place]              [address]                [facility] [place]  [address]        Patient: Tracie Gross   : 1960  Diagnosis/ICD-10 Code:  Osteoarthritis of right glenohumeral joint [M19.011]  Referring practitioner: Floyd Ruiz, *  Date of Initial Visit: Type: THERAPY  Noted: 2024  Today's Date: 2024  Patient seen for 2 sessions         Subjective    Tracie Gross reports: she has a few questions on the exercises.  No other problems since last session.           Objective   See Exercise, Manual, and Modality Logs for complete treatment.     Uncoordinated shoulder/scapular motion with shoulder flexion    Assessment/Plan  Poor coordination of muscles noted with supine shoulder flexion noted thus progressed scapular strengthening for increased GH joint stability.  Able to progress several exercises as noted with some fatigue but overall good tolerance and good technique.  Clarified HEP and will progress once pt is (I).  Plan to continue to progress shoulder stability exercises to improve form and tolerance to strengthening and functional exercises.     Progress per Plan of Care           Timed:  Manual Therapy:         mins  28775;  Therapeutic Exercise:    20     mins  42978;     Neuromuscular Walter:        mins  20298;    Therapeutic Activity:     10     mins  03521;     Gait Training:           mins  33905;     Ultrasound:          mins  95446;     Work Conditioning/Hardening (initial 2 hours)        mins  78641  Work Conditioning/Hardening (each add'l hour)        mins  67829    Untimed:   Electrical Stimulation:         mins  63930 (MC );  Traction          mins 12883    Timed Treatment:   30   mins   Total Treatment:     30   mins    Stacey Moreno PTA  Physical Therapist Assistant

## 2024-06-07 DIAGNOSIS — J30.9 ALLERGIC RHINITIS, UNSPECIFIED SEASONALITY, UNSPECIFIED TRIGGER: ICD-10-CM

## 2024-06-07 RX ORDER — FLUTICASONE PROPIONATE 50 MCG
SPRAY, SUSPENSION (ML) NASAL
Qty: 16 ML | Refills: 5 | Status: SHIPPED | OUTPATIENT
Start: 2024-06-07

## 2024-06-11 ENCOUNTER — TREATMENT (OUTPATIENT)
Dept: PHYSICAL THERAPY | Facility: CLINIC | Age: 64
End: 2024-06-11
Payer: MEDICARE

## 2024-06-11 DIAGNOSIS — M25.511 CHRONIC RIGHT SHOULDER PAIN: ICD-10-CM

## 2024-06-11 DIAGNOSIS — G89.29 CHRONIC RIGHT SHOULDER PAIN: ICD-10-CM

## 2024-06-11 DIAGNOSIS — M19.011 OSTEOARTHRITIS OF RIGHT GLENOHUMERAL JOINT: Primary | ICD-10-CM

## 2024-06-11 DIAGNOSIS — M25.619 LIMITED RANGE OF MOTION (ROM) OF SHOULDER: ICD-10-CM

## 2024-06-11 NOTE — PROGRESS NOTES
Physical Therapy Daily Treatment Note      [facility] [place]              [address], [address]                [place]  [address]        Patient: Tracie Gross   : 1960  Diagnosis/ICD-10 Code:  Osteoarthritis of right glenohumeral joint [M19.011]  Referring practitioner: Floyd Ruiz, *  Date of Initial Visit: Type: THERAPY  Noted: 2024  Today's Date: 2024  Patient seen for 3 sessions         Subjective    Tracie Gross reports: she is worn out.  She struggled to get out of the shower today and that did her in.  She said she was a little sore after last time but it wasn't too bad.            Objective   See Exercise, Manual, and Modality Logs for complete treatment.       Assessment/Plan  Continued to progress strengthening as noted.  She reported fatigue with progressions however no pain.  Intermittent cues required for technique and pt able to return demonstrate improvements.  Will continue to monitor and progress as able including HEP when appropriate.      Progress per Plan of Care           Timed:  Manual Therapy:         mins  79270;  Therapeutic Exercise:    25     mins  07365;     Neuromuscular Walter:    2    mins  72792;    Therapeutic Activity:     13     mins  13347;     Gait Training:           mins  16800;     Ultrasound:          mins  12695;     Work Conditioning/Hardening (initial 2 hours)        mins  83443  Work Conditioning/Hardening (each add'l hour)        mins  18677    Untimed:   Electrical Stimulation:         mins  95906 ( );  Traction          mins 70111    Timed Treatment:   40   mins   Total Treatment:     40   mins    Stacey Moreno PTA  Physical Therapist Assistant

## 2024-06-12 RX ORDER — LAMOTRIGINE 200 MG/1
200 TABLET ORAL NIGHTLY
Qty: 90 TABLET | Refills: 1 | Status: SHIPPED | OUTPATIENT
Start: 2024-06-12

## 2024-06-13 ENCOUNTER — TREATMENT (OUTPATIENT)
Dept: PHYSICAL THERAPY | Facility: CLINIC | Age: 64
End: 2024-06-13
Payer: MEDICARE

## 2024-06-13 DIAGNOSIS — M25.619 LIMITED RANGE OF MOTION (ROM) OF SHOULDER: Primary | ICD-10-CM

## 2024-06-13 DIAGNOSIS — M19.011 OSTEOARTHRITIS OF RIGHT GLENOHUMERAL JOINT: ICD-10-CM

## 2024-06-13 DIAGNOSIS — G89.29 CHRONIC RIGHT SHOULDER PAIN: ICD-10-CM

## 2024-06-13 DIAGNOSIS — M25.511 CHRONIC RIGHT SHOULDER PAIN: ICD-10-CM

## 2024-06-13 NOTE — PROGRESS NOTES
Physical Therapy Daily Treatment Note      [facility] [place]              [address]                [facility] [place]  [address]        Patient: Tracie Gross   : 1960  Diagnosis/ICD-10 Code:  Limited range of motion (ROM) of shoulder [M25.619]  Referring practitioner: Floyd Ruiz, *  Date of Initial Visit: Type: THERAPY  Noted: 2024  Today's Date: 2024  Patient seen for 4 sessions         Subjective    Tracie Gross reports: both her shoulders are sore.  It hurts in the joint.  She rated her (L) shoulder 6/10 and (R) shoulder 4/10.           Objective   See Exercise, Manual, and Modality Logs for complete treatment.     Uncoordinated supine shoulder flex    Assessment/Plan  Maintained all exercises d/t soreness.  Some fatigue also noted thus modified resistance or reps to address especially to ensure pt able to maintain form/technique.  Will monitor tolerance and progress exercises as able with focus on scapular stability and strength to improve proper and functional motion.       Progress per Plan of Care           Timed:  Manual Therapy:         mins  39991;  Therapeutic Exercise:    20     mins  93306;     Neuromuscular Walter:    2    mins  77426;    Therapeutic Activity:     10     mins  75596;     Gait Training:           mins  83084;     Ultrasound:          mins  25983;     Work Conditioning/Hardening (initial 2 hours)        mins  52142  Work Conditioning/Hardening (each add'l hour)        mins  13120    Untimed:   Electrical Stimulation:         mins  28339 (MC );  Traction          mins 87723    Timed Treatment:   32   mins   Total Treatment:     32   mins    Stacey Moreno PTA  Physical Therapist Assistant

## 2024-06-14 DIAGNOSIS — M79.89 LEG SWELLING: ICD-10-CM

## 2024-06-14 RX ORDER — FUROSEMIDE 20 MG/1
TABLET ORAL
Qty: 30 TABLET | Refills: 2 | Status: SHIPPED | OUTPATIENT
Start: 2024-06-14

## 2024-06-18 ENCOUNTER — TREATMENT (OUTPATIENT)
Dept: PHYSICAL THERAPY | Facility: CLINIC | Age: 64
End: 2024-06-18
Payer: MEDICARE

## 2024-06-18 DIAGNOSIS — M19.011 OSTEOARTHRITIS OF RIGHT GLENOHUMERAL JOINT: ICD-10-CM

## 2024-06-18 DIAGNOSIS — G89.29 CHRONIC RIGHT SHOULDER PAIN: ICD-10-CM

## 2024-06-18 DIAGNOSIS — M25.619 LIMITED RANGE OF MOTION (ROM) OF SHOULDER: Primary | ICD-10-CM

## 2024-06-18 DIAGNOSIS — M25.511 CHRONIC RIGHT SHOULDER PAIN: ICD-10-CM

## 2024-06-18 NOTE — PROGRESS NOTES
Physical Therapy Daily Treatment Note      [facility] [place]              [address]                [facility] [place]  [address]        Patient: Tracie Gross   : 1960  Diagnosis/ICD-10 Code:  Limited range of motion (ROM) of shoulder [M25.619]  Referring practitioner: Floyd Ruiz, *  Date of Initial Visit: Type: THERAPY  Noted: 2024  Today's Date: 2024  Patient seen for 5 sessions         Subjective    Tracie Gross reports: (L) shoulder feels liek there is an ax going down the middle.  The (R) is doing good.  She did something one day and it made it hurt but it was maybe a 2/10.  She can get her hand behind her back better.            Objective          Active Range of Motion     Right Shoulder   Flexion: 114 (with hiking) degrees   Abduction: 85 (with hiking, moved into scaption unable to complete true ABD) degrees   External rotation BTH: C4   Internal rotation BTB: Active internal rotation behind the back: (R) posterior hip.       See Exercise, Manual, and Modality Logs for complete treatment.       Assessment/Plan  Increased strength noted with rhythmic stabilization as pt required increased force to break.  Added IR/ER stabilization at 0 deg ABD as well however may move away from side next session.  Pt able to progress exercises as noted with focus on increased strengthening.  She required frequent cuing for technique and will continue to monitor and address as appropriate.  She reported fatigue and soreness at the end of the session.  Will monitor and continue to progress as able.     Progress per Plan of Care           Timed:  Manual Therapy:         mins  86757;  Therapeutic Exercise:    25     mins  33973;     Neuromuscular Wlater:    3    mins  59554;    Therapeutic Activity:     10     mins  58405;     Gait Training:           mins  23997;     Ultrasound:          mins  40371;     Work Conditioning/Hardening (initial 2 hours)        mins  80288  Work Conditioning/Hardening  (each add'l hour)        mins  95062    Untimed:   Electrical Stimulation:         mins  86877 ( );  Traction          mins 49167    Timed Treatment:   38   mins   Total Treatment:     38   mins    Stacey Moreno PTA  Physical Therapist Assistant

## 2024-06-25 ENCOUNTER — OFFICE VISIT (OUTPATIENT)
Dept: FAMILY MEDICINE CLINIC | Facility: CLINIC | Age: 64
End: 2024-06-25
Payer: MEDICARE

## 2024-06-25 VITALS
WEIGHT: 220 LBS | HEIGHT: 63 IN | HEART RATE: 65 BPM | DIASTOLIC BLOOD PRESSURE: 64 MMHG | BODY MASS INDEX: 38.98 KG/M2 | RESPIRATION RATE: 18 BRPM | OXYGEN SATURATION: 97 % | SYSTOLIC BLOOD PRESSURE: 118 MMHG

## 2024-06-25 DIAGNOSIS — M19.011 OSTEOARTHRITIS OF RIGHT GLENOHUMERAL JOINT: ICD-10-CM

## 2024-06-25 DIAGNOSIS — F31.9 BIPOLAR AFFECTIVE DISORDER, REMISSION STATUS UNSPECIFIED: ICD-10-CM

## 2024-06-25 DIAGNOSIS — K51.919 ULCERATIVE COLITIS WITH COMPLICATION, UNSPECIFIED LOCATION: ICD-10-CM

## 2024-06-25 DIAGNOSIS — M32.9 SLE (SYSTEMIC LUPUS ERYTHEMATOSUS RELATED SYNDROME): ICD-10-CM

## 2024-06-25 DIAGNOSIS — Z15.09 LYNCH SYNDROME: ICD-10-CM

## 2024-06-25 DIAGNOSIS — C50.919 MALIGNANT NEOPLASM OF FEMALE BREAST, UNSPECIFIED ESTROGEN RECEPTOR STATUS, UNSPECIFIED LATERALITY, UNSPECIFIED SITE OF BREAST: ICD-10-CM

## 2024-06-25 DIAGNOSIS — M34.9 SCLERODERMA: ICD-10-CM

## 2024-06-25 DIAGNOSIS — E03.9 HYPOTHYROIDISM, UNSPECIFIED TYPE: ICD-10-CM

## 2024-06-25 DIAGNOSIS — M54.16 LUMBAR RADICULOPATHY: ICD-10-CM

## 2024-06-25 DIAGNOSIS — I73.00 RAYNAUD'S DISEASE WITHOUT GANGRENE: ICD-10-CM

## 2024-06-25 DIAGNOSIS — M87.052 AVASCULAR NECROSIS OF FEMORAL HEAD, LEFT: ICD-10-CM

## 2024-06-25 DIAGNOSIS — I10 PRIMARY HYPERTENSION: ICD-10-CM

## 2024-06-25 DIAGNOSIS — M25.562 PAIN IN BOTH KNEES, UNSPECIFIED CHRONICITY: ICD-10-CM

## 2024-06-25 DIAGNOSIS — Z23 ENCOUNTER FOR IMMUNIZATION: ICD-10-CM

## 2024-06-25 DIAGNOSIS — M25.561 PAIN IN BOTH KNEES, UNSPECIFIED CHRONICITY: ICD-10-CM

## 2024-06-25 DIAGNOSIS — M75.122 COMPLETE TEAR OF LEFT ROTATOR CUFF, UNSPECIFIED WHETHER TRAUMATIC: ICD-10-CM

## 2024-06-25 DIAGNOSIS — K21.9 GASTROESOPHAGEAL REFLUX DISEASE, UNSPECIFIED WHETHER ESOPHAGITIS PRESENT: ICD-10-CM

## 2024-06-25 DIAGNOSIS — M81.0 OSTEOPOROSIS, UNSPECIFIED OSTEOPOROSIS TYPE, UNSPECIFIED PATHOLOGICAL FRACTURE PRESENCE: ICD-10-CM

## 2024-06-25 DIAGNOSIS — E55.9 VITAMIN D DEFICIENCY, UNSPECIFIED: ICD-10-CM

## 2024-06-25 DIAGNOSIS — Z00.00 MEDICARE ANNUAL WELLNESS VISIT, SUBSEQUENT: Primary | ICD-10-CM

## 2024-06-25 RX ORDER — ONDANSETRON 4 MG/1
4 TABLET, FILM COATED ORAL EVERY 8 HOURS PRN
Qty: 30 TABLET | Refills: 3 | Status: SHIPPED | OUTPATIENT
Start: 2024-06-25

## 2024-06-25 RX ADMIN — BUPIVACAINE HYDROCHLORIDE 2 ML: 5 INJECTION, SOLUTION PERINEURAL at 16:33

## 2024-06-25 RX ADMIN — LIDOCAINE HYDROCHLORIDE 2 ML: 10 INJECTION, SOLUTION INFILTRATION; PERINEURAL at 16:33

## 2024-06-25 RX ADMIN — TRIAMCINOLONE ACETONIDE 40 MG: 40 INJECTION, SUSPENSION INTRA-ARTICULAR; INTRAMUSCULAR at 16:33

## 2024-06-25 NOTE — PROGRESS NOTES
The ABCs of the Annual Wellness Visit  Subsequent Medicare Wellness Visit    Subjective    Tracie Gross is a 63 y.o. female who presents for a Subsequent Medicare Wellness Visit.    The following portions of the patient's history were reviewed and   updated as appropriate: allergies, current medications, past family history, past medical history, past social history, past surgical history, and problem list.    Compared to one year ago, the patient feels her physical   health is worse.    Compared to one year ago, the patient feels her mental   health is the same.    Recent Hospitalizations:  She was not admitted to the hospital during the last year.     Current Medical Providers:  Patient Care Team:  Floyd Silva MD as PCP - General (Internal Medicine)  Suzan Jones MD as Consulting Physician (Hematology and Oncology)    Outpatient Medications Prior to Visit   Medication Sig Dispense Refill    albuterol sulfate  (90 Base) MCG/ACT inhaler Inhale 2 puffs Every 4 (Four) Hours As Needed for Wheezing. 8 g 0    ALPRAZolam (XANAX) 1 MG tablet Take 1 tablet by mouth At Night As Needed for Anxiety. 30 tablet 3    bisoprolol-hydrochlorothiazide (ZIAC) 10-6.25 MG per tablet TAKE 1 TABLET BY MOUTH DAILY 90 tablet 1    Calcium + Vitamin D3 600-5 MG-MCG tablet TAKE 1 TABLET BY MOUTH TWO TIMES A DAY 60 tablet 6    cetirizine (zyrTEC) 10 MG tablet TAKE 1 TABLET BY MOUTH DAILY 90 tablet 1    cyclobenzaprine (FLEXERIL) 10 MG tablet TAKE ONE TABLET BY MOUTH THREE TIMES A DAY AS NEEDED FOR MUSCLE SPASMS 45 tablet 1    Denosumab (PROLIA SC) Inject  under the skin into the appropriate area as directed Every 6 (Six) Months.      dexlansoprazole (DEXILANT) 60 MG capsule Take 1 capsule by mouth Daily.      ferrous sulfate (FeroSul) 325 (65 FE) MG tablet Take 1 tablet by mouth Daily With Breakfast. 30 tablet 1    Flaxseed, Linseed, (FLAXSEED OIL MAX STR) 1300 MG capsule Take 1 tablet by mouth 2 (Two) Times a Day.       fluticasone (FLONASE) 50 MCG/ACT nasal spray SPRAY TWO SPRAYS IN EACH NOSTRIL ONCE DAILY 16 mL 5    furosemide (LASIX) 20 MG tablet TAKE 1 TABLET BY MOUTH DAILY AS NEEDED FOR LEG SWELLING 30 tablet 2    gabapentin (NEURONTIN) 600 MG tablet TAKE TWO TABLETS BY MOUTH EVERY MORNING THEN TAKE ONE TABLET BY MOUTH DAILY IN THE AFTERNOON THEN TAKE TWO TABLETS BY MOUTH EVERY NIGHT (Patient taking differently: Take 2 tablets by mouth 3 (Three) Times a Day. TAKE TWO TABLETS BY MOUTH EVERY MORNING THEN TAKE ONE TABLET BY MOUTH DAILY IN THE AFTERNOON THEN TAKE TWO TABLETS BY MOUTH EVERY NIGHT) 450 tablet 1    hydroxychloroquine (PLAQUENIL) 200 MG tablet Take 1 tablet by mouth 2 (Two) Times a Day. Indications: Systemic Lupus Erythematosus 180 tablet 3    hydrOXYzine (ATARAX) 25 MG tablet Take 1 tablet by mouth Every 8 (Eight) Hours As Needed for Itching. 30 tablet 3    lamoTRIgine (LaMICtal) 200 MG tablet TAKE ONE TABLET BY MOUTH ONCE NIGHTLY 90 tablet 1    levothyroxine (SYNTHROID, LEVOTHROID) 175 MCG tablet TAKE 1 TABLET BY MOUTH DAILY 90 tablet 1    lisinopril (PRINIVIL,ZESTRIL) 5 MG tablet TAKE 1 TABLET BY MOUTH DAILY 90 tablet 0    meclizine (ANTIVERT) 25 MG tablet Take 1 tablet by mouth 3 (Three) Times a Day As Needed for Dizziness. 60 tablet 2    NIFEdipine XL (PROCARDIA XL) 90 MG 24 hr tablet TAKE 1 TABLET BY MOUTH DAILY 90 tablet 1    oxyCODONE (ROXICODONE) 10 MG tablet Take 1 tablet by mouth Every 6 (Six) Hours As Needed for Moderate Pain. 120 tablet 0    QUEtiapine (SEROquel) 100 MG tablet Take 1 tablet by mouth Every Night. 90 tablet 1    triamcinolone (KENALOG) 0.1 % lotion Apply  topically to the appropriate area as directed 3 (Three) Times a Day As Needed.      HYDROcodone-Acetaminophen (XODOL )  MG per tablet Every 6 (Six) Hours.      liothyronine (CYTOMEL) 25 MCG tablet Daily.      ondansetron (ZOFRAN) 4 MG tablet Take 1 tablet by mouth Every 8 (Eight) Hours As Needed for Nausea or Vomiting. 30  tablet 3    famotidine (PEPCID) 10 MG tablet  (Patient not taking: Reported on 6/25/2024)      guaiFENesin (Mucinex) 600 MG 12 hr tablet Take 2 tablets twice daily for congestion (Patient not taking: Reported on 6/25/2024) 30 tablet 1    silver sulfadiazine (SILVADENE, SSD) 1 % cream  (Patient not taking: Reported on 6/25/2024)       No facility-administered medications prior to visit.     Opioid medication/s are on active medication list.  and I have evaluated her active treatment plan and pain score trends (see table).  There were no vitals filed for this visit.  I have reviewed the chart for potential of high risk medication and harmful drug interactions in the elderly.        Aspirin is not on active medication list.  Aspirin use is not indicated based on review of current medical condition/s. Risk of harm outweighs potential benefits.  .    Patient Active Problem List   Diagnosis    Vitamin B12 deficiency    Arthritis    Sleep apnea    Bipolar affective disorder    Depression    Breast cancer    Chronic pain    Dyslipidemia    Family history of malignant neoplasm of colon    Family history of melanoma    Gastroesophageal reflux disease    Heart murmur    Hypertension    Hypothyroidism    Osteoarthritis, generalized    Raynaud's disease    Ulcerative colitis    Artificial knee joint present    Neuralgia    Presence of artificial hip joint, right    Lumbar radiculopathy    Derangement of posterior horn of lateral meniscus    Von Willebrand disease    SLE (systemic lupus erythematosus related syndrome)    Chest pain    Scleroderma    Monahan syndrome    Rectal prolapse    Osteoporosis    COVID-19    Fibromyalgia    Onychomycosis    Stage 3 chronic kidney disease    Immunocompromised    Avascular necrosis of femoral head, left    Morbid obesity    Trochanteric bursitis of left hip    Pain in both knees    Status post total hip replacement, right    DJD of left shoulder    Complete tear of left rotator cuff     "Osteoarthritis of right glenohumeral joint     Advance Care Planning   Advance Care Planning   Advance Directive is on file.  ACP discussion was held with the patient during this visit. Patient has an advance directive in EMR which is still valid.      Objective    Vitals:    24 1531   BP: 118/64   Pulse: 65   Resp: 18   SpO2: 97%   Weight: 99.8 kg (220 lb)   Height: 160 cm (63\")     Estimated body mass index is 38.97 kg/m² as calculated from the following:    Height as of this encounter: 160 cm (63\").    Weight as of this encounter: 99.8 kg (220 lb).  Exercise limited by joint pain     Does the patient have evidence of cognitive impairment? No        HEALTH RISK ASSESSMENT    Smoking Status:  Social History     Tobacco Use   Smoking Status Former    Current packs/day: 0.00    Average packs/day: 0.3 packs/day for 10.0 years (2.5 ttl pk-yrs)    Types: Cigarettes    Start date: 1984    Quit date: 1994    Years since quittin.5    Passive exposure: Past   Smokeless Tobacco Never   Tobacco Comments    Off and on for  those years     Alcohol Consumption:  Social History     Substance and Sexual Activity   Alcohol Use Not Currently    Comment: Rarely     Fall Risk Screen:  WILLIEADI Fall Risk Assessment was completed, and patient is at LOW risk for falls.Assessment completed on:2024    Depression Screenin/25/2024     3:27 PM   PHQ-2/PHQ-9 Depression Screening   Little Interest or Pleasure in Doing Things 0-->not at all   Feeling Down, Depressed or Hopeless 0-->not at all   PHQ-9: Brief Depression Severity Measure Score 0     Health Habits and Functional and Cognitive Screenin/18/2024     5:17 PM   Functional & Cognitive Status   Do you have difficulty preparing food and eating? No   Do you have difficulty bathing yourself, getting dressed or grooming yourself? No   Do you have difficulty using the toilet? No   Do you have difficulty moving around from place to place? No   Do you have " trouble with steps or getting out of a bed or a chair? Yes   Current Diet Limited Junk Food   Dental Exam Up to date   Eye Exam Up to date   Exercise (times per week) 4 times per week   Current Exercises Include Light Weights   Do you need help using the phone?  No   Are you deaf or do you have serious difficulty hearing?  No   Do you need help to go to places out of walking distance? Yes   Do you need help shopping? No   Do you need help preparing meals?  No   Do you need help with housework?  Yes   Do you need help with laundry? No   Do you need help taking your medications? No   Do you need help managing money? No   Do you ever drive or ride in a car without wearing a seat belt? No   Have you felt unusual stress, anger or loneliness in the last month? No   Who do you live with? Spouse   If you need help, do you have trouble finding someone available to you? No   Have you been bothered in the last four weeks by sexual problems? No   Do you have difficulty concentrating, remembering or making decisions? No     Age-appropriate Screening Schedule:  Refer to the list below for future screening recommendations based on patient's age, sex and/or medical conditions. Orders for these recommended tests are listed in the plan section. The patient has been provided with a written plan.    Health Maintenance   Topic Date Due    PAP SMEAR  Never done    RSV Vaccine - Adults (1 - 1-dose 60+ series) Never done    COVID-19 Vaccine (4 - 2023-24 season) 09/01/2023    INFLUENZA VACCINE  08/01/2024    DXA SCAN  02/01/2025    MAMMOGRAM  02/22/2025    BMI FOLLOWUP  05/20/2025    ANNUAL WELLNESS VISIT  06/25/2025    TDAP/TD VACCINES (2 - Td or Tdap) 04/06/2031    COLORECTAL CANCER SCREENING  01/06/2033    HEPATITIS C SCREENING  Completed    Pneumococcal Vaccine 65+  Completed    ZOSTER VACCINE  Completed        CMS Preventative Services Quick Reference  Risk Factors Identified During Encounter  Immunizations Discussed/Encouraged:  Influenza  The above risks/problems have been discussed with the patient.  Pertinent information has been shared with the patient in the After Visit Summary.  An After Visit Summary and PPPS were made available to the patient.    Preventative  Colonoscopy: 1/2023, due 1/2024. Scheduled 6/2024- follows w/ Man  Mammogram: 2/2024; MRI 9/2023. Follows w/ Karen. Alternating mammogram and MRI  Pap smear: s/p hysterectomy  DEXA: 2/2023 w/ osteopenia. Maintained on Prolia since late 2021 (Karen)  Shingles: Shingrix 10/2018  Pneumonia: completed Prevnar 11/2017, PCV20 ordered today  Tdap: 4/2021  Influenza: 10/2022, recommended  COVID: Completed 3 shots Pfizer (1/2022) and had illness    Follow Up:   Next Medicare Wellness visit to be scheduled in 1 year.       Additional E&M Note during same encounter follows:  Patient has multiple medical problems which are significant and separately identifiable that require additional work above and beyond the Medicare Wellness Visit.      Chief Complaint  Medicare Wellness-subsequent    Subjective        HPI  Tracie Gross is also being seen today for multiple medical problems    Avascular necrosis of L hip: s/p hip replacment 5/17/2023 for AVN. Pain overall improved but not resolved. Reports she did not have as good of a result as w/ her R hip replacement. L hip doesn't limit activity but does make it difficult to get in shower. Maintained on gabapentin 1200/600/1200 and oxycodone 10mg PRN (average 1 daily)     Knee pain: Patient continues to complain of bilateral knee pain.  She has had the right knee replaced and still has considerable pain and limited range of motion.  Patient reports improvement to L knee pain w/ steroid injection 8/2023. These continue to help and interested in repeating today. Using gabapentin 1200/600/1200, oxycodone 10 PRN (1x/day)    Shoulder pain: s/p R shoulder replacement 11/1/23. Continues to struggle getting R shoulder behind her back but no pain. She  has recently restarted PT to try to improve her ROM further. Had L shoulder injected in May 2024 as she will need the L shoulder replaced as well. Follows w/ ortho (Joseph).     Rescheduling her MRI lumbar spine for chronic low back pain. Occasionally goes down L leg    Monahan syndrome: also w/ UC. Follows w/ Man and Karen. Last EGD 9/2021 (completing every other year) and colonoscopy 1/2023 (completing annually). EGD and colonoscopy scheduled for 6/28/24. Had EUS in 12/2021 and MRI abd/pel in 11/2023. Plan to obtain alternating EUS and MRI pancreas every other year so that she is obtaining screening annually. Patient reports increased concerns for UC flare in the last month or two w/ increased loose stool, mucus and blood in stool. Not maintained on any immunosuppressants for many years.      GERD: maintained on dexlansoprazole 60mg daily. No heartburn/reflux on this medicine. No dypshagia. Last EGD 9/2021- repeating 6/28/24. Follows w/ GI-Man    Breast cancer: hx R breast cancer s/p R mastectomy w/ lymph node dissection and reconstruction in 1985. Follows regularly w/ oncology- Karen. Alternating mammogram (2/2024) and breast MRI (11/2023) every 6 months, largely because of Monahan syndrome hx. No concerns at this time.      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 Patient endorses worsening joint pain in hands bilaterally as well and notable Raynaud's (R>L). Malar rash has remained well controlled. SOB, mostly w/ exertion/activity and when lying down at night. Patient was given inhaler (NOS) by pulmonology (Draw) but this did not help. Maintained on hydroxychloroquine 200 BID, nifedipine 90 daily, lisinopril 5 daily. Maintained on CPAP per pulm recs, which does seem to help breathing.     HTN: 118/64 today. Maintained on Ziac 10/6.25 mg daily, nifedipine 90 daily, lisinopril 5mg daily, and lasix 20 daily.  "No LH/dizziness. No CP     Hypothyroidism: maintained on levothyroxine 175mcg daily. Weight stable. No further complaints/concerns.     Osteoporosis: as seen on 12/2020 DEXA. Improved on 2/2023 DEXA. Maintained on Prolia since 2021 and Ca/VitD daily. Managed by Karen     Bipolar disorder: maintained on lamotrigine 200mg nightly and seroquel 100mg nightly. Has been well controlled on this regimen for years. No concerns at this time. Will use Xanax 1mg nightly for sleep       Objective   Vital Signs:  /64   Pulse 65   Resp 18   Ht 160 cm (63\")   Wt 99.8 kg (220 lb)   SpO2 97%   BMI 38.97 kg/m²     Physical Exam  Constitutional:       General: She is not in acute distress.     Appearance: She is well-developed. She is obese.   HENT:      Head: Normocephalic and atraumatic.      Right Ear: Tympanic membrane and external ear normal. There is no impacted cerumen.      Left Ear: Tympanic membrane and external ear normal. There is no impacted cerumen.      Nose: Nose normal.      Mouth/Throat:      Mouth: Mucous membranes are moist.      Pharynx: No oropharyngeal exudate or posterior oropharyngeal erythema.   Eyes:      General: No scleral icterus.        Right eye: No discharge.         Left eye: No discharge.      Extraocular Movements: Extraocular movements intact.      Conjunctiva/sclera: Conjunctivae normal.      Pupils: Pupils are equal, round, and reactive to light.   Neck:      Thyroid: No thyromegaly.      Vascular: No carotid bruit.   Cardiovascular:      Rate and Rhythm: Normal rate and regular rhythm.      Heart sounds: Normal heart sounds. No murmur heard.  Pulmonary:      Effort: Pulmonary effort is normal. No respiratory distress.      Breath sounds: Normal breath sounds. No wheezing or rales.   Abdominal:      General: Bowel sounds are normal. There is no distension.      Palpations: Abdomen is soft.      Tenderness: There is abdominal tenderness (LUQ>LLQ).   Musculoskeletal:         General: No " deformity. Normal range of motion.      Cervical back: Normal range of motion and neck supple.   Lymphadenopathy:      Cervical: No cervical adenopathy.   Skin:     General: Skin is warm and dry.      Findings: Lesion (tight, stiff digits bilaterally) present. No rash.   Neurological:      General: No focal deficit present.      Mental Status: She is alert and oriented to person, place, and time. Mental status is at baseline.      Cranial Nerves: No cranial nerve deficit.      Sensory: No sensory deficit.   Psychiatric:         Behavior: Behavior normal.         Thought Content: Thought content normal.     Arthrocentesis    Date/Time: 6/25/2024 4:33 PM    Performed by: Floyd Silva MD  Authorized by: Floyd Silva MD  Indications: pain   Body area: knee  Local anesthesia used: no    Anesthesia:  Local anesthesia used: no    Sedation:  Patient sedated: no    Preparation: Patient was prepped and draped in the usual sterile fashion.  Needle size: 22 G  Ultrasound guidance: no  Approach: anterior  Meds administered: 2 mL lidocaine 1 %; 2 mL bupivacaine 0.5 %; 40 mg triamcinolone acetonide 40 MG/ML  Patient tolerance: patient tolerated the procedure well with no immediate complications          Assessment and Plan   Diagnoses and all orders for this visit:    1. Medicare annual wellness visit, subsequent (Primary)  -     CBC & Differential  -     Comprehensive Metabolic Panel  -     Hemoglobin A1c  -     Lipid Panel  -     TSH  -     T4, Free  -     Vitamin D,25-Hydroxy  -     Vitamin B12  -     C-reactive Protein  -     Sedimentation Rate  -     Pneumococcal Conjugate Vaccine 20-Valent All  -  Counseled regarding diet, exercise, weight loss, and preventative health maintenance items/immunizations below    2. Avascular necrosis of femoral head, left: s/p L hip replacement w/ improvement in pain and ROM. Previously had R hip replaced   - Cont gabapentin 1200/600/1200 and oxycodone 10mg PRN    3. Pain in  both knees, unspecified chronicity: s/p R knee replacement w/ poor result. Trying to avoid L knee replacement. Getting good benefit from steroid injections- last performed 8/2023  -     Arthrocentesis  - Cont home gabapentin 1200/600/1200 and oxycodone 10mg PRN    4. Osteoarthritis of right glenohumeral joint: s/p R shoulder replacement w/ Abeln. Pain resolved. Still working w/ PT to improve ROM   - Cont PT    5. Complete tear of left rotator cuff, unspecified whether traumatic: needing L shoulder replacement but patient would like to improve ROM and strength in R shoulder first   - Cont PT as above   - Cont ortho f/u- Abeln   - Cont home gabapentin 1200/600/1200 and oxycodone 10mg PRN   - Steroid injection PRN    6. Lumbar radiculopathy   - Plan to obtain MRI lumbar spine to further evaluate   - Cont home gabapentin 1200/600/1200 and oxycodone 10mg PRN    7. 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 EGD and colonoscopy scheduled for 6/28/24. Had EUS in 12/2021 and MRI abd/pel in 11/2023. Plan to obtain alternating EUS and MRI pancreas every other year so that she is obtaining screening annually   - Cont GI and oncology f/u   - Routine screening as above    8. Ulcerative colitis with complication, unspecified location: tenderness on exam and reports of diarrhea/bloody stool in the last month   - Instructed to call GI regarding treatment/recommendations as this may delay/complicate her upcoming colonoscopy    9. Gastroesophageal reflux disease, unspecified whether esophagitis present   - Cont home dexlansoprazole 60mg daily    10. Malignant neoplasm of female breast, unspecified estrogen receptor status, unspecified laterality, unspecified site of breast: hx R breast cancer s/p R mastectomy w/ lymph node dissection and reconstruction in 1985. Alternating mammogram (2/2024) and breast MRI (11/2023) every 6 months, largely because of Monahan syndrome  hx   - Cont oncology f/u- Karen    11. SLE (systemic lupus erythematosus related syndrome)  12. Scleroderma  13. Raynaud's disease without gangrene  -     C-reactive Protein  -     Sedimentation Rate  - Cont home hydroxychloroquine 200 BID, nifedipine 90 daily, lisinopril 5 daily  - Cont rheumatology and pulmonology f/u    14. Primary hypertension: 118/64 today  -     Comprehensive Metabolic Panel  - Cont home Ziac 10/6.25 mg daily, nifedipine 90 daily, lisinopril 5mg daily, and lasix 20 daily    15. Hypothyroidism, unspecified type  -     TSH  -     T4, Free  -     T3, Free  - Cont home levothyroxine 175mcg daily pending labs    16. Osteoporosis, unspecified osteoporosis type, unspecified pathological fracture presence: noted on 12/2020 DEXA. Improved on 2/2023 DEXA   - Cont Prolia q6 months and VitD/Ca daily     -- Managed by Karen   - Recommend regular exercise    17. Bipolar affective disorder, remission status unspecified   - Cont home lamotrigine 200mg nightly and seroquel 100mg nightly   - Cont Xanax 1mg nightly PRN    18. Vitamin D deficiency, unspecified  -     Vitamin D,25-Hydroxy    19. Encounter for immunization  -     Pneumococcal Conjugate Vaccine 20-Valent All    Other orders  -     ondansetron (ZOFRAN) 4 MG tablet; Take 1 tablet by mouth Every 8 (Eight) Hours As Needed for Nausea or Vomiting.  Dispense: 30 tablet; Refill: 3    Call nataliia about UC flare given upcoming scope       Follow Up   Return in about 3 months (around 9/25/2024) for Recheck- 30min.  Patient was given instructions and counseling regarding her condition or for health maintenance advice. Please see specific information pulled into the AVS if appropriate.

## 2024-06-27 RX ORDER — LIDOCAINE HYDROCHLORIDE 10 MG/ML
2 INJECTION, SOLUTION INFILTRATION; PERINEURAL
Status: COMPLETED | OUTPATIENT
Start: 2024-06-25 | End: 2024-06-25

## 2024-06-27 RX ORDER — TRIAMCINOLONE ACETONIDE 40 MG/ML
40 INJECTION, SUSPENSION INTRA-ARTICULAR; INTRAMUSCULAR
Status: COMPLETED | OUTPATIENT
Start: 2024-06-25 | End: 2024-06-25

## 2024-06-27 RX ORDER — BUPIVACAINE HYDROCHLORIDE 5 MG/ML
2 INJECTION, SOLUTION PERINEURAL
Status: COMPLETED | OUTPATIENT
Start: 2024-06-25 | End: 2024-06-25

## 2024-07-01 ENCOUNTER — TREATMENT (OUTPATIENT)
Dept: PHYSICAL THERAPY | Facility: CLINIC | Age: 64
End: 2024-07-01
Payer: MEDICARE

## 2024-07-01 DIAGNOSIS — G89.29 CHRONIC RIGHT SHOULDER PAIN: ICD-10-CM

## 2024-07-01 DIAGNOSIS — M25.619 LIMITED RANGE OF MOTION (ROM) OF SHOULDER: Primary | ICD-10-CM

## 2024-07-01 DIAGNOSIS — M25.511 CHRONIC RIGHT SHOULDER PAIN: ICD-10-CM

## 2024-07-01 DIAGNOSIS — M19.011 OSTEOARTHRITIS OF RIGHT GLENOHUMERAL JOINT: ICD-10-CM

## 2024-07-01 NOTE — PROGRESS NOTES
Physical Therapy Progress Note/Re-assessment    Patient: Tracie Gross  : 1960  Referring practitioner: Floyd Ruiz, *  Today's Date: 2024    VISIT#: 6    Subjective   Tracie Gross reports: Says her right shoulder is doing better overall, was doing so much better over the first few weeks of therapy, but then had a setback this last 1-2 weeks because of other health issues going on and missed therapy appointments and wasn't really able to do HEP.         Objective     Active Range of Motion      Right Shoulder   Flexion: 112 (min/mod scapular compensation) degrees   Abduction: 105 (min/mod scapular compensation) degrees   External rotation BTH: Active external rotation behind the head: can reach to back of head.   Internal rotation BTB: Active internal rotation behind the back: just posterior to greater trochanter.      Additional Active Range of Motion Details  Cross body reach w/ RUE: can reach to top of left upper trap     Passive Range of Motion      Right Shoulder   Flexion: 150 degrees   Abduction: 125 degrees   External rotation 45°: 70 degrees   Internal rotation 45°: 80 degrees      Strength/Myotome Testing      Right Shoulder      Planes of Motion   Flexion: 4-   Abduction: 4   External rotation at 0°: 3+   Internal rotation at 0°: 4-      Isolated Muscles   Biceps: 4-   Lower trapezius: 3+   Middle trapezius: 3+   Serratus anterior: 3+   Triceps: 4-     See Exercise, Manual, and Modality Logs for complete treatment.     Patient Education:    Assessment & Plan       Assessment  Assessment details: Evelyn was making very good progress with therapy but then had slight setback over the past 1-2 weeks due to other medical issues. We are seeing improvements in strength and ROM. However, she continues to demonstrate limitations in shoulder ROM behind her back and flexion AROM and continued weakness. She has met 1 STGs and making good progress toward remaining goals. Requires continued PT to  address remaining deficits and return to PLOF.   Prognosis: good    Plan  Therapy options: will be seen for skilled therapy services  Frequency: 2x week         ST. Pt will be independent and compliant with initial HEP in 4 weeks. met  2. Pt will report a 50% improvement in symptoms since starting therapy in 4 weeks. Not met  3. Pt will demonstrate an increase in right shoulder AROM flexion to 130 degrees within 4 weeks. Not met  4. Pt will improve shdr functional IR BTB to right SIJ. Not met     LTG: - by DC (12 weeks)  1. Pt will be independent with final HEP for self-management of condition by DC.  2. Pt will improve score on QuickDASH to less than 25% impairment by DC.   3. Pt will report a 75% improvement in symptoms by DC in order to allow return to PLOF.  4. Pt will improve right shoulder functional ER behind the head  to at least C3  in order to be able to wash and fix hair by DC.  5. Pt will improve right shoulder strength to at least 4/5 in order to be able to compete ADLs with RUE by DC.       Progress per Plan of Care            Timed:         Manual Therapy:         mins  14008;     Therapeutic Exercise:    30     mins  27976;     Neuromuscular Walter:    15    mins  58535;    Therapeutic Activity:     10     mins  43785;     Gait Training:           mins  97256;     Ultrasound:          mins  44259;    Ionto:                                   mins   26226  Self Care:                            mins   49266    Un-Timed:  Electrical Stimulation:         mins  92134 ( );  Dry Needling          mins self-pay  Traction          mins 86497  Re-Eval                               mins  13416    Timed Treatment:   55   mins   Total Treatment:     55   mins    Phuong Yarbrough, PT  Physical Therapist  [place] PT license #: [id number]  [place] PT license #: [id number]

## 2024-07-03 ENCOUNTER — TREATMENT (OUTPATIENT)
Dept: PHYSICAL THERAPY | Facility: CLINIC | Age: 64
End: 2024-07-03
Payer: MEDICARE

## 2024-07-03 ENCOUNTER — HOSPITAL ENCOUNTER (OUTPATIENT)
Dept: MRI IMAGING | Facility: HOSPITAL | Age: 64
Discharge: HOME | End: 2024-07-03
Payer: MEDICARE

## 2024-07-03 ENCOUNTER — LAB (OUTPATIENT)
Dept: LAB | Facility: HOSPITAL | Age: 64
End: 2024-07-03
Payer: MEDICARE

## 2024-07-03 DIAGNOSIS — G89.29 CHRONIC RIGHT SHOULDER PAIN: ICD-10-CM

## 2024-07-03 DIAGNOSIS — M25.511 CHRONIC RIGHT SHOULDER PAIN: ICD-10-CM

## 2024-07-03 DIAGNOSIS — M25.619 LIMITED RANGE OF MOTION (ROM) OF SHOULDER: Primary | ICD-10-CM

## 2024-07-03 DIAGNOSIS — G89.29 CHRONIC LOW BACK PAIN, UNSPECIFIED BACK PAIN LATERALITY, UNSPECIFIED WHETHER SCIATICA PRESENT: ICD-10-CM

## 2024-07-03 DIAGNOSIS — M54.50 CHRONIC LOW BACK PAIN, UNSPECIFIED BACK PAIN LATERALITY, UNSPECIFIED WHETHER SCIATICA PRESENT: ICD-10-CM

## 2024-07-03 DIAGNOSIS — M19.011 OSTEOARTHRITIS OF RIGHT GLENOHUMERAL JOINT: ICD-10-CM

## 2024-07-03 LAB
25(OH)D3 SERPL-MCNC: 41.4 NG/ML (ref 30–100)
ALBUMIN SERPL-MCNC: 4 G/DL (ref 3.5–5.2)
ALBUMIN/GLOB SERPL: 1.4 G/DL
ALP SERPL-CCNC: 258 U/L (ref 39–117)
ALT SERPL W P-5'-P-CCNC: 69 U/L (ref 1–33)
ANION GAP SERPL CALCULATED.3IONS-SCNC: 1.5 MMOL/L (ref 5–15)
AST SERPL-CCNC: 47 U/L (ref 1–32)
BASOPHILS # BLD AUTO: 0.12 10*3/MM3 (ref 0–0.2)
BASOPHILS NFR BLD AUTO: 1.4 % (ref 0–1.5)
BILIRUB SERPL-MCNC: 0.2 MG/DL (ref 0–1.2)
BUN SERPL-MCNC: 19 MG/DL (ref 8–23)
BUN/CREAT SERPL: 16.8 (ref 7–25)
CALCIUM SPEC-SCNC: 9.5 MG/DL (ref 8.6–10.5)
CHLORIDE SERPL-SCNC: 100 MMOL/L (ref 98–107)
CHOLEST SERPL-MCNC: 156 MG/DL (ref 0–200)
CO2 SERPL-SCNC: 38.5 MMOL/L (ref 22–29)
CREAT SERPL-MCNC: 1.13 MG/DL (ref 0.57–1)
CRP SERPL-MCNC: 1.74 MG/DL (ref 0–0.5)
DEPRECATED RDW RBC AUTO: 42 FL (ref 37–54)
EGFRCR SERPLBLD CKD-EPI 2021: 54.1 ML/MIN/1.73
EOSINOPHIL # BLD AUTO: 0.36 10*3/MM3 (ref 0–0.4)
EOSINOPHIL NFR BLD AUTO: 4.1 % (ref 0.3–6.2)
ERYTHROCYTE [DISTWIDTH] IN BLOOD BY AUTOMATED COUNT: 12.4 % (ref 12.3–15.4)
ERYTHROCYTE [SEDIMENTATION RATE] IN BLOOD: 32 MM/HR (ref 0–30)
GLOBULIN UR ELPH-MCNC: 2.9 GM/DL
GLUCOSE SERPL-MCNC: 96 MG/DL (ref 65–99)
HBA1C MFR BLD: 5.4 % (ref 4.8–5.6)
HCT VFR BLD AUTO: 37.7 % (ref 34–46.6)
HDLC SERPL-MCNC: 51 MG/DL (ref 40–60)
HGB BLD-MCNC: 12.5 G/DL (ref 12–15.9)
IMM GRANULOCYTES # BLD AUTO: 0.05 10*3/MM3 (ref 0–0.05)
IMM GRANULOCYTES NFR BLD AUTO: 0.6 % (ref 0–0.5)
LDLC SERPL CALC-MCNC: 88 MG/DL (ref 0–100)
LDLC/HDLC SERPL: 1.69 {RATIO}
LYMPHOCYTES # BLD AUTO: 2.33 10*3/MM3 (ref 0.7–3.1)
LYMPHOCYTES NFR BLD AUTO: 26.3 % (ref 19.6–45.3)
MCH RBC QN AUTO: 31.3 PG (ref 26.6–33)
MCHC RBC AUTO-ENTMCNC: 33.2 G/DL (ref 31.5–35.7)
MCV RBC AUTO: 94.3 FL (ref 79–97)
MONOCYTES # BLD AUTO: 1.18 10*3/MM3 (ref 0.1–0.9)
MONOCYTES NFR BLD AUTO: 13.3 % (ref 5–12)
NEUTROPHILS NFR BLD AUTO: 4.82 10*3/MM3 (ref 1.7–7)
NEUTROPHILS NFR BLD AUTO: 54.3 % (ref 42.7–76)
NRBC BLD AUTO-RTO: 0 /100 WBC (ref 0–0.2)
PLATELET # BLD AUTO: 345 10*3/MM3 (ref 140–450)
PMV BLD AUTO: 11.2 FL (ref 6–12)
POTASSIUM SERPL-SCNC: 4.2 MMOL/L (ref 3.5–5.2)
PROT SERPL-MCNC: 6.9 G/DL (ref 6–8.5)
RBC # BLD AUTO: 4 10*6/MM3 (ref 3.77–5.28)
SODIUM SERPL-SCNC: 140 MMOL/L (ref 136–145)
T3FREE SERPL-MCNC: 2.09 PG/ML (ref 2–4.4)
T4 FREE SERPL-MCNC: 1.62 NG/DL (ref 0.92–1.68)
TRIGL SERPL-MCNC: 93 MG/DL (ref 0–150)
TSH SERPL DL<=0.05 MIU/L-ACNC: 0.04 UIU/ML (ref 0.27–4.2)
VIT B12 BLD-MCNC: 471 PG/ML (ref 211–946)
VLDLC SERPL-MCNC: 17 MG/DL (ref 5–40)
WBC NRBC COR # BLD AUTO: 8.86 10*3/MM3 (ref 3.4–10.8)

## 2024-07-03 PROCEDURE — 85652 RBC SED RATE AUTOMATED: CPT | Performed by: FAMILY MEDICINE

## 2024-07-03 PROCEDURE — 86140 C-REACTIVE PROTEIN: CPT | Performed by: FAMILY MEDICINE

## 2024-07-03 PROCEDURE — 82306 VITAMIN D 25 HYDROXY: CPT | Performed by: FAMILY MEDICINE

## 2024-07-03 PROCEDURE — 80053 COMPREHEN METABOLIC PANEL: CPT | Performed by: FAMILY MEDICINE

## 2024-07-03 PROCEDURE — 80061 LIPID PANEL: CPT | Performed by: FAMILY MEDICINE

## 2024-07-03 PROCEDURE — 72148 MRI LUMBAR SPINE W/O DYE: CPT

## 2024-07-03 PROCEDURE — 82607 VITAMIN B-12: CPT | Performed by: FAMILY MEDICINE

## 2024-07-03 PROCEDURE — 84439 ASSAY OF FREE THYROXINE: CPT | Performed by: FAMILY MEDICINE

## 2024-07-03 PROCEDURE — 84443 ASSAY THYROID STIM HORMONE: CPT | Performed by: FAMILY MEDICINE

## 2024-07-03 PROCEDURE — 85025 COMPLETE CBC W/AUTO DIFF WBC: CPT | Performed by: FAMILY MEDICINE

## 2024-07-03 PROCEDURE — 84481 FREE ASSAY (FT-3): CPT | Performed by: FAMILY MEDICINE

## 2024-07-03 PROCEDURE — 83036 HEMOGLOBIN GLYCOSYLATED A1C: CPT | Performed by: FAMILY MEDICINE

## 2024-07-03 NOTE — PROGRESS NOTES
Physical Therapy Daily Treatment Note      [facility] [place]              [address], [address]                [facility] [place]  [address]        Patient: Tracie Gross   : 1960  Diagnosis/ICD-10 Code:  Limited range of motion (ROM) of shoulder [M25.619]  Referring practitioner: Floyd Ruiz, *  Date of Initial Visit: Type: THERAPY  Noted: 2024  Today's Date: 7/3/2024  Patient seen for 7 sessions         Subjective    Tracie Gross reports: she said it is sore.  She said her back is really sore today.            Objective   See Exercise, Manual, and Modality Logs for complete treatment.       Assessment/Plan  Continued to focus on strengthening within tolerance and form with exercises.  Pt able to control resistance with walkouts today which she wasn't able to do previously.  Contineud to provide cues as necessary for technique however minimal required.  Will monitor and progress as able.     Progress per Plan of Care           Timed:  Manual Therapy:         mins  63228;  Therapeutic Exercise:    15     mins  84610;     Neuromuscular Walter:        mins  80331;    Therapeutic Activity:     15     mins  51193;     Gait Training:           mins  21455;     Ultrasound:          mins  15153;     Work Conditioning/Hardening (initial 2 hours)        mins  35272  Work Conditioning/Hardening (each add'l hour)        mins  21866    Untimed:   Electrical Stimulation:         mins  38371 ( );  Traction          mins 24502    Timed Treatment:   30   mins   Total Treatment:     30   mins    Stacey Moreno PTA  Physical Therapist Assistant

## 2024-07-11 ENCOUNTER — TREATMENT (OUTPATIENT)
Dept: PHYSICAL THERAPY | Facility: CLINIC | Age: 64
End: 2024-07-11
Payer: MEDICARE

## 2024-07-11 DIAGNOSIS — G89.29 CHRONIC RIGHT SHOULDER PAIN: ICD-10-CM

## 2024-07-11 DIAGNOSIS — M25.511 CHRONIC RIGHT SHOULDER PAIN: ICD-10-CM

## 2024-07-11 DIAGNOSIS — M25.619 LIMITED RANGE OF MOTION (ROM) OF SHOULDER: Primary | ICD-10-CM

## 2024-07-11 DIAGNOSIS — M19.011 OSTEOARTHRITIS OF RIGHT GLENOHUMERAL JOINT: ICD-10-CM

## 2024-07-11 NOTE — PROGRESS NOTES
Physical Therapy Daily Treatment Note      [facility] [place]              [address], [address]                [facility] [place]  [address]        Patient: Tracie Gross   : 1960  Diagnosis/ICD-10 Code:  Limited range of motion (ROM) of shoulder [M25.619]  Referring practitioner: No ref. provider found  Date of Initial Visit: Type: THERAPY  Noted: 2024  Today's Date: 2024  Patient seen for 8 sessions         Subjective    Tracie Gross reports: her shoulder is very achy.  It popped the other day and hurts slightly from that.  It was 4 days ago and she rated it 1/10 today.  She had her arm behind her and she leaned on it.  She feels like overall she is improving but not great.  She feels very off balance today.     Quickdash=38.6%       Objective          Active Range of Motion     Right Shoulder   Flexion: 119 degrees   Abduction: 93 (with hiking, trunk lean, and some scaption; moved into scapular plane to more functional ROM) degrees   External rotation BTH: Active external rotation behind the head: occiput.   Internal rotation BTB: Active internal rotation behind the back: lateral buttocks.     Strength/Myotome Testing     Right Shoulder     Planes of Motion   Flexion: 3+   Abduction: 4-   External rotation at 0°: 4-   Internal rotation at 0°: 4+       See Exercise, Manual, and Modality Logs for complete treatment.       Assessment/Plan  Modified walkouts and standing exercises to limit balance requirements as pt reported being very off balance today.  Pt continued to demonstrate strength deficits however has been able to progress with strengthening exercises with good tolerance.  Changes in MMT are within ranges for intertester reliability.  ROM as improved to assist with self-care and ADLs however true shoulder ROM measurements are difficulty to assess d/t substitution patterns.  Pt also reported improved function with the Quickdash to 38.6%.      Progress per Plan of Care       ST. Pt will be  independent and compliant with initial HEP in 4 weeks. met  2. Pt will report a 50% improvement in symptoms since starting therapy in 4 weeks. Not met  3. Pt will demonstrate an increase in right shoulder AROM flexion to 130 degrees within 4 weeks. Not met  4. Pt will improve shdr functional IR BTB to right SIJ. Not met     LTG: - by DC (12 weeks) NOT MET  1. Pt will be independent with final HEP for self-management of condition by DC.  2. Pt will improve score on QuickDASH to less than 25% impairment by DC. Decreased impairment from 43% to 38.6%.  3. Pt will report a 75% improvement in symptoms by DC in order to allow return to PLOF.  4. Pt will improve right shoulder functional ER behind the head  to at least C3  in order to be able to wash and fix hair by DC.  5. Pt will improve right shoulder strength to at least 4/5 in order to be able to compete ADLs with RUE by DC.        Timed:  Manual Therapy:         mins  35472;  Therapeutic Exercise:    25     mins  54563;     Neuromuscular Walter:        mins  18195;    Therapeutic Activity:     15     mins  86603;     Gait Training:           mins  64174;     Ultrasound:          mins  82233;     Work Conditioning/Hardening (initial 2 hours)        mins  65518  Work Conditioning/Hardening (each add'l hour)        mins  53073    Untimed:   Electrical Stimulation:         mins  60042 ( );  Traction          mins 20924    Timed Treatment:   40   mins   Total Treatment:     40   mins    Stacey Moreno PTA  Physical Therapist Assistant

## 2024-07-15 ENCOUNTER — TELEPHONE (OUTPATIENT)
Dept: ONCOLOGY | Facility: CLINIC | Age: 64
End: 2024-07-15

## 2024-07-15 ENCOUNTER — APPOINTMENT (OUTPATIENT)
Dept: GENERAL RADIOLOGY | Facility: HOSPITAL | Age: 64
End: 2024-07-15
Payer: MEDICARE

## 2024-07-15 ENCOUNTER — HOSPITAL ENCOUNTER (OUTPATIENT)
Facility: HOSPITAL | Age: 64
Discharge: HOME | End: 2024-07-15
Attending: EMERGENCY MEDICINE | Admitting: EMERGENCY MEDICINE
Payer: MEDICARE

## 2024-07-15 VITALS
SYSTOLIC BLOOD PRESSURE: 104 MMHG | HEART RATE: 83 BPM | TEMPERATURE: 98.3 F | WEIGHT: 210 LBS | OXYGEN SATURATION: 95 % | RESPIRATION RATE: 18 BRPM | DIASTOLIC BLOOD PRESSURE: 70 MMHG | BODY MASS INDEX: 35.85 KG/M2 | HEIGHT: 64 IN

## 2024-07-15 DIAGNOSIS — M79.672 LEFT FOOT PAIN: Primary | ICD-10-CM

## 2024-07-15 DIAGNOSIS — M81.0 OSTEOPOROSIS, UNSPECIFIED OSTEOPOROSIS TYPE, UNSPECIFIED PATHOLOGICAL FRACTURE PRESENCE: Primary | ICD-10-CM

## 2024-07-15 DIAGNOSIS — C50.919 MALIGNANT NEOPLASM OF FEMALE BREAST, UNSPECIFIED ESTROGEN RECEPTOR STATUS, UNSPECIFIED LATERALITY, UNSPECIFIED SITE OF BREAST: ICD-10-CM

## 2024-07-15 PROCEDURE — 73630 X-RAY EXAM OF FOOT: CPT

## 2024-07-15 PROCEDURE — G0463 HOSPITAL OUTPT CLINIC VISIT: HCPCS

## 2024-07-15 PROCEDURE — 73610 X-RAY EXAM OF ANKLE: CPT

## 2024-07-15 NOTE — TELEPHONE ENCOUNTER
"  Caller: Tracie Gross \"Evelyn\"    Relationship: Self    Best call back number: [phone]    Who are you requesting to speak with (clinical staff, provider,  specific staff member): scheduling    Do you know the name of the person who called: patient    What was the call regarding: pt needs to reschedule 07/16's appts   "

## 2024-07-15 NOTE — DISCHARGE INSTRUCTIONS
----- Message from Ti Randall [facility] in [place].     ----- Message -----  From: BERTO HUERTA  midwife.     Happy holidays  Ti.      Anti-inflammatories including Motrin Aleve or ibuprofen.  Ice and elevation.    Follow-up with PT as well as PCP    Return as needed

## 2024-07-17 ENCOUNTER — TREATMENT (OUTPATIENT)
Dept: PHYSICAL THERAPY | Facility: CLINIC | Age: 64
End: 2024-07-17
Payer: MEDICARE

## 2024-07-17 DIAGNOSIS — M25.511 CHRONIC RIGHT SHOULDER PAIN: ICD-10-CM

## 2024-07-17 DIAGNOSIS — G89.29 CHRONIC RIGHT SHOULDER PAIN: ICD-10-CM

## 2024-07-17 DIAGNOSIS — M19.011 OSTEOARTHRITIS OF RIGHT GLENOHUMERAL JOINT: ICD-10-CM

## 2024-07-17 DIAGNOSIS — M25.619 LIMITED RANGE OF MOTION (ROM) OF SHOULDER: Primary | ICD-10-CM

## 2024-07-17 NOTE — PROGRESS NOTES
Physical Therapy Daily Treatment Note    Patient: Tracie Gross  : 1960  Referring practitioner: Floyd Ruiz, *  Today's Date: 2024    VISIT#: 9      Subjective   Tracie Gross reports: Doing ok, shoulder is doing ok. Her left ankle is swollen and a little painful, went to  for it but they said it was just sprained.       Objective     See Exercise, Manual, and Modality Logs for complete treatment.     Patient Education: continue HEP    Assessment/Plan  Limited standing tolerance again today due to left ankle pain and reports feeling unsteady. Also tolerated less weight with exercises due to some mild shoulder pain.     Progress per Plan of Care            Timed:         Manual Therapy:         mins  86194;     Therapeutic Exercise:    15     mins  79341;     Neuromuscular Walter:        mins  05322;    Therapeutic Activity:     10     mins  60519;     Gait Training:           mins  13625;     Ultrasound:          mins  78941;    Ionto:                                   mins   81278  Self Care:                            mins   84837    Un-Timed:  Electrical Stimulation:         mins  74612 ( );  Dry Needling          mins self-pay  Traction          mins 89372  Re-Eval                               mins  99860    Timed Treatment:   25   mins   Total Treatment:     25   mins    Phuong Yarbrough, PT  Physical Therapist  [place] PT license #: [id number]  [place] PT license #: [id number]

## 2024-07-19 ENCOUNTER — TREATMENT (OUTPATIENT)
Dept: PHYSICAL THERAPY | Facility: CLINIC | Age: 64
End: 2024-07-19
Payer: MEDICARE

## 2024-07-19 DIAGNOSIS — M25.619 LIMITED RANGE OF MOTION (ROM) OF SHOULDER: Primary | ICD-10-CM

## 2024-07-19 DIAGNOSIS — G89.29 CHRONIC RIGHT SHOULDER PAIN: ICD-10-CM

## 2024-07-19 DIAGNOSIS — M25.511 CHRONIC RIGHT SHOULDER PAIN: ICD-10-CM

## 2024-07-19 DIAGNOSIS — M19.011 OSTEOARTHRITIS OF RIGHT GLENOHUMERAL JOINT: ICD-10-CM

## 2024-07-19 RX ORDER — LISINOPRIL 5 MG/1
5 TABLET ORAL DAILY
Qty: 90 TABLET | Refills: 0 | Status: SHIPPED | OUTPATIENT
Start: 2024-07-19

## 2024-07-20 NOTE — PROGRESS NOTES
Physical Therapy Daily Treatment Note    [place], [place]      Patient: Tracie Gross   : 1960  Referring practitioner: Floyd Ruiz, *  Date of Initial Visit: Type: THERAPY  Noted: 2024  Today's Date: 2024  Patient seen for 10 sessions       Visit Diagnoses:    ICD-10-CM ICD-9-CM   1. Limited range of motion (ROM) of shoulder  M25.619 719.51   2. Osteoarthritis of right glenohumeral joint  M19.011 715.91   3. Chronic right shoulder pain  M25.511 719.41    G89.29 338.29       Subjective Evaluation    History of Present Illness    Subjective comment: getting stronger. Still can't pull up her pants or zipper well but this is also in part due to her small hands with scleroderma and Raynaud's disease affecting her fine and gross motor skills. agrees to try and buy a zipper/button assist from [employer]. Watched a video on the product and she agrees.  .       Objective   See Exercise, Manual, and Modality Logs for complete treatment.       Assessment & Plan       Assessment  Assessment details: Focus today on working at table incline of 30 degrees where she can add wt and challenge but still have full ROM without scapular hiking.   Did well on shelf reach but just 1/2#.      strength better than expected for hand deformity having 30# on the R vs. 40# on the left.   Gave pt info on putty and dressing device from [employer].     P: increase incline to 40-50 degrees as strength improves. Need to consider ongoing possible dislocation precaution with behind the back AROM.           Timed:         Manual Therapy:    5     mins  67405;     Therapeutic Exercise:    30     mins  65246;     Neuromuscular Walter:        mins  21607;    Therapeutic Activity:          mins  88535;     Gait Training:           mins  58496;     Ultrasound:          mins  44993;    Ionto                                   mins   28466  Self Care                            mins   73436  Canalith [employer]         mins 01433  Work hardening    __   min 58331/32928      Un-Timed:  Electrical Stimulation:         mins  16165 ( );  Dry Needling          mins self-pay  Traction          mins 40140      Timed Treatment:   35   mins   Total Treatment:     35   mins    Zorre Zeno Kimura, PT  [place] License: [id number]    [place] License:  [id number]

## 2024-07-22 ENCOUNTER — TELEPHONE (OUTPATIENT)
Dept: FAMILY MEDICINE CLINIC | Facility: CLINIC | Age: 64
End: 2024-07-22

## 2024-07-22 ENCOUNTER — TELEPHONE (OUTPATIENT)
Dept: PHYSICAL THERAPY | Facility: CLINIC | Age: 64
End: 2024-07-22

## 2024-07-22 NOTE — TELEPHONE ENCOUNTER
"DELETE AFTER REVIEWING: Send this encounter to the appropriate pool. See your Call Action Grid or Workflows for direction.    Caller: Tracie Gross \"Evelyn\"    Relationship to patient: Self    Best call back number: [phone]     Chief complaint: PATIENT WAS SEEN AT URGENT CARE ABOUT A WEEK AGO FOR HER ANKLE AND FOOT AND WAS TOLD TO FOLLOW UP WITH DR RIVAS     Type of visit: OFFICE VISIT     Requested date:     Additional notes:[facility] IS UNABLE TO FIND ANY APPOINTMENTS WITH DR RIVAS. [facility] DID OFFER OTHER PROVIDERS BUT PATIENT IS REQUESTING TO SEE DR RIVAS   "

## 2024-07-23 ENCOUNTER — TELEPHONE (OUTPATIENT)
Dept: ONCOLOGY | Facility: CLINIC | Age: 64
End: 2024-07-23

## 2024-07-23 NOTE — TELEPHONE ENCOUNTER
"    Caller: Tracie Gross \"Evelyn\"    Relationship to patient: Self    Best call back number: [phone]     Chief complaint: [facility]    Type of visit: LAB/INJECTION/FOLLOW UP 1    Requested date: [facility] UNABLE TO WARM TRANSFER, PLEASE CALL PT TO [facility]    If rescheduling, when is the original appointment: TODAY 7/23       "

## 2024-07-24 ENCOUNTER — TREATMENT (OUTPATIENT)
Dept: PHYSICAL THERAPY | Facility: CLINIC | Age: 64
End: 2024-07-24
Payer: MEDICARE

## 2024-07-24 DIAGNOSIS — M19.011 OSTEOARTHRITIS OF RIGHT GLENOHUMERAL JOINT: ICD-10-CM

## 2024-07-24 DIAGNOSIS — G89.29 CHRONIC RIGHT SHOULDER PAIN: ICD-10-CM

## 2024-07-24 DIAGNOSIS — M25.511 CHRONIC RIGHT SHOULDER PAIN: ICD-10-CM

## 2024-07-24 DIAGNOSIS — M25.619 LIMITED RANGE OF MOTION (ROM) OF SHOULDER: Primary | ICD-10-CM

## 2024-07-24 NOTE — PROGRESS NOTES
Physical Therapy Daily Treatment Note      [facility] [place]              [address], [address]                [facility] [place]  [address]        Patient: Tracie Gross   : 1960  Diagnosis/ICD-10 Code:  Limited range of motion (ROM) of shoulder [M25.619]  Referring practitioner: Floyd Ruiz, *  Date of Initial Visit: Type: THERAPY  Noted: 2024  Today's Date: 2024  Patient seen for 11 sessions         Subjective    Tracie Gross reports: her foot has really been bothering her.  She went to  last week and they said she sprained it.  She said she can do her zippers now.  Her wrist limits her with self care at times along with the shoulder.            Objective   See Exercise, Manual, and Modality Logs for complete treatment.       Assessment/Plan  Progressed supine exercises however held or modified standing exercises d/t to foot pain.  She tolerated progressions well with minimal cues required.  Plan to add PNF patterns next session to continue to progress with self care tasks and strength.  Will return to standing exercises as foot allows.    Progress per Plan of Care           Timed:  Manual Therapy:         mins  66434;  Therapeutic Exercise:    15     mins  17773;     Neuromuscular Walter:        mins  06002;    Therapeutic Activity:     10     mins  23100;     Gait Training:           mins  20061;     Ultrasound:          mins  69270;     Work Conditioning/Hardening (initial 2 hours)        mins  97342  Work Conditioning/Hardening (each add'l hour)        mins  45263    Untimed:   Electrical Stimulation:         mins  97370 (MC );  Traction          mins 69024    Timed Treatment:   25   mins   Total Treatment:     25   mins    Stacey Moreno PTA  Physical Therapist Assistant

## 2024-07-30 DIAGNOSIS — M32.9 SLE (SYSTEMIC LUPUS ERYTHEMATOSUS RELATED SYNDROME): ICD-10-CM

## 2024-07-30 DIAGNOSIS — M34.9 SCLERODERMA: ICD-10-CM

## 2024-07-30 RX ORDER — CYCLOBENZAPRINE HCL 10 MG
10 TABLET ORAL 3 TIMES DAILY PRN
Qty: 45 TABLET | Refills: 1 | Status: SHIPPED | OUTPATIENT
Start: 2024-07-30

## 2024-08-01 ENCOUNTER — TREATMENT (OUTPATIENT)
Dept: PHYSICAL THERAPY | Facility: CLINIC | Age: 64
End: 2024-08-01
Payer: MEDICARE

## 2024-08-01 DIAGNOSIS — M25.619 LIMITED RANGE OF MOTION (ROM) OF SHOULDER: Primary | ICD-10-CM

## 2024-08-01 DIAGNOSIS — M19.011 OSTEOARTHRITIS OF RIGHT GLENOHUMERAL JOINT: ICD-10-CM

## 2024-08-01 DIAGNOSIS — M25.511 CHRONIC RIGHT SHOULDER PAIN: ICD-10-CM

## 2024-08-01 DIAGNOSIS — G89.29 CHRONIC RIGHT SHOULDER PAIN: ICD-10-CM

## 2024-08-01 NOTE — PROGRESS NOTES
Physical Therapy Progress Note/Re-assessment    Patient: Tracie Gross  : 1960  Referring practitioner: No ref. provider found  Today's Date: 2024    VISIT#: 12    Subjective   Tracie Gross reports: Says she is quite happy with how her shoulder is progressing. She can now put on deodorant and shave her armpit, still can't reach very far behind her back but is feels like it's getting better. Has had vertigo the last 2 days (has had this previously as well) Is going to see her doctor on Tuesday about it.    QuickDASH: 38%    Objective     Active Range of Motion      Right Shoulder   Flexion: 118 (min/mod scapular compensation) degrees   Abduction: 110 (min/mod scapular compensation) degrees   External rotation BTH: Active external rotation behind the head: can reach to C6.   Internal rotation BTB: Active internal rotation behind the back: 2 inches posterior to greater trochanter. (This is more than last assessment)     Additional Active Range of Motion Details  Cross body reach w/ RUE: can reach to top of left upper trap     Passive Range of Motion      Right Shoulder   Flexion: 150 degrees   Abduction: 125 degrees   External rotation 45°: 70 degrees   Internal rotation 45°: 80 degrees      Strength/Myotome Testing      Right Shoulder      Planes of Motion   Flexion: 4-   Abduction: 4   External rotation at 0°: 3+   Internal rotation at 0°: 4-      Isolated Muscles   Biceps: 4-   Lower trapezius: 3+   Middle trapezius: 3+   Serratus anterior: 3+   Triceps: 4-     See Exercise, Manual, and Modality Logs for complete treatment.     Patient Education: continue HEP to tolerance,     Assessment & Plan       Assessment  Assessment details: Tracie is making slow but steady progress with therapy. We are seeing improvements in her functional use of her UE, also her ROM measurements have improved. However, she continues to demonstrate limitations in AROM and strength. She has met 1 STGs and making good progress toward  remaining goals. Requires continued PT to address remaining deficits and return to PLOF. Assisted her out to car today due to dizziness.    Plan  Therapy options: will be seen for skilled therapy services  Frequency: 2x week          ST. Pt will be independent and compliant with initial HEP in 4 weeks. Met   2. Pt will report a 50% improvement in symptoms since starting therapy in 4 weeks. Progressing  3. Pt will demonstrate an increase in right shoulder AROM flexion to 130 degrees within 4 weeks. Not met  4. Pt will improve shdr functional IR BTB to right SIJ. Not met     LTG: - by DC (12 weeks)  1. Pt will be independent with final HEP for self-management of condition by DC.  2. Pt will improve score on QuickDASH to less than 25% impairment by DC.   3. Pt will report a 75% improvement in symptoms by DC in order to allow return to PLOF.  4. Pt will improve right shoulder functional ER behind the head  to at least C3  in order to be able to wash and fix hair by DC.  5. Pt will improve right shoulder strength to at least 4/5 in order to be able to compete ADLs with RUE by DC.       Progress per Plan of Care            Timed:         Manual Therapy:         mins  50079;     Therapeutic Exercise:   25      mins  25431;     Neuromuscular Walter:        mins  90128;    Therapeutic Activity:     15     mins  85581;     Gait Training:           mins  63096;     Ultrasound:          mins  43646;    Ionto:                                   mins   39655  Self Care:                            mins   75515    Un-Timed:  Electrical Stimulation:         mins  80516 ( );  Dry Needling          mins self-pay  Traction          mins 94762  Re-Eval                               mins  88336    Timed Treatment:   40   mins   Total Treatment:     40   mins    Phuong Yarbrough, PT  Physical Therapist  [place] PT license #: [id number]  [place] PT license #: [id number]

## 2024-08-07 ENCOUNTER — OFFICE VISIT (OUTPATIENT)
Dept: FAMILY MEDICINE CLINIC | Facility: CLINIC | Age: 64
End: 2024-08-07
Payer: MEDICARE

## 2024-08-07 VITALS
OXYGEN SATURATION: 95 % | WEIGHT: 231.2 LBS | HEART RATE: 83 BPM | SYSTOLIC BLOOD PRESSURE: 113 MMHG | DIASTOLIC BLOOD PRESSURE: 76 MMHG | RESPIRATION RATE: 16 BRPM | HEIGHT: 64 IN | BODY MASS INDEX: 39.47 KG/M2

## 2024-08-07 DIAGNOSIS — R42 VERTIGO: Primary | ICD-10-CM

## 2024-08-07 DIAGNOSIS — M21.42 PES PLANUS OF LEFT FOOT: ICD-10-CM

## 2024-08-07 DIAGNOSIS — M87.052 AVASCULAR NECROSIS OF FEMORAL HEAD, LEFT: ICD-10-CM

## 2024-08-07 DIAGNOSIS — M72.2 PLANTAR FASCIITIS: ICD-10-CM

## 2024-08-07 PROCEDURE — 1160F RVW MEDS BY RX/DR IN RCRD: CPT | Performed by: FAMILY MEDICINE

## 2024-08-07 PROCEDURE — 1159F MED LIST DOCD IN RCRD: CPT | Performed by: FAMILY MEDICINE

## 2024-08-07 PROCEDURE — 99214 OFFICE O/P EST MOD 30 MIN: CPT | Performed by: FAMILY MEDICINE

## 2024-08-07 PROCEDURE — 3078F DIAST BP <80 MM HG: CPT | Performed by: FAMILY MEDICINE

## 2024-08-07 PROCEDURE — 3074F SYST BP LT 130 MM HG: CPT | Performed by: FAMILY MEDICINE

## 2024-08-07 PROCEDURE — 1125F AMNT PAIN NOTED PAIN PRSNT: CPT | Performed by: FAMILY MEDICINE

## 2024-08-07 RX ORDER — OXYCODONE HYDROCHLORIDE 10 MG/1
10 TABLET ORAL EVERY 6 HOURS PRN
Qty: 120 TABLET | Refills: 0 | Status: SHIPPED | OUTPATIENT
Start: 2024-08-07

## 2024-08-07 RX ORDER — PREDNISONE 20 MG/1
TABLET ORAL
Qty: 15 TABLET | Refills: 0 | Status: SHIPPED | OUTPATIENT
Start: 2024-08-07

## 2024-08-07 NOTE — PROGRESS NOTES
Chief Complaint   Patient presents with    Hospital Follow Up Visit     Urgent care Left Ankle Pain    Dizziness     HPI  Tracie Gross is a 63 y.o. female that presents for   Chief Complaint   Patient presents with    Hospital Follow Up Visit     Urgent care Left Ankle Pain    Dizziness     Dizziness: reports flare in vertigo 2 weeks ago. Started back on meclizine 1 week ago, which has been helpful but not curative. She has plenty of medicine but still having symptoms and interested in vestibular rehab    L foot pain: started 1 month ago. Pain is slowly improving over the last month. Worse w/ activity/walking. She was evaluated at [facility] on 7/15/24. XR revealed arthritis but no fracture. She was discharged w/ OTC pain meds and OP f/u. Voltaren gel has been helpful. Has podiatrist (Nino) but hasn't seen him in months.     Review of Systems  Pertinent positives of ROS documented in HPI    The following portions of the patient's history were reviewed and updated as appropriate: problem list, past medical history, past surgical history, allergies, current medication    Problem List Tab  Patient History Tab  Immunizations Tab  Medications Tab  Chart Review Tab  Care Everywhere Tab  Synopsis Tab    PE  Vitals:    08/07/24 1126   BP: 113/76   Pulse: 83   Resp: 16   SpO2: 95%     Body mass index is 39.69 kg/m².  General: Well nourished, NAD  Head: AT/NC  Eyes: EOMI, anicteric sclera  Resp: CTAB, SCR, BS equal  CV: RRR w/o m/r/g; 2+ pulses  GI: Soft, NT/ND, +BS  MSK: FROM, no deformity, no edema  Skin: Warm, dry, intact  Neuro: Alert and oriented. No focal deficits  Psych: Appropriate mood and affect    Imaging  XR Ankle 3+ View Left    Result Date: 7/15/2024  Impression: 1.No acute osseous process identified. 2.Degenerative changes as described above. Electronically Signed: Adrian Mckay MD  7/15/2024 10:41 AM EDT  Workstation ID: [id number]    XR Foot 3+ View Left    Result Date: 7/15/2024  Impression: 1.No acute osseous  process identified. 2.Degenerative changes as described above. Electronically Signed: Adrian Mckay MD  7/15/2024 10:41 AM EDT  Workstation ID: [id number]    MRI Lumbar Spine Without Contrast    Result Date: 7/6/2024  Impression: Diffuse heterogeneous marrow signal likely reflecting demineralization. Otherwise essentially normal noncontrast MRI of the lumbar spine. Some mild spondylosis changes are present without associated high-grade spinal canal or neuroforaminal impingement. Electronically Signed: Dougie Grullon MD  7/6/2024 6:53 AM EDT  Workstation ID: [id number]    Assessment & Plan   Tracie Gross is a 63 y.o. female that presents for   Chief Complaint   Patient presents with    Hospital Follow Up Visit     Urgent care Left Ankle Pain    Dizziness     Diagnoses and all orders for this visit:    1. Vertigo (Primary)  -     Ambulatory Referral to Physical Therapy- vestibular  -  Cont home meclizine 25mg TID PRN    2. Plantar fasciitis  -     Adhesive Tape (KT Tape Pro) tape; Use 1 Application Daily.  Dispense: 5 each; Refill: 2  -     Start predniSONE (DELTASONE) 20 MG tablet; Take 2 tablets daily for 5 days and then 1 tablet daily for 5 days  Dispense: 15 tablet; Refill: 0  - Recommend supportive shoe w/ inserts  - Recommend podiatry f/u    3. Pes planus of left foot: reports foot flattened and grew 2 sizes 3 years ago  -     Adhesive Tape (KT Tape Pro) tape; Use 1 Application Daily.  Dispense: 5 each; Refill: 2  -     predniSONE (DELTASONE) 20 MG tablet; Take 2 tablets daily for 5 days and then 1 tablet daily for 5 days  Dispense: 15 tablet; Refill: 0  - Recommend supportive shoe w/ inserts  - Recommend podiatry f/u    4. Avascular necrosis of femoral head, left  -     oxyCODONE (ROXICODONE) 10 MG tablet; Take 1 tablet by mouth Every 6 (Six) Hours As Needed for Moderate Pain.  Dispense: 120 tablet; Refill: 0       Return if symptoms worsen or fail to improve.

## 2024-08-12 ENCOUNTER — TREATMENT (OUTPATIENT)
Dept: PHYSICAL THERAPY | Facility: CLINIC | Age: 64
End: 2024-08-12
Payer: MEDICARE

## 2024-08-12 DIAGNOSIS — M19.011 OSTEOARTHRITIS OF RIGHT GLENOHUMERAL JOINT: ICD-10-CM

## 2024-08-12 DIAGNOSIS — M25.511 CHRONIC RIGHT SHOULDER PAIN: ICD-10-CM

## 2024-08-12 DIAGNOSIS — M25.619 LIMITED RANGE OF MOTION (ROM) OF SHOULDER: Primary | ICD-10-CM

## 2024-08-12 DIAGNOSIS — G89.29 CHRONIC RIGHT SHOULDER PAIN: ICD-10-CM

## 2024-08-12 NOTE — PROGRESS NOTES
"   Physical Therapy Daily Treatment Note      [facility]              [address], [address]                [facility] [place]  [address]        Patient: Tracie Gross   : 1960  Diagnosis/ICD-10 Code:  Limited range of motion (ROM) of shoulder [M25.619]  Referring practitioner: Floyd Ruiz, *  Date of Initial Visit: Type: THERAPY  Noted: 2024  Today's Date: 2024  Patient seen for 13 sessions         Subjective    Tracie Gross reports: she is on a steroid because she can't take anti-inflammatories.  She said saw her MD and he ordered PT for her vertigo and she will have that around the end of the month.  She said she has been doing her exercises from her last PT and taking her medicine.  She said she was able to throw a ball with a \"adonis-it\" and was able to poop scoop.      Quickdash=34%       Objective          Active Range of Motion     Right Shoulder   Flexion: 120 (compensations) degrees   Abduction: 105 (compensations) degrees   External rotation BTH: C3   Internal rotation BTB: Active internal rotation behind the back: (R) hip.     Strength/Myotome Testing     Right Shoulder     Planes of Motion   Flexion: 3+   Abduction: 4   External rotation at 0°: 3+   Internal rotation at 0°: 4-       See Exercise, Manual, and Modality Logs for complete treatment.       Assessment/Plan  Pt continued to lack ROM and strength as noted.  Reported improvements with self care including fixing her hair and toileting but continues to be unable to reach behind her.  She tolerated shoulder exercises well however some dizziness with rolling to the side and then again with sitting.  Plan to hold sidelying exercises until vestibular treatment completed as pt required therapeutic rest break following.  Will monitor tolerance and progress exercises as able for increased functional use of UE.        Progress per Plan of Care       ST. Pt will be independent and compliant with initial HEP in 4 weeks. Met " 7/1  2. Pt will report a 50% improvement in symptoms since starting therapy in 4 weeks. Progressing per progress note 8/1  3. Pt will demonstrate an increase in right shoulder AROM flexion to 130 degrees within 4 weeks. Not met  4. Pt will improve shdr functional IR BTB to right SIJ. Not met     LTG: - by DC (12 weeks)  1. Pt will be independent with final HEP for self-management of condition by DC. Not Met  2. Pt will improve score on QuickDASH to less than 25% impairment by DC. Not Met  3. Pt will report a 75% improvement in symptoms by DC in order to allow return to PLOF. Not Met  4. Pt will improve right shoulder functional ER behind the head  to at least C3  in order to be able to wash and fix hair by DC. MET but can't do it like she would like  5. Pt will improve right shoulder strength to at least 4/5 in order to be able to compete ADLs with RUE by DC. Not MET    Timed:  Manual Therapy:         mins  13367;  Therapeutic Exercise:    30     mins  51589;     Neuromuscular Walter:        mins  27349;    Therapeutic Activity:     8     mins  94369;     Gait Training:           mins  27943;     Ultrasound:          mins  28629;     Work Conditioning/Hardening (initial 2 hours)        mins  57714  Work Conditioning/Hardening (each add'l hour)        mins  26486    Untimed:   Electrical Stimulation:         mins  54518 ( );  Traction          mins 16957    Timed Treatment:   38   mins   Total Treatment:     38   mins    Stacey Moreno PTA  Physical Therapist Assistant

## 2024-08-12 NOTE — PATIENT INSTRUCTIONS
- Supine PNF D2  - 1 x daily - 7 x weekly - 1 sets - 10 reps  - Supine Single Arm Shoulder PNF D1 Extension  - 1 x daily - 7 x weekly - 1 sets - 10 reps

## 2024-08-14 DIAGNOSIS — M81.0 OSTEOPOROSIS, UNSPECIFIED OSTEOPOROSIS TYPE, UNSPECIFIED PATHOLOGICAL FRACTURE PRESENCE: Primary | ICD-10-CM

## 2024-08-14 NOTE — PROGRESS NOTES
Hematology/Oncology Outpatient Follow Up    PATIENT NAME:Tracie Gross  :1960  MRN: [id number]  PRIMARY CARE PHYSICIAN: Floyd Silva MD  REFERRING PHYSICIAN: No ref. provider found      Chief Complaint   Patient presents with    Follow-up     Malignant neoplasm of female breast, unspecified estrogen receptor status, unspecified laterality, unspecified site of breas       HISTORY OF PRESENT ILLNESS:     This is a 63 y.o.  female who was diagnosed with right breast cancer in  when she was 32 years old.  Patient was originally seen on 18.  Please refer to the details of that note for more information.   18 - CBC:  WBC 13.6, hemoglobin 11.7, platelet count 392,000.  Differentials 65% neutrophils, 15% lymphocytes.  There was mild monocytosis at 16.  Eosinophils were 2.3 and basophils were 0.3.  B12 292.  Ferritin 88.  Folate 13.8.  AST slightly high at 57.  Alkaline phosphatase was high at 326.  .  Haptoglobin 179, normal.  SPEP with DYLAN did not show any monoclonal protein.  Flow cytometry did not show any evidence of acute leukemia or lymphoproliferative disease.  There was monocytosis measured at 21%.  Reticulocyte count normal 1.02.  Peripheral smear showed absolute monocytosis at 19%, thrombocytosis and macrocytosis.  BCR-abl was negative.  Methylmalonic acid level was elevated to 420.    18 - PET/CT scan showed multiple small bilateral lymph nodes in the neck without metabolic activity or change from prior CT scans and are likely due to SLE.  There were unchanged bilateral level 1 axillary lymph nodes without metabolic activity.  Multiple mediastinal nodes were also seen.  The largest 11 mm, unchanged from prior imaging with no hypermetabolic activity.  There was no evidence of metastatic disease in the abdomen or pelvis.  There was no focal lesion within the liver or biliary system.  Multiple small retroperitoneal and external iliac nodes, bilateral inguinal nodes similar  to prior imaging with no PET activity.    8/8/18 - RICHIE-2 analysis with no mutation identified.    11/29/18 - Bone density revealed osteoporosis.  Patient is currently on Fosamax.   12/3/18 - Bone marrow aspiration and biopsy showed normocellular bone marrow at 40%.  There was adequate iron stores and no evidence of malignancy was seen.  Flow cytometry was negative.  Cytogenetics showed normal female karyotype.  Blasts were less than 3% of nucleated cells.  There was no significant dyspoiesis.   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     1/22/19 - Urine cytology was negative for malignant cells.    2/28/19 - Patient seen by Dermatology for her annual skin evaluation.   3/13/19 - CT scan of the chest, abdomen and pelvis:  CT scan of chest showed chronic changes.  5/29/2019 patient had an EGD with polypectomy performed by Dr. Conway.  Dr. Conway has recommended follow-up MRI of the pancreas in 2 years.  And also EGD and colonoscopy in 2 years.  11/13/19-left screening mammogram was negative follow-up in 1 year was recommended  5/4/2020 patient had a CT scan of the chest, abdomen and pelvis multiple borderline enlarged mediastinal lymph nodes the largest measuring 2.2 cm in the subcarinal space unchanged from prior.  Pancreas is normal.  There are few retroperitoneal mesenteric and pelvic lymph node enlargement which are similar to prior exam.  All are subcentimeter in size.  5/20/2020 patient had bilateral breast MRI did not show any evidence of malignancy.  12/7/2020 patient DEXA scan showed persistent and worsened osteoporosis  Status post EUS with Dr. Negron in December 2021.  2 1 2023 DEXA scan with osteopenia  11/18/2023 abdominal MRI with and without contrast with chronic dilatation of the common bile duct with similar appearance to prior MRI from 2021.   Mildly blunted appearance of the ampulla which could relate to ampullary stenosis or chronic physiologic changes after cholecystectomy.  Pancreatic parenchymal atrophy with fatty interdigitation.  No evidence of pancreatic mass by MRI.  2/22/2024 screening mammogram with no signs of malignancy in the left breast    Past Medical History:   Diagnosis Date    Allergic 01/01/1998    Ankle sprain     Anxiety     Arthritis     Arthritis of back 0ll1/01/2013    Arthritis of neck 01/01/2005    Asthma     Bipolar affective disorder     Breast cancer 1985    s/p R mastectomy    Bursitis of hip [id number]    Cervical disc disorder 01/01/2006    CTS (carpal tunnel syndrome)     Depression 09/01/2000    Fracture of wrist 01/01/1995    Fracture, foot     GERD (gastroesophageal reflux disease) 1993    H/O breast reconstruction     SEVERAL    H/O laminectomy     Hamartoma     Hip arthrosis 01/01/2013    History of medical problems     Hx of bilateral oophorectomy     Hyperlipidemia     Hypertension     Hypothyroidism     Infectious viral hepatitis     Inflammatory bowel disease 1992    Man. EGD/colonoscopy annually (9/2021)    Irritable bowel syndrome     Kienbock's disease, right     WRIST    Knee swelling 01/01/2007    Low back pain 1991    Lumbosacral disc disease 01/01/1995    Had 2 lumber surgeries    Lupus     [place]avenell    Monahan syndrome     Man. EGD/colonoscopy annually (9/2021). MRI alternating w/ EUS annually for pancreatic screen    MRSA infection     Obesity     Osteopenia     Osteoporosis     maintained on Prolia through Karen    Peptic ulceration     Periarthritis of shoulder 04/01/2022    Pneumonia     Raynaud disease     Renal insufficiency     Rotator cuff syndrome [id number]    Scleroderma     Sleep apnea     cpap    Squamous cell cancer of lip     Tear of meniscus of knee     Tendinitis of knee     Thoracic disc disorder     Von Willebrand disease        Past Surgical History:   Procedure Laterality Date     APPENDECTOMY      ARM DEBRIDEMENT Right     X 3    BACK SURGERY      Vetas- cervical fusion    BACK SURGERY      lumbar decomp    BREAST BIOPSY      BREAST LUMPECTOMY      BREAST RECONSTRUCTION Right     BREAST RECONSTRUCTION Right     CARDIAC CATHETERIZATION      no stents placed     SECTION  ,     CHOLECYSTECTOMY      COLONOSCOPY      COLONOSCOPY N/A 2020    Procedure: COLONOSCOPY;  Surgeon: Cayden Conway MD;  Location: [facility] ENDOSCOPY;  Service: Gastroenterology;  Laterality: N/A;  rectal ulcer    COLONOSCOPY N/A 2021    Procedure: COLONOSCOPY WITH POLYPECTOMY X 1;  Surgeon: Cayden Conway MD;  Location: [facility] ENDOSCOPY;  Service: Gastroenterology;  Laterality: N/A;  Post: COLON POLYP    COLONOSCOPY N/A 2023    Procedure: COLONOSCOPY with polypectomy x 1, endoscopic clipping x 1;  Surgeon: Cayden Conway MD;  Location: [facility] ENDOSCOPY;  Service: Gastroenterology;  Laterality: N/A;    COSMETIC SURGERY  [id number]    ENDOSCOPY      ENDOSCOPY N/A 2020    Procedure: ESOPHAGOGASTRODUODENOSCOPY;  Surgeon: Cayden Conway MD;  Location: [facility] ENDOSCOPY;  Service: Gastroenterology;  Laterality: N/A;  Normal EGD    ENDOSCOPY N/A 2021    Procedure: ESOPHAGOGASTRODUODENOSCOPY;  Surgeon: Cayden Conway MD;  Location: [facility] ENDOSCOPY;  Service: Gastroenterology;  Laterality: N/A;  Post: normal egd    EXPLORATORY LAPAROTOMY      scar tissue removal    EYE SURGERY      FINGER SURGERY Right     ring finger mass excision    HAND SURGERY  Rt wrist  10/15    HIP SURGERY      HYSTERECTOMY      PARTIAL    JOINT REPLACEMENT Right ,    hip and knee    KNEE ARTHROSCOPY Left 2020    Procedure: LEFT KNEE SCOPE with partial lateral meniscectomy;  Surgeon: Chau Perez MD;  Location: [facility] MAIN OR;  Service: Orthopedics    KNEE SURGERY  Rtf knee      LASIK      LASIK/ LASIK ENHANCEMENT    MASTECTOMY RADICAL Right      NECK SURGERY  01/01/06    OOPHORECTOMY Bilateral     SHOULDER SURGERY  11/01/2023    SPINE SURGERY      SPLENECTOMY      SUBTOTAL HYSTERECTOMY      TOTAL SHOULDER ARTHROPLASTY W/ DISTAL CLAVICLE EXCISION Right 11/01/2023    Procedure: TOTAL SHOULDER REVERSE ARTHROPLASTY;  Surgeon: Floyd Ruiz MD;  Location: [facility] MAIN OR;  Service: Orthopedics;  Laterality: Right;    TRIGGER POINT INJECTION  7/23    TUBAL ABDOMINAL LIGATION  1981    UPPER ENDOSCOPIC ULTRASOUND W/ FNA N/A 12/09/2021    Procedure: ENDOSCOPIC ULTRASOUND WITH ESOPHAGOGASTRODUODENOSCOPY;  Surgeon: Luis E Negron MD;  Location: [facility] ENDOSCOPY;  Service: Gastroenterology;  Laterality: N/A;  Post: GASTROPARESIS, DILATED COMMON BILE DUCT, FUNDIC POLYP, DUODENITIS, NORMAL PANCREAS, HIATAL HERNIA    WRIST SURGERY Right     distal radius head removal (bone dead)  plates and screws decomp lunate         Current Outpatient Medications:     Adhesive Tape (KT Tape Pro) tape, Use 1 Application Daily., Disp: 5 each, Rfl: 2    albuterol sulfate  (90 Base) MCG/ACT inhaler, Inhale 2 puffs Every 4 (Four) Hours As Needed for Wheezing., Disp: 8 g, Rfl: 0    ALPRAZolam (XANAX) 1 MG tablet, Take 1 tablet by mouth At Night As Needed for Anxiety., Disp: 30 tablet, Rfl: 3    bisoprolol-hydrochlorothiazide (ZIAC) 10-6.25 MG per tablet, TAKE 1 TABLET BY MOUTH DAILY, Disp: 90 tablet, Rfl: 1    Calcium + Vitamin D3 600-5 MG-MCG tablet, TAKE 1 TABLET BY MOUTH TWO TIMES A DAY, Disp: 60 tablet, Rfl: 6    cetirizine (zyrTEC) 10 MG tablet, TAKE 1 TABLET BY MOUTH DAILY, Disp: 90 tablet, Rfl: 1    cyclobenzaprine (FLEXERIL) 10 MG tablet, TAKE ONE TABLET BY MOUTH THREE TIMES A DAY AS NEEDED FOR MUSCLE SPASMS, Disp: 45 tablet, Rfl: 1    Denosumab (PROLIA SC), Inject  under the skin into the appropriate area as directed Every 6 (Six) Months., Disp: , Rfl:     dexlansoprazole (DEXILANT) 60 MG capsule, Take 1 capsule by mouth Daily., Disp: , Rfl:     FeroSul 325 (65 Fe)  MG tablet, TAKE 1 TABLET BY MOUTH DAILY WITH BREAKFAST, Disp: 30 tablet, Rfl: 1    Flaxseed, Linseed, (FLAXSEED OIL MAX STR) 1300 MG capsule, Take 1 tablet by mouth 2 (Two) Times a Day., Disp: , Rfl:     fluticasone (FLONASE) 50 MCG/ACT nasal spray, SPRAY TWO SPRAYS IN EACH NOSTRIL ONCE DAILY, Disp: 16 mL, Rfl: 5    furosemide (LASIX) 20 MG tablet, TAKE 1 TABLET BY MOUTH DAILY AS NEEDED FOR LEG SWELLING, Disp: 30 tablet, Rfl: 2    gabapentin (NEURONTIN) 600 MG tablet, TAKE TWO TABLETS BY MOUTH EVERY MORNING THEN TAKE ONE TABLET BY MOUTH DAILY IN THE AFTERNOON THEN TAKE TWO TABLETS BY MOUTH EVERY NIGHT (Patient taking differently: Take 2 tablets by mouth 3 (Three) Times a Day. TAKE TWO TABLETS BY MOUTH EVERY MORNING THEN TAKE ONE TABLET BY MOUTH DAILY IN THE AFTERNOON THEN TAKE TWO TABLETS BY MOUTH EVERY NIGHT), Disp: 450 tablet, Rfl: 1    hydroxychloroquine (PLAQUENIL) 200 MG tablet, Take 1 tablet by mouth 2 (Two) Times a Day. Indications: Systemic Lupus Erythematosus, Disp: 180 tablet, Rfl: 3    hydrOXYzine (ATARAX) 25 MG tablet, Take 1 tablet by mouth Every 8 (Eight) Hours As Needed for Itching., Disp: 30 tablet, Rfl: 3    lamoTRIgine (LaMICtal) 200 MG tablet, TAKE ONE TABLET BY MOUTH ONCE NIGHTLY, Disp: 90 tablet, Rfl: 1    levothyroxine (SYNTHROID, LEVOTHROID) 175 MCG tablet, TAKE 1 TABLET BY MOUTH DAILY, Disp: 90 tablet, Rfl: 1    lisinopril (PRINIVIL,ZESTRIL) 5 MG tablet, TAKE 1 TABLET BY MOUTH DAILY, Disp: 90 tablet, Rfl: 0    meclizine (ANTIVERT) 25 MG tablet, Take 1 tablet by mouth 3 (Three) Times a Day As Needed for Dizziness., Disp: 60 tablet, Rfl: 2    NIFEdipine XL (PROCARDIA XL) 90 MG 24 hr tablet, TAKE 1 TABLET BY MOUTH DAILY, Disp: 90 tablet, Rfl: 1    ondansetron (ZOFRAN) 4 MG tablet, Take 1 tablet by mouth Every 8 (Eight) Hours As Needed for Nausea or Vomiting., Disp: 30 tablet, Rfl: 3    oxyCODONE (ROXICODONE) 10 MG tablet, Take 1 tablet by mouth Every 6 (Six) Hours As Needed for Moderate Pain.,  Disp: 120 tablet, Rfl: 0    predniSONE (DELTASONE) 20 MG tablet, Take 2 tablets daily for 5 days and then 1 tablet daily for 5 days, Disp: 15 tablet, Rfl: 0    QUEtiapine (SEROquel) 100 MG tablet, Take 1 tablet by mouth Every Night., Disp: 90 tablet, Rfl: 1    triamcinolone (KENALOG) 0.1 % lotion, Apply  topically to the appropriate area as directed 3 (Three) Times a Day As Needed., Disp: , Rfl:   No current facility-administered medications for this visit.    Allergies   Allergen Reactions    Aspirin Other (See Comments)     VONWILLENBRAND DISEASE     Penicillins Anaphylaxis    Baclofen Hives    Tape  [Adhesive Tape] Rash       Family History   Problem Relation Age of Onset    Colon cancer Mother     Heart disease Mother     Cancer Mother         Colon    Arthritis Mother     Kidney disease Father     Heart disease Father     Depression Father         Bladder cancer    Hyperlipidemia Father     Vision loss Father     Hypertension Father     Colon cancer Sister     Diabetes Sister     Heart disease Sister     Von Willebrand disease Sister     Von Willebrand disease Brother     Cancer Brother     Von Willebrand disease Son     Breast cancer Son     Diabetes Paternal Grandmother     Stroke Paternal Grandmother     Colon cancer Other     Colon cancer Son     Von Willebrand disease Son     Breast cancer Son     Cancer Sister         Melanoma, colon, bladder    Vision loss Sister     Stroke Sister     Arthritis Sister     Asthma Sister     Diabetes Sister     Heart disease Sister     Hyperlipidemia Sister     Kidney disease Sister     Cancer Paternal Aunt     Cancer Maternal Aunt     Cancer Maternal Aunt     Hyperlipidemia Sister     Thyroid disease Sister     Arthritis Sister     Hypertension Sister     Kidney disease Sister     Broken bones Sister     Rheumatologic disease Sister     Thyroid disease Sister     Cancer Sister     Clotting disorder Sister        Cancer-related family history includes Breast cancer in her  son and son; Cancer in her brother, maternal aunt, maternal aunt, mother, paternal aunt, sister, and sister; Colon cancer in her mother, sister, son, and another family member.    Social History     Tobacco Use    Smoking status: Former     Current packs/day: 0.00     Average packs/day: 0.3 packs/day for 10.0 years (2.5 ttl pk-yrs)     Types: Cigarettes     Start date: 1984     Quit date: 1994     Years since quittin.6     Passive exposure: Past    Smokeless tobacco: Never    Tobacco comments:     Off and on for  those years   Vaping Use    Vaping status: Never Used   Substance Use Topics    Alcohol use: Not Currently     Comment: Rarely    Drug use: No       I have reviewed and confirmed the accuracy of the patient's history: Chief complaint, HPI, ROS, and Subjective as entered by the MA/LPN/RN.         SUBJECTIVE:    Evelyn is here today for routine follow up.  She reports overall feeling.  She has no breast specific complaints today.  She continues to follow with gastroenterology, has upcoming colonoscopy and EGD.  She continues to follow with rheumatology, dermatology as directed.    She had reverse right total shoulder placement in 2023.  She continues to have decreased range of motion but is following with physical therapy.    Patient had left hip replacement surgery         REVIEW OF SYSTEMS:    Review of Systems   Constitutional:  Negative for chills and fever.   HENT:  Negative for ear pain, mouth sores, nosebleeds and sore throat.    Eyes:  Negative for photophobia and visual disturbance.   Respiratory:  Negative for wheezing and stridor.    Cardiovascular:  Negative for chest pain and palpitations.   Gastrointestinal:  Negative for abdominal pain, diarrhea, nausea and vomiting.   Endocrine: Negative for cold intolerance and heat intolerance.   Genitourinary:  Negative for dysuria and hematuria.   Musculoskeletal:  Negative for joint swelling and neck stiffness.   Skin:  Negative for  "color change and rash.   Neurological:  Negative for seizures and syncope.   Hematological:  Negative for adenopathy.        No obvious bleeding   Psychiatric/Behavioral:  Negative for agitation, confusion and hallucinations.          OBJECTIVE:    Vitals:    08/15/24 1502   BP: 132/81   Pulse: 85   Temp: 97.8 °F (36.6 °C)   SpO2: 95%   Weight: 102 kg (224 lb)   Height: 162.6 cm (64.02\")   PainSc: 0-No pain           ECOG  (1) Restricted in physically strenuous activity, ambulatory and able to do work of light nature    Physical Exam   Constitutional: She is oriented to person, place, and time.  Non-toxic appearance. No distress.   HENT:   Head: Normocephalic.   Eyes: Pupils are equal, round, and reactive to light. Conjunctivae are normal. No scleral icterus.   Cardiovascular: Normal rate and regular rhythm.   Pulmonary/Chest: Effort normal. No respiratory distress. She has no wheezes.   Abdominal: Soft. There is no abdominal tenderness.   Neurological: She is oriented to person, place, and time. She displays no weakness.   Skin: No lesion noted. No erythema.   Psychiatric: Her behavior is normal. Mood, judgment and thought content normal.   Vitals reviewed.    Patient declined breast/chest wall exam today.     I have reexamined the patient and the results are consistent with the previously documented exam.       RECENT LABS    WBC   Date Value Ref Range Status   08/15/2024 12.30 (H) 3.40 - 10.80 10*3/mm3 Final     RBC   Date Value Ref Range Status   08/15/2024 4.16 3.77 - 5.28 10*6/mm3 Final     Hemoglobin   Date Value Ref Range Status   08/15/2024 13.3 12.0 - 15.9 g/dL Final     Hematocrit   Date Value Ref Range Status   08/15/2024 40.5 34.0 - 46.6 % Final     MCV   Date Value Ref Range Status   08/15/2024 97.4 (H) 79.0 - 97.0 fL Final     MCH   Date Value Ref Range Status   08/15/2024 32.0 26.6 - 33.0 pg Final     MCHC   Date Value Ref Range Status   08/15/2024 32.8 31.5 - 35.7 g/dL Final     RDW   Date Value Ref " Range Status   08/15/2024 13.9 12.3 - 15.4 % Final     RDW-SD   Date Value Ref Range Status   08/15/2024 48.1 37.0 - 54.0 fl Final     MPV   Date Value Ref Range Status   08/15/2024 10.4 6.0 - 12.0 fL Final     Platelets   Date Value Ref Range Status   08/15/2024 403 140 - 450 10*3/mm3 Final     Neutrophil %   Date Value Ref Range Status   08/15/2024 78.5 (H) 42.7 - 76.0 % Final     Lymphocyte %   Date Value Ref Range Status   08/15/2024 13.3 (L) 19.6 - 45.3 % Final     Monocyte %   Date Value Ref Range Status   08/15/2024 7.9 5.0 - 12.0 % Final     Eosinophil %   Date Value Ref Range Status   08/15/2024 0.1 (L) 0.3 - 6.2 % Final     Basophil %   Date Value Ref Range Status   08/15/2024 0.2 0.0 - 1.5 % Final     Immature Grans %   Date Value Ref Range Status   07/03/2024 0.6 (H) 0.0 - 0.5 % Final     Neutrophils, Absolute   Date Value Ref Range Status   08/15/2024 9.66 (H) 1.70 - 7.00 10*3/mm3 Final     Lymphocytes, Absolute   Date Value Ref Range Status   08/15/2024 1.63 0.70 - 3.10 10*3/mm3 Final     Monocytes, Absolute   Date Value Ref Range Status   08/15/2024 0.97 (H) 0.10 - 0.90 10*3/mm3 Final     Eosinophils, Absolute   Date Value Ref Range Status   08/15/2024 0.01 0.00 - 0.40 10*3/mm3 Final     Basophils, Absolute   Date Value Ref Range Status   08/15/2024 0.03 0.00 - 0.20 10*3/mm3 Final     Immature Grans, Absolute   Date Value Ref Range Status   07/03/2024 0.05 0.00 - 0.05 10*3/mm3 Final     nRBC   Date Value Ref Range Status   07/03/2024 0.0 0.0 - 0.2 /100 WBC Final       Lab Results   Component Value Date    GLUCOSE 96 07/03/2024    BUN 19 07/03/2024    CREATININE 1.13 (H) 07/03/2024    EGFRIFNONA 54 (L) 01/18/2022    EGFRIFAFRI 83 04/04/2017    BCR 16.8 07/03/2024    K 4.2 07/03/2024    CO2 38.5 (H) 07/03/2024    CALCIUM 9.5 07/03/2024    ALBUMIN 4.0 07/03/2024    LABIL2 1.1 05/31/2019    AST 47 (H) 07/03/2024    ALT 69 (H) 07/03/2024           ASSESSMENT:    Comprehensive gene analysis with [employer]  technology returned with 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  She also has DICER1 gene as well as TSC2 with variants of unknown significance.  Reviewed  History of right breast cancer, status post right mastectomy followed by axillary lymph node dissection and right chest wall reconstruction in 1985 at the age of 32.  Recurrent elevated liver function tests, pruritus, but no significant pathology found on both the PET scan, MRCP, except for post cholecystectomy, biliary ductal dilatation.    She was on on Colestipol for pruritus.  We will plan to repeat her CMP today, will obtain CT scan of the abdomen and pelvis and have her referred back to Dr. Conway as soon as possible.  Also will call for records of her EUS findings from December 2021 performed by Dr. Arias.  Her abdominal symptoms are benign.  Reviewed her recent CT abdomen and pelvis which was essentially unremarkable  Left hip avascular necrosis: Status post left hip replacement surgery  She has peripheral lymphadenopathy involving the neck, axilla, mediastinum and retroperitoneum that are not PET avid.  This lymphadenopathy may be due to chronic inflammatory disease [lupus].    Strong family history of colon cancer and personal history of breast cancer.   Systemic lupus erythematosus, scleroderma, Raynaud’s phenomenon.   Normocytic anemia, multifactorial, underlying autoimmune disease, relative iron deficiency and B12 deficiency.  On iron and B12 supplementation.  Stable  Persistent monocytosis, status post bone marrow aspiration and biopsy which was essentially normal.  Stable  Osteoporosis: Now on Prolia most recent DEXA scan 2/1/2023 showing osteopenia.  Continue Prolia  Urine cytology checked annually: Due now 8/15/2024  Annual dermatology appointments reviewed with patient  Status post complete hysterectomy reducing her risk for ovarian and uterine cancer.  Assessment has been reviewed and updated    PLANS:     Reviewed  CBC   Continue follow-up surveillance for Monahan syndrome     Encouraged patient to follow up with GI for pancreatic cancer screening.   Abdominal MRI completed 11/18/2023. She has colonoscopy coming up with Dr. Conway October 2024  Continue Prolia every 6 months.  Reviewed her bone density results.  She will receive Prolia today  Continue calcium with vitamin D  Bone density is next due February 2025- this was ordered today  Bilateral screening mammogram completed on 2/22/2024.  Bilateral breast MRI was completed in sept 2023.  Ordered today to be completed.  She will continue monthly breast self-exam and call for lumps nipple discharge, skin discoloration.    Urine cytology today 8/15/2024  Annual dermatology appointments, now goes to [facility] now. She is seen q 6 months.  Follow-up with Dr. Jones in 6 months, sooner if condition indicates    I have answered all her questions to her satisfaction.     I have reviewed labs results, imaging, vitals, and medications with the patient today.       Patient verbalized understanding and is in agreement of the above plan.      Electronically signed by Pippa Wallace PA-C       I spent 40 total minutes, face-to-face, caring for Tracie boggs. 90% of this time involved counseling and/or coordination of care as documented within this note.

## 2024-08-15 ENCOUNTER — OFFICE VISIT (OUTPATIENT)
Dept: ONCOLOGY | Facility: CLINIC | Age: 64
End: 2024-08-15
Payer: MEDICARE

## 2024-08-15 ENCOUNTER — TREATMENT (OUTPATIENT)
Dept: PHYSICAL THERAPY | Facility: CLINIC | Age: 64
End: 2024-08-15
Payer: MEDICARE

## 2024-08-15 ENCOUNTER — HOSPITAL ENCOUNTER (OUTPATIENT)
Dept: ONCOLOGY | Facility: HOSPITAL | Age: 64
Discharge: HOME | End: 2024-08-15
Payer: MEDICARE

## 2024-08-15 ENCOUNTER — LAB (OUTPATIENT)
Dept: LAB | Facility: HOSPITAL | Age: 64
End: 2024-08-15
Payer: MEDICARE

## 2024-08-15 VITALS
BODY MASS INDEX: 38.24 KG/M2 | HEART RATE: 85 BPM | WEIGHT: 224 LBS | HEIGHT: 64 IN | SYSTOLIC BLOOD PRESSURE: 132 MMHG | OXYGEN SATURATION: 95 % | TEMPERATURE: 97.8 F | DIASTOLIC BLOOD PRESSURE: 81 MMHG

## 2024-08-15 DIAGNOSIS — M25.619 LIMITED RANGE OF MOTION (ROM) OF SHOULDER: Primary | ICD-10-CM

## 2024-08-15 DIAGNOSIS — M81.0 OSTEOPOROSIS, UNSPECIFIED OSTEOPOROSIS TYPE, UNSPECIFIED PATHOLOGICAL FRACTURE PRESENCE: ICD-10-CM

## 2024-08-15 DIAGNOSIS — M19.011 OSTEOARTHRITIS OF RIGHT GLENOHUMERAL JOINT: ICD-10-CM

## 2024-08-15 DIAGNOSIS — C50.919 MALIGNANT NEOPLASM OF FEMALE BREAST, UNSPECIFIED ESTROGEN RECEPTOR STATUS, UNSPECIFIED LATERALITY, UNSPECIFIED SITE OF BREAST: ICD-10-CM

## 2024-08-15 DIAGNOSIS — Z15.09 LYNCH SYNDROME: ICD-10-CM

## 2024-08-15 DIAGNOSIS — C50.919 MALIGNANT NEOPLASM OF FEMALE BREAST, UNSPECIFIED ESTROGEN RECEPTOR STATUS, UNSPECIFIED LATERALITY, UNSPECIFIED SITE OF BREAST: Primary | ICD-10-CM

## 2024-08-15 DIAGNOSIS — M25.511 CHRONIC RIGHT SHOULDER PAIN: ICD-10-CM

## 2024-08-15 DIAGNOSIS — Z85.3 PERSONAL HISTORY OF MALIGNANT NEOPLASM OF BREAST: ICD-10-CM

## 2024-08-15 DIAGNOSIS — M81.0 OSTEOPOROSIS, UNSPECIFIED OSTEOPOROSIS TYPE, UNSPECIFIED PATHOLOGICAL FRACTURE PRESENCE: Primary | ICD-10-CM

## 2024-08-15 DIAGNOSIS — G89.29 CHRONIC RIGHT SHOULDER PAIN: ICD-10-CM

## 2024-08-15 DIAGNOSIS — Z12.31 ENCOUNTER FOR SCREENING MAMMOGRAM FOR MALIGNANT NEOPLASM OF BREAST: ICD-10-CM

## 2024-08-15 DIAGNOSIS — Z12.39 BREAST CANCER SCREENING, HIGH RISK PATIENT: ICD-10-CM

## 2024-08-15 DIAGNOSIS — Z78.0 POST-MENOPAUSAL: ICD-10-CM

## 2024-08-15 LAB
ALBUMIN SERPL-MCNC: 4.3 G/DL (ref 3.5–5.2)
ALBUMIN/GLOB SERPL: 1.3 G/DL
ALP SERPL-CCNC: 151 U/L (ref 39–117)
ALT SERPL W P-5'-P-CCNC: 23 U/L (ref 1–33)
ANION GAP SERPL CALCULATED.3IONS-SCNC: 12 MMOL/L (ref 5–15)
AST SERPL-CCNC: 25 U/L (ref 1–32)
BASOPHILS # BLD AUTO: 0.03 10*3/MM3 (ref 0–0.2)
BASOPHILS NFR BLD AUTO: 0.2 % (ref 0–1.5)
BILIRUB SERPL-MCNC: 0.3 MG/DL (ref 0–1.2)
BUN SERPL-MCNC: 21 MG/DL (ref 8–23)
BUN/CREAT SERPL: 16.5 (ref 7–25)
CALCIUM SPEC-SCNC: 10.2 MG/DL (ref 8.6–10.5)
CHLORIDE SERPL-SCNC: 94 MMOL/L (ref 98–107)
CO2 SERPL-SCNC: 30 MMOL/L (ref 22–29)
CREAT SERPL-MCNC: 1.27 MG/DL (ref 0.57–1)
DEPRECATED RDW RBC AUTO: 48.1 FL (ref 37–54)
EGFRCR SERPLBLD CKD-EPI 2021: 47 ML/MIN/1.73
EOSINOPHIL # BLD AUTO: 0.01 10*3/MM3 (ref 0–0.4)
EOSINOPHIL NFR BLD AUTO: 0.1 % (ref 0.3–6.2)
ERYTHROCYTE [DISTWIDTH] IN BLOOD BY AUTOMATED COUNT: 13.9 % (ref 12.3–15.4)
GLOBULIN UR ELPH-MCNC: 3.2 GM/DL
GLUCOSE SERPL-MCNC: 105 MG/DL (ref 65–99)
HCT VFR BLD AUTO: 40.5 % (ref 34–46.6)
HGB BLD-MCNC: 13.3 G/DL (ref 12–15.9)
HOLD SPECIMEN: NORMAL
LYMPHOCYTES # BLD AUTO: 1.63 10*3/MM3 (ref 0.7–3.1)
LYMPHOCYTES NFR BLD AUTO: 13.3 % (ref 19.6–45.3)
MAGNESIUM SERPL-MCNC: 1.9 MG/DL (ref 1.6–2.4)
MCH RBC QN AUTO: 32 PG (ref 26.6–33)
MCHC RBC AUTO-ENTMCNC: 32.8 G/DL (ref 31.5–35.7)
MCV RBC AUTO: 97.4 FL (ref 79–97)
MONOCYTES # BLD AUTO: 0.97 10*3/MM3 (ref 0.1–0.9)
MONOCYTES NFR BLD AUTO: 7.9 % (ref 5–12)
NEUTROPHILS NFR BLD AUTO: 78.5 % (ref 42.7–76)
NEUTROPHILS NFR BLD AUTO: 9.66 10*3/MM3 (ref 1.7–7)
PHOSPHATE SERPL-MCNC: 4.7 MG/DL (ref 2.5–4.5)
PLATELET # BLD AUTO: 403 10*3/MM3 (ref 140–450)
PMV BLD AUTO: 10.4 FL (ref 6–12)
POTASSIUM SERPL-SCNC: 4.8 MMOL/L (ref 3.5–5.2)
PROT SERPL-MCNC: 7.5 G/DL (ref 6–8.5)
RBC # BLD AUTO: 4.16 10*6/MM3 (ref 3.77–5.28)
SODIUM SERPL-SCNC: 136 MMOL/L (ref 136–145)
WBC NRBC COR # BLD AUTO: 12.3 10*3/MM3 (ref 3.4–10.8)

## 2024-08-15 PROCEDURE — 85025 COMPLETE CBC W/AUTO DIFF WBC: CPT

## 2024-08-15 PROCEDURE — 25010000002 DENOSUMAB 60 MG/ML SOLUTION PREFILLED SYRINGE: Performed by: INTERNAL MEDICINE

## 2024-08-15 PROCEDURE — 96372 THER/PROPH/DIAG INJ SC/IM: CPT

## 2024-08-15 PROCEDURE — 84100 ASSAY OF PHOSPHORUS: CPT | Performed by: INTERNAL MEDICINE

## 2024-08-15 PROCEDURE — 80053 COMPREHEN METABOLIC PANEL: CPT | Performed by: INTERNAL MEDICINE

## 2024-08-15 PROCEDURE — 36415 COLL VENOUS BLD VENIPUNCTURE: CPT

## 2024-08-15 PROCEDURE — 83735 ASSAY OF MAGNESIUM: CPT | Performed by: INTERNAL MEDICINE

## 2024-08-15 RX ORDER — FERROUS SULFATE 325(65) MG
1 TABLET ORAL
Qty: 30 TABLET | Refills: 1 | Status: CANCELLED | OUTPATIENT
Start: 2024-08-15

## 2024-08-15 RX ORDER — FERROUS SULFATE 325(65) MG
1 TABLET ORAL
Qty: 30 TABLET | Refills: 1 | Status: SHIPPED | OUTPATIENT
Start: 2024-08-15

## 2024-08-15 RX ADMIN — DENOSUMAB 60 MG: 60 INJECTION SUBCUTANEOUS at 15:36

## 2024-08-15 NOTE — PROGRESS NOTES
Physical Therapy Daily Treatment Note      [facility]              [address], [address]                [facility] [place]  [address]        Patient: Tracie Gross   : 1960  Diagnosis/ICD-10 Code:  Limited range of motion (ROM) of shoulder [M25.619]  Referring practitioner: Floyd Ruiz, *  Date of Initial Visit: Type: THERAPY  Noted: 2024  Today's Date: 8/15/2024  Patient seen for 14 sessions         Subjective    Tracie Gross reports: she was able to pull weeds.  Her vertigo is better but she is sometimes uneasy on her feet.            Objective   See Exercise, Manual, and Modality Logs for complete treatment.       Assessment/Plan  Significantly improved tolerance to exercises.  Able to progress multiple exercises and maintain proper form.  IR continued to be the weakest plane.  Will monitor and progress as able.     Progress per Plan of Care           Timed:  Manual Therapy:         mins  91912;  Therapeutic Exercise:    15     mins  83767;     Neuromuscular Walter:        mins  37042;    Therapeutic Activity:     15     mins  02131;     Gait Training:           mins  95133;     Ultrasound:          mins  80590;     Work Conditioning/Hardening (initial 2 hours)        mins  88254  Work Conditioning/Hardening (each add'l hour)        mins  23907    Untimed:   Electrical Stimulation:         mins  87356 ( );  Traction          mins 24182    Timed Treatment:   30   mins   Total Treatment:     30   mins    Stacey Moreno PTA  Physical Therapist Assistant

## 2024-08-22 ENCOUNTER — TREATMENT (OUTPATIENT)
Dept: PHYSICAL THERAPY | Facility: CLINIC | Age: 64
End: 2024-08-22
Payer: MEDICARE

## 2024-08-22 DIAGNOSIS — G89.29 CHRONIC RIGHT SHOULDER PAIN: ICD-10-CM

## 2024-08-22 DIAGNOSIS — M25.511 CHRONIC RIGHT SHOULDER PAIN: ICD-10-CM

## 2024-08-22 DIAGNOSIS — M19.011 OSTEOARTHRITIS OF RIGHT GLENOHUMERAL JOINT: ICD-10-CM

## 2024-08-22 DIAGNOSIS — M25.619 LIMITED RANGE OF MOTION (ROM) OF SHOULDER: Primary | ICD-10-CM

## 2024-08-22 NOTE — PROGRESS NOTES
Physical Therapy Daily Treatment Note    Patient: Tracie Gross  : 1960  Referring practitioner: Floyd Ruiz, *  Today's Date: 2024    VISIT#: 15      Subjective   Tracie Gross reports: Doing pretty well, shoulder is getting stronger and she can do more. Was able to drive to [employer] and did ok, was sore after but was able to do it.       Objective     See Exercise, Manual, and Modality Logs for complete treatment.     Patient Education: continue HEP    Assessment/Plan  Good response to session, continues to demonstrate weakness but we continuing to increase reps and weight as tolerated. After lying on her left side on the wedge she started to feel dizzy again. She is seeing a PT next week to address her vertigo.     Progress per Plan of Care; likely see for 2 more visits then DC with HEP.            Timed:         Manual Therapy:         mins  75436;     Therapeutic Exercise:    25     mins  96952;     Neuromuscular Walter:        mins  85281;    Therapeutic Activity:     15     mins  16133;     Gait Training:           mins  18683;     Ultrasound:          mins  48761;    Ionto:                                   mins   28571  Self Care:                            mins   83022    Un-Timed:  Electrical Stimulation:         mins  86427 ( );  Dry Needling          mins self-pay  Traction          mins 95840  Re-Eval                               mins  32621    Timed Treatment:   40   mins   Total Treatment:     40   mins    Phuong Yarbrough, PT  Physical Therapist  [place] PT license #: [id number]  [place] PT license #: [id number]

## 2024-08-26 ENCOUNTER — TELEPHONE (OUTPATIENT)
Dept: PHYSICAL THERAPY | Facility: CLINIC | Age: 64
End: 2024-08-26

## 2024-08-30 ENCOUNTER — TREATMENT (OUTPATIENT)
Dept: PHYSICAL THERAPY | Facility: CLINIC | Age: 64
End: 2024-08-30
Payer: MEDICARE

## 2024-08-30 DIAGNOSIS — R42 VERTIGO: Primary | ICD-10-CM

## 2024-08-30 DIAGNOSIS — H83.2X3 VESTIBULAR HYPOFUNCTION OF BOTH EARS: ICD-10-CM

## 2024-08-30 DIAGNOSIS — M43.6 NECK STIFFNESS: ICD-10-CM

## 2024-08-30 DIAGNOSIS — R26.89 BALANCE PROBLEM: ICD-10-CM

## 2024-08-30 DIAGNOSIS — H81.13 BPPV (BENIGN PAROXYSMAL POSITIONAL VERTIGO), BILATERAL: ICD-10-CM

## 2024-08-30 NOTE — PROGRESS NOTES
"Physical Therapy Initial Evaluation and Plan of Care  [address] [place] [address]    Patient: Tracie Gross   : 1960  Diagnosis/ICD-10 Code:  Vertigo [R42]  Referring practitioner: Floyd Silva MD  Date of Initial Visit: 2024  Today's Date: 2024  Patient seen for 1 sessions           Visit Diagnoses:     ICD-10-CM ICD-9-CM   1. Vertigo  R42 780.4   2. BPPV (benign paroxysmal positional vertigo), bilateral  H81.13 386.11   3. Vestibular hypofunction of both ears  H83.2X3 386.50   4. Neck stiffness  M43.6 723.5   5. Balance problem  R26.89 781.99        Subjective Questionnaire: DHI: 64 = 64% limited    Subjective Evaluation    History of Present Illness  Mechanism of injury: Dizziness which began around 24 for ROSA and pt was placed back on Meclizine TID prn which helps, but doesn't cure. Pt reports room spinning with light headedness. Pt declines falls in the last year. Pt uses a SPC for some time now off/on. Pt does have frontal headaches which occur along with this and occurs \"every time\" she gets up. Pt does report that she easily gets car sickness even before all of this has been going on.     Pt states a prior incident about 2 months before with PT which resolved it at the time. PCP referred to [facility]PPT for vestibular rehab.     Allergies: aspirin, allergies: Penicillin, Baclofen, adhesive tape; ankle sprain, arthritis back/neck, asthma, bipolar affective disorder, hx breast cancer , bursitis of hip, cerv disc disorder, CTS, depression, fx wrist/foot, GERD, hamartoma,  hip arthrosis, hyperlipidemia, HTN, hypothyroidism, infectious viral hepatitis, inflammatory bowel disease, IBS, Kienbock's disease R wrist, knee swelling, LBP, LS disc disease, lupus, Monahan syndrome, MRSA infection, obesity, osteopenia, OP maintained with Prolia, peptic ulceration, periarthritis shld, pneumonia, Raynaud disease, renal insufficiency, RTC syndrome, Sclerodrma, sleep " apnea CPAP, Squamous cell cancer of lip, tear meniscus knee, tendinitis knee, thor disc disorder, Von Willebrand disease    PSH: appendectomy, arm debridement R, back surg cerv fusion & lumb decompression, breast biopsy, breast lumpectomy, breast recon R, cardiac cath, C section, obesity, cholecystectomy, colonoscopy, cosmetic surg, endoscopy, exploratory laparotomy, eye srg, finger surg, hand surg, hip surg, hyste, R hip/knee jt replacement, L knee arthroscopy, knee surg, Lasik, mastectomy radical R, neck surg, oophorectomy B, shld surgery, spine surgery, splenectomy, subtotal hyste, TSA w/DCE, trigger point injex, tubal abedominal ligation, upper endo ultrasound w/FNA, wrist surg R    Denies hx/current: pacemaker, DM, MI, CVA, seizures, latex allergies    Pain: 4/10 current, 0/10 at best, 4/10 at worst  Dizziness: 4/10 current, 0/10 at best, 6/10 at worst    Aggravating/functional factors: rolling in bed, balance with standing, walking, rising, bending down and coming back up after bending, driving, eye/neck movements, quick movements, avoids stairs if dizzy    PLOF: 1 prior incident a few months before relieved with PT     Relieving factors: focusing on something with her eyes    Social Hx: disabled; lives with spouse; stairs with 1 rail & lift chair that pt doesn't use; likes to groom dogs       Quality of life: good    Pain  Progression: worsening    Hand dominance: right    Treatments  Previous treatment: physical therapy and injection treatment (Prolia and injex L shld/L knee will need L shld surg soon)  Current treatment: physical therapy  Patient Goals  Patient goals for therapy: increased motion and improved balance  Patient goal: Return to PLOF, not feel like she's drunk; get back to grooming dogs         Objective          Active Range of Motion   Cervical/Thoracic Spine   Cervical    Flexion: 23 degrees   Extension: 35 degrees   Left lateral flexion: 20 degrees   Right lateral flexion: 15 degrees   Left  rotation: 37 degrees   Right rotation: 45 degrees     Additional Active Range of Motion Details  Dizzy/nauseous with flex/ext  More stiff than painful with all     Ambulation     Comments   VESTIBULAR:  Vertigo / hypofunction    VOMS:      Baseline symptoms - 4           Smooth pursuit - 7           Saccades vertical - 7           Saccades horizontal - 4             Convergence (near point) - Abnormal ~23 cm (normal is within 10 cm from nose for diploplia)             VOR - horizontal - 7           VOR - vertical - 8             Visual Motion Sensitivity Test : deferred - pt notes she gets car sick and has for a long time    Jahaira SOP:     Condition 1 -  Romberg open stance/ eyes open - S     Condition 2 - Romberg open open stance/ eyes closed - F     Condition 3 - Tandem Romberg with eyes open - F     Condition 4 - Tandem Romberg with eyes closed - F     Condition 5 - Standing on foam/ eyes open - F     Condition 6 - Standing on foam/ eyes closed - F     Condition 7  - Stepping Fukuda (march in place) eyes closed -    Vertebral AA test: (-)    SL Hallpike test: (+) R, tested L with supine DHP (-) as pt had difficulty and pain with moving R to L S/L with CRM    Horizontal Roll test: (+) B         Palpation: hypertonus B UT/lev scap/SCM/scalenes    Posture: head fwd/rounded shoulders    Gait: I with SPC; reduced step length with L LE not passing R; reduced gt speed 10 ft in 7 sec (1.42 ft/s) with trunk flex bias    Transfers/bed mobility: I sit to/from stand with 2 attempts and B UE A with wide MARGARET; significant difficulty getting legs onto table from S/L position and rolling; difficulty and neck pain with going from R S/L to L S/L    Flexibility: tight UT/lev scap/SCM/scalenes        Assessment & Plan       Assessment  Impairments: abnormal gait, abnormal or restricted ROM, activity intolerance, impaired balance, impaired physical strength, lacks appropriate home exercise program and safety issue   Other impairment:  dizziness  Assessment details: The patient is a 63 y.o. female who presents to physical therapy today for vertigo with BPPV B, vestibular hypofunction B and also appears with neck stiffness and balance problem. Upon initial evaluation, the patient demonstrates the above impairments. Due to these impairments, the patient is unable to/limited with:  rolling in bed, walking, balance with standing, rising, bending down and coming back up after bending, driving, eye/neck movements, quick movements, and avoids stairs if dizzy. The patient would benefit from skilled PT services to address functional limitations and impairments and to improve patient quality of life.      Barriers to therapy: OP, scleroderma, cerv/thor/LB issues with fusion cerv/lumb, IBS, GERD, L shld pain/L knee pain, clotting disorder, depression, hx infectious viral hepatitis,  renal insufficiency, hx squamous cell cancer lip,  could affect PT Rx/progress/outcomes if exacerbated/unregulated or if cancer were to recur which could affect tolerance to PT/exs  Prognosis: good    Goals  Plan Goals: STG's: 4 weeks   Patient will report >75% reduction in symptoms  Patient will be able to perform HEP with minimal verbal cues    LTG's: By discharge  Patient will have 0-15 dizziness with re-assessment using the Dizziness Handicap Inventory  Patient will report >80% reduction in headaches  Patient will demonstrate stable gait with horizontal and vertical head turns and be able to perform head/eye movements with min to no dizziness   Pt will be able to groom dogs without dizziness or LOB   Patient will report no new onset of falls  Patient will be independent with final HEP    Plan  Therapy options: will be seen for skilled therapy services  Planned therapy interventions: manual therapy, gait training, neuromuscular re-education, postural training, balance/weight-bearing training, ADL retraining, strengthening, transfer training, therapeutic activities, home  exercise program, motor coordination training, soft tissue mobilization, stretching, functional ROM exercises and flexibility  Other planned therapy interventions: canlith repositioning, reiki/massage  Frequency: 2x week  Duration in weeks: 13  Treatment plan discussed with: patient        History # of Personal Factors and/or Comorbidities: HIGH (3+)  Examination of Body System(s): # of elements: HIGH (4+)  Clinical Presentation: EVOLVING acute, affecting balance, comorbidity  Clinical Decision Making: MODERATE      Timed:         Manual Therapy:         mins  12581;     Therapeutic Exercise:         mins  64705;     Neuromuscular Walter:        mins  09857;    Therapeutic Activity:    10      mins  70982;     Gait Training:           mins  84263;     Ultrasound:          mins  11201;    Ionto                                   mins   16238  Self Care                            mins   57491      Un-Timed:  Electrical Stimulation:         mins  33279 ( );  Canalith Repos             11      mins  55500  Dry Needle 1-2 ms      ___  mins 91238  Dry Needle  3+ ms              mins 60656  Traction          mins 91851  Low Eval          Mins  66413  Mod Eval    44      Mins  13577  High Eval                            Mins  10931  Re-Eval                               mins  44442        Timed Treatment:  10    mins   Total Treatment:      65  mins            PT SIGNATURE: Megan Singleton PT   [place] PT Lic# [id number]  DATE TREATMENT INITIATED: 8/30/2024    Medicare Initial Certification  Certification Period: 8/30/2024 through 11/27/2024  I certify that the therapy services are furnished while this patient is under my care.  The services outlined above are required by this patient, and will be reviewed every 90 days.         Physician Signature: _________________________  PHYSICIAN: Floyd Silva MD   NPI: [id number]                                             DATE: _____________________________________    Please  sign and return via fax to [phone]. Thank you, [facility].

## 2024-09-03 ENCOUNTER — TELEPHONE (OUTPATIENT)
Dept: PHYSICAL THERAPY | Facility: CLINIC | Age: 64
End: 2024-09-03

## 2024-09-03 NOTE — TELEPHONE ENCOUNTER
"  Caller: Tracie Gross \"Evelyn\"    Relationship: Self       What was the call regarding: BEEN SICK FOR COUPLE DAYS          "

## 2024-09-05 ENCOUNTER — TREATMENT (OUTPATIENT)
Dept: PHYSICAL THERAPY | Facility: CLINIC | Age: 64
End: 2024-09-05
Payer: MEDICARE

## 2024-09-05 DIAGNOSIS — G89.29 CHRONIC RIGHT SHOULDER PAIN: Primary | ICD-10-CM

## 2024-09-05 DIAGNOSIS — M25.619 LIMITED RANGE OF MOTION (ROM) OF SHOULDER: ICD-10-CM

## 2024-09-05 DIAGNOSIS — M19.011 OSTEOARTHRITIS OF RIGHT GLENOHUMERAL JOINT: ICD-10-CM

## 2024-09-05 DIAGNOSIS — M25.511 CHRONIC RIGHT SHOULDER PAIN: Primary | ICD-10-CM

## 2024-09-05 NOTE — PROGRESS NOTES
Physical Therapy Daily Treatment Note      [facility]              [address], [address]                [facility] [place]  [address]        Patient: Tracie Gross   : 1960  Diagnosis/ICD-10 Code:  Chronic right shoulder pain [M25.511, G89.29]  Referring practitioner: Floyd Ruiz, *  Date of Initial Visit: Type: THERAPY  Noted: 2024  Today's Date: 2024  Patient seen for 16 sessions         Subjective    Tracie Gross reports: her shoulder is doing better than it was.  Over the weekend she was determined to get in her husbands  truck and has been paying for it since.  She said she was sick Sun-Tues and thinks her kidney's were in the process of shutting down.  She hasn't quite been the same since.           Objective   See Exercise, Manual, and Modality Logs for complete treatment.       Assessment/Plan  Limited tolerance to exercises today d/t increased pain since hurting shoulder on Saturday.  Held resistance and focused on gentle AROM and scapular stabilization.  Limited number of exercises d/t this.     Plan: see progress note portion           Timed:  Manual Therapy:         mins  56956;  Therapeutic Exercise:    15     mins  57645;     Neuromuscular Walter:        mins  07698;    Therapeutic Activity:          mins  14099;     Gait Training:           mins  40848;     Ultrasound:          mins  59650;     Work Conditioning/Hardening (initial 2 hours)        mins  50029  Work Conditioning/Hardening (each add'l hour)        mins  61124    Untimed:   Electrical Stimulation:         mins  43025 ( );  Traction          mins 34613    Timed Treatment:   15   mins   Total Treatment:     15   mins    Stacey Moreno PTA  Physical Therapist Assistant

## 2024-09-05 NOTE — PROGRESS NOTES
Re-Certification/Plan of Care        Patient: Tracie Grsos   : 1960  Diagnosis/ICD-10 Code:  Chronic right shoulder pain [M25.511, G89.29]  Referring practitioner: Floyd Ruiz, *  Date of Initial Visit: Type: THERAPY  Noted: 2024  Today's Date: 2024  Patient seen for 16 sessions  PT Clinic location:  [place] [address]      Subjective:   Tracie Gross reports: Doing ok, she is definitely feeling stronger and can do more with her right arm, but had a bit of a setback this week, hurt her shoulder getting in to her 's truck, still sore.     Subjective Questionnaire: QuickDASH: 36%  Clinical Progress: improved  Home Program Compliance: Yes      Objective     Active Range of Motion      Right Shoulder   Flexion: 118 (min/mod scapular compensation) degrees   Abduction: 110 (min/mod scapular compensation) degrees   External rotation BTH: Active external rotation behind the head: can reach to C2.   Internal rotation BTB: Active internal rotation behind the back: 2 inches posterior to greater trochanter. (This is more than last assessment)     Additional Active Range of Motion Details  Cross body reach w/ RUE: can reach to top of left upper trap     Passive Range of Motion      Right Shoulder   Flexion: 150 degrees   Abduction: 125 degrees   External rotation 45°: 70 degrees   Internal rotation 45°: 80 degrees      Strength/Myotome Testing      Right Shoulder      Planes of Motion   Flexion: 4-   Abduction: 4   External rotation at 0°: 4-   Internal rotation at 0°: 4-      Isolated Muscles   Biceps: 4-   Lower trapezius: 3+   Middle trapezius: 3+   Serratus anterior: 3+   Triceps: 4-      Assessment & Plan       Assessment  Assessment details: Tracie is making slow but steady progress with therapy. We are seeing improvements in her functional use of her RUE. However, she has missed multiple visits recently due to other medical issues and she continues  to have R shoulder weakness. We are getting close to wrapping up therapy, but due to recent setback we will likely see her for 3-4 more visits to work towards DC w/ HEP. Requires continued PT to address remaining deficits and return to PLOF.     Plan  Therapy options: will be seen for skilled therapy services        ST. Pt will be independent and compliant with initial HEP in 4 weeks. Met   2. Pt will report a 50% improvement in symptoms since starting therapy in 4 weeks. Progressing  3. Pt will demonstrate an increase in right shoulder AROM flexion to 130 degrees within 4 weeks. Not met  4. Pt will improve shdr functional IR BTB to right SIJ. Not met     LTG: - by DC (12 weeks)  1. Pt will be independent with final HEP for self-management of condition by DC.  2. Pt will improve score on QuickDASH to less than 25% impairment by DC.   3. Pt will report a 75% improvement in symptoms by DC in order to allow return to PLOF.  4. Pt will improve right shoulder functional ER behind the head  to at least C3  in order to be able to wash and fix hair by DC.  5. Pt will improve right shoulder strength to at least 4/5 in order to be able to compete ADLs with RUE by DC.     Progress toward previous goals: Partially Met        Recommendations: Continue as planned  Certification Period: 12/3/2024  Prognosis to achieve goals: good    PT Signature: Phuong Yarbrough PT  [place] PT license #: [id number]  [place] PT license #: [id number]    Based upon review of the patient's progress and continued therapy plan, it is my medical opinion that Tracie Gross should continue physical therapy treatment at [facility]  [address] [place] [address] [address]  [place] [address] [phone].    Signature: __________________________________  Floyd Ruiz MD  Please sign and return via fax to  [address] [address] [address]  [place] [address] Thank you, [facility].    Timed:         Manual Therapy:         mins  08075;     Therapeutic Exercise:         mins  67554;     Neuromuscular Walter:        mins  38680;    Therapeutic Activity:     10     mins  77584;     Gait Training:           mins  91450;     Ultrasound:          mins  26013;    Ionto                                   mins   10923  Self Care                            mins   96088      Un-Timed:  Electrical Stimulation:         mins  88092 ( );  Dry Needling          mins self-pay  Traction          mins 89356  Low Eval          Mins  23495  Mod Eval          Mins  28647  High Eval                            Mins  48800  Re-Eval                               mins  75687      Timed Treatment:   10   mins   Total Treatment:     10   mins

## 2024-09-06 DIAGNOSIS — M34.9 SCLERODERMA: ICD-10-CM

## 2024-09-06 DIAGNOSIS — I10 PRIMARY HYPERTENSION: ICD-10-CM

## 2024-09-06 RX ORDER — NIFEDIPINE 90 MG/1
90 TABLET, EXTENDED RELEASE ORAL DAILY
Qty: 90 TABLET | Refills: 1 | Status: SHIPPED | OUTPATIENT
Start: 2024-09-06

## 2024-09-10 ENCOUNTER — TREATMENT (OUTPATIENT)
Dept: PHYSICAL THERAPY | Facility: CLINIC | Age: 64
End: 2024-09-10
Payer: MEDICARE

## 2024-09-10 DIAGNOSIS — Z00.00 MEDICARE ANNUAL WELLNESS VISIT, SUBSEQUENT: ICD-10-CM

## 2024-09-10 DIAGNOSIS — H83.2X3 VESTIBULAR HYPOFUNCTION OF BOTH EARS: ICD-10-CM

## 2024-09-10 DIAGNOSIS — R26.89 BALANCE PROBLEM: ICD-10-CM

## 2024-09-10 DIAGNOSIS — H81.13 BPPV (BENIGN PAROXYSMAL POSITIONAL VERTIGO), BILATERAL: ICD-10-CM

## 2024-09-10 DIAGNOSIS — M43.6 NECK STIFFNESS: ICD-10-CM

## 2024-09-10 DIAGNOSIS — R42 VERTIGO: Primary | ICD-10-CM

## 2024-09-10 NOTE — PROGRESS NOTES
Physical Therapy Treatment Note  [address] [place] [address]      Patient: Tracie Gross   : 1960  Diagnosis/ICD-10 Code:  Vertigo [R42]  Referring practitioner: Floyd Silva MD  Date of Initial Visit: Type: THERAPY  Noted: 2024  Today's Date: 9/10/2024  Patient seen for 8 sessions           Visit Diagnoses:     ICD-10-CM ICD-9-CM   1. Vertigo  R42 780.4   2. BPPV (benign paroxysmal positional vertigo), bilateral  H81.13 386.11   3. Vestibular hypofunction of both ears  H83.2X3 386.50   4. Neck stiffness  M43.6 723.5   5. Balance problem  R26.89 781.99       Subjective Tracie Gross reports: she was better for about a week after she was here for the eval. Pt was going to go to a concert on 24, but pt got dizzy again with getting out of the recliner on Saturday morning. Pt states she canceled last week due to her kidneys shutting down. Pt states she was really disoriented. Pt reports she had a headache with that over the weekend.    Pt reports it was 8/10 at worst and today is a 5/10.     Objective   S/L HP (+) R &  DHP (+) B     See Exercise, Manual, and Modality Logs for complete treatment.     Patient Education: discussed positioning for testing/CRM & aftercare     Assessment/Plan  Pt had an easier time with Epley maneuvers versus Semont. Nystagmus lasted longer with R side. Held each position 1 min after nystagmus stopped. Pt was still dizzy at end of session, but noted it was reduced versus when she came in and was dizzy at end of last session as well, with 1 week relief afterwards.  Continue with CRM prn and add neck exs/NMR and balance/theracts as tolerated.     Progress per Plan of Care            Timed:         Manual Therapy:         mins  94180;     Therapeutic Exercise:         mins  17983;     Neuromuscular Walter:        mins  25868;    Therapeutic Activity:    10      mins  92281;     Gait Training:           mins  02327;     Ultrasound:           mins  86472;    Ionto                                   mins   95453  Self Care                            mins   70657    Un-Timed:  Electrical Stimulation:         mins  47239 ( );  Traction          mins 66966  Canalith Repos             30      mins  43310  Dry Needle 1-2 ms      ___  mins 43594  Dry Needle  3+ ms              mins 74643  Low Eval          mins  53677  Mod Eval          Mins  15041  High Eval                            Mins  08585  Re-Eval                               mins  90174    Timed Treatment:   10   mins   Total Treatment:    40    mins          Megan Singleton PT    Physical Therapist

## 2024-09-11 RX ORDER — CALCIUM CARBONATE/VITAMIN D3 600MG-5MCG
1 TABLET ORAL 2 TIMES DAILY
Qty: 60 TABLET | Refills: 6 | Status: SHIPPED | OUTPATIENT
Start: 2024-09-11

## 2024-09-12 DIAGNOSIS — M79.89 LEG SWELLING: ICD-10-CM

## 2024-09-12 RX ORDER — FUROSEMIDE 20 MG
TABLET ORAL
Qty: 30 TABLET | Refills: 2 | Status: SHIPPED | OUTPATIENT
Start: 2024-09-12

## 2024-09-17 ENCOUNTER — TELEPHONE (OUTPATIENT)
Dept: ORTHOPEDIC SURGERY | Facility: CLINIC | Age: 64
End: 2024-09-17
Payer: MEDICARE

## 2024-09-17 ENCOUNTER — TELEPHONE (OUTPATIENT)
Dept: PHYSICAL THERAPY | Facility: CLINIC | Age: 64
End: 2024-09-17

## 2024-09-17 DIAGNOSIS — Z47.89 ORTHOPEDIC AFTERCARE: Primary | ICD-10-CM

## 2024-09-18 DIAGNOSIS — R42 VERTIGO: ICD-10-CM

## 2024-09-18 RX ORDER — MECLIZINE HYDROCHLORIDE 25 MG/1
25 TABLET ORAL 3 TIMES DAILY PRN
Qty: 30 TABLET | Refills: 3 | Status: SHIPPED | OUTPATIENT
Start: 2024-09-18

## 2024-09-24 ENCOUNTER — TELEPHONE (OUTPATIENT)
Dept: PHYSICAL THERAPY | Facility: CLINIC | Age: 64
End: 2024-09-24

## 2024-09-27 ENCOUNTER — HOSPITAL ENCOUNTER (OUTPATIENT)
Dept: MRI IMAGING | Facility: HOSPITAL | Age: 64
Discharge: HOME | End: 2024-09-27
Payer: MEDICARE

## 2024-09-27 DIAGNOSIS — Z12.39 BREAST CANCER SCREENING, HIGH RISK PATIENT: ICD-10-CM

## 2024-09-27 DIAGNOSIS — R63.5 WEIGHT GAIN: ICD-10-CM

## 2024-09-27 DIAGNOSIS — Z85.3 PERSONAL HISTORY OF MALIGNANT NEOPLASM OF BREAST: ICD-10-CM

## 2024-09-27 DIAGNOSIS — G47.00 INSOMNIA, UNSPECIFIED TYPE: ICD-10-CM

## 2024-09-27 DIAGNOSIS — C50.919 MALIGNANT NEOPLASM OF FEMALE BREAST, UNSPECIFIED ESTROGEN RECEPTOR STATUS, UNSPECIFIED LATERALITY, UNSPECIFIED SITE OF BREAST: ICD-10-CM

## 2024-09-27 DIAGNOSIS — Z15.09 LYNCH SYNDROME: ICD-10-CM

## 2024-09-27 PROCEDURE — C8908 MRI W/O FOL W/CONT, BREAST,: HCPCS

## 2024-09-27 PROCEDURE — 25010000002 GADOTERIDOL PER 1 ML: Performed by: PHYSICIAN ASSISTANT

## 2024-09-27 PROCEDURE — A9579 GAD-BASE MR CONTRAST NOS,1ML: HCPCS | Performed by: PHYSICIAN ASSISTANT

## 2024-09-27 PROCEDURE — C8937 CAD BREAST MRI: HCPCS

## 2024-09-27 RX ORDER — ALPRAZOLAM 1 MG
1 TABLET ORAL NIGHTLY PRN
Qty: 30 TABLET | Refills: 2 | Status: SHIPPED | OUTPATIENT
Start: 2024-09-27

## 2024-09-27 RX ADMIN — GADOTERIDOL 20 ML: 279.3 INJECTION, SOLUTION INTRAVENOUS at 18:02

## 2024-10-01 ENCOUNTER — ANESTHESIA (OUTPATIENT)
Dept: GASTROENTEROLOGY | Facility: HOSPITAL | Age: 64
End: 2024-10-01
Payer: MEDICARE

## 2024-10-01 ENCOUNTER — ON CAMPUS - OUTPATIENT (AMBULATORY)
Age: 64
End: 2024-10-01
Payer: MEDICARE

## 2024-10-01 ENCOUNTER — ON CAMPUS - OUTPATIENT (AMBULATORY)
Dept: URBAN - METROPOLITAN AREA HOSPITAL 85 | Facility: HOSPITAL | Age: 64
End: 2024-10-01
Payer: MEDICARE

## 2024-10-01 ENCOUNTER — ANESTHESIA EVENT (OUTPATIENT)
Dept: GASTROENTEROLOGY | Facility: HOSPITAL | Age: 64
End: 2024-10-01
Payer: MEDICARE

## 2024-10-01 ENCOUNTER — HOSPITAL ENCOUNTER (OUTPATIENT)
Facility: HOSPITAL | Age: 64
Setting detail: HOSPITAL OUTPATIENT SURGERY
Discharge: HOME | End: 2024-10-01
Attending: INTERNAL MEDICINE | Admitting: INTERNAL MEDICINE
Payer: MEDICARE

## 2024-10-01 VITALS
BODY MASS INDEX: 37.21 KG/M2 | HEART RATE: 89 BPM | HEIGHT: 63 IN | SYSTOLIC BLOOD PRESSURE: 130 MMHG | OXYGEN SATURATION: 98 % | RESPIRATION RATE: 18 BRPM | DIASTOLIC BLOOD PRESSURE: 69 MMHG | WEIGHT: 210 LBS

## 2024-10-01 DIAGNOSIS — Z86.0102 PERSONAL HISTORY OF HYPERPLASTIC COLON POLYPS: ICD-10-CM

## 2024-10-01 DIAGNOSIS — K21.9 GASTRO-ESOPHAGEAL REFLUX DISEASE WITHOUT ESOPHAGITIS: ICD-10-CM

## 2024-10-01 DIAGNOSIS — Z09 ENCOUNTER FOR FOLLOW-UP EXAMINATION AFTER COMPLETED TREATMEN: ICD-10-CM

## 2024-10-01 DIAGNOSIS — Z15.09 GENETIC SUSCEPTIBILITY TO OTHER MALIGNANT NEOPLASM: ICD-10-CM

## 2024-10-01 PROCEDURE — 43235 EGD DIAGNOSTIC BRUSH WASH: CPT | Performed by: INTERNAL MEDICINE

## 2024-10-01 PROCEDURE — G0105 COLORECTAL SCRN; HI RISK IND: HCPCS | Performed by: INTERNAL MEDICINE

## 2024-10-01 PROCEDURE — 25010000002 PROPOFOL 10 MG/ML EMULSION: Performed by: NURSE ANESTHETIST, CERTIFIED REGISTERED

## 2024-10-01 PROCEDURE — 25810000003 SODIUM CHLORIDE 0.9 % SOLUTION: Performed by: NURSE ANESTHETIST, CERTIFIED REGISTERED

## 2024-10-01 PROCEDURE — 25010000002 PROPOFOL 1000 MG/100ML EMULSION: Performed by: NURSE ANESTHETIST, CERTIFIED REGISTERED

## 2024-10-01 PROCEDURE — 25010000002 LIDOCAINE PF 2% 2 % SOLUTION: Performed by: NURSE ANESTHETIST, CERTIFIED REGISTERED

## 2024-10-01 RX ORDER — PROPOFOL 10 MG/ML
INJECTION, EMULSION INTRAVENOUS AS NEEDED
Status: DISCONTINUED | OUTPATIENT
Start: 2024-10-01 | End: 2024-10-01 | Stop reason: SURG

## 2024-10-01 RX ORDER — SODIUM CHLORIDE 9 MG/ML
INJECTION, SOLUTION INTRAVENOUS CONTINUOUS PRN
Status: DISCONTINUED | OUTPATIENT
Start: 2024-10-01 | End: 2024-10-01 | Stop reason: SURG

## 2024-10-01 RX ORDER — LIDOCAINE HYDROCHLORIDE 20 MG/ML
INJECTION, SOLUTION EPIDURAL; INFILTRATION; INTRACAUDAL; PERINEURAL AS NEEDED
Status: DISCONTINUED | OUTPATIENT
Start: 2024-10-01 | End: 2024-10-01 | Stop reason: SURG

## 2024-10-01 RX ORDER — ONDANSETRON 2 MG/ML
4 INJECTION INTRAMUSCULAR; INTRAVENOUS ONCE AS NEEDED
Status: DISCONTINUED | OUTPATIENT
Start: 2024-10-01 | End: 2024-10-01 | Stop reason: HOSPADM

## 2024-10-01 RX ADMIN — PROPOFOL INJECTABLE EMULSION 50 MG: 10 INJECTION, EMULSION INTRAVENOUS at 11:35

## 2024-10-01 RX ADMIN — LIDOCAINE HYDROCHLORIDE 50 MG: 20 INJECTION, SOLUTION EPIDURAL; INFILTRATION; INTRACAUDAL; PERINEURAL at 11:41

## 2024-10-01 RX ADMIN — SODIUM CHLORIDE: 9 INJECTION, SOLUTION INTRAVENOUS at 11:29

## 2024-10-01 RX ADMIN — PROPOFOL INJECTABLE EMULSION 50 MG: 10 INJECTION, EMULSION INTRAVENOUS at 11:41

## 2024-10-01 RX ADMIN — PROPOFOL 150 MCG/KG/MIN: 10 INJECTION, EMULSION INTRAVENOUS at 11:41

## 2024-10-01 NOTE — H&P
GI PREOPERATIVE HISTORY AND PHYSICAL:    Referring Provider:    Floyd Silva MD    Chief complaint: GERD, personal history of colon polyps, Monahan syndrome, family history of colon cancer    Subjective .     History of present illness:      Tracie Gross is a 64 y.o. female who presents today for Procedure(s):  COLONOSCOPY  ESOPHAGOGASTRODUODENOSCOPY for the indications listed below.     GERD, personal history of colon polyps, Monahan syndrome, family history of colon cancer    The updated Patient Profile was reviewed prior to the procedure, in conjunction with the Physical Exam, including medical conditions, surgical procedures, medications, allergies, family history and social history.     Pre-operatively, I reviewed the indication(s) for the procedure, the risks of the procedure [including but not limited to: unexpected bleeding possibly requiring hospitalization and/or unplanned repeat procedures, perforation possibly requiring surgical treatment, missed lesions and complications of sedation/MAC (also explained by anesthesia staff)].     I have evaluated the patient for risks associated with the planned anesthesia and the procedure to be performed and find the patient an acceptable candidate for IV sedation.    Multiple opportunities were provided for any questions or concerns, and all questions were answered satisfactorily before any anesthesia was administered. We will proceed with the planned procedure.    Past Medical History:  Past Medical History:   Diagnosis Date    Allergic 01/01/1998    Ankle sprain     Anxiety     Arthritis     Arthritis of back 0ll1/01/2013    Arthritis of neck 01/01/2005    Asthma     Bipolar affective disorder     Breast cancer 1985    s/p R mastectomy    Bursitis of hip [id number]    Cervical disc disorder 01/01/2006    CTS (carpal tunnel syndrome)     Depression 09/01/2000    Fracture of wrist 01/01/1995    Fracture, foot     GERD (gastroesophageal reflux disease) 1993    H/O  breast reconstruction     SEVERAL    H/O laminectomy     Hamartoma     Hip arthrosis 2013    History of medical problems     Hx of bilateral oophorectomy     Hyperlipidemia     Hypertension     Hypothyroidism     Infectious viral hepatitis     Inflammatory bowel disease     Man. EGD/colonoscopy annually (2021)    Irritable bowel syndrome     Kienbock's disease, right     WRIST    Knee swelling 2007    Low back pain 1991    Lumbosacral disc disease 1995    Had 2 lumber surgeries    Lupus     [place]    Monahan syndrome     Man. EGD/colonoscopy annually (2021). MRI alternating w/ EUS annually for pancreatic screen    MRSA infection     Obesity     Osteopenia     Osteoporosis     maintained on Prolia through Karen    Peptic ulceration     Periarthritis of shoulder 2022    Pneumonia     Raynaud disease     Renal insufficiency     Rotator cuff syndrome [id number]    Scleroderma     Sleep apnea     cpap    Squamous cell cancer of lip     Tear of meniscus of knee     Tendinitis of knee     Thoracic disc disorder     Von Willebrand disease        Past Surgical History:  Past Surgical History:   Procedure Laterality Date    APPENDECTOMY      ARM DEBRIDEMENT Right     X 3    BACK SURGERY      Vetas- cervical fusion    BACK SURGERY      lumbar decomp    BREAST BIOPSY      BREAST LUMPECTOMY      BREAST RECONSTRUCTION Right     BREAST RECONSTRUCTION Right     CARDIAC CATHETERIZATION      no stents placed     SECTION  ,     CHOLECYSTECTOMY      COLONOSCOPY      COLONOSCOPY N/A 2020    Procedure: COLONOSCOPY;  Surgeon: Cayden Conway MD;  Location: [facility] ENDOSCOPY;  Service: Gastroenterology;  Laterality: N/A;  rectal ulcer    COLONOSCOPY N/A 2021    Procedure: COLONOSCOPY WITH POLYPECTOMY X 1;  Surgeon: Cayden Conway MD;  Location: [facility] ENDOSCOPY;  Service: Gastroenterology;  Laterality: N/A;  Post: COLON POLYP    COLONOSCOPY N/A 2023     Procedure: COLONOSCOPY with polypectomy x 1, endoscopic clipping x 1;  Surgeon: Cayden Conway MD;  Location: [facility] ENDOSCOPY;  Service: Gastroenterology;  Laterality: N/A;    COSMETIC SURGERY  [id number]    ENDOSCOPY      ENDOSCOPY N/A 08/14/2020    Procedure: ESOPHAGOGASTRODUODENOSCOPY;  Surgeon: Cayden Conway MD;  Location: [facility] ENDOSCOPY;  Service: Gastroenterology;  Laterality: N/A;  Normal EGD    ENDOSCOPY N/A 09/17/2021    Procedure: ESOPHAGOGASTRODUODENOSCOPY;  Surgeon: Cayden Conway MD;  Location: [facility] ENDOSCOPY;  Service: Gastroenterology;  Laterality: N/A;  Post: normal egd    EXPLORATORY LAPAROTOMY      scar tissue removal    EYE SURGERY  2000    FINGER SURGERY Right     ring finger mass excision    HAND SURGERY  Rt wrist  10/15    HIP SURGERY  1/12    HYSTERECTOMY      PARTIAL    JOINT REPLACEMENT Right 2012,2015    hip and knee    KNEE ARTHROSCOPY Left 01/07/2020    Procedure: LEFT KNEE SCOPE with partial lateral meniscectomy;  Surgeon: Chau Perez MD;  Location: [facility] MAIN OR;  Service: Orthopedics    KNEE SURGERY  Rtf knee  2015    LASIK      LASIK/ LASIK ENHANCEMENT    MASTECTOMY RADICAL Right     NECK SURGERY  01/01/06    OOPHORECTOMY Bilateral     SHOULDER SURGERY  11/01/2023    SPINE SURGERY      SPLENECTOMY      SUBTOTAL HYSTERECTOMY      TOTAL SHOULDER ARTHROPLASTY W/ DISTAL CLAVICLE EXCISION Right 11/01/2023    Procedure: TOTAL SHOULDER REVERSE ARTHROPLASTY;  Surgeon: Floyd Ruiz MD;  Location: [facility] MAIN OR;  Service: Orthopedics;  Laterality: Right;    TRIGGER POINT INJECTION  7/23    TUBAL ABDOMINAL LIGATION  1981    UPPER ENDOSCOPIC ULTRASOUND W/ FNA N/A 12/09/2021    Procedure: ENDOSCOPIC ULTRASOUND WITH ESOPHAGOGASTRODUODENOSCOPY;  Surgeon: Luis E Negron MD;  Location: [facility] ENDOSCOPY;  Service: Gastroenterology;  Laterality: N/A;  Post: GASTROPARESIS, DILATED COMMON BILE DUCT, FUNDIC POLYP, DUODENITIS, NORMAL PANCREAS, HIATAL HERNIA     WRIST SURGERY Right     distal radius head removal (bone dead)  plates and screws decomp lunate       Social History:  Social History     Tobacco Use    Smoking status: Former     Current packs/day: 0.00     Average packs/day: 0.3 packs/day for 10.0 years (2.5 ttl pk-yrs)     Types: Cigarettes     Start date: 1984     Quit date: 1994     Years since quittin.7     Passive exposure: Past    Smokeless tobacco: Never    Tobacco comments:     Off and on for  those years   Vaping Use    Vaping status: Never Used   Substance Use Topics    Alcohol use: Not Currently     Comment: Rarely    Drug use: No       Family History:  Family History   Problem Relation Age of Onset    Colon cancer Mother     Heart disease Mother     Cancer Mother         Colon    Arthritis Mother     Kidney disease Father     Heart disease Father     Depression Father         Bladder cancer    Hyperlipidemia Father     Vision loss Father     Hypertension Father     Colon cancer Sister     Diabetes Sister     Heart disease Sister     Von Willebrand disease Sister     Von Willebrand disease Brother     Cancer Brother     Von Willebrand disease Son     Breast cancer Son     Diabetes Paternal Grandmother     Stroke Paternal Grandmother     Colon cancer Other     Colon cancer Son     Von Willebrand disease Son     Breast cancer Son     Cancer Sister         Melanoma, colon, bladder    Vision loss Sister     Stroke Sister     Arthritis Sister     Asthma Sister     Diabetes Sister     Heart disease Sister     Hyperlipidemia Sister     Kidney disease Sister     Cancer Paternal Aunt     Cancer Maternal Aunt     Cancer Maternal Aunt     Hyperlipidemia Sister     Thyroid disease Sister     Arthritis Sister     Hypertension Sister     Kidney disease Sister     Broken bones Sister     Rheumatologic disease Sister     Thyroid disease Sister     Cancer Sister     Clotting disorder Sister        Medications:  No medications prior to admission.  "      Scheduled Meds:  Continuous Infusions:No current facility-administered medications for this encounter.    PRN Meds:.    ALLERGIES:  Aspirin, Penicillins, Baclofen, and Tape  [adhesive tape]    ROS:  The following systems were reviewed and negative;   Constitution:  No fevers, chills, no unintentional weight loss  Skin: no rash, no jaundice  Eyes:  No blurry vision, no eye pain  HENT:  No change in hearing or smell  Resp:  No dyspnea or cough  CV:  No chest pain or palpitations  :  No dysuria, hematuria  Musculoskeletal:  No leg cramps or arthralgias  Neuro:  No tremor, no numbness  Psych:  No depression or confsuion    Objective     Vital Signs:   Vitals:    06/21/24 1108 09/23/24 0943   Weight: 95.3 kg (210 lb) 95.3 kg (210 lb)   Height: 160 cm (63\") 160 cm (63\")       Physical Exam:       General Appearance:    Awake and alert, in no acute distress   Head:    Normocephalic, without obvious abnormality, atraumatic   Throat:   No oral lesions, no thrush, oral mucosa moist   Lungs  Cardiac:  Abdomen:  Extremities:     Respirations regular, even and unlabored    Regular rate and rhythm, no murmur, gallop, rub    Non-distended, good bowel sounds, non tender, no masses     No edema, pulses 2+   Skin:   No rash, no jaundice       Results Review:  Lab Results (last 24 hours)       ** No results found for the last 24 hours. **            Imaging Results (Last 24 Hours)       ** No results found for the last 24 hours. **             I reviewed the patient's labs and imaging.    ASSESSMENT AND PLAN:  GERD, personal history of colon polyps, Monahan syndrome, family history of colon cancer    Active Problems:    * No active hospital problems. *       Procedure(s):  COLONOSCOPY  ESOPHAGOGASTRODUODENOSCOPY      I discussed the patients findings and my recommendations with the patient.    Cayden Conway MD  10/01/24  07:03 EDT    "

## 2024-10-01 NOTE — ANESTHESIA POSTPROCEDURE EVALUATION
Patient: Tracie Gross    Procedure Summary       Date: 10/01/24 Room / Location: [facility] ENDOSCOPY 1 [facility]    Anesthesia Start: 1129 Anesthesia Stop: 1209    Procedures:       COLONOSCOPY      ESOPHAGOGASTRODUODENOSCOPY Diagnosis:       Personal history of colonic polyps      Von Willebrand disease      (Personal history of colonic polyps [Z86.010])      (Von Willebrand disease [D68.00])    Surgeons: Cayden Conway MD Provider: Lina Jesus MD    Anesthesia Type: general ASA Status: 3            Anesthesia Type: general    Vitals  Vitals Value Taken Time   /63 10/01/24 1230   Temp     Pulse 65 10/01/24 1231   Resp     SpO2 97 % 10/01/24 1231   Vitals shown include unfiled device data.        Post Anesthesia Care and Evaluation    Patient location during evaluation: PACU  Patient participation: complete - patient participated  Level of consciousness: awake and alert  Pain management: satisfactory to patient    Airway patency: patent  Anesthetic complications: No anesthetic complications  PONV Status: none  Cardiovascular status: acceptable  Respiratory status: acceptable  Hydration status: acceptable

## 2024-10-01 NOTE — DISCHARGE INSTRUCTIONS
A responsible adult should stay with you and you should rest quietly for the rest of the day.    Do not drink alcohol, drive, operate any heavy machinery or power tools or make any legal/important decisions for the next 24 hours.     Progress your diet as tolerated.  If you begin to experience severe pain, increased shortness of breath, racing heartbeat or a fever above 101 F, seek immediate medical attention.     Follow up with MD as instructed. Call office for results in 3 to 5 days if needed. [phone]    Findings:   Terminal ileum: Normal  Colon: Normal     Impression:  1.  Normal EGD  2.  Normal colonoscopy     Recommendations:  1.  Regular high-fiber diet  2.  Repeat colonoscopy in 2 years  3.  Follow-up in my office in 1 year to reassess reflux symptoms and irregular bowel habits

## 2024-10-01 NOTE — OP NOTE
COLONOSCOPY, ESOPHAGOGASTRODUODENOSCOPY Procedure Report    Patient Name:  Tracie Gross  YOB: 1960    Date of Surgery:  10/1/2024     Pre-Op Diagnosis:  1.  Gastroesophageal reflux disease  2.  Monahan syndrome  3.  Personal history of colon polyps  4.  Family history of colon cancer       Post-Op Diagnosis:  1.  Normal EGD  2.  Normal colonoscopy    Procedure/CPT® Codes:        Staff:  Surgeon(s):  Cayden Conway MD         Anesthesia: Monitored Anesthesia Care    Implants:    Nothing was implanted during the procedure    Specimen:        See Below    Complications:  None    Description of Procedure:  Informed consent was obtained for the procedure, including sedation.  Risks of perforation, hemorrhage, adverse drug reaction and aspiration were discussed.  The patient was brought into the endoscopy suite. Continuous cardiopulmonary monitoring was performed. The patient was placed in the left lateral decubitus position.  The bite block was inserted into the patient's mouth. After adequate sedation was attained, the Olympus gastroscope was inserted into the patient's mouth and advanced to the second portion of the duodenum without difficulty.  Circumferential examination was performed. A retroflex exam was performed in the patient's stomach.  On completion of the exam, the bowel was decompressed, the scope was removed from the patient, the patient tolerated the procedure well, there were no immediate post-operative complications.  There was no blood loss.      Examination of the esophagus: Normal  Examination of the stomach: Normal  Examination of the duodenum: Normal    Subsequently,  the Olympus colonoscope was inserted into the patient's rectum and advanced to the level of the cecum and terminal ileum without difficulty.  The bowel prep was excellent.  Circumferential examination of the patient's colon was performed on scope withdrawal.  The cecum, ascending colon, and hepatic flexure were  examined twice.  The transverse colon, splenic flexure, descending colon, and rectum were examined.  A retroflex exam was performed in the rectum.  The bowel was decompressed, the scope was withdrawn from the patient, and the patient tolerated the procedure well. There were no immediate post-operative complications.  There was no blood loss.     Findings:   Terminal ileum: Normal  Colon: Normal    Impression:  1.  Normal EGD  2.  Normal colonoscopy    Recommendations:  1.  Regular high-fiber diet  2.  Repeat colonoscopy in 2 years  3.  Follow-up in my office in 1 year to reassess reflux symptoms and irregular bowel habits      Cayden Conway MD     Date: 10/1/2024  Time: 12:31 EDT

## 2024-10-01 NOTE — ANESTHESIA PREPROCEDURE EVALUATION
Anesthesia Evaluation     Patient summary reviewed and Nursing notes reviewed   no history of anesthetic complications:   NPO Solid Status: > 8 hours  NPO Liquid Status: > 8 hours           Airway   Mallampati: II  TM distance: >3 FB  Neck ROM: full  No difficulty expected  Dental - normal exam     Pulmonary - normal exam   (+) a smoker Former, asthma,sleep apnea  Cardiovascular - normal exam    ECG reviewed    (+) hypertension, valvular problems/murmurs, hyperlipidemia      Neuro/Psych  (+) numbness, psychiatric history Depression and Bipolar  GI/Hepatic/Renal/Endo    (+) obesity, GERD, PUD, renal disease- CRI, thyroid problem hypothyroidism    Musculoskeletal     (+) back pain, chronic pain, myalgias, radiculopathy  Abdominal  - normal exam   Substance History      OB/GYN          Other   arthritis, autoimmune disease lupus and scleroderma, blood dyscrasia (vWD - denies bleeding complications),   history of cancer    ROS/Med Hx Other: Breast cancer, burdick syndrome, allergies, UC, fibromyalgia, von Willebrand dz, Raynaud's chest pain, neuralgia, osteoporosis    Echo  Normal LV size and contractility EF of 60 to 65%  Normal RV size  Normal atrial size  Aortic valve, mitral valve, tricuspid valve appears structurally normal, moderate mitral regurgitation seen.  No pericardial effusion seen.  Proximal aorta appears normal in size.    Stress  ? Findings consistent with a normal ECG stress test.  ? Diaphragmatic attenuation and GI artifacts are present.  ? Left ventricular ejection fraction is normal (Calculated EF = 59%).  ? Impressions are consistent with a low risk study.  ? Severe large inferoapical reverse redistribution gut uptake artifact noted no ischemia EF 59%.     EKG without changes during Lexiscan.  No chest pain      Correlation with myocardial perfusion images recommended          PS  HYSTERECTOMY MASTECTOMY RADICAL  CHOLECYSTECTOMY COLONOSCOPY  LASIK BREAST RECONSTRUCTION  BREAST  RECONSTRUCTION OOPHORECTOMY  COSMETIC SURGERY  SECTION  EYE SURGERY TUBAL ABDOMINAL LIGATION  BREAST BIOPSY BREAST LUMPECTOMY  JOINT REPLACEMENT WRIST SURGERY  ENDOSCOPY FINGER SURGERY  ARM DEBRIDEMENT SPLENECTOMY  BACK SURGERY BACK SURGERY  EXPLORATORY LAPAROTOMY CARDIAC CATHETERIZATION  KNEE ARTHROSCOPY ENDOSCOPY  COLONOSCOPY COLONOSCOPY  ENDOSCOPY UPPER ENDOSCOPIC ULTRASOUND W/ FNA                Anesthesia Plan    ASA 3     general   total IV anesthesia  (Patient identified; pre-operative vital signs, all relevant labs/studies, complete medical/surgical/anesthetic history, full medication list, full allergy list, and NPO status obtained/reviewed; physical assessment performed; anesthetic options, side effects, potential complications, risks, and benefits discussed; questions answered; written anesthesia consent obtained; patient cleared for procedure; anesthesia machine and equipment checked and functioning)  intravenous induction     Anesthetic plan, risks, benefits, and alternatives have been provided, discussed and informed consent has been obtained with: patient.    Plan discussed with CRNA.      CODE STATUS:

## 2024-10-02 ENCOUNTER — OFFICE VISIT (OUTPATIENT)
Dept: FAMILY MEDICINE CLINIC | Facility: CLINIC | Age: 64
End: 2024-10-02
Payer: MEDICARE

## 2024-10-02 ENCOUNTER — LAB (OUTPATIENT)
Dept: FAMILY MEDICINE CLINIC | Facility: CLINIC | Age: 64
End: 2024-10-02
Payer: MEDICARE

## 2024-10-02 VITALS
SYSTOLIC BLOOD PRESSURE: 121 MMHG | OXYGEN SATURATION: 94 % | RESPIRATION RATE: 16 BRPM | DIASTOLIC BLOOD PRESSURE: 82 MMHG | BODY MASS INDEX: 38.89 KG/M2 | HEIGHT: 64 IN | WEIGHT: 227.8 LBS | HEART RATE: 86 BPM

## 2024-10-02 DIAGNOSIS — M79.671 RIGHT FOOT PAIN: ICD-10-CM

## 2024-10-02 DIAGNOSIS — M70.61 GREATER TROCHANTERIC BURSITIS OF RIGHT HIP: Primary | ICD-10-CM

## 2024-10-02 DIAGNOSIS — N17.9 AKI (ACUTE KIDNEY INJURY): ICD-10-CM

## 2024-10-02 DIAGNOSIS — M32.9 SLE (SYSTEMIC LUPUS ERYTHEMATOSUS RELATED SYNDROME): ICD-10-CM

## 2024-10-02 DIAGNOSIS — R30.0 DYSURIA: ICD-10-CM

## 2024-10-02 DIAGNOSIS — G25.0 BENIGN ESSENTIAL TREMOR: ICD-10-CM

## 2024-10-02 DIAGNOSIS — M34.9 SCLERODERMA: ICD-10-CM

## 2024-10-02 LAB
BILIRUB UR QL STRIP: NEGATIVE
CLARITY UR: CLEAR
COLOR UR: YELLOW
GLUCOSE UR STRIP-MCNC: NEGATIVE MG/DL
HGB UR QL STRIP.AUTO: NEGATIVE
HOLD SPECIMEN: NORMAL
KETONES UR QL STRIP: NEGATIVE
LEUKOCYTE ESTERASE UR QL STRIP.AUTO: ABNORMAL
NITRITE UR QL STRIP: NEGATIVE
PH UR STRIP.AUTO: 6.5 [PH] (ref 5–8)
PROT UR QL STRIP: NEGATIVE
SP GR UR STRIP: 1.01 (ref 1–1.03)
UROBILINOGEN UR QL STRIP: ABNORMAL

## 2024-10-02 PROCEDURE — 3079F DIAST BP 80-89 MM HG: CPT | Performed by: FAMILY MEDICINE

## 2024-10-02 PROCEDURE — 99215 OFFICE O/P EST HI 40 MIN: CPT | Performed by: FAMILY MEDICINE

## 2024-10-02 PROCEDURE — 87086 URINE CULTURE/COLONY COUNT: CPT | Performed by: FAMILY MEDICINE

## 2024-10-02 PROCEDURE — 20610 DRAIN/INJ JOINT/BURSA W/O US: CPT | Performed by: FAMILY MEDICINE

## 2024-10-02 PROCEDURE — 87186 SC STD MICRODIL/AGAR DIL: CPT | Performed by: FAMILY MEDICINE

## 2024-10-02 PROCEDURE — 3074F SYST BP LT 130 MM HG: CPT | Performed by: FAMILY MEDICINE

## 2024-10-02 PROCEDURE — 1159F MED LIST DOCD IN RCRD: CPT | Performed by: FAMILY MEDICINE

## 2024-10-02 PROCEDURE — 1160F RVW MEDS BY RX/DR IN RCRD: CPT | Performed by: FAMILY MEDICINE

## 2024-10-02 PROCEDURE — G0008 ADMIN INFLUENZA VIRUS VAC: HCPCS | Performed by: FAMILY MEDICINE

## 2024-10-02 PROCEDURE — 90662 IIV NO PRSV INCREASED AG IM: CPT | Performed by: FAMILY MEDICINE

## 2024-10-02 PROCEDURE — 81001 URINALYSIS AUTO W/SCOPE: CPT | Performed by: FAMILY MEDICINE

## 2024-10-02 PROCEDURE — 1126F AMNT PAIN NOTED NONE PRSNT: CPT | Performed by: FAMILY MEDICINE

## 2024-10-02 PROCEDURE — 87088 URINE BACTERIA CULTURE: CPT | Performed by: FAMILY MEDICINE

## 2024-10-02 RX ORDER — PREDNISONE 5 MG/1
5 TABLET ORAL DAILY
Qty: 30 TABLET | Refills: 0 | Status: SHIPPED | OUTPATIENT
Start: 2024-10-02

## 2024-10-02 RX ORDER — LIDOCAINE HYDROCHLORIDE 10 MG/ML
2 INJECTION, SOLUTION INFILTRATION; PERINEURAL
Status: COMPLETED | OUTPATIENT
Start: 2024-10-02 | End: 2024-10-02

## 2024-10-02 RX ORDER — BUPIVACAINE HYDROCHLORIDE 5 MG/ML
2 INJECTION, SOLUTION PERINEURAL
Status: COMPLETED | OUTPATIENT
Start: 2024-10-02 | End: 2024-10-02

## 2024-10-02 RX ORDER — TRIAMCINOLONE ACETONIDE 40 MG/ML
40 INJECTION, SUSPENSION INTRA-ARTICULAR; INTRAMUSCULAR
Status: COMPLETED | OUTPATIENT
Start: 2024-10-02 | End: 2024-10-02

## 2024-10-02 RX ADMIN — LIDOCAINE HYDROCHLORIDE 2 ML: 10 INJECTION, SOLUTION INFILTRATION; PERINEURAL at 16:43

## 2024-10-02 RX ADMIN — TRIAMCINOLONE ACETONIDE 40 MG: 40 INJECTION, SUSPENSION INTRA-ARTICULAR; INTRAMUSCULAR at 16:43

## 2024-10-02 RX ADMIN — BUPIVACAINE HYDROCHLORIDE 2 ML: 5 INJECTION, SOLUTION PERINEURAL at 16:43

## 2024-10-02 NOTE — PROGRESS NOTES
Procedure   Arthrocentesis    Date/Time: 10/2/2024 4:43 PM    Performed by: Floyd Silva MD  Authorized by: Floyd Silva MD  Indications: pain   Body area: hip  Joint: right hip  Local anesthesia used: no    Anesthesia:  Local anesthesia used: no    Sedation:  Patient sedated: no    Preparation: Patient was prepped and draped in the usual sterile fashion.  Needle size: 22 G  Ultrasound guidance: no  Approach: lateral  Meds administered: 2 mL lidocaine 1 %; 2 mL bupivacaine 0.5 %; 40 mg triamcinolone acetonide 40 MG/ML  Patient tolerance: patient tolerated the procedure well with no immediate complications

## 2024-10-02 NOTE — PROGRESS NOTES
Chief Complaint   Patient presents with    Hip Pain     Right    Foot Pain     Ball of foot and Big toe    Gait Problem    Spasms     Neck and hands     HPI  Tracie Gross is a 64 y.o. female that presents for   Chief Complaint   Patient presents with    Hip Pain     Right    Foot Pain     Ball of foot and Big toe    Gait Problem    Spasms     Neck and hands     R hip pain: patient reports pain in the R lateral hip for the last 2 weeks. Exacerbated by movement.     R toe/foot pain: patient reports stiffness and pain involving the great toe, specifically at the 1st MTP. Recently evaluated by rheumatology and podiatry recommended. She has an appt w/ Dr Oneill upcoming    Spasms: patient reports worsening spasms in hands and arms up to her neck. Reports this is not painful but arms jerk. This is improved by hold the arm. Exacerbated by motor movements. Not interested in medicine.     Review of Systems  Pertinent positives of ROS documented in HPI    The following portions of the patient's history were reviewed and updated as appropriate: problem list, past medical history, past surgical history, allergies, current medication    Problem List Tab  Patient History Tab  Immunizations Tab  Medications Tab  Chart Review Tab  Care Everywhere Tab  Synopsis Tab    PE  Vitals:    10/02/24 1531   BP: 121/82   Pulse: 86   Resp: 16   SpO2: 94%     Body mass index is 39.1 kg/m².  General: Obese, NAD  Head: AT/NC  Eyes: EOMI, anicteric sclera  Resp: CTAB, SCR, BS equal  CV: RRR w/o m/r/g; 2+ pulses  GI: Soft, NT/ND, +BS  MSK: FROM, no deformity, no edema  Skin: Warm, dry, intact  Neuro: Alert and oriented. No focal deficits  Psych: Appropriate mood and affect    Imaging  MRI Breast Bilateral Diagnostic W WO Contrast    Result Date: 10/2/2024  No MR signs of malignancy in either breast. Recommend continued annual screening mammography. The patient has a greater than 20% lifetime risk of breast cancer, also recommend annual screening  breast MRI.    ASSESSMENT: BI-RADS 2. Benign findings.      Electronically Signed By-Oliverioenedina Betancourt DO On:10/2/2024 12:09 PM      XR Ankle 3+ View Left    Result Date: 7/15/2024  Impression: 1.No acute osseous process identified. 2.Degenerative changes as described above. Electronically Signed: Adrian Mckay MD  7/15/2024 10:41 AM EDT  Workstation ID: [id number]    XR Foot 3+ View Left    Result Date: 7/15/2024  Impression: 1.No acute osseous process identified. 2.Degenerative changes as described above. Electronically Signed: Adrian Mckay MD  7/15/2024 10:41 AM EDT  Workstation ID: [id number]    MRI Lumbar Spine Without Contrast    Result Date: 7/6/2024  Impression: Diffuse heterogeneous marrow signal likely reflecting demineralization. Otherwise essentially normal noncontrast MRI of the lumbar spine. Some mild spondylosis changes are present without associated high-grade spinal canal or neuroforaminal impingement. Electronically Signed: Dougie Grullon MD  7/6/2024 6:53 AM EDT  Workstation ID: [id number]    Assessment & Plan   Tracie Gross is a 64 y.o. female that presents for   Chief Complaint   Patient presents with    Hip Pain     Right    Foot Pain     Ball of foot and Big toe    Gait Problem    Spasms     Neck and hands     Diagnoses and all orders for this visit:    1. Greater trochanteric bursitis of right hip (Primary)  -     Arthrocentesis    2. Right foot pain   - Cont supportive shoes w/ inserts   - Plan for podiatry evaluation    3. Dysuria  -     Urinalysis With Culture If Indicated - Urine, Clean Catch  -     Burson Urine Culture Tube - Urine, Clean Catch    4. Benign essential tremor: new diagnosis   - Monitor, Defer medication at this time    5. SLE (systemic lupus erythematosus related syndrome)  6. Scleroderma: patient w/ diffuse joint pain that limits her activities and everyday quality of life. Treatment options are poor. We discussed that prednisone is not ideal treatment chronically.  However, her current health is poor and quality of life limited. She discussed how good she feels on prednisone. We discussed that 40mg daily dosing chronically is not an option. However, if 5mg is enough to provide benefit, then this might could be considered. She is a poor candidate w/ hx of AVN in R wrist and both hips but both hips have been replaced. Will trial this 5mg dose and see if it even helps her quality of life. Would obtain rheumatology opinion before continuing long term  -     Start predniSONE (DELTASONE) 5 MG tablet; Take 1 tablet by mouth Daily.  Dispense: 30 tablet; Refill: 0  - Cont home hydroxychlorquine 200 BID  - Appreciate rheumatology opinion    7. DANIELLE (acute kidney injury): noted on outside labs w/ Cr 1.67. Etiology unclear. No medication really standout as culprit. Consider scleroderma. Plan to repeat BMP in 1 month w/ low threshold to refer to renal   -     Basic Metabolic Panel  - Cont home lisinopril 5mg daily for now    Other orders  -     Fluzone High-Dose 65+yrs ([id number])       Return in about 3 months (around 1/2/2025) for Recheck- 30min.  48 minutes spent on the day of service face-to-face with the patient obtaining history, performing physical, reviewing chart/data, placing orders, and documenting.  Additional time spent in documentation outside the day of service.

## 2024-10-03 LAB
BACTERIA UR QL AUTO: ABNORMAL /HPF
HYALINE CASTS UR QL AUTO: ABNORMAL /LPF
RBC # UR STRIP: ABNORMAL /HPF
REF LAB TEST METHOD: ABNORMAL
SQUAMOUS #/AREA URNS HPF: ABNORMAL /HPF
WBC # UR STRIP: ABNORMAL /HPF

## 2024-10-03 RX ORDER — CEPHALEXIN 500 MG/1
500 CAPSULE ORAL 3 TIMES DAILY
Qty: 21 CAPSULE | Refills: 0 | Status: SHIPPED | OUTPATIENT
Start: 2024-10-03 | End: 2024-10-10

## 2024-10-05 LAB — BACTERIA SPEC AEROBE CULT: ABNORMAL

## 2024-10-07 RX ORDER — BISOPROLOL FUMARATE AND HYDROCHLOROTHIAZIDE 10; 6.25 MG/1; MG/1
1 TABLET ORAL DAILY
Qty: 90 TABLET | Refills: 1 | Status: SHIPPED | OUTPATIENT
Start: 2024-10-07

## 2024-10-07 RX ORDER — LEVOTHYROXINE SODIUM 175 UG/1
175 TABLET ORAL DAILY
Qty: 90 TABLET | Refills: 1 | Status: SHIPPED | OUTPATIENT
Start: 2024-10-07

## 2024-10-08 ENCOUNTER — TREATMENT (OUTPATIENT)
Dept: PHYSICAL THERAPY | Facility: CLINIC | Age: 64
End: 2024-10-08
Payer: MEDICARE

## 2024-10-08 DIAGNOSIS — R26.89 BALANCE PROBLEM: ICD-10-CM

## 2024-10-08 DIAGNOSIS — M43.6 NECK STIFFNESS: ICD-10-CM

## 2024-10-08 DIAGNOSIS — R42 VERTIGO: Primary | ICD-10-CM

## 2024-10-08 DIAGNOSIS — H81.13 BPPV (BENIGN PAROXYSMAL POSITIONAL VERTIGO), BILATERAL: ICD-10-CM

## 2024-10-08 DIAGNOSIS — H83.2X3 VESTIBULAR HYPOFUNCTION OF BOTH EARS: ICD-10-CM

## 2024-10-08 NOTE — PROGRESS NOTES
"Physical Therapy Re-Evaluation/Re-Assessment  [address] [facility] [place] [address]      Patient: Tracie Gross   : 1960  Diagnosis/ICD-10 Code:  Vertigo [R42]  Referring practitioner: Floyd Silva MD  Date of Initial Visit: Type: THERAPY  Noted: 2024  Today's Date: 10/8/2024  Patient seen for 3 sessions           Visit Diagnoses:     ICD-10-CM ICD-9-CM   1. Vertigo  R42 780.4   2. BPPV (benign paroxysmal positional vertigo), bilateral  H81.13 386.11   3. Vestibular hypofunction of both ears  H83.2X3 386.50   4. Neck stiffness  M43.6 723.5   5. Balance problem  R26.89 781.99     Subjective Questionnaire: DHI: 50 = 50% limited   Improving: Yes, overall  Compliance with HEP: added today  Treatments: theracts, canalith repositioning      Subjective Traice Gross reports: dizziness was 90% gone after last visit, but then she had a hard fall on a concrete floor. Pt fell because she tripped over the stair lift railing while not using her cane and hit her head on the banister then tried to catch herself on the TV which is on [employer]. Pt then fell with her L cheek being hit while going down onto her L elbow. Pt notes no issues with the L elbow at this time.     Pt states she had a knot on the L temporal/occipital regions. Pt denies LOC. Pt states she didn't go to the ER or UC as she states they always tell her to follow up with her PCP which takes about 3 months to get into. Pt states she stayed up for awhile to make sure she was ok.     Pt reports \"fuzziness\" with sitting in the clinic.   Pt is not oriented to date (thinks it's the ), but is oriented to place & person.     Dizziness: 4 current, 1-2 at best, 5-6 on average, 8 at worst  Pain/headache: 4 current, 6 at worst, 2 at best    Objective     Active Range of Motion   Cervical/Thoracic Spine   Cervical     Flexion: 40 degrees increased dizziness with return to neutral  Extension: 35 degrees   Left lateral flexion: 20 " degrees slight dizziness  Right lateral flexion: 13 degrees slight dizziness  Left rotation: 45 degrees   Right rotation: 53 degrees      Additional Active Range of Motion Details  Stiffness throughout with neck motions     Ambulation      Comments   VESTIBULAR:  Vertigo / hypofunction     VOMS:      Baseline symptoms - 4           Smooth pursuit - 4-5 some difficulty tracking and moving ahead of target           Saccades vertical - 6           Saccades horizontal - 5              Convergence (near point) - Abnormal ~25 cm (normal is within 10 cm from nose for diploplia)              VOR - horizontal - 6           VOR - vertical - 6              Visual Motion Sensitivity Test : deferred - pt notes she gets car sick and has for a long time     Jahaira SOP:     Condition 1 -  Romberg open stance/ eyes open - F     Condition 2 - Romberg open open stance/ eyes closed - F     Condition 3 - Tandem Romberg with eyes open - F     Condition 4 - Tandem Romberg with eyes closed - F     Condition 5 - Standing on foam/ eyes open - F     Condition 6 - Standing on foam/ eyes closed - F     Condition 7  - Stepping Fukuda (march in place) eyes closed - F     Vertebral AA test: (-)  Alar/transvers lig tests (-)     SL Hallpike test: (+) R/(+) L      Horizontal Roll test: (-) B     Palpation: hypertonus B UT/lev scap/SCM/scalenes     Posture: head fwd/rounded shoulders     Gait: I with cane; reduced step length with L LE not passing R; reduced gt speed with trunk flex bias     Transfers/bed mobility: I sit to/from stand with 2 attempts and B UE A with wide MARGARET; min/mod difficulty getting legs onto table bridging and rolling; min/mod difficulty with S/L R to/from S/L L     Flexibility: tight UT/lev scap/SCM/scalenes     See Exercise, Manual, Vestibular and Modality Logs for complete treatment.     Patient Education: cues for theracts with instr in positioning, what to avoid for 24 hrs and not to begin HEP until after 24 hrs; see there ex  flowsheet for particulars    Assessment/Plan  Assessment  Impairments: abnormal gait, abnormal or restricted ROM, activity intolerance, impaired balance, impaired physical strength, lacks appropriate home exercise program and safety issue; Other impairment: dizziness    Pt with noted BPPV post canal L worse than R. Horiz Canal test were (-) today. Pt with some reduced dizziness after CRM. Added VOR, saccades, and gaze stabilization for home as tolerated.     Assessment details: The patient is a 63 y.o. female who returns to physical therapy after almost a month. Pt had ~90% relief after the last session, but fell hitting her head 4 days ago and c/o return of dizziness. Pt is being seen for vertigo with additional diagnoses of B BPPV and vestibular hypofunction B. Pt also appears with neck stiffness, gait abnormality, and balance problem. Upon initial evaluation, the patient demonstrates the above impairments.     Due to these impairments, the patient is unable to/limited with: looking up, shopping, reading, getting in/out of bed, house work, rolling in bed, walking in/out doors, balance with standing, rising, bending down and coming back up after bending, driving, eye/neck movements, quick movements, and avoids stairs if dizzy. The patient would benefit from skilled PT services to address functional limitations and impairments and to improve patient quality of life.       Pt has partially met 1 goal, is progressing with 1 goal and has not met 6 goals. Prior Goals/POC continue to be appropriate for this pt. Recommend continued skilled PT with POC/interventions as noted below until goal attainment, I HEP and return to PLOF.    Barriers to therapy: fall hx/recent fall, OP, scleroderma, cerv/thor/LB issues with fusion cerv/lumb, IBS, GERD, L shld pain/L knee pain, clotting disorder, depression, hx infectious viral hepatitis, renal insufficiency, hx squamous cell cancer lip could affect PT Rx/progress/outcomes if  exacerbated/unregulated or if cancer were to recur which could affect tolerance to PT/exs    Prognosis: good    Goals  Plan Goals: STG's: 4 weeks   Patient will report >75% reduction in symptoms Partially Met 10/8 prior to recent fall   Patient will be able to perform HEP with minimal verbal cues Not Met 10/8     LTG's: By discharge  Patient will have 0-15 dizziness with re-assessment using the Dizziness Handicap Inventory Progressing 10/8  Patient will report >80% reduction in headaches Not Met 10/8  Patient will demonstrate stable gait with horizontal and vertical head turns and be able to perform head/eye movements with min to no dizziness Not Met 10/8  Pt will be able to groom dogs without dizziness or LOB Not Met 10/8  Patient will report no new onset of falls Not Met 10/8  Patient will be independent with final HEP Not Met 10/8       Plan  Therapy options: will be seen for skilled therapy services    Planned therapy interventions: manual therapy, gait training, neuromuscular re-education, postural training, balance/weight-bearing training, ADL retraining, strengthening, transfer training, therapeutic activities, home exercise program, motor coordination training, soft tissue mobilization, stretching, functional ROM exercises and flexibility    Other planned therapy interventions: canlith repositioning, reiki/massage    Frequency: 2x week  Duration in weeks: 13  Treatment plan discussed with: patient         History # of Personal Factors and/or Comorbidities: HIGH (3+)  Examination of Body System(s): # of elements: HIGH (4+)  Clinical Presentation: HIGH acute, affecting balance, multi comorbidity, recent fall hitting head  Clinical Decision Making: MODERATE           Timed:         Manual Therapy:         mins  35970;     Therapeutic Exercise:         mins  61073;     Neuromuscular Walter:        mins  28433;    Therapeutic Activity:    10      mins  06237;     Gait Training:           mins  48926;      Ultrasound:          mins  57938;    Ionto                                   mins   58011  Self Care                            mins   54647    Un-Timed:  Electrical Stimulation:         mins  61763 ( );  Traction          mins 55186  Canalith Repos             6      mins  16483  Dry Needle 1-2 ms      ___  mins 52626  Dry Needle  3+ ms              mins 70233  Low Eval          mins  51138  Mod Eval          Mins  67105  High Eval                            Mins  15525  Re-Eval                         23      mins  01013    Timed Treatment:  10    mins   Total Treatment:     39   mins          PT SIGNATURE: Megan Singleton, PT             [place] PT Lic# [id number]  DATE TREATMENT INITIATED: 10/8/2024     Medicare Initial Certification                       Certification Period: 10/8/2024 through 1/5/2025  I certify that the therapy services are furnished while this patient is under my care.  The services outlined above are required by this patient, and will be reviewed every 90 days.                      Physician Signature: _________________________  PHYSICIAN: Floyd Silva MD          NPI: [id number]                                                                               DATE: _____________________________________     Please sign and return via fax to [phone]. Thank you, [facility].

## 2024-10-15 ENCOUNTER — TREATMENT (OUTPATIENT)
Dept: PHYSICAL THERAPY | Facility: CLINIC | Age: 64
End: 2024-10-15
Payer: MEDICARE

## 2024-10-15 DIAGNOSIS — H83.2X3 VESTIBULAR HYPOFUNCTION OF BOTH EARS: ICD-10-CM

## 2024-10-15 DIAGNOSIS — M43.6 NECK STIFFNESS: ICD-10-CM

## 2024-10-15 DIAGNOSIS — H81.13 BPPV (BENIGN PAROXYSMAL POSITIONAL VERTIGO), BILATERAL: ICD-10-CM

## 2024-10-15 DIAGNOSIS — R26.89 BALANCE PROBLEM: ICD-10-CM

## 2024-10-15 DIAGNOSIS — R42 VERTIGO: Primary | ICD-10-CM

## 2024-10-15 NOTE — PROGRESS NOTES
Patient notified of results per provider.  Patient expressed understanding and has no questions.    Physical Therapy Treatment Note  [address] [place] [address]      Patient: Tracie Gross   : 1960  Diagnosis/ICD-10 Code:  Vertigo [R42]  Referring practitioner: Floyd Silva MD  Date of Initial Visit: Type: THERAPY  Noted: 2024  Today's Date: 10/15/2024  Patient seen for 10 sessions           Visit Diagnoses:     ICD-10-CM ICD-9-CM   1. Vertigo  R42 780.4   2. BPPV (benign paroxysmal positional vertigo), bilateral  H81.13 386.11   3. Vestibular hypofunction of both ears  H83.2X3 386.50   4. Neck stiffness  M43.6 723.5   5. Balance problem  R26.89 781.99       Subjective Tracie Gross reports: some relief for a couple of days after last session, but continued dizziness which was really bad the other day.     Dizziness: 4 current      Objective     See Exercise, Manual, and Modality Logs for complete treatment.     Patient Education: discussed contacting PCP to see if she should be seen after the fall/hit to the head due to continued dizziness; discussed that she could have a concussion from the fall or that PCP might want to have her have more testing done.     Assessment/Plan Dizziness was reduce to 2/10 by end of maneuvers after 3 min rest. Pt voiced understanding.    Progress per Plan of Care and Progress strengthening /stabilization /functional activity            Timed:         Manual Therapy:         mins  80484;     Therapeutic Exercise:         mins  40659;     Neuromuscular Walter:        mins  99693;    Therapeutic Activity:    8     mins  13110;     Gait Training:           mins  22249;     Ultrasound:          mins  16516;    Ionto                                   mins   30469  Self Care                            mins   36281    Un-Timed:  Electrical Stimulation:         mins  23768 ( );  Traction          mins 77089  Canalith Repos              6     mins  37967  Dry Needle 1-2 ms      ___  mins 80597  Dry Needle  3+ ms              mins  61461  Low Eval          mins  40490  Mod Eval          Mins  98489  High Eval                            Mins  79568  Re-Eval                               mins  46250    Timed Treatment:   8   mins   Total Treatment:    14   mins          Megan Singleton, PT    Physical Therapist

## 2024-10-16 RX ORDER — LISINOPRIL 5 MG/1
5 TABLET ORAL DAILY
Qty: 90 TABLET | Refills: 0 | Status: SHIPPED | OUTPATIENT
Start: 2024-10-16

## 2024-10-22 DIAGNOSIS — M34.9 SCLERODERMA: ICD-10-CM

## 2024-10-22 DIAGNOSIS — M32.9 SLE (SYSTEMIC LUPUS ERYTHEMATOSUS RELATED SYNDROME): ICD-10-CM

## 2024-10-22 RX ORDER — PREDNISONE 5 MG/1
5 TABLET ORAL DAILY
Qty: 30 TABLET | Refills: 1 | Status: SHIPPED | OUTPATIENT
Start: 2024-10-22

## 2024-10-28 RX ORDER — FERROUS SULFATE 325(65) MG
325 TABLET ORAL
Qty: 30 TABLET | Refills: 1 | Status: SHIPPED | OUTPATIENT
Start: 2024-10-28

## 2024-10-31 ENCOUNTER — TELEPHONE (OUTPATIENT)
Dept: ORTHOPEDICS | Facility: OTHER | Age: 64
End: 2024-10-31
Payer: MEDICARE

## 2024-11-07 ENCOUNTER — LAB (OUTPATIENT)
Dept: LAB | Facility: HOSPITAL | Age: 64
End: 2024-11-07
Payer: MEDICARE

## 2024-11-07 ENCOUNTER — TELEPHONE (OUTPATIENT)
Dept: PHYSICAL THERAPY | Facility: CLINIC | Age: 64
End: 2024-11-07

## 2024-11-07 LAB
ANION GAP SERPL CALCULATED.3IONS-SCNC: 10.4 MMOL/L (ref 5–15)
BUN SERPL-MCNC: 16 MG/DL (ref 8–23)
BUN/CREAT SERPL: 14.8 (ref 7–25)
CALCIUM SPEC-SCNC: 10.9 MG/DL (ref 8.6–10.5)
CHLORIDE SERPL-SCNC: 99 MMOL/L (ref 98–107)
CO2 SERPL-SCNC: 32.6 MMOL/L (ref 22–29)
CREAT SERPL-MCNC: 1.08 MG/DL (ref 0.57–1)
EGFRCR SERPLBLD CKD-EPI 2021: 56.8 ML/MIN/1.73
GLUCOSE SERPL-MCNC: 97 MG/DL (ref 65–99)
POTASSIUM SERPL-SCNC: 4.7 MMOL/L (ref 3.5–5.2)
SODIUM SERPL-SCNC: 142 MMOL/L (ref 136–145)

## 2024-11-07 PROCEDURE — 80048 BASIC METABOLIC PNL TOTAL CA: CPT | Performed by: FAMILY MEDICINE

## 2024-11-07 NOTE — TELEPHONE ENCOUNTER
"  Caller: Tracie Gross \"Evelyn\"    Relationship: Self         What was the call regarding: FEELING BETTER, NO SPELL IN OVER A WEEK, SCARED A TREATMENT MAY FLAIR IT UP          "

## 2024-11-08 DIAGNOSIS — M32.9 SLE (SYSTEMIC LUPUS ERYTHEMATOSUS RELATED SYNDROME): ICD-10-CM

## 2024-11-08 DIAGNOSIS — M34.9 SCLERODERMA: ICD-10-CM

## 2024-11-08 RX ORDER — CYCLOBENZAPRINE HCL 10 MG
10 TABLET ORAL 3 TIMES DAILY PRN
Qty: 45 TABLET | Refills: 1 | Status: SHIPPED | OUTPATIENT
Start: 2024-11-08

## 2024-11-22 ENCOUNTER — TRANSCRIBE ORDERS (OUTPATIENT)
Dept: ADMINISTRATIVE | Facility: HOSPITAL | Age: 64
End: 2024-11-22
Payer: MEDICARE

## 2024-11-22 DIAGNOSIS — R06.02 SHORTNESS OF BREATH: Primary | ICD-10-CM

## 2024-11-22 DIAGNOSIS — I27.20 PHT (PULMONARY HYPERTENSION): ICD-10-CM

## 2024-11-22 DIAGNOSIS — J84.9 INTERSTITIAL LUNG DISEASE: ICD-10-CM

## 2024-11-25 RX ORDER — ALBUTEROL SULFATE 90 UG/1
2 INHALANT RESPIRATORY (INHALATION) EVERY 4 HOURS PRN
Qty: 8 G | Refills: 0 | Status: SHIPPED | OUTPATIENT
Start: 2024-11-25

## 2024-11-25 NOTE — TELEPHONE ENCOUNTER
Caller: Brian Gross    Relationship: Emergency Contact    Best call back number: [phone]    Requested Prescriptions:   Requested Prescriptions     Pending Prescriptions Disp Refills    albuterol sulfate  (90 Base) MCG/ACT inhaler 8 g 0     Sig: Inhale 2 puffs Every 4 (Four) Hours As Needed for Wheezing.        Pharmacy where request should be sent: [facility] [phone] [place] [address] AT [facility] [address] [place] [address] - [phone] [place] [phone]      Last office visit with prescribing clinician: 10/2/2024   Last telemedicine visit with prescribing clinician: Visit date not found   Next office visit with prescribing clinician: 1/6/2025     Additional details provided by patient:     Does the patient have less than a 3 day supply:  [x] Yes  [] No        Karli Medley Rep   11/25/24 11:25 EST

## 2024-11-27 DIAGNOSIS — M87.052 AVASCULAR NECROSIS OF FEMORAL HEAD, LEFT: ICD-10-CM

## 2024-11-29 DIAGNOSIS — M87.052 AVASCULAR NECROSIS OF FEMORAL HEAD, LEFT: ICD-10-CM

## 2024-11-29 RX ORDER — OXYCODONE HYDROCHLORIDE 10 MG/1
10 TABLET ORAL EVERY 6 HOURS PRN
Qty: 120 TABLET | Refills: 0 | Status: SHIPPED | OUTPATIENT
Start: 2024-11-29

## 2024-12-02 RX ORDER — DEXTROMETHORPHAN HYDROBROMIDE AND PROMETHAZINE HYDROCHLORIDE 15; 6.25 MG/5ML; MG/5ML
5 SYRUP ORAL 4 TIMES DAILY PRN
Qty: 240 ML | Refills: 1 | Status: SHIPPED | OUTPATIENT
Start: 2024-12-02

## 2024-12-02 RX ORDER — OXYCODONE HYDROCHLORIDE 10 MG/1
10 TABLET ORAL EVERY 6 HOURS PRN
Qty: 120 TABLET | Refills: 0 | OUTPATIENT
Start: 2024-12-02

## 2024-12-03 ENCOUNTER — OFFICE VISIT (OUTPATIENT)
Dept: ORTHOPEDIC SURGERY | Facility: CLINIC | Age: 64
End: 2024-12-03
Payer: MEDICARE

## 2024-12-03 VITALS — WEIGHT: 227 LBS | HEIGHT: 64 IN | BODY MASS INDEX: 38.76 KG/M2 | OXYGEN SATURATION: 90 %

## 2024-12-03 DIAGNOSIS — M19.011 OSTEOARTHRITIS OF RIGHT GLENOHUMERAL JOINT: ICD-10-CM

## 2024-12-03 DIAGNOSIS — Z47.89 ORTHOPEDIC AFTERCARE: Primary | ICD-10-CM

## 2024-12-03 NOTE — PROGRESS NOTES
"   Patient ID: Tracie Gross is a 64 y.o. female presents with her , Brian, for further follow-up on 11/1/2023 right reverse total shoulder arthroplasty. Denies any issues with this shoulder. Only minor weakness.     Objective:  Ht 162.6 cm (64\")   Wt 103 kg (227 lb)   LMP 05/08/1983   SpO2 90%   BMI 38.96 kg/m²     Physical Examination:     Right shoulder:  Intact skin. Well-healed incisions.  No signs of infection.    No Tenderness palpation  Passive forward flexion 120, abduction 115, ER 20  Active IR S1  Belly press 3/5 pain free; Lift off 5/5  Range of motion of elbow, wrist and digits grossly intact  Radial pulse palpable capillary refill less than 2 seconds all digits   strength 5/5 and equal bilaterally  Sensation light touch intact in all distributions    Imaging:   XR Shoulder 2+ View Right (12/03/2024 13:34)       Assessment:    Diagnoses and all orders for this visit:    1. Orthopedic aftercare (Primary)  -     XR Shoulder 2+ View Right    2. Osteoarthritis of right glenohumeral joint  -     XR Shoulder 2+ View Right    Plan: Recommend continuing strengthening, as well as stretching, exercises to improve internal range of motion strength.  Those were demonstrated in office today.  These should be continued to be performed for the next 2 to 3 months to be performed 3-4 times weekly.  Then begin tapering frequency of HEP.  Follow-up as needed.    Disclaimer: Part of this note may be an electronic transcription/translation of spoken language to printed text using the Dragon Dictation System  "

## 2024-12-09 RX ORDER — LAMOTRIGINE 200 MG/1
200 TABLET ORAL NIGHTLY
Qty: 90 TABLET | Refills: 1 | Status: SHIPPED | OUTPATIENT
Start: 2024-12-09 | End: 2024-12-16

## 2024-12-15 DIAGNOSIS — M79.89 LEG SWELLING: ICD-10-CM

## 2024-12-16 RX ORDER — LAMOTRIGINE 200 MG/1
200 TABLET ORAL NIGHTLY
Qty: 90 TABLET | Refills: 1 | Status: SHIPPED | OUTPATIENT
Start: 2024-12-16

## 2024-12-16 RX ORDER — FUROSEMIDE 20 MG/1
TABLET ORAL
Qty: 90 TABLET | Refills: 2 | Status: SHIPPED | OUTPATIENT
Start: 2024-12-16

## 2024-12-17 ENCOUNTER — APPOINTMENT (OUTPATIENT)
Dept: GENERAL RADIOLOGY | Facility: HOSPITAL | Age: 64
End: 2024-12-17
Payer: MEDICARE

## 2024-12-17 ENCOUNTER — HOSPITAL ENCOUNTER (OUTPATIENT)
Facility: HOSPITAL | Age: 64
Discharge: HOME | End: 2024-12-17
Attending: EMERGENCY MEDICINE | Admitting: EMERGENCY MEDICINE
Payer: MEDICARE

## 2024-12-17 VITALS
BODY MASS INDEX: 39.9 KG/M2 | HEART RATE: 70 BPM | OXYGEN SATURATION: 94 % | SYSTOLIC BLOOD PRESSURE: 90 MMHG | TEMPERATURE: 97.7 F | HEIGHT: 64 IN | RESPIRATION RATE: 18 BRPM | WEIGHT: 233.7 LBS | DIASTOLIC BLOOD PRESSURE: 64 MMHG

## 2024-12-17 DIAGNOSIS — M25.512 ACUTE PAIN OF LEFT SHOULDER: ICD-10-CM

## 2024-12-17 DIAGNOSIS — M23.91 INTERNAL DERANGEMENT OF RIGHT KNEE: Primary | ICD-10-CM

## 2024-12-17 DIAGNOSIS — M25.511 ACUTE PAIN OF RIGHT SHOULDER: ICD-10-CM

## 2024-12-17 PROCEDURE — 73030 X-RAY EXAM OF SHOULDER: CPT

## 2024-12-17 PROCEDURE — G0463 HOSPITAL OUTPT CLINIC VISIT: HCPCS | Performed by: EMERGENCY MEDICINE

## 2024-12-17 PROCEDURE — 73562 X-RAY EXAM OF KNEE 3: CPT

## 2024-12-17 NOTE — DISCHARGE INSTRUCTIONS
You have a suspected nonfracture knee injury, please follow-up with orthopedics as scheduled.    Please bring your disc of your x-rays to your appointment.    Weightbearing as tolerated on affected extremity until seen by orthopedic surgery.  Rest, ice and elevate knee/shoulders.  Continue to take prescribed medication.  Return to ER for any concerns.

## 2024-12-17 NOTE — FSED PROVIDER NOTE
Subjective   History of Present Illness  Patient is a 64-year-old female with past medical history significant for some chronic pain issues, presents emergency room with complaints of injuries related to a fall.  Patient states that she fell 2 weeks ago.  Patient states that she fell twice in the same day.  Patient complains of pain to both of her shoulders as well as her right knee.  She reports twisting her right knee as she fell.  She has had pain and swelling to the area ever since.  She did not hit her head or lose consciousness.  She is here for evaluation of        Review of Systems   Constitutional: Negative.  Negative for chills, fatigue and fever.   Eyes: Negative.    Respiratory:  Negative for cough, chest tightness and shortness of breath.    Cardiovascular:  Negative for chest pain and palpitations.   Gastrointestinal:  Negative for abdominal pain, diarrhea, nausea and vomiting.   Genitourinary: Negative.    Musculoskeletal: Negative.  Positive for arthralgias and joint swelling.   Skin: Negative.  Negative for rash.   Neurological: Negative.  Negative for syncope, weakness, numbness and headaches.   Psychiatric/Behavioral: Negative.     All other systems reviewed and are negative.      Past Medical History:   Diagnosis Date    Allergic 01/01/1998    Anemia     Ankle sprain     Anxiety     Arthritis     Arthritis of back 0ll1/01/2013    Arthritis of neck 01/01/2005    Asthma     Bipolar affective disorder     Breast cancer 1985    s/p R mastectomy    Bursitis of hip [id number]    Cervical disc disorder 01/01/2006    CTS (carpal tunnel syndrome)     Depression 09/01/2000    Fracture of wrist 01/01/1995    Fracture, foot     Frozen shoulder     GERD (gastroesophageal reflux disease) 1993    H/O breast reconstruction     SEVERAL    H/O laminectomy     Hamartoma     Hip arthrosis 01/01/2013    History of medical problems     Hx of bilateral oophorectomy     Hyperlipidemia     Hypertension     Hypothyroidism      Infectious viral hepatitis     Inflammatory bowel disease     Man. EGD/colonoscopy annually (2021)    Irritable bowel syndrome     Kienbock's disease, right     WRIST    Knee swelling 2007    Low back pain 1991    Low back strain     Lumbosacral disc disease 1995    Had 2 lumber surgeries    Lupus     Ravenell    Monahan syndrome     Man. EGD/colonoscopy annually (2021). MRI alternating w/ EUS annually for pancreatic screen    MRSA infection     Neuroma of foot     Obesity     Osteopenia     Osteoporosis     maintained on Prolia through Karen    Peptic ulceration     Periarthritis of shoulder 2022    Pneumonia     Raynaud disease     Renal insufficiency     Rotator cuff syndrome [id number]    Scleroderma     Sleep apnea     cpap    Squamous cell cancer of lip     Tear of meniscus of knee     Tendinitis of knee     Thoracic disc disorder     Von Willebrand disease     Wrist sprain        Allergies   Allergen Reactions    Aspirin Other (See Comments)     VONWILLENBRAND DISEASE     Baclofen Hives       Past Surgical History:   Procedure Laterality Date    APPENDECTOMY      ARM DEBRIDEMENT Right     X 3    BACK SURGERY      Vetas- cervical fusion    BACK SURGERY      lumbar decomp    BREAST BIOPSY      BREAST LUMPECTOMY      BREAST RECONSTRUCTION Right     BREAST RECONSTRUCTION Right     CARDIAC CATHETERIZATION      no stents placed     SECTION  1980    CHOLECYSTECTOMY      COLONOSCOPY      COLONOSCOPY N/A 2020    Procedure: COLONOSCOPY;  Surgeon: Cayden Conway MD;  Location: [facility] ENDOSCOPY;  Service: Gastroenterology;  Laterality: N/A;  rectal ulcer    COLONOSCOPY N/A 2021    Procedure: COLONOSCOPY WITH POLYPECTOMY X 1;  Surgeon: Cayden Conway MD;  Location: [facility] ENDOSCOPY;  Service: Gastroenterology;  Laterality: N/A;  Post: COLON POLYP    COLONOSCOPY N/A 2023    Procedure: COLONOSCOPY with polypectomy x 1, endoscopic clipping x  1;  Surgeon: Cayden Conway MD;  Location: [facility] ENDOSCOPY;  Service: Gastroenterology;  Laterality: N/A;    COLONOSCOPY N/A 10/01/2024    Procedure: COLONOSCOPY;  Surgeon: Cayden Conway MD;  Location: [facility] ENDOSCOPY;  Service: Gastroenterology;  Laterality: N/A;  normal    COSMETIC SURGERY  [id number]    ENDOSCOPY      ENDOSCOPY N/A 08/14/2020    Procedure: ESOPHAGOGASTRODUODENOSCOPY;  Surgeon: Cayden Conway MD;  Location: [facility] ENDOSCOPY;  Service: Gastroenterology;  Laterality: N/A;  Normal EGD    ENDOSCOPY N/A 09/17/2021    Procedure: ESOPHAGOGASTRODUODENOSCOPY;  Surgeon: Cayden Conway MD;  Location: [facility] ENDOSCOPY;  Service: Gastroenterology;  Laterality: N/A;  Post: normal egd    ENDOSCOPY N/A 10/01/2024    Procedure: ESOPHAGOGASTRODUODENOSCOPY;  Surgeon: Cayden Conway MD;  Location: [facility] ENDOSCOPY;  Service: Gastroenterology;  Laterality: N/A;  normal    EXPLORATORY LAPAROTOMY      scar tissue removal    EYE SURGERY  2000    FINGER SURGERY Right     ring finger mass excision    HAND SURGERY  Rt wrist  10/15    HIP SURGERY  1/12    HYSTERECTOMY      PARTIAL    JOINT REPLACEMENT Right 2012,2015    hip and knee    KNEE ARTHROSCOPY Left 01/07/2020    Procedure: LEFT KNEE SCOPE with partial lateral meniscectomy;  Surgeon: Chau Perez MD;  Location: [facility] MAIN OR;  Service: Orthopedics    KNEE SURGERY  Rtf knee  2015    LASIK      LASIK/ LASIK ENHANCEMENT    MASTECTOMY RADICAL Right     NECK SURGERY  01/01/06    OOPHORECTOMY Bilateral     SHOULDER SURGERY  11/01/2023    SPINE SURGERY      SPLENECTOMY      SUBTOTAL HYSTERECTOMY      TOTAL HIP ARTHROPLASTY Left 05/17/2023    TOTAL SHOULDER ARTHROPLASTY W/ DISTAL CLAVICLE EXCISION Right 11/01/2023    Procedure: TOTAL SHOULDER REVERSE ARTHROPLASTY;  Surgeon: Floyd Ruiz MD;  Location: [facility] MAIN OR;  Service: Orthopedics;  Laterality: Right;    TRIGGER POINT INJECTION  7/23    TUBAL ABDOMINAL LIGATION   1981    UPPER ENDOSCOPIC ULTRASOUND W/ FNA N/A 12/09/2021    Procedure: ENDOSCOPIC ULTRASOUND WITH ESOPHAGOGASTRODUODENOSCOPY;  Surgeon: Luis E Negron MD;  Location: [facility] ENDOSCOPY;  Service: Gastroenterology;  Laterality: N/A;  Post: GASTROPARESIS, DILATED COMMON BILE DUCT, FUNDIC POLYP, DUODENITIS, NORMAL PANCREAS, HIATAL HERNIA    WRIST SURGERY Right     distal radius head removal (bone dead)  plates and screws decomp lunate       Family History   Problem Relation Age of Onset    Colon cancer Mother     Heart disease Mother     Cancer Mother         Colon    Arthritis Mother     Kidney disease Father     Heart disease Father     Depression Father         Bladder cancer    Hyperlipidemia Father     Vision loss Father     Hypertension Father     Colon cancer Sister     Diabetes Sister     Heart disease Sister     Von Willebrand disease Sister     Von Willebrand disease Brother     Cancer Brother     Von Willebrand disease Son     Breast cancer Son     Diabetes Paternal Grandmother     Stroke Paternal Grandmother     Colon cancer Other     Colon cancer Son     Von Willebrand disease Son     Breast cancer Son     Cancer Sister         Melanoma, colon, bladder    Vision loss Sister     Stroke Sister     Arthritis Sister     Asthma Sister     Diabetes Sister     Heart disease Sister     Hyperlipidemia Sister     Kidney disease Sister     Cancer Paternal Aunt     Cancer Maternal Aunt     Cancer Maternal Aunt     Hyperlipidemia Sister     Thyroid disease Sister     Arthritis Sister     Hypertension Sister     Kidney disease Sister     Broken bones Sister     Rheumatologic disease Sister     Thyroid disease Sister     Cancer Sister     Clotting disorder Sister        Social History     Socioeconomic History    Marital status:      Spouse name: Brian    Number of children: 2   Tobacco Use    Smoking status: Former     Current packs/day: 0.00     Average packs/day: 0.3 packs/day for 10.0 years (2.5 ttl pk-yrs)      Types: Cigarettes     Start date: 1984     Quit date: 1994     Years since quittin.9     Passive exposure: Past    Smokeless tobacco: Never    Tobacco comments:     Off and on for  those years   Vaping Use    Vaping status: Never Used   Substance and Sexual Activity    Alcohol use: Not Currently     Comment: Rarely    Drug use: No    Sexual activity: Never           Objective   Physical Exam  Vitals and nursing note reviewed.   Constitutional:       General: She is not in acute distress.     Appearance: Normal appearance. She is normal weight.   HENT:      Right Ear: External ear normal.      Left Ear: External ear normal.      Nose: Nose normal.   Cardiovascular:      Rate and Rhythm: Normal rate.   Pulmonary:      Effort: Pulmonary effort is normal.   Musculoskeletal:         General: Normal range of motion.      Right shoulder: Tenderness and bony tenderness present. No swelling or deformity. Decreased range of motion.      Left shoulder: Tenderness and bony tenderness present. No swelling or deformity. Decreased range of motion.      Cervical back: Normal range of motion.      Right knee: Swelling present. Decreased range of motion. Tenderness present over the medial joint line.   Skin:     Capillary Refill: Capillary refill takes less than 2 seconds.   Neurological:      General: No focal deficit present.      Mental Status: She is alert and oriented to person, place, and time.   Psychiatric:         Mood and Affect: Mood normal.         Procedures           ED Course  ED Course as of 24 1112   Tue Dec 17, 2024   1043 X-rays are negative for acute fracture. [KZ]      ED Course User Index  [KZ] Brooks Castro MD                                           Medical Decision Making  The patient presents to the emergency room with joint discomfort after sustaining injury/trauma.  Differential diagnosis includes fracture, sprain, strain, dislocation, nonspecific joint pain.  X-ray has been ordered  to further evaluate. The patient is otherwise well appearing, hemodynamically stable, and shows no evidence of neurovascular injury or compartment syndrome.    The patient was found to have no acute fracture on x-ray.  Patient did not require splinting or immobilization.  She is referred to orthopedic surgery for further care.      Problems Addressed:  Acute pain of left shoulder: complicated acute illness or injury  Acute pain of right shoulder: complicated acute illness or injury  Internal derangement of right knee: complicated acute illness or injury    Amount and/or Complexity of Data Reviewed  Radiology: ordered and independent interpretation performed. Decision-making details documented in ED Course.        Final diagnoses:   Internal derangement of right knee   Acute pain of right shoulder   Acute pain of left shoulder       ED Disposition  ED Disposition       ED Disposition   Discharge    Condition   Stable    Comment   --               Floyd Ruiz MD  [address] [place] [address]  [phone]    Call today  To arrange for follow-up         Medication List        Changed      gabapentin 600 MG tablet  Commonly known as: NEURONTIN  TAKE TWO TABLETS BY MOUTH EVERY MORNING THEN TAKE ONE TABLET BY MOUTH DAILY IN THE AFTERNOON THEN TAKE TWO TABLETS BY MOUTH EVERY NIGHT  What changed:   how much to take  how to take this  when to take this

## 2024-12-20 DIAGNOSIS — R42 VERTIGO: ICD-10-CM

## 2024-12-20 RX ORDER — MECLIZINE HYDROCHLORIDE 25 MG/1
25 TABLET ORAL 3 TIMES DAILY PRN
Qty: 30 TABLET | Refills: 3 | Status: SHIPPED | OUTPATIENT
Start: 2024-12-20

## 2025-01-07 ENCOUNTER — DOCUMENTATION (OUTPATIENT)
Dept: PHYSICAL THERAPY | Facility: CLINIC | Age: 65
End: 2025-01-07
Payer: MEDICARE

## 2025-01-07 RX ORDER — FERROUS SULFATE 325(65) MG
325 TABLET ORAL
Qty: 30 TABLET | Refills: 0 | Status: SHIPPED | OUTPATIENT
Start: 2025-01-07

## 2025-01-07 NOTE — PROGRESS NOTES
Physical Therapy Discharge Summary  [address] [address]    Patient: Tracie Gross   : 1960  Diagnosis/ICD-10 Code:  Vertigo [R42]  Referring practitioner: Floyd Silva MD  Date of Initial Visit: Type: THERAPY  Noted: 2024  Last Visit Date: 10/15/2024  Patient seen for 10 sessions    Discharge Status of Patient: Pt canceled the last two appointments that were in the system. For the last cancellation she noted that she hadn't had a spell in a week and was afraid PT would exacerbate that. Pt never returned to PT and POC has .      Goals  Plan Goals: STG's: 4 weeks   Patient will report >75% reduction in symptoms Partially Met 10/8 prior to recent fall   Patient will be able to perform HEP with minimal verbal cues Not Met 10/8     LTG's: By discharge  Patient will have 0-15 dizziness with re-assessment using the Dizziness Handicap Inventory Progressing 10/8  Patient will report >80% reduction in headaches Not Met 10/8  Patient will demonstrate stable gait with horizontal and vertical head turns and be able to perform head/eye movements with min to no dizziness Not Met 10/8  Pt will be able to groom dogs without dizziness or LOB Not Met 10/8  Patient will report no new onset of falls Not Met 10/8  Patient will be independent with final HEP Not Met 10/8     Discharge Plan:  no specific D/C instr were given as pt did not return to PT after 10/15/24     Comments: Pt was given HEP during sessions. Closed prior PT encounter that had not yet been resolved.      Date of Discharge: 25            Megan Singleton, PT  Physical Therapist  [place] License: [id number]

## 2025-01-09 ENCOUNTER — TELEPHONE (OUTPATIENT)
Dept: FAMILY MEDICINE CLINIC | Facility: CLINIC | Age: 65
End: 2025-01-09
Payer: MEDICARE

## 2025-01-09 NOTE — TELEPHONE ENCOUNTER
"  Caller: Tracie Gross \"Evelyn\"    Relationship: Self    Best call back number: [phone]          What was the call regarding:   PATIENT WANTS TO SEE DR. RIVAS SOONER THAN MARCH 5, 2025.  HER APPOINTMENT 1/6/25 WAS CANCELLED DUE TO WEATHER.         "

## 2025-01-10 DIAGNOSIS — R63.5 WEIGHT GAIN: ICD-10-CM

## 2025-01-10 DIAGNOSIS — G47.00 INSOMNIA, UNSPECIFIED TYPE: ICD-10-CM

## 2025-01-10 RX ORDER — ALPRAZOLAM 1 MG/1
1 TABLET ORAL NIGHTLY PRN
Qty: 30 TABLET | Refills: 2 | Status: SHIPPED | OUTPATIENT
Start: 2025-01-10

## 2025-01-13 NOTE — TELEPHONE ENCOUNTER
"   Caller: Tracie Gross \"Evelyn\"     Relationship: Self     Best call back number: [phone]       What was the call regarding:   PATIENT WANTS TO SEE DR. RIVAS SOONER THAN MARCH 5, 2025.  HER APPOINTMENT 1/6/25 WAS CANCELLED DUE TO WEATHER.       " No

## 2025-01-14 ENCOUNTER — HOSPITAL ENCOUNTER (OUTPATIENT)
Dept: CT IMAGING | Facility: HOSPITAL | Age: 65
Discharge: HOME | End: 2025-01-14
Payer: MEDICARE

## 2025-01-14 ENCOUNTER — HOSPITAL ENCOUNTER (OUTPATIENT)
Dept: RESPIRATORY THERAPY | Facility: HOSPITAL | Age: 65
Discharge: HOME | End: 2025-01-14
Payer: MEDICARE

## 2025-01-14 ENCOUNTER — HOSPITAL ENCOUNTER (OUTPATIENT)
Dept: CARDIOLOGY | Facility: HOSPITAL | Age: 65
Discharge: HOME | End: 2025-01-14
Payer: MEDICARE

## 2025-01-14 ENCOUNTER — APPOINTMENT (OUTPATIENT)
Dept: RESPIRATORY THERAPY | Facility: HOSPITAL | Age: 65
End: 2025-01-14
Payer: MEDICARE

## 2025-01-14 VITALS
BODY MASS INDEX: 39.78 KG/M2 | SYSTOLIC BLOOD PRESSURE: 121 MMHG | WEIGHT: 233 LBS | DIASTOLIC BLOOD PRESSURE: 82 MMHG | HEIGHT: 64 IN

## 2025-01-14 VITALS — HEART RATE: 71 BPM | OXYGEN SATURATION: 97 % | RESPIRATION RATE: 16 BRPM

## 2025-01-14 DIAGNOSIS — I27.20 PHT (PULMONARY HYPERTENSION): ICD-10-CM

## 2025-01-14 DIAGNOSIS — J84.9 INTERSTITIAL LUNG DISEASE: ICD-10-CM

## 2025-01-14 DIAGNOSIS — R06.02 SHORTNESS OF BREATH: ICD-10-CM

## 2025-01-14 PROCEDURE — 94727 GAS DIL/WSHOT DETER LNG VOL: CPT

## 2025-01-14 PROCEDURE — A9270 NON-COVERED ITEM OR SERVICE: HCPCS | Performed by: INTERNAL MEDICINE

## 2025-01-14 PROCEDURE — 93306 TTE W/DOPPLER COMPLETE: CPT

## 2025-01-14 PROCEDURE — 63710000001 ALBUTEROL SULFATE HFA 108 (90 BASE) MCG/ACT AEROSOL SOLUTION 6.7 G INHALER: Performed by: INTERNAL MEDICINE

## 2025-01-14 PROCEDURE — 94799 UNLISTED PULMONARY SVC/PX: CPT

## 2025-01-14 PROCEDURE — 94060 EVALUATION OF WHEEZING: CPT

## 2025-01-14 PROCEDURE — 94729 DIFFUSING CAPACITY: CPT

## 2025-01-14 PROCEDURE — 71250 CT THORAX DX C-: CPT

## 2025-01-14 PROCEDURE — 94664 DEMO&/EVAL PT USE INHALER: CPT

## 2025-01-14 RX ORDER — ALBUTEROL SULFATE 90 UG/1
2 INHALANT RESPIRATORY (INHALATION) ONCE
Status: COMPLETED | OUTPATIENT
Start: 2025-01-14 | End: 2025-01-14

## 2025-01-14 RX ORDER — LISINOPRIL 5 MG/1
5 TABLET ORAL DAILY
Qty: 90 TABLET | Refills: 0 | Status: SHIPPED | OUTPATIENT
Start: 2025-01-14

## 2025-01-14 RX ADMIN — ALBUTEROL SULFATE 2 PUFF: 108 AEROSOL, METERED RESPIRATORY (INHALATION) at 16:19

## 2025-01-16 LAB
AORTIC DIMENSIONLESS INDEX: 0.76 (DI)
AV MEAN PRESS GRAD SYS DOP V1V2: 4 MMHG
AV VMAX SYS DOP: 131 CM/SEC
BH CV ECHO MEAS - ACS: 1.9 CM
BH CV ECHO MEAS - AO MAX PG: 6.9 MMHG
BH CV ECHO MEAS - AO V2 VTI: 25.1 CM
BH CV ECHO MEAS - AVA(I,D): 2.26 CM2
BH CV ECHO MEAS - EDV(CUBED): 103.8 ML
BH CV ECHO MEAS - EDV(MOD-SP4): 77.7 ML
BH CV ECHO MEAS - EF(MOD-SP4): 59.3 %
BH CV ECHO MEAS - ESV(CUBED): 39.3 ML
BH CV ECHO MEAS - ESV(MOD-SP4): 31.6 ML
BH CV ECHO MEAS - FS: 27.7 %
BH CV ECHO MEAS - IVS/LVPW: 0.9 CM
BH CV ECHO MEAS - IVSD: 0.9 CM
BH CV ECHO MEAS - LA DIMENSION: 3.7 CM
BH CV ECHO MEAS - LAT PEAK E' VEL: 11.4 CM/SEC
BH CV ECHO MEAS - LV DIASTOLIC VOL/BSA (35-75): 37.2 CM2
BH CV ECHO MEAS - LV MASS(C)D: 153.4 GRAMS
BH CV ECHO MEAS - LV MAX PG: 4 MMHG
BH CV ECHO MEAS - LV MEAN PG: 2 MMHG
BH CV ECHO MEAS - LV SYSTOLIC VOL/BSA (12-30): 15.1 CM2
BH CV ECHO MEAS - LV V1 MAX: 100 CM/SEC
BH CV ECHO MEAS - LV V1 VTI: 20 CM
BH CV ECHO MEAS - LVIDD: 4.7 CM
BH CV ECHO MEAS - LVIDS: 3.4 CM
BH CV ECHO MEAS - LVOT AREA: 2.8 CM2
BH CV ECHO MEAS - LVOT DIAM: 1.9 CM
BH CV ECHO MEAS - LVPWD: 1 CM
BH CV ECHO MEAS - MED PEAK E' VEL: 9.6 CM/SEC
BH CV ECHO MEAS - MR MAX PG: 48.7 MMHG
BH CV ECHO MEAS - MR MAX VEL: 349 CM/SEC
BH CV ECHO MEAS - MV A MAX VEL: 55.5 CM/SEC
BH CV ECHO MEAS - MV DEC SLOPE: 720 CM/SEC2
BH CV ECHO MEAS - MV DEC TIME: 0.19 SEC
BH CV ECHO MEAS - MV E MAX VEL: 62.4 CM/SEC
BH CV ECHO MEAS - MV E/A: 1.12
BH CV ECHO MEAS - MV MAX PG: 3.7 MMHG
BH CV ECHO MEAS - MV MEAN PG: 2 MMHG
BH CV ECHO MEAS - MV P1/2T: 43.1 MSEC
BH CV ECHO MEAS - MV V2 VTI: 20.8 CM
BH CV ECHO MEAS - MVA(P1/2T): 5.1 CM2
BH CV ECHO MEAS - MVA(VTI): 2.7 CM2
BH CV ECHO MEAS - PA ACC TIME: 0.08 SEC
BH CV ECHO MEAS - PA V2 MAX: 89.9 CM/SEC
BH CV ECHO MEAS - RV MAX PG: 2.07 MMHG
BH CV ECHO MEAS - RV V1 MAX: 71.9 CM/SEC
BH CV ECHO MEAS - RV V1 VTI: 21.9 CM
BH CV ECHO MEAS - RVDD: 2.7 CM
BH CV ECHO MEAS - SV(LVOT): 56.7 ML
BH CV ECHO MEAS - SV(MOD-SP4): 46.1 ML
BH CV ECHO MEAS - SVI(LVOT): 27.2 ML/M2
BH CV ECHO MEAS - SVI(MOD-SP4): 22.1 ML/M2
BH CV ECHO MEAS - TAPSE (>1.6): 1.57 CM
BH CV ECHO MEASUREMENTS AVERAGE E/E' RATIO: 5.94
BH CV XLRA - TDI S': 10.8 CM/SEC
LV EF BIPLANE MOD: 59 %
SINUS: 2.6 CM
STJ: 2.3 CM

## 2025-01-29 ENCOUNTER — HOSPITAL ENCOUNTER (OUTPATIENT)
Dept: GENERAL RADIOLOGY | Facility: HOSPITAL | Age: 65
Discharge: HOME | End: 2025-01-29
Admitting: FAMILY MEDICINE
Payer: MEDICARE

## 2025-01-29 ENCOUNTER — OFFICE VISIT (OUTPATIENT)
Dept: FAMILY MEDICINE CLINIC | Facility: CLINIC | Age: 65
End: 2025-01-29
Payer: MEDICARE

## 2025-01-29 VITALS
WEIGHT: 244.4 LBS | DIASTOLIC BLOOD PRESSURE: 83 MMHG | RESPIRATION RATE: 16 BRPM | SYSTOLIC BLOOD PRESSURE: 137 MMHG | HEIGHT: 64 IN | HEART RATE: 80 BPM | BODY MASS INDEX: 41.73 KG/M2 | OXYGEN SATURATION: 94 %

## 2025-01-29 DIAGNOSIS — M34.9 SCLERODERMA: ICD-10-CM

## 2025-01-29 DIAGNOSIS — R51.9 NONINTRACTABLE HEADACHE, UNSPECIFIED CHRONICITY PATTERN, UNSPECIFIED HEADACHE TYPE: ICD-10-CM

## 2025-01-29 DIAGNOSIS — E66.01 CLASS 3 SEVERE OBESITY WITH BODY MASS INDEX (BMI) OF 40.0 TO 44.9 IN ADULT, UNSPECIFIED OBESITY TYPE, UNSPECIFIED WHETHER SERIOUS COMORBIDITY PRESENT: ICD-10-CM

## 2025-01-29 DIAGNOSIS — J40 BRONCHITIS: ICD-10-CM

## 2025-01-29 DIAGNOSIS — R42 DIZZINESS: Primary | ICD-10-CM

## 2025-01-29 DIAGNOSIS — F31.9 BIPOLAR AFFECTIVE DISORDER, REMISSION STATUS UNSPECIFIED: ICD-10-CM

## 2025-01-29 DIAGNOSIS — E66.813 CLASS 3 SEVERE OBESITY WITH BODY MASS INDEX (BMI) OF 40.0 TO 44.9 IN ADULT, UNSPECIFIED OBESITY TYPE, UNSPECIFIED WHETHER SERIOUS COMORBIDITY PRESENT: ICD-10-CM

## 2025-01-29 PROBLEM — D84.9 IMMUNOCOMPROMISED: Status: RESOLVED | Noted: 2021-10-14 | Resolved: 2025-01-29

## 2025-01-29 PROCEDURE — 99215 OFFICE O/P EST HI 40 MIN: CPT | Performed by: FAMILY MEDICINE

## 2025-01-29 PROCEDURE — 3079F DIAST BP 80-89 MM HG: CPT | Performed by: FAMILY MEDICINE

## 2025-01-29 PROCEDURE — 1126F AMNT PAIN NOTED NONE PRSNT: CPT | Performed by: FAMILY MEDICINE

## 2025-01-29 PROCEDURE — 71046 X-RAY EXAM CHEST 2 VIEWS: CPT

## 2025-01-29 PROCEDURE — 1159F MED LIST DOCD IN RCRD: CPT | Performed by: FAMILY MEDICINE

## 2025-01-29 PROCEDURE — 1160F RVW MEDS BY RX/DR IN RCRD: CPT | Performed by: FAMILY MEDICINE

## 2025-01-29 PROCEDURE — 3075F SYST BP GE 130 - 139MM HG: CPT | Performed by: FAMILY MEDICINE

## 2025-01-29 RX ORDER — HYDROXYCHLOROQUINE SULFATE 200 MG/1
TABLET, FILM COATED ORAL
Qty: 180 TABLET | Refills: 3 | Status: CANCELLED | OUTPATIENT
Start: 2025-01-29

## 2025-01-29 RX ORDER — AZITHROMYCIN 500 MG/1
500 TABLET, FILM COATED ORAL DAILY
Qty: 3 TABLET | Refills: 0 | Status: SHIPPED | OUTPATIENT
Start: 2025-01-29

## 2025-01-29 RX ORDER — GUAIFENESIN 600 MG/1
TABLET, EXTENDED RELEASE ORAL
Qty: 30 TABLET | Refills: 1 | Status: SHIPPED | OUTPATIENT
Start: 2025-01-29

## 2025-01-29 RX ORDER — QUETIAPINE FUMARATE 100 MG/1
50 TABLET, FILM COATED ORAL NIGHTLY
Qty: 90 TABLET | Refills: 1 | Status: SHIPPED | OUTPATIENT
Start: 2025-01-29

## 2025-01-29 RX ORDER — PREDNISONE 20 MG/1
40 TABLET ORAL DAILY
Qty: 10 TABLET | Refills: 0 | Status: SHIPPED | OUTPATIENT
Start: 2025-01-29

## 2025-01-29 NOTE — PROGRESS NOTES
Chief Complaint   Patient presents with    Dizziness    Back Pain     HPI  Tracie Gross is a 64 y.o. female that presents for   Chief Complaint   Patient presents with    Dizziness    Back Pain     Dizziness: patient reports persistent dizziness and headaches, which are new in the last couple months. Patient is concerned that headaches are related to recent falls. She has had 3 falls in the last 2-3 months. Dizziness described as room spinning, which is prompted by standing or turning too quickly. She has been taking meclizine, which seems to be helping. Saw PT for vestibular rehab in Oct 2024 but they were unsure this was truly due to vertigo    Obesity: BMI 42. Weight up 20lbs in the last year. This is exacerbating back, hip and knee pain    Cough: patient reports cough for the last 4 days. Cough is productive and has been progressive. No fever but endorses some SOB. Patient has been using prescription cough medicine w/o significant benefit.     SLE/Scleroderma: prednisone 5mg daily was started after last visit. This did seem to be of benefit but discontinued after 3-4 weeks due to concerns for side effects. Rheumatology feels that side effect potential is too great. 1/2025 PFTs revealed moderate restrictive lung disease. 1/2025 CT chest w/ mild fibrosis. She has f/u w/ pulmonology (Draw) next week    Bipolar disorder: maintained on Seroquel 100mg nightly and lamotrigine 200mg nightly. Well controlled but concerned about weight gain on Seroquel.     Review of Systems  Pertinent positives of ROS documented in HPI    The following portions of the patient's history were reviewed and updated as appropriate: problem list, past medical history, past surgical history, allergies, current medications, past social history and past family history.    Problem List Tab  Patient History Tab  Immunizations Tab  Medications Tab  Chart Review Tab  Care Everywhere Tab  Synopsis Tab    PE  Vitals:    01/29/25 1134   BP: 137/83    Pulse: 80   Resp: 16   SpO2: 94%     Body mass index is 41.95 kg/m².  General: Obese, NAD  Head: AT/NC  Eyes: EOMI, anicteric sclera  Resp: Diffuse crackles on exam. SCR, BS equal  CV: RRR w/o m/r/g; 2+ pulses  GI: Soft, NT/ND, +BS  MSK: FROM, no deformity, no edema  Skin: Warm, dry, intact  Neuro: Alert and oriented. No focal deficits  Psych: Appropriate mood and affect    Imaging  CT Chest Hi Resolution Diagnostic    Result Date: 1/16/2025  Impression: 1. Mosaic attenuation pattern in the lungs suggests small airways obstructive process. This appears to improve on inspiration. 2. Minimal subpleural reticular interstitial thickening predominantly in the lower lobes suggestive of mild fibrosis. No wolf honeycombing fibrosis. 3. Chronic areas of subsegmental atelectasis or scarring in the lung bases. No acute lung consolidations. 4.. Right mastectomy with reconstructive surgery. No evidence of recurrent malignancy or metastatic disease within the chest. 5. Splenectomy. Cholecystectomy. Anterior cervical spinal fusion. Right shoulder replacement. Electronically Signed: Barb Millan MD  1/16/2025 10:26 AM EST  Workstation ID: [id number]    XR Shoulder 2+ View Right    Result Date: 12/17/2024  Impression: No acute traumatic injury identified Electronically Signed: Valentino Wayne MD  12/17/2024 10:31 AM EST  Workstation ID: [id number]    XR Shoulder 2+ View Left    Result Date: 12/17/2024  Impression: 1.No acute traumatic injury is identified. Electronically Signed: aVlentino Wayne MD  12/17/2024 10:29 AM EST  Workstation ID: [id number]    XR Knee 3 View Right    Result Date: 12/17/2024  Impression: No acute osseous injury identified Electronically Signed: Valentino Wayne MD  12/17/2024 10:29 AM EST  Workstation ID: [id number]    XR Shoulder 2+ View Right    Result Date: 12/5/2024  Impression: Right shoulder arthroplasty remains appropriately positioned without evidence of hardware complication, and is unchanged from 5/20/2024.  Electronically Signed: Barb Millan MD  12/5/2024 1:15 PM EST  Workstation ID: [id number]    Assessment & Plan   Tracie Gross is a 64 y.o. female that presents for   Chief Complaint   Patient presents with    Dizziness    Back Pain     Diagnoses and all orders for this visit:    1. Dizziness (Primary): sounds like BPPV to me but PT is unsure as not responding to vestibular rehab. Welcome opinion from ENT to confirm diagnosis   -     CT Head Without Contrast; Future  -     Ambulatory Referral to ENT (Otolaryngology)    2. Nonintractable headache, unspecified chronicity pattern, unspecified headache type: persistent dizziness that has resulted in falls and now HA. Will obtain imaging  -     CT Head Without Contrast; Future    3. Class 3 severe obesity with body mass index (BMI) of 40.0 to 44.9 in adult, unspecified obesity type, unspecified whether serious comorbidity present   - Cont diet and exercise efforts   - Reduce seroquel as below    4. Bronchitis  -     XR Chest PA & Lateral  -     azithromycin (ZITHROMAX) 500 MG tablet; Take 1 tablet by mouth Daily.  Dispense: 3 tablet; Refill: 0  -     guaiFENesin (Mucinex) 600 MG 12 hr tablet; Take 2 tablets twice daily for congestion  Dispense: 30 tablet; Refill: 1  -     predniSONE (DELTASONE) 20 MG tablet; Take 2 tablets by mouth Daily.  Dispense: 10 tablet; Refill: 0    5. Scleroderma: concern for pulmonary involvement based on recent CT chest and PFTs   - Cont pulm (appt w/ Draw tomorrow) and rheumatology f/u    6. Bipolar affective disorder, remission status unspecified  -     Reduce QUEtiapine (SEROquel) 100 MG tablet; Take 0.5 tablets by mouth Every Night.  Dispense: 90 tablet; Refill: 1  - Cont home Lamictal 200mg nightly    Reduce seroquel to 50mg nightly       Return in about 2 months (around 3/29/2025) for Recheck- 30min; preference 11:15 or 4:00.  50 minutes spent on the day of service face-to-face with the patient obtaining history, performing physical,  reviewing chart/data, placing orders, and documenting.

## 2025-01-30 RX ORDER — HYDROXYCHLOROQUINE SULFATE 200 MG/1
200 TABLET, FILM COATED ORAL 2 TIMES DAILY
Qty: 180 TABLET | Refills: 3 | Status: SHIPPED | OUTPATIENT
Start: 2025-01-30

## 2025-02-07 RX ORDER — FERROUS SULFATE 325(65) MG
1 TABLET ORAL
Qty: 30 TABLET | Refills: 0 | Status: SHIPPED | OUTPATIENT
Start: 2025-02-07

## 2025-02-12 ENCOUNTER — LAB (OUTPATIENT)
Dept: LAB | Facility: HOSPITAL | Age: 65
End: 2025-02-12
Payer: MEDICARE

## 2025-02-12 ENCOUNTER — HOSPITAL ENCOUNTER (OUTPATIENT)
Dept: ONCOLOGY | Facility: HOSPITAL | Age: 65
Discharge: HOME | End: 2025-02-12
Payer: MEDICARE

## 2025-02-12 ENCOUNTER — OFFICE VISIT (OUTPATIENT)
Dept: ONCOLOGY | Facility: CLINIC | Age: 65
End: 2025-02-12
Payer: MEDICARE

## 2025-02-12 VITALS
OXYGEN SATURATION: 95 % | HEIGHT: 64 IN | BODY MASS INDEX: 39.61 KG/M2 | SYSTOLIC BLOOD PRESSURE: 89 MMHG | DIASTOLIC BLOOD PRESSURE: 59 MMHG | HEART RATE: 89 BPM | RESPIRATION RATE: 20 BRPM | WEIGHT: 232 LBS | TEMPERATURE: 97.6 F

## 2025-02-12 DIAGNOSIS — M81.0 OSTEOPOROSIS, UNSPECIFIED OSTEOPOROSIS TYPE, UNSPECIFIED PATHOLOGICAL FRACTURE PRESENCE: Primary | ICD-10-CM

## 2025-02-12 DIAGNOSIS — J84.115 RESPIRATORY BRONCHIOLITIS INTERSTITIAL LUNG DISEASE: ICD-10-CM

## 2025-02-12 DIAGNOSIS — Z15.09 LYNCH SYNDROME: ICD-10-CM

## 2025-02-12 DIAGNOSIS — C50.919 MALIGNANT NEOPLASM OF FEMALE BREAST, UNSPECIFIED ESTROGEN RECEPTOR STATUS, UNSPECIFIED LATERALITY, UNSPECIFIED SITE OF BREAST: Primary | ICD-10-CM

## 2025-02-12 DIAGNOSIS — M81.0 OSTEOPOROSIS, UNSPECIFIED OSTEOPOROSIS TYPE, UNSPECIFIED PATHOLOGICAL FRACTURE PRESENCE: ICD-10-CM

## 2025-02-12 DIAGNOSIS — C50.919 MALIGNANT NEOPLASM OF FEMALE BREAST, UNSPECIFIED ESTROGEN RECEPTOR STATUS, UNSPECIFIED LATERALITY, UNSPECIFIED SITE OF BREAST: ICD-10-CM

## 2025-02-12 PROBLEM — J84.10 PULMONARY FIBROSIS: Status: ACTIVE | Noted: 2025-02-12

## 2025-02-12 LAB
ALBUMIN SERPL-MCNC: 4.2 G/DL (ref 3.5–5.2)
ALBUMIN/GLOB SERPL: 1.3 G/DL
ALP SERPL-CCNC: 208 U/L (ref 39–117)
ALT SERPL W P-5'-P-CCNC: 66 U/L (ref 1–33)
ANION GAP SERPL CALCULATED.3IONS-SCNC: 14.4 MMOL/L (ref 5–15)
AST SERPL-CCNC: 93 U/L (ref 1–32)
BASOPHILS # BLD AUTO: 0.06 10*3/MM3 (ref 0–0.2)
BASOPHILS NFR BLD AUTO: 0.6 % (ref 0–1.5)
BILIRUB SERPL-MCNC: 0.3 MG/DL (ref 0–1.2)
BUN SERPL-MCNC: 31 MG/DL (ref 8–23)
BUN/CREAT SERPL: 14.3 (ref 7–25)
CALCIUM SPEC-SCNC: 10 MG/DL (ref 8.6–10.5)
CHLORIDE SERPL-SCNC: 96 MMOL/L (ref 98–107)
CO2 SERPL-SCNC: 27.6 MMOL/L (ref 22–29)
CREAT SERPL-MCNC: 2.17 MG/DL (ref 0.57–1)
DEPRECATED RDW RBC AUTO: 49.5 FL (ref 37–54)
EGFRCR SERPLBLD CKD-EPI 2021: 24.6 ML/MIN/1.73
EOSINOPHIL # BLD AUTO: 0.69 10*3/MM3 (ref 0–0.4)
EOSINOPHIL NFR BLD AUTO: 6.8 % (ref 0.3–6.2)
ERYTHROCYTE [DISTWIDTH] IN BLOOD BY AUTOMATED COUNT: 14 % (ref 12.3–15.4)
GLOBULIN UR ELPH-MCNC: 3.2 GM/DL
GLUCOSE SERPL-MCNC: 98 MG/DL (ref 65–99)
HCT VFR BLD AUTO: 39.8 % (ref 34–46.6)
HGB BLD-MCNC: 12.8 G/DL (ref 12–15.9)
HOLD SPECIMEN: NORMAL
LYMPHOCYTES # BLD AUTO: 1.83 10*3/MM3 (ref 0.7–3.1)
LYMPHOCYTES NFR BLD AUTO: 18 % (ref 19.6–45.3)
MAGNESIUM SERPL-MCNC: 1.8 MG/DL (ref 1.6–2.4)
MCH RBC QN AUTO: 31.8 PG (ref 26.6–33)
MCHC RBC AUTO-ENTMCNC: 32.2 G/DL (ref 31.5–35.7)
MCV RBC AUTO: 99 FL (ref 79–97)
MONOCYTES # BLD AUTO: 1.59 10*3/MM3 (ref 0.1–0.9)
MONOCYTES NFR BLD AUTO: 15.6 % (ref 5–12)
NEUTROPHILS NFR BLD AUTO: 59 % (ref 42.7–76)
NEUTROPHILS NFR BLD AUTO: 6.02 10*3/MM3 (ref 1.7–7)
PHOSPHATE SERPL-MCNC: 3.3 MG/DL (ref 2.5–4.5)
PLATELET # BLD AUTO: 263 10*3/MM3 (ref 140–450)
PMV BLD AUTO: 11.6 FL (ref 6–12)
POTASSIUM SERPL-SCNC: 3.6 MMOL/L (ref 3.5–5.2)
PROT SERPL-MCNC: 7.4 G/DL (ref 6–8.5)
RBC # BLD AUTO: 4.02 10*6/MM3 (ref 3.77–5.28)
SODIUM SERPL-SCNC: 138 MMOL/L (ref 136–145)
WBC NRBC COR # BLD AUTO: 10.19 10*3/MM3 (ref 3.4–10.8)

## 2025-02-12 PROCEDURE — 96372 THER/PROPH/DIAG INJ SC/IM: CPT

## 2025-02-12 PROCEDURE — 80053 COMPREHEN METABOLIC PANEL: CPT | Performed by: INTERNAL MEDICINE

## 2025-02-12 PROCEDURE — 25010000002 DENOSUMAB 60 MG/ML SOLUTION PREFILLED SYRINGE: Performed by: INTERNAL MEDICINE

## 2025-02-12 PROCEDURE — 84100 ASSAY OF PHOSPHORUS: CPT | Performed by: INTERNAL MEDICINE

## 2025-02-12 PROCEDURE — 83735 ASSAY OF MAGNESIUM: CPT | Performed by: INTERNAL MEDICINE

## 2025-02-12 PROCEDURE — 36415 COLL VENOUS BLD VENIPUNCTURE: CPT

## 2025-02-12 PROCEDURE — 85025 COMPLETE CBC W/AUTO DIFF WBC: CPT

## 2025-02-12 RX ORDER — MYCOPHENOLATE MOFETIL 500 MG/1
TABLET ORAL 2 TIMES DAILY
COMMUNITY

## 2025-02-12 RX ORDER — SULFAMETHOXAZOLE AND TRIMETHOPRIM 800; 160 MG/1; MG/1
1 TABLET ORAL EVERY OTHER DAY
COMMUNITY

## 2025-02-12 RX ADMIN — DENOSUMAB 60 MG: 60 INJECTION SUBCUTANEOUS at 15:34

## 2025-02-12 NOTE — PROGRESS NOTES
Hematology/Oncology Outpatient Follow Up    PATIENT NAME:Tracie Gross  :1960  MRN: [id number]  PRIMARY CARE PHYSICIAN: Floyd Silva MD  REFERRING PHYSICIAN: Floyd Silva, *      Chief Complaint   Patient presents with    Follow-up     Malignant neoplasm of female breast, unspecified estrogen receptor status, unspecified laterality, unspecified site of breast           HISTORY OF PRESENT ILLNESS:     This is a 64 y.o.  female who was diagnosed with right breast cancer in  when she was 32 years old.  Patient was originally seen on 18.  Please refer to the details of that note for more information.   18 - CBC:  WBC 13.6, hemoglobin 11.7, platelet count 392,000.  Differentials 65% neutrophils, 15% lymphocytes.  There was mild monocytosis at 16.  Eosinophils were 2.3 and basophils were 0.3.  B12 292.  Ferritin 88.  Folate 13.8.  AST slightly high at 57.  Alkaline phosphatase was high at 326.  .  Haptoglobin 179, normal.  SPEP with DYLAN did not show any monoclonal protein.  Flow cytometry did not show any evidence of acute leukemia or lymphoproliferative disease.  There was monocytosis measured at 21%.  Reticulocyte count normal 1.02.  Peripheral smear showed absolute monocytosis at 19%, thrombocytosis and macrocytosis.  BCR-abl was negative.  Methylmalonic acid level was elevated to 420.    18 - PET/CT scan showed multiple small bilateral lymph nodes in the neck without metabolic activity or change from prior CT scans and are likely due to SLE.  There were unchanged bilateral level 1 axillary lymph nodes without metabolic activity.  Multiple mediastinal nodes were also seen.  The largest 11 mm, unchanged from prior imaging with no hypermetabolic activity.  There was no evidence of metastatic disease in the abdomen or pelvis.  There was no focal lesion within the liver or biliary system.  Multiple small retroperitoneal and external iliac nodes, bilateral inguinal nodes  similar to prior imaging with no PET activity.    8/8/18 - RICHIE-2 analysis with no mutation identified.    11/29/18 - Bone density revealed osteoporosis.  Patient is currently on Fosamax.   12/3/18 - Bone marrow aspiration and biopsy showed normocellular bone marrow at 40%.  There was adequate iron stores and no evidence of malignancy was seen.  Flow cytometry was negative.  Cytogenetics showed normal female karyotype.  Blasts were less than 3% of nucleated cells.  There was no significant dyspoiesis.   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     1/22/19 - Urine cytology was negative for malignant cells.    2/28/19 - Patient seen by Dermatology for her annual skin evaluation.   3/13/19 - CT scan of the chest, abdomen and pelvis:  CT scan of chest showed chronic changes.  5/29/2019 patient had an EGD with polypectomy performed by Dr. Conway.  Dr. Conway has recommended follow-up MRI of the pancreas in 2 years.  And also EGD and colonoscopy in 2 years.  11/13/19-left screening mammogram was negative follow-up in 1 year was recommended  5/4/2020 patient had a CT scan of the chest, abdomen and pelvis multiple borderline enlarged mediastinal lymph nodes the largest measuring 2.2 cm in the subcarinal space unchanged from prior.  Pancreas is normal.  There are few retroperitoneal mesenteric and pelvic lymph node enlargement which are similar to prior exam.  All are subcentimeter in size.  5/20/2020 patient had bilateral breast MRI did not show any evidence of malignancy.  12/7/2020 patient DEXA scan showed persistent and worsened osteoporosis  Status post EUS with Dr. Negron in December 2021.  2 1 2023 DEXA scan with osteopenia  11/18/2023 abdominal MRI with and without contrast with chronic dilatation of the common bile duct with similar appearance to prior MRI from  2021.  Mildly blunted appearance of the ampulla which could relate to ampullary stenosis or chronic physiologic changes after cholecystectomy.  Pancreatic parenchymal atrophy with fatty interdigitation.  No evidence of pancreatic mass by MRI.  2/22/2024 screening mammogram with no signs of malignancy in the left breast    Past Medical History:   Diagnosis Date    Allergic 01/01/1998    Anemia     Ankle sprain     Anxiety     Arthritis     Arthritis of back 0ll1/01/2013    Arthritis of neck 01/01/2005    Asthma     Bipolar affective disorder     Breast cancer 1985    s/p R mastectomy    Bursitis of hip [id number]    Cervical disc disorder 01/01/2006    CTS (carpal tunnel syndrome)     Depression 09/01/2000    Fracture of wrist 01/01/1995    Fracture, foot     Frozen shoulder     GERD (gastroesophageal reflux disease) 1993    H/O breast reconstruction     SEVERAL    H/O laminectomy     Hamartoma     Hip arthrosis 01/01/2013    History of medical problems     Hx of bilateral oophorectomy     Hyperlipidemia     Hypertension     Hypothyroidism     Infectious viral hepatitis     Inflammatory bowel disease 1992    Man. EGD/colonoscopy annually (9/2021)    Irritable bowel syndrome     Kienbock's disease, right     WRIST    Knee swelling 01/01/2007    Low back pain 1991    Low back strain     Lumbosacral disc disease 01/01/1995    Had 2 lumber surgeries    Lupus     [place]    Monahan syndrome     Man. EGD/colonoscopy annually (9/2021). MRI alternating w/ EUS annually for pancreatic screen    MRSA infection     Neuroma of foot     Obesity     Osteopenia     Osteoporosis     maintained on Prolia through Karen    Peptic ulceration     Periarthritis of shoulder 04/01/2022    Pneumonia     Pulmonary fibrosis     Raynaud disease     Renal insufficiency     Rotator cuff syndrome [id number]    Scleroderma     Sleep apnea     cpap    Squamous cell cancer of lip     Tear of meniscus of knee     Tendinitis of knee     Thoracic disc  disorder     Von Willebrand disease     Wrist sprain        Past Surgical History:   Procedure Laterality Date    APPENDECTOMY      ARM DEBRIDEMENT Right     X 3    BACK SURGERY      Vetas- cervical fusion    BACK SURGERY      lumbar decomp    BREAST BIOPSY      BREAST LUMPECTOMY      BREAST RECONSTRUCTION Right     BREAST RECONSTRUCTION Right     CARDIAC CATHETERIZATION      no stents placed     SECTION  ,     CHOLECYSTECTOMY      COLONOSCOPY      COLONOSCOPY N/A 2020    Procedure: COLONOSCOPY;  Surgeon: Cayden Conway MD;  Location: [facility] ENDOSCOPY;  Service: Gastroenterology;  Laterality: N/A;  rectal ulcer    COLONOSCOPY N/A 2021    Procedure: COLONOSCOPY WITH POLYPECTOMY X 1;  Surgeon: Cayden Conway MD;  Location: [facility] ENDOSCOPY;  Service: Gastroenterology;  Laterality: N/A;  Post: COLON POLYP    COLONOSCOPY N/A 2023    Procedure: COLONOSCOPY with polypectomy x 1, endoscopic clipping x 1;  Surgeon: Cayden Conway MD;  Location: [facility] ENDOSCOPY;  Service: Gastroenterology;  Laterality: N/A;    COLONOSCOPY N/A 10/01/2024    Procedure: COLONOSCOPY;  Surgeon: Caydne Conway MD;  Location: [facility] ENDOSCOPY;  Service: Gastroenterology;  Laterality: N/A;  normal    COSMETIC SURGERY  [id number]    ENDOSCOPY      ENDOSCOPY N/A 2020    Procedure: ESOPHAGOGASTRODUODENOSCOPY;  Surgeon: Cayden Conway MD;  Location: [facility] ENDOSCOPY;  Service: Gastroenterology;  Laterality: N/A;  Normal EGD    ENDOSCOPY N/A 2021    Procedure: ESOPHAGOGASTRODUODENOSCOPY;  Surgeon: Cayden Conway MD;  Location: [facility] ENDOSCOPY;  Service: Gastroenterology;  Laterality: N/A;  Post: normal egd    ENDOSCOPY N/A 10/01/2024    Procedure: ESOPHAGOGASTRODUODENOSCOPY;  Surgeon: Cayden Conway MD;  Location: [facility] ENDOSCOPY;  Service: Gastroenterology;  Laterality: N/A;  normal    EXPLORATORY LAPAROTOMY      scar tissue removal    EYE SURGERY   2000    FINGER SURGERY Right     ring finger mass excision    HAND SURGERY  Rt wrist  10/15    HIP SURGERY  1/12    HYSTERECTOMY      PARTIAL    JOINT REPLACEMENT Right 2012,2015    hip and knee    KNEE ARTHROSCOPY Left 01/07/2020    Procedure: LEFT KNEE SCOPE with partial lateral meniscectomy;  Surgeon: Chau Perez MD;  Location: [facility] MAIN OR;  Service: Orthopedics    KNEE SURGERY  Rtf knee  2015    LASIK      LASIK/ LASIK ENHANCEMENT    MASTECTOMY RADICAL Right     NECK SURGERY  01/01/06    OOPHORECTOMY Bilateral     SHOULDER SURGERY  11/01/2023    SPINE SURGERY      SPLENECTOMY      SUBTOTAL HYSTERECTOMY      TOTAL HIP ARTHROPLASTY Left 05/17/2023    TOTAL SHOULDER ARTHROPLASTY W/ DISTAL CLAVICLE EXCISION Right 11/01/2023    Procedure: TOTAL SHOULDER REVERSE ARTHROPLASTY;  Surgeon: Floyd Ruiz MD;  Location: [facility] MAIN OR;  Service: Orthopedics;  Laterality: Right;    TRIGGER POINT INJECTION  7/23    TUBAL ABDOMINAL LIGATION  1981    UPPER ENDOSCOPIC ULTRASOUND W/ FNA N/A 12/09/2021    Procedure: ENDOSCOPIC ULTRASOUND WITH ESOPHAGOGASTRODUODENOSCOPY;  Surgeon: Luis E Negron MD;  Location: [facility] ENDOSCOPY;  Service: Gastroenterology;  Laterality: N/A;  Post: GASTROPARESIS, DILATED COMMON BILE DUCT, FUNDIC POLYP, DUODENITIS, NORMAL PANCREAS, HIATAL HERNIA    WRIST SURGERY Right     distal radius head removal (bone dead)  plates and screws decomp lunate         Current Outpatient Medications:     albuterol sulfate  (90 Base) MCG/ACT inhaler, Inhale 2 puffs Every 4 (Four) Hours As Needed for Wheezing., Disp: 8 g, Rfl: 0    ALPRAZolam (XANAX) 1 MG tablet, Take 1 tablet by mouth At Night As Needed for Anxiety., Disp: 30 tablet, Rfl: 2    azithromycin (ZITHROMAX) 500 MG tablet, Take 1 tablet by mouth Daily., Disp: 3 tablet, Rfl: 0    bisoprolol-hydrochlorothiazide (ZIAC) 10-6.25 MG per tablet, TAKE 1 TABLET BY MOUTH DAILY, Disp: 90 tablet, Rfl: 1    Calcium + Vitamin D3 600-5 MG-MCG tablet,  TAKE ONE TABLET BY MOUTH TWICE A DAY, Disp: 60 tablet, Rfl: 6    cetirizine (zyrTEC) 10 MG tablet, TAKE 1 TABLET BY MOUTH DAILY, Disp: 90 tablet, Rfl: 1    cyclobenzaprine (FLEXERIL) 10 MG tablet, TAKE ONE TABLET BY MOUTH THREE TIMES A DAY AS NEEDED FOR MUSCLE SPASMS, Disp: 45 tablet, Rfl: 1    Denosumab (PROLIA SC), Inject  under the skin into the appropriate area as directed Every 6 (Six) Months., Disp: , Rfl:     FeroSul 325 (65 Fe) MG tablet, TAKE 1 TABLET BY MOUTH DAILY WITH BREAKFAST, Disp: 30 tablet, Rfl: 0    Flaxseed, Linseed, (FLAXSEED OIL MAX STR) 1300 MG capsule, Take 1 tablet by mouth 2 (Two) Times a Day., Disp: , Rfl:     fluticasone (FLONASE) 50 MCG/ACT nasal spray, SPRAY TWO SPRAYS IN EACH NOSTRIL ONCE DAILY, Disp: 16 mL, Rfl: 5    furosemide (LASIX) 20 MG tablet, TAKE 1 TABLET BY MOUTH DAILY AS NEEDED FOR LEG SWELLING, Disp: 90 tablet, Rfl: 2    gabapentin (NEURONTIN) 600 MG tablet, TAKE TWO TABLETS BY MOUTH EVERY MORNING THEN TAKE ONE TABLET BY MOUTH DAILY IN THE AFTERNOON THEN TAKE TWO TABLETS BY MOUTH EVERY NIGHT (Patient taking differently: Take 2 tablets by mouth 3 (Three) Times a Day. TAKE TWO TABLETS BY MOUTH EVERY MORNING THEN TAKE ONE TABLET BY MOUTH DAILY IN THE AFTERNOON THEN TAKE TWO TABLETS BY MOUTH EVERY NIGHT), Disp: 450 tablet, Rfl: 1    guaiFENesin (Mucinex) 600 MG 12 hr tablet, Take 2 tablets twice daily for congestion, Disp: 30 tablet, Rfl: 1    hydroxychloroquine (PLAQUENIL) 200 MG tablet, Take 1 tablet by mouth 2 (Two) Times a Day. Indications: Systemic Lupus Erythematosus, Disp: 180 tablet, Rfl: 3    hydrOXYzine (ATARAX) 25 MG tablet, Take 1 tablet by mouth Every 8 (Eight) Hours As Needed for Itching., Disp: 30 tablet, Rfl: 3    lamoTRIgine (LaMICtal) 200 MG tablet, TAKE ONE TABLET BY MOUTH ONCE NIGHTLY, Disp: 90 tablet, Rfl: 1    levothyroxine (SYNTHROID, LEVOTHROID) 175 MCG tablet, TAKE 1 TABLET BY MOUTH DAILY, Disp: 90 tablet, Rfl: 1    lisinopril (PRINIVIL,ZESTRIL) 5 MG  tablet, TAKE 1 TABLET BY MOUTH DAILY, Disp: 90 tablet, Rfl: 0    meclizine (ANTIVERT) 25 MG tablet, Take 1 tablet by mouth 3 (Three) Times a Day As Needed for Dizziness., Disp: 30 tablet, Rfl: 3    mycophenolate (CELLCEPT) 500 MG tablet, Take  by mouth 2 (Two) Times a Day., Disp: , Rfl:     NIFEdipine XL (PROCARDIA XL) 90 MG 24 hr tablet, TAKE 1 TABLET BY MOUTH DAILY, Disp: 90 tablet, Rfl: 1    NON FORMULARY, Apply  topically to the appropriate area as directed. Lidocaine powder Ketamine powder Diclofenac powder, Disp: , Rfl:     ondansetron (ZOFRAN) 4 MG tablet, Take 1 tablet by mouth Every 8 (Eight) Hours As Needed for Nausea or Vomiting., Disp: 30 tablet, Rfl: 3    oxyCODONE (ROXICODONE) 10 MG tablet, Take 1 tablet by mouth Every 6 (Six) Hours As Needed for Moderate Pain., Disp: 120 tablet, Rfl: 0    predniSONE (DELTASONE) 20 MG tablet, Take 2 tablets by mouth Daily., Disp: 10 tablet, Rfl: 0    promethazine-dextromethorphan (PROMETHAZINE-DM) 6.25-15 MG/5ML syrup, Take 5 mL by mouth 4 (Four) Times a Day As Needed for Cough., Disp: 240 mL, Rfl: 1    QUEtiapine (SEROquel) 100 MG tablet, Take 0.5 tablets by mouth Every Night., Disp: 90 tablet, Rfl: 1    sulfamethoxazole-trimethoprim (BACTRIM DS,SEPTRA DS) 800-160 MG per tablet, Take 1 tablet by mouth Every Other Day., Disp: , Rfl:   No current facility-administered medications for this visit.    Allergies   Allergen Reactions    Aspirin Other (See Comments)     VONWILLENBRAND DISEASE     Baclofen Hives       Family History   Problem Relation Age of Onset    Colon cancer Mother     Heart disease Mother     Cancer Mother         Colon    Arthritis Mother     Kidney disease Father     Heart disease Father     Depression Father         Bladder cancer    Hyperlipidemia Father     Vision loss Father     Hypertension Father     Colon cancer Sister     Diabetes Sister     Heart disease Sister     Von Willebrand disease Sister     Von Willebrand disease Brother     Cancer  Brother     Von Willebrand disease Son     Breast cancer Son     Diabetes Paternal Grandmother     Stroke Paternal Grandmother     Colon cancer Other     Colon cancer Son     Von Willebrand disease Son     Breast cancer Son     Cancer Sister         Melanoma, colon, bladder    Vision loss Sister     Stroke Sister     Arthritis Sister     Asthma Sister     Diabetes Sister     Heart disease Sister     Hyperlipidemia Sister     Kidney disease Sister     Cancer Paternal Aunt     Cancer Maternal Aunt     Cancer Maternal Aunt     Hyperlipidemia Sister     Thyroid disease Sister     Arthritis Sister     Hypertension Sister     Kidney disease Sister     Broken bones Sister     Rheumatologic disease Sister     Thyroid disease Sister     Cancer Sister     Clotting disorder Sister        Cancer-related family history includes Breast cancer in her son and son; Cancer in her brother, maternal aunt, maternal aunt, mother, paternal aunt, sister, and sister; Colon cancer in her mother, sister, son, and another family member.    Social History     Tobacco Use    Smoking status: Former     Current packs/day: 0.00     Average packs/day: 0.3 packs/day for 10.0 years (2.5 ttl pk-yrs)     Types: Cigarettes     Start date: 1984     Quit date: 1994     Years since quittin.1     Passive exposure: Past    Smokeless tobacco: Never    Tobacco comments:     Off and on for  those years   Vaping Use    Vaping status: Never Used   Substance Use Topics    Alcohol use: Not Currently     Comment: Rarely    Drug use: No       I have reviewed and confirmed the accuracy of the patient's history: Chief complaint, HPI, ROS, and Subjective as entered by the MA/LPN/RN. Suzan Jones MD 25    SUBJECTIVE:    Patient is here today for follow-up.  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   HENT:  Negative  "for ear pain, mouth sores, nosebleeds and sore throat.    Eyes:  Negative for photophobia and visual disturbance.   Respiratory:  Negative for wheezing and stridor.    Cardiovascular:  Negative for chest pain and palpitations.   Gastrointestinal:  Negative for abdominal pain, diarrhea, nausea and vomiting.   Endocrine: Negative for cold intolerance and heat intolerance.   Genitourinary:  Negative for dysuria and hematuria.   Musculoskeletal:  Negative for joint swelling and neck stiffness.   Skin:  Negative for color change and rash.   Neurological:  Negative for seizures and syncope.   Hematological:  Negative for adenopathy.        No obvious bleeding   Psychiatric/Behavioral:  Negative for agitation, confusion and hallucinations.          OBJECTIVE:    Vitals:    02/12/25 1517   BP: (!) 89/59   Pulse: 89   Resp: 20   Temp: 97.6 °F (36.4 °C)   TempSrc: Infrared   SpO2: 95%   Weight: 105 kg (232 lb)   Height: 162.6 cm (64\")   PainSc:   6   PainLoc: Shoulder             ECOG  (1) Restricted in physically strenuous activity, ambulatory and able to do work of light nature    Physical Exam   Constitutional: She is oriented to person, place, and time.  Non-toxic appearance. No distress.   HENT:   Head: Normocephalic.   Eyes: Pupils are equal, round, and reactive to light. Conjunctivae are normal. No scleral icterus.   Cardiovascular: Normal rate and regular rhythm.   Pulmonary/Chest: Effort normal. No respiratory distress. She has no wheezes.   Abdominal: Soft. There is no abdominal tenderness.   Neurological: She is oriented to person, place, and time. She displays no weakness.   Skin: No lesion noted. No erythema.   Psychiatric: Her behavior is normal. Mood, judgment and thought content normal.   Vitals reviewed.    Patient declined breast/chest wall exam today.     I have reexamined the patient and the results are consistent with the previously documented exam. Suzan Jones MD      RECENT LABS    WBC   Date " Value Ref Range Status   02/12/2025 10.19 3.40 - 10.80 10*3/mm3 Final     RBC   Date Value Ref Range Status   02/12/2025 4.02 3.77 - 5.28 10*6/mm3 Final     Hemoglobin   Date Value Ref Range Status   02/12/2025 12.8 12.0 - 15.9 g/dL Final     Hematocrit   Date Value Ref Range Status   02/12/2025 39.8 34.0 - 46.6 % Final     MCV   Date Value Ref Range Status   02/12/2025 99.0 (H) 79.0 - 97.0 fL Final     MCH   Date Value Ref Range Status   02/12/2025 31.8 26.6 - 33.0 pg Final     MCHC   Date Value Ref Range Status   02/12/2025 32.2 31.5 - 35.7 g/dL Final     RDW   Date Value Ref Range Status   02/12/2025 14.0 12.3 - 15.4 % Final     RDW-SD   Date Value Ref Range Status   02/12/2025 49.5 37.0 - 54.0 fl Final     MPV   Date Value Ref Range Status   02/12/2025 11.6 6.0 - 12.0 fL Final     Platelets   Date Value Ref Range Status   02/12/2025 263 140 - 450 10*3/mm3 Final     Neutrophil %   Date Value Ref Range Status   02/12/2025 59.0 42.7 - 76.0 % Final     Lymphocyte %   Date Value Ref Range Status   02/12/2025 18.0 (L) 19.6 - 45.3 % Final     Monocyte %   Date Value Ref Range Status   02/12/2025 15.6 (H) 5.0 - 12.0 % Final     Eosinophil %   Date Value Ref Range Status   02/12/2025 6.8 (H) 0.3 - 6.2 % Final     Basophil %   Date Value Ref Range Status   02/12/2025 0.6 0.0 - 1.5 % Final     Immature Grans %   Date Value Ref Range Status   07/03/2024 0.6 (H) 0.0 - 0.5 % Final     Neutrophils, Absolute   Date Value Ref Range Status   02/12/2025 6.02 1.70 - 7.00 10*3/mm3 Final     Lymphocytes, Absolute   Date Value Ref Range Status   02/12/2025 1.83 0.70 - 3.10 10*3/mm3 Final     Monocytes, Absolute   Date Value Ref Range Status   02/12/2025 1.59 (H) 0.10 - 0.90 10*3/mm3 Final     Eosinophils, Absolute   Date Value Ref Range Status   02/12/2025 0.69 (H) 0.00 - 0.40 10*3/mm3 Final     Basophils, Absolute   Date Value Ref Range Status   02/12/2025 0.06 0.00 - 0.20 10*3/mm3 Final     Immature Grans, Absolute   Date Value Ref  Range Status   07/03/2024 0.05 0.00 - 0.05 10*3/mm3 Final     nRBC   Date Value Ref Range Status   07/03/2024 0.0 0.0 - 0.2 /100 WBC Final       Lab Results   Component Value Date    GLUCOSE 98 02/12/2025    BUN 31 (H) 02/12/2025    CREATININE 2.17 (H) 02/12/2025    EGFRIFNONA 54 (L) 01/18/2022    EGFRIFAFRI 83 04/04/2017    BCR 14.3 02/12/2025    K 3.6 02/12/2025    CO2 27.6 02/12/2025    CALCIUM 10.0 02/12/2025    ALBUMIN 4.2 02/12/2025    LABIL2 1.1 05/31/2019    AST 93 (H) 02/12/2025    ALT 66 (H) 02/12/2025           ASSESSMENT:    Comprehensive gene analysis with [employer] technology returned with 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  She also has DICER1 gene as well as TSC2 with variants of unknown significance.  Reviewed  History of right breast cancer, status post right mastectomy followed by axillary lymph node dissection and right chest wall reconstruction in 1985 at the age of 32.  Reviewed  Recurrent elevated liver function tests, pruritus, but no significant pathology found on both the PET scan, MRCP, except for post cholecystectomy, biliary ductal dilatation.    She was on on Colestipol for pruritus.  We will plan to repeat her CMP today, will obtain CT scan of the abdomen and pelvis and have her referred back to Dr. Conway as soon as possible.  Also will call for records of her EUS findings from December 2021 performed by Dr. Arias.  Her abdominal symptoms are benign.  Reviewed her recent CT abdomen and pelvis which was essentially unremarkable  Left hip avascular necrosis: Status post left hip replacement surgery  Asymptomatic low blood pressure: I have asked patient to increase her p.o. fluid intake follow-up with Dr. Silva.  Discussed her blood pressure medications will need to be adjusted to follow-up with her PCP  She has peripheral lymphadenopathy involving the neck, axilla, mediastinum and retroperitoneum that are not PET avid.  This lymphadenopathy may be due  to chronic inflammatory disease [lupus].    Strong family history of colon cancer and personal history of breast cancer.   Systemic lupus erythematosus, scleroderma, Raynaud’s phenomenon.   Normocytic anemia, multifactorial, underlying autoimmune disease, relative iron deficiency and B12 deficiency.  On iron and B12 supplementation.  Stable  Persistent monocytosis, status post bone marrow aspiration and biopsy which was essentially normal.  Stable  Osteoporosis: Now on Prolia most recent DEXA scan 2/1/2023 showing osteopenia.  Continue Prolia  Urine cytology checked annually: Due now 8/15/2024  Annual dermatology appointments reviewed with patient  Status post complete hysterectomy reducing her risk for ovarian and uterine cancer.  Assessment has been reviewed and updated    PLANS:     Labs reviewed  Bilateral screening mammogram scheduled February 2025  Last colonoscopy was October 2024  Urine cytology due today 2/12/2025  Bone density also scheduled February 2025  Continue follow-up surveillance for Monahan syndrome     Encouraged patient to follow up with GI for pancreatic cancer screening.   Abdominal MRI completed 11/18/2023. She has colonoscopy coming up with Dr. Conway October 2024  Continue Prolia every 6 months  Continue calcium with vitamin D  Bilateral breast MRI will be due again September 2025    Continue monthly breast self-exam and call for lumps nipple discharge, skin discoloration.      Annual dermatology appointments, now goes to [facility] now. She is seen q 6 months.  Follow-up 6 months or earlier as needed    I have answered all her questions to her satisfaction.     I have reviewed labs results, imaging, vitals, and medications with the patient today.       Patient verbalized understanding and is in agreement of the above plan.      I spent 40 total minutes, face-to-face, caring for Tracie boggs. 90% of this time involved counseling and/or coordination of care as documented within this  note.       Electronically signed by Suzan Jones MD, 02/12/25, 5:27 PM EST.

## 2025-02-13 DIAGNOSIS — Z15.09 LYNCH SYNDROME: ICD-10-CM

## 2025-02-13 DIAGNOSIS — C50.919 MALIGNANT NEOPLASM OF FEMALE BREAST, UNSPECIFIED ESTROGEN RECEPTOR STATUS, UNSPECIFIED LATERALITY, UNSPECIFIED SITE OF BREAST: Primary | ICD-10-CM

## 2025-02-13 DIAGNOSIS — R79.89 ELEVATED SERUM CREATININE: ICD-10-CM

## 2025-02-15 DIAGNOSIS — R63.5 WEIGHT GAIN: ICD-10-CM

## 2025-02-15 DIAGNOSIS — G47.00 INSOMNIA, UNSPECIFIED TYPE: ICD-10-CM

## 2025-02-17 RX ORDER — ALPRAZOLAM 1 MG/1
1 TABLET ORAL NIGHTLY PRN
Qty: 30 TABLET | Refills: 2 | Status: SHIPPED | OUTPATIENT
Start: 2025-02-17

## 2025-02-18 ENCOUNTER — LAB (OUTPATIENT)
Dept: LAB | Facility: HOSPITAL | Age: 65
End: 2025-02-18
Payer: MEDICARE

## 2025-02-18 DIAGNOSIS — R79.89 ELEVATED SERUM CREATININE: ICD-10-CM

## 2025-02-18 DIAGNOSIS — Z15.09 LYNCH SYNDROME: ICD-10-CM

## 2025-02-18 LAB
ALBUMIN SERPL-MCNC: 4.1 G/DL (ref 3.5–5.2)
ALBUMIN/GLOB SERPL: 1.3 G/DL
ALP SERPL-CCNC: 114 U/L (ref 39–117)
ALT SERPL W P-5'-P-CCNC: 21 U/L (ref 1–33)
ANION GAP SERPL CALCULATED.3IONS-SCNC: 16.8 MMOL/L (ref 5–15)
AST SERPL-CCNC: 30 U/L (ref 1–32)
BILIRUB SERPL-MCNC: 0.2 MG/DL (ref 0–1.2)
BUN SERPL-MCNC: 16 MG/DL (ref 8–23)
BUN/CREAT SERPL: 9.9 (ref 7–25)
CALCIUM SPEC-SCNC: 9.9 MG/DL (ref 8.6–10.5)
CHLORIDE SERPL-SCNC: 99 MMOL/L (ref 98–107)
CO2 SERPL-SCNC: 27.2 MMOL/L (ref 22–29)
CREAT SERPL-MCNC: 1.62 MG/DL (ref 0.57–1)
EGFRCR SERPLBLD CKD-EPI 2021: 34.9 ML/MIN/1.73
GLOBULIN UR ELPH-MCNC: 3.1 GM/DL
GLUCOSE SERPL-MCNC: 95 MG/DL (ref 65–99)
POTASSIUM SERPL-SCNC: 3.6 MMOL/L (ref 3.5–5.2)
PROT SERPL-MCNC: 7.2 G/DL (ref 6–8.5)
SODIUM SERPL-SCNC: 143 MMOL/L (ref 136–145)

## 2025-02-18 PROCEDURE — 36415 COLL VENOUS BLD VENIPUNCTURE: CPT

## 2025-02-18 PROCEDURE — 80053 COMPREHEN METABOLIC PANEL: CPT | Performed by: NURSE PRACTITIONER

## 2025-02-24 ENCOUNTER — HOSPITAL ENCOUNTER (OUTPATIENT)
Dept: BONE DENSITY | Facility: HOSPITAL | Age: 65
Discharge: HOME | End: 2025-02-24
Payer: MEDICARE

## 2025-02-24 ENCOUNTER — HOSPITAL ENCOUNTER (OUTPATIENT)
Dept: CT IMAGING | Facility: HOSPITAL | Age: 65
Discharge: HOME | End: 2025-02-24
Payer: MEDICARE

## 2025-02-24 ENCOUNTER — HOSPITAL ENCOUNTER (OUTPATIENT)
Dept: MAMMOGRAPHY | Facility: HOSPITAL | Age: 65
Discharge: HOME | End: 2025-02-24
Payer: MEDICARE

## 2025-02-24 DIAGNOSIS — Z15.09 LYNCH SYNDROME: ICD-10-CM

## 2025-02-24 DIAGNOSIS — C50.919 MALIGNANT NEOPLASM OF FEMALE BREAST, UNSPECIFIED ESTROGEN RECEPTOR STATUS, UNSPECIFIED LATERALITY, UNSPECIFIED SITE OF BREAST: ICD-10-CM

## 2025-02-24 DIAGNOSIS — Z78.0 POST-MENOPAUSAL: ICD-10-CM

## 2025-02-24 DIAGNOSIS — Z12.31 ENCOUNTER FOR SCREENING MAMMOGRAM FOR MALIGNANT NEOPLASM OF BREAST: ICD-10-CM

## 2025-02-24 DIAGNOSIS — R51.9 NONINTRACTABLE HEADACHE, UNSPECIFIED CHRONICITY PATTERN, UNSPECIFIED HEADACHE TYPE: ICD-10-CM

## 2025-02-24 DIAGNOSIS — R42 DIZZINESS: ICD-10-CM

## 2025-02-24 PROCEDURE — 77063 BREAST TOMOSYNTHESIS BI: CPT

## 2025-02-24 PROCEDURE — 77080 DXA BONE DENSITY AXIAL: CPT

## 2025-02-24 PROCEDURE — 70450 CT HEAD/BRAIN W/O DYE: CPT

## 2025-02-24 PROCEDURE — 77067 SCR MAMMO BI INCL CAD: CPT

## 2025-03-03 DIAGNOSIS — M34.9 SCLERODERMA: ICD-10-CM

## 2025-03-03 DIAGNOSIS — I10 PRIMARY HYPERTENSION: ICD-10-CM

## 2025-03-03 RX ORDER — NIFEDIPINE 90 MG/1
90 TABLET, EXTENDED RELEASE ORAL DAILY
Qty: 90 TABLET | Refills: 1 | Status: SHIPPED | OUTPATIENT
Start: 2025-03-03

## 2025-03-11 ENCOUNTER — HOSPITAL ENCOUNTER (OUTPATIENT)
Dept: MRI IMAGING | Facility: HOSPITAL | Age: 65
Discharge: HOME | End: 2025-03-11
Admitting: INTERNAL MEDICINE
Payer: MEDICARE

## 2025-03-11 DIAGNOSIS — Z15.09 LYNCH SYNDROME: ICD-10-CM

## 2025-03-11 DIAGNOSIS — G47.00 INSOMNIA, UNSPECIFIED TYPE: ICD-10-CM

## 2025-03-11 DIAGNOSIS — C50.919 MALIGNANT NEOPLASM OF FEMALE BREAST, UNSPECIFIED ESTROGEN RECEPTOR STATUS, UNSPECIFIED LATERALITY, UNSPECIFIED SITE OF BREAST: ICD-10-CM

## 2025-03-11 DIAGNOSIS — M87.052 AVASCULAR NECROSIS OF FEMORAL HEAD, LEFT: ICD-10-CM

## 2025-03-11 DIAGNOSIS — R63.5 WEIGHT GAIN: ICD-10-CM

## 2025-03-11 LAB
CREAT BLDA-MCNC: 1.8 MG/DL (ref 0.6–1.3)
EGFRCR SERPLBLD CKD-EPI 2021: 30.8 ML/MIN/1.73

## 2025-03-11 PROCEDURE — 74183 MRI ABD W/O CNTR FLWD CNTR: CPT

## 2025-03-11 PROCEDURE — 82565 ASSAY OF CREATININE: CPT

## 2025-03-11 PROCEDURE — 25010000002 GADOTERIDOL PER 1 ML: Performed by: INTERNAL MEDICINE

## 2025-03-11 PROCEDURE — A9579 GAD-BASE MR CONTRAST NOS,1ML: HCPCS | Performed by: INTERNAL MEDICINE

## 2025-03-11 RX ORDER — OXYCODONE HYDROCHLORIDE 10 MG/1
10 TABLET ORAL EVERY 6 HOURS PRN
Qty: 120 TABLET | Refills: 0 | Status: ON HOLD | OUTPATIENT
Start: 2025-03-11

## 2025-03-11 RX ORDER — ALPRAZOLAM 1 MG/1
1 TABLET ORAL NIGHTLY PRN
Qty: 30 TABLET | Refills: 2 | Status: ON HOLD | OUTPATIENT
Start: 2025-03-11

## 2025-03-11 RX ADMIN — GADOTERIDOL 20 ML: 279.3 INJECTION, SOLUTION INTRAVENOUS at 17:04

## 2025-03-11 NOTE — FSED PROVIDER NOTE
Should cont potassium .  Please update med list and refill if needed    Subjective   History of Present Illness  Chief Complaint: Left foot and ankle pain      HPI: Patient is a 63-year-old morbidly obese female who presents by private vehicle with known history of hypertension, hypothyroidism, lupus, hip and shoulder repair with complaints of left foot pain.  She states that started approximately 4 days ago after she had increased physical activity she reports she lives a sedentary lifestyle.  She has had no known injury.  Pain is worsened with ambulation no attempted treatment prior to arrival.  She is currently in physical therapy for her shoulder.     PCP: Ricardo         Review of Systems  See above as noted     Past Medical History:   Diagnosis Date    Allergic 01/01/1998    Ankle sprain     Anxiety     Arthritis     Arthritis of back 0ll1/01/2013    Arthritis of neck 01/01/2005    Asthma     Bipolar affective disorder     Breast cancer 1985    s/p R mastectomy    Bursitis of hip [id number]    Cervical disc disorder 01/01/2006    CTS (carpal tunnel syndrome)     Depression 09/01/2000    Fracture of wrist 01/01/1995    Fracture, foot     GERD (gastroesophageal reflux disease) 1993    H/O breast reconstruction     SEVERAL    H/O laminectomy     Hamartoma     Hip arthrosis 01/01/2013    History of medical problems     Hx of bilateral oophorectomy     Hypertension     Hypothyroidism     Inflammatory bowel disease 1992    Man. EGD/colonoscopy annually (9/2021)    Irritable bowel syndrome     Kienbock's disease, right     WRIST    Knee swelling 01/01/2007    Low back pain 1991    Lumbosacral disc disease 01/01/1995    Had 2 lumber surgeries    Lupus     Ravenell    Monahan syndrome     Man. EGD/colonoscopy annually (9/2021). MRI alternating w/ EUS annually for pancreatic screen    MRSA infection     Obesity     Osteopenia     Osteoporosis     maintained on Prolia through Karen    Peptic ulceration     Periarthritis of shoulder 04/01/2022    Pneumonia     Raynaud disease     Renal  insufficiency     Rotator cuff syndrome [id number]    Scleroderma     Sleep apnea     cpap    Squamous cell cancer of lip     Tear of meniscus of knee     Tendinitis of knee     Thoracic disc disorder     Von Willebrand disease        Allergies   Allergen Reactions    Aspirin Other (See Comments)     VONWILLENBRAND DISEASE     Penicillins Anaphylaxis    Baclofen Hives    Tape  [Adhesive Tape] Rash       Past Surgical History:   Procedure Laterality Date    APPENDECTOMY      ARM DEBRIDEMENT Right     X 3    BACK SURGERY      Vetas- cervical fusion    BACK SURGERY      lumbar decomp    BREAST BIOPSY      BREAST LUMPECTOMY      BREAST RECONSTRUCTION Right     BREAST RECONSTRUCTION Right     CARDIAC CATHETERIZATION      no stents placed     SECTION  ,     CHOLECYSTECTOMY      COLONOSCOPY      COLONOSCOPY N/A 2020    Procedure: COLONOSCOPY;  Surgeon: Cayden Conway MD;  Location: [facility] ENDOSCOPY;  Service: Gastroenterology;  Laterality: N/A;  rectal ulcer    COLONOSCOPY N/A 2021    Procedure: COLONOSCOPY WITH POLYPECTOMY X 1;  Surgeon: Cayden Conway MD;  Location: [facility] ENDOSCOPY;  Service: Gastroenterology;  Laterality: N/A;  Post: COLON POLYP    COLONOSCOPY N/A 2023    Procedure: COLONOSCOPY with polypectomy x 1, endoscopic clipping x 1;  Surgeon: Cayden Conway MD;  Location: [facility] ENDOSCOPY;  Service: Gastroenterology;  Laterality: N/A;    COSMETIC SURGERY  [id number]    ENDOSCOPY      ENDOSCOPY N/A 2020    Procedure: ESOPHAGOGASTRODUODENOSCOPY;  Surgeon: Cayden Conway MD;  Location: [facility] ENDOSCOPY;  Service: Gastroenterology;  Laterality: N/A;  Normal EGD    ENDOSCOPY N/A 2021    Procedure: ESOPHAGOGASTRODUODENOSCOPY;  Surgeon: Cayden Conway MD;  Location: [facility] ENDOSCOPY;  Service: Gastroenterology;  Laterality: N/A;  Post: normal egd    EXPLORATORY LAPAROTOMY      scar tissue removal    EYE SURGERY      FINGER  SURGERY Right     ring finger mass excision    HAND SURGERY  Rt wrist  10/15    HIP SURGERY  1/12    HYSTERECTOMY      PARTIAL    JOINT REPLACEMENT Right 2012,2015    hip and knee    KNEE ARTHROSCOPY Left 01/07/2020    Procedure: LEFT KNEE SCOPE with partial lateral meniscectomy;  Surgeon: Chau Perez MD;  Location: [facility] MAIN OR;  Service: Orthopedics    KNEE SURGERY  Rtf knee  2015    LASIK      LASIK/ LASIK ENHANCEMENT    MASTECTOMY RADICAL Right     NECK SURGERY  01/01/06    OOPHORECTOMY Bilateral     SHOULDER SURGERY  11/01/2023    SPINE SURGERY      SPLENECTOMY      TOTAL SHOULDER ARTHROPLASTY W/ DISTAL CLAVICLE EXCISION Right 11/01/2023    Procedure: TOTAL SHOULDER REVERSE ARTHROPLASTY;  Surgeon: Floyd Ruiz MD;  Location: [facility] MAIN OR;  Service: Orthopedics;  Laterality: Right;    TRIGGER POINT INJECTION  7/23    TUBAL ABDOMINAL LIGATION  1981    UPPER ENDOSCOPIC ULTRASOUND W/ FNA N/A 12/09/2021    Procedure: ENDOSCOPIC ULTRASOUND WITH ESOPHAGOGASTRODUODENOSCOPY;  Surgeon: Luis E Negron MD;  Location: [facility] ENDOSCOPY;  Service: Gastroenterology;  Laterality: N/A;  Post: GASTROPARESIS, DILATED COMMON BILE DUCT, FUNDIC POLYP, DUODENITIS, NORMAL PANCREAS, HIATAL HERNIA    WRIST SURGERY Right     distal radius head removal (bone dead)  plates and screws decomp lunate       Family History   Problem Relation Age of Onset    Colon cancer Mother     Heart disease Mother     Cancer Mother         Colon    Arthritis Mother     Kidney disease Father     Heart disease Father     Depression Father         Bladder cancer    Hyperlipidemia Father     Vision loss Father     Hypertension Father     Colon cancer Sister     Diabetes Sister     Heart disease Sister     Von Willebrand disease Sister     Von Willebrand disease Brother     Cancer Brother     Von Willebrand disease Son     Breast cancer Son     Other Son         burdick syndrome    Diabetes Paternal Grandmother     Stroke Paternal Grandmother      Colon cancer Other     Colon cancer Son     Von Willebrand disease Son     Other Son         burdick syndrome    Breast cancer Son     Cancer Sister         Melanoma, colon, bladder    Vision loss Sister     Other Sister     Stroke Sister     Arthritis Sister     Asthma Sister     Diabetes Sister     Heart disease Sister     Hyperlipidemia Sister     Kidney disease Sister     Cancer Paternal Aunt     Cancer Maternal Aunt     Cancer Maternal Aunt     Hyperlipidemia Sister     Thyroid disease Sister     Arthritis Sister     Hypertension Sister     Kidney disease Sister     Broken bones Sister     Rheumatologic disease Sister     Thyroid disease Sister     Cancer Sister     Clotting disorder Sister        Social History     Socioeconomic History    Marital status:      Spouse name: Brian    Number of children: 2   Tobacco Use    Smoking status: Former     Current packs/day: 0.00     Average packs/day: 0.3 packs/day for 10.0 years (2.5 ttl pk-yrs)     Types: Cigarettes     Start date: 1984     Quit date: 1994     Years since quittin.5     Passive exposure: Past    Smokeless tobacco: Never    Tobacco comments:     Off and on for  those years   Vaping Use    Vaping status: Never Used   Substance and Sexual Activity    Alcohol use: Not Currently     Comment: Rarely    Drug use: No    Sexual activity: Never           Objective   Physical Exam  Vitals reviewed.   Constitutional:       Appearance: She is obese.   HENT:      Head: Normocephalic.   Eyes:      Extraocular Movements: Extraocular movements intact.      Pupils: Pupils are equal, round, and reactive to light.   Cardiovascular:      Pulses: Normal pulses.   Pulmonary:      Effort: Pulmonary effort is normal.   Musculoskeletal:        Feet:    Feet:      Comments: Pain on palpation, no wound, bony stepoffs.  Distal neurovascular intact  Pain radiates the medial aspect into ankle.    Skin:     General: Skin is warm.   Neurological:      General: No  "focal deficit present.      Mental Status: She is alert and oriented to person, place, and time.         Procedures           ED Course      /70 (BP Location: Right arm, Patient Position: Sitting)   Pulse 83   Temp 98.3 °F (36.8 °C) (Temporal)   Resp 18   Ht 162.6 cm (64\")   Wt 95.3 kg (210 lb)   LMP 05/08/1983   SpO2 95%   BMI 36.05 kg/m²   Labs Reviewed - No data to display  Medications - No data to display  XR Ankle 3+ View Left    Result Date: 7/15/2024  Impression: 1.No acute osseous process identified. 2.Degenerative changes as described above. Electronically Signed: Adrian Mckay MD  7/15/2024 10:41 AM EDT  Workstation ID: [id number]    XR Foot 3+ View Left    Result Date: 7/15/2024  Impression: 1.No acute osseous process identified. 2.Degenerative changes as described above. Electronically Signed: Ardian Mckay MD  7/15/2024 10:41 AM EDT  Workstation ID: [id number]                                        Medical Decision Making  Patient presented with above complaints, noted physical exam was completed x-rays were obtained of the patient's left foot and ankle were negative for fracture no evidence of dislocation radiologist notes osteoarthritis.  Differentials considered but not all-inclusive, sprain, strain, arthritis, fracture.  Patient was placed in an Ace wrap we discussed nonpharmacological treatment of her symptoms as well as over-the-counter pain relievers.  She is currently in physical therapy encouraged to discuss persistent discomfort with them also encouraged follow-up with PCP.  Patient gave verbal understanding of the ED course, denies further questions or complaints at time of discharge.    Chart review:  7/11/2024 outpatient note from physical therapy, diagnosis to include limited range of motion, osteoarthritis, chronic right shoulder pain    Note Disclaimer: At [facility], we believe that sharing information builds trust and better  relationships. You are receiving this " note because you recently visited [facility]. It is possible you will see health information before a provider has talked with you about it. This kind of information can be easy to misunderstand. To help you fully understand what it means for your health, we urge you to discuss this note with your provider.       Part of this note may be an electronic transcription/translation of spoken language to printed text using the Dragon Dictation System.    Appropriate PPE worn during exam.    Amount and/or Complexity of Data Reviewed  External Data Reviewed: notes.  Radiology: ordered and independent interpretation performed. Decision-making details documented in ED Course.        Final diagnoses:   Left foot pain       ED Disposition  ED Disposition       ED Disposition   Discharge    Condition   Stable    Comment   --               Floyd Silva MD  [address]  [facility] [address]  [place] [address] [phone]               Medication List        Changed      gabapentin 600 MG tablet  Commonly known as: NEURONTIN  TAKE TWO TABLETS BY MOUTH EVERY MORNING THEN TAKE ONE TABLET BY MOUTH DAILY IN THE AFTERNOON THEN TAKE TWO TABLETS BY MOUTH EVERY NIGHT  What changed:   how much to take  how to take this  when to take this

## 2025-03-12 RX ORDER — DEXTROMETHORPHAN HYDROBROMIDE AND PROMETHAZINE HYDROCHLORIDE 15; 6.25 MG/5ML; MG/5ML
5 SYRUP ORAL 4 TIMES DAILY PRN
Qty: 240 ML | Refills: 1 | Status: ON HOLD | OUTPATIENT
Start: 2025-03-12

## 2025-03-15 ENCOUNTER — HOSPITAL ENCOUNTER (INPATIENT)
Facility: HOSPITAL | Age: 65
LOS: 16 days | Discharge: HOME HEALTH | DRG: 196 | End: 2025-03-31
Attending: EMERGENCY MEDICINE | Admitting: STUDENT IN AN ORGANIZED HEALTH CARE EDUCATION/TRAINING PROGRAM
Payer: MEDICARE

## 2025-03-15 ENCOUNTER — APPOINTMENT (OUTPATIENT)
Dept: GENERAL RADIOLOGY | Facility: HOSPITAL | Age: 65
DRG: 196 | End: 2025-03-15
Payer: MEDICARE

## 2025-03-15 DIAGNOSIS — R06.00 DYSPNEA, UNSPECIFIED TYPE: Primary | ICD-10-CM

## 2025-03-15 DIAGNOSIS — J84.10 PULMONARY FIBROSIS: ICD-10-CM

## 2025-03-15 DIAGNOSIS — I95.9 HYPOTENSION, UNSPECIFIED HYPOTENSION TYPE: ICD-10-CM

## 2025-03-15 DIAGNOSIS — M19.011 OSTEOARTHRITIS OF RIGHT GLENOHUMERAL JOINT: ICD-10-CM

## 2025-03-15 DIAGNOSIS — N39.0 URINARY TRACT INFECTION WITHOUT HEMATURIA, SITE UNSPECIFIED: ICD-10-CM

## 2025-03-15 PROBLEM — R06.02 SOB (SHORTNESS OF BREATH): Status: ACTIVE | Noted: 2025-03-15

## 2025-03-15 LAB
ANION GAP SERPL CALCULATED.3IONS-SCNC: 12.7 MMOL/L (ref 5–15)
ARTERIAL PATENCY WRIST A: POSITIVE
ATMOSPHERIC PRESS: ABNORMAL MM[HG]
B PARAPERT DNA SPEC QL NAA+PROBE: NOT DETECTED
B PERT DNA SPEC QL NAA+PROBE: NOT DETECTED
BACTERIA UR QL AUTO: ABNORMAL /HPF
BASE EXCESS BLDA CALC-SCNC: 1.8 MMOL/L (ref 0–3)
BASOPHILS # BLD AUTO: 0.12 10*3/MM3 (ref 0–0.2)
BASOPHILS NFR BLD AUTO: 0.9 % (ref 0–1.5)
BDY SITE: ABNORMAL
BILIRUB UR QL STRIP: ABNORMAL
BUN SERPL-MCNC: 27 MG/DL (ref 8–23)
BUN/CREAT SERPL: 11.6 (ref 7–25)
C PNEUM DNA NPH QL NAA+NON-PROBE: NOT DETECTED
CALCIUM SPEC-SCNC: 8.4 MG/DL (ref 8.6–10.5)
CHLORIDE SERPL-SCNC: 98 MMOL/L (ref 98–107)
CLARITY UR: ABNORMAL
CO2 BLDA-SCNC: 28.6 MMOL/L (ref 22–29)
CO2 SERPL-SCNC: 24.3 MMOL/L (ref 22–29)
COLOR UR: ABNORMAL
CREAT SERPL-MCNC: 2.32 MG/DL (ref 0.57–1)
D DIMER PPP FEU-MCNC: 1.76 MCGFEU/ML (ref 0–0.66)
DEPRECATED RDW RBC AUTO: 50.4 FL (ref 37–54)
EGFRCR SERPLBLD CKD-EPI 2021: 22.7 ML/MIN/1.73
EOSINOPHIL # BLD AUTO: 0.54 10*3/MM3 (ref 0–0.4)
EOSINOPHIL NFR BLD AUTO: 4.2 % (ref 0.3–6.2)
ERYTHROCYTE [DISTWIDTH] IN BLOOD BY AUTOMATED COUNT: 14.3 % (ref 12.3–15.4)
FLUAV SUBTYP SPEC NAA+PROBE: NOT DETECTED
FLUBV RNA ISLT QL NAA+PROBE: NOT DETECTED
GLUCOSE SERPL-MCNC: 102 MG/DL (ref 65–99)
GLUCOSE UR STRIP-MCNC: NEGATIVE MG/DL
HADV DNA SPEC NAA+PROBE: NOT DETECTED
HCO3 BLDA-SCNC: 27.2 MMOL/L (ref 21–28)
HCOV 229E RNA SPEC QL NAA+PROBE: NOT DETECTED
HCOV HKU1 RNA SPEC QL NAA+PROBE: NOT DETECTED
HCOV NL63 RNA SPEC QL NAA+PROBE: NOT DETECTED
HCOV OC43 RNA SPEC QL NAA+PROBE: NOT DETECTED
HCT VFR BLD AUTO: 34.1 % (ref 34–46.6)
HEMODILUTION: NO
HGB BLD-MCNC: 11 G/DL (ref 12–15.9)
HGB UR QL STRIP.AUTO: NEGATIVE
HMPV RNA NPH QL NAA+NON-PROBE: NOT DETECTED
HOLD SPECIMEN: NORMAL
HPIV1 RNA ISLT QL NAA+PROBE: NOT DETECTED
HPIV2 RNA SPEC QL NAA+PROBE: NOT DETECTED
HPIV3 RNA NPH QL NAA+PROBE: NOT DETECTED
HPIV4 P GENE NPH QL NAA+PROBE: NOT DETECTED
HYALINE CASTS UR QL AUTO: ABNORMAL /LPF
IMM GRANULOCYTES # BLD AUTO: 0.09 10*3/MM3 (ref 0–0.05)
IMM GRANULOCYTES NFR BLD AUTO: 0.7 % (ref 0–0.5)
INHALED O2 CONCENTRATION: 21 %
KETONES UR QL STRIP: ABNORMAL
LEUKOCYTE ESTERASE UR QL STRIP.AUTO: ABNORMAL
LYMPHOCYTES # BLD AUTO: 2.29 10*3/MM3 (ref 0.7–3.1)
LYMPHOCYTES NFR BLD AUTO: 17.9 % (ref 19.6–45.3)
M PNEUMO IGG SER IA-ACNC: NOT DETECTED
MCH RBC QN AUTO: 31.3 PG (ref 26.6–33)
MCHC RBC AUTO-ENTMCNC: 32.3 G/DL (ref 31.5–35.7)
MCV RBC AUTO: 96.9 FL (ref 79–97)
MODALITY: ABNORMAL
MONOCYTES # BLD AUTO: 2.89 10*3/MM3 (ref 0.1–0.9)
MONOCYTES NFR BLD AUTO: 22.5 % (ref 5–12)
NEUTROPHILS NFR BLD AUTO: 53.8 % (ref 42.7–76)
NEUTROPHILS NFR BLD AUTO: 6.89 10*3/MM3 (ref 1.7–7)
NITRITE UR QL STRIP: NEGATIVE
NRBC BLD AUTO-RTO: 0 /100 WBC (ref 0–0.2)
NT-PROBNP SERPL-MCNC: 1995 PG/ML (ref 0–900)
PCO2 BLDA: 44.7 MM HG (ref 35–48)
PH BLDA: 7.39 PH UNITS (ref 7.35–7.45)
PH UR STRIP.AUTO: <=5 [PH] (ref 5–8)
PLATELET # BLD AUTO: 301 10*3/MM3 (ref 140–450)
PMV BLD AUTO: 11.2 FL (ref 6–12)
PO2 BLD: 252 MM[HG] (ref 0–500)
PO2 BLDA: 52.9 MM HG (ref 83–108)
POTASSIUM SERPL-SCNC: 4.6 MMOL/L (ref 3.5–5.2)
PROCALCITONIN SERPL-MCNC: 0.24 NG/ML (ref 0–0.25)
PROT UR QL STRIP: ABNORMAL
RBC # BLD AUTO: 3.52 10*6/MM3 (ref 3.77–5.28)
RBC # UR STRIP: ABNORMAL /HPF
REF LAB TEST METHOD: ABNORMAL
RHINOVIRUS RNA SPEC NAA+PROBE: NOT DETECTED
RSV RNA NPH QL NAA+NON-PROBE: NOT DETECTED
SAO2 % BLDCOA: 86.4 % (ref 94–98)
SARS-COV-2 RNA RESP QL NAA+PROBE: NOT DETECTED
SODIUM SERPL-SCNC: 135 MMOL/L (ref 136–145)
SP GR UR STRIP: 1.02 (ref 1–1.03)
SQUAMOUS #/AREA URNS HPF: ABNORMAL /HPF
UROBILINOGEN UR QL STRIP: ABNORMAL
WBC # UR STRIP: ABNORMAL /HPF
WBC NRBC COR # BLD AUTO: 12.82 10*3/MM3 (ref 3.4–10.8)

## 2025-03-15 PROCEDURE — 85025 COMPLETE CBC W/AUTO DIFF WBC: CPT | Performed by: EMERGENCY MEDICINE

## 2025-03-15 PROCEDURE — 25010000002 CEFTRIAXONE PER 250 MG: Performed by: EMERGENCY MEDICINE

## 2025-03-15 PROCEDURE — 71045 X-RAY EXAM CHEST 1 VIEW: CPT

## 2025-03-15 PROCEDURE — 93005 ELECTROCARDIOGRAM TRACING: CPT | Performed by: EMERGENCY MEDICINE

## 2025-03-15 PROCEDURE — 94799 UNLISTED PULMONARY SVC/PX: CPT

## 2025-03-15 PROCEDURE — 85379 FIBRIN DEGRADATION QUANT: CPT | Performed by: EMERGENCY MEDICINE

## 2025-03-15 PROCEDURE — 25010000002 METHYLPREDNISOLONE PER 40 MG: Performed by: INTERNAL MEDICINE

## 2025-03-15 PROCEDURE — 36600 WITHDRAWAL OF ARTERIAL BLOOD: CPT | Performed by: EMERGENCY MEDICINE

## 2025-03-15 PROCEDURE — 82803 BLOOD GASES ANY COMBINATION: CPT | Performed by: EMERGENCY MEDICINE

## 2025-03-15 PROCEDURE — 25810000003 SODIUM CHLORIDE 0.9 % SOLUTION: Performed by: EMERGENCY MEDICINE

## 2025-03-15 PROCEDURE — 36415 COLL VENOUS BLD VENIPUNCTURE: CPT

## 2025-03-15 PROCEDURE — 99285 EMERGENCY DEPT VISIT HI MDM: CPT

## 2025-03-15 PROCEDURE — 63710000001 MYCOPHENOLATE MOFETIL PER 250 MG: Performed by: INTERNAL MEDICINE

## 2025-03-15 PROCEDURE — 94640 AIRWAY INHALATION TREATMENT: CPT

## 2025-03-15 PROCEDURE — 0202U NFCT DS 22 TRGT SARS-COV-2: CPT | Performed by: EMERGENCY MEDICINE

## 2025-03-15 PROCEDURE — 87086 URINE CULTURE/COLONY COUNT: CPT | Performed by: EMERGENCY MEDICINE

## 2025-03-15 PROCEDURE — 84145 PROCALCITONIN (PCT): CPT | Performed by: INTERNAL MEDICINE

## 2025-03-15 PROCEDURE — 83880 ASSAY OF NATRIURETIC PEPTIDE: CPT | Performed by: EMERGENCY MEDICINE

## 2025-03-15 PROCEDURE — 81001 URINALYSIS AUTO W/SCOPE: CPT | Performed by: EMERGENCY MEDICINE

## 2025-03-15 PROCEDURE — 87449 NOS EACH ORGANISM AG IA: CPT | Performed by: HOSPITALIST

## 2025-03-15 PROCEDURE — 80048 BASIC METABOLIC PNL TOTAL CA: CPT | Performed by: EMERGENCY MEDICINE

## 2025-03-15 RX ORDER — HYDROXYCHLOROQUINE SULFATE 200 MG/1
200 TABLET, FILM COATED ORAL 2 TIMES DAILY
Status: DISCONTINUED | OUTPATIENT
Start: 2025-03-15 | End: 2025-03-31 | Stop reason: HOSPADM

## 2025-03-15 RX ORDER — NITROGLYCERIN 0.4 MG/1
0.4 TABLET SUBLINGUAL
Status: DISCONTINUED | OUTPATIENT
Start: 2025-03-15 | End: 2025-03-31 | Stop reason: HOSPADM

## 2025-03-15 RX ORDER — ALPRAZOLAM 1 MG/1
1 TABLET ORAL NIGHTLY PRN
Status: DISCONTINUED | OUTPATIENT
Start: 2025-03-15 | End: 2025-03-31 | Stop reason: HOSPADM

## 2025-03-15 RX ORDER — ONDANSETRON 2 MG/ML
4 INJECTION INTRAMUSCULAR; INTRAVENOUS EVERY 6 HOURS PRN
Status: DISCONTINUED | OUTPATIENT
Start: 2025-03-15 | End: 2025-03-15

## 2025-03-15 RX ORDER — GUAIFENESIN 600 MG/1
1200 TABLET, EXTENDED RELEASE ORAL EVERY 12 HOURS SCHEDULED
Status: DISCONTINUED | OUTPATIENT
Start: 2025-03-15 | End: 2025-03-16

## 2025-03-15 RX ORDER — ONDANSETRON 4 MG/1
4 TABLET, ORALLY DISINTEGRATING ORAL EVERY 6 HOURS PRN
Status: DISCONTINUED | OUTPATIENT
Start: 2025-03-15 | End: 2025-03-31 | Stop reason: HOSPADM

## 2025-03-15 RX ORDER — BISACODYL 10 MG
10 SUPPOSITORY, RECTAL RECTAL DAILY PRN
Status: DISCONTINUED | OUTPATIENT
Start: 2025-03-15 | End: 2025-03-31 | Stop reason: HOSPADM

## 2025-03-15 RX ORDER — IPRATROPIUM BROMIDE AND ALBUTEROL SULFATE 2.5; .5 MG/3ML; MG/3ML
3 SOLUTION RESPIRATORY (INHALATION) EVERY 4 HOURS PRN
Status: DISCONTINUED | OUTPATIENT
Start: 2025-03-15 | End: 2025-03-31 | Stop reason: HOSPADM

## 2025-03-15 RX ORDER — AMOXICILLIN 250 MG
2 CAPSULE ORAL 2 TIMES DAILY PRN
Status: DISCONTINUED | OUTPATIENT
Start: 2025-03-15 | End: 2025-03-31 | Stop reason: HOSPADM

## 2025-03-15 RX ORDER — ACETAMINOPHEN 160 MG/5ML
650 SOLUTION ORAL EVERY 4 HOURS PRN
Status: DISCONTINUED | OUTPATIENT
Start: 2025-03-15 | End: 2025-03-31 | Stop reason: HOSPADM

## 2025-03-15 RX ORDER — SODIUM CHLORIDE 0.9 % (FLUSH) 0.9 %
10 SYRINGE (ML) INJECTION AS NEEDED
Status: DISCONTINUED | OUTPATIENT
Start: 2025-03-15 | End: 2025-03-31 | Stop reason: HOSPADM

## 2025-03-15 RX ORDER — CYCLOBENZAPRINE HCL 10 MG
10 TABLET ORAL 3 TIMES DAILY PRN
Status: DISCONTINUED | OUTPATIENT
Start: 2025-03-15 | End: 2025-03-31 | Stop reason: HOSPADM

## 2025-03-15 RX ORDER — ACETAMINOPHEN 650 MG/1
650 SUPPOSITORY RECTAL EVERY 4 HOURS PRN
Status: DISCONTINUED | OUTPATIENT
Start: 2025-03-15 | End: 2025-03-31 | Stop reason: HOSPADM

## 2025-03-15 RX ORDER — ALUMINA, MAGNESIA, AND SIMETHICONE 2400; 2400; 240 MG/30ML; MG/30ML; MG/30ML
15 SUSPENSION ORAL EVERY 6 HOURS PRN
Status: DISCONTINUED | OUTPATIENT
Start: 2025-03-15 | End: 2025-03-31 | Stop reason: HOSPADM

## 2025-03-15 RX ORDER — MYCOPHENOLATE MOFETIL 250 MG/1
500 CAPSULE ORAL EVERY 12 HOURS SCHEDULED
Status: DISCONTINUED | OUTPATIENT
Start: 2025-03-15 | End: 2025-03-31 | Stop reason: HOSPADM

## 2025-03-15 RX ORDER — OXYCODONE HYDROCHLORIDE 5 MG/1
10 TABLET ORAL EVERY 6 HOURS PRN
Refills: 0 | Status: DISCONTINUED | OUTPATIENT
Start: 2025-03-15 | End: 2025-03-31 | Stop reason: HOSPADM

## 2025-03-15 RX ORDER — BISACODYL 5 MG/1
5 TABLET, DELAYED RELEASE ORAL DAILY PRN
Status: DISCONTINUED | OUTPATIENT
Start: 2025-03-15 | End: 2025-03-31 | Stop reason: HOSPADM

## 2025-03-15 RX ORDER — METHYLPREDNISOLONE SODIUM SUCCINATE 40 MG/ML
40 INJECTION, POWDER, LYOPHILIZED, FOR SOLUTION INTRAMUSCULAR; INTRAVENOUS EVERY 8 HOURS
Status: DISCONTINUED | OUTPATIENT
Start: 2025-03-15 | End: 2025-03-23

## 2025-03-15 RX ORDER — LAMOTRIGINE 100 MG/1
200 TABLET ORAL NIGHTLY
Status: DISCONTINUED | OUTPATIENT
Start: 2025-03-15 | End: 2025-03-31 | Stop reason: HOSPADM

## 2025-03-15 RX ORDER — SODIUM CHLORIDE 0.9 % (FLUSH) 0.9 %
10 SYRINGE (ML) INJECTION EVERY 12 HOURS SCHEDULED
Status: DISCONTINUED | OUTPATIENT
Start: 2025-03-15 | End: 2025-03-31 | Stop reason: HOSPADM

## 2025-03-15 RX ORDER — BUDESONIDE 0.5 MG/2ML
0.5 INHALANT ORAL
Status: DISCONTINUED | OUTPATIENT
Start: 2025-03-15 | End: 2025-03-31 | Stop reason: HOSPADM

## 2025-03-15 RX ORDER — QUETIAPINE FUMARATE 25 MG/1
50 TABLET, FILM COATED ORAL NIGHTLY
Status: DISCONTINUED | OUTPATIENT
Start: 2025-03-15 | End: 2025-03-31 | Stop reason: HOSPADM

## 2025-03-15 RX ORDER — HEPARIN SODIUM 5000 [USP'U]/ML
5000 INJECTION, SOLUTION INTRAVENOUS; SUBCUTANEOUS EVERY 12 HOURS SCHEDULED
Status: DISCONTINUED | OUTPATIENT
Start: 2025-03-15 | End: 2025-03-15

## 2025-03-15 RX ORDER — MECLIZINE HYDROCHLORIDE 25 MG/1
25 TABLET ORAL 3 TIMES DAILY PRN
Status: DISCONTINUED | OUTPATIENT
Start: 2025-03-15 | End: 2025-03-31 | Stop reason: HOSPADM

## 2025-03-15 RX ORDER — CETIRIZINE HYDROCHLORIDE 10 MG/1
10 TABLET ORAL DAILY
Status: DISCONTINUED | OUTPATIENT
Start: 2025-03-16 | End: 2025-03-31 | Stop reason: HOSPADM

## 2025-03-15 RX ORDER — ACETAMINOPHEN 325 MG/1
650 TABLET ORAL EVERY 4 HOURS PRN
Status: DISCONTINUED | OUTPATIENT
Start: 2025-03-15 | End: 2025-03-31 | Stop reason: HOSPADM

## 2025-03-15 RX ORDER — HYDROXYZINE HYDROCHLORIDE 25 MG/1
25 TABLET, FILM COATED ORAL EVERY 8 HOURS PRN
Status: DISCONTINUED | OUTPATIENT
Start: 2025-03-15 | End: 2025-03-15

## 2025-03-15 RX ORDER — SODIUM CHLORIDE 9 MG/ML
40 INJECTION, SOLUTION INTRAVENOUS AS NEEDED
Status: DISCONTINUED | OUTPATIENT
Start: 2025-03-15 | End: 2025-03-31 | Stop reason: HOSPADM

## 2025-03-15 RX ORDER — POLYETHYLENE GLYCOL 3350 17 G/17G
17 POWDER, FOR SOLUTION ORAL DAILY PRN
Status: DISCONTINUED | OUTPATIENT
Start: 2025-03-15 | End: 2025-03-31 | Stop reason: HOSPADM

## 2025-03-15 RX ORDER — LEVOTHYROXINE SODIUM 175 UG/1
175 TABLET ORAL
Status: DISCONTINUED | OUTPATIENT
Start: 2025-03-16 | End: 2025-03-31 | Stop reason: HOSPADM

## 2025-03-15 RX ADMIN — HYDROXYCHLOROQUINE SULFATE 200 MG: 200 TABLET ORAL at 23:14

## 2025-03-15 RX ADMIN — CEFTRIAXONE 2 G: 2 INJECTION, POWDER, FOR SOLUTION INTRAMUSCULAR; INTRAVENOUS at 18:15

## 2025-03-15 RX ADMIN — QUETIAPINE FUMARATE 50 MG: 25 TABLET ORAL at 21:18

## 2025-03-15 RX ADMIN — Medication 1 TABLET: at 23:14

## 2025-03-15 RX ADMIN — METHYLPREDNISOLONE SODIUM SUCCINATE 40 MG: 40 INJECTION, POWDER, FOR SOLUTION INTRAMUSCULAR; INTRAVENOUS at 21:14

## 2025-03-15 RX ADMIN — GUAIFENESIN 1200 MG: 600 TABLET, EXTENDED RELEASE ORAL at 23:14

## 2025-03-15 RX ADMIN — Medication 10 ML: at 21:13

## 2025-03-15 RX ADMIN — ACETAMINOPHEN 650 MG: 325 TABLET, FILM COATED ORAL at 21:18

## 2025-03-15 RX ADMIN — SODIUM CHLORIDE 1000 ML: 9 INJECTION, SOLUTION INTRAVENOUS at 17:28

## 2025-03-15 RX ADMIN — MYCOPHENOLATE MOFETIL 500 MG: 250 CAPSULE ORAL at 23:14

## 2025-03-15 RX ADMIN — Medication 10 ML: at 16:22

## 2025-03-15 RX ADMIN — BUDESONIDE 0.5 MG: 0.5 INHALANT RESPIRATORY (INHALATION) at 20:51

## 2025-03-15 RX ADMIN — LAMOTRIGINE 200 MG: 100 TABLET ORAL at 23:14

## 2025-03-15 NOTE — ED PROVIDER NOTES
Subjective   History of Present Illness  Patient is a 64-year-old female complaint of increased shortness of breath past several days.  She also complains of urinary frequency.  Denies cough fever vomiting diarrhea or other complaint      Review of Systems    Past Medical History:   Diagnosis Date    Allergic 01/01/1998    Anemia     Ankle sprain     Anxiety     Arthritis     Arthritis of back 0ll1/01/2013    Arthritis of neck 01/01/2005    Asthma     Bipolar affective disorder     Breast cancer 1985    s/p R mastectomy    Bursitis of hip [id number]    Cervical disc disorder 01/01/2006    CTS (carpal tunnel syndrome)     Depression 09/01/2000    Fracture of wrist 01/01/1995    Fracture, foot     Frozen shoulder     GERD (gastroesophageal reflux disease) 1993    H/O breast reconstruction     SEVERAL    H/O laminectomy     Hamartoma     Hip arthrosis 01/01/2013    History of medical problems     Hx of bilateral oophorectomy     Hyperlipidemia     Hypertension     Hypothyroidism     Infectious viral hepatitis     Inflammatory bowel disease 1992    Man. EGD/colonoscopy annually (9/2021)    Irritable bowel syndrome     Kienbock's disease, right     WRIST    Knee swelling 01/01/2007    Low back pain 1991    Low back strain     Lumbosacral disc disease 01/01/1995    Had 2 lumber surgeries    Lupus     Ravenell    Monahan syndrome     Man. EGD/colonoscopy annually (9/2021). MRI alternating w/ EUS annually for pancreatic screen    MRSA infection     Neuroma of foot     Obesity     Osteopenia     Osteoporosis     maintained on Prolia through Karen    Peptic ulceration     Periarthritis of shoulder 04/01/2022    Pneumonia     Pulmonary fibrosis     Raynaud disease     Renal insufficiency     Rotator cuff syndrome [id number]    Scleroderma     Sleep apnea     cpap    Squamous cell cancer of lip     Tear of meniscus of knee     Tendinitis of knee     Thoracic disc disorder     Von Willebrand disease     Wrist sprain         Allergies   Allergen Reactions    Aspirin Other (See Comments)     VONWILLENBRAND DISEASE     Baclofen Hives       Past Surgical History:   Procedure Laterality Date    APPENDECTOMY      ARM DEBRIDEMENT Right     X 3    BACK SURGERY      [facility]etas- cervical fusion    BACK SURGERY      lumbar decomp    BREAST BIOPSY      BREAST LUMPECTOMY      BREAST RECONSTRUCTION Right     BREAST RECONSTRUCTION Right     CARDIAC CATHETERIZATION      no stents placed     SECTION  ,     CHOLECYSTECTOMY      COLONOSCOPY      COLONOSCOPY N/A 2020    Procedure: COLONOSCOPY;  Surgeon: Cayden Conway MD;  Location: [facility] ENDOSCOPY;  Service: Gastroenterology;  Laterality: N/A;  rectal ulcer    COLONOSCOPY N/A 2021    Procedure: COLONOSCOPY WITH POLYPECTOMY X 1;  Surgeon: Cayden Conway MD;  Location: [facility] ENDOSCOPY;  Service: Gastroenterology;  Laterality: N/A;  Post: COLON POLYP    COLONOSCOPY N/A 2023    Procedure: COLONOSCOPY with polypectomy x 1, endoscopic clipping x 1;  Surgeon: Cayden Conway MD;  Location: [facility] ENDOSCOPY;  Service: Gastroenterology;  Laterality: N/A;    COLONOSCOPY N/A 10/01/2024    Procedure: COLONOSCOPY;  Surgeon: Cayden Conway MD;  Location: [facility] ENDOSCOPY;  Service: Gastroenterology;  Laterality: N/A;  normal    COSMETIC SURGERY  [id number]    ENDOSCOPY      ENDOSCOPY N/A 2020    Procedure: ESOPHAGOGASTRODUODENOSCOPY;  Surgeon: Cayden Conway MD;  Location: [facility] ENDOSCOPY;  Service: Gastroenterology;  Laterality: N/A;  Normal EGD    ENDOSCOPY N/A 2021    Procedure: ESOPHAGOGASTRODUODENOSCOPY;  Surgeon: Cayden Conway MD;  Location: [facility] ENDOSCOPY;  Service: Gastroenterology;  Laterality: N/A;  Post: normal egd    ENDOSCOPY N/A 10/01/2024    Procedure: ESOPHAGOGASTRODUODENOSCOPY;  Surgeon: Cayden Conway MD;  Location: [facility] ENDOSCOPY;  Service: Gastroenterology;  Laterality: N/A;  normal     EXPLORATORY LAPAROTOMY      scar tissue removal    EYE SURGERY  2000    FINGER SURGERY Right     ring finger mass excision    HAND SURGERY  Rt wrist  10/15    HIP SURGERY  1/12    HYSTERECTOMY      PARTIAL    JOINT REPLACEMENT Right 2012,2015    hip and knee    KNEE ARTHROSCOPY Left 01/07/2020    Procedure: LEFT KNEE SCOPE with partial lateral meniscectomy;  Surgeon: Chau Perez MD;  Location: [facility] MAIN OR;  Service: Orthopedics    KNEE SURGERY  Rtf knee  2015    LASIK      LASIK/ LASIK ENHANCEMENT    MASTECTOMY RADICAL Right     NECK SURGERY  01/01/06    OOPHORECTOMY Bilateral     SHOULDER SURGERY  11/01/2023    SPINE SURGERY      SPLENECTOMY      SUBTOTAL HYSTERECTOMY      TOTAL HIP ARTHROPLASTY Left 05/17/2023    TOTAL SHOULDER ARTHROPLASTY W/ DISTAL CLAVICLE EXCISION Right 11/01/2023    Procedure: TOTAL SHOULDER REVERSE ARTHROPLASTY;  Surgeon: Floyd Ruiz MD;  Location: [facility] MAIN OR;  Service: Orthopedics;  Laterality: Right;    TRIGGER POINT INJECTION  7/23    TUBAL ABDOMINAL LIGATION  1981    UPPER ENDOSCOPIC ULTRASOUND W/ FNA N/A 12/09/2021    Procedure: ENDOSCOPIC ULTRASOUND WITH ESOPHAGOGASTRODUODENOSCOPY;  Surgeon: Luis E Negron MD;  Location: [facility] ENDOSCOPY;  Service: Gastroenterology;  Laterality: N/A;  Post: GASTROPARESIS, DILATED COMMON BILE DUCT, FUNDIC POLYP, DUODENITIS, NORMAL PANCREAS, HIATAL HERNIA    WRIST SURGERY Right     distal radius head removal (bone dead)  plates and screws decomp lunate       Family History   Problem Relation Age of Onset    Colon cancer Mother     Heart disease Mother     Cancer Mother         Colon    Arthritis Mother     Kidney disease Father     Heart disease Father     Depression Father         Bladder cancer    Hyperlipidemia Father     Vision loss Father     Hypertension Father     Colon cancer Sister     Diabetes Sister     Heart disease Sister     Von Willebrand disease Sister     Von Willebrand disease Brother     Cancer Brother     Von  Willebrand disease Son     Breast cancer Son     Diabetes Paternal Grandmother     Stroke Paternal Grandmother     Colon cancer Other     Colon cancer Son     Von Willebrand disease Son     Breast cancer Son     Cancer Sister         Melanoma, colon, bladder    Vision loss Sister     Stroke Sister     Arthritis Sister     Asthma Sister     Diabetes Sister     Heart disease Sister     Hyperlipidemia Sister     Kidney disease Sister     Cancer Paternal Aunt     Cancer Maternal Aunt     Cancer Maternal Aunt     Hyperlipidemia Sister     Thyroid disease Sister     Arthritis Sister     Hypertension Sister     Kidney disease Sister     Broken bones Sister     Rheumatologic disease Sister     Thyroid disease Sister     Cancer Sister     Clotting disorder Sister        Social History     Socioeconomic History    Marital status:      Spouse name: Brian    Number of children: 2   Tobacco Use    Smoking status: Former     Current packs/day: 0.00     Average packs/day: 0.3 packs/day for 10.0 years (2.5 ttl pk-yrs)     Types: Cigarettes     Start date: 1984     Quit date: 1994     Years since quittin.2     Passive exposure: Past    Smokeless tobacco: Never    Tobacco comments:     Off and on for  those years   Vaping Use    Vaping status: Never Used   Substance and Sexual Activity    Alcohol use: Not Currently     Comment: Rarely    Drug use: No    Sexual activity: Never           Objective   Physical Exam  Neck has no adenopathy JVD or bruits.  Lungs have distant breath sounds.  Heart has a regular rate rhythm without murmur rub or gallop.  Chest is nontender.  Abdomen soft.  Extremities M is no cyanosis or edema.  Procedures       My EKG interpretation shows normal sinus rhythm at a rate of 64 with no acute ST change    ED Course      Results for orders placed or performed during the hospital encounter of 03/15/25   ECG 12 Lead Dyspnea    Collection Time: 03/15/25  3:33 PM   Result Value Ref Range    QT  Interval 437 ms    QTC Interval 452 ms   Blood Gas, Arterial -    Collection Time: 03/15/25  3:49 PM    Specimen: Arterial Blood   Result Value Ref Range    Site Left Radial     Peterson's Test Positive     pH, Arterial 7.392 7.350 - 7.450 pH units    pCO2, Arterial 44.7 35.0 - 48.0 mm Hg    pO2, Arterial 52.9 (L) 83.0 - 108.0 mm Hg    HCO3, Arterial 27.2 21.0 - 28.0 mmol/L    Base Excess, Arterial 1.8 0.0 - 3.0 mmol/L    O2 Saturation, Arterial 86.4 (L) 94.0 - 98.0 %    CO2 Content 28.6 22 - 29 mmol/L    Barometric Pressure for Blood Gas      Modality Room Air     FIO2 21 %    Hemodilution No     PO2/FIO2 252 0 - 500   Respiratory Panel PCR w/COVID-19(SARS-CoV-2) G[facility]I/RAJIV/AUSTEN/PAD/[facility]/APRIL In-House, NP Swab in UTM/VTM, 2 HR TAT - Swab, Nasopharynx    Collection Time: 03/15/25  4:10 PM    Specimen: Nasopharynx; Swab   Result Value Ref Range    ADENOVIRUS, PCR Not Detected Not Detected    Coronavirus 229E Not Detected Not Detected    Coronavirus HKU1 Not Detected Not Detected    Coronavirus NL63 Not Detected Not Detected    Coronavirus OC43 Not Detected Not Detected    COVID19 Not Detected Not Detected - Ref. Range    Human Metapneumovirus Not Detected Not Detected    Human Rhinovirus/Enterovirus Not Detected Not Detected    Influenza A PCR Not Detected Not Detected    Influenza B PCR Not Detected Not Detected    Parainfluenza Virus 1 Not Detected Not Detected    Parainfluenza Virus 2 Not Detected Not Detected    Parainfluenza Virus 3 Not Detected Not Detected    Parainfluenza Virus 4 Not Detected Not Detected    RSV, PCR Not Detected Not Detected    Bordetella pertussis pcr Not Detected Not Detected    Bordetella parapertussis PCR Not Detected Not Detected    Chlamydophila pneumoniae PCR Not Detected Not Detected    Mycoplasma pneumo by PCR Not Detected Not Detected   Urinalysis With Microscopic If Indicated (No Culture) - Urine, Clean Catch    Collection Time: 03/15/25  4:10 PM    Specimen: Urine, Clean Catch   Result  Value Ref Range    Color, UA Dark Yellow (A) Yellow, Straw    Appearance, UA Cloudy (A) Clear    pH, UA <=5.0 5.0 - 8.0    Specific Gravity, UA 1.023 1.005 - 1.030    Glucose, UA Negative Negative    Ketones, UA Trace (A) Negative    Bilirubin, UA Small (1+) (A) Negative    Blood, UA Negative Negative    Protein, UA 30 mg/dL (1+) (A) Negative    Leuk Esterase, UA Trace (A) Negative    Nitrite, UA Negative Negative    Urobilinogen, UA 1.0 E.U./dL 0.2 - 1.0 E.U./dL   Urinalysis, Microscopic Only - Urine, Clean Catch    Collection Time: 03/15/25  4:10 PM    Specimen: Urine, Clean Catch   Result Value Ref Range    RBC, UA 6-10 (A) None Seen, 0-2 /HPF    WBC, UA 0-2 None Seen, 0-2 /HPF    Bacteria, UA 4+ (A) None Seen /HPF    Squamous Epithelial Cells, UA 3-6 (A) None Seen, 0-2 /HPF    Hyaline Casts, UA Too Numerous to Count None Seen /LPF    Methodology Manual Light Microscopy    Basic Metabolic Panel    Collection Time: 03/15/25  4:21 PM    Specimen: Blood   Result Value Ref Range    Glucose 102 (H) 65 - 99 mg/dL    BUN 27 (H) 8 - 23 mg/dL    Creatinine 2.32 (H) 0.57 - 1.00 mg/dL    Sodium 135 (L) 136 - 145 mmol/L    Potassium 4.6 3.5 - 5.2 mmol/L    Chloride 98 98 - 107 mmol/L    CO2 24.3 22.0 - 29.0 mmol/L    Calcium 8.4 (L) 8.6 - 10.5 mg/dL    BUN/Creatinine Ratio 11.6 7.0 - 25.0    Anion Gap 12.7 5.0 - 15.0 mmol/L    eGFR 22.7 (L) >60.0 mL/min/1.73   BNP    Collection Time: 03/15/25  4:21 PM    Specimen: Blood   Result Value Ref Range    proBNP 1,995.0 (H) 0.0 - 900.0 pg/mL   D-dimer, Quantitative    Collection Time: 03/15/25  4:21 PM    Specimen: Blood   Result Value Ref Range    D-Dimer, Quantitative 1.76 (H) 0.00 - 0.66 MCGFEU/mL   CBC Auto Differential    Collection Time: 03/15/25  4:21 PM    Specimen: Blood   Result Value Ref Range    WBC 12.82 (H) 3.40 - 10.80 10*3/mm3    RBC 3.52 (L) 3.77 - 5.28 10*6/mm3    Hemoglobin 11.0 (L) 12.0 - 15.9 g/dL    Hematocrit 34.1 34.0 - 46.6 %    MCV 96.9 79.0 - 97.0 fL     MCH 31.3 26.6 - 33.0 pg    MCHC 32.3 31.5 - 35.7 g/dL    RDW 14.3 12.3 - 15.4 %    RDW-SD 50.4 37.0 - 54.0 fl    MPV 11.2 6.0 - 12.0 fL    Platelets 301 140 - 450 10*3/mm3    Neutrophil % 53.8 42.7 - 76.0 %    Lymphocyte % 17.9 (L) 19.6 - 45.3 %    Monocyte % 22.5 (H) 5.0 - 12.0 %    Eosinophil % 4.2 0.3 - 6.2 %    Basophil % 0.9 0.0 - 1.5 %    Immature Grans % 0.7 (H) 0.0 - 0.5 %    Neutrophils, Absolute 6.89 1.70 - 7.00 10*3/mm3    Lymphocytes, Absolute 2.29 0.70 - 3.10 10*3/mm3    Monocytes, Absolute 2.89 (H) 0.10 - 0.90 10*3/mm3    Eosinophils, Absolute 0.54 (H) 0.00 - 0.40 10*3/mm3    Basophils, Absolute 0.12 0.00 - 0.20 10*3/mm3    Immature Grans, Absolute 0.09 (H) 0.00 - 0.05 10*3/mm3    nRBC 0.0 0.0 - 0.2 /100 WBC   Gold Top - SST    Collection Time: 03/15/25  4:21 PM   Result Value Ref Range    Extra Tube Hold for add-ons.      *Note: Due to a large number of results and/or encounters for the requested time period, some results have not been displayed. A complete set of results can be found in Results Review.     XR Chest 1 View  Result Date: 3/15/2025  Impression: Mild hazy left greater than right basal opacities, which could represent atelectasis or pneumonia. Electronically Signed: Josh Lorenzo  3/15/2025 4:18 PM EDT  Workstation ID: [id number]                                                     Medical Decision Making  My chest x-ray interpretation shows no cardiomegaly fusion or infiltrate.  CBC shows leukocytosis with no left shift and no anemia.  D-dimer is 1.7.  BNP is greater than 1900.  BMP shows acute kidney injury at 27 2.3 on BUN and creatinine.  There is no electrolyte abnormality.  UA shows 4+ bacteria.  Urine culture was obtained.  Patient given IV antibiotics after blood cultures were obtained.  Patient was noted to be hypotensive.  She was given normal saline 1 L fluid bolus with current blood pressure 100 systolic.  Patient will be admitted for further respiratory support and IV  antibiotics.  I did speak the on-call hospitalist.  Total critical care time 50 minutes    Problems Addressed:  Dyspnea, unspecified type: complicated acute illness or injury  Hypotension, unspecified hypotension type: complicated acute illness or injury  Pulmonary fibrosis: complicated acute illness or injury  Urinary tract infection without hematuria, site unspecified: complicated acute illness or injury    Amount and/or Complexity of Data Reviewed  Labs: ordered. Decision-making details documented in ED Course.  Radiology: ordered and independent interpretation performed.  ECG/medicine tests: ordered and independent interpretation performed.    Risk  Prescription drug management.  Decision regarding hospitalization.        Final diagnoses:   Dyspnea, unspecified type   Pulmonary fibrosis   Hypotension, unspecified hypotension type   Urinary tract infection without hematuria, site unspecified       ED Disposition  ED Disposition       ED Disposition   Decision to Admit    Condition   --    Comment   --               No follow-up provider specified.       Medication List        Changed      gabapentin 600 MG tablet  Commonly known as: NEURONTIN  TAKE TWO TABLETS BY MOUTH EVERY MORNING THEN TAKE ONE TABLET BY MOUTH DAILY IN THE AFTERNOON THEN TAKE TWO TABLETS BY MOUTH EVERY NIGHT  What changed:   how much to take  how to take this  when to take this                 Brooks Wheatley MD  03/15/25 [id number]

## 2025-03-15 NOTE — H&P
History and Physical   Tracie Gross : 1960 MRN:[id number] LOS:0     Reason for admission: SOB (shortness of breath)     Assessment / Plan     # Acute hypoxic respiratory failure in the setting of underlying pulmonary fibrosis  -pt is with hypoxia needed 3 L in ED ( baseline is RA )   -CXR with ? Pneumonia   -procal normal   -RVP negative   -pulm consulted   -resp tx IV solumedrol   -on IV rocephin for UTI   -VQ scan for increased d dimer   -venous doppler BL for mild leg pain     #Urinary tract infection  -on IV rocephin and follow up on UCX     #DANIELLE on CKD   -reduced intake last week   -pt is on bactrim per pulm   -Cr at 2.3 ( baseline around 1.6- 1.8 )   -Avoid nephrotoxic medication  -nephro consulted     #Anxiety  -Resume home medication once verified by pharmacy and clinically appropriate    #Hypertension  #Hyperlipidemia  #Hypothyroidism  -Resume home medication once verified by pharmacy and clinically appropriate    #Von Willebrand disease  -no bleeding reported     Code Status (Patient has no pulse and is not breathing): CPR (Attempt to Resuscitate)  Medical Interventions (Patient has pulse or is breathing): Full Support       Nutrition: Diet: Cardiac; Healthy Heart (2-3 Na+); Fluid Consistency: Thin (IDDSI 0)     DVT Prophylaxis: Active VTE Prophylaxis  Pharmacologic:        Start     Dose Route Frequency Stop    03/15/25 2100  heparin (porcine) 5000 UNIT/ML injection 5,000 Units         5,000 Units SC Every 12 Hours Scheduled --                  Mechanical:        Start        03/15/25 1955  Maintain Sequential Compression Device  Continuous                              History of Present illness     A 64 y.o. old female patient with PMH of anxiety hypertension hyperlipidemia hypothyroidism and irritable bowel syndrome von Willebrand disease CKD pulmonary fibrosis follow-up with Dr. Whaley presents to the hospital with complaints of decreased appetite generalized weakness and increased shortness of  breath.  Patient does not use oxygen at home and currently needed 2 L of oxygen in the ED.  Patient labs showing leukocytosis Fontana D-dimer 1.76 BNP of 1900 creatinine 2.3 elevated from baseline UA with UTI and procalcitonin 0.2 ABG without retention.  Patient is started on IV Rocephin respiratory treatment.  Given hypoxic respiratory failure primary pulmonologist is consulted.  Nephrologist is consulted.      Patient will be admitted for UTI and hypoxic respiratory failure.      Subjective / Review of systems     Review of Systems   Weakness   Mild SOB  No CP    Past Medical/Surgical/Social/Family History & Allergies     Past Medical History:   Diagnosis Date    Allergic 01/01/1998    Anemia     Ankle sprain     Anxiety     Arthritis     Arthritis of back 0ll1/01/2013    Arthritis of neck 01/01/2005    Asthma     Bipolar affective disorder     Breast cancer 1985    s/p R mastectomy    Bursitis of hip [id number]    Cervical disc disorder 01/01/2006    CTS (carpal tunnel syndrome)     Depression 09/01/2000    Fracture of wrist 01/01/1995    Fracture, foot     Frozen shoulder     GERD (gastroesophageal reflux disease) 1993    H/O breast reconstruction     SEVERAL    H/O laminectomy     Hamartoma     Hip arthrosis 01/01/2013    History of medical problems     Hx of bilateral oophorectomy     Hyperlipidemia     Hypertension     Hypothyroidism     Infectious viral hepatitis     Inflammatory bowel disease 1992    Man. EGD/colonoscopy annually (9/2021)    Irritable bowel syndrome     Kienbock's disease, right     WRIST    Knee swelling 01/01/2007    Low back pain 1991    Low back strain     Lumbosacral disc disease 01/01/1995    Had 2 lumber surgeries    Lupus     Ravenell    Monahan syndrome     Mna. EGD/colonoscopy annually (9/2021). MRI alternating w/ EUS annually for pancreatic screen    MRSA infection     Neuroma of foot     Obesity     Osteopenia     Osteoporosis     maintained on Prolia through Karen    Peptic  ulceration     Periarthritis of shoulder 2022    Pneumonia     Pulmonary fibrosis     Raynaud disease     Renal insufficiency     Rotator cuff syndrome [id number]    Scleroderma     Sleep apnea     cpap    Squamous cell cancer of lip     Tear of meniscus of knee     Tendinitis of knee     Thoracic disc disorder     Von Willebrand disease     Wrist sprain       Past Surgical History:   Procedure Laterality Date    APPENDECTOMY      ARM DEBRIDEMENT Right     X 3    BACK SURGERY      Vetas- cervical fusion    BACK SURGERY      lumbar decomp    BREAST BIOPSY      BREAST LUMPECTOMY      BREAST RECONSTRUCTION Right     BREAST RECONSTRUCTION Right     CARDIAC CATHETERIZATION      no stents placed     SECTION  ,     CHOLECYSTECTOMY      COLONOSCOPY      COLONOSCOPY N/A 2020    Procedure: COLONOSCOPY;  Surgeon: Cayden Conway MD;  Location: [facility] ENDOSCOPY;  Service: Gastroenterology;  Laterality: N/A;  rectal ulcer    COLONOSCOPY N/A 2021    Procedure: COLONOSCOPY WITH POLYPECTOMY X 1;  Surgeon: Cayden Conway MD;  Location: [facility] ENDOSCOPY;  Service: Gastroenterology;  Laterality: N/A;  Post: COLON POLYP    COLONOSCOPY N/A 2023    Procedure: COLONOSCOPY with polypectomy x 1, endoscopic clipping x 1;  Surgeon: Cayden Conway MD;  Location: [facility] ENDOSCOPY;  Service: Gastroenterology;  Laterality: N/A;    COLONOSCOPY N/A 10/01/2024    Procedure: COLONOSCOPY;  Surgeon: Cayden Conway MD;  Location: [facility] ENDOSCOPY;  Service: Gastroenterology;  Laterality: N/A;  normal    COSMETIC SURGERY  [id number]    ENDOSCOPY      ENDOSCOPY N/A 2020    Procedure: ESOPHAGOGASTRODUODENOSCOPY;  Surgeon: Cayden Conway MD;  Location: [facility] ENDOSCOPY;  Service: Gastroenterology;  Laterality: N/A;  Normal EGD    ENDOSCOPY N/A 2021    Procedure: ESOPHAGOGASTRODUODENOSCOPY;  Surgeon: Cayden Conway MD;  Location: [facility] ENDOSCOPY;  Service:  Gastroenterology;  Laterality: N/A;  Post: normal egd    ENDOSCOPY N/A 10/01/2024    Procedure: ESOPHAGOGASTRODUODENOSCOPY;  Surgeon: Cayden Conway MD;  Location: [facility] ENDOSCOPY;  Service: Gastroenterology;  Laterality: N/A;  normal    EXPLORATORY LAPAROTOMY      scar tissue removal    EYE SURGERY  2000    FINGER SURGERY Right     ring finger mass excision    HAND SURGERY  Rt wrist  10/15    HIP SURGERY  1/12    HYSTERECTOMY      PARTIAL    JOINT REPLACEMENT Right 2012,2015    hip and knee    KNEE ARTHROSCOPY Left 01/07/2020    Procedure: LEFT KNEE SCOPE with partial lateral meniscectomy;  Surgeon: Chau Perez MD;  Location: [facility] MAIN OR;  Service: Orthopedics    KNEE SURGERY  Rtf knee  2015    LASIK      LASIK/ LASIK ENHANCEMENT    MASTECTOMY RADICAL Right     NECK SURGERY  01/01/06    OOPHORECTOMY Bilateral     SHOULDER SURGERY  11/01/2023    SPINE SURGERY      SPLENECTOMY      SUBTOTAL HYSTERECTOMY      TOTAL HIP ARTHROPLASTY Left 05/17/2023    TOTAL SHOULDER ARTHROPLASTY W/ DISTAL CLAVICLE EXCISION Right 11/01/2023    Procedure: TOTAL SHOULDER REVERSE ARTHROPLASTY;  Surgeon: Floyd Ruiz MD;  Location: [facility] MAIN OR;  Service: Orthopedics;  Laterality: Right;    TRIGGER POINT INJECTION  7/23    TUBAL ABDOMINAL LIGATION  1981    UPPER ENDOSCOPIC ULTRASOUND W/ FNA N/A 12/09/2021    Procedure: ENDOSCOPIC ULTRASOUND WITH ESOPHAGOGASTRODUODENOSCOPY;  Surgeon: Luis E Negron MD;  Location: [facility] ENDOSCOPY;  Service: Gastroenterology;  Laterality: N/A;  Post: GASTROPARESIS, DILATED COMMON BILE DUCT, FUNDIC POLYP, DUODENITIS, NORMAL PANCREAS, HIATAL HERNIA    WRIST SURGERY Right     distal radius head removal (bone dead)  plates and screws decomp lunate      Social History     Socioeconomic History    Marital status:      Spouse name: Brian    Number of children: 2   Tobacco Use    Smoking status: Former     Current packs/day: 0.00     Average packs/day: 0.3 packs/day for 10.0 years  (2.5 ttl pk-yrs)     Types: Cigarettes     Start date: 1984     Quit date: 1994     Years since quittin.2     Passive exposure: Past    Smokeless tobacco: Never    Tobacco comments:     Off and on for  those years   Vaping Use    Vaping status: Never Used   Substance and Sexual Activity    Alcohol use: Not Currently     Comment: Rarely    Drug use: No    Sexual activity: Never      Family History   Problem Relation Age of Onset    Colon cancer Mother     Heart disease Mother     Cancer Mother         Colon    Arthritis Mother     Kidney disease Father     Heart disease Father     Depression Father         Bladder cancer    Hyperlipidemia Father     Vision loss Father     Hypertension Father     Colon cancer Sister     Diabetes Sister     Heart disease Sister     Von Willebrand disease Sister     Von Willebrand disease Brother     Cancer Brother     Von Willebrand disease Son     Breast cancer Son     Diabetes Paternal Grandmother     Stroke Paternal Grandmother     Colon cancer Other     Colon cancer Son     Von Willebrand disease Son     Breast cancer Son     Cancer Sister         Melanoma, colon, bladder    Vision loss Sister     Stroke Sister     Arthritis Sister     Asthma Sister     Diabetes Sister     Heart disease Sister     Hyperlipidemia Sister     Kidney disease Sister     Cancer Paternal Aunt     Cancer Maternal Aunt     Cancer Maternal Aunt     Hyperlipidemia Sister     Thyroid disease Sister     Arthritis Sister     Hypertension Sister     Kidney disease Sister     Broken bones Sister     Rheumatologic disease Sister     Thyroid disease Sister     Cancer Sister     Clotting disorder Sister       Allergies   Allergen Reactions    Aspirin Other (See Comments)     VONWILLENBRAND DISEASE     Baclofen Hives        Home Medications     Prior to Admission medications    Medication Sig Start Date End Date Taking? Authorizing Provider   albuterol sulfate  (90 Base) MCG/ACT inhaler Inhale 2  puffs Every 4 (Four) Hours As Needed for Wheezing. 11/25/24   Floyd Silva MD   ALPRAZolam (XANAX) 1 MG tablet Take 1 tablet by mouth At Night As Needed for Anxiety. 3/11/25   Floyd Silva MD   azithromycin (ZITHROMAX) 500 MG tablet Take 1 tablet by mouth Daily. 1/29/25   Floyd Silva MD   bisoprolol-hydrochlorothiazide (ZIAC) 10-6.25 MG per tablet TAKE 1 TABLET BY MOUTH DAILY 10/7/24   Floyd Silva MD   Calcium + Vitamin D3 600-5 MG-MCG tablet TAKE ONE TABLET BY MOUTH TWICE A DAY 9/11/24   Floyd Silva MD   cetirizine (zyrTEC) 10 MG tablet TAKE 1 TABLET BY MOUTH DAILY 4/1/24   Floyd Silva MD   cyclobenzaprine (FLEXERIL) 10 MG tablet TAKE ONE TABLET BY MOUTH THREE TIMES A DAY AS NEEDED FOR MUSCLE SPASMS 11/8/24   Floyd Silva MD   Denosumab (PROLIA SC) Inject  under the skin into the appropriate area as directed Every 6 (Six) Months.    ProviderChuck MD   FeroSul 325 (65 Fe) MG tablet TAKE 1 TABLET BY MOUTH DAILY WITH BREAKFAST 2/7/25   Suzan Jones MD Flaxseed Linseed, (FLAXSEED OIL MAX STR) 1300 MG capsule Take 1 tablet by mouth 2 (Two) Times a Day.    ProviderChuck MD   fluticasone (FLONASE) 50 MCG/ACT nasal spray SPRAY TWO SPRAYS IN EACH NOSTRIL ONCE DAILY 6/7/24   Floyd Silva MD   furosemide (LASIX) 20 MG tablet TAKE 1 TABLET BY MOUTH DAILY AS NEEDED FOR LEG SWELLING 12/16/24   Floyd Silva MD   gabapentin (NEURONTIN) 600 MG tablet TAKE TWO TABLETS BY MOUTH EVERY MORNING THEN TAKE ONE TABLET BY MOUTH DAILY IN THE AFTERNOON THEN TAKE TWO TABLETS BY MOUTH EVERY NIGHT  Patient taking differently: Take 2 tablets by mouth 3 (Three) Times a Day. TAKE TWO TABLETS BY MOUTH EVERY MORNING THEN TAKE ONE TABLET BY MOUTH DAILY IN THE AFTERNOON THEN TAKE TWO TABLETS BY MOUTH EVERY NIGHT 5/28/24   Floyd Silva MD   guaiFENesin (Mucinex) 600 MG 12 hr tablet Take 2 tablets twice daily for  congestion 1/29/25   Floyd Silva MD   hydroxychloroquine (PLAQUENIL) 200 MG tablet Take 1 tablet by mouth 2 (Two) Times a Day. Indications: Systemic Lupus Erythematosus 1/30/25   Floyd Silva MD   hydrOXYzine (ATARAX) 25 MG tablet Take 1 tablet by mouth Every 8 (Eight) Hours As Needed for Itching. 7/6/21   Floyd Silva MD   lamoTRIgine (LaMICtal) 200 MG tablet TAKE ONE TABLET BY MOUTH ONCE NIGHTLY 12/16/24   Floyd Silva MD   levothyroxine (SYNTHROID, LEVOTHROID) 175 MCG tablet TAKE 1 TABLET BY MOUTH DAILY 10/7/24   Floyd Silva MD   lisinopril (PRINIVIL,ZESTRIL) 5 MG tablet TAKE 1 TABLET BY MOUTH DAILY 1/14/25   Floyd Silva MD   meclizine (ANTIVERT) 25 MG tablet Take 1 tablet by mouth 3 (Three) Times a Day As Needed for Dizziness. 12/20/24   Floyd Silva MD   mycophenolate (CELLCEPT) 500 MG tablet Take  by mouth 2 (Two) Times a Day.    Chuck Lester MD   NIFEdipine XL (PROCARDIA XL) 90 MG 24 hr tablet TAKE 1 TABLET BY MOUTH DAILY 3/3/25   Floyd Silva MD   NON FORMULARY Apply  topically to the appropriate area as directed. Lidocaine powder  Ketamine powder  Diclofenac powder    ProviderChuck MD   ondansetron (ZOFRAN) 4 MG tablet Take 1 tablet by mouth Every 8 (Eight) Hours As Needed for Nausea or Vomiting. 6/25/24   Floyd Silva MD   oxyCODONE (ROXICODONE) 10 MG tablet Take 1 tablet by mouth Every 6 (Six) Hours As Needed for Moderate Pain. 3/11/25   Floyd Silva MD   predniSONE (DELTASONE) 20 MG tablet Take 2 tablets by mouth Daily. 1/29/25   Floyd Silva MD   promethazine-dextromethorphan (PROMETHAZINE-DM) 6.25-15 MG/5ML syrup Take 5 mL by mouth 4 (Four) Times a Day As Needed for Cough. 3/12/25   Floyd Silva MD   QUEtiapine (SEROquel) 100 MG tablet Take 0.5 tablets by mouth Every Night. 1/29/25   Floyd Silva MD   sulfamethoxazole-trimethoprim (BACTRIM DS,SEPTRA DS)  800-160 MG per tablet Take 1 tablet by mouth Every Other Day.    Provider, MD Chuck      Objective / Physical Exam   Vital signs:  Temp: 98 °F (36.7 °C)  BP: 95/54  Heart Rate: 62  Resp: 17  SpO2: 93 %  Weight: 113 kg (249 lb 1.9 oz)    Admission Weight: Weight: 105 kg (231 lb 7.7 oz)    Physical Exam   Physical Exam  HENT:      Head: Normocephalic and atraumatic.      Nose: Nose normal.   Eyes:      Extraocular Movements: Extraocular movements intact.      Conjunctivae/sclerae: Conjunctivae normal.      Pupils: Pupils are equal, round, and reactive to light.   Cardiovascular:      Rate and Rhythm: Normal       Pulses: Normal pulses.      Heart sounds: Normal heart sounds.   Pulmonary:      Reduced BS bilaterally   Abdominal:      General: Abdomen is flat. Bowel sounds are normal.      Palpations: Abdomen is soft.   Musculoskeletal:        Bilateral LE mild tenderness and oedema   Skin:     General: Skin is dry.   Neurological:      General: No focal deficit present.      Mental Status: alert.   Psychiatric:         Mood and Affect: Mood normal.        Labs     Results from last 7 days   Lab Units 03/15/25  1621   WBC 10*3/mm3 12.82*   HEMATOCRIT % 34.1   PLATELETS 10*3/mm3 301      Results from last 7 days   Lab Units 03/15/25  1621 03/11/25  1559   SODIUM mmol/L 135*  --    POTASSIUM mmol/L 4.6  --    CHLORIDE mmol/L 98  --    CO2 mmol/L 24.3  --    BUN mg/dL 27*  --    CREATININE mg/dL 2.32* 1.80*        Current Medications   Scheduled Meds:budesonide, 0.5 mg, Nebulization, BID - RT  [START ON 3/16/2025] cefTRIAXone, 2,000 mg, Intravenous, Q24H  heparin (porcine), 5,000 Units, Subcutaneous, Q12H  sodium chloride, 10 mL, Intravenous, Q12H         Continuous Infusions:      Miguel Oates MD  [facility]   03/15/25   20:36 EDT

## 2025-03-15 NOTE — ED NOTES
Nursing report ED to floor  Tracie Gross  64 y.o.  female    HPI:   Chief Complaint   Patient presents with    Shortness of Breath       Admitting doctor:   Mushtaq Garcia MD    Admitting diagnosis:   The primary encounter diagnosis was Dyspnea, unspecified type. Diagnoses of Pulmonary fibrosis, Hypotension, unspecified hypotension type, and Urinary tract infection without hematuria, site unspecified were also pertinent to this visit.    Code status:   Current Code Status       Date Active Code Status Order ID Comments User Context       Prior            Allergies:   Aspirin and Baclofen    Isolation:  No active isolations     Fall Risk:  Fall Risk Assessment was completed, and patient is at high risk for falls.   Predictive Model Details         20 (Low) Factor Value    Calculated 3/15/2025 19:06 Age 64    Risk of Fall Model Active Peripheral IV Present     Imaging order in this encounter Present     Respiratory Rate 20     Diastolic BP 44     Magnesium not on file     Calcium 8.4 mg/dL     Creatinine 2.32 mg/dL     Charanjit Scale not on file     Number of Distinct Medication Classes administered 2     Chloride 98 mmol/L     Cardiac Assessment X     Albumin not on file     Potassium 4.6 mmol/L     Total Bilirubin not on file     Duration of Current Encounter 0.157 days     Tobacco Use Quit     ALT not on file         Weight:       03/15/25  1516   Weight: 105 kg (231 lb 7.7 oz)       Intake and Output    Intake/Output Summary (Last 24 hours) at 3/15/2025 1908  Last data filed at 3/15/2025 1837  Gross per 24 hour   Intake 1100 ml   Output --   Net 1100 ml       Diet:        Most recent vitals:   Vitals:    03/15/25 1646 03/15/25 1716 03/15/25 1745 03/15/25 1817   BP: 96/45 (!) 80/43 97/57 103/44   Pulse: 60 54 56 55   Resp:       Temp:       TempSrc:       SpO2:  96% 97% 91%   Weight:       Height:           Active LDAs/IV Access:   Lines, Drains & Airways       Active LDAs       Name Placement date Placement time  Site Days    Peripheral IV 03/15/25 1621 Anterior;Left Forearm 03/15/25  1621  Forearm  less than 1                    Skin Condition:   Skin Assessments (last day)       None             Labs (abnormal labs have a star):   Labs Reviewed   BASIC METABOLIC PANEL - Abnormal; Notable for the following components:       Result Value    Glucose 102 (*)     BUN 27 (*)     Creatinine 2.32 (*)     Sodium 135 (*)     Calcium 8.4 (*)     eGFR 22.7 (*)     All other components within normal limits    Narrative:     GFR Categories in Chronic Kidney Disease (CKD)      GFR Category          GFR (mL/min/1.73)    Interpretation  G1                     90 or greater         Normal or high (1)  G2                      60-89                Mild decrease (1)  G3a                   45-59                Mild to moderate decrease  G3b                   30-44                Moderate to severe decrease  G4                    15-29                Severe decrease  G5                    14 or less           Kidney failure          (1)In the absence of evidence of kidney disease, neither GFR category G1 or G2 fulfill the criteria for CKD.    eGFR calculation 2021 CKD-EPI creatinine equation, which does not include race as a factor   URINALYSIS W/ MICROSCOPIC IF INDICATED (NO CULTURE) - Abnormal; Notable for the following components:    Color, UA Dark Yellow (*)     Appearance, UA Cloudy (*)     Ketones, UA Trace (*)     Bilirubin, UA Small (1+) (*)     Protein, UA 30 mg/dL (1+) (*)     Leuk Esterase, UA Trace (*)     All other components within normal limits   BLOOD GAS, ARTERIAL - Abnormal; Notable for the following components:    pO2, Arterial 52.9 (*)     O2 Saturation, Arterial 86.4 (*)     All other components within normal limits   BNP (IN-HOUSE) - Abnormal; Notable for the following components:    proBNP 1,995.0 (*)     All other components within normal limits    Narrative:     This assay is used as an aid in the diagnosis of  "individuals suspected of having heart failure. It can be used as an aid in the diagnosis of acute decompensated heart failure (ADHF) in patients presenting with signs and symptoms of ADHF to the emergency department (ED). In addition, NT-proBNP of <300 pg/mL indicates ADHF is not likely.    Age Range Result Interpretation  NT-proBNP Concentration (pg/mL:      <50             Positive            >450                   Gray                 300-450                    Negative             <300    50-75           Positive            >900                  Gray                300-900                  Negative            <300      >75             Positive            >1800                  Gray                300-1800                  Negative            <300   D-DIMER, QUANTITATIVE - Abnormal; Notable for the following components:    D-Dimer, Quantitative 1.76 (*)     All other components within normal limits    Narrative:     According to the assay 's published package insert, a normal (<0.50 MCGFEU/mL) D-dimer result in conjunction with a non-high clinical probability assessment, excludes deep vein thrombosis (DVT) and pulmonary embolism (PE) with high sensitivity.    D-dimer values increase with age and this can make VTE exclusion of an older population difficult. To address this, the [employer], based on best available evidence and recent guidelines, recommends that clinicians use age-adjusted D-dimer thresholds in patients greater than 50 years of age with: a) a low probability of PE who do not meet all Pulmonary Embolism Rule Out Criteria, or b) in those with intermediate probability of PE.   The formula for an age-adjusted D-dimer cut-off is \"age/100\".  For example, a 60 year old patient would have an age-adjusted cut-off of 0.60 MCGFEU/mL and an 80 year old 0.80 MCGFEU/mL.   CBC WITH AUTO DIFFERENTIAL - Abnormal; Notable for the following components:    WBC 12.82 (*)     RBC 3.52 (*)  "    Hemoglobin 11.0 (*)     Lymphocyte % 17.9 (*)     Monocyte % 22.5 (*)     Immature Grans % 0.7 (*)     Monocytes, Absolute 2.89 (*)     Eosinophils, Absolute 0.54 (*)     Immature Grans, Absolute 0.09 (*)     All other components within normal limits   URINALYSIS, MICROSCOPIC ONLY - Abnormal; Notable for the following components:    RBC, UA 6-10 (*)     Bacteria, UA 4+ (*)     Squamous Epithelial Cells, UA 3-6 (*)     All other components within normal limits   RESPIRATORY PANEL PCR W/ COVID-19 (SARS-COV-2), NP SWAB IN UTM/VTP, 2 HR TAT - Normal    Narrative:     In the setting of a positive respiratory panel with a viral infection PLUS a negative procalcitonin without other underlying concern for bacterial infection, consider observing off antibiotics or discontinuation of antibiotics and continue supportive care. If the respiratory panel is positive for atypical bacterial infection (Bordetella pertussis, Chlamydophila pneumoniae, or Mycoplasma pneumoniae), consider antibiotic de-escalation to target atypical bacterial infection.   URINE CULTURE   CBC AND DIFFERENTIAL    Narrative:     The following orders were created for panel order CBC & Differential.  Procedure                               Abnormality         Status                     ---------                               -----------         ------                     CBC Auto Differential[[id number]]        Abnormal            Final result               Scan Slide[[id number]]                                                                    Please view results for these tests on the individual orders.   EXTRA TUBES    Narrative:     The following orders were created for panel order Extra Tubes.  Procedure                               Abnormality         Status                     ---------                               -----------         ------                     Gold Top - SST[[id number]]                                   Final result                  Please view results for these tests on the individual orders.   GOLD TOP - SST       LOC: Person, Place, Time, and Situation    Telemetry:  Med/Surg    Cardiac Monitoring Ordered: yes    EKG:   ECG 12 Lead Dyspnea   Preliminary Result   HEART RATE=64  bpm   RR Pixljisb=003  ms   NC Sxuujkcu=603  ms   P Horizontal Axis=37  deg   P Front Axis=55  deg   QRSD Mndqlcjb=461  ms   QT Wtsjgboe=782  ms   DYzT=184  ms   QRS Axis=-8  deg   T Wave Axis=11  deg   - ABNORMAL ECG -   Sinus rhythm   Nonspecific intraventricular conduction delay   Date and Time of Study:2025-03-15 15:33:23          Medications Given in the ED:   Medications   sodium chloride 0.9 % flush 10 mL (10 mL Intravenous Given 3/15/25 1622)   sodium chloride 0.9 % bolus 1,000 mL (0 mL Intravenous Stopped 3/15/25 1813)   cefTRIAXone (ROCEPHIN) 2 g in sodium chloride 0.9 % 100 mL MBP (0 g Intravenous Stopped 3/15/25 183)       Imaging results:  XR Chest 1 View  Result Date: 3/15/2025  Impression: Mild hazy left greater than right basal opacities, which could represent atelectasis or pneumonia. Electronically Signed: Josh Lorenzo  3/15/2025 4:18 PM EDT  Workstation ID: [id number]      Social issues:   Social History     Socioeconomic History    Marital status:      Spouse name: Brian    Number of children: 2   Tobacco Use    Smoking status: Former     Current packs/day: 0.00     Average packs/day: 0.3 packs/day for 10.0 years (2.5 ttl pk-yrs)     Types: Cigarettes     Start date: 1984     Quit date: 1994     Years since quittin.2     Passive exposure: Past    Smokeless tobacco: Never    Tobacco comments:     Off and on for  those years   Vaping Use    Vaping status: Never Used   Substance and Sexual Activity    Alcohol use: Not Currently     Comment: Rarely    Drug use: No    Sexual activity: Never       NIH Stroke Scale:  Interval: (not recorded)  1a. Level of Consciousness: (not recorded)  1b. LOC Questions: (not recorded)  1c. LOC  Commands: (not recorded)  2. Best Gaze: (not recorded)  3. Visual: (not recorded)  4. Facial Palsy: (not recorded)  5a. Motor Arm, Left: (not recorded)  5b. Motor Arm, Right: (not recorded)  6a. Motor Leg, Left: (not recorded)  6b. Motor Leg, Right: (not recorded)  7. Limb Ataxia: (not recorded)  8. Sensory: (not recorded)  9. Best Language: (not recorded)  10. Dysarthria: (not recorded)  11. Extinction and Inattention (formerly Neglect): (not recorded)    Total (NIH Stroke Scale): (not recorded)     Additional notable assessment information:     Nursing report ED to floor:  REPORT GIVEN TO AZUCENA HENDERSON TAKEN ROOM [id number]    Sai Dawson LPN   03/15/25 19:08 EDT

## 2025-03-16 ENCOUNTER — APPOINTMENT (OUTPATIENT)
Dept: NUCLEAR MEDICINE | Facility: HOSPITAL | Age: 65
DRG: 196 | End: 2025-03-16
Payer: MEDICARE

## 2025-03-16 ENCOUNTER — APPOINTMENT (OUTPATIENT)
Dept: ULTRASOUND IMAGING | Facility: HOSPITAL | Age: 65
DRG: 196 | End: 2025-03-16
Payer: MEDICARE

## 2025-03-16 ENCOUNTER — APPOINTMENT (OUTPATIENT)
Dept: CARDIOLOGY | Facility: HOSPITAL | Age: 65
DRG: 196 | End: 2025-03-16
Payer: MEDICARE

## 2025-03-16 LAB
ANION GAP SERPL CALCULATED.3IONS-SCNC: 13.5 MMOL/L (ref 5–15)
ANION GAP SERPL CALCULATED.3IONS-SCNC: 16.6 MMOL/L (ref 5–15)
BASOPHILS # BLD AUTO: 0.09 10*3/MM3 (ref 0–0.2)
BASOPHILS NFR BLD AUTO: 0.7 % (ref 0–1.5)
BH CV LOWER VASCULAR LEFT COMMON FEMORAL AUGMENT: NORMAL
BH CV LOWER VASCULAR LEFT COMMON FEMORAL COMPETENT: NORMAL
BH CV LOWER VASCULAR LEFT COMMON FEMORAL COMPRESS: NORMAL
BH CV LOWER VASCULAR LEFT COMMON FEMORAL PHASIC: NORMAL
BH CV LOWER VASCULAR LEFT COMMON FEMORAL SPONT: NORMAL
BH CV LOWER VASCULAR LEFT DISTAL FEMORAL COMPRESS: NORMAL
BH CV LOWER VASCULAR LEFT GASTRONEMIUS COMPRESS: NORMAL
BH CV LOWER VASCULAR LEFT GREATER SAPH AK COMPRESS: NORMAL
BH CV LOWER VASCULAR LEFT GREATER SAPH BK COMPRESS: NORMAL
BH CV LOWER VASCULAR LEFT LESSER SAPH COMPRESS: NORMAL
BH CV LOWER VASCULAR LEFT MID FEMORAL AUGMENT: NORMAL
BH CV LOWER VASCULAR LEFT MID FEMORAL COMPETENT: NORMAL
BH CV LOWER VASCULAR LEFT MID FEMORAL COMPRESS: NORMAL
BH CV LOWER VASCULAR LEFT MID FEMORAL PHASIC: NORMAL
BH CV LOWER VASCULAR LEFT MID FEMORAL SPONT: NORMAL
BH CV LOWER VASCULAR LEFT PERONEAL COMPRESS: NORMAL
BH CV LOWER VASCULAR LEFT POPLITEAL AUGMENT: NORMAL
BH CV LOWER VASCULAR LEFT POPLITEAL COMPETENT: NORMAL
BH CV LOWER VASCULAR LEFT POPLITEAL COMPRESS: NORMAL
BH CV LOWER VASCULAR LEFT POPLITEAL PHASIC: NORMAL
BH CV LOWER VASCULAR LEFT POPLITEAL SPONT: NORMAL
BH CV LOWER VASCULAR LEFT POSTERIOR TIBIAL COMPRESS: NORMAL
BH CV LOWER VASCULAR LEFT PROXIMAL FEMORAL COMPRESS: NORMAL
BH CV LOWER VASCULAR LEFT SAPHENOFEMORAL JUNCTION COMPRESS: NORMAL
BH CV LOWER VASCULAR RIGHT COMMON FEMORAL AUGMENT: NORMAL
BH CV LOWER VASCULAR RIGHT COMMON FEMORAL COMPETENT: NORMAL
BH CV LOWER VASCULAR RIGHT COMMON FEMORAL COMPRESS: NORMAL
BH CV LOWER VASCULAR RIGHT COMMON FEMORAL PHASIC: NORMAL
BH CV LOWER VASCULAR RIGHT COMMON FEMORAL SPONT: NORMAL
BH CV LOWER VASCULAR RIGHT DISTAL FEMORAL COMPRESS: NORMAL
BH CV LOWER VASCULAR RIGHT GASTRONEMIUS COMPRESS: NORMAL
BH CV LOWER VASCULAR RIGHT GREATER SAPH AK COMPRESS: NORMAL
BH CV LOWER VASCULAR RIGHT GREATER SAPH BK COMPRESS: NORMAL
BH CV LOWER VASCULAR RIGHT LESSER SAPH COMPRESS: NORMAL
BH CV LOWER VASCULAR RIGHT MID FEMORAL AUGMENT: NORMAL
BH CV LOWER VASCULAR RIGHT MID FEMORAL COMPETENT: NORMAL
BH CV LOWER VASCULAR RIGHT MID FEMORAL COMPRESS: NORMAL
BH CV LOWER VASCULAR RIGHT MID FEMORAL PHASIC: NORMAL
BH CV LOWER VASCULAR RIGHT MID FEMORAL SPONT: NORMAL
BH CV LOWER VASCULAR RIGHT PERONEAL COMPRESS: NORMAL
BH CV LOWER VASCULAR RIGHT POPLITEAL AUGMENT: NORMAL
BH CV LOWER VASCULAR RIGHT POPLITEAL COMPETENT: NORMAL
BH CV LOWER VASCULAR RIGHT POPLITEAL COMPRESS: NORMAL
BH CV LOWER VASCULAR RIGHT POPLITEAL PHASIC: NORMAL
BH CV LOWER VASCULAR RIGHT POPLITEAL SPONT: NORMAL
BH CV LOWER VASCULAR RIGHT POSTERIOR TIBIAL COMPRESS: NORMAL
BH CV LOWER VASCULAR RIGHT PROXIMAL FEMORAL COMPRESS: NORMAL
BH CV LOWER VASCULAR RIGHT SAPHENOFEMORAL JUNCTION COMPRESS: NORMAL
BUN SERPL-MCNC: 31 MG/DL (ref 8–23)
BUN SERPL-MCNC: 40 MG/DL (ref 8–23)
BUN/CREAT SERPL: 13.4 (ref 7–25)
BUN/CREAT SERPL: 18.5 (ref 7–25)
CALCIUM SPEC-SCNC: 8.1 MG/DL (ref 8.6–10.5)
CALCIUM SPEC-SCNC: 8.4 MG/DL (ref 8.6–10.5)
CHLORIDE SERPL-SCNC: 97 MMOL/L (ref 98–107)
CHLORIDE SERPL-SCNC: 98 MMOL/L (ref 98–107)
CO2 SERPL-SCNC: 19.4 MMOL/L (ref 22–29)
CO2 SERPL-SCNC: 23.5 MMOL/L (ref 22–29)
CREAT SERPL-MCNC: 2.16 MG/DL (ref 0.57–1)
CREAT SERPL-MCNC: 2.31 MG/DL (ref 0.57–1)
DEPRECATED RDW RBC AUTO: 50.5 FL (ref 37–54)
EGFRCR SERPLBLD CKD-EPI 2021: 22.8 ML/MIN/1.73
EGFRCR SERPLBLD CKD-EPI 2021: 24.7 ML/MIN/1.73
EOSINOPHIL # BLD AUTO: 0.17 10*3/MM3 (ref 0–0.4)
EOSINOPHIL NFR BLD AUTO: 1.4 % (ref 0.3–6.2)
ERYTHROCYTE [DISTWIDTH] IN BLOOD BY AUTOMATED COUNT: 14.2 % (ref 12.3–15.4)
GLUCOSE SERPL-MCNC: 120 MG/DL (ref 65–99)
GLUCOSE SERPL-MCNC: 97 MG/DL (ref 65–99)
HCT VFR BLD AUTO: 32.2 % (ref 34–46.6)
HGB BLD-MCNC: 10.2 G/DL (ref 12–15.9)
IMM GRANULOCYTES # BLD AUTO: 0.1 10*3/MM3 (ref 0–0.05)
IMM GRANULOCYTES NFR BLD AUTO: 0.8 % (ref 0–0.5)
L PNEUMO1 AG UR QL IA: NEGATIVE
LYMPHOCYTES # BLD AUTO: 1.13 10*3/MM3 (ref 0.7–3.1)
LYMPHOCYTES NFR BLD AUTO: 9.1 % (ref 19.6–45.3)
MAGNESIUM SERPL-MCNC: 2.4 MG/DL (ref 1.6–2.4)
MCH RBC QN AUTO: 30.8 PG (ref 26.6–33)
MCHC RBC AUTO-ENTMCNC: 31.7 G/DL (ref 31.5–35.7)
MCV RBC AUTO: 97.3 FL (ref 79–97)
MONOCYTES # BLD AUTO: 0.77 10*3/MM3 (ref 0.1–0.9)
MONOCYTES NFR BLD AUTO: 6.2 % (ref 5–12)
NEUTROPHILS NFR BLD AUTO: 10.18 10*3/MM3 (ref 1.7–7)
NEUTROPHILS NFR BLD AUTO: 81.8 % (ref 42.7–76)
NRBC BLD AUTO-RTO: 0.2 /100 WBC (ref 0–0.2)
PHOSPHATE SERPL-MCNC: 5.1 MG/DL (ref 2.5–4.5)
PLATELET # BLD AUTO: 279 10*3/MM3 (ref 140–450)
PMV BLD AUTO: 11.3 FL (ref 6–12)
POTASSIUM SERPL-SCNC: 4.7 MMOL/L (ref 3.5–5.2)
POTASSIUM SERPL-SCNC: 4.8 MMOL/L (ref 3.5–5.2)
QT INTERVAL: 437 MS
QTC INTERVAL: 452 MS
RBC # BLD AUTO: 3.31 10*6/MM3 (ref 3.77–5.28)
S PNEUM AG SPEC QL LA: POSITIVE
SODIUM SERPL-SCNC: 133 MMOL/L (ref 136–145)
SODIUM SERPL-SCNC: 135 MMOL/L (ref 136–145)
WBC NRBC COR # BLD AUTO: 12.44 10*3/MM3 (ref 3.4–10.8)

## 2025-03-16 PROCEDURE — A9540 TC99M MAA: HCPCS | Performed by: HOSPITALIST

## 2025-03-16 PROCEDURE — 25810000003 SODIUM CHLORIDE 0.9 % SOLUTION: Performed by: INTERNAL MEDICINE

## 2025-03-16 PROCEDURE — 76775 US EXAM ABDO BACK WALL LIM: CPT

## 2025-03-16 PROCEDURE — 25010000002 CEFTRIAXONE PER 250 MG: Performed by: INTERNAL MEDICINE

## 2025-03-16 PROCEDURE — 94761 N-INVAS EAR/PLS OXIMETRY MLT: CPT

## 2025-03-16 PROCEDURE — 34310000005 TECHNETIUM TC99M PYROPHOSPHATE: Performed by: HOSPITALIST

## 2025-03-16 PROCEDURE — 34310000005 TECHNETIUM ALBUMIN AGGREGATED: Performed by: HOSPITALIST

## 2025-03-16 PROCEDURE — 94799 UNLISTED PULMONARY SVC/PX: CPT

## 2025-03-16 PROCEDURE — 63710000001 MYCOPHENOLATE MOFETIL PER 250 MG: Performed by: INTERNAL MEDICINE

## 2025-03-16 PROCEDURE — 85025 COMPLETE CBC W/AUTO DIFF WBC: CPT | Performed by: INTERNAL MEDICINE

## 2025-03-16 PROCEDURE — 78582 LUNG VENTILAT&PERFUS IMAGING: CPT

## 2025-03-16 PROCEDURE — A9538 TC99M PYROPHOSPHATE: HCPCS | Performed by: HOSPITALIST

## 2025-03-16 PROCEDURE — 93970 EXTREMITY STUDY: CPT

## 2025-03-16 PROCEDURE — 80048 BASIC METABOLIC PNL TOTAL CA: CPT | Performed by: INTERNAL MEDICINE

## 2025-03-16 PROCEDURE — 97166 OT EVAL MOD COMPLEX 45 MIN: CPT

## 2025-03-16 PROCEDURE — 93970 EXTREMITY STUDY: CPT | Performed by: STUDENT IN AN ORGANIZED HEALTH CARE EDUCATION/TRAINING PROGRAM

## 2025-03-16 PROCEDURE — 25010000002 METHYLPREDNISOLONE PER 40 MG: Performed by: INTERNAL MEDICINE

## 2025-03-16 PROCEDURE — 84100 ASSAY OF PHOSPHORUS: CPT | Performed by: INTERNAL MEDICINE

## 2025-03-16 PROCEDURE — 94664 DEMO&/EVAL PT USE INHALER: CPT

## 2025-03-16 PROCEDURE — 83735 ASSAY OF MAGNESIUM: CPT | Performed by: INTERNAL MEDICINE

## 2025-03-16 RX ORDER — GUAIFENESIN 600 MG/1
600 TABLET, EXTENDED RELEASE ORAL EVERY 12 HOURS SCHEDULED
Status: DISCONTINUED | OUTPATIENT
Start: 2025-03-16 | End: 2025-03-18

## 2025-03-16 RX ORDER — SODIUM CHLORIDE 9 MG/ML
50 INJECTION, SOLUTION INTRAVENOUS CONTINUOUS
Status: DISCONTINUED | OUTPATIENT
Start: 2025-03-16 | End: 2025-03-18

## 2025-03-16 RX ORDER — AZITHROMYCIN 250 MG/1
500 TABLET, FILM COATED ORAL
Status: DISCONTINUED | OUTPATIENT
Start: 2025-03-16 | End: 2025-03-17

## 2025-03-16 RX ORDER — CODEINE PHOSPHATE AND GUAIFENESIN 10; 100 MG/5ML; MG/5ML
5 SOLUTION ORAL EVERY 6 HOURS PRN
Status: DISCONTINUED | OUTPATIENT
Start: 2025-03-16 | End: 2025-03-17

## 2025-03-16 RX ADMIN — CETIRIZINE HYDROCHLORIDE 10 MG: 10 TABLET, FILM COATED ORAL at 09:02

## 2025-03-16 RX ADMIN — GUAIFENESIN AND CODEINE PHOSPHATE 5 ML: 100; 10 SOLUTION ORAL at 14:08

## 2025-03-16 RX ADMIN — Medication 10 ML: at 09:06

## 2025-03-16 RX ADMIN — BUDESONIDE 0.5 MG: 0.5 INHALANT RESPIRATORY (INHALATION) at 19:06

## 2025-03-16 RX ADMIN — METHYLPREDNISOLONE SODIUM SUCCINATE 40 MG: 40 INJECTION, POWDER, FOR SOLUTION INTRAMUSCULAR; INTRAVENOUS at 13:07

## 2025-03-16 RX ADMIN — TECHNETIUM TC99M PYROPHOSPHATE 1 DOSE: 12 INJECTION INTRAVENOUS at 11:15

## 2025-03-16 RX ADMIN — ALPRAZOLAM 1 MG: 1 TABLET ORAL at 21:53

## 2025-03-16 RX ADMIN — MYCOPHENOLATE MOFETIL 500 MG: 250 CAPSULE ORAL at 09:02

## 2025-03-16 RX ADMIN — METHYLPREDNISOLONE SODIUM SUCCINATE 40 MG: 40 INJECTION, POWDER, FOR SOLUTION INTRAMUSCULAR; INTRAVENOUS at 05:12

## 2025-03-16 RX ADMIN — SODIUM CHLORIDE 50 ML/HR: 9 INJECTION, SOLUTION INTRAVENOUS at 20:35

## 2025-03-16 RX ADMIN — LEVOTHYROXINE SODIUM 175 MCG: 175 TABLET ORAL at 05:12

## 2025-03-16 RX ADMIN — AZITHROMYCIN 500 MG: 250 TABLET, FILM COATED ORAL at 10:06

## 2025-03-16 RX ADMIN — Medication 1 TABLET: at 09:02

## 2025-03-16 RX ADMIN — OXYCODONE HYDROCHLORIDE 10 MG: 5 TABLET ORAL at 13:04

## 2025-03-16 RX ADMIN — KIT FOR THE PREPARATION OF TECHNETIUM TC 99M ALBUMIN AGGREGATED 1 DOSE: 2.5 INJECTION, POWDER, FOR SOLUTION INTRAVENOUS at 12:20

## 2025-03-16 RX ADMIN — LAMOTRIGINE 200 MG: 100 TABLET ORAL at 20:35

## 2025-03-16 RX ADMIN — GUAIFENESIN 1200 MG: 600 TABLET, EXTENDED RELEASE ORAL at 09:02

## 2025-03-16 RX ADMIN — CEFTRIAXONE 2000 MG: 2 INJECTION, POWDER, FOR SOLUTION INTRAMUSCULAR; INTRAVENOUS at 17:33

## 2025-03-16 RX ADMIN — Medication 10 ML: at 20:36

## 2025-03-16 RX ADMIN — BUDESONIDE 0.5 MG: 0.5 INHALANT RESPIRATORY (INHALATION) at 08:36

## 2025-03-16 RX ADMIN — QUETIAPINE FUMARATE 50 MG: 25 TABLET ORAL at 20:35

## 2025-03-16 RX ADMIN — HYDROXYCHLOROQUINE SULFATE 200 MG: 200 TABLET ORAL at 20:35

## 2025-03-16 RX ADMIN — GUAIFENESIN AND CODEINE PHOSPHATE 5 ML: 100; 10 SOLUTION ORAL at 20:35

## 2025-03-16 RX ADMIN — SODIUM CHLORIDE 500 ML: 9 INJECTION, SOLUTION INTRAVENOUS at 01:51

## 2025-03-16 RX ADMIN — METHYLPREDNISOLONE SODIUM SUCCINATE 40 MG: 40 INJECTION, POWDER, FOR SOLUTION INTRAMUSCULAR; INTRAVENOUS at 20:35

## 2025-03-16 RX ADMIN — Medication 1 TABLET: at 20:35

## 2025-03-16 RX ADMIN — HYDROXYCHLOROQUINE SULFATE 200 MG: 200 TABLET ORAL at 09:02

## 2025-03-16 RX ADMIN — GUAIFENESIN 600 MG: 600 TABLET, EXTENDED RELEASE ORAL at 20:35

## 2025-03-16 NOTE — CONSULTS
Group: [facility]         CONSULT NOTE    Patient Identification:  Tracie Gross  64 y.o.  female  1960  [id number]            Requesting physician: Attending physician    Reason for Consultation: Hypoxia      History of Present Illness:  64-year-old female admitted on 3/15/2025 with increasing fatigue and shortness of breath  Past medical history as below    Assessment:    Hypoxic respiratory insufficiency  Pneumonia streptococcal antigen positive    Scleroderma-ILD, HRCT January 2025:   Minimal subpleural reticular interstitial thickening predominantly in the lower lobessuggestive of mild fibrosis. No wolf honeycombing fibrosis  Patient was treated with mycophenolate, prophylactic Bactrim    PFT PC 1 0.6 L/51%,  FEV1 1.4 L / 58%, ratio 88, TLC 57%, RV 50%, DLCO 51%    PFTs July 2021, FEV1 1.7 L 68% improve 75% post bronchodilator, FEV1/ FVC ratio 87, TLC 72%, RV 74%, DLCO 56%    PFTs in 2017 FEV1 66-70% predicted which is unchanged since 2012  quit smoking 1994,     PFT 2012 and 2017, FEV-1 and TLC 75% , suggestive of mild to mod restrictive lung disease    2D echo  2018 normal RVSP and EF  2D echo May 2020 no pulmonary hypertension   2d echo July 2021 RVSP 30  2D echo January 2025 no pulmonary hypertension EF 60%    FERN, AHI 14.5 ,       No Response to inhalers including Breztri and Trelegy    Recommendations:    CT chest noncontrast    Oxygen titration currently on 3 L  Antibiotics Rocephin 2 g IV daily for 5 days  Azithromycin 500 mg p.o. daily for 3 days    Hold mycophenolate    Bronchodilators Pulmicort and DuoNeb    On Plaquenil 200 mg twice daily          Chest x-ray 3/15/2025      High-res CT scan January 2025        Review of Sytems:  Review of Systems   Respiratory:  Positive for cough and shortness of breath. Negative for wheezing and stridor.    Cardiovascular:  Negative for chest pain, palpitations and leg swelling.       Past Medical History:  Past Medical History:   Diagnosis Date     Allergic 01/01/1998    Anemia     Ankle sprain     Anxiety     Arthritis     Arthritis of back 0ll1/01/2013    Arthritis of neck 01/01/2005    Asthma     Bipolar affective disorder     Breast cancer 1985    s/p R mastectomy    Bursitis of hip [id number]    Cervical disc disorder 01/01/2006    CTS (carpal tunnel syndrome)     Depression 09/01/2000    Fracture of wrist 01/01/1995    Fracture, foot     Frozen shoulder     GERD (gastroesophageal reflux disease) 1993    H/O breast reconstruction     SEVERAL    H/O laminectomy     Hamartoma     Hip arthrosis 01/01/2013    History of medical problems     Hx of bilateral oophorectomy     Hyperlipidemia     Hypertension     Hypothyroidism     Infectious viral hepatitis     Inflammatory bowel disease 1992    Man. EGD/colonoscopy annually (9/2021)    Irritable bowel syndrome     Kienbock's disease, right     WRIST    Knee swelling 01/01/2007    Low back pain 1991    Low back strain     Lumbosacral disc disease 01/01/1995    Had 2 lumber surgeries    Lupus     Ravenell    Monahan syndrome     Man. EGD/colonoscopy annually (9/2021). MRI alternating w/ EUS annually for pancreatic screen    MRSA infection     Neuroma of foot     Obesity     Osteopenia     Osteoporosis     maintained on Prolia through Karen    Peptic ulceration     Periarthritis of shoulder 04/01/2022    Pneumonia     Pulmonary fibrosis     Raynaud disease     Renal insufficiency     Rotator cuff syndrome [id number]    Scleroderma     Sleep apnea     cpap    Squamous cell cancer of lip     Tear of meniscus of knee     Tendinitis of knee     Thoracic disc disorder     Von Willebrand disease     Wrist sprain        Past Surgical History:  Past Surgical History:   Procedure Laterality Date    APPENDECTOMY      ARM DEBRIDEMENT Right     X 3    BACK SURGERY      Vetas- cervical fusion    BACK SURGERY      lumbar decomp    BREAST BIOPSY      BREAST LUMPECTOMY      BREAST RECONSTRUCTION Right     BREAST  RECONSTRUCTION Right     CARDIAC CATHETERIZATION      no stents placed     SECTION  1980    CHOLECYSTECTOMY      COLONOSCOPY      COLONOSCOPY N/A 2020    Procedure: COLONOSCOPY;  Surgeon: Cayden Conway MD;  Location: [facility] ENDOSCOPY;  Service: Gastroenterology;  Laterality: N/A;  rectal ulcer    COLONOSCOPY N/A 2021    Procedure: COLONOSCOPY WITH POLYPECTOMY X 1;  Surgeon: Cayden Conway MD;  Location: [facility] ENDOSCOPY;  Service: Gastroenterology;  Laterality: N/A;  Post: COLON POLYP    COLONOSCOPY N/A 2023    Procedure: COLONOSCOPY with polypectomy x 1, endoscopic clipping x 1;  Surgeon: Cayden Conway MD;  Location: [facility] ENDOSCOPY;  Service: Gastroenterology;  Laterality: N/A;    COLONOSCOPY N/A 10/01/2024    Procedure: COLONOSCOPY;  Surgeon: Cayden Conway MD;  Location: [facility] ENDOSCOPY;  Service: Gastroenterology;  Laterality: N/A;  normal    COSMETIC SURGERY  [id number]    ENDOSCOPY      ENDOSCOPY N/A 2020    Procedure: ESOPHAGOGASTRODUODENOSCOPY;  Surgeon: Cayden Conway MD;  Location: [facility] ENDOSCOPY;  Service: Gastroenterology;  Laterality: N/A;  Normal EGD    ENDOSCOPY N/A 2021    Procedure: ESOPHAGOGASTRODUODENOSCOPY;  Surgeon: Cayden Conway MD;  Location: [facility] ENDOSCOPY;  Service: Gastroenterology;  Laterality: N/A;  Post: normal egd    ENDOSCOPY N/A 10/01/2024    Procedure: ESOPHAGOGASTRODUODENOSCOPY;  Surgeon: Cayden Conway MD;  Location: [facility] ENDOSCOPY;  Service: Gastroenterology;  Laterality: N/A;  normal    EXPLORATORY LAPAROTOMY      scar tissue removal    EYE SURGERY      FINGER SURGERY Right     ring finger mass excision    HAND SURGERY  Rt wrist  10/15    HIP SURGERY      HYSTERECTOMY      PARTIAL    JOINT REPLACEMENT Right ,    hip and knee    KNEE ARTHROSCOPY Left 2020    Procedure: LEFT KNEE SCOPE with partial lateral meniscectomy;  Surgeon: Chau Perez  MD;  Location: [facility] MAIN OR;  Service: Orthopedics    KNEE SURGERY  Rtf knee  2015    LASIK      LASIK/ LASIK ENHANCEMENT    MASTECTOMY RADICAL Right     NECK SURGERY  01/01/06    OOPHORECTOMY Bilateral     SHOULDER SURGERY  11/01/2023    SPINE SURGERY      SPLENECTOMY      SUBTOTAL HYSTERECTOMY      TOTAL HIP ARTHROPLASTY Left 05/17/2023    TOTAL SHOULDER ARTHROPLASTY W/ DISTAL CLAVICLE EXCISION Right 11/01/2023    Procedure: TOTAL SHOULDER REVERSE ARTHROPLASTY;  Surgeon: Floyd Ruiz MD;  Location: [facility] MAIN OR;  Service: Orthopedics;  Laterality: Right;    TRIGGER POINT INJECTION  7/23    TUBAL ABDOMINAL LIGATION  1981    UPPER ENDOSCOPIC ULTRASOUND W/ FNA N/A 12/09/2021    Procedure: ENDOSCOPIC ULTRASOUND WITH ESOPHAGOGASTRODUODENOSCOPY;  Surgeon: Luis E Negron MD;  Location: [facility] ENDOSCOPY;  Service: Gastroenterology;  Laterality: N/A;  Post: GASTROPARESIS, DILATED COMMON BILE DUCT, FUNDIC POLYP, DUODENITIS, NORMAL PANCREAS, HIATAL HERNIA    WRIST SURGERY Right     distal radius head removal (bone dead)  plates and screws decomp lunate        Home Meds:  Medications Prior to Admission   Medication Sig Dispense Refill Last Dose/Taking    albuterol sulfate  (90 Base) MCG/ACT inhaler Inhale 2 puffs Every 4 (Four) Hours As Needed for Wheezing. 8 g 0 3/14/2025    ALPRAZolam (XANAX) 1 MG tablet Take 1 tablet by mouth At Night As Needed for Anxiety. 30 tablet 2 3/14/2025    bisoprolol-hydrochlorothiazide (ZIAC) 10-6.25 MG per tablet TAKE 1 TABLET BY MOUTH DAILY 90 tablet 1 3/14/2025    Calcium + Vitamin D3 600-5 MG-MCG tablet TAKE ONE TABLET BY MOUTH TWICE A DAY 60 tablet 6 3/14/2025    cetirizine (zyrTEC) 10 MG tablet TAKE 1 TABLET BY MOUTH DAILY 90 tablet 1 3/14/2025    cyclobenzaprine (FLEXERIL) 10 MG tablet TAKE ONE TABLET BY MOUTH THREE TIMES A DAY AS NEEDED FOR MUSCLE SPASMS 45 tablet 1 3/14/2025    Denosumab (PROLIA SC) Inject  under the skin into the appropriate area as directed Every  6 (Six) Months.   Past Month    Flaxseed, Linseed, (FLAXSEED OIL MAX STR) 1300 MG capsule Take 1 tablet by mouth 2 (Two) Times a Day.   3/14/2025    fluticasone (FLONASE) 50 MCG/ACT nasal spray SPRAY TWO SPRAYS IN EACH NOSTRIL ONCE DAILY 16 mL 5 3/14/2025    furosemide (LASIX) 20 MG tablet TAKE 1 TABLET BY MOUTH DAILY AS NEEDED FOR LEG SWELLING 90 tablet 2 3/14/2025    gabapentin (NEURONTIN) 600 MG tablet TAKE TWO TABLETS BY MOUTH EVERY MORNING THEN TAKE ONE TABLET BY MOUTH DAILY IN THE AFTERNOON THEN TAKE TWO TABLETS BY MOUTH EVERY NIGHT (Patient taking differently: Take 2 tablets by mouth 3 (Three) Times a Day. TAKE TWO TABLETS BY MOUTH EVERY MORNING THEN TAKE ONE TABLET BY MOUTH DAILY IN THE AFTERNOON THEN TAKE TWO TABLETS BY MOUTH EVERY NIGHT) 450 tablet 1 3/14/2025    guaiFENesin (Mucinex) 600 MG 12 hr tablet Take 2 tablets twice daily for congestion 30 tablet 1 Past Month    hydroxychloroquine (PLAQUENIL) 200 MG tablet Take 1 tablet by mouth 2 (Two) Times a Day. Indications: Systemic Lupus Erythematosus 180 tablet 3 3/15/2025    lamoTRIgine (LaMICtal) 200 MG tablet TAKE ONE TABLET BY MOUTH ONCE NIGHTLY 90 tablet 1 3/14/2025    levothyroxine (SYNTHROID, LEVOTHROID) 175 MCG tablet TAKE 1 TABLET BY MOUTH DAILY 90 tablet 1 3/15/2025    lisinopril (PRINIVIL,ZESTRIL) 5 MG tablet TAKE 1 TABLET BY MOUTH DAILY 90 tablet 0 3/15/2025    meclizine (ANTIVERT) 25 MG tablet Take 1 tablet by mouth 3 (Three) Times a Day As Needed for Dizziness. 30 tablet 3 3/15/2025    mycophenolate (CELLCEPT) 500 MG tablet Take  by mouth 2 (Two) Times a Day.   3/15/2025    NIFEdipine XL (PROCARDIA XL) 90 MG 24 hr tablet TAKE 1 TABLET BY MOUTH DAILY 90 tablet 1 3/15/2025    NON FORMULARY Apply  topically to the appropriate area as directed. Lidocaine powder  Ketamine powder  Diclofenac powder   Past Week    ondansetron (ZOFRAN) 4 MG tablet Take 1 tablet by mouth Every 8 (Eight) Hours As Needed for Nausea or Vomiting. 30 tablet 3 Past Month     oxyCODONE (ROXICODONE) 10 MG tablet Take 1 tablet by mouth Every 6 (Six) Hours As Needed for Moderate Pain. 120 tablet 0 3/14/2025    promethazine-dextromethorphan (PROMETHAZINE-DM) 6.25-15 MG/5ML syrup Take 5 mL by mouth 4 (Four) Times a Day As Needed for Cough. 240 mL 1 3/14/2025    QUEtiapine (SEROquel) 100 MG tablet Take 0.5 tablets by mouth Every Night. 90 tablet 1 3/14/2025    sulfamethoxazole-trimethoprim (BACTRIM DS,SEPTRA DS) 800-160 MG per tablet Take 1 tablet by mouth Every Other Day.   Past Week    hydrOXYzine (ATARAX) 25 MG tablet Take 1 tablet by mouth Every 8 (Eight) Hours As Needed for Itching. 30 tablet 3 More than a month       Allergies:  Allergies   Allergen Reactions    Aspirin Other (See Comments)     VONWILLENBRAND DISEASE     Baclofen Hives       Social History:   Social History     Socioeconomic History    Marital status:      Spouse name: Brian    Number of children: 2   Tobacco Use    Smoking status: Former     Current packs/day: 0.00     Average packs/day: 0.3 packs/day for 10.0 years (2.5 ttl pk-yrs)     Types: Cigarettes     Start date: 1984     Quit date: 1994     Years since quittin.2     Passive exposure: Past    Smokeless tobacco: Never    Tobacco comments:     Off and on for  those years   Vaping Use    Vaping status: Never Used   Substance and Sexual Activity    Alcohol use: Not Currently     Comment: Rarely    Drug use: No    Sexual activity: Never       Family History:  Family History   Problem Relation Age of Onset    Colon cancer Mother     Heart disease Mother     Cancer Mother         Colon    Arthritis Mother     Kidney disease Father     Heart disease Father     Depression Father         Bladder cancer    Hyperlipidemia Father     Vision loss Father     Hypertension Father     Colon cancer Sister     Diabetes Sister     Heart disease Sister     Von Willebrand disease Sister     Von Willebrand disease Brother     Cancer Brother     Von Willebrand disease  "Son     Breast cancer Son     Diabetes Paternal Grandmother     Stroke Paternal Grandmother     Colon cancer Other     Colon cancer Son     Von Willebrand disease Son     Breast cancer Son     Cancer Sister         Melanoma, colon, bladder    Vision loss Sister     Stroke Sister     Arthritis Sister     Asthma Sister     Diabetes Sister     Heart disease Sister     Hyperlipidemia Sister     Kidney disease Sister     Cancer Paternal Aunt     Cancer Maternal Aunt     Cancer Maternal Aunt     Hyperlipidemia Sister     Thyroid disease Sister     Arthritis Sister     Hypertension Sister     Kidney disease Sister     Broken bones Sister     Rheumatologic disease Sister     Thyroid disease Sister     Cancer Sister     Clotting disorder Sister        Physical Exam:  /62   Pulse 60   Temp 97.3 °F (36.3 °C) (Axillary)   Resp 16   Ht 162.6 cm (64\")   Wt 113 kg (249 lb 1.9 oz)   LMP 05/08/1983   SpO2 94%   BMI 42.76 kg/m²  Body mass index is 42.76 kg/m². 94% 113 kg (249 lb 1.9 oz)  Physical Exam  Cardiovascular:      Heart sounds: Murmur heard.      No gallop.   Pulmonary:      Effort: No respiratory distress.      Breath sounds: No stridor. Rhonchi and rales present. No wheezing.   Chest:      Chest wall: No tenderness.         LABS:  Lab Results   Component Value Date    CALCIUM 8.1 (L) 03/16/2025    PHOS 5.1 (H) 03/16/2025     Results from last 7 days   Lab Units 03/16/25  0101 03/15/25  1621 03/15/25  1621 03/11/25  1559   MAGNESIUM mg/dL 2.4  --   --   --    SODIUM mmol/L 135*  --  135*  --    POTASSIUM mmol/L 4.7  --  4.6  --    CHLORIDE mmol/L 98  --  98  --    CO2 mmol/L 23.5  --  24.3  --    BUN mg/dL 31*  --  27*  --    CREATININE mg/dL 2.31*  --  2.32* 1.80*   GLUCOSE mg/dL 97   < > 102*  --    CALCIUM mg/dL 8.1*  --  8.4*  --    WBC 10*3/mm3 12.44*  --  12.82*  --    HEMOGLOBIN g/dL 10.2*  --  11.0*  --    PLATELETS 10*3/mm3 279  --  301  --    PROBNP pg/mL  --   --  1,995.0*  --    PROCALCITONIN ng/mL "  --   --  0.24  --     < > = values in this interval not displayed.     Lab Results   Component Value Date    CKTOTAL 110 04/05/2022    TROPONINT <0.010 05/14/2020             Results from last 7 days   Lab Units 03/15/25  1621   PROCALCITONIN ng/mL 0.24     Results from last 7 days   Lab Units 03/15/25  1549   PH, ARTERIAL pH units 7.392   PCO2, ARTERIAL mm Hg 44.7   PO2 ART mm Hg 52.9*   O2 SATURATION ART % 86.4*   MODALITY  Room Air     Results from last 7 days   Lab Units 03/15/25  1610   ADENOVIRUS DETECTION BY PCR  Not Detected   CORONAVIRUS 229E  Not Detected   CORONAVIRUS HKU1  Not Detected   CORONAVIRUS NL63  Not Detected   CORONAVIRUS OC43  Not Detected   HUMAN METAPNEUMOVIRUS  Not Detected   HUMAN RHINOVIRUS/ENTEROVIRUS  Not Detected   INFLUENZA B PCR  Not Detected   PARAINFLUENZA 1  Not Detected   PARAINFLUENZA VIRUS 2  Not Detected   PARAINFLUENZA VIRUS 3  Not Detected   PARAINFLUENZA VIRUS 4  Not Detected   BORDETELLA PERTUSSIS PCR  Not Detected   CHLAMYDOPHILA PNEUMONIAE PCR  Not Detected   MYCOPLAMA PNEUMO PCR  Not Detected   INFLUENZA A PCR  Not Detected   RSV, PCR  Not Detected             Lab Results   Component Value Date    TSH 0.045 (L) 07/03/2024     Estimated Creatinine Clearance: 29.5 mL/min (A) (by C-G formula based on SCr of 2.31 mg/dL (H)).  Results from last 7 days   Lab Units 03/15/25  1610   NITRITE UA  Negative   WBC UA /HPF 0-2   BACTERIA UA /HPF 4+*   SQUAM EPITHEL UA /HPF 3-6*        Imaging:  Imaging Results (Last 24 Hours)       Procedure Component Value Units Date/Time    XR Chest 1 View [[id number]] Collected: 03/15/25 1617     Updated: 03/15/25 1620    Narrative:      XR CHEST 1 VW    Date of Exam: 3/15/2025 3:54 PM EDT    Indication: Dyspnea.    Comparison: Chest radiograph  1/29/2025    Findings:      Mediastinum: Cardiac silhouette appears unchanged and normal in size    Lungs: Mild hazy left greater than right basal opacities.    Pleura: No visible pleural effusion or  pneumothorax.    Bones and soft tissues: No acute, displaced fracture seen. Partially imaged right shoulder arthroplasty. Right axillary surgical clips.        Impression:      Impression:  Mild hazy left greater than right basal opacities, which could represent atelectasis or pneumonia.        Electronically Signed: Josh Lorenzo    3/15/2025 4:18 PM EDT    Workstation ID: [id number]              Current Meds:   SCHEDULE  azithromycin, 500 mg, Oral, Q24H  budesonide, 0.5 mg, Nebulization, BID - RT  calcium 500 mg vitamin D 5 mcg (200 UT), 1 tablet, Oral, BID  cefTRIAXone, 2,000 mg, Intravenous, Q24H  cetirizine, 10 mg, Oral, Daily  guaiFENesin, 1,200 mg, Oral, Q12H  hydroxychloroquine, 200 mg, Oral, BID  lamoTRIgine, 200 mg, Oral, Nightly  levothyroxine, 175 mcg, Oral, Q AM  methylPREDNISolone sodium succinate, 40 mg, Intravenous, Q8H  mycophenolate, 500 mg, Oral, Q12H  QUEtiapine, 50 mg, Oral, Nightly  sodium chloride, 10 mL, Intravenous, Q12H      Infusions     PRNs    acetaminophen **OR** acetaminophen **OR** acetaminophen    ALPRAZolam    aluminum-magnesium hydroxide-simethicone    senna-docusate sodium **AND** polyethylene glycol **AND** bisacodyl **AND** bisacodyl    Calcium Replacement - Follow Nurse / BPA Driven Protocol    cyclobenzaprine    ipratropium-albuterol    Magnesium Standard Dose Replacement - Follow Nurse / BPA Driven Protocol    meclizine    melatonin    nitroglycerin    ondansetron ODT **OR** [DISCONTINUED] ondansetron    oxyCODONE    Phosphorus Replacement - Follow Nurse / BPA Driven Protocol    Potassium Replacement - Follow Nurse / BPA Driven Protocol    [COMPLETED] Insert Peripheral IV **AND** sodium chloride    sodium chloride    sodium chloride        Isaac Vazquez MD  3/16/2025  11:09 EDT      Much of this encounter note is an electronic transcription/translation of spoken language to printed text using Dragon Software.

## 2025-03-16 NOTE — PROGRESS NOTES
[facility] MEDICINE SERVICE  DAILY PROGRESS NOTE    NAME: Tracie Gross  : 1960  MRN: [id number]      LOS: 1 day     PROVIDER OF SERVICE: Timothy Duane Brammell, MD    Chief Complaint: SOB (shortness of breath)    Subjective:     Interval History:  History taken from: patient    Patient with ongoing cough.  No sputum production.  Does not have home oxygen.  Denies any urinary symptoms.  Denies any gastric bowel issues.  Denies any other additional acute issues.        Review of Systems:   Review of Systems   All other systems reviewed and are negative.      Objective:     Vital Signs  Temp:  [97.3 °F (36.3 °C)-98.2 °F (36.8 °C)] 97.3 °F (36.3 °C)  Heart Rate:  [51-64] 60  Resp:  [12-20] 16  BP: ()/(43-62) 101/62  Flow (L/min) (Oxygen Therapy):  [3-10] 3   Body mass index is 42.76 kg/m².    Physical Exam  Physical Exam  Vitals reviewed.   Constitutional:       Appearance: She is obese.   HENT:      Head: Normocephalic.   Cardiovascular:      Rate and Rhythm: Normal rate and regular rhythm.   Pulmonary:      Effort: Pulmonary effort is normal.      Comments: Poor air movement  Abdominal:      General: Bowel sounds are normal.      Palpations: Abdomen is soft.      Tenderness: There is no abdominal tenderness.   Musculoskeletal:         General: Swelling present.      Comments: Trace edema            Diagnostic Data    Results from last 7 days   Lab Units 25  0101   WBC 10*3/mm3 12.44*   HEMOGLOBIN g/dL 10.2*   HEMATOCRIT % 32.2*   PLATELETS 10*3/mm3 279   GLUCOSE mg/dL 97   CREATININE mg/dL 2.31*   BUN mg/dL 31*   SODIUM mmol/L 135*   POTASSIUM mmol/L 4.7   ANION GAP mmol/L 13.5       XR Chest 1 View  Result Date: 3/15/2025  Impression: Mild hazy left greater than right basal opacities, which could represent atelectasis or pneumonia. Electronically Signed: Josh Lorenzo  3/15/2025 4:18 PM EDT  Workstation ID: [id number]            Assessment:    Acute hypoxemic respiratory  failure  Pulmonary fibrosis  UTI  Acute kidney injury superimposed on chronic kidney disease  Von Willebrand's disease  Obesity  fatty pancreatic changes  Chronic immunosuppression           Plan.  Chart reviewed.  Lower extremity venous Doppler imaging.  Ventilation/perfusion scan.  Pulmonary to review.  Nephrology to see.  Renal ultrasound.  Strep and Legionella urinary antigens.  Add azithromycin for atypical coverage.      Active and Resolved Problems  Active Hospital Problems    Diagnosis  POA    **SOB (shortness of breath) [R06.02]  Yes      Resolved Hospital Problems   No resolved problems to display.           VTE Prophylaxis:  Mechanical VTE prophylaxis orders are present.             Disposition Planning:     Barriers to Discharge:pullmonary issues  Anticipated Date of Discharge: 3/19  Place of Discharge: home      Time: 45 minutes     Code Status and Medical Interventions: CPR (Attempt to Resuscitate); Full Support   Ordered at: 03/15/25 1955     Code Status (Patient has no pulse and is not breathing):    CPR (Attempt to Resuscitate)     Medical Interventions (Patient has pulse or is breathing):    Full Support       Signature: Electronically signed by Timothy Duane Brammell, MD, 03/16/25, 09:43 EDT.  [facility] Hospitalist Team

## 2025-03-16 NOTE — PLAN OF CARE
Goal Outcome Evaluation:  Plan of Care Reviewed With: patient, spouse        Progress: improving  Outcome Evaluation: 64 y.o. old female patient with PMH of anxiety hypertension hyperlipidemia hypothyroidism and irritable bowel syndrome von Willebrand disease CKD pulmonary fibrosis elective rTSH in R shoulder presents to the hospital with complaints of decreased appetite generalized weakness and increased shortness of breath.  Patient does not use oxygen at home and currently needed 2 L of oxygen in the ED.  Patient labs showing leukocytosis. UA with UTI without retention.  Patient is started on IV Rocephin & respiratory treatment.  At baseline pt lives with spouse in a home with a ramp & chair lift to the second floor and uses a cane for mobility but a RW for 1-2 weeks. Pt states she is mod (I) for ADl and takes sponge baths.  She does not use home O2. This date pt requires assist with grooming, LBD, toileitng, and basic mobility. Pt may benefit from SNF for short stay rehab at d/c. OT will follow to engage pt in valued life occupations progressively as able. Note, pt has Hx vertigo & had just finished 5 treatments by an ENT. Pt states she felt better after the second treatment but the MD wanted to continue. She reports recurrence of symptoms this am and OT notes some nystagmus with report of symptoms as pt comes to EOB. Symptoms subside quickly. Pt could benefit from vestibular intervention. PT notified.    Anticipated Discharge Disposition (OT): skilled nursing facility

## 2025-03-16 NOTE — PLAN OF CARE
Goal Outcome Evaluation:      Patient blood pressure stable this shift. Patient ambulated to bedside commode with one assist with a walker. Patient has had dark urine output this shift. Nephrology consulted. Patient went off floor to nuclear this afternoon for a duplex. After arriving back to the floor patient has had very frequent coughing. Received order from Dr. Vazquez PRN medication for cough. Plan of care ongoing.

## 2025-03-16 NOTE — PLAN OF CARE
Pt arrived from ED on 3L NC, UTI and possible pneumonia. Pt received NS bolus for hypotension. Prn pain med given. IV steroids given. On IV ABX  Problem: Adult Inpatient Plan of Care  Goal: Plan of Care Review  Outcome: Progressing  Goal: Patient-Specific Goal (Individualized)  Outcome: Progressing  Goal: Absence of Hospital-Acquired Illness or Injury  Outcome: Progressing  Intervention: Identify and Manage Fall Risk  Intervention: Prevent Skin Injury  Intervention: Prevent Infection  Goal: Optimal Comfort and Wellbeing  Outcome: Progressing  Intervention: Monitor Pain and Promote Comfort  Intervention: Provide Person-Centered Care  Goal: Readiness for Transition of Care  Outcome: Progressing  Intervention: Mutually Develop Transition Plan     Problem: Skin Injury Risk Increased  Goal: Skin Health and Integrity  Outcome: Progressing  Intervention: Optimize Skin Protection     Problem: Comorbidity Management  Goal: Maintenance of Behavioral Health Symptom Control  Outcome: Progressing  Goal: Blood Pressure in Desired Range  Outcome: Progressing  Goal: Maintenance of Osteoarthritis Symptom Control  Outcome: Progressing  Intervention: Maintain Osteoarthritis Symptom Control   Goal Outcome Evaluation:

## 2025-03-16 NOTE — CONSULTS
Nephrology Consult Note                                                [facility]      Patient Identification:  Name: Tracie Gross  Age: 64 y.o.  Sex: female  :  1960  MRN: [id number]               Requesting Physician: Mushtaq Garcia MD  Reason for Consultation: management recommendations      History of Present Illness:    Patient is a 64-year-old female patient with history of hypertension hyperlipidemia hypothyroidism irritable bowel syndrome and an underlying CKD not followed by nephrology also has history of pulmonary fibrosis presented to the hospital with decreased appetite shortness of breath and has developed increasing her creatinine 2.3 from a baseline of 1.6 prompting renal consultation  Problem List:    SOB (shortness of breath)     Past Medical History:  Past Medical History:   Diagnosis Date    Allergic 1998    Anemia     Ankle sprain     Anxiety     Arthritis     Arthritis of back [id number]    Arthritis of neck 2005    Asthma     Bipolar affective disorder     Breast cancer 1985    s/p R mastectomy    Bursitis of hip [id number]    Cervical disc disorder 2006    CTS (carpal tunnel syndrome)     Depression 2000    Fracture of wrist 1995    Fracture, foot     Frozen shoulder     GERD (gastroesophageal reflux disease)     H/O breast reconstruction     SEVERAL    H/O laminectomy     Hamartoma     Hip arthrosis 2013    History of medical problems     Hx of bilateral oophorectomy     Hyperlipidemia     Hypertension     Hypothyroidism     Infectious viral hepatitis     Inflammatory bowel disease 1992    Man. EGD/colonoscopy annually (2021)    Irritable bowel syndrome     Kienbock's disease, right     WRIST    Knee swelling 2007    Low back pain 1991    Low back strain     Lumbosacral disc disease 1995    Had 2 lumber surgeries    Lupus     Ravenell    Monahan syndrome     Man. EGD/colonoscopy annually (2021).  MRI alternating w/ EUS annually for pancreatic screen    MRSA infection     Neuroma of foot     Obesity     Osteopenia     Osteoporosis     maintained on Prolia through Karen    Peptic ulceration     Periarthritis of shoulder 2022    Pneumonia     Pulmonary fibrosis     Raynaud disease     Renal insufficiency     Rotator cuff syndrome [id number]    Scleroderma     Sleep apnea     cpap    Squamous cell cancer of lip     Tear of meniscus of knee     Tendinitis of knee     Thoracic disc disorder     Von Willebrand disease     Wrist sprain      Past Surgical History:  Past Surgical History:   Procedure Laterality Date    APPENDECTOMY      ARM DEBRIDEMENT Right     X 3    BACK SURGERY      Vetas- cervical fusion    BACK SURGERY      lumbar decomp    BREAST BIOPSY      BREAST LUMPECTOMY      BREAST RECONSTRUCTION Right     BREAST RECONSTRUCTION Right     CARDIAC CATHETERIZATION      no stents placed     SECTION  ,     CHOLECYSTECTOMY      COLONOSCOPY      COLONOSCOPY N/A 2020    Procedure: COLONOSCOPY;  Surgeon: Cayden Conway MD;  Location: [facility] ENDOSCOPY;  Service: Gastroenterology;  Laterality: N/A;  rectal ulcer    COLONOSCOPY N/A 2021    Procedure: COLONOSCOPY WITH POLYPECTOMY X 1;  Surgeon: Cayden Conway MD;  Location: [facility] ENDOSCOPY;  Service: Gastroenterology;  Laterality: N/A;  Post: COLON POLYP    COLONOSCOPY N/A 2023    Procedure: COLONOSCOPY with polypectomy x 1, endoscopic clipping x 1;  Surgeon: Cayden Conway MD;  Location: [facility] ENDOSCOPY;  Service: Gastroenterology;  Laterality: N/A;    COLONOSCOPY N/A 10/01/2024    Procedure: COLONOSCOPY;  Surgeon: Cayden Conway MD;  Location: [facility] ENDOSCOPY;  Service: Gastroenterology;  Laterality: N/A;  normal    COSMETIC SURGERY  [id number]    ENDOSCOPY      ENDOSCOPY N/A 2020    Procedure: ESOPHAGOGASTRODUODENOSCOPY;  Surgeon: Cayden Conway MD;  Location: [facility]  ENDOSCOPY;  Service: Gastroenterology;  Laterality: N/A;  Normal EGD    ENDOSCOPY N/A 09/17/2021    Procedure: ESOPHAGOGASTRODUODENOSCOPY;  Surgeon: Cayden Conway MD;  Location: [facility] ENDOSCOPY;  Service: Gastroenterology;  Laterality: N/A;  Post: normal egd    ENDOSCOPY N/A 10/01/2024    Procedure: ESOPHAGOGASTRODUODENOSCOPY;  Surgeon: Cayden Conway MD;  Location: [facility] ENDOSCOPY;  Service: Gastroenterology;  Laterality: N/A;  normal    EXPLORATORY LAPAROTOMY      scar tissue removal    EYE SURGERY  2000    FINGER SURGERY Right     ring finger mass excision    HAND SURGERY  Rt wrist  10/15    HIP SURGERY  1/12    HYSTERECTOMY      PARTIAL    JOINT REPLACEMENT Right 2012,2015    hip and knee    KNEE ARTHROSCOPY Left 01/07/2020    Procedure: LEFT KNEE SCOPE with partial lateral meniscectomy;  Surgeon: Chau Perez MD;  Location: [facility] MAIN OR;  Service: Orthopedics    KNEE SURGERY  Rtf knee  2015    LASIK      LASIK/ LASIK ENHANCEMENT    MASTECTOMY RADICAL Right     NECK SURGERY  01/01/06    OOPHORECTOMY Bilateral     SHOULDER SURGERY  11/01/2023    SPINE SURGERY      SPLENECTOMY      SUBTOTAL HYSTERECTOMY      TOTAL HIP ARTHROPLASTY Left 05/17/2023    TOTAL SHOULDER ARTHROPLASTY W/ DISTAL CLAVICLE EXCISION Right 11/01/2023    Procedure: TOTAL SHOULDER REVERSE ARTHROPLASTY;  Surgeon: Floyd Ruiz MD;  Location: [facility] MAIN OR;  Service: Orthopedics;  Laterality: Right;    TRIGGER POINT INJECTION  7/23    TUBAL ABDOMINAL LIGATION  1981    UPPER ENDOSCOPIC ULTRASOUND W/ FNA N/A 12/09/2021    Procedure: ENDOSCOPIC ULTRASOUND WITH ESOPHAGOGASTRODUODENOSCOPY;  Surgeon: Luis E Negron MD;  Location: [facility] ENDOSCOPY;  Service: Gastroenterology;  Laterality: N/A;  Post: GASTROPARESIS, DILATED COMMON BILE DUCT, FUNDIC POLYP, DUODENITIS, NORMAL PANCREAS, HIATAL HERNIA    WRIST SURGERY Right     distal radius head removal (bone dead)  plates and screws decomp lunate      Home  Meds:  Medications Prior to Admission   Medication Sig Dispense Refill Last Dose/Taking    albuterol sulfate  (90 Base) MCG/ACT inhaler Inhale 2 puffs Every 4 (Four) Hours As Needed for Wheezing. 8 g 0 3/14/2025    ALPRAZolam (XANAX) 1 MG tablet Take 1 tablet by mouth At Night As Needed for Anxiety. 30 tablet 2 3/14/2025    bisoprolol-hydrochlorothiazide (ZIAC) 10-6.25 MG per tablet TAKE 1 TABLET BY MOUTH DAILY 90 tablet 1 3/14/2025    Calcium + Vitamin D3 600-5 MG-MCG tablet TAKE ONE TABLET BY MOUTH TWICE A DAY 60 tablet 6 3/14/2025    cetirizine (zyrTEC) 10 MG tablet TAKE 1 TABLET BY MOUTH DAILY 90 tablet 1 3/14/2025    cyclobenzaprine (FLEXERIL) 10 MG tablet TAKE ONE TABLET BY MOUTH THREE TIMES A DAY AS NEEDED FOR MUSCLE SPASMS 45 tablet 1 3/14/2025    Denosumab (PROLIA SC) Inject  under the skin into the appropriate area as directed Every 6 (Six) Months.   Past Month    Flaxseed, Linseed, (FLAXSEED OIL MAX STR) 1300 MG capsule Take 1 tablet by mouth 2 (Two) Times a Day.   3/14/2025    fluticasone (FLONASE) 50 MCG/ACT nasal spray SPRAY TWO SPRAYS IN EACH NOSTRIL ONCE DAILY 16 mL 5 3/14/2025    furosemide (LASIX) 20 MG tablet TAKE 1 TABLET BY MOUTH DAILY AS NEEDED FOR LEG SWELLING 90 tablet 2 3/14/2025    gabapentin (NEURONTIN) 600 MG tablet TAKE TWO TABLETS BY MOUTH EVERY MORNING THEN TAKE ONE TABLET BY MOUTH DAILY IN THE AFTERNOON THEN TAKE TWO TABLETS BY MOUTH EVERY NIGHT (Patient taking differently: Take 2 tablets by mouth 3 (Three) Times a Day. TAKE TWO TABLETS BY MOUTH EVERY MORNING THEN TAKE ONE TABLET BY MOUTH DAILY IN THE AFTERNOON THEN TAKE TWO TABLETS BY MOUTH EVERY NIGHT) 450 tablet 1 3/14/2025    guaiFENesin (Mucinex) 600 MG 12 hr tablet Take 2 tablets twice daily for congestion 30 tablet 1 Past Month    hydroxychloroquine (PLAQUENIL) 200 MG tablet Take 1 tablet by mouth 2 (Two) Times a Day. Indications: Systemic Lupus Erythematosus 180 tablet 3 3/15/2025    lamoTRIgine (LaMICtal) 200 MG tablet  TAKE ONE TABLET BY MOUTH ONCE NIGHTLY 90 tablet 1 3/14/2025    levothyroxine (SYNTHROID, LEVOTHROID) 175 MCG tablet TAKE 1 TABLET BY MOUTH DAILY 90 tablet 1 3/15/2025    lisinopril (PRINIVIL,ZESTRIL) 5 MG tablet TAKE 1 TABLET BY MOUTH DAILY 90 tablet 0 3/15/2025    meclizine (ANTIVERT) 25 MG tablet Take 1 tablet by mouth 3 (Three) Times a Day As Needed for Dizziness. 30 tablet 3 3/15/2025    mycophenolate (CELLCEPT) 500 MG tablet Take  by mouth 2 (Two) Times a Day.   3/15/2025    NIFEdipine XL (PROCARDIA XL) 90 MG 24 hr tablet TAKE 1 TABLET BY MOUTH DAILY 90 tablet 1 3/15/2025    NON FORMULARY Apply  topically to the appropriate area as directed. Lidocaine powder  Ketamine powder  Diclofenac powder   Past Week    ondansetron (ZOFRAN) 4 MG tablet Take 1 tablet by mouth Every 8 (Eight) Hours As Needed for Nausea or Vomiting. 30 tablet 3 Past Month    oxyCODONE (ROXICODONE) 10 MG tablet Take 1 tablet by mouth Every 6 (Six) Hours As Needed for Moderate Pain. 120 tablet 0 3/14/2025    promethazine-dextromethorphan (PROMETHAZINE-DM) 6.25-15 MG/5ML syrup Take 5 mL by mouth 4 (Four) Times a Day As Needed for Cough. 240 mL 1 3/14/2025    QUEtiapine (SEROquel) 100 MG tablet Take 0.5 tablets by mouth Every Night. 90 tablet 1 3/14/2025    sulfamethoxazole-trimethoprim (BACTRIM DS,SEPTRA DS) 800-160 MG per tablet Take 1 tablet by mouth Every Other Day.   Past Week    hydrOXYzine (ATARAX) 25 MG tablet Take 1 tablet by mouth Every 8 (Eight) Hours As Needed for Itching. 30 tablet 3 More than a month     Current Meds:     Current Facility-Administered Medications:     acetaminophen (TYLENOL) tablet 650 mg, 650 mg, Oral, Q4H PRN, 650 mg at 03/15/25 2118 **OR** acetaminophen (TYLENOL) 160 MG/5ML oral solution 650 mg, 650 mg, Oral, Q4H PRN **OR** acetaminophen (TYLENOL) suppository 650 mg, 650 mg, Rectal, Q4H PRN, Miguel Oates MD    ALPRAZolam (XANAX) tablet 1 mg, 1 mg, Oral, Nightly PRN, Miguel Oates MD    aluminum-magnesium  hydroxide-simethicone (MAALOX MAX) 400-400-40 MG/5ML suspension 15 mL, 15 mL, Oral, Q6H PRN, Miguel Oates MD    sennosides-docusate (PERICOLACE) 8.6-50 MG per tablet 2 tablet, 2 tablet, Oral, BID PRN **AND** polyethylene glycol (MIRALAX) packet 17 g, 17 g, Oral, Daily PRN **AND** bisacodyl (DULCOLAX) EC tablet 5 mg, 5 mg, Oral, Daily PRN **AND** bisacodyl (DULCOLAX) suppository 10 mg, 10 mg, Rectal, Daily PRN, Miguel Oates MD    budesonide (PULMICORT) nebulizer solution 0.5 mg, 0.5 mg, Nebulization, BID - RT, Miguel Oates MD, 0.5 mg at 03/15/25 2051    calcium 500 mg vitamin D 5 mcg (200 UT) per tablet 1 tablet, 1 tablet, Oral, BID, Miguel Oates MD, 1 tablet at 03/15/25 2314    Calcium Replacement - Follow Nurse / BPA Driven Protocol, , Not Applicable, PRN, Miguel Oates MD    cefTRIAXone (ROCEPHIN) 2,000 mg in sodium chloride 0.9 % 100 mL MBP, 2,000 mg, Intravenous, Q24H, Miguel Oates MD    cetirizine (zyrTEC) tablet 10 mg, 10 mg, Oral, Daily, Miguel Oates MD    cyclobenzaprine (FLEXERIL) tablet 10 mg, 10 mg, Oral, TID PRN, Miguel Oates MD    guaiFENesin (MUCINEX) 12 hr tablet 1,200 mg, 1,200 mg, Oral, Q12H, Miguel Oates MD, 1,200 mg at 03/15/25 2314    hydroxychloroquine (PLAQUENIL) tablet 200 mg, 200 mg, Oral, BID, Miguel Oates MD, 200 mg at 03/15/25 2314    ipratropium-albuterol (DUO-NEB) nebulizer solution 3 mL, 3 mL, Nebulization, Q4H PRN, Miguel Oates MD    lamoTRIgine (LaMICtal) tablet 200 mg, 200 mg, Oral, Nightly, Miguel Oates MD, 200 mg at 03/15/25 2314    levothyroxine (SYNTHROID, LEVOTHROID) tablet 175 mcg, 175 mcg, Oral, Q AM, Miguel Oates MD, 175 mcg at 03/16/25 0512    Magnesium Standard Dose Replacement - Follow Nurse / BPA Driven Protocol, , Not Applicable, PRN, Miguel Oates MD    meclizine (ANTIVERT) tablet 25 mg, 25 mg, Oral, TID PRN, Miguel Oates MD    melatonin tablet 5 mg, 5 mg, Oral, Nightly PRNИван Yadana, MD    methylPREDNISolone sodium succinate (SOLU-Medrol) injection 40 mg, 40 mg, Intravenous, Q8H, Oo,  MD Miguel, 40 mg at 25 0512    mycophenolate (CELLCEPT) capsule 500 mg, 500 mg, Oral, Q12H, Miguel Oates MD, 500 mg at 03/15/25 2314    nitroglycerin (NITROSTAT) SL tablet 0.4 mg, 0.4 mg, Sublingual, Q5 Min PRN, Miguel Oates MD    ondansetron ODT (ZOFRAN-ODT) disintegrating tablet 4 mg, 4 mg, Oral, Q6H PRN **OR** [DISCONTINUED] ondansetron (ZOFRAN) injection 4 mg, 4 mg, Intravenous, Q6H PRNИван Yadana, MD    oxyCODONE (ROXICODONE) immediate release tablet 10 mg, 10 mg, Oral, Q6H PRN, Miguel Oates MD    Phosphorus Replacement - Follow Nurse / BPA Driven Protocol, , Not Applicable, PRИван SCHMIDT Yadana, MD    Potassium Replacement - Follow Nurse / BPA Driven Protocol, , Not Applicable, PRBRAYAN, Miguel Oates MD    QUEtiapine (SEROquel) tablet 50 mg, 50 mg, Oral, Nightly, Miguel Oates MD, 50 mg at 03/15/25 2118    [COMPLETED] Insert Peripheral IV, , , Once **AND** sodium chloride 0.9 % flush 10 mL, 10 mL, Intravenous, PRN, Brooks Wheatley MD, 10 mL at 03/15/25 1622    sodium chloride 0.9 % flush 10 mL, 10 mL, Intravenous, Q12H, Miguel Oates MD, 10 mL at 03/15/25 2113    sodium chloride 0.9 % flush 10 mL, 10 mL, Intravenous, PRN, Miguel Oates MD    sodium chloride 0.9 % infusion 40 mL, 40 mL, Intravenous, PRNИван Yadana, MD    Allergies:  Allergies   Allergen Reactions    Aspirin Other (See Comments)     VONWILLENBRAND DISEASE     Baclofen Hives     Social History:   Social History     Tobacco Use    Smoking status: Former     Current packs/day: 0.00     Average packs/day: 0.3 packs/day for 10.0 years (2.5 ttl pk-yrs)     Types: Cigarettes     Start date: 1984     Quit date: 1994     Years since quittin.2     Passive exposure: Past    Smokeless tobacco: Never    Tobacco comments:     Off and on for  those years   Substance Use Topics    Alcohol use: Not Currently     Comment: Rarely      Family History:  Family History   Problem Relation Age of Onset    Colon cancer Mother     Heart disease Mother     Cancer Mother   "       Colon    Arthritis Mother     Kidney disease Father     Heart disease Father     Depression Father         Bladder cancer    Hyperlipidemia Father     Vision loss Father     Hypertension Father     Colon cancer Sister     Diabetes Sister     Heart disease Sister     Von Willebrand disease Sister     Von Willebrand disease Brother     Cancer Brother     Von Willebrand disease Son     Breast cancer Son     Diabetes Paternal Grandmother     Stroke Paternal Grandmother     Colon cancer Other     Colon cancer Son     Von Willebrand disease Son     Breast cancer Son     Cancer Sister         Melanoma, colon, bladder    Vision loss Sister     Stroke Sister     Arthritis Sister     Asthma Sister     Diabetes Sister     Heart disease Sister     Hyperlipidemia Sister     Kidney disease Sister     Cancer Paternal Aunt     Cancer Maternal Aunt     Cancer Maternal Aunt     Hyperlipidemia Sister     Thyroid disease Sister     Arthritis Sister     Hypertension Sister     Kidney disease Sister     Broken bones Sister     Rheumatologic disease Sister     Thyroid disease Sister     Cancer Sister     Clotting disorder Sister         Review of Systems  Reviewed 12 systems were reviewed, all negative except for those mentioned in HPI    Objective:  Vitals:   BP 94/50 (BP Location: Left arm, Patient Position: Lying)   Pulse 56   Temp 97.4 °F (36.3 °C) (Oral)   Resp 12   Ht 162.6 cm (64\")   Wt 113 kg (249 lb 1.9 oz)   LMP 05/08/1983   SpO2 95%   BMI 42.76 kg/m²   I/O:     Intake/Output Summary (Last 24 hours) at 3/16/2025 0747  Last data filed at 3/15/2025 1837  Gross per 24 hour   Intake 1100 ml   Output --   Net 1100 ml       Exam:  General Appearance:  Alert  Head:  Normocephalic, without obvious abnormality, atraumatic  Eyes:  PERRL, conjunctiva/corneas clear     Neck:  Supple,  no adenopathy;      Lungs:  Decreased BS occasion ronchi  Heart:  Regular rate and rhythm, S1 and S2 normal  Abdomen:  Soft, non-tender, bowel " sounds active   Extremities: trace edema  Pulses: 2+ and symmetric all extremities  Skin:  No rashes or lesions  Data Review:  All labs (24hrs):   Recent Results (from the past 24 hours)   ECG 12 Lead Dyspnea    Collection Time: 03/15/25  3:33 PM   Result Value Ref Range    QT Interval 437 ms    QTC Interval 452 ms   Blood Gas, Arterial -    Collection Time: 03/15/25  3:49 PM    Specimen: Arterial Blood   Result Value Ref Range    Site Left Radial     Peterson's Test Positive     pH, Arterial 7.392 7.350 - 7.450 pH units    pCO2, Arterial 44.7 35.0 - 48.0 mm Hg    pO2, Arterial 52.9 (L) 83.0 - 108.0 mm Hg    HCO3, Arterial 27.2 21.0 - 28.0 mmol/L    Base Excess, Arterial 1.8 0.0 - 3.0 mmol/L    O2 Saturation, Arterial 86.4 (L) 94.0 - 98.0 %    CO2 Content 28.6 22 - 29 mmol/L    Barometric Pressure for Blood Gas      Modality Room Air     FIO2 21 %    Hemodilution No     PO2/FIO2 252 0 - 500   Respiratory Panel PCR w/COVID-19(SARS-CoV-2) GAVI/RAJIV/AUSTEN/PAD/COR/APRIL In-House, NP Swab in UTM/VTM, 2 HR TAT - Swab, Nasopharynx    Collection Time: 03/15/25  4:10 PM    Specimen: Nasopharynx; Swab   Result Value Ref Range    ADENOVIRUS, PCR Not Detected Not Detected    Coronavirus 229E Not Detected Not Detected    Coronavirus HKU1 Not Detected Not Detected    Coronavirus NL63 Not Detected Not Detected    Coronavirus OC43 Not Detected Not Detected    COVID19 Not Detected Not Detected - Ref. Range    Human Metapneumovirus Not Detected Not Detected    Human Rhinovirus/Enterovirus Not Detected Not Detected    Influenza A PCR Not Detected Not Detected    Influenza B PCR Not Detected Not Detected    Parainfluenza Virus 1 Not Detected Not Detected    Parainfluenza Virus 2 Not Detected Not Detected    Parainfluenza Virus 3 Not Detected Not Detected    Parainfluenza Virus 4 Not Detected Not Detected    RSV, PCR Not Detected Not Detected    Bordetella pertussis pcr Not Detected Not Detected    Bordetella parapertussis PCR Not Detected Not  Detected    Chlamydophila pneumoniae PCR Not Detected Not Detected    Mycoplasma pneumo by PCR Not Detected Not Detected   Urinalysis With Microscopic If Indicated (No Culture) - Urine, Clean Catch    Collection Time: 03/15/25  4:10 PM    Specimen: Urine, Clean Catch   Result Value Ref Range    Color, UA Dark Yellow (A) Yellow, Straw    Appearance, UA Cloudy (A) Clear    pH, UA <=5.0 5.0 - 8.0    Specific Gravity, UA 1.023 1.005 - 1.030    Glucose, UA Negative Negative    Ketones, UA Trace (A) Negative    Bilirubin, UA Small (1+) (A) Negative    Blood, UA Negative Negative    Protein, UA 30 mg/dL (1+) (A) Negative    Leuk Esterase, UA Trace (A) Negative    Nitrite, UA Negative Negative    Urobilinogen, UA 1.0 E.U./dL 0.2 - 1.0 E.U./dL   Urinalysis, Microscopic Only - Urine, Clean Catch    Collection Time: 03/15/25  4:10 PM    Specimen: Urine, Clean Catch   Result Value Ref Range    RBC, UA 6-10 (A) None Seen, 0-2 /HPF    WBC, UA 0-2 None Seen, 0-2 /HPF    Bacteria, UA 4+ (A) None Seen /HPF    Squamous Epithelial Cells, UA 3-6 (A) None Seen, 0-2 /HPF    Hyaline Casts, UA Too Numerous to Count None Seen /LPF    Methodology Manual Light Microscopy    Basic Metabolic Panel    Collection Time: 03/15/25  4:21 PM    Specimen: Blood   Result Value Ref Range    Glucose 102 (H) 65 - 99 mg/dL    BUN 27 (H) 8 - 23 mg/dL    Creatinine 2.32 (H) 0.57 - 1.00 mg/dL    Sodium 135 (L) 136 - 145 mmol/L    Potassium 4.6 3.5 - 5.2 mmol/L    Chloride 98 98 - 107 mmol/L    CO2 24.3 22.0 - 29.0 mmol/L    Calcium 8.4 (L) 8.6 - 10.5 mg/dL    BUN/Creatinine Ratio 11.6 7.0 - 25.0    Anion Gap 12.7 5.0 - 15.0 mmol/L    eGFR 22.7 (L) >60.0 mL/min/1.73   BNP    Collection Time: 03/15/25  4:21 PM    Specimen: Blood   Result Value Ref Range    proBNP 1,995.0 (H) 0.0 - 900.0 pg/mL   D-dimer, Quantitative    Collection Time: 03/15/25  4:21 PM    Specimen: Blood   Result Value Ref Range    D-Dimer, Quantitative 1.76 (H) 0.00 - 0.66 MCGFEU/mL   CBC Auto  Differential    Collection Time: 03/15/25  4:21 PM    Specimen: Blood   Result Value Ref Range    WBC 12.82 (H) 3.40 - 10.80 10*3/mm3    RBC 3.52 (L) 3.77 - 5.28 10*6/mm3    Hemoglobin 11.0 (L) 12.0 - 15.9 g/dL    Hematocrit 34.1 34.0 - 46.6 %    MCV 96.9 79.0 - 97.0 fL    MCH 31.3 26.6 - 33.0 pg    MCHC 32.3 31.5 - 35.7 g/dL    RDW 14.3 12.3 - 15.4 %    RDW-SD 50.4 37.0 - 54.0 fl    MPV 11.2 6.0 - 12.0 fL    Platelets 301 140 - 450 10*3/mm3    Neutrophil % 53.8 42.7 - 76.0 %    Lymphocyte % 17.9 (L) 19.6 - 45.3 %    Monocyte % 22.5 (H) 5.0 - 12.0 %    Eosinophil % 4.2 0.3 - 6.2 %    Basophil % 0.9 0.0 - 1.5 %    Immature Grans % 0.7 (H) 0.0 - 0.5 %    Neutrophils, Absolute 6.89 1.70 - 7.00 10*3/mm3    Lymphocytes, Absolute 2.29 0.70 - 3.10 10*3/mm3    Monocytes, Absolute 2.89 (H) 0.10 - 0.90 10*3/mm3    Eosinophils, Absolute 0.54 (H) 0.00 - 0.40 10*3/mm3    Basophils, Absolute 0.12 0.00 - 0.20 10*3/mm3    Immature Grans, Absolute 0.09 (H) 0.00 - 0.05 10*3/mm3    nRBC 0.0 0.0 - 0.2 /100 WBC   Gold Top - SST    Collection Time: 03/15/25  4:21 PM   Result Value Ref Range    Extra Tube Hold for add-ons.    Procalcitonin    Collection Time: 03/15/25  4:21 PM    Specimen: Blood   Result Value Ref Range    Procalcitonin 0.24 0.00 - 0.25 ng/mL   Basic Metabolic Panel    Collection Time: 03/16/25  1:01 AM    Specimen: Cannula; Blood   Result Value Ref Range    Glucose 97 65 - 99 mg/dL    BUN 31 (H) 8 - 23 mg/dL    Creatinine 2.31 (H) 0.57 - 1.00 mg/dL    Sodium 135 (L) 136 - 145 mmol/L    Potassium 4.7 3.5 - 5.2 mmol/L    Chloride 98 98 - 107 mmol/L    CO2 23.5 22.0 - 29.0 mmol/L    Calcium 8.1 (L) 8.6 - 10.5 mg/dL    BUN/Creatinine Ratio 13.4 7.0 - 25.0    Anion Gap 13.5 5.0 - 15.0 mmol/L    eGFR 22.8 (L) >60.0 mL/min/1.73   Magnesium    Collection Time: 03/16/25  1:01 AM    Specimen: Cannula; Blood   Result Value Ref Range    Magnesium 2.4 1.6 - 2.4 mg/dL   Phosphorus    Collection Time: 03/16/25  1:01 AM    Specimen:  Cannula; Blood   Result Value Ref Range    Phosphorus 5.1 (H) 2.5 - 4.5 mg/dL   CBC Auto Differential    Collection Time: 03/16/25  1:01 AM    Specimen: Cannula; Blood   Result Value Ref Range    WBC 12.44 (H) 3.40 - 10.80 10*3/mm3    RBC 3.31 (L) 3.77 - 5.28 10*6/mm3    Hemoglobin 10.2 (L) 12.0 - 15.9 g/dL    Hematocrit 32.2 (L) 34.0 - 46.6 %    MCV 97.3 (H) 79.0 - 97.0 fL    MCH 30.8 26.6 - 33.0 pg    MCHC 31.7 31.5 - 35.7 g/dL    RDW 14.2 12.3 - 15.4 %    RDW-SD 50.5 37.0 - 54.0 fl    MPV 11.3 6.0 - 12.0 fL    Platelets 279 140 - 450 10*3/mm3    Neutrophil % 81.8 (H) 42.7 - 76.0 %    Lymphocyte % 9.1 (L) 19.6 - 45.3 %    Monocyte % 6.2 5.0 - 12.0 %    Eosinophil % 1.4 0.3 - 6.2 %    Basophil % 0.7 0.0 - 1.5 %    Immature Grans % 0.8 (H) 0.0 - 0.5 %    Neutrophils, Absolute 10.18 (H) 1.70 - 7.00 10*3/mm3    Lymphocytes, Absolute 1.13 0.70 - 3.10 10*3/mm3    Monocytes, Absolute 0.77 0.10 - 0.90 10*3/mm3    Eosinophils, Absolute 0.17 0.00 - 0.40 10*3/mm3    Basophils, Absolute 0.09 0.00 - 0.20 10*3/mm3    Immature Grans, Absolute 0.10 (H) 0.00 - 0.05 10*3/mm3    nRBC 0.2 0.0 - 0.2 /100 WBC       Current Facility-Administered Medications:     acetaminophen (TYLENOL) tablet 650 mg, 650 mg, Oral, Q4H PRN, 650 mg at 03/15/25 2118 **OR** acetaminophen (TYLENOL) 160 MG/5ML oral solution 650 mg, 650 mg, Oral, Q4H PRN **OR** acetaminophen (TYLENOL) suppository 650 mg, 650 mg, Rectal, Q4H PRN, Miguel Oates MD    ALPRAZolam (XANAX) tablet 1 mg, 1 mg, Oral, Nightly PRN, Miguel Oates MD    aluminum-magnesium hydroxide-simethicone (MAALOX MAX) 400-400-40 MG/5ML suspension 15 mL, 15 mL, Oral, Q6H PRN, Miguel Oates MD    sennosides-docusate (PERICOLACE) 8.6-50 MG per tablet 2 tablet, 2 tablet, Oral, BID PRN **AND** polyethylene glycol (MIRALAX) packet 17 g, 17 g, Oral, Daily PRN **AND** bisacodyl (DULCOLAX) EC tablet 5 mg, 5 mg, Oral, Daily PRN **AND** bisacodyl (DULCOLAX) suppository 10 mg, 10 mg, Rectal, Daily PRN, Miguel Oates MD     budesonide (PULMICORT) nebulizer solution 0.5 mg, 0.5 mg, Nebulization, BID - RT, Miguel Oates MD, 0.5 mg at 03/15/25 2051    calcium 500 mg vitamin D 5 mcg (200 UT) per tablet 1 tablet, 1 tablet, Oral, BID, Miguel Oates MD, 1 tablet at 03/15/25 2314    Calcium Replacement - Follow Nurse / BPA Driven Protocol, , Not Applicable, PRN, Miguel Oates MD    cefTRIAXone (ROCEPHIN) 2,000 mg in sodium chloride 0.9 % 100 mL MBP, 2,000 mg, Intravenous, Q24H, Miguel Oates MD    cetirizine (zyrTEC) tablet 10 mg, 10 mg, Oral, Daily, Miguel Oates MD    cyclobenzaprine (FLEXERIL) tablet 10 mg, 10 mg, Oral, TID PRN, Miguel Oates MD    guaiFENesin (MUCINEX) 12 hr tablet 1,200 mg, 1,200 mg, Oral, Q12H, Miguel Oates MD, 1,200 mg at 03/15/25 2314    hydroxychloroquine (PLAQUENIL) tablet 200 mg, 200 mg, Oral, BID, Miguel Oates MD, 200 mg at 03/15/25 2314    ipratropium-albuterol (DUO-NEB) nebulizer solution 3 mL, 3 mL, Nebulization, Q4H PRN, Miguel Oates MD    lamoTRIgine (LaMICtal) tablet 200 mg, 200 mg, Oral, Nightly, Miguel Oates MD, 200 mg at 03/15/25 2314    levothyroxine (SYNTHROID, LEVOTHROID) tablet 175 mcg, 175 mcg, Oral, Q AM, Miguel Oates MD, 175 mcg at 03/16/25 0512    Magnesium Standard Dose Replacement - Follow Nurse / BPA Driven Protocol, , Not Applicable, PRN, Miguel Oates MD    meclizine (ANTIVERT) tablet 25 mg, 25 mg, Oral, TID PRN, Miguel Oates MD    melatonin tablet 5 mg, 5 mg, Oral, Nightly PRN, Miguel Oates MD    methylPREDNISolone sodium succinate (SOLU-Medrol) injection 40 mg, 40 mg, Intravenous, Q8H, Miguel Oates MD, 40 mg at 03/16/25 0512    mycophenolate (CELLCEPT) capsule 500 mg, 500 mg, Oral, Q12H, Miguel Oates MD, 500 mg at 03/15/25 2314    nitroglycerin (NITROSTAT) SL tablet 0.4 mg, 0.4 mg, Sublingual, Q5 Min PRN, Migeul Oates MD    ondansetron ODT (ZOFRAN-ODT) disintegrating tablet 4 mg, 4 mg, Oral, Q6H PRN **OR** [DISCONTINUED] ondansetron (ZOFRAN) injection 4 mg, 4 mg, Intravenous, Q6H PRN, Miguel Oates MD    oxyCODONE  (ROXICODONE) immediate release tablet 10 mg, 10 mg, Oral, Q6H PRN, Miguel Oates MD    Phosphorus Replacement - Follow Nurse / BPA Driven Protocol, , Not Applicable, Иван LAMAS Yadana, MD    Potassium Replacement - Follow Nurse / BPA Driven Protocol, , Not Applicable, Иван LAMAS Yadana, MD    QUEtiapine (SEROquel) tablet 50 mg, 50 mg, Oral, Nightly, Miguel Oates MD, 50 mg at 03/15/25 2118    [COMPLETED] Insert Peripheral IV, , , Once **AND** sodium chloride 0.9 % flush 10 mL, 10 mL, Intravenous, PRN, Brooks Wheatley MD, 10 mL at 03/15/25 1622    sodium chloride 0.9 % flush 10 mL, 10 mL, Intravenous, Q12H, Miguel Oates MD, 10 mL at 03/15/25 2113    sodium chloride 0.9 % flush 10 mL, 10 mL, Intravenous, PRN, Miguel Oates MD    sodium chloride 0.9 % infusion 40 mL, 40 mL, Intravenous, CYDNEY, Miguel Oates MD    Assessment:  Acute kidney injury on top of chronic kidney disease stage III  Acute hypoxic respiratory failure  Pulmonary fibrosis  Urinary tract infection  Anxiety disorder  Hypertension  Hyperlipidemia  Hypothyroidism  History of von Willebrand disease with no bleeding      Recommendations:  Patient with acute kidney injury on top of chronic kidney disease stage III likely secondary to ATN from pneumonia and hypoxia  Will use cautious hydration  Rule out reversible cause of kidney failure  Check urine studies and CPK  Recheck labs  Will follow along with you  Discussed with the patient and family at bedside      Any Manzanares MD  3/16/2025  07:47 EDT

## 2025-03-16 NOTE — THERAPY EVALUATION
Patient Name: Tracie Gross  : 1960    MRN: [id number]                              Today's Date: 3/16/2025       Admit Date: 3/15/2025    Visit Dx:     ICD-10-CM ICD-9-CM   1. Dyspnea, unspecified type  R06.00 786.09   2. Pulmonary fibrosis  J84.10 515   3. Hypotension, unspecified hypotension type  I95.9 458.9   4. Urinary tract infection without hematuria, site unspecified  N39.0 599.0     Patient Active Problem List   Diagnosis    Vitamin B12 deficiency    Arthritis    Sleep apnea    Bipolar affective disorder    Depression    Breast cancer    Chronic pain    Dyslipidemia    Family history of malignant neoplasm of colon    Family history of melanoma    Gastroesophageal reflux disease    Heart murmur    Hypertension    Hypothyroidism    Osteoarthritis, generalized    Raynaud's disease    Ulcerative colitis    Artificial knee joint present    Neuralgia    Presence of artificial hip joint, right    Lumbar radiculopathy    Derangement of posterior horn of lateral meniscus    Von Willebrand disease    SLE (systemic lupus erythematosus related syndrome)    Chest pain    Scleroderma    Monahan syndrome    Rectal prolapse    Osteoporosis    COVID-19    Fibromyalgia    Onychomycosis    Stage 3 chronic kidney disease    Avascular necrosis of femoral head, left    Morbid obesity    Trochanteric bursitis of left hip    Pain in both knees    Status post total hip replacement, right    DJD of left shoulder    Complete tear of left rotator cuff    Osteoarthritis of right glenohumeral joint    Pulmonary fibrosis    SOB (shortness of breath)     Past Medical History:   Diagnosis Date    Allergic 1998    Anemia     Ankle sprain     Anxiety     Arthritis     Arthritis of back 0ll2013    Arthritis of neck 2005    Asthma     Bipolar affective disorder     Breast cancer 1985    s/p R mastectomy    Bursitis of hip [id number]    Cervical disc disorder 2006    CTS (carpal tunnel syndrome)     Depression  2000    Fracture of wrist 1995    Fracture, foot     Frozen shoulder     GERD (gastroesophageal reflux disease)     H/O breast reconstruction     SEVERAL    H/O laminectomy     Hamartoma     Hip arthrosis 2013    History of medical problems     Hx of bilateral oophorectomy     Hyperlipidemia     Hypertension     Hypothyroidism     Infectious viral hepatitis     Inflammatory bowel disease     Man. EGD/colonoscopy annually (2021)    Irritable bowel syndrome     Kienbock's disease, right     WRIST    Knee swelling 2007    Low back pain 1991    Low back strain     Lumbosacral disc disease 1995    Had 2 lumber surgeries    Lupus     [place]    Monahan syndrome     Man. EGD/colonoscopy annually (2021). MRI alternating w/ EUS annually for pancreatic screen    MRSA infection     Neuroma of foot     Obesity     Osteopenia     Osteoporosis     maintained on Prolia through Karen    Peptic ulceration     Periarthritis of shoulder 2022    Pneumonia     Pulmonary fibrosis     Raynaud disease     Renal insufficiency     Rotator cuff syndrome [id number]    Scleroderma     Sleep apnea     cpap    Squamous cell cancer of lip     Tear of meniscus of knee     Tendinitis of knee     Thoracic disc disorder     Von Willebrand disease     Wrist sprain      Past Surgical History:   Procedure Laterality Date    APPENDECTOMY      ARM DEBRIDEMENT Right     X 3    BACK SURGERY      Vetas- cervical fusion    BACK SURGERY      lumbar decomp    BREAST BIOPSY      BREAST LUMPECTOMY      BREAST RECONSTRUCTION Right     BREAST RECONSTRUCTION Right     CARDIAC CATHETERIZATION      no stents placed     SECTION  ,     CHOLECYSTECTOMY      COLONOSCOPY      COLONOSCOPY N/A 2020    Procedure: COLONOSCOPY;  Surgeon: Cayden Conway MD;  Location: [facility] ENDOSCOPY;  Service: Gastroenterology;  Laterality: N/A;  rectal ulcer    COLONOSCOPY N/A 2021    Procedure:  COLONOSCOPY WITH POLYPECTOMY X 1;  Surgeon: Cayden Conway MD;  Location: [facility] ENDOSCOPY;  Service: Gastroenterology;  Laterality: N/A;  Post: COLON POLYP    COLONOSCOPY N/A 01/06/2023    Procedure: COLONOSCOPY with polypectomy x 1, endoscopic clipping x 1;  Surgeon: Cayden Conway MD;  Location: [facility] ENDOSCOPY;  Service: Gastroenterology;  Laterality: N/A;    COLONOSCOPY N/A 10/01/2024    Procedure: COLONOSCOPY;  Surgeon: Cayden Conway MD;  Location: [facility] ENDOSCOPY;  Service: Gastroenterology;  Laterality: N/A;  normal    COSMETIC SURGERY  [id number]    ENDOSCOPY      ENDOSCOPY N/A 08/14/2020    Procedure: ESOPHAGOGASTRODUODENOSCOPY;  Surgeon: Cayden Conway MD;  Location: [facility] ENDOSCOPY;  Service: Gastroenterology;  Laterality: N/A;  Normal EGD    ENDOSCOPY N/A 09/17/2021    Procedure: ESOPHAGOGASTRODUODENOSCOPY;  Surgeon: Cayden Conway MD;  Location: [facility] ENDOSCOPY;  Service: Gastroenterology;  Laterality: N/A;  Post: normal egd    ENDOSCOPY N/A 10/01/2024    Procedure: ESOPHAGOGASTRODUODENOSCOPY;  Surgeon: Cayden Conway MD;  Location: [facility] ENDOSCOPY;  Service: Gastroenterology;  Laterality: N/A;  normal    EXPLORATORY LAPAROTOMY      scar tissue removal    EYE SURGERY  2000    FINGER SURGERY Right     ring finger mass excision    HAND SURGERY  Rt wrist  10/15    HIP SURGERY  1/12    HYSTERECTOMY      PARTIAL    JOINT REPLACEMENT Right 2012,2015    hip and knee    KNEE ARTHROSCOPY Left 01/07/2020    Procedure: LEFT KNEE SCOPE with partial lateral meniscectomy;  Surgeon: Chau Perez MD;  Location: [facility] MAIN OR;  Service: Orthopedics    KNEE SURGERY  Rtf knee  2015    LASIK      LASIK/ LASIK ENHANCEMENT    MASTECTOMY RADICAL Right     NECK SURGERY  01/01/06    OOPHORECTOMY Bilateral     SHOULDER SURGERY  11/01/2023    SPINE SURGERY      SPLENECTOMY      SUBTOTAL HYSTERECTOMY      TOTAL HIP ARTHROPLASTY Left 05/17/2023    TOTAL SHOULDER ARTHROPLASTY  W/ DISTAL CLAVICLE EXCISION Right 11/01/2023    Procedure: TOTAL SHOULDER REVERSE ARTHROPLASTY;  Surgeon: Floyd Ruiz MD;  Location: [facility] MAIN OR;  Service: Orthopedics;  Laterality: Right;    TRIGGER POINT INJECTION  7/23    TUBAL ABDOMINAL LIGATION  1981    UPPER ENDOSCOPIC ULTRASOUND W/ FNA N/A 12/09/2021    Procedure: ENDOSCOPIC ULTRASOUND WITH ESOPHAGOGASTRODUODENOSCOPY;  Surgeon: Luis E Negron MD;  Location: [facility] ENDOSCOPY;  Service: Gastroenterology;  Laterality: N/A;  Post: GASTROPARESIS, DILATED COMMON BILE DUCT, FUNDIC POLYP, DUODENITIS, NORMAL PANCREAS, HIATAL HERNIA    WRIST SURGERY Right     distal radius head removal (bone dead)  plates and screws decomp lunate      General Information       Row Name 03/16/25 1048          OT Time and Intention    Subjective Information complains of;weakness;fatigue;pain;dizziness  -     Patient Effort good  -     Symptoms Noted During/After Treatment dizziness;fatigue;shortness of breath  -     Comment not on home O2  -       Row Name 03/16/25 104          General Information    Prior Level of Function independent:;all household mobility;ADL's  -     Existing Precautions/Restrictions fall;oxygen therapy device and L/min;other (see comments)  doppler this am checking for LE DVT. Pt has been up to [facility].  -     Barriers to Rehab medically complex;previous functional deficit  wants a bathroom remodel to have shower rather than tub/shower so she can access  -       Row Name 03/16/25 1045          Living Environment    Current Living Arrangements home  -     People in Home spouse  -       Row Name 03/16/25 1044          Home Main Entrance    Number of Stairs, Main Entrance other (see comments)  ramp. Uses cane but for the past 1-2 weeks she has been insteady & needing to use her RW. Has w/c from a family member who used to live there. Ramp was installed to assist pt in climbing up the 2 WILLIE.  -       Row Name 03/16/25 0682           Stairs Within Home, Primary    Number of Stairs, Within Home, Primary other (see comments)  chair lift to upstairs  -       Row Name 03/16/25 1048          Cognition    Orientation Status (Cognition) oriented x 4  -       Row Name 03/16/25 1048          Safety Issues/Impairments Affecting Functional Mobility    Impairments Affecting Function (Mobility) balance;endurance/activity tolerance;strength;shortness of breath  -               User Key  (r) = Recorded By, (t) = Taken By, (c) = Cosigned By      Initials Name Provider Type     Racquel Humphries, OT Occupational Therapist                     Mobility/ADL's       Row Name 03/16/25 1106          Bed Mobility    Bed Mobility supine-sit  -     Supine-Sit [place] (Bed Mobility) set up;standby assist  -     Assistive Device (Bed Mobility) head of bed elevated;bed rails  -[facility] Name 03/16/25 1106          Transfers    Transfers sit-stand transfer;stand-sit transfer;bed-chair transfer  -[facility] Name 03/16/25 1106          Bed-Chair Transfer    Bed-Chair [place] (Transfers) standby assist  -     Assistive Device (Bed-Chair Transfers) walker, front-wheeled  -[facility] Name 03/16/25 1106          Sit-Stand Transfer    Sit-Stand [place] (Transfers) minimum assist (75% patient effort);verbal cues  -     Assistive Device (Sit-Stand Transfers) walker, front-wheeled  -[facility] Name 03/16/25 1106          Stand-Sit Transfer    Stand-Sit [place] (Transfers) contact guard;verbal cues  -     Assistive Device (Stand-Sit Transfers) walker, front-wheeled  -[facility] Name 03/16/25 1106          Functional Mobility    Functional Mobility- Ind. Level standby assist;set up required  for short distance ambulatory transfer. Pt moving slowly, shuffles.  -       Row Name 03/16/25 1106          Activities of Daily Living    BADL Assessment/Intervention lower body dressing;grooming;feeding;toileting  -       Row Name 03/16/25 1106           Lower Body Dressing Assessment/Training    [place] Level (Lower Body Dressing) don;socks;dependent (less than 25% patient effort)  -[facility] Name 03/16/25 1106          Grooming Assessment/Training    [place] Level (Grooming) wash face, hands;set up;hair care, combing/brushing;maximum assist (25% patient effort)  -[facility] Name 03/16/25 1106          Self-Feeding Assessment/Training    [place] Level (Feeding) feeding skills;set up  -     Position (Feeding) supported sitting  -[facility] Name 03/16/25 1106          Toileting Assessment/Training    [place] Level (Toileting) toileting skills;dependent (less than 25% patient effort)  -               User Key  (r) = Recorded By, (t) = Taken By, (c) = Cosigned By      Initials Name Provider Type     Racquel Humphries OT Occupational Therapist                   Obj/Interventions       [facility] Name 03/16/25 1108          Sensory Assessment (Somatosensory)    Sensory Assessment (Somatosensory) sensation intact  -[facility]       Row Name 03/16/25 1108          Vision Assessment/Intervention    Visual Impairment/Limitations corrective lenses full-time  -[facility]       Row Name 03/16/25 1108          Range of Motion Comprehensive    Comment, General Range of Motion body habitus limiting functional reach  -[facility] Name 03/16/25 1108          Strength Comprehensive (MMT)    Comment, General Manual Muscle Testing (MMT) Assessment BUE 4-/5, BLE 3+/5  -               User Key  (r) = Recorded By, (t) = Taken By, (c) = Cosigned By      Initials Name Provider Type     Racquel Humphries OT Occupational Therapist                   Goals/Plan       [facility] Name 03/16/25 1115          Bed Mobility Goal 1 (OT)    Strategies/Barriers (Bed Mobility Goal 1, OT) sleeps in recliner  -[facility] Name 03/16/25 1115          Transfer Goal 1 (OT)    Activity/Assistive Device (Transfer Goal 1, OT) transfers, all;walker, rolling  -     [place] Level/Cues Needed  (Transfer Goal 1, OT) modified independence  -     Time Frame (Transfer Goal 1, OT) 2 weeks  -       Row Name 03/16/25 1115          Bathing Goal 1 (OT)    Activity/Device (Bathing Goal 1, OT) bathing skills, all  -     [place] Level/Cues Needed (Bathing Goal 1, OT) set-up required  -     Time Frame (Bathing Goal 1, OT) 2 weeks  -       Row Name 03/16/25 1115          Dressing Goal 1 (OT)    Activity/Device (Dressing Goal 1, OT) dressing skills, all  -     [place]/Cues Needed (Dressing Goal 1, OT) set-up required  -     Time Frame (Dressing Goal 1, OT) 2 weeks  -       Row Name 03/16/25 1116          Toileting Goal 1 (OT)    Activity/Device (Toileting Goal 1, OT) toileting skills, all  -     [place] Level/Cues Needed (Toileting Goal 1, OT) modified independence  -     Time Frame (Toileting Goal 1, OT) 2 weeks  -[facility] Name 03/16/25 1113          Therapy Assessment/Plan (OT)    Planned Therapy Interventions (OT) activity tolerance training;adaptive equipment training;BADL retraining;functional balance retraining;patient/caregiver education/training;transfer/mobility retraining;occupation/activity based interventions;ROM/therapeutic exercise  -               User Key  (r) = Recorded By, (t) = Taken By, (c) = Cosigned By      Initials Name Provider Type     Racquel Humphries, OT Occupational Therapist                   Clinical Impression       Row Name 03/16/25 1108          Pain Assessment    Pretreatment Pain Rating 2/10  -     Posttreatment Pain Rating 2/10  legs  -[facility] Name 03/16/25 1102          Plan of Care Review    Plan of Care Reviewed With patient;spouse  -     Progress improving  -     Outcome Evaluation 64 y.o. old female patient with PMH of anxiety hypertension hyperlipidemia hypothyroidism and irritable bowel syndrome von Willebrand disease CKD pulmonary fibrosis elective rTSH in R shoulder presents to the hospital with complaints of decreased  appetite generalized weakness and increased shortness of breath.  Patient does not use oxygen at home and currently needed 2 L of oxygen in the ED.  Patient labs showing leukocytosis. UA with UTI without retention.  Patient is started on IV Rocephin & respiratory treatment.  At baseline pt lives with spouse in a home with a ramp & chair lift to the second floor and uses a cane for mobility but a RW for 1-2 weeks. Pt states she is mod (I) for ADl and takes sponge baths.  She does not use home O2. This date pt requires assist with grooming, LBD, toileitng, and basic mobility. Pt may benefit from SNF for short stay rehab at d/c. OT will follow to engage pt in valued life occupations progressively as able. Note, pt has Hx vertigo & had just finished 5 treatments by an ENT. Pt states she felt better after the second treatment but the MD wanted to continue. She reports recurrence of symptoms this am and OT notes some nystagmus with report of symptoms as pt comes to EOB. Symptoms subside quickly. Pt could benefit from vestibular intervention. PT notified.  -       Row Name 03/16/25 1102          Therapy Assessment/Plan (OT)    Rehab Potential (OT) good  -     Criteria for Skilled Therapeutic Interventions Met (OT) skilled treatment is necessary  -     Therapy Frequency (OT) 3 times/wk  -     Predicted Duration of Therapy Intervention (OT) until d/c  -       Row Name 03/16/25 1109          Therapy Plan Review/Discharge Plan (OT)    Anticipated Discharge Disposition (OT) skilled nursing facility  -       Row Name 03/16/25 1109          Vital Signs    Intratreatment Heart Rate (beats/min) 117  -     O2 Delivery Pre Treatment supplemental O2  -     O2 Delivery Intra Treatment supplemental O2  -     O2 Delivery Post Treatment supplemental O2  -     Pre Patient Position Supine  -     Post Patient Position Sitting  -       Row Name 03/16/25 1109          Positioning and Restraints    Pre-Treatment Position  in bed  -     Post Treatment Position chair  -     In Chair notified nsg;call light within reach;encouraged to call for assist;exit alarm on;with family/caregiver;sitting;legs elevated  -               User Key  (r) = Recorded By, (t) = Taken By, (c) = Cosigned By      Initials Name Provider Type     Racquel Humphries OT Occupational Therapist                   Outcome Measures       Row Name 03/16/25 0800          How much help from another person do you currently need...    Turning from your back to your side while in flat bed without using bedrails? 3  -PS     Moving from lying on back to sitting on the side of a flat bed without bedrails? 2  -PS     Moving to and from a bed to a chair (including a wheelchair)? 2  -PS     Standing up from a chair using your arms (e.g., wheelchair, bedside chair)? 2  -PS     Climbing 3-5 steps with a railing? 2  -PS     To walk in hospital room? 2  -PS     AM-PAC 6 Clicks Score (PT) 13  -PS               User Key  (r) = Recorded By, (t) = Taken By, (c) = Cosigned By      Initials Name Provider Type    Ramirez Keith, RN Registered Nurse                    Occupational Therapy Education       Title: PT OT SLP Therapies (Done)       Topic: Occupational Therapy (Done)       Point: ADL training (Done)       Learning Progress Summary            Patient Acceptance, E,TB,D, VU,DU,NR by  at 3/16/2025 1116                      Point: Home exercise program (Done)       Learning Progress Summary            Patient Acceptance, E,TB,D, VU,DU,NR by  at 3/16/2025 1116                      Point: Precautions (Done)       Learning Progress Summary            Patient Acceptance, E,TB,D, VU,DU,NR by  at 3/16/2025 1116                      Point: Body mechanics (Done)       Learning Progress Summary            Patient Acceptance, E,TB,D, VU,DU,NR by  at 3/16/2025 1116                                      User Key       Initials Effective Dates Name Provider Type [facility]  06/16/21 -  Racquel Humphries, MIN Occupational Therapist OT                  OT Recommendation and Plan  Planned Therapy Interventions (OT): activity tolerance training, adaptive equipment training, BADL retraining, functional balance retraining, patient/caregiver education/training, transfer/mobility retraining, occupation/activity based interventions, ROM/therapeutic exercise  Therapy Frequency (OT): 3 times/wk  Plan of Care Review  Plan of Care Reviewed With: patient, spouse  Progress: improving  Outcome Evaluation: 64 y.o. old female patient with PMH of anxiety hypertension hyperlipidemia hypothyroidism and irritable bowel syndrome von Willebrand disease CKD pulmonary fibrosis elective rTSH in R shoulder presents to the hospital with complaints of decreased appetite generalized weakness and increased shortness of breath.  Patient does not use oxygen at home and currently needed 2 L of oxygen in the ED.  Patient labs showing leukocytosis. UA with UTI without retention.  Patient is started on IV Rocephin & respiratory treatment.  At baseline pt lives with spouse in a home with a ramp & chair lift to the second floor and uses a cane for mobility but a RW for 1-2 weeks. Pt states she is mod (I) for ADl and takes sponge baths.  She does not use home O2. This date pt requires assist with grooming, LBD, toileitng, and basic mobility. Pt may benefit from SNF for short stay rehab at d/c. OT will follow to engage pt in valued life occupations progressively as able. Note, pt has Hx vertigo & had just finished 5 treatments by an ENT. Pt states she felt better after the second treatment but the MD wanted to continue. She reports recurrence of symptoms this am and OT notes some nystagmus with report of symptoms as pt comes to EOB. Symptoms subside quickly. Pt could benefit from vestibular intervention. PT notified.     Time Calculation:         Time Calculation- OT       Row Name 03/16/25 1117             Time Calculation- OT     OT Start Time 0941  -      OT Stop Time 1003  -      OT Time Calculation (min) 22 min  -      Total Timed Code Minutes- OT 0 minute(s)  -      OT Received On 03/16/25  -      OT - Next Appointment 03/18/25  -      OT Goal Re-Cert Due Date 03/30/25  -                User Key  (r) = Recorded By, (t) = Taken By, (c) = Cosigned By      Initials Name Provider Type     Racquel Humphries, OT Occupational Therapist                  Therapy Charges for Today       Code Description Service Date Service Provider Modifiers Qty    [id number]  OT EVAL MOD COMPLEXITY 3 3/16/2025 Racquel Humphries OT GO 1                 Racquel Humphries OT  3/16/2025

## 2025-03-17 LAB
ANION GAP SERPL CALCULATED.3IONS-SCNC: 13.3 MMOL/L (ref 5–15)
BACTERIA SPEC AEROBE CULT: NORMAL
BASOPHILS # BLD AUTO: 0.02 10*3/MM3 (ref 0–0.2)
BASOPHILS NFR BLD AUTO: 0.2 % (ref 0–1.5)
BUN SERPL-MCNC: 42 MG/DL (ref 8–23)
BUN/CREAT SERPL: 20.8 (ref 7–25)
CALCIUM SPEC-SCNC: 8 MG/DL (ref 8.6–10.5)
CHLORIDE SERPL-SCNC: 97 MMOL/L (ref 98–107)
CK SERPL-CCNC: 94 U/L (ref 20–180)
CO2 SERPL-SCNC: 20.7 MMOL/L (ref 22–29)
CREAT SERPL-MCNC: 2.02 MG/DL (ref 0.57–1)
DEPRECATED RDW RBC AUTO: 47.9 FL (ref 37–54)
EGFRCR SERPLBLD CKD-EPI 2021: 26.8 ML/MIN/1.73
EOSINOPHIL # BLD AUTO: 0 10*3/MM3 (ref 0–0.4)
EOSINOPHIL NFR BLD AUTO: 0 % (ref 0.3–6.2)
ERYTHROCYTE [DISTWIDTH] IN BLOOD BY AUTOMATED COUNT: 13.6 % (ref 12.3–15.4)
GLUCOSE SERPL-MCNC: 153 MG/DL (ref 65–99)
HCT VFR BLD AUTO: 30.6 % (ref 34–46.6)
HGB BLD-MCNC: 9.9 G/DL (ref 12–15.9)
IMM GRANULOCYTES # BLD AUTO: 0.09 10*3/MM3 (ref 0–0.05)
IMM GRANULOCYTES NFR BLD AUTO: 0.9 % (ref 0–0.5)
L PNEUMO1 AG UR QL IA: NEGATIVE
LYMPHOCYTES # BLD AUTO: 1.14 10*3/MM3 (ref 0.7–3.1)
LYMPHOCYTES NFR BLD AUTO: 10.9 % (ref 19.6–45.3)
MAGNESIUM SERPL-MCNC: 2.7 MG/DL (ref 1.6–2.4)
MCH RBC QN AUTO: 30.8 PG (ref 26.6–33)
MCHC RBC AUTO-ENTMCNC: 32.4 G/DL (ref 31.5–35.7)
MCV RBC AUTO: 95.3 FL (ref 79–97)
MONOCYTES # BLD AUTO: 0.96 10*3/MM3 (ref 0.1–0.9)
MONOCYTES NFR BLD AUTO: 9.1 % (ref 5–12)
NEUTROPHILS NFR BLD AUTO: 78.9 % (ref 42.7–76)
NEUTROPHILS NFR BLD AUTO: 8.29 10*3/MM3 (ref 1.7–7)
NRBC BLD AUTO-RTO: 0 /100 WBC (ref 0–0.2)
PHOSPHATE SERPL-MCNC: 5 MG/DL (ref 2.5–4.5)
PLATELET # BLD AUTO: 286 10*3/MM3 (ref 140–450)
PMV BLD AUTO: 11.2 FL (ref 6–12)
POTASSIUM SERPL-SCNC: 4.6 MMOL/L (ref 3.5–5.2)
RBC # BLD AUTO: 3.21 10*6/MM3 (ref 3.77–5.28)
SODIUM SERPL-SCNC: 131 MMOL/L (ref 136–145)
SODIUM UR-SCNC: <20 MMOL/L
WBC NRBC COR # BLD AUTO: 10.5 10*3/MM3 (ref 3.4–10.8)

## 2025-03-17 PROCEDURE — 87040 BLOOD CULTURE FOR BACTERIA: CPT | Performed by: INTERNAL MEDICINE

## 2025-03-17 PROCEDURE — 25810000003 SODIUM CHLORIDE 0.9 % SOLUTION: Performed by: INTERNAL MEDICINE

## 2025-03-17 PROCEDURE — 97162 PT EVAL MOD COMPLEX 30 MIN: CPT

## 2025-03-17 PROCEDURE — 97530 THERAPEUTIC ACTIVITIES: CPT

## 2025-03-17 PROCEDURE — 85025 COMPLETE CBC W/AUTO DIFF WBC: CPT | Performed by: INTERNAL MEDICINE

## 2025-03-17 PROCEDURE — 87449 NOS EACH ORGANISM AG IA: CPT | Performed by: INTERNAL MEDICINE

## 2025-03-17 PROCEDURE — 83735 ASSAY OF MAGNESIUM: CPT | Performed by: INTERNAL MEDICINE

## 2025-03-17 PROCEDURE — 94761 N-INVAS EAR/PLS OXIMETRY MLT: CPT

## 2025-03-17 PROCEDURE — 84100 ASSAY OF PHOSPHORUS: CPT | Performed by: INTERNAL MEDICINE

## 2025-03-17 PROCEDURE — 94799 UNLISTED PULMONARY SVC/PX: CPT

## 2025-03-17 PROCEDURE — 82550 ASSAY OF CK (CPK): CPT | Performed by: INTERNAL MEDICINE

## 2025-03-17 PROCEDURE — 25010000002 METHYLPREDNISOLONE PER 40 MG: Performed by: INTERNAL MEDICINE

## 2025-03-17 PROCEDURE — 80048 BASIC METABOLIC PNL TOTAL CA: CPT | Performed by: INTERNAL MEDICINE

## 2025-03-17 PROCEDURE — 94664 DEMO&/EVAL PT USE INHALER: CPT

## 2025-03-17 PROCEDURE — 25010000002 CEFTRIAXONE PER 250 MG: Performed by: INTERNAL MEDICINE

## 2025-03-17 PROCEDURE — 84300 ASSAY OF URINE SODIUM: CPT | Performed by: INTERNAL MEDICINE

## 2025-03-17 RX ORDER — BENZONATATE 100 MG/1
200 CAPSULE ORAL 3 TIMES DAILY
Status: DISCONTINUED | OUTPATIENT
Start: 2025-03-17 | End: 2025-03-31 | Stop reason: HOSPADM

## 2025-03-17 RX ORDER — CODEINE PHOSPHATE AND GUAIFENESIN 10; 100 MG/5ML; MG/5ML
10 SOLUTION ORAL EVERY 6 HOURS PRN
Status: DISCONTINUED | OUTPATIENT
Start: 2025-03-17 | End: 2025-03-17

## 2025-03-17 RX ORDER — CODEINE PHOSPHATE AND GUAIFENESIN 10; 100 MG/5ML; MG/5ML
10 SOLUTION ORAL EVERY 4 HOURS PRN
Status: DISCONTINUED | OUTPATIENT
Start: 2025-03-17 | End: 2025-03-31 | Stop reason: HOSPADM

## 2025-03-17 RX ORDER — CALCIUM ACETATE 667 MG/1
667 CAPSULE ORAL
Status: COMPLETED | OUTPATIENT
Start: 2025-03-17 | End: 2025-03-18

## 2025-03-17 RX ADMIN — GUAIFENESIN 600 MG: 600 TABLET, EXTENDED RELEASE ORAL at 09:29

## 2025-03-17 RX ADMIN — CETIRIZINE HYDROCHLORIDE 10 MG: 10 TABLET, FILM COATED ORAL at 09:29

## 2025-03-17 RX ADMIN — CEFTRIAXONE 2000 MG: 2 INJECTION, POWDER, FOR SOLUTION INTRAMUSCULAR; INTRAVENOUS at 17:12

## 2025-03-17 RX ADMIN — LAMOTRIGINE 200 MG: 100 TABLET ORAL at 20:38

## 2025-03-17 RX ADMIN — QUETIAPINE FUMARATE 50 MG: 25 TABLET ORAL at 20:38

## 2025-03-17 RX ADMIN — IPRATROPIUM BROMIDE AND ALBUTEROL SULFATE 3 ML: .5; 3 SOLUTION RESPIRATORY (INHALATION) at 09:25

## 2025-03-17 RX ADMIN — Medication 10 ML: at 09:29

## 2025-03-17 RX ADMIN — GUAIFENESIN AND CODEINE PHOSPHATE 10 ML: 100; 10 SOLUTION ORAL at 20:39

## 2025-03-17 RX ADMIN — Medication 10 ML: at 20:39

## 2025-03-17 RX ADMIN — METHYLPREDNISOLONE SODIUM SUCCINATE 40 MG: 40 INJECTION, POWDER, FOR SOLUTION INTRAMUSCULAR; INTRAVENOUS at 20:38

## 2025-03-17 RX ADMIN — BENZONATATE 200 MG: 100 CAPSULE ORAL at 14:00

## 2025-03-17 RX ADMIN — HYDROXYCHLOROQUINE SULFATE 200 MG: 200 TABLET ORAL at 09:29

## 2025-03-17 RX ADMIN — Medication 1 TABLET: at 20:38

## 2025-03-17 RX ADMIN — LEVOTHYROXINE SODIUM 175 MCG: 175 TABLET ORAL at 05:24

## 2025-03-17 RX ADMIN — BENZONATATE 200 MG: 100 CAPSULE ORAL at 20:39

## 2025-03-17 RX ADMIN — Medication 1 TABLET: at 09:29

## 2025-03-17 RX ADMIN — GUAIFENESIN AND CODEINE PHOSPHATE 5 ML: 100; 10 SOLUTION ORAL at 09:28

## 2025-03-17 RX ADMIN — SODIUM CHLORIDE 50 ML/HR: 9 INJECTION, SOLUTION INTRAVENOUS at 13:59

## 2025-03-17 RX ADMIN — CALCIUM ACETATE 667 MG: 667 CAPSULE ORAL at 17:12

## 2025-03-17 RX ADMIN — METHYLPREDNISOLONE SODIUM SUCCINATE 40 MG: 40 INJECTION, POWDER, FOR SOLUTION INTRAMUSCULAR; INTRAVENOUS at 05:24

## 2025-03-17 RX ADMIN — AZITHROMYCIN 500 MG: 250 TABLET, FILM COATED ORAL at 09:28

## 2025-03-17 RX ADMIN — BUDESONIDE 0.5 MG: 0.5 INHALANT RESPIRATORY (INHALATION) at 09:20

## 2025-03-17 RX ADMIN — BUDESONIDE 0.5 MG: 0.5 INHALANT RESPIRATORY (INHALATION) at 19:14

## 2025-03-17 RX ADMIN — GUAIFENESIN AND CODEINE PHOSPHATE 5 ML: 100; 10 SOLUTION ORAL at 02:35

## 2025-03-17 RX ADMIN — HYDROXYCHLOROQUINE SULFATE 200 MG: 200 TABLET ORAL at 20:39

## 2025-03-17 RX ADMIN — GUAIFENESIN AND CODEINE PHOSPHATE 10 ML: 100; 10 SOLUTION ORAL at 15:54

## 2025-03-17 RX ADMIN — METHYLPREDNISOLONE SODIUM SUCCINATE 40 MG: 40 INJECTION, POWDER, FOR SOLUTION INTRAMUSCULAR; INTRAVENOUS at 13:59

## 2025-03-17 RX ADMIN — GUAIFENESIN 600 MG: 600 TABLET, EXTENDED RELEASE ORAL at 20:39

## 2025-03-17 NOTE — THERAPY EVALUATION
Patient Name: Tracie Gross  : 1960    MRN: [id number]                              Today's Date: 3/17/2025       Admit Date: 3/15/2025    Visit Dx:     ICD-10-CM ICD-9-CM   1. Dyspnea, unspecified type  R06.00 786.09   2. Pulmonary fibrosis  J84.10 515   3. Hypotension, unspecified hypotension type  I95.9 458.9   4. Urinary tract infection without hematuria, site unspecified  N39.0 599.0     Patient Active Problem List   Diagnosis    Vitamin B12 deficiency    Arthritis    Sleep apnea    Bipolar affective disorder    Depression    Breast cancer    Chronic pain    Dyslipidemia    Family history of malignant neoplasm of colon    Family history of melanoma    Gastroesophageal reflux disease    Heart murmur    Hypertension    Hypothyroidism    Osteoarthritis, generalized    Raynaud's disease    Ulcerative colitis    Artificial knee joint present    Neuralgia    Presence of artificial hip joint, right    Lumbar radiculopathy    Derangement of posterior horn of lateral meniscus    Von Willebrand disease    SLE (systemic lupus erythematosus related syndrome)    Chest pain    Scleroderma    Monahan syndrome    Rectal prolapse    Osteoporosis    COVID-19    Fibromyalgia    Onychomycosis    Stage 3 chronic kidney disease    Avascular necrosis of femoral head, left    Morbid obesity    Trochanteric bursitis of left hip    Pain in both knees    Status post total hip replacement, right    DJD of left shoulder    Complete tear of left rotator cuff    Osteoarthritis of right glenohumeral joint    Pulmonary fibrosis    SOB (shortness of breath)     Past Medical History:   Diagnosis Date    Allergic 1998    Anemia     Ankle sprain     Anxiety     Arthritis     Arthritis of back 0ll2013    Arthritis of neck 2005    Asthma     Bipolar affective disorder     Breast cancer 1985    s/p R mastectomy    Bursitis of hip [id number]    Cervical disc disorder 2006    CTS (carpal tunnel syndrome)     Depression  2000    Fracture of wrist 1995    Fracture, foot     Frozen shoulder     GERD (gastroesophageal reflux disease)     H/O breast reconstruction     SEVERAL    H/O laminectomy     Hamartoma     Hip arthrosis 2013    History of medical problems     Hx of bilateral oophorectomy     Hyperlipidemia     Hypertension     Hypothyroidism     Infectious viral hepatitis     Inflammatory bowel disease     Man. EGD/colonoscopy annually (2021)    Irritable bowel syndrome     Kienbock's disease, right     WRIST    Knee swelling 2007    Low back pain 1991    Low back strain     Lumbosacral disc disease 1995    Had 2 lumber surgeries    Lupus     [place]    Monahan syndrome     Man. EGD/colonoscopy annually (2021). MRI alternating w/ EUS annually for pancreatic screen    MRSA infection     Neuroma of foot     Obesity     Osteopenia     Osteoporosis     maintained on Prolia through Karen    Peptic ulceration     Periarthritis of shoulder 2022    Pneumonia     Pulmonary fibrosis     Raynaud disease     Renal insufficiency     Rotator cuff syndrome [id number]    Scleroderma     Sleep apnea     cpap    Squamous cell cancer of lip     Tear of meniscus of knee     Tendinitis of knee     Thoracic disc disorder     Von Willebrand disease     Wrist sprain      Past Surgical History:   Procedure Laterality Date    APPENDECTOMY      ARM DEBRIDEMENT Right     X 3    BACK SURGERY      Vetas- cervical fusion    BACK SURGERY      lumbar decomp    BREAST BIOPSY      BREAST LUMPECTOMY      BREAST RECONSTRUCTION Right     BREAST RECONSTRUCTION Right     CARDIAC CATHETERIZATION      no stents placed     SECTION  ,     CHOLECYSTECTOMY      COLONOSCOPY      COLONOSCOPY N/A 2020    Procedure: COLONOSCOPY;  Surgeon: Cayden Conway MD;  Location: [facility] ENDOSCOPY;  Service: Gastroenterology;  Laterality: N/A;  rectal ulcer    COLONOSCOPY N/A 2021    Procedure:  COLONOSCOPY WITH POLYPECTOMY X 1;  Surgeon: Cayden Conway MD;  Location: [facility] ENDOSCOPY;  Service: Gastroenterology;  Laterality: N/A;  Post: COLON POLYP    COLONOSCOPY N/A 01/06/2023    Procedure: COLONOSCOPY with polypectomy x 1, endoscopic clipping x 1;  Surgeon: Cayden Conway MD;  Location: [facility] ENDOSCOPY;  Service: Gastroenterology;  Laterality: N/A;    COLONOSCOPY N/A 10/01/2024    Procedure: COLONOSCOPY;  Surgeon: Cayden Conway MD;  Location: [facility] ENDOSCOPY;  Service: Gastroenterology;  Laterality: N/A;  normal    COSMETIC SURGERY  [id number]    ENDOSCOPY      ENDOSCOPY N/A 08/14/2020    Procedure: ESOPHAGOGASTRODUODENOSCOPY;  Surgeon: Cayden Conway MD;  Location: [facility] ENDOSCOPY;  Service: Gastroenterology;  Laterality: N/A;  Normal EGD    ENDOSCOPY N/A 09/17/2021    Procedure: ESOPHAGOGASTRODUODENOSCOPY;  Surgeon: Cayden Conway MD;  Location: [facility] ENDOSCOPY;  Service: Gastroenterology;  Laterality: N/A;  Post: normal egd    ENDOSCOPY N/A 10/01/2024    Procedure: ESOPHAGOGASTRODUODENOSCOPY;  Surgeon: Cayden Conway MD;  Location: [facility] ENDOSCOPY;  Service: Gastroenterology;  Laterality: N/A;  normal    EXPLORATORY LAPAROTOMY      scar tissue removal    EYE SURGERY  2000    FINGER SURGERY Right     ring finger mass excision    HAND SURGERY  Rt wrist  10/15    HIP SURGERY  1/12    HYSTERECTOMY      PARTIAL    JOINT REPLACEMENT Right 2012,2015    hip and knee    KNEE ARTHROSCOPY Left 01/07/2020    Procedure: LEFT KNEE SCOPE with partial lateral meniscectomy;  Surgeon: Chau Perez MD;  Location: [facility] MAIN OR;  Service: Orthopedics    KNEE SURGERY  Rtf knee  2015    LASIK      LASIK/ LASIK ENHANCEMENT    MASTECTOMY RADICAL Right     NECK SURGERY  01/01/06    OOPHORECTOMY Bilateral     SHOULDER SURGERY  11/01/2023    SPINE SURGERY      SPLENECTOMY      SUBTOTAL HYSTERECTOMY      TOTAL HIP ARTHROPLASTY Left 05/17/2023    TOTAL SHOULDER ARTHROPLASTY  W/ DISTAL CLAVICLE EXCISION Right 11/01/2023    Procedure: TOTAL SHOULDER REVERSE ARTHROPLASTY;  Surgeon: Floyd Ruiz MD;  Location: [facility] MAIN OR;  Service: Orthopedics;  Laterality: Right;    TRIGGER POINT INJECTION  7/23    TUBAL ABDOMINAL LIGATION  1981    UPPER ENDOSCOPIC ULTRASOUND W/ FNA N/A 12/09/2021    Procedure: ENDOSCOPIC ULTRASOUND WITH ESOPHAGOGASTRODUODENOSCOPY;  Surgeon: Luis E Negron MD;  Location: [facility] ENDOSCOPY;  Service: Gastroenterology;  Laterality: N/A;  Post: GASTROPARESIS, DILATED COMMON BILE DUCT, FUNDIC POLYP, DUODENITIS, NORMAL PANCREAS, HIATAL HERNIA    WRIST SURGERY Right     distal radius head removal (bone dead)  plates and screws decomp lunate      General Information       Row Name 03/17/25 1541          Physical Therapy Time and Intention    Document Type evaluation  -OD     Mode of Treatment physical therapy  -OD       Row Name 03/17/25 1541          General Information    Patient Profile Reviewed yes  -OD     Prior Level of Function independent:;ADL's;all household mobility  denies use of AD, though has needed a cane the past few weeks  -OD     Existing Precautions/Restrictions fall;oxygen therapy device and L/min;other (see comments)  3L O2  -OD     Barriers to Rehab medically complex;previous functional deficit  -OD       Row Name 03/17/25 1541          Living Environment    Current Living Arrangements home  -OD     People in Home spouse  -OD       Row Name 03/17/25 1541          Home Main Entrance    Number of Stairs, Main Entrance none;other (see comments)  ramp entry  -OD       Row Name 03/17/25 1541          Cognition    Orientation Status (Cognition) oriented x 4  -OD       Row Name 03/17/25 1541          Safety Issues/Impairments Affecting Functional Mobility    Impairments Affecting Function (Mobility) endurance/activity tolerance;strength;visual/perceptual;balance  -OD               User Key  (r) = Recorded By, (t) = Taken By, (c) = Cosigned By       Initials Name Provider Type    OD Kaitlin Pizarro, PT Physical Therapist                   Mobility       Row Name 03/17/25 1543          Bed Mobility    Bed Mobility bed mobility (all) activities  -OD     All Activities, [place] (Bed Mobility) minimum assist (75% patient effort)  -OD       Row Name 03/17/25 1543          Bed-Chair Transfer    Bed-Chair [place] (Transfers) contact guard  -OD     Assistive Device (Bed-Chair Transfers) walker, front-wheeled  -OD       Row Name 03/17/25 1543          Sit-Stand Transfer    Sit-Stand [place] (Transfers) minimum assist (75% patient effort);verbal cues  -OD     Assistive Device (Sit-Stand Transfers) walker, front-wheeled  -OD       Row Name 03/17/25 1543          Gait/Stairs (Locomotion)    [place] Level (Gait) contact guard  -OD     Assistive Device (Gait) walker, front-wheeled  -OD     Patient was able to Ambulate yes  -OD     Distance in Feet (Gait) 3  -OD     Deviations/Abnormal Patterns (Gait) weight shifting decreased  -OD     Bilateral Gait Deviations forward flexed posture  -OD               User Key  (r) = Recorded By, (t) = Taken By, (c) = Cosigned By      Initials Name Provider Type    OD Kaitlin Pizarro, PT Physical Therapist                   Obj/Interventions       Row Name 03/17/25 1543          Range of Motion Comprehensive    General Range of Motion bilateral lower extremity ROM WFL  -OD       Row Name 03/17/25 1543          Strength Comprehensive (MMT)    General Manual Muscle Testing (MMT) Assessment lower extremity strength deficits identified  -OD     Comment, General Manual Muscle Testing (MMT) Assessment BLE functional weakness 3+/5  -OD       Row Name 03/17/25 1543          Motor Skills    Motor Skills functional endurance  -OD     Functional Endurance poor r/t cough and dizziness  -OD       Row Name 03/17/25 1543          Balance    Balance Interventions sitting;minimal challenge;standing;supported;moderate challenge  -OD        Row Name 03/17/25 1543          Sensory Assessment (Somatosensory)    Sensory Assessment (Somatosensory) sensation intact  -OD               User Key  (r) = Recorded By, (t) = Taken By, (c) = Cosigned By      Initials Name Provider Type    Kaitlin Murphy, PT Physical Therapist                   Goals/Plan       Row Name 03/17/25 1556          Bed Mobility Goal 1 (PT)    Activity/Assistive Device (Bed Mobility Goal 1, PT) bed mobility activities, all  -OD     [place] Level/Cues Needed (Bed Mobility Goal 1, PT) independent  -OD     Time Frame (Bed Mobility Goal 1, PT) long term goal (LTG);2 weeks  -OD       Row Name 03/17/25 1556          Transfer Goal 1 (PT)    Activity/Assistive Device (Transfer Goal 1, PT) transfers, all;walker, rolling  -OD     [place] Level/Cues Needed (Transfer Goal 1, PT) modified independence  -OD     Time Frame (Transfer Goal 1, PT) long term goal (LTG);2 weeks  -OD       Row Name 03/17/25 1556          Gait Training Goal 1 (PT)    Activity/Assistive Device (Gait Training Goal 1, PT) gait (walking locomotion);assistive device use;walker, rolling  -OD     [place] Level (Gait Training Goal 1, PT) modified independence  -OD     Distance (Gait Training Goal 1, PT) 50'  -OD     Time Frame (Gait Training Goal 1, PT) long term goal (LTG);2 weeks  -OD       Row Name 03/17/25 1556          Balance Goal 1 (PT)    Activity/Assistive Device (Balance Goal) standing static balance;standing dynamic balance;walker, rolling  -OD     [place] Level/Cues Needed (Balance Goal 1, PT) modified independence  -OD     Time Frame (Balance Goal 1, PT) long-term goal (LTG);2 weeks  -OD       Row Name 03/17/25 0256          Therapy Assessment/Plan (PT)    Planned Therapy Interventions (PT) balance training;bed mobility training;gait training;home exercise program;neuromuscular re-education;ROM (range of motion);stair training;strengthening;stretching;patient/family education;postural  re-education;transfer training;vestibular therapy  -OD               User Key  (r) = Recorded By, (t) = Taken By, (c) = Cosigned By      Initials Name Provider Type    OD Kaitlin Pizarro, PT Physical Therapist                   Clinical Impression       Row Name 03/17/25 1540          Pain    Pain Location chest;other (see comments)  throat  -OD     Pain Side/Orientation generalized  -OD     Additional Documentation Pain Scale: FACES Pre/Post-Treatment (Group)  -OD       Row Name 03/17/25 0397          Pain Scale: FACES Pre/Post-Treatment    Pain: FACES Scale, Pretreatment 2-->hurts little bit  -OD     Posttreatment Pain Rating 2-->hurts little bit  -OD       Row Name 03/17/25 1546          Plan of Care Review    Plan of Care Reviewed With patient;family  -OD     Progress no change  -OD     Outcome Evaluation Evelyn Gross is a 63 y/o F who was admitted to [facility] on 3/15/25 for SOA and generalized weakness. Cultures (+) streptococcus PNA. D/w acute hypoxic resp failure r/t new diagnosis of pulmonary fibrosis. PMH includes R rTSA, IBS, vertigo, von willebrands. At baseline, pt lives with her spouse in a house with ramp entry and chair-lift to 2nd floor. Pt is typically IND with ADLs and mobility, though reports she has needed to use cane recently r/t weakness. Pt reports her dizziness first began in January, endorses room-spinning dizziness, tinnitus, fullness in her ears, and weakness/balance deficits. Pt was treated with positional maneuvers via ENT and PT; reports her dizziness began improving, but all symptoms never fully went away. Per OT evaluation yesterday, pt had visible nystagmus after changing positions and sitting upright. This date, pt has less complaints of dizziness, but is limited mostly due to severe, dry cough. Attempted working with pt several times throughout the day, only able to tolerate in PM. Pt was Olegario for bed mobility, Olegario for STS with RW, maxA for donning brief, and CGA with RW for short transfer  to recliner. Pt had soiled the bed, requiring assist for donning new gown and getting cleaned up. Pt began reporting dizziness once sitting in recliner, no visible nystagmus. Due to cluster of symptoms in addition to weakness/balance deficits, pt would benefit from referral to ENT/audiologist to rule out Meniere's disease prior to continuing positional treatments. PT will continue to follow while admitted to address her functional deficits, and recommend SNF upon d/c.  -OD       Row Name 03/17/25 1544          Therapy Assessment/Plan (PT)    Rehab Potential (PT) good  -OD     Criteria for Skilled Interventions Met (PT) yes;meets criteria  -OD     Therapy Frequency (PT) 5 times/wk  -OD     Predicted Duration of Therapy Intervention (PT) until d/c  -OD       Row Name 03/17/25 1544          Vital Signs    O2 Delivery Pre Treatment nasal cannula  -OD     O2 Delivery Intra Treatment nasal cannula  -OD     O2 Delivery Post Treatment nasal cannula  -OD     Pre Patient Position Supine  -OD     Intra Patient Position Standing  -OD     Post Patient Position Sitting  -OD       Row Name 03/17/25 1544          Positioning and Restraints    Pre-Treatment Position in bed  -OD     Post Treatment Position chair  -OD     In Chair notified nsg;reclined;call light within reach;encouraged to call for assist;exit alarm on;with family/caregiver  -OD               User Key  (r) = Recorded By, (t) = Taken By, (c) = Cosigned By      Initials Name Provider Type    OD Kaitlin Pizarro, PT Physical Therapist                   Outcome Measures       Row Name 03/17/25 1556 03/17/25 0834       How much help from another person do you currently need...    Turning from your back to your side while in flat bed without using bedrails? 3  -OD 3  -HW    Moving from lying on back to sitting on the side of a flat bed without bedrails? 3  -OD 2  -HW    Moving to and from a bed to a chair (including a wheelchair)? 3  -OD 2  -HW    Standing up from a chair  using your arms (e.g., wheelchair, bedside chair)? 3  -OD 2  -HW    Climbing 3-5 steps with a railing? 2  -OD 2  -HW    To walk in hospital room? 2  -OD 2  -HW    AM-PAC 6 Clicks Score (PT) 16  -OD 13  -HW    Highest Level of Mobility Goal 5 --> Static standing  -OD 4 --> Transfer to chair/commode  -HW      Row Name 03/17/25 1556          Functional Assessment    Outcome Measure Options AM-PAC 6 Clicks Basic Mobility (PT)  -OD               User Key  (r) = Recorded By, (t) = Taken By, (c) = Cosigned By      Initials Name Provider Type    OD Kaitlin Pizarro, PT Physical Therapist    HW Eulalia Flores RN Registered Nurse                                 Physical Therapy Education       Title: PT OT SLP Therapies (Done)       Topic: Physical Therapy (Done)       Point: Mobility training (Done)       Learning Progress Summary            Patient Acceptance, E, VU by OD at 3/17/2025 1556                      Point: Home exercise program (Done)       Learning Progress Summary            Patient Acceptance, E, VU by OD at 3/17/2025 1556                      Point: Body mechanics (Done)       Learning Progress Summary            Patient Acceptance, E, VU by OD at 3/17/2025 1556                      Point: Precautions (Done)       Learning Progress Summary            Patient Acceptance, E, VU by OD at 3/17/2025 1556                                      User Key       Initials Effective Dates Name Provider Type Discipline    OD 05/11/23 -  Kaitlin Pizarro, PT Physical Therapist PT                  PT Recommendation and Plan  Planned Therapy Interventions (PT): balance training, bed mobility training, gait training, home exercise program, neuromuscular re-education, ROM (range of motion), stair training, strengthening, stretching, patient/family education, postural re-education, transfer training, vestibular therapy  Progress: no change  Outcome Evaluation: Evelyn Gross is a 63 y/o F who was admitted to [facility] on 3/15/25 for SOA and  generalized weakness. Cultures (+) streptococcus PNA. D/w acute hypoxic resp failure r/t new diagnosis of pulmonary fibrosis. PMH includes R rTSA, IBS, vertigo, von willebrands. At baseline, pt lives with her spouse in a house with ramp entry and chair-lift to 2nd floor. Pt is typically IND with ADLs and mobility, though reports she has needed to use cane recently r/t weakness. Pt reports her dizziness first began in January, endorses room-spinning dizziness, tinnitus, fullness in her ears, and weakness/balance deficits. Pt was treated with positional maneuvers via ENT and PT; reports her dizziness began improving, but all symptoms never fully went away. Per OT evaluation yesterday, pt had visible nystagmus after changing positions and sitting upright. This date, pt has less complaints of dizziness, but is limited mostly due to severe, dry cough. Attempted working with pt several times throughout the day, only able to tolerate in PM. Pt was Olegario for bed mobility, Olegario for STS with RW, maxA for donning brief, and CGA with RW for short transfer to recliner. Pt had soiled the bed, requiring assist for donning new gown and getting cleaned up. Pt began reporting dizziness once sitting in recliner, no visible nystagmus. Due to cluster of symptoms in addition to weakness/balance deficits, pt would benefit from referral to ENT/audiologist to rule out Meniere's disease prior to continuing positional treatments. PT will continue to follow while admitted to address her functional deficits, and recommend SNF upon d/c.     Time Calculation:         PT Charges       Row Name 03/17/25 5281             Time Calculation    Start Time 1412  -OD      Stop Time 1445  (+) 8 minutes from 1024 to 1032 when asking subjective questions in the morning  -OD      Time Calculation (min) 33 min  -OD      PT Received On 03/17/25  -OD      PT - Next Appointment 03/18/25  -OD      PT Goal Re-Cert Due Date 03/31/25  -OD         Time Calculation- PT     Total Timed Code Minutes- PT 8 minute(s)  -OD                User Key  (r) = Recorded By, (t) = Taken By, (c) = Cosigned By      Initials Name Provider Type    OD Kaitlin Pizarro, PT Physical Therapist                  Therapy Charges for Today       Code Description Service Date Service Provider Modifiers Qty    41052827486 HC PT EVAL MOD COMPLEXITY 4 3/17/2025 Kaitlin Pziarro, PT GP 1    95119575140 HC PT THERAPEUTIC ACT EA 15 MIN 3/17/2025 Kaitlin Pizarro, PT GP 1            PT G-Codes  Outcome Measure Options: AM-PAC 6 Clicks Basic Mobility (PT)  AM-PAC 6 Clicks Score (PT): 16  PT Discharge Summary  Anticipated Discharge Disposition (PT): skilled nursing facility    Kaitlin Pizarro PT  3/17/2025

## 2025-03-17 NOTE — PROGRESS NOTES
[facility] MEDICINE SERVICE  DAILY PROGRESS NOTE    NAME: Tracie Gross  : 1960  MRN: [id number]      LOS: 2 days     PROVIDER OF SERVICE: Santa Pierre MD    Chief Complaint: SOB (shortness of breath)    Subjective:     Interval History:  History taken from: patient    No new complaint    Review of Systems:   Review of Systems   All other systems reviewed and are negative.      Objective:     Vital Signs  Temp:  [97.1 °F (36.2 °C)-98.8 °F (37.1 °C)] 97.1 °F (36.2 °C)  Heart Rate:  [63-74] 74  Resp:  [12-20] 16  BP: ()/(50-66) 110/54  Flow (L/min) (Oxygen Therapy):  [3] 3   Body mass index is 44.39 kg/m².    Physical Exam  Physical Exam  Constitutional:       Appearance: Normal appearance.   HENT:      Head: Normocephalic and atraumatic.      Nose: Nose normal.      Mouth/Throat:      Mouth: Mucous membranes are moist.   Eyes:      Extraocular Movements: Extraocular movements intact.      Pupils: Pupils are equal, round, and reactive to light.   Cardiovascular:      Rate and Rhythm: Normal rate and regular rhythm.   Pulmonary:      Effort: Pulmonary effort is normal.      Breath sounds: Normal breath sounds.   Abdominal:      General: Abdomen is flat. Bowel sounds are normal.      Palpations: Abdomen is soft.   Musculoskeletal:         General: Normal range of motion.      Cervical back: Normal range of motion and neck supple.   Skin:     General: Skin is warm and dry.   Neurological:      General: No focal deficit present.      Mental Status: She is alert and oriented to person, place, and time.   Psychiatric:         Mood and Affect: Mood normal.         Behavior: Behavior normal.         Thought Content: Thought content normal.         Judgment: Judgment normal.         Current Medications:  Scheduled Meds:azithromycin, 500 mg, Oral, Q24H  benzonatate, 200 mg, Oral, TID  budesonide, 0.5 mg, Nebulization, BID - RT  calcium 500 mg vitamin D 5 mcg (200 UT), 1 tablet, Oral,  BID  cefTRIAXone, 2,000 mg, Intravenous, Q24H  cetirizine, 10 mg, Oral, Daily  guaiFENesin, 600 mg, Oral, Q12H  hydroxychloroquine, 200 mg, Oral, BID  lamoTRIgine, 200 mg, Oral, Nightly  levothyroxine, 175 mcg, Oral, Q AM  methylPREDNISolone sodium succinate, 40 mg, Intravenous, Q8H  [Held by provider] mycophenolate, 500 mg, Oral, Q12H  QUEtiapine, 50 mg, Oral, Nightly  sodium chloride, 10 mL, Intravenous, Q12H      Continuous Infusions:sodium chloride, 50 mL/hr, Last Rate: 50 mL/hr (03/17/25 1359)      PRN Meds:.  acetaminophen **OR** acetaminophen **OR** acetaminophen    ALPRAZolam    aluminum-magnesium hydroxide-simethicone    senna-docusate sodium **AND** polyethylene glycol **AND** bisacodyl **AND** bisacodyl    Calcium Replacement - Follow Nurse / BPA Driven Protocol    cyclobenzaprine    guaiFENesin-codeine    ipratropium-albuterol    Magnesium Standard Dose Replacement - Follow Nurse / BPA Driven Protocol    meclizine    melatonin    nitroglycerin    ondansetron ODT **OR** [DISCONTINUED] ondansetron    oxyCODONE    Phosphorus Replacement - Follow Nurse / BPA Driven Protocol    Potassium Replacement - Follow Nurse / BPA Driven Protocol    [COMPLETED] Insert Peripheral IV **AND** sodium chloride    sodium chloride    sodium chloride       Diagnostic Data    Results from last 7 days   Lab Units 03/17/25  0138   WBC 10*3/mm3 10.50   HEMOGLOBIN g/dL 9.9*   HEMATOCRIT % 30.6*   PLATELETS 10*3/mm3 286   GLUCOSE mg/dL 153*   CREATININE mg/dL 2.02*   BUN mg/dL 42*   SODIUM mmol/L 131*   POTASSIUM mmol/L 4.6   ANION GAP mmol/L 13.3       US Renal Bilateral  Result Date: 3/16/2025  Impression: No hydronephrosis or acute sonographic abnormality. Electronically Signed: Tomy Orellana MD  3/16/2025 5:38 PM EDT  Workstation ID: [id number]    NM Lung Ventilation Perfusion  Result Date: 3/16/2025  Impression: Negative for pulmonary embolism. Electronically Signed: Nimesh Dash MD  3/16/2025 3:17 PM EDT  Workstation ID:  [id number]    XR Chest 1 View  Result Date: 3/15/2025  Impression: Mild hazy left greater than right basal opacities, which could represent atelectasis or pneumonia. Electronically Signed: Josh SHIRIN Lorenzo  3/15/2025 4:18 PM EDT  Workstation ID: [id number]        I reviewed the patient's new clinical results.    Assessment/Plan:     Active and Resolved Problems  Active Hospital Problems    Diagnosis  POA    **SOB (shortness of breath) [R06.02]  Yes      Resolved Hospital Problems   No resolved problems to display.       Acute hypoxic respiratory failure not on home oxygen but currently on 3 L of oxygen via nasal cannula   Pneumonia  Recently diagnosed pulmonary fibrosis  DANIELLE on CKD 3  UTI  Hypertension  Hypothyroidism      -acute hypoxic respiratory failure likely secondary to pneumonia plus underlying pulmonary fibrosis.  Urine strep pneumo antigen is positive.  Continue IV ceftriaxone for treatment of likely Streptococcus pneumonia.  Follow-up on blood and sputum cultures.   follow-up on urine Legionella antigen and discontinue azithromycin if this is negative.  -Renal function with slight improvement.  See further recommendations per nephrology.  - Urine culture showed no growth ruling out UTI  - Continue current management.      VTE Prophylaxis:  Mechanical VTE prophylaxis orders are present.       Disposition Planning:     Barriers to Discharge: Pending clinical improvement  Anticipated Date of Discharge: 3/24/2025  Place of Discharge: Home      Code Status and Medical Interventions: CPR (Attempt to Resuscitate); Full Support   Ordered at: 03/15/25 1955     Code Status (Patient has no pulse and is not breathing):    CPR (Attempt to Resuscitate)     Medical Interventions (Patient has pulse or is breathing):    Full Support       Signature: Electronically signed by Santa Pierre MD, 03/17/25, 15:34 EDT.  [facility] Hospitalist Team

## 2025-03-17 NOTE — PLAN OF CARE
Goal Outcome Evaluation:      Alert and oriented, pleasant patient. Noted to be coughing incessantly, patient reported that she had it right after VQ scan yesterday, cough syrup is on board and administered every 6 hours with slight relief as stated by pt. Nephro consult was done yesterday for increased creatinine, decreased oral fluid intake, started on NS 40 ml/hr, urine noted to be tea colored. Call bell within reach, bed alarm on, continue to monitor.

## 2025-03-17 NOTE — PROGRESS NOTES
"                                                                                                                                      Nephrology  Progress Note                                        [facility]    Patient Identification    Name: Tracie Gross  Age: 64 y.o.  Sex: female  :  1960  MRN: [id number]      DATE OF SERVICE:  3/17/2025        Subective    Complaint of cough     Objective   Scheduled Meds:azithromycin, 500 mg, Oral, Q24H  budesonide, 0.5 mg, Nebulization, BID - RT  calcium 500 mg vitamin D 5 mcg (200 UT), 1 tablet, Oral, BID  cefTRIAXone, 2,000 mg, Intravenous, Q24H  cetirizine, 10 mg, Oral, Daily  guaiFENesin, 600 mg, Oral, Q12H  hydroxychloroquine, 200 mg, Oral, BID  lamoTRIgine, 200 mg, Oral, Nightly  levothyroxine, 175 mcg, Oral, Q AM  methylPREDNISolone sodium succinate, 40 mg, Intravenous, Q8H  [Held by provider] mycophenolate, 500 mg, Oral, Q12H  QUEtiapine, 50 mg, Oral, Nightly  sodium chloride, 10 mL, Intravenous, Q12H          Continuous Infusions:sodium chloride, 50 mL/hr, Last Rate: 50 mL/hr (25)        PRN Meds:  acetaminophen **OR** acetaminophen **OR** acetaminophen    ALPRAZolam    aluminum-magnesium hydroxide-simethicone    senna-docusate sodium **AND** polyethylene glycol **AND** bisacodyl **AND** bisacodyl    Calcium Replacement - Follow Nurse / BPA Driven Protocol    cyclobenzaprine    guaiFENesin-codeine    ipratropium-albuterol    Magnesium Standard Dose Replacement - Follow Nurse / BPA Driven Protocol    meclizine    melatonin    nitroglycerin    ondansetron ODT **OR** [DISCONTINUED] ondansetron    oxyCODONE    Phosphorus Replacement - Follow Nurse / BPA Driven Protocol    Potassium Replacement - Follow Nurse / BPA Driven Protocol    [COMPLETED] Insert Peripheral IV **AND** sodium chloride    sodium chloride    sodium chloride     Exam:  BP 94/50   Pulse 63   Temp 97.1 °F (36.2 °C) (Axillary)   Resp 12   Ht 162.6 cm (64\")   Wt " 117 kg (258 lb 9.6 oz)   LMP 05/08/1983   SpO2 94%   BMI 44.39 kg/m²     Intake/Output last 3 shifts:  I/O last 3 completed shifts:  In: 1340 [P.O.:240; IV Piggyback:1100]  Out: -     Intake/Output this shift:  I/O this shift:  In: 240 [P.O.:240]  Out: -     Physical exam:  General Appearance:  Alert  Head:  Normocephalic, without obvious abnormality, atraumatic  Eyes:  PERRL, conjunctiva/corneas clear     Neck:  Supple,  no adenopathy;      Lungs:  Decreased BS occasion ronchi  Heart:  Regular rate and rhythm, S1 and S2 normal  Abdomen:  Soft, non-tender, bowel sounds active   Extremities: trace edema  Pulses: 2+ and symmetric all extremities  Skin:  No rashes or lesions       Data Review:  All labs (24hrs):   Recent Results (from the past 24 hours)   Duplex Venous Lower Extremity - Bilateral CAR    Collection Time: 03/16/25 10:56 AM   Result Value Ref Range    Right Common Femoral Spont Y     Right Common Femoral Competent Y     Right Common Femoral Phasic Y     Right Common Femoral Compress C     Right Common Femoral Augment Y     Right Saphenofemoral Junction Compress C     Right Proximal Femoral Compress C     Right Mid Femoral Spont Y     Right Mid Femoral Competent Y     Right Mid Femoral Phasic Y     Right Mid Femoral Compress C     Right Mid Femoral Augment Y     Right Distal Femoral Compress C     Right Popliteal Spont Y     Right Popliteal Competent Y     Right Popliteal Phasic Y     Right Popliteal Compress C     Right Popliteal Augment Y     Right Posterior Tibial Compress C     Right Peroneal Compress C     Right Gastronemius Compress C     Right Greater Saph AK Compress C     Right Greater Saph BK Compress C     Right Lesser Saph Compress C     Left Common Femoral Spont Y     Left Common Femoral Competent Y     Left Common Femoral Phasic Y     Left Common Femoral Compress C     Left Common Femoral Augment Y     Left Saphenofemoral Junction Compress C     Left Proximal Femoral Compress C     Left  Mid Femoral Spont Y     Left Mid Femoral Competent Y     Left Mid Femoral Phasic Y     Left Mid Femoral Compress C     Left Mid Femoral Augment Y     Left Distal Femoral Compress C     Left Popliteal Spont Y     Left Popliteal Competent Y     Left Popliteal Phasic Y     Left Popliteal Compress C     Left Popliteal Augment Y     Left Posterior Tibial Compress C     Left Peroneal Compress C     Left Gastronemius Compress C     Left Greater Saph AK Compress C     Left Greater Saph BK Compress C     Left Lesser Saph Compress C    Basic Metabolic Panel    Collection Time: 03/16/25  8:42 PM    Specimen: Cannula; Blood   Result Value Ref Range    Glucose 120 (H) 65 - 99 mg/dL    BUN 40 (H) 8 - 23 mg/dL    Creatinine 2.16 (H) 0.57 - 1.00 mg/dL    Sodium 133 (L) 136 - 145 mmol/L    Potassium 4.8 3.5 - 5.2 mmol/L    Chloride 97 (L) 98 - 107 mmol/L    CO2 19.4 (L) 22.0 - 29.0 mmol/L    Calcium 8.4 (L) 8.6 - 10.5 mg/dL    BUN/Creatinine Ratio 18.5 7.0 - 25.0    Anion Gap 16.6 (H) 5.0 - 15.0 mmol/L    eGFR 24.7 (L) >60.0 mL/min/1.73   Basic Metabolic Panel    Collection Time: 03/17/25  1:38 AM    Specimen: Cannula; Blood   Result Value Ref Range    Glucose 153 (H) 65 - 99 mg/dL    BUN 42 (H) 8 - 23 mg/dL    Creatinine 2.02 (H) 0.57 - 1.00 mg/dL    Sodium 131 (L) 136 - 145 mmol/L    Potassium 4.6 3.5 - 5.2 mmol/L    Chloride 97 (L) 98 - 107 mmol/L    CO2 20.7 (L) 22.0 - 29.0 mmol/L    Calcium 8.0 (L) 8.6 - 10.5 mg/dL    BUN/Creatinine Ratio 20.8 7.0 - 25.0    Anion Gap 13.3 5.0 - 15.0 mmol/L    eGFR 26.8 (L) >60.0 mL/min/1.73   Magnesium    Collection Time: 03/17/25  1:38 AM    Specimen: Cannula; Blood   Result Value Ref Range    Magnesium 2.7 (H) 1.6 - 2.4 mg/dL   Phosphorus    Collection Time: 03/17/25  1:38 AM    Specimen: Cannula; Blood   Result Value Ref Range    Phosphorus 5.0 (H) 2.5 - 4.5 mg/dL   CK    Collection Time: 03/17/25  1:38 AM    Specimen: Cannula; Blood   Result Value Ref Range    Creatine Kinase 94 20 - 180  U/L   CBC Auto Differential    Collection Time: 03/17/25  1:38 AM    Specimen: Cannula; Blood   Result Value Ref Range    WBC 10.50 3.40 - 10.80 10*3/mm3    RBC 3.21 (L) 3.77 - 5.28 10*6/mm3    Hemoglobin 9.9 (L) 12.0 - 15.9 g/dL    Hematocrit 30.6 (L) 34.0 - 46.6 %    MCV 95.3 79.0 - 97.0 fL    MCH 30.8 26.6 - 33.0 pg    MCHC 32.4 31.5 - 35.7 g/dL    RDW 13.6 12.3 - 15.4 %    RDW-SD 47.9 37.0 - 54.0 fl    MPV 11.2 6.0 - 12.0 fL    Platelets 286 140 - 450 10*3/mm3    Neutrophil % 78.9 (H) 42.7 - 76.0 %    Lymphocyte % 10.9 (L) 19.6 - 45.3 %    Monocyte % 9.1 5.0 - 12.0 %    Eosinophil % 0.0 (L) 0.3 - 6.2 %    Basophil % 0.2 0.0 - 1.5 %    Immature Grans % 0.9 (H) 0.0 - 0.5 %    Neutrophils, Absolute 8.29 (H) 1.70 - 7.00 10*3/mm3    Lymphocytes, Absolute 1.14 0.70 - 3.10 10*3/mm3    Monocytes, Absolute 0.96 (H) 0.10 - 0.90 10*3/mm3    Eosinophils, Absolute 0.00 0.00 - 0.40 10*3/mm3    Basophils, Absolute 0.02 0.00 - 0.20 10*3/mm3    Immature Grans, Absolute 0.09 (H) 0.00 - 0.05 10*3/mm3    nRBC 0.0 0.0 - 0.2 /100 WBC          Imaging:  US Renal Bilateral  Result Date: 3/16/2025  Impression: No hydronephrosis or acute sonographic abnormality. Electronically Signed: Tomy Orellana MD  3/16/2025 5:38 PM EDT  Workstation ID: [id number]    NM Lung Ventilation Perfusion  Result Date: 3/16/2025  Impression: Negative for pulmonary embolism. Electronically Signed: Nimesh Dash MD  3/16/2025 3:17 PM EDT  Workstation ID: [id number]    XR Chest 1 View  Result Date: 3/15/2025  Impression: Mild hazy left greater than right basal opacities, which could represent atelectasis or pneumonia. Electronically Signed: Josh Lorenzo  3/15/2025 4:18 PM EDT  Workstation ID: [id number]    MRI Abdomen With & Without Contrast  Result Date: 3/15/2025  Impression: 1. Multiple small round eccentric lesions scattered throughout the colon suspicious for polyps based on patient history. Recommend correlation with colonoscopy. 2. No suspicious adenopathy.  3. Similar chronic dilation of the biliary tree with blunting at the ampulla. Chronicity favors physiologic changes status post cholecystectomy and/or nonobstructing ampullary stricture. 4. Lipomatous infiltration and pancreatic atrophy. Negative for active inflammation or suspicious mass. 5. Cardiomegaly. Mild body wall edema. Electronically Signed: Nimesh Dash MD  3/15/2025 2:37 PM EDT  Workstation ID: [id number]      Assessment/Plan:     SOB (shortness of breath)         Acute kidney injury on top of chronic kidney disease stage III  Acute hypoxic respiratory failure  Pulmonary fibrosis  Urinary tract infection  Anxiety disorder  Hypertension  Hyperlipidemia  Hypothyroidism  History of von Willebrand disease with no bleeding        Recommendations:  Creatinine is about the same at 2.0  Watch sodium 131  Slightly more acidotic  Add sodium bicarb orally      Patient with acute kidney injury on top of chronic kidney disease stage III likely secondary to ATN from pneumonia and hypoxia    CK normal noted UA  Recheck labs    Discussed with the patient and family at bedside

## 2025-03-17 NOTE — PLAN OF CARE
Goal Outcome Evaluation: pt aox4. Remains on 3L NC. BP stable. NS at 50mL/hr. Pt remains with edema. Pt with dry consistent cough, PRN cough syrup given and scheduled tessalon pearls. Urine and sputum sample ordered. Blood cultures obtained. Pt able to make needs known. Up to chair. Call light in reach.

## 2025-03-17 NOTE — CASE MANAGEMENT/SOCIAL WORK
[facility]  Dept: [phone]    Jamison May MD    Name: Teagan Hardy      : 1970        You are scheduled to have the following procedure: Colonoscopy      Procedure Date: 7/10/2024 Arrival Time: 1:15 Procedure Time: 2:15       Location: Please report to the [facility] Main Entrance 1 hour before your procedure:  [address], [place] [address].     Prep medication(s) ordered:  GoLytely and Zofran/Ondansetron (2 tablets, anti-nausea)      DO NOT FOLLOW THE PREP INSTRUCTIONS FROM THE PHARMACY.    Day Before Your Procedure:  You must follow a CLEAR LIQUID diet the day before the procedure  Attached is a list of clear liquid diet items  You will start your prep in the evening, the day before your procedure.    1) Fill the Nulytely/Golytely bottle with water to the line indicated on the bottle. Place cap on the bottle and shake to assure ingredients are dissolved. Once the prep is prepared it may be refrigerated and must be used within 24 hours. You may add one packet of crystal light for flavoring (NO red, blue or purple).    2) Follow the clear liquid diet all day.    3) At 6 pm the evening before your procedure, take one (1) anti-nausea tablet (Zofran/Ondansetron)    4) At 7 pm the evening before your procedure, start drinking 8 oz of the prep every 10 minutes until the bottle is half (1/2) gone. If you become sick, nauseous, and/or bloated, take a break for about 10 to 15 minutes, and then continue to drink the prep.     Day of Procedure:    5) At 4 am on the day of your procedure, take one (1) anti-nausea tablet (Zofran/Ondansetron)    6) At 5 am on the day of your procedure, drink the rest of the prep, again drinking 8 oz of the prep every 10 minutes until the bottle is completely gone. This needs to be completed by 6 am.     You must drink the entire bottle to obtain the best results.    You may become chilled while drinking the  Discharge Planning Assessment   Jeffrey     Patient Name: Tracie Gross  MRN: [id number]  Today's Date: 3/17/2025    Admit Date: 3/15/2025    Plan: Return home with spouse.   Discharge Needs Assessment       Row Name 03/17/25 1333       Living Environment    People in Home spouse    Name(s) of People in Home  Brian    Current Living Arrangements home    Potentially Unsafe Housing Conditions none    In the past 12 months has the electric, gas, oil, or water company threatened to shut off services in your home? No    Primary Care Provided by self;spouse/significant other    Provides Primary Care For no one    Family Caregiver if Needed spouse    Family Caregiver Names  Brian    Quality of Family Relationships helpful;involved;supportive    Able to Return to Prior Arrangements yes       Resource/Environmental Concerns    Resource/Environmental Concerns none    Transportation Concerns none       Transportation Needs    In the past 12 months, has lack of transportation kept you from medical appointments or from getting medications? no    In the past 12 months, has lack of transportation kept you from meetings, work, or from getting things needed for daily living? No       Food Insecurity    Within the past 12 months, you worried that your food would run out before you got the money to buy more. Never true    Within the past 12 months, the food you bought just didn't last and you didn't have money to get more. Never true       Transition Planning    Patient/Family Anticipates Transition to home with family    Patient/Family Anticipated Services at Transition none    Transportation Anticipated family or friend will provide       Discharge Needs Assessment    Readmission Within the Last 30 Days no previous admission in last 30 days    Equipment Currently Used at Home walker, rolling;bp cuff;pulse ox;scales;grab bar;bath bench;cane, quad tip;wheelchair;other (see comments)  lift chair    Concerns to be Addressed  care coordination/care conferences;discharge planning    Do you want help finding or keeping work or a job? Patient declined    Do you want help with school or training? For example, starting or completing job training or getting a high school diploma, GED or equivalent Patient declined    Anticipated Changes Related to Illness none    Equipment Needed After Discharge none    Outpatient/Agency/Support Group Needs skilled nursing facility    Discharge Facility/Level of Care Needs nursing facility, skilled    Offered/Gave Vendor List yes                   Discharge Plan       Row Name 03/17/25 6211       Plan    Plan Comments CM met with patient at bedside to discuss discharge planning. Patient lives at home with her  Brian. She can drive but has not lately and needs assist with ADLs. She has wheelchair, walker, lift chair, and quad cane at home. Spouse states she needs bariatric equipment vs what she has. CM notified primary nurse and PT regarding DME needs, currently patient is 258 lbs and insurance starts bariatric coverage at 300 lbs. If patient discharges home CM can follow up with Jw Harvey with [employer] for possible donated items. Spouse and family verbalize that she really needs to go to rehab short term to gain strength but at current patient is not agreeable. PCP and pharmacy verified, Agreeable to M2B. Denies issues affording medications. No current OPPT or HHC. Spouse can transport at discharge.      Row Name 03/17/25 2180       Plan    Plan Return home with spouse.                Demographic Summary       Row Name 03/17/25 9911       General Information    Admission Type inpatient    Arrived From emergency department    Required Notices Provided Important Message from Medicare    Referral Source admission list    Reason for Consult care coordination/care conference;discharge planning    Preferred Language English       Contact Information    Permission Granted to Share Info With        prep; do not be alarmed, this is normal. You may continue intake of warm liquids during the time that you are drinking the prep.    NOTHING BY MOUTH (FOOD, WATER, GUM, CANDY, CIGARETTES, ETC)  AFTER 6 AM  DAY OF PROCEDURE.    MEDICATION INSTRUCTIONS    Do not take any vitamins/herbal supplements for 7 days prior to your procedure. This includes multivitamins, vitamin D, and iron supplements.       Do not take any NSAIDS such as ibuprofen, advil, motrin meloxicam, naproxen, aspirin and aleve for 3 days prior to procedure. You may continue to take tylenol during this time.     Hold 3 days prior to procedure:  diclofenac (VOLTAREN)      Please notify office of any new or changes to medications prior to procedure      Please call our office if you need to reschedule your procedure or if you have questions regarding your upcoming procedure: Dept: [phone]    Office Hours:  8:00 am- 4:30 pm Monday-Friday                      Low Fiber Diet      Please follow 7 days prior to procedure      Eating a low-fiber diet means eating foods that don’t have much fiber. These foods are easy to digest.      Most of the fiber that you eat passes undigested through your bowel. This is what forms stool. Low-fiber foods can help to slow down your bowel movements. When you eat a low-fiber diet, you have fewer stools. This lets your intestine rest.      Your healthcare provider will tell you how long you need to be on this diet. It may only be for a short time. Low-fiber foods often don’t give you all the nutrients you need to stay healthy. Your healthcare provider may have you take certain vitamins while you are on this diet.      Reasons to eat a low-fiber diet   The goal of a low-fiber diet is to limit the size and number of your stools. It may be prescribed if you:      Are going through chemotherapy or radiation treatments    Have had intestinal surgery    Have trouble digesting food   Have a condition that affects your intestine,               Functional Status       Row Name 03/17/25 1332       Functional Status    Usual Activity Tolerance fair    Current Activity Tolerance fair       Functional Status, IADL    Medications independent    Meal Preparation assistive person    Housekeeping assistive person    Laundry assistive person    Shopping assistive equipment and person       Mental Status Summary    Recent Changes in Mental Status/Cognitive Functioning no changes             Gabriela Bermeo RN      [facility]  Office: [phone]  Cell: [phone]  Fax # [phone]     such as diarrhea, irritable bowel syndrome, Crohn’s disease, ulcerative colitis, or diverticulitis      General guidelines for a low-fiber diet   In general, a low-fiber diet means having fewer than 13 grams of fiber a day. Your healthcare provider may give you a list of things you can and can’t eat or drink. Read food labels. Choose foods and drinks that have as close to zero grams of fiber as possible. Here are general guidelines to follow:      Breads, pasta, cereal, rice, and other starches (6 to 11 servings daily)  What to choose: white bread, biscuits, muffins, and white rolls; plain crackers; waffles; white pasta; white rice; cream of wheat; grits; white pancakes; corn flakes; cooked potatoes without skin; pretzels. Fiber content of these foods should be less than 0.5 (1/2) gram per serving.    What to pass up: whole-wheat or whole-grain breads, crackers, and pasta; breads with seeds or nuts; wheat germ; meryl crackers; cornbread; wild or brown rice; cereals with whole-grain, bran, and granola; cereals with seeds, nuts, coconut, or dried fruit; potatoes with skin      Milk and dairy (2 servings daily)  What to choose: milk, buttermilk; yogurt or ice cream without seeds or nuts; custard or pudding; sour cream; cheese and cottage cheese; cream sauces, soups, and casseroles    What to pass up: ice cream and yogurt with seeds, nuts, or fruit chunks      Fruit (2 to 4 servings daily)  What to choose: ripe banana; ripe nectarine, peach, apricot, papaya, and plum; soft honeydew melon and cantaloupe; cooked or canned fruit without skin or seeds (not sweetened with sorbitol); applesauce; strained fruit juice (without pulp)    What to pass up: raw or dried fruit; all berries; raisins; canned and raw pineapple; prunes and prune juice; fruit juice with pulp      Vegetables (3 to 5 servings daily)  What to choose: well-cooked or canned vegetables without seeds, such as spinach, eggplant, green and wax beans, carrots,  yellow squash, pumpkin; lettuce on a sandwich    What to pass up: all raw or steamed vegetables; vegetables with seeds, such as unstrained tomato sauce; green peas; lima beans; broccoli; corn; parsnips      Meats and protein (4 to 6 ounces daily)   What to choose: tender, well-cooked meat, including ground meat, poultry, and fish; eggs; tofu; creamy peanut butter    What to pass up: processed meats such as hot dogs and sausages, tough, chewy meat with gristle; peas, including split, yellow, and black-eyed; beans, including navy, lima, black, garbanzo, soy, hannah, and lentil; peanuts and crunchy peanut butter       Fats, oils, sauces, condiments (fewer than 8 teaspoons daily)   What to choose: butter, margarine, oils, whipped cream, sour cream, mayonnaise, smooth dressings and sauces; plain gravy; smooth condiments    What to pass up: dressing with seeds or fruit chunks; pickles and relishes      Other foods and drinks   What to choose: water; plain gelatin; plain puddings; pretzels; plain cookies and cakes; honey, syrup; decaffeinated drinks, including tea and coffee      What to pass up: popcorn; potato chips; spicy foods; fried, greasy foods; marmalade, jam, and preserves; desserts that have seeds, nuts, coconut, dried fruit, whole grains, or bran; candy that has seeds or nuts.               Clear Liquid Diet       *No RED, BLUE or PURPLE liquids*    FOOD GROUP RECOMMENDED AVOID   Beverages Fruit juices(e.g. apple, white cranberry, or white grape); pulp-free juices (e.g. orange, lemonade or grapefruit)  Coffee (NO cream, flavored creamer or milk) or tea and carbonated beverages as allowed as tolerated. All others including nectars, prune juices, juices with pulp, tomato or vegetable juice, milk, cream, and cocoa.   Soups Clear broth, bouillon or consomme    Sweets and Desserts Fruit-flavored or unflavored gelatin; fruit ice made from clear fruit juice; plain hard candy; sugar; honey; sugar substitutes; frozen  popsicles     Miscellaneous Commercially prepared low residue lactose free nutritional supplements; herbs and mild seasonings, salt and flavor extracts.      Sample Menu  Breakfast  White Cranberry Juice  Gelatin dessert  Tea/Coffee  Sugar Lunch  Broth  Apple Juice  Carbonated soda  Frozen popsicle  Tea/Coffee Dinner  Broth  Orange juice, strained  Gelatin dessert  Carbonated soda  Tea/Coffee   Mid-morning Snack  Gelatin dessert Mid-afternoon snack  Frozen popsicle Evening Snack  Frozen popsicle

## 2025-03-17 NOTE — CASE MANAGEMENT/SOCIAL WORK
Continued Stay Note  [facility]     Patient Name: Tracie Gross  MRN: [id number]  Today's Date: 3/17/2025    Admit Date: 3/15/2025        Discharge Plan       Row Name 03/17/25 1202       Plan    Plan Comments DC Barriers: 3L O2, Cr 2.02, IV abx, IV steroids, incessant cough, IVFs, renal US, neph following             Gabriela Bermeo RN     [facility]  Office: [phone]  Cell: [phone]  Fax # [phone]

## 2025-03-17 NOTE — PROGRESS NOTES
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 No wolf honeycombing fibrosis  Patient was treated with mycophenolate, prophylactic Bactrim     PFT PC 1 0.6 L/51%,  FEV1 1.4 L / 58%, ratio 88, TLC 57%, RV 50%, DLCO 51%     PFTs July 2021, FEV1 1.7 L 68% improve 75% post bronchodilator, FEV1/ FVC ratio 87, TLC 72%, RV 74%, DLCO 56%     PFTs in 2017 FEV1 66-70% predicted which is unchanged since 2012  quit smoking 1994,      PFT 2012 and 2017, FEV-1 and TLC 75% , suggestive of mild to mod restrictive lung disease     2D echo  2018 normal RVSP and EF  2D echo May 2020 no pulmonary hypertension   2d echo July 2021 RVSP 30  2D echo January 2025 no pulmonary hypertension EF 60%     FERN, AHI 14.5 ,       No Response to inhalers including Breztri and Trelegy     Recommendations:        Oxygen titration currently on 2 L  Antibiotics Rocephin 2 g IV daily for 5 days  Azithromycin 500 mg p.o. daily for 3 days     Hold mycophenolate     Bronchodilators Pulmicort and DuoNeb     On Plaquenil 200 mg twice daily              Chest x-ray 3/15/2025      VQ scan 3/16/2025       High-res CT scan January 2025             LOS: 2 days     Subjective         Objective     Vital signs for last 24 hours:  Vitals:    03/16/25 2015 03/17/25 0018 03/17/25 0300 03/17/25 0500   BP: 112/66 97/54 101/54 94/50   BP Location: Left arm Left arm Left arm    Patient Position: Lying Lying Lying    Pulse: 71 68 66 63   Resp: 19 14 12    Temp: 98.8 °F (37.1 °C) 98.1 °F (36.7 °C) 97.1 °F (36.2 °C)    TempSrc: Axillary Oral Axillary    SpO2: 96% 92% 93% 94%   Weight:    117 kg (258 lb 9.6 oz)   Height:           Intake/Output last 3 shifts:  I/O last 3 completed shifts:  In: 480 [P.O.:480]  Out: -    Intake/Output this shift:  No intake/output data recorded.      Radiology  Imaging Results (Last 24 Hours)       Procedure Component Value Units Date/Time    US Renal Bilateral [[id number]] Collected: 03/16/25 1735     Updated: 03/16/25 1740    Narrative:      US RENAL BILATERAL    Date of Exam: 3/16/2025 3:27 PM EDT    Indication: renal failure.    Comparison: MRI abdomen 3/11/2025    Technique: Grayscale and color Doppler ultrasound evaluation of the kidneys and urinary bladder was performed.    Findings:  The right kidney measures 9.8 x 4.9 x 4 cm. The left kidney measures 7.1 x 5.3 x 3.4 cm. There is no hydronephrosis. Sonographic window partially limited due to patient habitus which limits assessment of the left and right renal parenchyma. Bladder is   under distended.      Impression:      Impression:  No hydronephrosis or acute sonographic abnormality.            Electronically Signed: Tomy Orellana MD    3/16/2025 5:38 PM EDT    Workstation ID: [id number]    NM Lung Ventilation Perfusion [[id number]] Collected: 03/16/25 1516     Updated: 03/16/25 1519    Narrative:      NM LUNG VENTILATION PERFUSION    Date of Exam: 3/16/2025 11:00 AM EDT    Indication: D dimer increased and hypoxia.    Comparison: Chest radiograph 3/15/2025    Technique:  The patient was ventilated with 39.2 mCi of technetium 99m pyrophosphate aerosol and ventilation images were acquired in multiple obliquities. The patient then received 5.5 mCi of technetium 99m MAA intravenously and perfusion images were   acquired in multiple obliquities.      Findings:  Negative for large peripheral wedge-shaped VQ mismatch defect to suggest pulmonary embolism. Slight matched heterogeneity on perfusion and ventilation likely related to known underlying chronic lung disease.      Impression:      Impression:  Negative for pulmonary embolism.        Electronically Signed: Nimesh Dash MD    3/16/2025 3:17 PM EDT    Workstation ID: [id number]             Labs:  Results from last 7 days   Lab Units 03/17/25  0138   WBC 10*3/mm3 10.50   HEMOGLOBIN g/dL 9.9*   HEMATOCRIT % 30.6*   PLATELETS 10*3/mm3 286     Results from last 7 days   Lab Units 03/17/25  0138   SODIUM mmol/L 131*   POTASSIUM mmol/L 4.6   CHLORIDE mmol/L 97*   CO2 mmol/L 20.7*   BUN mg/dL 42*   CREATININE mg/dL 2.02*   CALCIUM mg/dL 8.0*   GLUCOSE mg/dL 153*     Results from last 7 days   Lab Units 03/15/25  1549   PH, ARTERIAL pH units 7.392   PO2 ART mm Hg 52.9*   PCO2, ARTERIAL mm Hg 44.7   HCO3 ART mmol/L 27.2         Results from last 7 days   Lab Units 03/17/25  0138   CK TOTAL U/L 94         Results from last 7 days   Lab Units 03/17/25  0138   MAGNESIUM mg/dL 2.7*                   Meds:   SCHEDULE  azithromycin, 500 mg, Oral, Q24H  budesonide, 0.5 mg, Nebulization, BID - RT  calcium 500 mg vitamin D 5 mcg (200 UT), 1 tablet, Oral, BID  cefTRIAXone, 2,000 mg, Intravenous, Q24H  cetirizine, 10 mg, Oral, Daily  guaiFENesin, 600 mg, Oral, Q12H  hydroxychloroquine, 200 mg, Oral, BID  lamoTRIgine, 200 mg, Oral, Nightly  levothyroxine, 175 mcg, Oral, Q AM  methylPREDNISolone sodium succinate, 40 mg, Intravenous, Q8H  [Held by provider] mycophenolate, 500 mg, Oral, Q12H  QUEtiapine, 50 mg, Oral, Nightly  sodium chloride, 10 mL, Intravenous, Q12H      Infusions  sodium chloride, 50 mL/hr, Last Rate: 50 mL/hr (03/16/25 2035)      PRNs    acetaminophen **OR** acetaminophen **OR** acetaminophen    ALPRAZolam    aluminum-magnesium hydroxide-simethicone    senna-docusate sodium **AND** polyethylene glycol **AND** bisacodyl **AND** bisacodyl    Calcium Replacement - Follow Nurse / BPA Driven Protocol    cyclobenzaprine    guaiFENesin-codeine    ipratropium-albuterol    Magnesium Standard Dose Replacement - Follow Nurse / BPA Driven Protocol    meclizine    melatonin    nitroglycerin    ondansetron ODT **OR** [DISCONTINUED] ondansetron    oxyCODONE    Phosphorus Replacement - Follow Nurse / BPA Driven  Protocol    Potassium Replacement - Follow Nurse / BPA Driven Protocol    [COMPLETED] Insert Peripheral IV **AND** sodium chloride    sodium chloride    sodium chloride    Physical Exam:  Physical Exam  Cardiovascular:      Heart sounds: Murmur heard.      No gallop.   Pulmonary:      Effort: No respiratory distress.      Breath sounds: No stridor. Rhonchi and rales present. No wheezing.   Chest:      Chest wall: No tenderness.         ROS  Review of Systems   Respiratory:  Positive for cough and shortness of breath. Negative for wheezing and stridor.    Cardiovascular:  Positive for palpitations. Negative for chest pain and leg swelling.             Total critical care time spent with patient greater than: 45 Minutes

## 2025-03-17 NOTE — PLAN OF CARE
Goal Outcome Evaluation:  Plan of Care Reviewed With: patient, family        Progress: no change  Outcome Evaluation: Evelyn Gross is a 65 y/o F who was admitted to [facility] on 3/15/25 for SOA and generalized weakness. Cultures (+) streptococcus PNA. D/w acute hypoxic resp failure r/t new diagnosis of pulmonary fibrosis. PMH includes R rTSA, IBS, vertigo, von willebrands. At baseline, pt lives with her spouse in a house with ramp entry and chair-lift to 2nd floor. Pt is typically IND with ADLs and mobility, though reports she has needed to use cane recently r/t weakness. Pt reports her dizziness first began in January, endorses room-spinning dizziness, tinnitus, fullness in her ears, and weakness/balance deficits. Pt was treated with positional maneuvers via ENT and PT; reports her dizziness began improving, but all symptoms never fully went away. Per OT evaluation yesterday, pt had visible nystagmus after changing positions and sitting upright. This date, pt has less complaints of dizziness, but is limited mostly due to severe, dry cough. Attempted working with pt several times throughout the day, only able to tolerate in PM. Pt was Olegario for bed mobility, Olegario for STS with RW, maxA for donning brief, and CGA with RW for short transfer to recliner. Pt had soiled the bed, requiring assist for donning new gown and getting cleaned up. Pt began reporting dizziness once sitting in recliner, no visible nystagmus. Due to cluster of symptoms in addition to weakness/balance deficits, pt would benefit from referral to ENT/audiologist to rule out Meniere's disease prior to continuing positional treatments. PT will continue to follow while admitted to address her functional deficits, and recommend SNF upon d/c.    Anticipated Discharge Disposition (PT): skilled nursing facility

## 2025-03-18 LAB
ANION GAP SERPL CALCULATED.3IONS-SCNC: 11.7 MMOL/L (ref 5–15)
BASOPHILS # BLD AUTO: 0.01 10*3/MM3 (ref 0–0.2)
BASOPHILS NFR BLD AUTO: 0.1 % (ref 0–1.5)
BUN SERPL-MCNC: 38 MG/DL (ref 8–23)
BUN/CREAT SERPL: 25.7 (ref 7–25)
CALCIUM SPEC-SCNC: 8.2 MG/DL (ref 8.6–10.5)
CHLORIDE SERPL-SCNC: 99 MMOL/L (ref 98–107)
CO2 SERPL-SCNC: 20.3 MMOL/L (ref 22–29)
CREAT SERPL-MCNC: 1.48 MG/DL (ref 0.57–1)
DEPRECATED RDW RBC AUTO: 48.3 FL (ref 37–54)
EGFRCR SERPLBLD CKD-EPI 2021: 38.9 ML/MIN/1.73
EOSINOPHIL # BLD AUTO: 0 10*3/MM3 (ref 0–0.4)
EOSINOPHIL NFR BLD AUTO: 0 % (ref 0.3–6.2)
ERYTHROCYTE [DISTWIDTH] IN BLOOD BY AUTOMATED COUNT: 13.8 % (ref 12.3–15.4)
GLUCOSE SERPL-MCNC: 142 MG/DL (ref 65–99)
HCT VFR BLD AUTO: 30.9 % (ref 34–46.6)
HGB BLD-MCNC: 10.2 G/DL (ref 12–15.9)
IMM GRANULOCYTES # BLD AUTO: 0.08 10*3/MM3 (ref 0–0.05)
IMM GRANULOCYTES NFR BLD AUTO: 0.9 % (ref 0–0.5)
LYMPHOCYTES # BLD AUTO: 0.89 10*3/MM3 (ref 0.7–3.1)
LYMPHOCYTES NFR BLD AUTO: 10.4 % (ref 19.6–45.3)
MAGNESIUM SERPL-MCNC: 2.6 MG/DL (ref 1.6–2.4)
MCH RBC QN AUTO: 31.5 PG (ref 26.6–33)
MCHC RBC AUTO-ENTMCNC: 33 G/DL (ref 31.5–35.7)
MCV RBC AUTO: 95.4 FL (ref 79–97)
MONOCYTES # BLD AUTO: 0.88 10*3/MM3 (ref 0.1–0.9)
MONOCYTES NFR BLD AUTO: 10.2 % (ref 5–12)
NEUTROPHILS NFR BLD AUTO: 6.73 10*3/MM3 (ref 1.7–7)
NEUTROPHILS NFR BLD AUTO: 78.4 % (ref 42.7–76)
NRBC BLD AUTO-RTO: 0.5 /100 WBC (ref 0–0.2)
PHOSPHATE SERPL-MCNC: 3.4 MG/DL (ref 2.5–4.5)
PLATELET # BLD AUTO: 300 10*3/MM3 (ref 140–450)
PMV BLD AUTO: 10.6 FL (ref 6–12)
POTASSIUM SERPL-SCNC: 4.5 MMOL/L (ref 3.5–5.2)
RBC # BLD AUTO: 3.24 10*6/MM3 (ref 3.77–5.28)
SODIUM SERPL-SCNC: 131 MMOL/L (ref 136–145)
WBC NRBC COR # BLD AUTO: 8.59 10*3/MM3 (ref 3.4–10.8)

## 2025-03-18 PROCEDURE — 87070 CULTURE OTHR SPECIMN AEROBIC: CPT | Performed by: INTERNAL MEDICINE

## 2025-03-18 PROCEDURE — 84100 ASSAY OF PHOSPHORUS: CPT | Performed by: INTERNAL MEDICINE

## 2025-03-18 PROCEDURE — 80048 BASIC METABOLIC PNL TOTAL CA: CPT | Performed by: INTERNAL MEDICINE

## 2025-03-18 PROCEDURE — 94664 DEMO&/EVAL PT USE INHALER: CPT

## 2025-03-18 PROCEDURE — 25010000002 METHYLPREDNISOLONE PER 40 MG: Performed by: INTERNAL MEDICINE

## 2025-03-18 PROCEDURE — 94799 UNLISTED PULMONARY SVC/PX: CPT

## 2025-03-18 PROCEDURE — 83735 ASSAY OF MAGNESIUM: CPT | Performed by: INTERNAL MEDICINE

## 2025-03-18 PROCEDURE — 25810000003 SODIUM CHLORIDE 0.9 % SOLUTION: Performed by: INTERNAL MEDICINE

## 2025-03-18 PROCEDURE — 85025 COMPLETE CBC W/AUTO DIFF WBC: CPT | Performed by: INTERNAL MEDICINE

## 2025-03-18 PROCEDURE — 25010000002 CEFTRIAXONE PER 250 MG: Performed by: INTERNAL MEDICINE

## 2025-03-18 PROCEDURE — 94761 N-INVAS EAR/PLS OXIMETRY MLT: CPT

## 2025-03-18 PROCEDURE — 87205 SMEAR GRAM STAIN: CPT | Performed by: INTERNAL MEDICINE

## 2025-03-18 RX ORDER — GUAIFENESIN 600 MG/1
1200 TABLET, EXTENDED RELEASE ORAL EVERY 12 HOURS SCHEDULED
Status: DISCONTINUED | OUTPATIENT
Start: 2025-03-18 | End: 2025-03-18

## 2025-03-18 RX ORDER — GUAIFENESIN 600 MG/1
600 TABLET, EXTENDED RELEASE ORAL EVERY 12 HOURS SCHEDULED
Status: DISCONTINUED | OUTPATIENT
Start: 2025-03-18 | End: 2025-03-31 | Stop reason: HOSPADM

## 2025-03-18 RX ORDER — MORPHINE SULFATE 2 MG/ML
2 INJECTION, SOLUTION INTRAMUSCULAR; INTRAVENOUS NIGHTLY PRN
Status: DISPENSED | OUTPATIENT
Start: 2025-03-18 | End: 2025-03-23

## 2025-03-18 RX ORDER — FERROUS SULFATE 325(65) MG
1 TABLET ORAL
Qty: 30 TABLET | Refills: 0 | Status: SHIPPED | OUTPATIENT
Start: 2025-03-18

## 2025-03-18 RX ADMIN — HYDROXYCHLOROQUINE SULFATE 200 MG: 200 TABLET ORAL at 20:08

## 2025-03-18 RX ADMIN — GUAIFENESIN AND CODEINE PHOSPHATE 10 ML: 100; 10 SOLUTION ORAL at 20:08

## 2025-03-18 RX ADMIN — CETIRIZINE HYDROCHLORIDE 10 MG: 10 TABLET, FILM COATED ORAL at 09:25

## 2025-03-18 RX ADMIN — GUAIFENESIN AND CODEINE PHOSPHATE 10 ML: 100; 10 SOLUTION ORAL at 15:16

## 2025-03-18 RX ADMIN — BENZONATATE 200 MG: 100 CAPSULE ORAL at 20:08

## 2025-03-18 RX ADMIN — CALCIUM ACETATE 667 MG: 667 CAPSULE ORAL at 12:39

## 2025-03-18 RX ADMIN — BUDESONIDE 0.5 MG: 0.5 INHALANT RESPIRATORY (INHALATION) at 06:52

## 2025-03-18 RX ADMIN — BENZONATATE 200 MG: 100 CAPSULE ORAL at 15:17

## 2025-03-18 RX ADMIN — GUAIFENESIN AND CODEINE PHOSPHATE 10 ML: 100; 10 SOLUTION ORAL at 00:43

## 2025-03-18 RX ADMIN — Medication 1 TABLET: at 20:08

## 2025-03-18 RX ADMIN — METHYLPREDNISOLONE SODIUM SUCCINATE 40 MG: 40 INJECTION, POWDER, FOR SOLUTION INTRAMUSCULAR; INTRAVENOUS at 12:39

## 2025-03-18 RX ADMIN — IPRATROPIUM BROMIDE AND ALBUTEROL SULFATE 3 ML: .5; 3 SOLUTION RESPIRATORY (INHALATION) at 11:04

## 2025-03-18 RX ADMIN — GUAIFENESIN AND CODEINE PHOSPHATE 10 ML: 100; 10 SOLUTION ORAL at 05:00

## 2025-03-18 RX ADMIN — GUAIFENESIN 600 MG: 600 TABLET, MULTILAYER, EXTENDED RELEASE ORAL at 12:39

## 2025-03-18 RX ADMIN — LEVOTHYROXINE SODIUM 175 MCG: 175 TABLET ORAL at 05:00

## 2025-03-18 RX ADMIN — Medication 10 ML: at 09:25

## 2025-03-18 RX ADMIN — GUAIFENESIN AND CODEINE PHOSPHATE 10 ML: 100; 10 SOLUTION ORAL at 09:25

## 2025-03-18 RX ADMIN — CALCIUM ACETATE 667 MG: 667 CAPSULE ORAL at 09:25

## 2025-03-18 RX ADMIN — BENZONATATE 200 MG: 100 CAPSULE ORAL at 09:25

## 2025-03-18 RX ADMIN — GUAIFENESIN 600 MG: 600 TABLET, EXTENDED RELEASE ORAL at 09:25

## 2025-03-18 RX ADMIN — HYDROXYCHLOROQUINE SULFATE 200 MG: 200 TABLET ORAL at 09:25

## 2025-03-18 RX ADMIN — SODIUM CHLORIDE 50 ML/HR: 9 INJECTION, SOLUTION INTRAVENOUS at 09:25

## 2025-03-18 RX ADMIN — GUAIFENESIN 600 MG: 600 TABLET, MULTILAYER, EXTENDED RELEASE ORAL at 20:08

## 2025-03-18 RX ADMIN — LAMOTRIGINE 200 MG: 100 TABLET ORAL at 20:08

## 2025-03-18 RX ADMIN — METHYLPREDNISOLONE SODIUM SUCCINATE 40 MG: 40 INJECTION, POWDER, FOR SOLUTION INTRAMUSCULAR; INTRAVENOUS at 05:00

## 2025-03-18 RX ADMIN — METHYLPREDNISOLONE SODIUM SUCCINATE 40 MG: 40 INJECTION, POWDER, FOR SOLUTION INTRAMUSCULAR; INTRAVENOUS at 21:20

## 2025-03-18 RX ADMIN — QUETIAPINE FUMARATE 50 MG: 25 TABLET ORAL at 20:09

## 2025-03-18 RX ADMIN — Medication 1 TABLET: at 09:25

## 2025-03-18 RX ADMIN — Medication 10 ML: at 20:08

## 2025-03-18 RX ADMIN — CEFTRIAXONE 2000 MG: 2 INJECTION, POWDER, FOR SOLUTION INTRAMUSCULAR; INTRAVENOUS at 17:49

## 2025-03-18 NOTE — PROGRESS NOTES
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 No wolf honeycombing fibrosis  Patient was treated with mycophenolate, prophylactic Bactrim     PFT 1/14/2025   FVC 1 1.6 L/51%,  FEV1 1.4 L / 58%, ratio 88, TLC 57%, RV 50%, DLCO 51%     PFTs July 2021,   FEV1 1.7 L 68% improve 75% post bronchodilator, FEV1/ FVC ratio 87, TLC 72%, RV 74%, DLCO 56%     PFTs in 2017   FEV1 66-70% predicted which is unchanged since 2012  quit smoking 1994,      PFT 2012 and 2017,   FEV-1 and TLC 75% , suggestive of mild to mod restrictive lung disease     2D echo  2018 normal RVSP and EF  2D echo May 2020 no pulmonary hypertension   2d echo July 2021 RVSP 30  2D echo January 2025 no pulmonary hypertension EF 60%     FERN, AHI 14.5 ,       No Response to inhalers including Breztri and Trelegy     Recommendations:        Oxygen titration currently on 2 L  Antibiotics Rocephin 2 g IV daily for 5 days  Azithromycin 500 mg p.o. daily for 3 days     Hold mycophenolate     Bronchodilators Pulmicort and DuoNeb     On Plaquenil 200 mg twice daily              Chest x-ray 3/15/2025      VQ scan 3/16/2025       High-res CT scan January 2025             LOS: 3 days     Subjective         Objective     Vital signs for last 24 hours:  Vitals:    03/18/25 0420 03/18/25 0500 03/18/25 0652 03/18/25 0656   BP: 130/65      BP Location: Left arm      Patient Position: Lying      Pulse: 70  66 67   Resp: 25 22 22   Temp: 97.6 °F (36.4 °C)      TempSrc: Oral      SpO2: 94%  95% 98%   Weight:  116 kg (256 lb 2.8 oz)     Height:           Intake/Output last 3 shifts:  I/O last 3 completed shifts:  In: 960 [P.O.:960]  Out: 550 [Urine:550]  Intake/Output this shift:  No intake/output data  recorded.      Radiology  Imaging Results (Last 24 Hours)       ** No results found for the last 24 hours. **            Labs:  Results from last 7 days   Lab Units 03/18/25  0459   WBC 10*3/mm3 8.59   HEMOGLOBIN g/dL 10.2*   HEMATOCRIT % 30.9*   PLATELETS 10*3/mm3 300     Results from last 7 days   Lab Units 03/18/25  0459   SODIUM mmol/L 131*   POTASSIUM mmol/L 4.5   CHLORIDE mmol/L 99   CO2 mmol/L 20.3*   BUN mg/dL 38*   CREATININE mg/dL 1.48*   CALCIUM mg/dL 8.2*   GLUCOSE mg/dL 142*     Results from last 7 days   Lab Units 03/15/25  1549   PH, ARTERIAL pH units 7.392   PO2 ART mm Hg 52.9*   PCO2, ARTERIAL mm Hg 44.7   HCO3 ART mmol/L 27.2         Results from last 7 days   Lab Units 03/17/25  0138   CK TOTAL U/L 94         Results from last 7 days   Lab Units 03/18/25  0459   MAGNESIUM mg/dL 2.6*                   Meds:   SCHEDULE  benzonatate, 200 mg, Oral, TID  budesonide, 0.5 mg, Nebulization, BID - RT  calcium 500 mg vitamin D 5 mcg (200 UT), 1 tablet, Oral, BID  calcium acetate, 667 mg, Oral, TID With Meals  cefTRIAXone, 2,000 mg, Intravenous, Q24H  cetirizine, 10 mg, Oral, Daily  guaiFENesin, 600 mg, Oral, Q12H  hydroxychloroquine, 200 mg, Oral, BID  lamoTRIgine, 200 mg, Oral, Nightly  levothyroxine, 175 mcg, Oral, Q AM  methylPREDNISolone sodium succinate, 40 mg, Intravenous, Q8H  [Held by provider] mycophenolate, 500 mg, Oral, Q12H  QUEtiapine, 50 mg, Oral, Nightly  sodium chloride, 10 mL, Intravenous, Q12H      Infusions  sodium chloride, 50 mL/hr, Last Rate: 50 mL/hr (03/17/25 1359)      PRNs    acetaminophen **OR** acetaminophen **OR** acetaminophen    ALPRAZolam    aluminum-magnesium hydroxide-simethicone    senna-docusate sodium **AND** polyethylene glycol **AND** bisacodyl **AND** bisacodyl    Calcium Replacement - Follow Nurse / BPA Driven Protocol    cyclobenzaprine    guaiFENesin-codeine    ipratropium-albuterol    Magnesium Standard Dose Replacement - Follow Nurse / BPA Driven Protocol     meclizine    melatonin    nitroglycerin    ondansetron ODT **OR** [DISCONTINUED] ondansetron    oxyCODONE    Phosphorus Replacement - Follow Nurse / BPA Driven Protocol    Potassium Replacement - Follow Nurse / BPA Driven Protocol    [COMPLETED] Insert Peripheral IV **AND** sodium chloride    sodium chloride    sodium chloride    Physical Exam:  Physical Exam  Cardiovascular:      Heart sounds: Murmur heard.      No gallop.   Pulmonary:      Effort: No respiratory distress.      Breath sounds: No stridor. Rhonchi and rales present. No wheezing.   Chest:      Chest wall: No tenderness.         ROS  Review of Systems   Respiratory:  Positive for cough and shortness of breath. Negative for wheezing and stridor.    Cardiovascular:  Positive for palpitations. Negative for chest pain and leg swelling.             Total critical care time spent with patient greater than: 45 Minutes

## 2025-03-18 NOTE — PROGRESS NOTES
Nephrology  Progress Note                                        [facility]    Patient Identification    Name: Tracie Gross  Age: 64 y.o.  Sex: female  :  1960  MRN: [id number]      DATE OF SERVICE:  3/18/2025        Subective    Complaint of cough     Objective   Scheduled Meds:benzonatate, 200 mg, Oral, TID  budesonide, 0.5 mg, Nebulization, BID - RT  calcium 500 mg vitamin D 5 mcg (200 UT), 1 tablet, Oral, BID  calcium acetate, 667 mg, Oral, TID With Meals  cefTRIAXone, 2,000 mg, Intravenous, Q24H  cetirizine, 10 mg, Oral, Daily  guaiFENesin, 600 mg, Oral, Q12H  hydroxychloroquine, 200 mg, Oral, BID  lamoTRIgine, 200 mg, Oral, Nightly  levothyroxine, 175 mcg, Oral, Q AM  methylPREDNISolone sodium succinate, 40 mg, Intravenous, Q8H  [Held by provider] mycophenolate, 500 mg, Oral, Q12H  QUEtiapine, 50 mg, Oral, Nightly  sodium chloride, 10 mL, Intravenous, Q12H          Continuous Infusions:sodium chloride, 50 mL/hr, Last Rate: 50 mL/hr (25 [phone])        PRN Meds:  acetaminophen **OR** acetaminophen **OR** acetaminophen    ALPRAZolam    aluminum-magnesium hydroxide-simethicone    senna-docusate sodium **AND** polyethylene glycol **AND** bisacodyl **AND** bisacodyl    Calcium Replacement - Follow Nurse / BPA Driven Protocol    cyclobenzaprine    guaiFENesin-codeine    ipratropium-albuterol    Magnesium Standard Dose Replacement - Follow Nurse / BPA Driven Protocol    meclizine    melatonin    nitroglycerin    ondansetron ODT **OR** [DISCONTINUED] ondansetron    oxyCODONE    Phosphorus Replacement - Follow Nurse / BPA Driven Protocol    Potassium Replacement - Follow Nurse / BPA Driven Protocol    [COMPLETED] Insert Peripheral IV **AND** sodium chloride    sodium chloride    sodium chloride     Exam:  /65 (BP Location: Left arm, Patient Position:  "Lying)   Pulse 67   Temp 97.6 °F (36.4 °C) (Oral)   Resp 22   Ht 162.6 cm (64\")   Wt 116 kg (256 lb 2.8 oz)   LMP 05/08/1983   SpO2 98%   BMI 43.97 kg/m²     Intake/Output last 3 shifts:  I/O last 3 completed shifts:  In: 960 [P.O.:960]  Out: 550 [Urine:550]    Intake/Output this shift:  No intake/output data recorded.    Physical exam:  General Appearance:  Alert  Head:  Normocephalic, without obvious abnormality, atraumatic  Eyes:  PERRL, conjunctiva/corneas clear     Neck:  Supple,  no adenopathy;      Lungs:  Decreased BS occasion ronchi  Heart:  Regular rate and rhythm, S1 and S2 normal  Abdomen:  Soft, non-tender, bowel sounds active   Extremities: trace edema  Pulses: 2+ and symmetric all extremities  Skin:  No rashes or lesions       Data Review:  All labs (24hrs):   Recent Results (from the past 24 hours)   Sodium, Urine, Random - Urine, Clean Catch    Collection Time: 03/17/25  4:13 PM    Specimen: Urine, Clean Catch   Result Value Ref Range    Sodium, Urine <20 mmol/L   Legionella Antigen, Urine - Urine, Urine, Clean Catch    Collection Time: 03/17/25  4:13 PM    Specimen: Urine, Clean Catch   Result Value Ref Range    LEGIONELLA ANTIGEN, URINE Negative Negative   Basic Metabolic Panel    Collection Time: 03/18/25  4:59 AM    Specimen: Blood   Result Value Ref Range    Glucose 142 (H) 65 - 99 mg/dL    BUN 38 (H) 8 - 23 mg/dL    Creatinine 1.48 (H) 0.57 - 1.00 mg/dL    Sodium 131 (L) 136 - 145 mmol/L    Potassium 4.5 3.5 - 5.2 mmol/L    Chloride 99 98 - 107 mmol/L    CO2 20.3 (L) 22.0 - 29.0 mmol/L    Calcium 8.2 (L) 8.6 - 10.5 mg/dL    BUN/Creatinine Ratio 25.7 (H) 7.0 - 25.0    Anion Gap 11.7 5.0 - 15.0 mmol/L    eGFR 38.9 (L) >60.0 mL/min/1.73   Magnesium    Collection Time: 03/18/25  4:59 AM    Specimen: Blood   Result Value Ref Range    Magnesium 2.6 (H) 1.6 - 2.4 mg/dL   Phosphorus    Collection Time: 03/18/25  4:59 AM    Specimen: Blood   Result Value Ref Range    Phosphorus 3.4 2.5 - 4.5 " mg/dL   CBC Auto Differential    Collection Time: 03/18/25  4:59 AM    Specimen: Blood   Result Value Ref Range    WBC 8.59 3.40 - 10.80 10*3/mm3    RBC 3.24 (L) 3.77 - 5.28 10*6/mm3    Hemoglobin 10.2 (L) 12.0 - 15.9 g/dL    Hematocrit 30.9 (L) 34.0 - 46.6 %    MCV 95.4 79.0 - 97.0 fL    MCH 31.5 26.6 - 33.0 pg    MCHC 33.0 31.5 - 35.7 g/dL    RDW 13.8 12.3 - 15.4 %    RDW-SD 48.3 37.0 - 54.0 fl    MPV 10.6 6.0 - 12.0 fL    Platelets 300 140 - 450 10*3/mm3    Neutrophil % 78.4 (H) 42.7 - 76.0 %    Lymphocyte % 10.4 (L) 19.6 - 45.3 %    Monocyte % 10.2 5.0 - 12.0 %    Eosinophil % 0.0 (L) 0.3 - 6.2 %    Basophil % 0.1 0.0 - 1.5 %    Immature Grans % 0.9 (H) 0.0 - 0.5 %    Neutrophils, Absolute 6.73 1.70 - 7.00 10*3/mm3    Lymphocytes, Absolute 0.89 0.70 - 3.10 10*3/mm3    Monocytes, Absolute 0.88 0.10 - 0.90 10*3/mm3    Eosinophils, Absolute 0.00 0.00 - 0.40 10*3/mm3    Basophils, Absolute 0.01 0.00 - 0.20 10*3/mm3    Immature Grans, Absolute 0.08 (H) 0.00 - 0.05 10*3/mm3    nRBC 0.5 (H) 0.0 - 0.2 /100 WBC          Imaging:  US Renal Bilateral  Result Date: 3/16/2025  Impression: No hydronephrosis or acute sonographic abnormality. Electronically Signed: Tomy Orellana MD  3/16/2025 5:38 PM EDT  Workstation ID: [id number]    NM Lung Ventilation Perfusion  Result Date: 3/16/2025  Impression: Negative for pulmonary embolism. Electronically Signed: Nimesh Dash MD  3/16/2025 3:17 PM EDT  Workstation ID: [id number]    XR Chest 1 View  Result Date: 3/15/2025  Impression: Mild hazy left greater than right basal opacities, which could represent atelectasis or pneumonia. Electronically Signed: Josh Lorenzo  3/15/2025 4:18 PM EDT  Workstation ID: [id number]    MRI Abdomen With & Without Contrast  Result Date: 3/15/2025  Impression: 1. Multiple small round eccentric lesions scattered throughout the colon suspicious for polyps based on patient history. Recommend correlation with colonoscopy. 2. No suspicious adenopathy. 3.  Similar chronic dilation of the biliary tree with blunting at the ampulla. Chronicity favors physiologic changes status post cholecystectomy and/or nonobstructing ampullary stricture. 4. Lipomatous infiltration and pancreatic atrophy. Negative for active inflammation or suspicious mass. 5. Cardiomegaly. Mild body wall edema. Electronically Signed: Nimesh Dash MD  3/15/2025 2:37 PM EDT  Workstation ID: [id number]      Assessment/Plan:     SOB (shortness of breath)         Acute kidney injury on top of chronic kidney disease stage III  Acute hypoxic respiratory failure  Pulmonary fibrosis  Urinary tract infection  Anxiety disorder  Hypertension  Hyperlipidemia  Hypothyroidism  History of von Willebrand disease with no bleeding        Recommendations:  Creatinine is better down to 1.48  She still coughing  DC IV fluid  Sodium stable at 131       CK normal noted UA  Recheck labs    Discussed with the patient and family at bedside

## 2025-03-18 NOTE — PROGRESS NOTES
[facility] MEDICINE SERVICE  DAILY PROGRESS NOTE    NAME: Tracie Gross  : 1960  MRN: [id number]      LOS: 3 days     PROVIDER OF SERVICE: Santa Pierre MD    Chief Complaint: SOB (shortness of breath)    Subjective:     Interval History:  History taken from: patient    No new complaint    Review of Systems:   Review of Systems   All other systems reviewed and are negative.      Objective:     Vital Signs  Temp:  [97.6 °F (36.4 °C)-98.5 °F (36.9 °C)] 97.9 °F (36.6 °C)  Heart Rate:  [66-89] 73  Resp:  [13-25] 16  BP: (114-151)/(53-90) 119/57  Flow (L/min) (Oxygen Therapy):  [2-3] 2   Body mass index is 43.97 kg/m².    Physical Exam  Physical Exam  Constitutional:       Appearance: Normal appearance.   HENT:      Head: Normocephalic and atraumatic.      Nose: Nose normal.      Mouth/Throat:      Mouth: Mucous membranes are moist.   Eyes:      Extraocular Movements: Extraocular movements intact.      Pupils: Pupils are equal, round, and reactive to light.   Cardiovascular:      Rate and Rhythm: Normal rate and regular rhythm.   Pulmonary:      Effort: Pulmonary effort is normal.      Breath sounds: Normal breath sounds.   Abdominal:      General: Abdomen is flat. Bowel sounds are normal.      Palpations: Abdomen is soft.   Musculoskeletal:         General: Normal range of motion.      Cervical back: Normal range of motion and neck supple.   Skin:     General: Skin is warm and dry.   Neurological:      General: No focal deficit present.      Mental Status: She is alert and oriented to person, place, and time.   Psychiatric:         Mood and Affect: Mood normal.         Behavior: Behavior normal.         Thought Content: Thought content normal.         Judgment: Judgment normal.         Current Medications:  Scheduled Meds:benzonatate, 200 mg, Oral, TID  budesonide, 0.5 mg, Nebulization, BID - RT  calcium 500 mg vitamin D 5 mcg (200 UT), 1 tablet, Oral, BID  cefTRIAXone, 2,000 mg, Intravenous,  Q24H  cetirizine, 10 mg, Oral, Daily  guaiFENesin, 600 mg, Oral, Q12H  hydroxychloroquine, 200 mg, Oral, BID  lamoTRIgine, 200 mg, Oral, Nightly  levothyroxine, 175 mcg, Oral, Q AM  methylPREDNISolone sodium succinate, 40 mg, Intravenous, Q8H  [Held by provider] mycophenolate, 500 mg, Oral, Q12H  QUEtiapine, 50 mg, Oral, Nightly  sodium chloride, 10 mL, Intravenous, Q12H      Continuous Infusions:     PRN Meds:.  acetaminophen **OR** acetaminophen **OR** acetaminophen    ALPRAZolam    aluminum-magnesium hydroxide-simethicone    senna-docusate sodium **AND** polyethylene glycol **AND** bisacodyl **AND** bisacodyl    Calcium Replacement - Follow Nurse / BPA Driven Protocol    cyclobenzaprine    guaiFENesin-codeine    ipratropium-albuterol    Magnesium Standard Dose Replacement - Follow Nurse / BPA Driven Protocol    meclizine    melatonin    nitroglycerin    ondansetron ODT **OR** [DISCONTINUED] ondansetron    oxyCODONE    Phosphorus Replacement - Follow Nurse / BPA Driven Protocol    Potassium Replacement - Follow Nurse / BPA Driven Protocol    [COMPLETED] Insert Peripheral IV **AND** sodium chloride    sodium chloride    sodium chloride       Diagnostic Data    Results from last 7 days   Lab Units 03/18/25  0459   WBC 10*3/mm3 8.59   HEMOGLOBIN g/dL 10.2*   HEMATOCRIT % 30.9*   PLATELETS 10*3/mm3 300   GLUCOSE mg/dL 142*   CREATININE mg/dL 1.48*   BUN mg/dL 38*   SODIUM mmol/L 131*   POTASSIUM mmol/L 4.5   ANION GAP mmol/L 11.7       US Renal Bilateral  Result Date: 3/16/2025  Impression: No hydronephrosis or acute sonographic abnormality. Electronically Signed: Tomy Orellana MD  3/16/2025 5:38 PM EDT  Workstation ID: [id number]        I reviewed the patient's new clinical results.    Assessment/Plan:     Active and Resolved Problems  Active Hospital Problems    Diagnosis  POA    **SOB (shortness of breath) [R06.02]  Yes      Resolved Hospital Problems   No resolved problems to display.       Acute hypoxic respiratory  failure not on home oxygen but currently on 3 L of oxygen via nasal cannula   Pneumonia  Recently diagnosed pulmonary fibrosis  DANIELLE on CKD 3  UTI  Hypertension  Hypothyroidism      -acute hypoxic respiratory failure likely secondary to pneumonia plus underlying pulmonary fibrosis.  Urine strep pneumo antigen is positive.  Continue IV ceftriaxone for treatment of likely Streptococcus pneumonia.  Follow-up on blood and sputum cultures.     -Renal function with slight improvement.  See further recommendations per nephrology.  - Urine culture showed no growth ruling out UTI  - Continue current management.      VTE Prophylaxis:  Mechanical VTE prophylaxis orders are present.       Disposition Planning:     Barriers to Discharge: Pending clinical improvement  Anticipated Date of Discharge: 3/24/2025  Place of Discharge: Home      Code Status and Medical Interventions: CPR (Attempt to Resuscitate); Full Support   Ordered at: 03/15/25 1955     Code Status (Patient has no pulse and is not breathing):    CPR (Attempt to Resuscitate)     Medical Interventions (Patient has pulse or is breathing):    Full Support       Signature: Electronically signed by Santa Pierre MD, 03/18/25, 14:20 EDT.  [facility] Hospitalist Team

## 2025-03-18 NOTE — PLAN OF CARE
Goal Outcome Evaluation: pt aox4. on 2L NC. BP stable. Fluids d/c.  Pt remains with edema. Pt with dry consistent cough, PRN cough syrup given, and PRN nebulizer Pt able to make needs known. Up to chair and BSC. Call light in reach.

## 2025-03-18 NOTE — SIGNIFICANT NOTE
03/18/25 2524   OTHER   Discipline physical therapist   Rehab Time/Intention   Session Not Performed other (see comments)  (Attempted at 1116 and 1558; patient with severe cough that kept her up all night, but improved by the afternoon. However, had just returned to bed from using BSC and ambulating with RN staff. Will f/u tomorrow.)   Therapy Assessment/Plan (PT)   Criteria for Skilled Interventions Met (PT) yes;meets criteria   Recommendation   PT - Next Appointment 03/19/25   Recommendation   OT - Next Appointment 03/19/25

## 2025-03-19 ENCOUNTER — INPATIENT HOSPITAL (AMBULATORY)
Dept: URBAN - METROPOLITAN AREA HOSPITAL 84 | Facility: HOSPITAL | Age: 65
End: 2025-03-19
Payer: MEDICARE

## 2025-03-19 ENCOUNTER — APPOINTMENT (OUTPATIENT)
Dept: GENERAL RADIOLOGY | Facility: HOSPITAL | Age: 65
DRG: 196 | End: 2025-03-19
Payer: MEDICARE

## 2025-03-19 DIAGNOSIS — R13.10 DYSPHAGIA, UNSPECIFIED: ICD-10-CM

## 2025-03-19 DIAGNOSIS — D64.9 ANEMIA, UNSPECIFIED: ICD-10-CM

## 2025-03-19 DIAGNOSIS — Z84.81 FAMILY HISTORY OF CARRIER OF GENETIC DISEASE: ICD-10-CM

## 2025-03-19 DIAGNOSIS — R93.3 ABNORMAL FINDINGS ON DIAGNOSTIC IMAGING OF OTHER PARTS OF DI: ICD-10-CM

## 2025-03-19 DIAGNOSIS — K63.9 DISEASE OF INTESTINE, UNSPECIFIED: ICD-10-CM

## 2025-03-19 LAB
ANION GAP SERPL CALCULATED.3IONS-SCNC: 10.2 MMOL/L (ref 5–15)
BUN SERPL-MCNC: 29 MG/DL (ref 8–23)
BUN/CREAT SERPL: 24.2 (ref 7–25)
CALCIUM SPEC-SCNC: 9.1 MG/DL (ref 8.6–10.5)
CHLORIDE SERPL-SCNC: 100 MMOL/L (ref 98–107)
CO2 SERPL-SCNC: 22.8 MMOL/L (ref 22–29)
CREAT SERPL-MCNC: 1.2 MG/DL (ref 0.57–1)
DEPRECATED RDW RBC AUTO: 49.6 FL (ref 37–54)
EGFRCR SERPLBLD CKD-EPI 2021: 50 ML/MIN/1.73
ERYTHROCYTE [DISTWIDTH] IN BLOOD BY AUTOMATED COUNT: 14.2 % (ref 12.3–15.4)
GLUCOSE BLDC GLUCOMTR-MCNC: 133 MG/DL (ref 70–105)
GLUCOSE SERPL-MCNC: 142 MG/DL (ref 65–99)
HCT VFR BLD AUTO: 31.6 % (ref 34–46.6)
HGB BLD-MCNC: 10.2 G/DL (ref 12–15.9)
MCH RBC QN AUTO: 30.9 PG (ref 26.6–33)
MCHC RBC AUTO-ENTMCNC: 32.3 G/DL (ref 31.5–35.7)
MCV RBC AUTO: 95.8 FL (ref 79–97)
PLATELET # BLD AUTO: 291 10*3/MM3 (ref 140–450)
PMV BLD AUTO: 10.9 FL (ref 6–12)
POTASSIUM SERPL-SCNC: 5.2 MMOL/L (ref 3.5–5.2)
RBC # BLD AUTO: 3.3 10*6/MM3 (ref 3.77–5.28)
SODIUM SERPL-SCNC: 133 MMOL/L (ref 136–145)
WBC NRBC COR # BLD AUTO: 9.34 10*3/MM3 (ref 3.4–10.8)

## 2025-03-19 PROCEDURE — 97530 THERAPEUTIC ACTIVITIES: CPT

## 2025-03-19 PROCEDURE — 97116 GAIT TRAINING THERAPY: CPT

## 2025-03-19 PROCEDURE — 25010000002 MORPHINE PER 10 MG: Performed by: INTERNAL MEDICINE

## 2025-03-19 PROCEDURE — 97535 SELF CARE MNGMENT TRAINING: CPT

## 2025-03-19 PROCEDURE — 25010000002 CEFTRIAXONE PER 250 MG: Performed by: INTERNAL MEDICINE

## 2025-03-19 PROCEDURE — 71045 X-RAY EXAM CHEST 1 VIEW: CPT

## 2025-03-19 PROCEDURE — 99222 1ST HOSP IP/OBS MODERATE 55: CPT | Performed by: NURSE PRACTITIONER

## 2025-03-19 PROCEDURE — 80048 BASIC METABOLIC PNL TOTAL CA: CPT | Performed by: INTERNAL MEDICINE

## 2025-03-19 PROCEDURE — 63710000001 ONDANSETRON ODT 4 MG TABLET DISPERSIBLE: Performed by: INTERNAL MEDICINE

## 2025-03-19 PROCEDURE — 97112 NEUROMUSCULAR REEDUCATION: CPT

## 2025-03-19 PROCEDURE — 25010000002 METHYLPREDNISOLONE PER 40 MG: Performed by: INTERNAL MEDICINE

## 2025-03-19 PROCEDURE — 85027 COMPLETE CBC AUTOMATED: CPT | Performed by: INTERNAL MEDICINE

## 2025-03-19 PROCEDURE — 82948 REAGENT STRIP/BLOOD GLUCOSE: CPT

## 2025-03-19 RX ORDER — FUROSEMIDE 20 MG/1
20 TABLET ORAL DAILY
Status: DISCONTINUED | OUTPATIENT
Start: 2025-03-19 | End: 2025-03-26

## 2025-03-19 RX ADMIN — HYDROXYCHLOROQUINE SULFATE 200 MG: 200 TABLET ORAL at 08:54

## 2025-03-19 RX ADMIN — GUAIFENESIN 600 MG: 600 TABLET, MULTILAYER, EXTENDED RELEASE ORAL at 08:54

## 2025-03-19 RX ADMIN — METHYLPREDNISOLONE SODIUM SUCCINATE 40 MG: 40 INJECTION, POWDER, FOR SOLUTION INTRAMUSCULAR; INTRAVENOUS at 11:50

## 2025-03-19 RX ADMIN — CETIRIZINE HYDROCHLORIDE 10 MG: 10 TABLET, FILM COATED ORAL at 08:54

## 2025-03-19 RX ADMIN — GUAIFENESIN AND CODEINE PHOSPHATE 10 ML: 100; 10 SOLUTION ORAL at 16:59

## 2025-03-19 RX ADMIN — HYDROXYCHLOROQUINE SULFATE 200 MG: 200 TABLET ORAL at 20:04

## 2025-03-19 RX ADMIN — GUAIFENESIN AND CODEINE PHOSPHATE 10 ML: 100; 10 SOLUTION ORAL at 03:55

## 2025-03-19 RX ADMIN — MORPHINE SULFATE 2 MG: 2 INJECTION, SOLUTION INTRAMUSCULAR; INTRAVENOUS at 18:10

## 2025-03-19 RX ADMIN — GUAIFENESIN AND CODEINE PHOSPHATE 10 ML: 100; 10 SOLUTION ORAL at 13:10

## 2025-03-19 RX ADMIN — BENZONATATE 200 MG: 100 CAPSULE ORAL at 20:04

## 2025-03-19 RX ADMIN — GUAIFENESIN 600 MG: 600 TABLET, MULTILAYER, EXTENDED RELEASE ORAL at 20:03

## 2025-03-19 RX ADMIN — Medication 1 TABLET: at 08:54

## 2025-03-19 RX ADMIN — FUROSEMIDE 20 MG: 20 TABLET ORAL at 11:50

## 2025-03-19 RX ADMIN — ACETAMINOPHEN 650 MG: 325 TABLET, FILM COATED ORAL at 03:33

## 2025-03-19 RX ADMIN — BENZONATATE 200 MG: 100 CAPSULE ORAL at 16:59

## 2025-03-19 RX ADMIN — QUETIAPINE FUMARATE 50 MG: 25 TABLET ORAL at 20:03

## 2025-03-19 RX ADMIN — CEFTRIAXONE 2000 MG: 2 INJECTION, POWDER, FOR SOLUTION INTRAMUSCULAR; INTRAVENOUS at 17:02

## 2025-03-19 RX ADMIN — LAMOTRIGINE 200 MG: 100 TABLET ORAL at 20:03

## 2025-03-19 RX ADMIN — ALPRAZOLAM 1 MG: 1 TABLET ORAL at 22:51

## 2025-03-19 RX ADMIN — METHYLPREDNISOLONE SODIUM SUCCINATE 40 MG: 40 INJECTION, POWDER, FOR SOLUTION INTRAMUSCULAR; INTRAVENOUS at 05:19

## 2025-03-19 RX ADMIN — Medication 1 TABLET: at 20:03

## 2025-03-19 RX ADMIN — GUAIFENESIN AND CODEINE PHOSPHATE 10 ML: 100; 10 SOLUTION ORAL at 21:09

## 2025-03-19 RX ADMIN — Medication 10 ML: at 20:04

## 2025-03-19 RX ADMIN — ONDANSETRON 4 MG: 4 TABLET, ORALLY DISINTEGRATING ORAL at 11:50

## 2025-03-19 RX ADMIN — LEVOTHYROXINE SODIUM 175 MCG: 175 TABLET ORAL at 05:19

## 2025-03-19 RX ADMIN — BENZONATATE 200 MG: 100 CAPSULE ORAL at 08:54

## 2025-03-19 RX ADMIN — METHYLPREDNISOLONE SODIUM SUCCINATE 40 MG: 40 INJECTION, POWDER, FOR SOLUTION INTRAMUSCULAR; INTRAVENOUS at 21:24

## 2025-03-19 RX ADMIN — Medication 10 ML: at 08:54

## 2025-03-19 RX ADMIN — GUAIFENESIN AND CODEINE PHOSPHATE 10 ML: 100; 10 SOLUTION ORAL at 08:57

## 2025-03-19 NOTE — PROGRESS NOTES
[facility] MEDICINE SERVICE  DAILY PROGRESS NOTE    NAME: Tracie Gross  : 1960  MRN: [id number]      LOS: 4 days     PROVIDER OF SERVICE: Santa Pierre MD    Chief Complaint: SOB (shortness of breath)    Subjective:     Interval History:  History taken from: patient    No new complaint    Review of Systems:   Review of Systems   All other systems reviewed and are negative.      Objective:     Vital Signs  Temp:  [98 °F (36.7 °C)-98.5 °F (36.9 °C)] 98.2 °F (36.8 °C)  Heart Rate:  [70-81] 78  Resp:  [16-23] 16  BP: ()/(65-96) 117/96  Flow (L/min) (Oxygen Therapy):  [2] 2   Body mass index is 43.14 kg/m².    Physical Exam  Physical Exam  Constitutional:       Appearance: Normal appearance.   HENT:      Head: Normocephalic and atraumatic.      Nose: Nose normal.      Mouth/Throat:      Mouth: Mucous membranes are moist.   Eyes:      Extraocular Movements: Extraocular movements intact.      Pupils: Pupils are equal, round, and reactive to light.   Cardiovascular:      Rate and Rhythm: Normal rate and regular rhythm.   Pulmonary:      Effort: Pulmonary effort is normal.      Breath sounds: Normal breath sounds.   Abdominal:      General: Abdomen is flat. Bowel sounds are normal.      Palpations: Abdomen is soft.   Musculoskeletal:         General: Normal range of motion.      Cervical back: Normal range of motion and neck supple.   Skin:     General: Skin is warm and dry.   Neurological:      General: No focal deficit present.      Mental Status: She is alert and oriented to person, place, and time.   Psychiatric:         Mood and Affect: Mood normal.         Behavior: Behavior normal.         Thought Content: Thought content normal.         Judgment: Judgment normal.         Current Medications:  Scheduled Meds:benzonatate, 200 mg, Oral, TID  budesonide, 0.5 mg, Nebulization, BID - RT  calcium 500 mg vitamin D 5 mcg (200 UT), 1 tablet, Oral, BID  cefTRIAXone, 2,000 mg, Intravenous,  Q24H  cetirizine, 10 mg, Oral, Daily  furosemide, 20 mg, Oral, Daily  guaiFENesin, 600 mg, Oral, Q12H  hydroxychloroquine, 200 mg, Oral, BID  lamoTRIgine, 200 mg, Oral, Nightly  levothyroxine, 175 mcg, Oral, Q AM  methylPREDNISolone sodium succinate, 40 mg, Intravenous, Q8H  [Held by provider] mycophenolate, 500 mg, Oral, Q12H  QUEtiapine, 50 mg, Oral, Nightly  sodium chloride, 10 mL, Intravenous, Q12H      Continuous Infusions:     PRN Meds:.  acetaminophen **OR** acetaminophen **OR** acetaminophen    ALPRAZolam    aluminum-magnesium hydroxide-simethicone    senna-docusate sodium **AND** polyethylene glycol **AND** bisacodyl **AND** bisacodyl    Calcium Replacement - Follow Nurse / BPA Driven Protocol    cyclobenzaprine    guaiFENesin-codeine    ipratropium-albuterol    Magnesium Standard Dose Replacement - Follow Nurse / BPA Driven Protocol    meclizine    melatonin    Morphine    nitroglycerin    ondansetron ODT **OR** [DISCONTINUED] ondansetron    oxyCODONE    Phosphorus Replacement - Follow Nurse / BPA Driven Protocol    Potassium Replacement - Follow Nurse / BPA Driven Protocol    [COMPLETED] Insert Peripheral IV **AND** sodium chloride    sodium chloride    sodium chloride       Diagnostic Data    Results from last 7 days   Lab Units 03/19/25  0338   WBC 10*3/mm3 9.34   HEMOGLOBIN g/dL 10.2*   HEMATOCRIT % 31.6*   PLATELETS 10*3/mm3 291   GLUCOSE mg/dL 142*   CREATININE mg/dL 1.20*   BUN mg/dL 29*   SODIUM mmol/L 133*   POTASSIUM mmol/L 5.2   ANION GAP mmol/L 10.2       XR Chest 1 View  Result Date: 3/19/2025  Impression: Multifocal patchy nodular densities in both lungs, thought to be similar to 3/15/2025. Correlate for pneumonia. Electronically Signed: Barb Millan MD  3/19/2025 2:35 PM EDT  Workstation ID: [id number]        I reviewed the patient's new clinical results.    Assessment/Plan:     Active and Resolved Problems  Active Hospital Problems    Diagnosis  POA    **SOB (shortness of breath) [R06.02]   Yes      Resolved Hospital Problems   No resolved problems to display.       Acute hypoxic respiratory failure not on home oxygen but currently on 3 L of oxygen via nasal cannula   Pneumonia  Recently diagnosed pulmonary fibrosis  DANIELLE on CKD 3  UTI  Hypertension  Hypothyroidism      -acute hypoxic respiratory failure likely secondary to pneumonia plus underlying pulmonary fibrosis.  Urine strep pneumo antigen is positive.  Continue IV ceftriaxone for treatment of likely Streptococcus pneumonia.  Blood cultures and sputum culture negative  -Renal function with slight improvement.  See further recommendations per nephrology.  - Urine culture showed no growth ruling out UTI  - Continue current management.      VTE Prophylaxis:  Mechanical VTE prophylaxis orders are present.       Disposition Planning:     Barriers to Discharge: Pending clinical improvement  Anticipated Date of Discharge: 3/24/2025  Place of Discharge: Home      Code Status and Medical Interventions: CPR (Attempt to Resuscitate); Full Support   Ordered at: 03/15/25 1955     Code Status (Patient has no pulse and is not breathing):    CPR (Attempt to Resuscitate)     Medical Interventions (Patient has pulse or is breathing):    Full Support       Signature: Electronically signed by Santa Pierre MD, 03/19/25, 18:04 EDT.  [facility] Hospitalist Team

## 2025-03-19 NOTE — SIGNIFICANT NOTE
03/19/25 1330   PASRR   PASRR Status Under clinical review  (Level 1 QFR, provider states needs > 30 days, exemption declined on PASRR.)

## 2025-03-19 NOTE — CASE MANAGEMENT/SOCIAL WORK
Continued Stay Note  [facility]     Patient Name: Tracie Gross  MRN: [id number]  Today's Date: 3/19/2025    Admit Date: 3/15/2025    Plan: Return home with spouse.   Discharge Plan       Row Name 03/19/25 1045       Plan    Plan Comments DC Barriers: 2L O2, Cr 1.20, IV abx, IV steroids, incessant cough, IVFs, renal US, neph following             Gabriela Bermeo RN     [facility]  Office: [phone]  Cell: [phone]  Fax # [phone]

## 2025-03-19 NOTE — PLAN OF CARE
Goal Outcome Evaluation:    Orders received for dysphagia evaluation/VFSS d/t pt choking on eggs. Pt declined PO at beside d/t nausea. ST will f/u next service date and evaluate w/ further recs as indicated. Discussed plan w/ pt, spouse, and nsg. All in agreement w/ plan.

## 2025-03-19 NOTE — CONSULTS
GI CONSULT  NOTE:    Referring Provider:  Dr. Vazquez    Chief complaint: History of scleroderma, possible esophageal stricture    Subjective .     History of present illness: Tracie Gross is a 64 y.o. female who has a history of GERD, bipolar disorder, breast cancer, lupus, Monahan syndrome, von Willebrand disease, cholecystectomy and scleroderma.  She presented with increased shortness of breath and cough since the end of December that has progressively worsened.  She has had generalized weakness as well as decreased appetite and weakness over the last month.  She was without much GI symptoms nausea after coughing until yesterday when she choked on some eggs.  This caused a severe coughing episode which recurred overnight.  She has take large pills by themselves but otherwise denies much issues with dysphagia prior to this episode.  She denies heartburn but occasional nausea with coughing and some mild abdominal tenderness since the coughing episode yesterday.  She has chronic alternating constipation with diarrhea but no melena or rectal bleeding.  She reports significant improvement with coughing after change in breathing treatment yesterday    Endo History:  10/2024 EGD/colonoscopy by Dr. Conway -normal EGD, normal  1/2023 colonoscopy (Dr. Conway) ascending colon polyp not recovered.  Clipped.  Recall 2024.  EGD 2024.  12/2021 EUS (Dr. Negron) small hiatal hernia with some retained food in the stomach consistent with possible gastroparesis.  Minimal changes of duodenitis.  No signs or chronic pancreatitis.  9 mm CBD with no filling defect.  9/2021 EGD/colonoscopy (Dr. Conway) normal EGD.  Hyperplastic polyps.  Recall 2022 8/2020 EGD/colonoscopy (Dr. Conway) normal EGD. Rectal ulcer consistent with rectal prolapse.   Multiple other EGD and colonoscopies    Past Medical History:  Past Medical History:   Diagnosis Date    Allergic 01/01/1998    Anemia     Ankle sprain     Anxiety     Arthritis     Arthritis of  back 0ll2013    Arthritis of neck 2005    Asthma     Bipolar affective disorder     Breast cancer 1985    s/p R mastectomy    Bursitis of hip [id number]    Cervical disc disorder 2006    CTS (carpal tunnel syndrome)     Depression 2000    Fracture of wrist 1995    Fracture, foot     Frozen shoulder     GERD (gastroesophageal reflux disease)     H/O breast reconstruction     SEVERAL    H/O laminectomy     Hamartoma     Hip arthrosis 2013    History of medical problems     Hx of bilateral oophorectomy     Hyperlipidemia     Hypertension     Hypothyroidism     Infectious viral hepatitis     Inflammatory bowel disease 1992    Man. EGD/colonoscopy annually (2021)    Irritable bowel syndrome     Kienbock's disease, right     WRIST    Knee swelling 2007    Low back pain     Low back strain     Lumbosacral disc disease 1995    Had 2 lumber surgeries    Lupus     [place]    Monahan syndrome     Man. EGD/colonoscopy annually (2021). MRI alternating w/ EUS annually for pancreatic screen    MRSA infection     Neuroma of foot     Obesity     Osteopenia     Osteoporosis     maintained on Prolia through Karen    Peptic ulceration     Periarthritis of shoulder 2022    Pneumonia     Pulmonary fibrosis     Raynaud disease     Renal insufficiency     Rotator cuff syndrome [id number]    Scleroderma     Sleep apnea     cpap    Squamous cell cancer of lip     Tear of meniscus of knee     Tendinitis of knee     Thoracic disc disorder     Von Willebrand disease     Wrist sprain        Past Surgical History:  Past Surgical History:   Procedure Laterality Date    APPENDECTOMY      ARM DEBRIDEMENT Right     X 3    BACK SURGERY      Vetas- cervical fusion    BACK SURGERY      lumbar decomp    BREAST BIOPSY      BREAST LUMPECTOMY      BREAST RECONSTRUCTION Right     BREAST RECONSTRUCTION Right     CARDIAC CATHETERIZATION      no stents placed     SECTION  ,      CHOLECYSTECTOMY      COLONOSCOPY      COLONOSCOPY N/A 08/14/2020    Procedure: COLONOSCOPY;  Surgeon: Cayden Conway MD;  Location: [facility] ENDOSCOPY;  Service: Gastroenterology;  Laterality: N/A;  rectal ulcer    COLONOSCOPY N/A 09/17/2021    Procedure: COLONOSCOPY WITH POLYPECTOMY X 1;  Surgeon: Cayden Conway MD;  Location: [facility] ENDOSCOPY;  Service: Gastroenterology;  Laterality: N/A;  Post: COLON POLYP    COLONOSCOPY N/A 01/06/2023    Procedure: COLONOSCOPY with polypectomy x 1, endoscopic clipping x 1;  Surgeon: Cayden Conway MD;  Location: [facility] ENDOSCOPY;  Service: Gastroenterology;  Laterality: N/A;    COLONOSCOPY N/A 10/01/2024    Procedure: COLONOSCOPY;  Surgeon: Cayden Conway MD;  Location: [facility] ENDOSCOPY;  Service: Gastroenterology;  Laterality: N/A;  normal    COSMETIC SURGERY  [id number]    ENDOSCOPY      ENDOSCOPY N/A 08/14/2020    Procedure: ESOPHAGOGASTRODUODENOSCOPY;  Surgeon: Cayden Conway MD;  Location: [facility] ENDOSCOPY;  Service: Gastroenterology;  Laterality: N/A;  Normal EGD    ENDOSCOPY N/A 09/17/2021    Procedure: ESOPHAGOGASTRODUODENOSCOPY;  Surgeon: Cayden Conway MD;  Location: [facility] ENDOSCOPY;  Service: Gastroenterology;  Laterality: N/A;  Post: normal egd    ENDOSCOPY N/A 10/01/2024    Procedure: ESOPHAGOGASTRODUODENOSCOPY;  Surgeon: Cayden Conway MD;  Location: [facility] ENDOSCOPY;  Service: Gastroenterology;  Laterality: N/A;  normal    EXPLORATORY LAPAROTOMY      scar tissue removal    EYE SURGERY  2000    FINGER SURGERY Right     ring finger mass excision    HAND SURGERY  Rt wrist  10/15    HIP SURGERY  1/12    HYSTERECTOMY      PARTIAL    JOINT REPLACEMENT Right 2012,2015    hip and knee    KNEE ARTHROSCOPY Left 01/07/2020    Procedure: LEFT KNEE SCOPE with partial lateral meniscectomy;  Surgeon: Chau Perez MD;  Location: [facility] MAIN OR;  Service: Orthopedics    KNEE SURGERY  Rtf knee  2015    LASIK      LASIK/  LASIK ENHANCEMENT    MASTECTOMY RADICAL Right     NECK SURGERY  06    OOPHORECTOMY Bilateral     SHOULDER SURGERY  2023    SPINE SURGERY      SPLENECTOMY      SUBTOTAL HYSTERECTOMY      TOTAL HIP ARTHROPLASTY Left 2023    TOTAL SHOULDER ARTHROPLASTY W/ DISTAL CLAVICLE EXCISION Right 2023    Procedure: TOTAL SHOULDER REVERSE ARTHROPLASTY;  Surgeon: Floyd Ruiz MD;  Location: [facility] MAIN OR;  Service: Orthopedics;  Laterality: Right;    TRIGGER POINT INJECTION      TUBAL ABDOMINAL LIGATION      UPPER ENDOSCOPIC ULTRASOUND W/ FNA N/A 2021    Procedure: ENDOSCOPIC ULTRASOUND WITH ESOPHAGOGASTRODUODENOSCOPY;  Surgeon: Luis E Negron MD;  Location: [facility] ENDOSCOPY;  Service: Gastroenterology;  Laterality: N/A;  Post: GASTROPARESIS, DILATED COMMON BILE DUCT, FUNDIC POLYP, DUODENITIS, NORMAL PANCREAS, HIATAL HERNIA    WRIST SURGERY Right     distal radius head removal (bone dead)  plates and screws decomp lunate       Social History:  Social History     Tobacco Use    Smoking status: Former     Current packs/day: 0.00     Average packs/day: 0.3 packs/day for 10.0 years (2.5 ttl pk-yrs)     Types: Cigarettes     Start date: 1984     Quit date: 1994     Years since quittin.2     Passive exposure: Past    Smokeless tobacco: Never    Tobacco comments:     Off and on for  those years   Vaping Use    Vaping status: Never Used   Substance Use Topics    Alcohol use: Not Currently     Comment: Rarely    Drug use: No       Family History:  Family History   Problem Relation Age of Onset    Colon cancer Mother     Heart disease Mother     Cancer Mother         Colon    Arthritis Mother     Kidney disease Father     Heart disease Father     Depression Father         Bladder cancer    Hyperlipidemia Father     Vision loss Father     Hypertension Father     Colon cancer Sister     Diabetes Sister     Heart disease Sister     Von Willebrand disease Sister     Von Willebrand  disease Brother     Cancer Brother     Von Willebrand disease Son     Breast cancer Son     Diabetes Paternal Grandmother     Stroke Paternal Grandmother     Colon cancer Other     Colon cancer Son     Von Willebrand disease Son     Breast cancer Son     Cancer Sister         Melanoma, colon, bladder    Vision loss Sister     Stroke Sister     Arthritis Sister     Asthma Sister     Diabetes Sister     Heart disease Sister     Hyperlipidemia Sister     Kidney disease Sister     Cancer Paternal Aunt     Cancer Maternal Aunt     Cancer Maternal Aunt     Hyperlipidemia Sister     Thyroid disease Sister     Arthritis Sister     Hypertension Sister     Kidney disease Sister     Broken bones Sister     Rheumatologic disease Sister     Thyroid disease Sister     Cancer Sister     Clotting disorder Sister        Medications:  Medications Prior to Admission   Medication Sig Dispense Refill Last Dose/Taking    albuterol sulfate  (90 Base) MCG/ACT inhaler Inhale 2 puffs Every 4 (Four) Hours As Needed for Wheezing. 8 g 0 3/14/2025    ALPRAZolam (XANAX) 1 MG tablet Take 1 tablet by mouth At Night As Needed for Anxiety. 30 tablet 2 3/14/2025    bisoprolol-hydrochlorothiazide (ZIAC) 10-6.25 MG per tablet TAKE 1 TABLET BY MOUTH DAILY 90 tablet 1 3/14/2025    Calcium + Vitamin D3 600-5 MG-MCG tablet TAKE ONE TABLET BY MOUTH TWICE A DAY 60 tablet 6 3/14/2025    cetirizine (zyrTEC) 10 MG tablet TAKE 1 TABLET BY MOUTH DAILY 90 tablet 1 3/14/2025    cyclobenzaprine (FLEXERIL) 10 MG tablet TAKE ONE TABLET BY MOUTH THREE TIMES A DAY AS NEEDED FOR MUSCLE SPASMS 45 tablet 1 3/14/2025    Denosumab (PROLIA SC) Inject  under the skin into the appropriate area as directed Every 6 (Six) Months.   Past Month    Flaxseed, Linseed, (FLAXSEED OIL MAX STR) 1300 MG capsule Take 1 tablet by mouth 2 (Two) Times a Day.   3/14/2025    fluticasone (FLONASE) 50 MCG/ACT nasal spray SPRAY TWO SPRAYS IN EACH NOSTRIL ONCE DAILY 16 mL 5 3/14/2025     furosemide (LASIX) 20 MG tablet TAKE 1 TABLET BY MOUTH DAILY AS NEEDED FOR LEG SWELLING 90 tablet 2 3/14/2025    gabapentin (NEURONTIN) 600 MG tablet TAKE TWO TABLETS BY MOUTH EVERY MORNING THEN TAKE ONE TABLET BY MOUTH DAILY IN THE AFTERNOON THEN TAKE TWO TABLETS BY MOUTH EVERY NIGHT (Patient taking differently: Take 2 tablets by mouth 3 (Three) Times a Day. TAKE TWO TABLETS BY MOUTH EVERY MORNING THEN TAKE ONE TABLET BY MOUTH DAILY IN THE AFTERNOON THEN TAKE TWO TABLETS BY MOUTH EVERY NIGHT) 450 tablet 1 3/14/2025    guaiFENesin (Mucinex) 600 MG 12 hr tablet Take 2 tablets twice daily for congestion 30 tablet 1 Past Month    hydroxychloroquine (PLAQUENIL) 200 MG tablet Take 1 tablet by mouth 2 (Two) Times a Day. Indications: Systemic Lupus Erythematosus 180 tablet 3 3/15/2025    lamoTRIgine (LaMICtal) 200 MG tablet TAKE ONE TABLET BY MOUTH ONCE NIGHTLY 90 tablet 1 3/14/2025    levothyroxine (SYNTHROID, LEVOTHROID) 175 MCG tablet TAKE 1 TABLET BY MOUTH DAILY 90 tablet 1 3/15/2025    lisinopril (PRINIVIL,ZESTRIL) 5 MG tablet TAKE 1 TABLET BY MOUTH DAILY 90 tablet 0 3/15/2025    meclizine (ANTIVERT) 25 MG tablet Take 1 tablet by mouth 3 (Three) Times a Day As Needed for Dizziness. 30 tablet 3 3/15/2025    mycophenolate (CELLCEPT) 500 MG tablet Take  by mouth 2 (Two) Times a Day.   3/15/2025    NIFEdipine XL (PROCARDIA XL) 90 MG 24 hr tablet TAKE 1 TABLET BY MOUTH DAILY 90 tablet 1 3/15/2025    NON FORMULARY Apply  topically to the appropriate area as directed. Lidocaine powder  Ketamine powder  Diclofenac powder   Past Week    ondansetron (ZOFRAN) 4 MG tablet Take 1 tablet by mouth Every 8 (Eight) Hours As Needed for Nausea or Vomiting. 30 tablet 3 Past Month    oxyCODONE (ROXICODONE) 10 MG tablet Take 1 tablet by mouth Every 6 (Six) Hours As Needed for Moderate Pain. 120 tablet 0 3/14/2025    promethazine-dextromethorphan (PROMETHAZINE-DM) 6.25-15 MG/5ML syrup Take 5 mL by mouth 4 (Four) Times a Day As Needed for  Cough. 240 mL 1 3/14/2025    QUEtiapine (SEROquel) 100 MG tablet Take 0.5 tablets by mouth Every Night. 90 tablet 1 3/14/2025    sulfamethoxazole-trimethoprim (BACTRIM DS,SEPTRA DS) 800-160 MG per tablet Take 1 tablet by mouth Every Other Day.   Past Week    FeroSul 325 (65 Fe) MG tablet TAKE 1 TABLET BY MOUTH DAILY WITH BREAKFAST 30 tablet 0     hydrOXYzine (ATARAX) 25 MG tablet Take 1 tablet by mouth Every 8 (Eight) Hours As Needed for Itching. 30 tablet 3 More than a month       Scheduled Meds:benzonatate, 200 mg, Oral, TID  budesonide, 0.5 mg, Nebulization, BID - RT  calcium 500 mg vitamin D 5 mcg (200 UT), 1 tablet, Oral, BID  cefTRIAXone, 2,000 mg, Intravenous, Q24H  cetirizine, 10 mg, Oral, Daily  furosemide, 20 mg, Oral, Daily  guaiFENesin, 600 mg, Oral, Q12H  hydroxychloroquine, 200 mg, Oral, BID  lamoTRIgine, 200 mg, Oral, Nightly  levothyroxine, 175 mcg, Oral, Q AM  methylPREDNISolone sodium succinate, 40 mg, Intravenous, Q8H  [Held by provider] mycophenolate, 500 mg, Oral, Q12H  QUEtiapine, 50 mg, Oral, Nightly  sodium chloride, 10 mL, Intravenous, Q12H      Continuous Infusions:   PRN Meds:.  acetaminophen **OR** acetaminophen **OR** acetaminophen    ALPRAZolam    aluminum-magnesium hydroxide-simethicone    senna-docusate sodium **AND** polyethylene glycol **AND** bisacodyl **AND** bisacodyl    Calcium Replacement - Follow Nurse / BPA Driven Protocol    cyclobenzaprine    guaiFENesin-codeine    ipratropium-albuterol    Magnesium Standard Dose Replacement - Follow Nurse / BPA Driven Protocol    meclizine    melatonin    Morphine    nitroglycerin    ondansetron ODT **OR** [DISCONTINUED] ondansetron    oxyCODONE    Phosphorus Replacement - Follow Nurse / BPA Driven Protocol    Potassium Replacement - Follow Nurse / BPA Driven Protocol    [COMPLETED] Insert Peripheral IV **AND** sodium chloride    sodium chloride    sodium chloride    ALLERGIES:  Aspirin and Baclofen    ROS:  The following systems were  reviewed   Constitution:  No fevers, chills, no unintentional weight loss  Skin: no rash, no jaundice  Eyes:  No blurry vision, no eye pain  HENT:  No change in hearing or smell  Resp: Dyspnea with cough  CV:  No chest pain or palpitations  :  No dysuria, hematuria  Musculoskeletal:  No leg cramps or arthralgias  Neuro:  No tremor, no numbness  Psych:  No depression or confusion    Objective chronically ill-appearing but no acute distress, family at bedside    Vital Signs:   Vitals:    03/19/25 0034 03/19/25 0431 03/19/25 0805 03/19/25 1217   BP: 130/65 145/83 142/84 95/78   BP Location: Left arm Left arm     Patient Position: Lying Lying Lying    Pulse: 70 81 70 72   Resp: 18 23 17 16   Temp: 98.1 °F (36.7 °C) 98.3 °F (36.8 °C) 98 °F (36.7 °C) 98.5 °F (36.9 °C)   TempSrc: Axillary Oral Oral Oral   SpO2:    93%   Weight:  114 kg (251 lb 5.2 oz)     Height:           Physical Exam:       General Appearance:    Awake and alert, in no acute distress, obese   Head:    Normocephalic, without obvious abnormality, atraumatic   Throat:   No oral lesions, no thrush, oral mucosa moist   Lungs:     Respirations regular, even and unlabored, dry cough, 2 L nasal cannula   Chest Wall:    No abnormalities observed   Abdomen:     Soft, non-tender, nondistended   Rectal:     Deferred   Extremities:   Moves all extremities, no cyanosis       Skin:   no jaundice, normal palpation       Neurologic:   Cranial nerves 2 - 12 grossly intact       Results Review:   I reviewed the patient's labs and imaging.  Results from last 7 days   Lab Units 03/19/25  0338 03/18/25  0459 03/17/25  0138 03/16/25  0101 03/15/25  1621   WBC 10*3/mm3 9.34 8.59 10.50 12.44* 12.82*   HEMOGLOBIN g/dL 10.2* 10.2* 9.9* 10.2* 11.0*   PLATELETS 10*3/mm3 291 300 286 279 301     Results from last 7 days   Lab Units 03/19/25  0338 03/18/25  0459 03/17/25  0138 03/16/25  2042 03/16/25  0101 03/15/25  1621   SODIUM mmol/L 133* 131* 131* 133* 135* 135*   POTASSIUM  mmol/L 5.2 4.5 4.6 4.8 4.7 4.6   CHLORIDE mmol/L 100 99 97* 97* 98 98   CO2 mmol/L 22.8 20.3* 20.7* 19.4* 23.5 24.3   BUN mg/dL 29* 38* 42* 40* 31* 27*   CREATININE mg/dL 1.20* 1.48* 2.02* 2.16* 2.31* 2.32*   GLUCOSE mg/dL 142* 142* 153* 120* 97 102*   MAGNESIUM mg/dL  --  2.6* 2.7*  --  2.4  --    PHOSPHORUS mg/dL  --  3.4 5.0*  --  5.1*  --      Estimated Creatinine Clearance: 57.1 mL/min (A) (by C-G formula based on SCr of 1.2 mg/dL (H)).  Lab Results   Component Value Date    HGBA1C 5.40 07/03/2024    HGBA1C 5.60 10/24/2023    HGBA1C 5.30 05/24/2023     Results from last 7 days   Lab Units 03/17/25  0138   CK TOTAL U/L 94     Infection   Results from last 7 days   Lab Units 03/18/25  1633 03/17/25  1220 03/17/25  1201 03/15/25  1621 03/15/25  1610   BLOODCX   --  No growth at 2 days No growth at 2 days  --   --    RESPCX  Scant growth (1+) The culture consists of normal respiratory miles. This is a preliminary report; final report to follow.  --   --   --   --    URINECX   --   --   --   --  >100,000 CFU/mL Normal Urogenital Miles   PROCALCITONIN ng/mL  --   --   --  0.24  --      UA    Results from last 7 days   Lab Units 03/15/25  1610   NITRITE UA  Negative   WBC UA /HPF 0-2   BACTERIA UA /HPF 4+*   SQUAM EPITHEL UA /HPF 3-6*   URINECX  >100,000 CFU/mL Normal Urogenital Miles     Microbiology Results (last 10 days)       Procedure Component Value - Date/Time    Respiratory Culture - Sputum, Cough [[id number]] Collected: 03/18/25 1633    Lab Status: Preliminary result Specimen: Sputum from Cough Updated: 03/19/25 1029     Respiratory Culture Scant growth (1+) The culture consists of normal respiratory miles. This is a preliminary report; final report to follow.     Gram Stain Moderate (3+) WBCs seen      Few (2+) Epithelial cells seen      Rare (1+) Gram positive cocci    Legionella Antigen, Urine - Urine, Urine, Clean Catch [[id number]]  (Normal) Collected: 03/17/25 1613    Lab Status: Final result Specimen:  Urine, Clean Catch Updated: 03/17/25 1723     LEGIONELLA ANTIGEN, URINE Negative    Blood Culture - Blood, Wrist, Left [[id number]]  (Normal) Collected: 03/17/25 1220    Lab Status: Preliminary result Specimen: Blood from Wrist, Left Updated: 03/19/25 1230     Blood Culture No growth at 2 days    Narrative:      Less than seven (7) mL's of blood was collected.  Insufficient quantity may yield false negative results.    Blood Culture - Blood, Hand, Left [[id number]]  (Normal) Collected: 03/17/25 1201    Lab Status: Preliminary result Specimen: Blood from Hand, Left Updated: 03/19/25 1230     Blood Culture No growth at 2 days    Narrative:      Less than seven (7) mL's of blood was collected.  Insufficient quantity may yield false negative results.    Respiratory Panel PCR w/COVID-19(SARS-CoV-2) GAVI/RAJIV/AUSTEN/PAD/COR/APRIL In-House, NP Swab in UTM/VTM, 2 HR TAT - Swab, Nasopharynx [[id number]]  (Normal) Collected: 03/15/25 1610    Lab Status: Final result Specimen: Swab from Nasopharynx Updated: 03/15/25 1720     ADENOVIRUS, PCR Not Detected     Coronavirus 229E Not Detected     Coronavirus HKU1 Not Detected     Coronavirus NL63 Not Detected     Coronavirus OC43 Not Detected     COVID19 Not Detected     Human Metapneumovirus Not Detected     Human Rhinovirus/Enterovirus Not Detected     Influenza A PCR Not Detected     Influenza B PCR Not Detected     Parainfluenza Virus 1 Not Detected     Parainfluenza Virus 2 Not Detected     Parainfluenza Virus 3 Not Detected     Parainfluenza Virus 4 Not Detected     RSV, PCR Not Detected     Bordetella pertussis pcr Not Detected     Bordetella parapertussis PCR Not Detected     Chlamydophila pneumoniae PCR Not Detected     Mycoplasma pneumo by PCR Not Detected    Narrative:      In the setting of a positive respiratory panel with a viral infection PLUS a negative procalcitonin without other underlying concern for bacterial infection, consider observing off antibiotics or discontinuation  of antibiotics and continue supportive care. If the respiratory panel is positive for atypical bacterial infection (Bordetella pertussis, Chlamydophila pneumoniae, or Mycoplasma pneumoniae), consider antibiotic de-escalation to target atypical bacterial infection.    Urine Culture - Urine, Urine, Clean Catch [[id number]] Collected: 03/15/25 1610    Lab Status: Final result Specimen: Urine, Clean Catch Updated: 03/17/25 1116     Urine Culture >100,000 CFU/mL Normal Urogenital Rima    Narrative:      Colonization of the urinary tract without infection is common. Treatment is discouraged unless the patient is symptomatic, pregnant, or undergoing an invasive urologic procedure.    Legionella Antigen, Urine - Urine, Urine, Clean Catch [[id number]]  (Normal) Collected: 03/15/25 1610    Lab Status: Final result Specimen: Urine, Clean Catch Updated: 03/16/25 1022     LEGIONELLA ANTIGEN, URINE Negative    S. Pneumo Ag Urine or CSF - Urine, Urine, Clean Catch [[id number]]  (Abnormal) Collected: 03/15/25 1610    Lab Status: Final result Specimen: Urine, Clean Catch Updated: 03/16/25 1022     Strep Pneumo Ag Positive          Imaging Results (Last 72 Hours)       Procedure Component Value Units Date/Time    US Renal Bilateral [[id number]] Collected: 03/16/25 1735     Updated: 03/16/25 1740    Narrative:      US RENAL BILATERAL    Date of Exam: 3/16/2025 3:27 PM EDT    Indication: renal failure.    Comparison: MRI abdomen 3/11/2025    Technique: Grayscale and color Doppler ultrasound evaluation of the kidneys and urinary bladder was performed.    Findings:  The right kidney measures 9.8 x 4.9 x 4 cm. The left kidney measures 7.1 x 5.3 x 3.4 cm. There is no hydronephrosis. Sonographic window partially limited due to patient habitus which limits assessment of the left and right renal parenchyma. Bladder is   under distended.      Impression:      Impression:  No hydronephrosis or acute sonographic  abnormality.            Electronically Signed: Tomy Orellana MD    3/16/2025 5:38 PM EDT    Workstation ID: [id number]    NM Lung Ventilation Perfusion [[id number]] Collected: 03/16/25 1516     Updated: 03/16/25 1519    Narrative:      NM LUNG VENTILATION PERFUSION    Date of Exam: 3/16/2025 11:00 AM EDT    Indication: D dimer increased and hypoxia.    Comparison: Chest radiograph 3/15/2025    Technique:  The patient was ventilated with 39.2 mCi of technetium 99m pyrophosphate aerosol and ventilation images were acquired in multiple obliquities. The patient then received 5.5 mCi of technetium 99m MAA intravenously and perfusion images were   acquired in multiple obliquities.      Findings:  Negative for large peripheral wedge-shaped VQ mismatch defect to suggest pulmonary embolism. Slight matched heterogeneity on perfusion and ventilation likely related to known underlying chronic lung disease.      Impression:      Impression:  Negative for pulmonary embolism.        Electronically Signed: Nimesh Dash MD    3/16/2025 3:17 PM EDT    Workstation ID: [id number]            ASSESSMENT:  Patient is a 64-year-old female with a history of scleroderma, lupus and von Willebrand disease who presented with persistent cough and shortness of breath since the end of December.  GI asked to consult for choking episode x 2.    Choking episode x 2  Dyspnea with cough -hypoxic respiratory insufficiency  Abnormal CT of the colon  Scleroderma  Normocytic anemia  Lupus  Monahan syndrome/history of breast cancer  Von Willebrand disease  History of cholecystectomy    PLAN:  -Creatinine 1.20, BUN 29, sodium 133, hemoglobin 10.2, MCV 95.8, platelets 291, WBC 9.3, respiratory panel negative, strep pneumo ag+  -Renal ultrasound unremarkable  -Nuc med lung ventilation/perfusion negative for pulmonary emboli  -Venous duplex negative  -Chest x-ray with mild hazy left greater than right basilar opacities which could reflect atelectasis or  pneumonia  -3/15/2025 MRI abdomen with and without contrast shows multiple small round eccentric lesions scattered throughout the colon suspicious for polyps, no suspicious adenopathy, similar chronic dilation of the biliary tree with blunting at the ampulla favors postcholecystectomy state and/or nonobstructing ampullary stricture, lipomatous infiltration and sporadic atrophy, cardiomegaly with mild body wall edema    EGD and colonoscopy October 2024 were completely normal.  Would recommend speech therapy evaluation with video swallow for further evaluation of choking episode.  Discussed with bedside RN.  We will follow.    I discussed the patients findings and my recommendations with the patient.    We appreciate the referral    Electronically signed by JADEN Ocampo, 03/19/25, 1:38 PM EDT.

## 2025-03-19 NOTE — SIGNIFICANT NOTE
03/19/25 1303   PASRR   PASRR Status Needs to be completed  (Awaiting MD exemption, requested via secure chat, pending documentation. PASRR in drafts.)

## 2025-03-19 NOTE — PLAN OF CARE
Assessment: Tracie Gross is a 64 y/o F with ARF, new dx pulmonary fibrosis, and h/o scleroderma who presents with functional mobility impairments which indicate the need for skilled intervention. Pt on BSC upon arrival, requires modA for ronnie-care, CGA for balance while pt wipes from the front, and modA for donning new brief. Pt able to progress ambulation, having several bouts, totaling 46 feet with CGA and RW. Pt does continue to report episodes of dizziness that last < 30 seconds, however due to her tinnitus, balance deficits, and weakness, will hold positional treatments while inpt and refer to ENT to r/o Menieres. [facility] pt regarding differential vestibular diagnoses and Tolerating session today without incident. Will continue to follow and progress as tolerated.     Anticipated Discharge Disposition (PT): skilled nursing facility

## 2025-03-19 NOTE — PLAN OF CARE
Goal Outcome Evaluation:              Outcome Evaluation: Patient appeared to briefly sleep during the night. Patient offered the PRN several times but refused each time. Patient remains on 2L via NC.

## 2025-03-19 NOTE — PROGRESS NOTES
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 No wolf honeycombing fibrosis  Patient was treated with mycophenolate, prophylactic Bactrim     PFT 1/14/2025   FVC 1 1.6 L/51%,  FEV1 1.4 L / 58%, ratio 88, TLC 57%, RV 50%, DLCO 51%     PFTs July 2021,   FEV1 1.7 L 68% improve 75% post bronchodilator, FEV1/ FVC ratio 87, TLC 72%, RV 74%, DLCO 56%     PFTs in 2017   FEV1 66-70% predicted which is unchanged since 2012  quit smoking 1994,      PFT 2012 and 2017,   FEV-1 and TLC 75% , suggestive of mild to mod restrictive lung disease     2D echo  2018 normal RVSP and EF  2D echo May 2020 no pulmonary hypertension   2d echo July 2021 RVSP 30  2D echo January 2025 no pulmonary hypertension EF 60%     FERN, AHI 14.5 ,       No Response to inhalers including Breztri and Trelegy     Recommendations:     GI to see for possible esophageal stricture 2nd to Scleroderma with recurrent \" chocking\"    Oxygen titration currently on 2 L  Antibiotics Rocephin 2 g IV daily for 5 days  Azithromycin 500 mg p.o. daily for 3 days     Hold mycophenolate     Bronchodilators Pulmicort and DuoNeb     On Plaquenil 200 mg twice daily              Chest x-ray 3/15/2025      VQ scan 3/16/2025       High-res CT scan January 2025             LOS: 4 days     Subjective         Objective     Vital signs for last 24 hours:  Vitals:    03/18/25 1633 03/18/25 1952 03/19/25 0034 03/19/25 0431   BP: 134/69 112/81 130/65 145/83   BP Location: Left arm Left arm Left arm Left arm   Patient Position: Lying Lying Lying Lying   Pulse: 77 77 70 81   Resp: 25 21 18 23   Temp: 97.9 °F (36.6 °C) 98.3 °F (36.8 °C) 98.1 °F (36.7 °C) 98.3 °F (36.8 °C)   TempSrc: Axillary Axillary Axillary Oral   SpO2:       Weight:    " 114 kg (251 lb 5.2 oz)   Height:           Intake/Output last 3 shifts:  I/O last 3 completed shifts:  In: 1440 [P.O.:1440]  Out: 950 [Urine:950]  Intake/Output this shift:  No intake/output data recorded.      Radiology  Imaging Results (Last 24 Hours)       ** No results found for the last 24 hours. **            Labs:  Results from last 7 days   Lab Units 03/19/25  0338   WBC 10*3/mm3 9.34   HEMOGLOBIN g/dL 10.2*   HEMATOCRIT % 31.6*   PLATELETS 10*3/mm3 291     Results from last 7 days   Lab Units 03/19/25  0338   SODIUM mmol/L 133*   POTASSIUM mmol/L 5.2   CHLORIDE mmol/L 100   CO2 mmol/L 22.8   BUN mg/dL 29*   CREATININE mg/dL 1.20*   CALCIUM mg/dL 9.1   GLUCOSE mg/dL 142*     Results from last 7 days   Lab Units 03/15/25  1549   PH, ARTERIAL pH units 7.392   PO2 ART mm Hg 52.9*   PCO2, ARTERIAL mm Hg 44.7   HCO3 ART mmol/L 27.2         Results from last 7 days   Lab Units 03/17/25  0138   CK TOTAL U/L 94         Results from last 7 days   Lab Units 03/18/25  0459   MAGNESIUM mg/dL 2.6*                   Meds:   SCHEDULE  benzonatate, 200 mg, Oral, TID  budesonide, 0.5 mg, Nebulization, BID - RT  calcium 500 mg vitamin D 5 mcg (200 UT), 1 tablet, Oral, BID  cefTRIAXone, 2,000 mg, Intravenous, Q24H  cetirizine, 10 mg, Oral, Daily  guaiFENesin, 600 mg, Oral, Q12H  hydroxychloroquine, 200 mg, Oral, BID  lamoTRIgine, 200 mg, Oral, Nightly  levothyroxine, 175 mcg, Oral, Q AM  methylPREDNISolone sodium succinate, 40 mg, Intravenous, Q8H  [Held by provider] mycophenolate, 500 mg, Oral, Q12H  QUEtiapine, 50 mg, Oral, Nightly  sodium chloride, 10 mL, Intravenous, Q12H      Infusions       PRNs    acetaminophen **OR** acetaminophen **OR** acetaminophen    ALPRAZolam    aluminum-magnesium hydroxide-simethicone    senna-docusate sodium **AND** polyethylene glycol **AND** bisacodyl **AND** bisacodyl    Calcium Replacement - Follow Nurse / BPA Driven Protocol    cyclobenzaprine    guaiFENesin-codeine     ipratropium-albuterol    Magnesium Standard Dose Replacement - Follow Nurse / BPA Driven Protocol    meclizine    melatonin    Morphine    nitroglycerin    ondansetron ODT **OR** [DISCONTINUED] ondansetron    oxyCODONE    Phosphorus Replacement - Follow Nurse / BPA Driven Protocol    Potassium Replacement - Follow Nurse / BPA Driven Protocol    [COMPLETED] Insert Peripheral IV **AND** sodium chloride    sodium chloride    sodium chloride    Physical Exam:  Physical Exam  Cardiovascular:      Heart sounds: Murmur heard.      No gallop.   Pulmonary:      Effort: No respiratory distress.      Breath sounds: No stridor. Rhonchi and rales present. No wheezing.   Chest:      Chest wall: No tenderness.         ROS  Review of Systems   Respiratory:  Positive for cough and shortness of breath. Negative for wheezing and stridor.    Cardiovascular:  Positive for palpitations. Negative for chest pain and leg swelling.             Total critical care time spent with patient greater than: 45 Minutes

## 2025-03-19 NOTE — THERAPY TREATMENT NOTE
Subjective: Pt agreeable to therapeutic plan of care. Pt reports she continues to have dizziness with exertion / changes in position, and the episodes last < 1 minute. Pt endorses tinnitus L ear and h/o of fullness that has since resolved. Pt also endorses h/o 3 falls within 1 week at home.    Objective:     Precautions - cough; dizzy    Bed mobility - N/A on bedside commode upon arrival.    Transfers - CGA and with rolling walker    Ambulation - 1 x 6 feet CGA with RW followed by seated rest break. 2 x 20 feet CGA and with rolling walker with standing rest break. Demo slow gait speed, small strides.    Balance - CGA with unilat to BUE support on RW for static and dynamic standing    ADLs - modA for ronnie-care and donning briefs. Edu regarding strategies for improving independence with ADLs r/t R knee deficit    Therapeutic Exercise - sit to stands, LAQ    Vitals: WNL    Pain: 2 BriggsJames Location: throat  Intervention for pain: Repositioned, Increased Activity, and Therapeutic Presence    Education: Provided education on the importance of mobility in the acute care setting, Verbal/Tactile Cues, ADL training, Transfer Training, Gait Training, and Energy conservation strategies. Edu regarding vestibular differential diagnosis.    Assessment: Tracie Gross is a 64 y/o F with ARF, new dx pulmonary fibrosis, and h/o scleroderma who presents with functional mobility impairments which indicate the need for skilled intervention. Pt on BSC upon arrival, requires modA for ronnie-care, CGA for balance while pt wipes from the front, and modA for donning new brief. Pt able to progress ambulation, having several bouts, totaling 46 feet with CGA and RW. Pt does continue to report episodes of dizziness that last < 30 seconds, however due to her tinnitus, balance deficits, and weakness, will hold positional treatments while inpt and refer to ENT to r/o Menieres. Edu pt regarding differential vestibular diagnoses and Tolerating session  "today without incident. Will continue to follow and progress as tolerated.     Plan/Recommendations:   If medically appropriate, Moderate Intensity Therapy recommended post-acute care. This is recommended as therapy feels the patient would require 3-4 days per week and wouldn't tolerate \"3 hour daily\" rehab intensity. SNF would be the preferred choice. If the patient does not agree to SNF, arrange HH or OP depending on home bound status. If patient is medically complex, consider LTACH. Pt requires no DME at discharge.     Pt desires Skilled Rehab placement at discharge. Pt cooperative; agreeable to therapeutic recommendations and plan of care.         Basic Mobility 6-click:  Rollin = Total, A lot = 2, A little = 3; 4 = None  Supine>Sit:   1 = Total, A lot = 2, A little = 3; 4 = None   Sit>Stand with arms:  1 = Total, A lot = 2, A little = 3; 4 = None  Bed>Chair:   1 = Total, A lot = 2, A little = 3; 4 = None  Ambulate in room:  1 = Total, A lot = 2, A little = 3; 4 = None  3-5 Steps with railin = Total, A lot = 2, A little = 3; 4 = None  Score: 19    Modified Jose: N/A = No pre-op stroke/TIA    Post-Tx Position: Up in Chair, Alarms activated, and Call light and personal items within reach  PPE: gloves and surgical mask    Therapy Charges for Today       Code Description Service Date Service Provider Modifiers Qty    35786748888 HC PT THERAPEUTIC ACT EA 15 MIN 3/19/2025 Kaitlin Pizarro, PT GP 1    61537418532 HC GAIT TRAINING EA 15 MIN 3/19/2025 Kaitlin Pizarro, PT GP 1    [id number]  PT NEUROMUSC RE EDUCATION EA 15 MIN 3/19/2025 Kaitlin Pizarro, PT GP 1    [id number]  PT SELF CARE/MGMT/TRAIN EA 15 MIN 3/19/2025 Kaitlin Pizarro, PT GP 1           PT Charges       Row Name 25 1438             Time Calculation    Start Time 924  -OD      Stop Time 959  -OD      Time Calculation (min) 35 min  -OD      PT Received On 25  -OD      PT - Next Appointment 25  -OD         Time " Calculation- PT    Total Timed Code Minutes- PT 35 minute(s)  -OD                User Key  (r) = Recorded By, (t) = Taken By, (c) = Cosigned By      Initials Name Provider Type    Kaitlin Murphy, PT Physical Therapist

## 2025-03-19 NOTE — CASE MANAGEMENT/SOCIAL WORK
Social Work Assessment  [facility]     Patient Name: Tracie Gross  MRN: [id number]  Today's Date: 3/19/2025    Admit Date: 3/15/2025     Discharge Plan       Row Name 03/19/25 1408       Plan    Plan [facility] (skilled, pending). PASRR QFR. Precert required.    Plan Comments CM updated on PASRR status. PASRR is QFR, requested MD exemption, but informed by provder patient would need > 30 days. Exemption declined in assessmentpro.                  Paula Chase, ISMAEL, LSW, CCM  Lead   Phone: [phone]  Cell: [phone]  Fax: [phone]  [email or web address]

## 2025-03-19 NOTE — CASE MANAGEMENT/SOCIAL WORK
Continued Stay Note  [facility]     Patient Name: Tracie Gross  MRN: [id number]  Today's Date: 3/19/2025    Admit Date: 3/15/2025    Plan: [facility] (skilled, pending). PASRR needed. Precert required.   Discharge Plan       Row Name 03/19/25 1319       Plan    Plan [facility] (skilled, pending). PASRR needed. Precert required.    Plan Comments CM met with patient and spouse at bedside to discuss SNF choices. Patient seems more agreeable to possible SNF but states she wants a private room. Agreeable to referral to [facility]. CM sent inbasket and notified liaison Dolores.        Gabriela Bermeo RN     [facility]  Office: [phone]  Cell: [phone]  Fax # [phone]

## 2025-03-19 NOTE — PROGRESS NOTES
"                                                                                                                                      Nephrology  Progress Note                                        [facility]    Patient Identification    Name: Tracie Gross  Age: 64 y.o.  Sex: female  :  1960  MRN: [id number]      DATE OF SERVICE:  3/19/2025        Subective    Complaint of cough     Objective   Scheduled Meds:benzonatate, 200 mg, Oral, TID  budesonide, 0.5 mg, Nebulization, BID - RT  calcium 500 mg vitamin D 5 mcg (200 UT), 1 tablet, Oral, BID  cefTRIAXone, 2,000 mg, Intravenous, Q24H  cetirizine, 10 mg, Oral, Daily  guaiFENesin, 600 mg, Oral, Q12H  hydroxychloroquine, 200 mg, Oral, BID  lamoTRIgine, 200 mg, Oral, Nightly  levothyroxine, 175 mcg, Oral, Q AM  methylPREDNISolone sodium succinate, 40 mg, Intravenous, Q8H  [Held by provider] mycophenolate, 500 mg, Oral, Q12H  QUEtiapine, 50 mg, Oral, Nightly  sodium chloride, 10 mL, Intravenous, Q12H          Continuous Infusions:       PRN Meds:  acetaminophen **OR** acetaminophen **OR** acetaminophen    ALPRAZolam    aluminum-magnesium hydroxide-simethicone    senna-docusate sodium **AND** polyethylene glycol **AND** bisacodyl **AND** bisacodyl    Calcium Replacement - Follow Nurse / BPA Driven Protocol    cyclobenzaprine    guaiFENesin-codeine    ipratropium-albuterol    Magnesium Standard Dose Replacement - Follow Nurse / BPA Driven Protocol    meclizine    melatonin    Morphine    nitroglycerin    ondansetron ODT **OR** [DISCONTINUED] ondansetron    oxyCODONE    Phosphorus Replacement - Follow Nurse / BPA Driven Protocol    Potassium Replacement - Follow Nurse / BPA Driven Protocol    [COMPLETED] Insert Peripheral IV **AND** sodium chloride    sodium chloride    sodium chloride     Exam:  /83 (BP Location: Left arm, Patient Position: Lying)   Pulse 81   Temp 98.3 °F (36.8 °C) (Oral)   Resp 23   Ht 162.6 cm (64\")   Wt 114 kg " (251 lb 5.2 oz)   LMP 05/08/1983   SpO2 98%   BMI 43.14 kg/m²     Intake/Output last 3 shifts:  I/O last 3 completed shifts:  In: 1320 [P.O.:1320]  Out: 950 [Urine:950]    Intake/Output this shift:  I/O this shift:  In: 480 [P.O.:480]  Out: -     Physical exam:  General Appearance:  Alert  Head:  Normocephalic, without obvious abnormality, atraumatic  Eyes:  PERRL, conjunctiva/corneas clear     Neck:  Supple,  no adenopathy;      Lungs:  Decreased BS occasion ronchi  Heart:  Regular rate and rhythm, S1 and S2 normal  Abdomen:  Soft, non-tender, bowel sounds active   Extremities: trace edema  Pulses: 2+ and symmetric all extremities  Skin:  No rashes or lesions       Data Review:  All labs (24hrs):   Recent Results (from the past 24 hours)   Respiratory Culture - Sputum, Cough    Collection Time: 03/18/25  4:33 PM    Specimen: Cough; Sputum   Result Value Ref Range    Gram Stain Moderate (3+) WBCs seen     Gram Stain Few (2+) Epithelial cells seen     Gram Stain Rare (1+) Gram positive cocci    Basic Metabolic Panel    Collection Time: 03/19/25  3:38 AM    Specimen: Blood   Result Value Ref Range    Glucose 142 (H) 65 - 99 mg/dL    BUN 29 (H) 8 - 23 mg/dL    Creatinine 1.20 (H) 0.57 - 1.00 mg/dL    Sodium 133 (L) 136 - 145 mmol/L    Potassium 5.2 3.5 - 5.2 mmol/L    Chloride 100 98 - 107 mmol/L    CO2 22.8 22.0 - 29.0 mmol/L    Calcium 9.1 8.6 - 10.5 mg/dL    BUN/Creatinine Ratio 24.2 7.0 - 25.0    Anion Gap 10.2 5.0 - 15.0 mmol/L    eGFR 50.0 (L) >60.0 mL/min/1.73   CBC (No Diff)    Collection Time: 03/19/25  3:38 AM    Specimen: Blood   Result Value Ref Range    WBC 9.34 3.40 - 10.80 10*3/mm3    RBC 3.30 (L) 3.77 - 5.28 10*6/mm3    Hemoglobin 10.2 (L) 12.0 - 15.9 g/dL    Hematocrit 31.6 (L) 34.0 - 46.6 %    MCV 95.8 79.0 - 97.0 fL    MCH 30.9 26.6 - 33.0 pg    MCHC 32.3 31.5 - 35.7 g/dL    RDW 14.2 12.3 - 15.4 %    RDW-SD 49.6 37.0 - 54.0 fl    MPV 10.9 6.0 - 12.0 fL    Platelets 291 140 - 450 10*3/mm3           Imaging:  US Renal Bilateral  Result Date: 3/16/2025  Impression: No hydronephrosis or acute sonographic abnormality. Electronically Signed: Tomy Orellana MD  3/16/2025 5:38 PM EDT  Workstation ID: [id number]    NM Lung Ventilation Perfusion  Result Date: 3/16/2025  Impression: Negative for pulmonary embolism. Electronically Signed: Nimesh Dash MD  3/16/2025 3:17 PM EDT  Workstation ID: [id number]    XR Chest 1 View  Result Date: 3/15/2025  Impression: Mild hazy left greater than right basal opacities, which could represent atelectasis or pneumonia. Electronically Signed: Josh Lorenzo  3/15/2025 4:18 PM EDT  Workstation ID: [id number]    MRI Abdomen With & Without Contrast  Result Date: 3/15/2025  Impression: 1. Multiple small round eccentric lesions scattered throughout the colon suspicious for polyps based on patient history. Recommend correlation with colonoscopy. 2. No suspicious adenopathy. 3. Similar chronic dilation of the biliary tree with blunting at the ampulla. Chronicity favors physiologic changes status post cholecystectomy and/or nonobstructing ampullary stricture. 4. Lipomatous infiltration and pancreatic atrophy. Negative for active inflammation or suspicious mass. 5. Cardiomegaly. Mild body wall edema. Electronically Signed: Nimesh Dash MD  3/15/2025 2:37 PM EDT  Workstation ID: [id number]      Assessment/Plan:     SOB (shortness of breath)         Acute kidney injury on top of chronic kidney disease stage III  Acute hypoxic respiratory failure  Pulmonary fibrosis  Urinary tract infection  Anxiety disorder  Hypertension  Hyperlipidemia  Hypothyroidism  History of von Willebrand disease with no bleeding        Recommendations:  Creatinine is better down to 1.2  She still coughing     Sodium stable at 133   Potassium 5.2   Volume status increasing   Lasix home dose restarted       Discussed with the patient and family at bedside

## 2025-03-20 ENCOUNTER — INPATIENT HOSPITAL (AMBULATORY)
Dept: URBAN - METROPOLITAN AREA HOSPITAL 84 | Facility: HOSPITAL | Age: 65
End: 2025-03-20
Payer: MEDICARE

## 2025-03-20 DIAGNOSIS — R11.0 NAUSEA: ICD-10-CM

## 2025-03-20 DIAGNOSIS — R93.3 ABNORMAL FINDINGS ON DIAGNOSTIC IMAGING OF OTHER PARTS OF DI: ICD-10-CM

## 2025-03-20 DIAGNOSIS — D64.9 ANEMIA, UNSPECIFIED: ICD-10-CM

## 2025-03-20 DIAGNOSIS — R63.0 ANOREXIA: ICD-10-CM

## 2025-03-20 DIAGNOSIS — R05.9 COUGH, UNSPECIFIED: ICD-10-CM

## 2025-03-20 DIAGNOSIS — Z90.49 ACQUIRED ABSENCE OF OTHER SPECIFIED PARTS OF DIGESTIVE TRACT: ICD-10-CM

## 2025-03-20 LAB
ALBUMIN SERPL-MCNC: 3.5 G/DL (ref 3.5–5.2)
ALBUMIN/GLOB SERPL: 1.3 G/DL
ALP SERPL-CCNC: 80 U/L (ref 39–117)
ALT SERPL W P-5'-P-CCNC: 12 U/L (ref 1–33)
ANION GAP SERPL CALCULATED.3IONS-SCNC: 11.3 MMOL/L (ref 5–15)
AST SERPL-CCNC: 35 U/L (ref 1–32)
BACTERIA SPEC RESP CULT: NORMAL
BASOPHILS # BLD AUTO: 0.01 10*3/MM3 (ref 0–0.2)
BASOPHILS NFR BLD AUTO: 0.1 % (ref 0–1.5)
BILIRUB SERPL-MCNC: 0.2 MG/DL (ref 0–1.2)
BUN SERPL-MCNC: 27 MG/DL (ref 8–23)
BUN/CREAT SERPL: 23.7 (ref 7–25)
CALCIUM SPEC-SCNC: 9.5 MG/DL (ref 8.6–10.5)
CHLORIDE SERPL-SCNC: 103 MMOL/L (ref 98–107)
CO2 SERPL-SCNC: 22.7 MMOL/L (ref 22–29)
CREAT SERPL-MCNC: 1.14 MG/DL (ref 0.57–1)
DEPRECATED RDW RBC AUTO: 49.8 FL (ref 37–54)
EGFRCR SERPLBLD CKD-EPI 2021: 53.2 ML/MIN/1.73
EOSINOPHIL # BLD AUTO: 0 10*3/MM3 (ref 0–0.4)
EOSINOPHIL NFR BLD AUTO: 0 % (ref 0.3–6.2)
ERYTHROCYTE [DISTWIDTH] IN BLOOD BY AUTOMATED COUNT: 14.1 % (ref 12.3–15.4)
GLOBULIN UR ELPH-MCNC: 2.6 GM/DL
GLUCOSE SERPL-MCNC: 126 MG/DL (ref 65–99)
GRAM STN SPEC: NORMAL
HCT VFR BLD AUTO: 32.3 % (ref 34–46.6)
HGB BLD-MCNC: 10.4 G/DL (ref 12–15.9)
IMM GRANULOCYTES # BLD AUTO: 0.12 10*3/MM3 (ref 0–0.05)
IMM GRANULOCYTES NFR BLD AUTO: 1 % (ref 0–0.5)
INR PPP: 1.2 (ref 0.9–1.1)
LYMPHOCYTES # BLD AUTO: 0.83 10*3/MM3 (ref 0.7–3.1)
LYMPHOCYTES NFR BLD AUTO: 7.2 % (ref 19.6–45.3)
MCH RBC QN AUTO: 31.1 PG (ref 26.6–33)
MCHC RBC AUTO-ENTMCNC: 32.2 G/DL (ref 31.5–35.7)
MCV RBC AUTO: 96.7 FL (ref 79–97)
MONOCYTES # BLD AUTO: 1.64 10*3/MM3 (ref 0.1–0.9)
MONOCYTES NFR BLD AUTO: 14.2 % (ref 5–12)
NEUTROPHILS NFR BLD AUTO: 77.5 % (ref 42.7–76)
NEUTROPHILS NFR BLD AUTO: 8.94 10*3/MM3 (ref 1.7–7)
NRBC BLD AUTO-RTO: 0.2 /100 WBC (ref 0–0.2)
PLATELET # BLD AUTO: 329 10*3/MM3 (ref 140–450)
PMV BLD AUTO: 11.1 FL (ref 6–12)
POTASSIUM SERPL-SCNC: 5.3 MMOL/L (ref 3.5–5.2)
PROT SERPL-MCNC: 6.1 G/DL (ref 6–8.5)
PROTHROMBIN TIME: 15.2 SECONDS (ref 11.7–14.2)
RBC # BLD AUTO: 3.34 10*6/MM3 (ref 3.77–5.28)
SODIUM SERPL-SCNC: 137 MMOL/L (ref 136–145)
WBC NRBC COR # BLD AUTO: 11.54 10*3/MM3 (ref 3.4–10.8)

## 2025-03-20 PROCEDURE — 94799 UNLISTED PULMONARY SVC/PX: CPT

## 2025-03-20 PROCEDURE — 85610 PROTHROMBIN TIME: CPT | Performed by: NURSE PRACTITIONER

## 2025-03-20 PROCEDURE — 94761 N-INVAS EAR/PLS OXIMETRY MLT: CPT

## 2025-03-20 PROCEDURE — 85025 COMPLETE CBC W/AUTO DIFF WBC: CPT | Performed by: NURSE PRACTITIONER

## 2025-03-20 PROCEDURE — 25010000002 METHYLPREDNISOLONE PER 40 MG: Performed by: INTERNAL MEDICINE

## 2025-03-20 PROCEDURE — 80053 COMPREHEN METABOLIC PANEL: CPT | Performed by: NURSE PRACTITIONER

## 2025-03-20 PROCEDURE — 25010000002 MORPHINE PER 10 MG: Performed by: INTERNAL MEDICINE

## 2025-03-20 PROCEDURE — 99231 SBSQ HOSP IP/OBS SF/LOW 25: CPT | Performed by: NURSE PRACTITIONER

## 2025-03-20 PROCEDURE — 25010000002 CEFTRIAXONE PER 250 MG: Performed by: INTERNAL MEDICINE

## 2025-03-20 PROCEDURE — 94664 DEMO&/EVAL PT USE INHALER: CPT

## 2025-03-20 RX ADMIN — Medication 10 ML: at 07:50

## 2025-03-20 RX ADMIN — GUAIFENESIN AND CODEINE PHOSPHATE 10 ML: 100; 10 SOLUTION ORAL at 01:12

## 2025-03-20 RX ADMIN — QUETIAPINE FUMARATE 50 MG: 25 TABLET ORAL at 22:17

## 2025-03-20 RX ADMIN — HYDROXYCHLOROQUINE SULFATE 200 MG: 200 TABLET ORAL at 22:18

## 2025-03-20 RX ADMIN — GUAIFENESIN 600 MG: 600 TABLET, MULTILAYER, EXTENDED RELEASE ORAL at 22:17

## 2025-03-20 RX ADMIN — LEVOTHYROXINE SODIUM 175 MCG: 175 TABLET ORAL at 05:35

## 2025-03-20 RX ADMIN — METHYLPREDNISOLONE SODIUM SUCCINATE 40 MG: 40 INJECTION, POWDER, FOR SOLUTION INTRAMUSCULAR; INTRAVENOUS at 05:35

## 2025-03-20 RX ADMIN — METHYLPREDNISOLONE SODIUM SUCCINATE 40 MG: 40 INJECTION, POWDER, FOR SOLUTION INTRAMUSCULAR; INTRAVENOUS at 12:32

## 2025-03-20 RX ADMIN — METHYLPREDNISOLONE SODIUM SUCCINATE 40 MG: 40 INJECTION, POWDER, FOR SOLUTION INTRAMUSCULAR; INTRAVENOUS at 22:18

## 2025-03-20 RX ADMIN — BENZONATATE 200 MG: 100 CAPSULE ORAL at 22:17

## 2025-03-20 RX ADMIN — BENZONATATE 200 MG: 100 CAPSULE ORAL at 07:50

## 2025-03-20 RX ADMIN — Medication 1 TABLET: at 07:50

## 2025-03-20 RX ADMIN — BENZONATATE 100 MG: 100 CAPSULE ORAL at 14:38

## 2025-03-20 RX ADMIN — BUDESONIDE 0.5 MG: 0.5 INHALANT RESPIRATORY (INHALATION) at 08:14

## 2025-03-20 RX ADMIN — GUAIFENESIN AND CODEINE PHOSPHATE 10 ML: 100; 10 SOLUTION ORAL at 19:59

## 2025-03-20 RX ADMIN — HYDROXYCHLOROQUINE SULFATE 200 MG: 200 TABLET ORAL at 07:50

## 2025-03-20 RX ADMIN — LAMOTRIGINE 200 MG: 100 TABLET ORAL at 22:17

## 2025-03-20 RX ADMIN — GUAIFENESIN AND CODEINE PHOSPHATE 10 ML: 100; 10 SOLUTION ORAL at 14:39

## 2025-03-20 RX ADMIN — FUROSEMIDE 20 MG: 20 TABLET ORAL at 07:50

## 2025-03-20 RX ADMIN — BUDESONIDE 0.5 MG: 0.5 INHALANT RESPIRATORY (INHALATION) at 18:52

## 2025-03-20 RX ADMIN — Medication 1 TABLET: at 22:17

## 2025-03-20 RX ADMIN — MORPHINE SULFATE 2 MG: 2 INJECTION, SOLUTION INTRAMUSCULAR; INTRAVENOUS at 19:59

## 2025-03-20 RX ADMIN — GUAIFENESIN AND CODEINE PHOSPHATE 10 ML: 100; 10 SOLUTION ORAL at 10:35

## 2025-03-20 RX ADMIN — ACETAMINOPHEN 650 MG: 325 TABLET, FILM COATED ORAL at 05:35

## 2025-03-20 RX ADMIN — ALPRAZOLAM 1 MG: 1 TABLET ORAL at 22:18

## 2025-03-20 RX ADMIN — CETIRIZINE HYDROCHLORIDE 10 MG: 10 TABLET, FILM COATED ORAL at 07:50

## 2025-03-20 RX ADMIN — CEFTRIAXONE 2000 MG: 2 INJECTION, POWDER, FOR SOLUTION INTRAMUSCULAR; INTRAVENOUS at 17:00

## 2025-03-20 RX ADMIN — GUAIFENESIN 600 MG: 600 TABLET, MULTILAYER, EXTENDED RELEASE ORAL at 07:50

## 2025-03-20 RX ADMIN — Medication 10 ML: at 22:18

## 2025-03-20 RX ADMIN — SODIUM ZIRCONIUM CYCLOSILICATE 10 G: 10 POWDER, FOR SUSPENSION ORAL at 12:32

## 2025-03-20 NOTE — PLAN OF CARE
Goal Outcome Evaluation:Speech will check with patient tomorrow morning to see if video swallow can be done. Getting prn cough syrup. Will monitor.

## 2025-03-20 NOTE — PROGRESS NOTES
[facility] MEDICINE SERVICE  DAILY PROGRESS NOTE    NAME: Tracie Gross  : 1960  MRN: [id number]      LOS: 5 days     PROVIDER OF SERVICE: Santa Pierre MD    Chief Complaint: SOB (shortness of breath)    Subjective:     Interval History:  History taken from: patient    No new complaint    Review of Systems:   Review of Systems   All other systems reviewed and are negative.      Objective:     Vital Signs  Temp:  [97.5 °F (36.4 °C)-98.5 °F (36.9 °C)] 98.5 °F (36.9 °C)  Heart Rate:  [49-83] 75  Resp:  [15-20] 16  BP: (117-155)/(76-96) 147/80  Flow (L/min) (Oxygen Therapy):  [2-3] 2   Body mass index is 42.31 kg/m².    Physical Exam  Physical Exam  Constitutional:       Appearance: Normal appearance.   HENT:      Head: Normocephalic and atraumatic.      Nose: Nose normal.      Mouth/Throat:      Mouth: Mucous membranes are moist.   Eyes:      Extraocular Movements: Extraocular movements intact.      Pupils: Pupils are equal, round, and reactive to light.   Cardiovascular:      Rate and Rhythm: Normal rate and regular rhythm.   Pulmonary:      Effort: Pulmonary effort is normal.      Breath sounds: Normal breath sounds.   Abdominal:      General: Abdomen is flat. Bowel sounds are normal.      Palpations: Abdomen is soft.   Musculoskeletal:         General: Normal range of motion.      Cervical back: Normal range of motion and neck supple.   Skin:     General: Skin is warm and dry.   Neurological:      General: No focal deficit present.      Mental Status: She is alert and oriented to person, place, and time.   Psychiatric:         Mood and Affect: Mood normal.         Behavior: Behavior normal.         Thought Content: Thought content normal.         Judgment: Judgment normal.         Current Medications:  Scheduled Meds:benzonatate, 200 mg, Oral, TID  budesonide, 0.5 mg, Nebulization, BID - RT  calcium 500 mg vitamin D 5 mcg (200 UT), 1 tablet, Oral, BID  cefTRIAXone, 2,000 mg, Intravenous,  Q24H  cetirizine, 10 mg, Oral, Daily  furosemide, 20 mg, Oral, Daily  guaiFENesin, 600 mg, Oral, Q12H  hydroxychloroquine, 200 mg, Oral, BID  lamoTRIgine, 200 mg, Oral, Nightly  levothyroxine, 175 mcg, Oral, Q AM  methylPREDNISolone sodium succinate, 40 mg, Intravenous, Q8H  [Held by provider] mycophenolate, 500 mg, Oral, Q12H  QUEtiapine, 50 mg, Oral, Nightly  sodium chloride, 10 mL, Intravenous, Q12H      Continuous Infusions:     PRN Meds:.  acetaminophen **OR** acetaminophen **OR** acetaminophen    ALPRAZolam    aluminum-magnesium hydroxide-simethicone    senna-docusate sodium **AND** polyethylene glycol **AND** bisacodyl **AND** bisacodyl    Calcium Replacement - Follow Nurse / BPA Driven Protocol    cyclobenzaprine    guaiFENesin-codeine    ipratropium-albuterol    Magnesium Standard Dose Replacement - Follow Nurse / BPA Driven Protocol    meclizine    melatonin    Morphine    nitroglycerin    ondansetron ODT **OR** [DISCONTINUED] ondansetron    oxyCODONE    Phosphorus Replacement - Follow Nurse / BPA Driven Protocol    Potassium Replacement - Follow Nurse / BPA Driven Protocol    [COMPLETED] Insert Peripheral IV **AND** sodium chloride    sodium chloride    sodium chloride       Diagnostic Data    Results from last 7 days   Lab Units 03/20/25  0526 03/20/25  0500   WBC 10*3/mm3 11.54*  --    HEMOGLOBIN g/dL 10.4*  --    HEMATOCRIT % 32.3*  --    PLATELETS 10*3/mm3 329  --    GLUCOSE mg/dL  --  126*   CREATININE mg/dL  --  1.14*   BUN mg/dL  --  27*   SODIUM mmol/L  --  137   POTASSIUM mmol/L  --  5.3*   AST (SGOT) U/L  --  35*   ALT (SGPT) U/L  --  12   ALK PHOS U/L  --  80   BILIRUBIN mg/dL  --  0.2   ANION GAP mmol/L  --  11.3       XR Chest 1 View  Result Date: 3/19/2025  Impression: Multifocal patchy nodular densities in both lungs, thought to be similar to 3/15/2025. Correlate for pneumonia. Electronically Signed: Barb Millan MD  3/19/2025 2:35 PM EDT  Workstation ID: [id number]        I reviewed the  patient's new clinical results.    Assessment/Plan:     Active and Resolved Problems  Active Hospital Problems    Diagnosis  POA    **SOB (shortness of breath) [R06.02]  Yes      Resolved Hospital Problems   No resolved problems to display.       Acute hypoxic respiratory failure not on home oxygen but currently on 3 L of oxygen via nasal cannula   Pneumonia  Hyperkalemia  Recently diagnosed pulmonary fibrosis  DANIELLE on CKD 3  UTI  Hypertension  Hypothyroidism      -acute hypoxic respiratory failure likely secondary to pneumonia plus underlying pulmonary fibrosis.  Urine strep pneumo antigen is positive.  Continue IV ceftriaxone for treatment of likely Streptococcus pneumonia.  Blood cultures and sputum culture negative  -Renal function with slight improvement.  See further recommendations per nephrology.  - Treat hyperkalemia with Lokelma  - Urine culture showed no growth ruling out UTI  - Continue current management.      VTE Prophylaxis:  Mechanical VTE prophylaxis orders are present.       Disposition Planning:     Barriers to Discharge: Pending clinical improvement  Anticipated Date of Discharge: 3/24/2025  Place of Discharge: Home      Code Status and Medical Interventions: CPR (Attempt to Resuscitate); Full Support   Ordered at: 03/15/25 1955     Code Status (Patient has no pulse and is not breathing):    CPR (Attempt to Resuscitate)     Medical Interventions (Patient has pulse or is breathing):    Full Support       Signature: Electronically signed by Santa Pierre MD, 03/20/25, 16:06 EDT.  [facility] Hospitalist Team

## 2025-03-20 NOTE — PROGRESS NOTES
" LOS: 5 days   Patient Care Team:  Floyd Silva MD as PCP - General (Internal Medicine)  Suzan Jones MD as Consulting Physician (Hematology and Oncology)  Isaac Vazquez MD as Consulting Physician (Pulmonary Disease)  Marisol Salazar MD as Consulting Physician (Rheumatology)  Tiffanie Chaves, AZUCENA as Ambulatory  ([facility])      Subjective \"I had a better night\"    Interval History:     Subjective: She reports significant vertigo still which leads to nausea and decrease in appetite.  No abdominal pain.  Overall had a better night with more sleep.    ROS:   No chest pain, less shortness of breath.  Less severe and less frequent cough     Medication Review:     Current Facility-Administered Medications:     acetaminophen (TYLENOL) tablet 650 mg, 650 mg, Oral, Q4H PRN, 650 mg at 03/20/25 0535 **OR** acetaminophen (TYLENOL) 160 MG/5ML oral solution 650 mg, 650 mg, Oral, Q4H PRN **OR** acetaminophen (TYLENOL) suppository 650 mg, 650 mg, Rectal, Q4H PRN, Miguel Oates MD    ALPRAZolam (XANAX) tablet 1 mg, 1 mg, Oral, Nightly PRN, Miguel Oates MD, 1 mg at 03/19/25 2251    aluminum-magnesium hydroxide-simethicone (MAALOX MAX) 400-400-40 MG/5ML suspension 15 mL, 15 mL, Oral, Q6H PRN, Miguel Oates MD    benzonatate (TESSALON) capsule 200 mg, 200 mg, Oral, TID, Santa Pierre MD, 200 mg at 03/20/25 0750    sennosides-docusate (PERICOLACE) 8.6-50 MG per tablet 2 tablet, 2 tablet, Oral, BID PRN **AND** polyethylene glycol (MIRALAX) packet 17 g, 17 g, Oral, Daily PRN **AND** bisacodyl (DULCOLAX) EC tablet 5 mg, 5 mg, Oral, Daily PRN **AND** bisacodyl (DULCOLAX) suppository 10 mg, 10 mg, Rectal, Daily PRN, Oo, Yadana, MD    budesonide (PULMICORT) nebulizer solution 0.5 mg, 0.5 mg, Nebulization, BID - RT, Miguel Oates MD, 0.5 mg at 03/20/25 0814    calcium 500 mg vitamin D 5 mcg (200 UT) per tablet 1 tablet, 1 tablet, Oral, BID, Miguel Oates MD, 1 tablet at 03/20/25 0750    Calcium " Replacement - Follow Nurse / BPA Driven Protocol, , Not Applicable, PRN, Miguel Oates MD    cefTRIAXone (ROCEPHIN) 2,000 mg in sodium chloride 0.9 % 100 mL MBP, 2,000 mg, Intravenous, Q24H, DrawIsaac MD, Last Rate: 200 mL/hr at 03/19/25 1702, 2,000 mg at 03/19/25 1702    cetirizine (zyrTEC) tablet 10 mg, 10 mg, Oral, Daily, Miguel Oates MD, 10 mg at 03/20/25 0750    cyclobenzaprine (FLEXERIL) tablet 10 mg, 10 mg, Oral, TID PRN, Miguel Oates MD    furosemide (LASIX) tablet 20 mg, 20 mg, Oral, Daily, Any Manzanares MD, 20 mg at 03/20/25 0750    guaiFENesin (MUCINEX) 12 hr tablet 600 mg, 600 mg, Oral, Q12H, Santa Pierre MD, 600 mg at 03/20/25 0750    guaiFENesin-codeine (ROMILAR-AC) solution 10 mL, 10 mL, Oral, Q4H PRN, Santa Pierre MD, 10 mL at 03/20/25 0112    hydroxychloroquine (PLAQUENIL) tablet 200 mg, 200 mg, Oral, BID, Miguel Oates MD, 200 mg at 03/20/25 0750    ipratropium-albuterol (DUO-NEB) nebulizer solution 3 mL, 3 mL, Nebulization, Q4H PRN, Miguel Oates MD, 3 mL at 03/18/25 1104    lamoTRIgine (LaMICtal) tablet 200 mg, 200 mg, Oral, Nightly, Miguel Oates MD, 200 mg at 03/19/25 2003    levothyroxine (SYNTHROID, LEVOTHROID) tablet 175 mcg, 175 mcg, Oral, Q AM, Miguel Oates MD, 175 mcg at 03/20/25 0535    Magnesium Standard Dose Replacement - Follow Nurse / BPA Driven Protocol, , Not Applicable, PRN, Miguel Oates MD    meclizine (ANTIVERT) tablet 25 mg, 25 mg, Oral, TID PRN, Miguel Oates MD    melatonin tablet 5 mg, 5 mg, Oral, Nightly PRN, Miguel Oates MD    methylPREDNISolone sodium succinate (SOLU-Medrol) injection 40 mg, 40 mg, Intravenous, Q8H, Miguel Oates MD, 40 mg at 03/20/25 0535    morphine injection 2 mg, 2 mg, Intravenous, Nightly PRN, Isaac Vazquez MD, 2 mg at 03/19/25 1810    [Held by provider] mycophenolate (CELLCEPT) capsule 500 mg, 500 mg, Oral, Q12H, Miguel Oates MD, 500 mg at 03/16/25 0902    nitroglycerin (NITROSTAT) SL tablet 0.4 mg, 0.4 mg, Sublingual, Q5 Min PRN, Miguel Oates MD     ondansetron ODT (ZOFRAN-ODT) disintegrating tablet 4 mg, 4 mg, Oral, Q6H PRN, 4 mg at 03/19/25 1150 **OR** [DISCONTINUED] ondansetron (ZOFRAN) injection 4 mg, 4 mg, Intravenous, Q6H PRN, Miguel Oates MD    oxyCODONE (ROXICODONE) immediate release tablet 10 mg, 10 mg, Oral, Q6H PRN, Miguel Oates MD, 10 mg at 03/16/25 1304    Phosphorus Replacement - Follow Nurse / BPA Driven Protocol, , Not Applicable, PRИван SCHMIDT Yadana, MD    Potassium Replacement - Follow Nurse / BPA Driven Protocol, , Not Applicable, Иван LAMAS Yadana, MD    QUEtiapine (SEROquel) tablet 50 mg, 50 mg, Oral, Nightly, Miguel Oates MD, 50 mg at 03/19/25 2003    [COMPLETED] Insert Peripheral IV, , , Once **AND** sodium chloride 0.9 % flush 10 mL, 10 mL, Intravenous, PRN, Brooks Wheatley MD, 10 mL at 03/15/25 1622    sodium chloride 0.9 % flush 10 mL, 10 mL, Intravenous, Q12H, Miguel Oates MD, 10 mL at 03/20/25 0750    sodium chloride 0.9 % flush 10 mL, 10 mL, Intravenous, PRN, Miguel Oates MD    sodium chloride 0.9 % infusion 40 mL, 40 mL, Intravenous, CYDNEY, Miguel Oates MD      Objective chronically ill-appearing but no acute distress, no family at bedside    Vital Signs  Vitals:    03/20/25 0528 03/20/25 0736 03/20/25 0814 03/20/25 0818   BP: 155/81 139/81     BP Location: Left arm      Patient Position: Lying Lying     Pulse: 68 72 (!) 49 83   Resp: 16 16 20 19   Temp: 97.5 °F (36.4 °C) 98.4 °F (36.9 °C)     TempSrc: Oral Oral     SpO2: 96% 96% 96% (!) 89%   Weight: 112 kg (246 lb 7.6 oz)      Height:           Physical Exam:     General Appearance:    Awake and alert, in no acute distress, obese   Head:    Normocephalic, without obvious abnormality   Eyes:          Conjunctivae normal, anicteric sclera   Throat:   No oral lesions, no thrush, oral mucosa moist   Neck:   supple, no JVD   Lungs:     respirations regular, even and unlabored, on O2   Abdomen:   Nondistended   Rectal:     Deferred   Extremities:    no cyanosis   Skin:   No obvious bruising or rash,  no jaundice        Results Review:    Results from last 7 days   Lab Units 03/20/25  0526 03/19/25  0338 03/18/25  0459 03/17/25  0138 03/16/25  0101 03/15/25  1621   WBC 10*3/mm3 11.54* 9.34 8.59 10.50 12.44* 12.82*   HEMOGLOBIN g/dL 10.4* 10.2* 10.2* 9.9* 10.2* 11.0*   PLATELETS 10*3/mm3 329 291 300 286 279 301     Results from last 7 days   Lab Units 03/20/25  0526 03/20/25  0500 03/19/25  0338 03/18/25  0459 03/17/25  0138 03/16/25  2042 03/16/25  0101 03/15/25  1621   SODIUM mmol/L  --  137 133* 131* 131* 133* 135* 135*   POTASSIUM mmol/L  --  5.3* 5.2 4.5 4.6 4.8 4.7 4.6   CHLORIDE mmol/L  --  103 100 99 97* 97* 98 98   CO2 mmol/L  --  22.7 22.8 20.3* 20.7* 19.4* 23.5 24.3   BUN mg/dL  --  27* 29* 38* 42* 40* 31* 27*   CREATININE mg/dL  --  1.14* 1.20* 1.48* 2.02* 2.16* 2.31* 2.32*   GLUCOSE mg/dL  --  126* 142* 142* 153* 120* 97 102*   ALBUMIN g/dL  --  3.5  --   --   --   --   --   --    BILIRUBIN mg/dL  --  0.2  --   --   --   --   --   --    ALK PHOS U/L  --  80  --   --   --   --   --   --    AST (SGOT) U/L  --  35*  --   --   --   --   --   --    ALT (SGPT) U/L  --  12  --   --   --   --   --   --    INR  1.20*  --   --   --   --   --   --   --    MAGNESIUM mg/dL  --   --   --  2.6* 2.7*  --  2.4  --    PHOSPHORUS mg/dL  --   --   --  3.4 5.0*  --  5.1*  --      Estimated Creatinine Clearance: 59.5 mL/min (A) (by C-G formula based on SCr of 1.14 mg/dL (H)).  Lab Results   Component Value Date    HGBA1C 5.40 07/03/2024    HGBA1C 5.60 10/24/2023    HGBA1C 5.30 05/24/2023     Results from last 7 days   Lab Units 03/17/25  0138   CK TOTAL U/L 94     Infection   Results from last 7 days   Lab Units 03/18/25  1633 03/17/25  1220 03/17/25  1201 03/15/25  1621 03/15/25  1610   BLOODCX   --  No growth at 2 days No growth at 2 days  --   --    RESPCX  Scant growth (1+) Normal respiratory miles. No S. aureus or Pseudomonas aeruginosa detected. Final report.  --   --   --   --    URINECX   --   --   --   --   >100,000 CFU/mL Normal Urogenital Miles   PROCALCITONIN ng/mL  --   --   --  0.24  --      UA    Results from last 7 days   Lab Units 03/15/25  1610   NITRITE UA  Negative   WBC UA /HPF 0-2   BACTERIA UA /HPF 4+*   SQUAM EPITHEL UA /HPF 3-6*   URINECX  >100,000 CFU/mL Normal Urogenital Miles     Microbiology Results (last 10 days)       Procedure Component Value - Date/Time    Respiratory Culture - Sputum, Cough [[id number]] Collected: 03/18/25 1633    Lab Status: Final result Specimen: Sputum from Cough Updated: 03/20/25 0736     Respiratory Culture Scant growth (1+) Normal respiratory miles. No S. aureus or Pseudomonas aeruginosa detected. Final report.     Gram Stain Moderate (3+) WBCs seen      Few (2+) Epithelial cells seen      Rare (1+) Gram positive cocci    Legionella Antigen, Urine - Urine, Urine, Clean Catch [[id number]]  (Normal) Collected: 03/17/25 1613    Lab Status: Final result Specimen: Urine, Clean Catch Updated: 03/17/25 1723     LEGIONELLA ANTIGEN, URINE Negative    Blood Culture - Blood, Wrist, Left [[id number]]  (Normal) Collected: 03/17/25 1220    Lab Status: Preliminary result Specimen: Blood from Wrist, Left Updated: 03/19/25 1230     Blood Culture No growth at 2 days    Narrative:      Less than seven (7) mL's of blood was collected.  Insufficient quantity may yield false negative results.    Blood Culture - Blood, Hand, Left [[id number]]  (Normal) Collected: 03/17/25 1201    Lab Status: Preliminary result Specimen: Blood from Hand, Left Updated: 03/19/25 1230     Blood Culture No growth at 2 days    Narrative:      Less than seven (7) mL's of blood was collected.  Insufficient quantity may yield false negative results.    Respiratory Panel PCR w/COVID-19(SARS-CoV-2) GAVI/RAJIV/AUSTEN/PAD/COR/APRIL In-House, NP Swab in UTM/VTM, 2 HR TAT - Swab, Nasopharynx [[id number]]  (Normal) Collected: 03/15/25 1610    Lab Status: Final result Specimen: Swab from Nasopharynx Updated: 03/15/25 1720     ADENOVIRUS,  PCR Not Detected     Coronavirus 229E Not Detected     Coronavirus HKU1 Not Detected     Coronavirus NL63 Not Detected     Coronavirus OC43 Not Detected     COVID19 Not Detected     Human Metapneumovirus Not Detected     Human Rhinovirus/Enterovirus Not Detected     Influenza A PCR Not Detected     Influenza B PCR Not Detected     Parainfluenza Virus 1 Not Detected     Parainfluenza Virus 2 Not Detected     Parainfluenza Virus 3 Not Detected     Parainfluenza Virus 4 Not Detected     RSV, PCR Not Detected     Bordetella pertussis pcr Not Detected     Bordetella parapertussis PCR Not Detected     Chlamydophila pneumoniae PCR Not Detected     Mycoplasma pneumo by PCR Not Detected    Narrative:      In the setting of a positive respiratory panel with a viral infection PLUS a negative procalcitonin without other underlying concern for bacterial infection, consider observing off antibiotics or discontinuation of antibiotics and continue supportive care. If the respiratory panel is positive for atypical bacterial infection (Bordetella pertussis, Chlamydophila pneumoniae, or Mycoplasma pneumoniae), consider antibiotic de-escalation to target atypical bacterial infection.    Urine Culture - Urine, Urine, Clean Catch [[id number]] Collected: 03/15/25 1610    Lab Status: Final result Specimen: Urine, Clean Catch Updated: 03/17/25 1116     Urine Culture >100,000 CFU/mL Normal Urogenital Rima    Narrative:      Colonization of the urinary tract without infection is common. Treatment is discouraged unless the patient is symptomatic, pregnant, or undergoing an invasive urologic procedure.    Legionella Antigen, Urine - Urine, Urine, Clean Catch [[id number]]  (Normal) Collected: 03/15/25 1610    Lab Status: Final result Specimen: Urine, Clean Catch Updated: 03/16/25 1022     LEGIONELLA ANTIGEN, URINE Negative    S. Pneumo Ag Urine or CSF - Urine, Urine, Clean Catch [[id number]]  (Abnormal) Collected: 03/15/25 1610    Lab Status:  Final result Specimen: Urine, Clean Catch Updated: 03/16/25 1022     Strep Pneumo Ag Positive          Imaging Results (Last 72 Hours)       Procedure Component Value Units Date/Time    XR Chest 1 View [[id number]] Collected: 03/19/25 1432     Updated: 03/19/25 1437    Narrative:      XR CHEST 1 VW    Date of Exam: 3/19/2025 2:08 PM EDT    Indication: Chest pain    Comparison: AP chest 3/15/2025.    Findings:  Low volume inspiration. Ill-defined patchy nodular densities are scattered within both lungs. No significant pleural fluid collection is identified. Heart size is within normal limits. Right shoulder replacement. Anterior cervical spinal fusion. Multiple   surgical clips in the left upper quadrant of the abdomen. Surgical clips project over the right axilla.      Impression:      Impression:  Multifocal patchy nodular densities in both lungs, thought to be similar to 3/15/2025. Correlate for pneumonia.      Electronically Signed: Barb Millan MD    3/19/2025 2:35 PM EDT    Workstation ID: [id number]            Assessment & Plan     Patient is a 64-year-old female with a history of scleroderma, lupus and von Willebrand disease who presented with persistent cough and shortness of breath since the end of December.  GI asked to consult for choking episode x 2.     Choking episode x 2  Nausea with anorexia  Dyspnea with cough -hypoxic respiratory insufficiency  Abnormal CT of the colon  Vertigo  Scleroderma  Normocytic anemia  Lupus  Monahan syndrome/history of breast cancer  Von Willebrand disease  History of cholecystectomy     PLAN:  -Hemoglobin 10.4, WBC 11.5, platelets 329, creatinine 1.14, BUN 27, potassium 5.3, AST 35, ALT 12, alk phos 80, total bilirubin 0.2    -Nuc med lung ventilation/perfusion negative for pulmonary emboli  -Venous duplex negative  -Chest x-ray with mild hazy left greater than right basilar opacities which could reflect atelectasis or pneumonia  -3/15/2025 MRI abdomen with and without  contrast shows multiple small round eccentric lesions scattered throughout the colon suspicious for polyps, no suspicious adenopathy, similar chronic dilation of the biliary tree with blunting at the ampulla favors postcholecystectomy state and/or nonobstructing ampullary stricture, lipomatous infiltration and sporadic atrophy, cardiomegaly with mild body wall edema     EGD and colonoscopy October 2024 were completely normal.  Suspect some of her nausea and decrease in appetite is secondary to her significant vertigo symptoms.  Continue antiemetics as needed.  We will await speech therapy evaluation with video swallow to rule out oropharyngeal component to her choking episodes.    Electronically signed by JADEN Ocampo, 03/20/25, 9:23 AM EDT.

## 2025-03-20 NOTE — PLAN OF CARE
Goal Outcome Evaluation:      3/20/2025   OTHER   Discipline Speech-Language Pathologist   Rehab Time/Intention   Session Not Performed Unable to treat ; St currently following for CSE/possible VFSS if appropriate/indicated. ST consulted yesterday d/t choking episode on eggs. Pt declining PO yesterday and today d/t nausea. Unable to perform CSE or VFSS if pt unable to tolerate PO. ST will re-attempt on next date of service.

## 2025-03-20 NOTE — CASE MANAGEMENT/SOCIAL WORK
Social Work Assessment  [facility]     Patient Name: Tracie Gross  MRN: [id number]  Today's Date: 3/20/2025    Admit Date: 3/15/2025     Discharge Plan       Row Name 03/20/25 0959       Plan    Plan [place] (skilled, pending). PASRR triggered level 2 on 3/19 (allow 5-7 business days for determination. Precert required.                  ISMAEL Quintero, LSW, CCM  Lead   Phone: [phone]  Cell: [phone]  Fax: [phone]  [email or web address]

## 2025-03-20 NOTE — SIGNIFICANT NOTE
03/20/25 1516   OTHER   Discipline physical therapist   Rehab Time/Intention   Session Not Performed other (see comments)  (attempted at 941; pt on BSC and awaiting staff for some procedure/scan. will f/u as able.)   Therapy Assessment/Plan (PT)   Criteria for Skilled Interventions Met (PT) yes;meets criteria   Recommendation   PT - Next Appointment 03/21/25

## 2025-03-20 NOTE — PROGRESS NOTES
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 No wolf honeycombing fibrosis  Patient was treated with mycophenolate, prophylactic Bactrim     PFT 1/14/2025   FVC 1 1.6 L/51%,  FEV1 1.4 L / 58%, ratio 88, TLC 57%, RV 50%, DLCO 51%     PFTs July 2021,   FEV1 1.7 L 68% improve 75% post bronchodilator, FEV1/ FVC ratio 87, TLC 72%, RV 74%, DLCO 56%     PFTs in 2017   FEV1 66-70% predicted which is unchanged since 2012  quit smoking 1994,      PFT 2012 and 2017,   FEV-1 and TLC 75% , suggestive of mild to mod restrictive lung disease     2D echo  2018 normal RVSP and EF  2D echo May 2020 no pulmonary hypertension   2d echo July 2021 RVSP 30  2D echo January 2025 no pulmonary hypertension EF 60%     FERN, AHI 14.5 ,       No Response to inhalers including Breztri and Trelegy     Recommendations:    Oxygen titration currently on 2 L  Antibiotics Rocephin 2 g IV daily for 5 days  Azithromycin 500 mg p.o. daily for 3 days        Seen by GI for possible esophageal stricture 2nd to Scleroderma with recurrent \" chocking\"    Hold mycophenolate     Bronchodilators Pulmicort and DuoNeb     On Plaquenil 200 mg twice daily              Chest x-ray 3/15/2025      VQ scan 3/16/2025       High-res CT scan January 2025             LOS: 5 days     Subjective         Objective     Vital signs for last 24 hours:  Vitals:    03/19/25 2004 03/20/25 0024 03/20/25 0528 03/20/25 0736   BP: 136/76 126/87 155/81 139/81   BP Location: Left arm Left arm Left arm    Patient Position: Sitting Sitting Lying Lying   Pulse: 76 70 68 72   Resp: 16 15 16 16   Temp: 98 °F (36.7 °C) 97.8 °F (36.6 °C) 97.5 °F (36.4 °C) 98.4 °F (36.9 °C)   TempSrc: Axillary Axillary Oral Oral   SpO2: 92% 93% 96% 96% "   Weight:   112 kg (246 lb 7.6 oz)    Height:           Intake/Output last 3 shifts:  I/O last 3 completed shifts:  In: 600 [P.O.:600]  Out: 1500 [Urine:1500]  Intake/Output this shift:  No intake/output data recorded.      Radiology  Imaging Results (Last 24 Hours)       Procedure Component Value Units Date/Time    XR Chest 1 View [[id number]] Collected: 03/19/25 1432     Updated: 03/19/25 1437    Narrative:      XR CHEST 1 VW    Date of Exam: 3/19/2025 2:08 PM EDT    Indication: Chest pain    Comparison: AP chest 3/15/2025.    Findings:  Low volume inspiration. Ill-defined patchy nodular densities are scattered within both lungs. No significant pleural fluid collection is identified. Heart size is within normal limits. Right shoulder replacement. Anterior cervical spinal fusion. Multiple   surgical clips in the left upper quadrant of the abdomen. Surgical clips project over the right axilla.      Impression:      Impression:  Multifocal patchy nodular densities in both lungs, thought to be similar to 3/15/2025. Correlate for pneumonia.      Electronically Signed: Barb Millan MD    3/19/2025 2:35 PM EDT    Workstation ID: [id number]            Labs:  Results from last 7 days   Lab Units 03/20/25  0526   WBC 10*3/mm3 11.54*   HEMOGLOBIN g/dL 10.4*   HEMATOCRIT % 32.3*   PLATELETS 10*3/mm3 329     Results from last 7 days   Lab Units 03/20/25  0500   SODIUM mmol/L 137   POTASSIUM mmol/L 5.3*   CHLORIDE mmol/L 103   CO2 mmol/L 22.7   BUN mg/dL 27*   CREATININE mg/dL 1.14*   CALCIUM mg/dL 9.5   BILIRUBIN mg/dL 0.2   ALK PHOS U/L 80   ALT (SGPT) U/L 12   AST (SGOT) U/L 35*   GLUCOSE mg/dL 126*     Results from last 7 days   Lab Units 03/15/25  1549   PH, ARTERIAL pH units 7.392   PO2 ART mm Hg 52.9*   PCO2, ARTERIAL mm Hg 44.7   HCO3 ART mmol/L 27.2     Results from last 7 days   Lab Units 03/20/25  0500   ALBUMIN g/dL 3.5     Results from last 7 days   Lab Units 03/17/25  0138   CK TOTAL U/L 94         Results from  last 7 days   Lab Units 03/18/25  0459   MAGNESIUM mg/dL 2.6*     Results from last 7 days   Lab Units 03/20/25  0526   INR  1.20*               Meds:   SCHEDULE  benzonatate, 200 mg, Oral, TID  budesonide, 0.5 mg, Nebulization, BID - RT  calcium 500 mg vitamin D 5 mcg (200 UT), 1 tablet, Oral, BID  cefTRIAXone, 2,000 mg, Intravenous, Q24H  cetirizine, 10 mg, Oral, Daily  furosemide, 20 mg, Oral, Daily  guaiFENesin, 600 mg, Oral, Q12H  hydroxychloroquine, 200 mg, Oral, BID  lamoTRIgine, 200 mg, Oral, Nightly  levothyroxine, 175 mcg, Oral, Q AM  methylPREDNISolone sodium succinate, 40 mg, Intravenous, Q8H  [Held by provider] mycophenolate, 500 mg, Oral, Q12H  QUEtiapine, 50 mg, Oral, Nightly  sodium chloride, 10 mL, Intravenous, Q12H      Infusions       PRNs    acetaminophen **OR** acetaminophen **OR** acetaminophen    ALPRAZolam    aluminum-magnesium hydroxide-simethicone    senna-docusate sodium **AND** polyethylene glycol **AND** bisacodyl **AND** bisacodyl    Calcium Replacement - Follow Nurse / BPA Driven Protocol    cyclobenzaprine    guaiFENesin-codeine    ipratropium-albuterol    Magnesium Standard Dose Replacement - Follow Nurse / BPA Driven Protocol    meclizine    melatonin    Morphine    nitroglycerin    ondansetron ODT **OR** [DISCONTINUED] ondansetron    oxyCODONE    Phosphorus Replacement - Follow Nurse / BPA Driven Protocol    Potassium Replacement - Follow Nurse / BPA Driven Protocol    [COMPLETED] Insert Peripheral IV **AND** sodium chloride    sodium chloride    sodium chloride    Physical Exam:  Physical Exam  Cardiovascular:      Heart sounds: Murmur heard.      No gallop.   Pulmonary:      Effort: No respiratory distress.      Breath sounds: No stridor. Rhonchi and rales present. No wheezing.   Chest:      Chest wall: No tenderness.         ROS  Review of Systems   Respiratory:  Positive for cough and shortness of breath. Negative for wheezing and stridor.    Cardiovascular:  Positive for  palpitations. Negative for chest pain and leg swelling.             Total critical care time spent with patient greater than: 45 Minutes

## 2025-03-20 NOTE — PROGRESS NOTES
Nephrology  Progress Note                                        [facility]    Patient Identification    Name: Tracie Grsos  Age: 64 y.o.  Sex: female  :  1960  MRN: [id number]      DATE OF SERVICE:  3/20/2025        Subective    Feels better     Objective   Scheduled Meds:benzonatate, 200 mg, Oral, TID  budesonide, 0.5 mg, Nebulization, BID - RT  calcium 500 mg vitamin D 5 mcg (200 UT), 1 tablet, Oral, BID  cefTRIAXone, 2,000 mg, Intravenous, Q24H  cetirizine, 10 mg, Oral, Daily  furosemide, 20 mg, Oral, Daily  guaiFENesin, 600 mg, Oral, Q12H  hydroxychloroquine, 200 mg, Oral, BID  lamoTRIgine, 200 mg, Oral, Nightly  levothyroxine, 175 mcg, Oral, Q AM  methylPREDNISolone sodium succinate, 40 mg, Intravenous, Q8H  [Held by provider] mycophenolate, 500 mg, Oral, Q12H  QUEtiapine, 50 mg, Oral, Nightly  sodium chloride, 10 mL, Intravenous, Q12H          Continuous Infusions:       PRN Meds:  acetaminophen **OR** acetaminophen **OR** acetaminophen    ALPRAZolam    aluminum-magnesium hydroxide-simethicone    senna-docusate sodium **AND** polyethylene glycol **AND** bisacodyl **AND** bisacodyl    Calcium Replacement - Follow Nurse / BPA Driven Protocol    cyclobenzaprine    guaiFENesin-codeine    ipratropium-albuterol    Magnesium Standard Dose Replacement - Follow Nurse / BPA Driven Protocol    meclizine    melatonin    Morphine    nitroglycerin    ondansetron ODT **OR** [DISCONTINUED] ondansetron    oxyCODONE    Phosphorus Replacement - Follow Nurse / BPA Driven Protocol    Potassium Replacement - Follow Nurse / BPA Driven Protocol    [COMPLETED] Insert Peripheral IV **AND** sodium chloride    sodium chloride    sodium chloride     Exam:  /81 (BP Location: Left arm, Patient Position: Lying)   Pulse 68   Temp 97.5 °F (36.4 °C) (Oral)   Resp 16   Ht  "162.6 cm (64\")   Wt 112 kg (246 lb 7.6 oz)   LMP 05/08/1983   SpO2 96%   BMI 42.31 kg/m²     Intake/Output last 3 shifts:  I/O last 3 completed shifts:  In: 1080 [P.O.:1080]  Out: 1300 [Urine:1300]    Intake/Output this shift:  I/O this shift:  In: 120 [P.O.:120]  Out: 600 [Urine:600]    Physical exam:  General Appearance:  Alert  Head:  Normocephalic, without obvious abnormality, atraumatic  Eyes:  PERRL, conjunctiva/corneas clear     Neck:  Supple,  no adenopathy;      Lungs:  Decreased BS occasion ronchi  Heart:  Regular rate and rhythm, S1 and S2 normal  Abdomen:  Soft, non-tender, bowel sounds active   Extremities: trace edema  Pulses: 2+ and symmetric all extremities  Skin:  No rashes or lesions       Data Review:  All labs (24hrs):   Recent Results (from the past 24 hours)   POC Glucose Once    Collection Time: 03/19/25  4:53 PM    Specimen: Blood   Result Value Ref Range    Glucose 133 (H) 70 - 105 mg/dL   Comprehensive Metabolic Panel    Collection Time: 03/20/25  5:00 AM    Specimen: Blood   Result Value Ref Range    Glucose 126 (H) 65 - 99 mg/dL    BUN 27 (H) 8 - 23 mg/dL    Creatinine 1.14 (H) 0.57 - 1.00 mg/dL    Sodium 137 136 - 145 mmol/L    Potassium 5.3 (H) 3.5 - 5.2 mmol/L    Chloride 103 98 - 107 mmol/L    CO2 22.7 22.0 - 29.0 mmol/L    Calcium 9.5 8.6 - 10.5 mg/dL    Total Protein 6.1 6.0 - 8.5 g/dL    Albumin 3.5 3.5 - 5.2 g/dL    ALT (SGPT) 12 1 - 33 U/L    AST (SGOT) 35 (H) 1 - 32 U/L    Alkaline Phosphatase 80 39 - 117 U/L    Total Bilirubin 0.2 0.0 - 1.2 mg/dL    Globulin 2.6 gm/dL    A/G Ratio 1.3 g/dL    BUN/Creatinine Ratio 23.7 7.0 - 25.0    Anion Gap 11.3 5.0 - 15.0 mmol/L    eGFR 53.2 (L) >60.0 mL/min/1.73   Protime-INR    Collection Time: 03/20/25  5:26 AM    Specimen: Arm, Left; Blood   Result Value Ref Range    Protime 15.2 (H) 11.7 - 14.2 Seconds    INR 1.20 (H) 0.90 - 1.10   CBC Auto Differential    Collection Time: 03/20/25  5:26 AM    Specimen: Arm, Left; Blood   Result " Value Ref Range    WBC 11.54 (H) 3.40 - 10.80 10*3/mm3    RBC 3.34 (L) 3.77 - 5.28 10*6/mm3    Hemoglobin 10.4 (L) 12.0 - 15.9 g/dL    Hematocrit 32.3 (L) 34.0 - 46.6 %    MCV 96.7 79.0 - 97.0 fL    MCH 31.1 26.6 - 33.0 pg    MCHC 32.2 31.5 - 35.7 g/dL    RDW 14.1 12.3 - 15.4 %    RDW-SD 49.8 37.0 - 54.0 fl    MPV 11.1 6.0 - 12.0 fL    Platelets 329 140 - 450 10*3/mm3    Neutrophil % 77.5 (H) 42.7 - 76.0 %    Lymphocyte % 7.2 (L) 19.6 - 45.3 %    Monocyte % 14.2 (H) 5.0 - 12.0 %    Eosinophil % 0.0 (L) 0.3 - 6.2 %    Basophil % 0.1 0.0 - 1.5 %    Immature Grans % 1.0 (H) 0.0 - 0.5 %    Neutrophils, Absolute 8.94 (H) 1.70 - 7.00 10*3/mm3    Lymphocytes, Absolute 0.83 0.70 - 3.10 10*3/mm3    Monocytes, Absolute 1.64 (H) 0.10 - 0.90 10*3/mm3    Eosinophils, Absolute 0.00 0.00 - 0.40 10*3/mm3    Basophils, Absolute 0.01 0.00 - 0.20 10*3/mm3    Immature Grans, Absolute 0.12 (H) 0.00 - 0.05 10*3/mm3    nRBC 0.2 0.0 - 0.2 /100 WBC          Imaging:  XR Chest 1 View  Result Date: 3/19/2025  Impression: Multifocal patchy nodular densities in both lungs, thought to be similar to 3/15/2025. Correlate for pneumonia. Electronically Signed: Barb Millan MD  3/19/2025 2:35 PM EDT  Workstation ID: [id number]    US Renal Bilateral  Result Date: 3/16/2025  Impression: No hydronephrosis or acute sonographic abnormality. Electronically Signed: Tomy Orellana MD  3/16/2025 5:38 PM EDT  Workstation ID: [id number]    NM Lung Ventilation Perfusion  Result Date: 3/16/2025  Impression: Negative for pulmonary embolism. Electronically Signed: Nimesh Dash MD  3/16/2025 3:17 PM EDT  Workstation ID: [id number]    XR Chest 1 View  Result Date: 3/15/2025  Impression: Mild hazy left greater than right basal opacities, which could represent atelectasis or pneumonia. Electronically Signed: Josh Lorenzo  3/15/2025 4:18 PM EDT  Workstation ID: [id number]      Assessment/Plan:     SOB (shortness of breath)         Acute kidney injury on top of chronic  kidney disease stage III  Acute hypoxic respiratory failure  Pulmonary fibrosis  Urinary tract infection  Anxiety disorder  Hypertension  Hyperlipidemia  Hypothyroidism  History of von Willebrand disease with no bleeding        Recommendations:  Creatinine is better down to 1.1  less coughing   Lokelma for k 5.3      Volume status increasing   Lasix home dose restarted       Discussed with the patient and family at bedside

## 2025-03-20 NOTE — PLAN OF CARE
Problem: Adult Inpatient Plan of Care  Goal: Absence of Hospital-Acquired Illness or Injury  Intervention: Identify and Manage Fall Risk  Recent Flowsheet Documentation  Taken 3/20/2025 0501 by Enzo Patel RN  Safety Promotion/Fall Prevention:   safety round/check completed   room organization consistent   nonskid shoes/slippers when out of bed   fall prevention program maintained   clutter free environment maintained   assistive device/personal items within reach   activity supervised  Intervention: Prevent Skin Injury  Recent Flowsheet Documentation  Taken 3/20/2025 0501 by Enzo Patel RN  Body Position: position changed independently  Skin Protection:   drying agents applied   incontinence pads utilized   skin sealant/moisture barrier applied   transparent dressing maintained  Intervention: Prevent Infection  Recent Flowsheet Documentation  Taken 3/20/2025 0501 by Enzo Patel RN  Infection Prevention:   single patient room provided   rest/sleep promoted   hand hygiene promoted   environmental surveillance performed   Goal Outcome Evaluation:

## 2025-03-20 NOTE — SIGNIFICANT NOTE
03/20/25 0959   PASRR   PASRR Status Under clinical review  (PASRR triggered level 2 (allow 5-7 business days for determination).)   Level II Initiated 03/19/25

## 2025-03-21 ENCOUNTER — APPOINTMENT (OUTPATIENT)
Dept: GENERAL RADIOLOGY | Facility: HOSPITAL | Age: 65
DRG: 196 | End: 2025-03-21
Payer: MEDICARE

## 2025-03-21 LAB
ANION GAP SERPL CALCULATED.3IONS-SCNC: 7.7 MMOL/L (ref 5–15)
BUN SERPL-MCNC: 27 MG/DL (ref 8–23)
BUN/CREAT SERPL: 22.7 (ref 7–25)
CALCIUM SPEC-SCNC: 9.9 MG/DL (ref 8.6–10.5)
CHLORIDE SERPL-SCNC: 102 MMOL/L (ref 98–107)
CO2 SERPL-SCNC: 28.3 MMOL/L (ref 22–29)
CREAT SERPL-MCNC: 1.19 MG/DL (ref 0.57–1)
DEPRECATED RDW RBC AUTO: 48.8 FL (ref 37–54)
EGFRCR SERPLBLD CKD-EPI 2021: 50.5 ML/MIN/1.73
ERYTHROCYTE [DISTWIDTH] IN BLOOD BY AUTOMATED COUNT: 13.9 % (ref 12.3–15.4)
GLUCOSE SERPL-MCNC: 117 MG/DL (ref 65–99)
HCT VFR BLD AUTO: 32.5 % (ref 34–46.6)
HGB BLD-MCNC: 10.6 G/DL (ref 12–15.9)
MCH RBC QN AUTO: 31 PG (ref 26.6–33)
MCHC RBC AUTO-ENTMCNC: 32.6 G/DL (ref 31.5–35.7)
MCV RBC AUTO: 95 FL (ref 79–97)
PLATELET # BLD AUTO: 309 10*3/MM3 (ref 140–450)
PMV BLD AUTO: 10.7 FL (ref 6–12)
POTASSIUM SERPL-SCNC: 4.5 MMOL/L (ref 3.5–5.2)
RBC # BLD AUTO: 3.42 10*6/MM3 (ref 3.77–5.28)
SODIUM SERPL-SCNC: 138 MMOL/L (ref 136–145)
WBC NRBC COR # BLD AUTO: 15.71 10*3/MM3 (ref 3.4–10.8)

## 2025-03-21 PROCEDURE — 85027 COMPLETE CBC AUTOMATED: CPT | Performed by: INTERNAL MEDICINE

## 2025-03-21 PROCEDURE — 94799 UNLISTED PULMONARY SVC/PX: CPT

## 2025-03-21 PROCEDURE — 25010000002 CEFTRIAXONE PER 250 MG: Performed by: INTERNAL MEDICINE

## 2025-03-21 PROCEDURE — 63710000001 ONDANSETRON ODT 4 MG TABLET DISPERSIBLE: Performed by: INTERNAL MEDICINE

## 2025-03-21 PROCEDURE — 94664 DEMO&/EVAL PT USE INHALER: CPT

## 2025-03-21 PROCEDURE — 74230 X-RAY XM SWLNG FUNCJ C+: CPT

## 2025-03-21 PROCEDURE — 92611 MOTION FLUOROSCOPY/SWALLOW: CPT

## 2025-03-21 PROCEDURE — 80048 BASIC METABOLIC PNL TOTAL CA: CPT | Performed by: INTERNAL MEDICINE

## 2025-03-21 PROCEDURE — 94761 N-INVAS EAR/PLS OXIMETRY MLT: CPT

## 2025-03-21 PROCEDURE — 25010000002 METHYLPREDNISOLONE PER 40 MG: Performed by: INTERNAL MEDICINE

## 2025-03-21 PROCEDURE — 25010000002 MORPHINE PER 10 MG: Performed by: INTERNAL MEDICINE

## 2025-03-21 RX ADMIN — BUDESONIDE 0.5 MG: 0.5 INHALANT RESPIRATORY (INHALATION) at 19:07

## 2025-03-21 RX ADMIN — Medication 1 TABLET: at 09:09

## 2025-03-21 RX ADMIN — BENZONATATE 200 MG: 100 CAPSULE ORAL at 09:09

## 2025-03-21 RX ADMIN — BUDESONIDE 0.5 MG: 0.5 INHALANT RESPIRATORY (INHALATION) at 07:21

## 2025-03-21 RX ADMIN — CEFTRIAXONE 2000 MG: 2 INJECTION, POWDER, FOR SOLUTION INTRAMUSCULAR; INTRAVENOUS at 17:42

## 2025-03-21 RX ADMIN — Medication 10 ML: at 09:14

## 2025-03-21 RX ADMIN — HYDROXYCHLOROQUINE SULFATE 200 MG: 200 TABLET ORAL at 20:00

## 2025-03-21 RX ADMIN — MORPHINE SULFATE 2 MG: 2 INJECTION, SOLUTION INTRAMUSCULAR; INTRAVENOUS at 20:01

## 2025-03-21 RX ADMIN — ONDANSETRON 4 MG: 4 TABLET, ORALLY DISINTEGRATING ORAL at 09:10

## 2025-03-21 RX ADMIN — BENZONATATE 200 MG: 100 CAPSULE ORAL at 16:02

## 2025-03-21 RX ADMIN — FUROSEMIDE 20 MG: 20 TABLET ORAL at 09:09

## 2025-03-21 RX ADMIN — Medication 10 ML: at 20:02

## 2025-03-21 RX ADMIN — GUAIFENESIN 600 MG: 600 TABLET, MULTILAYER, EXTENDED RELEASE ORAL at 20:00

## 2025-03-21 RX ADMIN — AMITRIPTYLINE HYDROCHLORIDE 25 MG: 25 TABLET, FILM COATED ORAL at 20:00

## 2025-03-21 RX ADMIN — METHYLPREDNISOLONE SODIUM SUCCINATE 40 MG: 40 INJECTION, POWDER, FOR SOLUTION INTRAMUSCULAR; INTRAVENOUS at 20:13

## 2025-03-21 RX ADMIN — LAMOTRIGINE 200 MG: 100 TABLET ORAL at 20:00

## 2025-03-21 RX ADMIN — BARIUM SULFATE 50 ML: 400 SUSPENSION ORAL at 12:19

## 2025-03-21 RX ADMIN — LEVOTHYROXINE SODIUM 175 MCG: 175 TABLET ORAL at 05:38

## 2025-03-21 RX ADMIN — ALPRAZOLAM 1 MG: 1 TABLET ORAL at 21:58

## 2025-03-21 RX ADMIN — GUAIFENESIN 600 MG: 600 TABLET, MULTILAYER, EXTENDED RELEASE ORAL at 09:09

## 2025-03-21 RX ADMIN — METHYLPREDNISOLONE SODIUM SUCCINATE 40 MG: 40 INJECTION, POWDER, FOR SOLUTION INTRAMUSCULAR; INTRAVENOUS at 05:38

## 2025-03-21 RX ADMIN — CETIRIZINE HYDROCHLORIDE 10 MG: 10 TABLET, FILM COATED ORAL at 09:10

## 2025-03-21 RX ADMIN — HYDROXYCHLOROQUINE SULFATE 200 MG: 200 TABLET ORAL at 09:10

## 2025-03-21 RX ADMIN — METHYLPREDNISOLONE SODIUM SUCCINATE 40 MG: 40 INJECTION, POWDER, FOR SOLUTION INTRAMUSCULAR; INTRAVENOUS at 13:43

## 2025-03-21 RX ADMIN — BENZONATATE 200 MG: 100 CAPSULE ORAL at 20:00

## 2025-03-21 RX ADMIN — Medication 1 TABLET: at 20:00

## 2025-03-21 RX ADMIN — GUAIFENESIN AND CODEINE PHOSPHATE 10 ML: 100; 10 SOLUTION ORAL at 13:43

## 2025-03-21 RX ADMIN — GUAIFENESIN AND CODEINE PHOSPHATE 10 ML: 100; 10 SOLUTION ORAL at 18:46

## 2025-03-21 RX ADMIN — QUETIAPINE FUMARATE 50 MG: 25 TABLET ORAL at 20:00

## 2025-03-21 NOTE — PLAN OF CARE
Goal Outcome Evaluation:   Pt was seen for video swallow study due to pt having choking episode on scrambled eggs. Pt has had very little PO intake since this choking incident and further eval of swallow was rec. She was seated upright at 90 degrees in the lateral position. Pt fed herself all trials and spontaneously took small bites and sips. Pt given trials of thin barium by cup x2, barium coated applesauce x3, barium coated peaches x2, thin barium by straw x1 and in rapid consecutive sips x1 and an esophageal scan, to complete the study. Pt has natural dentition in good condition and mastication was timely and efficient, however pt was only obseerved to swalow a portion of the peach bolus due to gagging and expectorating the rest.. Oral transit timely with adequate bolus control on all trials except one trial of applesauce and one trial of peaches, where pt began to gag. She was able to swallow the applesauce after gagging, but as mentioned above, she swallowed one of the peaches, but did have to expectorate the rest of the peach bolus.   Swallow was timely. Pt had adequate laryngeal elevation and anterior hyoid excursion. Epiglottic inversion and laryngeal vestibular closure complete. There was trace, transient penetration of thin liquids during the swallow on all straw sips, but this did completly clear. There was no other penetration or aspiration of any consistency. There was mild residue remaining in the pharyngeal area after the swallow. This spontaneously cleared. An esophageal scan revealed no esophageal retention and good clearing.     Pt presents with functional oral and  pharyngeal swallow. Pt's swallow presents as a 2 on the Rosenbeck penetration-Aspiration Scale, indicating that material enters the airway, remains above the vocal folds and is ejected from the airway.     It is recommended that pt initiate a full liquid diet as tolerated.    Pt may upgrade back to solids as soon as she feels that she  can handle solid food without gagging/vomiting.     Education was given to pt regarding the full results and recommendations from this study and she was shown images from the [facility]. ST will follow to assure tolerance of diet and make further recs as indicated.                             SLP Swallowing Diagnosis: functional oral phase, functional pharyngeal phase (03/21/25 1400)

## 2025-03-21 NOTE — PLAN OF CARE
Goal Outcome Evaluation:   Pt A/Ox4 with VSS on 2L O2. Video swallow complete with SLP during this shift, see note. Pt tolerating full liquid diet at this time. Pt able to ambulate standby assist with walker to bathroom. Spouse at bedside throughout shift. Pt able to voice concerns. Call light within reach.

## 2025-03-21 NOTE — PROGRESS NOTES
[facility] MEDICINE SERVICE  DAILY PROGRESS NOTE    NAME: Tracie Gross  : 1960  MRN: [id number]      LOS: 6 days     PROVIDER OF SERVICE: Santa Pierre MD    Chief Complaint: SOB (shortness of breath)    Subjective:     Interval History:  History taken from: patient    No new complaint    Review of Systems:   Review of Systems   All other systems reviewed and are negative.      Objective:     Vital Signs  Temp:  [97.4 °F (36.3 °C)-98.1 °F (36.7 °C)] 97.7 °F (36.5 °C)  Heart Rate:  [79-99] 87  Resp:  [16-25] 16  BP: (130-154)/(77-88) 154/88  Flow (L/min) (Oxygen Therapy):  [2] 2   Body mass index is 42.38 kg/m².    Physical Exam  Physical Exam  Constitutional:       Appearance: Normal appearance.   HENT:      Head: Normocephalic and atraumatic.      Nose: Nose normal.      Mouth/Throat:      Mouth: Mucous membranes are moist.   Eyes:      Extraocular Movements: Extraocular movements intact.      Pupils: Pupils are equal, round, and reactive to light.   Cardiovascular:      Rate and Rhythm: Normal rate and regular rhythm.   Pulmonary:      Effort: Pulmonary effort is normal.      Breath sounds: Normal breath sounds.   Abdominal:      General: Abdomen is flat. Bowel sounds are normal.      Palpations: Abdomen is soft.   Musculoskeletal:         General: Normal range of motion.      Cervical back: Normal range of motion and neck supple.   Skin:     General: Skin is warm and dry.   Neurological:      General: No focal deficit present.      Mental Status: She is alert and oriented to person, place, and time.   Psychiatric:         Mood and Affect: Mood normal.         Behavior: Behavior normal.         Thought Content: Thought content normal.         Judgment: Judgment normal.         Current Medications:  Scheduled Meds:amitriptyline, 25 mg, Oral, Nightly  benzonatate, 200 mg, Oral, TID  budesonide, 0.5 mg, Nebulization, BID - RT  calcium 500 mg vitamin D 5 mcg (200 UT), 1 tablet, Oral,  BID  cefTRIAXone, 2,000 mg, Intravenous, Q24H  cetirizine, 10 mg, Oral, Daily  furosemide, 20 mg, Oral, Daily  guaiFENesin, 600 mg, Oral, Q12H  hydroxychloroquine, 200 mg, Oral, BID  lamoTRIgine, 200 mg, Oral, Nightly  levothyroxine, 175 mcg, Oral, Q AM  methylPREDNISolone sodium succinate, 40 mg, Intravenous, Q8H  [Held by provider] mycophenolate, 500 mg, Oral, Q12H  QUEtiapine, 50 mg, Oral, Nightly  sodium chloride, 10 mL, Intravenous, Q12H      Continuous Infusions:     PRN Meds:.  acetaminophen **OR** acetaminophen **OR** acetaminophen    ALPRAZolam    aluminum-magnesium hydroxide-simethicone    senna-docusate sodium **AND** polyethylene glycol **AND** bisacodyl **AND** bisacodyl    Calcium Replacement - Follow Nurse / BPA Driven Protocol    cyclobenzaprine    guaiFENesin-codeine    ipratropium-albuterol    Magnesium Standard Dose Replacement - Follow Nurse / BPA Driven Protocol    meclizine    melatonin    Morphine    nitroglycerin    ondansetron ODT **OR** [DISCONTINUED] ondansetron    oxyCODONE    Phosphorus Replacement - Follow Nurse / BPA Driven Protocol    Potassium Replacement - Follow Nurse / BPA Driven Protocol    [COMPLETED] Insert Peripheral IV **AND** sodium chloride    sodium chloride    sodium chloride       Diagnostic Data    Results from last 7 days   Lab Units 03/21/25  0043 03/20/25  0526 03/20/25  0500   WBC 10*3/mm3 15.71*   < >  --    HEMOGLOBIN g/dL 10.6*   < >  --    HEMATOCRIT % 32.5*   < >  --    PLATELETS 10*3/mm3 309   < >  --    GLUCOSE mg/dL 117*  --  126*   CREATININE mg/dL 1.19*  --  1.14*   BUN mg/dL 27*  --  27*   SODIUM mmol/L 138  --  137   POTASSIUM mmol/L 4.5  --  5.3*   AST (SGOT) U/L  --   --  35*   ALT (SGPT) U/L  --   --  12   ALK PHOS U/L  --   --  80   BILIRUBIN mg/dL  --   --  0.2   ANION GAP mmol/L 7.7  --  11.3    < > = values in this interval not displayed.       No radiology results for the last day        I reviewed the patient's new clinical  results.    Assessment/Plan:     Active and Resolved Problems  Active Hospital Problems    Diagnosis  POA    **SOB (shortness of breath) [R06.02]  Yes      Resolved Hospital Problems   No resolved problems to display.       Acute hypoxic respiratory failure not on home oxygen but currently on 3 L of oxygen via nasal cannula   Pneumonia  Hyperkalemia  Recently diagnosed pulmonary fibrosis  DANIELLE on CKD 3  UTI  Hypertension  Hypothyroidism      -acute hypoxic respiratory failure likely secondary to pneumonia plus underlying pulmonary fibrosis.  Urine strep pneumo antigen is positive.  Continue IV ceftriaxone for treatment of likely Streptococcus pneumonia.  Blood cultures and sputum culture negative  -Renal function with slight improvement.  See further recommendations per nephrology.  - Treat hyperkalemia with Lokelma  - Urine culture showed no growth ruling out UTI  - Continue current management.      VTE Prophylaxis:  Mechanical VTE prophylaxis orders are present.       Disposition Planning:     Barriers to Discharge: Pending clinical improvement  Anticipated Date of Discharge: 3/24/2025  Place of Discharge: Home      Code Status and Medical Interventions: CPR (Attempt to Resuscitate); Full Support   Ordered at: 03/15/25 1955     Code Status (Patient has no pulse and is not breathing):    CPR (Attempt to Resuscitate)     Medical Interventions (Patient has pulse or is breathing):    Full Support       Signature: Electronically signed by Santa Pierre MD, 03/21/25, 15:42 EDT.  [facility] Hospitalist Team

## 2025-03-21 NOTE — PROGRESS NOTES
"                                                                                                                                      Nephrology  Progress Note                                        [facility]    Patient Identification    Name: Traice Gross  Age: 64 y.o.  Sex: female  :  1960  MRN: [id number]      DATE OF SERVICE:  3/21/2025        Subective    Feels better     Objective   Scheduled Meds:benzonatate, 200 mg, Oral, TID  budesonide, 0.5 mg, Nebulization, BID - RT  calcium 500 mg vitamin D 5 mcg (200 UT), 1 tablet, Oral, BID  cefTRIAXone, 2,000 mg, Intravenous, Q24H  cetirizine, 10 mg, Oral, Daily  furosemide, 20 mg, Oral, Daily  guaiFENesin, 600 mg, Oral, Q12H  hydroxychloroquine, 200 mg, Oral, BID  lamoTRIgine, 200 mg, Oral, Nightly  levothyroxine, 175 mcg, Oral, Q AM  methylPREDNISolone sodium succinate, 40 mg, Intravenous, Q8H  [Held by provider] mycophenolate, 500 mg, Oral, Q12H  QUEtiapine, 50 mg, Oral, Nightly  sodium chloride, 10 mL, Intravenous, Q12H          Continuous Infusions:       PRN Meds:  acetaminophen **OR** acetaminophen **OR** acetaminophen    ALPRAZolam    aluminum-magnesium hydroxide-simethicone    senna-docusate sodium **AND** polyethylene glycol **AND** bisacodyl **AND** bisacodyl    Calcium Replacement - Follow Nurse / BPA Driven Protocol    cyclobenzaprine    guaiFENesin-codeine    ipratropium-albuterol    Magnesium Standard Dose Replacement - Follow Nurse / BPA Driven Protocol    meclizine    melatonin    Morphine    nitroglycerin    ondansetron ODT **OR** [DISCONTINUED] ondansetron    oxyCODONE    Phosphorus Replacement - Follow Nurse / BPA Driven Protocol    Potassium Replacement - Follow Nurse / BPA Driven Protocol    [COMPLETED] Insert Peripheral IV **AND** sodium chloride    sodium chloride    sodium chloride     Exam:  BP (P) 141/81   Pulse (P) 92   Temp (P) 97.7 °F (36.5 °C) (Oral)   Resp (P) 16   Ht 162.6 cm (64\")   Wt 112 kg (246 lb " 14.6 oz)   LMP 05/08/1983   SpO2 (P) 94%   BMI 42.38 kg/m²     Intake/Output last 3 shifts:  I/O last 3 completed shifts:  In: 360 [P.O.:360]  Out: 1200 [Urine:1200]    Intake/Output this shift:  No intake/output data recorded.    Physical exam:  General Appearance:  Alert  Head:  Normocephalic, without obvious abnormality, atraumatic  Eyes:  PERRL, conjunctiva/corneas clear     Neck:  Supple,  no adenopathy;      Lungs:  Decreased BS occasion ronchi  Heart:  Regular rate and rhythm, S1 and S2 normal  Abdomen:  Soft, non-tender, bowel sounds active   Extremities: trace edema  Pulses: 2+ and symmetric all extremities  Skin:  No rashes or lesions       Data Review:  All labs (24hrs):   Recent Results (from the past 24 hours)   Basic Metabolic Panel    Collection Time: 03/21/25 12:43 AM    Specimen: Arm, Left; Blood   Result Value Ref Range    Glucose 117 (H) 65 - 99 mg/dL    BUN 27 (H) 8 - 23 mg/dL    Creatinine 1.19 (H) 0.57 - 1.00 mg/dL    Sodium 138 136 - 145 mmol/L    Potassium 4.5 3.5 - 5.2 mmol/L    Chloride 102 98 - 107 mmol/L    CO2 28.3 22.0 - 29.0 mmol/L    Calcium 9.9 8.6 - 10.5 mg/dL    BUN/Creatinine Ratio 22.7 7.0 - 25.0    Anion Gap 7.7 5.0 - 15.0 mmol/L    eGFR 50.5 (L) >60.0 mL/min/1.73   CBC (No Diff)    Collection Time: 03/21/25 12:43 AM    Specimen: Arm, Left; Blood   Result Value Ref Range    WBC 15.71 (H) 3.40 - 10.80 10*3/mm3    RBC 3.42 (L) 3.77 - 5.28 10*6/mm3    Hemoglobin 10.6 (L) 12.0 - 15.9 g/dL    Hematocrit 32.5 (L) 34.0 - 46.6 %    MCV 95.0 79.0 - 97.0 fL    MCH 31.0 26.6 - 33.0 pg    MCHC 32.6 31.5 - 35.7 g/dL    RDW 13.9 12.3 - 15.4 %    RDW-SD 48.8 37.0 - 54.0 fl    MPV 10.7 6.0 - 12.0 fL    Platelets 309 140 - 450 10*3/mm3          Imaging:  XR Chest 1 View  Result Date: 3/19/2025  Impression: Multifocal patchy nodular densities in both lungs, thought to be similar to 3/15/2025. Correlate for pneumonia. Electronically Signed: Barb Millan MD  3/19/2025 2:35 PM EDT  Workstation  ID: [id number]    US Renal Bilateral  Result Date: 3/16/2025  Impression: No hydronephrosis or acute sonographic abnormality. Electronically Signed: Tomy Orellana MD  3/16/2025 5:38 PM EDT  Workstation ID: [id number]    NM Lung Ventilation Perfusion  Result Date: 3/16/2025  Impression: Negative for pulmonary embolism. Electronically Signed: Nimesh Dash MD  3/16/2025 3:17 PM EDT  Workstation ID: [id number]    XR Chest 1 View  Result Date: 3/15/2025  Impression: Mild hazy left greater than right basal opacities, which could represent atelectasis or pneumonia. Electronically Signed: Josh Lorenzo  3/15/2025 4:18 PM EDT  Workstation ID: [id number]      Assessment/Plan:     SOB (shortness of breath)         Acute kidney injury on top of chronic kidney disease stage III  Acute hypoxic respiratory failure  Pulmonary fibrosis  Urinary tract infection  Anxiety disorder  Hypertension  Hyperlipidemia  Hypothyroidism  History of von Willebrand disease with no bleeding        Recommendations:  Creatinine is better down to 1.1  less coughing   Lokelma for k 5.3  Now potassium is down to 4.5  Restarted home dose Lasix       Discussed with the patient and family at bedside

## 2025-03-21 NOTE — MBS/VFSS/FEES
Acute Care - Speech Language Pathology   Swallow Initial Evaluation  Jeffrey     Patient Name: Tracie Gross  : 1960  MRN: [id number]  Today's Date: 3/21/2025               Admit Date: 3/15/2025    Visit Dx:     ICD-10-CM ICD-9-CM   1. Dyspnea, unspecified type  R06.00 786.09   2. Pulmonary fibrosis  J84.10 515   3. Hypotension, unspecified hypotension type  I95.9 458.9   4. Urinary tract infection without hematuria, site unspecified  N39.0 599.0     Patient Active Problem List   Diagnosis    Vitamin B12 deficiency    Arthritis    Sleep apnea    Bipolar affective disorder    Depression    Breast cancer    Chronic pain    Dyslipidemia    Family history of malignant neoplasm of colon    Family history of melanoma    Gastroesophageal reflux disease    Heart murmur    Hypertension    Hypothyroidism    Osteoarthritis, generalized    Raynaud's disease    Ulcerative colitis    Artificial knee joint present    Neuralgia    Presence of artificial hip joint, right    Lumbar radiculopathy    Derangement of posterior horn of lateral meniscus    Von Willebrand disease    SLE (systemic lupus erythematosus related syndrome)    Chest pain    Scleroderma    Monahan syndrome    Rectal prolapse    Osteoporosis    COVID-19    Fibromyalgia    Onychomycosis    Stage 3 chronic kidney disease    Avascular necrosis of femoral head, left    Morbid obesity    Trochanteric bursitis of left hip    Pain in both knees    Status post total hip replacement, right    DJD of left shoulder    Complete tear of left rotator cuff    Osteoarthritis of right glenohumeral joint    Pulmonary fibrosis    SOB (shortness of breath)     Past Medical History:   Diagnosis Date    Allergic 1998    Anemia     Ankle sprain     Anxiety     Arthritis     Arthritis of back [id number]    Arthritis of neck 2005    Asthma     Bipolar affective disorder     Breast cancer 1985    s/p R mastectomy    Bursitis of hip [id number]    Cervical disc disorder  2006    CTS (carpal tunnel syndrome)     Depression 2000    Fracture of wrist 1995    Fracture, foot     Frozen shoulder     GERD (gastroesophageal reflux disease)     H/O breast reconstruction     SEVERAL    H/O laminectomy     Hamartoma     Hip arthrosis 2013    History of medical problems     Hx of bilateral oophorectomy     Hyperlipidemia     Hypertension     Hypothyroidism     Infectious viral hepatitis     Inflammatory bowel disease     Man. EGD/colonoscopy annually (2021)    Irritable bowel syndrome     Kienbock's disease, right     WRIST    Knee swelling 2007    Low back pain 1991    Low back strain     Lumbosacral disc disease 1995    Had 2 lumber surgeries    Lupus     [place]    Monahan syndrome     Man. EGD/colonoscopy annually (2021). MRI alternating w/ EUS annually for pancreatic screen    MRSA infection     Neuroma of foot     Obesity     Osteopenia     Osteoporosis     maintained on Prolia through Karen    Peptic ulceration     Periarthritis of shoulder 2022    Pneumonia     Pulmonary fibrosis     Raynaud disease     Renal insufficiency     Rotator cuff syndrome [id number]    Scleroderma     Sleep apnea     cpap    Squamous cell cancer of lip     Tear of meniscus of knee     Tendinitis of knee     Thoracic disc disorder     Von Willebrand disease     Wrist sprain      Past Surgical History:   Procedure Laterality Date    APPENDECTOMY      ARM DEBRIDEMENT Right     X 3    BACK SURGERY      Vetas- cervical fusion    BACK SURGERY      lumbar decomp    BREAST BIOPSY      BREAST LUMPECTOMY      BREAST RECONSTRUCTION Right     BREAST RECONSTRUCTION Right     CARDIAC CATHETERIZATION      no stents placed     SECTION  ,     CHOLECYSTECTOMY      COLONOSCOPY      COLONOSCOPY N/A 2020    Procedure: COLONOSCOPY;  Surgeon: Cayden Conway MD;  Location: [facility] ENDOSCOPY;  Service: Gastroenterology;  Laterality: N/A;   rectal ulcer    COLONOSCOPY N/A 09/17/2021    Procedure: COLONOSCOPY WITH POLYPECTOMY X 1;  Surgeon: Cayden Conway MD;  Location: [facility] ENDOSCOPY;  Service: Gastroenterology;  Laterality: N/A;  Post: COLON POLYP    COLONOSCOPY N/A 01/06/2023    Procedure: COLONOSCOPY with polypectomy x 1, endoscopic clipping x 1;  Surgeon: Cayden Conway MD;  Location: [facility] ENDOSCOPY;  Service: Gastroenterology;  Laterality: N/A;    COLONOSCOPY N/A 10/01/2024    Procedure: COLONOSCOPY;  Surgeon: Cayden Conway MD;  Location: [facility] ENDOSCOPY;  Service: Gastroenterology;  Laterality: N/A;  normal    COSMETIC SURGERY  [id number]    ENDOSCOPY      ENDOSCOPY N/A 08/14/2020    Procedure: ESOPHAGOGASTRODUODENOSCOPY;  Surgeon: Cayden Conway MD;  Location: [facility] ENDOSCOPY;  Service: Gastroenterology;  Laterality: N/A;  Normal EGD    ENDOSCOPY N/A 09/17/2021    Procedure: ESOPHAGOGASTRODUODENOSCOPY;  Surgeon: Cayden Conway MD;  Location: [facility] ENDOSCOPY;  Service: Gastroenterology;  Laterality: N/A;  Post: normal egd    ENDOSCOPY N/A 10/01/2024    Procedure: ESOPHAGOGASTRODUODENOSCOPY;  Surgeon: Cayden Conway MD;  Location: [facility] ENDOSCOPY;  Service: Gastroenterology;  Laterality: N/A;  normal    EXPLORATORY LAPAROTOMY      scar tissue removal    EYE SURGERY  2000    FINGER SURGERY Right     ring finger mass excision    HAND SURGERY  Rt wrist  10/15    HIP SURGERY  1/12    HYSTERECTOMY      PARTIAL    JOINT REPLACEMENT Right 2012,2015    hip and knee    KNEE ARTHROSCOPY Left 01/07/2020    Procedure: LEFT KNEE SCOPE with partial lateral meniscectomy;  Surgeon: Chau Perez MD;  Location: [facility] MAIN OR;  Service: Orthopedics    KNEE SURGERY  Rtf knee  2015    LASIK      LASIK/ LASIK ENHANCEMENT    MASTECTOMY RADICAL Right     NECK SURGERY  01/01/06    OOPHORECTOMY Bilateral     SHOULDER SURGERY  11/01/2023    SPINE SURGERY      SPLENECTOMY      SUBTOTAL HYSTERECTOMY      TOTAL HIP  ARTHROPLASTY Left 05/17/2023    TOTAL SHOULDER ARTHROPLASTY W/ DISTAL CLAVICLE EXCISION Right 11/01/2023    Procedure: TOTAL SHOULDER REVERSE ARTHROPLASTY;  Surgeon: Floyd Ruiz MD;  Location: [facility] MAIN OR;  Service: Orthopedics;  Laterality: Right;    TRIGGER POINT INJECTION  7/23    TUBAL ABDOMINAL LIGATION  1981    UPPER ENDOSCOPIC ULTRASOUND W/ FNA N/A 12/09/2021    Procedure: ENDOSCOPIC ULTRASOUND WITH ESOPHAGOGASTRODUODENOSCOPY;  Surgeon: Luis E Negron MD;  Location: [facility] ENDOSCOPY;  Service: Gastroenterology;  Laterality: N/A;  Post: GASTROPARESIS, DILATED COMMON BILE DUCT, FUNDIC POLYP, DUODENITIS, NORMAL PANCREAS, HIATAL HERNIA    WRIST SURGERY Right     distal radius head removal (bone dead)  plates and screws decomp lunate       SLP Recommendation and Plan  SLP Swallowing Diagnosis: functional oral phase, functional pharyngeal phase (03/21/25 1400)  SLP Diet Recommendation: full liquid diet (03/21/25 1400)  Recommended Precautions and Strategies: upright posture during/after eating, small bites of food and sips of liquid, alternate between small bites of food and sips of liquid, general aspiration precautions, reflux precautions (03/21/25 1400)  SLP Rec. for Method of Medication Administration: meds whole, meds crushed, as tolerated (03/21/25 1400)     Monitor for Signs of Aspiration: yes, notify SLP if any concerns, cough, gurgly voice, throat clearing (03/21/25 1400)  Recommended Diagnostics: reassess via clinical swallow evaluation (03/21/25 1400)  Swallow Criteria for Skilled Therapeutic Interventions Met: demonstrates skilled criteria (03/21/25 1400)     Rehab Potential/Prognosis, Swallowing: good, to achieve stated therapy goals (03/21/25 1400)  Therapy Frequency (Swallow): PRN (03/21/25 1400)  Predicted Duration Therapy Intervention (Days): until discharge (03/21/25 1400)  Oral Care Recommendations: Oral Care BID/PRN (03/21/25 1400)                                                SWALLOW EVALUATION (Last 72 Hours)       SLP Adult Swallow Evaluation       Row Name 03/21/25 1400       Rehab Evaluation    Document Type evaluation  -CP    Subjective Information complains of;nausea/vomiting;weakness  Pt given Zofran and was feeling a little better  -CP    Patient Observations alert;cooperative  -CP    Patient/Family/Caregiver Comments/Observations Pt's  accompanied her to the procedure  -CP    Patient Effort good  -CP       General Information    Patient Profile Reviewed yes  -CP    Pertinent History Of Current Problem Patient is a 64-year-old female with a history of scleroderma, lupus and von Willebrand disease who presented with persistent cough and shortness of breath since the end of December. ST asked to consult for choking episode x 2. Following bedside swlalow eval, VFSS was rec for further eval of swallow.  -CP    Current Method of Nutrition regular textures;thin liquids  -CP    Prior Level of Function-Swallowing no diet consistency restrictions;regular textures;thin liquids  -CP    Plans/Goals Discussed with patient;spouse/S.O.  -CP    Barriers to Rehab none identified  -CP       Pain    Pretreatment Pain Rating 0/10 - no pain  -CP    Posttreatment Pain Rating 0/10 - no pain  -CP       Oral Motor Structure and Function    Dentition Assessment natural, present and adequate  -CP    Secretion Management WNL/WFL  -CP    Mucosal Quality moist, healthy  -CP       Oral Musculature and Cranial Nerve Assessment    Oral Motor General Assessment WFL  -CP       General Eating/Swallowing Observations    Respiratory Support Currently in Use nasal cannula  -CP    O2 Liters 2L  -CP    Eating/Swallowing Skills self-fed;appropriate self-feeding skills observed  -CP    Positioning During Eating upright 90 degree;upright in chair  -CP       MBS/VFSS    Utensils Used spoon;cup;straw  -CP    Consistencies Trialed thin liquids;pureed;soft to chew textures  -CP       MBS/VFSS Interpretation     VFSS Summary Pt was seen for video swallow study due to pt having choking episode on scrambled eggs. Pt has had very little PO intake since this choking incident and further eval of swallow was rec. She was seated upright at 90 degrees in the lateral position. Pt fed herself all trials and spontaneously took small bites and sips. Pt given trials of thin barium by cup x2, barium coated applesauce x3, barium coated peaches x2, thin barium by straw x1 and in rapid consecutive sips x1 and an esophageal scan, to complete the study. Pt has natural dentition in good condition and mastication was timely and efficient, however pt was only obseerved to swalow a portion of the peach bolus due to gagging and expectorating the rest.. Oral transit timely with adequate bolus control on all trials except one trial of applesauce and one trial of peaches, where pt began to gag. She was able to swallow the applesauce after gagging, but as mentioned above, she swallowed one of the peaches, but did have to expectorate the rest of the peach bolus.   Swallow was timely. Pt had adequate laryngeal elevation and anterior hyoid excursion. Epiglottic inversion and laryngeal vestibular closure complete. There was trace, transient penetration of thin liquids during the swallow on all straw sips, but this did completly clear. There was no other penetration or aspiration of any consistency. There was mild residue remaining in the pharyngeal area after the swallow. This spontaneously cleared. An esophageal scan revealed no esophageal retention and good clearing. Pt presents with functional oral and  pharyngeal swallow. Pt's swallow presents as a 2 on the Rosenbeck penetration-Aspiration Scale, indicating that material enters the airway, remains above the vocal folds and is ejected from the airway. It is recommended that pt initiate a full liquid diet as tolerated, Pt may upgrade back to solids as soon as she feels that she can handle solid food  without gagging/vomiting. Education was given to pt regarding the full results and recommendations from this study and she was shown images from the [facility]. ST will follow to assure tolerance of diet and make further recs as indicated.  -CP       SLP Evaluation Clinical Impression    SLP Swallowing Diagnosis functional oral phase;functional pharyngeal phase  -CP    Functional Impact risk of malnutrition  -CP    Rehab Potential/Prognosis, Swallowing good, to achieve stated therapy goals  -CP    Swallow Criteria for Skilled Therapeutic Interventions Met demonstrates skilled criteria  -CP       SLP Treatment Clinical Impressions    Care Plan Review evaluation/treatment results reviewed;care plan/treatment goals reviewed;risks/benefits reviewed;patient/other agree to care plan  -CP    Care Plan Review, Other Participant(s) spouse  -CP       Recommendations    Therapy Frequency (Swallow) PRN  -CP    Predicted Duration Therapy Intervention (Days) until discharge  -CP    SLP Diet Recommendation full liquid diet  -CP    Recommended Diagnostics reassess via clinical swallow evaluation  -CP    Recommended Precautions and Strategies upright posture during/after eating;small bites of food and sips of liquid;alternate between small bites of food and sips of liquid;general aspiration precautions;reflux precautions  -CP    Oral Care Recommendations Oral Care BID/PRN  -CP    SLP Rec. for Method of Medication Administration meds whole;meds crushed;as tolerated  -CP    Monitor for Signs of Aspiration yes;notify SLP if any concerns;cough;gurgly voice;throat clearing  -CP       Swallow Goals (SLP)    Swallow LTGs Swallow Long Term Goal (free text)  -CP    Swallow STGs diet tolerance goal selection (SLP)  -CP    Diet Tolerance Goal Selection (SLP) Swallow Short Term Goal 1;Swallow Short Term Goal 2  -CP       (LTG) Swallow    (LTG) Swallow Pt will maximize swallow function for least restrictive PO diet, exhibiting no complication  associated with dysphagia, adequate PO intake, and demonstrating independent use of swallow compensations  -CP    Time Frame (Swallow Long Term Goal) by discharge  -CP    Barriers (Swallow Long Term Goal) nausea, gagging  -CP       (STG) Swallow 1    (STG) Swallow 1 The patient will participate in a meal/follow-up assessment to determine safety and adequacy of recommended diet, independent use of safe swallow compensations, pt/family education and additional goals/recommendations to follow.  -CP    Time Frame (Swallow Short Term Goal 1) 1 week  -CP    Progress/Outcomes (Swallow Short Term Goal 1) new goal  -CP       (STG) Swallow 2    (STG) Swallow 2 Pt will participate in ongoing swallow assessment to include VFSS if indicated, and caregiver teaching.  -CP    Time Frame (Swallow Short Term Goal 2) 1 week  -CP    Progress/Outcomes (Swallow Short Term Goal 2) goal ongoing  -CP    Comment (Swallow Short Term Goal 2) See above  -CP              User Key  (r) = Recorded By, (t) = Taken By, (c) = Cosigned By      Initials Name Effective Dates    CP Natalie Lawton SLP 06/16/21 -                     EDUCATION  The patient has been educated in the following areas:   Dysphagia (Swallowing Impairment) Oral Care/Hydration Modified Diet Instruction.        SLP GOALS       Row Name 03/21/25 1400             (LTG) Swallow    (LTG) Swallow Pt will maximize swallow function for least restrictive PO diet, exhibiting no complication associated with dysphagia, adequate PO intake, and demonstrating independent use of swallow compensations  -CP      Time Frame (Swallow Long Term Goal) by discharge  -CP      Barriers (Swallow Long Term Goal) nausea, gagging  -CP         (STG) Swallow 1    (STG) Swallow 1 The patient will participate in a meal/follow-up assessment to determine safety and adequacy of recommended diet, independent use of safe swallow compensations, pt/family education and additional goals/recommendations to follow.  -CP       Time Frame (Swallow Short Term Goal 1) 1 week  -CP      Progress/Outcomes (Swallow Short Term Goal 1) new goal  -CP         (STG) Swallow 2    (STG) Swallow 2 Pt will participate in ongoing swallow assessment to include VFSS if indicated, and caregiver teaching.  -CP      Time Frame (Swallow Short Term Goal 2) 1 week  -CP      Progress/Outcomes (Swallow Short Term Goal 2) goal ongoing  -CP      Comment (Swallow Short Term Goal 2) See above  -CP                User Key  (r) = Recorded By, (t) = Taken By, (c) = Cosigned By      Initials Name Provider Type    CP Natalie Lawton, SLP Speech and Language Pathologist                         Time Calculation:                LESLEY Williamson  3/21/2025

## 2025-03-21 NOTE — PLAN OF CARE
Goal Outcome Evaluation:   Pt. OOR this AM, not available for tx, will follow up as appropriate.

## 2025-03-21 NOTE — PROGRESS NOTES
LOS: 6 days   Patient Care Team:  Floyd Silva MD as PCP - General (Internal Medicine)  Suzan Jones MD as Consulting Physician (Hematology and Oncology)  Isaac Vazquez MD as Consulting Physician (Pulmonary Disease)  Marisol Salazar MD as Consulting Physician (Rheumatology)  Tiffanie Chaves, RN as Ambulatory  ([facility])      Subjective     states he believes she is scared she is going to choke so she is not eating    Interval History:   Patient resting comfortably in bed.   Waiting speech therapy eval  LABS: Sodium and potassium are normal.  Creatinine is about the same at 1.19.  WBCs 15.7 (11.54), hemoglobin 10.6, platelets 309.      ROS:   No chest pain, less shortness of breath.  Less severe and less frequent cough     Medication Review:     Current Facility-Administered Medications:     acetaminophen (TYLENOL) tablet 650 mg, 650 mg, Oral, Q4H PRN, 650 mg at 03/20/25 0535 **OR** acetaminophen (TYLENOL) 160 MG/5ML oral solution 650 mg, 650 mg, Oral, Q4H PRN **OR** acetaminophen (TYLENOL) suppository 650 mg, 650 mg, Rectal, Q4H PRN, Miguel Oates MD    ALPRAZolam (XANAX) tablet 1 mg, 1 mg, Oral, Nightly PRN, Miguel aOtes MD, 1 mg at 03/20/25 2218    aluminum-magnesium hydroxide-simethicone (MAALOX MAX) 400-400-40 MG/5ML suspension 15 mL, 15 mL, Oral, Q6H PRN, Miguel Oates MD    benzonatate (TESSALON) capsule 200 mg, 200 mg, Oral, TID, NwaoSanta kahn MD, 200 mg at 03/21/25 0909    sennosides-docusate (PERICOLACE) 8.6-50 MG per tablet 2 tablet, 2 tablet, Oral, BID PRN **AND** polyethylene glycol (MIRALAX) packet 17 g, 17 g, Oral, Daily PRN **AND** bisacodyl (DULCOLAX) EC tablet 5 mg, 5 mg, Oral, Daily PRN **AND** bisacodyl (DULCOLAX) suppository 10 mg, 10 mg, Rectal, Daily PRN, Miguel Oates MD    budesonide (PULMICORT) nebulizer solution 0.5 mg, 0.5 mg, Nebulization, BID - RT, Miguel Oates MD, 0.5 mg at 03/21/25 0721    calcium 500 mg vitamin D 5 mcg (200  UT) per tablet 1 tablet, 1 tablet, Oral, BID, Miguel Oates MD, 1 tablet at 03/21/25 0909    Calcium Replacement - Follow Nurse / BPA Driven Protocol, , Not Applicable, PRN, Miguel Oates MD    cefTRIAXone (ROCEPHIN) 2,000 mg in sodium chloride 0.9 % 100 mL MBP, 2,000 mg, Intravenous, Q24H, Draw, MD Isaac, Last Rate: 200 mL/hr at 03/20/25 1700, 2,000 mg at 03/20/25 1700    cetirizine (zyrTEC) tablet 10 mg, 10 mg, Oral, Daily, Miguel Oates MD, 10 mg at 03/21/25 0910    cyclobenzaprine (FLEXERIL) tablet 10 mg, 10 mg, Oral, TID PRN, Miguel Oates MD    furosemide (LASIX) tablet 20 mg, 20 mg, Oral, Daily, Any Manzanares MD, 20 mg at 03/21/25 0909    guaiFENesin (MUCINEX) 12 hr tablet 600 mg, 600 mg, Oral, Q12H, Santa Pierre MD, 600 mg at 03/21/25 0909    guaiFENesin-codeine (ROMILAR-AC) solution 10 mL, 10 mL, Oral, Q4H PRN, Santa Pierre MD, 10 mL at 03/20/25 1959    hydroxychloroquine (PLAQUENIL) tablet 200 mg, 200 mg, Oral, BID, Miguel Oates MD, 200 mg at 03/21/25 0910    ipratropium-albuterol (DUO-NEB) nebulizer solution 3 mL, 3 mL, Nebulization, Q4H PRN, Miguel Oates MD, 3 mL at 03/18/25 1104    lamoTRIgine (LaMICtal) tablet 200 mg, 200 mg, Oral, Nightly, Miguel Oates MD, 200 mg at 03/20/25 2217    levothyroxine (SYNTHROID, LEVOTHROID) tablet 175 mcg, 175 mcg, Oral, Q AM, Miguel Oates MD, 175 mcg at 03/21/25 0538    Magnesium Standard Dose Replacement - Follow Nurse / BPA Driven Protocol, , Not Applicable, PRN, Miguel Oates MD    meclizine (ANTIVERT) tablet 25 mg, 25 mg, Oral, TID PRN, Miguel Oates MD    melatonin tablet 5 mg, 5 mg, Oral, Nightly PRN, Miguel Oates MD    methylPREDNISolone sodium succinate (SOLU-Medrol) injection 40 mg, 40 mg, Intravenous, Q8H, Miguel Oates MD, 40 mg at 03/21/25 0538    morphine injection 2 mg, 2 mg, Intravenous, Nightly PRN, Isaac Vazquez MD, 2 mg at 03/20/25 1959    [Held by provider] mycophenolate (CELLCEPT) capsule 500 mg, 500 mg, Oral, Q12H, Miguel Oates MD, 500 mg at 03/16/25 0902     nitroglycerin (NITROSTAT) SL tablet 0.4 mg, 0.4 mg, Sublingual, Q5 Min PRBRAYAN, Miguel Oates MD    ondansetron ODT (ZOFRAN-ODT) disintegrating tablet 4 mg, 4 mg, Oral, Q6H PRN, 4 mg at 03/21/25 0910 **OR** [DISCONTINUED] ondansetron (ZOFRAN) injection 4 mg, 4 mg, Intravenous, Q6H PRN, Miguel Oates MD    oxyCODONE (ROXICODONE) immediate release tablet 10 mg, 10 mg, Oral, Q6H PRN, Miguel Oates MD, 10 mg at 03/16/25 1304    Phosphorus Replacement - Follow Nurse / BPA Driven Protocol, , Not Applicable, Иван LAMAS Yadana, MD    Potassium Replacement - Follow Nurse / BPA Driven Protocol, , Not Applicable, Иван LAMAS Yadana, MD    QUEtiapine (SEROquel) tablet 50 mg, 50 mg, Oral, Nightly, Miguel Oates MD, 50 mg at 03/20/25 2217    [COMPLETED] Insert Peripheral IV, , , Once **AND** sodium chloride 0.9 % flush 10 mL, 10 mL, Intravenous, PRN, Brooks Wheatley MD, 10 mL at 03/15/25 1622    sodium chloride 0.9 % flush 10 mL, 10 mL, Intravenous, Q12H, Miguel Oates MD, 10 mL at 03/21/25 0914    sodium chloride 0.9 % flush 10 mL, 10 mL, Intravenous, PRNИван Yadana, MD    sodium chloride 0.9 % infusion 40 mL, 40 mL, Intravenous, PRBRAYAN, Miguel Oates MD      Objective chronically ill-appearing but no acute distress,  at bedside  Vital Signs  Vitals:    03/21/25 0721 03/21/25 0724 03/21/25 0733 03/21/25 0909   BP:   141/81 154/88   BP Location:       Patient Position:       Pulse: 79 91 92 87   Resp: 19 18 16    Temp:   97.7 °F (36.5 °C)    TempSrc:   Oral    SpO2: 95% 94% 94%    Weight:       Height:           Physical Exam:   General Appearance:    Awake and alert, in no acute distress, obese   Head:    Normocephalic, without obvious abnormality   Eyes:          Conjunctivae normal, anicteric sclera   Throat:   No oral lesions, no thrush, oral mucosa moist   Neck:   supple, no JVD   Lungs:     respirations regular, even and unlabored, on O2   Abdomen:   Nondistended   Rectal:     Deferred   Extremities:    no cyanosis   Skin:   No obvious  bruising or rash, no jaundice        Results Review:    Results from last 7 days   Lab Units 03/21/25  0043 03/20/25  0526 03/19/25  0338 03/18/25  0459 03/17/25 0138 03/16/25  0101 03/15/25  1621   WBC 10*3/mm3 15.71* 11.54* 9.34 8.59 10.50 12.44* 12.82*   HEMOGLOBIN g/dL 10.6* 10.4* 10.2* 10.2* 9.9* 10.2* 11.0*   PLATELETS 10*3/mm3 309 329 291 300 286 279 301     Results from last 7 days   Lab Units 03/21/25  0043 03/20/25  0526 03/20/25  0500 03/19/25 0338 03/18/25 0459 03/17/25 0138 03/16/25  2042 03/16/25  0101   SODIUM mmol/L 138  --  137 133* 131* 131* 133* 135*   POTASSIUM mmol/L 4.5  --  5.3* 5.2 4.5 4.6 4.8 4.7   CHLORIDE mmol/L 102  --  103 100 99 97* 97* 98   CO2 mmol/L 28.3  --  22.7 22.8 20.3* 20.7* 19.4* 23.5   BUN mg/dL 27*  --  27* 29* 38* 42* 40* 31*   CREATININE mg/dL 1.19*  --  1.14* 1.20* 1.48* 2.02* 2.16* 2.31*   GLUCOSE mg/dL 117*  --  126* 142* 142* 153* 120* 97   ALBUMIN g/dL  --   --  3.5  --   --   --   --   --    BILIRUBIN mg/dL  --   --  0.2  --   --   --   --   --    ALK PHOS U/L  --   --  80  --   --   --   --   --    AST (SGOT) U/L  --   --  35*  --   --   --   --   --    ALT (SGPT) U/L  --   --  12  --   --   --   --   --    INR   --  1.20*  --   --   --   --   --   --    MAGNESIUM mg/dL  --   --   --   --  2.6* 2.7*  --  2.4   PHOSPHORUS mg/dL  --   --   --   --  3.4 5.0*  --  5.1*     Estimated Creatinine Clearance: 57 mL/min (A) (by C-G formula based on SCr of 1.19 mg/dL (H)).  Lab Results   Component Value Date    HGBA1C 5.40 07/03/2024    HGBA1C 5.60 10/24/2023    HGBA1C 5.30 05/24/2023     Results from last 7 days   Lab Units 03/17/25  0138   CK TOTAL U/L 94     Infection   Results from last 7 days   Lab Units 03/18/25  1633 03/17/25  1220 03/17/25  1201 03/15/25  1621 03/15/25  1610   BLOODCX   --  No growth at 3 days No growth at 3 days  --   --    RESPCX  Scant growth (1+) Normal respiratory miles. No S. aureus or Pseudomonas aeruginosa detected. Final report.  --   --    --   --    URINECX   --   --   --   --  >100,000 CFU/mL Normal Urogenital Miles   PROCALCITONIN ng/mL  --   --   --  0.24  --      UA    Results from last 7 days   Lab Units 03/15/25  1610   NITRITE UA  Negative   WBC UA /HPF 0-2   BACTERIA UA /HPF 4+*   SQUAM EPITHEL UA /HPF 3-6*   URINECX  >100,000 CFU/mL Normal Urogenital Miles     Microbiology Results (last 10 days)       Procedure Component Value - Date/Time    Respiratory Culture - Sputum, Cough [[id number]] Collected: 03/18/25 1633    Lab Status: Final result Specimen: Sputum from Cough Updated: 03/20/25 0736     Respiratory Culture Scant growth (1+) Normal respiratory miles. No S. aureus or Pseudomonas aeruginosa detected. Final report.     Gram Stain Moderate (3+) WBCs seen      Few (2+) Epithelial cells seen      Rare (1+) Gram positive cocci    Legionella Antigen, Urine - Urine, Urine, Clean Catch [[id number]]  (Normal) Collected: 03/17/25 1613    Lab Status: Final result Specimen: Urine, Clean Catch Updated: 03/17/25 1723     LEGIONELLA ANTIGEN, URINE Negative    Blood Culture - Blood, Wrist, Left [[id number]]  (Normal) Collected: 03/17/25 1220    Lab Status: Preliminary result Specimen: Blood from Wrist, Left Updated: 03/20/25 1230     Blood Culture No growth at 3 days    Narrative:      Less than seven (7) mL's of blood was collected.  Insufficient quantity may yield false negative results.    Blood Culture - Blood, Hand, Left [[id number]]  (Normal) Collected: 03/17/25 1201    Lab Status: Preliminary result Specimen: Blood from Hand, Left Updated: 03/20/25 1230     Blood Culture No growth at 3 days    Narrative:      Less than seven (7) mL's of blood was collected.  Insufficient quantity may yield false negative results.    Respiratory Panel PCR w/COVID-19(SARS-CoV-2) GAVI/RAJIV/AUSTEN/PAD/COR/APRIL In-House, NP Swab in UTM/VTM, 2 HR TAT - Swab, Nasopharynx [[id number]]  (Normal) Collected: 03/15/25 1610    Lab Status: Final result Specimen: Swab from Nasopharynx  Updated: 03/15/25 1720     ADENOVIRUS, PCR Not Detected     Coronavirus 229E Not Detected     Coronavirus HKU1 Not Detected     Coronavirus NL63 Not Detected     Coronavirus OC43 Not Detected     COVID19 Not Detected     Human Metapneumovirus Not Detected     Human Rhinovirus/Enterovirus Not Detected     Influenza A PCR Not Detected     Influenza B PCR Not Detected     Parainfluenza Virus 1 Not Detected     Parainfluenza Virus 2 Not Detected     Parainfluenza Virus 3 Not Detected     Parainfluenza Virus 4 Not Detected     RSV, PCR Not Detected     Bordetella pertussis pcr Not Detected     Bordetella parapertussis PCR Not Detected     Chlamydophila pneumoniae PCR Not Detected     Mycoplasma pneumo by PCR Not Detected    Narrative:      In the setting of a positive respiratory panel with a viral infection PLUS a negative procalcitonin without other underlying concern for bacterial infection, consider observing off antibiotics or discontinuation of antibiotics and continue supportive care. If the respiratory panel is positive for atypical bacterial infection (Bordetella pertussis, Chlamydophila pneumoniae, or Mycoplasma pneumoniae), consider antibiotic de-escalation to target atypical bacterial infection.    Urine Culture - Urine, Urine, Clean Catch [[id number]] Collected: 03/15/25 1610    Lab Status: Final result Specimen: Urine, Clean Catch Updated: 03/17/25 1116     Urine Culture >100,000 CFU/mL Normal Urogenital Rima    Narrative:      Colonization of the urinary tract without infection is common. Treatment is discouraged unless the patient is symptomatic, pregnant, or undergoing an invasive urologic procedure.    Legionella Antigen, Urine - Urine, Urine, Clean Catch [[id number]]  (Normal) Collected: 03/15/25 1610    Lab Status: Final result Specimen: Urine, Clean Catch Updated: 03/16/25 1022     LEGIONELLA ANTIGEN, URINE Negative    S. Pneumo Ag Urine or CSF - Urine, Urine, Clean Catch [[id number]]  (Abnormal)  Collected: 03/15/25 1610    Lab Status: Final result Specimen: Urine, Clean Catch Updated: 03/16/25 1022     Strep Pneumo Ag Positive          Imaging Results (Last 72 Hours)       Procedure Component Value Units Date/Time    XR Chest 1 View [[id number]] Collected: 03/19/25 1432     Updated: 03/19/25 1437    Narrative:      XR CHEST 1 VW    Date of Exam: 3/19/2025 2:08 PM EDT    Indication: Chest pain    Comparison: AP chest 3/15/2025.    Findings:  Low volume inspiration. Ill-defined patchy nodular densities are scattered within both lungs. No significant pleural fluid collection is identified. Heart size is within normal limits. Right shoulder replacement. Anterior cervical spinal fusion. Multiple   surgical clips in the left upper quadrant of the abdomen. Surgical clips project over the right axilla.      Impression:      Impression:  Multifocal patchy nodular densities in both lungs, thought to be similar to 3/15/2025. Correlate for pneumonia.      Electronically Signed: Barb Millan MD    3/19/2025 2:35 PM EDT    Workstation ID: [id number]            Assessment & Plan   Patient is a 64-year-old female with a history of scleroderma, lupus and von Willebrand disease who presented with persistent cough and shortness of breath since the end of December.  GI asked to consult for choking episode x 2.     Choking episode x 2  Nausea with anorexia  Dyspnea with cough -hypoxic respiratory insufficiency  Abnormal CT of the colon  Vertigo  Scleroderma  Normocytic anemia  Lupus  Monahan syndrome/history of breast cancer  Von Willebrand disease  History of cholecystectomy     PLAN:  Nausea is under better control today.  Patient is agreeable to video swallow.  Will attempt this today.   states today that he believes her refusal to eat or drink is due to anxiety and fear of choking again.  Will start amitriptyline tonight.  Negative scopes recently.  No need to repeat.      Electronically signed by JADEN Bryant,  03/21/25, 10:58 AM EDT.

## 2025-03-21 NOTE — PLAN OF CARE
Pt received prn meds for cough and pain. Pt spent most of shift up in recliner, pt stated that it helped her breathing and cough.   Problem: Adult Inpatient Plan of Care  Goal: Plan of Care Review  Outcome: Progressing  Goal: Patient-Specific Goal (Individualized)  Outcome: Progressing  Goal: Absence of Hospital-Acquired Illness or Injury  Outcome: Progressing  Intervention: Identify and Manage Fall Risk  Intervention: Prevent Skin Injury  Intervention: Prevent and Manage VTE (Venous Thromboembolism) Risk  Intervention: Prevent Infection  Goal: Optimal Comfort and Wellbeing  Outcome: Progressing  Intervention: Monitor Pain and Promote Comfort  Intervention: Provide Person-Centered Care  Goal: Readiness for Transition of Care  Outcome: Progressing     Problem: Skin Injury Risk Increased  Goal: Skin Health and Integrity  Outcome: Progressing  Intervention: Optimize Skin Protection     Problem: Comorbidity Management  Goal: Maintenance of Behavioral Health Symptom Control  Outcome: Progressing  Goal: Blood Pressure in Desired Range  Outcome: Progressing  Goal: Maintenance of Osteoarthritis Symptom Control  Outcome: Progressing  Intervention: Maintain Osteoarthritis Symptom Control     Problem: Fall Injury Risk  Goal: Absence of Fall and Fall-Related Injury  Outcome: Progressing  Intervention: Identify and Manage Contributors  Intervention: Promote Injury-Free Environment   Goal Outcome Evaluation:

## 2025-03-21 NOTE — SIGNIFICANT NOTE
03/21/25 1542   OTHER   Discipline physical therapist   Rehab Time/Intention   Session Not Performed other (see comments)  (Pt reports cough worsening after swallow evaluation, declines therapy at this time ~ 1335.)   Therapy Assessment/Plan (PT)   Criteria for Skilled Interventions Met (PT) yes;meets criteria   Recommendation   PT - Next Appointment 03/23/25

## 2025-03-21 NOTE — PROGRESS NOTES
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 No wolf honeycombing fibrosis  Patient was treated with mycophenolate, prophylactic Bactrim     PFT 1/14/2025   FVC 1 1.6 L/51%,  FEV1 1.4 L / 58%, ratio 88, TLC 57%, RV 50%, DLCO 51%     PFTs July 2021,   FEV1 1.7 L 68% improve 75% post bronchodilator, FEV1/ FVC ratio 87, TLC 72%, RV 74%, DLCO 56%     PFTs in 2017   FEV1 66-70% predicted which is unchanged since 2012  quit smoking 1994,      PFT 2012 and 2017,   FEV-1 and TLC 75% , suggestive of mild to mod restrictive lung disease     2D echo  2018 normal RVSP and EF  2D echo May 2020 no pulmonary hypertension   2d echo July 2021 RVSP 30  2D echo January 2025 no pulmonary hypertension EF 60%     FERN, AHI 14.5 ,       No Response to inhalers including Breztri and Trelegy     Recommendations:    Oxygen titration currently on 2 L  Antibiotics Rocephin 2 g IV daily for 5 days  Azithromycin 500 mg p.o. daily for 3 days        Seen by GI for recurrent \" chocking\"     Hold mycophenolate     Bronchodilators Pulmicort and DuoNeb     On Plaquenil 200 mg twice daily              Chest x-ray 3/15/2025      VQ scan 3/16/2025       High-res CT scan January 2025             LOS: 6 days     Subjective         Objective     Vital signs for last 24 hours:  Vitals:    03/21/25 0721 03/21/25 0724 03/21/25 0733 03/21/25 0909   BP:   141/81 154/88   BP Location:       Patient Position:       Pulse: 79 91 92 87   Resp: 19 18 16    Temp:   97.7 °F (36.5 °C)    TempSrc:   Oral    SpO2: 95% 94% 94%    Weight:       Height:           Intake/Output last 3 shifts:  I/O last 3 completed shifts:  In: 360 [P.O.:360]  Out: 1200 [Urine:1200]  Intake/Output this shift:  No intake/output data " recorded.      Radiology  Imaging Results (Last 24 Hours)       ** No results found for the last 24 hours. **            Labs:  Results from last 7 days   Lab Units 03/21/25  0043   WBC 10*3/mm3 15.71*   HEMOGLOBIN g/dL 10.6*   HEMATOCRIT % 32.5*   PLATELETS 10*3/mm3 309     Results from last 7 days   Lab Units 03/21/25  0043 03/20/25  0500   SODIUM mmol/L 138 137   POTASSIUM mmol/L 4.5 5.3*   CHLORIDE mmol/L 102 103   CO2 mmol/L 28.3 22.7   BUN mg/dL 27* 27*   CREATININE mg/dL 1.19* 1.14*   CALCIUM mg/dL 9.9 9.5   BILIRUBIN mg/dL  --  0.2   ALK PHOS U/L  --  80   ALT (SGPT) U/L  --  12   AST (SGOT) U/L  --  35*   GLUCOSE mg/dL 117* 126*     Results from last 7 days   Lab Units 03/15/25  1549   PH, ARTERIAL pH units 7.392   PO2 ART mm Hg 52.9*   PCO2, ARTERIAL mm Hg 44.7   HCO3 ART mmol/L 27.2     Results from last 7 days   Lab Units 03/20/25  0500   ALBUMIN g/dL 3.5     Results from last 7 days   Lab Units 03/17/25  0138   CK TOTAL U/L 94         Results from last 7 days   Lab Units 03/18/25  0459   MAGNESIUM mg/dL 2.6*     Results from last 7 days   Lab Units 03/20/25  0526   INR  1.20*               Meds:   SCHEDULE  benzonatate, 200 mg, Oral, TID  budesonide, 0.5 mg, Nebulization, BID - RT  calcium 500 mg vitamin D 5 mcg (200 UT), 1 tablet, Oral, BID  cefTRIAXone, 2,000 mg, Intravenous, Q24H  cetirizine, 10 mg, Oral, Daily  furosemide, 20 mg, Oral, Daily  guaiFENesin, 600 mg, Oral, Q12H  hydroxychloroquine, 200 mg, Oral, BID  lamoTRIgine, 200 mg, Oral, Nightly  levothyroxine, 175 mcg, Oral, Q AM  methylPREDNISolone sodium succinate, 40 mg, Intravenous, Q8H  [Held by provider] mycophenolate, 500 mg, Oral, Q12H  QUEtiapine, 50 mg, Oral, Nightly  sodium chloride, 10 mL, Intravenous, Q12H      Infusions       PRNs    acetaminophen **OR** acetaminophen **OR** acetaminophen    ALPRAZolam    aluminum-magnesium hydroxide-simethicone    senna-docusate sodium **AND** polyethylene glycol **AND** bisacodyl **AND**  bisacodyl    Calcium Replacement - Follow Nurse / BPA Driven Protocol    cyclobenzaprine    guaiFENesin-codeine    ipratropium-albuterol    Magnesium Standard Dose Replacement - Follow Nurse / BPA Driven Protocol    meclizine    melatonin    Morphine    nitroglycerin    ondansetron ODT **OR** [DISCONTINUED] ondansetron    oxyCODONE    Phosphorus Replacement - Follow Nurse / BPA Driven Protocol    Potassium Replacement - Follow Nurse / BPA Driven Protocol    [COMPLETED] Insert Peripheral IV **AND** sodium chloride    sodium chloride    sodium chloride    Physical Exam:  Physical Exam  Cardiovascular:      Heart sounds: Murmur heard.      No gallop.   Pulmonary:      Effort: No respiratory distress.      Breath sounds: No stridor. Rhonchi and rales present. No wheezing.   Chest:      Chest wall: No tenderness.         ROS  Review of Systems   Respiratory:  Positive for cough and shortness of breath. Negative for wheezing and stridor.    Cardiovascular:  Positive for palpitations. Negative for chest pain and leg swelling.             Total critical care time spent with patient greater than: 45 Minutes

## 2025-03-21 NOTE — CASE MANAGEMENT/SOCIAL WORK
Continued Stay Note  [facility]     Patient Name: Tracie Gross  MRN: [id number]  Today's Date: 3/21/2025    Admit Date: 3/15/2025    Plan: [place] vs [facility] (accepted, skilled). PASRR triggered level 2 on 3/19 (allow 5-7 business days for determination). Precert required.   Discharge Plan       Row Name 03/21/25 1504       Plan    Plan [place] vs [facility] (accepted, skilled). PASRR triggered level 2 on 3/19 (allow 5-7 business days for determination). Precert required.    Plan Comments Char with [facility] confirmed acceptance.      Row Name 03/21/25 1437       Plan    Plan Comments CM met with patient and spouse at bedside and they requested referral be sent to [facility] as well. CM sent inbasket and notified liaison Char.             Gabriela Bermeo RN     [facility]  Office: [phone]  Cell: [phone]  Fax # [phone]

## 2025-03-21 NOTE — CASE MANAGEMENT/SOCIAL WORK
Continued Stay Note  [facility]     Patient Name: Tracie Gross  MRN: [id number]  Today's Date: 3/21/2025    Admit Date: 3/15/2025    Plan: [place] [facility] (skilled, accepted). PASRR triggered level 2 on 3/19 (allow 5-7 business days for determination). Precert required.   Discharge Plan       Row Name 03/21/25 0802       Plan    Plan Comments DC Barriers: 2L O2, Cr 1.19, IV abx, IV steroids, urine strep pneumo antigen+, incessant cough-supportive mgmt, neph following             Gabriela Bermeo RN     [facility]  Office: [phone]  Cell: [phone]  Fax # [phone]

## 2025-03-22 LAB
ANION GAP SERPL CALCULATED.3IONS-SCNC: 10.4 MMOL/L (ref 5–15)
BACTERIA SPEC AEROBE CULT: NORMAL
BACTERIA SPEC AEROBE CULT: NORMAL
BUN SERPL-MCNC: 32 MG/DL (ref 8–23)
BUN/CREAT SERPL: 25.4 (ref 7–25)
CALCIUM SPEC-SCNC: 10.3 MG/DL (ref 8.6–10.5)
CHLORIDE SERPL-SCNC: 102 MMOL/L (ref 98–107)
CO2 SERPL-SCNC: 28.6 MMOL/L (ref 22–29)
CREAT SERPL-MCNC: 1.26 MG/DL (ref 0.57–1)
DEPRECATED RDW RBC AUTO: 48 FL (ref 37–54)
EGFRCR SERPLBLD CKD-EPI 2021: 47.2 ML/MIN/1.73
ERYTHROCYTE [DISTWIDTH] IN BLOOD BY AUTOMATED COUNT: 13.7 % (ref 12.3–15.4)
GLUCOSE SERPL-MCNC: 136 MG/DL (ref 65–99)
HCT VFR BLD AUTO: 33.6 % (ref 34–46.6)
HGB BLD-MCNC: 10.8 G/DL (ref 12–15.9)
MCH RBC QN AUTO: 30.5 PG (ref 26.6–33)
MCHC RBC AUTO-ENTMCNC: 32.1 G/DL (ref 31.5–35.7)
MCV RBC AUTO: 94.9 FL (ref 79–97)
PLATELET # BLD AUTO: 296 10*3/MM3 (ref 140–450)
PMV BLD AUTO: 11.1 FL (ref 6–12)
POTASSIUM SERPL-SCNC: 4.7 MMOL/L (ref 3.5–5.2)
RBC # BLD AUTO: 3.54 10*6/MM3 (ref 3.77–5.28)
SODIUM SERPL-SCNC: 141 MMOL/L (ref 136–145)
WBC NRBC COR # BLD AUTO: 15.83 10*3/MM3 (ref 3.4–10.8)

## 2025-03-22 PROCEDURE — 94761 N-INVAS EAR/PLS OXIMETRY MLT: CPT

## 2025-03-22 PROCEDURE — 94799 UNLISTED PULMONARY SVC/PX: CPT

## 2025-03-22 PROCEDURE — 25010000002 METHYLPREDNISOLONE PER 40 MG: Performed by: INTERNAL MEDICINE

## 2025-03-22 PROCEDURE — 94664 DEMO&/EVAL PT USE INHALER: CPT

## 2025-03-22 PROCEDURE — 85027 COMPLETE CBC AUTOMATED: CPT | Performed by: INTERNAL MEDICINE

## 2025-03-22 PROCEDURE — 80048 BASIC METABOLIC PNL TOTAL CA: CPT | Performed by: INTERNAL MEDICINE

## 2025-03-22 RX ADMIN — Medication 1 TABLET: at 20:13

## 2025-03-22 RX ADMIN — GUAIFENESIN 600 MG: 600 TABLET, MULTILAYER, EXTENDED RELEASE ORAL at 20:13

## 2025-03-22 RX ADMIN — HYDROXYCHLOROQUINE SULFATE 200 MG: 200 TABLET ORAL at 20:13

## 2025-03-22 RX ADMIN — BENZONATATE 200 MG: 100 CAPSULE ORAL at 08:15

## 2025-03-22 RX ADMIN — BENZONATATE 200 MG: 100 CAPSULE ORAL at 15:11

## 2025-03-22 RX ADMIN — LAMOTRIGINE 200 MG: 100 TABLET ORAL at 20:13

## 2025-03-22 RX ADMIN — QUETIAPINE FUMARATE 50 MG: 25 TABLET ORAL at 20:13

## 2025-03-22 RX ADMIN — Medication 10 ML: at 08:16

## 2025-03-22 RX ADMIN — METHYLPREDNISOLONE SODIUM SUCCINATE 40 MG: 40 INJECTION, POWDER, FOR SOLUTION INTRAMUSCULAR; INTRAVENOUS at 05:44

## 2025-03-22 RX ADMIN — HYDROXYCHLOROQUINE SULFATE 200 MG: 200 TABLET ORAL at 08:15

## 2025-03-22 RX ADMIN — BENZONATATE 200 MG: 100 CAPSULE ORAL at 20:13

## 2025-03-22 RX ADMIN — CETIRIZINE HYDROCHLORIDE 10 MG: 10 TABLET, FILM COATED ORAL at 08:15

## 2025-03-22 RX ADMIN — GUAIFENESIN AND CODEINE PHOSPHATE 10 ML: 100; 10 SOLUTION ORAL at 22:35

## 2025-03-22 RX ADMIN — FUROSEMIDE 20 MG: 20 TABLET ORAL at 08:15

## 2025-03-22 RX ADMIN — AMITRIPTYLINE HYDROCHLORIDE 25 MG: 25 TABLET, FILM COATED ORAL at 20:13

## 2025-03-22 RX ADMIN — LEVOTHYROXINE SODIUM 175 MCG: 175 TABLET ORAL at 05:44

## 2025-03-22 RX ADMIN — GUAIFENESIN 600 MG: 600 TABLET, MULTILAYER, EXTENDED RELEASE ORAL at 08:15

## 2025-03-22 RX ADMIN — BUDESONIDE 0.5 MG: 0.5 INHALANT RESPIRATORY (INHALATION) at 20:29

## 2025-03-22 RX ADMIN — OXYCODONE HYDROCHLORIDE 10 MG: 5 TABLET ORAL at 22:35

## 2025-03-22 RX ADMIN — Medication 1 TABLET: at 08:15

## 2025-03-22 RX ADMIN — GUAIFENESIN AND CODEINE PHOSPHATE 10 ML: 100; 10 SOLUTION ORAL at 15:58

## 2025-03-22 RX ADMIN — BUDESONIDE 0.5 MG: 0.5 INHALANT RESPIRATORY (INHALATION) at 07:36

## 2025-03-22 NOTE — PROGRESS NOTES
" LOS: 7 days   Patient Care Team:  Floyd Silva MD as PCP - General (Internal Medicine)  Suzan Jones MD as Consulting Physician (Hematology and Oncology)  Isaac Vazquez MD as Consulting Physician (Pulmonary Disease)  Marisol Salazar MD as Consulting Physician (Rheumatology)  Tiffanie Chaves, RN as Ambulatory  ([facility])      Subjective    \"I did pretty good this morning. I actually woke up hungry this morning\"    Interval History:   LABS: Sodium and potassium are normal.  Creatinine 1.26.  WBCs 15.83, hemoglobin 10.8, platelets 296.  Speech therapy evaluated patient she tolerated liquids but did have material into the airway but remained above vocal cords and was ejected from airway.  Is able to upgrade to solids as soon as she feels able.  Patient ate ice cream, orange juice and Coke this morning without choking.    ROS:   No chest pain, less shortness of breath.  Less severe and less frequent cough     Medication Review:     Current Facility-Administered Medications:     acetaminophen (TYLENOL) tablet 650 mg, 650 mg, Oral, Q4H PRN, 650 mg at 03/20/25 0535 **OR** acetaminophen (TYLENOL) 160 MG/5ML oral solution 650 mg, 650 mg, Oral, Q4H PRN **OR** acetaminophen (TYLENOL) suppository 650 mg, 650 mg, Rectal, Q4H PRN, Miguel Oates MD    ALPRAZolam (XANAX) tablet 1 mg, 1 mg, Oral, Nightly PRN, Miguel Oates MD, 1 mg at 03/21/25 2158    aluminum-magnesium hydroxide-simethicone (MAALOX MAX) 400-400-40 MG/5ML suspension 15 mL, 15 mL, Oral, Q6H PRN, Miguel Oates MD    amitriptyline (ELAVIL) tablet 25 mg, 25 mg, Oral, Nightly, Audrey Bal APRN, 25 mg at 03/21/25 2000    benzonatate (TESSALON) capsule 200 mg, 200 mg, Oral, TID, Santa Pierre MD, 200 mg at 03/22/25 0815    sennosides-docusate (PERICOLACE) 8.6-50 MG per tablet 2 tablet, 2 tablet, Oral, BID PRN **AND** polyethylene glycol (MIRALAX) packet 17 g, 17 g, Oral, Daily PRN **AND** bisacodyl (DULCOLAX) EC " tablet 5 mg, 5 mg, Oral, Daily PRN **AND** bisacodyl (DULCOLAX) suppository 10 mg, 10 mg, Rectal, Daily PRN, Miguel Oates MD    budesonide (PULMICORT) nebulizer solution 0.5 mg, 0.5 mg, Nebulization, BID - RT, Miguel Oates MD, 0.5 mg at 03/22/25 0736    calcium 500 mg vitamin D 5 mcg (200 UT) per tablet 1 tablet, 1 tablet, Oral, BID, Miguel Oates MD, 1 tablet at 03/22/25 0815    Calcium Replacement - Follow Nurse / BPA Driven Protocol, , Not Applicable, PRN, Miguel Oates MD    cetirizine (zyrTEC) tablet 10 mg, 10 mg, Oral, Daily, Miguel Oates MD, 10 mg at 03/22/25 0815    cyclobenzaprine (FLEXERIL) tablet 10 mg, 10 mg, Oral, TID PRN, Miguel Oates MD    furosemide (LASIX) tablet 20 mg, 20 mg, Oral, Daily, Any Manzanares MD, 20 mg at 03/22/25 0815    guaiFENesin (MUCINEX) 12 hr tablet 600 mg, 600 mg, Oral, Q12H, Santa Pierre MD, 600 mg at 03/22/25 0815    guaiFENesin-codeine (ROMILAR-AC) solution 10 mL, 10 mL, Oral, Q4H PRN, Santa Pierre MD, 10 mL at 03/21/25 1846    hydroxychloroquine (PLAQUENIL) tablet 200 mg, 200 mg, Oral, BID, Miguel Oates MD, 200 mg at 03/22/25 0815    ipratropium-albuterol (DUO-NEB) nebulizer solution 3 mL, 3 mL, Nebulization, Q4H PRN, Miguel Oates MD, 3 mL at 03/18/25 1104    lamoTRIgine (LaMICtal) tablet 200 mg, 200 mg, Oral, Nightly, Miguel Oates MD, 200 mg at 03/21/25 2000    levothyroxine (SYNTHROID, LEVOTHROID) tablet 175 mcg, 175 mcg, Oral, Q AM, Miguel Oates MD, 175 mcg at 03/22/25 0544    Magnesium Standard Dose Replacement - Follow Nurse / BPA Driven Protocol, , Not Applicable, PRN, Miguel Oates MD    meclizine (ANTIVERT) tablet 25 mg, 25 mg, Oral, TID PRN, Miguel Oates MD    melatonin tablet 5 mg, 5 mg, Oral, Nightly PRN, Miguel Oates MD    methylPREDNISolone sodium succinate (SOLU-Medrol) injection 40 mg, 40 mg, Intravenous, Q8H, Miguel Oates MD, 40 mg at 03/22/25 0544    morphine injection 2 mg, 2 mg, Intravenous, Nightly PRN, Isaac Vazquez MD, 2 mg at 03/21/25 2001    [Held by  provider] mycophenolate (CELLCEPT) capsule 500 mg, 500 mg, Oral, Q12H, Miguel Oates MD, 500 mg at 03/16/25 0902    nitroglycerin (NITROSTAT) SL tablet 0.4 mg, 0.4 mg, Sublingual, Q5 Min PRN, Miguel Oates MD    ondansetron ODT (ZOFRAN-ODT) disintegrating tablet 4 mg, 4 mg, Oral, Q6H PRN, 4 mg at 03/21/25 0910 **OR** [DISCONTINUED] ondansetron (ZOFRAN) injection 4 mg, 4 mg, Intravenous, Q6H PRBRAYAN, Miguel Oates MD    oxyCODONE (ROXICODONE) immediate release tablet 10 mg, 10 mg, Oral, Q6H PRN, Miguel Oates MD, 10 mg at 03/16/25 1304    Phosphorus Replacement - Follow Nurse / BPA Driven Protocol, , Not Applicable, Иван LAMAS Yadana, MD    Potassium Replacement - Follow Nurse / BPA Driven Protocol, , Not Applicable, Иван LAMAS Yadana, MD    QUEtiapine (SEROquel) tablet 50 mg, 50 mg, Oral, Nightly, Miguel Oates MD, 50 mg at 03/21/25 2000    [COMPLETED] Insert Peripheral IV, , , Once **AND** sodium chloride 0.9 % flush 10 mL, 10 mL, Intravenous, PRN, Brooks Wheatley MD, 10 mL at 03/15/25 1622    sodium chloride 0.9 % flush 10 mL, 10 mL, Intravenous, Q12H, Miguel Oates MD, 10 mL at 03/22/25 0816    sodium chloride 0.9 % flush 10 mL, 10 mL, Intravenous, PRNИван Yadana, MD    sodium chloride 0.9 % infusion 40 mL, 40 mL, Intravenous, Иван LAMAS Yadana, MD      Objective chronically ill-appearing but no acute distress, no family present.    Vital Signs  Vitals:    03/22/25 0411 03/22/25 0736 03/22/25 0738 03/22/25 0826   BP: 144/89   141/99   BP Location:    Left arm   Patient Position:    Sitting   Pulse: 93 95 92 100   Resp: 20 22 20 27   Temp: 97.4 °F (36.3 °C)   97.4 °F (36.3 °C)   TempSrc: Oral   Oral   SpO2: 94% 93% 98% 94%   Weight:       Height:           Physical Exam:   General Appearance:    Awake and alert, in no acute distress, obese   Head:    Normocephalic, without obvious abnormality   Eyes:          Conjunctivae normal, anicteric sclera   Throat:   No oral lesions, no thrush, oral mucosa moist   Neck:   supple, no JVD   Lungs:      respirations regular, even and unlabored, on O2   Abdomen:   Nondistended   Rectal:     Deferred   Extremities:    no cyanosis   Skin:   No obvious bruising or rash, no jaundice        Results Review:    Results from last 7 days   Lab Units 03/22/25  0007 03/21/25  0043 03/20/25  0526 03/19/25  0338 03/18/25  0459 03/17/25  0138 03/16/25  0101   WBC 10*3/mm3 15.83* 15.71* 11.54* 9.34 8.59 10.50 12.44*   HEMOGLOBIN g/dL 10.8* 10.6* 10.4* 10.2* 10.2* 9.9* 10.2*   PLATELETS 10*3/mm3 296 309 329 291 300 286 279     Results from last 7 days   Lab Units 03/22/25  0007 03/21/25  0043 03/20/25  0526 03/20/25  0500 03/19/25  0338 03/18/25  0459 03/17/25 0138 03/16/25  2042 03/16/25  0101   SODIUM mmol/L 141 138  --  137 133* 131* 131* 133* 135*   POTASSIUM mmol/L 4.7 4.5  --  5.3* 5.2 4.5 4.6 4.8 4.7   CHLORIDE mmol/L 102 102  --  103 100 99 97* 97* 98   CO2 mmol/L 28.6 28.3  --  22.7 22.8 20.3* 20.7* 19.4* 23.5   BUN mg/dL 32* 27*  --  27* 29* 38* 42* 40* 31*   CREATININE mg/dL 1.26* 1.19*  --  1.14* 1.20* 1.48* 2.02* 2.16* 2.31*   GLUCOSE mg/dL 136* 117*  --  126* 142* 142* 153* 120* 97   ALBUMIN g/dL  --   --   --  3.5  --   --   --   --   --    BILIRUBIN mg/dL  --   --   --  0.2  --   --   --   --   --    ALK PHOS U/L  --   --   --  80  --   --   --   --   --    AST (SGOT) U/L  --   --   --  35*  --   --   --   --   --    ALT (SGPT) U/L  --   --   --  12  --   --   --   --   --    INR   --   --  1.20*  --   --   --   --   --   --    MAGNESIUM mg/dL  --   --   --   --   --  2.6* 2.7*  --  2.4   PHOSPHORUS mg/dL  --   --   --   --   --  3.4 5.0*  --  5.1*     Estimated Creatinine Clearance: 52.7 mL/min (A) (by C-G formula based on SCr of 1.26 mg/dL (H)).  Lab Results   Component Value Date    HGBA1C 5.40 07/03/2024    HGBA1C 5.60 10/24/2023    HGBA1C 5.30 05/24/2023     Results from last 7 days   Lab Units 03/17/25  0138   CK TOTAL U/L 94     Infection   Results from last 7 days   Lab Units 03/18/25  7379  03/17/25  1220 03/17/25  1201 03/15/25  1621 03/15/25  1610   BLOODCX   --  No growth at 4 days No growth at 4 days  --   --    RESPCX  Scant growth (1+) Normal respiratory miles. No S. aureus or Pseudomonas aeruginosa detected. Final report.  --   --   --   --    URINECX   --   --   --   --  >100,000 CFU/mL Normal Urogenital Miles   PROCALCITONIN ng/mL  --   --   --  0.24  --      UA    Results from last 7 days   Lab Units 03/15/25  1610   NITRITE UA  Negative   WBC UA /HPF 0-2   BACTERIA UA /HPF 4+*   SQUAM EPITHEL UA /HPF 3-6*   URINECX  >100,000 CFU/mL Normal Urogenital Miles     Microbiology Results (last 10 days)       Procedure Component Value - Date/Time    Respiratory Culture - Sputum, Cough [[id number]] Collected: 03/18/25 1633    Lab Status: Final result Specimen: Sputum from Cough Updated: 03/20/25 0736     Respiratory Culture Scant growth (1+) Normal respiratory miles. No S. aureus or Pseudomonas aeruginosa detected. Final report.     Gram Stain Moderate (3+) WBCs seen      Few (2+) Epithelial cells seen      Rare (1+) Gram positive cocci    Legionella Antigen, Urine - Urine, Urine, Clean Catch [[id number]]  (Normal) Collected: 03/17/25 1613    Lab Status: Final result Specimen: Urine, Clean Catch Updated: 03/17/25 1723     LEGIONELLA ANTIGEN, URINE Negative    Blood Culture - Blood, Wrist, Left [[id number]]  (Normal) Collected: 03/17/25 1220    Lab Status: Preliminary result Specimen: Blood from Wrist, Left Updated: 03/21/25 1230     Blood Culture No growth at 4 days    Narrative:      Less than seven (7) mL's of blood was collected.  Insufficient quantity may yield false negative results.    Blood Culture - Blood, Hand, Left [[id number]]  (Normal) Collected: 03/17/25 1201    Lab Status: Preliminary result Specimen: Blood from Hand, Left Updated: 03/21/25 1230     Blood Culture No growth at 4 days    Narrative:      Less than seven (7) mL's of blood was collected.  Insufficient quantity may yield false  negative results.    Respiratory Panel PCR w/COVID-19(SARS-CoV-2) GAVI/RAJIV/AUSTEN/PAD/COR/APRIL In-House, NP Swab in UTM/VTM, 2 HR TAT - Swab, Nasopharynx [[id number]]  (Normal) Collected: 03/15/25 1610    Lab Status: Final result Specimen: Swab from Nasopharynx Updated: 03/15/25 1720     ADENOVIRUS, PCR Not Detected     Coronavirus 229E Not Detected     Coronavirus HKU1 Not Detected     Coronavirus NL63 Not Detected     Coronavirus OC43 Not Detected     COVID19 Not Detected     Human Metapneumovirus Not Detected     Human Rhinovirus/Enterovirus Not Detected     Influenza A PCR Not Detected     Influenza B PCR Not Detected     Parainfluenza Virus 1 Not Detected     Parainfluenza Virus 2 Not Detected     Parainfluenza Virus 3 Not Detected     Parainfluenza Virus 4 Not Detected     RSV, PCR Not Detected     Bordetella pertussis pcr Not Detected     Bordetella parapertussis PCR Not Detected     Chlamydophila pneumoniae PCR Not Detected     Mycoplasma pneumo by PCR Not Detected    Narrative:      In the setting of a positive respiratory panel with a viral infection PLUS a negative procalcitonin without other underlying concern for bacterial infection, consider observing off antibiotics or discontinuation of antibiotics and continue supportive care. If the respiratory panel is positive for atypical bacterial infection (Bordetella pertussis, Chlamydophila pneumoniae, or Mycoplasma pneumoniae), consider antibiotic de-escalation to target atypical bacterial infection.    Urine Culture - Urine, Urine, Clean Catch [[id number]] Collected: 03/15/25 1610    Lab Status: Final result Specimen: Urine, Clean Catch Updated: 03/17/25 1116     Urine Culture >100,000 CFU/mL Normal Urogenital Rima    Narrative:      Colonization of the urinary tract without infection is common. Treatment is discouraged unless the patient is symptomatic, pregnant, or undergoing an invasive urologic procedure.    Legionella Antigen, Urine - Urine, Urine, Clean  Catch [[id number]]  (Normal) Collected: 03/15/25 1610    Lab Status: Final result Specimen: Urine, Clean Catch Updated: 03/16/25 1022     LEGIONELLA ANTIGEN, URINE Negative    S. Pneumo Ag Urine or CSF - Urine, Urine, Clean Catch [[id number]]  (Abnormal) Collected: 03/15/25 1610    Lab Status: Final result Specimen: Urine, Clean Catch Updated: 03/16/25 1022     Strep Pneumo Ag Positive          Imaging Results (Last 72 Hours)       Procedure Component Value Units Date/Time    FL Video Swallow With Speech Single Contrast [[id number]] Resulted: 03/21/25 1223     Updated: 03/21/25 1223    Narrative:      This procedure was auto-finalized with no dictation required.    XR Chest 1 View [[id number]] Collected: 03/19/25 1432     Updated: 03/19/25 1437    Narrative:      XR CHEST 1 VW    Date of Exam: 3/19/2025 2:08 PM EDT    Indication: Chest pain    Comparison: AP chest 3/15/2025.    Findings:  Low volume inspiration. Ill-defined patchy nodular densities are scattered within both lungs. No significant pleural fluid collection is identified. Heart size is within normal limits. Right shoulder replacement. Anterior cervical spinal fusion. Multiple   surgical clips in the left upper quadrant of the abdomen. Surgical clips project over the right axilla.      Impression:      Impression:  Multifocal patchy nodular densities in both lungs, thought to be similar to 3/15/2025. Correlate for pneumonia.      Electronically Signed: Barb Millan MD    3/19/2025 2:35 PM EDT    Workstation ID: [id number]            Assessment & Plan   Patient is a 64-year-old female with a history of scleroderma, lupus and von Willebrand disease who presented with persistent cough and shortness of breath since the end of December.  GI asked to consult for choking episode x 2.     Choking episode x 2  Nausea with anorexia  Dyspnea with cough -hypoxic respiratory insufficiency  Abnormal CT of the colon  Vertigo  Scleroderma  Normocytic anemia  Lupus  [place]  syndrome/history of breast cancer  Von Willebrand disease  History of cholecystectomy     PLAN:  No nausea or vomiting.  Able to tolerate full liquid breakfast this morning.  Reports no coughing.  Reports she is hungry.  Speech therapy report reviewed and able to increase to solid food when patient feels able.  Recommend continuing amitriptyline.  Okay to discharge from GI standpoint will follow-up in the office to discuss outpatient esophageal manometry.         Electronically signed by JADEN Bryant, 03/22/25, 10:33 AM EDT.

## 2025-03-22 NOTE — PROGRESS NOTES
"                                                                                                                                      Nephrology  Progress Note                                        [facility]    Patient Identification    Name: Tracie Gross  Age: 64 y.o.  Sex: female  :  1960  MRN: [id number]      DATE OF SERVICE:  3/22/2025        Subective    Feels better     Objective   Scheduled Meds:amitriptyline, 25 mg, Oral, Nightly  benzonatate, 200 mg, Oral, TID  budesonide, 0.5 mg, Nebulization, BID - RT  calcium 500 mg vitamin D 5 mcg (200 UT), 1 tablet, Oral, BID  cetirizine, 10 mg, Oral, Daily  furosemide, 20 mg, Oral, Daily  guaiFENesin, 600 mg, Oral, Q12H  hydroxychloroquine, 200 mg, Oral, BID  lamoTRIgine, 200 mg, Oral, Nightly  levothyroxine, 175 mcg, Oral, Q AM  methylPREDNISolone sodium succinate, 40 mg, Intravenous, Q8H  [Held by provider] mycophenolate, 500 mg, Oral, Q12H  QUEtiapine, 50 mg, Oral, Nightly  sodium chloride, 10 mL, Intravenous, Q12H          Continuous Infusions:       PRN Meds:  acetaminophen **OR** acetaminophen **OR** acetaminophen    ALPRAZolam    aluminum-magnesium hydroxide-simethicone    senna-docusate sodium **AND** polyethylene glycol **AND** bisacodyl **AND** bisacodyl    Calcium Replacement - Follow Nurse / BPA Driven Protocol    cyclobenzaprine    guaiFENesin-codeine    ipratropium-albuterol    Magnesium Standard Dose Replacement - Follow Nurse / BPA Driven Protocol    meclizine    melatonin    Morphine    nitroglycerin    ondansetron ODT **OR** [DISCONTINUED] ondansetron    oxyCODONE    Phosphorus Replacement - Follow Nurse / BPA Driven Protocol    Potassium Replacement - Follow Nurse / BPA Driven Protocol    [COMPLETED] Insert Peripheral IV **AND** sodium chloride    sodium chloride    sodium chloride     Exam:  /89   Pulse 93   Temp 97.4 °F (36.3 °C) (Oral)   Resp 20   Ht 162.6 cm (64\")   Wt 108 kg (238 lb 12.1 oz)   LMP " 05/08/1983   SpO2 94%   BMI 40.98 kg/m²     Intake/Output last 3 shifts:  I/O last 3 completed shifts:  In: 720 [P.O.:720]  Out: 600 [Urine:600]    Intake/Output this shift:  I/O this shift:  In: -   Out: 200 [Urine:200]    Physical exam:  General Appearance:  Alert  Head:  Normocephalic, without obvious abnormality, atraumatic  Eyes:  PERRL, conjunctiva/corneas clear     Neck:  Supple,  no adenopathy;      Lungs:  Decreased BS occasion ronchi  Heart:  Regular rate and rhythm, S1 and S2 normal  Abdomen:  Soft, non-tender, bowel sounds active   Extremities: trace edema  Pulses: 2+ and symmetric all extremities  Skin:  No rashes or lesions       Data Review:  All labs (24hrs):   Recent Results (from the past 24 hours)   Basic Metabolic Panel    Collection Time: 03/22/25 12:07 AM    Specimen: Arm, Left; Blood   Result Value Ref Range    Glucose 136 (H) 65 - 99 mg/dL    BUN 32 (H) 8 - 23 mg/dL    Creatinine 1.26 (H) 0.57 - 1.00 mg/dL    Sodium 141 136 - 145 mmol/L    Potassium 4.7 3.5 - 5.2 mmol/L    Chloride 102 98 - 107 mmol/L    CO2 28.6 22.0 - 29.0 mmol/L    Calcium 10.3 8.6 - 10.5 mg/dL    BUN/Creatinine Ratio 25.4 (H) 7.0 - 25.0    Anion Gap 10.4 5.0 - 15.0 mmol/L    eGFR 47.2 (L) >60.0 mL/min/1.73   CBC (No Diff)    Collection Time: 03/22/25 12:07 AM    Specimen: Arm, Left; Blood   Result Value Ref Range    WBC 15.83 (H) 3.40 - 10.80 10*3/mm3    RBC 3.54 (L) 3.77 - 5.28 10*6/mm3    Hemoglobin 10.8 (L) 12.0 - 15.9 g/dL    Hematocrit 33.6 (L) 34.0 - 46.6 %    MCV 94.9 79.0 - 97.0 fL    MCH 30.5 26.6 - 33.0 pg    MCHC 32.1 31.5 - 35.7 g/dL    RDW 13.7 12.3 - 15.4 %    RDW-SD 48.0 37.0 - 54.0 fl    MPV 11.1 6.0 - 12.0 fL    Platelets 296 140 - 450 10*3/mm3          Imaging:  XR Chest 1 View  Result Date: 3/19/2025  Impression: Multifocal patchy nodular densities in both lungs, thought to be similar to 3/15/2025. Correlate for pneumonia. Electronically Signed: Barb Millan MD  3/19/2025 2:35 PM EDT  Workstation  ID: [id number]    US Renal Bilateral  Result Date: 3/16/2025  Impression: No hydronephrosis or acute sonographic abnormality. Electronically Signed: Tomy Orellana MD  3/16/2025 5:38 PM EDT  Workstation ID: [id number]    NM Lung Ventilation Perfusion  Result Date: 3/16/2025  Impression: Negative for pulmonary embolism. Electronically Signed: Nimesh Dash MD  3/16/2025 3:17 PM EDT  Workstation ID: [id number]    XR Chest 1 View  Result Date: 3/15/2025  Impression: Mild hazy left greater than right basal opacities, which could represent atelectasis or pneumonia. Electronically Signed: Josh Lorenzo  3/15/2025 4:18 PM EDT  Workstation ID: [id number]      Assessment/Plan:     SOB (shortness of breath)         Acute kidney injury on top of chronic kidney disease stage III  Acute hypoxic respiratory failure  Pulmonary fibrosis  Urinary tract infection  Anxiety disorder  Hypertension  Hyperlipidemia  Hypothyroidism  History of von Willebrand disease with no bleeding        Recommendations:   a slight change in her creatinine  Continue home dose Lasix  Now potassium is down to 4.7   home dose Lasix       Discussed with the patient and family at bedside

## 2025-03-22 NOTE — PROGRESS NOTES
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 No wolf honeycombing fibrosis  Patient was treated with mycophenolate, prophylactic Bactrim     PFT 1/14/2025   FVC 1 1.6 L/51%,  FEV1 1.4 L / 58%, ratio 88, TLC 57%, RV 50%, DLCO 51%     PFTs July 2021,   FEV1 1.7 L 68% improve 75% post bronchodilator, FEV1/ FVC ratio 87, TLC 72%, RV 74%, DLCO 56%     PFTs in 2017   FEV1 66-70% predicted which is unchanged since 2012  quit smoking 1994,      PFT 2012 and 2017,   FEV-1 and TLC 75% , suggestive of mild to mod restrictive lung disease     2D echo  2018 normal RVSP and EF  2D echo May 2020 no pulmonary hypertension   2d echo July 2021 RVSP 30  2D echo January 2025 no pulmonary hypertension EF 60%     FERN, AHI 14.5 ,       No Response to inhalers including Breztri and Trelegy     Recommendations:    Oxygen titration currently on 2 L  Antibiotics Rocephin 2 g IV daily for 5 days  Azithromycin 500 mg p.o. daily for 3 days      Seen by GI for recurrent \" chocking\"     Holding mycophenolate for treatment of pneumonia     Bronchodilators Pulmicort and DuoNeb     On Plaquenil 200 mg twice daily              Chest x-ray 3/15/2025      VQ scan 3/16/2025       High-res CT scan January 2025             LOS: 7 days     Subjective         Objective     Vital signs for last 24 hours:  Vitals:    03/22/25 0411 03/22/25 0736 03/22/25 0738 03/22/25 0826   BP: 144/89   141/99   BP Location:    Left arm   Patient Position:    Sitting   Pulse: 93 95 92 100   Resp: 20 22 20 27   Temp: 97.4 °F (36.3 °C)   97.4 °F (36.3 °C)   TempSrc: Oral   Oral   SpO2: 94% 93% 98% 94%   Weight:       Height:           Intake/Output last 3 shifts:  I/O last 3 completed shifts:  In: 720 [P.O.:720]  Out: 200 " [Urine:200]  Intake/Output this shift:  I/O this shift:  In: 120 [P.O.:120]  Out: -       Radiology  Imaging Results (Last 24 Hours)       Procedure Component Value Units Date/Time    FL Video Swallow With Speech Single Contrast [[id number]] Resulted: 03/21/25 1223     Updated: 03/21/25 1223    Narrative:      This procedure was auto-finalized with no dictation required.            Labs:  Results from last 7 days   Lab Units 03/22/25  0007   WBC 10*3/mm3 15.83*   HEMOGLOBIN g/dL 10.8*   HEMATOCRIT % 33.6*   PLATELETS 10*3/mm3 296     Results from last 7 days   Lab Units 03/22/25  0007 03/21/25  0043 03/20/25  0500   SODIUM mmol/L 141   < > 137   POTASSIUM mmol/L 4.7   < > 5.3*   CHLORIDE mmol/L 102   < > 103   CO2 mmol/L 28.6   < > 22.7   BUN mg/dL 32*   < > 27*   CREATININE mg/dL 1.26*   < > 1.14*   CALCIUM mg/dL 10.3   < > 9.5   BILIRUBIN mg/dL  --   --  0.2   ALK PHOS U/L  --   --  80   ALT (SGPT) U/L  --   --  12   AST (SGOT) U/L  --   --  35*   GLUCOSE mg/dL 136*   < > 126*    < > = values in this interval not displayed.     Results from last 7 days   Lab Units 03/15/25  1549   PH, ARTERIAL pH units 7.392   PO2 ART mm Hg 52.9*   PCO2, ARTERIAL mm Hg 44.7   HCO3 ART mmol/L 27.2     Results from last 7 days   Lab Units 03/20/25  0500   ALBUMIN g/dL 3.5     Results from last 7 days   Lab Units 03/17/25  0138   CK TOTAL U/L 94         Results from last 7 days   Lab Units 03/18/25  0459   MAGNESIUM mg/dL 2.6*     Results from last 7 days   Lab Units 03/20/25  0526   INR  1.20*               Meds:   SCHEDULE  amitriptyline, 25 mg, Oral, Nightly  benzonatate, 200 mg, Oral, TID  budesonide, 0.5 mg, Nebulization, BID - RT  calcium 500 mg vitamin D 5 mcg (200 UT), 1 tablet, Oral, BID  cetirizine, 10 mg, Oral, Daily  furosemide, 20 mg, Oral, Daily  guaiFENesin, 600 mg, Oral, Q12H  hydroxychloroquine, 200 mg, Oral, BID  lamoTRIgine, 200 mg, Oral, Nightly  levothyroxine, 175 mcg, Oral, Q AM  methylPREDNISolone sodium  succinate, 40 mg, Intravenous, Q8H  [Held by provider] mycophenolate, 500 mg, Oral, Q12H  QUEtiapine, 50 mg, Oral, Nightly  sodium chloride, 10 mL, Intravenous, Q12H      Infusions       PRNs    acetaminophen **OR** acetaminophen **OR** acetaminophen    ALPRAZolam    aluminum-magnesium hydroxide-simethicone    senna-docusate sodium **AND** polyethylene glycol **AND** bisacodyl **AND** bisacodyl    Calcium Replacement - Follow Nurse / BPA Driven Protocol    cyclobenzaprine    guaiFENesin-codeine    ipratropium-albuterol    Magnesium Standard Dose Replacement - Follow Nurse / BPA Driven Protocol    meclizine    melatonin    Morphine    nitroglycerin    ondansetron ODT **OR** [DISCONTINUED] ondansetron    oxyCODONE    Phosphorus Replacement - Follow Nurse / BPA Driven Protocol    Potassium Replacement - Follow Nurse / BPA Driven Protocol    [COMPLETED] Insert Peripheral IV **AND** sodium chloride    sodium chloride    sodium chloride    Physical Exam:  Physical Exam  Cardiovascular:      Heart sounds: Murmur heard.      No gallop.   Pulmonary:      Effort: No respiratory distress.      Breath sounds: No stridor. Rhonchi and rales present. No wheezing.   Chest:      Chest wall: No tenderness.         ROS  Review of Systems   Respiratory:  Positive for cough and shortness of breath. Negative for wheezing and stridor.    Cardiovascular:  Positive for palpitations. Negative for chest pain and leg swelling.             Total critical care time spent with patient greater than: 45 Minutes

## 2025-03-22 NOTE — PLAN OF CARE
Pt received prn med for pain and sleep.   Problem: Adult Inpatient Plan of Care  Goal: Plan of Care Review  Outcome: Progressing  Goal: Patient-Specific Goal (Individualized)  Outcome: Progressing  Goal: Absence of Hospital-Acquired Illness or Injury  Outcome: Progressing  Intervention: Identify and Manage Fall Risk  Intervention: Prevent Skin Injury  Intervention: Prevent Infection  Goal: Optimal Comfort and Wellbeing  Outcome: Progressing  Intervention: Monitor Pain and Promote Comfort  Intervention: Provide Person-Centered Care  Goal: Readiness for Transition of Care  Outcome: Progressing     Problem: Skin Injury Risk Increased  Goal: Skin Health and Integrity  Outcome: Progressing  Intervention: Optimize Skin Protection     Problem: Comorbidity Management  Goal: Maintenance of Behavioral Health Symptom Control  Outcome: Progressing  Goal: Blood Pressure in Desired Range  Outcome: Progressing  Goal: Maintenance of Osteoarthritis Symptom Control  Outcome: Progressing  Intervention: Maintain Osteoarthritis Symptom Control     Problem: Fall Injury Risk  Goal: Absence of Fall and Fall-Related Injury  Outcome: Progressing  Intervention: Identify and Manage Contributors  Intervention: Promote Injury-Free Environment   Goal Outcome Evaluation:

## 2025-03-22 NOTE — PLAN OF CARE
Problem: Adult Inpatient Plan of Care  Goal: Plan of Care Review  Outcome: Progressing  Goal: Patient-Specific Goal (Individualized)  Outcome: Progressing  Goal: Absence of Hospital-Acquired Illness or Injury  Outcome: Progressing  Intervention: Identify and Manage Fall Risk  Recent Flowsheet Documentation  Taken 3/22/2025 1400 by Tabatha Hung LPN  Safety Promotion/Fall Prevention:   activity supervised   clutter free environment maintained   assistive device/personal items within reach   fall prevention program maintained   gait belt   nonskid shoes/slippers when out of bed   safety round/check completed  Taken 3/22/2025 1200 by Tabatha Hung LPN  Safety Promotion/Fall Prevention:   activity supervised   assistive device/personal items within reach   clutter free environment maintained   gait belt   fall prevention program maintained   nonskid shoes/slippers when out of bed   safety round/check completed  Taken 3/22/2025 1000 by Tabatha Hung LPN  Safety Promotion/Fall Prevention:   activity supervised   assistive device/personal items within reach   clutter free environment maintained   fall prevention program maintained   gait belt   nonskid shoes/slippers when out of bed   safety round/check completed  Taken 3/22/2025 0800 by Tabatha Hung LPN  Safety Promotion/Fall Prevention:   activity supervised   assistive device/personal items within reach   clutter free environment maintained   fall prevention program maintained   gait belt   nonskid shoes/slippers when out of bed   safety round/check completed  Intervention: Prevent Skin Injury  Recent Flowsheet Documentation  Taken 3/22/2025 1200 by Tabatha Hung LPN  Skin Protection: incontinence pads utilized  Taken 3/22/2025 0800 by Tabatha Hung LPN  Skin Protection: incontinence pads utilized  Intervention: Prevent Infection  Recent Flowsheet Documentation  Taken 3/22/2025 1400 by Tabatha Hung LPN  Infection Prevention:   hand hygiene promoted    single patient room provided   rest/sleep promoted  Taken 3/22/2025 1200 by Tabatha Hung LPN  Infection Prevention:   hand hygiene promoted   rest/sleep promoted   single patient room provided  Taken 3/22/2025 1000 by Tabatha Hung LPN  Infection Prevention:   hand hygiene promoted   single patient room provided   rest/sleep promoted  Taken 3/22/2025 0800 by Tabatha Hung LPN  Infection Prevention:   hand hygiene promoted   single patient room provided   rest/sleep promoted  Goal: Optimal Comfort and Wellbeing  Outcome: Progressing  Intervention: Provide Person-Centered Care  Recent Flowsheet Documentation  Taken 3/22/2025 1200 by Tabatha Hung LPN  Trust Relationship/Rapport:   care explained   thoughts/feelings acknowledged   reassurance provided   questions encouraged   questions answered  Taken 3/22/2025 0800 by Tabatha Hung LPN  Trust Relationship/Rapport:   care explained   thoughts/feelings acknowledged   reassurance provided   questions encouraged   questions answered  Goal: Readiness for Transition of Care  Outcome: Progressing     Problem: Skin Injury Risk Increased  Goal: Skin Health and Integrity  Outcome: Progressing  Intervention: Optimize Skin Protection  Recent Flowsheet Documentation  Taken 3/22/2025 1200 by Tabatha Hung LPN  Pressure Reduction Techniques: frequent weight shift encouraged  Pressure Reduction Devices:   pressure-redistributing mattress utilized   positioning supports utilized  Skin Protection: incontinence pads utilized  Taken 3/22/2025 0800 by Tabatha Hung LPN  Pressure Reduction Techniques: frequent weight shift encouraged  Pressure Reduction Devices:   positioning supports utilized   pressure-redistributing mattress utilized  Skin Protection: incontinence pads utilized     Problem: Comorbidity Management  Goal: Maintenance of Behavioral Health Symptom Control  Outcome: Progressing  Intervention: Maintain Behavioral Health Symptom Control  Recent Flowsheet  Documentation  Taken 3/22/2025 1400 by Tabatha Hung LPN  Medication Review/Management: medications reviewed  Taken 3/22/2025 1200 by Tabatha Hung LPN  Medication Review/Management: medications reviewed  Taken 3/22/2025 1000 by Tabatha Hung LPN  Medication Review/Management: medications reviewed  Taken 3/22/2025 0800 by Tabatha Hung LPN  Medication Review/Management: medications reviewed  Goal: Blood Pressure in Desired Range  Outcome: Progressing  Intervention: Maintain Blood Pressure Management  Recent Flowsheet Documentation  Taken 3/22/2025 1400 by Tabatha Hung LPN  Medication Review/Management: medications reviewed  Taken 3/22/2025 1200 by Tabatha Hung LPN  Medication Review/Management: medications reviewed  Taken 3/22/2025 1000 by Tabatha Hung LPN  Medication Review/Management: medications reviewed  Taken 3/22/2025 0800 by Tabatha Hung LPN  Medication Review/Management: medications reviewed  Goal: Maintenance of Osteoarthritis Symptom Control  Outcome: Progressing  Intervention: Maintain Osteoarthritis Symptom Control  Recent Flowsheet Documentation  Taken 3/22/2025 1400 by Tabatha Hung LPN  Medication Review/Management: medications reviewed  Taken 3/22/2025 1200 by Tabatha Hung LPN  Medication Review/Management: medications reviewed  Taken 3/22/2025 1000 by Tabatha Hung LPN  Medication Review/Management: medications reviewed  Taken 3/22/2025 0800 by Tabatha Hung LPN  Medication Review/Management: medications reviewed     Problem: Fall Injury Risk  Goal: Absence of Fall and Fall-Related Injury  Outcome: Progressing  Intervention: Identify and Manage Contributors  Recent Flowsheet Documentation  Taken 3/22/2025 1400 by Tabatha Hung LPN  Medication Review/Management: medications reviewed  Taken 3/22/2025 1200 by Tabatha Hung LPN  Medication Review/Management: medications reviewed  Taken 3/22/2025 1000 by Tabatha Hung LPN  Medication Review/Management: medications  reviewed  Taken 3/22/2025 0800 by Tabatha Hung LPN  Medication Review/Management: medications reviewed  Intervention: Promote Injury-Free Environment  Recent Flowsheet Documentation  Taken 3/22/2025 1400 by Tabatha Hung LPN  Safety Promotion/Fall Prevention:   activity supervised   clutter free environment maintained   assistive device/personal items within reach   fall prevention program maintained   gait belt   nonskid shoes/slippers when out of bed   safety round/check completed  Taken 3/22/2025 1200 by Tabatha Hung LPN  Safety Promotion/Fall Prevention:   activity supervised   assistive device/personal items within reach   clutter free environment maintained   gait belt   fall prevention program maintained   nonskid shoes/slippers when out of bed   safety round/check completed  Taken 3/22/2025 1000 by Tabatha Hung LPN  Safety Promotion/Fall Prevention:   activity supervised   assistive device/personal items within reach   clutter free environment maintained   fall prevention program maintained   gait belt   nonskid shoes/slippers when out of bed   safety round/check completed  Taken 3/22/2025 0800 by Tabatha Hung LPN  Safety Promotion/Fall Prevention:   activity supervised   assistive device/personal items within reach   clutter free environment maintained   fall prevention program maintained   gait belt   nonskid shoes/slippers when out of bed   safety round/check completed   Goal Outcome Evaluation:

## 2025-03-22 NOTE — CONSULTS
Called to obtain vascular access on this pt. Ms. Gross is well known to the PICC team. She has a restricted right arm. There are no appropriate vessels noted with ultrasound in the left forearm or upper arm. Recommend CVC placement by qualified provider or IR consult for placement.

## 2025-03-22 NOTE — PROGRESS NOTES
[facility] MEDICINE SERVICE  DAILY PROGRESS NOTE    NAME: Tracie Gross  : 1960  MRN: [id number]      LOS: 7 days     PROVIDER OF SERVICE: Santa Pierre MD    Chief Complaint: SOB (shortness of breath)    Subjective:     Interval History:  History taken from: patient    No new complaint    Review of Systems:   Review of Systems   All other systems reviewed and are negative.      Objective:     Vital Signs  Temp:  [97.4 °F (36.3 °C)-98.2 °F (36.8 °C)] 97.4 °F (36.3 °C)  Heart Rate:  [] 101  Resp:  [15-27] 17  BP: (134-150)/() 150/100  Flow (L/min) (Oxygen Therapy):  [2-3] 2   Body mass index is 40.98 kg/m².    Physical Exam  Physical Exam  Constitutional:       Appearance: Normal appearance.   HENT:      Head: Normocephalic and atraumatic.      Nose: Nose normal.      Mouth/Throat:      Mouth: Mucous membranes are moist.   Eyes:      Extraocular Movements: Extraocular movements intact.      Pupils: Pupils are equal, round, and reactive to light.   Cardiovascular:      Rate and Rhythm: Normal rate and regular rhythm.   Pulmonary:      Effort: Pulmonary effort is normal.      Breath sounds: Normal breath sounds.   Abdominal:      General: Abdomen is flat. Bowel sounds are normal.      Palpations: Abdomen is soft.   Musculoskeletal:         General: Normal range of motion.      Cervical back: Normal range of motion and neck supple.   Skin:     General: Skin is warm and dry.   Neurological:      General: No focal deficit present.      Mental Status: She is alert and oriented to person, place, and time.   Psychiatric:         Mood and Affect: Mood normal.         Behavior: Behavior normal.         Thought Content: Thought content normal.         Judgment: Judgment normal.         Current Medications:  Scheduled Meds:amitriptyline, 25 mg, Oral, Nightly  benzonatate, 200 mg, Oral, TID  budesonide, 0.5 mg, Nebulization, BID - RT  calcium 500 mg vitamin D 5 mcg (200 UT), 1 tablet, Oral,  BID  cetirizine, 10 mg, Oral, Daily  furosemide, 20 mg, Oral, Daily  guaiFENesin, 600 mg, Oral, Q12H  hydroxychloroquine, 200 mg, Oral, BID  lamoTRIgine, 200 mg, Oral, Nightly  levothyroxine, 175 mcg, Oral, Q AM  methylPREDNISolone sodium succinate, 40 mg, Intravenous, Q8H  [Held by provider] mycophenolate, 500 mg, Oral, Q12H  QUEtiapine, 50 mg, Oral, Nightly  sodium chloride, 10 mL, Intravenous, Q12H      Continuous Infusions:     PRN Meds:.  acetaminophen **OR** acetaminophen **OR** acetaminophen    ALPRAZolam    aluminum-magnesium hydroxide-simethicone    senna-docusate sodium **AND** polyethylene glycol **AND** bisacodyl **AND** bisacodyl    Calcium Replacement - Follow Nurse / BPA Driven Protocol    cyclobenzaprine    guaiFENesin-codeine    ipratropium-albuterol    Magnesium Standard Dose Replacement - Follow Nurse / BPA Driven Protocol    meclizine    melatonin    Morphine    nitroglycerin    ondansetron ODT **OR** [DISCONTINUED] ondansetron    oxyCODONE    Phosphorus Replacement - Follow Nurse / BPA Driven Protocol    Potassium Replacement - Follow Nurse / BPA Driven Protocol    [COMPLETED] Insert Peripheral IV **AND** sodium chloride    sodium chloride    sodium chloride       Diagnostic Data    Results from last 7 days   Lab Units 03/22/25  0007 03/20/25  0526 03/20/25  0500   WBC 10*3/mm3 15.83*   < >  --    HEMOGLOBIN g/dL 10.8*   < >  --    HEMATOCRIT % 33.6*   < >  --    PLATELETS 10*3/mm3 296   < >  --    GLUCOSE mg/dL 136*   < > 126*   CREATININE mg/dL 1.26*   < > 1.14*   BUN mg/dL 32*   < > 27*   SODIUM mmol/L 141   < > 137   POTASSIUM mmol/L 4.7   < > 5.3*   AST (SGOT) U/L  --   --  35*   ALT (SGPT) U/L  --   --  12   ALK PHOS U/L  --   --  80   BILIRUBIN mg/dL  --   --  0.2   ANION GAP mmol/L 10.4   < > 11.3    < > = values in this interval not displayed.       No radiology results for the last day        I reviewed the patient's new clinical results.    Assessment/Plan:     Active and Resolved  Problems  Active Hospital Problems    Diagnosis  POA    **SOB (shortness of breath) [R06.02]  Yes      Resolved Hospital Problems   No resolved problems to display.       Acute hypoxic respiratory failure not on home oxygen but currently on 3 L of oxygen via nasal cannula   Pneumonia  Hyperkalemia  Recently diagnosed pulmonary fibrosis  DANIELLE on CKD 3  UTI  Hypertension  Hypothyroidism      -acute hypoxic respiratory failure likely secondary to pneumonia plus underlying pulmonary fibrosis.  Urine strep pneumo antigen is positive.  Has completed ceftriaxone for treatment of likely Streptococcus pneumonia.  Blood cultures and sputum culture negative  -Renal function with slight improvement.  See further recommendations per nephrology.  - Treat hyperkalemia with Lokelma  - Urine culture showed no growth ruling out UTI  - Continue current management.      VTE Prophylaxis:  Mechanical VTE prophylaxis orders are present.       Disposition Planning:     Barriers to Discharge: Pending clinical improvement  Anticipated Date of Discharge: 3/24/2025  Place of Discharge: Home      Code Status and Medical Interventions: CPR (Attempt to Resuscitate); Full Support   Ordered at: 03/15/25 1955     Code Status (Patient has no pulse and is not breathing):    CPR (Attempt to Resuscitate)     Medical Interventions (Patient has pulse or is breathing):    Full Support       Signature: Electronically signed by Santa Pierre MD, 03/22/25, 14:40 EDT.  [facility]ist Team

## 2025-03-23 ENCOUNTER — APPOINTMENT (OUTPATIENT)
Dept: GENERAL RADIOLOGY | Facility: HOSPITAL | Age: 65
DRG: 196 | End: 2025-03-23
Payer: MEDICARE

## 2025-03-23 ENCOUNTER — APPOINTMENT (OUTPATIENT)
Dept: CT IMAGING | Facility: HOSPITAL | Age: 65
DRG: 196 | End: 2025-03-23
Payer: MEDICARE

## 2025-03-23 PROBLEM — R06.00 DYSPNEA: Status: ACTIVE | Noted: 2025-03-15

## 2025-03-23 LAB
ANION GAP SERPL CALCULATED.3IONS-SCNC: 11.8 MMOL/L (ref 5–15)
BASOPHILS # BLD AUTO: 0.05 10*3/MM3 (ref 0–0.2)
BASOPHILS NFR BLD AUTO: 0.2 % (ref 0–1.5)
BUN SERPL-MCNC: 36 MG/DL (ref 8–23)
BUN/CREAT SERPL: 29 (ref 7–25)
CALCIUM SPEC-SCNC: 10.5 MG/DL (ref 8.6–10.5)
CHLORIDE SERPL-SCNC: 100 MMOL/L (ref 98–107)
CO2 SERPL-SCNC: 28.2 MMOL/L (ref 22–29)
CREAT SERPL-MCNC: 1.24 MG/DL (ref 0.57–1)
DEPRECATED RDW RBC AUTO: 47.3 FL (ref 37–54)
EGFRCR SERPLBLD CKD-EPI 2021: 48.1 ML/MIN/1.73
EOSINOPHIL # BLD AUTO: 0.01 10*3/MM3 (ref 0–0.4)
EOSINOPHIL NFR BLD AUTO: 0 % (ref 0.3–6.2)
ERYTHROCYTE [DISTWIDTH] IN BLOOD BY AUTOMATED COUNT: 14 % (ref 12.3–15.4)
GLUCOSE SERPL-MCNC: 105 MG/DL (ref 65–99)
HCT VFR BLD AUTO: 35.4 % (ref 34–46.6)
HGB BLD-MCNC: 11.6 G/DL (ref 12–15.9)
IMM GRANULOCYTES # BLD AUTO: 0.49 10*3/MM3 (ref 0–0.05)
IMM GRANULOCYTES NFR BLD AUTO: 2.2 % (ref 0–0.5)
LYMPHOCYTES # BLD AUTO: 2.44 10*3/MM3 (ref 0.7–3.1)
LYMPHOCYTES NFR BLD AUTO: 11 % (ref 19.6–45.3)
MCH RBC QN AUTO: 31 PG (ref 26.6–33)
MCHC RBC AUTO-ENTMCNC: 32.8 G/DL (ref 31.5–35.7)
MCV RBC AUTO: 94.7 FL (ref 79–97)
MONOCYTES # BLD AUTO: 2.61 10*3/MM3 (ref 0.1–0.9)
MONOCYTES NFR BLD AUTO: 11.8 % (ref 5–12)
NEUTROPHILS NFR BLD AUTO: 16.55 10*3/MM3 (ref 1.7–7)
NEUTROPHILS NFR BLD AUTO: 74.8 % (ref 42.7–76)
NRBC BLD AUTO-RTO: 2 /100 WBC (ref 0–0.2)
PLATELET # BLD AUTO: 306 10*3/MM3 (ref 140–450)
PMV BLD AUTO: 11.1 FL (ref 6–12)
POTASSIUM SERPL-SCNC: 3.8 MMOL/L (ref 3.5–5.2)
RBC # BLD AUTO: 3.74 10*6/MM3 (ref 3.77–5.28)
SODIUM SERPL-SCNC: 140 MMOL/L (ref 136–145)
WBC NRBC COR # BLD AUTO: 22.15 10*3/MM3 (ref 3.4–10.8)

## 2025-03-23 PROCEDURE — 80048 BASIC METABOLIC PNL TOTAL CA: CPT | Performed by: INTERNAL MEDICINE

## 2025-03-23 PROCEDURE — 94664 DEMO&/EVAL PT USE INHALER: CPT

## 2025-03-23 PROCEDURE — 63710000001 PREDNISONE PER 1 MG: Performed by: INTERNAL MEDICINE

## 2025-03-23 PROCEDURE — 94799 UNLISTED PULMONARY SVC/PX: CPT

## 2025-03-23 PROCEDURE — 71045 X-RAY EXAM CHEST 1 VIEW: CPT

## 2025-03-23 PROCEDURE — 92526 ORAL FUNCTION THERAPY: CPT

## 2025-03-23 PROCEDURE — 71250 CT THORAX DX C-: CPT

## 2025-03-23 PROCEDURE — 85025 COMPLETE CBC W/AUTO DIFF WBC: CPT | Performed by: INTERNAL MEDICINE

## 2025-03-23 PROCEDURE — 94761 N-INVAS EAR/PLS OXIMETRY MLT: CPT

## 2025-03-23 RX ORDER — PREDNISONE 20 MG/1
40 TABLET ORAL EVERY 12 HOURS SCHEDULED
Status: DISCONTINUED | OUTPATIENT
Start: 2025-03-23 | End: 2025-03-24

## 2025-03-23 RX ADMIN — GUAIFENESIN AND CODEINE PHOSPHATE 10 ML: 100; 10 SOLUTION ORAL at 08:35

## 2025-03-23 RX ADMIN — ALPRAZOLAM 1 MG: 1 TABLET ORAL at 00:46

## 2025-03-23 RX ADMIN — Medication 1 TABLET: at 08:35

## 2025-03-23 RX ADMIN — ACETAMINOPHEN 650 MG: 325 TABLET, FILM COATED ORAL at 20:43

## 2025-03-23 RX ADMIN — FUROSEMIDE 20 MG: 20 TABLET ORAL at 08:35

## 2025-03-23 RX ADMIN — ALPRAZOLAM 1 MG: 1 TABLET ORAL at 20:44

## 2025-03-23 RX ADMIN — QUETIAPINE FUMARATE 50 MG: 25 TABLET ORAL at 20:44

## 2025-03-23 RX ADMIN — GUAIFENESIN 600 MG: 600 TABLET, MULTILAYER, EXTENDED RELEASE ORAL at 20:44

## 2025-03-23 RX ADMIN — GUAIFENESIN AND CODEINE PHOSPHATE 10 ML: 100; 10 SOLUTION ORAL at 13:18

## 2025-03-23 RX ADMIN — GUAIFENESIN 600 MG: 600 TABLET, MULTILAYER, EXTENDED RELEASE ORAL at 08:35

## 2025-03-23 RX ADMIN — CYCLOBENZAPRINE 10 MG: 10 TABLET, FILM COATED ORAL at 20:58

## 2025-03-23 RX ADMIN — CETIRIZINE HYDROCHLORIDE 10 MG: 10 TABLET, FILM COATED ORAL at 08:35

## 2025-03-23 RX ADMIN — BENZONATATE 200 MG: 100 CAPSULE ORAL at 14:09

## 2025-03-23 RX ADMIN — BENZONATATE 200 MG: 100 CAPSULE ORAL at 08:35

## 2025-03-23 RX ADMIN — PREDNISONE 40 MG: 20 TABLET ORAL at 11:27

## 2025-03-23 RX ADMIN — Medication 5 MG: at 20:43

## 2025-03-23 RX ADMIN — LAMOTRIGINE 200 MG: 100 TABLET ORAL at 20:45

## 2025-03-23 RX ADMIN — BUDESONIDE 0.5 MG: 0.5 INHALANT RESPIRATORY (INHALATION) at 08:24

## 2025-03-23 RX ADMIN — PREDNISONE 40 MG: 20 TABLET ORAL at 20:43

## 2025-03-23 RX ADMIN — IPRATROPIUM BROMIDE AND ALBUTEROL SULFATE 3 ML: .5; 3 SOLUTION RESPIRATORY (INHALATION) at 15:38

## 2025-03-23 RX ADMIN — OXYCODONE HYDROCHLORIDE 10 MG: 5 TABLET ORAL at 20:42

## 2025-03-23 RX ADMIN — LEVOTHYROXINE SODIUM 175 MCG: 175 TABLET ORAL at 05:33

## 2025-03-23 RX ADMIN — IPRATROPIUM BROMIDE AND ALBUTEROL SULFATE 3 ML: .5; 3 SOLUTION RESPIRATORY (INHALATION) at 08:24

## 2025-03-23 RX ADMIN — Medication 1 TABLET: at 20:43

## 2025-03-23 RX ADMIN — AMITRIPTYLINE HYDROCHLORIDE 25 MG: 25 TABLET, FILM COATED ORAL at 20:43

## 2025-03-23 RX ADMIN — BENZONATATE 200 MG: 100 CAPSULE ORAL at 20:45

## 2025-03-23 RX ADMIN — GUAIFENESIN AND CODEINE PHOSPHATE 10 ML: 100; 10 SOLUTION ORAL at 20:58

## 2025-03-23 RX ADMIN — BUDESONIDE 0.5 MG: 0.5 INHALANT RESPIRATORY (INHALATION) at 19:27

## 2025-03-23 RX ADMIN — HYDROXYCHLOROQUINE SULFATE 200 MG: 200 TABLET ORAL at 08:35

## 2025-03-23 RX ADMIN — HYDROXYCHLOROQUINE SULFATE 200 MG: 200 TABLET ORAL at 20:45

## 2025-03-23 NOTE — PROGRESS NOTES
[facility] MEDICINE SERVICE  DAILY PROGRESS NOTE    NAME: Tracie Gross  : 1960  MRN: [id number]      LOS: 8 days     PROVIDER OF SERVICE: Santa Pierre MD    Chief Complaint: SOB (shortness of breath)    Subjective:     Interval History:  History taken from: patient    Complaining of shortness of breath and cough.    Review of Systems:   Review of Systems   All other systems reviewed and are negative.      Objective:     Vital Signs  Temp:  [97.6 °F (36.4 °C)-98.6 °F (37 °C)] 98.6 °F (37 °C)  Heart Rate:  [] 111  Resp:  [16-27] 24  BP: (125-166)/() 125/106  Flow (L/min) (Oxygen Therapy):  [2] 2   Body mass index is 39.36 kg/m².    Physical Exam  Physical Exam  Constitutional:       Appearance: Normal appearance.   HENT:      Head: Normocephalic and atraumatic.      Nose: Nose normal.      Mouth/Throat:      Mouth: Mucous membranes are moist.   Eyes:      Extraocular Movements: Extraocular movements intact.      Pupils: Pupils are equal, round, and reactive to light.   Cardiovascular:      Rate and Rhythm: Normal rate and regular rhythm.   Pulmonary:      Effort: Pulmonary effort is normal.      Breath sounds: Normal breath sounds.   Abdominal:      General: Abdomen is flat. Bowel sounds are normal.      Palpations: Abdomen is soft.   Musculoskeletal:         General: Normal range of motion.      Cervical back: Normal range of motion and neck supple.   Skin:     General: Skin is warm and dry.   Neurological:      General: No focal deficit present.      Mental Status: She is alert and oriented to person, place, and time.   Psychiatric:         Mood and Affect: Mood normal.         Behavior: Behavior normal.         Thought Content: Thought content normal.         Judgment: Judgment normal.         Current Medications:  Scheduled Meds:amitriptyline, 25 mg, Oral, Nightly  benzonatate, 200 mg, Oral, TID  budesonide, 0.5 mg, Nebulization, BID - RT  calcium 500 mg vitamin D 5 mcg (200  UT), 1 tablet, Oral, BID  cetirizine, 10 mg, Oral, Daily  furosemide, 20 mg, Oral, Daily  guaiFENesin, 600 mg, Oral, Q12H  hydroxychloroquine, 200 mg, Oral, BID  lamoTRIgine, 200 mg, Oral, Nightly  levothyroxine, 175 mcg, Oral, Q AM  [Held by provider] mycophenolate, 500 mg, Oral, Q12H  predniSONE, 40 mg, Oral, Q12H  QUEtiapine, 50 mg, Oral, Nightly  sodium chloride, 10 mL, Intravenous, Q12H      Continuous Infusions:     PRN Meds:.  acetaminophen **OR** acetaminophen **OR** acetaminophen    ALPRAZolam    aluminum-magnesium hydroxide-simethicone    senna-docusate sodium **AND** polyethylene glycol **AND** bisacodyl **AND** bisacodyl    Calcium Replacement - Follow Nurse / BPA Driven Protocol    cyclobenzaprine    guaiFENesin-codeine    ipratropium-albuterol    Magnesium Standard Dose Replacement - Follow Nurse / BPA Driven Protocol    meclizine    melatonin    Morphine    nitroglycerin    ondansetron ODT **OR** [DISCONTINUED] ondansetron    oxyCODONE    Phosphorus Replacement - Follow Nurse / BPA Driven Protocol    Potassium Replacement - Follow Nurse / BPA Driven Protocol    [COMPLETED] Insert Peripheral IV **AND** sodium chloride    sodium chloride    sodium chloride       Diagnostic Data    Results from last 7 days   Lab Units 03/23/25  0056 03/20/25  0526 03/20/25  0500   WBC 10*3/mm3 22.15*   < >  --    HEMOGLOBIN g/dL 11.6*   < >  --    HEMATOCRIT % 35.4   < >  --    PLATELETS 10*3/mm3 306   < >  --    GLUCOSE mg/dL 105*   < > 126*   CREATININE mg/dL 1.24*   < > 1.14*   BUN mg/dL 36*   < > 27*   SODIUM mmol/L 140   < > 137   POTASSIUM mmol/L 3.8   < > 5.3*   AST (SGOT) U/L  --   --  35*   ALT (SGPT) U/L  --   --  12   ALK PHOS U/L  --   --  80   BILIRUBIN mg/dL  --   --  0.2   ANION GAP mmol/L 11.8   < > 11.3    < > = values in this interval not displayed.       No radiology results for the last day        I reviewed the patient's new clinical results.    Assessment/Plan:     Active and Resolved  Problems  Active Hospital Problems    Diagnosis  POA    **SOB (shortness of breath) [R06.02]  Yes      Resolved Hospital Problems   No resolved problems to display.       Acute hypoxic respiratory failure not on home oxygen but currently on 3 L of oxygen via nasal cannula   Pneumonia  Hyperkalemia  Recently diagnosed pulmonary fibrosis  DANIELLE on CKD 3  UTI  Hypertension  Hypothyroidism      -acute hypoxic respiratory failure likely secondary to pneumonia plus underlying pulmonary fibrosis.  Urine strep pneumo antigen is positive.  Has completed ceftriaxone for treatment of likely Streptococcus pneumonia.  Blood cultures and sputum culture negative.  Still complaining of shortness of breath and is tachypneic.  Follow-up on repeat chest x-ray.  Pulmonary following.  -Renal function with slight improvement.  See further recommendations per nephrology.  - Treat hyperkalemia with Lokelma  - Urine culture showed no growth ruling out UTI  - Continue current management.      VTE Prophylaxis:  Mechanical VTE prophylaxis orders are present.       Disposition Planning:     Barriers to Discharge: Pending clinical improvement  Anticipated Date of Discharge: 3/24/2025  Place of Discharge: Home      Code Status and Medical Interventions: CPR (Attempt to Resuscitate); Full Support   Ordered at: 03/15/25 1955     Code Status (Patient has no pulse and is not breathing):    CPR (Attempt to Resuscitate)     Medical Interventions (Patient has pulse or is breathing):    Full Support       Signature: Electronically signed by Santa Pierre MD, 03/23/25, 12:24 EDT.  [facility] Hospitalist Team

## 2025-03-23 NOTE — CASE MANAGEMENT/SOCIAL WORK
Continued Stay Note  [facility]     Patient Name: Tracie Gross  MRN: [id number]  Today's Date: 3/23/2025    Admit Date: 3/15/2025    Plan: Home w/[facility] (referral pending) vs SNF. Will require precert for SNF. PASRR triggered level 2 on 3/19 (allow 5-7 business days for determination).   Discharge Plan       Row Name 03/23/25 1818       Plan    Plan Home w/[facility] (referral pending) vs SNF. Will require precert for SNF. PASRR triggered level 2 on 3/19 (allow 5-7 business days for determination).    Plan Comments CM noted pt needed follow up on SNF preference over weekend  in order for precert to be started. CM met with pt and her , Brian, at the bedside to discuss which SNF they preferred. Both agree that they would prefer for pt to receive PT at home as they feel like pt would not fair well in a rehab facility setting with her more demure personality type. Brian asked about insurance coverage and the need for precert with home health. CM explained home health would not require precert, that the hospitalist would place initial order and then it would be sent on to Dr. Silva to follow after dc. CM did educate and encourage pt to participate with PT when they come to work with her while inpatient as well as encouraged her to eat/drink for nutritional support and the healing process. Pt and Brian both discussed her decrease in appetite lately and her generalized weakness. When discussing home health options, pt and Brian state they have used [facility] for OPPT in the past and are happy to have referral placed to [facility]. CM placed referral in epic basket and notified [facility] liaisonKecia.             Expected Discharge Date and Time       Expected Discharge Date Expected Discharge Time    Mar 24, 2025           Fiona Fay RN     Office Phone: [phone]  Office Cell: [phone]

## 2025-03-23 NOTE — DISCHARGE PLACEMENT REQUEST
"Evelyn Grossela MARY JANE \"Evelyn\" (64 y.o. Female)       Date of Birth   1960    Social Security Number       Address   [address] [place] [address] [place] [facility]    Home Phone   [phone]    MRN   [id number]       [facility]    Marital Status                               Admission Date   3/15/2025    Admission Type   Emergency    Admitting Provider   Mushtaq Garcia MD    Attending Provider   Santa Pierre MD    Department, Room/Bed   [facility], 2107/1       Discharge Date       Discharge Disposition       Discharge Destination                                 Attending Provider: Santa Pierre MD    Allergies: Aspirin, Baclofen    Isolation: None   Infection: None   Code Status: CPR    Ht: 162.6 cm (64\")   Wt: 104 kg (229 lb 4.5 oz)    Admission Cmt: None   Principal Problem: SOB (shortness of breath) [R06.02]                   Active Insurance as of 3/15/2025       Primary Coverage       Payor Plan Insurance Group Employer/Plan Group    ANTHEM MEDICARE REPLACEMENT ANTHEM MEDICARE ADVANTAGE PPO [id number]       Payor Plan Address Payor Plan Phone Number Payor Plan Fax Number Effective Dates    [address] [phone]  1/1/2024 - None Entered    [facility] [address]         Subscriber Name Subscriber Birth Date Member ID       TRACIE GROSS 1960 [id number]                     Emergency Contacts        (Rel.) Home Phone Work Phone Mobile Phone    Brian Gross (Spouse) [phone] -- [phone]    Roverto Hopkins (Other) -- -- [phone]    Adithya Hopkins (Other) -- -- [phone]          "

## 2025-03-23 NOTE — PROGRESS NOTES
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 No wofl honeycombing fibrosis  Patient was treated with mycophenolate, prophylactic Bactrim     PFT 1/14/2025   FVC 1 1.6 L/51%,  FEV1 1.4 L / 58%, ratio 88, TLC 57%, RV 50%, DLCO 51%     PFTs July 2021,   FEV1 1.7 L 68% improve 75% post bronchodilator, FEV1/ FVC ratio 87, TLC 72%, RV 74%, DLCO 56%     PFTs in 2017   FEV1 66-70% predicted which is unchanged since 2012  quit smoking 1994,      PFT 2012 and 2017,   FEV-1 and TLC 75% , suggestive of mild to mod restrictive lung disease     2D echo  2018 normal RVSP and EF  2D echo May 2020 no pulmonary hypertension   2d echo July 2021 RVSP 30  2D echo January 2025 no pulmonary hypertension EF 60%     FERN, AHI 14.5 ,       No Response to inhalers including Breztri and Trelegy     Recommendations:    Chest CT without contrast  Bronchoscopy in a.m. for mild hemoptysis    oxygen titration currently on 2 L  Antibiotics Rocephin 2 g IV daily for 5 days  Azithromycin 500 mg p.o. daily for 3 days      Seen by GI for recurrent \" chocking\"     Holding mycophenolate for treatment of pneumonia     Bronchodilators Pulmicort and DuoNeb     On Plaquenil 200 mg twice daily              Chest x-ray 3/15/2025      VQ scan 3/16/2025       High-res CT scan January 2025             LOS: 8 days     Subjective         Objective     Vital signs for last 24 hours:  Vitals:    03/23/25 0827 03/23/25 0828 03/23/25 0831 03/23/25 0912   BP:    (!) 125/106   BP Location:    Left arm   Patient Position:    Lying   Pulse: 106 105 105 111   Resp: 26 24 22 24   Temp:    98.6 °F (37 °C)   TempSrc:    Axillary   SpO2: 94% 94% 92% 95%   Weight:       Height:           Intake/Output last 3 shifts:  I/O last " 3 completed shifts:  In: 120 [P.O.:120]  Out: 200 [Urine:200]  Intake/Output this shift:  No intake/output data recorded.      Radiology  Imaging Results (Last 24 Hours)       Procedure Component Value Units Date/Time    XR Chest 1 View [[id number]] Resulted: 03/23/25 1059     Updated: 03/23/25 1100            Labs:  Results from last 7 days   Lab Units 03/23/25  0056   WBC 10*3/mm3 22.15*   HEMOGLOBIN g/dL 11.6*   HEMATOCRIT % 35.4   PLATELETS 10*3/mm3 306     Results from last 7 days   Lab Units 03/23/25  0056 03/21/25  0043 03/20/25  0500   SODIUM mmol/L 140   < > 137   POTASSIUM mmol/L 3.8   < > 5.3*   CHLORIDE mmol/L 100   < > 103   CO2 mmol/L 28.2   < > 22.7   BUN mg/dL 36*   < > 27*   CREATININE mg/dL 1.24*   < > 1.14*   CALCIUM mg/dL 10.5   < > 9.5   BILIRUBIN mg/dL  --   --  0.2   ALK PHOS U/L  --   --  80   ALT (SGPT) U/L  --   --  12   AST (SGOT) U/L  --   --  35*   GLUCOSE mg/dL 105*   < > 126*    < > = values in this interval not displayed.           Results from last 7 days   Lab Units 03/20/25  0500   ALBUMIN g/dL 3.5     Results from last 7 days   Lab Units 03/17/25  0138   CK TOTAL U/L 94         Results from last 7 days   Lab Units 03/18/25  0459   MAGNESIUM mg/dL 2.6*     Results from last 7 days   Lab Units 03/20/25  0526   INR  1.20*               Meds:   SCHEDULE  amitriptyline, 25 mg, Oral, Nightly  benzonatate, 200 mg, Oral, TID  budesonide, 0.5 mg, Nebulization, BID - RT  calcium 500 mg vitamin D 5 mcg (200 UT), 1 tablet, Oral, BID  cetirizine, 10 mg, Oral, Daily  furosemide, 20 mg, Oral, Daily  guaiFENesin, 600 mg, Oral, Q12H  hydroxychloroquine, 200 mg, Oral, BID  lamoTRIgine, 200 mg, Oral, Nightly  levothyroxine, 175 mcg, Oral, Q AM  [Held by provider] mycophenolate, 500 mg, Oral, Q12H  predniSONE, 40 mg, Oral, Q12H  QUEtiapine, 50 mg, Oral, Nightly  sodium chloride, 10 mL, Intravenous, Q12H      Infusions       PRNs    acetaminophen **OR** acetaminophen **OR** acetaminophen     ALPRAZolam    aluminum-magnesium hydroxide-simethicone    senna-docusate sodium **AND** polyethylene glycol **AND** bisacodyl **AND** bisacodyl    Calcium Replacement - Follow Nurse / BPA Driven Protocol    cyclobenzaprine    guaiFENesin-codeine    ipratropium-albuterol    Magnesium Standard Dose Replacement - Follow Nurse / BPA Driven Protocol    meclizine    melatonin    Morphine    nitroglycerin    ondansetron ODT **OR** [DISCONTINUED] ondansetron    oxyCODONE    Phosphorus Replacement - Follow Nurse / BPA Driven Protocol    Potassium Replacement - Follow Nurse / BPA Driven Protocol    [COMPLETED] Insert Peripheral IV **AND** sodium chloride    sodium chloride    sodium chloride    Physical Exam:  Physical Exam  Cardiovascular:      Heart sounds: Murmur heard.      No gallop.   Pulmonary:      Effort: No respiratory distress.      Breath sounds: No stridor. Rhonchi and rales present. No wheezing.   Chest:      Chest wall: No tenderness.         ROS  Review of Systems   Respiratory:  Positive for cough and shortness of breath. Negative for wheezing and stridor.    Cardiovascular:  Positive for palpitations. Negative for chest pain and leg swelling.             Total critical care time spent with patient greater than: 45 Minutes

## 2025-03-23 NOTE — PLAN OF CARE
Problem: Adult Inpatient Plan of Care  Goal: Plan of Care Review  Outcome: Progressing  Goal: Patient-Specific Goal (Individualized)  Outcome: Progressing  Goal: Absence of Hospital-Acquired Illness or Injury  Outcome: Progressing  Intervention: Identify and Manage Fall Risk  Recent Flowsheet Documentation  Taken 3/23/2025 1200 by Tabatha Hung LPN  Safety Promotion/Fall Prevention:   activity supervised   assistive device/personal items within reach   clutter free environment maintained   fall prevention program maintained   gait belt   nonskid shoes/slippers when out of bed   safety round/check completed  Taken 3/23/2025 1000 by Tabatha Hung LPN  Safety Promotion/Fall Prevention:   activity supervised   clutter free environment maintained   assistive device/personal items within reach   fall prevention program maintained   gait belt   nonskid shoes/slippers when out of bed   safety round/check completed  Taken 3/23/2025 0800 by Tabatha Hung LPN  Safety Promotion/Fall Prevention:   assistive device/personal items within reach   activity supervised   clutter free environment maintained   fall prevention program maintained   gait belt   nonskid shoes/slippers when out of bed   safety round/check completed  Intervention: Prevent Skin Injury  Recent Flowsheet Documentation  Taken 3/23/2025 1200 by Tabatha Hung LPN  Skin Protection: incontinence pads utilized  Taken 3/23/2025 0800 by Tabatha Hung LPN  Skin Protection: transparent dressing maintained  Intervention: Prevent Infection  Recent Flowsheet Documentation  Taken 3/23/2025 1200 by Tabatha Hung LPN  Infection Prevention:   hand hygiene promoted   rest/sleep promoted   single patient room provided  Taken 3/23/2025 1000 by Tabatha Hung LPN  Infection Prevention:   hand hygiene promoted   single patient room provided   rest/sleep promoted  Taken 3/23/2025 0800 by Tabatha Hung LPN  Infection Prevention:   hand hygiene promoted   single patient  room provided   rest/sleep promoted  Goal: Optimal Comfort and Wellbeing  Outcome: Progressing  Intervention: Provide Person-Centered Care  Recent Flowsheet Documentation  Taken 3/23/2025 1200 by Tabatha Hung LPN  Trust Relationship/Rapport:   care explained   thoughts/feelings acknowledged   reassurance provided   questions encouraged   questions answered  Taken 3/23/2025 0800 by Tabatha Hung LPN  Trust Relationship/Rapport:   care explained   thoughts/feelings acknowledged   reassurance provided   questions encouraged   questions answered  Goal: Readiness for Transition of Care  Outcome: Progressing     Problem: Skin Injury Risk Increased  Goal: Skin Health and Integrity  Outcome: Progressing  Intervention: Optimize Skin Protection  Recent Flowsheet Documentation  Taken 3/23/2025 1200 by Tabatha Hung LPN  Pressure Reduction Techniques: frequent weight shift encouraged  Pressure Reduction Devices:   positioning supports utilized   pressure-redistributing mattress utilized  Skin Protection: incontinence pads utilized  Taken 3/23/2025 0800 by Tabatha Hung LPN  Pressure Reduction Techniques: frequent weight shift encouraged  Pressure Reduction Devices:   positioning supports utilized   pressure-redistributing mattress utilized  Skin Protection: transparent dressing maintained     Problem: Comorbidity Management  Goal: Maintenance of Behavioral Health Symptom Control  Outcome: Progressing  Intervention: Maintain Behavioral Health Symptom Control  Recent Flowsheet Documentation  Taken 3/23/2025 1200 by Tabatha Hung LPN  Medication Review/Management: medications reviewed  Taken 3/23/2025 1000 by Tabatha Hung LPN  Medication Review/Management: medications reviewed  Taken 3/23/2025 0800 by Tabatha Hung LPN  Medication Review/Management: medications reviewed  Goal: Blood Pressure in Desired Range  Outcome: Progressing  Intervention: Maintain Blood Pressure Management  Recent Flowsheet  Documentation  Taken 3/23/2025 1200 by Tabatha Hung LPN  Medication Review/Management: medications reviewed  Taken 3/23/2025 1000 by Tabatha Hung LPN  Medication Review/Management: medications reviewed  Taken 3/23/2025 0800 by Tabatha Hung LPN  Medication Review/Management: medications reviewed  Goal: Maintenance of Osteoarthritis Symptom Control  Outcome: Progressing  Intervention: Maintain Osteoarthritis Symptom Control  Recent Flowsheet Documentation  Taken 3/23/2025 1200 by Tabatha Hung LPN  Medication Review/Management: medications reviewed  Taken 3/23/2025 1000 by Tabatha Hung LPN  Medication Review/Management: medications reviewed  Taken 3/23/2025 0800 by Tabatha Hung LPN  Medication Review/Management: medications reviewed     Problem: Fall Injury Risk  Goal: Absence of Fall and Fall-Related Injury  Outcome: Progressing  Intervention: Identify and Manage Contributors  Recent Flowsheet Documentation  Taken 3/23/2025 1200 by Tabatha Hung LPN  Medication Review/Management: medications reviewed  Taken 3/23/2025 1000 by Tabatha Hung LPN  Medication Review/Management: medications reviewed  Taken 3/23/2025 0800 by Tabatha Hung LPN  Medication Review/Management: medications reviewed  Intervention: Promote Injury-Free Environment  Recent Flowsheet Documentation  Taken 3/23/2025 1200 by Tabatha Hung LPN  Safety Promotion/Fall Prevention:   activity supervised   assistive device/personal items within reach   clutter free environment maintained   fall prevention program maintained   gait belt   nonskid shoes/slippers when out of bed   safety round/check completed  Taken 3/23/2025 1000 by Tabatha Hung LPN  Safety Promotion/Fall Prevention:   activity supervised   clutter free environment maintained   assistive device/personal items within reach   fall prevention program maintained   gait belt   nonskid shoes/slippers when out of bed   safety round/check completed  Taken 3/23/2025 0800 by  Tabatha Hung, LPN  Safety Promotion/Fall Prevention:   assistive device/personal items within reach   activity supervised   clutter free environment maintained   fall prevention program maintained   gait belt   nonskid shoes/slippers when out of bed   safety round/check completed   Goal Outcome Evaluation:

## 2025-03-23 NOTE — THERAPY TREATMENT NOTE
Acute Care - Speech Language Pathology   Swallow Treatment Note JESI Murphy     Patient Name: Tracie Gross  : 1960  MRN: [id number]  Today's Date: 3/23/2025               Admit Date: 3/15/2025    Visit Dx:     ICD-10-CM ICD-9-CM   1. Dyspnea, unspecified type  R06.00 786.09   2. Pulmonary fibrosis  J84.10 515   3. Hypotension, unspecified hypotension type  I95.9 458.9   4. Urinary tract infection without hematuria, site unspecified  N39.0 599.0     Patient Active Problem List   Diagnosis    Vitamin B12 deficiency    Arthritis    Sleep apnea    Bipolar affective disorder    Depression    Breast cancer    Chronic pain    Dyslipidemia    Family history of malignant neoplasm of colon    Family history of melanoma    Gastroesophageal reflux disease    Heart murmur    Hypertension    Hypothyroidism    Osteoarthritis, generalized    Raynaud's disease    Ulcerative colitis    Artificial knee joint present    Neuralgia    Presence of artificial hip joint, right    Lumbar radiculopathy    Derangement of posterior horn of lateral meniscus    Von Willebrand disease    SLE (systemic lupus erythematosus related syndrome)    Chest pain    Scleroderma    Monahan syndrome    Rectal prolapse    Osteoporosis    COVID-19    Fibromyalgia    Onychomycosis    Stage 3 chronic kidney disease    Avascular necrosis of femoral head, left    Morbid obesity    Trochanteric bursitis of left hip    Pain in both knees    Status post total hip replacement, right    DJD of left shoulder    Complete tear of left rotator cuff    Osteoarthritis of right glenohumeral joint    Pulmonary fibrosis    SOB (shortness of breath)    Dyspnea     Past Medical History:   Diagnosis Date    Allergic 1998    Anemia     Ankle sprain     Anxiety     Arthritis     Arthritis of back 0ll2013    Arthritis of neck 2005    Asthma     Bipolar affective disorder     Breast cancer 1985    s/p R mastectomy    Bursitis of hip [id number]    Cervical disc disorder  2006    CTS (carpal tunnel syndrome)     Depression 2000    Fracture of wrist 1995    Fracture, foot     Frozen shoulder     GERD (gastroesophageal reflux disease)     H/O breast reconstruction     SEVERAL    H/O laminectomy     Hamartoma     Hip arthrosis 2013    History of medical problems     Hx of bilateral oophorectomy     Hyperlipidemia     Hypertension     Hypothyroidism     Infectious viral hepatitis     Inflammatory bowel disease     Man. EGD/colonoscopy annually (2021)    Irritable bowel syndrome     Kienbock's disease, right     WRIST    Knee swelling 2007    Low back pain 1991    Low back strain     Lumbosacral disc disease 1995    Had 2 lumber surgeries    Lupus     [place]    Monahan syndrome     Man. EGD/colonoscopy annually (2021). MRI alternating w/ EUS annually for pancreatic screen    MRSA infection     Neuroma of foot     Obesity     Osteopenia     Osteoporosis     maintained on Prolia through Karen    Peptic ulceration     Periarthritis of shoulder 2022    Pneumonia     Pulmonary fibrosis     Raynaud disease     Renal insufficiency     Rotator cuff syndrome [id number]    Scleroderma     Sleep apnea     cpap    Squamous cell cancer of lip     Tear of meniscus of knee     Tendinitis of knee     Thoracic disc disorder     Von Willebrand disease     Wrist sprain      Past Surgical History:   Procedure Laterality Date    APPENDECTOMY      ARM DEBRIDEMENT Right     X 3    BACK SURGERY      Vetas- cervical fusion    BACK SURGERY      lumbar decomp    BREAST BIOPSY      BREAST LUMPECTOMY      BREAST RECONSTRUCTION Right     BREAST RECONSTRUCTION Right     CARDIAC CATHETERIZATION      no stents placed     SECTION  ,     CHOLECYSTECTOMY      COLONOSCOPY      COLONOSCOPY N/A 2020    Procedure: COLONOSCOPY;  Surgeon: Cayden Conway MD;  Location: [facility] ENDOSCOPY;  Service: Gastroenterology;  Laterality: N/A;   rectal ulcer    COLONOSCOPY N/A 09/17/2021    Procedure: COLONOSCOPY WITH POLYPECTOMY X 1;  Surgeon: Cayden Conway MD;  Location: [facility] ENDOSCOPY;  Service: Gastroenterology;  Laterality: N/A;  Post: COLON POLYP    COLONOSCOPY N/A 01/06/2023    Procedure: COLONOSCOPY with polypectomy x 1, endoscopic clipping x 1;  Surgeon: Cayden Conway MD;  Location: [facility] ENDOSCOPY;  Service: Gastroenterology;  Laterality: N/A;    COLONOSCOPY N/A 10/01/2024    Procedure: COLONOSCOPY;  Surgeon: Cayden Conway MD;  Location: [facility] ENDOSCOPY;  Service: Gastroenterology;  Laterality: N/A;  normal    COSMETIC SURGERY  [id number]    ENDOSCOPY      ENDOSCOPY N/A 08/14/2020    Procedure: ESOPHAGOGASTRODUODENOSCOPY;  Surgeon: Cayden Conway MD;  Location: [facility] ENDOSCOPY;  Service: Gastroenterology;  Laterality: N/A;  Normal EGD    ENDOSCOPY N/A 09/17/2021    Procedure: ESOPHAGOGASTRODUODENOSCOPY;  Surgeon: Cayden Conway MD;  Location: [facility] ENDOSCOPY;  Service: Gastroenterology;  Laterality: N/A;  Post: normal egd    ENDOSCOPY N/A 10/01/2024    Procedure: ESOPHAGOGASTRODUODENOSCOPY;  Surgeon: Cayden Conway MD;  Location: [facility] ENDOSCOPY;  Service: Gastroenterology;  Laterality: N/A;  normal    EXPLORATORY LAPAROTOMY      scar tissue removal    EYE SURGERY  2000    FINGER SURGERY Right     ring finger mass excision    HAND SURGERY  Rt wrist  10/15    HIP SURGERY  1/12    HYSTERECTOMY      PARTIAL    JOINT REPLACEMENT Right 2012,2015    hip and knee    KNEE ARTHROSCOPY Left 01/07/2020    Procedure: LEFT KNEE SCOPE with partial lateral meniscectomy;  Surgeon: Chau Perez MD;  Location: [facility] MAIN OR;  Service: Orthopedics    KNEE SURGERY  Rtf knee  2015    LASIK      LASIK/ LASIK ENHANCEMENT    MASTECTOMY RADICAL Right     NECK SURGERY  01/01/06    OOPHORECTOMY Bilateral     SHOULDER SURGERY  11/01/2023    SPINE SURGERY      SPLENECTOMY      SUBTOTAL HYSTERECTOMY      TOTAL HIP  ARTHROPLASTY Left 05/17/2023    TOTAL SHOULDER ARTHROPLASTY W/ DISTAL CLAVICLE EXCISION Right 11/01/2023    Procedure: TOTAL SHOULDER REVERSE ARTHROPLASTY;  Surgeon: Flyod Ruiz MD;  Location: [facility] MAIN OR;  Service: Orthopedics;  Laterality: Right;    TRIGGER POINT INJECTION  7/23    TUBAL ABDOMINAL LIGATION  1981    UPPER ENDOSCOPIC ULTRASOUND W/ FNA N/A 12/09/2021    Procedure: ENDOSCOPIC ULTRASOUND WITH ESOPHAGOGASTRODUODENOSCOPY;  Surgeon: Luis E Negron MD;  Location: [facility] ENDOSCOPY;  Service: Gastroenterology;  Laterality: N/A;  Post: GASTROPARESIS, DILATED COMMON BILE DUCT, FUNDIC POLYP, DUODENITIS, NORMAL PANCREAS, HIATAL HERNIA    WRIST SURGERY Right     distal radius head removal (bone dead)  plates and screws decomp lunate       SLP Recommendation and Plan     SLP Diet Recommendation: regular textures, thin liquids (03/23/25 1600)  Recommended Precautions and Strategies: upright posture during/after eating, small bites of food and sips of liquid, alternate between small bites of food and sips of liquid, general aspiration precautions, reflux precautions (03/23/25 1600)  SLP Rec. for Method of Medication Administration: meds whole, meds crushed, as tolerated (03/23/25 1600)     Monitor for Signs of Aspiration: yes, notify SLP if any concerns, cough, gurgly voice, throat clearing (03/23/25 1600)  Recommended Diagnostics: reassess via clinical swallow evaluation (03/23/25 1600)           Therapy Frequency (Swallow): PRN (03/23/25 1600)  Predicted Duration Therapy Intervention (Days): until discharge (03/23/25 1600)  Oral Care Recommendations: Oral Care BID/PRN (03/23/25 1600)                                               SWALLOW EVALUATION (Last 72 Hours)       SLP Adult Swallow Evaluation       Row Name 03/23/25 1600       Rehab Evaluation    Document Type therapy note (daily note)  -CP    Subjective Information complains of;weakness;fatigue;dyspnea  Pt has had increased coughing and SOA  since VFSS on Friday.  -CP    Patient Observations alert;cooperative  -CP    Patient/Family/Caregiver Comments/Observations Pt's  is in the room  -CP    Patient Effort good  -CP    Comment Skilled ST targeting dysphagia was conducted today. Pt was seen for a follow-up after VFSS on Friday. A full lquid diet was recommended but pt was able to upgrade to solids as soon as her persistent nausea was improved. Pt did upgrade diet yesterday due to wanting to try toast. Pt and her  reprot that pt was doing well yesterday and eating and visiting with family. Late in the afternoon she began to have increased coughing and became very SOA. Pt has been unable to eat anything since and is only drinking. Pt and her  both report that dshedid tolerate the solids she had yesterday and no s/s of aspiration on liquids. pt did agree to drink some diet coke for clinician. She took multiple sips by cup and showed no overt s/s of aspiration. Pt is to have a bronch tomorrow for further invetigation of her persistent cough. ST will continue to follow to assure tolerance of diet and make further recs as indicated.  -CP       General Information       Recommendations    Therapy Frequency (Swallow) PRN  -CP    Predicted Duration Therapy Intervention (Days) until discharge  -CP    SLP Diet Recommendation regular textures;thin liquids  -CP    Recommended Diagnostics reassess via clinical swallow evaluation  -CP    Recommended Precautions and Strategies upright posture during/after eating;small bites of food and sips of liquid;alternate between small bites of food and sips of liquid;general aspiration precautions;reflux precautions  -CP    Oral Care Recommendations Oral Care BID/PRN  -CP    SLP Rec. for Method of Medication Administration meds whole;meds crushed;as tolerated  -CP    Monitor for Signs of Aspiration yes;notify SLP if any concerns;cough;gurgly voice;throat clearing  -CP       Swallow Goals (SLP)    Swallow LTGs  Swallow Long Term Goal (free text)  -CP    Swallow STGs diet tolerance goal selection (SLP)  -CP    Diet Tolerance Goal Selection (SLP) Swallow Short Term Goal 1;Swallow Short Term Goal 2  -CP       (LTG) Swallow    (LTG) Swallow Pt will maximize swallow function for least restrictive PO diet, exhibiting no complication associated with dysphagia, adequate PO intake, and demonstrating independent use of swallow compensations  -CP    Time Frame (Swallow Long Term Goal) by discharge  -CP    Barriers (Swallow Long Term Goal) coughing/SOA  -CP    Progress/Outcomes (Swallow Long Term Goal) progress slower than expected  -CP    Comment (Swallow Long Term Goal) See above  -CP       (STG) Swallow 1    (STG) Swallow 1 The patient will participate in a meal/follow-up assessment to determine safety and adequacy of recommended diet, independent use of safe swallow compensations, pt/family education and additional goals/recommendations to follow.  -CP    Time Frame (Swallow Short Term Goal 1) 1 week  -CP    Progress/Outcomes (Swallow Short Term Goal 1) progress slower than expected  -CP    Comment (Swallow Short Term Goal 1) See above  -CP       (STG) Swallow 2    (STG) Swallow 2 Pt will participate in ongoing swallow assessment to include VFSS if indicated, and caregiver teaching.  -CP    Time Frame (Swallow Short Term Goal 2) 1 week  -CP    Progress/Outcomes (Swallow Short Term Goal 2) goal ongoing  -CP    Comment (Swallow Short Term Goal 2) See above  -CP              User Key  (r) = Recorded By, (t) = Taken By, (c) = Cosigned By      Initials Name Effective Dates    CP Natalie Lawton SLP 06/16/21 -                     EDUCATION  The patient has been educated in the following areas:   Dysphagia (Swallowing Impairment) Oral Care/Hydration.        SLP GOALS       Row Name 03/23/25 1600 03/21/25 1400          (LTG) Swallow    (LTG) Swallow Pt will maximize swallow function for least restrictive PO diet, exhibiting no  complication associated with dysphagia, adequate PO intake, and demonstrating independent use of swallow compensations  -CP Pt will maximize swallow function for least restrictive PO diet, exhibiting no complication associated with dysphagia, adequate PO intake, and demonstrating independent use of swallow compensations  -CP     Time Frame (Swallow Long Term Goal) by discharge  -CP by discharge  -CP     Barriers (Swallow Long Term Goal) coughing/SOA  -CP nausea, gagging  -CP     Progress/Outcomes (Swallow Long Term Goal) progress slower than expected  -CP --     Comment (Swallow Long Term Goal) See above  -CP --        (STG) Swallow 1    (STG) Swallow 1 The patient will participate in a meal/follow-up assessment to determine safety and adequacy of recommended diet, independent use of safe swallow compensations, pt/family education and additional goals/recommendations to follow.  -CP The patient will participate in a meal/follow-up assessment to determine safety and adequacy of recommended diet, independent use of safe swallow compensations, pt/family education and additional goals/recommendations to follow.  -CP     Time Frame (Swallow Short Term Goal 1) 1 week  -CP 1 week  -CP     Progress/Outcomes (Swallow Short Term Goal 1) progress slower than expected  -CP new goal  -CP     Comment (Swallow Short Term Goal 1) See above  -CP --        (STG) Swallow 2    (STG) Swallow 2 Pt will participate in ongoing swallow assessment to include VFSS if indicated, and caregiver teaching.  -CP Pt will participate in ongoing swallow assessment to include VFSS if indicated, and caregiver teaching.  -CP     Time Frame (Swallow Short Term Goal 2) 1 week  -CP 1 week  -CP     Progress/Outcomes (Swallow Short Term Goal 2) goal ongoing  -CP goal ongoing  -CP     Comment (Swallow Short Term Goal 2) See above  -CP See above  -CP               User Key  (r) = Recorded By, (t) = Taken By, (c) = Cosigned By      Initials Name Provider Type     Natalie Escalera, SLP Speech and Language Pathologist                         Time Calculation:                Natalie Lawton, LESLEY  3/23/2025

## 2025-03-23 NOTE — NURSING NOTE
Pt c/o SOA and increased coughing, PRN and scheduled cough medication given. Pt screened positive for simple sepsis, MD notified. Pt also had some bloody sputum this morning, MD was notified.

## 2025-03-23 NOTE — PLAN OF CARE
Pt on 2L NC. Pt received prn meds for cough, pain, and sleep.   Problem: Adult Inpatient Plan of Care  Goal: Plan of Care Review  Outcome: Progressing  Goal: Patient-Specific Goal (Individualized)  Outcome: Progressing  Goal: Absence of Hospital-Acquired Illness or Injury  Outcome: Progressing  Intervention: Identify and Manage Fall Risk  Intervention: Prevent Skin Injury  Intervention: Prevent and Manage VTE (Venous Thromboembolism) Risk  Intervention: Prevent Infection  Goal: Optimal Comfort and Wellbeing  Outcome: Progressing  Intervention: Monitor Pain and Promote Comfort  Intervention: Provide Person-Centered Care  Goal: Readiness for Transition of Care  Outcome: Progressing     Problem: Skin Injury Risk Increased  Goal: Skin Health and Integrity  Outcome: Progressing  Intervention: Optimize Skin Protection     Problem: Comorbidity Management  Goal: Maintenance of Behavioral Health Symptom Control  Outcome: Progressing  Goal: Blood Pressure in Desired Range  Outcome: Progressing  Goal: Maintenance of Osteoarthritis Symptom Control  Outcome: Progressing  Intervention: Maintain Osteoarthritis Symptom Control     Problem: Fall Injury Risk  Goal: Absence of Fall and Fall-Related Injury  Outcome: Progressing  Intervention: Identify and Manage Contributors  Intervention: Promote Injury-Free Environment   Goal Outcome Evaluation:

## 2025-03-23 NOTE — SIGNIFICANT NOTE
03/23/25 1036   OTHER   Discipline physical therapy assistant   Rehab Time/Intention   Session Not Performed patient/family declined, not feeling well  (patient stated it was a bad day and too SOA with just transfer earlier. MD ordering chest xray and pt wanting another breathing tx.)   Recommendation   PT - Next Appointment 03/24/25

## 2025-03-23 NOTE — PROGRESS NOTES
Nephrology  Progress Note                                        [facility]    Patient Identification    Name: Tracie Gross  Age: 64 y.o.  Sex: female  :  1960  MRN: [id number]      DATE OF SERVICE:  3/23/2025        Subective    Noted cough and shortness of breath or     Objective   Scheduled Meds:amitriptyline, 25 mg, Oral, Nightly  benzonatate, 200 mg, Oral, TID  budesonide, 0.5 mg, Nebulization, BID - RT  calcium 500 mg vitamin D 5 mcg (200 UT), 1 tablet, Oral, BID  cetirizine, 10 mg, Oral, Daily  furosemide, 20 mg, Oral, Daily  guaiFENesin, 600 mg, Oral, Q12H  hydroxychloroquine, 200 mg, Oral, BID  lamoTRIgine, 200 mg, Oral, Nightly  levothyroxine, 175 mcg, Oral, Q AM  methylPREDNISolone sodium succinate, 40 mg, Intravenous, Q8H  [Held by provider] mycophenolate, 500 mg, Oral, Q12H  QUEtiapine, 50 mg, Oral, Nightly  sodium chloride, 10 mL, Intravenous, Q12H          Continuous Infusions:       PRN Meds:  acetaminophen **OR** acetaminophen **OR** acetaminophen    ALPRAZolam    aluminum-magnesium hydroxide-simethicone    senna-docusate sodium **AND** polyethylene glycol **AND** bisacodyl **AND** bisacodyl    Calcium Replacement - Follow Nurse / BPA Driven Protocol    cyclobenzaprine    guaiFENesin-codeine    ipratropium-albuterol    Magnesium Standard Dose Replacement - Follow Nurse / BPA Driven Protocol    meclizine    melatonin    Morphine    nitroglycerin    ondansetron ODT **OR** [DISCONTINUED] ondansetron    oxyCODONE    Phosphorus Replacement - Follow Nurse / BPA Driven Protocol    Potassium Replacement - Follow Nurse / BPA Driven Protocol    [COMPLETED] Insert Peripheral IV **AND** sodium chloride    sodium chloride    sodium chloride     Exam:  /84 (BP Location: Left arm, Patient Position: Lying)   Pulse 104   Temp 97.6 °F (36.4 °C) (Oral)  "  Resp 23   Ht 162.6 cm (64\")   Wt 104 kg (229 lb 4.5 oz)   LMP 05/08/1983   SpO2 93%   BMI 39.36 kg/m²     Intake/Output last 3 shifts:  I/O last 3 completed shifts:  In: 120 [P.O.:120]  Out: 200 [Urine:200]    Intake/Output this shift:  No intake/output data recorded.    Physical exam:  General Appearance:  Alert  Head:  Normocephalic, without obvious abnormality, atraumatic  Eyes:  PERRL, conjunctiva/corneas clear     Neck:  Supple,  no adenopathy;      Lungs:  Decreased BS occasion ronchi  Heart:  Regular rate and rhythm, S1 and S2 normal  Abdomen:  Soft, non-tender, bowel sounds active   Extremities: trace edema  Pulses: 2+ and symmetric all extremities  Skin:  No rashes or lesions       Data Review:  All labs (24hrs):   Recent Results (from the past 24 hours)   Basic Metabolic Panel    Collection Time: 03/23/25 12:56 AM    Specimen: Arm, Left; Blood   Result Value Ref Range    Glucose 105 (H) 65 - 99 mg/dL    BUN 36 (H) 8 - 23 mg/dL    Creatinine 1.24 (H) 0.57 - 1.00 mg/dL    Sodium 140 136 - 145 mmol/L    Potassium 3.8 3.5 - 5.2 mmol/L    Chloride 100 98 - 107 mmol/L    CO2 28.2 22.0 - 29.0 mmol/L    Calcium 10.5 8.6 - 10.5 mg/dL    BUN/Creatinine Ratio 29.0 (H) 7.0 - 25.0    Anion Gap 11.8 5.0 - 15.0 mmol/L    eGFR 48.1 (L) >60.0 mL/min/1.73   CBC Auto Differential    Collection Time: 03/23/25 12:56 AM    Specimen: Arm, Left; Blood   Result Value Ref Range    WBC 22.15 (H) 3.40 - 10.80 10*3/mm3    RBC 3.74 (L) 3.77 - 5.28 10*6/mm3    Hemoglobin 11.6 (L) 12.0 - 15.9 g/dL    Hematocrit 35.4 34.0 - 46.6 %    MCV 94.7 79.0 - 97.0 fL    MCH 31.0 26.6 - 33.0 pg    MCHC 32.8 31.5 - 35.7 g/dL    RDW 14.0 12.3 - 15.4 %    RDW-SD 47.3 37.0 - 54.0 fl    MPV 11.1 6.0 - 12.0 fL    Platelets 306 140 - 450 10*3/mm3    Neutrophil % 74.8 42.7 - 76.0 %    Lymphocyte % 11.0 (L) 19.6 - 45.3 %    Monocyte % 11.8 5.0 - 12.0 %    Eosinophil % 0.0 (L) 0.3 - 6.2 %    Basophil % 0.2 0.0 - 1.5 %    Immature Grans % 2.2 (H) 0.0 - " 0.5 %    Neutrophils, Absolute 16.55 (H) 1.70 - 7.00 10*3/mm3    Lymphocytes, Absolute 2.44 0.70 - 3.10 10*3/mm3    Monocytes, Absolute 2.61 (H) 0.10 - 0.90 10*3/mm3    Eosinophils, Absolute 0.01 0.00 - 0.40 10*3/mm3    Basophils, Absolute 0.05 0.00 - 0.20 10*3/mm3    Immature Grans, Absolute 0.49 (H) 0.00 - 0.05 10*3/mm3    nRBC 2.0 (H) 0.0 - 0.2 /100 WBC          Imaging:  XR Chest 1 View  Result Date: 3/19/2025  Impression: Multifocal patchy nodular densities in both lungs, thought to be similar to 3/15/2025. Correlate for pneumonia. Electronically Signed: Barb Millan MD  3/19/2025 2:35 PM EDT  Workstation ID: [id number]    US Renal Bilateral  Result Date: 3/16/2025  Impression: No hydronephrosis or acute sonographic abnormality. Electronically Signed: Tomy Orellana MD  3/16/2025 5:38 PM EDT  Workstation ID: [id number]    NM Lung Ventilation Perfusion  Result Date: 3/16/2025  Impression: Negative for pulmonary embolism. Electronically Signed: Nimesh Dash MD  3/16/2025 3:17 PM EDT  Workstation ID: [id number]    XR Chest 1 View  Result Date: 3/15/2025  Impression: Mild hazy left greater than right basal opacities, which could represent atelectasis or pneumonia. Electronically Signed: Josh Lorenzo  3/15/2025 4:18 PM EDT  Workstation ID: [id number]      Assessment/Plan:     SOB (shortness of breath)         Acute kidney injury on top of chronic kidney disease stage III  Acute hypoxic respiratory failure  Pulmonary fibrosis  Urinary tract infection  Anxiety disorder  Hypertension  Hyperlipidemia  Hypothyroidism  History of von Willebrand disease with no bleeding        Recommendations:    check chest x-ray  Continue Lasix  Creatinine stable at 1.2       Discussed with the patient and family at bedside

## 2025-03-24 ENCOUNTER — ANESTHESIA EVENT (OUTPATIENT)
Dept: GASTROENTEROLOGY | Facility: HOSPITAL | Age: 65
End: 2025-03-24
Payer: MEDICARE

## 2025-03-24 ENCOUNTER — APPOINTMENT (OUTPATIENT)
Dept: INTERVENTIONAL RADIOLOGY/VASCULAR | Facility: HOSPITAL | Age: 65
DRG: 196 | End: 2025-03-24
Payer: MEDICARE

## 2025-03-24 ENCOUNTER — ANESTHESIA (OUTPATIENT)
Dept: GASTROENTEROLOGY | Facility: HOSPITAL | Age: 65
End: 2025-03-24
Payer: MEDICARE

## 2025-03-24 LAB
ANION GAP SERPL CALCULATED.3IONS-SCNC: 8.9 MMOL/L (ref 5–15)
B PARAPERT DNA SPEC QL NAA+PROBE: NOT DETECTED
B PERT DNA SPEC QL NAA+PROBE: NOT DETECTED
BUN SERPL-MCNC: 29 MG/DL (ref 8–23)
BUN/CREAT SERPL: 25.4 (ref 7–25)
C PNEUM DNA NPH QL NAA+NON-PROBE: NOT DETECTED
CALCIUM SPEC-SCNC: 9.8 MG/DL (ref 8.6–10.5)
CHLORIDE SERPL-SCNC: 100 MMOL/L (ref 98–107)
CO2 SERPL-SCNC: 31.1 MMOL/L (ref 22–29)
CREAT SERPL-MCNC: 1.14 MG/DL (ref 0.57–1)
DEPRECATED RDW RBC AUTO: 49.1 FL (ref 37–54)
EGFRCR SERPLBLD CKD-EPI 2021: 53.2 ML/MIN/1.73
ERYTHROCYTE [DISTWIDTH] IN BLOOD BY AUTOMATED COUNT: 14.2 % (ref 12.3–15.4)
FLUAV SUBTYP SPEC NAA+PROBE: NOT DETECTED
FLUBV RNA ISLT QL NAA+PROBE: NOT DETECTED
GLUCOSE SERPL-MCNC: 162 MG/DL (ref 65–99)
HADV DNA SPEC NAA+PROBE: NOT DETECTED
HCOV 229E RNA SPEC QL NAA+PROBE: NOT DETECTED
HCOV HKU1 RNA SPEC QL NAA+PROBE: NOT DETECTED
HCOV NL63 RNA SPEC QL NAA+PROBE: NOT DETECTED
HCOV OC43 RNA SPEC QL NAA+PROBE: NOT DETECTED
HCT VFR BLD AUTO: 33.4 % (ref 34–46.6)
HGB BLD-MCNC: 11 G/DL (ref 12–15.9)
HMPV RNA NPH QL NAA+NON-PROBE: NOT DETECTED
HPIV1 RNA ISLT QL NAA+PROBE: NOT DETECTED
HPIV2 RNA SPEC QL NAA+PROBE: NOT DETECTED
HPIV3 RNA NPH QL NAA+PROBE: NOT DETECTED
HPIV4 P GENE NPH QL NAA+PROBE: NOT DETECTED
M PNEUMO IGG SER IA-ACNC: NOT DETECTED
MCH RBC QN AUTO: 31.3 PG (ref 26.6–33)
MCHC RBC AUTO-ENTMCNC: 32.9 G/DL (ref 31.5–35.7)
MCV RBC AUTO: 95.2 FL (ref 79–97)
PLATELET # BLD AUTO: 241 10*3/MM3 (ref 140–450)
PMV BLD AUTO: 11 FL (ref 6–12)
POTASSIUM SERPL-SCNC: 4.2 MMOL/L (ref 3.5–5.2)
RBC # BLD AUTO: 3.51 10*6/MM3 (ref 3.77–5.28)
RHINOVIRUS RNA SPEC NAA+PROBE: NOT DETECTED
RSV RNA NPH QL NAA+NON-PROBE: NOT DETECTED
SARS-COV-2 RNA RESP QL NAA+PROBE: NOT DETECTED
SODIUM SERPL-SCNC: 140 MMOL/L (ref 136–145)
WBC NRBC COR # BLD AUTO: 16.21 10*3/MM3 (ref 3.4–10.8)

## 2025-03-24 PROCEDURE — 25010000002 LIDOCAINE 1 % SOLUTION: Performed by: RADIOLOGY

## 2025-03-24 PROCEDURE — 86038 ANTINUCLEAR ANTIBODIES: CPT | Performed by: INTERNAL MEDICINE

## 2025-03-24 PROCEDURE — 94664 DEMO&/EVAL PT USE INHALER: CPT

## 2025-03-24 PROCEDURE — 88108 CYTOPATH CONCENTRATE TECH: CPT | Performed by: INTERNAL MEDICINE

## 2025-03-24 PROCEDURE — 76937 US GUIDE VASCULAR ACCESS: CPT

## 2025-03-24 PROCEDURE — 86235 NUCLEAR ANTIGEN ANTIBODY: CPT | Performed by: INTERNAL MEDICINE

## 2025-03-24 PROCEDURE — 25010000002 LIDOCAINE 2% SOLUTION: Performed by: INTERNAL MEDICINE

## 2025-03-24 PROCEDURE — 94799 UNLISTED PULMONARY SVC/PX: CPT

## 2025-03-24 PROCEDURE — 87206 SMEAR FLUORESCENT/ACID STAI: CPT | Performed by: INTERNAL MEDICINE

## 2025-03-24 PROCEDURE — 87102 FUNGUS ISOLATION CULTURE: CPT | Performed by: INTERNAL MEDICINE

## 2025-03-24 PROCEDURE — 83516 IMMUNOASSAY NONANTIBODY: CPT | Performed by: INTERNAL MEDICINE

## 2025-03-24 PROCEDURE — 80048 BASIC METABOLIC PNL TOTAL CA: CPT | Performed by: INTERNAL MEDICINE

## 2025-03-24 PROCEDURE — 87798 DETECT AGENT NOS DNA AMP: CPT | Performed by: INTERNAL MEDICINE

## 2025-03-24 PROCEDURE — 86037 ANCA TITER EACH ANTIBODY: CPT | Performed by: INTERNAL MEDICINE

## 2025-03-24 PROCEDURE — C1751 CATH, INF, PER/CENT/MIDLINE: HCPCS

## 2025-03-24 PROCEDURE — 94761 N-INVAS EAR/PLS OXIMETRY MLT: CPT

## 2025-03-24 PROCEDURE — 87116 MYCOBACTERIA CULTURE: CPT | Performed by: INTERNAL MEDICINE

## 2025-03-24 PROCEDURE — 85027 COMPLETE CBC AUTOMATED: CPT | Performed by: INTERNAL MEDICINE

## 2025-03-24 PROCEDURE — 87205 SMEAR GRAM STAIN: CPT | Performed by: INTERNAL MEDICINE

## 2025-03-24 PROCEDURE — 0B9M8ZX DRAINAGE OF BILATERAL LUNGS, VIA NATURAL OR ARTIFICIAL OPENING ENDOSCOPIC, DIAGNOSTIC: ICD-10-PCS | Performed by: INTERNAL MEDICINE

## 2025-03-24 PROCEDURE — 77001 FLUOROGUIDE FOR VEIN DEVICE: CPT

## 2025-03-24 PROCEDURE — C1894 INTRO/SHEATH, NON-LASER: HCPCS

## 2025-03-24 PROCEDURE — 25010000002 MORPHINE PER 10 MG: Performed by: INTERNAL MEDICINE

## 2025-03-24 PROCEDURE — 25010000002 PROPOFOL 10 MG/ML EMULSION: Performed by: NURSE ANESTHETIST, CERTIFIED REGISTERED

## 2025-03-24 PROCEDURE — 25810000003 SODIUM CHLORIDE 0.9 % SOLUTION: Performed by: NURSE ANESTHETIST, CERTIFIED REGISTERED

## 2025-03-24 PROCEDURE — 86431 RHEUMATOID FACTOR QUANT: CPT | Performed by: INTERNAL MEDICINE

## 2025-03-24 PROCEDURE — 87070 CULTURE OTHR SPECIMN AEROBIC: CPT | Performed by: INTERNAL MEDICINE

## 2025-03-24 PROCEDURE — 25010000002 METHYLPREDNISOLONE PER 125 MG: Performed by: INTERNAL MEDICINE

## 2025-03-24 PROCEDURE — 0202U NFCT DS 22 TRGT SARS-COV-2: CPT | Performed by: INTERNAL MEDICINE

## 2025-03-24 RX ORDER — LIDOCAINE 50 MG/G
OINTMENT TOPICAL
Status: DISPENSED
Start: 2025-03-24 | End: 2025-03-25

## 2025-03-24 RX ORDER — LIDOCAINE HYDROCHLORIDE 10 MG/ML
INJECTION, SOLUTION INFILTRATION; PERINEURAL AS NEEDED
Status: COMPLETED | OUTPATIENT
Start: 2025-03-24 | End: 2025-03-24

## 2025-03-24 RX ORDER — METHYLPREDNISOLONE SODIUM SUCCINATE 125 MG/2ML
125 INJECTION, POWDER, LYOPHILIZED, FOR SOLUTION INTRAMUSCULAR; INTRAVENOUS EVERY 6 HOURS
Status: DISCONTINUED | OUTPATIENT
Start: 2025-03-24 | End: 2025-03-24

## 2025-03-24 RX ORDER — LIDOCAINE HYDROCHLORIDE 20 MG/ML
INJECTION, SOLUTION INFILTRATION; PERINEURAL AS NEEDED
Status: DISCONTINUED | OUTPATIENT
Start: 2025-03-24 | End: 2025-03-24 | Stop reason: HOSPADM

## 2025-03-24 RX ORDER — PROPOFOL 10 MG/ML
VIAL (ML) INTRAVENOUS AS NEEDED
Status: DISCONTINUED | OUTPATIENT
Start: 2025-03-24 | End: 2025-03-24 | Stop reason: SURG

## 2025-03-24 RX ORDER — SODIUM CHLORIDE 9 MG/ML
INJECTION, SOLUTION INTRAVENOUS CONTINUOUS PRN
Status: DISCONTINUED | OUTPATIENT
Start: 2025-03-24 | End: 2025-03-24 | Stop reason: SURG

## 2025-03-24 RX ORDER — METHYLPREDNISOLONE SODIUM SUCCINATE 125 MG/2ML
125 INJECTION, POWDER, LYOPHILIZED, FOR SOLUTION INTRAMUSCULAR; INTRAVENOUS EVERY 6 HOURS
Status: DISCONTINUED | OUTPATIENT
Start: 2025-03-24 | End: 2025-03-27

## 2025-03-24 RX ORDER — HYDRALAZINE HYDROCHLORIDE 20 MG/ML
5 INJECTION INTRAMUSCULAR; INTRAVENOUS
Status: DISCONTINUED | OUTPATIENT
Start: 2025-03-24 | End: 2025-03-24 | Stop reason: HOSPADM

## 2025-03-24 RX ORDER — LABETALOL HYDROCHLORIDE 5 MG/ML
5 INJECTION, SOLUTION INTRAVENOUS
Status: DISCONTINUED | OUTPATIENT
Start: 2025-03-24 | End: 2025-03-24 | Stop reason: HOSPADM

## 2025-03-24 RX ORDER — LIDOCAINE 50 MG/G
OINTMENT TOPICAL AS NEEDED
Status: DISCONTINUED | OUTPATIENT
Start: 2025-03-24 | End: 2025-03-24 | Stop reason: HOSPADM

## 2025-03-24 RX ADMIN — IPRATROPIUM BROMIDE AND ALBUTEROL SULFATE 3 ML: .5; 3 SOLUTION RESPIRATORY (INHALATION) at 16:48

## 2025-03-24 RX ADMIN — PROPOFOL 80 MG: 10 INJECTION, EMULSION INTRAVENOUS at 16:25

## 2025-03-24 RX ADMIN — GUAIFENESIN 600 MG: 600 TABLET, MULTILAYER, EXTENDED RELEASE ORAL at 21:57

## 2025-03-24 RX ADMIN — AMITRIPTYLINE HYDROCHLORIDE 25 MG: 25 TABLET, FILM COATED ORAL at 21:57

## 2025-03-24 RX ADMIN — CYCLOBENZAPRINE 10 MG: 10 TABLET, FILM COATED ORAL at 21:57

## 2025-03-24 RX ADMIN — GUAIFENESIN AND CODEINE PHOSPHATE 10 ML: 100; 10 SOLUTION ORAL at 21:57

## 2025-03-24 RX ADMIN — Medication 10 ML: at 21:59

## 2025-03-24 RX ADMIN — LAMOTRIGINE 200 MG: 100 TABLET ORAL at 21:58

## 2025-03-24 RX ADMIN — BENZONATATE 200 MG: 100 CAPSULE ORAL at 21:57

## 2025-03-24 RX ADMIN — HYDROXYCHLOROQUINE SULFATE 200 MG: 200 TABLET ORAL at 21:57

## 2025-03-24 RX ADMIN — LIDOCAINE HYDROCHLORIDE 3 ML: 10 INJECTION, SOLUTION INFILTRATION; PERINEURAL at 09:01

## 2025-03-24 RX ADMIN — ALPRAZOLAM 1 MG: 1 TABLET ORAL at 21:58

## 2025-03-24 RX ADMIN — Medication 1 TABLET: at 22:00

## 2025-03-24 RX ADMIN — METHYLPREDNISOLONE SODIUM SUCCINATE 125 MG: 125 INJECTION, POWDER, FOR SOLUTION INTRAMUSCULAR; INTRAVENOUS at 21:57

## 2025-03-24 RX ADMIN — QUETIAPINE FUMARATE 50 MG: 25 TABLET ORAL at 21:58

## 2025-03-24 RX ADMIN — BUDESONIDE 0.5 MG: 0.5 INHALANT RESPIRATORY (INHALATION) at 08:08

## 2025-03-24 RX ADMIN — ACETAMINOPHEN 650 MG: 325 TABLET, FILM COATED ORAL at 21:58

## 2025-03-24 RX ADMIN — MORPHINE SULFATE 2 MG: 4 INJECTION, SOLUTION INTRAMUSCULAR; INTRAVENOUS at 16:55

## 2025-03-24 RX ADMIN — Medication 5 MG: at 21:58

## 2025-03-24 RX ADMIN — BUDESONIDE 0.5 MG: 0.5 INHALANT RESPIRATORY (INHALATION) at 19:48

## 2025-03-24 RX ADMIN — SODIUM CHLORIDE: 9 INJECTION, SOLUTION INTRAVENOUS at 16:14

## 2025-03-24 NOTE — PROGRESS NOTES
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 No wolf honeycombing fibrosis  Patient was treated with mycophenolate, prophylactic Bactrim     PFT 1/14/2025   FVC 1 1.6 L/51%,  FEV1 1.4 L / 58%, ratio 88, TLC 57%, RV 50%, DLCO 51%     PFTs July 2021,   FEV1 1.7 L 68% improve 75% post bronchodilator, FEV1/ FVC ratio 87, TLC 72%, RV 74%, DLCO 56%     PFTs in 2017   FEV1 66-70% predicted which is unchanged since 2012  quit smoking 1994,      PFT 2012 and 2017,   FEV-1 and TLC 75% , suggestive of mild to mod restrictive lung disease     2D echo  2018 normal RVSP and EF  2D echo May 2020 no pulmonary hypertension   2d echo July 2021 RVSP 30  2D echo January 2025 no pulmonary hypertension EF 60%     FERN, AHI 14.5 ,       No Response to inhalers including Breztri and Trelegy     Recommendations:    Bronchoscopy  for mild hemoptysis    LANA  ANCA  Lupus panel  Scleroderma titers    Initiate high-dose steroids IV Solu-Medrol 125 mg every 6 hours    oxygen titration currently on 2 L  Antibiotics completed Rocephin 2 g IV daily for 5 days  Azithromycin 500 mg p.o. daily for 3 days      Seen by GI for recurrent \" chocking\"     Holding mycophenolate for treatment of pneumonia     Bronchodilators Pulmicort and DuoNeb     On Plaquenil 200 mg twice daily        CT chest 3/23/2025 compared to January 2025         Chest x-ray 3/15/2025      VQ scan 3/16/2025       High-res CT scan January 2025             LOS: 9 days     Subjective         Objective     Vital signs for last 24 hours:  Vitals:    03/23/25 1927 03/23/25 2033 03/24/25 0140 03/24/25 0612   BP:  143/87 120/70 133/75   BP Location:  Left " arm Left arm Left arm   Patient Position:  Lying Lying Lying   Pulse: 95 92 72 68   Resp: 20 18 14 17   Temp:  98.3 °F (36.8 °C) 97.8 °F (36.6 °C) 97.6 °F (36.4 °C)   TempSrc:  Axillary Axillary Oral   SpO2: 96% 99% 98% 96%   Weight:    105 kg (231 lb 11.3 oz)   Height:           Intake/Output last 3 shifts:  I/O last 3 completed shifts:  In: 120 [P.O.:120]  Out: 800 [Urine:800]  Intake/Output this shift:  No intake/output data recorded.      Radiology  Imaging Results (Last 24 Hours)       Procedure Component Value Units Date/Time    CT Chest Without Contrast Diagnostic [[id number]] Collected: 03/23/25 1650     Updated: 03/23/25 1717    Narrative:      CT CHEST WO CONTRAST DIAGNOSTIC    Date of Exam: 3/23/2025 1:00 PM EDT    Indication: Hemoptysis.    Comparison: 1/14/2025    Technique: Axial CT images were obtained of the chest without contrast administration.  Sagittal and coronal reconstructions were performed.  Automated exposure control and iterative reconstruction methods were used.      Findings:  Lower Neck: Unremarkable.    Hilum and Mediastinum: Borderline cardiomegaly. No pericardial effusion. Mild enlargement of the central pulmonary arteries. Scattered prominent to mildly enlarged mediastinal lymph nodes.  Additional evaluation of the mediastinal structures is limited on this study given lack of IV contrast.    Lung Parenchyma and Pleura: Multifocal patchy opacities noted throughout the lungs bilaterally, worse in the perihilar region.  The central airways are patent.  Trace bilateral pleural effusions. No pneumothorax.    Upper Abdomen: Findings suggestive of splenectomy. No definite acute abnormality.    Soft tissues: Unremarkable.    Osseous structures: No definite acute osseous abnormality, although evaluation is limited by respiratory motion artifact.            Impression:      Impression:  1.Multifocal patchy opacities noted throughout the lungs bilaterally, worse in the perihilar region.  Findings are concerning for multifocal pneumonia. Less likely, this may represent pulmonary hemorrhage  2.Additional nonemergent findings as characterized above  3.Scattered prominent to mildly enlarged mediastinal lymph nodes, likely reactive.        Electronically Signed: Rafael Archer,     3/23/2025 5:15 PM EDT    Workstation ID: [id number]    XR Chest 1 View [[id number]] Collected: 03/23/25 1229     Updated: 03/23/25 1232    Narrative:      XR CHEST 1 VW    Date of Exam: 3/23/2025 10:50 AM EDT    Indication: Soa    Comparison: 3/19/2025    Findings:  Lines: None    Lungs: Multifocal patchy opacities scattered throughout the lungs, has worsened since most recent comparison.  Pleura: Trace bilateral pleural effusion    Cardiomediastinum: Borderline cardiomegaly, unchanged. Otherwise unremarkable    Soft Tissues: Surgical clips within the right axilla. Surgical clips in the left upper quadrant of the abdomen. Otherwise unremarkable    Bones: Postsurgical changes consistent with right shoulder arthroplasty and ACDF. No definite acute osseous abnormality      Impression:      Impression:  1.Worsening multifocal patchy opacities scattered throughout the lungs. This most likely represents multifocal pneumonia, although pulmonary edema is also a possibility  2.Trace bilateral pleural effusions.        Electronically Signed: Rafael Archer DO    3/23/2025 12:30 PM EDT    Workstation ID: [id number]            Labs:  Results from last 7 days   Lab Units 03/24/25  0151   WBC 10*3/mm3 16.21*   HEMOGLOBIN g/dL 11.0*   HEMATOCRIT % 33.4*   PLATELETS 10*3/mm3 241     Results from last 7 days   Lab Units 03/24/25  0151 03/21/25  0043 03/20/25  0500   SODIUM mmol/L 140   < > 137   POTASSIUM mmol/L 4.2   < > 5.3*   CHLORIDE mmol/L 100   < > 103   CO2 mmol/L 31.1*   < > 22.7   BUN mg/dL 29*   < > 27*   CREATININE mg/dL 1.14*   < > 1.14*   CALCIUM mg/dL 9.8   < > 9.5   BILIRUBIN mg/dL  --   --  0.2   ALK PHOS U/L  --   --  80    ALT (SGPT) U/L  --   --  12   AST (SGOT) U/L  --   --  35*   GLUCOSE mg/dL 162*   < > 126*    < > = values in this interval not displayed.           Results from last 7 days   Lab Units 03/20/25  0500   ALBUMIN g/dL 3.5               Results from last 7 days   Lab Units 03/18/25  0459   MAGNESIUM mg/dL 2.6*     Results from last 7 days   Lab Units 03/20/25  0526   INR  1.20*               Meds:   SCHEDULE  amitriptyline, 25 mg, Oral, Nightly  benzonatate, 200 mg, Oral, TID  budesonide, 0.5 mg, Nebulization, BID - RT  calcium 500 mg vitamin D 5 mcg (200 UT), 1 tablet, Oral, BID  cetirizine, 10 mg, Oral, Daily  furosemide, 20 mg, Oral, Daily  guaiFENesin, 600 mg, Oral, Q12H  hydroxychloroquine, 200 mg, Oral, BID  lamoTRIgine, 200 mg, Oral, Nightly  levothyroxine, 175 mcg, Oral, Q AM  [Held by provider] mycophenolate, 500 mg, Oral, Q12H  predniSONE, 40 mg, Oral, Q12H  QUEtiapine, 50 mg, Oral, Nightly  sodium chloride, 10 mL, Intravenous, Q12H      Infusions       PRNs    acetaminophen **OR** acetaminophen **OR** acetaminophen    ALPRAZolam    aluminum-magnesium hydroxide-simethicone    senna-docusate sodium **AND** polyethylene glycol **AND** bisacodyl **AND** bisacodyl    Calcium Replacement - Follow Nurse / BPA Driven Protocol    cyclobenzaprine    guaiFENesin-codeine    ipratropium-albuterol    Magnesium Standard Dose Replacement - Follow Nurse / BPA Driven Protocol    meclizine    melatonin    nitroglycerin    ondansetron ODT **OR** [DISCONTINUED] ondansetron    oxyCODONE    Phosphorus Replacement - Follow Nurse / BPA Driven Protocol    Potassium Replacement - Follow Nurse / BPA Driven Protocol    [COMPLETED] Insert Peripheral IV **AND** sodium chloride    sodium chloride    sodium chloride    Physical Exam:  Physical Exam  Cardiovascular:      Heart sounds: Murmur heard.      No gallop.   Pulmonary:      Effort: No respiratory distress.      Breath sounds: No stridor. Rhonchi and rales present. No wheezing.    Chest:      Chest wall: No tenderness.         ROS  Review of Systems   Respiratory:  Positive for cough and shortness of breath. Negative for wheezing and stridor.    Cardiovascular:  Positive for palpitations. Negative for chest pain and leg swelling.             Total critical care time spent with patient greater than: 45 Minutes

## 2025-03-24 NOTE — PROGRESS NOTES
[facility] MEDICINE SERVICE  DAILY PROGRESS NOTE    NAME: Tracie Gross  : 1960  MRN: [id number]      LOS: 9 days     PROVIDER OF SERVICE: Santa Pierre MD    Chief Complaint: SOB (shortness of breath)    Subjective:     Interval History:  History taken from: patient    Complaining of shortness of breath and cough.    Review of Systems:   Review of Systems   All other systems reviewed and are negative.      Objective:     Vital Signs  Temp:  [97.6 °F (36.4 °C)-98.3 °F (36.8 °C)] 98.3 °F (36.8 °C)  Heart Rate:  [] 74  Resp:  [14-29] 24  BP: (120-146)/(70-87) 134/74  Flow (L/min) (Oxygen Therapy):  [2-3] 2   Body mass index is 39.77 kg/m².    Physical Exam  Physical Exam  Constitutional:       Appearance: Normal appearance.   HENT:      Head: Normocephalic and atraumatic.      Nose: Nose normal.      Mouth/Throat:      Mouth: Mucous membranes are moist.   Eyes:      Extraocular Movements: Extraocular movements intact.      Pupils: Pupils are equal, round, and reactive to light.   Cardiovascular:      Rate and Rhythm: Normal rate and regular rhythm.   Pulmonary:      Effort: Pulmonary effort is normal.      Breath sounds: Normal breath sounds.   Abdominal:      General: Abdomen is flat. Bowel sounds are normal.      Palpations: Abdomen is soft.   Musculoskeletal:         General: Normal range of motion.      Cervical back: Normal range of motion and neck supple.   Skin:     General: Skin is warm and dry.   Neurological:      General: No focal deficit present.      Mental Status: She is alert and oriented to person, place, and time.   Psychiatric:         Mood and Affect: Mood normal.         Behavior: Behavior normal.         Thought Content: Thought content normal.         Judgment: Judgment normal.         Current Medications:  Scheduled Meds:amitriptyline, 25 mg, Oral, Nightly  benzonatate, 200 mg, Oral, TID  budesonide, 0.5 mg, Nebulization, BID - RT  calcium 500 mg vitamin D 5 mcg (200  UT), 1 tablet, Oral, BID  cetirizine, 10 mg, Oral, Daily  furosemide, 20 mg, Oral, Daily  guaiFENesin, 600 mg, Oral, Q12H  hydroxychloroquine, 200 mg, Oral, BID  lamoTRIgine, 200 mg, Oral, Nightly  levothyroxine, 175 mcg, Oral, Q AM  methylPREDNISolone sodium succinate, 125 mg, Intravenous, Q6H  [Held by provider] mycophenolate, 500 mg, Oral, Q12H  QUEtiapine, 50 mg, Oral, Nightly  sodium chloride, 10 mL, Intravenous, Q12H      Continuous Infusions:     PRN Meds:.  acetaminophen **OR** acetaminophen **OR** acetaminophen    ALPRAZolam    aluminum-magnesium hydroxide-simethicone    senna-docusate sodium **AND** polyethylene glycol **AND** bisacodyl **AND** bisacodyl    Calcium Replacement - Follow Nurse / BPA Driven Protocol    cyclobenzaprine    guaiFENesin-codeine    ipratropium-albuterol    Magnesium Standard Dose Replacement - Follow Nurse / BPA Driven Protocol    meclizine    melatonin    nitroglycerin    ondansetron ODT **OR** [DISCONTINUED] ondansetron    oxyCODONE    Phosphorus Replacement - Follow Nurse / BPA Driven Protocol    Potassium Replacement - Follow Nurse / BPA Driven Protocol    [COMPLETED] Insert Peripheral IV **AND** sodium chloride    sodium chloride    sodium chloride       Diagnostic Data    Results from last 7 days   Lab Units 03/24/25  0151 03/20/25  0526 03/20/25  0500   WBC 10*3/mm3 16.21*   < >  --    HEMOGLOBIN g/dL 11.0*   < >  --    HEMATOCRIT % 33.4*   < >  --    PLATELETS 10*3/mm3 241   < >  --    GLUCOSE mg/dL 162*   < > 126*   CREATININE mg/dL 1.14*   < > 1.14*   BUN mg/dL 29*   < > 27*   SODIUM mmol/L 140   < > 137   POTASSIUM mmol/L 4.2   < > 5.3*   AST (SGOT) U/L  --   --  35*   ALT (SGPT) U/L  --   --  12   ALK PHOS U/L  --   --  80   BILIRUBIN mg/dL  --   --  0.2   ANION GAP mmol/L 8.9   < > 11.3    < > = values in this interval not displayed.       CT Chest Without Contrast Diagnostic  Result Date: 3/23/2025  Impression: 1.Multifocal patchy opacities noted throughout the lungs  bilaterally, worse in the perihilar region. Findings are concerning for multifocal pneumonia. Less likely, this may represent pulmonary hemorrhage 2.Additional nonemergent findings as characterized above 3.Scattered prominent to mildly enlarged mediastinal lymph nodes, likely reactive. Electronically Signed: Rafael Archer,   3/23/2025 5:15 PM EDT  Workstation ID: [id number]    XR Chest 1 View  Result Date: 3/23/2025  Impression: 1.Worsening multifocal patchy opacities scattered throughout the lungs. This most likely represents multifocal pneumonia, although pulmonary edema is also a possibility 2.Trace bilateral pleural effusions. Electronically Signed: Rafael Archer, DO  3/23/2025 12:30 PM EDT  Workstation ID: [id number]          I reviewed the patient's new clinical results.    Assessment/Plan:     Active and Resolved Problems  Active Hospital Problems    Diagnosis  POA    **SOB (shortness of breath) [R06.02]  Yes    Dyspnea [R06.00]  Unknown    Pulmonary fibrosis [J84.10]  Unknown      Resolved Hospital Problems   No resolved problems to display.       Acute hypoxic respiratory failure not on home oxygen but currently on 2 L of oxygen via nasal cannula   Pneumonia  Hyperkalemia  Recently diagnosed pulmonary fibrosis  DANIELLE on CKD 3  UTI  Hypertension  Hypothyroidism      -acute hypoxic respiratory failure likely secondary to pneumonia plus underlying pulmonary fibrosis.  Urine strep pneumo antigen is positive.  Has completed ceftriaxone for treatment of likely Streptococcus pneumonia.  Blood cultures and sputum culture negative.  Still complaining of shortness of breath and is tachypneic.  Chest x-ray showed worsening findings.  Pulmonary planning bronchoscopy.  See further recommendations per pulmonary.    -Renal function is improving.  Appreciate nephrology's input.    - Urine culture showed no growth ruling out UTI    - Continue current management.      VTE Prophylaxis:  Mechanical VTE prophylaxis orders  are present.       Disposition Planning:     Barriers to Discharge: Pending clinical improvement  Anticipated Date of Discharge: 3/30/2025  Place of Discharge: Home      Code Status and Medical Interventions: CPR (Attempt to Resuscitate); Full Support   Ordered at: 03/15/25 1955     Code Status (Patient has no pulse and is not breathing):    CPR (Attempt to Resuscitate)     Medical Interventions (Patient has pulse or is breathing):    Full Support       Signature: Electronically signed by Santa Pierre MD, 03/24/25, 13:18 EDT.  [facility] Hospitalist Team

## 2025-03-24 NOTE — ANESTHESIA POSTPROCEDURE EVALUATION
Patient: Tracie Gross    Procedure Summary       Date: 03/24/25 Room / Location: [facility] 2 / [facility]    Anesthesia Start: 1614 Anesthesia Stop:     Procedure: BRONCHOSCOPY with bilateral lung washing (Bronchus) Diagnosis:       Dyspnea, unspecified type      Pulmonary fibrosis      (Dyspnea, unspecified type [R06.00])      (Pulmonary fibrosis [J84.10])    Surgeons: Isaac Vazquez MD Provider: Guero Henao MD    Anesthesia Type: general ASA Status: 4            Anesthesia Type: general    Vitals  Vitals Value Taken Time   /75 03/24/25 16:34   Temp     Pulse 102 03/24/25 16:41   Resp 29 03/24/25 16:34   SpO2 93 % 03/24/25 16:41   Vitals shown include unfiled device data.        Post Anesthesia Care and Evaluation    Patient location during evaluation: PACU  Patient participation: complete - patient participated  Level of consciousness: awake  Pain scale: See nurse's notes for pain score.  Pain management: adequate    Airway patency: patent  Anesthetic complications: No anesthetic complications  PONV Status: none  Cardiovascular status: acceptable  Respiratory status: acceptable and spontaneous ventilation  Hydration status: acceptable    Comments: Patient seen and examined postoperatively; vital signs stable; SpO2 greater than or equal to 90%; cardiopulmonary status stable; nausea/vomiting adequately controlled; pain adequately controlled; no apparent anesthesia complications; patient discharged from anesthesia care when discharge criteria were met

## 2025-03-24 NOTE — PLAN OF CARE
Goal Outcome Evaluation:   Speech Therapy following for dysphagia after initial VFSS completed Friday 3/21/2025. Noted diet upgraded yesterday however intake varied due to persistent coughing. ST unable to see patient today as she is currently NPO pending bronchoscopy. ST will continue to follow while in-house and attempt again on next day of service.

## 2025-03-24 NOTE — OP NOTE
Bronchoscopy Procedure Note    Timeout was done appropriately by staff    Procedure:  Bronchoscopy, Diagnostic  Bilateral lung washing    Preoperative Diagnosis: Hemoptysis with bilateral patchy alveolar infiltrate    Postoperative Diagnosis:     No active hemoptysis  Moderate inflammatory changes  Bilateral lung washing    Anesthesia: Moderate Sedation    Procedure Details: Patient was consented for the procedure with all risks and benefits of the procedure explained in detail.  Patient was given the opportunity to ask questions and all concerns were answered.  The bronchocope was inserted into the main airway via the oropharynx. An anatomical survey was done of the main airways and the subsegmental bronchus to at least the first subsegmental level of all five lobes of both lungs.  The findings are reported below.  A bronchoalveolar lavage was performed using aliquots of normal saline instilled into the airways then aspirated back.      Findings:        No active hemoptysis  Moderate inflammatory changes  Bilateral lung washing    Patient tolerated procedure well        Estimated Blood Loss:  Minimal           Specimens:  Sent serosanguinous fluid                Complications:  None; patient tolerated the procedure well.           Disposition: PACU - hemodynamically stable.      Patient tolerated the procedure well.    Isaac Vazquez MD  3/24/2025  16:31 EDT

## 2025-03-24 NOTE — PLAN OF CARE
Goal Outcome Evaluation:               Pt c/o pain/anxiety overnight that was medicated per orders; pt is supposed to be going to IR for IV access per day shift RN prior to planned bronch 3/24 (consent is signed and in chart); pt triggered simple sepsis 3/23 on day shift; VSS overnight on 2L NC              Problem: Adult Inpatient Plan of Care  Goal: Plan of Care Review  Outcome: Progressing  Goal: Patient-Specific Goal (Individualized)  Outcome: Progressing  Goal: Absence of Hospital-Acquired Illness or Injury  Outcome: Progressing  Intervention: Identify and Manage Fall Risk  Recent Flowsheet Documentation  Taken 3/24/2025 0200 by Wai Galindo, RN  Safety Promotion/Fall Prevention:   safety round/check completed   room organization consistent   nonskid shoes/slippers when out of bed   lighting adjusted   assistive device/personal items within reach   clutter free environment maintained   fall prevention program maintained  Taken 3/24/2025 0000 by Wai Galindo, RN  Safety Promotion/Fall Prevention:   safety round/check completed   room organization consistent   nonskid shoes/slippers when out of bed   lighting adjusted   assistive device/personal items within reach   clutter free environment maintained   fall prevention program maintained  Taken 3/23/2025 2200 by Wai Galindo, RN  Safety Promotion/Fall Prevention:   safety round/check completed   room organization consistent   nonskid shoes/slippers when out of bed   lighting adjusted   assistive device/personal items within reach   clutter free environment maintained   fall prevention program maintained  Taken 3/23/2025 2000 by Wai Galindo, RN  Safety Promotion/Fall Prevention:   safety round/check completed   room organization consistent   nonskid shoes/slippers when out of bed   lighting adjusted   assistive device/personal items within reach   clutter free environment maintained   fall prevention program maintained  Intervention: Prevent Skin  Injury  Recent Flowsheet Documentation  Taken 3/24/2025 0000 by Wai Galindo RN  Body Position:   weight shifting   position changed independently  Taken 3/23/2025 2000 by Wai Galindo RN  Body Position: position changed independently  Skin Protection: incontinence pads utilized  Intervention: Prevent and Manage VTE (Venous Thromboembolism) Risk  Recent Flowsheet Documentation  Taken 3/23/2025 2000 by Wai Galindo RN  VTE Prevention/Management: (educated)   patient refused intervention   bilateral   other (see comments)  Intervention: Prevent Infection  Recent Flowsheet Documentation  Taken 3/24/2025 0200 by Wai Galindo RN  Infection Prevention:   equipment surfaces disinfected   hand hygiene promoted   personal protective equipment utilized   rest/sleep promoted   single patient room provided  Taken 3/24/2025 0000 by Wai Galindo RN  Infection Prevention:   equipment surfaces disinfected   hand hygiene promoted   personal protective equipment utilized   rest/sleep promoted   single patient room provided  Taken 3/23/2025 2200 by Wai Galindo RN  Infection Prevention:   equipment surfaces disinfected   hand hygiene promoted   personal protective equipment utilized   rest/sleep promoted   single patient room provided  Taken 3/23/2025 2000 by Wai Galindo RN  Infection Prevention:   equipment surfaces disinfected   hand hygiene promoted   personal protective equipment utilized   rest/sleep promoted   single patient room provided  Goal: Optimal Comfort and Wellbeing  Outcome: Progressing  Intervention: Monitor Pain and Promote Comfort  Recent Flowsheet Documentation  Taken 3/23/2025 2112 by Wai Galindo RN  Pain Management Interventions:   quiet environment facilitated   position adjusted   pillow support provided   pain management plan reviewed with patient/caregiver   care clustered  Taken 3/23/2025 2042 by Wai Galindo RN  Pain Management Interventions:   quiet environment  facilitated   pain medication given   pain management plan reviewed with patient/caregiver   pillow support provided   position adjusted   care clustered  Taken 3/23/2025 2000 by Wai Galindo RN  Pain Management Interventions:   care clustered   pillow support provided   position adjusted   quiet environment facilitated  Intervention: Provide Person-Centered Care  Recent Flowsheet Documentation  Taken 3/23/2025 2000 by Wai Galindo RN  Trust Relationship/Rapport:   care explained   choices provided   emotional support provided   empathic listening provided   questions answered   questions encouraged   reassurance provided   thoughts/feelings acknowledged  Goal: Readiness for Transition of Care  Outcome: Progressing     Problem: Skin Injury Risk Increased  Goal: Skin Health and Integrity  Outcome: Progressing  Intervention: Optimize Skin Protection  Recent Flowsheet Documentation  Taken 3/24/2025 0000 by Wai Galindo, RN  Head of Bed (HOB) Positioning: HOB elevated  Taken 3/23/2025 2000 by Wai Galindo RN  Pressure Reduction Techniques:   frequent weight shift encouraged   positioned off wounds   pressure points protected   weight shift assistance provided  Head of Bed (HOB) Positioning: HOB elevated  Pressure Reduction Devices:   positioning supports utilized   pressure-redistributing mattress utilized  Skin Protection: incontinence pads utilized     Problem: Comorbidity Management  Goal: Maintenance of Behavioral Health Symptom Control  Outcome: Progressing  Intervention: Maintain Behavioral Health Symptom Control  Recent Flowsheet Documentation  Taken 3/24/2025 0200 by Wai Galindo, RN  Medication Review/Management: medications reviewed  Taken 3/24/2025 0000 by Wai Galindo, RN  Medication Review/Management: medications reviewed  Taken 3/23/2025 2200 by Wai Galindo, RN  Medication Review/Management: medications reviewed  Taken 3/23/2025 2000 by Wai Galindo, RN  Medication  Review/Management: medications reviewed  Goal: Blood Pressure in Desired Range  Outcome: Progressing  Intervention: Maintain Blood Pressure Management  Recent Flowsheet Documentation  Taken 3/24/2025 0200 by Wai Galindo RN  Medication Review/Management: medications reviewed  Taken 3/24/2025 0000 by Wai Galindo RN  Medication Review/Management: medications reviewed  Taken 3/23/2025 2200 by Wai Galindo RN  Medication Review/Management: medications reviewed  Taken 3/23/2025 2000 by Wai Galindo RN  Medication Review/Management: medications reviewed  Goal: Maintenance of Osteoarthritis Symptom Control  Outcome: Progressing  Intervention: Maintain Osteoarthritis Symptom Control  Recent Flowsheet Documentation  Taken 3/24/2025 0200 by Wai Galindo RN  Medication Review/Management: medications reviewed  Taken 3/24/2025 0000 by Wai Galindo RN  Medication Review/Management: medications reviewed  Taken 3/23/2025 2200 by Wai Galindo RN  Medication Review/Management: medications reviewed  Taken 3/23/2025 2000 by Wai Galindo RN  Medication Review/Management: medications reviewed     Problem: Fall Injury Risk  Goal: Absence of Fall and Fall-Related Injury  Outcome: Progressing  Intervention: Identify and Manage Contributors  Recent Flowsheet Documentation  Taken 3/24/2025 0200 by Wai Galindo RN  Medication Review/Management: medications reviewed  Taken 3/24/2025 0000 by Wai Galindo RN  Medication Review/Management: medications reviewed  Taken 3/23/2025 2200 by Wai Galindo RN  Medication Review/Management: medications reviewed  Taken 3/23/2025 2000 by Wai Galindo RN  Medication Review/Management: medications reviewed  Self-Care Promotion: independence encouraged  Intervention: Promote Injury-Free Environment  Recent Flowsheet Documentation  Taken 3/24/2025 0200 by Wai Galindo RN  Safety Promotion/Fall Prevention:   safety round/check completed   room organization  consistent   nonskid shoes/slippers when out of bed   lighting adjusted   assistive device/personal items within reach   clutter free environment maintained   fall prevention program maintained  Taken 3/24/2025 0000 by Wai Galindo RN  Safety Promotion/Fall Prevention:   safety round/check completed   room organization consistent   nonskid shoes/slippers when out of bed   lighting adjusted   assistive device/personal items within reach   clutter free environment maintained   fall prevention program maintained  Taken 3/23/2025 2200 by Wai Galindo RN  Safety Promotion/Fall Prevention:   safety round/check completed   room organization consistent   nonskid shoes/slippers when out of bed   lighting adjusted   assistive device/personal items within reach   clutter free environment maintained   fall prevention program maintained  Taken 3/23/2025 2000 by Wai Galindo RN  Safety Promotion/Fall Prevention:   safety round/check completed   room organization consistent   nonskid shoes/slippers when out of bed   lighting adjusted   assistive device/personal items within reach   clutter free environment maintained   fall prevention program maintained     Problem: Sepsis/Septic Shock  Goal: Optimal Coping  Outcome: Progressing  Intervention: Support Patient and Family Response  Recent Flowsheet Documentation  Taken 3/23/2025 2000 by Wai Galindo, RN  Supportive Measures:   active listening utilized   self-care encouraged  Goal: Absence of Bleeding  Outcome: Progressing  Goal: Blood Glucose Level Within Target Range  Outcome: Progressing  Goal: Absence of Infection Signs and Symptoms  Outcome: Progressing  Intervention: Initiate Sepsis Management  Recent Flowsheet Documentation  Taken 3/24/2025 0200 by Wai Galindo, RN  Infection Prevention:   equipment surfaces disinfected   hand hygiene promoted   personal protective equipment utilized   rest/sleep promoted   single patient room provided  Taken 3/24/2025 0000  by Wai Galindo, RN  Infection Prevention:   equipment surfaces disinfected   hand hygiene promoted   personal protective equipment utilized   rest/sleep promoted   single patient room provided  Taken 3/23/2025 2200 by Wai Galindo, RN  Infection Prevention:   equipment surfaces disinfected   hand hygiene promoted   personal protective equipment utilized   rest/sleep promoted   single patient room provided  Taken 3/23/2025 2000 by Wai Galindo, RN  Infection Prevention:   equipment surfaces disinfected   hand hygiene promoted   personal protective equipment utilized   rest/sleep promoted   single patient room provided  Intervention: Promote Recovery  Recent Flowsheet Documentation  Taken 3/23/2025 2000 by Wai Galindo, RN  Sleep/Rest Enhancement:   awakenings minimized   consistent schedule promoted   noise level reduced   regular sleep/rest pattern promoted   room darkened  Goal: Optimal Nutrition Delivery  Outcome: Progressing

## 2025-03-24 NOTE — PROGRESS NOTES
"                                                                                                                                      Nephrology  Progress Note                                        [facility]    Patient Identification    Name: Tracie Gross  Age: 64 y.o.  Sex: female  :  1960  MRN: [id number]      DATE OF SERVICE:  3/24/2025        Subective    Noted cough and shortness of breath or     Objective   Scheduled Meds:amitriptyline, 25 mg, Oral, Nightly  benzonatate, 200 mg, Oral, TID  budesonide, 0.5 mg, Nebulization, BID - RT  calcium 500 mg vitamin D 5 mcg (200 UT), 1 tablet, Oral, BID  cetirizine, 10 mg, Oral, Daily  furosemide, 20 mg, Oral, Daily  guaiFENesin, 600 mg, Oral, Q12H  hydroxychloroquine, 200 mg, Oral, BID  lamoTRIgine, 200 mg, Oral, Nightly  levothyroxine, 175 mcg, Oral, Q AM  [Held by provider] mycophenolate, 500 mg, Oral, Q12H  predniSONE, 40 mg, Oral, Q12H  QUEtiapine, 50 mg, Oral, Nightly  sodium chloride, 10 mL, Intravenous, Q12H          Continuous Infusions:       PRN Meds:  acetaminophen **OR** acetaminophen **OR** acetaminophen    ALPRAZolam    aluminum-magnesium hydroxide-simethicone    senna-docusate sodium **AND** polyethylene glycol **AND** bisacodyl **AND** bisacodyl    Calcium Replacement - Follow Nurse / BPA Driven Protocol    cyclobenzaprine    guaiFENesin-codeine    ipratropium-albuterol    Magnesium Standard Dose Replacement - Follow Nurse / BPA Driven Protocol    meclizine    melatonin    nitroglycerin    ondansetron ODT **OR** [DISCONTINUED] ondansetron    oxyCODONE    Phosphorus Replacement - Follow Nurse / BPA Driven Protocol    Potassium Replacement - Follow Nurse / BPA Driven Protocol    [COMPLETED] Insert Peripheral IV **AND** sodium chloride    sodium chloride    sodium chloride     Exam:  /75 (BP Location: Left arm, Patient Position: Lying)   Pulse 68   Temp 97.6 °F (36.4 °C) (Oral)   Resp 17   Ht 162.6 cm (64\")   Wt 105 " kg (231 lb 11.3 oz)   LMP 05/08/1983   SpO2 96%   BMI 39.77 kg/m²     Intake/Output last 3 shifts:  I/O last 3 completed shifts:  In: 240 [P.O.:240]  Out: 800 [Urine:800]    Intake/Output this shift:  No intake/output data recorded.    Physical exam:  General Appearance:  Alert  Head:  Normocephalic, without obvious abnormality, atraumatic  Eyes:  PERRL, conjunctiva/corneas clear     Neck:  Supple,  no adenopathy;      Lungs:  Decreased BS occasion ronchi  Heart:  Regular rate and rhythm, S1 and S2 normal  Abdomen:  Soft, non-tender, bowel sounds active   Extremities: trace edema  Pulses: 2+ and symmetric all extremities  Skin:  No rashes or lesions       Data Review:  All labs (24hrs):   Recent Results (from the past 24 hours)   Basic Metabolic Panel    Collection Time: 03/24/25  1:51 AM    Specimen: Arm, Left; Blood   Result Value Ref Range    Glucose 162 (H) 65 - 99 mg/dL    BUN 29 (H) 8 - 23 mg/dL    Creatinine 1.14 (H) 0.57 - 1.00 mg/dL    Sodium 140 136 - 145 mmol/L    Potassium 4.2 3.5 - 5.2 mmol/L    Chloride 100 98 - 107 mmol/L    CO2 31.1 (H) 22.0 - 29.0 mmol/L    Calcium 9.8 8.6 - 10.5 mg/dL    BUN/Creatinine Ratio 25.4 (H) 7.0 - 25.0    Anion Gap 8.9 5.0 - 15.0 mmol/L    eGFR 53.2 (L) >60.0 mL/min/1.73   CBC (No Diff)    Collection Time: 03/24/25  1:51 AM    Specimen: Arm, Left; Blood   Result Value Ref Range    WBC 16.21 (H) 3.40 - 10.80 10*3/mm3    RBC 3.51 (L) 3.77 - 5.28 10*6/mm3    Hemoglobin 11.0 (L) 12.0 - 15.9 g/dL    Hematocrit 33.4 (L) 34.0 - 46.6 %    MCV 95.2 79.0 - 97.0 fL    MCH 31.3 26.6 - 33.0 pg    MCHC 32.9 31.5 - 35.7 g/dL    RDW 14.2 12.3 - 15.4 %    RDW-SD 49.1 37.0 - 54.0 fl    MPV 11.0 6.0 - 12.0 fL    Platelets 241 140 - 450 10*3/mm3          Imaging:  CT Chest Without Contrast Diagnostic  Result Date: 3/23/2025  Impression: 1.Multifocal patchy opacities noted throughout the lungs bilaterally, worse in the perihilar region. Findings are concerning for multifocal pneumonia. Less  likely, this may represent pulmonary hemorrhage 2.Additional nonemergent findings as characterized above 3.Scattered prominent to mildly enlarged mediastinal lymph nodes, likely reactive. Electronically Signed: Rafael Archer DO  3/23/2025 5:15 PM EDT  Workstation ID: [id number]    XR Chest 1 View  Result Date: 3/23/2025  Impression: 1.Worsening multifocal patchy opacities scattered throughout the lungs. This most likely represents multifocal pneumonia, although pulmonary edema is also a possibility 2.Trace bilateral pleural effusions. Electronically Signed: Rafael Archer DO  3/23/2025 12:30 PM EDT  Workstation ID: [id number]    XR Chest 1 View  Result Date: 3/19/2025  Impression: Multifocal patchy nodular densities in both lungs, thought to be similar to 3/15/2025. Correlate for pneumonia. Electronically Signed: Barb Millan MD  3/19/2025 2:35 PM EDT  Workstation ID: [id number]    US Renal Bilateral  Result Date: 3/16/2025  Impression: No hydronephrosis or acute sonographic abnormality. Electronically Signed: Tomy Orellana MD  3/16/2025 5:38 PM EDT  Workstation ID: [id number]    NM Lung Ventilation Perfusion  Result Date: 3/16/2025  Impression: Negative for pulmonary embolism. Electronically Signed: Nimesh Dash MD  3/16/2025 3:17 PM EDT  Workstation ID: [id number]      Assessment/Plan:     SOB (shortness of breath)    Pulmonary fibrosis    Dyspnea         Acute kidney injury on top of chronic kidney disease stage III  Acute hypoxic respiratory failure  Pulmonary fibrosis  Urinary tract infection  Anxiety disorder  Hypertension  Hyperlipidemia  Hypothyroidism  History of von Willebrand disease with no bleeding        Recommendations:    check chest x-ray  Continue Lasix  Creatinine stable at 1.2       Discussed with the patient and family at bedside

## 2025-03-24 NOTE — SIGNIFICANT NOTE
03/24/25 1520   OTHER   Discipline physical therapist   Rehab Time/Intention   Session Not Performed other (see comments)  (Pt had several procedures today; audrey this AM in IR, then audrey for bronchoscopy. Checked on pt this afternoon, and she was resting asleep in bed. Will hold therapy this date and f/u tomorrow.)   Therapy Assessment/Plan (PT)   Criteria for Skilled Interventions Met (PT) yes;meets criteria   Recommendation   PT - Next Appointment 03/25/25   Recommendation   OT - Next Appointment 03/25/25

## 2025-03-24 NOTE — ANESTHESIA PREPROCEDURE EVALUATION
Anesthesia Evaluation     NPO Solid Status: > 8 hours  NPO Liquid Status: > 8 hours           Airway   Mallampati: I  TM distance: >3 FB  Neck ROM: full  No difficulty expected  Dental - normal exam     Pulmonary - normal exam   (+) pneumonia , asthma,shortness of breath, sleep apnea    ROS comment: Acute hypoxic respiratory failure not on home oxygen but currently on 3 L of oxygen via nasal cannula   Pneumonia  Hyperkalemia  Recently diagnosed pulmonary fibrosis  DANIELLE on CKD 3  UTI  Hypertension  Hypothyroidism        -acute hypoxic respiratory failure likely secondary to pneumonia plus underlying pulmonary fibrosis.  Urine strep pneumo antigen is positive.  Has completed ceftriaxone for treatment of likely Streptococcus pneumonia.  Blood cultures and sputum culture negative.  Still complaining of shortness of breath and is tachypneic.  Follow-up on repeat chest x-ray.  Pulmonary following.  -Renal function with slight improvement.  See further recommendations per nephrology.  - Treat hyperkalemia with Lokelma  - Urine culture showed no growth ruling out UTI  - Continue current management.    Cardiovascular - normal exam    (+) hypertension, valvular problems/murmurs, hyperlipidemia      Neuro/Psych  (+) numbness, psychiatric history  GI/Hepatic/Renal/Endo    (+) obesity, morbid obesity, GERD, PUD, hepatitis, liver disease, renal disease-, thyroid problem hypothyroidism    Musculoskeletal     Abdominal  - normal exam    Bowel sounds: normal.   Substance History      OB/GYN          Other   arthritis,   history of cancer    ROS/Med Hx Other: NORMAL EF 11/2024              Anesthesia Plan    ASA 4     general   total IV anesthesia  intravenous induction     Anesthetic plan, risks, benefits, and alternatives have been provided, discussed and informed consent has been obtained with: patient.  Pre-procedure education provided  Plan discussed with CRNA.    CODE STATUS:    Code Status (Patient has no pulse and is not  breathing): CPR (Attempt to Resuscitate)  Medical Interventions (Patient has pulse or is breathing): Full Support

## 2025-03-24 NOTE — CASE MANAGEMENT/SOCIAL WORK
Continued Stay Note  [facility]     Patient Name: Tracie Gross  MRN: [id number]  Today's Date: 3/24/2025    Admit Date: 3/15/2025    Plan: Home with family & [facility] (accepted, need order).   Discharge Plan       Row Name 03/24/25 3717       Plan    Plan Home with family & [facility] (accepted, need order).    Plan Comments [facility] liaison Kecia confirmed they have accepted patient.      Row Name 03/24/25 8685       Plan    Plan Comments DC Barriers: planned bronch today, 3/23 triggered simple sepsis, 2L O2, Cr 1.14, WBC 16.21, urine strep pneumo antigen+, incessant cough-supportive mgmt, neph following             Gabriela Bermeo RN     [facility]  Office: [phone]  Cell: [phone]  Fax # [phone]

## 2025-03-25 LAB
ANA SER QL IF: NEGATIVE
ANA SER QL: NEGATIVE
ANION GAP SERPL CALCULATED.3IONS-SCNC: 10.7 MMOL/L (ref 5–15)
BUN SERPL-MCNC: 30 MG/DL (ref 8–23)
BUN/CREAT SERPL: 31.9 (ref 7–25)
CALCIUM SPEC-SCNC: 9.6 MG/DL (ref 8.6–10.5)
CHLORIDE SERPL-SCNC: 102 MMOL/L (ref 98–107)
CHROMATIN AB SERPL-ACNC: <0.2 AI (ref 0–0.9)
CO2 SERPL-SCNC: 27.3 MMOL/L (ref 22–29)
CREAT SERPL-MCNC: 0.94 MG/DL (ref 0.57–1)
DEPRECATED RDW RBC AUTO: 52.2 FL (ref 37–54)
DSDNA AB SER-ACNC: <1 IU/ML (ref 0–9)
EGFRCR SERPLBLD CKD-EPI 2021: 67.1 ML/MIN/1.73
ENA RNP AB SER-ACNC: <0.2 AI (ref 0–0.9)
ENA SCL70 AB SER-ACNC: <0.2 AI (ref 0–0.9)
ENA SM AB SER-ACNC: <0.2 AI (ref 0–0.9)
ENA SS-A AB SER-ACNC: <0.2 AI (ref 0–0.9)
ENA SS-B AB SER-ACNC: <0.2 AI (ref 0–0.9)
ERYTHROCYTE [DISTWIDTH] IN BLOOD BY AUTOMATED COUNT: 14.7 % (ref 12.3–15.4)
GLUCOSE SERPL-MCNC: 117 MG/DL (ref 65–99)
HCT VFR BLD AUTO: 34 % (ref 34–46.6)
HGB BLD-MCNC: 10.7 G/DL (ref 12–15.9)
LABORATORY COMMENT REPORT: NORMAL
MCH RBC QN AUTO: 31 PG (ref 26.6–33)
MCHC RBC AUTO-ENTMCNC: 31.5 G/DL (ref 31.5–35.7)
MCV RBC AUTO: 98.6 FL (ref 79–97)
PLATELET # BLD AUTO: 148 10*3/MM3 (ref 140–450)
PMV BLD AUTO: 11.6 FL (ref 6–12)
POTASSIUM SERPL-SCNC: 4 MMOL/L (ref 3.5–5.2)
RBC # BLD AUTO: 3.45 10*6/MM3 (ref 3.77–5.28)
RHEUMATOID FACT SERPL-ACNC: 18.9 IU/ML
SODIUM SERPL-SCNC: 140 MMOL/L (ref 136–145)
WBC NRBC COR # BLD AUTO: 16.47 10*3/MM3 (ref 3.4–10.8)

## 2025-03-25 PROCEDURE — 94761 N-INVAS EAR/PLS OXIMETRY MLT: CPT

## 2025-03-25 PROCEDURE — 25010000002 MORPHINE PER 10 MG: Performed by: FAMILY MEDICINE

## 2025-03-25 PROCEDURE — 80048 BASIC METABOLIC PNL TOTAL CA: CPT | Performed by: INTERNAL MEDICINE

## 2025-03-25 PROCEDURE — 94799 UNLISTED PULMONARY SVC/PX: CPT

## 2025-03-25 PROCEDURE — 97530 THERAPEUTIC ACTIVITIES: CPT

## 2025-03-25 PROCEDURE — 97116 GAIT TRAINING THERAPY: CPT

## 2025-03-25 PROCEDURE — 94664 DEMO&/EVAL PT USE INHALER: CPT

## 2025-03-25 PROCEDURE — 92526 ORAL FUNCTION THERAPY: CPT

## 2025-03-25 PROCEDURE — 25010000002 METHYLPREDNISOLONE PER 125 MG: Performed by: INTERNAL MEDICINE

## 2025-03-25 PROCEDURE — 85027 COMPLETE CBC AUTOMATED: CPT | Performed by: INTERNAL MEDICINE

## 2025-03-25 RX ORDER — MORPHINE SULFATE 2 MG/ML
2 INJECTION, SOLUTION INTRAMUSCULAR; INTRAVENOUS NIGHTLY PRN
Status: DISPENSED | OUTPATIENT
Start: 2025-03-25 | End: 2025-03-28

## 2025-03-25 RX ADMIN — QUETIAPINE FUMARATE 50 MG: 25 TABLET ORAL at 20:29

## 2025-03-25 RX ADMIN — METHYLPREDNISOLONE SODIUM SUCCINATE 125 MG: 125 INJECTION, POWDER, FOR SOLUTION INTRAMUSCULAR; INTRAVENOUS at 09:14

## 2025-03-25 RX ADMIN — BUDESONIDE 0.5 MG: 0.5 INHALANT RESPIRATORY (INHALATION) at 06:56

## 2025-03-25 RX ADMIN — METHYLPREDNISOLONE SODIUM SUCCINATE 125 MG: 125 INJECTION, POWDER, FOR SOLUTION INTRAMUSCULAR; INTRAVENOUS at 16:19

## 2025-03-25 RX ADMIN — LEVOTHYROXINE SODIUM 175 MCG: 175 TABLET ORAL at 05:37

## 2025-03-25 RX ADMIN — Medication 10 ML: at 09:14

## 2025-03-25 RX ADMIN — Medication 1 TABLET: at 09:14

## 2025-03-25 RX ADMIN — LAMOTRIGINE 200 MG: 100 TABLET ORAL at 20:29

## 2025-03-25 RX ADMIN — HYDROXYCHLOROQUINE SULFATE 200 MG: 200 TABLET ORAL at 09:13

## 2025-03-25 RX ADMIN — Medication 5 MG: at 20:29

## 2025-03-25 RX ADMIN — BENZONATATE 200 MG: 100 CAPSULE ORAL at 09:13

## 2025-03-25 RX ADMIN — Medication 10 ML: at 20:32

## 2025-03-25 RX ADMIN — ACETAMINOPHEN 650 MG: 325 TABLET, FILM COATED ORAL at 02:51

## 2025-03-25 RX ADMIN — HYDROXYCHLOROQUINE SULFATE 200 MG: 200 TABLET ORAL at 20:29

## 2025-03-25 RX ADMIN — GUAIFENESIN 600 MG: 600 TABLET, MULTILAYER, EXTENDED RELEASE ORAL at 20:29

## 2025-03-25 RX ADMIN — GUAIFENESIN AND CODEINE PHOSPHATE 10 ML: 100; 10 SOLUTION ORAL at 13:26

## 2025-03-25 RX ADMIN — MORPHINE SULFATE 2 MG: 2 INJECTION, SOLUTION INTRAMUSCULAR; INTRAVENOUS at 20:43

## 2025-03-25 RX ADMIN — GUAIFENESIN AND CODEINE PHOSPHATE 10 ML: 100; 10 SOLUTION ORAL at 17:36

## 2025-03-25 RX ADMIN — GUAIFENESIN 600 MG: 600 TABLET, MULTILAYER, EXTENDED RELEASE ORAL at 09:13

## 2025-03-25 RX ADMIN — CETIRIZINE HYDROCHLORIDE 10 MG: 10 TABLET, FILM COATED ORAL at 09:14

## 2025-03-25 RX ADMIN — BENZONATATE 200 MG: 100 CAPSULE ORAL at 16:19

## 2025-03-25 RX ADMIN — AMITRIPTYLINE HYDROCHLORIDE 25 MG: 25 TABLET, FILM COATED ORAL at 20:29

## 2025-03-25 RX ADMIN — GUAIFENESIN AND CODEINE PHOSPHATE 10 ML: 100; 10 SOLUTION ORAL at 20:28

## 2025-03-25 RX ADMIN — GUAIFENESIN AND CODEINE PHOSPHATE 10 ML: 100; 10 SOLUTION ORAL at 09:23

## 2025-03-25 RX ADMIN — BUDESONIDE 0.5 MG: 0.5 INHALANT RESPIRATORY (INHALATION) at 18:52

## 2025-03-25 RX ADMIN — FUROSEMIDE 20 MG: 20 TABLET ORAL at 09:13

## 2025-03-25 RX ADMIN — ALPRAZOLAM 1 MG: 1 TABLET ORAL at 20:29

## 2025-03-25 RX ADMIN — METHYLPREDNISOLONE SODIUM SUCCINATE 125 MG: 125 INJECTION, POWDER, FOR SOLUTION INTRAMUSCULAR; INTRAVENOUS at 02:49

## 2025-03-25 RX ADMIN — Medication 1 TABLET: at 20:29

## 2025-03-25 RX ADMIN — METHYLPREDNISOLONE SODIUM SUCCINATE 125 MG: 125 INJECTION, POWDER, FOR SOLUTION INTRAMUSCULAR; INTRAVENOUS at 20:29

## 2025-03-25 RX ADMIN — BENZONATATE 200 MG: 100 CAPSULE ORAL at 20:29

## 2025-03-25 RX ADMIN — ACETAMINOPHEN 650 MG: 325 TABLET, FILM COATED ORAL at 20:29

## 2025-03-25 NOTE — PROGRESS NOTES
Nephrology  Progress Note                                        [facility]    Patient Identification    Name: Tracie Gross  Age: 64 y.o.  Sex: female  :  1960  MRN: [id number]      DATE OF SERVICE:  3/25/2025        Subective    Noted cough and shortness of breath or     Objective   Scheduled Meds:amitriptyline, 25 mg, Oral, Nightly  benzonatate, 200 mg, Oral, TID  budesonide, 0.5 mg, Nebulization, BID - RT  calcium 500 mg vitamin D 5 mcg (200 UT), 1 tablet, Oral, BID  cetirizine, 10 mg, Oral, Daily  furosemide, 20 mg, Oral, Daily  guaiFENesin, 600 mg, Oral, Q12H  hydroxychloroquine, 200 mg, Oral, BID  lamoTRIgine, 200 mg, Oral, Nightly  levothyroxine, 175 mcg, Oral, Q AM  methylPREDNISolone sodium succinate, 125 mg, Intravenous, Q6H  [Held by provider] mycophenolate, 500 mg, Oral, Q12H  QUEtiapine, 50 mg, Oral, Nightly  sodium chloride, 10 mL, Intravenous, Q12H          Continuous Infusions:       PRN Meds:  acetaminophen **OR** acetaminophen **OR** acetaminophen    ALPRAZolam    aluminum-magnesium hydroxide-simethicone    senna-docusate sodium **AND** polyethylene glycol **AND** bisacodyl **AND** bisacodyl    Calcium Replacement - Follow Nurse / BPA Driven Protocol    cyclobenzaprine    guaiFENesin-codeine    ipratropium-albuterol    Magnesium Standard Dose Replacement - Follow Nurse / BPA Driven Protocol    meclizine    melatonin    nitroglycerin    ondansetron ODT **OR** [DISCONTINUED] ondansetron    oxyCODONE    Phosphorus Replacement - Follow Nurse / BPA Driven Protocol    Potassium Replacement - Follow Nurse / BPA Driven Protocol    [COMPLETED] Insert Peripheral IV **AND** sodium chloride    sodium chloride    sodium chloride     Exam:  /87 (BP Location: Left arm, Patient Position: Lying)   Pulse 68   Temp 97.5 °F (36.4 °C) (Oral)   Resp 14   " Ht 162.6 cm (64\")   Wt 105 kg (232 lb 9.4 oz)   LMP 05/08/1983   SpO2 95%   BMI 39.92 kg/m²     Intake/Output last 3 shifts:  I/O last 3 completed shifts:  In: 320 [P.O.:120; I.V.:200]  Out: 800 [Urine:800]    Intake/Output this shift:  I/O this shift:  In: 240 [P.O.:240]  Out: -     Physical exam:  General Appearance:  Alert  Head:  Normocephalic, without obvious abnormality, atraumatic  Eyes:  PERRL, conjunctiva/corneas clear     Neck:  Supple,  no adenopathy;      Lungs:  Decreased BS occasion ronchi  Heart:  Regular rate and rhythm, S1 and S2 normal  Abdomen:  Soft, non-tender, bowel sounds active   Extremities: trace edema  Pulses: 2+ and symmetric all extremities  Skin:  No rashes or lesions       Data Review:  All labs (24hrs):   Recent Results (from the past 24 hours)   Respiratory Culture - Wash, Lung, L    Collection Time: 03/24/25  4:27 PM    Specimen: Lung, L; Wash   Result Value Ref Range    Gram Stain Few (2+) Epithelial cells per low power field     Gram Stain Moderate (3+) WBCs per low power field     Gram Stain Rare (1+) Gram positive cocci in pairs    Respiratory Panel PCR w/COVID-19(SARS-CoV-2) GAVI/RAJIV/AUSTEN/PAD/COR/APRIL In-House, NP Swab in UTM/VTM, 2 HR TAT - Wash, Lung, L    Collection Time: 03/24/25  4:27 PM    Specimen: Lung, L; Wash   Result Value Ref Range    ADENOVIRUS, PCR Not Detected Not Detected    Coronavirus 229E Not Detected Not Detected    Coronavirus HKU1 Not Detected Not Detected    Coronavirus NL63 Not Detected Not Detected    Coronavirus OC43 Not Detected Not Detected    COVID19 Not Detected Not Detected - Ref. Range    Human Metapneumovirus Not Detected Not Detected    Human Rhinovirus/Enterovirus Not Detected Not Detected    Influenza A PCR Not Detected Not Detected    Influenza B PCR Not Detected Not Detected    Parainfluenza Virus 1 Not Detected Not Detected    Parainfluenza Virus 2 Not Detected Not Detected    Parainfluenza Virus 3 Not Detected Not Detected    " Parainfluenza Virus 4 Not Detected Not Detected    RSV, PCR Not Detected Not Detected    Bordetella pertussis pcr Not Detected Not Detected    Bordetella parapertussis PCR Not Detected Not Detected    Chlamydophila pneumoniae PCR Not Detected Not Detected    Mycoplasma pneumo by PCR Not Detected Not Detected   Basic Metabolic Panel    Collection Time: 03/25/25 12:53 AM    Specimen: Blood   Result Value Ref Range    Glucose 117 (H) 65 - 99 mg/dL    BUN 30 (H) 8 - 23 mg/dL    Creatinine 0.94 0.57 - 1.00 mg/dL    Sodium 140 136 - 145 mmol/L    Potassium 4.0 3.5 - 5.2 mmol/L    Chloride 102 98 - 107 mmol/L    CO2 27.3 22.0 - 29.0 mmol/L    Calcium 9.6 8.6 - 10.5 mg/dL    BUN/Creatinine Ratio 31.9 (H) 7.0 - 25.0    Anion Gap 10.7 5.0 - 15.0 mmol/L    eGFR 67.1 >60.0 mL/min/1.73   CBC (No Diff)    Collection Time: 03/25/25 12:53 AM    Specimen: Blood   Result Value Ref Range    WBC 16.47 (H) 3.40 - 10.80 10*3/mm3    RBC 3.45 (L) 3.77 - 5.28 10*6/mm3    Hemoglobin 10.7 (L) 12.0 - 15.9 g/dL    Hematocrit 34.0 34.0 - 46.6 %    MCV 98.6 (H) 79.0 - 97.0 fL    MCH 31.0 26.6 - 33.0 pg    MCHC 31.5 31.5 - 35.7 g/dL    RDW 14.7 12.3 - 15.4 %    RDW-SD 52.2 37.0 - 54.0 fl    MPV 11.6 6.0 - 12.0 fL    Platelets 148 140 - 450 10*3/mm3          Imaging:  CT Chest Without Contrast Diagnostic  Result Date: 3/23/2025  Impression: 1.Multifocal patchy opacities noted throughout the lungs bilaterally, worse in the perihilar region. Findings are concerning for multifocal pneumonia. Less likely, this may represent pulmonary hemorrhage 2.Additional nonemergent findings as characterized above 3.Scattered prominent to mildly enlarged mediastinal lymph nodes, likely reactive. Electronically Signed: Rafael Archer DO  3/23/2025 5:15 PM EDT  Workstation ID: [id number]    XR Chest 1 View  Result Date: 3/23/2025  Impression: 1.Worsening multifocal patchy opacities scattered throughout the lungs. This most likely represents multifocal pneumonia,  although pulmonary edema is also a possibility 2.Trace bilateral pleural effusions. Electronically Signed: Rafael Archer DO  3/23/2025 12:30 PM EDT  Workstation ID: [id number]    XR Chest 1 View  Result Date: 3/19/2025  Impression: Multifocal patchy nodular densities in both lungs, thought to be similar to 3/15/2025. Correlate for pneumonia. Electronically Signed: Barb Millan MD  3/19/2025 2:35 PM EDT  Workstation ID: [id number]      Assessment/Plan:     SOB (shortness of breath)    Pulmonary fibrosis    Dyspnea         Acute kidney injury on top of chronic kidney disease stage III  Acute hypoxic respiratory failure  Pulmonary fibrosis  Urinary tract infection  Anxiety disorder  Hypertension  Hyperlipidemia  Hypothyroidism  History of von Willebrand disease with no bleeding        Recommendations:      Continue Lasix  Creatinine stable at 0.9 from 1.2       Discussed with the patient and family at bedside

## 2025-03-25 NOTE — PROGRESS NOTES
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 No wolf honeycombing fibrosis  Patient was treated with mycophenolate, prophylactic Bactrim     PFT 1/14/2025   FVC 1 1.6 L/51%,  FEV1 1.4 L / 58%, ratio 88, TLC 57%, RV 50%, DLCO 51%     PFTs July 2021,   FEV1 1.7 L 68% improve 75% post bronchodilator, FEV1/ FVC ratio 87, TLC 72%, RV 74%, DLCO 56%     PFTs in 2017   FEV1 66-70% predicted which is unchanged since 2012  quit smoking 1994,      PFT 2012 and 2017,   FEV-1 and TLC 75% , suggestive of mild to mod restrictive lung disease     2D echo  2018 normal RVSP and EF  2D echo May 2020 no pulmonary hypertension   2d echo July 2021 RVSP 30  2D echo January 2025 no pulmonary hypertension EF 60%     FERN, AHI 14.5 ,       No Response to inhalers including Breztri and Trelegy     Recommendations:    Bronchoscopy no active hemoptysis   Cultures to date negative  LANA, negative     Pending   ANCA  Lupus panel  Scleroderma titers    cont high-dose steroids IV Solu-Medrol 125 mg every 6 hours    oxygen titration currently on 2 L  Antibiotics completed Rocephin 2 g IV daily for 5 days  Azithromycin 500 mg p.o. daily for 3 days      Seen by GI for recurrent \" chocking\"     Holding mycophenolate for treatment of pneumonia     Bronchodilators Pulmicort and DuoNeb     On Plaquenil 200 mg twice daily        CT chest 3/23/2025 compared to January 2025         Chest x-ray 3/15/2025      VQ scan 3/16/2025       High-res CT scan January 2025             LOS: 10 days     Subjective         Objective     Vital signs for last 24 hours:  Vitals:    03/25/25 0656 03/25/25 0659 03/25/25 0900 03/25/25 1232 "   BP:   151/81 168/92   BP Location:   Left arm Left arm   Patient Position:   Lying Sitting   Pulse: 63 62 74 73   Resp: 16 16 25 14   Temp:   98.1 °F (36.7 °C) 98.5 °F (36.9 °C)   TempSrc:   Oral Oral   SpO2: 94% 97% 96% 98%   Weight:       Height:           Intake/Output last 3 shifts:  I/O last 3 completed shifts:  In: 440 [P.O.:240; I.V.:200]  Out: -   Intake/Output this shift:  No intake/output data recorded.      Radiology  Imaging Results (Last 24 Hours)       ** No results found for the last 24 hours. **            Labs:  Results from last 7 days   Lab Units 03/25/25  0053   WBC 10*3/mm3 16.47*   HEMOGLOBIN g/dL 10.7*   HEMATOCRIT % 34.0   PLATELETS 10*3/mm3 148     Results from last 7 days   Lab Units 03/25/25  0053 03/21/25  0043 03/20/25  0500   SODIUM mmol/L 140   < > 137   POTASSIUM mmol/L 4.0   < > 5.3*   CHLORIDE mmol/L 102   < > 103   CO2 mmol/L 27.3   < > 22.7   BUN mg/dL 30*   < > 27*   CREATININE mg/dL 0.94   < > 1.14*   CALCIUM mg/dL 9.6   < > 9.5   BILIRUBIN mg/dL  --   --  0.2   ALK PHOS U/L  --   --  80   ALT (SGPT) U/L  --   --  12   AST (SGOT) U/L  --   --  35*   GLUCOSE mg/dL 117*   < > 126*    < > = values in this interval not displayed.           Results from last 7 days   Lab Units 03/20/25  0500   ALBUMIN g/dL 3.5                     Results from last 7 days   Lab Units 03/20/25  0526   INR  1.20*               Meds:   SCHEDULE  amitriptyline, 25 mg, Oral, Nightly  benzonatate, 200 mg, Oral, TID  budesonide, 0.5 mg, Nebulization, BID - RT  calcium 500 mg vitamin D 5 mcg (200 UT), 1 tablet, Oral, BID  cetirizine, 10 mg, Oral, Daily  furosemide, 20 mg, Oral, Daily  guaiFENesin, 600 mg, Oral, Q12H  hydroxychloroquine, 200 mg, Oral, BID  lamoTRIgine, 200 mg, Oral, Nightly  levothyroxine, 175 mcg, Oral, Q AM  methylPREDNISolone sodium succinate, 125 mg, Intravenous, Q6H  [Held by provider] mycophenolate, 500 mg, Oral, Q12H  QUEtiapine, 50 mg, Oral, Nightly  sodium chloride, 10 mL,  Intravenous, Q12H      Infusions       PRNs    acetaminophen **OR** acetaminophen **OR** acetaminophen    ALPRAZolam    aluminum-magnesium hydroxide-simethicone    senna-docusate sodium **AND** polyethylene glycol **AND** bisacodyl **AND** bisacodyl    Calcium Replacement - Follow Nurse / BPA Driven Protocol    cyclobenzaprine    guaiFENesin-codeine    ipratropium-albuterol    Magnesium Standard Dose Replacement - Follow Nurse / BPA Driven Protocol    meclizine    melatonin    nitroglycerin    ondansetron ODT **OR** [DISCONTINUED] ondansetron    oxyCODONE    Phosphorus Replacement - Follow Nurse / BPA Driven Protocol    Potassium Replacement - Follow Nurse / BPA Driven Protocol    [COMPLETED] Insert Peripheral IV **AND** sodium chloride    sodium chloride    sodium chloride    Physical Exam:  Physical Exam  Cardiovascular:      Heart sounds: Murmur heard.      No gallop.   Pulmonary:      Effort: No respiratory distress.      Breath sounds: No stridor. Rhonchi and rales present. No wheezing.   Chest:      Chest wall: No tenderness.         ROS  Review of Systems   Respiratory:  Positive for cough and shortness of breath. Negative for wheezing and stridor.    Cardiovascular:  Positive for palpitations. Negative for chest pain and leg swelling.             Total critical care time spent with patient greater than: 45 Minutes

## 2025-03-25 NOTE — THERAPY TREATMENT NOTE
Acute Care - Speech Language Pathology   Swallow Treatment Note JESI Murphy     Patient Name: Tracie Gross  : 1960  MRN: [id number]  Today's Date: 3/25/2025               Admit Date: 3/15/2025    Visit Dx:     ICD-10-CM ICD-9-CM   1. Dyspnea, unspecified type  R06.00 786.09   2. Pulmonary fibrosis  J84.10 515   3. Hypotension, unspecified hypotension type  I95.9 458.9   4. Urinary tract infection without hematuria, site unspecified  N39.0 599.0     Patient Active Problem List   Diagnosis    Vitamin B12 deficiency    Arthritis    Sleep apnea    Bipolar affective disorder    Depression    Breast cancer    Chronic pain    Dyslipidemia    Family history of malignant neoplasm of colon    Family history of melanoma    Gastroesophageal reflux disease    Heart murmur    Hypertension    Hypothyroidism    Osteoarthritis, generalized    Raynaud's disease    Ulcerative colitis    Artificial knee joint present    Neuralgia    Presence of artificial hip joint, right    Lumbar radiculopathy    Derangement of posterior horn of lateral meniscus    Von Willebrand disease    SLE (systemic lupus erythematosus related syndrome)    Chest pain    Scleroderma    Monahan syndrome    Rectal prolapse    Osteoporosis    COVID-19    Fibromyalgia    Onychomycosis    Stage 3 chronic kidney disease    Avascular necrosis of femoral head, left    Morbid obesity    Trochanteric bursitis of left hip    Pain in both knees    Status post total hip replacement, right    DJD of left shoulder    Complete tear of left rotator cuff    Osteoarthritis of right glenohumeral joint    Pulmonary fibrosis    SOB (shortness of breath)    Dyspnea     Past Medical History:   Diagnosis Date    Allergic 1998    Anemia     Ankle sprain     Anxiety     Arthritis     Arthritis of back 0ll2013    Arthritis of neck 2005    Asthma     Bipolar affective disorder     Breast cancer 1985    s/p R mastectomy    Bursitis of hip [id number]    Cervical disc disorder  2006    CTS (carpal tunnel syndrome)     Depression 2000    Fracture of wrist 1995    Fracture, foot     Frozen shoulder     GERD (gastroesophageal reflux disease)     H/O breast reconstruction     SEVERAL    H/O laminectomy     Hamartoma     Hip arthrosis 2013    History of medical problems     Hx of bilateral oophorectomy     Hyperlipidemia     Hypertension     Hypothyroidism     Infectious viral hepatitis     Inflammatory bowel disease     Man. EGD/colonoscopy annually (2021)    Irritable bowel syndrome     Kienbock's disease, right     WRIST    Knee swelling 2007    Low back pain 1991    Low back strain     Lumbosacral disc disease 1995    Had 2 lumber surgeries    Lupus     [place]    Monahan syndrome     Man. EGD/colonoscopy annually (2021). MRI alternating w/ EUS annually for pancreatic screen    MRSA infection     Neuroma of foot     Obesity     Osteopenia     Osteoporosis     maintained on Prolia through Karen    Peptic ulceration     Periarthritis of shoulder 2022    Pneumonia     Pulmonary fibrosis     Raynaud disease     Renal insufficiency     Rotator cuff syndrome [id number]    Scleroderma     Sleep apnea     cpap    Squamous cell cancer of lip     Tear of meniscus of knee     Tendinitis of knee     Thoracic disc disorder     Von Willebrand disease     Wrist sprain      Past Surgical History:   Procedure Laterality Date    APPENDECTOMY      ARM DEBRIDEMENT Right     X 3    BACK SURGERY      Vetas- cervical fusion    BACK SURGERY      lumbar decomp    BREAST BIOPSY      BREAST LUMPECTOMY      BREAST RECONSTRUCTION Right     BREAST RECONSTRUCTION Right     CARDIAC CATHETERIZATION      no stents placed     SECTION  ,     CHOLECYSTECTOMY      COLONOSCOPY      COLONOSCOPY N/A 2020    Procedure: COLONOSCOPY;  Surgeon: Cayden Conway MD;  Location: [facility] ENDOSCOPY;  Service: Gastroenterology;  Laterality: N/A;   rectal ulcer    COLONOSCOPY N/A 09/17/2021    Procedure: COLONOSCOPY WITH POLYPECTOMY X 1;  Surgeon: Cayden Conway MD;  Location: [facility] ENDOSCOPY;  Service: Gastroenterology;  Laterality: N/A;  Post: COLON POLYP    COLONOSCOPY N/A 01/06/2023    Procedure: COLONOSCOPY with polypectomy x 1, endoscopic clipping x 1;  Surgeon: Cayden Conway MD;  Location: [facility] ENDOSCOPY;  Service: Gastroenterology;  Laterality: N/A;    COLONOSCOPY N/A 10/01/2024    Procedure: COLONOSCOPY;  Surgeon: Cayden Conway MD;  Location: [facility] ENDOSCOPY;  Service: Gastroenterology;  Laterality: N/A;  normal    COSMETIC SURGERY  [id number]    ENDOSCOPY      ENDOSCOPY N/A 08/14/2020    Procedure: ESOPHAGOGASTRODUODENOSCOPY;  Surgeon: Cayden Conway MD;  Location: [facility] ENDOSCOPY;  Service: Gastroenterology;  Laterality: N/A;  Normal EGD    ENDOSCOPY N/A 09/17/2021    Procedure: ESOPHAGOGASTRODUODENOSCOPY;  Surgeon: Cayden Conway MD;  Location: [facility] ENDOSCOPY;  Service: Gastroenterology;  Laterality: N/A;  Post: normal egd    ENDOSCOPY N/A 10/01/2024    Procedure: ESOPHAGOGASTRODUODENOSCOPY;  Surgeon: Cayden Conway MD;  Location: [facility] ENDOSCOPY;  Service: Gastroenterology;  Laterality: N/A;  normal    EXPLORATORY LAPAROTOMY      scar tissue removal    EYE SURGERY  2000    FINGER SURGERY Right     ring finger mass excision    HAND SURGERY  Rt wrist  10/15    HIP SURGERY  1/12    HYSTERECTOMY      PARTIAL    JOINT REPLACEMENT Right 2012,2015    hip and knee    KNEE ARTHROSCOPY Left 01/07/2020    Procedure: LEFT KNEE SCOPE with partial lateral meniscectomy;  Surgeon: Chau Perez MD;  Location: [facility] MAIN OR;  Service: Orthopedics    KNEE SURGERY  Rtf knee  2015    LASIK      LASIK/ LASIK ENHANCEMENT    MASTECTOMY RADICAL Right     NECK SURGERY  01/01/06    OOPHORECTOMY Bilateral     SHOULDER SURGERY  11/01/2023    SPINE SURGERY      SPLENECTOMY      SUBTOTAL HYSTERECTOMY      TOTAL HIP  ARTHROPLASTY Left 05/17/2023    TOTAL SHOULDER ARTHROPLASTY W/ DISTAL CLAVICLE EXCISION Right 11/01/2023    Procedure: TOTAL SHOULDER REVERSE ARTHROPLASTY;  Surgeon: Floyd Ruiz MD;  Location: [facility] MAIN OR;  Service: Orthopedics;  Laterality: Right;    TRIGGER POINT INJECTION  7/23    TUBAL ABDOMINAL LIGATION  1981    UPPER ENDOSCOPIC ULTRASOUND W/ FNA N/A 12/09/2021    Procedure: ENDOSCOPIC ULTRASOUND WITH ESOPHAGOGASTRODUODENOSCOPY;  Surgeon: Luis E Negron MD;  Location: [facility] ENDOSCOPY;  Service: Gastroenterology;  Laterality: N/A;  Post: GASTROPARESIS, DILATED COMMON BILE DUCT, FUNDIC POLYP, DUODENITIS, NORMAL PANCREAS, HIATAL HERNIA    WRIST SURGERY Right     distal radius head removal (bone dead)  plates and screws decomp lunate                 EDUCATION  The patient has been educated in the following areas:   Dysphagia (Swallowing Impairment) Oral Care/Hydration Modified Diet Instruction.        SLP GOALS       Row Name 03/25/25 1500       (LTG) Swallow    (LTG) Swallow Pt will maximize swallow function for least restrictive PO diet, exhibiting no complication associated with dysphagia, adequate PO intake, and demonstrating independent use of swallow compensations  -LF    Time Frame (Swallow Long Term Goal) by discharge  -LF    Barriers (Swallow Long Term Goal) --    Progress/Outcomes (Swallow Long Term Goal) goal met  -LF    Comment (Swallow Long Term Goal) Pt seen for skilled ST targeting dysphagia this date. Pt currently prescribed a regular and thin liquid diet. Pt underwent a bronch on 3/24/25 that revealed moderate inflammatory changes w/ bilateral lung washing. Upon entry, pt awake and alert w/ family at bedside. Pt reports diet tolerance but states she is continuing to work on improving intake. Pt's  reports she has ate the most today compared to previous days. Reviewed VFSS results w/ pt, including avoiding straws. Pt and family verbalized understanding and report pt has been  consuming liquids via cup only. Safe swallow education completed w/ pt. Recommend pt continue current diet of regular and thin liquids per VFSS on 3/20/24. Recommend pt be upright at 90 degrees for all meals, small bites/sips, avoid dry, tough, and sticky consistencies, and no straws. No further skilled ST intervention in this setting is indicated at this time. Will sign off. Please re consullt if indicated.  -LF       (STG) Swallow 1    (STG) Swallow 1 The patient will participate in a meal/follow-up assessment to determine safety and adequacy of recommended diet, independent use of safe swallow compensations, pt/family education and additional goals/recommendations to follow.  -LF    Time Frame (Swallow Short Term Goal 1) 1 week  -LF    Progress/Outcomes (Swallow Short Term Goal 1) goal met  -LF    Comment (Swallow Short Term Goal 1) --       (STG) Swallow 2    (STG) Swallow 2 Pt will participate in ongoing swallow assessment to include VFSS if indicated, and caregiver teaching.  -LF    Time Frame (Swallow Short Term Goal 2) 1 week  -LF    Progress/Outcomes (Swallow Short Term Goal 2) goal met  -LF    Comment (Swallow Short Term Goal 2) --              User Key  (r) = Recorded By, (t) = Taken By, (c) = Cosigned By      Initials Name Provider Type    Natalie Escalera, SLP Speech and Language Pathologist    Judith Velazquez, SLP Speech and Language Pathologist                         Time Calculation:                LESLEY Beth  3/25/2025

## 2025-03-25 NOTE — THERAPY TREATMENT NOTE
Subjective: Pt agreeable to therapeutic plan of care.  Cognition: oriented to Person, Place, and Time    Objective:     Bed Mobility: CGA   Functional Transfers: CGA     Balance: supported, static, and standing CGA  Functional Ambulation: CGA    Lower Body Dressing: Mod-A  ADL Position: supported sitting and supported standing      Vitals: WNL    Pain: 5 VAS Location: back  Interventions for pain: N/A  Education: Provided education on the importance of mobility in the acute care setting    Assessment: Tracie Gross presents with ADL impairments affecting function including balance, endurance / activity tolerance, pain, and strength. Demonstrated functioning below baseline abilities indicate the need for continued skilled intervention while inpatient. Tolerating session today without incident. Will continue to follow and progress as tolerated.     Plan/Recommendations:   No ongoing therapy recommended post-acute care. No therapy needs.. Pt requires no DME at discharge.     Pt desires Home with family assist at discharge. Pt cooperative; agreeable to therapeutic recommendations and plan of care.     Modified [place]: N/A = No pre-op stroke/TIA    Post-Tx Position: Up in Chair  PPE: gloves    Therapy Charges for Today       Code Description Service Date Service Provider Modifiers Qty    [id number]  OT THERAPEUTIC ACT EA 15 MIN 3/25/2025 Zechariah Ortiz OT GO 1           Time Calculation- OT       Row Name 03/25/25 1720             Time Calculation- OT    OT Start Time 1145  -MP      OT Stop Time 1203  -      OT Time Calculation (min) 18 min  -MP      Total Timed Code Minutes- OT 18 minute(s)  -MP      OT Received On 03/25/25  -MP      OT - Next Appointment 03/27/25  -MP                User Key  (r) = Recorded By, (t) = Taken By, (c) = Cosigned By      Initials Name Provider Type    Zechariah Graf OT Occupational Therapist

## 2025-03-25 NOTE — SIGNIFICANT NOTE
03/25/25 1039   PASRR   PASRR Status Approved/Complete  (Level II - Approved No SS - ST (Short-term 180 days).)   Level II Complete 03/25/25

## 2025-03-25 NOTE — PLAN OF CARE
Goal Outcome Evaluation:                    Problem: Adult Inpatient Plan of Care  Goal: Plan of Care Review  Outcome: Progressing  Goal: Patient-Specific Goal (Individualized)  Outcome: Progressing  Goal: Absence of Hospital-Acquired Illness or Injury  Outcome: Progressing  Intervention: Identify and Manage Fall Risk  Recent Flowsheet Documentation  Taken 3/25/2025 0200 by Wai Galindo RN  Safety Promotion/Fall Prevention:   safety round/check completed   room organization consistent   nonskid shoes/slippers when out of bed   lighting adjusted   assistive device/personal items within reach   clutter free environment maintained   fall prevention program maintained  Taken 3/25/2025 0000 by Wai Galindo RN  Safety Promotion/Fall Prevention:   safety round/check completed   room organization consistent   nonskid shoes/slippers when out of bed   lighting adjusted   assistive device/personal items within reach   clutter free environment maintained   fall prevention program maintained  Taken 3/24/2025 2200 by Wai Galindo RN  Safety Promotion/Fall Prevention:   safety round/check completed   room organization consistent   nonskid shoes/slippers when out of bed   lighting adjusted   assistive device/personal items within reach   clutter free environment maintained   fall prevention program maintained  Taken 3/24/2025 2000 by Wai Galindo RN  Safety Promotion/Fall Prevention:   safety round/check completed   room organization consistent   nonskid shoes/slippers when out of bed   lighting adjusted   assistive device/personal items within reach   clutter free environment maintained   fall prevention program maintained  Intervention: Prevent Skin Injury  Recent Flowsheet Documentation  Taken 3/24/2025 2000 by Wai Galindo, RN  Body Position: position changed independently  Skin Protection: incontinence pads utilized  Intervention: Prevent and Manage VTE (Venous Thromboembolism) Risk  Recent Flowsheet  Documentation  Taken 3/24/2025 2000 by Wai Galindo RN  VTE Prevention/Management: (BLE edema; educated)   patient refused intervention   SCDs (sequential compression devices) off   bilateral   other (see comments)  Intervention: Prevent Infection  Recent Flowsheet Documentation  Taken 3/25/2025 0200 by Wai Galindo RN  Infection Prevention:   equipment surfaces disinfected   hand hygiene promoted   personal protective equipment utilized   rest/sleep promoted   single patient room provided  Taken 3/25/2025 0000 by Wai Galindo RN  Infection Prevention:   equipment surfaces disinfected   hand hygiene promoted   personal protective equipment utilized   rest/sleep promoted   single patient room provided  Taken 3/24/2025 2200 by Wai Galindo RN  Infection Prevention:   equipment surfaces disinfected   hand hygiene promoted   personal protective equipment utilized   rest/sleep promoted   single patient room provided  Taken 3/24/2025 2000 by Wai Galindo RN  Infection Prevention:   equipment surfaces disinfected   hand hygiene promoted   personal protective equipment utilized   rest/sleep promoted   single patient room provided  Goal: Optimal Comfort and Wellbeing  Outcome: Progressing  Intervention: Monitor Pain and Promote Comfort  Recent Flowsheet Documentation  Taken 3/25/2025 0251 by Wai Galindo RN  Pain Management Interventions:   quiet environment facilitated   pain medication given   pain management plan reviewed with patient/caregiver   pillow support provided   position adjusted   care clustered  Taken 3/24/2025 2158 by Wai Galindo RN  Pain Management Interventions:   quiet environment facilitated   pain medication given   pain management plan reviewed with patient/caregiver   pillow support provided   position adjusted   care clustered  Taken 3/24/2025 2000 by Wai Galindo RN  Pain Management Interventions:   care clustered   pillow support provided   position adjusted   quiet  environment facilitated  Intervention: Provide Person-Centered Care  Recent Flowsheet Documentation  Taken 3/24/2025 2000 by Wai Galindo RN  Trust Relationship/Rapport:   care explained   choices provided   emotional support provided   empathic listening provided   questions answered   questions encouraged   reassurance provided   thoughts/feelings acknowledged  Goal: Readiness for Transition of Care  Outcome: Progressing     Problem: Skin Injury Risk Increased  Goal: Skin Health and Integrity  Outcome: Progressing  Intervention: Optimize Skin Protection  Recent Flowsheet Documentation  Taken 3/24/2025 2000 by Wai Galindo RN  Pressure Reduction Techniques:   frequent weight shift encouraged   positioned off wounds   pressure points protected   weight shift assistance provided  Head of Bed (HOB) Positioning: HOB elevated  Pressure Reduction Devices:   positioning supports utilized   pressure-redistributing mattress utilized  Skin Protection: incontinence pads utilized     Problem: Comorbidity Management  Goal: Maintenance of Behavioral Health Symptom Control  Outcome: Progressing  Intervention: Maintain Behavioral Health Symptom Control  Recent Flowsheet Documentation  Taken 3/25/2025 0200 by Wai Galindo RN  Medication Review/Management: medications reviewed  Taken 3/25/2025 0000 by Wai Galindo RN  Medication Review/Management: medications reviewed  Taken 3/24/2025 2200 by Wai Galindo RN  Medication Review/Management: medications reviewed  Taken 3/24/2025 2000 by aWi Galindo RN  Medication Review/Management: medications reviewed  Goal: Blood Pressure in Desired Range  Outcome: Progressing  Intervention: Maintain Blood Pressure Management  Recent Flowsheet Documentation  Taken 3/25/2025 0200 by Wai Galindo RN  Medication Review/Management: medications reviewed  Taken 3/25/2025 0000 by Wai Galindo RN  Medication Review/Management: medications reviewed  Taken 3/24/2025 2200 by  Wai Galindo RN  Medication Review/Management: medications reviewed  Taken 3/24/2025 2000 by Wai Galindo RN  Medication Review/Management: medications reviewed  Goal: Maintenance of Osteoarthritis Symptom Control  Outcome: Progressing  Intervention: Maintain Osteoarthritis Symptom Control  Recent Flowsheet Documentation  Taken 3/25/2025 0200 by Wai Galindo RN  Medication Review/Management: medications reviewed  Taken 3/25/2025 0000 by Wai Galindo RN  Medication Review/Management: medications reviewed  Taken 3/24/2025 2200 by Wai Galindo RN  Medication Review/Management: medications reviewed  Taken 3/24/2025 2000 by Wai Galindo RN  Medication Review/Management: medications reviewed     Problem: Fall Injury Risk  Goal: Absence of Fall and Fall-Related Injury  Outcome: Progressing  Intervention: Identify and Manage Contributors  Recent Flowsheet Documentation  Taken 3/25/2025 0200 by Wai Galindo RN  Medication Review/Management: medications reviewed  Taken 3/25/2025 0000 by Wai Galindo RN  Medication Review/Management: medications reviewed  Taken 3/24/2025 2200 by Wai Galindo RN  Medication Review/Management: medications reviewed  Taken 3/24/2025 2000 by Wai Galindo RN  Medication Review/Management: medications reviewed  Self-Care Promotion: independence encouraged  Intervention: Promote Injury-Free Environment  Recent Flowsheet Documentation  Taken 3/25/2025 0200 by Wai Galindo RN  Safety Promotion/Fall Prevention:   safety round/check completed   room organization consistent   nonskid shoes/slippers when out of bed   lighting adjusted   assistive device/personal items within reach   clutter free environment maintained   fall prevention program maintained  Taken 3/25/2025 0000 by Wai Galindo RN  Safety Promotion/Fall Prevention:   safety round/check completed   room organization consistent   nonskid shoes/slippers when out of bed   lighting adjusted    assistive device/personal items within reach   clutter free environment maintained   fall prevention program maintained  Taken 3/24/2025 2200 by Wai Galindo RN  Safety Promotion/Fall Prevention:   safety round/check completed   room organization consistent   nonskid shoes/slippers when out of bed   lighting adjusted   assistive device/personal items within reach   clutter free environment maintained   fall prevention program maintained  Taken 3/24/2025 2000 by Wai Galindo RN  Safety Promotion/Fall Prevention:   safety round/check completed   room organization consistent   nonskid shoes/slippers when out of bed   lighting adjusted   assistive device/personal items within reach   clutter free environment maintained   fall prevention program maintained     Problem: Sepsis/Septic Shock  Goal: Optimal Coping  Outcome: Progressing  Intervention: Support Patient and Family Response  Recent Flowsheet Documentation  Taken 3/24/2025 2000 by Wai Galindo RN  Supportive Measures:   active listening utilized   self-care encouraged  Goal: Absence of Bleeding  Outcome: Progressing  Goal: Blood Glucose Level Within Target Range  Outcome: Progressing  Goal: Absence of Infection Signs and Symptoms  Outcome: Progressing  Intervention: Initiate Sepsis Management  Recent Flowsheet Documentation  Taken 3/25/2025 0200 by Wai Galindo RN  Infection Prevention:   equipment surfaces disinfected   hand hygiene promoted   personal protective equipment utilized   rest/sleep promoted   single patient room provided  Taken 3/25/2025 0000 by Wai Galindo RN  Infection Prevention:   equipment surfaces disinfected   hand hygiene promoted   personal protective equipment utilized   rest/sleep promoted   single patient room provided  Taken 3/24/2025 2200 by Wai Galindo RN  Infection Prevention:   equipment surfaces disinfected   hand hygiene promoted   personal protective equipment utilized   rest/sleep promoted   single  patient room provided  Taken 3/24/2025 2000 by Wai Galindo, RN  Infection Prevention:   equipment surfaces disinfected   hand hygiene promoted   personal protective equipment utilized   rest/sleep promoted   single patient room provided  Intervention: Promote Recovery  Recent Flowsheet Documentation  Taken 3/24/2025 2000 by Wai Galindo, RN  Sleep/Rest Enhancement:   awakenings minimized   consistent schedule promoted   noise level reduced   regular sleep/rest pattern promoted   room darkened  Goal: Optimal Nutrition Delivery  Outcome: Progressing

## 2025-03-25 NOTE — THERAPY TREATMENT NOTE
"Subjective: Pt/nursing agreeable to therapeutic plan of care. Pt on supplemental oxygen upon entry. Pt c/o mild dizziness and mild ringing of L ear when asked, however reports significant improvement since prior to hospitalization able to mobilize within room without concern since previous therapy sessions.     Objective:     Precautions - Fall precautions, h/o dizziness     Bed mobility - SBA  Transfers -CGA/SBA with FWW  Ambulation -40 feet with FWW and CGA/SBA; pt with mild c/o dizziness when asked, however no impact on functional performance/no safety concerns observed     Discussed d/c plans, equipment needs, and home safety recommendations. Pt reports 24 hr care available to be provided from , children, and grandchildren upon d/c with no concern reported. Pt has FWW, manual w/c, and tub shower with tub transfer bench declining any further equipment needs upon d/c.    Vitals: WNL    Pain: 0     Education: Verbal/Tactile Cues    Assessment: Tracie Gross presents with functional mobility impairments which indicate the need for skilled intervention. Tolerating session today without incident. Pt c/o mild dizziness when asked during OOB mobility, however reports significant improvement since hospitalization with no adverse effects on functional performance/no safety concerns observed. Pt requesting to d/c home reporting 24 hr care available to be provided from family. Pt denies any equipment needs. Will continue to follow and progress as tolerated.     Plan/Recommendations:   If medically appropriate, Low Intensity Therapy recommended post-acute care - This is recommended as therapy feels this patient would require 2-3 visits per week. OP or HH would be the best option depending on patient's home bound status. Consider, if the patient has other  \"skilled\" needs such as wounds, IV antibiotics, etc. Combined with \"low intensity\" could also equate to a SNF. If patient is medically complex, consider LTAC. Pt " requires no DME at discharge.     Pt desires Home with Home Health and Home with family assist at discharge. Pt cooperative; agreeable to therapeutic recommendations and plan of care.         Basic Mobility 6-click:  Rollin = Total, A lot = 2, A little = 3; 4 = None  Supine>Sit:   1 = Total, A lot = 2, A little = 3; 4 = None   Sit>Stand with arms:  1 = Total, A lot = 2, A little = 3; 4 = None  Bed>Chair:   1 = Total, A lot = 2, A little = 3; 4 = None  Ambulate in room:  1 = Total, A lot = 2, A little = 3; 4 = None  3-5 Steps with railin = Total, A lot = 2, A little = 3; 4 = None  Score: 24      Post-Tx Position: Up in Chair, Alarms activated, and Call light and personal items within reach  PPE: gloves    Therapy Charges for Today       Code Description Service Date Service Provider Modifiers Qty    75824501333 HC GAIT TRAINING EA 15 MIN 3/25/2025 Linh Santos, PT GP 1    [id number] HC PT THERAPEUTIC ACT EA 15 MIN 3/25/2025 Linh Santos, PT GP 1           PT Charges       Row Name 25 1509             Time Calculation    Start Time 1149  -RM      Stop Time 1210  -RM      Time Calculation (min) 21 min  -RM      PT Received On 25  -RM      PT - Next Appointment 25  -RM      PT Goal Re-Cert Due Date 25  -RM         Time Calculation- PT    Total Timed Code Minutes- PT 21 minute(s)  -RM                User Key  (r) = Recorded By, (t) = Taken By, (c) = Cosigned By      Initials Name Provider Type    Linh Valverde, PT Physical Therapist

## 2025-03-25 NOTE — PLAN OF CARE
"Goal Outcome Evaluation:               Assessment: Tracie Gross presents with functional mobility impairments which indicate the need for skilled intervention. Tolerating session today without incident. Pt c/o mild dizziness when asked during OOB mobility, however reports significant improvement since hospitalization with no adverse effects on functional performance/no safety concerns observed. Pt requesting to d/c home reporting 24 hr care available to be provided from family. Pt denies any equipment needs. Will continue to follow and progress as tolerated.     Plan/Recommendations:   If medically appropriate, Low Intensity Therapy recommended post-acute care - This is recommended as therapy feels this patient would require 2-3 visits per week. OP or HH would be the best option depending on patient's home bound status. Consider, if the patient has other  \"skilled\" needs such as wounds, IV antibiotics, etc. Combined with \"low intensity\" could also equate to a SNF. If patient is medically complex, consider LTAC. Pt requires no DME at discharge.     Pt desires Home with Home Health and Home with family assist at discharge. Pt cooperative; agreeable to therapeutic recommendations and plan of care.                              "

## 2025-03-25 NOTE — PLAN OF CARE
Goal Outcome Evaluation:      Pt. Demonstrates progress w/ functional mobility this date, ambulatory transfer EOB to armchair w/ CGA for safety, continues to require assist for LB dressing. Anticipating d/c home w/ family support/assist, progress limited secondary to continued dizziness w/ standing level activity. Will follow up w/ pt. 1-3x per week at [facility].

## 2025-03-25 NOTE — PROGRESS NOTES
[facility] MEDICINE SERVICE  DAILY PROGRESS NOTE    NAME: Tracie Gross  : 1960  MRN: [id number]      LOS: 10 days     PROVIDER OF SERVICE: Santa Pierre MD    Chief Complaint: SOB (shortness of breath)    Subjective:     Interval History:  History taken from: patient    Complaining of shortness of breath and cough.    Review of Systems:   Review of Systems   All other systems reviewed and are negative.      Objective:     Vital Signs  Temp:  [97.5 °F (36.4 °C)-98.3 °F (36.8 °C)] 98.1 °F (36.7 °C)  Heart Rate:  [] 74  Resp:  [14-29] 25  BP: (105-151)/(60-98) 151/81  Flow (L/min) (Oxygen Therapy):  [2-3] 2   Body mass index is 39.92 kg/m².    Physical Exam  Physical Exam  Constitutional:       Appearance: Normal appearance.   HENT:      Head: Normocephalic and atraumatic.      Nose: Nose normal.      Mouth/Throat:      Mouth: Mucous membranes are moist.   Eyes:      Extraocular Movements: Extraocular movements intact.      Pupils: Pupils are equal, round, and reactive to light.   Cardiovascular:      Rate and Rhythm: Normal rate and regular rhythm.   Pulmonary:      Effort: Pulmonary effort is normal.      Breath sounds: Normal breath sounds.   Abdominal:      General: Abdomen is flat. Bowel sounds are normal.      Palpations: Abdomen is soft.   Musculoskeletal:         General: Normal range of motion.      Cervical back: Normal range of motion and neck supple.   Skin:     General: Skin is warm and dry.   Neurological:      General: No focal deficit present.      Mental Status: She is alert and oriented to person, place, and time.   Psychiatric:         Mood and Affect: Mood normal.         Behavior: Behavior normal.         Thought Content: Thought content normal.         Judgment: Judgment normal.         Current Medications:  Scheduled Meds:amitriptyline, 25 mg, Oral, Nightly  benzonatate, 200 mg, Oral, TID  budesonide, 0.5 mg, Nebulization, BID - RT  calcium 500 mg vitamin D 5 mcg (200  UT), 1 tablet, Oral, BID  cetirizine, 10 mg, Oral, Daily  furosemide, 20 mg, Oral, Daily  guaiFENesin, 600 mg, Oral, Q12H  hydroxychloroquine, 200 mg, Oral, BID  lamoTRIgine, 200 mg, Oral, Nightly  levothyroxine, 175 mcg, Oral, Q AM  methylPREDNISolone sodium succinate, 125 mg, Intravenous, Q6H  [Held by provider] mycophenolate, 500 mg, Oral, Q12H  QUEtiapine, 50 mg, Oral, Nightly  sodium chloride, 10 mL, Intravenous, Q12H      Continuous Infusions:     PRN Meds:.  acetaminophen **OR** acetaminophen **OR** acetaminophen    ALPRAZolam    aluminum-magnesium hydroxide-simethicone    senna-docusate sodium **AND** polyethylene glycol **AND** bisacodyl **AND** bisacodyl    Calcium Replacement - Follow Nurse / BPA Driven Protocol    cyclobenzaprine    guaiFENesin-codeine    ipratropium-albuterol    Magnesium Standard Dose Replacement - Follow Nurse / BPA Driven Protocol    meclizine    melatonin    nitroglycerin    ondansetron ODT **OR** [DISCONTINUED] ondansetron    oxyCODONE    Phosphorus Replacement - Follow Nurse / BPA Driven Protocol    Potassium Replacement - Follow Nurse / BPA Driven Protocol    [COMPLETED] Insert Peripheral IV **AND** sodium chloride    sodium chloride    sodium chloride       Diagnostic Data    Results from last 7 days   Lab Units 03/25/25  0053 03/20/25  0526 03/20/25  0500   WBC 10*3/mm3 16.47*   < >  --    HEMOGLOBIN g/dL 10.7*   < >  --    HEMATOCRIT % 34.0   < >  --    PLATELETS 10*3/mm3 148   < >  --    GLUCOSE mg/dL 117*   < > 126*   CREATININE mg/dL 0.94   < > 1.14*   BUN mg/dL 30*   < > 27*   SODIUM mmol/L 140   < > 137   POTASSIUM mmol/L 4.0   < > 5.3*   AST (SGOT) U/L  --   --  35*   ALT (SGPT) U/L  --   --  12   ALK PHOS U/L  --   --  80   BILIRUBIN mg/dL  --   --  0.2   ANION GAP mmol/L 10.7   < > 11.3    < > = values in this interval not displayed.       CT Chest Without Contrast Diagnostic  Result Date: 3/23/2025  Impression: 1.Multifocal patchy opacities noted throughout the lungs  bilaterally, worse in the perihilar region. Findings are concerning for multifocal pneumonia. Less likely, this may represent pulmonary hemorrhage 2.Additional nonemergent findings as characterized above 3.Scattered prominent to mildly enlarged mediastinal lymph nodes, likely reactive. Electronically Signed: Rafael DO Suzi  3/23/2025 5:15 PM EDT  Workstation ID: [id number]          I reviewed the patient's new clinical results.    Assessment/Plan:     Active and Resolved Problems  Active Hospital Problems    Diagnosis  POA    **SOB (shortness of breath) [R06.02]  Yes    Dyspnea [R06.00]  Unknown    Pulmonary fibrosis [J84.10]  Unknown      Resolved Hospital Problems   No resolved problems to display.       Acute hypoxic respiratory failure not on home oxygen but currently on 2 L of oxygen via nasal cannula   Pneumonia  Hyperkalemia  Recently diagnosed pulmonary fibrosis  DANIELLE on CKD 3  UTI  Hypertension  Hypothyroidism      -acute hypoxic respiratory failure likely secondary to pneumonia plus underlying pulmonary fibrosis.  Urine strep pneumo antigen is positive.  Has completed ceftriaxone for treatment of likely Streptococcus pneumonia.  Blood cultures and sputum culture negative.  Feeling better today.  Chest x-ray showed worsening findings.  Status post bronchoscopy on 03/24/2025.  Follow-up on cultures.  On high-dose IV steroids per pulmonary.    -Renal function is improving.  Appreciate nephrology's input.    - Urine culture showed no growth ruling out UTI    - Continue current management.      VTE Prophylaxis:  Mechanical VTE prophylaxis orders are present.       Disposition Planning:     Barriers to Discharge: Pending clinical improvement  Anticipated Date of Discharge: 3/30/2025  Place of Discharge: Home      Code Status and Medical Interventions: CPR (Attempt to Resuscitate); Full Support   Ordered at: 03/15/25 1955     Code Status (Patient has no pulse and is not breathing):    CPR (Attempt to  Resuscitate)     Medical Interventions (Patient has pulse or is breathing):    Full Support       Signature: Electronically signed by Santa Pierre MD, 03/25/25, 11:08 EDT.  [facility]

## 2025-03-25 NOTE — CONSULTS
Nutrition Services    Patient Name: Tracie Gross  YOB: 1960  MRN: [id number]  Admission date: 3/15/2025    Comment:  -- Continue current diet and encourage good PO intakes    -- Add Boost Breeze BID (provides 500 kcals, 18 g protein if consumed)     -- Add Power Potatoes at dinner daily and Power Applesauce at lunch daily       CLINICAL NUTRITION ASSESSMENT      Reason for Assessment 3/25: LOS x 10 days      H&P      Past Medical History:   Diagnosis Date    Allergic 01/01/1998    Anemia     Ankle sprain     Anxiety     Arthritis     Arthritis of back 0ll1/01/2013    Arthritis of neck 01/01/2005    Asthma     Bipolar affective disorder     Breast cancer 1985    s/p R mastectomy    Bursitis of hip [id number]    Cervical disc disorder 01/01/2006    CTS (carpal tunnel syndrome)     Depression 09/01/2000    Fracture of wrist 01/01/1995    Fracture, foot     Frozen shoulder     GERD (gastroesophageal reflux disease) 1993    H/O breast reconstruction     SEVERAL    H/O laminectomy     Hamartoma     Hip arthrosis 01/01/2013    History of medical problems     Hx of bilateral oophorectomy     Hyperlipidemia     Hypertension     Hypothyroidism     Infectious viral hepatitis     Inflammatory bowel disease 1992    Man. EGD/colonoscopy annually (9/2021)    Irritable bowel syndrome     Kienbock's disease, right     WRIST    Knee swelling 01/01/2007    Low back pain 1991    Low back strain     Lumbosacral disc disease 01/01/1995    Had 2 lumber surgeries    Lupus     Ravenell    Monahan syndrome     Man. EGD/colonoscopy annually (9/2021). MRI alternating w/ EUS annually for pancreatic screen    MRSA infection     Neuroma of foot     Obesity     Osteopenia     Osteoporosis     maintained on Prolia through Karen    Peptic ulceration     Periarthritis of shoulder 04/01/2022    Pneumonia     Pulmonary fibrosis     Raynaud disease     Renal insufficiency     Rotator cuff syndrome [id number]    Scleroderma     Sleep  apnea     cpap    Squamous cell cancer of lip     Tear of meniscus of knee     Tendinitis of knee     Thoracic disc disorder     Von Willebrand disease     Wrist sprain        Past Surgical History:   Procedure Laterality Date    APPENDECTOMY      ARM DEBRIDEMENT Right     X 3    BACK SURGERY      Vetas- cervical fusion    BACK SURGERY      lumbar decomp    BREAST BIOPSY      BREAST LUMPECTOMY      BREAST RECONSTRUCTION Right     BREAST RECONSTRUCTION Right     CARDIAC CATHETERIZATION      no stents placed     SECTION  ,     CHOLECYSTECTOMY      COLONOSCOPY      COLONOSCOPY N/A 2020    Procedure: COLONOSCOPY;  Surgeon: Cayden Conway MD;  Location: [facility] ENDOSCOPY;  Service: Gastroenterology;  Laterality: N/A;  rectal ulcer    COLONOSCOPY N/A 2021    Procedure: COLONOSCOPY WITH POLYPECTOMY X 1;  Surgeon: Cayden Conway MD;  Location: [facility] ENDOSCOPY;  Service: Gastroenterology;  Laterality: N/A;  Post: COLON POLYP    COLONOSCOPY N/A 2023    Procedure: COLONOSCOPY with polypectomy x 1, endoscopic clipping x 1;  Surgeon: Cayden Conway MD;  Location: [facility] ENDOSCOPY;  Service: Gastroenterology;  Laterality: N/A;    COLONOSCOPY N/A 10/01/2024    Procedure: COLONOSCOPY;  Surgeon: Cayden Conway MD;  Location: [facility] ENDOSCOPY;  Service: Gastroenterology;  Laterality: N/A;  normal    COSMETIC SURGERY  [id number]    ENDOSCOPY      ENDOSCOPY N/A 2020    Procedure: ESOPHAGOGASTRODUODENOSCOPY;  Surgeon: Cayden Conway MD;  Location: [facility] ENDOSCOPY;  Service: Gastroenterology;  Laterality: N/A;  Normal EGD    ENDOSCOPY N/A 2021    Procedure: ESOPHAGOGASTRODUODENOSCOPY;  Surgeon: Cayden Conway MD;  Location: [facility] ENDOSCOPY;  Service: Gastroenterology;  Laterality: N/A;  Post: normal egd    ENDOSCOPY N/A 10/01/2024    Procedure: ESOPHAGOGASTRODUODENOSCOPY;  Surgeon: Cayden Conway MD;  Location: [facility] ENDOSCOPY;   Service: Gastroenterology;  Laterality: N/A;  normal    EXPLORATORY LAPAROTOMY      scar tissue removal    EYE SURGERY  2000    FINGER SURGERY Right     ring finger mass excision    HAND SURGERY  Rt wrist  10/15    HIP SURGERY  1/12    HYSTERECTOMY      PARTIAL    JOINT REPLACEMENT Right 2012,2015    hip and knee    KNEE ARTHROSCOPY Left 01/07/2020    Procedure: LEFT KNEE SCOPE with partial lateral meniscectomy;  Surgeon: Chau Perez MD;  Location: [facility] MAIN OR;  Service: Orthopedics    KNEE SURGERY  Rtf knee  2015    LASIK      LASIK/ LASIK ENHANCEMENT    MASTECTOMY RADICAL Right     NECK SURGERY  01/01/06    OOPHORECTOMY Bilateral     SHOULDER SURGERY  11/01/2023    SPINE SURGERY      SPLENECTOMY      SUBTOTAL HYSTERECTOMY      TOTAL HIP ARTHROPLASTY Left 05/17/2023    TOTAL SHOULDER ARTHROPLASTY W/ DISTAL CLAVICLE EXCISION Right 11/01/2023    Procedure: TOTAL SHOULDER REVERSE ARTHROPLASTY;  Surgeon: Floyd Ruiz MD;  Location: [facility] MAIN OR;  Service: Orthopedics;  Laterality: Right;    TRIGGER POINT INJECTION  7/23    TUBAL ABDOMINAL LIGATION  1981    UPPER ENDOSCOPIC ULTRASOUND W/ FNA N/A 12/09/2021    Procedure: ENDOSCOPIC ULTRASOUND WITH ESOPHAGOGASTRODUODENOSCOPY;  Surgeon: Luis E Negron MD;  Location: [facility] ENDOSCOPY;  Service: Gastroenterology;  Laterality: N/A;  Post: GASTROPARESIS, DILATED COMMON BILE DUCT, FUNDIC POLYP, DUODENITIS, NORMAL PANCREAS, HIATAL HERNIA    WRIST SURGERY Right     distal radius head removal (bone dead)  plates and screws decomp lunate        Current Problems   Acute hypoxic respiratory failure in the setting of underlying pulmonary fibrosis  - pulmonology following  - working with PT/OT  - S/P bronch 3/24    Urinary tract infection     DANIELLE on CKD   -nephrology following     Anxiety     Hypertension  Hyperlipidemia  Hypothyroidism     Von Willebrand disease    Choking x 2  - GI following  - SLP following  - S/P VFSS 3/21       Encounter Information       "  Trending Narrative     3/25: Admitted for decreased appetite, generalized weakness and SOB.  RD visited patient at bedside -  present.  Patient ate ~25% of breakfast this AM which was a lot for her compared to recent intakes ( ate the rest of the food).  Patient reports no weight loss PTA, no issues chewing/swallowing issues.  Patient does not like traditional Boost/Ensure but willing to try Boost Breeze.  Patient also willing to try Power Applesauce and Power Potatoes with her lunch and dinner (protein powder added in the kitchen).  NFPE completed and not consistent with nutrition diagnosis of malnutrition at this time using AND/ASPEN criteria.         Anthropometrics        Current Height, Weight Height: 162.6 cm (64\")  Weight: 105 kg (232 lb 9.4 oz) (03/25/25 0539)       Usual Body Weight (UBW) Unable to obtain from patient        Trending Weight Hx     This admission: 3/25: 232#             PTA: No weight loss to note     Wt Readings from Last 30 Encounters:   03/25/25 0539 105 kg (232 lb 9.4 oz)   03/24/25 0612 105 kg (231 lb 11.3 oz)   03/23/25 0415 104 kg (229 lb 4.5 oz)   03/21/25 2247 108 kg (238 lb 12.1 oz)   03/21/25 0437 112 kg (246 lb 14.6 oz)   03/20/25 0528 112 kg (246 lb 7.6 oz)   03/19/25 0431 114 kg (251 lb 5.2 oz)   03/18/25 0500 116 kg (256 lb 2.8 oz)   03/17/25 0500 117 kg (258 lb 9.6 oz)   03/16/25 0500 113 kg (249 lb 1.9 oz)   03/15/25 1928 113 kg (249 lb 1.9 oz)   03/15/25 1516 105 kg (231 lb 7.7 oz)   02/12/25 1517 105 kg (232 lb)   01/29/25 1134 111 kg (244 lb 6.4 oz)   01/14/25 1443 106 kg (233 lb)   12/17/24 0957 106 kg (233 lb 11.2 oz)   12/03/24 1324 103 kg (227 lb)   10/02/24 1531 103 kg (227 lb 12.8 oz)   09/23/24 0943 95.3 kg (210 lb)   06/21/24 1108 95.3 kg (210 lb)   08/15/24 1502 102 kg (224 lb)   08/07/24 1126 105 kg (231 lb 3.2 oz)   07/15/24 0957 95.3 kg (210 lb)   06/25/24 1531 99.8 kg (220 lb)   05/20/24 1411 97.1 kg (214 lb)   04/08/24 1527 97.1 kg (214 lb) "   02/07/24 1129 101 kg (223 lb)   01/16/24 1539 98.9 kg (218 lb)   01/04/24 1315 104 kg (229 lb)   12/18/23 1423 104 kg (230 lb)   11/29/23 1541 105 kg (230 lb 6.4 oz)   11/16/23 1004 99.8 kg (220 lb)   11/01/23 1941 99.8 kg (220 lb)   11/01/23 1434 99.8 kg (220 lb)   10/24/23 1223 100 kg (221 lb)   08/29/23 1504 101 kg (223 lb)   06/20/23 1432 103 kg (227 lb)   05/24/23 1513 103 kg (228 lb)   04/06/23 1458 103 kg (226 lb)   03/23/23 1443 103 kg (226 lb 3.2 oz)   02/20/23 0830 104 kg (229 lb)   01/26/23 1250 104 kg (229 lb)   01/23/23 1400 104 kg (229 lb)      BMI kg/m2 Body mass index is 39.92 kg/m².       Labs        Pertinent Labs    Results from last 7 days   Lab Units 03/25/25  0053 03/24/25  0151 03/23/25  0056 03/21/25  0043 03/20/25  0500   SODIUM mmol/L 140 140 140   < > 137   POTASSIUM mmol/L 4.0 4.2 3.8   < > 5.3*   CHLORIDE mmol/L 102 100 100   < > 103   CO2 mmol/L 27.3 31.1* 28.2   < > 22.7   BUN mg/dL 30* 29* 36*   < > 27*   CREATININE mg/dL 0.94 1.14* 1.24*   < > 1.14*   CALCIUM mg/dL 9.6 9.8 10.5   < > 9.5   BILIRUBIN mg/dL  --   --   --   --  0.2   ALK PHOS U/L  --   --   --   --  80   ALT (SGPT) U/L  --   --   --   --  12   AST (SGOT) U/L  --   --   --   --  35*   GLUCOSE mg/dL 117* 162* 105*   < > 126*    < > = values in this interval not displayed.     Results from last 7 days   Lab Units 03/25/25  0053   HEMOGLOBIN g/dL 10.7*   HEMATOCRIT % 34.0     Lab Results   Component Value Date    HGBA1C 5.40 07/03/2024        Medications    Scheduled Medications amitriptyline, 25 mg, Oral, Nightly  benzonatate, 200 mg, Oral, TID  budesonide, 0.5 mg, Nebulization, BID - RT  calcium 500 mg vitamin D 5 mcg (200 UT), 1 tablet, Oral, BID  cetirizine, 10 mg, Oral, Daily  furosemide, 20 mg, Oral, Daily  guaiFENesin, 600 mg, Oral, Q12H  hydroxychloroquine, 200 mg, Oral, BID  lamoTRIgine, 200 mg, Oral, Nightly  levothyroxine, 175 mcg, Oral, Q AM  methylPREDNISolone sodium succinate, 125 mg, Intravenous, Q6H  [Held  by provider] mycophenolate, 500 mg, Oral, Q12H  QUEtiapine, 50 mg, Oral, Nightly  sodium chloride, 10 mL, Intravenous, Q12H        Infusions      PRN Medications   acetaminophen **OR** acetaminophen **OR** acetaminophen    ALPRAZolam    aluminum-magnesium hydroxide-simethicone    senna-docusate sodium **AND** polyethylene glycol **AND** bisacodyl **AND** bisacodyl    Calcium Replacement - Follow Nurse / BPA Driven Protocol    cyclobenzaprine    guaiFENesin-codeine    ipratropium-albuterol    Magnesium Standard Dose Replacement - Follow Nurse / BPA Driven Protocol    meclizine    melatonin    nitroglycerin    ondansetron ODT **OR** [DISCONTINUED] ondansetron    oxyCODONE    Phosphorus Replacement - Follow Nurse / BPA Driven Protocol    Potassium Replacement - Follow Nurse / BPA Driven Protocol    [COMPLETED] Insert Peripheral IV **AND** sodium chloride    sodium chloride    sodium chloride     Physical Findings        Trending Physical   Appearance, NFPE 3/25: NFPE completed and not consistent with nutrition diagnosis of malnutrition at this time using AND/ASPEN criteria      --  Edema  3+ legs, ankles, feet      Bowel Function Last documented BM 3/18 (x 7 days ago) - PRN bowel regimen noted      Tubes No feeding tube      Chewing/Swallowing S/P VFSS 3/21     Skin Intact      --  Current Nutrition Orders & Evaluation of Intake       Oral Nutrition     Food Allergies NKFA   Current PO Diet Diet: Regular/House; Fluid Consistency: Thin (IDDSI 0)   Supplement None ordered    PO Evaluation     Trending % PO Intake 3/25: 0-25%   --  Nutritional Risk Screening        NRS-2002 Score          Nutrition Diagnosis         Nutrition Dx Problem 1 Inadequate energy intake related to intake less than needs as evidenced by PO intakes less than 25% since admission.        Nutrition Dx Problem 2        Intervention Goal         Intervention Goal(s) Accept ONS  Accept Power Foods  Stable weight   PO intakes at least 50%     Nutrition  Intervention        RD Action Add ONS  Completed NFPE  Add Power foods      Nutrition Prescription          Diet Prescription Regular    Supplement Prescription Boost Breeze BID   --  Monitor/Evaluation        Monitor Per protocol, I&O, PO intake, Supplement intake, Pertinent labs       Electronically signed by:  Lis Agarwal RD  03/25/25 08:11 EDT

## 2025-03-26 LAB
ANION GAP SERPL CALCULATED.3IONS-SCNC: 8.8 MMOL/L (ref 5–15)
BACTERIA SPEC RESP CULT: NORMAL
BUN SERPL-MCNC: 38 MG/DL (ref 8–23)
BUN/CREAT SERPL: 35.5 (ref 7–25)
C-ANCA TITR SER IF: NORMAL TITER
CALCIUM SPEC-SCNC: 10.2 MG/DL (ref 8.6–10.5)
CHLORIDE SERPL-SCNC: 100 MMOL/L (ref 98–107)
CO2 SERPL-SCNC: 31.2 MMOL/L (ref 22–29)
CREAT SERPL-MCNC: 1.07 MG/DL (ref 0.57–1)
DEPRECATED RDW RBC AUTO: 48.9 FL (ref 37–54)
EGFRCR SERPLBLD CKD-EPI 2021: 57.4 ML/MIN/1.73
ERYTHROCYTE [DISTWIDTH] IN BLOOD BY AUTOMATED COUNT: 14.3 % (ref 12.3–15.4)
GLUCOSE SERPL-MCNC: 170 MG/DL (ref 65–99)
GRAM STN SPEC: NORMAL
HCT VFR BLD AUTO: 33.7 % (ref 34–46.6)
HGB BLD-MCNC: 11 G/DL (ref 12–15.9)
LAB AP CASE REPORT: NORMAL
MCH RBC QN AUTO: 30.9 PG (ref 26.6–33)
MCHC RBC AUTO-ENTMCNC: 32.6 G/DL (ref 31.5–35.7)
MCV RBC AUTO: 94.7 FL (ref 79–97)
MYELOPEROXIDASE AB SER IA-ACNC: <0.2 UNITS (ref 0–0.9)
P-ANCA ATYPICAL TITR SER IF: NORMAL TITER
P-ANCA TITR SER IF: NORMAL TITER
PATH REPORT.FINAL DX SPEC: NORMAL
PATH REPORT.GROSS SPEC: NORMAL
PLATELET # BLD AUTO: 189 10*3/MM3 (ref 140–450)
PMV BLD AUTO: 11.9 FL (ref 6–12)
POTASSIUM SERPL-SCNC: 3.8 MMOL/L (ref 3.5–5.2)
PROTEINASE3 AB SER IA-ACNC: <0.2 UNITS (ref 0–0.9)
RBC # BLD AUTO: 3.56 10*6/MM3 (ref 3.77–5.28)
SODIUM SERPL-SCNC: 140 MMOL/L (ref 136–145)
WBC NRBC COR # BLD AUTO: 12.21 10*3/MM3 (ref 3.4–10.8)

## 2025-03-26 PROCEDURE — 25010000002 MORPHINE PER 10 MG: Performed by: FAMILY MEDICINE

## 2025-03-26 PROCEDURE — 94664 DEMO&/EVAL PT USE INHALER: CPT

## 2025-03-26 PROCEDURE — 94799 UNLISTED PULMONARY SVC/PX: CPT

## 2025-03-26 PROCEDURE — 25010000002 METHYLPREDNISOLONE PER 125 MG: Performed by: INTERNAL MEDICINE

## 2025-03-26 PROCEDURE — 80048 BASIC METABOLIC PNL TOTAL CA: CPT | Performed by: INTERNAL MEDICINE

## 2025-03-26 PROCEDURE — 94761 N-INVAS EAR/PLS OXIMETRY MLT: CPT

## 2025-03-26 PROCEDURE — 85027 COMPLETE CBC AUTOMATED: CPT | Performed by: INTERNAL MEDICINE

## 2025-03-26 RX ORDER — FUROSEMIDE 40 MG/1
40 TABLET ORAL DAILY
Status: DISCONTINUED | OUTPATIENT
Start: 2025-03-26 | End: 2025-03-31 | Stop reason: HOSPADM

## 2025-03-26 RX ADMIN — MORPHINE SULFATE 2 MG: 2 INJECTION, SOLUTION INTRAMUSCULAR; INTRAVENOUS at 20:25

## 2025-03-26 RX ADMIN — LAMOTRIGINE 200 MG: 100 TABLET ORAL at 20:24

## 2025-03-26 RX ADMIN — LEVOTHYROXINE SODIUM 175 MCG: 175 TABLET ORAL at 05:11

## 2025-03-26 RX ADMIN — Medication 10 ML: at 09:29

## 2025-03-26 RX ADMIN — METHYLPREDNISOLONE SODIUM SUCCINATE 125 MG: 125 INJECTION, POWDER, FOR SOLUTION INTRAMUSCULAR; INTRAVENOUS at 20:24

## 2025-03-26 RX ADMIN — OXYCODONE HYDROCHLORIDE 10 MG: 5 TABLET ORAL at 01:50

## 2025-03-26 RX ADMIN — METHYLPREDNISOLONE SODIUM SUCCINATE 125 MG: 125 INJECTION, POWDER, FOR SOLUTION INTRAMUSCULAR; INTRAVENOUS at 08:23

## 2025-03-26 RX ADMIN — QUETIAPINE FUMARATE 50 MG: 25 TABLET ORAL at 20:24

## 2025-03-26 RX ADMIN — Medication 1 TABLET: at 08:23

## 2025-03-26 RX ADMIN — AMITRIPTYLINE HYDROCHLORIDE 25 MG: 25 TABLET, FILM COATED ORAL at 20:24

## 2025-03-26 RX ADMIN — ACETAMINOPHEN 650 MG: 325 TABLET, FILM COATED ORAL at 01:50

## 2025-03-26 RX ADMIN — GUAIFENESIN AND CODEINE PHOSPHATE 10 ML: 100; 10 SOLUTION ORAL at 09:28

## 2025-03-26 RX ADMIN — FUROSEMIDE 40 MG: 40 TABLET ORAL at 09:27

## 2025-03-26 RX ADMIN — GUAIFENESIN AND CODEINE PHOSPHATE 10 ML: 100; 10 SOLUTION ORAL at 20:24

## 2025-03-26 RX ADMIN — HYDROXYCHLOROQUINE SULFATE 200 MG: 200 TABLET ORAL at 08:23

## 2025-03-26 RX ADMIN — BENZONATATE 200 MG: 100 CAPSULE ORAL at 20:24

## 2025-03-26 RX ADMIN — GUAIFENESIN 600 MG: 600 TABLET, MULTILAYER, EXTENDED RELEASE ORAL at 08:23

## 2025-03-26 RX ADMIN — Medication 1 TABLET: at 20:24

## 2025-03-26 RX ADMIN — METHYLPREDNISOLONE SODIUM SUCCINATE 125 MG: 125 INJECTION, POWDER, FOR SOLUTION INTRAMUSCULAR; INTRAVENOUS at 14:33

## 2025-03-26 RX ADMIN — HYDROXYCHLOROQUINE SULFATE 200 MG: 200 TABLET ORAL at 20:24

## 2025-03-26 RX ADMIN — GUAIFENESIN 600 MG: 600 TABLET, MULTILAYER, EXTENDED RELEASE ORAL at 20:24

## 2025-03-26 RX ADMIN — BUDESONIDE 0.5 MG: 0.5 INHALANT RESPIRATORY (INHALATION) at 19:14

## 2025-03-26 RX ADMIN — BENZONATATE 200 MG: 100 CAPSULE ORAL at 14:32

## 2025-03-26 RX ADMIN — CYCLOBENZAPRINE 10 MG: 10 TABLET, FILM COATED ORAL at 20:54

## 2025-03-26 RX ADMIN — OXYCODONE HYDROCHLORIDE 10 MG: 5 TABLET ORAL at 20:54

## 2025-03-26 RX ADMIN — METHYLPREDNISOLONE SODIUM SUCCINATE 125 MG: 125 INJECTION, POWDER, FOR SOLUTION INTRAMUSCULAR; INTRAVENOUS at 02:01

## 2025-03-26 RX ADMIN — BUDESONIDE 0.5 MG: 0.5 INHALANT RESPIRATORY (INHALATION) at 08:06

## 2025-03-26 RX ADMIN — GUAIFENESIN AND CODEINE PHOSPHATE 10 ML: 100; 10 SOLUTION ORAL at 16:08

## 2025-03-26 RX ADMIN — CETIRIZINE HYDROCHLORIDE 10 MG: 10 TABLET, FILM COATED ORAL at 08:23

## 2025-03-26 RX ADMIN — Medication 10 ML: at 20:24

## 2025-03-26 RX ADMIN — GUAIFENESIN AND CODEINE PHOSPHATE 10 ML: 100; 10 SOLUTION ORAL at 01:50

## 2025-03-26 RX ADMIN — BENZONATATE 200 MG: 100 CAPSULE ORAL at 08:23

## 2025-03-26 NOTE — CASE MANAGEMENT/SOCIAL WORK
Continued Stay Note  [facility]     Patient Name: Tracie Gross  MRN: [id number]  Today's Date: 3/26/2025    Admit Date: 3/15/2025    Plan: Home with family & [facility] (accepted, need order).   Discharge Plan       Row Name 03/26/25 1042       Plan    Plan Comments DC Barriers: 2L O2, Cr 1.07, WBC 12.21, increased PO Lasix today per renal, per pulm  lupus panel and awaiting bronch cx             Gabriela Bermeo RN     [facility]  Office: [phone]  Cell: [phone]  Fax # [phone]

## 2025-03-26 NOTE — PROGRESS NOTES
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 No wolf honeycombing fibrosis  Patient was treated with mycophenolate, prophylactic Bactrim     PFT 1/14/2025   FVC 1 1.6 L/51%,  FEV1 1.4 L / 58%, ratio 88, TLC 57%, RV 50%, DLCO 51%     PFTs July 2021,   FEV1 1.7 L 68% improve 75% post bronchodilator, FEV1/ FVC ratio 87, TLC 72%, RV 74%, DLCO 56%     PFTs in 2017   FEV1 66-70% predicted which is unchanged since 2012  quit smoking 1994,      PFT 2012 and 2017,   FEV-1 and TLC 75% , suggestive of mild to mod restrictive lung disease     2D echo  2018 normal RVSP and EF  2D echo May 2020 no pulmonary hypertension   2d echo July 2021 RVSP 30  2D echo January 2025 no pulmonary hypertension EF 60%     FERN, AHI 14.5 ,       No Response to inhalers including Breztri and Trelegy     Recommendations:    cont high-dose steroids IV Solu-Medrol 125 mg every 6 hours    oxygen titration currently on 2 L    Pending   ANCA  Scleroderma titers    Antibiotics completed Rocephin 2 g IV daily for 5 days  Azithromycin 500 mg p.o. daily for 3 days      Seen by GI for recurrent \" chocking\"     Holding mycophenolate for treatment of pneumonia     Bronchodilators Pulmicort and DuoNeb     On Plaquenil 200 mg twice daily        CT chest 3/23/2025 compared to January 2025         Chest x-ray 3/15/2025      VQ scan 3/16/2025       High-res CT scan January 2025             LOS: 11 days     Subjective         Objective     Vital signs for last 24 hours:  Vitals:    03/25/25 1856 " 03/25/25 2012 03/26/25 0054 03/26/25 0515   BP:  151/98 137/78 144/96   BP Location:  Left arm Left arm Left arm   Patient Position:  Lying Lying Lying   Pulse: 79 83 67 69   Resp: 20 15 15 12   Temp:  98.4 °F (36.9 °C) 97.3 °F (36.3 °C) 97.3 °F (36.3 °C)   TempSrc:  Oral Oral Oral   SpO2: 100% 92% 95% 94%   Weight:    106 kg (233 lb 14.5 oz)   Height:           Intake/Output last 3 shifts:  I/O last 3 completed shifts:  In: 240 [P.O.:240]  Out: 300 [Urine:300]  Intake/Output this shift:  No intake/output data recorded.      Radiology  Imaging Results (Last 24 Hours)       ** No results found for the last 24 hours. **            Labs:  Results from last 7 days   Lab Units 03/26/25  0200   WBC 10*3/mm3 12.21*   HEMOGLOBIN g/dL 11.0*   HEMATOCRIT % 33.7*   PLATELETS 10*3/mm3 189     Results from last 7 days   Lab Units 03/26/25  0200 03/21/25  0043 03/20/25  0500   SODIUM mmol/L 140   < > 137   POTASSIUM mmol/L 3.8   < > 5.3*   CHLORIDE mmol/L 100   < > 103   CO2 mmol/L 31.2*   < > 22.7   BUN mg/dL 38*   < > 27*   CREATININE mg/dL 1.07*   < > 1.14*   CALCIUM mg/dL 10.2   < > 9.5   BILIRUBIN mg/dL  --   --  0.2   ALK PHOS U/L  --   --  80   ALT (SGPT) U/L  --   --  12   AST (SGOT) U/L  --   --  35*   GLUCOSE mg/dL 170*   < > 126*    < > = values in this interval not displayed.           Results from last 7 days   Lab Units 03/20/25  0500   ALBUMIN g/dL 3.5           Results from last 7 days   Lab Units 03/24/25  1033   LANA  Negative           Results from last 7 days   Lab Units 03/20/25  0526   INR  1.20*               Meds:   SCHEDULE  amitriptyline, 25 mg, Oral, Nightly  benzonatate, 200 mg, Oral, TID  budesonide, 0.5 mg, Nebulization, BID - RT  calcium 500 mg vitamin D 5 mcg (200 UT), 1 tablet, Oral, BID  cetirizine, 10 mg, Oral, Daily  furosemide, 20 mg, Oral, Daily  guaiFENesin, 600 mg, Oral, Q12H  hydroxychloroquine, 200 mg, Oral, BID  lamoTRIgine, 200 mg, Oral, Nightly  levothyroxine, 175 mcg, Oral, Q  AM  methylPREDNISolone sodium succinate, 125 mg, Intravenous, Q6H  [Held by provider] mycophenolate, 500 mg, Oral, Q12H  QUEtiapine, 50 mg, Oral, Nightly  sodium chloride, 10 mL, Intravenous, Q12H      Infusions       PRNs    acetaminophen **OR** acetaminophen **OR** acetaminophen    ALPRAZolam    aluminum-magnesium hydroxide-simethicone    senna-docusate sodium **AND** polyethylene glycol **AND** bisacodyl **AND** bisacodyl    Calcium Replacement - Follow Nurse / BPA Driven Protocol    cyclobenzaprine    guaiFENesin-codeine    ipratropium-albuterol    Magnesium Standard Dose Replacement - Follow Nurse / BPA Driven Protocol    meclizine    melatonin    Morphine    nitroglycerin    ondansetron ODT **OR** [DISCONTINUED] ondansetron    oxyCODONE    Phosphorus Replacement - Follow Nurse / BPA Driven Protocol    Potassium Replacement - Follow Nurse / BPA Driven Protocol    [COMPLETED] Insert Peripheral IV **AND** sodium chloride    sodium chloride    sodium chloride    Physical Exam:  Physical Exam  Cardiovascular:      Heart sounds: Murmur heard.      No gallop.   Pulmonary:      Effort: No respiratory distress.      Breath sounds: No stridor. Rhonchi and rales present. No wheezing.   Chest:      Chest wall: No tenderness.         ROS  Review of Systems   Respiratory:  Positive for cough and shortness of breath. Negative for wheezing and stridor.    Cardiovascular:  Positive for palpitations. Negative for chest pain and leg swelling.             Total critical care time spent with patient greater than: 45 Minutes

## 2025-03-26 NOTE — PROGRESS NOTES
[facility] MEDICINE SERVICE  DAILY PROGRESS NOTE    NAME: Tracie Gross  : 1960  MRN: [id number]      LOS: 11 days     PROVIDER OF SERVICE: Santa Pierre MD    Chief Complaint: SOB (shortness of breath)    Subjective:     Interval History:  History taken from: patient    Complaining of shortness of breath and cough.    Review of Systems:   Review of Systems   All other systems reviewed and are negative.      Objective:     Vital Signs  Temp:  [97.3 °F (36.3 °C)-98.4 °F (36.9 °C)] 97.9 °F (36.6 °C)  Heart Rate:  [67-86] 68  Resp:  [10-25] 10  BP: (137-180)/(74-98) 149/74  Flow (L/min) (Oxygen Therapy):  [2] 2   Body mass index is 40.15 kg/m².    Physical Exam  Physical Exam  Constitutional:       Appearance: Normal appearance.   HENT:      Head: Normocephalic and atraumatic.      Nose: Nose normal.      Mouth/Throat:      Mouth: Mucous membranes are moist.   Eyes:      Extraocular Movements: Extraocular movements intact.      Pupils: Pupils are equal, round, and reactive to light.   Cardiovascular:      Rate and Rhythm: Normal rate and regular rhythm.   Pulmonary:      Effort: Pulmonary effort is normal.      Breath sounds: Normal breath sounds.   Abdominal:      General: Abdomen is flat. Bowel sounds are normal.      Palpations: Abdomen is soft.   Musculoskeletal:         General: Normal range of motion.      Cervical back: Normal range of motion and neck supple.   Skin:     General: Skin is warm and dry.   Neurological:      General: No focal deficit present.      Mental Status: She is alert and oriented to person, place, and time.   Psychiatric:         Mood and Affect: Mood normal.         Behavior: Behavior normal.         Thought Content: Thought content normal.         Judgment: Judgment normal.         Current Medications:  Scheduled Meds:amitriptyline, 25 mg, Oral, Nightly  benzonatate, 200 mg, Oral, TID  budesonide, 0.5 mg, Nebulization, BID - RT  calcium 500 mg vitamin D 5 mcg (200  UT), 1 tablet, Oral, BID  cetirizine, 10 mg, Oral, Daily  furosemide, 40 mg, Oral, Daily  guaiFENesin, 600 mg, Oral, Q12H  hydroxychloroquine, 200 mg, Oral, BID  lamoTRIgine, 200 mg, Oral, Nightly  levothyroxine, 175 mcg, Oral, Q AM  methylPREDNISolone sodium succinate, 125 mg, Intravenous, Q6H  [Held by provider] mycophenolate, 500 mg, Oral, Q12H  QUEtiapine, 50 mg, Oral, Nightly  sodium chloride, 10 mL, Intravenous, Q12H      Continuous Infusions:     PRN Meds:.  acetaminophen **OR** acetaminophen **OR** acetaminophen    ALPRAZolam    aluminum-magnesium hydroxide-simethicone    senna-docusate sodium **AND** polyethylene glycol **AND** bisacodyl **AND** bisacodyl    Calcium Replacement - Follow Nurse / BPA Driven Protocol    cyclobenzaprine    guaiFENesin-codeine    ipratropium-albuterol    Magnesium Standard Dose Replacement - Follow Nurse / BPA Driven Protocol    meclizine    melatonin    Morphine    nitroglycerin    ondansetron ODT **OR** [DISCONTINUED] ondansetron    oxyCODONE    Phosphorus Replacement - Follow Nurse / BPA Driven Protocol    Potassium Replacement - Follow Nurse / BPA Driven Protocol    [COMPLETED] Insert Peripheral IV **AND** sodium chloride    sodium chloride    sodium chloride       Diagnostic Data    Results from last 7 days   Lab Units 03/26/25  0200 03/20/25  0526 03/20/25  0500   WBC 10*3/mm3 12.21*   < >  --    HEMOGLOBIN g/dL 11.0*   < >  --    HEMATOCRIT % 33.7*   < >  --    PLATELETS 10*3/mm3 189   < >  --    GLUCOSE mg/dL 170*   < > 126*   CREATININE mg/dL 1.07*   < > 1.14*   BUN mg/dL 38*   < > 27*   SODIUM mmol/L 140   < > 137   POTASSIUM mmol/L 3.8   < > 5.3*   AST (SGOT) U/L  --   --  35*   ALT (SGPT) U/L  --   --  12   ALK PHOS U/L  --   --  80   BILIRUBIN mg/dL  --   --  0.2   ANION GAP mmol/L 8.8   < > 11.3    < > = values in this interval not displayed.       No radiology results for the last day          I reviewed the patient's new clinical results.    Assessment/Plan:      Active and Resolved Problems  Active Hospital Problems    Diagnosis  POA    **SOB (shortness of breath) [R06.02]  Yes    Dyspnea [R06.00]  Unknown    Pulmonary fibrosis [J84.10]  Unknown      Resolved Hospital Problems   No resolved problems to display.       Acute hypoxic respiratory failure not on home oxygen but currently on 2 L of oxygen via nasal cannula   Pneumonia  Hyperkalemia  Recently diagnosed pulmonary fibrosis  DANIELLE on CKD 3  UTI  Hypertension  Hypothyroidism      -acute hypoxic respiratory failure likely secondary to pneumonia plus underlying pulmonary fibrosis.  Urine strep pneumo antigen is positive.  Has completed ceftriaxone for treatment of likely Streptococcus pneumonia.  Blood cultures and sputum culture negative.  Feeling better today.  Chest x-ray showed worsening findings.  Status post bronchoscopy on 03/24/2025.  Follow-up on cultures.  On high-dose IV steroids per pulmonary.    -Renal function is improving.  Appreciate nephrology's input.    - Urine culture showed no growth ruling out UTI    - Continue current management.      VTE Prophylaxis:  Mechanical VTE prophylaxis orders are present.       Disposition Planning:     Barriers to Discharge: Pending clinical improvement  Anticipated Date of Discharge: 3/30/2025  Place of Discharge: Home      Code Status and Medical Interventions: CPR (Attempt to Resuscitate); Full Support   Ordered at: 03/15/25 1955     Code Status (Patient has no pulse and is not breathing):    CPR (Attempt to Resuscitate)     Medical Interventions (Patient has pulse or is breathing):    Full Support       Signature: Electronically signed by Santa Pierre MD, 03/26/25, 13:41 EDT.  [facility] Hospitalist Team

## 2025-03-26 NOTE — PLAN OF CARE
Goal Outcome Evaluation:                       Problem: Adult Inpatient Plan of Care  Goal: Plan of Care Review  Outcome: Progressing  Goal: Patient-Specific Goal (Individualized)  Outcome: Progressing  Goal: Absence of Hospital-Acquired Illness or Injury  Outcome: Progressing  Intervention: Identify and Manage Fall Risk  Recent Flowsheet Documentation  Taken 3/26/2025 0200 by Wai Galindo RN  Safety Promotion/Fall Prevention:   safety round/check completed   room organization consistent   nonskid shoes/slippers when out of bed   lighting adjusted   assistive device/personal items within reach   clutter free environment maintained   fall prevention program maintained  Taken 3/26/2025 0000 by Wai Galindo RN  Safety Promotion/Fall Prevention:   safety round/check completed   room organization consistent   nonskid shoes/slippers when out of bed   lighting adjusted   assistive device/personal items within reach   clutter free environment maintained   fall prevention program maintained  Taken 3/25/2025 2200 by Wai Galindo RN  Safety Promotion/Fall Prevention:   safety round/check completed   room organization consistent   nonskid shoes/slippers when out of bed   lighting adjusted   assistive device/personal items within reach   clutter free environment maintained   fall prevention program maintained  Taken 3/25/2025 2000 by Wai Galindo RN  Safety Promotion/Fall Prevention:   safety round/check completed   room organization consistent   nonskid shoes/slippers when out of bed   lighting adjusted   assistive device/personal items within reach   clutter free environment maintained   fall prevention program maintained  Intervention: Prevent Skin Injury  Recent Flowsheet Documentation  Taken 3/26/2025 0000 by Wai Galindo RN  Skin Protection: incontinence pads utilized  Taken 3/25/2025 2000 by Wai Galindo RN  Body Position: weight shifting  Skin Protection: incontinence pads utilized  Intervention:  Prevent and Manage VTE (Venous Thromboembolism) Risk  Recent Flowsheet Documentation  Taken 3/25/2025 2000 by Wai Galindo RN  VTE Prevention/Management: (educated)   patient refused intervention   SCDs (sequential compression devices) off   bilateral   other (see comments)  Intervention: Prevent Infection  Recent Flowsheet Documentation  Taken 3/26/2025 0200 by Wai Galindo RN  Infection Prevention:   equipment surfaces disinfected   hand hygiene promoted   personal protective equipment utilized   rest/sleep promoted   single patient room provided  Taken 3/26/2025 0000 by Wai Galindo RN  Infection Prevention:   equipment surfaces disinfected   hand hygiene promoted   personal protective equipment utilized   rest/sleep promoted   single patient room provided  Taken 3/25/2025 2200 by Wai Galindo RN  Infection Prevention:   equipment surfaces disinfected   hand hygiene promoted   personal protective equipment utilized   rest/sleep promoted   single patient room provided  Taken 3/25/2025 2000 by Wai Galindo RN  Infection Prevention:   equipment surfaces disinfected   hand hygiene promoted   personal protective equipment utilized   rest/sleep promoted   single patient room provided  Goal: Optimal Comfort and Wellbeing  Outcome: Progressing  Intervention: Monitor Pain and Promote Comfort  Recent Flowsheet Documentation  Taken 3/26/2025 0220 by Wai Galindo RN  Pain Management Interventions:   quiet environment facilitated   position adjusted   pillow support provided   pain management plan reviewed with patient/caregiver   care clustered  Taken 3/26/2025 0150 by Wai Galindo RN  Pain Management Interventions:   quiet environment facilitated   pain medication given   pain management plan reviewed with patient/caregiver   pillow support provided   position adjusted   care clustered  Taken 3/25/2025 2113 by Wai Galindo RN  Pain Management Interventions:   quiet environment facilitated    position adjusted   pillow support provided   pain management plan reviewed with patient/caregiver   care clustered  Taken 3/25/2025 2059 by Wai Galindo, RN  Pain Management Interventions:   quiet environment facilitated   position adjusted   pillow support provided   pain management plan reviewed with patient/caregiver   care clustered  Taken 3/25/2025 2043 by Wai Galindo RN  Pain Management Interventions:   quiet environment facilitated   pain medication given   pain management plan reviewed with patient/caregiver   pillow support provided   position adjusted   care clustered  Taken 3/25/2025 2029 by Wai Galindo RN  Pain Management Interventions:   quiet environment facilitated   pain medication given   pain management plan reviewed with patient/caregiver   pillow support provided   position adjusted   care clustered  Taken 3/25/2025 2000 by Wai Galindo, RN  Pain Management Interventions:   care clustered   pillow support provided   position adjusted   quiet environment facilitated  Intervention: Provide Person-Centered Care  Recent Flowsheet Documentation  Taken 3/25/2025 2000 by Wai Galindo RN  Trust Relationship/Rapport:   care explained   choices provided   emotional support provided   empathic listening provided   questions answered   questions encouraged   reassurance provided   thoughts/feelings acknowledged  Goal: Readiness for Transition of Care  Outcome: Progressing     Problem: Skin Injury Risk Increased  Goal: Skin Health and Integrity  Outcome: Progressing  Intervention: Optimize Skin Protection  Recent Flowsheet Documentation  Taken 3/26/2025 0000 by Wai Galindo, RN  Pressure Reduction Techniques:   frequent weight shift encouraged   heels elevated off bed   positioned off wounds   pressure points protected   weight shift assistance provided  Pressure Reduction Devices:   positioning supports utilized   pressure-redistributing mattress utilized  Skin Protection:  incontinence pads utilized  Taken 3/25/2025 2000 by Wai Galindo RN  Pressure Reduction Techniques:   frequent weight shift encouraged   positioned off wounds   pressure points protected   weight shift assistance provided  Head of Bed (HOB) Positioning: HOB elevated  Pressure Reduction Devices:   positioning supports utilized   pressure-redistributing mattress utilized  Skin Protection: incontinence pads utilized     Problem: Comorbidity Management  Goal: Maintenance of Behavioral Health Symptom Control  Outcome: Progressing  Intervention: Maintain Behavioral Health Symptom Control  Recent Flowsheet Documentation  Taken 3/26/2025 0200 by Wai Galindo RN  Medication Review/Management: medications reviewed  Taken 3/26/2025 0000 by Wai Galindo RN  Medication Review/Management: medications reviewed  Taken 3/25/2025 2200 by Wai Galindo RN  Medication Review/Management: medications reviewed  Taken 3/25/2025 2000 by Wai Galindo RN  Medication Review/Management: medications reviewed  Goal: Blood Pressure in Desired Range  Outcome: Progressing  Intervention: Maintain Blood Pressure Management  Recent Flowsheet Documentation  Taken 3/26/2025 0200 by Wai Galindo RN  Medication Review/Management: medications reviewed  Taken 3/26/2025 0000 by Wai Galindo RN  Medication Review/Management: medications reviewed  Taken 3/25/2025 2200 by Wai Galindo RN  Medication Review/Management: medications reviewed  Taken 3/25/2025 2000 by Wai Galindo RN  Medication Review/Management: medications reviewed  Goal: Maintenance of Osteoarthritis Symptom Control  Outcome: Progressing  Intervention: Maintain Osteoarthritis Symptom Control  Recent Flowsheet Documentation  Taken 3/26/2025 0200 by Wai Galindo RN  Medication Review/Management: medications reviewed  Taken 3/26/2025 0000 by Wai Galindo RN  Medication Review/Management: medications reviewed  Taken 3/25/2025 2200 by Wai Galindo  RN  Medication Review/Management: medications reviewed  Taken 3/25/2025 2000 by Wai Galindo RN  Medication Review/Management: medications reviewed     Problem: Fall Injury Risk  Goal: Absence of Fall and Fall-Related Injury  Outcome: Progressing  Intervention: Identify and Manage Contributors  Recent Flowsheet Documentation  Taken 3/26/2025 0200 by Wai Galindo RN  Medication Review/Management: medications reviewed  Taken 3/26/2025 0000 by Wai Galindo RN  Medication Review/Management: medications reviewed  Taken 3/25/2025 2200 by Wai Galindo RN  Medication Review/Management: medications reviewed  Taken 3/25/2025 2000 by Wai Galindo RN  Medication Review/Management: medications reviewed  Self-Care Promotion: independence encouraged  Intervention: Promote Injury-Free Environment  Recent Flowsheet Documentation  Taken 3/26/2025 0200 by Wai Galindo RN  Safety Promotion/Fall Prevention:   safety round/check completed   room organization consistent   nonskid shoes/slippers when out of bed   lighting adjusted   assistive device/personal items within reach   clutter free environment maintained   fall prevention program maintained  Taken 3/26/2025 0000 by Wai Galindo RN  Safety Promotion/Fall Prevention:   safety round/check completed   room organization consistent   nonskid shoes/slippers when out of bed   lighting adjusted   assistive device/personal items within reach   clutter free environment maintained   fall prevention program maintained  Taken 3/25/2025 2200 by Wai Galindo RN  Safety Promotion/Fall Prevention:   safety round/check completed   room organization consistent   nonskid shoes/slippers when out of bed   lighting adjusted   assistive device/personal items within reach   clutter free environment maintained   fall prevention program maintained  Taken 3/25/2025 2000 by Wai Galindo RN  Safety Promotion/Fall Prevention:   safety round/check completed   room organization  consistent   nonskid shoes/slippers when out of bed   lighting adjusted   assistive device/personal items within reach   clutter free environment maintained   fall prevention program maintained     Problem: Sepsis/Septic Shock  Goal: Optimal Coping  Outcome: Progressing  Intervention: Support Patient and Family Response  Recent Flowsheet Documentation  Taken 3/25/2025 2000 by Wai Galindo RN  Supportive Measures:   active listening utilized   self-care encouraged  Goal: Absence of Bleeding  Outcome: Progressing  Goal: Blood Glucose Level Within Target Range  Outcome: Progressing  Goal: Absence of Infection Signs and Symptoms  Outcome: Progressing  Intervention: Initiate Sepsis Management  Recent Flowsheet Documentation  Taken 3/26/2025 0200 by Wai Galindo RN  Infection Prevention:   equipment surfaces disinfected   hand hygiene promoted   personal protective equipment utilized   rest/sleep promoted   single patient room provided  Taken 3/26/2025 0000 by Wai Galindo RN  Infection Prevention:   equipment surfaces disinfected   hand hygiene promoted   personal protective equipment utilized   rest/sleep promoted   single patient room provided  Taken 3/25/2025 2200 by Wai Galindo RN  Infection Prevention:   equipment surfaces disinfected   hand hygiene promoted   personal protective equipment utilized   rest/sleep promoted   single patient room provided  Taken 3/25/2025 2000 by Wai Galindo RN  Infection Prevention:   equipment surfaces disinfected   hand hygiene promoted   personal protective equipment utilized   rest/sleep promoted   single patient room provided  Intervention: Promote Recovery  Recent Flowsheet Documentation  Taken 3/25/2025 2000 by Wai Galindo RN  Sleep/Rest Enhancement:   awakenings minimized   consistent schedule promoted   noise level reduced   regular sleep/rest pattern promoted   room darkened  Goal: Optimal Nutrition Delivery  Outcome: Progressing

## 2025-03-26 NOTE — PROGRESS NOTES
Nephrology  Progress Note                                        [facility]    Patient Identification    Name: Tracie Gross  Age: 64 y.o.  Sex: female  :  1960  MRN: [id number]      DATE OF SERVICE:  3/26/2025        Subective    Noted cough and shortness of breath or     Objective   Scheduled Meds:amitriptyline, 25 mg, Oral, Nightly  benzonatate, 200 mg, Oral, TID  budesonide, 0.5 mg, Nebulization, BID - RT  calcium 500 mg vitamin D 5 mcg (200 UT), 1 tablet, Oral, BID  cetirizine, 10 mg, Oral, Daily  furosemide, 20 mg, Oral, Daily  guaiFENesin, 600 mg, Oral, Q12H  hydroxychloroquine, 200 mg, Oral, BID  lamoTRIgine, 200 mg, Oral, Nightly  levothyroxine, 175 mcg, Oral, Q AM  methylPREDNISolone sodium succinate, 125 mg, Intravenous, Q6H  [Held by provider] mycophenolate, 500 mg, Oral, Q12H  QUEtiapine, 50 mg, Oral, Nightly  sodium chloride, 10 mL, Intravenous, Q12H          Continuous Infusions:       PRN Meds:  acetaminophen **OR** acetaminophen **OR** acetaminophen    ALPRAZolam    aluminum-magnesium hydroxide-simethicone    senna-docusate sodium **AND** polyethylene glycol **AND** bisacodyl **AND** bisacodyl    Calcium Replacement - Follow Nurse / BPA Driven Protocol    cyclobenzaprine    guaiFENesin-codeine    ipratropium-albuterol    Magnesium Standard Dose Replacement - Follow Nurse / BPA Driven Protocol    meclizine    melatonin    Morphine    nitroglycerin    ondansetron ODT **OR** [DISCONTINUED] ondansetron    oxyCODONE    Phosphorus Replacement - Follow Nurse / BPA Driven Protocol    Potassium Replacement - Follow Nurse / BPA Driven Protocol    [COMPLETED] Insert Peripheral IV **AND** sodium chloride    sodium chloride    sodium chloride     Exam:  /96 (BP Location: Left arm, Patient Position: Lying)   Pulse 69   Temp 97.3 °F (36.3 °C) (Oral)  "  Resp 12   Ht 162.6 cm (64\")   Wt 106 kg (233 lb 14.5 oz)   LMP 05/08/1983   SpO2 94%   BMI 40.15 kg/m²     Intake/Output last 3 shifts:  I/O last 3 completed shifts:  In: 440 [P.O.:240; I.V.:200]  Out: -     Intake/Output this shift:  I/O this shift:  In: -   Out: 300 [Urine:300]    Physical exam:  General Appearance:  Alert  Head:  Normocephalic, without obvious abnormality, atraumatic  Eyes:  PERRL, conjunctiva/corneas clear     Neck:  Supple,  no adenopathy;      Lungs:  Decreased BS occasion ronchi  Heart:  Regular rate and rhythm, S1 and S2 normal  Abdomen:  Soft, non-tender, bowel sounds active   Extremities: ++ edema  Pulses: 2+ and symmetric all extremities  Skin:  No rashes or lesions       Data Review:  All labs (24hrs):   Recent Results (from the past 24 hours)   Basic Metabolic Panel    Collection Time: 03/26/25  2:00 AM    Specimen: Blood   Result Value Ref Range    Glucose 170 (H) 65 - 99 mg/dL    BUN 38 (H) 8 - 23 mg/dL    Creatinine 1.07 (H) 0.57 - 1.00 mg/dL    Sodium 140 136 - 145 mmol/L    Potassium 3.8 3.5 - 5.2 mmol/L    Chloride 100 98 - 107 mmol/L    CO2 31.2 (H) 22.0 - 29.0 mmol/L    Calcium 10.2 8.6 - 10.5 mg/dL    BUN/Creatinine Ratio 35.5 (H) 7.0 - 25.0    Anion Gap 8.8 5.0 - 15.0 mmol/L    eGFR 57.4 (L) >60.0 mL/min/1.73   CBC (No Diff)    Collection Time: 03/26/25  2:00 AM    Specimen: Blood   Result Value Ref Range    WBC 12.21 (H) 3.40 - 10.80 10*3/mm3    RBC 3.56 (L) 3.77 - 5.28 10*6/mm3    Hemoglobin 11.0 (L) 12.0 - 15.9 g/dL    Hematocrit 33.7 (L) 34.0 - 46.6 %    MCV 94.7 79.0 - 97.0 fL    MCH 30.9 26.6 - 33.0 pg    MCHC 32.6 31.5 - 35.7 g/dL    RDW 14.3 12.3 - 15.4 %    RDW-SD 48.9 37.0 - 54.0 fl    MPV 11.9 6.0 - 12.0 fL    Platelets 189 140 - 450 10*3/mm3          Imaging:  CT Chest Without Contrast Diagnostic  Result Date: 3/23/2025  Impression: 1.Multifocal patchy opacities noted throughout the lungs bilaterally, worse in the perihilar region. Findings are concerning " for multifocal pneumonia. Less likely, this may represent pulmonary hemorrhage 2.Additional nonemergent findings as characterized above 3.Scattered prominent to mildly enlarged mediastinal lymph nodes, likely reactive. Electronically Signed: Rafael Archer DO  3/23/2025 5:15 PM EDT  Workstation ID: [id number]    XR Chest 1 View  Result Date: 3/23/2025  Impression: 1.Worsening multifocal patchy opacities scattered throughout the lungs. This most likely represents multifocal pneumonia, although pulmonary edema is also a possibility 2.Trace bilateral pleural effusions. Electronically Signed: Rafael Archer DO  3/23/2025 12:30 PM EDT  Workstation ID: [id number]    XR Chest 1 View  Result Date: 3/19/2025  Impression: Multifocal patchy nodular densities in both lungs, thought to be similar to 3/15/2025. Correlate for pneumonia. Electronically Signed: Barb Millan MD  3/19/2025 2:35 PM EDT  Workstation ID: [id number]      Assessment/Plan:     SOB (shortness of breath)    Pulmonary fibrosis    Dyspnea         Acute kidney injury on top of chronic kidney disease stage III  Acute hypoxic respiratory failure  Pulmonary fibrosis  Urinary tract infection  Anxiety disorder  Hypertension  Hyperlipidemia  Hypothyroidism  History of von Willebrand disease with no bleeding        Recommendations:   Increase Lasix dose to 40 mg due to increased edema  Kidney function stable creatinine 1.0    Discussed with the patient and family at bedside

## 2025-03-27 LAB
ANION GAP SERPL CALCULATED.3IONS-SCNC: 9 MMOL/L (ref 5–15)
BUN SERPL-MCNC: 41 MG/DL (ref 8–23)
BUN/CREAT SERPL: 35 (ref 7–25)
CALCIUM SPEC-SCNC: 9.8 MG/DL (ref 8.6–10.5)
CHLORIDE SERPL-SCNC: 100 MMOL/L (ref 98–107)
CO2 SERPL-SCNC: 34 MMOL/L (ref 22–29)
CREAT SERPL-MCNC: 1.17 MG/DL (ref 0.57–1)
DEPRECATED RDW RBC AUTO: 49.1 FL (ref 37–54)
EGFRCR SERPLBLD CKD-EPI 2021: 51.6 ML/MIN/1.73
ERYTHROCYTE [DISTWIDTH] IN BLOOD BY AUTOMATED COUNT: 14.4 % (ref 12.3–15.4)
GLUCOSE SERPL-MCNC: 190 MG/DL (ref 65–99)
HCT VFR BLD AUTO: 32.8 % (ref 34–46.6)
HGB BLD-MCNC: 10.7 G/DL (ref 12–15.9)
MCH RBC QN AUTO: 31.1 PG (ref 26.6–33)
MCHC RBC AUTO-ENTMCNC: 32.6 G/DL (ref 31.5–35.7)
MCV RBC AUTO: 95.3 FL (ref 79–97)
PLATELET # BLD AUTO: 180 10*3/MM3 (ref 140–450)
PMV BLD AUTO: 12.4 FL (ref 6–12)
POTASSIUM SERPL-SCNC: 3.4 MMOL/L (ref 3.5–5.2)
POTASSIUM SERPL-SCNC: 3.9 MMOL/L (ref 3.5–5.2)
RBC # BLD AUTO: 3.44 10*6/MM3 (ref 3.77–5.28)
SODIUM SERPL-SCNC: 143 MMOL/L (ref 136–145)
WBC NRBC COR # BLD AUTO: 12.97 10*3/MM3 (ref 3.4–10.8)

## 2025-03-27 PROCEDURE — 94761 N-INVAS EAR/PLS OXIMETRY MLT: CPT

## 2025-03-27 PROCEDURE — 97535 SELF CARE MNGMENT TRAINING: CPT

## 2025-03-27 PROCEDURE — 85027 COMPLETE CBC AUTOMATED: CPT | Performed by: INTERNAL MEDICINE

## 2025-03-27 PROCEDURE — 97112 NEUROMUSCULAR REEDUCATION: CPT

## 2025-03-27 PROCEDURE — 80048 BASIC METABOLIC PNL TOTAL CA: CPT | Performed by: INTERNAL MEDICINE

## 2025-03-27 PROCEDURE — 97530 THERAPEUTIC ACTIVITIES: CPT

## 2025-03-27 PROCEDURE — 97116 GAIT TRAINING THERAPY: CPT

## 2025-03-27 PROCEDURE — 94664 DEMO&/EVAL PT USE INHALER: CPT

## 2025-03-27 PROCEDURE — 25010000002 MORPHINE PER 10 MG: Performed by: FAMILY MEDICINE

## 2025-03-27 PROCEDURE — 25010000002 METHYLPREDNISOLONE PER 40 MG: Performed by: INTERNAL MEDICINE

## 2025-03-27 PROCEDURE — 94799 UNLISTED PULMONARY SVC/PX: CPT

## 2025-03-27 PROCEDURE — 25010000002 METHYLPREDNISOLONE PER 125 MG: Performed by: INTERNAL MEDICINE

## 2025-03-27 PROCEDURE — 84132 ASSAY OF SERUM POTASSIUM: CPT | Performed by: INTERNAL MEDICINE

## 2025-03-27 RX ORDER — POTASSIUM CHLORIDE 1500 MG/1
40 TABLET, EXTENDED RELEASE ORAL EVERY 4 HOURS
Status: COMPLETED | OUTPATIENT
Start: 2025-03-27 | End: 2025-03-27

## 2025-03-27 RX ORDER — METHYLPREDNISOLONE SODIUM SUCCINATE 40 MG/ML
40 INJECTION, POWDER, LYOPHILIZED, FOR SOLUTION INTRAMUSCULAR; INTRAVENOUS EVERY 6 HOURS
Status: DISCONTINUED | OUTPATIENT
Start: 2025-03-27 | End: 2025-03-28

## 2025-03-27 RX ADMIN — GUAIFENESIN AND CODEINE PHOSPHATE 10 ML: 100; 10 SOLUTION ORAL at 15:50

## 2025-03-27 RX ADMIN — Medication 10 ML: at 20:25

## 2025-03-27 RX ADMIN — GUAIFENESIN 600 MG: 600 TABLET, MULTILAYER, EXTENDED RELEASE ORAL at 09:06

## 2025-03-27 RX ADMIN — METHYLPREDNISOLONE SODIUM SUCCINATE 40 MG: 40 INJECTION, POWDER, FOR SOLUTION INTRAMUSCULAR; INTRAVENOUS at 20:25

## 2025-03-27 RX ADMIN — QUETIAPINE FUMARATE 50 MG: 25 TABLET ORAL at 20:36

## 2025-03-27 RX ADMIN — FUROSEMIDE 40 MG: 40 TABLET ORAL at 09:06

## 2025-03-27 RX ADMIN — GUAIFENESIN AND CODEINE PHOSPHATE 10 ML: 100; 10 SOLUTION ORAL at 20:24

## 2025-03-27 RX ADMIN — POTASSIUM CHLORIDE 40 MEQ: 1500 TABLET, EXTENDED RELEASE ORAL at 09:06

## 2025-03-27 RX ADMIN — MORPHINE SULFATE 2 MG: 2 INJECTION, SOLUTION INTRAMUSCULAR; INTRAVENOUS at 20:25

## 2025-03-27 RX ADMIN — METHYLPREDNISOLONE SODIUM SUCCINATE 40 MG: 40 INJECTION, POWDER, FOR SOLUTION INTRAMUSCULAR; INTRAVENOUS at 15:50

## 2025-03-27 RX ADMIN — LEVOTHYROXINE SODIUM 175 MCG: 175 TABLET ORAL at 04:01

## 2025-03-27 RX ADMIN — HYDROXYCHLOROQUINE SULFATE 200 MG: 200 TABLET ORAL at 09:06

## 2025-03-27 RX ADMIN — GUAIFENESIN AND CODEINE PHOSPHATE 10 ML: 100; 10 SOLUTION ORAL at 09:30

## 2025-03-27 RX ADMIN — LAMOTRIGINE 200 MG: 100 TABLET ORAL at 20:24

## 2025-03-27 RX ADMIN — BENZONATATE 200 MG: 100 CAPSULE ORAL at 15:50

## 2025-03-27 RX ADMIN — Medication 1 TABLET: at 09:06

## 2025-03-27 RX ADMIN — Medication 5 MG: at 20:24

## 2025-03-27 RX ADMIN — Medication 10 ML: at 09:06

## 2025-03-27 RX ADMIN — OXYCODONE HYDROCHLORIDE 10 MG: 5 TABLET ORAL at 20:24

## 2025-03-27 RX ADMIN — BUDESONIDE 0.5 MG: 0.5 INHALANT RESPIRATORY (INHALATION) at 19:00

## 2025-03-27 RX ADMIN — BENZONATATE 200 MG: 100 CAPSULE ORAL at 09:06

## 2025-03-27 RX ADMIN — METHYLPREDNISOLONE SODIUM SUCCINATE 40 MG: 40 INJECTION, POWDER, FOR SOLUTION INTRAMUSCULAR; INTRAVENOUS at 09:05

## 2025-03-27 RX ADMIN — AMITRIPTYLINE HYDROCHLORIDE 25 MG: 25 TABLET, FILM COATED ORAL at 20:24

## 2025-03-27 RX ADMIN — CETIRIZINE HYDROCHLORIDE 10 MG: 10 TABLET, FILM COATED ORAL at 09:06

## 2025-03-27 RX ADMIN — HYDROXYCHLOROQUINE SULFATE 200 MG: 200 TABLET ORAL at 20:24

## 2025-03-27 RX ADMIN — GUAIFENESIN 600 MG: 600 TABLET, MULTILAYER, EXTENDED RELEASE ORAL at 20:24

## 2025-03-27 RX ADMIN — Medication 1 TABLET: at 20:24

## 2025-03-27 RX ADMIN — METHYLPREDNISOLONE SODIUM SUCCINATE 125 MG: 125 INJECTION, POWDER, FOR SOLUTION INTRAMUSCULAR; INTRAVENOUS at 04:01

## 2025-03-27 RX ADMIN — BENZONATATE 200 MG: 100 CAPSULE ORAL at 20:24

## 2025-03-27 RX ADMIN — ALPRAZOLAM 1 MG: 1 TABLET ORAL at 20:24

## 2025-03-27 RX ADMIN — CYCLOBENZAPRINE 10 MG: 10 TABLET, FILM COATED ORAL at 20:24

## 2025-03-27 RX ADMIN — Medication 5 MG: at 00:32

## 2025-03-27 RX ADMIN — POTASSIUM CHLORIDE 40 MEQ: 1500 TABLET, EXTENDED RELEASE ORAL at 05:51

## 2025-03-27 RX ADMIN — BUDESONIDE 0.5 MG: 0.5 INHALANT RESPIRATORY (INHALATION) at 06:48

## 2025-03-27 NOTE — PROGRESS NOTES
[facility] MEDICINE SERVICE  DAILY PROGRESS NOTE    NAME: Tracie Gross  : 1960  MRN: [id number]      LOS: 12 days     PROVIDER OF SERVICE: Santa Pierre MD    Chief Complaint: SOB (shortness of breath)    Subjective:     Interval History:  History taken from: patient    Complaining of shortness of breath and cough.    Review of Systems:   Review of Systems   All other systems reviewed and are negative.      Objective:     Vital Signs  Temp:  [97.4 °F (36.3 °C)-98.2 °F (36.8 °C)] 97.9 °F (36.6 °C)  Heart Rate:  [61-89] 77  Resp:  [15-19] 16  BP: (139-162)/(69-84) 148/82  Flow (L/min) (Oxygen Therapy):  [2] 2   Body mass index is 40.38 kg/m².    Physical Exam  Physical Exam  Constitutional:       Appearance: Normal appearance.   HENT:      Head: Normocephalic and atraumatic.      Nose: Nose normal.      Mouth/Throat:      Mouth: Mucous membranes are moist.   Eyes:      Extraocular Movements: Extraocular movements intact.      Pupils: Pupils are equal, round, and reactive to light.   Cardiovascular:      Rate and Rhythm: Normal rate and regular rhythm.   Pulmonary:      Effort: Pulmonary effort is normal.      Breath sounds: Normal breath sounds.   Abdominal:      General: Abdomen is flat. Bowel sounds are normal.      Palpations: Abdomen is soft.   Musculoskeletal:         General: Normal range of motion.      Cervical back: Normal range of motion and neck supple.   Skin:     General: Skin is warm and dry.   Neurological:      General: No focal deficit present.      Mental Status: She is alert and oriented to person, place, and time.   Psychiatric:         Mood and Affect: Mood normal.         Behavior: Behavior normal.         Thought Content: Thought content normal.         Judgment: Judgment normal.         Current Medications:  Scheduled Meds:amitriptyline, 25 mg, Oral, Nightly  benzonatate, 200 mg, Oral, TID  budesonide, 0.5 mg, Nebulization, BID - RT  calcium 500 mg vitamin D 5 mcg (200  UT), 1 tablet, Oral, BID  cetirizine, 10 mg, Oral, Daily  furosemide, 40 mg, Oral, Daily  guaiFENesin, 600 mg, Oral, Q12H  hydroxychloroquine, 200 mg, Oral, BID  lamoTRIgine, 200 mg, Oral, Nightly  levothyroxine, 175 mcg, Oral, Q AM  methylPREDNISolone sodium succinate, 40 mg, Intravenous, Q6H  [Held by provider] mycophenolate, 500 mg, Oral, Q12H  QUEtiapine, 50 mg, Oral, Nightly  sodium chloride, 10 mL, Intravenous, Q12H      Continuous Infusions:     PRN Meds:.  acetaminophen **OR** acetaminophen **OR** acetaminophen    ALPRAZolam    aluminum-magnesium hydroxide-simethicone    senna-docusate sodium **AND** polyethylene glycol **AND** bisacodyl **AND** bisacodyl    Calcium Replacement - Follow Nurse / BPA Driven Protocol    cyclobenzaprine    guaiFENesin-codeine    ipratropium-albuterol    Magnesium Standard Dose Replacement - Follow Nurse / BPA Driven Protocol    meclizine    melatonin    Morphine    nitroglycerin    ondansetron ODT **OR** [DISCONTINUED] ondansetron    oxyCODONE    Phosphorus Replacement - Follow Nurse / BPA Driven Protocol    Potassium Replacement - Follow Nurse / BPA Driven Protocol    [COMPLETED] Insert Peripheral IV **AND** sodium chloride    sodium chloride    sodium chloride       Diagnostic Data    Results from last 7 days   Lab Units 03/27/25  0408   WBC 10*3/mm3 12.97*   HEMOGLOBIN g/dL 10.7*   HEMATOCRIT % 32.8*   PLATELETS 10*3/mm3 180   GLUCOSE mg/dL 190*   CREATININE mg/dL 1.17*   BUN mg/dL 41*   SODIUM mmol/L 143   POTASSIUM mmol/L 3.4*   ANION GAP mmol/L 9.0       No radiology results for the last day          I reviewed the patient's new clinical results.    Assessment/Plan:     Active and Resolved Problems  Active Hospital Problems    Diagnosis  POA    **SOB (shortness of breath) [R06.02]  Yes    Dyspnea [R06.00]  Unknown    Pulmonary fibrosis [J84.10]  Unknown      Resolved Hospital Problems   No resolved problems to display.       Acute hypoxic respiratory failure not on home  oxygen but currently on 2 L of oxygen via nasal cannula   Pneumonia  Hyperkalemia  Recently diagnosed pulmonary fibrosis  DANIELLE on CKD 3  UTI  Hypertension  Hypothyroidism      -acute hypoxic respiratory failure likely secondary to pneumonia plus underlying pulmonary fibrosis.  Urine strep pneumo antigen is positive.  Has completed ceftriaxone for treatment of likely Streptococcus pneumonia.  Blood cultures and sputum culture negative.  Feeling better today.  Chest x-ray showed worsening findings.  Status post bronchoscopy on 03/24/2025.  Follow-up on cultures.  On high-dose IV steroids per pulmonary.    -Renal function is improving.  Appreciate nephrology's input.    - Urine culture showed no growth ruling out UTI    - Continue current management.      VTE Prophylaxis:  Mechanical VTE prophylaxis orders are present.       Disposition Planning:     Barriers to Discharge: Pending clinical improvement  Anticipated Date of Discharge: 3/30/2025  Place of Discharge: Home      Code Status and Medical Interventions: CPR (Attempt to Resuscitate); Full Support   Ordered at: 03/15/25 1955     Code Status (Patient has no pulse and is not breathing):    CPR (Attempt to Resuscitate)     Medical Interventions (Patient has pulse or is breathing):    Full Support       Signature: Electronically signed by Santa Pierre MD, 03/27/25, 13:28 EDT.  [facility] Hospitalist Team

## 2025-03-27 NOTE — PLAN OF CARE
Goal Outcome Evaluation:     Decreased steroid dosage.      Progress: no change

## 2025-03-27 NOTE — PLAN OF CARE
Assessment: Tracie Gross presents with functional mobility impairments which indicate the need for skilled intervention. Tolerating session today without incident. Pt stated she couldn't have done what she did today due ot incr in weakness and did not want to try walking very far because she still felt weak and didn't not want R knee to buckle. Ind with ronnie care and managing pull up. Plans on home with assist and HH at [place].Will continue to follow and progress as tolerated.

## 2025-03-27 NOTE — PROGRESS NOTES
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 No wolf honeycombing fibrosis  Patient was treated with mycophenolate, prophylactic Bactrim     PFT 1/14/2025   FVC 1 1.6 L/51%,  FEV1 1.4 L / 58%, ratio 88, TLC 57%, RV 50%, DLCO 51%     PFTs July 2021,   FEV1 1.7 L 68% improve 75% post bronchodilator, FEV1/ FVC ratio 87, TLC 72%, RV 74%, DLCO 56%     PFTs in 2017   FEV1 66-70% predicted which is unchanged since 2012  quit smoking 1994,      PFT 2012 and 2017,   FEV-1 and TLC 75% , suggestive of mild to mod restrictive lung disease     2D echo  2018 normal RVSP and EF  2D echo May 2020 no pulmonary hypertension   2d echo July 2021 RVSP 30  2D echo January 2025 no pulmonary hypertension EF 60%     FERN, AHI 14.5 ,       No Response to inhalers including Breztri and Trelegy     Recommendations:    weaning steroids IV Solu-Medrol 40 mg every 6 hours    oxygen titration currently on 2 L    Antibiotics completed Rocephin 2 g IV daily for 5 days  Azithromycin 500 mg p.o. daily for 3 days      Seen by GI for recurrent \" chocking\"     Holding mycophenolate for treatment of pneumonia     Bronchodilators Pulmicort and DuoNeb     On Plaquenil 200 mg twice daily        CT chest 3/23/2025 compared to January 2025         Chest x-ray 3/15/2025      VQ scan 3/16/2025       High-res CT scan January 2025             LOS: 12 days     Subjective         Objective     Vital signs for last " 24 hours:  Vitals:    03/27/25 0405 03/27/25 0648 03/27/25 0652 03/27/25 0826   BP: 139/78   146/69   BP Location: Left arm      Patient Position: Lying      Pulse: 67 61 67 70   Resp: 17 18  18   Temp: 97.4 °F (36.3 °C)   97.6 °F (36.4 °C)   TempSrc: Oral   Oral   SpO2: 96% 96% 97% 99%   Weight: 107 kg (235 lb 3.7 oz)      Height:           Intake/Output last 3 shifts:  I/O last 3 completed shifts:  In: -   Out: 300 [Urine:300]  Intake/Output this shift:  No intake/output data recorded.      Radiology  Imaging Results (Last 24 Hours)       ** No results found for the last 24 hours. **            Labs:  Results from last 7 days   Lab Units 03/27/25  0408   WBC 10*3/mm3 12.97*   HEMOGLOBIN g/dL 10.7*   HEMATOCRIT % 32.8*   PLATELETS 10*3/mm3 180     Results from last 7 days   Lab Units 03/27/25  0408   SODIUM mmol/L 143   POTASSIUM mmol/L 3.4*   CHLORIDE mmol/L 100   CO2 mmol/L 34.0*   BUN mg/dL 41*   CREATININE mg/dL 1.17*   CALCIUM mg/dL 9.8   GLUCOSE mg/dL 190*                       Results from last 7 days   Lab Units 03/24/25  1033   LANA  Negative                           Meds:   SCHEDULE  amitriptyline, 25 mg, Oral, Nightly  benzonatate, 200 mg, Oral, TID  budesonide, 0.5 mg, Nebulization, BID - RT  calcium 500 mg vitamin D 5 mcg (200 UT), 1 tablet, Oral, BID  cetirizine, 10 mg, Oral, Daily  furosemide, 40 mg, Oral, Daily  guaiFENesin, 600 mg, Oral, Q12H  hydroxychloroquine, 200 mg, Oral, BID  lamoTRIgine, 200 mg, Oral, Nightly  levothyroxine, 175 mcg, Oral, Q AM  methylPREDNISolone sodium succinate, 40 mg, Intravenous, Q6H  [Held by provider] mycophenolate, 500 mg, Oral, Q12H  potassium chloride ER, 40 mEq, Oral, Q4H  QUEtiapine, 50 mg, Oral, Nightly  sodium chloride, 10 mL, Intravenous, Q12H      Infusions       PRNs    acetaminophen **OR** acetaminophen **OR** acetaminophen    ALPRAZolam    aluminum-magnesium hydroxide-simethicone    senna-docusate sodium **AND** polyethylene glycol **AND** bisacodyl  **AND** bisacodyl    Calcium Replacement - Follow Nurse / BPA Driven Protocol    cyclobenzaprine    guaiFENesin-codeine    ipratropium-albuterol    Magnesium Standard Dose Replacement - Follow Nurse / BPA Driven Protocol    meclizine    melatonin    Morphine    nitroglycerin    ondansetron ODT **OR** [DISCONTINUED] ondansetron    oxyCODONE    Phosphorus Replacement - Follow Nurse / BPA Driven Protocol    Potassium Replacement - Follow Nurse / BPA Driven Protocol    [COMPLETED] Insert Peripheral IV **AND** sodium chloride    sodium chloride    sodium chloride    Physical Exam:  Physical Exam  Cardiovascular:      Heart sounds: Murmur heard.      No gallop.   Pulmonary:      Effort: No respiratory distress.      Breath sounds: No stridor. Rhonchi and rales present. No wheezing.   Chest:      Chest wall: No tenderness.         ROS  Review of Systems   Respiratory:  Positive for cough and shortness of breath. Negative for wheezing and stridor.    Cardiovascular:  Positive for palpitations. Negative for chest pain and leg swelling.             Total critical care time spent with patient greater than: 45 Minutes

## 2025-03-27 NOTE — PROGRESS NOTES
Nutrition Services    Patient Name: Tracie Gross  YOB: 1960  MRN: [id number]  Admission date: 3/15/2025    PROGRESS NOTE      Encounter Information: Progress note to check on intakes and to check on whether pt is enjoying receiving new fortified food items. Pt has tried the fortified mashed potatoes and the fortified applesauce. She does not wish to continue the potatoes but likes the applesauce and would like to increase to BID. Orders adjusted accordingly. Pt also asked some additional questions about protein in general -- nutrition education provided.        PO Diet: Diet: Regular/House; No Straw; Fluid Consistency: Thin (IDDSI 0)   PO Supplements: Power applesauce BID  - provides 114 kcal and 15g PRO each   PO Intake:  Variable intake; averaging about 40% at meals; accepting fortified foods       Current nutrition support:    Nutrition support review:        Labs (reviewed below): Reviewed         GI Function:  Stool Output  Stool Unmeasured Occurrence: 1 (03/27/25 1318)  Bowel Incontinence: No (03/27/25 1318)  Stool Amount: Small (03/27/25 1318)  Stool Consistency: formed (03/18/25 0925)  Perineal Care: perineum cleansed, absorbent brief/pad changed (03/27/25 1500)          Nutrition Intervention Updates: Continue power applesauce BID     Continue current diet       Results from last 7 days   Lab Units 03/27/25  1554 03/27/25  0408 03/26/25  0200 03/25/25  0053   SODIUM mmol/L  --  143 140 140   POTASSIUM mmol/L 3.9 3.4* 3.8 4.0   CHLORIDE mmol/L  --  100 100 102   CO2 mmol/L  --  34.0* 31.2* 27.3   BUN mg/dL  --  41* 38* 30*   CREATININE mg/dL  --  1.17* 1.07* 0.94   CALCIUM mg/dL  --  9.8 10.2 9.6   GLUCOSE mg/dL  --  190* 170* 117*     Results from last 7 days   Lab Units 03/27/25  0408   HEMOGLOBIN g/dL 10.7*   HEMATOCRIT % 32.8*     COVID19   Date Value Ref Range Status   03/24/2025 Not Detected Not Detected - Ref. Range Final     Lab Results   Component Value Date    HGBA1C 5.40 07/03/2024        Wt Readings from Last 10 Encounters:   03/27/25 0405 107 kg (235 lb 3.7 oz)   03/26/25 0515 106 kg (233 lb 14.5 oz)   03/25/25 0539 105 kg (232 lb 9.4 oz)   03/24/25 0612 105 kg (231 lb 11.3 oz)   03/23/25 0415 104 kg (229 lb 4.5 oz)   03/21/25 2247 108 kg (238 lb 12.1 oz)   03/21/25 0437 112 kg (246 lb 14.6 oz)   03/20/25 0528 112 kg (246 lb 7.6 oz)   03/19/25 0431 114 kg (251 lb 5.2 oz)   03/18/25 0500 116 kg (256 lb 2.8 oz)   03/17/25 0500 117 kg (258 lb 9.6 oz)   03/16/25 0500 113 kg (249 lb 1.9 oz)   03/15/25 1928 113 kg (249 lb 1.9 oz)   03/15/25 1516 105 kg (231 lb 7.7 oz)   02/12/25 1517 105 kg (232 lb)   01/29/25 1134 111 kg (244 lb 6.4 oz)   01/14/25 1443 106 kg (233 lb)   12/17/24 0957 106 kg (233 lb 11.2 oz)   12/03/24 1324 103 kg (227 lb)   10/02/24 1531 103 kg (227 lb 12.8 oz)   09/23/24 0943 95.3 kg (210 lb)   06/21/24 1108 95.3 kg (210 lb)   08/15/24 1502 102 kg (224 lb)   08/07/24 1126 105 kg (231 lb 3.2 oz)     RD to follow up per protocol.    Electronically signed by:  Alison Mckeon RD  03/27/25 19:04 EDT

## 2025-03-27 NOTE — THERAPY TREATMENT NOTE
"Subjective: Pt agreeable to therapeutic plan of care.Pt doesn't want to sit in chair because she needs to elevate her legs more than it can to decrease edema.     Objective:     Precautions - falls, O2    Bed mobility - SBA supine>sit with extra time and bedrail, min assist with legs back onto bed.  Transfers - Min-A with UE support and extra time bed<> bs commode  Ambulation - 5x3 feet forward and backward Min-A and with rolling walker    Vitals: WNL on 2L O2    Pain: 0 VAS     Education: Provided education on the importance of mobility in the acute care setting, Verbal/Tactile Cues, ADL training, Transfer Training, and Gait Training    Assessment: Tracie Gross presents with functional mobility impairments which indicate the need for skilled intervention. Tolerating session today without incident. Pt stated she couldn't have done what she did today due ot incr in weakness and did not want to try walking very far because she still felt weak and didn't not want R knee to buckle. Ind with ronnie care and managing pull up. Plans on home with assist and HH at [place].Will continue to follow and progress as tolerated.     Plan/Recommendations:   If medically appropriate, Low Intensity Therapy recommended post-acute care - This is recommended as therapy feels this patient would require 2-3 visits per week. OP or HH would be the best option depending on patient's home bound status. Consider, if the patient has other  \"skilled\" needs such as wounds, IV antibiotics, etc. Combined with \"low intensity\" could also equate to a SNF. If patient is medically complex, consider LTAC. Pt requires no DME at discharge.     Pt desires Home with family assist and Home Health at discharge. Pt cooperative; agreeable to therapeutic recommendations and plan of care.         Basic Mobility 6-click:  Rollin = Total, A lot = 2, A little = 3; 4 = None  Supine>Sit:   1 = Total, A lot = 2, A little = 3; 4 = None   Sit>Stand with arms:  1 = " Total, A lot = 2, A little = 3; 4 = None  Bed>Chair:   1 = Total, A lot = 2, A little = 3; 4 = None  Ambulate in room:  1 = Total, A lot = 2, A little = 3; 4 = None  3-5 Steps with railin = Total, A lot = 2, A little = 3; 4 = None  Score: 19    Post-Tx Position: Supine with HOB Elevated and Call light and personal items within reach  PPE: gloves    Therapy Charges for Today       Code Description Service Date Service Provider Modifiers Qty    48005496159  PT NEUROMUSC RE EDUCATION EA 15 MIN 3/27/2025 Anusha Good, PTA GP 1    [id number]  PT THERAPEUTIC ACT EA 15 MIN 3/27/2025 Anusha Good, PTA GP 1    [id number]  PT SELF CARE/MGMT/TRAIN EA 15 MIN 3/27/2025 Anusha Good, PTA GP 1    [id number] HC GAIT TRAINING EA 15 MIN 3/27/2025 Anusha Good, PTA GP 1           PT Charges       Row Name 25 1046             Time Calculation    Start Time 0835  -      Stop Time 0910  -      Time Calculation (min) 35 min  -      PT Received On 25  -      PT - Next Appointment 25  -         Time Calculation- PT    Total Timed Code Minutes- PT 35 minute(s)  -                User Key  (r) = Recorded By, (t) = Taken By, (c) = Cosigned By      Initials Name Provider Type     Anusha Good PTA Physical Therapist Assistant

## 2025-03-27 NOTE — PROGRESS NOTES
Nephrology  Progress Note                                        [facility]    Patient Identification    Name: Tracie Gross  Age: 64 y.o.  Sex: female  :  1960  MRN: [id number]      DATE OF SERVICE:  3/27/2025        Subective    Noted cough and shortness of breath or     Objective   Scheduled Meds:amitriptyline, 25 mg, Oral, Nightly  benzonatate, 200 mg, Oral, TID  budesonide, 0.5 mg, Nebulization, BID - RT  calcium 500 mg vitamin D 5 mcg (200 UT), 1 tablet, Oral, BID  cetirizine, 10 mg, Oral, Daily  furosemide, 40 mg, Oral, Daily  guaiFENesin, 600 mg, Oral, Q12H  hydroxychloroquine, 200 mg, Oral, BID  lamoTRIgine, 200 mg, Oral, Nightly  levothyroxine, 175 mcg, Oral, Q AM  methylPREDNISolone sodium succinate, 125 mg, Intravenous, Q6H  [Held by provider] mycophenolate, 500 mg, Oral, Q12H  potassium chloride ER, 40 mEq, Oral, Q4H  QUEtiapine, 50 mg, Oral, Nightly  sodium chloride, 10 mL, Intravenous, Q12H          Continuous Infusions:       PRN Meds:  acetaminophen **OR** acetaminophen **OR** acetaminophen    ALPRAZolam    aluminum-magnesium hydroxide-simethicone    senna-docusate sodium **AND** polyethylene glycol **AND** bisacodyl **AND** bisacodyl    Calcium Replacement - Follow Nurse / BPA Driven Protocol    cyclobenzaprine    guaiFENesin-codeine    ipratropium-albuterol    Magnesium Standard Dose Replacement - Follow Nurse / BPA Driven Protocol    meclizine    melatonin    Morphine    nitroglycerin    ondansetron ODT **OR** [DISCONTINUED] ondansetron    oxyCODONE    Phosphorus Replacement - Follow Nurse / BPA Driven Protocol    Potassium Replacement - Follow Nurse / BPA Driven Protocol    [COMPLETED] Insert Peripheral IV **AND** sodium chloride    sodium chloride    sodium chloride     Exam:  /78 (BP Location: Left arm, Patient Position: Lying)    "Pulse 67   Temp 97.4 °F (36.3 °C) (Oral)   Resp 18   Ht 162.6 cm (64\")   Wt 107 kg (235 lb 3.7 oz)   LMP 05/08/1983   SpO2 97%   BMI 40.38 kg/m²     Intake/Output last 3 shifts:  I/O last 3 completed shifts:  In: -   Out: 300 [Urine:300]    Intake/Output this shift:  No intake/output data recorded.    Physical exam:  General Appearance:  Alert  Head:  Normocephalic, without obvious abnormality, atraumatic  Eyes:  PERRL, conjunctiva/corneas clear     Neck:  Supple,  no adenopathy;      Lungs:  Decreased BS occasion ronchi  Heart:  Regular rate and rhythm, S1 and S2 normal  Abdomen:  Soft, non-tender, bowel sounds active   Extremities: ++ edema  Pulses: 2+ and symmetric all extremities  Skin:  No rashes or lesions       Data Review:  All labs (24hrs):   Recent Results (from the past 24 hours)   Basic Metabolic Panel    Collection Time: 03/27/25  4:08 AM    Specimen: Neck; Blood   Result Value Ref Range    Glucose 190 (H) 65 - 99 mg/dL    BUN 41 (H) 8 - 23 mg/dL    Creatinine 1.17 (H) 0.57 - 1.00 mg/dL    Sodium 143 136 - 145 mmol/L    Potassium 3.4 (L) 3.5 - 5.2 mmol/L    Chloride 100 98 - 107 mmol/L    CO2 34.0 (H) 22.0 - 29.0 mmol/L    Calcium 9.8 8.6 - 10.5 mg/dL    BUN/Creatinine Ratio 35.0 (H) 7.0 - 25.0    Anion Gap 9.0 5.0 - 15.0 mmol/L    eGFR 51.6 (L) >60.0 mL/min/1.73   CBC (No Diff)    Collection Time: 03/27/25  4:08 AM    Specimen: Neck; Blood   Result Value Ref Range    WBC 12.97 (H) 3.40 - 10.80 10*3/mm3    RBC 3.44 (L) 3.77 - 5.28 10*6/mm3    Hemoglobin 10.7 (L) 12.0 - 15.9 g/dL    Hematocrit 32.8 (L) 34.0 - 46.6 %    MCV 95.3 79.0 - 97.0 fL    MCH 31.1 26.6 - 33.0 pg    MCHC 32.6 31.5 - 35.7 g/dL    RDW 14.4 12.3 - 15.4 %    RDW-SD 49.1 37.0 - 54.0 fl    MPV 12.4 (H) 6.0 - 12.0 fL    Platelets 180 140 - 450 10*3/mm3          Imaging:  CT Chest Without Contrast Diagnostic  Result Date: 3/23/2025  Impression: 1.Multifocal patchy opacities noted throughout the lungs bilaterally, worse in the " perihilar region. Findings are concerning for multifocal pneumonia. Less likely, this may represent pulmonary hemorrhage 2.Additional nonemergent findings as characterized above 3.Scattered prominent to mildly enlarged mediastinal lymph nodes, likely reactive. Electronically Signed: Rafael Archer,   3/23/2025 5:15 PM EDT  Workstation ID: [id number]    XR Chest 1 View  Result Date: 3/23/2025  Impression: 1.Worsening multifocal patchy opacities scattered throughout the lungs. This most likely represents multifocal pneumonia, although pulmonary edema is also a possibility 2.Trace bilateral pleural effusions. Electronically Signed: Rafael Archer,   3/23/2025 12:30 PM EDT  Workstation ID: [id number]    XR Chest 1 View  Result Date: 3/19/2025  Impression: Multifocal patchy nodular densities in both lungs, thought to be similar to 3/15/2025. Correlate for pneumonia. Electronically Signed: Barb Millan MD  3/19/2025 2:35 PM EDT  Workstation ID: [id number]      Assessment/Plan:     SOB (shortness of breath)    Pulmonary fibrosis    Dyspnea         Acute kidney injury on top of chronic kidney disease stage III  Acute hypoxic respiratory failure  Pulmonary fibrosis  Urinary tract infection  Anxiety disorder  Hypertension  Hyperlipidemia  Hypothyroidism  History of von Willebrand disease with no bleeding        Recommendations:   Increased Lasix dose to 40 mg due to increased edema  Kidney function stable creatinine 1.1    Discussed with the patient and family at bedside

## 2025-03-27 NOTE — PLAN OF CARE
Goal Outcome Evaluation:         Pt resting comfortably overnight. K 3.4 on AM labs; replacing per protocol. Pt able to make needs known. VSS. No complaints at this time.           Problem: Adult Inpatient Plan of Care  Goal: Plan of Care Review  Outcome: Progressing  Goal: Patient-Specific Goal (Individualized)  Outcome: Progressing  Goal: Absence of Hospital-Acquired Illness or Injury  Outcome: Progressing  Intervention: Identify and Manage Fall Risk  Recent Flowsheet Documentation  Taken 3/27/2025 0600 by Betsy Rojas, RN  Safety Promotion/Fall Prevention:   safety round/check completed   room organization consistent   nonskid shoes/slippers when out of bed   fall prevention program maintained   clutter free environment maintained   assistive device/personal items within reach  Taken 3/27/2025 0430 by Betsy Rojas, RN  Safety Promotion/Fall Prevention:   safety round/check completed   room organization consistent   nonskid shoes/slippers when out of bed   fall prevention program maintained   clutter free environment maintained   assistive device/personal items within reach  Taken 3/27/2025 0220 by Betsy Rojas RN  Safety Promotion/Fall Prevention:   safety round/check completed   room organization consistent   nonskid shoes/slippers when out of bed   fall prevention program maintained   clutter free environment maintained   assistive device/personal items within reach  Taken 3/27/2025 0030 by Betsy Rojas, RN  Safety Promotion/Fall Prevention:   safety round/check completed   room organization consistent   nonskid shoes/slippers when out of bed   fall prevention program maintained   clutter free environment maintained   assistive device/personal items within reach  Taken 3/26/2025 2215 by Betsy Rojas, RN  Safety Promotion/Fall Prevention:   safety round/check completed   room organization consistent   nonskid shoes/slippers when out of bed   fall prevention program  maintained   clutter free environment maintained   assistive device/personal items within reach  Taken 3/26/2025 2025 by Betsy Rojas RN  Safety Promotion/Fall Prevention:   safety round/check completed   room organization consistent   nonskid shoes/slippers when out of bed   fall prevention program maintained   clutter free environment maintained   assistive device/personal items within reach  Intervention: Prevent Skin Injury  Recent Flowsheet Documentation  Taken 3/27/2025 0600 by Betsy Rojas RN  Body Position:   position changed independently   sitting up in bed  Taken 3/27/2025 0430 by Betsy Rojas RN  Body Position:   position changed independently   weight shifting  Skin Protection: transparent dressing maintained  Taken 3/27/2025 0220 by Betsy Rojas RN  Body Position:   sitting up in bed   weight shifting  Taken 3/27/2025 0030 by Betsy Rojas RN  Body Position:   position changed independently   sitting up in bed   weight shifting  Skin Protection:   incontinence pads utilized   transparent dressing maintained  Taken 3/26/2025 2215 by Betsy Rojas RN  Body Position:   position changed independently   supine   weight shifting  Taken 3/26/2025 2025 by Betsy Rojas RN  Body Position:   position changed independently   supine   weight shifting  Skin Protection:   transparent dressing maintained   incontinence pads utilized   skin sealant/moisture barrier applied  Intervention: Prevent and Manage VTE (Venous Thromboembolism) Risk  Recent Flowsheet Documentation  Taken 3/26/2025 2025 by Betsy Rojas RN  VTE Prevention/Management: (severe BLE edema)   bilateral   SCDs (sequential compression devices) off  Intervention: Prevent Infection  Recent Flowsheet Documentation  Taken 3/27/2025 0600 by Betsy Rojas RN  Infection Prevention:   single patient room provided   personal protective equipment utilized   hand hygiene promoted    equipment surfaces disinfected   rest/sleep promoted  Taken 3/27/2025 0430 by Betsy Rojas RN  Infection Prevention:   single patient room provided   personal protective equipment utilized   hand hygiene promoted   equipment surfaces disinfected   rest/sleep promoted  Taken 3/27/2025 0220 by Betsy Rojas RN  Infection Prevention:   single patient room provided   personal protective equipment utilized   hand hygiene promoted   equipment surfaces disinfected   rest/sleep promoted  Taken 3/27/2025 0030 by Betsy Rojas RN  Infection Prevention:   single patient room provided   personal protective equipment utilized   hand hygiene promoted   equipment surfaces disinfected   rest/sleep promoted  Taken 3/26/2025 2215 by Betsy Rojas RN  Infection Prevention:   single patient room provided   personal protective equipment utilized   hand hygiene promoted   equipment surfaces disinfected   rest/sleep promoted  Taken 3/26/2025 2025 by Betsy Rojas RN  Infection Prevention:   single patient room provided   personal protective equipment utilized   hand hygiene promoted   equipment surfaces disinfected   rest/sleep promoted  Goal: Optimal Comfort and Wellbeing  Outcome: Progressing  Intervention: Monitor Pain and Promote Comfort  Recent Flowsheet Documentation  Taken 3/27/2025 0430 by Betsy Rojas RN  Pain Management Interventions:   care clustered   pain management plan reviewed with patient/caregiver   pillow support provided   position adjusted   quiet environment facilitated   relaxation techniques promoted  Taken 3/27/2025 0030 by Betsy Rojas RN  Pain Management Interventions:   care clustered   pain management plan reviewed with patient/caregiver   pillow support provided   position adjusted   quiet environment facilitated   relaxation techniques promoted  Taken 3/26/2025 2025 by Betsy Roajs RN  Pain Management Interventions:   care clustered   pain  management plan reviewed with patient/caregiver   pillow support provided   position adjusted   quiet environment facilitated   relaxation techniques promoted   pain medication given  Intervention: Provide Person-Centered Care  Recent Flowsheet Documentation  Taken 3/26/2025 2025 by Betsy Rojas, RN  Trust Relationship/Rapport:   care explained   questions answered   questions encouraged  Goal: Readiness for Transition of Care  Outcome: Progressing

## 2025-03-28 LAB
ANION GAP SERPL CALCULATED.3IONS-SCNC: 7.9 MMOL/L (ref 5–15)
BUN SERPL-MCNC: 43 MG/DL (ref 8–23)
BUN/CREAT SERPL: 37.1 (ref 7–25)
CALCIUM SPEC-SCNC: 10 MG/DL (ref 8.6–10.5)
CHLORIDE SERPL-SCNC: 101 MMOL/L (ref 98–107)
CO2 SERPL-SCNC: 34.1 MMOL/L (ref 22–29)
CREAT SERPL-MCNC: 1.16 MG/DL (ref 0.57–1)
EGFRCR SERPLBLD CKD-EPI 2021: 52.1 ML/MIN/1.73
GLUCOSE SERPL-MCNC: 164 MG/DL (ref 65–99)
POTASSIUM SERPL-SCNC: 4.1 MMOL/L (ref 3.5–5.2)
SODIUM SERPL-SCNC: 143 MMOL/L (ref 136–145)
WHOLE BLOOD HOLD SPECIMEN: NORMAL

## 2025-03-28 PROCEDURE — 97530 THERAPEUTIC ACTIVITIES: CPT

## 2025-03-28 PROCEDURE — 94799 UNLISTED PULMONARY SVC/PX: CPT

## 2025-03-28 PROCEDURE — 94664 DEMO&/EVAL PT USE INHALER: CPT

## 2025-03-28 PROCEDURE — 25010000002 MORPHINE PER 10 MG: Performed by: INTERNAL MEDICINE

## 2025-03-28 PROCEDURE — 94761 N-INVAS EAR/PLS OXIMETRY MLT: CPT

## 2025-03-28 PROCEDURE — 25010000002 METHYLPREDNISOLONE PER 40 MG: Performed by: INTERNAL MEDICINE

## 2025-03-28 PROCEDURE — 80048 BASIC METABOLIC PNL TOTAL CA: CPT | Performed by: INTERNAL MEDICINE

## 2025-03-28 PROCEDURE — 97112 NEUROMUSCULAR REEDUCATION: CPT

## 2025-03-28 PROCEDURE — 97110 THERAPEUTIC EXERCISES: CPT

## 2025-03-28 RX ORDER — PREDNISONE 20 MG/1
40 TABLET ORAL
Status: DISCONTINUED | OUTPATIENT
Start: 2025-03-29 | End: 2025-03-31 | Stop reason: HOSPADM

## 2025-03-28 RX ORDER — MORPHINE SULFATE 2 MG/ML
2 INJECTION, SOLUTION INTRAMUSCULAR; INTRAVENOUS NIGHTLY PRN
Status: DISCONTINUED | OUTPATIENT
Start: 2025-03-28 | End: 2025-03-31 | Stop reason: HOSPADM

## 2025-03-28 RX ADMIN — GUAIFENESIN 600 MG: 600 TABLET, MULTILAYER, EXTENDED RELEASE ORAL at 20:28

## 2025-03-28 RX ADMIN — OXYCODONE HYDROCHLORIDE 10 MG: 5 TABLET ORAL at 22:00

## 2025-03-28 RX ADMIN — METHYLPREDNISOLONE SODIUM SUCCINATE 40 MG: 40 INJECTION, POWDER, FOR SOLUTION INTRAMUSCULAR; INTRAVENOUS at 08:42

## 2025-03-28 RX ADMIN — BUDESONIDE 0.5 MG: 0.5 INHALANT RESPIRATORY (INHALATION) at 21:23

## 2025-03-28 RX ADMIN — GUAIFENESIN AND CODEINE PHOSPHATE 10 ML: 100; 10 SOLUTION ORAL at 13:41

## 2025-03-28 RX ADMIN — LAMOTRIGINE 200 MG: 100 TABLET ORAL at 20:28

## 2025-03-28 RX ADMIN — GUAIFENESIN AND CODEINE PHOSPHATE 10 ML: 100; 10 SOLUTION ORAL at 20:38

## 2025-03-28 RX ADMIN — BUDESONIDE 0.5 MG: 0.5 INHALANT RESPIRATORY (INHALATION) at 06:56

## 2025-03-28 RX ADMIN — QUETIAPINE FUMARATE 50 MG: 25 TABLET ORAL at 20:28

## 2025-03-28 RX ADMIN — BENZONATATE 200 MG: 100 CAPSULE ORAL at 20:28

## 2025-03-28 RX ADMIN — Medication 5 MG: at 20:29

## 2025-03-28 RX ADMIN — FUROSEMIDE 40 MG: 40 TABLET ORAL at 08:42

## 2025-03-28 RX ADMIN — Medication 10 ML: at 20:38

## 2025-03-28 RX ADMIN — BENZONATATE 200 MG: 100 CAPSULE ORAL at 15:56

## 2025-03-28 RX ADMIN — HYDROXYCHLOROQUINE SULFATE 200 MG: 200 TABLET ORAL at 20:28

## 2025-03-28 RX ADMIN — LEVOTHYROXINE SODIUM 175 MCG: 175 TABLET ORAL at 04:17

## 2025-03-28 RX ADMIN — Medication 1 TABLET: at 08:42

## 2025-03-28 RX ADMIN — MORPHINE SULFATE 2 MG: 2 INJECTION, SOLUTION INTRAMUSCULAR; INTRAVENOUS at 22:00

## 2025-03-28 RX ADMIN — METHYLPREDNISOLONE SODIUM SUCCINATE 40 MG: 40 INJECTION, POWDER, FOR SOLUTION INTRAMUSCULAR; INTRAVENOUS at 04:17

## 2025-03-28 RX ADMIN — GUAIFENESIN AND CODEINE PHOSPHATE 10 ML: 100; 10 SOLUTION ORAL at 08:42

## 2025-03-28 RX ADMIN — Medication 1 TABLET: at 20:28

## 2025-03-28 RX ADMIN — GUAIFENESIN 600 MG: 600 TABLET, MULTILAYER, EXTENDED RELEASE ORAL at 08:42

## 2025-03-28 RX ADMIN — CETIRIZINE HYDROCHLORIDE 10 MG: 10 TABLET, FILM COATED ORAL at 08:42

## 2025-03-28 RX ADMIN — HYDROXYCHLOROQUINE SULFATE 200 MG: 200 TABLET ORAL at 08:42

## 2025-03-28 RX ADMIN — BENZONATATE 200 MG: 100 CAPSULE ORAL at 08:42

## 2025-03-28 RX ADMIN — Medication 10 ML: at 08:44

## 2025-03-28 RX ADMIN — AMITRIPTYLINE HYDROCHLORIDE 25 MG: 25 TABLET, FILM COATED ORAL at 20:28

## 2025-03-28 NOTE — PROGRESS NOTES
[facility] MEDICINE SERVICE  DAILY PROGRESS NOTE    NAME: Tracie Gross  : 1960  MRN: [id number]      LOS: 13 days     PROVIDER OF SERVICE: Santa Pierre MD    Chief Complaint: SOB (shortness of breath)    Subjective:     Interval History:  History taken from: patient    Complaining of shortness of breath and cough.    Review of Systems:   Review of Systems   All other systems reviewed and are negative.      Objective:     Vital Signs  Temp:  [97.6 °F (36.4 °C)-98.8 °F (37.1 °C)] 98.1 °F (36.7 °C)  Heart Rate:  [75-95] 75  Resp:  [12-26] 17  BP: (140-173)/(81-99) 173/96  Flow (L/min) (Oxygen Therapy):  [2] 2   Body mass index is 40.38 kg/m².    Physical Exam  Physical Exam  Constitutional:       Appearance: Normal appearance.   HENT:      Head: Normocephalic and atraumatic.      Nose: Nose normal.      Mouth/Throat:      Mouth: Mucous membranes are moist.   Eyes:      Extraocular Movements: Extraocular movements intact.      Pupils: Pupils are equal, round, and reactive to light.   Cardiovascular:      Rate and Rhythm: Normal rate and regular rhythm.   Pulmonary:      Effort: Pulmonary effort is normal.      Breath sounds: Normal breath sounds.   Abdominal:      General: Abdomen is flat. Bowel sounds are normal.      Palpations: Abdomen is soft.   Musculoskeletal:         General: Normal range of motion.      Cervical back: Normal range of motion and neck supple.   Skin:     General: Skin is warm and dry.   Neurological:      General: No focal deficit present.      Mental Status: She is alert and oriented to person, place, and time.   Psychiatric:         Mood and Affect: Mood normal.         Behavior: Behavior normal.         Thought Content: Thought content normal.         Judgment: Judgment normal.         Current Medications:  Scheduled Meds:amitriptyline, 25 mg, Oral, Nightly  benzonatate, 200 mg, Oral, TID  budesonide, 0.5 mg, Nebulization, BID - RT  calcium 500 mg vitamin D 5 mcg (200  UT), 1 tablet, Oral, BID  cetirizine, 10 mg, Oral, Daily  furosemide, 40 mg, Oral, Daily  guaiFENesin, 600 mg, Oral, Q12H  hydroxychloroquine, 200 mg, Oral, BID  lamoTRIgine, 200 mg, Oral, Nightly  levothyroxine, 175 mcg, Oral, Q AM  [Held by provider] mycophenolate, 500 mg, Oral, Q12H  [START ON 3/29/2025] predniSONE, 40 mg, Oral, Daily With Breakfast  QUEtiapine, 50 mg, Oral, Nightly  sodium chloride, 10 mL, Intravenous, Q12H      Continuous Infusions:     PRN Meds:.  acetaminophen **OR** acetaminophen **OR** acetaminophen    ALPRAZolam    aluminum-magnesium hydroxide-simethicone    senna-docusate sodium **AND** polyethylene glycol **AND** bisacodyl **AND** bisacodyl    Calcium Replacement - Follow Nurse / BPA Driven Protocol    cyclobenzaprine    guaiFENesin-codeine    ipratropium-albuterol    Magnesium Standard Dose Replacement - Follow Nurse / BPA Driven Protocol    meclizine    melatonin    Morphine    nitroglycerin    ondansetron ODT **OR** [DISCONTINUED] ondansetron    oxyCODONE    Phosphorus Replacement - Follow Nurse / BPA Driven Protocol    Potassium Replacement - Follow Nurse / BPA Driven Protocol    [COMPLETED] Insert Peripheral IV **AND** sodium chloride    sodium chloride    sodium chloride       Diagnostic Data    Results from last 7 days   Lab Units 03/28/25  0424 03/27/25  1554 03/27/25  0408   WBC 10*3/mm3  --   --  12.97*   HEMOGLOBIN g/dL  --   --  10.7*   HEMATOCRIT %  --   --  32.8*   PLATELETS 10*3/mm3  --   --  180   GLUCOSE mg/dL 164*  --  190*   CREATININE mg/dL 1.16*  --  1.17*   BUN mg/dL 43*  --  41*   SODIUM mmol/L 143  --  143   POTASSIUM mmol/L 4.1   < > 3.4*   ANION GAP mmol/L 7.9  --  9.0    < > = values in this interval not displayed.       No radiology results for the last day          I reviewed the patient's new clinical results.    Assessment/Plan:     Active and Resolved Problems  Active Hospital Problems    Diagnosis  POA    **SOB (shortness of breath) [R06.02]  Yes     Dyspnea [R06.00]  Unknown    Pulmonary fibrosis [J84.10]  Unknown      Resolved Hospital Problems   No resolved problems to display.       Acute hypoxic respiratory failure not on home oxygen but currently on 2 L of oxygen via nasal cannula   Pneumonia  Hyperkalemia  Recently diagnosed pulmonary fibrosis  DANIELLE on CKD 3  UTI  Hypertension  Hypothyroidism      -acute hypoxic respiratory failure likely secondary to pneumonia plus underlying pulmonary fibrosis.  Urine strep pneumo antigen is positive.  Has completed ceftriaxone for treatment of likely Streptococcus pneumonia.  Blood cultures and sputum culture negative.  Feeling better today.  Chest x-ray showed worsening findings.  Status post bronchoscopy on 03/24/2025.  Follow-up on cultures.  On high-dose IV steroids per pulmonary.    -Renal function is improving.  Appreciate nephrology's input.    - Urine culture showed no growth ruling out UTI    - Continue current management.      VTE Prophylaxis:  Mechanical VTE prophylaxis orders are present.       Disposition Planning:     Barriers to Discharge: Pending clinical improvement  Anticipated Date of Discharge: 3/30/2025  Place of Discharge: Home      Code Status and Medical Interventions: CPR (Attempt to Resuscitate); Full Support   Ordered at: 03/15/25 1955     Code Status (Patient has no pulse and is not breathing):    CPR (Attempt to Resuscitate)     Medical Interventions (Patient has pulse or is breathing):    Full Support       Signature: Electronically signed by Santa Pierre MD, 03/28/25, 13:06 EDT.  [facility] Hospitalist Team

## 2025-03-28 NOTE — CASE MANAGEMENT/SOCIAL WORK
Continued Stay Note  [facility]     Patient Name: Tracie Gross  MRN: [id number]  Today's Date: 3/28/2025    Admit Date: 3/15/2025    Plan: Home with family & [facility] (accepted, need order).   Discharge Plan       Row Name 03/28/25 0928       Plan    Plan Comments DC Barriers: 2L O2, Cr 1.16, WBC 12.97, s/p bronch 3/24-pending cx, high dose IV steroids per pulm             Gabriela Bermeo RN      [facility]  Office: [phone]  Cell: [phone]  Fax # [phone]

## 2025-03-28 NOTE — PROGRESS NOTES
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 No wolf honeycombing fibrosis  Patient was treated with mycophenolate, prophylactic Bactrim     PFT 1/14/2025   FVC 1 1.6 L/51%,  FEV1 1.4 L / 58%, ratio 88, TLC 57%, RV 50%, DLCO 51%     PFTs July 2021,   FEV1 1.7 L 68% improve 75% post bronchodilator, FEV1/ FVC ratio 87, TLC 72%, RV 74%, DLCO 56%     PFTs in 2017   FEV1 66-70% predicted which is unchanged since 2012  quit smoking 1994,      PFT 2012 and 2017,   FEV-1 and TLC 75% , suggestive of mild to mod restrictive lung disease     2D echo  2018 normal RVSP and EF  2D echo May 2020 no pulmonary hypertension   2d echo July 2021 RVSP 30  2D echo January 2025 no pulmonary hypertension EF 60%     FERN, AHI 14.5 ,       No Response to inhalers including Breztri and Trelegy     Recommendations:    weaning steroids DC IV Solu-Medrol   P.o. prednisone 40 mg daily for 7 days then 20 mg daily for 14 days then 20 mg daily for 7 days    oxygen titration currently on room air at rest however requires 2 L with activity and sleep    Antibiotics completed Rocephin 2 g IV daily for 5 days  Azithromycin 500 mg p.o. daily for 3 days      Seen by GI for recurrent \" chocking\"     Holding mycophenolate for treatment of pneumonia     Bronchodilators Pulmicort and DuoNeb     On Plaquenil 200 mg twice daily        CT chest 3/23/2025 compared to January 2025         Chest x-ray " 3/15/2025      VQ scan 3/16/2025       High-res CT scan January 2025             LOS: 13 days     Subjective         Objective     Vital signs for last 24 hours:  Vitals:    03/28/25 0420 03/28/25 0656 03/28/25 0659 03/28/25 0828   BP: 140/82   161/91   BP Location: Left arm   Left arm   Patient Position: Lying   Lying   Pulse: 84 86 82 85   Resp: 13 16  18   Temp: 97.6 °F (36.4 °C)   98.1 °F (36.7 °C)   TempSrc: Oral   Oral   SpO2: 96% 99% 99% 97%   Weight:       Height:           Intake/Output last 3 shifts:  I/O last 3 completed shifts:  In: 480 [P.O.:480]  Out: -   Intake/Output this shift:  No intake/output data recorded.      Radiology  Imaging Results (Last 24 Hours)       Procedure Component Value Units Date/Time    IR insert non-tunneled central line 5+ [[id number]] Collected: 03/28/25 0854     Updated: 03/28/25 0858    Narrative:      DATE OF EXAM:  3/24/2025 8:40 AM EDT    PROCEDURE:  IR INSERT NON-TUNNELED CENTRAL LINE 5+    INDICATIONS:  Difficult IV Access    COMPARISON:  No Comparisons Available    FLUOROSCOPIC TIME:  6 seconds    PHYSICIAN MONITORED CONSCIOUS SEDATION TIME:  None minutes    TECHNIQUE:   A detailed explanation of the procedure, including the risks, benefits, and alternatives was provided. A preprocedure timeout was performed. The interventional radiology nurse monitored the patient at all times. The patient was placed supine on the   fluoroscopy table and the left neck was ultrasounded, demonstrating a patent left internal jugular vein, and an image was obtained. The neck was then prepped and draped using maximum sterile barrier technique. The skin was anesthetized with 1% lidocaine,   and under direct ultrasound guidance the access needle was advanced into the left internal jugular vein. A guidewire was manipulated centrally.    The tract was serially dilated over the guidewire and a triple-lumen central venous catheter was advanced over the wire. Fluoroscopy confirmed appropriate  position. The catheter was secured to the skin with Prolene. The patient tolerated the procedure   well without immediate complication.      FINDINGS:  See above      Impression:      Successful placement of left internal jugular central venous catheter.        Electronically Signed: Reji Romero MD    3/28/2025 8:55 AM EDT    Workstation ID: [id number]            Labs:  Results from last 7 days   Lab Units 03/27/25  0408   WBC 10*3/mm3 12.97*   HEMOGLOBIN g/dL 10.7*   HEMATOCRIT % 32.8*   PLATELETS 10*3/mm3 180     Results from last 7 days   Lab Units 03/28/25  0424   SODIUM mmol/L 143   POTASSIUM mmol/L 4.1   CHLORIDE mmol/L 101   CO2 mmol/L 34.1*   BUN mg/dL 43*   CREATININE mg/dL 1.16*   CALCIUM mg/dL 10.0   GLUCOSE mg/dL 164*                       Results from last 7 days   Lab Units 03/24/25  1033   LANA  Negative                           Meds:   SCHEDULE  amitriptyline, 25 mg, Oral, Nightly  benzonatate, 200 mg, Oral, TID  budesonide, 0.5 mg, Nebulization, BID - RT  calcium 500 mg vitamin D 5 mcg (200 UT), 1 tablet, Oral, BID  cetirizine, 10 mg, Oral, Daily  furosemide, 40 mg, Oral, Daily  guaiFENesin, 600 mg, Oral, Q12H  hydroxychloroquine, 200 mg, Oral, BID  lamoTRIgine, 200 mg, Oral, Nightly  levothyroxine, 175 mcg, Oral, Q AM  methylPREDNISolone sodium succinate, 40 mg, Intravenous, Q6H  [Held by provider] mycophenolate, 500 mg, Oral, Q12H  QUEtiapine, 50 mg, Oral, Nightly  sodium chloride, 10 mL, Intravenous, Q12H      Infusions       PRNs    acetaminophen **OR** acetaminophen **OR** acetaminophen    ALPRAZolam    aluminum-magnesium hydroxide-simethicone    senna-docusate sodium **AND** polyethylene glycol **AND** bisacodyl **AND** bisacodyl    Calcium Replacement - Follow Nurse / BPA Driven Protocol    cyclobenzaprine    guaiFENesin-codeine    ipratropium-albuterol    Magnesium Standard Dose Replacement - Follow Nurse / BPA Driven Protocol    meclizine    melatonin    Morphine    nitroglycerin     ondansetron ODT **OR** [DISCONTINUED] ondansetron    oxyCODONE    Phosphorus Replacement - Follow Nurse / BPA Driven Protocol    Potassium Replacement - Follow Nurse / BPA Driven Protocol    [COMPLETED] Insert Peripheral IV **AND** sodium chloride    sodium chloride    sodium chloride    Physical Exam:  Physical Exam  Cardiovascular:      Heart sounds: Murmur heard.      No gallop.   Pulmonary:      Effort: No respiratory distress.      Breath sounds: No stridor. Rhonchi and rales present. No wheezing.   Chest:      Chest wall: No tenderness.         ROS  Review of Systems   Respiratory:  Positive for cough and shortness of breath. Negative for wheezing and stridor.    Cardiovascular:  Positive for palpitations. Negative for chest pain and leg swelling.             Total critical care time spent with patient greater than: 45 Minutes

## 2025-03-28 NOTE — PLAN OF CARE
Goal Outcome Evaluation:     Pt resting this shift. Pt able to remove oxygen for some time and tolerated well. Oxygen off and on overnight, with sleep. Currently on 2L NC. K WNL today. Pt able to make needs known. VSS. No complaints at this time.              Problem: Adult Inpatient Plan of Care  Goal: Plan of Care Review  Outcome: Progressing  Goal: Patient-Specific Goal (Individualized)  Outcome: Progressing  Goal: Absence of Hospital-Acquired Illness or Injury  Outcome: Progressing  Intervention: Identify and Manage Fall Risk  Recent Flowsheet Documentation  Taken 3/28/2025 0415 by Betsy Rojas RN  Safety Promotion/Fall Prevention:   safety round/check completed   room organization consistent   nonskid shoes/slippers when out of bed   fall prevention program maintained   clutter free environment maintained   assistive device/personal items within reach  Taken 3/28/2025 0215 by Betsy Rojas RN  Safety Promotion/Fall Prevention:   safety round/check completed   room organization consistent   nonskid shoes/slippers when out of bed   fall prevention program maintained   clutter free environment maintained   assistive device/personal items within reach  Taken 3/28/2025 0030 by Betsy Rojas RN  Safety Promotion/Fall Prevention:   safety round/check completed   room organization consistent   nonskid shoes/slippers when out of bed   fall prevention program maintained   clutter free environment maintained   assistive device/personal items within reach  Taken 3/27/2025 2230 by Betsy Rojas RN  Safety Promotion/Fall Prevention:   safety round/check completed   room organization consistent   nonskid shoes/slippers when out of bed   fall prevention program maintained   clutter free environment maintained   assistive device/personal items within reach  Taken 3/27/2025 2015 by Betsy Rojas RN  Safety Promotion/Fall Prevention:   safety round/check completed   room organization  consistent   nonskid shoes/slippers when out of bed   fall prevention program maintained   clutter free environment maintained   assistive device/personal items within reach  Intervention: Prevent Skin Injury  Recent Flowsheet Documentation  Taken 3/28/2025 0415 by Betsy Rojas RN  Body Position: weight shifting  Skin Protection: transparent dressing maintained  Taken 3/28/2025 0215 by Betsy Rojas RN  Body Position:   sitting up in bed   weight shifting  Taken 3/28/2025 0030 by Betsy Rojas RN  Body Position: weight shifting  Skin Protection: transparent dressing maintained  Taken 3/27/2025 2230 by Betsy Rojas RN  Body Position:   position changed independently   supine   weight shifting  Taken 3/27/2025 2015 by Betsy Rojas RN  Body Position:   position changed independently   sitting up in bed  Skin Protection: transparent dressing maintained  Intervention: Prevent and Manage VTE (Venous Thromboembolism) Risk  Recent Flowsheet Documentation  Taken 3/27/2025 2015 by Betsy Rojas RN  VTE Prevention/Management: (BLE edema)   bilateral   SCDs (sequential compression devices) off  Intervention: Prevent Infection  Recent Flowsheet Documentation  Taken 3/28/2025 0415 by Betsy Rojas RN  Infection Prevention:   single patient room provided   personal protective equipment utilized   hand hygiene promoted   equipment surfaces disinfected   rest/sleep promoted  Taken 3/28/2025 0215 by Betsy Rojas RN  Infection Prevention:   single patient room provided   personal protective equipment utilized   hand hygiene promoted   equipment surfaces disinfected   rest/sleep promoted  Taken 3/28/2025 0030 by Betsy Rojas RN  Infection Prevention:   single patient room provided   personal protective equipment utilized   hand hygiene promoted   equipment surfaces disinfected   rest/sleep promoted  Taken 3/27/2025 2230 by Betsy Rojas RN  Infection  Prevention:   single patient room provided   personal protective equipment utilized   hand hygiene promoted   equipment surfaces disinfected   rest/sleep promoted  Taken 3/27/2025 2015 by Betsy Rojas RN  Infection Prevention:   single patient room provided   personal protective equipment utilized   hand hygiene promoted   equipment surfaces disinfected   rest/sleep promoted  Goal: Optimal Comfort and Wellbeing  Outcome: Progressing  Intervention: Monitor Pain and Promote Comfort  Recent Flowsheet Documentation  Taken 3/28/2025 0415 by Betsy Rojas RN  Pain Management Interventions:   care clustered   pain management plan reviewed with patient/caregiver   pillow support provided   position adjusted   quiet environment facilitated   relaxation techniques promoted  Taken 3/28/2025 0030 by Betsy Rojas RN  Pain Management Interventions:   care clustered   pain management plan reviewed with patient/caregiver   pillow support provided   position adjusted   quiet environment facilitated   relaxation techniques promoted  Taken 3/27/2025 2015 by Betsy Rojas RN  Pain Management Interventions:   care clustered   pain management plan reviewed with patient/caregiver   pillow support provided   position adjusted   quiet environment facilitated   relaxation techniques promoted   pain medication given  Intervention: Provide Person-Centered Care  Recent Flowsheet Documentation  Taken 3/27/2025 2015 by Betsy Rojas RN  Trust Relationship/Rapport:   care explained   questions answered   questions encouraged  Goal: Readiness for Transition of Care  Outcome: Progressing

## 2025-03-28 NOTE — PLAN OF CARE
Assessment: Tracie Gross presents with functional mobility impairments which indicate the need for skilled intervention. Pt's strength has regressed and dizziness more prevalent than previous sessions. Likely related to increased BLE edema being managed per nephrologist. Pt required min-modA for sit to stands and able to take side steps along the bed, but declined fwd/bkwd steps r/t BLE shaking. Recommend 2nd person for chair follow to improve patient's confidence and safety with ambulation. Tolerating session today without incident. Will continue to follow and progress as tolerated.     Anticipated Discharge Disposition (PT): home with 24/7 care

## 2025-03-28 NOTE — THERAPY TREATMENT NOTE
"Subjective: Pt agreeable to therapeutic plan of care. Pt reports feeling weaker and having increased swelling in her legs.    Objective:     Precautions - falls; BLE edema    Bed mobility - SBA for supine>sit EOB with bed-rails; min-A for BLE support sit>supine.    Transfers - Min-A, Mod-A, and with rolling walker and high bed height for sit to stands.    Ambulation - 2 feet CGA and with rolling walker for lateral steps along EOB.     Therapeutic Exercise - BLE: ankle pumps in supine x15, LAQ in sitting x10, mini squats and active-assisted single leg heal raise weight-shifts x10.    Vitals: WNL    Pain: 0     Education: Provided education on the importance of mobility in the acute care setting, Verbal/Tactile Cues, Transfer Training, Gait Training, and Energy conservation strategies    Assessment: Tracie Gross presents with functional mobility impairments which indicate the need for skilled intervention. Pt's strength has regressed and dizziness more prevalent than previous sessions. Likely related to increased BLE edema being managed per nephrologist. Pt required min-modA for sit to stands and able to take side steps along the bed, but declined fwd/bkwd steps r/t BLE shaking. Recommend 2nd person for chair follow to improve patient's confidence and safety with ambulation. Tolerating session today without incident. Will continue to follow and progress as tolerated.     Plan/Recommendations:   If medically appropriate, Low Intensity Therapy recommended post-acute care - This is recommended as therapy feels this patient would require 2-3 visits per week. OP or HH would be the best option depending on patient's home bound status. Consider, if the patient has other  \"skilled\" needs such as wounds, IV antibiotics, etc. Combined with \"low intensity\" could also equate to a SNF. If patient is medically complex, consider LTAC. Pt requires no DME at discharge.     Pt desires home with 24/7 care at discharge. Pt cooperative; " agreeable to therapeutic recommendations and plan of care.         Basic Mobility 6-click:  Rollin = Total, A lot = 2, A little = 3; 4 = None  Supine>Sit:   1 = Total, A lot = 2, A little = 3; 4 = None   Sit>Stand with arms:  1 = Total, A lot = 2, A little = 3; 4 = None  Bed>Chair:   1 = Total, A lot = 2, A little = 3; 4 = None  Ambulate in room:  1 = Total, A lot = 2, A little = 3; 4 = None  3-5 Steps with railin = Total, A lot = 2, A little = 3; 4 = None  Score: 17    Modified [place]: N/A = No pre-op stroke/TIA    Post-Tx Position: Supine with HOB Elevated and Call light and personal items within reach  PPE: gloves    Therapy Charges for Today       Code Description Service Date Service Provider Modifiers Qty    81418857276 HC PT THERAPEUTIC ACT EA 15 MIN 3/28/2025 Kaitlin Pizarro, PT GP 1    46074077666 HC PT THER PROC EA 15 MIN 3/28/2025 Kaitlin Pizarro, PT GP 1    90457128854 HC PT NEUROMUSC RE EDUCATION EA 15 MIN 3/28/2025 Kaitlin Pizarro, PT GP 1           PT Charges       Row Name 25 1544             Time Calculation    Start Time 1321  -OD      Stop Time 1345  -OD      Time Calculation (min) 24 min  -OD      PT Received On 25  -OD      PT - Next Appointment 25  -OD         Time Calculation- PT    Total Timed Code Minutes- PT 24 minute(s)  -OD                User Key  (r) = Recorded By, (t) = Taken By, (c) = Cosigned By      Initials Name Provider Type    OD Kaitlin Pizarro PT Physical Therapist

## 2025-03-28 NOTE — PROGRESS NOTES
Nephrology  Progress Note                                        [facility]    Patient Identification    Name: Tracie Gross  Age: 64 y.o.  Sex: female  :  1960  MRN: [id number]      DATE OF SERVICE:  3/28/2025        Subective    Noted cough and shortness of breath or     Objective   Scheduled Meds:amitriptyline, 25 mg, Oral, Nightly  benzonatate, 200 mg, Oral, TID  budesonide, 0.5 mg, Nebulization, BID - RT  calcium 500 mg vitamin D 5 mcg (200 UT), 1 tablet, Oral, BID  cetirizine, 10 mg, Oral, Daily  furosemide, 40 mg, Oral, Daily  guaiFENesin, 600 mg, Oral, Q12H  hydroxychloroquine, 200 mg, Oral, BID  lamoTRIgine, 200 mg, Oral, Nightly  levothyroxine, 175 mcg, Oral, Q AM  methylPREDNISolone sodium succinate, 40 mg, Intravenous, Q6H  [Held by provider] mycophenolate, 500 mg, Oral, Q12H  QUEtiapine, 50 mg, Oral, Nightly  sodium chloride, 10 mL, Intravenous, Q12H          Continuous Infusions:       PRN Meds:  acetaminophen **OR** acetaminophen **OR** acetaminophen    ALPRAZolam    aluminum-magnesium hydroxide-simethicone    senna-docusate sodium **AND** polyethylene glycol **AND** bisacodyl **AND** bisacodyl    Calcium Replacement - Follow Nurse / BPA Driven Protocol    cyclobenzaprine    guaiFENesin-codeine    ipratropium-albuterol    Magnesium Standard Dose Replacement - Follow Nurse / BPA Driven Protocol    meclizine    melatonin    Morphine    nitroglycerin    ondansetron ODT **OR** [DISCONTINUED] ondansetron    oxyCODONE    Phosphorus Replacement - Follow Nurse / BPA Driven Protocol    Potassium Replacement - Follow Nurse / BPA Driven Protocol    [COMPLETED] Insert Peripheral IV **AND** sodium chloride    sodium chloride    sodium chloride     Exam:  /82 (BP Location: Left arm, Patient Position: Lying)   Pulse 82   Temp 97.6 °F (36.4 °C) (Oral)   " Resp 16   Ht 162.6 cm (64\")   Wt 107 kg (235 lb 3.7 oz)   LMP 05/08/1983   SpO2 99%   BMI 40.38 kg/m²     Intake/Output last 3 shifts:  I/O last 3 completed shifts:  In: 480 [P.O.:480]  Out: -     Intake/Output this shift:  No intake/output data recorded.    Physical exam:  General Appearance:  Alert  Head:  Normocephalic, without obvious abnormality, atraumatic  Eyes:  PERRL, conjunctiva/corneas clear     Neck:  Supple,  no adenopathy;      Lungs:  Decreased BS occasion ronchi  Heart:  Regular rate and rhythm, S1 and S2 normal  Abdomen:  Soft, non-tender, bowel sounds active   Extremities: ++ edema  Pulses: 2+ and symmetric all extremities  Skin:  No rashes or lesions       Data Review:  All labs (24hrs):   Recent Results (from the past 24 hours)   Potassium    Collection Time: 03/27/25  3:54 PM    Specimen: Blood   Result Value Ref Range    Potassium 3.9 3.5 - 5.2 mmol/L   Basic Metabolic Panel    Collection Time: 03/28/25  4:24 AM    Specimen: Neck; Blood   Result Value Ref Range    Glucose 164 (H) 65 - 99 mg/dL    BUN 43 (H) 8 - 23 mg/dL    Creatinine 1.16 (H) 0.57 - 1.00 mg/dL    Sodium 143 136 - 145 mmol/L    Potassium 4.1 3.5 - 5.2 mmol/L    Chloride 101 98 - 107 mmol/L    CO2 34.1 (H) 22.0 - 29.0 mmol/L    Calcium 10.0 8.6 - 10.5 mg/dL    BUN/Creatinine Ratio 37.1 (H) 7.0 - 25.0    Anion Gap 7.9 5.0 - 15.0 mmol/L    eGFR 52.1 (L) >60.0 mL/min/1.73   Lavender Top    Collection Time: 03/28/25  4:24 AM   Result Value Ref Range    Extra Tube hold for add-on           Imaging:  CT Chest Without Contrast Diagnostic  Result Date: 3/23/2025  Impression: 1.Multifocal patchy opacities noted throughout the lungs bilaterally, worse in the perihilar region. Findings are concerning for multifocal pneumonia. Less likely, this may represent pulmonary hemorrhage 2.Additional nonemergent findings as characterized above 3.Scattered prominent to mildly enlarged mediastinal lymph nodes, likely reactive. Electronically " Signed: Rafael Suzi, DO  3/23/2025 5:15 PM EDT  Workstation ID: [id number]    XR Chest 1 View  Result Date: 3/23/2025  Impression: 1.Worsening multifocal patchy opacities scattered throughout the lungs. This most likely represents multifocal pneumonia, although pulmonary edema is also a possibility 2.Trace bilateral pleural effusions. Electronically Signed: aRfael Suzi, DO  3/23/2025 12:30 PM EDT  Workstation ID: [id number]      Assessment/Plan:     SOB (shortness of breath)    Pulmonary fibrosis    Dyspnea         Acute kidney injury on top of chronic kidney disease stage III  Acute hypoxic respiratory failure  Pulmonary fibrosis  Urinary tract infection  Anxiety disorder  Hypertension  Hyperlipidemia  Hypothyroidism  History of von Willebrand disease with no bleeding        Recommendations:   Increased Lasix dose to 40 mg due to increased edema  Kidney function stable creatinine 1.1    Discussed with the patient and family at bedside

## 2025-03-28 NOTE — PLAN OF CARE
Goal Outcome Evaluation:      Now on PO steroids.     Progress: improving

## 2025-03-29 LAB
ANION GAP SERPL CALCULATED.3IONS-SCNC: 6.2 MMOL/L (ref 5–15)
BUN SERPL-MCNC: 38 MG/DL (ref 8–23)
BUN/CREAT SERPL: 35.2 (ref 7–25)
CALCIUM SPEC-SCNC: 9.9 MG/DL (ref 8.6–10.5)
CHLORIDE SERPL-SCNC: 98 MMOL/L (ref 98–107)
CO2 SERPL-SCNC: 35.8 MMOL/L (ref 22–29)
CREAT SERPL-MCNC: 1.08 MG/DL (ref 0.57–1)
DEPRECATED RDW RBC AUTO: 49 FL (ref 37–54)
EGFRCR SERPLBLD CKD-EPI 2021: 56.8 ML/MIN/1.73
ERYTHROCYTE [DISTWIDTH] IN BLOOD BY AUTOMATED COUNT: 14.3 % (ref 12.3–15.4)
GLUCOSE SERPL-MCNC: 115 MG/DL (ref 65–99)
HCT VFR BLD AUTO: 34.3 % (ref 34–46.6)
HGB BLD-MCNC: 11.2 G/DL (ref 12–15.9)
MCH RBC QN AUTO: 31.1 PG (ref 26.6–33)
MCHC RBC AUTO-ENTMCNC: 32.7 G/DL (ref 31.5–35.7)
MCV RBC AUTO: 95.3 FL (ref 79–97)
PLATELET # BLD AUTO: 176 10*3/MM3 (ref 140–450)
PMV BLD AUTO: 12.9 FL (ref 6–12)
POTASSIUM SERPL-SCNC: 3.2 MMOL/L (ref 3.5–5.2)
POTASSIUM SERPL-SCNC: 4.2 MMOL/L (ref 3.5–5.2)
RBC # BLD AUTO: 3.6 10*6/MM3 (ref 3.77–5.28)
SODIUM SERPL-SCNC: 140 MMOL/L (ref 136–145)
WBC NRBC COR # BLD AUTO: 18.2 10*3/MM3 (ref 3.4–10.8)

## 2025-03-29 PROCEDURE — 63710000001 PREDNISONE PER 1 MG: Performed by: INTERNAL MEDICINE

## 2025-03-29 PROCEDURE — 94761 N-INVAS EAR/PLS OXIMETRY MLT: CPT

## 2025-03-29 PROCEDURE — 80048 BASIC METABOLIC PNL TOTAL CA: CPT | Performed by: INTERNAL MEDICINE

## 2025-03-29 PROCEDURE — 94799 UNLISTED PULMONARY SVC/PX: CPT

## 2025-03-29 PROCEDURE — 85027 COMPLETE CBC AUTOMATED: CPT | Performed by: INTERNAL MEDICINE

## 2025-03-29 PROCEDURE — 25010000002 MORPHINE PER 10 MG: Performed by: INTERNAL MEDICINE

## 2025-03-29 PROCEDURE — 84132 ASSAY OF SERUM POTASSIUM: CPT | Performed by: INTERNAL MEDICINE

## 2025-03-29 PROCEDURE — 94664 DEMO&/EVAL PT USE INHALER: CPT

## 2025-03-29 RX ORDER — POTASSIUM CHLORIDE 1500 MG/1
40 TABLET, EXTENDED RELEASE ORAL EVERY 4 HOURS
Status: COMPLETED | OUTPATIENT
Start: 2025-03-29 | End: 2025-03-29

## 2025-03-29 RX ADMIN — Medication 5 MG: at 20:29

## 2025-03-29 RX ADMIN — MORPHINE SULFATE 2 MG: 2 INJECTION, SOLUTION INTRAMUSCULAR; INTRAVENOUS at 21:45

## 2025-03-29 RX ADMIN — POTASSIUM CHLORIDE 40 MEQ: 1500 TABLET, EXTENDED RELEASE ORAL at 10:41

## 2025-03-29 RX ADMIN — OXYCODONE HYDROCHLORIDE 10 MG: 5 TABLET ORAL at 21:45

## 2025-03-29 RX ADMIN — CETIRIZINE HYDROCHLORIDE 10 MG: 10 TABLET, FILM COATED ORAL at 10:41

## 2025-03-29 RX ADMIN — GUAIFENESIN AND CODEINE PHOSPHATE 10 ML: 100; 10 SOLUTION ORAL at 16:28

## 2025-03-29 RX ADMIN — GUAIFENESIN 600 MG: 600 TABLET, MULTILAYER, EXTENDED RELEASE ORAL at 20:29

## 2025-03-29 RX ADMIN — GUAIFENESIN 600 MG: 600 TABLET, MULTILAYER, EXTENDED RELEASE ORAL at 10:41

## 2025-03-29 RX ADMIN — AMITRIPTYLINE HYDROCHLORIDE 25 MG: 25 TABLET, FILM COATED ORAL at 20:29

## 2025-03-29 RX ADMIN — GUAIFENESIN AND CODEINE PHOSPHATE 10 ML: 100; 10 SOLUTION ORAL at 01:42

## 2025-03-29 RX ADMIN — Medication 1 TABLET: at 10:41

## 2025-03-29 RX ADMIN — HYDROXYCHLOROQUINE SULFATE 200 MG: 200 TABLET ORAL at 20:29

## 2025-03-29 RX ADMIN — BUDESONIDE 0.5 MG: 0.5 INHALANT RESPIRATORY (INHALATION) at 07:23

## 2025-03-29 RX ADMIN — Medication 1 TABLET: at 20:29

## 2025-03-29 RX ADMIN — POTASSIUM CHLORIDE 40 MEQ: 1500 TABLET, EXTENDED RELEASE ORAL at 05:26

## 2025-03-29 RX ADMIN — LEVOTHYROXINE SODIUM 175 MCG: 175 TABLET ORAL at 05:26

## 2025-03-29 RX ADMIN — HYDROXYCHLOROQUINE SULFATE 200 MG: 200 TABLET ORAL at 10:41

## 2025-03-29 RX ADMIN — QUETIAPINE FUMARATE 50 MG: 25 TABLET ORAL at 20:29

## 2025-03-29 RX ADMIN — LAMOTRIGINE 200 MG: 100 TABLET ORAL at 20:29

## 2025-03-29 RX ADMIN — Medication 10 ML: at 20:29

## 2025-03-29 RX ADMIN — BUDESONIDE 0.5 MG: 0.5 INHALANT RESPIRATORY (INHALATION) at 21:00

## 2025-03-29 RX ADMIN — BENZONATATE 200 MG: 100 CAPSULE ORAL at 16:28

## 2025-03-29 RX ADMIN — PREDNISONE 40 MG: 20 TABLET ORAL at 10:41

## 2025-03-29 RX ADMIN — CYCLOBENZAPRINE 10 MG: 10 TABLET, FILM COATED ORAL at 21:45

## 2025-03-29 RX ADMIN — BENZONATATE 200 MG: 100 CAPSULE ORAL at 20:29

## 2025-03-29 RX ADMIN — FUROSEMIDE 40 MG: 40 TABLET ORAL at 10:41

## 2025-03-29 RX ADMIN — BENZONATATE 200 MG: 100 CAPSULE ORAL at 10:41

## 2025-03-29 NOTE — PLAN OF CARE
Problem: Adult Inpatient Plan of Care  Goal: Plan of Care Review  Outcome: Progressing  Flowsheets (Taken 3/29/2025 0650)  Outcome Evaluation: Patient resting in bed this shift. PRN pain medication given x1. Potassium low in AM, first dose given. Call light in reach, able to make needs known.  Goal: Patient-Specific Goal (Individualized)  Outcome: Progressing  Goal: Absence of Hospital-Acquired Illness or Injury  Outcome: Progressing  Intervention: Identify and Manage Fall Risk  Recent Flowsheet Documentation  Taken 3/29/2025 0600 by Katey Mosquera RN  Safety Promotion/Fall Prevention:   safety round/check completed   room organization consistent   nonskid shoes/slippers when out of bed   lighting adjusted   clutter free environment maintained   assistive device/personal items within reach  Taken 3/29/2025 0400 by Katey Mosquera RN  Safety Promotion/Fall Prevention:   safety round/check completed   room organization consistent   nonskid shoes/slippers when out of bed   lighting adjusted   clutter free environment maintained   assistive device/personal items within reach  Taken 3/29/2025 0000 by Katey Mosquera RN  Safety Promotion/Fall Prevention:   safety round/check completed   room organization consistent   nonskid shoes/slippers when out of bed   lighting adjusted   clutter free environment maintained   assistive device/personal items within reach  Taken 3/28/2025 2000 by Katey Mosquera RN  Safety Promotion/Fall Prevention:   safety round/check completed   room organization consistent   nonskid shoes/slippers when out of bed   lighting adjusted   clutter free environment maintained   assistive device/personal items within reach  Intervention: Prevent Skin Injury  Recent Flowsheet Documentation  Taken 3/29/2025 0400 by Katey Mosquera RN  Skin Protection:   incontinence pads utilized   transparent dressing maintained  Taken 3/29/2025 0000 by Katey Mosquera RN  Skin Protection: incontinence pads utilized  Taken  3/28/2025 2000 by Katey Mosquera RN  Skin Protection: incontinence pads utilized  Intervention: Prevent and Manage VTE (Venous Thromboembolism) Risk  Recent Flowsheet Documentation  Taken 3/28/2025 2000 by Katey Mosquera RN  VTE Prevention/Management:   SCDs (sequential compression devices) off   patient refused intervention  Intervention: Prevent Infection  Recent Flowsheet Documentation  Taken 3/29/2025 0600 by Katey Mosquera RN  Infection Prevention:   environmental surveillance performed   hand hygiene promoted   rest/sleep promoted   single patient room provided  Taken 3/29/2025 0400 by Katey Mosquera RN  Infection Prevention:   environmental surveillance performed   hand hygiene promoted   rest/sleep promoted   single patient room provided  Taken 3/29/2025 0000 by Katey Mosquera RN  Infection Prevention:   environmental surveillance performed   hand hygiene promoted   rest/sleep promoted   single patient room provided  Taken 3/28/2025 2000 by Katey Mosquera RN  Infection Prevention:   environmental surveillance performed   hand hygiene promoted   rest/sleep promoted   single patient room provided  Goal: Optimal Comfort and Wellbeing  Outcome: Progressing  Intervention: Monitor Pain and Promote Comfort  Recent Flowsheet Documentation  Taken 3/28/2025 2000 by Katey Mosquera RN  Pain Management Interventions: care clustered  Intervention: Provide Person-Centered Care  Recent Flowsheet Documentation  Taken 3/29/2025 0400 by Katey Mosquera RN  Trust Relationship/Rapport:   care explained   choices provided  Taken 3/29/2025 0000 by Katey Mosquera RN  Trust Relationship/Rapport:   care explained   choices provided  Taken 3/28/2025 2000 by Katey Mosquera RN  Trust Relationship/Rapport:   care explained   choices provided  Goal: Readiness for Transition of Care  Outcome: Progressing     Problem: Skin Injury Risk Increased  Goal: Skin Health and Integrity  Outcome: Progressing  Intervention: Optimize Skin Protection  Recent  Flowsheet Documentation  Taken 3/29/2025 0400 by Katey Mosquera RN  Pressure Reduction Techniques: frequent weight shift encouraged  Pressure Reduction Devices:   pressure-redistributing mattress utilized   positioning supports utilized  Skin Protection:   incontinence pads utilized   transparent dressing maintained  Taken 3/29/2025 0000 by Katey Mosquera RN  Pressure Reduction Techniques: frequent weight shift encouraged  Pressure Reduction Devices:   pressure-redistributing mattress utilized   positioning supports utilized  Skin Protection: incontinence pads utilized  Taken 3/28/2025 2000 by Katey Mosquera RN  Pressure Reduction Techniques: frequent weight shift encouraged  Pressure Reduction Devices:   pressure-redistributing mattress utilized   positioning supports utilized  Skin Protection: incontinence pads utilized     Problem: Comorbidity Management  Goal: Maintenance of Behavioral Health Symptom Control  Outcome: Progressing  Intervention: Maintain Behavioral Health Symptom Control  Recent Flowsheet Documentation  Taken 3/29/2025 0600 by Katey Mosquera RN  Medication Review/Management: medications reviewed  Taken 3/29/2025 0400 by Katey Mosquera RN  Medication Review/Management: medications reviewed  Taken 3/29/2025 0000 by Katey Mosquera RN  Medication Review/Management: medications reviewed  Taken 3/28/2025 2000 by Katey Mosquera RN  Medication Review/Management: medications reviewed  Goal: Blood Pressure in Desired Range  Outcome: Progressing  Intervention: Maintain Blood Pressure Management  Recent Flowsheet Documentation  Taken 3/29/2025 0600 by Katey Mosquera RN  Medication Review/Management: medications reviewed  Taken 3/29/2025 0400 by Katey Mosquera RN  Medication Review/Management: medications reviewed  Taken 3/29/2025 0000 by Katey Mosquera RN  Medication Review/Management: medications reviewed  Taken 3/28/2025 2000 by Katey Mosquera RN  Medication Review/Management: medications reviewed  Goal: Maintenance  of Osteoarthritis Symptom Control  Outcome: Progressing  Intervention: Maintain Osteoarthritis Symptom Control  Recent Flowsheet Documentation  Taken 3/29/2025 0600 by Katey Mosquera RN  Medication Review/Management: medications reviewed  Taken 3/29/2025 0400 by Katey Mosquera RN  Medication Review/Management: medications reviewed  Taken 3/29/2025 0000 by Katey Mosquera RN  Medication Review/Management: medications reviewed  Taken 3/28/2025 2000 by Katey Mosquera RN  Medication Review/Management: medications reviewed     Problem: Fall Injury Risk  Goal: Absence of Fall and Fall-Related Injury  Outcome: Progressing  Intervention: Identify and Manage Contributors  Recent Flowsheet Documentation  Taken 3/29/2025 0600 by Katey Mosquera RN  Medication Review/Management: medications reviewed  Taken 3/29/2025 0400 by Katey Mosquera RN  Medication Review/Management: medications reviewed  Taken 3/29/2025 0000 by Katey Mosquera RN  Medication Review/Management: medications reviewed  Taken 3/28/2025 2000 by Katey Mosquera RN  Medication Review/Management: medications reviewed  Intervention: Promote Injury-Free Environment  Recent Flowsheet Documentation  Taken 3/29/2025 0600 by Katey Mosquera RN  Safety Promotion/Fall Prevention:   safety round/check completed   room organization consistent   nonskid shoes/slippers when out of bed   lighting adjusted   clutter free environment maintained   assistive device/personal items within reach  Taken 3/29/2025 0400 by Katey Mosquera RN  Safety Promotion/Fall Prevention:   safety round/check completed   room organization consistent   nonskid shoes/slippers when out of bed   lighting adjusted   clutter free environment maintained   assistive device/personal items within reach  Taken 3/29/2025 0000 by Katey Mosquera RN  Safety Promotion/Fall Prevention:   safety round/check completed   room organization consistent   nonskid shoes/slippers when out of bed   lighting adjusted   clutter free environment  maintained   assistive device/personal items within reach  Taken 3/28/2025 2000 by Katey Mosquera RN  Safety Promotion/Fall Prevention:   safety round/check completed   room organization consistent   nonskid shoes/slippers when out of bed   lighting adjusted   clutter free environment maintained   assistive device/personal items within reach     Problem: Sepsis/Septic Shock  Goal: Optimal Coping  Outcome: Progressing  Intervention: Support Patient and Family Response  Recent Flowsheet Documentation  Taken 3/29/2025 0400 by Katey Mosquera RN  Supportive Measures: active listening utilized  Family/Support System Care: self-care encouraged  Taken 3/29/2025 0000 by Katey Mosquera RN  Supportive Measures: active listening utilized  Family/Support System Care: self-care encouraged  Taken 3/28/2025 2000 by Katey Mosquera RN  Supportive Measures: active listening utilized  Family/Support System Care: support provided  Goal: Absence of Bleeding  Outcome: Progressing  Goal: Blood Glucose Level Within Target Range  Outcome: Progressing  Goal: Absence of Infection Signs and Symptoms  Outcome: Progressing  Intervention: Initiate Sepsis Management  Recent Flowsheet Documentation  Taken 3/29/2025 0600 by Katey Mosquera RN  Infection Prevention:   environmental surveillance performed   hand hygiene promoted   rest/sleep promoted   single patient room provided  Taken 3/29/2025 0400 by Katey Mosquera RN  Infection Prevention:   environmental surveillance performed   hand hygiene promoted   rest/sleep promoted   single patient room provided  Taken 3/29/2025 0000 by Katey Mosquera RN  Infection Prevention:   environmental surveillance performed   hand hygiene promoted   rest/sleep promoted   single patient room provided  Taken 3/28/2025 2000 by Katey Mosquera RN  Infection Prevention:   environmental surveillance performed   hand hygiene promoted   rest/sleep promoted   single patient room provided  Intervention: Promote Recovery  Recent  Flowsheet Documentation  Taken 3/29/2025 0400 by Katey Mosquera, RN  Sleep/Rest Enhancement: awakenings minimized  Taken 3/29/2025 0000 by Katey Mosquera, RN  Sleep/Rest Enhancement: awakenings minimized  Taken 3/28/2025 2000 by Katey Mosquera, RN  Sleep/Rest Enhancement: consistent schedule promoted  Goal: Optimal Nutrition Delivery  Outcome: Progressing   Goal Outcome Evaluation:              Outcome Evaluation: Patient resting in bed this shift. PRN pain medication given x1. Potassium low in AM, first dose given. Call light in reach, able to make needs known.

## 2025-03-29 NOTE — PROGRESS NOTES
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 No wolf honeycombing fibrosis  Patient was treated with mycophenolate, prophylactic Bactrim     PFT 1/14/2025   FVC 1 1.6 L/51%,  FEV1 1.4 L / 58%, ratio 88, TLC 57%, RV 50%, DLCO 51%     PFTs July 2021,   FEV1 1.7 L 68% improve 75% post bronchodilator, FEV1/ FVC ratio 87, TLC 72%, RV 74%, DLCO 56%     PFTs in 2017   FEV1 66-70% predicted which is unchanged since 2012  quit smoking 1994,      PFT 2012 and 2017,   FEV-1 and TLC 75% , suggestive of mild to mod restrictive lung disease     2D echo  2018 normal RVSP and EF  2D echo May 2020 no pulmonary hypertension   2d echo July 2021 RVSP 30  2D echo January 2025 no pulmonary hypertension EF 60%     FERN, AHI 14.5 ,       No Response to inhalers including Breztri and Trelegy     Recommendations:    weaning steroids   P.o. prednisone 40 mg daily for 7 days then 20 mg daily for 14 days then 20 mg daily for 7 days    oxygen titration currently on room air at rest however requires 2 L with activity and sleep    Antibiotics completed Rocephin 2 g IV daily for 5 days  Azithromycin 500 mg p.o. daily for 3 days      Seen by GI for recurrent \" chocking\"     Holding mycophenolate for treatment of pneumonia     Bronchodilators Pulmicort and DuoNeb     On Plaquenil 200 mg twice daily        CT chest 3/23/2025 compared to January 2025         Chest x-ray 3/15/2025      VQ scan " 3/16/2025       High-res CT scan January 2025             LOS: 14 days     Subjective         Objective     Vital signs for last 24 hours:  Vitals:    03/29/25 0346 03/29/25 0723 03/29/25 0726 03/29/25 0851   BP: 147/80   129/64   BP Location: Left arm   Left arm   Patient Position: Lying   Lying   Pulse:  68 72 77   Resp: 12 14 15 14   Temp: 97.6 °F (36.4 °C)   97.4 °F (36.3 °C)   TempSrc: Oral   Oral   SpO2:  90% 99% 92%   Weight:       Height:           Intake/Output last 3 shifts:  I/O last 3 completed shifts:  In: 480 [P.O.:480]  Out: -   Intake/Output this shift:  No intake/output data recorded.      Radiology  Imaging Results (Last 24 Hours)       ** No results found for the last 24 hours. **            Labs:  Results from last 7 days   Lab Units 03/29/25  0150   WBC 10*3/mm3 18.20*   HEMOGLOBIN g/dL 11.2*   HEMATOCRIT % 34.3   PLATELETS 10*3/mm3 176     Results from last 7 days   Lab Units 03/29/25  0150   SODIUM mmol/L 140   POTASSIUM mmol/L 3.2*   CHLORIDE mmol/L 98   CO2 mmol/L 35.8*   BUN mg/dL 38*   CREATININE mg/dL 1.08*   CALCIUM mg/dL 9.9   GLUCOSE mg/dL 115*                       Results from last 7 days   Lab Units 03/24/25  1033   LANA  Negative                           Meds:   SCHEDULE  amitriptyline, 25 mg, Oral, Nightly  benzonatate, 200 mg, Oral, TID  budesonide, 0.5 mg, Nebulization, BID - RT  calcium 500 mg vitamin D 5 mcg (200 UT), 1 tablet, Oral, BID  cetirizine, 10 mg, Oral, Daily  furosemide, 40 mg, Oral, Daily  guaiFENesin, 600 mg, Oral, Q12H  hydroxychloroquine, 200 mg, Oral, BID  lamoTRIgine, 200 mg, Oral, Nightly  levothyroxine, 175 mcg, Oral, Q AM  [Held by provider] mycophenolate, 500 mg, Oral, Q12H  predniSONE, 40 mg, Oral, Daily With Breakfast  QUEtiapine, 50 mg, Oral, Nightly  sodium chloride, 10 mL, Intravenous, Q12H      Infusions       PRNs    acetaminophen **OR** acetaminophen **OR** acetaminophen    ALPRAZolam    aluminum-magnesium hydroxide-simethicone    senna-docusate  sodium **AND** polyethylene glycol **AND** bisacodyl **AND** bisacodyl    Calcium Replacement - Follow Nurse / BPA Driven Protocol    cyclobenzaprine    guaiFENesin-codeine    ipratropium-albuterol    Magnesium Standard Dose Replacement - Follow Nurse / BPA Driven Protocol    meclizine    melatonin    Morphine    nitroglycerin    ondansetron ODT **OR** [DISCONTINUED] ondansetron    oxyCODONE    Phosphorus Replacement - Follow Nurse / BPA Driven Protocol    Potassium Replacement - Follow Nurse / BPA Driven Protocol    [COMPLETED] Insert Peripheral IV **AND** sodium chloride    sodium chloride    sodium chloride    Physical Exam:  Physical Exam  Cardiovascular:      Heart sounds: Murmur heard.      No gallop.   Pulmonary:      Effort: No respiratory distress.      Breath sounds: No stridor. Rhonchi and rales present. No wheezing.   Chest:      Chest wall: No tenderness.         ROS  Review of Systems   Respiratory:  Positive for cough and shortness of breath. Negative for wheezing and stridor.    Cardiovascular:  Positive for palpitations. Negative for chest pain and leg swelling.             Total critical care time spent with patient greater than: 45 Minutes

## 2025-03-29 NOTE — CASE MANAGEMENT/SOCIAL WORK
Continued Stay Note  [facility]     Patient Name: Tracie Gross  MRN: [id number]  Today's Date: 3/29/2025    Admit Date: 3/15/2025    Plan: Home with family and [facility]; accepted and order in place.   Discharge Plan       Row Name 03/29/25 0854       Plan    Plan Home with family and [facility]; accepted and order in place.             Hayde Cervantes RN BSN  Weekend   [facility]  Phone: [phone]  Fax: [phone]

## 2025-03-29 NOTE — PROGRESS NOTES
[facility] MEDICINE SERVICE  DAILY PROGRESS NOTE    NAME: Tracie Gross  : 1960  MRN: [id number]      LOS: 14 days     PROVIDER OF SERVICE: Avtar Diaz MD    Chief Complaint: SOB (shortness of breath)    Subjective:     Interval History 64-year-old female with past medical history of anxiety, hypertension, dyslipidemia, hypothyroidism, von Willebrand's disease, chronic kidney disease, pulmonary fibrosis.  Patient follows pulmonologist Dr. Whaley, admitted with worsening shortness of breath.  Patient is not on any home oxygen and required 2 L of oxygen in the ER.  Patient has been evaluated by pulmonologist and also nephrologist for elevated creatinine 2.3        Subjective-patient feels weak, mild shortness of breath        Review of Systems:    As above, Rest of ROS neg    Objective:     Vital Signs  Temp:  [97.4 °F (36.3 °C)-98.4 °F (36.9 °C)] 97.4 °F (36.3 °C)  Heart Rate:  [68-97] 77  Resp:  [11-20] 14  BP: (129-175)/(64-96) 129/64  Flow (L/min) (Oxygen Therapy):  [2] 2   Body mass index is 40.38 kg/m².    Physical Exam  The patient is alert,  Vital signs as noted above.  Head and neck- JVP not raised  LungB/L equal air entry  Heart: Normal first and second heart sounds. No murmur.    Abdomen: Soft and nontender.    Extremities with good peripheral pulses without any pedal edema.  Skin: Warm and dry.  Musculoskeletal system is grossly normal.  CNS grossly normal.        Diagnostic Data    Results from last 7 days   Lab Units 25  0150   WBC 10*3/mm3 18.20*   HEMOGLOBIN g/dL 11.2*   HEMATOCRIT % 34.3   PLATELETS 10*3/mm3 176   GLUCOSE mg/dL 115*   CREATININE mg/dL 1.08*   BUN mg/dL 38*   SODIUM mmol/L 140   POTASSIUM mmol/L 3.2*   ANION GAP mmol/L 6.2       No radiology results for the last day      Results- I reviewed the patient's new clinical results.    Assessment/Plan:     Active and Resolved Problems  Active Hospital Problems    Diagnosis  POA    **SOB (shortness of breath)  [R06.02]  Yes    Dyspnea [R06.00]  Unknown    Pulmonary fibrosis [J84.10]  Unknown      Resolved Hospital Problems   No resolved problems to display.       Acute hypoxemia-patient is not on any home oxygen but required 2 L of oxygen via nasal cannula.  Likely to be due to underlying fibrosis/pneumonia.    History of pulmonary fibrosis-patient follows pulmonologist.  Patient is on high-dose IV steroids.  Patient had pulmonary function test done in January 2025 and shows restrictive pattern    Suspected pneumonia due to gram-positive organism.  Urine strep pneumo antigen with positive.  Blood culture and sputum cultures are negative.  Patient had last bronchoscopy done in March 2025.    Acute on chronic kidney disease stage IIIa-nephrology is on board.  Renal functions improved    Asymptomatic UTI     History of anxiety without any agitation or suicidal thoughts    Hypertension    Dyslipidemia    Hypothyroidism-clinically euthyroid    History of von Willebrand's disease.  Valve gradient to 30    Hypokalemia-repleted    VTE Prophylaxis:  Mechanical VTE prophylaxis orders are present.             Disposition Planning:     Barriers to Discharge- Pending clinical improvement  Anticipated Date of Discharge next couple of days  Place of Discharge-  home          Code Status and Medical Interventions: CPR (Attempt to Resuscitate); Full Support   Ordered at: 03/15/25 1955     Code Status (Patient has no pulse and is not breathing):    CPR (Attempt to Resuscitate)     Medical Interventions (Patient has pulse or is breathing):    Full Support       Signature: Electronically signed by Avtar Diaz MD, 03/29/25, 11:23 EDT.  [facility] Hospitalist Team

## 2025-03-29 NOTE — PROGRESS NOTES
Nephrology  Progress Note                                        [facility]    Patient Identification    Name: Tracie Gross  Age: 64 y.o.  Sex: female  :  1960  MRN: [id number]      DATE OF SERVICE:  3/29/2025        Subective    Noted cough and shortness of breath or     Objective   Scheduled Meds:amitriptyline, 25 mg, Oral, Nightly  benzonatate, 200 mg, Oral, TID  budesonide, 0.5 mg, Nebulization, BID - RT  calcium 500 mg vitamin D 5 mcg (200 UT), 1 tablet, Oral, BID  cetirizine, 10 mg, Oral, Daily  furosemide, 40 mg, Oral, Daily  guaiFENesin, 600 mg, Oral, Q12H  hydroxychloroquine, 200 mg, Oral, BID  lamoTRIgine, 200 mg, Oral, Nightly  levothyroxine, 175 mcg, Oral, Q AM  [Held by provider] mycophenolate, 500 mg, Oral, Q12H  potassium chloride ER, 40 mEq, Oral, Q4H  predniSONE, 40 mg, Oral, Daily With Breakfast  QUEtiapine, 50 mg, Oral, Nightly  sodium chloride, 10 mL, Intravenous, Q12H          Continuous Infusions:       PRN Meds:  acetaminophen **OR** acetaminophen **OR** acetaminophen    ALPRAZolam    aluminum-magnesium hydroxide-simethicone    senna-docusate sodium **AND** polyethylene glycol **AND** bisacodyl **AND** bisacodyl    Calcium Replacement - Follow Nurse / BPA Driven Protocol    cyclobenzaprine    guaiFENesin-codeine    ipratropium-albuterol    Magnesium Standard Dose Replacement - Follow Nurse / BPA Driven Protocol    meclizine    melatonin    Morphine    nitroglycerin    ondansetron ODT **OR** [DISCONTINUED] ondansetron    oxyCODONE    Phosphorus Replacement - Follow Nurse / BPA Driven Protocol    Potassium Replacement - Follow Nurse / BPA Driven Protocol    [COMPLETED] Insert Peripheral IV **AND** sodium chloride    sodium chloride    sodium chloride     Exam:  /80 (BP Location: Left arm, Patient Position: Lying)   Pulse 97    "Temp 97.6 °F (36.4 °C) (Oral)   Resp 12   Ht 162.6 cm (64\")   Wt 107 kg (235 lb 3.7 oz)   LMP 05/08/1983   SpO2 98%   BMI 40.38 kg/m²     Intake/Output last 3 shifts:  I/O last 3 completed shifts:  In: 480 [P.O.:480]  Out: -     Intake/Output this shift:  No intake/output data recorded.    Physical exam:  General Appearance:  Alert  Head:  Normocephalic, without obvious abnormality, atraumatic  Eyes:  PERRL, conjunctiva/corneas clear     Neck:  Supple,  no adenopathy;      Lungs:  Decreased BS occasion ronchi  Heart:  Regular rate and rhythm, S1 and S2 normal  Abdomen:  Soft, non-tender, bowel sounds active   Extremities: ++ edema  Pulses: 2+ and symmetric all extremities  Skin:  No rashes or lesions       Data Review:  All labs (24hrs):   Recent Results (from the past 24 hours)   Basic Metabolic Panel    Collection Time: 03/29/25  1:50 AM    Specimen: Blood   Result Value Ref Range    Glucose 115 (H) 65 - 99 mg/dL    BUN 38 (H) 8 - 23 mg/dL    Creatinine 1.08 (H) 0.57 - 1.00 mg/dL    Sodium 140 136 - 145 mmol/L    Potassium 3.2 (L) 3.5 - 5.2 mmol/L    Chloride 98 98 - 107 mmol/L    CO2 35.8 (H) 22.0 - 29.0 mmol/L    Calcium 9.9 8.6 - 10.5 mg/dL    BUN/Creatinine Ratio 35.2 (H) 7.0 - 25.0    Anion Gap 6.2 5.0 - 15.0 mmol/L    eGFR 56.8 (L) >60.0 mL/min/1.73   CBC (No Diff)    Collection Time: 03/29/25  1:50 AM    Specimen: Blood   Result Value Ref Range    WBC 18.20 (H) 3.40 - 10.80 10*3/mm3    RBC 3.60 (L) 3.77 - 5.28 10*6/mm3    Hemoglobin 11.2 (L) 12.0 - 15.9 g/dL    Hematocrit 34.3 34.0 - 46.6 %    MCV 95.3 79.0 - 97.0 fL    MCH 31.1 26.6 - 33.0 pg    MCHC 32.7 31.5 - 35.7 g/dL    RDW 14.3 12.3 - 15.4 %    RDW-SD 49.0 37.0 - 54.0 fl    MPV 12.9 (H) 6.0 - 12.0 fL    Platelets 176 140 - 450 10*3/mm3          Imaging:  IR insert non-tunneled central line 5+  Result Date: 3/28/2025  Successful placement of left internal jugular central venous catheter. Electronically Signed: Reji Romero MD  3/28/2025 8:55 AM " EDT  Workstation ID: [id number]    CT Chest Without Contrast Diagnostic  Result Date: 3/23/2025  Impression: 1.Multifocal patchy opacities noted throughout the lungs bilaterally, worse in the perihilar region. Findings are concerning for multifocal pneumonia. Less likely, this may represent pulmonary hemorrhage 2.Additional nonemergent findings as characterized above 3.Scattered prominent to mildly enlarged mediastinal lymph nodes, likely reactive. Electronically Signed: Rafael Archer,   3/23/2025 5:15 PM EDT  Workstation ID: [id number]    XR Chest 1 View  Result Date: 3/23/2025  Impression: 1.Worsening multifocal patchy opacities scattered throughout the lungs. This most likely represents multifocal pneumonia, although pulmonary edema is also a possibility 2.Trace bilateral pleural effusions. Electronically Signed: Rafael Archer,   3/23/2025 12:30 PM EDT  Workstation ID: [id number]      Assessment/Plan:     SOB (shortness of breath)    Pulmonary fibrosis    Dyspnea         Acute kidney injury on top of chronic kidney disease stage III  Acute hypoxic respiratory failure  Pulmonary fibrosis  Urinary tract infection  Anxiety disorder  Hypertension  Hyperlipidemia  Hypothyroidism  History of von Willebrand disease with no bleeding        Recommendations:   Increased Lasix dose to 40 mg due to increased edema  Kidney function stable creatinine 1.1    Discussed with the patient and family at bedside

## 2025-03-30 LAB
ANION GAP SERPL CALCULATED.3IONS-SCNC: 5.7 MMOL/L (ref 5–15)
BUN SERPL-MCNC: 28 MG/DL (ref 8–23)
BUN/CREAT SERPL: 25 (ref 7–25)
CALCIUM SPEC-SCNC: 9.8 MG/DL (ref 8.6–10.5)
CHLORIDE SERPL-SCNC: 97 MMOL/L (ref 98–107)
CO2 SERPL-SCNC: 38.3 MMOL/L (ref 22–29)
CREAT SERPL-MCNC: 1.12 MG/DL (ref 0.57–1)
DEPRECATED RDW RBC AUTO: 49.4 FL (ref 37–54)
EGFRCR SERPLBLD CKD-EPI 2021: 54.3 ML/MIN/1.73
ERYTHROCYTE [DISTWIDTH] IN BLOOD BY AUTOMATED COUNT: 14.4 % (ref 12.3–15.4)
GLUCOSE SERPL-MCNC: 99 MG/DL (ref 65–99)
HCT VFR BLD AUTO: 33.8 % (ref 34–46.6)
HGB BLD-MCNC: 11 G/DL (ref 12–15.9)
MCH RBC QN AUTO: 30.9 PG (ref 26.6–33)
MCHC RBC AUTO-ENTMCNC: 32.5 G/DL (ref 31.5–35.7)
MCV RBC AUTO: 94.9 FL (ref 79–97)
PLATELET # BLD AUTO: 159 10*3/MM3 (ref 140–450)
PMV BLD AUTO: 13.2 FL (ref 6–12)
POTASSIUM SERPL-SCNC: 3.6 MMOL/L (ref 3.5–5.2)
POTASSIUM SERPL-SCNC: 4.6 MMOL/L (ref 3.5–5.2)
RBC # BLD AUTO: 3.56 10*6/MM3 (ref 3.77–5.28)
SODIUM SERPL-SCNC: 141 MMOL/L (ref 136–145)
WBC NRBC COR # BLD AUTO: 15.24 10*3/MM3 (ref 3.4–10.8)

## 2025-03-30 PROCEDURE — 94761 N-INVAS EAR/PLS OXIMETRY MLT: CPT

## 2025-03-30 PROCEDURE — 80048 BASIC METABOLIC PNL TOTAL CA: CPT | Performed by: INTERNAL MEDICINE

## 2025-03-30 PROCEDURE — 94618 PULMONARY STRESS TESTING: CPT

## 2025-03-30 PROCEDURE — 94799 UNLISTED PULMONARY SVC/PX: CPT

## 2025-03-30 PROCEDURE — 85027 COMPLETE CBC AUTOMATED: CPT | Performed by: INTERNAL MEDICINE

## 2025-03-30 PROCEDURE — 94664 DEMO&/EVAL PT USE INHALER: CPT

## 2025-03-30 PROCEDURE — 63710000001 PREDNISONE PER 1 MG: Performed by: INTERNAL MEDICINE

## 2025-03-30 PROCEDURE — 25010000002 MORPHINE PER 10 MG: Performed by: INTERNAL MEDICINE

## 2025-03-30 PROCEDURE — 84132 ASSAY OF SERUM POTASSIUM: CPT | Performed by: INTERNAL MEDICINE

## 2025-03-30 RX ORDER — POTASSIUM CHLORIDE 1500 MG/1
40 TABLET, EXTENDED RELEASE ORAL EVERY 4 HOURS
Status: COMPLETED | OUTPATIENT
Start: 2025-03-30 | End: 2025-03-30

## 2025-03-30 RX ADMIN — GUAIFENESIN AND CODEINE PHOSPHATE 10 ML: 100; 10 SOLUTION ORAL at 14:35

## 2025-03-30 RX ADMIN — FUROSEMIDE 40 MG: 40 TABLET ORAL at 08:07

## 2025-03-30 RX ADMIN — BENZONATATE 200 MG: 100 CAPSULE ORAL at 14:35

## 2025-03-30 RX ADMIN — ALPRAZOLAM 1 MG: 1 TABLET ORAL at 20:11

## 2025-03-30 RX ADMIN — HYDROXYCHLOROQUINE SULFATE 200 MG: 200 TABLET ORAL at 20:11

## 2025-03-30 RX ADMIN — CETIRIZINE HYDROCHLORIDE 10 MG: 10 TABLET, FILM COATED ORAL at 08:07

## 2025-03-30 RX ADMIN — BUDESONIDE 0.5 MG: 0.5 INHALANT RESPIRATORY (INHALATION) at 19:50

## 2025-03-30 RX ADMIN — GUAIFENESIN AND CODEINE PHOSPHATE 10 ML: 100; 10 SOLUTION ORAL at 10:50

## 2025-03-30 RX ADMIN — BENZONATATE 200 MG: 100 CAPSULE ORAL at 20:11

## 2025-03-30 RX ADMIN — POTASSIUM CHLORIDE 40 MEQ: 1500 TABLET, EXTENDED RELEASE ORAL at 06:15

## 2025-03-30 RX ADMIN — BUDESONIDE 0.5 MG: 0.5 INHALANT RESPIRATORY (INHALATION) at 07:55

## 2025-03-30 RX ADMIN — PREDNISONE 40 MG: 20 TABLET ORAL at 08:07

## 2025-03-30 RX ADMIN — Medication 5 MG: at 20:11

## 2025-03-30 RX ADMIN — LEVOTHYROXINE SODIUM 175 MCG: 175 TABLET ORAL at 06:15

## 2025-03-30 RX ADMIN — Medication 1 TABLET: at 08:07

## 2025-03-30 RX ADMIN — GUAIFENESIN 600 MG: 600 TABLET, MULTILAYER, EXTENDED RELEASE ORAL at 08:07

## 2025-03-30 RX ADMIN — Medication 1 TABLET: at 20:11

## 2025-03-30 RX ADMIN — Medication 10 ML: at 20:15

## 2025-03-30 RX ADMIN — LAMOTRIGINE 200 MG: 100 TABLET ORAL at 20:11

## 2025-03-30 RX ADMIN — HYDROXYCHLOROQUINE SULFATE 200 MG: 200 TABLET ORAL at 08:07

## 2025-03-30 RX ADMIN — QUETIAPINE FUMARATE 50 MG: 25 TABLET ORAL at 20:10

## 2025-03-30 RX ADMIN — GUAIFENESIN 600 MG: 600 TABLET, MULTILAYER, EXTENDED RELEASE ORAL at 20:10

## 2025-03-30 RX ADMIN — GUAIFENESIN AND CODEINE PHOSPHATE 10 ML: 100; 10 SOLUTION ORAL at 20:10

## 2025-03-30 RX ADMIN — BENZONATATE 200 MG: 100 CAPSULE ORAL at 08:07

## 2025-03-30 RX ADMIN — MORPHINE SULFATE 2 MG: 2 INJECTION, SOLUTION INTRAMUSCULAR; INTRAVENOUS at 20:11

## 2025-03-30 RX ADMIN — CYCLOBENZAPRINE 10 MG: 10 TABLET, FILM COATED ORAL at 14:38

## 2025-03-30 RX ADMIN — AMITRIPTYLINE HYDROCHLORIDE 25 MG: 25 TABLET, FILM COATED ORAL at 20:11

## 2025-03-30 RX ADMIN — POTASSIUM CHLORIDE 40 MEQ: 1500 TABLET, EXTENDED RELEASE ORAL at 10:50

## 2025-03-30 RX ADMIN — Medication 10 ML: at 08:09

## 2025-03-30 NOTE — PROGRESS NOTES
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 No wolf honeycombing fibrosis  Patient was treated with mycophenolate, prophylactic Bactrim     PFT 1/14/2025   FVC 1 1.6 L/51%,  FEV1 1.4 L / 58%, ratio 88, TLC 57%, RV 50%, DLCO 51%     PFTs July 2021,   FEV1 1.7 L 68% improve 75% post bronchodilator, FEV1/ FVC ratio 87, TLC 72%, RV 74%, DLCO 56%     PFTs in 2017   FEV1 66-70% predicted which is unchanged since 2012  quit smoking 1994,      PFT 2012 and 2017,   FEV-1 and TLC 75% , suggestive of mild to mod restrictive lung disease     2D echo  2018 normal RVSP and EF  2D echo May 2020 no pulmonary hypertension   2d echo July 2021 RVSP 30  2D echo January 2025 no pulmonary hypertension EF 60%     FERN, AHI 14.5 ,       No Response to inhalers including Breztri and Trelegy     Recommendations:    weaning steroids   P.o. prednisone 40 mg daily for 7 days then 20 mg daily for 14 days then 20 mg daily for 7 days    oxygen titration currently on room air at rest however requires 2 L with activity and sleep    Antibiotics completed Rocephin 2 g IV daily for 5 days  Azithromycin 500 mg p.o. daily for 3 days      Seen by GI for recurrent \" chocking\"     Holding mycophenolate for treatment of pneumonia     Bronchodilators Pulmicort and DuoNeb     On Plaquenil 200 mg twice daily        CT chest 3/23/2025 compared to January 2025         Chest x-ray 3/15/2025      VQ scan " 3/16/2025       High-res CT scan January 2025             LOS: 15 days     Subjective         Objective     Vital signs for last 24 hours:  Vitals:    03/30/25 0500 03/30/25 0755 03/30/25 0758 03/30/25 0813   BP:    159/88   BP Location:    Left arm   Patient Position:    Lying   Pulse:  89 73 82   Resp:  14 13 19   Temp:    97.4 °F (36.3 °C)   TempSrc:    Oral   SpO2:  92% 100% 95%   Weight: 102 kg (225 lb 12 oz)      Height:           Intake/Output last 3 shifts:  No intake/output data recorded.  Intake/Output this shift:  No intake/output data recorded.      Radiology  Imaging Results (Last 24 Hours)       ** No results found for the last 24 hours. **            Labs:  Results from last 7 days   Lab Units 03/30/25  0452   WBC 10*3/mm3 15.24*   HEMOGLOBIN g/dL 11.0*   HEMATOCRIT % 33.8*   PLATELETS 10*3/mm3 159     Results from last 7 days   Lab Units 03/30/25  0452   SODIUM mmol/L 141   POTASSIUM mmol/L 3.6   CHLORIDE mmol/L 97*   CO2 mmol/L 38.3*   BUN mg/dL 28*   CREATININE mg/dL 1.12*   CALCIUM mg/dL 9.8   GLUCOSE mg/dL 99                       Results from last 7 days   Lab Units 03/24/25  1033   LANA  Negative                           Meds:   SCHEDULE  amitriptyline, 25 mg, Oral, Nightly  benzonatate, 200 mg, Oral, TID  budesonide, 0.5 mg, Nebulization, BID - RT  calcium 500 mg vitamin D 5 mcg (200 UT), 1 tablet, Oral, BID  cetirizine, 10 mg, Oral, Daily  furosemide, 40 mg, Oral, Daily  guaiFENesin, 600 mg, Oral, Q12H  hydroxychloroquine, 200 mg, Oral, BID  lamoTRIgine, 200 mg, Oral, Nightly  levothyroxine, 175 mcg, Oral, Q AM  [Held by provider] mycophenolate, 500 mg, Oral, Q12H  potassium chloride ER, 40 mEq, Oral, Q4H  predniSONE, 40 mg, Oral, Daily With Breakfast  QUEtiapine, 50 mg, Oral, Nightly  sodium chloride, 10 mL, Intravenous, Q12H      Infusions       PRNs    acetaminophen **OR** acetaminophen **OR** acetaminophen    ALPRAZolam    aluminum-magnesium hydroxide-simethicone    senna-docusate sodium  **AND** polyethylene glycol **AND** bisacodyl **AND** bisacodyl    Calcium Replacement - Follow Nurse / BPA Driven Protocol    cyclobenzaprine    guaiFENesin-codeine    ipratropium-albuterol    Magnesium Standard Dose Replacement - Follow Nurse / BPA Driven Protocol    meclizine    melatonin    Morphine    nitroglycerin    ondansetron ODT **OR** [DISCONTINUED] ondansetron    oxyCODONE    Phosphorus Replacement - Follow Nurse / BPA Driven Protocol    Potassium Replacement - Follow Nurse / BPA Driven Protocol    [COMPLETED] Insert Peripheral IV **AND** sodium chloride    sodium chloride    sodium chloride    Physical Exam:  Physical Exam  Cardiovascular:      Heart sounds: Murmur heard.      No gallop.   Pulmonary:      Effort: No respiratory distress.      Breath sounds: No stridor. Rhonchi and rales present. No wheezing.   Chest:      Chest wall: No tenderness.         ROS  Review of Systems   Respiratory:  Positive for cough and shortness of breath. Negative for wheezing and stridor.    Cardiovascular:  Positive for palpitations. Negative for chest pain and leg swelling.             Total critical care time spent with patient greater than: 45 Minutes

## 2025-03-30 NOTE — PROGRESS NOTES
[facility] MEDICINE SERVICE  DAILY PROGRESS NOTE    NAME: Tracie Gross  : 1960  MRN: [id number]      LOS: 15 days     PROVIDER OF SERVICE: Avtar Diaz MD    Chief Complaint: SOB (shortness of breath)    Subjective:     Interval History 64-year-old female with past medical history of anxiety, hypertension, dyslipidemia, hypothyroidism, von Willebrand's disease, chronic kidney disease, pulmonary fibrosis.  Patient follows pulmonologist Dr. Whaley, admitted with worsening shortness of breath.  Patient is not on any home oxygen and required 2 L of oxygen in the ER.  Patient has been evaluated by pulmonologist and also nephrologist for elevated creatinine 2.3        Subjective-patient still complains of shortness of breath.  Pulmonary has reevaluated the patient and recommend steroid taper.  CellCept is on hold.  Home oxygen evaluation has been requested        Review of Systems:    As above, Rest of ROS neg    Objective:     Vital Signs  Temp:  [97.4 °F (36.3 °C)-98.2 °F (36.8 °C)] 97.4 °F (36.3 °C)  Heart Rate:  [73-89] 82  Resp:  [13-24] 19  BP: (130-159)/(70-91) 159/88  Flow (L/min) (Oxygen Therapy):  [2] 2   Body mass index is 38.75 kg/m².    Physical Exam  The patient is alert,  Vital signs as noted above.  Head and neck- JVP not raised  LungB/L equal air entry  Heart: Normal first and second heart sounds. No murmur.    Abdomen: Soft and nontender.    CNS grossly normal.        Diagnostic Data    Results from last 7 days   Lab Units 25  0452   WBC 10*3/mm3 15.24*   HEMOGLOBIN g/dL 11.0*   HEMATOCRIT % 33.8*   PLATELETS 10*3/mm3 159   GLUCOSE mg/dL 99   CREATININE mg/dL 1.12*   BUN mg/dL 28*   SODIUM mmol/L 141   POTASSIUM mmol/L 3.6   ANION GAP mmol/L 5.7       No radiology results for the last day      Results- I reviewed the patient's new clinical results.    Assessment/Plan:     Active and Resolved Problems  Active Hospital Problems    Diagnosis  POA    **SOB (shortness of breath)  [R06.02]  Yes    Dyspnea [R06.00]  Unknown    Pulmonary fibrosis [J84.10]  Unknown      Resolved Hospital Problems   No resolved problems to display.       Acute hypoxemia-patient is not on any home oxygen but required 2 L of oxygen via nasal cannula.  Likely to be due to underlying fibrosis/pneumonia.    History of pulmonary fibrosis-patient follows pulmonologist.  Patient is on high-dose IV steroids.  Patient had pulmonary function test done in January 2025 and shows restrictive pattern    Suspected pneumonia due to gram-positive organism.  Urine strep pneumo antigen with positive.  Blood culture and sputum cultures are negative.  Patient had last bronchoscopy done in March 2025.    Acute on chronic kidney disease stage IIIa-nephrology is on board.  Renal functions improved    Asymptomatic UTI     History of anxiety without any agitation or suicidal thoughts    Hypertension    Dyslipidemia    Hypothyroidism-clinically euthyroid    History of von Willebrand's disease.  Valve gradient to 30    Hypokalemia-repleted    VTE Prophylaxis:  Mechanical VTE prophylaxis orders are present.             Disposition Planning:     Barriers to Discharge- Pending clinical improvement  Anticipated Date of Discharge next couple of days  Place of Discharge-  home    Plan-follow-up the recommendations of pulmonologist.  Home oxygen evaluation has been requested.  Pulmonologist recommended prednisone taper.  CellCept is on hold.          Code Status and Medical Interventions: CPR (Attempt to Resuscitate); Full Support   Ordered at: 03/15/25 1955     Code Status (Patient has no pulse and is not breathing):    CPR (Attempt to Resuscitate)     Medical Interventions (Patient has pulse or is breathing):    Full Support       Signature: Electronically signed by Avtar Diaz MD, 03/30/25, 11:14 EDT.  [facility] Hospitalist Team

## 2025-03-30 NOTE — PLAN OF CARE
Problem: Adult Inpatient Plan of Care  Goal: Plan of Care Review  Outcome: Progressing  Flowsheets (Taken 3/30/2025 0502)  Progress: no change  Goal: Patient-Specific Goal (Individualized)  Outcome: Progressing  Goal: Absence of Hospital-Acquired Illness or Injury  Outcome: Progressing  Intervention: Identify and Manage Fall Risk  Recent Flowsheet Documentation  Taken 3/30/2025 0000 by Katey Mosquera RN  Safety Promotion/Fall Prevention:   safety round/check completed   room organization consistent   nonskid shoes/slippers when out of bed   lighting adjusted   clutter free environment maintained   assistive device/personal items within reach  Taken 3/29/2025 2000 by Katey Mosquera RN  Safety Promotion/Fall Prevention:   safety round/check completed   room organization consistent   nonskid shoes/slippers when out of bed   lighting adjusted   clutter free environment maintained   assistive device/personal items within reach  Intervention: Prevent Skin Injury  Recent Flowsheet Documentation  Taken 3/30/2025 0000 by Katey Mosquera RN  Skin Protection:   transparent dressing maintained   incontinence pads utilized  Taken 3/29/2025 2000 by Katey Mosquera RN  Skin Protection:   transparent dressing maintained   incontinence pads utilized  Intervention: Prevent and Manage VTE (Venous Thromboembolism) Risk  Recent Flowsheet Documentation  Taken 3/29/2025 2000 by Katey Mosquera RN  VTE Prevention/Management:   bilateral   SCDs (sequential compression devices) off   patient refused intervention  Intervention: Prevent Infection  Recent Flowsheet Documentation  Taken 3/30/2025 0000 by Katey Mosquera RN  Infection Prevention:   environmental surveillance performed   hand hygiene promoted   rest/sleep promoted   single patient room provided  Taken 3/29/2025 2000 by Katey Mosquera RN  Infection Prevention:   environmental surveillance performed   hand hygiene promoted   rest/sleep promoted   single patient room provided  Goal: Optimal  Comfort and Wellbeing  Outcome: Progressing  Intervention: Monitor Pain and Promote Comfort  Recent Flowsheet Documentation  Taken 3/29/2025 2000 by Katey Mosquera RN  Pain Management Interventions: care clustered  Intervention: Provide Person-Centered Care  Recent Flowsheet Documentation  Taken 3/30/2025 0000 by Katey Mosquera RN  Trust Relationship/Rapport:   care explained   choices provided  Taken 3/29/2025 2000 by Katey Mosquera RN  Trust Relationship/Rapport:   care explained   choices provided   questions encouraged   questions answered  Goal: Readiness for Transition of Care  Outcome: Progressing     Problem: Skin Injury Risk Increased  Goal: Skin Health and Integrity  Outcome: Progressing  Intervention: Optimize Skin Protection  Recent Flowsheet Documentation  Taken 3/30/2025 0000 by Katey Mosquera RN  Pressure Reduction Techniques:   frequent weight shift encouraged   weight shift assistance provided  Pressure Reduction Devices:   pressure-redistributing mattress utilized   positioning supports utilized  Skin Protection:   transparent dressing maintained   incontinence pads utilized  Taken 3/29/2025 2000 by Katey Mosquera RN  Pressure Reduction Techniques:   frequent weight shift encouraged   weight shift assistance provided  Pressure Reduction Devices:   pressure-redistributing mattress utilized   positioning supports utilized  Skin Protection:   transparent dressing maintained   incontinence pads utilized     Problem: Comorbidity Management  Goal: Maintenance of Behavioral Health Symptom Control  Outcome: Progressing  Intervention: Maintain Behavioral Health Symptom Control  Recent Flowsheet Documentation  Taken 3/30/2025 0000 by Katey Mosquera RN  Medication Review/Management: medications reviewed  Taken 3/29/2025 2000 by Katey Mosquera RN  Medication Review/Management: medications reviewed  Goal: Blood Pressure in Desired Range  Outcome: Progressing  Intervention: Maintain Blood Pressure Management  Recent  Flowsheet Documentation  Taken 3/30/2025 0000 by Katey Mosquera RN  Medication Review/Management: medications reviewed  Taken 3/29/2025 2000 by Katey Mosquera RN  Medication Review/Management: medications reviewed  Goal: Maintenance of Osteoarthritis Symptom Control  Outcome: Progressing  Intervention: Maintain Osteoarthritis Symptom Control  Recent Flowsheet Documentation  Taken 3/30/2025 0000 by Katey Mosquera RN  Medication Review/Management: medications reviewed  Taken 3/29/2025 2000 by Katey Mosquera RN  Medication Review/Management: medications reviewed     Problem: Fall Injury Risk  Goal: Absence of Fall and Fall-Related Injury  Outcome: Progressing  Intervention: Identify and Manage Contributors  Recent Flowsheet Documentation  Taken 3/30/2025 0000 by Katey Mosquera RN  Medication Review/Management: medications reviewed  Taken 3/29/2025 2000 by Katey Mosquera RN  Medication Review/Management: medications reviewed  Intervention: Promote Injury-Free Environment  Recent Flowsheet Documentation  Taken 3/30/2025 0000 by Katey Mosquera RN  Safety Promotion/Fall Prevention:   safety round/check completed   room organization consistent   nonskid shoes/slippers when out of bed   lighting adjusted   clutter free environment maintained   assistive device/personal items within reach  Taken 3/29/2025 2000 by Katey Mosquera RN  Safety Promotion/Fall Prevention:   safety round/check completed   room organization consistent   nonskid shoes/slippers when out of bed   lighting adjusted   clutter free environment maintained   assistive device/personal items within reach     Problem: Sepsis/Septic Shock  Goal: Optimal Coping  Outcome: Progressing  Intervention: Support Patient and Family Response  Recent Flowsheet Documentation  Taken 3/30/2025 0000 by Katey Mosquera RN  Family/Support System Care: self-care encouraged  Taken 3/29/2025 2000 by Katey Mosquera RN  Supportive Measures: active listening utilized  Family/Support System Care:  self-care encouraged  Goal: Absence of Bleeding  Outcome: Progressing  Goal: Blood Glucose Level Within Target Range  Outcome: Progressing  Goal: Absence of Infection Signs and Symptoms  Outcome: Progressing  Intervention: Initiate Sepsis Management  Recent Flowsheet Documentation  Taken 3/30/2025 0000 by Katey Mosquera RN  Infection Prevention:   environmental surveillance performed   hand hygiene promoted   rest/sleep promoted   single patient room provided  Taken 3/29/2025 2000 by Katey Mosquera RN  Infection Prevention:   environmental surveillance performed   hand hygiene promoted   rest/sleep promoted   single patient room provided  Intervention: Promote Recovery  Recent Flowsheet Documentation  Taken 3/30/2025 0000 by Katey Mosquera, RN  Sleep/Rest Enhancement: awakenings minimized  Taken 3/29/2025 2000 by Katey Mosquera RN  Sleep/Rest Enhancement: consistent schedule promoted  Goal: Optimal Nutrition Delivery  Outcome: Progressing   Goal Outcome Evaluation:           Progress: no change  Outcome Evaluation: Patient resting in bed this shift. PRN pain medication given x1. Potassium low in AM, first dose given. Call light in reach, able to make needs known.

## 2025-03-30 NOTE — PROCEDURES
Exercise Oximetry    Patient Name:Tracie Gross   MRN: [id number]   Date: 03/30/25             ROOM AIR BASELINE   SpO2% 93 on room air at rest in bed   Heart Rate    Blood Pressure      EXERCISE ON ROOM AIR SpO2% EXERCISE ON O2 @  LPM SpO2%   1 MINUTE 93 1 MINUTE    2 MINUTES 95 2 MINUTES    3 MINUTES 94 3 MINUTES    4 MINUTES 94 4 MINUTES    5 MINUTES 91 5 MINUTES    6 MINUTES 94 6 MINUTES               Distance Walked   Distance Walked   Dyspnea (Yajaira Scale)   Dyspnea (Yajaira Scale)   Fatigue (Yajaira Scale)   Fatigue (Yajaira Scale)   SpO2% Post Exercise   SpO2% Post Exercise   HR Post Exercise   HR Post Exercise   Time to Recovery   Time to Recovery     Comments: Sao2 93% on room air at rest in bed pt stood with me assisting pivoted in place to use the bed side commode as pt states the she can only walk a few feet after which she stood very briefly and completed the study sitting up tall on the edge of the bed without desaturation noted.

## 2025-03-30 NOTE — PROGRESS NOTES
Nephrology  Progress Note                                        [facility]    Patient Identification    Name: Tracie Gross  Age: 64 y.o.  Sex: female  :  1960  MRN: [id number]      DATE OF SERVICE:  3/30/2025        Subective    Noted cough and shortness of breath or     Objective   Scheduled Meds:amitriptyline, 25 mg, Oral, Nightly  benzonatate, 200 mg, Oral, TID  budesonide, 0.5 mg, Nebulization, BID - RT  calcium 500 mg vitamin D 5 mcg (200 UT), 1 tablet, Oral, BID  cetirizine, 10 mg, Oral, Daily  furosemide, 40 mg, Oral, Daily  guaiFENesin, 600 mg, Oral, Q12H  hydroxychloroquine, 200 mg, Oral, BID  lamoTRIgine, 200 mg, Oral, Nightly  levothyroxine, 175 mcg, Oral, Q AM  [Held by provider] mycophenolate, 500 mg, Oral, Q12H  potassium chloride ER, 40 mEq, Oral, Q4H  predniSONE, 40 mg, Oral, Daily With Breakfast  QUEtiapine, 50 mg, Oral, Nightly  sodium chloride, 10 mL, Intravenous, Q12H          Continuous Infusions:       PRN Meds:  acetaminophen **OR** acetaminophen **OR** acetaminophen    ALPRAZolam    aluminum-magnesium hydroxide-simethicone    senna-docusate sodium **AND** polyethylene glycol **AND** bisacodyl **AND** bisacodyl    Calcium Replacement - Follow Nurse / BPA Driven Protocol    cyclobenzaprine    guaiFENesin-codeine    ipratropium-albuterol    Magnesium Standard Dose Replacement - Follow Nurse / BPA Driven Protocol    meclizine    melatonin    Morphine    nitroglycerin    ondansetron ODT **OR** [DISCONTINUED] ondansetron    oxyCODONE    Phosphorus Replacement - Follow Nurse / BPA Driven Protocol    Potassium Replacement - Follow Nurse / BPA Driven Protocol    [COMPLETED] Insert Peripheral IV **AND** sodium chloride    sodium chloride    sodium chloride     Exam:  /83 (BP Location: Left arm, Patient Position: Lying)   Pulse 75    "Temp 97.4 °F (36.3 °C) (Oral)   Resp 20   Ht 162.6 cm (64\")   Wt 102 kg (225 lb 12 oz)   LMP 05/08/1983   SpO2 96%   BMI 38.75 kg/m²     Intake/Output last 3 shifts:  I/O last 3 completed shifts:  In: 480 [P.O.:480]  Out: -     Intake/Output this shift:  No intake/output data recorded.    Physical exam:  General Appearance:  Alert  Head:  Normocephalic, without obvious abnormality, atraumatic  Eyes:  PERRL, conjunctiva/corneas clear     Neck:  Supple,  no adenopathy;      Lungs:  Decreased BS occasion ronchi  Heart:  Regular rate and rhythm, S1 and S2 normal  Abdomen:  Soft, non-tender, bowel sounds active   Extremities: ++ edema  Pulses: 2+ and symmetric all extremities  Skin:  No rashes or lesions       Data Review:  All labs (24hrs):   Recent Results (from the past 24 hours)   Potassium    Collection Time: 03/29/25  4:28 PM    Specimen: Blood   Result Value Ref Range    Potassium 4.2 3.5 - 5.2 mmol/L   Basic Metabolic Panel    Collection Time: 03/30/25  4:52 AM    Specimen: Blood   Result Value Ref Range    Glucose 99 65 - 99 mg/dL    BUN 28 (H) 8 - 23 mg/dL    Creatinine 1.12 (H) 0.57 - 1.00 mg/dL    Sodium 141 136 - 145 mmol/L    Potassium 3.6 3.5 - 5.2 mmol/L    Chloride 97 (L) 98 - 107 mmol/L    CO2 38.3 (H) 22.0 - 29.0 mmol/L    Calcium 9.8 8.6 - 10.5 mg/dL    BUN/Creatinine Ratio 25.0 7.0 - 25.0    Anion Gap 5.7 5.0 - 15.0 mmol/L    eGFR 54.3 (L) >60.0 mL/min/1.73   CBC (No Diff)    Collection Time: 03/30/25  4:52 AM    Specimen: Blood   Result Value Ref Range    WBC 15.24 (H) 3.40 - 10.80 10*3/mm3    RBC 3.56 (L) 3.77 - 5.28 10*6/mm3    Hemoglobin 11.0 (L) 12.0 - 15.9 g/dL    Hematocrit 33.8 (L) 34.0 - 46.6 %    MCV 94.9 79.0 - 97.0 fL    MCH 30.9 26.6 - 33.0 pg    MCHC 32.5 31.5 - 35.7 g/dL    RDW 14.4 12.3 - 15.4 %    RDW-SD 49.4 37.0 - 54.0 fl    MPV 13.2 (H) 6.0 - 12.0 fL    Platelets 159 140 - 450 10*3/mm3          Imaging:  IR insert non-tunneled central line 5+  Result Date: " 3/28/2025  Successful placement of left internal jugular central venous catheter. Electronically Signed: Reji Romero MD  3/28/2025 8:55 AM EDT  Workstation ID: [id number]    CT Chest Without Contrast Diagnostic  Result Date: 3/23/2025  Impression: 1.Multifocal patchy opacities noted throughout the lungs bilaterally, worse in the perihilar region. Findings are concerning for multifocal pneumonia. Less likely, this may represent pulmonary hemorrhage 2.Additional nonemergent findings as characterized above 3.Scattered prominent to mildly enlarged mediastinal lymph nodes, likely reactive. Electronically Signed: Rafael Archer,   3/23/2025 5:15 PM EDT  Workstation ID: [id number]    XR Chest 1 View  Result Date: 3/23/2025  Impression: 1.Worsening multifocal patchy opacities scattered throughout the lungs. This most likely represents multifocal pneumonia, although pulmonary edema is also a possibility 2.Trace bilateral pleural effusions. Electronically Signed: Rafael Archer,   3/23/2025 12:30 PM EDT  Workstation ID: [id number]      Assessment/Plan:     SOB (shortness of breath)    Pulmonary fibrosis    Dyspnea         Acute kidney injury on top of chronic kidney disease stage III  Acute hypoxic respiratory failure  Pulmonary fibrosis  Urinary tract infection  Anxiety disorder  Hypertension  Hyperlipidemia  Hypothyroidism  History of von Willebrand disease with no bleeding        Recommendations:   Increased Lasix dose to 40 mg due to increased edema  Kidney function stable creatinine 1.12    Discussed with the patient and family at bedside

## 2025-03-30 NOTE — PLAN OF CARE
Problem: Adult Inpatient Plan of Care  Goal: Plan of Care Review  Outcome: Progressing  Goal: Patient-Specific Goal (Individualized)  Outcome: Progressing  Goal: Absence of Hospital-Acquired Illness or Injury  Outcome: Progressing  Intervention: Identify and Manage Fall Risk  Recent Flowsheet Documentation  Taken 3/30/2025 1400 by Tabatha Hung LPN  Safety Promotion/Fall Prevention:   assistive device/personal items within reach   activity supervised   clutter free environment maintained   fall prevention program maintained   gait belt   nonskid shoes/slippers when out of bed   safety round/check completed  Taken 3/30/2025 1200 by Tabatha Hung LPN  Safety Promotion/Fall Prevention:   activity supervised   clutter free environment maintained   assistive device/personal items within reach   fall prevention program maintained   gait belt   nonskid shoes/slippers when out of bed   safety round/check completed  Taken 3/30/2025 1000 by Tabatha Hung LPN  Safety Promotion/Fall Prevention:   assistive device/personal items within reach   activity supervised   clutter free environment maintained   fall prevention program maintained   gait belt   nonskid shoes/slippers when out of bed   safety round/check completed  Taken 3/30/2025 0813 by Tabatha Hung LPN  Safety Promotion/Fall Prevention:   activity supervised   assistive device/personal items within reach   clutter free environment maintained   fall prevention program maintained   gait belt   nonskid shoes/slippers when out of bed   safety round/check completed  Intervention: Prevent Skin Injury  Recent Flowsheet Documentation  Taken 3/30/2025 1200 by Tabatha Hung LPN  Skin Protection: incontinence pads utilized  Taken 3/30/2025 0813 by Tabatha Hung LPN  Skin Protection: incontinence pads utilized  Intervention: Prevent Infection  Recent Flowsheet Documentation  Taken 3/30/2025 1400 by Tabatha Hung LPN  Infection Prevention:   hand hygiene promoted    single patient room provided   rest/sleep promoted  Taken 3/30/2025 1200 by Tabatha Hung LPN  Infection Prevention:   hand hygiene promoted   single patient room provided   rest/sleep promoted  Taken 3/30/2025 1000 by Tabtaha Hung LPN  Infection Prevention:   hand hygiene promoted   single patient room provided   rest/sleep promoted  Taken 3/30/2025 0813 by Tabatha Hung LPN  Infection Prevention:   hand hygiene promoted   single patient room provided   rest/sleep promoted  Goal: Optimal Comfort and Wellbeing  Outcome: Progressing  Intervention: Monitor Pain and Promote Comfort  Recent Flowsheet Documentation  Taken 3/30/2025 1200 by Tabatha Hung LPN  Pain Management Interventions: care clustered  Taken 3/30/2025 0813 by Tabatha Hung LPN  Pain Management Interventions: care clustered  Intervention: Provide Person-Centered Care  Recent Flowsheet Documentation  Taken 3/30/2025 1200 by Tabatha Hung LPN  Trust Relationship/Rapport:   care explained   thoughts/feelings acknowledged   reassurance provided   questions encouraged   questions answered  Taken 3/30/2025 0813 by Tabatha Hung LPN  Trust Relationship/Rapport:   care explained   thoughts/feelings acknowledged   reassurance provided   questions encouraged   questions answered  Goal: Readiness for Transition of Care  Outcome: Progressing     Problem: Skin Injury Risk Increased  Goal: Skin Health and Integrity  Outcome: Progressing  Intervention: Optimize Skin Protection  Recent Flowsheet Documentation  Taken 3/30/2025 1200 by Tabatha Hung LPN  Pressure Reduction Techniques: frequent weight shift encouraged  Pressure Reduction Devices:   pressure-redistributing mattress utilized   positioning supports utilized  Skin Protection: incontinence pads utilized  Taken 3/30/2025 0813 by Tabatha Hung LPN  Pressure Reduction Techniques: frequent weight shift encouraged  Pressure Reduction Devices:   positioning supports utilized    pressure-redistributing mattress utilized  Skin Protection: incontinence pads utilized  Intervention: Promote and Optimize Oral Intake  Recent Flowsheet Documentation  Taken 3/30/2025 0813 by Tabatha Hung LPN  Nutrition Interventions: food preferences provided  Taken 3/30/2025 0733 by Tabatha Hung LPN  Nutrition Interventions: food preferences provided     Problem: Comorbidity Management  Goal: Maintenance of Behavioral Health Symptom Control  Outcome: Progressing  Intervention: Maintain Behavioral Health Symptom Control  Recent Flowsheet Documentation  Taken 3/30/2025 1400 by Tabatha Hung LPN  Medication Review/Management: medications reviewed  Taken 3/30/2025 1200 by Tabatha Hung LPN  Medication Review/Management: medications reviewed  Taken 3/30/2025 1000 by Tabatha Hung LPN  Medication Review/Management: medications reviewed  Taken 3/30/2025 0813 by Tabatha Hung LPN  Medication Review/Management: medications reviewed  Goal: Blood Pressure in Desired Range  Outcome: Progressing  Intervention: Maintain Blood Pressure Management  Recent Flowsheet Documentation  Taken 3/30/2025 1400 by Tabatha Hung LPN  Medication Review/Management: medications reviewed  Taken 3/30/2025 1200 by Tabatha Hung LPN  Medication Review/Management: medications reviewed  Taken 3/30/2025 1000 by Tabatha Hung LPN  Medication Review/Management: medications reviewed  Taken 3/30/2025 0813 by Tabatha Hung LPN  Medication Review/Management: medications reviewed  Goal: Maintenance of Osteoarthritis Symptom Control  Outcome: Progressing  Intervention: Maintain Osteoarthritis Symptom Control  Recent Flowsheet Documentation  Taken 3/30/2025 1400 by Tabatha Hung LPN  Medication Review/Management: medications reviewed  Taken 3/30/2025 1200 by Tabatha Hung LPN  Medication Review/Management: medications reviewed  Taken 3/30/2025 1000 by Tabatha Hung LPN  Medication Review/Management: medications reviewed  Taken  3/30/2025 0813 by Tabatha Hung LPN  Medication Review/Management: medications reviewed     Problem: Fall Injury Risk  Goal: Absence of Fall and Fall-Related Injury  Outcome: Progressing  Intervention: Identify and Manage Contributors  Recent Flowsheet Documentation  Taken 3/30/2025 1400 by Tabatha Hung LPN  Medication Review/Management: medications reviewed  Taken 3/30/2025 1200 by Tabatha Hung LPN  Medication Review/Management: medications reviewed  Taken 3/30/2025 1000 by Tabatha Hung LPN  Medication Review/Management: medications reviewed  Taken 3/30/2025 0813 by Tabatha Hung LPN  Medication Review/Management: medications reviewed  Intervention: Promote Injury-Free Environment  Recent Flowsheet Documentation  Taken 3/30/2025 1400 by Tabatha Hung LPN  Safety Promotion/Fall Prevention:   assistive device/personal items within reach   activity supervised   clutter free environment maintained   fall prevention program maintained   gait belt   nonskid shoes/slippers when out of bed   safety round/check completed  Taken 3/30/2025 1200 by Tabatha Hung LPN  Safety Promotion/Fall Prevention:   activity supervised   clutter free environment maintained   assistive device/personal items within reach   fall prevention program maintained   gait belt   nonskid shoes/slippers when out of bed   safety round/check completed  Taken 3/30/2025 1000 by Tabatha Hung LPN  Safety Promotion/Fall Prevention:   assistive device/personal items within reach   activity supervised   clutter free environment maintained   fall prevention program maintained   gait belt   nonskid shoes/slippers when out of bed   safety round/check completed  Taken 3/30/2025 0813 by Tabatha Hung LPN  Safety Promotion/Fall Prevention:   activity supervised   assistive device/personal items within reach   clutter free environment maintained   fall prevention program maintained   gait belt   nonskid shoes/slippers when out of bed   safety  round/check completed     Problem: Sepsis/Septic Shock  Goal: Optimal Coping  Outcome: Progressing  Intervention: Support Patient and Family Response  Recent Flowsheet Documentation  Taken 3/30/2025 1200 by Tabatha Hung LPN  Supportive Measures: active listening utilized  Family/Support System Care: self-care encouraged  Taken 3/30/2025 0813 by Tabatha Hung LPN  Supportive Measures: active listening utilized  Family/Support System Care: self-care encouraged  Goal: Absence of Bleeding  Outcome: Progressing  Goal: Blood Glucose Level Within Target Range  Outcome: Progressing  Goal: Absence of Infection Signs and Symptoms  Outcome: Progressing  Intervention: Initiate Sepsis Management  Recent Flowsheet Documentation  Taken 3/30/2025 1400 by Tabatha Hung LPN  Infection Prevention:   hand hygiene promoted   single patient room provided   rest/sleep promoted  Taken 3/30/2025 1200 by Tabatha Hung LPN  Infection Prevention:   hand hygiene promoted   single patient room provided   rest/sleep promoted  Taken 3/30/2025 1000 by Tabatha Hung LPN  Infection Prevention:   hand hygiene promoted   single patient room provided   rest/sleep promoted  Taken 3/30/2025 0813 by Tabatha Hung LPN  Infection Prevention:   hand hygiene promoted   single patient room provided   rest/sleep promoted  Goal: Optimal Nutrition Delivery  Outcome: Progressing  Intervention: Optimize Nutrition Delivery  Recent Flowsheet Documentation  Taken 3/30/2025 0813 by Tabatha Hung LPN  Nutrition Interventions: food preferences provided  Taken 3/30/2025 0733 by Tabatha Hung LPN  Nutrition Interventions: food preferences provided   Goal Outcome Evaluation:

## 2025-03-31 ENCOUNTER — DOCUMENTATION (OUTPATIENT)
Dept: HOME HEALTH SERVICES | Facility: HOME HEALTHCARE | Age: 65
End: 2025-03-31
Payer: MEDICARE

## 2025-03-31 ENCOUNTER — READMISSION MANAGEMENT (OUTPATIENT)
Dept: CALL CENTER | Facility: HOSPITAL | Age: 65
End: 2025-03-31
Payer: MEDICARE

## 2025-03-31 ENCOUNTER — TRANSCRIBE ORDERS (OUTPATIENT)
Dept: HOME HEALTH SERVICES | Facility: HOME HEALTHCARE | Age: 65
End: 2025-03-31
Payer: MEDICARE

## 2025-03-31 VITALS
BODY MASS INDEX: 37.6 KG/M2 | DIASTOLIC BLOOD PRESSURE: 82 MMHG | HEART RATE: 109 BPM | RESPIRATION RATE: 20 BRPM | HEIGHT: 64 IN | OXYGEN SATURATION: 94 % | SYSTOLIC BLOOD PRESSURE: 150 MMHG | WEIGHT: 220.24 LBS | TEMPERATURE: 97.8 F

## 2025-03-31 DIAGNOSIS — N30.00 ACUTE CYSTITIS WITHOUT HEMATURIA: ICD-10-CM

## 2025-03-31 DIAGNOSIS — J30.9 ALLERGIC RHINITIS, UNSPECIFIED SEASONALITY, UNSPECIFIED TRIGGER: ICD-10-CM

## 2025-03-31 DIAGNOSIS — J84.10 PULMONARY FIBROSIS: Primary | ICD-10-CM

## 2025-03-31 DIAGNOSIS — M87.052 AVASCULAR NECROSIS OF FEMORAL HEAD, LEFT: ICD-10-CM

## 2025-03-31 PROBLEM — R06.00 DYSPNEA: Status: RESOLVED | Noted: 2025-03-15 | Resolved: 2025-03-31

## 2025-03-31 PROBLEM — R06.02 SOB (SHORTNESS OF BREATH): Status: RESOLVED | Noted: 2025-03-15 | Resolved: 2025-03-31

## 2025-03-31 LAB
ACANTHOCYTES BLD QL SMEAR: ABNORMAL
ANION GAP SERPL CALCULATED.3IONS-SCNC: 8.7 MMOL/L (ref 5–15)
BUN SERPL-MCNC: 28 MG/DL (ref 8–23)
BUN/CREAT SERPL: 23.1 (ref 7–25)
C3 FRG RBC-MCNC: ABNORMAL
CALCIUM SPEC-SCNC: 10.1 MG/DL (ref 8.6–10.5)
CHLORIDE SERPL-SCNC: 96 MMOL/L (ref 98–107)
CO2 SERPL-SCNC: 37.3 MMOL/L (ref 22–29)
CREAT SERPL-MCNC: 1.21 MG/DL (ref 0.57–1)
DACRYOCYTES BLD QL SMEAR: ABNORMAL
DEPRECATED RDW RBC AUTO: 50.2 FL (ref 37–54)
EGFRCR SERPLBLD CKD-EPI 2021: 49.5 ML/MIN/1.73
ERYTHROCYTE [DISTWIDTH] IN BLOOD BY AUTOMATED COUNT: 14.7 % (ref 12.3–15.4)
FUNGUS WND CULT: ABNORMAL
GLUCOSE SERPL-MCNC: 101 MG/DL (ref 65–99)
HCT VFR BLD AUTO: 35.9 % (ref 34–46.6)
HGB BLD-MCNC: 11.5 G/DL (ref 12–15.9)
LARGE PLATELETS: ABNORMAL
LYMPHOCYTES # BLD MANUAL: 2.21 10*3/MM3 (ref 0.7–3.1)
LYMPHOCYTES NFR BLD MANUAL: 2 % (ref 5–12)
MCH RBC QN AUTO: 30.7 PG (ref 26.6–33)
MCHC RBC AUTO-ENTMCNC: 32 G/DL (ref 31.5–35.7)
MCV RBC AUTO: 95.7 FL (ref 79–97)
MONOCYTES # BLD: 0.34 10*3/MM3 (ref 0.1–0.9)
NEUTROPHILS # BLD AUTO: 14.47 10*3/MM3 (ref 1.7–7)
NEUTROPHILS NFR BLD MANUAL: 85 % (ref 42.7–76)
P JIROVECII DNA L RESP QL NAA+NON-PROBE: NEGATIVE
PLATELET # BLD AUTO: 160 10*3/MM3 (ref 140–450)
PMV BLD AUTO: 13.3 FL (ref 6–12)
POIKILOCYTOSIS BLD QL SMEAR: ABNORMAL
POTASSIUM SERPL-SCNC: 4.1 MMOL/L (ref 3.5–5.2)
RBC # BLD AUTO: 3.75 10*6/MM3 (ref 3.77–5.28)
REF LAB TEST METHOD: NORMAL
SCAN SLIDE: NORMAL
SMALL PLATELETS BLD QL SMEAR: ADEQUATE
SODIUM SERPL-SCNC: 142 MMOL/L (ref 136–145)
VARIANT LYMPHS NFR BLD MANUAL: 1 % (ref 0–5)
VARIANT LYMPHS NFR BLD MANUAL: 12 % (ref 19.6–45.3)
WBC MORPH BLD: NORMAL
WBC NRBC COR # BLD AUTO: 17.02 10*3/MM3 (ref 3.4–10.8)

## 2025-03-31 PROCEDURE — 97530 THERAPEUTIC ACTIVITIES: CPT

## 2025-03-31 PROCEDURE — 85025 COMPLETE CBC W/AUTO DIFF WBC: CPT | Performed by: STUDENT IN AN ORGANIZED HEALTH CARE EDUCATION/TRAINING PROGRAM

## 2025-03-31 PROCEDURE — 94799 UNLISTED PULMONARY SVC/PX: CPT

## 2025-03-31 PROCEDURE — 80048 BASIC METABOLIC PNL TOTAL CA: CPT | Performed by: INTERNAL MEDICINE

## 2025-03-31 PROCEDURE — 94761 N-INVAS EAR/PLS OXIMETRY MLT: CPT

## 2025-03-31 PROCEDURE — 85007 BL SMEAR W/DIFF WBC COUNT: CPT | Performed by: STUDENT IN AN ORGANIZED HEALTH CARE EDUCATION/TRAINING PROGRAM

## 2025-03-31 PROCEDURE — 97535 SELF CARE MNGMENT TRAINING: CPT

## 2025-03-31 PROCEDURE — 63710000001 PREDNISONE PER 1 MG: Performed by: INTERNAL MEDICINE

## 2025-03-31 PROCEDURE — 94664 DEMO&/EVAL PT USE INHALER: CPT

## 2025-03-31 RX ORDER — PREDNISONE 20 MG/1
TABLET ORAL
Qty: 35 TABLET | Refills: 0 | Status: SHIPPED | OUTPATIENT
Start: 2025-03-31 | End: 2025-04-28

## 2025-03-31 RX ADMIN — BUDESONIDE 0.5 MG: 0.5 INHALANT RESPIRATORY (INHALATION) at 10:47

## 2025-03-31 RX ADMIN — GUAIFENESIN 600 MG: 600 TABLET, MULTILAYER, EXTENDED RELEASE ORAL at 08:53

## 2025-03-31 RX ADMIN — GUAIFENESIN AND CODEINE PHOSPHATE 10 ML: 100; 10 SOLUTION ORAL at 08:54

## 2025-03-31 RX ADMIN — CETIRIZINE HYDROCHLORIDE 10 MG: 10 TABLET, FILM COATED ORAL at 08:53

## 2025-03-31 RX ADMIN — LEVOTHYROXINE SODIUM 175 MCG: 175 TABLET ORAL at 05:09

## 2025-03-31 RX ADMIN — BENZONATATE 200 MG: 100 CAPSULE ORAL at 08:53

## 2025-03-31 RX ADMIN — Medication 10 ML: at 08:54

## 2025-03-31 RX ADMIN — FUROSEMIDE 40 MG: 40 TABLET ORAL at 08:53

## 2025-03-31 RX ADMIN — HYDROXYCHLOROQUINE SULFATE 200 MG: 200 TABLET ORAL at 08:54

## 2025-03-31 RX ADMIN — PREDNISONE 40 MG: 20 TABLET ORAL at 08:53

## 2025-03-31 RX ADMIN — Medication 1 TABLET: at 08:54

## 2025-03-31 NOTE — PLAN OF CARE
Goal Outcome Evaluation:      Pt has been resting through the night with minimal complaints noted. Pt had 6 minute walk on prior shift. Current plan is to discharge home with home health. K+ remains WNL at 4.1 on am labs. Pt has SCDs in place. Call light remains within reach and able to make needs known.

## 2025-03-31 NOTE — PROGRESS NOTES
[facility] MEDICINE SERVICE  DAILY PROGRESS NOTE    NAME: Tracie Gross  : 1960  MRN: [id number]      LOS: 16 days     PROVIDER OF SERVICE: Avtar Diaz MD    Chief Complaint: SOB (shortness of breath)    Subjective:     Interval History 64-year-old female with past medical history of anxiety, hypertension, dyslipidemia, hypothyroidism, von Willebrand's disease, chronic kidney disease, pulmonary fibrosis.  Patient follows pulmonologist Dr. Whaley, admitted with worsening shortness of breath.  Patient is not on any home oxygen and required 2 L of oxygen in the ER.  Patient has been evaluated by pulmonologist and also nephrologist for elevated creatinine 2.3        Subjective- no significant complaints, Home oxygen eval done      Review of Systems:    As above, Rest of ROS neg    Objective:     Vital Signs  Temp:  [97.1 °F (36.2 °C)-98.4 °F (36.9 °C)] 97.4 °F (36.3 °C)  Heart Rate:  [] 96  Resp:  [14-22] 19  BP: (112-140)/(52-89) 137/52  Flow (L/min) (Oxygen Therapy):  [2] 2   Body mass index is 37.8 kg/m².    Physical Exam  The patient is alert,  Vital signs as noted above.  Head and neck- JVP not raised  LungB/L equal air entry  Heart: Normal first and second heart sounds. No murmur.    Abdomen: Soft and nontender.    CNS grossly normal.        Diagnostic Data    Results from last 7 days   Lab Units 25  0058   WBC 10*3/mm3 17.02*   HEMOGLOBIN g/dL 11.5*   HEMATOCRIT % 35.9   PLATELETS 10*3/mm3 160   GLUCOSE mg/dL 101*   CREATININE mg/dL 1.21*   BUN mg/dL 28*   SODIUM mmol/L 142   POTASSIUM mmol/L 4.1   ANION GAP mmol/L 8.7       No radiology results for the last day      Results- I reviewed the patient's new clinical results.    Assessment/Plan:     Active and Resolved Problems  Active Hospital Problems    Diagnosis  POA    **SOB (shortness of breath) [R06.02]  Yes    Dyspnea [R06.00]  Unknown    Pulmonary fibrosis [J84.10]  Unknown      Resolved Hospital Problems   No resolved  problems to display.       Acute hypoxemia-patient is not on any home oxygen but required 2 L of oxygen via nasal cannula.  Likely to be due to underlying fibrosis/pneumonia.    History of pulmonary fibrosis-patient follows pulmonologist.  Patient is on high-dose IV steroids.  Patient had pulmonary function test done in January 2025 and shows restrictive pattern    Suspected pneumonia due to gram-positive organism.  Urine strep pneumo antigen with positive.  Blood culture and sputum cultures are negative.  Patient had last bronchoscopy done in March 2025.    Acute on chronic kidney disease stage IIIa-nephrology is on board.  Renal functions improved    Asymptomatic UTI     History of anxiety without any agitation or suicidal thoughts    Hypertension    Dyslipidemia    Hypothyroidism-clinically euthyroid    History of von Willebrand's disease.  Valve gradient to 30    Hypokalemia-repleted    VTE Prophylaxis:  Mechanical VTE prophylaxis orders are present.             Disposition Planning:     Barriers to Discharge- case manger for discharge planning  Anticipated Date of Discharge- as per pulmnologist  Place of Discharge-  home    Plan-follow-up the recommendations of pulmonologist.  Home oxygen evaluation has been requested.  Pulmonologist recommended prednisone taper.  CellCept is on hold.          Code Status and Medical Interventions: CPR (Attempt to Resuscitate); Full Support   Ordered at: 03/15/25 1955     Code Status (Patient has no pulse and is not breathing):    CPR (Attempt to Resuscitate)     Medical Interventions (Patient has pulse or is breathing):    Full Support       Signature: Electronically signed by Avtar Diaz MD, 03/31/25, 09:47 EDT.  [facility] Hospitalist Team

## 2025-03-31 NOTE — OUTREACH NOTE
Prep Survey      Flowsheet Row Responses   [facility] patient discharged from? [facility]   Is LACE score < 7 ? No   Eligibility [facility]   Date of Admission 03/15/25   Date of Discharge 03/31/25   Discharge Disposition Home-Health Care Svc   Discharge diagnosis SOB (shortness of breath), Urinary tract infection   Does the patient have one of the following disease processes/diagnoses(primary or secondary)? Other   Does the patient have Home health ordered? Yes   What is the Home health agency?  [facility]   Is there a DME ordered? No   Prep survey completed? Yes            Ara ROCKWELL - Registered Nurse

## 2025-03-31 NOTE — THERAPY TREATMENT NOTE
"Subjective: Pt agreeable to therapeutic plan of care.  Cognition: arousal/alertness: Alert    Objective:     Bed Mobility: Supervision   Functional Transfers: CGA and with rolling walker     Balance: dynamic, with UE support, and standing CGA  Functional Ambulation: CGA and with rolling walker    Toileting: Min-A provided by  for posterior ronnie care   ADL Position: supported sitting and supported standing  ADL Comments: Ronnie care and clothing management     Grooming: CGA  ADL Position: at sink  ADL Comments: Washing hands while leaning on sink edge with elbows.  Pt appeared to require at least one UE for support when standing to wash hands.      Vitals: WNL    Pain: 0 VAS Location: NA  Interventions for pain: N/A  Education: Provided education on the importance of mobility in the acute care setting    Assessment: Tracie Gross presents with ADL impairments affecting function including balance, endurance / activity tolerance, and strength. Demonstrated functioning below baseline abilities indicate the need for continued skilled intervention while inpatient. Tolerating session today without incident. Will continue to follow and progress as tolerated.     Plan/Recommendations:   Low Intensity Therapy recommended post-acute care - This is recommended as therapy feels this patient would require 2-3 visits per week. OP or HH would be the best option depending on patient's home bound status. Consider, if the patient has other  \"skilled\" needs such as wounds, IV antibiotics, etc. Combined with \"low intensity\" could also equate to a SNF. If patient is medically complex, consider LTAC.. Pt requires rolling walker and BSC at discharge.     Pt desires Home with family assist and Home Health at discharge. Pt cooperative; agreeable to therapeutic recommendations and plan of care.     Modified Jose: N/A = No pre-op stroke/TIA    Post-Tx Position: Supine with HOB Elevated and Call light and personal items within reach  PPE: " gloves    Therapy Charges for Today       Code Description Service Date Service Provider Modifiers Qty    45475647214 HC OT SELF CARE/MGMT/TRAIN EA 15 MIN 3/31/2025 Rose Smith, OT GO 1    87119628069 HC OT THERAPEUTIC ACT EA 15 MIN 3/31/2025 Rose Smith, OT GO 1           Time Calculation- OT       Row Name 03/31/25 1454             Time Calculation- OT    OT Start Time 1336  -SR      OT Stop Time 1352  -SR      OT Time Calculation (min) 16 min  -SR      Total Timed Code Minutes- OT 16 minute(s)  -SR      OT Received On 03/31/25  -SR      OT - Next Appointment 04/02/25  -SR                User Key  (r) = Recorded By, (t) = Taken By, (c) = Cosigned By      Initials Name Provider Type    SR Rose Smith, OT Occupational Therapist

## 2025-03-31 NOTE — PROGRESS NOTES
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 No wolf honeycombing fibrosis  Patient was treated with mycophenolate, prophylactic Bactrim     PFT 1/14/2025   FVC 1 1.6 L/51%,  FEV1 1.4 L / 58%, ratio 88, TLC 57%, RV 50%, DLCO 51%     PFTs July 2021,   FEV1 1.7 L 68% improve 75% post bronchodilator, FEV1/ FVC ratio 87, TLC 72%, RV 74%, DLCO 56%     PFTs in 2017   FEV1 66-70% predicted which is unchanged since 2012  quit smoking 1994,      PFT 2012 and 2017,   FEV-1 and TLC 75% , suggestive of mild to mod restrictive lung disease     2D echo  2018 normal RVSP and EF  2D echo May 2020 no pulmonary hypertension   2d echo July 2021 RVSP 30  2D echo January 2025 no pulmonary hypertension EF 60%     FERN, AHI 14.5 ,       No Response to inhalers including Breztri and Trelegy     Recommendations:    weaning steroids   P.o. prednisone 40 mg daily for 7 days then 20 mg daily for 14 days then 20 mg daily for 7 days    oxygen titration currently on room air at rest however requires 2 L with activity and sleep    Antibiotics completed Rocephin 2 g IV daily for 5 days  Azithromycin 500 mg p.o. daily for 3 days      Seen by GI for recurrent \" chocking\"     Holding mycophenolate for treatment of pneumonia     Bronchodilators Pulmicort and DuoNeb     On Plaquenil 200 mg twice daily        CT chest 3/23/2025 compared to January 2025         Chest x-ray 3/15/2025      VQ scan " 3/16/2025       High-res CT scan January 2025             LOS: 16 days     Subjective         Objective     Vital signs for last 24 hours:  Vitals:    03/30/25 1950 03/30/25 2015 03/31/25 0055 03/31/25 0511   BP:  132/88 115/74 112/73   BP Location:  Left arm Left arm Left arm   Patient Position:  Lying Lying Lying   Pulse: 98 107 92 91   Resp: 22 18 14 21   Temp:  97.2 °F (36.2 °C) 97.4 °F (36.3 °C) 97.1 °F (36.2 °C)   TempSrc:  Oral Oral Oral   SpO2:  92% 95% 97%   Weight:    99.9 kg (220 lb 3.8 oz)   Height:           Intake/Output last 3 shifts:  I/O last 3 completed shifts:  In: 240 [P.O.:240]  Out: -   Intake/Output this shift:  No intake/output data recorded.      Radiology  Imaging Results (Last 24 Hours)       ** No results found for the last 24 hours. **            Labs:  Results from last 7 days   Lab Units 03/31/25  0058   WBC 10*3/mm3 17.02*   HEMOGLOBIN g/dL 11.5*   HEMATOCRIT % 35.9   PLATELETS 10*3/mm3 160     Results from last 7 days   Lab Units 03/31/25  0058   SODIUM mmol/L 142   POTASSIUM mmol/L 4.1   CHLORIDE mmol/L 96*   CO2 mmol/L 37.3*   BUN mg/dL 28*   CREATININE mg/dL 1.21*   CALCIUM mg/dL 10.1   GLUCOSE mg/dL 101*                       Results from last 7 days   Lab Units 03/24/25  1033   LANA  Negative                           Meds:   SCHEDULE  amitriptyline, 25 mg, Oral, Nightly  benzonatate, 200 mg, Oral, TID  budesonide, 0.5 mg, Nebulization, BID - RT  calcium 500 mg vitamin D 5 mcg (200 UT), 1 tablet, Oral, BID  cetirizine, 10 mg, Oral, Daily  furosemide, 40 mg, Oral, Daily  guaiFENesin, 600 mg, Oral, Q12H  hydroxychloroquine, 200 mg, Oral, BID  lamoTRIgine, 200 mg, Oral, Nightly  levothyroxine, 175 mcg, Oral, Q AM  [Held by provider] mycophenolate, 500 mg, Oral, Q12H  predniSONE, 40 mg, Oral, Daily With Breakfast  QUEtiapine, 50 mg, Oral, Nightly  sodium chloride, 10 mL, Intravenous, Q12H      Infusions       PRNs    acetaminophen **OR** acetaminophen **OR** acetaminophen     ALPRAZolam    aluminum-magnesium hydroxide-simethicone    senna-docusate sodium **AND** polyethylene glycol **AND** bisacodyl **AND** bisacodyl    Calcium Replacement - Follow Nurse / BPA Driven Protocol    cyclobenzaprine    guaiFENesin-codeine    ipratropium-albuterol    Magnesium Standard Dose Replacement - Follow Nurse / BPA Driven Protocol    meclizine    melatonin    Morphine    nitroglycerin    ondansetron ODT **OR** [DISCONTINUED] ondansetron    oxyCODONE    Phosphorus Replacement - Follow Nurse / BPA Driven Protocol    Potassium Replacement - Follow Nurse / BPA Driven Protocol    [COMPLETED] Insert Peripheral IV **AND** sodium chloride    sodium chloride    sodium chloride    Physical Exam:  Physical Exam  Cardiovascular:      Heart sounds: Murmur heard.      No gallop.   Pulmonary:      Effort: No respiratory distress.      Breath sounds: No stridor. Rhonchi and rales present. No wheezing.   Chest:      Chest wall: No tenderness.         ROS  Review of Systems   Respiratory:  Positive for cough and shortness of breath. Negative for wheezing and stridor.    Cardiovascular:  Positive for palpitations. Negative for chest pain and leg swelling.             Total critical care time spent with patient greater than: 45 Minutes

## 2025-03-31 NOTE — PROGRESS NOTES
Start PACC Note    Home Health Referral    Evaluated patient on Home Care and services available. Patient offered choice of available HHC and agreeable to RN/PT/OT services with [facility].    Care Types:   Isolation Precautions: [unfilled]    Social Determinants of Health:  Tobacco Use: Medium Risk (3/24/2025)    Patient History     Smoking Tobacco Use: Former     Smokeless Tobacco Use: Never     Passive Exposure: Past     Social History     Substance and Sexual Activity   Alcohol Use Not Currently    Comment: Rarely     Social History     Substance and Sexual Activity   Drug Use No       Does the patient have any financial resource strain?   Does the patient have any food insecurities?   Does the patient have any housing instabilities?     If any of the above is noted as yes - consider a MSW evaluation once the patient returns home.    START PATIENT REGISTRATION INFORMATION  Order Information  Order Signing Physician:Dr. Floyd Silva  Service Ordered RN?: Yes  Service Ordered PT?: Yes  Service Ordered OT?: Yes  Service Ordered ST?: No  Service Ordered MSW?: No  Service Ordered HHA?: No  Following Physician: Floyd Silva MD  Following Physician Phone: [phone]  Overseeing Physician: Flody Sivla MD  (Required for Residents Only)  Agreeable to Follow? Yes  Date/Time of Call 03/31/25 14:22 EDT, Spoke with: via secure chat    Care Coordination  Same Day SOC?: No  Primary Care Physician: Floyd Silva MD  Primary Care Physician Phone: [phone]  Primary Care Physician Address: [address] [place] [address]  Visit Instructions: N/A  Service Discharge Location Type: Home  Service Facility Name: N/A  Service Floor Facility: N/A  Service Room No: N/A    Demographics  Patient Last Name: Renato  Patient First Name: Tracie  Language/Communication Barrier: none  Service Address: [address] [place] [address]  Service City: [place]  Service State: [place]  Service Zip:  [address]  Service Home Phone: [phone]  Other Phone Numbers:   Telephone Information:   Mobile [phone]     Emergency Contact:   Extended Emergency Contact Information  Primary Emergency Contact: Brian Gross  Address: [address] [place] [address] of [place]  Home Phone: [phone]  Mobile Phone: [phone]  Relation: Spouse  Hearing or visual needs: None  Other needs: None  Preferred language: English   needed? No  Secondary Emergency Contact: Roverto Hopkins  Mobile Phone: [phone]  Relation: Other    Admission Information  Admit Date: 03/15/2025  Patient Status at Discharge:   Admitting Diagnosis: dyspnea, pulmonary fibrosis    Caregiver Information  Caregiver First Name:   Caregiver Last Name:   Caregiver Relationship to Patient:   Caregiver Phone Number:   Caregiver Notes:     [employer]  [employer]  No      END PATIENT REGISTRATION INFORMATION    Start PACC Summary    Additional Comments: none    END PACC Summary    Discharge Date: Pending    Referral Source: [facility]    Signed By: Kecia Duarte RN, 3/31/2025, 14:22 EDT     Date/Time: 03/31/25 14:22 EDT    End PACC Note

## 2025-03-31 NOTE — DISCHARGE SUMMARY
[facility] Medicine Services  Discharge Summary    Date of Service: 3/31/2025  Patient Name: Tracie Gross  : 1960  MRN: [id number]    Date of Admission: 3/15/2025  Discharge Diagnosis: SOB (shortness of breath)  Date of Discharge: 3/31/2025  Primary Care Physician: Floyd Silva MD      Presenting Problem:   Pulmonary fibrosis [J84.10]  Hypotension, unspecified hypotension type [I95.9]  Urinary tract infection without hematuria, site unspecified [N39.0]  Dyspnea, unspecified type [R06.00]  SOB (shortness of breath) [R06.02]    Active and Resolved Hospital Problems:  Active Hospital Problems    Diagnosis POA    Pulmonary fibrosis [J84.10] Yes      Resolved Hospital Problems    Diagnosis POA    **SOB (shortness of breath) [R06.02] Yes    Dyspnea [R06.00] Yes         Hospital Course     HPI:    64-year-old female with past medical history of anxiety, hypertension, dyslipidemia, hypothyroidism, von Willebrand's disease, chronic kidney disease, pulmonary fibrosis. Patient follows pulmonologist Dr. Whaley, admitted with worsening shortness of breath. Patient is not on any home oxygen and required 2 L of oxygen in the ER. Patient has been evaluated by pulmonologist and also nephrologist for elevated creatinine 2.3     Hospital Course:  Patient has been evaluated by pulmonary and nephrology.  Patient condition improved.  Home oxygen evaluation has been done.  Patient completed antibiotics.  Patient had last bronchoscopy done today 2025.  Blood cultures negative.  Patient is clinically stable.  Pulmonologist recommended discharge on taper steroids which has been ordered.  Patient is being discharged home to follow-up with PCP and pulmonary        DISCHARGE Follow Up Recommendations for labs and diagnostics: FU with pcp        Day of Discharge     Vital Signs:  Temp:  [97.1 °F (36.2 °C)-98.1 °F (36.7 °C)] 97.8 °F (36.6 °C)  Heart Rate:  [] 109  Resp:  [14-22] 20  BP:  (112-150)/(52-89) 150/82  Flow (L/min) (Oxygen Therapy):  [2] 2    Physical Exam:    The patient is alert,  Vital signs as noted above.  Head and neck- JVP not raised  LungB/L equal air entry  Heart: Normal first and second heart sounds. No murmur.    Abdomen: Soft and nontender.    Extremities with good peripheral pulses without any pedal edema.  Skin: Warm and dry.  Musculoskeletal system is grossly normal.  CNS grossly normal.     Pertinent  and/or Most Recent Results     LAB RESULTS:      Lab 03/31/25  0058 03/30/25  0452 03/29/25  0150 03/27/25  0408 03/26/25  0200   WBC 17.02* 15.24* 18.20* 12.97* 12.21*   HEMOGLOBIN 11.5* 11.0* 11.2* 10.7* 11.0*   HEMATOCRIT 35.9 33.8* 34.3 32.8* 33.7*   PLATELETS 160 159 176 180 189   NEUTROS ABS 14.47*  --   --   --   --    MCV 95.7 94.9 95.3 95.3 94.7         Lab 03/31/25  0058 03/30/25  1512 03/30/25  0452 03/29/25  1628 03/29/25  0150 03/28/25  0424 03/27/25  1554 03/27/25  0408   SODIUM 142  --  141  --  140 143  --  143   POTASSIUM 4.1 4.6 3.6 4.2 3.2* 4.1   < > 3.4*   CHLORIDE 96*  --  97*  --  98 101  --  100   CO2 37.3*  --  38.3*  --  35.8* 34.1*  --  34.0*   ANION GAP 8.7  --  5.7  --  6.2 7.9  --  9.0   BUN 28*  --  28*  --  38* 43*  --  41*   CREATININE 1.21*  --  1.12*  --  1.08* 1.16*  --  1.17*   EGFR 49.5*  --  54.3*  --  56.8* 52.1*  --  51.6*   GLUCOSE 101*  --  99  --  115* 164*  --  190*   CALCIUM 10.1  --  9.8  --  9.9 10.0  --  9.8    < > = values in this interval not displayed.                         Brief Urine Lab Results  (Last result in the past 365 days)        Color   Clarity   Blood   Leuk Est   Nitrite   Protein   CREAT   Urine HCG        03/15/25 1610 Dark Yellow   Cloudy   Negative   Trace   Negative   30 mg/dL (1+)                 Microbiology Results (last 10 days)       Procedure Component Value - Date/Time    Fungus Culture - Wash, Lung, L [[id number]]  (Abnormal) Collected: 03/24/25 1627    Lab Status: Preliminary result Specimen: Wash  from Lung, L Updated: 03/31/25 1309     Fungus Culture Yeast isolated    Narrative:      Probable Candida species    AFB Culture - Wash, Lung, L [[id number]] Collected: 03/24/25 1627    Lab Status: Preliminary result Specimen: Wash from Lung, L Updated: 03/29/25 1800     AFB Culture No AFB isolated at less than 1 week     AFB Stain No acid fast bacilli seen on concentrated smear    Respiratory Culture - Wash, Lung, L [[id number]] Collected: 03/24/25 1627    Lab Status: Final result Specimen: Wash from Lung, L Updated: 03/26/25 1144     Respiratory Culture Moderate growth (3+) Normal respiratory miles. No S. aureus or Pseudomonas aeruginosa detected. Final report.     Gram Stain Few (2+) Epithelial cells per low power field      Moderate (3+) WBCs per low power field      Rare (1+) Gram positive cocci in pairs    Pneumocystis PCR - Wash, Lung, L [[id number]] Collected: 03/24/25 1627    Lab Status: Final result Specimen: Wash from Lung, L Updated: 03/31/25 1016     Reference Lab Report See Attached Report     Pneumocystis Negative    Respiratory Panel PCR w/COVID-19(SARS-CoV-2) [facility] In-House, NP Swab in UTM/VTM, 2 HR TAT - Wash, Lung, L [[id number]]  (Normal) Collected: 03/24/25 1627    Lab Status: Final result Specimen: Wash from Lung, L Updated: 03/24/25 1938     ADENOVIRUS, PCR Not Detected     Coronavirus 229E Not Detected     Coronavirus HKU1 Not Detected     Coronavirus NL63 Not Detected     Coronavirus OC43 Not Detected     COVID19 Not Detected     Human Metapneumovirus Not Detected     Human Rhinovirus/Enterovirus Not Detected     Influenza A PCR Not Detected     Influenza B PCR Not Detected     Parainfluenza Virus 1 Not Detected     Parainfluenza Virus 2 Not Detected     Parainfluenza Virus 3 Not Detected     Parainfluenza Virus 4 Not Detected     RSV, PCR Not Detected     Bordetella pertussis pcr Not Detected     Bordetella parapertussis PCR Not Detected     Chlamydophila pneumoniae PCR Not  Detected     Mycoplasma pneumo by PCR Not Detected    Narrative:      In the setting of a positive respiratory panel with a viral infection PLUS a negative procalcitonin without other underlying concern for bacterial infection, consider observing off antibiotics or discontinuation of antibiotics and continue supportive care. If the respiratory panel is positive for atypical bacterial infection (Bordetella pertussis, Chlamydophila pneumoniae, or Mycoplasma pneumoniae), consider antibiotic de-escalation to target atypical bacterial infection.            IR insert non-tunneled central line 5+  Result Date: 3/28/2025  Impression: Successful placement of left internal jugular central venous catheter. Electronically Signed: Reji Romero MD  3/28/2025 8:55 AM EDT  Workstation ID: [id number]    CT Chest Without Contrast Diagnostic  Result Date: 3/23/2025  Impression: Impression: 1.Multifocal patchy opacities noted throughout the lungs bilaterally, worse in the perihilar region. Findings are concerning for multifocal pneumonia. Less likely, this may represent pulmonary hemorrhage 2.Additional nonemergent findings as characterized above 3.Scattered prominent to mildly enlarged mediastinal lymph nodes, likely reactive. Electronically Signed: Rafael Archer DO  3/23/2025 5:15 PM EDT  Workstation ID: [id number]    XR Chest 1 View  Result Date: 3/23/2025  Impression: Impression: 1.Worsening multifocal patchy opacities scattered throughout the lungs. This most likely represents multifocal pneumonia, although pulmonary edema is also a possibility 2.Trace bilateral pleural effusions. Electronically Signed: Rafael Archer DO  3/23/2025 12:30 PM EDT  Workstation ID: [id number]    XR Chest 1 View  Result Date: 3/19/2025  Impression: Impression: Multifocal patchy nodular densities in both lungs, thought to be similar to 3/15/2025. Correlate for pneumonia. Electronically Signed: Barb Millan MD  3/19/2025 2:35 PM EDT  Workstation  ID: [id number]    US Renal Bilateral  Result Date: 3/16/2025  Impression: Impression: No hydronephrosis or acute sonographic abnormality. Electronically Signed: Tomy Orellana MD  3/16/2025 5:38 PM EDT  Workstation ID: [id number]    NM Lung Ventilation Perfusion  Result Date: 3/16/2025  Impression: Impression: Negative for pulmonary embolism. Electronically Signed: Nimesh Dash MD  3/16/2025 3:17 PM EDT  Workstation ID: [id number]    XR Chest 1 View  Result Date: 3/15/2025  Impression: Impression: Mild hazy left greater than right basal opacities, which could represent atelectasis or pneumonia. Electronically Signed: Josh Lorenzo  3/15/2025 4:18 PM EDT  Workstation ID: [id number]    MRI Abdomen With & Without Contrast  Result Date: 3/15/2025  Impression: Impression: 1. Multiple small round eccentric lesions scattered throughout the colon suspicious for polyps based on patient history. Recommend correlation with colonoscopy. 2. No suspicious adenopathy. 3. Similar chronic dilation of the biliary tree with blunting at the ampulla. Chronicity favors physiologic changes status post cholecystectomy and/or nonobstructing ampullary stricture. 4. Lipomatous infiltration and pancreatic atrophy. Negative for active inflammation or suspicious mass. 5. Cardiomegaly. Mild body wall edema. Electronically Signed: Nimesh Dash MD  3/15/2025 2:37 PM EDT  Workstation ID: [id number]      Results for orders placed during the hospital encounter of 03/15/25    Duplex Venous Lower Extremity - Bilateral CAR    Interpretation Summary    Normal bilateral lower extremity venous duplex scan.      Results for orders placed during the hospital encounter of 03/15/25    Duplex Venous Lower Extremity - Bilateral CAR    Interpretation Summary    Normal bilateral lower extremity venous duplex scan.      Results for orders placed during the hospital encounter of 01/14/25    Adult Transthoracic Echo Complete W/ Cont if Necessary Per  Protocol    Interpretation Summary    Left ventricular systolic function is normal. Calculated left ventricular EF = 59% Left ventricular ejection fraction appears to be 56 - 60%.    Left ventricular diastolic function is consistent with (grade II w/high LAP) pseudonormalization.    The left atrial cavity is mild to moderately dilated.      Labs Pending at Discharge:  Pending Results       None            Procedures Performed  Procedure(s):  BRONCHOSCOPY with bilateral lung washing  03/30 1302 Note By: Eduardo Wong, ANT      Consults:   Consults       Date and Time Order Name Status Description    3/19/2025  8:53 AM Inpatient Gastroenterology Consult Completed     3/16/2025  9:50 AM Inpatient Nephrology Consult      3/15/2025  8:33 PM Inpatient Nephrology Consult Completed     3/15/2025  7:55 PM Inpatient Pulmonology Consult Completed               Discharge Details        Discharge Medications        New Medications        Instructions Start Date   predniSONE 20 MG tablet  Commonly known as: DELTASONE   Take 2 tablets by mouth Daily for 7 days, THEN 1 tablet Daily for 14 days, THEN 1 tablet Daily for 7 days.   Start Date: March 31, 2025            Changes to Medications        Instructions Start Date   gabapentin 600 MG tablet  Commonly known as: NEURONTIN  What changed:   how much to take  how to take this  when to take this   TAKE TWO TABLETS BY MOUTH EVERY MORNING THEN TAKE ONE TABLET BY MOUTH DAILY IN THE AFTERNOON THEN TAKE TWO TABLETS BY MOUTH EVERY NIGHT             Continue These Medications        Instructions Start Date   albuterol sulfate  (90 Base) MCG/ACT inhaler  Commonly known as: PROVENTIL HFA;VENTOLIN HFA;PROAIR HFA   2 puffs, Inhalation, Every 4 Hours PRN      ALPRAZolam 1 MG tablet  Commonly known as: XANAX   1 mg, Oral, Nightly PRN      bisoprolol-hydrochlorothiazide 10-6.25 MG per tablet  Commonly known as: ZIAC   1 tablet, Oral, Daily      Calcium + Vitamin D3 600-5 MG-MCG  tablet  Generic drug: Calcium Carb-Cholecalciferol   1 tablet, Oral, 2 Times Daily      cetirizine 10 MG tablet  Commonly known as: zyrTEC   10 mg, Oral, Daily      cyclobenzaprine 10 MG tablet  Commonly known as: FLEXERIL   10 mg, Oral, 3 Times Daily PRN      Flaxseed Oil Max Str 1300 MG capsule   1 tablet, 2 Times Daily      fluticasone 50 MCG/ACT nasal spray  Commonly known as: FLONASE   SPRAY TWO SPRAYS IN EACH NOSTRIL ONCE DAILY      furosemide 20 MG tablet  Commonly known as: LASIX   TAKE 1 TABLET BY MOUTH DAILY AS NEEDED FOR LEG SWELLING      guaiFENesin 600 MG 12 hr tablet  Commonly known as: Mucinex   Take 2 tablets twice daily for congestion      hydroxychloroquine 200 MG tablet  Commonly known as: PLAQUENIL   200 mg, Oral, 2 Times Daily      hydrOXYzine 25 MG tablet  Commonly known as: ATARAX   25 mg, Oral, Every 8 Hours PRN      lamoTRIgine 200 MG tablet  Commonly known as: LaMICtal   200 mg, Oral, Nightly      levothyroxine 175 MCG tablet  Commonly known as: SYNTHROID, LEVOTHROID   175 mcg, Oral, Daily      lisinopril 5 MG tablet  Commonly known as: PRINIVIL,ZESTRIL   5 mg, Oral, Daily      meclizine 25 MG tablet  Commonly known as: ANTIVERT   25 mg, Oral, 3 Times Daily PRN      NIFEdipine XL 90 MG 24 hr tablet  Commonly known as: PROCARDIA XL   90 mg, Oral, Daily      NON FORMULARY   Apply  topically to the appropriate area as directed. Lidocaine powder  Ketamine powder  Diclofenac powder      ondansetron 4 MG tablet  Commonly known as: ZOFRAN   4 mg, Oral, Every 8 Hours PRN      oxyCODONE 10 MG tablet  Commonly known as: ROXICODONE   10 mg, Oral, Every 6 Hours PRN      PROLIA SC   Every 6 Months      promethazine-dextromethorphan 6.25-15 MG/5ML syrup  Commonly known as: PROMETHAZINE-DM   5 mL, Oral, 4 Times Daily PRN      QUEtiapine 100 MG tablet  Commonly known as: SEROquel   50 mg, Oral, Nightly      sulfamethoxazole-trimethoprim 800-160 MG per tablet  Commonly known as: BACTRIM DS,SEPTRA DS   1  tablet, Every Other Day             Stop These Medications      mycophenolate 500 MG tablet  Commonly known as: CELLCEPT            ASK your doctor about these medications        Instructions Start Date   FeroSul 325 (65 Fe) MG tablet  Generic drug: ferrous sulfate  Ask about: Which instructions should I use?   325 mg, Oral, Daily With Breakfast               Allergies   Allergen Reactions    Aspirin Other (See Comments)     VONWILLENBRAND DISEASE     Baclofen Hives         Discharge Disposition:   Home or Self Care    Diet:  Hospital:  Diet Order   Procedures    Diet: Regular/House; No Straw; Fluid Consistency: Thin (IDDSI 0)         Discharge Activity:   As tolerated      CODE STATUS:  Code Status and Medical Interventions: CPR (Attempt to Resuscitate); Full Support   Ordered at: 03/15/25 1955     Code Status (Patient has no pulse and is not breathing):    CPR (Attempt to Resuscitate)     Medical Interventions (Patient has pulse or is breathing):    Full Support         Future Appointments   Date Time Provider Department Center   8/13/2025  3:00 PM Suzan Jones MD [facility]   8/13/2025  3:00 PM LAB MD [facility]       Additional Instructions for the Follow-ups that You Need to Schedule       Ambulatory Referral to Home Health   As directed      Face to Face Visit Date: 3/27/2025   Follow-up provider for Plan of Care?: I treated the patient in an acute care facility and will not continue treatment after discharge.   Follow-up provider: ANDI RIVAS [[id number]]   Reason/Clinical Findings: Impaired mobility   Describe mobility limitations that make leaving home difficult: Impaired mobility   Nursing/Therapeutic Services Requested: Physical Therapy   Frequency: 1 Week 1        Discharge Follow-up with Specified Provider: pulm 1-2 week   As directed      To: pulm 1-2 week                Time spent on Discharge including face to face service: 30 minutes    Signature:  Electronically signed by Avtar Diaz MD, 03/31/25, 14:14 EDT.  [facility] Hospitalist Team

## 2025-03-31 NOTE — SIGNIFICANT NOTE
03/31/25 1510   OTHER   Discipline physical therapy assistant   Rehab Time/Intention   Session Not Performed other (see comments)  (Pending hospital discharge)   Recommendation   PT - Next Appointment 04/01/25

## 2025-03-31 NOTE — PLAN OF CARE
"Goal Outcome Evaluation:           Assessment: Tracie Gross presents with ADL impairments affecting function including balance, endurance / activity tolerance, and strength. Demonstrated functioning below baseline abilities indicate the need for continued skilled intervention while inpatient. Tolerating session today without incident. Will continue to follow and progress as tolerated.     Plan/Recommendations:   Low Intensity Therapy recommended post-acute care - This is recommended as therapy feels this patient would require 2-3 visits per week. OP or HH would be the best option depending on patient's home bound status. Consider, if the patient has other  \"skilled\" needs such as wounds, IV antibiotics, etc. Combined with \"low intensity\" could also equate to a SNF. If patient is medically complex, consider LTAC.. Pt requires rolling walker and BSC at discharge.     Pt desires Home with family assist and Home Health at discharge. Pt cooperative; agreeable to therapeutic recommendations and plan of care.                                    "

## 2025-03-31 NOTE — CASE MANAGEMENT/SOCIAL WORK
Continued Stay Note  [facility]     Patient Name: Tracie Gross  MRN: [id number]  Today's Date: 3/31/2025    Admit Date: 3/15/2025    Plan: Home with family and [facility]; accepted and order in place.   Discharge Plan       Row Name 03/31/25 0826       Plan    Plan Comments DC Barriers: Cr 1.21, WBC 17.02, s/p bronch 3/24, pulm following, poss DC today             Gabriela Bermeo RN     [facility]  Office: [phone]  Cell: [phone]  Fax # [phone]

## 2025-03-31 NOTE — PROGRESS NOTES
Nephrology  Progress Note                                        [facility]    Patient Identification    Name: Tracie Gross  Age: 64 y.o.  Sex: female  :  1960  MRN: [id number]      DATE OF SERVICE:  3/31/2025        Subective    Noted cough and shortness of breath or     Objective   Scheduled Meds:amitriptyline, 25 mg, Oral, Nightly  benzonatate, 200 mg, Oral, TID  budesonide, 0.5 mg, Nebulization, BID - RT  calcium 500 mg vitamin D 5 mcg (200 UT), 1 tablet, Oral, BID  cetirizine, 10 mg, Oral, Daily  furosemide, 40 mg, Oral, Daily  guaiFENesin, 600 mg, Oral, Q12H  hydroxychloroquine, 200 mg, Oral, BID  lamoTRIgine, 200 mg, Oral, Nightly  levothyroxine, 175 mcg, Oral, Q AM  [Held by provider] mycophenolate, 500 mg, Oral, Q12H  predniSONE, 40 mg, Oral, Daily With Breakfast  QUEtiapine, 50 mg, Oral, Nightly  sodium chloride, 10 mL, Intravenous, Q12H          Continuous Infusions:       PRN Meds:  acetaminophen **OR** acetaminophen **OR** acetaminophen    ALPRAZolam    aluminum-magnesium hydroxide-simethicone    senna-docusate sodium **AND** polyethylene glycol **AND** bisacodyl **AND** bisacodyl    Calcium Replacement - Follow Nurse / BPA Driven Protocol    cyclobenzaprine    guaiFENesin-codeine    ipratropium-albuterol    Magnesium Standard Dose Replacement - Follow Nurse / BPA Driven Protocol    meclizine    melatonin    Morphine    nitroglycerin    ondansetron ODT **OR** [DISCONTINUED] ondansetron    oxyCODONE    Phosphorus Replacement - Follow Nurse / BPA Driven Protocol    Potassium Replacement - Follow Nurse / BPA Driven Protocol    [COMPLETED] Insert Peripheral IV **AND** sodium chloride    sodium chloride    sodium chloride     Exam:  /73 (BP Location: Left arm, Patient Position: Lying)   Pulse 91   Temp 97.1 °F (36.2 °C) (Oral)   Resp 21    "Ht 162.6 cm (64\")   Wt 99.9 kg (220 lb 3.8 oz)   LMP 05/08/1983   SpO2 97%   BMI 37.80 kg/m²     Intake/Output last 3 shifts:  I/O last 3 completed shifts:  In: 240 [P.O.:240]  Out: -     Intake/Output this shift:  No intake/output data recorded.    Physical exam:  General Appearance:  Alert  Head:  Normocephalic, without obvious abnormality, atraumatic  Eyes:  PERRL, conjunctiva/corneas clear     Neck:  Supple,  no adenopathy;      Lungs:  Decreased BS occasion ronchi  Heart:  Regular rate and rhythm, S1 and S2 normal  Abdomen:  Soft, non-tender, bowel sounds active   Extremities: ++ edema  Pulses: 2+ and symmetric all extremities  Skin:  No rashes or lesions       Data Review:  All labs (24hrs):   Recent Results (from the past 24 hours)   Potassium    Collection Time: 03/30/25  3:12 PM    Specimen: Blood   Result Value Ref Range    Potassium 4.6 3.5 - 5.2 mmol/L   Basic Metabolic Panel    Collection Time: 03/31/25 12:58 AM    Specimen: Blood   Result Value Ref Range    Glucose 101 (H) 65 - 99 mg/dL    BUN 28 (H) 8 - 23 mg/dL    Creatinine 1.21 (H) 0.57 - 1.00 mg/dL    Sodium 142 136 - 145 mmol/L    Potassium 4.1 3.5 - 5.2 mmol/L    Chloride 96 (L) 98 - 107 mmol/L    CO2 37.3 (H) 22.0 - 29.0 mmol/L    Calcium 10.1 8.6 - 10.5 mg/dL    BUN/Creatinine Ratio 23.1 7.0 - 25.0    Anion Gap 8.7 5.0 - 15.0 mmol/L    eGFR 49.5 (L) >60.0 mL/min/1.73   CBC Auto Differential    Collection Time: 03/31/25 12:58 AM    Specimen: Blood   Result Value Ref Range    WBC 17.02 (H) 3.40 - 10.80 10*3/mm3    RBC 3.75 (L) 3.77 - 5.28 10*6/mm3    Hemoglobin 11.5 (L) 12.0 - 15.9 g/dL    Hematocrit 35.9 34.0 - 46.6 %    MCV 95.7 79.0 - 97.0 fL    MCH 30.7 26.6 - 33.0 pg    MCHC 32.0 31.5 - 35.7 g/dL    RDW 14.7 12.3 - 15.4 %    RDW-SD 50.2 37.0 - 54.0 fl    MPV 13.3 (H) 6.0 - 12.0 fL    Platelets 160 140 - 450 10*3/mm3   Scan Slide    Collection Time: 03/31/25 12:58 AM    Specimen: Blood   Result Value Ref Range    Scan Slide     Manual " Differential    Collection Time: 03/31/25 12:58 AM    Specimen: Blood   Result Value Ref Range    Neutrophil % 85.0 (H) 42.7 - 76.0 %    Lymphocyte % 12.0 (L) 19.6 - 45.3 %    Monocyte % 2.0 (L) 5.0 - 12.0 %    Atypical Lymphocyte % 1.0 0.0 - 5.0 %    Neutrophils Absolute 14.47 (H) 1.70 - 7.00 10*3/mm3    Lymphocytes Absolute 2.21 0.70 - 3.10 10*3/mm3    Monocytes Absolute 0.34 0.10 - 0.90 10*3/mm3    Acanthocytes Slight/1+ None Seen    Dacrocytes Slight/1+ None Seen    Poikilocytes Slight/1+ None Seen    RBC Fragments Slight/1+ None Seen    WBC Morphology Normal Normal    Platelet Estimate Adequate Normal    Large Platelets Slight/1+ None Seen          Imaging:  IR insert non-tunneled central line 5+  Result Date: 3/28/2025  Successful placement of left internal jugular central venous catheter. Electronically Signed: Reji Romero MD  3/28/2025 8:55 AM EDT  Workstation ID: [id number]    CT Chest Without Contrast Diagnostic  Result Date: 3/23/2025  Impression: 1.Multifocal patchy opacities noted throughout the lungs bilaterally, worse in the perihilar region. Findings are concerning for multifocal pneumonia. Less likely, this may represent pulmonary hemorrhage 2.Additional nonemergent findings as characterized above 3.Scattered prominent to mildly enlarged mediastinal lymph nodes, likely reactive. Electronically Signed: Rafael Archer DO  3/23/2025 5:15 PM EDT  Workstation ID: [id number]    XR Chest 1 View  Result Date: 3/23/2025  Impression: 1.Worsening multifocal patchy opacities scattered throughout the lungs. This most likely represents multifocal pneumonia, although pulmonary edema is also a possibility 2.Trace bilateral pleural effusions. Electronically Signed: Rafael Archer DO  3/23/2025 12:30 PM EDT  Workstation ID: [id number]      Assessment/Plan:     SOB (shortness of breath)    Pulmonary fibrosis    Dyspnea         Acute kidney injury on top of chronic kidney disease stage III  Acute hypoxic  respiratory failure  Pulmonary fibrosis  Urinary tract infection  Anxiety disorder  Hypertension  Hyperlipidemia  Hypothyroidism  History of von Willebrand disease with no bleeding        Recommendations:   Increased Lasix dose to 40 mg due to increased edema  Kidney function stable creatinine 1.12    Discussed with the patient and family at bedside

## 2025-04-01 ENCOUNTER — TRANSITIONAL CARE MANAGEMENT TELEPHONE ENCOUNTER (OUTPATIENT)
Dept: CALL CENTER | Facility: HOSPITAL | Age: 65
End: 2025-04-01
Payer: MEDICARE

## 2025-04-01 RX ORDER — GABAPENTIN 600 MG/1
TABLET ORAL
Qty: 450 TABLET | Refills: 1 | Status: SHIPPED | OUTPATIENT
Start: 2025-04-01

## 2025-04-01 RX ORDER — BISOPROLOL FUMARATE AND HYDROCHLOROTHIAZIDE 10; 6.25 MG/1; MG/1
1 TABLET ORAL DAILY
Qty: 90 TABLET | Refills: 1 | Status: SHIPPED | OUTPATIENT
Start: 2025-04-01

## 2025-04-01 RX ORDER — LEVOTHYROXINE SODIUM 175 UG/1
175 TABLET ORAL DAILY
Qty: 90 TABLET | Refills: 1 | Status: SHIPPED | OUTPATIENT
Start: 2025-04-01

## 2025-04-01 RX ORDER — FLUTICASONE PROPIONATE 50 MCG
2 SPRAY, SUSPENSION (ML) NASAL DAILY
Qty: 16 G | Refills: 1 | Status: SHIPPED | OUTPATIENT
Start: 2025-04-01

## 2025-04-01 RX ORDER — LISINOPRIL 5 MG/1
5 TABLET ORAL DAILY
Qty: 90 TABLET | Refills: 1 | Status: SHIPPED | OUTPATIENT
Start: 2025-04-01

## 2025-04-01 NOTE — CASE MANAGEMENT/SOCIAL WORK
Case Management Discharge Note      Final Note: [facility]         Selected Continued Care - Discharged on 3/31/2025 Admission date: 3/15/2025 - Discharge disposition: Home or Self Care        Home Medical Care Coordination complete.      Service Provider Services Address Phone Fax Patient Preferred    [facility], [facility] [address] [facility] [place] [address] [phone] --                 Transportation Services  Private: Car    Final Discharge Disposition Code: 06 - home with home health care

## 2025-04-01 NOTE — OUTREACH NOTE
Call Center TCM Note      Flowsheet Row Responses   [facility] patient discharged from? [facility]   Does the patient have one of the following disease processes/diagnoses(primary or secondary)? Other   TCM attempt successful? Yes   Call start time 1356   Call end time 1407   Discharge diagnosis SOB (shortness of breath), Urinary tract infection   Is patient permission given to speak with other caregiver? Yes   List who call center can speak with spouse- Brian   Person spoke with today (if not patient) and relationship spouse- Brian   Meds reviewed with patient/caregiver? Yes   Is the patient having any side effects they believe may be caused by any medication additions or changes? No   Does the patient have all medications ordered at discharge? Yes   Is the patient taking all medications as directed (includes completed medication regime)? Yes   Does the patient have an appointment with their PCP within 7-14 days of discharge? No appointments available   What is the Home health agency?  [facility]   Has home health visited the patient within 72 hours of discharge? Call prior to 72 hours   Psychosocial issues? No   Did the patient receive a copy of their discharge instructions? Yes   Nursing interventions Reviewed instructions with patient   What is the patient's perception of their health status since discharge? Improving   Is the patient/caregiver able to teach back signs and symptoms related to disease process for when to call PCP? Yes   Is the patient/caregiver able to teach back signs and symptoms related to disease process for when to call 911? Yes   Is the patient/caregiver able to teach back the hierarchy of who to call/visit for symptoms/problems? PCP, Specialist, Home health nurse, Urgent Care, ED, 911 Yes   TCM call completed? Yes   Wrap up additional comments Per spouse, patient is doing well, all concerns addressed,  is coming this afternoon, attempted to schedule a hospital follow up appt with PCP but no  afternoon appts available, will notify office.   Call end time 1407   Would this patient benefit from a Referral to [facility] Social Work? No   Is the patient interested in additional calls from an ambulatory ? No            Aleah HINTON - Registered Nurse    4/1/2025, 14:07 EDT

## 2025-04-03 ENCOUNTER — PATIENT OUTREACH (OUTPATIENT)
Dept: CASE MANAGEMENT | Facility: CLINIC | Age: 65
End: 2025-04-03
Payer: MEDICARE

## 2025-04-03 DIAGNOSIS — N18.30 STAGE 3 CHRONIC KIDNEY DISEASE, UNSPECIFIED WHETHER STAGE 3A OR 3B CKD: ICD-10-CM

## 2025-04-03 DIAGNOSIS — J84.10 PULMONARY FIBROSIS: Primary | ICD-10-CM

## 2025-04-03 NOTE — OUTREACH NOTE
"AMBULATORY CASE MANAGEMENT NOTE    Names and Relationships of Patient/Support Persons: Contact: Tracie Gross \"Evelyn\"; Relationship: Self -     CCM Interim Update    Spoke with patient at this time regarding recent hospitalization, identified self and role.  Offered hospital f/u appt with PCP (next available is AM appt), patient states she needs afternoon appts.  Explained Dr. Silva does not have an afternoon appt until May 29th, offered to make hospital f/u appt with another provider in the office, patient declines at this time.  Offered assistance with chronic conditions through CCM, patient declines.  She denies any other needs.        Tiffanie SIN  Ambulatory Case Management    4/3/2025, 10:26 EDT  "

## 2025-04-04 ENCOUNTER — TELEPHONE (OUTPATIENT)
Dept: FAMILY MEDICINE CLINIC | Facility: CLINIC | Age: 65
End: 2025-04-04
Payer: MEDICARE

## 2025-04-04 ENCOUNTER — TELEPHONE (OUTPATIENT)
Dept: FAMILY MEDICINE CLINIC | Facility: CLINIC | Age: 65
End: 2025-04-04

## 2025-04-04 DIAGNOSIS — M34.9 SCLERODERMA: Primary | ICD-10-CM

## 2025-04-04 DIAGNOSIS — I10 PRIMARY HYPERTENSION: ICD-10-CM

## 2025-04-04 RX ORDER — NIFEDIPINE 30 MG/1
30 TABLET, EXTENDED RELEASE ORAL DAILY
Qty: 30 TABLET | Refills: 2 | Status: SHIPPED | OUTPATIENT
Start: 2025-04-04

## 2025-04-04 NOTE — TELEPHONE ENCOUNTER
Scheduled Thursday 3. Brian asks for rx for nifedipine 30 mg; current RX for 90XL are scored in the middle.

## 2025-04-04 NOTE — TELEPHONE ENCOUNTER
Isabela, OT with  Home Care, called to get verbal order it was okay to reschedule patient from today to Monday for her OT eval. I gave verbal order it was okay.

## 2025-04-04 NOTE — TELEPHONE ENCOUNTER
Caller: [facility]    Relationship: Home Health    Best call back number: [phone]     What is the best time to reach you: ANY    Who are you requesting to speak with (clinical staff, provider,  specific staff member): PCP    Do you know the name of the person who called:     What was the call regarding: VERBAL ORDERS FOR A BEDSIDE COMMODE    Is it okay if the provider responds through MyChart:

## 2025-04-07 ENCOUNTER — TELEPHONE (OUTPATIENT)
Dept: FAMILY MEDICINE CLINIC | Facility: CLINIC | Age: 65
End: 2025-04-07
Payer: MEDICARE

## 2025-04-07 NOTE — TELEPHONE ENCOUNTER
Isabela, OT with  Home Care, saw patient for the OT evaluation. I gave verbal orders to see patient for OT.

## 2025-04-10 ENCOUNTER — OFFICE VISIT (OUTPATIENT)
Dept: FAMILY MEDICINE CLINIC | Facility: CLINIC | Age: 65
End: 2025-04-10
Payer: MEDICARE

## 2025-04-10 VITALS
OXYGEN SATURATION: 95 % | BODY MASS INDEX: 36.5 KG/M2 | RESPIRATION RATE: 18 BRPM | HEIGHT: 64 IN | SYSTOLIC BLOOD PRESSURE: 86 MMHG | DIASTOLIC BLOOD PRESSURE: 62 MMHG | HEART RATE: 67 BPM | WEIGHT: 213.8 LBS

## 2025-04-10 DIAGNOSIS — R53.81 PHYSICAL DEBILITY: ICD-10-CM

## 2025-04-10 DIAGNOSIS — I95.9 HYPOTENSION, UNSPECIFIED HYPOTENSION TYPE: ICD-10-CM

## 2025-04-10 DIAGNOSIS — Z15.09 LYNCH SYNDROME: ICD-10-CM

## 2025-04-10 DIAGNOSIS — J84.10 PULMONARY FIBROSIS: Primary | ICD-10-CM

## 2025-04-10 RX ORDER — BISOPROLOL FUMARATE AND HYDROCHLOROTHIAZIDE 10; 6.25 MG/1; MG/1
0.5 TABLET ORAL DAILY
Qty: 90 TABLET | Refills: 1 | Status: SHIPPED | OUTPATIENT
Start: 2025-04-10

## 2025-04-10 NOTE — PROGRESS NOTES
Transitional Care Follow Up Visit  Subjective     Tracie Gross is a 64 y.o. female who presents for a transitional care management visit.    Within 48 business hours after discharge our office contacted her via telephone to coordinate her care and needs.      I reviewed and discussed the details of that call along with the discharge summary, hospital problems, inpatient lab results, inpatient diagnostic studies, and consultation reports with Tracie.     Current outpatient and discharge medications have been reconciled for the patient.  Reviewed by: Floyd Silva MD        4/13/2025     3:56 PM   Date of TCM Phone Call   [facility]   Date of Admission 4/11/2025   Date of Discharge 4/13/2025   Discharge Disposition Home or Self Care   Transitional Care call completed 4/1/25    Risk for Readmission (LACE) Score: 13 (4/13/2025  6:00 AM)    History of Present Illness   Course During Hospital Stay:  Hospital records reviewed    Patient presented to [facility] ER on 3/15/25 w/ complaint of increased SOB for the past several days and urinary frequency. Evaluation revealed BNP 1900, +UA and DANIELLE w/ Cr 2.3. Blood cultures obtained and IVF given due to hypotension. Patient admitted for further evaluation and mgmt w/ IV abx. Patient was continued on Rocephin and patient found to be hypoxic, requiring 3L NC. Pulm consulted w/ concern for pulmonary fibrosis and no definitive infectious process. Patient was treated w/ abx given questionable imaging and +Strep antigen. Pulm (Draw) recommended Rocephin 2g daily x5 days and Azithro 500mg x3 days while holding mycophenolate and continuing pulmicort and duonebs. CT c/w mild fibrosis. Patient seemed to improve w/ O2 weaned to 2L but developed hemoptysis. She was subsequently taken for bronch on 3/24/25 w/ no evidence of hemoptysis. BAL unremarkable but notable inflammatory changes. Patient subsequently started on Medrol pulse steroids. Oxygen and steroids were weaned  "in the coming days until patient was able to be discharged home on 3/31/25. Patient discharged w/ 4 week steroid taper and 2L O2 for activity and sleep.     Patient reports she feels terrible today. Reports feeling as though she was going to pass out when getting out of the car    Pulmonary fibrosis: patient reports SOB has improved and feels this is \"better.\" Cough is a lot better. Still has some coughing fits but markedly improved. She has been compliant w/ prednisone 40mg x1 week and just weaning to 20mg daily now. Patient has f/u w/ pulmonology (Dr Vazquez) next week and is working to get a rheumatology (Martin) f/u. No longer taking mycophenolate 200mg daily or inhaler/nebulizer. Not currently taking Bactrim QOD    HTN: 86/62 today. Maintained on Ziac 10/6.25 daily, lisinopril 5 daily, nifedipine 30 daily. Reports LH/dizziness    Debility: currently getting home health PT twice weekly. 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     The following portions of the patient's history were reviewed and updated as appropriate: allergies, current medications, past family history, past medical history, past social history, past surgical history, and problem list.    Review of Systems:  A review of systems was performed, and positive findings are noted in the HPI.    Objective   Physical Exam  Constitutional:       General: She is not in acute distress.     Appearance: She is well-developed. She is obese.   HENT:      Head: Normocephalic and atraumatic.      Right Ear: Tympanic membrane and external ear normal. There is no impacted cerumen.      Left Ear: Tympanic membrane and external ear normal. There is no impacted cerumen.      Nose: Nose normal.      Mouth/Throat:      Mouth: Mucous membranes are moist.      Pharynx: No oropharyngeal exudate or posterior oropharyngeal erythema.   Eyes:      General: No scleral icterus.        Right eye: No discharge.         " Left eye: No discharge.      Extraocular Movements: Extraocular movements intact.      Conjunctiva/sclera: Conjunctivae normal.      Pupils: Pupils are equal, round, and reactive to light.   Neck:      Thyroid: No thyromegaly.   Cardiovascular:      Rate and Rhythm: Normal rate and regular rhythm.      Heart sounds: Normal heart sounds. No murmur heard.  Pulmonary:      Effort: Pulmonary effort is normal. No respiratory distress.      Breath sounds: Rales (bibasilar, R>L) present. No wheezing.   Abdominal:      General: Bowel sounds are normal. There is no distension.      Palpations: Abdomen is soft.      Tenderness: There is no abdominal tenderness.   Musculoskeletal:         General: No deformity.      Cervical back: Normal range of motion and neck supple.   Skin:     General: Skin is warm and dry.      Findings: No rash.   Neurological:      General: No focal deficit present.      Mental Status: She is alert and oriented to person, place, and time. Mental status is at baseline.      Cranial Nerves: No cranial nerve deficit.      Sensory: No sensory deficit.   Psychiatric:         Behavior: Behavior normal.         Thought Content: Thought content normal.     Assessment & Plan   Diagnoses and all orders for this visit:    1. 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 Hypotension, unspecified hypotension type: 86/62 today  -     CBC & Differential  -     Comprehensive Metabolic Panel  - Reduce Ziac to 5/3.125mg daily  - Stop nifedipine  - Cont lisinopril 5mg daily    3. Physical debility   - Cont home health PT 2x/week    4. Monahan syndrome   - Recommend GI f/u to discuss discrepancy between 3/2025 MRI findings and 10/2024 colonoscopy    Other orders  -     bisoprolol-hydrochlorothiazide (ZIAC) 10-6.25 MG per tablet; Take 0.5 tablets by mouth Daily.  Dispense: 90 tablet; Refill: 1     47 minutes  spent on the day of service face-to-face with the patient obtaining history, performing physical, reviewing chart/data, placing orders, and documenting.  Additional time spent in documentation outside the day of service.

## 2025-04-11 ENCOUNTER — APPOINTMENT (OUTPATIENT)
Dept: CARDIOLOGY | Facility: HOSPITAL | Age: 65
DRG: 287 | End: 2025-04-11
Payer: MEDICARE

## 2025-04-11 ENCOUNTER — APPOINTMENT (OUTPATIENT)
Dept: GENERAL RADIOLOGY | Facility: HOSPITAL | Age: 65
DRG: 287 | End: 2025-04-11
Payer: MEDICARE

## 2025-04-11 ENCOUNTER — HOSPITAL ENCOUNTER (INPATIENT)
Facility: HOSPITAL | Age: 65
LOS: 2 days | Discharge: HOME HEALTH | DRG: 287 | End: 2025-04-13
Attending: EMERGENCY MEDICINE | Admitting: STUDENT IN AN ORGANIZED HEALTH CARE EDUCATION/TRAINING PROGRAM
Payer: MEDICARE

## 2025-04-11 ENCOUNTER — READMISSION MANAGEMENT (OUTPATIENT)
Dept: CALL CENTER | Facility: HOSPITAL | Age: 65
End: 2025-04-11
Payer: MEDICARE

## 2025-04-11 DIAGNOSIS — R06.00 DYSPNEA, UNSPECIFIED TYPE: Primary | ICD-10-CM

## 2025-04-11 DIAGNOSIS — R94.31 ABNORMAL EKG: ICD-10-CM

## 2025-04-11 DIAGNOSIS — I95.9 HYPOTENSION, UNSPECIFIED HYPOTENSION TYPE: ICD-10-CM

## 2025-04-11 PROBLEM — I24.9 ACS (ACUTE CORONARY SYNDROME): Status: ACTIVE | Noted: 2025-04-11

## 2025-04-11 PROBLEM — I20.89 ANGINA AT REST: Status: ACTIVE | Noted: 2025-04-11

## 2025-04-11 LAB
ALBUMIN SERPL-MCNC: 3.9 G/DL (ref 3.5–5.2)
ALBUMIN/GLOB SERPL: 1.6 G/DL
ALP SERPL-CCNC: 108 U/L (ref 39–117)
ALT SERPL W P-5'-P-CCNC: 26 U/L (ref 1–33)
ANION GAP SERPL CALCULATED.3IONS-SCNC: 14.6 MMOL/L (ref 5–15)
AST SERPL-CCNC: 27 U/L (ref 1–32)
BASOPHILS # BLD AUTO: 0.07 10*3/MM3 (ref 0–0.2)
BASOPHILS NFR BLD AUTO: 0.6 % (ref 0–1.5)
BILIRUB SERPL-MCNC: 0.2 MG/DL (ref 0–1.2)
BUN SERPL-MCNC: 23 MG/DL (ref 8–23)
BUN/CREAT SERPL: 15.8 (ref 7–25)
CALCIUM SPEC-SCNC: 9.9 MG/DL (ref 8.6–10.5)
CHLORIDE SERPL-SCNC: 96 MMOL/L (ref 98–107)
CHOLEST SERPL-MCNC: 193 MG/DL (ref 0–200)
CO2 SERPL-SCNC: 28.4 MMOL/L (ref 22–29)
CREAT SERPL-MCNC: 1.46 MG/DL (ref 0.57–1)
DEPRECATED RDW RBC AUTO: 55 FL (ref 37–54)
EGFRCR SERPLBLD CKD-EPI 2021: 39.5 ML/MIN/1.73
EOSINOPHIL # BLD AUTO: 0.03 10*3/MM3 (ref 0–0.4)
EOSINOPHIL NFR BLD AUTO: 0.3 % (ref 0.3–6.2)
ERYTHROCYTE [DISTWIDTH] IN BLOOD BY AUTOMATED COUNT: 15.6 % (ref 12.3–15.4)
GEN 5 1HR TROPONIN T REFLEX: 107 NG/L
GLOBULIN UR ELPH-MCNC: 2.5 GM/DL
GLUCOSE SERPL-MCNC: 103 MG/DL (ref 65–99)
HBA1C MFR BLD: 5.91 % (ref 4.8–5.6)
HCT VFR BLD AUTO: 37.1 % (ref 34–46.6)
HDLC SERPL-MCNC: 63 MG/DL (ref 40–60)
HGB BLD-MCNC: 11.8 G/DL (ref 12–15.9)
HOLD SPECIMEN: NORMAL
HOLD SPECIMEN: NORMAL
IMM GRANULOCYTES # BLD AUTO: 0.13 10*3/MM3 (ref 0–0.05)
IMM GRANULOCYTES NFR BLD AUTO: 1.1 % (ref 0–0.5)
LDLC SERPL CALC-MCNC: 101 MG/DL (ref 0–100)
LDLC/HDLC SERPL: 1.52 {RATIO}
LYMPHOCYTES # BLD AUTO: 1.29 10*3/MM3 (ref 0.7–3.1)
LYMPHOCYTES NFR BLD AUTO: 11.3 % (ref 19.6–45.3)
MAGNESIUM SERPL-MCNC: 1.5 MG/DL (ref 1.6–2.4)
MCH RBC QN AUTO: 31.2 PG (ref 26.6–33)
MCHC RBC AUTO-ENTMCNC: 31.8 G/DL (ref 31.5–35.7)
MCV RBC AUTO: 98.1 FL (ref 79–97)
MONOCYTES # BLD AUTO: 0.66 10*3/MM3 (ref 0.1–0.9)
MONOCYTES NFR BLD AUTO: 5.8 % (ref 5–12)
NEUTROPHILS NFR BLD AUTO: 80.9 % (ref 42.7–76)
NEUTROPHILS NFR BLD AUTO: 9.21 10*3/MM3 (ref 1.7–7)
NRBC BLD AUTO-RTO: 0 /100 WBC (ref 0–0.2)
NT-PROBNP SERPL-MCNC: 2881 PG/ML (ref 0–900)
PHOSPHATE SERPL-MCNC: 3.7 MG/DL (ref 2.5–4.5)
PLATELET # BLD AUTO: 288 10*3/MM3 (ref 140–450)
PMV BLD AUTO: 12 FL (ref 6–12)
POTASSIUM SERPL-SCNC: 3.5 MMOL/L (ref 3.5–5.2)
PROT SERPL-MCNC: 6.4 G/DL (ref 6–8.5)
RBC # BLD AUTO: 3.78 10*6/MM3 (ref 3.77–5.28)
SODIUM SERPL-SCNC: 139 MMOL/L (ref 136–145)
TRIGL SERPL-MCNC: 171 MG/DL (ref 0–150)
TROPONIN T % DELTA: -11
TROPONIN T NUMERIC DELTA: -13 NG/L
TROPONIN T SERPL HS-MCNC: 120 NG/L
TSH SERPL DL<=0.05 MIU/L-ACNC: 0.38 UIU/ML (ref 0.27–4.2)
VLDLC SERPL-MCNC: 29 MG/DL (ref 5–40)
WBC NRBC COR # BLD AUTO: 11.39 10*3/MM3 (ref 3.4–10.8)
WHOLE BLOOD HOLD COAG: NORMAL
WHOLE BLOOD HOLD SPECIMEN: NORMAL

## 2025-04-11 PROCEDURE — 83880 ASSAY OF NATRIURETIC PEPTIDE: CPT | Performed by: EMERGENCY MEDICINE

## 2025-04-11 PROCEDURE — 80061 LIPID PANEL: CPT | Performed by: STUDENT IN AN ORGANIZED HEALTH CARE EDUCATION/TRAINING PROGRAM

## 2025-04-11 PROCEDURE — 25810000003 SODIUM CHLORIDE 0.9 % SOLUTION: Performed by: INTERNAL MEDICINE

## 2025-04-11 PROCEDURE — B2111ZZ FLUOROSCOPY OF MULTIPLE CORONARY ARTERIES USING LOW OSMOLAR CONTRAST: ICD-10-PCS | Performed by: INTERNAL MEDICINE

## 2025-04-11 PROCEDURE — C1769 GUIDE WIRE: HCPCS | Performed by: INTERNAL MEDICINE

## 2025-04-11 PROCEDURE — 84484 ASSAY OF TROPONIN QUANT: CPT | Performed by: EMERGENCY MEDICINE

## 2025-04-11 PROCEDURE — 71045 X-RAY EXAM CHEST 1 VIEW: CPT

## 2025-04-11 PROCEDURE — 84100 ASSAY OF PHOSPHORUS: CPT | Performed by: EMERGENCY MEDICINE

## 2025-04-11 PROCEDURE — 25010000002 MIDAZOLAM PER 1 MG: Performed by: INTERNAL MEDICINE

## 2025-04-11 PROCEDURE — 93005 ELECTROCARDIOGRAM TRACING: CPT | Performed by: EMERGENCY MEDICINE

## 2025-04-11 PROCEDURE — 84443 ASSAY THYROID STIM HORMONE: CPT | Performed by: STUDENT IN AN ORGANIZED HEALTH CARE EDUCATION/TRAINING PROGRAM

## 2025-04-11 PROCEDURE — 4A023N7 MEASUREMENT OF CARDIAC SAMPLING AND PRESSURE, LEFT HEART, PERCUTANEOUS APPROACH: ICD-10-PCS | Performed by: INTERNAL MEDICINE

## 2025-04-11 PROCEDURE — 25010000002 LIDOCAINE 1 % SOLUTION: Performed by: INTERNAL MEDICINE

## 2025-04-11 PROCEDURE — 93306 TTE W/DOPPLER COMPLETE: CPT | Performed by: INTERNAL MEDICINE

## 2025-04-11 PROCEDURE — 36415 COLL VENOUS BLD VENIPUNCTURE: CPT

## 2025-04-11 PROCEDURE — 83036 HEMOGLOBIN GLYCOSYLATED A1C: CPT | Performed by: STUDENT IN AN ORGANIZED HEALTH CARE EDUCATION/TRAINING PROGRAM

## 2025-04-11 PROCEDURE — 99291 CRITICAL CARE FIRST HOUR: CPT

## 2025-04-11 PROCEDURE — 25010000002 FENTANYL CITRATE (PF) 100 MCG/2ML SOLUTION: Performed by: INTERNAL MEDICINE

## 2025-04-11 PROCEDURE — 99222 1ST HOSP IP/OBS MODERATE 55: CPT | Performed by: INTERNAL MEDICINE

## 2025-04-11 PROCEDURE — 93454 CORONARY ARTERY ANGIO S&I: CPT | Performed by: INTERNAL MEDICINE

## 2025-04-11 PROCEDURE — 25510000001 IOPAMIDOL PER 1 ML: Performed by: INTERNAL MEDICINE

## 2025-04-11 PROCEDURE — 93306 TTE W/DOPPLER COMPLETE: CPT

## 2025-04-11 PROCEDURE — C1894 INTRO/SHEATH, NON-LASER: HCPCS | Performed by: INTERNAL MEDICINE

## 2025-04-11 PROCEDURE — 25010000002 MAGNESIUM SULFATE IN D5W 1G/100ML (PREMIX) 1-5 GM/100ML-% SOLUTION: Performed by: STUDENT IN AN ORGANIZED HEALTH CARE EDUCATION/TRAINING PROGRAM

## 2025-04-11 PROCEDURE — 85025 COMPLETE CBC W/AUTO DIFF WBC: CPT | Performed by: EMERGENCY MEDICINE

## 2025-04-11 PROCEDURE — 83735 ASSAY OF MAGNESIUM: CPT | Performed by: EMERGENCY MEDICINE

## 2025-04-11 PROCEDURE — 80053 COMPREHEN METABOLIC PANEL: CPT | Performed by: EMERGENCY MEDICINE

## 2025-04-11 PROCEDURE — 99152 MOD SED SAME PHYS/QHP 5/>YRS: CPT | Performed by: INTERNAL MEDICINE

## 2025-04-11 RX ORDER — LISINOPRIL 5 MG/1
5 TABLET ORAL DAILY
Status: CANCELLED | OUTPATIENT
Start: 2025-04-11

## 2025-04-11 RX ORDER — SULFAMETHOXAZOLE AND TRIMETHOPRIM 800; 160 MG/1; MG/1
1 TABLET ORAL EVERY OTHER DAY
Status: CANCELLED | OUTPATIENT
Start: 2025-04-13 | End: 2025-04-27

## 2025-04-11 RX ORDER — GABAPENTIN 600 MG/1
1200 TABLET ORAL 2 TIMES DAILY
COMMUNITY

## 2025-04-11 RX ORDER — ALBUTEROL SULFATE 90 UG/1
2 INHALANT RESPIRATORY (INHALATION) EVERY 6 HOURS PRN
Status: CANCELLED | OUTPATIENT
Start: 2025-04-11

## 2025-04-11 RX ORDER — PREDNISONE 20 MG/1
20 TABLET ORAL DAILY
Status: ON HOLD | COMMUNITY
Start: 2025-04-21 | End: 2025-04-11

## 2025-04-11 RX ORDER — ACETAMINOPHEN 325 MG/1
650 TABLET ORAL EVERY 4 HOURS PRN
Status: DISCONTINUED | OUTPATIENT
Start: 2025-04-11 | End: 2025-04-13 | Stop reason: HOSPADM

## 2025-04-11 RX ORDER — QUETIAPINE FUMARATE 100 MG/1
200 TABLET, FILM COATED ORAL NIGHTLY
Status: CANCELLED | OUTPATIENT
Start: 2025-04-11

## 2025-04-11 RX ORDER — ACETAMINOPHEN 160 MG/5ML
650 SOLUTION ORAL EVERY 4 HOURS PRN
Status: DISCONTINUED | OUTPATIENT
Start: 2025-04-11 | End: 2025-04-13 | Stop reason: HOSPADM

## 2025-04-11 RX ORDER — NIFEDIPINE 30 MG/1
30 TABLET, EXTENDED RELEASE ORAL DAILY
Status: CANCELLED | OUTPATIENT
Start: 2025-04-11

## 2025-04-11 RX ORDER — FUROSEMIDE 20 MG/1
20 TABLET ORAL DAILY PRN
COMMUNITY
End: 2025-04-13 | Stop reason: HOSPADM

## 2025-04-11 RX ORDER — SACCHAROMYCES BOULARDII 250 MG
250 CAPSULE ORAL
COMMUNITY

## 2025-04-11 RX ORDER — MECLIZINE HYDROCHLORIDE 25 MG/1
25 TABLET ORAL 3 TIMES DAILY PRN
Status: CANCELLED | OUTPATIENT
Start: 2025-04-11

## 2025-04-11 RX ORDER — GABAPENTIN 400 MG/1
400 CAPSULE ORAL EVERY 12 HOURS SCHEDULED
Status: CANCELLED | OUTPATIENT
Start: 2025-04-11

## 2025-04-11 RX ORDER — BISOPROLOL FUMARATE 5 MG/1
5 TABLET, FILM COATED ORAL DAILY
Status: CANCELLED | OUTPATIENT
Start: 2025-04-11

## 2025-04-11 RX ORDER — BISACODYL 10 MG
10 SUPPOSITORY, RECTAL RECTAL DAILY PRN
Status: DISCONTINUED | OUTPATIENT
Start: 2025-04-11 | End: 2025-04-13 | Stop reason: HOSPADM

## 2025-04-11 RX ORDER — HYDROXYCHLOROQUINE SULFATE 200 MG/1
200 TABLET, FILM COATED ORAL 2 TIMES DAILY
Status: CANCELLED | OUTPATIENT
Start: 2025-04-11

## 2025-04-11 RX ORDER — LAMOTRIGINE 100 MG/1
200 TABLET ORAL NIGHTLY
Status: CANCELLED | OUTPATIENT
Start: 2025-04-11

## 2025-04-11 RX ORDER — HYDROCHLOROTHIAZIDE 12.5 MG/1
6.25 TABLET ORAL DAILY
Status: CANCELLED | OUTPATIENT
Start: 2025-04-11

## 2025-04-11 RX ORDER — PANTOPRAZOLE SODIUM 40 MG/1
40 TABLET, DELAYED RELEASE ORAL
Status: CANCELLED | OUTPATIENT
Start: 2025-04-12

## 2025-04-11 RX ORDER — SODIUM CHLORIDE 0.9 % (FLUSH) 0.9 %
10 SYRINGE (ML) INJECTION AS NEEDED
Status: DISCONTINUED | OUTPATIENT
Start: 2025-04-11 | End: 2025-04-13 | Stop reason: HOSPADM

## 2025-04-11 RX ORDER — FENTANYL CITRATE 50 UG/ML
INJECTION, SOLUTION INTRAMUSCULAR; INTRAVENOUS
Status: DISCONTINUED | OUTPATIENT
Start: 2025-04-11 | End: 2025-04-11 | Stop reason: HOSPADM

## 2025-04-11 RX ORDER — IOPAMIDOL 755 MG/ML
INJECTION, SOLUTION INTRAVASCULAR
Status: DISCONTINUED | OUTPATIENT
Start: 2025-04-11 | End: 2025-04-11 | Stop reason: HOSPADM

## 2025-04-11 RX ORDER — LAMOTRIGINE 100 MG/1
200 TABLET ORAL NIGHTLY
Status: DISCONTINUED | OUTPATIENT
Start: 2025-04-12 | End: 2025-04-13 | Stop reason: HOSPADM

## 2025-04-11 RX ORDER — INDOMETHACIN 25 MG/1
50 CAPSULE ORAL ONCE
Status: COMPLETED | OUTPATIENT
Start: 2025-04-11 | End: 2025-04-11

## 2025-04-11 RX ORDER — NITROGLYCERIN 0.4 MG/1
0.4 TABLET SUBLINGUAL
Status: DISCONTINUED | OUTPATIENT
Start: 2025-04-11 | End: 2025-04-13 | Stop reason: HOSPADM

## 2025-04-11 RX ORDER — NITROGLYCERIN 0.4 MG/1
0.4 TABLET SUBLINGUAL
Status: CANCELLED | OUTPATIENT
Start: 2025-04-11

## 2025-04-11 RX ORDER — PREDNISONE 20 MG/1
20 TABLET ORAL DAILY
Status: CANCELLED | OUTPATIENT
Start: 2025-04-12 | End: 2025-04-20

## 2025-04-11 RX ORDER — LIDOCAINE HYDROCHLORIDE 10 MG/ML
INJECTION, SOLUTION INFILTRATION; PERINEURAL
Status: DISCONTINUED | OUTPATIENT
Start: 2025-04-11 | End: 2025-04-11 | Stop reason: HOSPADM

## 2025-04-11 RX ORDER — SODIUM CHLORIDE 9 MG/ML
250 INJECTION, SOLUTION INTRAVENOUS ONCE AS NEEDED
Status: DISPENSED | OUTPATIENT
Start: 2025-04-11 | End: 2025-04-11

## 2025-04-11 RX ORDER — MECLIZINE HYDROCHLORIDE 25 MG/1
25 TABLET ORAL 3 TIMES DAILY PRN
COMMUNITY

## 2025-04-11 RX ORDER — PREDNISONE 20 MG/1
20 TABLET ORAL DAILY
Status: CANCELLED | OUTPATIENT
Start: 2025-04-12 | End: 2025-04-27

## 2025-04-11 RX ORDER — ASPIRIN 81 MG/1
TABLET, CHEWABLE ORAL
Status: DISCONTINUED
Start: 2025-04-11 | End: 2025-04-11

## 2025-04-11 RX ORDER — SODIUM CHLORIDE 9 MG/ML
100 INJECTION, SOLUTION INTRAVENOUS CONTINUOUS
Status: DISPENSED | OUTPATIENT
Start: 2025-04-11 | End: 2025-04-11

## 2025-04-11 RX ORDER — NIFEDIPINE 30 MG/1
30 TABLET, EXTENDED RELEASE ORAL DAILY
COMMUNITY
End: 2025-04-13 | Stop reason: HOSPADM

## 2025-04-11 RX ORDER — MIDAZOLAM HYDROCHLORIDE 1 MG/ML
INJECTION, SOLUTION INTRAMUSCULAR; INTRAVENOUS
Status: DISCONTINUED | OUTPATIENT
Start: 2025-04-11 | End: 2025-04-11 | Stop reason: HOSPADM

## 2025-04-11 RX ORDER — PREDNISONE 20 MG/1
20 TABLET ORAL DAILY
COMMUNITY
Start: 2025-04-07 | End: 2025-04-27

## 2025-04-11 RX ORDER — AMOXICILLIN 250 MG
2 CAPSULE ORAL 2 TIMES DAILY PRN
Status: DISCONTINUED | OUTPATIENT
Start: 2025-04-11 | End: 2025-04-13 | Stop reason: HOSPADM

## 2025-04-11 RX ORDER — CYCLOBENZAPRINE HCL 10 MG
10 TABLET ORAL 3 TIMES DAILY PRN
Status: CANCELLED | OUTPATIENT
Start: 2025-04-11

## 2025-04-11 RX ORDER — ALPRAZOLAM 0.5 MG
0.5 TABLET ORAL NIGHTLY PRN
Status: DISCONTINUED | OUTPATIENT
Start: 2025-04-11 | End: 2025-04-13 | Stop reason: HOSPADM

## 2025-04-11 RX ORDER — ALPRAZOLAM 1 MG/1
1 TABLET ORAL NIGHTLY PRN
Status: CANCELLED | OUTPATIENT
Start: 2025-04-11

## 2025-04-11 RX ORDER — POLYETHYLENE GLYCOL 3350 17 G/17G
17 POWDER, FOR SOLUTION ORAL DAILY PRN
Status: DISCONTINUED | OUTPATIENT
Start: 2025-04-11 | End: 2025-04-13 | Stop reason: HOSPADM

## 2025-04-11 RX ORDER — QUETIAPINE FUMARATE 100 MG/1
200 TABLET, FILM COATED ORAL NIGHTLY
COMMUNITY

## 2025-04-11 RX ORDER — ACETAMINOPHEN 650 MG/1
650 SUPPOSITORY RECTAL EVERY 4 HOURS PRN
Status: DISCONTINUED | OUTPATIENT
Start: 2025-04-11 | End: 2025-04-13 | Stop reason: HOSPADM

## 2025-04-11 RX ORDER — BISACODYL 5 MG/1
5 TABLET, DELAYED RELEASE ORAL DAILY PRN
Status: DISCONTINUED | OUTPATIENT
Start: 2025-04-11 | End: 2025-04-13 | Stop reason: HOSPADM

## 2025-04-11 RX ORDER — INDOMETHACIN 25 MG/1
CAPSULE ORAL
Status: COMPLETED
Start: 2025-04-11 | End: 2025-04-11

## 2025-04-11 RX ORDER — MAGNESIUM SULFATE 1 G/100ML
1 INJECTION INTRAVENOUS
Status: COMPLETED | OUTPATIENT
Start: 2025-04-11 | End: 2025-04-11

## 2025-04-11 RX ORDER — DEXLANSOPRAZOLE 60 MG/1
60 CAPSULE, DELAYED RELEASE ORAL
COMMUNITY

## 2025-04-11 RX ORDER — GABAPENTIN 600 MG/1
600 TABLET ORAL DAILY
COMMUNITY

## 2025-04-11 RX ORDER — HYDROXYZINE HYDROCHLORIDE 25 MG/1
25 TABLET, FILM COATED ORAL EVERY 8 HOURS PRN
Status: CANCELLED | OUTPATIENT
Start: 2025-04-11

## 2025-04-11 RX ORDER — ACETAMINOPHEN 325 MG/1
650 TABLET ORAL EVERY 4 HOURS PRN
Status: DISCONTINUED | OUTPATIENT
Start: 2025-04-11 | End: 2025-04-11

## 2025-04-11 RX ORDER — POTASSIUM CHLORIDE 1500 MG/1
40 TABLET, EXTENDED RELEASE ORAL EVERY 4 HOURS
Status: COMPLETED | OUTPATIENT
Start: 2025-04-11 | End: 2025-04-11

## 2025-04-11 RX ORDER — OXYCODONE HYDROCHLORIDE 5 MG/1
10 TABLET ORAL EVERY 6 HOURS PRN
Refills: 0 | Status: DISCONTINUED | OUTPATIENT
Start: 2025-04-11 | End: 2025-04-13 | Stop reason: HOSPADM

## 2025-04-11 RX ADMIN — LAMOTRIGINE 200 MG: 100 TABLET ORAL at 23:23

## 2025-04-11 RX ADMIN — INDOMETHACIN 50 MEQ: 25 CAPSULE ORAL at 13:53

## 2025-04-11 RX ADMIN — OXYCODONE 10 MG: 5 TABLET ORAL at 16:57

## 2025-04-11 RX ADMIN — MUPIROCIN 1 APPLICATION: 20 OINTMENT TOPICAL at 23:23

## 2025-04-11 RX ADMIN — SODIUM CHLORIDE 100 ML/HR: 9 INJECTION, SOLUTION INTRAVENOUS at 13:50

## 2025-04-11 RX ADMIN — MAGNESIUM SULFATE IN DEXTROSE 1 G: 10 INJECTION, SOLUTION INTRAVENOUS at 16:57

## 2025-04-11 RX ADMIN — SODIUM BICARBONATE 50 MEQ: 84 INJECTION INTRAVENOUS at 13:53

## 2025-04-11 RX ADMIN — POTASSIUM CHLORIDE 40 MEQ: 1500 TABLET, EXTENDED RELEASE ORAL at 16:57

## 2025-04-11 RX ADMIN — MAGNESIUM SULFATE IN DEXTROSE 1 G: 10 INJECTION, SOLUTION INTRAVENOUS at 19:48

## 2025-04-11 RX ADMIN — ALPRAZOLAM 0.5 MG: 0.5 TABLET ORAL at 23:23

## 2025-04-11 RX ADMIN — POTASSIUM CHLORIDE 40 MEQ: 1500 TABLET, EXTENDED RELEASE ORAL at 20:58

## 2025-04-11 RX ADMIN — MAGNESIUM SULFATE IN DEXTROSE 1 G: 10 INJECTION, SOLUTION INTRAVENOUS at 18:25

## 2025-04-11 NOTE — OUTREACH NOTE
Medical Week 2 Survey      Flowsheet Row Responses   [facility] facility patient discharged from? [facility]   Does the patient have one of the following disease processes/diagnoses(primary or secondary)? Other   Week 2 attempt successful? No   Unsuccessful attempts Attempt 1   Revoke Readmitted            Tiffanie ROCKWELL - Registered Nurse

## 2025-04-11 NOTE — H&P
[facility] Medicine Services  History & Physical    Patient Name: Tracie Gross  : 1960  MRN: [id number]  Primary Care Physician:  Floyd Silva MD  Date of admission: 2025  Date and Time of Service: 2025 at 1545    Subjective      Chief Complaint: Hypotension, syncope    History of Present Illness: Tracie Gross is a 64 y.o. female with a CMH of pulmonary fibrosis, htn, hld, CKD, von Willebrand's disease, hypothyroid, IBS who presented to [facility] on 2025 with  low blood pressure, chest pressure, syncope. Pt was recent admitted here 3/15-3/31 for AHRF, treated for pneumonia in the setting of pulmonary fibrosis and discharged in stable condition on steroid taper. Today she reports having multiple recent low blood pressure readings at home, has been around 80-90 systolic but with a low reading of 60/40. She also reports a syncopal episode about 4 days ago, states she was sitting on the toilet at the time. Denies falling/hitting her head, says she just kind of slumped over. Does note she gets more lightheaded/dizzy with standing. She has had generalized weakness during this time. Reports some chronic SOA though notes it has been at recent baseline. She does report some substernal chest pressure over the past day or so, denies overt chest pain.   In ED workup notable for troponin 120 -> 107, BNP 2900, ECG with c/f acute posterior MI. Cr 1.46, WBC 11.39. Cardiology consulted in ED and pt taken to cath lab with concern for acute NSTEMI. Hospitalist service consulted for admission following cath.       Review of Systems   Constitutional:  Positive for activity change and fatigue. Negative for appetite change, chills and fever.   HENT: Negative.     Eyes: Negative.    Respiratory:          + baseline cough/SOA, no change   Cardiovascular:  Positive for leg swelling. Negative for palpitations.        + chest pressure   Gastrointestinal:  Negative for abdominal distention,  abdominal pain, blood in stool, constipation, diarrhea and nausea.   Genitourinary:  Negative for dysuria, frequency and urgency.   Musculoskeletal:  Positive for arthralgias and back pain. Negative for myalgias.   Neurological:  Positive for dizziness, syncope and light-headedness. Negative for headaches.        + generalized weakness, no focal       Personal History     Past Medical History:   Diagnosis Date    Allergic 01/01/1998    Anemia     Ankle sprain     Anxiety     Arthritis     Arthritis of back 0ll1/01/2013    Arthritis of neck 01/01/2005    Asthma     Bipolar affective disorder     Bleeding disorder 1986    Breast cancer 1985    s/p R mastectomy    Bursitis of hip [id number]    Cervical disc disorder 01/01/2006    CTS (carpal tunnel syndrome)     Depression 09/01/2000    Fracture of wrist 01/01/1995    Fracture, foot     Frozen shoulder     GERD (gastroesophageal reflux disease) 1993    H/O breast reconstruction     SEVERAL    H/O laminectomy     Hamartoma     Hip arthrosis 01/01/2013    History of medical problems     History of transfusion 1986    Hx of bilateral oophorectomy     Hyperlipidemia     Hypertension     Hypothyroidism     Infectious viral hepatitis     Inflammatory bowel disease 1992    Man. EGD/colonoscopy annually (9/2021)    Injury of back 12/01/1996    Irritable bowel syndrome     Kienbock's disease, right     WRIST    Knee swelling 01/01/2007    Low back pain 1991    Low back strain     Lumbosacral disc disease 01/01/1995    Had 2 lumber surgeries    Lupus     Ravenell    Monahan syndrome     Man. EGD/colonoscopy annually (9/2021). MRI alternating w/ EUS annually for pancreatic screen    MRSA infection     Neuroma of foot     Obesity     Osteopenia     Osteoporosis     maintained on Prolia through Karen    Peptic ulceration     Periarthritis of shoulder 04/01/2022    Pneumonia     Pulmonary fibrosis     Raynaud disease     Renal insufficiency     Rotator cuff syndrome [id number]     Scleroderma     Shortness of breath     Sleep apnea     cpap    Squamous cell cancer of lip     Tear of meniscus of knee     Tendinitis of knee     Thoracic disc disorder     Von Willebrand disease     Wrist sprain        Past Surgical History:   Procedure Laterality Date    APPENDECTOMY      ARM DEBRIDEMENT Right     X 3    BACK SURGERY      Vetas- cervical fusion    BACK SURGERY      lumbar decomp    BREAST BIOPSY      BREAST LUMPECTOMY      BREAST RECONSTRUCTION Right     BREAST RECONSTRUCTION Right     BRONCHOSCOPY N/A 2025    Procedure: BRONCHOSCOPY with bilateral lung washing;  Surgeon: Isaac Vazquez MD;  Location: [facility] ENDOSCOPY;  Service: Pulmonary;  Laterality: N/A;  postop:    CARDIAC CATHETERIZATION      no stents placed    CARPAL TUNNEL RELEASE  Rt wrist     SECTION  ,     CHOLECYSTECTOMY      COLONOSCOPY      COLONOSCOPY N/A 2020    Procedure: COLONOSCOPY;  Surgeon: Cayden Conway MD;  Location: [facility] ENDOSCOPY;  Service: Gastroenterology;  Laterality: N/A;  rectal ulcer    COLONOSCOPY N/A 2021    Procedure: COLONOSCOPY WITH POLYPECTOMY X 1;  Surgeon: Cayden Conway MD;  Location: [facility] ENDOSCOPY;  Service: Gastroenterology;  Laterality: N/A;  Post: COLON POLYP    COLONOSCOPY N/A 2023    Procedure: COLONOSCOPY with polypectomy x 1, endoscopic clipping x 1;  Surgeon: Cayden Conway MD;  Location: [facility] ENDOSCOPY;  Service: Gastroenterology;  Laterality: N/A;    COLONOSCOPY N/A 10/01/2024    Procedure: COLONOSCOPY;  Surgeon: Cayden Conway MD;  Location: [facility] ENDOSCOPY;  Service: Gastroenterology;  Laterality: N/A;  normal    COSMETIC SURGERY  [id number]    ENDOSCOPY      ENDOSCOPY N/A 2020    Procedure: ESOPHAGOGASTRODUODENOSCOPY;  Surgeon: Cayden Conway MD;  Location: [facility] ENDOSCOPY;  Service: Gastroenterology;  Laterality: N/A;  Normal EGD    ENDOSCOPY N/A 2021    Procedure:  ESOPHAGOGASTRODUODENOSCOPY;  Surgeon: Cayden Conway MD;  Location: [facility] ENDOSCOPY;  Service: Gastroenterology;  Laterality: N/A;  Post: normal egd    ENDOSCOPY N/A 10/01/2024    Procedure: ESOPHAGOGASTRODUODENOSCOPY;  Surgeon: Cayden Conway MD;  Location: [facility] ENDOSCOPY;  Service: Gastroenterology;  Laterality: N/A;  normal    EXPLORATORY LAPAROTOMY      scar tissue removal    EYE SURGERY  2000    FINGER SURGERY Right     ring finger mass excision    HAND SURGERY  Rt wrist  10/15    HIP SURGERY  1/12    HYSTERECTOMY      PARTIAL    JOINT REPLACEMENT Right 2012,2015    hip and knee    KNEE ARTHROSCOPY Left 01/07/2020    Procedure: LEFT KNEE SCOPE with partial lateral meniscectomy;  Surgeon: Chau Perez MD;  Location: [facility] MAIN OR;  Service: Orthopedics    KNEE ARTHROSCOPY  Rt knee  2014    KNEE SURGERY  Rtf knee  2015    LAMINECTOMY  2/93    LASIK      LASIK/ LASIK ENHANCEMENT    MASTECTOMY RADICAL Right     NECK SURGERY  01/01/06    OOPHORECTOMY Bilateral     SHOULDER SURGERY  11/01/2023    SPINE SURGERY      SPLENECTOMY      SUBTOTAL HYSTERECTOMY      TOTAL HIP ARTHROPLASTY Left 05/17/2023    TOTAL SHOULDER ARTHROPLASTY W/ DISTAL CLAVICLE EXCISION Right 11/01/2023    Procedure: TOTAL SHOULDER REVERSE ARTHROPLASTY;  Surgeon: Floyd Ruiz MD;  Location: [facility] MAIN OR;  Service: Orthopedics;  Laterality: Right;    TRIGGER POINT INJECTION  7/23    TUBAL ABDOMINAL LIGATION  1981    UPPER ENDOSCOPIC ULTRASOUND W/ FNA N/A 12/09/2021    Procedure: ENDOSCOPIC ULTRASOUND WITH ESOPHAGOGASTRODUODENOSCOPY;  Surgeon: Luis E Negron MD;  Location: [facility] ENDOSCOPY;  Service: Gastroenterology;  Laterality: N/A;  Post: GASTROPARESIS, DILATED COMMON BILE DUCT, FUNDIC POLYP, DUODENITIS, NORMAL PANCREAS, HIATAL HERNIA    WRIST SURGERY Right     distal radius head removal (bone dead)  plates and screws decomp lunate       Family History: family history includes Arthritis in her mother, sister,  and sister; Asthma in her sister; Breast cancer in her son and son; Broken bones in her sister and sister; Cancer in her brother, maternal aunt, maternal aunt, maternal aunt, maternal aunt, mother, paternal aunt, paternal aunt, sister, sister, and son; Clotting disorder in her sister; Colon cancer in her mother, sister, son, and another family member; Depression in her father; Diabetes in her paternal grandmother, sister, and sister; Heart disease in her father, mother, sister, and sister; Hyperlipidemia in her father, sister, and sister; Hypertension in her father and sister; Kidney disease in her father, sister, and sister; Rheumatologic disease in her sister; Stroke in her paternal grandmother and sister; Thyroid disease in her sister and sister; Vision loss in her father and sister; Von Willebrand disease in her brother, sister, son, and son. Otherwise pertinent FHx was reviewed and not pertinent to current issue.    Social History:  reports that she quit smoking about 31 years ago. Her smoking use included cigarettes. She started smoking about 41 years ago. She has a 2.5 pack-year smoking history. She has been exposed to tobacco smoke. She has never used smokeless tobacco. She reports that she does not currently use alcohol. She reports that she does not use drugs.    Home Medications:  Prior to Admission Medications       Prescriptions Last Dose Informant Patient Reported? Taking?    albuterol sulfate  (90 Base) MCG/ACT inhaler   No No    Inhale 2 puffs Every 4 (Four) Hours As Needed for Wheezing.    ALPRAZolam (XANAX) 1 MG tablet   No No    Take 1 tablet by mouth At Night As Needed for Anxiety.    bisoprolol-hydrochlorothiazide (ZIAC) 10-6.25 MG per tablet   No No    Take 0.5 tablets by mouth Daily.    Calcium + Vitamin D3 600-5 MG-MCG tablet   No No    TAKE ONE TABLET BY MOUTH TWICE A DAY    cetirizine (zyrTEC) 10 MG tablet   No No    TAKE 1 TABLET BY MOUTH DAILY    cyclobenzaprine (FLEXERIL) 10 MG  tablet   No No    TAKE ONE TABLET BY MOUTH THREE TIMES A DAY AS NEEDED FOR MUSCLE SPASMS    Denosumab (PROLIA SC)   Yes No    Inject  under the skin into the appropriate area as directed Every 6 (Six) Months.    FeroSul 325 (65 Fe) MG tablet   No No    TAKE 1 TABLET BY MOUTH DAILY WITH BREAKFAST    Flaxseed, Linseed, (FLAXSEED OIL MAX STR) 1300 MG capsule   Yes No    Take 1 tablet by mouth 2 (Two) Times a Day.    fluticasone (FLONASE) 50 MCG/ACT nasal spray   No No    SPRAY TWO SPRAYS IN EACH NOSTRIL ONCE DAILY    furosemide (LASIX) 20 MG tablet   No No    TAKE 1 TABLET BY MOUTH DAILY AS NEEDED FOR LEG SWELLING    gabapentin (NEURONTIN) 600 MG tablet   No No    TAKE TWO TABLETS BY MOUTH EVERY MORNING THEN TAKE ONE TABLET BY MOUTH DAILY IN THE AFTERNOON THEN TAKE TWO TABLETS BY MOUTH EVERY NIGHT    guaiFENesin (Mucinex) 600 MG 12 hr tablet   No No    Take 2 tablets twice daily for congestion    Patient not taking:  Reported on 4/10/2025    hydroxychloroquine (PLAQUENIL) 200 MG tablet   No No    Take 1 tablet by mouth 2 (Two) Times a Day. Indications: Systemic Lupus Erythematosus    hydrOXYzine (ATARAX) 25 MG tablet   No No    Take 1 tablet by mouth Every 8 (Eight) Hours As Needed for Itching.    lamoTRIgine (LaMICtal) 200 MG tablet   No No    TAKE ONE TABLET BY MOUTH ONCE NIGHTLY    levothyroxine (SYNTHROID, LEVOTHROID) 175 MCG tablet   No No    TAKE 1 TABLET BY MOUTH DAILY    lisinopril (PRINIVIL,ZESTRIL) 5 MG tablet   No No    TAKE 1 TABLET BY MOUTH DAILY    meclizine (ANTIVERT) 25 MG tablet   No No    Take 1 tablet by mouth 3 (Three) Times a Day As Needed for Dizziness.    NON FORMULARY   Yes No    Apply  topically to the appropriate area as directed. Lidocaine powder  Ketamine powder  Diclofenac powder    ondansetron (ZOFRAN) 4 MG tablet   No No    Take 1 tablet by mouth Every 8 (Eight) Hours As Needed for Nausea or Vomiting.    oxyCODONE (ROXICODONE) 10 MG tablet   No No    Take 1 tablet by mouth Every 6 (Six)  Hours As Needed for Moderate Pain.    predniSONE (DELTASONE) 20 MG tablet   No No    Take 2 tablets by mouth Daily for 7 days, THEN 1 tablet Daily for 14 days, THEN 1 tablet Daily for 7 days.    promethazine-dextromethorphan (PROMETHAZINE-DM) 6.25-15 MG/5ML syrup   No No    Take 5 mL by mouth 4 (Four) Times a Day As Needed for Cough.    Patient not taking:  Reported on 4/10/2025    QUEtiapine (SEROquel) 100 MG tablet   No No    Take 0.5 tablets by mouth Every Night.    sulfamethoxazole-trimethoprim (BACTRIM DS,SEPTRA DS) 800-160 MG per tablet   Yes No    Take 1 tablet by mouth Every Other Day.    Patient not taking:  Reported on 4/10/2025              Allergies:  Allergies   Allergen Reactions    Aspirin Other (See Comments)     VONWILLENBRAND DISEASE     Baclofen Hives       Objective      Vitals:   Temp:  [97.3 °F (36.3 °C)] 97.3 °F (36.3 °C)  Heart Rate:  [62-94] 65  Resp:  [14-18] 14  BP: ()/(55-77) 111/64  Body mass index is 36.71 kg/m².  Physical Exam  Constitutional:       General: She is not in acute distress.     Appearance: Normal appearance.   HENT:      Head: Normocephalic and atraumatic.      Mouth/Throat:      Mouth: Mucous membranes are moist.      Pharynx: Oropharynx is clear.   Eyes:      Extraocular Movements: Extraocular movements intact.      Conjunctiva/sclera: Conjunctivae normal.      Pupils: Pupils are equal, round, and reactive to light.   Cardiovascular:      Rate and Rhythm: Normal rate and regular rhythm.      Heart sounds: Normal heart sounds.   Pulmonary:      Effort: Pulmonary effort is normal.      Breath sounds: Normal breath sounds. No wheezing, rhonchi or rales.   Abdominal:      General: Abdomen is flat.      Palpations: Abdomen is soft.      Tenderness: There is no abdominal tenderness. There is no guarding or rebound.   Musculoskeletal:         General: No tenderness.      Cervical back: Normal range of motion and neck supple.      Comments: Trace leonard LE edema   Skin:      General: Skin is warm and dry.   Neurological:      General: No focal deficit present.      Mental Status: She is alert and oriented to person, place, and time. Mental status is at baseline.      Cranial Nerves: No cranial nerve deficit.      Motor: No weakness.         Diagnostic Data:  Lab Results (last 24 hours)       Procedure Component Value Units Date/Time    High Sensitivity Troponin T 1Hr [[id number]]  (Abnormal) Collected: 04/11/25 1308    Specimen: Blood Updated: 04/11/25 1333     HS Troponin T 107 ng/L      Troponin T Numeric Delta -13 ng/L      Troponin T % Delta -11    Narrative:      High Sensitive Troponin T Reference Range:  <14.0 ng/L- Negative Female for AMI  <22.0 ng/L- Negative Male for AMI  >=14 - Abnormal Female indicating possible myocardial injury.  >=22 - Abnormal Male indicating possible myocardial injury.   Clinicians would have to utilize clinical acumen, EKG, Troponin, and serial changes to determine if it is an Acute Myocardial Infarction or myocardial injury due to an underlying chronic condition.         Comprehensive Metabolic Panel [[id number]]  (Abnormal) Collected: 04/11/25 1217    Specimen: Blood Updated: 04/11/25 1248     Glucose 103 mg/dL      BUN 23 mg/dL      Creatinine 1.46 mg/dL      Sodium 139 mmol/L      Potassium 3.5 mmol/L      Comment: Slight hemolysis detected by analyzer. Result may be falsely elevated.        Chloride 96 mmol/L      CO2 28.4 mmol/L      Calcium 9.9 mg/dL      Total Protein 6.4 g/dL      Albumin 3.9 g/dL      ALT (SGPT) 26 U/L      AST (SGOT) 27 U/L      Alkaline Phosphatase 108 U/L      Total Bilirubin 0.2 mg/dL      Globulin 2.5 gm/dL      A/G Ratio 1.6 g/dL      BUN/Creatinine Ratio 15.8     Anion Gap 14.6 mmol/L      eGFR 39.5 mL/min/1.73     Narrative:      GFR Categories in Chronic Kidney Disease (CKD)      GFR Category          GFR (mL/min/1.73)    Interpretation  G1                     90 or greater         Normal or high (1)  G2                       60-89                Mild decrease (1)  G3a                   45-59                Mild to moderate decrease  G3b                   30-44                Moderate to severe decrease  G4                    15-29                Severe decrease  G5                    14 or less           Kidney failure          (1)In the absence of evidence of kidney disease, neither GFR category G1 or G2 fulfill the criteria for CKD.    eGFR calculation 2021 CKD-EPI creatinine equation, which does not include race as a factor    Magnesium [[id number]]  (Abnormal) Collected: 04/11/25 1217    Specimen: Blood Updated: 04/11/25 1248     Magnesium 1.5 mg/dL     High Sensitivity Troponin T [[id number]]  (Abnormal) Collected: 04/11/25 1217    Specimen: Blood Updated: 04/11/25 1248     HS Troponin T 120 ng/L     Narrative:      High Sensitive Troponin T Reference Range:  <14.0 ng/L- Negative Female for AMI  <22.0 ng/L- Negative Male for AMI  >=14 - Abnormal Female indicating possible myocardial injury.  >=22 - Abnormal Male indicating possible myocardial injury.   Clinicians would have to utilize clinical acumen, EKG, Troponin, and serial changes to determine if it is an Acute Myocardial Infarction or myocardial injury due to an underlying chronic condition.         BNP [[id number]]  (Abnormal) Collected: 04/11/25 1217    Specimen: Blood Updated: 04/11/25 1247     proBNP 2,881.0 pg/mL     Narrative:      This assay is used as an aid in the diagnosis of individuals suspected of having heart failure. It can be used as an aid in the diagnosis of acute decompensated heart failure (ADHF) in patients presenting with signs and symptoms of ADHF to the emergency department (ED). In addition, NT-proBNP of <300 pg/mL indicates ADHF is not likely.    Age Range Result Interpretation  NT-proBNP Concentration (pg/mL:      <50             Positive            >450                   Gray                 300-450                    Negative              <300    50-75           Positive            >900                  Gray                300-900                  Negative            <300      >75             Positive            >1800                  Gray                300-1800                  Negative            <300    Phosphorus [[id number]]  (Normal) Collected: 04/11/25 1217    Specimen: Blood Updated: 04/11/25 1247     Phosphorus 3.7 mg/dL     [place] Draw [[id number]] Collected: 04/11/25 1217    Specimen: Blood Updated: 04/11/25 1230    Narrative:      The following orders were created for panel order [place] Draw.  Procedure                               Abnormality         Status                     ---------                               -----------         ------                     Green Top (Gel)[[id number]]                                  Final result               Lavender Top[[id number]]                                     Final result               Gold Top - SST[[id number]]                                   Final result               Light Blue Top[[id number]]                                   Final result                 Please view results for these tests on the individual orders.    Gold Top - SST [[id number]] Collected: 04/11/25 1217    Specimen: Blood Updated: 04/11/25 1230     Extra Tube Hold for add-ons.     Comment: Auto resulted.       Light Blue Top [[id number]] Collected: 04/11/25 1217    Specimen: Blood Updated: 04/11/25 1230     Extra Tube Hold for add-ons.     Comment: Auto resulted       Green Top (Gel) [[id number]] Collected: 04/11/25 1217    Specimen: Blood Updated: 04/11/25 1230     Extra Tube Hold for add-ons.     Comment: Auto resulted.       Lavender Top [[id number]] Collected: 04/11/25 1217    Specimen: Blood Updated: 04/11/25 1230     Extra Tube hold for add-on     Comment: Auto resulted       CBC & Differential [[id number]]  (Abnormal) Collected: 04/11/25 1217    Specimen: Blood Updated: 04/11/25 1226    Narrative:      The following  orders were created for panel order CBC & Differential.  Procedure                               Abnormality         Status                     ---------                               -----------         ------                     CBC Auto Differential[[id number]]        Abnormal            Final result                 Please view results for these tests on the individual orders.    CBC Auto Differential [[id number]]  (Abnormal) Collected: 04/11/25 1217    Specimen: Blood Updated: 04/11/25 1226     WBC 11.39 10*3/mm3      RBC 3.78 10*6/mm3      Hemoglobin 11.8 g/dL      Hematocrit 37.1 %      MCV 98.1 fL      MCH 31.2 pg      MCHC 31.8 g/dL      RDW 15.6 %      RDW-SD 55.0 fl      MPV 12.0 fL      Platelets 288 10*3/mm3      Neutrophil % 80.9 %      Lymphocyte % 11.3 %      Monocyte % 5.8 %      Eosinophil % 0.3 %      Basophil % 0.6 %      Immature Grans % 1.1 %      Neutrophils, Absolute 9.21 10*3/mm3      Lymphocytes, Absolute 1.29 10*3/mm3      Monocytes, Absolute 0.66 10*3/mm3      Eosinophils, Absolute 0.03 10*3/mm3      Basophils, Absolute 0.07 10*3/mm3      Immature Grans, Absolute 0.13 10*3/mm3      nRBC 0.0 /100 WBC              Imaging Results (Last 24 Hours)       Procedure Component Value Units Date/Time    XR Chest 1 View [[id number]] Collected: 04/11/25 1249     Updated: 04/11/25 1257    Narrative:      XR CHEST 1 VW    Date of Exam: 4/11/2025 12:38 PM EDT    Indication: syncope    Comparison: March 23, 2025    Findings:  Depth of inspiration is poor. There has been improvement in the appearance of the lungs since the prior study. It looks like there probably are residual areas of airspace disease. The heart is magnified. There are no pleural effusions. Surgical clips are   seen in the right axilla. Patient had reverse right total shoulder arthroplasty.      Impression:      Impression:  Improvement in appearance of the lungs since prior study. There probably are residual areas of airspace  disease.        Electronically Signed: Alen Rasmussen MD    4/11/2025 12:55 PM EDT    Workstation ID: [id number]              Assessment & Plan        This is a 64 y.o. female with:    Active and Resolved Problems  Active Hospital Problems    Diagnosis  POA    **Angina at rest [I20.89]  Yes    ACS (acute coronary syndrome) [I24.9]  Yes      Resolved Hospital Problems   No resolved problems to display.       ? Acute coronary syndrome  - troponin 120 -> 107, BNP 2900, ECG with c/f acute posterior MI. Seen by cardiology in ED and taken to cath lab  - TTE pending  - pending A1C/lipid/TSH  - awaiting cath report, mgmt per cardiology    Syncopal episode/hypotension  Htn  - pt w/ recent reported hypotension, syncopal episode. Possible component of ACS as above, suspect vasovagal and orthostatic. No focal deficits on neuro exam  - BP has been low-normal in ED  - holding home antihypertensives, resume as appropriate. Pt reports PCP had recently decr some doses yesterday d/t reported lows  - orthostatic vitals in am    DANIELLE on CKDIIIa  - Cr 1.46 OA, baseline ~1. Pt w/ reported recent hypotension.  - Hold antihypertensives.   - Avoid nephrotoxic medications as able  - Anticipate incr in Cr w/ contrast exposure for cath, consider nephrology consult if significantly increased  - trend labs    Pulmonary fibrosis  Recent pneumonia  - completed abx course for pneumonia during recent admission. Underwent walk test without O2 indicated. Discharge on steroid taper, will cont  - follows w/ pulm, underwent bronch during recent admission.  - cont home medications  - cont pulse ox, supplemental O2 as needed    Hypothyroidism  - TSH pending as above  - cont synthroid    Hx vWB disease  - hgb stable  - ctm    Mood   Anxiety  - cont home medication      Home medications pending verification    VTE Prophylaxis:  Mechanical VTE prophylaxis orders are present.        The patient desires to be as follows:    CODE STATUS:    Code Status (Patient has  no pulse and is not breathing): CPR (Attempt to Resuscitate)  Medical Interventions (Patient has pulse or is breathing): Full Support  Level Of Support Discussed With: Patient        Brian Gross, who can be contacted at [phone] , is the designated person to make medical decisions on the patient's behalf if She is incapable of doing so. This was clarified with patient and/or next of kin on 4/11/2025 during the course of this H&P.    Admission Status:  I believe this patient meets inpatient status.    Expected Length of Stay: >2 midnights    PDMP and Medication Dispenses via Sidebar reviewed and consistent with patient reported medications.    I discussed the patient's findings and my recommendations with patient and family.      Signature:     This document has been electronically signed by Mushtaq Garcia MD on April 11, 2025 16:06 EDT   [facility] Hospitalist Team

## 2025-04-11 NOTE — ED PROVIDER NOTES
Subjective   History of Present Illness  Chief complaint: Low blood pressure    64-year-old female presents with low blood pressure.  Patient was admitted to the hospital in the middle of March for low blood pressure, pulmonary fibrosis, UTI.  She states over the past 4 days her blood pressure has been running low.  She had a syncopal episode about 4 days ago as well.  She has been generally weak.  Today her blood pressure got as low as 60/40 at home.  Family states it was mostly running around 80-90 systolic.  She denies any chest pain.  She has had no fever.  She denies any vomiting or diarrhea.    History provided by:  Patient      Review of Systems   Constitutional:  Negative for fever.   HENT:  Negative for congestion.    Respiratory:  Positive for shortness of breath. Negative for cough.    Cardiovascular:  Negative for chest pain.   Gastrointestinal:  Negative for abdominal pain, diarrhea and vomiting.   Musculoskeletal:  Negative for back pain.   Neurological:  Positive for syncope and weakness. Negative for headaches.   Psychiatric/Behavioral:  Negative for confusion.        Past Medical History:   Diagnosis Date    Allergic 01/01/1998    Anemia     Ankle sprain     Anxiety     Arthritis     Arthritis of back 0ll1/01/2013    Arthritis of neck 01/01/2005    Asthma     Bipolar affective disorder     Bleeding disorder 1986    Breast cancer 1985    s/p R mastectomy    Bursitis of hip [id number]    Cervical disc disorder 01/01/2006    CTS (carpal tunnel syndrome)     Depression 09/01/2000    Fracture of wrist 01/01/1995    Fracture, foot     Frozen shoulder     GERD (gastroesophageal reflux disease) 1993    H/O breast reconstruction     SEVERAL    H/O laminectomy     Hamartoma     Hip arthrosis 01/01/2013    History of medical problems     History of transfusion 1986    Hx of bilateral oophorectomy     Hyperlipidemia     Hypertension     Hypothyroidism     Infectious viral hepatitis     Inflammatory bowel disease  Juanjo Conway. EGD/colonoscopy annually (2021)    Injury of back 1996    Irritable bowel syndrome     Kienbock's disease, right     WRIST    Knee swelling 2007    Low back pain 1991    Low back strain     Lumbosacral disc disease 1995    Had 2 lumber surgeries    Lupus     [place]    Monahan syndrome     Man. EGD/colonoscopy annually (2021). MRI alternating w/ EUS annually for pancreatic screen    MRSA infection     Neuroma of foot     Obesity     Osteopenia     Osteoporosis     maintained on Prolia through Karen    Peptic ulceration     Periarthritis of shoulder 2022    Pneumonia     Pulmonary fibrosis     Raynaud disease     Renal insufficiency     Rotator cuff syndrome [id number]    Scleroderma     Shortness of breath     Sleep apnea     cpap    Squamous cell cancer of lip     Tear of meniscus of knee     Tendinitis of knee     Thoracic disc disorder     Von Willebrand disease     Wrist sprain        Allergies   Allergen Reactions    Aspirin Other (See Comments)     VONWILLENBRAND DISEASE     Baclofen Hives       Past Surgical History:   Procedure Laterality Date    APPENDECTOMY      ARM DEBRIDEMENT Right     X 3    BACK SURGERY      Vetas- cervical fusion    BACK SURGERY      lumbar decomp    BREAST BIOPSY      BREAST LUMPECTOMY      BREAST RECONSTRUCTION Right     BREAST RECONSTRUCTION Right     BRONCHOSCOPY N/A 2025    Procedure: BRONCHOSCOPY with bilateral lung washing;  Surgeon: Isaac Vazquez MD;  [facility];  Service: Pulmonary;  Laterality: N/A;  postop:    CARDIAC CATHETERIZATION      no stents placed    CARPAL TUNNEL RELEASE  Rt wrist     SECTION  1980    CHOLECYSTECTOMY      COLONOSCOPY      COLONOSCOPY N/A 2020    Procedure: COLONOSCOPY;  Surgeon: Cayden Conway MD;  [facility];  Service: Gastroenterology;  Laterality: N/A;  rectal ulcer    COLONOSCOPY N/A 2021    Procedure: COLONOSCOPY  WITH POLYPECTOMY X 1;  Surgeon: Cayden Conway MD;  Location: [facility] ENDOSCOPY;  Service: Gastroenterology;  Laterality: N/A;  Post: COLON POLYP    COLONOSCOPY N/A 01/06/2023    Procedure: COLONOSCOPY with polypectomy x 1, endoscopic clipping x 1;  Surgeon: Cayden Conway MD;  Location: [facility] ENDOSCOPY;  Service: Gastroenterology;  Laterality: N/A;    COLONOSCOPY N/A 10/01/2024    Procedure: COLONOSCOPY;  Surgeon: Cayden Conway MD;  Location: [facility] ENDOSCOPY;  Service: Gastroenterology;  Laterality: N/A;  normal    COSMETIC SURGERY  [id number]    ENDOSCOPY      ENDOSCOPY N/A 08/14/2020    Procedure: ESOPHAGOGASTRODUODENOSCOPY;  Surgeon: Cayden Conway MD;  Location: [facility] ENDOSCOPY;  Service: Gastroenterology;  Laterality: N/A;  Normal EGD    ENDOSCOPY N/A 09/17/2021    Procedure: ESOPHAGOGASTRODUODENOSCOPY;  Surgeon: Cayden Conway MD;  Location: [facility] ENDOSCOPY;  Service: Gastroenterology;  Laterality: N/A;  Post: normal egd    ENDOSCOPY N/A 10/01/2024    Procedure: ESOPHAGOGASTRODUODENOSCOPY;  Surgeon: Cayden Conway MD;  Location: [facility] ENDOSCOPY;  Service: Gastroenterology;  Laterality: N/A;  normal    EXPLORATORY LAPAROTOMY      scar tissue removal    EYE SURGERY  2000    FINGER SURGERY Right     ring finger mass excision    HAND SURGERY  Rt wrist  10/15    HIP SURGERY  1/12    HYSTERECTOMY      PARTIAL    JOINT REPLACEMENT Right 2012,2015    hip and knee    KNEE ARTHROSCOPY Left 01/07/2020    Procedure: LEFT KNEE SCOPE with partial lateral meniscectomy;  Surgeon: Chau Perez MD;  Location: [facility] MAIN OR;  Service: Orthopedics    KNEE ARTHROSCOPY  Rt knee  2014    KNEE SURGERY  Rtf knee  2015    LAMINECTOMY  2/93    LASIK      LASIK/ LASIK ENHANCEMENT    MASTECTOMY RADICAL Right     NECK SURGERY  01/01/06    OOPHORECTOMY Bilateral     SHOULDER SURGERY  11/01/2023    SPINE SURGERY      SPLENECTOMY      SUBTOTAL HYSTERECTOMY      TOTAL HIP ARTHROPLASTY  Left 05/17/2023    TOTAL SHOULDER ARTHROPLASTY W/ DISTAL CLAVICLE EXCISION Right 11/01/2023    Procedure: TOTAL SHOULDER REVERSE ARTHROPLASTY;  Surgeon: Floyd Ruiz MD;  Location: [facility] MAIN OR;  Service: Orthopedics;  Laterality: Right;    TRIGGER POINT INJECTION  7/23    TUBAL ABDOMINAL LIGATION  1981    UPPER ENDOSCOPIC ULTRASOUND W/ FNA N/A 12/09/2021    Procedure: ENDOSCOPIC ULTRASOUND WITH ESOPHAGOGASTRODUODENOSCOPY;  Surgeon: Luis E Negron MD;  Location: [facility] ENDOSCOPY;  Service: Gastroenterology;  Laterality: N/A;  Post: GASTROPARESIS, DILATED COMMON BILE DUCT, FUNDIC POLYP, DUODENITIS, NORMAL PANCREAS, HIATAL HERNIA    WRIST SURGERY Right     distal radius head removal (bone dead)  plates and screws decomp lunate       Family History   Problem Relation Age of Onset    Colon cancer Mother     Heart disease Mother     Cancer Mother         Colon    Arthritis Mother     Kidney disease Father     Heart disease Father     Depression Father         Bladder cancer    Hyperlipidemia Father     Vision loss Father     Hypertension Father     Colon cancer Sister     Diabetes Sister     Heart disease Sister     Von Willebrand disease Sister     Von Willebrand disease Brother     Cancer Brother     Von Willebrand disease Son     Breast cancer Son     Diabetes Paternal Grandmother     Stroke Paternal Grandmother     Colon cancer Other     Colon cancer Son     Von Willebrand disease Son     Breast cancer Son     Cancer Son     Cancer Sister         Melanoma, colon, bladder    Vision loss Sister     Stroke Sister     Arthritis Sister     Asthma Sister     Diabetes Sister     Heart disease Sister     Hyperlipidemia Sister     Kidney disease Sister     Broken bones Sister     Cancer Paternal Aunt     Cancer Maternal Aunt     Cancer Maternal Aunt     Hyperlipidemia Sister     Thyroid disease Sister     Arthritis Sister     Hypertension Sister     Kidney disease Sister     Broken bones Sister     Rheumatologic  "disease Sister     Thyroid disease Sister     Cancer Sister     Clotting disorder Sister     Cancer Maternal Aunt     Cancer Maternal Aunt     Cancer Paternal Aunt        Social History     Socioeconomic History    Marital status:      Spouse name: Brian    Number of children: 2   Tobacco Use    Smoking status: Former     Current packs/day: 0.00     Average packs/day: 0.3 packs/day for 10.0 years (2.5 ttl pk-yrs)     Types: Cigarettes     Start date: 1984     Quit date: 1994     Years since quittin.2     Passive exposure: Past    Smokeless tobacco: Never    Tobacco comments:     Off and on for  those years   Vaping Use    Vaping status: Never Used   Substance and Sexual Activity    Alcohol use: Not Currently     Comment: Rarely    Drug use: No    Sexual activity: Never       BP 99/67   Pulse 62   Temp 97.3 °F (36.3 °C) (Oral)   Resp 18   Ht 162.6 cm (64\")   Wt 97 kg (213 lb 13.5 oz)   LMP 1983   SpO2 93%   BMI 36.71 kg/m²       Objective   Physical Exam  Vitals and nursing note reviewed.   Constitutional:       Appearance: Normal appearance.   HENT:      Head: Normocephalic and atraumatic.      Mouth/Throat:      Mouth: Mucous membranes are moist.   Cardiovascular:      Rate and Rhythm: Normal rate and regular rhythm.      Heart sounds: Normal heart sounds.   Pulmonary:      Effort: Pulmonary effort is normal. No respiratory distress.      Breath sounds: Normal breath sounds.   Abdominal:      Palpations: Abdomen is soft.      Tenderness: There is no abdominal tenderness.   Skin:     General: Skin is warm and dry.   Neurological:      Mental Status: She is alert and oriented to person, place, and time.      Comments: General weakness with no focal motor or sensory deficit appreciated         Procedures           ED Course      My interpretation of EKG shows sinus rhythm, rate of 62, nonspecific IVCD, ST depression in V1 and V2 concerning for posterior MI                                "      Results for orders placed or performed during the hospital encounter of 04/11/25   Comprehensive Metabolic Panel    Collection Time: 04/11/25 12:17 PM    Specimen: Blood   Result Value Ref Range    Glucose 103 (H) 65 - 99 mg/dL    BUN 23 8 - 23 mg/dL    Creatinine 1.46 (H) 0.57 - 1.00 mg/dL    Sodium 139 136 - 145 mmol/L    Potassium 3.5 3.5 - 5.2 mmol/L    Chloride 96 (L) 98 - 107 mmol/L    CO2 28.4 22.0 - 29.0 mmol/L    Calcium 9.9 8.6 - 10.5 mg/dL    Total Protein 6.4 6.0 - 8.5 g/dL    Albumin 3.9 3.5 - 5.2 g/dL    ALT (SGPT) 26 1 - 33 U/L    AST (SGOT) 27 1 - 32 U/L    Alkaline Phosphatase 108 39 - 117 U/L    Total Bilirubin 0.2 0.0 - 1.2 mg/dL    Globulin 2.5 gm/dL    A/G Ratio 1.6 g/dL    BUN/Creatinine Ratio 15.8 7.0 - 25.0    Anion Gap 14.6 5.0 - 15.0 mmol/L    eGFR 39.5 (L) >60.0 mL/min/1.73   Magnesium    Collection Time: 04/11/25 12:17 PM    Specimen: Blood   Result Value Ref Range    Magnesium 1.5 (L) 1.6 - 2.4 mg/dL   Phosphorus    Collection Time: 04/11/25 12:17 PM    Specimen: Blood   Result Value Ref Range    Phosphorus 3.7 2.5 - 4.5 mg/dL   High Sensitivity Troponin T    Collection Time: 04/11/25 12:17 PM    Specimen: Blood   Result Value Ref Range    HS Troponin T 120 (C) <14 ng/L   BNP    Collection Time: 04/11/25 12:17 PM    Specimen: Blood   Result Value Ref Range    proBNP 2,881.0 (H) 0.0 - 900.0 pg/mL   CBC Auto Differential    Collection Time: 04/11/25 12:17 PM    Specimen: Blood   Result Value Ref Range    WBC 11.39 (H) 3.40 - 10.80 10*3/mm3    RBC 3.78 3.77 - 5.28 10*6/mm3    Hemoglobin 11.8 (L) 12.0 - 15.9 g/dL    Hematocrit 37.1 34.0 - 46.6 %    MCV 98.1 (H) 79.0 - 97.0 fL    MCH 31.2 26.6 - 33.0 pg    MCHC 31.8 31.5 - 35.7 g/dL    RDW 15.6 (H) 12.3 - 15.4 %    RDW-SD 55.0 (H) 37.0 - 54.0 fl    MPV 12.0 6.0 - 12.0 fL    Platelets 288 140 - 450 10*3/mm3    Neutrophil % 80.9 (H) 42.7 - 76.0 %    Lymphocyte % 11.3 (L) 19.6 - 45.3 %    Monocyte % 5.8 5.0 - 12.0 %    Eosinophil % 0.3 0.3  - 6.2 %    Basophil % 0.6 0.0 - 1.5 %    Immature Grans % 1.1 (H) 0.0 - 0.5 %    Neutrophils, Absolute 9.21 (H) 1.70 - 7.00 10*3/mm3    Lymphocytes, Absolute 1.29 0.70 - 3.10 10*3/mm3    Monocytes, Absolute 0.66 0.10 - 0.90 10*3/mm3    Eosinophils, Absolute 0.03 0.00 - 0.40 10*3/mm3    Basophils, Absolute 0.07 0.00 - 0.20 10*3/mm3    Immature Grans, Absolute 0.13 (H) 0.00 - 0.05 10*3/mm3    nRBC 0.0 0.0 - 0.2 /100 WBC   Green Top (Gel)    Collection Time: 04/11/25 12:17 PM   Result Value Ref Range    Extra Tube Hold for add-ons.    Lavender Top    Collection Time: 04/11/25 12:17 PM   Result Value Ref Range    Extra Tube hold for add-on    Gold Top - SST    Collection Time: 04/11/25 12:17 PM   Result Value Ref Range    Extra Tube Hold for add-ons.    Light Blue Top    Collection Time: 04/11/25 12:17 PM   Result Value Ref Range    Extra Tube Hold for add-ons.    ECG 12 Lead Syncope    Collection Time: 04/11/25 12:28 PM   Result Value Ref Range    QT Interval 463 ms    QTC Interval 471 ms   High Sensitivity Troponin T 1Hr    Collection Time: 04/11/25  1:08 PM    Specimen: Blood   Result Value Ref Range    HS Troponin T 107 (C) <14 ng/L    Troponin T Numeric Delta -13 ng/L    Troponin T % Delta -11 Abnormal if >/= 20%     *Note: Due to a large number of results and/or encounters for the requested time period, some results have not been displayed. A complete set of results can be found in Results Review.     XR Chest 1 View  Result Date: 4/11/2025  Impression: Improvement in appearance of the lungs since prior study. There probably are residual areas of airspace disease. Electronically Signed: Alen Rasmussen MD  4/11/2025 12:55 PM EDT  Workstation ID: [id number]                  Medical Decision Making  Amount and/or Complexity of Data Reviewed  Labs: ordered.  Radiology: ordered.  ECG/medicine tests: ordered.    Risk  Prescription drug management.      Patient had the above evaluation.  Results were discussed with the  patient.  Chest x-ray reviewed by me shows improvement in appearance of the lungs since the prior study with probable residual areas of airspace disease.  White blood cell count is borderline elevated 11.39.  CMP significant for creatinine of 1.46.  EKG is showing possible posterior MI.  I have discussed with Dr. Benavides with cardiology and he is taking patient to the Cath Lab.  Initial troponin was 120 and repeat troponin was 107.  BNP was 2881.  Patient remained hemodynamically stable in the emergency room.  Blood pressure has been low normal.  I discussed with the hospitalist and patient will be admitted after going directly to the Cath Lab from the emergency room.  Critical care time total 35 minutes.      Final diagnoses:   Dyspnea, unspecified type   Hypotension, unspecified hypotension type   Abnormal EKG       ED Disposition  ED Disposition       ED Disposition   Send to Cath Lab    Condition   --    Comment   --               No follow-up provider specified.       Medication List      No changes were made to your prescriptions during this visit.            Yair Carvalho MD  04/11/25 [id number]

## 2025-04-11 NOTE — Clinical Note
A 6 fr sheath was successfully inserted into the right femoral artery.
All Patches/Pads removed. Skin Intact.
Allergies reviewed.  H&P note has been confirmed for the patient. Procedural consent has been signed.  Staff has reviewed the patient's labs.
Catheter inserted with wire simultaneously.
Catheter inserted with wire simultaneously.
Emergency staff delivered patient to lab.  Consents and History and Physical not obtained due to patient condition.
No change
No in lab complications
Patient was given Post Procedure instruction by the staff.
Physician notified by staff.
Prepped: right groin. Prepped with: ChloraPrep. The site was clipped. The patient was draped in a sterile fashion.
Report was  verbally given at bedside. .
The left coronary artery was selectively engaged, injected and visualized.
The right coronary artery was selectively engaged, injected and visualized.
Transparent Dressing was used to secure the sheath post procedure.  Sheath Left Intact after the procedure.  Pressure Bag was used to stabalize the sheath post procedure.
catheter removed  over the wire.
removed.
72

## 2025-04-11 NOTE — ED NOTES
Pt arrived to ED today via PV with c/o symptomatic low BP since yesterday. Reports feeling nausea, abdominal tenderness, lethargic, and dizziness. Denies any pain at this time.

## 2025-04-11 NOTE — CONSULTS
CARDIOLOGY CONSULT:    Tracie Gross  1960  female  [id number]      Referring Provider: Hospitalist  Reason for Consultation: Posterior MI    Patient Care Team:  Floyd Silva MD as PCP - General (Internal Medicine)  Suzan Jones MD as Consulting Physician (Hematology and Oncology)  Isaac Vazquez MD as Consulting Physician (Pulmonary Disease)  Marisol Salazar MD as Consulting Physician (Rheumatology)    Chief complaint chest pain    Subjective .     History of present illness:  Tracie Gross is a 64 y.o. female with history of pulmonary fibrosis hypertension chronic insufficiency and von Willebrand's disease in the past presented to the hospital with complaints of chest pain and dizziness and shortness of breath along with low blood pressure.  No complaints of any PND orthopnea.  No palpitations dizziness syncope or syncope or swelling of the feet.  Patient states that she has been taking her medicines regularly.  She has been having these symptoms for the last day but more this morning.  In the ER patient was noted to have an abnormal EKG probably consistent with posterior MI and elevated troponin hence a cardiology consult is called.    Review of Systems   Constitutional: Negative for fever and malaise/fatigue.   HENT:  Negative for ear pain and nosebleeds.    Eyes:  Negative for blurred vision and double vision.   Cardiovascular:  Positive for chest pain. Negative for dyspnea on exertion and palpitations.   Respiratory:  Negative for cough and shortness of breath.    Skin:  Negative for rash.   Musculoskeletal:  Negative for joint pain.   Gastrointestinal:  Negative for abdominal pain, nausea and vomiting.   Neurological:  Positive for dizziness. Negative for focal weakness and headaches.   Psychiatric/Behavioral:  Negative for depression. The patient is not nervous/anxious.    All other systems reviewed and are negative.      History  Past Medical History:   Diagnosis Date     Allergic 01/01/1998    Anemia     Ankle sprain     Anxiety     Arthritis     Arthritis of back 0ll1/01/2013    Arthritis of neck 01/01/2005    Asthma     Bipolar affective disorder     Bleeding disorder 1986    Breast cancer 1985    s/p R mastectomy    Bursitis of hip [id number]    Cervical disc disorder 01/01/2006    CTS (carpal tunnel syndrome)     Depression 09/01/2000    Fracture of wrist 01/01/1995    Fracture, foot     Frozen shoulder     GERD (gastroesophageal reflux disease) 1993    H/O breast reconstruction     SEVERAL    H/O laminectomy     Hamartoma     Hip arthrosis 01/01/2013    History of medical problems     History of transfusion 1986    Hx of bilateral oophorectomy     Hyperlipidemia     Hypertension     Hypothyroidism     Infectious viral hepatitis     Inflammatory bowel disease 1992    Man. EGD/colonoscopy annually (9/2021)    Injury of back 12/01/1996    Irritable bowel syndrome     Kienbock's disease, right     WRIST    Knee swelling 01/01/2007    Low back pain 1991    Low back strain     Lumbosacral disc disease 01/01/1995    Had 2 lumber surgeries    Lupus     Ravenell    Monahan syndrome     Amn. EGD/colonoscopy annually (9/2021). MRI alternating w/ EUS annually for pancreatic screen    MRSA infection     Neuroma of foot     Obesity     Osteopenia     Osteoporosis     maintained on Prolia through Karen    Peptic ulceration     Periarthritis of shoulder 04/01/2022    Pneumonia     Pulmonary fibrosis     Raynaud disease     Renal insufficiency     Rotator cuff syndrome [id number]    Scleroderma     Shortness of breath 00/00/2020    Sleep apnea     cpap    Squamous cell cancer of lip     Tear of meniscus of knee     Tendinitis of knee     Thoracic disc disorder     Von Willebrand disease     Wrist sprain        Past Surgical History:   Procedure Laterality Date    APPENDECTOMY      ARM DEBRIDEMENT Right     X 3    BACK SURGERY      Vetas- cervical fusion    BACK SURGERY      lumbar decomp     BREAST BIOPSY      BREAST LUMPECTOMY      BREAST RECONSTRUCTION Right     BREAST RECONSTRUCTION Right     BRONCHOSCOPY N/A 2025    Procedure: BRONCHOSCOPY with bilateral lung washing;  Surgeon: Isaac Vazquez MD;  Location: [facility] ENDOSCOPY;  Service: Pulmonary;  Laterality: N/A;  postop:    CARDIAC CATHETERIZATION      no stents placed    CARPAL TUNNEL RELEASE  Rt wrist     SECTION  1980    CHOLECYSTECTOMY      COLONOSCOPY      COLONOSCOPY N/A 2020    Procedure: COLONOSCOPY;  Surgeon: Cayden Conway MD;  Location: [facility] ENDOSCOPY;  Service: Gastroenterology;  Laterality: N/A;  rectal ulcer    COLONOSCOPY N/A 2021    Procedure: COLONOSCOPY WITH POLYPECTOMY X 1;  Surgeon: Cayden Conway MD;  Location: [facility] ENDOSCOPY;  Service: Gastroenterology;  Laterality: N/A;  Post: COLON POLYP    COLONOSCOPY N/A 2023    Procedure: COLONOSCOPY with polypectomy x 1, endoscopic clipping x 1;  Surgeon: Cayden Conway MD;  Location: [facility] ENDOSCOPY;  Service: Gastroenterology;  Laterality: N/A;    COLONOSCOPY N/A 10/01/2024    Procedure: COLONOSCOPY;  Surgeon: Cayden Conway MD;  Location: [facility] ENDOSCOPY;  Service: Gastroenterology;  Laterality: N/A;  normal    COSMETIC SURGERY  [id number]    ENDOSCOPY      ENDOSCOPY N/A 2020    Procedure: ESOPHAGOGASTRODUODENOSCOPY;  Surgeon: Cayden Conway MD;  Location: [facility] ENDOSCOPY;  Service: Gastroenterology;  Laterality: N/A;  Normal EGD    ENDOSCOPY N/A 2021    Procedure: ESOPHAGOGASTRODUODENOSCOPY;  Surgeon: Cayden Conway MD;  Location: [facility] ENDOSCOPY;  Service: Gastroenterology;  Laterality: N/A;  Post: normal egd    ENDOSCOPY N/A 10/01/2024    Procedure: ESOPHAGOGASTRODUODENOSCOPY;  Surgeon: Cayden Conway MD;  Location: [facility] ENDOSCOPY;  Service: Gastroenterology;  Laterality: N/A;  normal    EXPLORATORY LAPAROTOMY      scar tissue removal    EYE SURGERY      FINGER  SURGERY Right     ring finger mass excision    HAND SURGERY  Rt wrist  10/15    HIP SURGERY  1/12    HYSTERECTOMY      PARTIAL    JOINT REPLACEMENT Right 2012,2015    hip and knee    KNEE ARTHROSCOPY Left 01/07/2020    Procedure: LEFT KNEE SCOPE with partial lateral meniscectomy;  Surgeon: Chau Perez MD;  Location: [facility] MAIN OR;  Service: Orthopedics    KNEE ARTHROSCOPY  Rt knee  2014    KNEE SURGERY  Rtf knee  2015    LAMINECTOMY  2/93    LASIK      LASIK/ LASIK ENHANCEMENT    MASTECTOMY RADICAL Right     NECK SURGERY  01/01/06    OOPHORECTOMY Bilateral     SHOULDER SURGERY  11/01/2023    SPINE SURGERY      SPLENECTOMY      SUBTOTAL HYSTERECTOMY      TOTAL HIP ARTHROPLASTY Left 05/17/2023    TOTAL SHOULDER ARTHROPLASTY W/ DISTAL CLAVICLE EXCISION Right 11/01/2023    Procedure: TOTAL SHOULDER REVERSE ARTHROPLASTY;  Surgeon: Floyd Ruiz MD;  Location: [facility] MAIN OR;  Service: Orthopedics;  Laterality: Right;    TRIGGER POINT INJECTION  7/23    TUBAL ABDOMINAL LIGATION  1981    UPPER ENDOSCOPIC ULTRASOUND W/ FNA N/A 12/09/2021    Procedure: ENDOSCOPIC ULTRASOUND WITH ESOPHAGOGASTRODUODENOSCOPY;  Surgeon: Luis E Negron MD;  Location: [facility] ENDOSCOPY;  Service: Gastroenterology;  Laterality: N/A;  Post: GASTROPARESIS, DILATED COMMON BILE DUCT, FUNDIC POLYP, DUODENITIS, NORMAL PANCREAS, HIATAL HERNIA    WRIST SURGERY Right     distal radius head removal (bone dead)  plates and screws decomp lunate       Family History   Problem Relation Age of Onset    Colon cancer Mother     Heart disease Mother     Cancer Mother         Colon    Arthritis Mother     Kidney disease Father     Heart disease Father     Depression Father         Bladder cancer    Hyperlipidemia Father     Vision loss Father     Hypertension Father     Colon cancer Sister     Diabetes Sister     Heart disease Sister     Von Willebrand disease Sister     Von Willebrand disease Brother     Cancer Brother     Von Willebrand disease Son   "   Breast cancer Son     Diabetes Paternal Grandmother     Stroke Paternal Grandmother     Colon cancer Other     Colon cancer Son     Von Willebrand disease Son     Breast cancer Son     Cancer Son     Cancer Sister         Melanoma, colon, bladder    Vision loss Sister     Stroke Sister     Arthritis Sister     Asthma Sister     Diabetes Sister     Heart disease Sister     Hyperlipidemia Sister     Kidney disease Sister     Broken bones Sister     Cancer Paternal Aunt     Cancer Maternal Aunt     Cancer Maternal Aunt     Hyperlipidemia Sister     Thyroid disease Sister     Arthritis Sister     Hypertension Sister     Kidney disease Sister     Broken bones Sister     Rheumatologic disease Sister     Thyroid disease Sister     Cancer Sister     Clotting disorder Sister     Cancer Maternal Aunt     Cancer Maternal Aunt     Cancer Paternal Aunt        Social History     Tobacco Use    Smoking status: Former     Current packs/day: 0.00     Average packs/day: 0.3 packs/day for 10.0 years (2.5 ttl pk-yrs)     Types: Cigarettes     Start date: 1984     Quit date: 1994     Years since quittin.2     Passive exposure: Past    Smokeless tobacco: Never    Tobacco comments:     Off and on for  those years   Vaping Use    Vaping status: Never Used   Substance Use Topics    Alcohol use: Not Currently     Comment: Rarely    Drug use: No        (Not in a hospital admission)      Allergies: Aspirin and Baclofen    Scheduled Meds:   Continuous Infusions:   PRN Meds:.  [COMPLETED] Insert Peripheral IV **AND** sodium chloride    Objective     VITAL SIGNS  Vitals:    25 1242 25 1247 25 1302 25 1317   BP: 100/77 97/56 110/66 99/67   BP Location:       Patient Position:       Pulse: 62 66 63 62   Resp:       Temp:       TempSrc:       SpO2: 93% 93% 92% 93%   Weight:       Height:           Flowsheet Rows      Flowsheet Row First Filed Value   Admission Height 162.6 cm (64\") Documented at 2025 " 1141   Admission Weight 97 kg (213 lb 13.5 oz) Documented at 04/11/2025 1141             TELEMETRY: Sinus rhythm with ST segment abnormality consistent with posterior MI    Physical Exam:  Constitutional:       Appearance: Well-developed.   Eyes:      General: No scleral icterus.     Conjunctiva/sclera: Conjunctivae normal.      Pupils: Pupils are equal, round, and reactive to light.   HENT:      Head: Normocephalic and atraumatic.   Neck:      Vascular: No carotid bruit or JVD.   Pulmonary:      Effort: Pulmonary effort is normal.      Breath sounds: Normal breath sounds. No wheezing. No rales.   Cardiovascular:      Normal rate. Regular rhythm.   Pulses:     Intact distal pulses.   Abdominal:      General: Bowel sounds are normal.      Palpations: Abdomen is soft.   Musculoskeletal: Normal range of motion.      Cervical back: Normal range of motion and neck supple. Skin:     General: Skin is warm and dry.      Findings: No rash.   Neurological:      Mental Status: Alert.      Comments: No focal deficits          Results Review:   I reviewed the patient's new clinical results.  Lab Results (last 24 hours)       Procedure Component Value Units Date/Time    High Sensitivity Troponin T 1Hr [[id number]]  (Abnormal) Collected: 04/11/25 1308    Specimen: Blood Updated: 04/11/25 1333     HS Troponin T 107 ng/L      Troponin T Numeric Delta -13 ng/L      Troponin T % Delta -11    Narrative:      High Sensitive Troponin T Reference Range:  <14.0 ng/L- Negative Female for AMI  <22.0 ng/L- Negative Male for AMI  >=14 - Abnormal Female indicating possible myocardial injury.  >=22 - Abnormal Male indicating possible myocardial injury.   Clinicians would have to utilize clinical acumen, EKG, Troponin, and serial changes to determine if it is an Acute Myocardial Infarction or myocardial injury due to an underlying chronic condition.         Comprehensive Metabolic Panel [[id number]]  (Abnormal) Collected: 04/11/25 1217    Specimen:  Blood Updated: 04/11/25 1248     Glucose 103 mg/dL      BUN 23 mg/dL      Creatinine 1.46 mg/dL      Sodium 139 mmol/L      Potassium 3.5 mmol/L      Comment: Slight hemolysis detected by analyzer. Result may be falsely elevated.        Chloride 96 mmol/L      CO2 28.4 mmol/L      Calcium 9.9 mg/dL      Total Protein 6.4 g/dL      Albumin 3.9 g/dL      ALT (SGPT) 26 U/L      AST (SGOT) 27 U/L      Alkaline Phosphatase 108 U/L      Total Bilirubin 0.2 mg/dL      Globulin 2.5 gm/dL      A/G Ratio 1.6 g/dL      BUN/Creatinine Ratio 15.8     Anion Gap 14.6 mmol/L      eGFR 39.5 mL/min/1.73     Narrative:      GFR Categories in Chronic Kidney Disease (CKD)      GFR Category          GFR (mL/min/1.73)    Interpretation  G1                     90 or greater         Normal or high (1)  G2                      60-89                Mild decrease (1)  G3a                   45-59                Mild to moderate decrease  G3b                   30-44                Moderate to severe decrease  G4                    15-29                Severe decrease  G5                    14 or less           Kidney failure          (1)In the absence of evidence of kidney disease, neither GFR category G1 or G2 fulfill the criteria for CKD.    eGFR calculation 2021 CKD-EPI creatinine equation, which does not include race as a factor    Magnesium [[id number]]  (Abnormal) Collected: 04/11/25 1217    Specimen: Blood Updated: 04/11/25 1248     Magnesium 1.5 mg/dL     High Sensitivity Troponin T [[id number]]  (Abnormal) Collected: 04/11/25 1217    Specimen: Blood Updated: 04/11/25 1248     HS Troponin T 120 ng/L     Narrative:      High Sensitive Troponin T Reference Range:  <14.0 ng/L- Negative Female for AMI  <22.0 ng/L- Negative Male for AMI  >=14 - Abnormal Female indicating possible myocardial injury.  >=22 - Abnormal Male indicating possible myocardial injury.   Clinicians would have to utilize clinical acumen, EKG, Troponin, and serial changes  to determine if it is an Acute Myocardial Infarction or myocardial injury due to an underlying chronic condition.         BNP [[id number]]  (Abnormal) Collected: 04/11/25 1217    Specimen: Blood Updated: 04/11/25 1247     proBNP 2,881.0 pg/mL     Narrative:      This assay is used as an aid in the diagnosis of individuals suspected of having heart failure. It can be used as an aid in the diagnosis of acute decompensated heart failure (ADHF) in patients presenting with signs and symptoms of ADHF to the emergency department (ED). In addition, NT-proBNP of <300 pg/mL indicates ADHF is not likely.    Age Range Result Interpretation  NT-proBNP Concentration (pg/mL:      <50             Positive            >450                   Gray                 300-450                    Negative             <300    50-75           Positive            >900                  Gray                300-900                  Negative            <300      >75             Positive            >1800                  Gray                300-1800                  Negative            <300    Phosphorus [[id number]]  (Normal) Collected: 04/11/25 1217    Specimen: Blood Updated: 04/11/25 1247     Phosphorus 3.7 mg/dL     [place] Draw [[id number]] Collected: 04/11/25 1217    Specimen: Blood Updated: 04/11/25 1230    Narrative:      The following orders were created for panel order [place] Draw.  Procedure                               Abnormality         Status                     ---------                               -----------         ------                     Green Top (Gel)[[id number]]                                  Final result               Lavender Top[[id number]]                                     Final result               Gold Top - SST[[id number]]                                   Final result               Light Blue Top[[id number]]                                   Final result                 Please view results for these tests on the  individual orders.    Gold Top - SST [[id number]] Collected: 04/11/25 1217    Specimen: Blood Updated: 04/11/25 1230     Extra Tube Hold for add-ons.     Comment: Auto resulted.       Light Blue Top [[id number]] Collected: 04/11/25 1217    Specimen: Blood Updated: 04/11/25 1230     Extra Tube Hold for add-ons.     Comment: Auto resulted       Green Top (Gel) [[id number]] Collected: 04/11/25 1217    Specimen: Blood Updated: 04/11/25 1230     Extra Tube Hold for add-ons.     Comment: Auto resulted.       Lavender Top [[id number]] Collected: 04/11/25 1217    Specimen: Blood Updated: 04/11/25 1230     Extra Tube hold for add-on     Comment: Auto resulted       CBC & Differential [[id number]]  (Abnormal) Collected: 04/11/25 1217    Specimen: Blood Updated: 04/11/25 1226    Narrative:      The following orders were created for panel order CBC & Differential.  Procedure                               Abnormality         Status                     ---------                               -----------         ------                     CBC Auto Differential[[id number]]        Abnormal            Final result                 Please view results for these tests on the individual orders.    CBC Auto Differential [[id number]]  (Abnormal) Collected: 04/11/25 1217    Specimen: Blood Updated: 04/11/25 1226     WBC 11.39 10*3/mm3      RBC 3.78 10*6/mm3      Hemoglobin 11.8 g/dL      Hematocrit 37.1 %      MCV 98.1 fL      MCH 31.2 pg      MCHC 31.8 g/dL      RDW 15.6 %      RDW-SD 55.0 fl      MPV 12.0 fL      Platelets 288 10*3/mm3      Neutrophil % 80.9 %      Lymphocyte % 11.3 %      Monocyte % 5.8 %      Eosinophil % 0.3 %      Basophil % 0.6 %      Immature Grans % 1.1 %      Neutrophils, Absolute 9.21 10*3/mm3      Lymphocytes, Absolute 1.29 10*3/mm3      Monocytes, Absolute 0.66 10*3/mm3      Eosinophils, Absolute 0.03 10*3/mm3      Basophils, Absolute 0.07 10*3/mm3      Immature Grans, Absolute 0.13 10*3/mm3      nRBC 0.0 /100 WBC              Imaging Results (Last 24 Hours)       Procedure Component Value Units Date/Time    XR Chest 1 View [[id number]] Collected: 04/11/25 1249     Updated: 04/11/25 1257    Narrative:      XR CHEST 1 VW    Date of Exam: 4/11/2025 12:38 PM EDT    Indication: syncope    Comparison: March 23, 2025    Findings:  Depth of inspiration is poor. There has been improvement in the appearance of the lungs since the prior study. It looks like there probably are residual areas of airspace disease. The heart is magnified. There are no pleural effusions. Surgical clips are   seen in the right axilla. Patient had reverse right total shoulder arthroplasty.      Impression:      Impression:  Improvement in appearance of the lungs since prior study. There probably are residual areas of airspace disease.        Electronically Signed: Alen Rasmussen MD    4/11/2025 12:55 PM EDT    Workstation ID: [id number]            EKG      I personally viewed and interpreted the patient's EKG/Telemetry data:    ECHOCARDIOGRAM:  Results for orders placed during the hospital encounter of 01/14/25    Adult Transthoracic Echo Complete W/ Cont if Necessary Per Protocol    Interpretation Summary    Left ventricular systolic function is normal. Calculated left ventricular EF = 59% Left ventricular ejection fraction appears to be 56 - 60%.    Left ventricular diastolic function is consistent with (grade II w/high LAP) pseudonormalization.    The left atrial cavity is mild to moderately dilated.         STRESS MYOVIEW:  Results for orders placed during the hospital encounter of 05/13/20    Stress Test With Myocardial Perfusion One Day    Interpretation Summary  · Findings consistent with a normal ECG stress test.  · Diaphragmatic attenuation and GI artifacts are present.  · Left ventricular ejection fraction is normal (Calculated EF = 59%).  · Impressions are consistent with a low risk study.  · Severe large inferoapical reverse redistribution gut uptake  artifact noted no ischemia EF 59%.    EKG without changes during Lexiscan.  No chest pain    Correlation with myocardial perfusion images recommended       CARDIAC CATHETERIZATION:    No results found for this or any previous visit.       OTHER:         MDM      Acute coronary syndrome  Patient presented with chest pain shortness of breath dizziness and hypotension but has an EKG probably consistent with posterior MI  Patient will have an echocardiogram for LV function bladder abnormalities  Patient will also have a cardiac evaluation performed immediately to rule out severe coronary disease and possible revascularization as needed    Pulmonaryfibrosis/scleroderma  Patient has history of pulmonary fibrosis and is morbidly pulmonologist.  Patient is followed by the pulmonologist    Hypertension  Patient has history of hypertension but is currently hypotensive and hence her blood pressure medicines will be held.    Symptoms  Patient has chronic insufficiency but will be given IV fluids and followed by the pulmonologist.    I discussed the patients findings and my recommendations with patient and nurse and family    Elian Benavides MD  04/11/25  13:46 EDT

## 2025-04-12 LAB
ANION GAP SERPL CALCULATED.3IONS-SCNC: 7.1 MMOL/L (ref 5–15)
AORTIC DIMENSIONLESS INDEX: 0.73 (DI)
AV MEAN PRESS GRAD SYS DOP V1V2: 4 MMHG
AV VMAX SYS DOP: 131 CM/SEC
BASOPHILS # BLD AUTO: 0.04 10*3/MM3 (ref 0–0.2)
BASOPHILS NFR BLD AUTO: 0.3 % (ref 0–1.5)
BH CV ECHO MEAS - AO MAX PG: 6.9 MMHG
BH CV ECHO MEAS - AO V2 VTI: 27.6 CM
BH CV ECHO MEAS - AVA(I,D): 2.09 CM2
BH CV ECHO MEAS - EDV(CUBED): 74.1 ML
BH CV ECHO MEAS - EDV(MOD-SP4): 69.8 ML
BH CV ECHO MEAS - EF(MOD-SP4): 56.2 %
BH CV ECHO MEAS - ESV(CUBED): 27 ML
BH CV ECHO MEAS - ESV(MOD-SP4): 30.6 ML
BH CV ECHO MEAS - FS: 28.6 %
BH CV ECHO MEAS - IVS/LVPW: 1 CM
BH CV ECHO MEAS - IVSD: 1 CM
BH CV ECHO MEAS - LA DIMENSION: 3.9 CM
BH CV ECHO MEAS - LAT PEAK E' VEL: 8.1 CM/SEC
BH CV ECHO MEAS - LV DIASTOLIC VOL/BSA (35-75): 34.7 CM2
BH CV ECHO MEAS - LV MASS(C)D: 137.2 GRAMS
BH CV ECHO MEAS - LV MAX PG: 3.6 MMHG
BH CV ECHO MEAS - LV MEAN PG: 2 MMHG
BH CV ECHO MEAS - LV SYSTOLIC VOL/BSA (12-30): 15.2 CM2
BH CV ECHO MEAS - LV V1 MAX: 95.4 CM/SEC
BH CV ECHO MEAS - LV V1 VTI: 18.4 CM
BH CV ECHO MEAS - LVIDD: 4.2 CM
BH CV ECHO MEAS - LVIDS: 3 CM
BH CV ECHO MEAS - LVOT AREA: 3.1 CM2
BH CV ECHO MEAS - LVOT DIAM: 2 CM
BH CV ECHO MEAS - LVPWD: 1 CM
BH CV ECHO MEAS - MED PEAK E' VEL: 5 CM/SEC
BH CV ECHO MEAS - MV A DUR: 0.13 SEC
BH CV ECHO MEAS - MV A MAX VEL: 63.4 CM/SEC
BH CV ECHO MEAS - MV DEC SLOPE: 332 CM/SEC2
BH CV ECHO MEAS - MV DEC TIME: 0.26 SEC
BH CV ECHO MEAS - MV E MAX VEL: 51.9 CM/SEC
BH CV ECHO MEAS - MV E/A: 0.82
BH CV ECHO MEAS - MV MAX PG: 2.35 MMHG
BH CV ECHO MEAS - MV MEAN PG: 1 MMHG
BH CV ECHO MEAS - MV P1/2T: 64.8 MSEC
BH CV ECHO MEAS - MV V2 VTI: 28.1 CM
BH CV ECHO MEAS - MVA(P1/2T): 3.4 CM2
BH CV ECHO MEAS - MVA(VTI): 2.06 CM2
BH CV ECHO MEAS - PA ACC TIME: 0.14 SEC
BH CV ECHO MEAS - PA V2 MAX: 78.7 CM/SEC
BH CV ECHO MEAS - RV MAX PG: 2.7 MMHG
BH CV ECHO MEAS - RV V1 MAX: 81.4 CM/SEC
BH CV ECHO MEAS - RV V1 VTI: 15.1 CM
BH CV ECHO MEAS - SV(LVOT): 57.8 ML
BH CV ECHO MEAS - SV(MOD-SP4): 39.2 ML
BH CV ECHO MEAS - SVI(LVOT): 28.8 ML/M2
BH CV ECHO MEAS - SVI(MOD-SP4): 19.5 ML/M2
BH CV ECHO MEASUREMENTS AVERAGE E/E' RATIO: 7.92
BH CV XLRA - TDI S': 7.2 CM/SEC
BUN SERPL-MCNC: 18 MG/DL (ref 8–23)
BUN/CREAT SERPL: 17.1 (ref 7–25)
CALCIUM SPEC-SCNC: 9.2 MG/DL (ref 8.6–10.5)
CHLORIDE SERPL-SCNC: 101 MMOL/L (ref 98–107)
CO2 SERPL-SCNC: 30.9 MMOL/L (ref 22–29)
CREAT SERPL-MCNC: 1.05 MG/DL (ref 0.57–1)
DEPRECATED RDW RBC AUTO: 53.3 FL (ref 37–54)
EGFRCR SERPLBLD CKD-EPI 2021: 58.7 ML/MIN/1.73
EOSINOPHIL # BLD AUTO: 0.01 10*3/MM3 (ref 0–0.4)
EOSINOPHIL NFR BLD AUTO: 0.1 % (ref 0.3–6.2)
ERYTHROCYTE [DISTWIDTH] IN BLOOD BY AUTOMATED COUNT: 15.2 % (ref 12.3–15.4)
GLUCOSE SERPL-MCNC: 100 MG/DL (ref 65–99)
HCT VFR BLD AUTO: 34.3 % (ref 34–46.6)
HGB BLD-MCNC: 11.2 G/DL (ref 12–15.9)
IMM GRANULOCYTES # BLD AUTO: 0.09 10*3/MM3 (ref 0–0.05)
IMM GRANULOCYTES NFR BLD AUTO: 0.8 % (ref 0–0.5)
LEFT ATRIUM VOLUME INDEX: 26.1 ML/M2
LYMPHOCYTES # BLD AUTO: 1.97 10*3/MM3 (ref 0.7–3.1)
LYMPHOCYTES NFR BLD AUTO: 17.1 % (ref 19.6–45.3)
MAGNESIUM SERPL-MCNC: 2.9 MG/DL (ref 1.6–2.4)
MCH RBC QN AUTO: 31.5 PG (ref 26.6–33)
MCHC RBC AUTO-ENTMCNC: 32.7 G/DL (ref 31.5–35.7)
MCV RBC AUTO: 96.6 FL (ref 79–97)
MONOCYTES # BLD AUTO: 1.04 10*3/MM3 (ref 0.1–0.9)
MONOCYTES NFR BLD AUTO: 9 % (ref 5–12)
NEUTROPHILS NFR BLD AUTO: 72.7 % (ref 42.7–76)
NEUTROPHILS NFR BLD AUTO: 8.36 10*3/MM3 (ref 1.7–7)
NRBC BLD AUTO-RTO: 0 /100 WBC (ref 0–0.2)
PLATELET # BLD AUTO: 266 10*3/MM3 (ref 140–450)
PMV BLD AUTO: 12 FL (ref 6–12)
POTASSIUM SERPL-SCNC: 4.6 MMOL/L (ref 3.5–5.2)
QT INTERVAL: 463 MS
QTC INTERVAL: 471 MS
RBC # BLD AUTO: 3.55 10*6/MM3 (ref 3.77–5.28)
SINUS: 2.8 CM
SODIUM SERPL-SCNC: 139 MMOL/L (ref 136–145)
STJ: 2.9 CM
WBC NRBC COR # BLD AUTO: 11.51 10*3/MM3 (ref 3.4–10.8)

## 2025-04-12 PROCEDURE — 83735 ASSAY OF MAGNESIUM: CPT | Performed by: STUDENT IN AN ORGANIZED HEALTH CARE EDUCATION/TRAINING PROGRAM

## 2025-04-12 PROCEDURE — 63710000001 PREDNISONE PER 1 MG: Performed by: STUDENT IN AN ORGANIZED HEALTH CARE EDUCATION/TRAINING PROGRAM

## 2025-04-12 PROCEDURE — 80048 BASIC METABOLIC PNL TOTAL CA: CPT | Performed by: INTERNAL MEDICINE

## 2025-04-12 PROCEDURE — 85025 COMPLETE CBC W/AUTO DIFF WBC: CPT | Performed by: STUDENT IN AN ORGANIZED HEALTH CARE EDUCATION/TRAINING PROGRAM

## 2025-04-12 RX ORDER — GABAPENTIN 300 MG/1
600 CAPSULE ORAL 3 TIMES DAILY
Status: DISCONTINUED | OUTPATIENT
Start: 2025-04-12 | End: 2025-04-13 | Stop reason: HOSPADM

## 2025-04-12 RX ORDER — HYDROXYCHLOROQUINE SULFATE 200 MG/1
200 TABLET, FILM COATED ORAL 2 TIMES DAILY
Status: DISCONTINUED | OUTPATIENT
Start: 2025-04-12 | End: 2025-04-13 | Stop reason: HOSPADM

## 2025-04-12 RX ORDER — QUETIAPINE FUMARATE 100 MG/1
200 TABLET, FILM COATED ORAL NIGHTLY
Status: DISCONTINUED | OUTPATIENT
Start: 2025-04-12 | End: 2025-04-13 | Stop reason: HOSPADM

## 2025-04-12 RX ORDER — PREDNISONE 20 MG/1
20 TABLET ORAL DAILY
Status: DISCONTINUED | OUTPATIENT
Start: 2025-04-12 | End: 2025-04-13 | Stop reason: HOSPADM

## 2025-04-12 RX ADMIN — LAMOTRIGINE 200 MG: 100 TABLET ORAL at 20:16

## 2025-04-12 RX ADMIN — QUETIAPINE FUMARATE 200 MG: 100 TABLET ORAL at 20:06

## 2025-04-12 RX ADMIN — HYDROXYCHLOROQUINE SULFATE 200 MG: 200 TABLET, FILM COATED ORAL at 20:05

## 2025-04-12 RX ADMIN — MUPIROCIN 1 APPLICATION: 20 OINTMENT TOPICAL at 08:25

## 2025-04-12 RX ADMIN — PREDNISONE 20 MG: 20 TABLET ORAL at 18:57

## 2025-04-12 RX ADMIN — GABAPENTIN 600 MG: 300 CAPSULE ORAL at 20:05

## 2025-04-12 RX ADMIN — LEVOTHYROXINE SODIUM 175 MCG: 0.03 TABLET ORAL at 17:33

## 2025-04-12 RX ADMIN — Medication 1 TABLET: at 17:33

## 2025-04-12 RX ADMIN — MUPIROCIN 1 APPLICATION: 20 OINTMENT TOPICAL at 20:05

## 2025-04-12 RX ADMIN — OXYCODONE 10 MG: 5 TABLET ORAL at 18:57

## 2025-04-12 RX ADMIN — ALPRAZOLAM 0.5 MG: 0.5 TABLET ORAL at 22:32

## 2025-04-12 NOTE — PROGRESS NOTES
[facility] MEDICINE SERVICE  DAILY PROGRESS NOTE    NAME: Tracie Gross  : 1960  MRN: [id number]      LOS: 1 day     PROVIDER OF SERVICE: George Hubbard MD    Chief Complaint: Angina at rest    Subjective:     Interval History:  History taken from: patient    Feeling better than arrival.        Review of Systems:   Review of Systems    Objective:     Vital Signs  Temp:  [97.3 °F (36.3 °C)-98.2 °F (36.8 °C)] 97.5 °F (36.4 °C)  Heart Rate:  [52-94] 61  Resp:  [9-18] 12  BP: ()/(55-82) 104/61  Flow (L/min) (Oxygen Therapy):  [2] 2   Body mass index is 36.29 kg/m².    Physical Exam  Physical Exam  Constitutional:       Appearance: Normal appearance.   Cardiovascular:      Rate and Rhythm: Normal rate and regular rhythm.      Pulses: Normal pulses.      Heart sounds: Normal heart sounds.   Pulmonary:      Effort: Pulmonary effort is normal.      Breath sounds: Normal breath sounds.   Abdominal:      General: Abdomen is flat.      Palpations: Abdomen is soft.   Neurological:      Mental Status: She is alert.            Diagnostic Data    Results from last 7 days   Lab Units 25  0119 25  1217   WBC 10*3/mm3 11.51* 11.39*   HEMOGLOBIN g/dL 11.2* 11.8*   HEMATOCRIT % 34.3 37.1   PLATELETS 10*3/mm3 266 288   GLUCOSE mg/dL 100* 103*   CREATININE mg/dL 1.05* 1.46*   BUN mg/dL 18 23   SODIUM mmol/L 139 139   POTASSIUM mmol/L 4.6 3.5   AST (SGOT) U/L  --  27   ALT (SGPT) U/L  --  26   ALK PHOS U/L  --  108   BILIRUBIN mg/dL  --  0.2   ANION GAP mmol/L 7.1 14.6       XR Chest 1 View  Result Date: 2025  Impression: Improvement in appearance of the lungs since prior study. There probably are residual areas of airspace disease. Electronically Signed: Alen Rasmussen MD  2025 12:55 PM EDT  Workstation ID: [id number]        I reviewed the patient's new clinical results.    Assessment/Plan:     Active and Resolved Problems  Active Hospital Problems    Diagnosis  POA    **Angina at rest [I20.89]   Yes    ACS (acute coronary syndrome) [I24.9]  Yes      Resolved Hospital Problems   No resolved problems to display.     Posterior MI  Acute coronary syndrome  - troponin 120 -> 107, BNP 2900, ECG with c/f acute posterior MI. Seen by cardiology in ED and taken to cath lab  - TTE pending  - A1C - 5.9/lipid/TSH-, triglycerides 171/normal  - mgmt per cardiology     Syncopal episode/hypotension  Htn  - pt w/ recent reported hypotension, syncopal episode. Possible component of ACS as above, suspect vasovagal and orthostatic. No focal deficits on neuro exam  - BP has been low-normal in ED  - holding home antihypertensives  - orthostatic vitals in am     DANIELLE on CKDIIIa  - Cr 1.46 OA, baseline ~1. Pt w/ reported recent hypotension.  - Hold antihypertensives.   - Avoid nephrotoxic medications as able  - Anticipate incr in Cr w/ contrast exposure for cath, consider nephrology consult if significantly increased  - trend labs     Pulmonary fibrosis  Recent pneumonia  - completed abx course for pneumonia during recent admission. Underwent walk test without O2 indicated. Discharge on steroid taper, will cont  - follows w/ pulm, underwent bronch during recent admission.  - cont home medications  - cont pulse ox, supplemental O2 as needed     Hypothyroidism  - TSH pending as above  - cont synthroid     Hx vWB disease  - hgb stable  - ctm     Mood   Anxiety  - cont home medication    VTE Prophylaxis:  Mechanical VTE prophylaxis orders are present.             Disposition Planning:     Barriers to Discharge: Hypotension, therapy, cardiology  Anticipated Date of Discharge: 4/13/2025  Place of Discharge: Home      Time: 35 minutes     Code Status and Medical Interventions: CPR (Attempt to Resuscitate); Full Support   Ordered at: 04/11/25 1529     Code Status (Patient has no pulse and is not breathing):    CPR (Attempt to Resuscitate)     Medical Interventions (Patient has pulse or is breathing):    Full Support     Level Of  Support Discussed With:    Patient       Signature: Electronically signed by George Hubbard MD, 04/12/25, 10:47 EDT.  [facility] Hospitalist Team

## 2025-04-12 NOTE — PLAN OF CARE
Problem: Adult Inpatient Plan of Care  Goal: Plan of Care Review  Outcome: Progressing  Flowsheets (Taken 4/12/2025 0701)  Progress: no change  Plan of Care Reviewed With: patient  Goal: Patient-Specific Goal (Individualized)  Outcome: Progressing  Goal: Absence of Hospital-Acquired Illness or Injury  Outcome: Progressing  Intervention: Identify and Manage Fall Risk  Recent Flowsheet Documentation  Taken 4/12/2025 0600 by Sara Ma RN  Safety Promotion/Fall Prevention: safety round/check completed  Taken 4/12/2025 0500 by Sara Ma RN  Safety Promotion/Fall Prevention: safety round/check completed  Taken 4/12/2025 0400 by Sara Ma RN  Safety Promotion/Fall Prevention: safety round/check completed  Taken 4/12/2025 0200 by Sara Ma RN  Safety Promotion/Fall Prevention: safety round/check completed  Taken 4/12/2025 0100 by Sara Ma RN  Safety Promotion/Fall Prevention: safety round/check completed  Taken 4/12/2025 0012 by Sara Ma RN  Safety Promotion/Fall Prevention: safety round/check completed  Taken 4/11/2025 2200 by Sara Ma RN  Safety Promotion/Fall Prevention: safety round/check completed  Taken 4/11/2025 2000 by Sara Ma RN  Safety Promotion/Fall Prevention: safety round/check completed  Intervention: Prevent Skin Injury  Recent Flowsheet Documentation  Taken 4/12/2025 0500 by Sara Ma RN  Body Position: position changed independently  Taken 4/12/2025 0400 by Sara Ma RN  Body Position: position changed independently  Skin Protection:   incontinence pads utilized   transparent dressing maintained  Taken 4/12/2025 0300 by Sara Ma RN  Body Position: position changed independently  Taken 4/12/2025 0100 by Sara Ma RN  Body Position: position changed independently  Taken 4/12/2025 0012 by Sara Ma RN  Body Position: position changed independently  Skin Protection:   incontinence pads utilized   transparent dressing maintained  Taken  4/11/2025 2300 by Sara Ma RN  Body Position: position changed independently  Taken 4/11/2025 2100 by Sara Ma RN  Body Position: position changed independently  Taken 4/11/2025 2000 by Sara Ma RN  Body Position: position changed independently  Skin Protection:   incontinence pads utilized   transparent dressing maintained  Taken 4/11/2025 1900 by Sara Ma RN  Body Position: position changed independently  Intervention: Prevent and Manage VTE (Venous Thromboembolism) Risk  Recent Flowsheet Documentation  Taken 4/12/2025 0400 by Sara Ma RN  VTE Prevention/Management:   bilateral   SCDs (sequential compression devices) off  Taken 4/12/2025 0012 by Sara Ma RN  VTE Prevention/Management:   bilateral   SCDs (sequential compression devices) off  Taken 4/11/2025 2000 by Sara Ma RN  VTE Prevention/Management:   bilateral   SCDs (sequential compression devices) off  Intervention: Prevent Infection  Recent Flowsheet Documentation  Taken 4/12/2025 0400 by Sara Ma RN  Infection Prevention:   environmental surveillance performed   hand hygiene promoted   personal protective equipment utilized   rest/sleep promoted   single patient room provided  Taken 4/12/2025 0012 by Sara Ma RN  Infection Prevention:   environmental surveillance performed   hand hygiene promoted   personal protective equipment utilized   rest/sleep promoted   single patient room provided  Taken 4/11/2025 2000 by Sara Ma RN  Infection Prevention:   environmental surveillance performed   hand hygiene promoted   personal protective equipment utilized   rest/sleep promoted   single patient room provided  Goal: Optimal Comfort and Wellbeing  Outcome: Progressing  Intervention: Provide Person-Centered Care  Recent Flowsheet Documentation  Taken 4/12/2025 0400 by Sara Ma RN  Trust Relationship/Rapport:   care explained   choices provided   empathic listening provided   questions encouraged    reassurance provided   thoughts/feelings acknowledged  Taken 4/12/2025 0012 by Sara Ma RN  Trust Relationship/Rapport:   care explained   choices provided   empathic listening provided   questions encouraged   reassurance provided   thoughts/feelings acknowledged  Taken 4/11/2025 2000 by Sara Ma RN  Trust Relationship/Rapport:   care explained   choices provided   empathic listening provided   questions encouraged   reassurance provided   thoughts/feelings acknowledged  Goal: Readiness for Transition of Care  Outcome: Progressing     Problem: Skin Injury Risk Increased  Goal: Skin Health and Integrity  Outcome: Progressing  Intervention: Optimize Skin Protection  Recent Flowsheet Documentation  Taken 4/12/2025 0400 by Sara Ma RN  Activity Management:   ambulated to bathroom   back to bed  Pressure Reduction Techniques: frequent weight shift encouraged  Head of Bed (HOB) Positioning: HOB at 20-30 degrees  Pressure Reduction Devices:   positioning supports utilized   pressure-redistributing mattress utilized  Skin Protection:   incontinence pads utilized   transparent dressing maintained  Taken 4/12/2025 0012 by Sara Ma RN  Activity Management:   ambulated to bathroom   back to bed  Pressure Reduction Techniques: frequent weight shift encouraged  Head of Bed (HOB) Positioning: HOB at 20-30 degrees  Pressure Reduction Devices:   positioning supports utilized   pressure-redistributing mattress utilized  Skin Protection:   incontinence pads utilized   transparent dressing maintained  Taken 4/11/2025 2000 by Sara Ma RN  Activity Management:   ambulated to bathroom   back to bed  Pressure Reduction Techniques: frequent weight shift encouraged  Head of Bed (HOB) Positioning: HOB at 20-30 degrees  Pressure Reduction Devices:   positioning supports utilized   pressure-redistributing mattress utilized  Skin Protection:   incontinence pads utilized   transparent dressing maintained      Problem: Comorbidity Management  Goal: Blood Pressure in Desired Range  Outcome: Progressing  Intervention: Maintain Blood Pressure Management  Recent Flowsheet Documentation  Taken 4/12/2025 0400 by Sara Ma, RN  Medication Review/Management: medications reviewed  Taken 4/12/2025 0012 by Sara Ma, RN  Medication Review/Management: medications reviewed  Taken 4/11/2025 2000 by Sara Ma, RN  Medication Review/Management: medications reviewed

## 2025-04-13 ENCOUNTER — READMISSION MANAGEMENT (OUTPATIENT)
Dept: CALL CENTER | Facility: HOSPITAL | Age: 65
End: 2025-04-13
Payer: MEDICARE

## 2025-04-13 VITALS
HEART RATE: 74 BPM | DIASTOLIC BLOOD PRESSURE: 57 MMHG | OXYGEN SATURATION: 99 % | RESPIRATION RATE: 18 BRPM | BODY MASS INDEX: 36.81 KG/M2 | WEIGHT: 215.6 LBS | HEIGHT: 64 IN | SYSTOLIC BLOOD PRESSURE: 100 MMHG | TEMPERATURE: 97.9 F

## 2025-04-13 PROCEDURE — 97162 PT EVAL MOD COMPLEX 30 MIN: CPT

## 2025-04-13 PROCEDURE — 63710000001 PREDNISONE PER 1 MG: Performed by: STUDENT IN AN ORGANIZED HEALTH CARE EDUCATION/TRAINING PROGRAM

## 2025-04-13 RX ADMIN — MUPIROCIN 1 APPLICATION: 20 OINTMENT TOPICAL at 09:11

## 2025-04-13 RX ADMIN — GABAPENTIN 600 MG: 300 CAPSULE ORAL at 09:11

## 2025-04-13 RX ADMIN — PREDNISONE 20 MG: 20 TABLET ORAL at 09:10

## 2025-04-13 RX ADMIN — LEVOTHYROXINE SODIUM 175 MCG: 0.03 TABLET ORAL at 09:10

## 2025-04-13 RX ADMIN — HYDROXYCHLOROQUINE SULFATE 200 MG: 200 TABLET, FILM COATED ORAL at 09:10

## 2025-04-13 RX ADMIN — Medication 1 TABLET: at 09:10

## 2025-04-13 NOTE — OUTREACH NOTE
Prep Survey      Flowsheet Row Responses   [facility] patient discharged from? [place]   Is LACE score < 7 ? No   Eligibility [facility]   Date of Admission 04/11/25   Date of Discharge 04/13/25   Discharge Disposition Home or Self Care   Discharge diagnosis Angina at rest, posterior MI, stress test negative   Does the patient have one of the following disease processes/diagnoses(primary or secondary)? Acute MI (STEMI,NSTEMI)   Does the patient have Home health ordered? No   Is there a DME ordered? No   Prep survey completed? Yes            Ara Mahoney Registered Nurse

## 2025-04-13 NOTE — THERAPY EVALUATION
Patient Name: Tracie Gross  : 1960    MRN: [id number]                              Today's Date: 2025       Admit Date: 2025    Visit Dx:     ICD-10-CM ICD-9-CM   1. Dyspnea, unspecified type  R06.00 786.09   2. Hypotension, unspecified hypotension type  I95.9 458.9   3. Abnormal EKG  R94.31 794.31     Patient Active Problem List   Diagnosis    Vitamin B12 deficiency    Arthritis    Sleep apnea    Bipolar affective disorder    Depression    Breast cancer    Chronic pain    Dyslipidemia    Family history of malignant neoplasm of colon    Family history of melanoma    Gastroesophageal reflux disease    Heart murmur    Hypertension    Hypothyroidism    Osteoarthritis, generalized    Raynaud's disease    Ulcerative colitis    Artificial knee joint present    Neuralgia    Presence of artificial hip joint, right    Lumbar radiculopathy    Derangement of posterior horn of lateral meniscus    Von Willebrand disease    SLE (systemic lupus erythematosus related syndrome)    Chest pain    Scleroderma    Monahan syndrome    Rectal prolapse    Osteoporosis    COVID-19    Fibromyalgia    Onychomycosis    Stage 3 chronic kidney disease    Avascular necrosis of femoral head, left    Morbid obesity    Trochanteric bursitis of left hip    Pain in both knees    Status post total hip replacement, right    DJD of left shoulder    Complete tear of left rotator cuff    Osteoarthritis of right glenohumeral joint    Pulmonary fibrosis    Angina at rest    ACS (acute coronary syndrome)     Past Medical History:   Diagnosis Date    Allergic 1998    Anemia     Ankle sprain     Anxiety     Arthritis     Arthritis of back 0ll2013    Arthritis of neck 2005    Asthma     Bipolar affective disorder     Bleeding disorder 1986    Breast cancer 1985    s/p R mastectomy    Bursitis of hip [id number]    Cervical disc disorder 2006    CTS (carpal tunnel syndrome)     Depression 2000    Fracture of wrist  1995    Fracture, foot     Frozen shoulder     GERD (gastroesophageal reflux disease)     H/O breast reconstruction     SEVERAL    H/O laminectomy     Hamartoma     Hip arthrosis 2013    History of medical problems     History of transfusion     Hx of bilateral oophorectomy     Hyperlipidemia     Hypertension     Hypothyroidism     Infectious viral hepatitis     Inflammatory bowel disease 1992    Man. EGD/colonoscopy annually (2021)    Injury of back 1996    Irritable bowel syndrome     Kienbock's disease, right     WRIST    Knee swelling 2007    Low back pain 1991    Low back strain     Lumbosacral disc disease 1995    Had 2 lumber surgeries    Lupus     [place]    Monahan syndrome     Man. EGD/colonoscopy annually (2021). MRI alternating w/ EUS annually for pancreatic screen    MRSA infection     Neuroma of foot     Obesity     Osteopenia     Osteoporosis     maintained on Prolia through Karen    Peptic ulceration     Periarthritis of shoulder 2022    Pneumonia     Pulmonary fibrosis     Raynaud disease     Renal insufficiency     Rotator cuff syndrome [id number]    Scleroderma     Shortness of breath     Sleep apnea     cpap    Squamous cell cancer of lip     Tear of meniscus of knee     Tendinitis of knee     Thoracic disc disorder     Von Willebrand disease     Wrist sprain      Past Surgical History:   Procedure Laterality Date    APPENDECTOMY      ARM DEBRIDEMENT Right     X 3    BACK SURGERY      Vetas- cervical fusion    BACK SURGERY      lumbar decomp    BREAST BIOPSY      BREAST LUMPECTOMY      BREAST RECONSTRUCTION Right     BREAST RECONSTRUCTION Right     BRONCHOSCOPY N/A 2025    Procedure: BRONCHOSCOPY with bilateral lung washing;  Surgeon: Isaac Vazquez MD;  Location: [facility] ENDOSCOPY;  Service: Pulmonary;  Laterality: N/A;  postop:    CARDIAC CATHETERIZATION      no stents placed    CARPAL TUNNEL RELEASE  Rt wrist      SECTION  1978, 1980    CHOLECYSTECTOMY      COLONOSCOPY      COLONOSCOPY N/A 08/14/2020    Procedure: COLONOSCOPY;  Surgeon: Cayden Conway MD;  Location: [facility] ENDOSCOPY;  Service: Gastroenterology;  Laterality: N/A;  rectal ulcer    COLONOSCOPY N/A 09/17/2021    Procedure: COLONOSCOPY WITH POLYPECTOMY X 1;  Surgeon: Cayden Conway MD;  Location: [facility] ENDOSCOPY;  Service: Gastroenterology;  Laterality: N/A;  Post: COLON POLYP    COLONOSCOPY N/A 01/06/2023    Procedure: COLONOSCOPY with polypectomy x 1, endoscopic clipping x 1;  Surgeon: Cayden Conway MD;  Location: [facility] ENDOSCOPY;  Service: Gastroenterology;  Laterality: N/A;    COLONOSCOPY N/A 10/01/2024    Procedure: COLONOSCOPY;  Surgeon: Cayden Conway MD;  Location: [facility] ENDOSCOPY;  Service: Gastroenterology;  Laterality: N/A;  normal    COSMETIC SURGERY  [id number]    ENDOSCOPY      ENDOSCOPY N/A 08/14/2020    Procedure: ESOPHAGOGASTRODUODENOSCOPY;  Surgeon: Cayden Conway MD;  Location: [facility] ENDOSCOPY;  Service: Gastroenterology;  Laterality: N/A;  Normal EGD    ENDOSCOPY N/A 09/17/2021    Procedure: ESOPHAGOGASTRODUODENOSCOPY;  Surgeon: Cayden Conway MD;  Location: [facility] ENDOSCOPY;  Service: Gastroenterology;  Laterality: N/A;  Post: normal egd    ENDOSCOPY N/A 10/01/2024    Procedure: ESOPHAGOGASTRODUODENOSCOPY;  Surgeon: Cayden Conway MD;  Location: [facility] ENDOSCOPY;  Service: Gastroenterology;  Laterality: N/A;  normal    EXPLORATORY LAPAROTOMY      scar tissue removal    EYE SURGERY  2000    FINGER SURGERY Right     ring finger mass excision    HAND SURGERY  Rt wrist  10/15    HIP SURGERY  1/12    HYSTERECTOMY      PARTIAL    JOINT REPLACEMENT Right 2012,2015    hip and knee    KNEE ARTHROSCOPY Left 01/07/2020    Procedure: LEFT KNEE SCOPE with partial lateral meniscectomy;  Surgeon: Chau Perez MD;  Location: [facility] MAIN OR;  Service: Orthopedics    KNEE ARTHROSCOPY  Rt knee  2014     KNEE SURGERY  Rtf knee  2015    LAMINECTOMY  2/93    LASIK      LASIK/ LASIK ENHANCEMENT    MASTECTOMY RADICAL Right     NECK SURGERY  01/01/06    OOPHORECTOMY Bilateral     SHOULDER SURGERY  11/01/2023    SPINE SURGERY      SPLENECTOMY      SUBTOTAL HYSTERECTOMY      TOTAL HIP ARTHROPLASTY Left 05/17/2023    TOTAL SHOULDER ARTHROPLASTY W/ DISTAL CLAVICLE EXCISION Right 11/01/2023    Procedure: TOTAL SHOULDER REVERSE ARTHROPLASTY;  Surgeon: Floyd Ruiz MD;  Location: [facility] MAIN OR;  Service: Orthopedics;  Laterality: Right;    TRIGGER POINT INJECTION  7/23    TUBAL ABDOMINAL LIGATION  1981    UPPER ENDOSCOPIC ULTRASOUND W/ FNA N/A 12/09/2021    Procedure: ENDOSCOPIC ULTRASOUND WITH ESOPHAGOGASTRODUODENOSCOPY;  Surgeon: Luis E Negron MD;  Location: [facility] ENDOSCOPY;  Service: Gastroenterology;  Laterality: N/A;  Post: GASTROPARESIS, DILATED COMMON BILE DUCT, FUNDIC POLYP, DUODENITIS, NORMAL PANCREAS, HIATAL HERNIA    WRIST SURGERY Right     distal radius head removal (bone dead)  plates and screws decomp lunate      General Information       Row Name 04/13/25 1833          Physical Therapy Time and Intention    Document Type evaluation  -EL     Mode of Treatment individual therapy  -       Row Name 04/13/25 1833          General Information    Prior Level of Function independent:  -       Row Name 04/13/25 1833          Living Environment    Current Living Arrangements home  -EL     People in Home spouse  -       Row Name 04/13/25 1833          Cognition    Orientation Status (Cognition) oriented x 4  -EL               User Key  (r) = Recorded By, (t) = Taken By, (c) = Cosigned By      Initials Name Provider Type    EL Robert Everett PT Physical Therapist                   Mobility       Row Name 04/13/25 1834          Bed Mobility    Bed Mobility bed mobility (all) activities  -EL     All Activities, [place] (Bed Mobility) modified independence  -       Row Name 04/13/25 1834           Bed-Chair Transfer    Bed-Chair [place] (Transfers) supervision  -[facility] Name 04/13/25 1834          Sit-Stand Transfer    Sit-Stand [place] (Transfers) supervision  -EL       Row Name 04/13/25 1834          Gait/Stairs (Locomotion)    [place] Level (Gait) supervision  -     Patient was able to Ambulate yes  -EL     Distance in Feet (Gait) 100  -               User Key  (r) = Recorded By, (t) = Taken By, (c) = Cosigned By      Initials Name Provider Type    Robert Lundy PT Physical Therapist                   Obj/Interventions       Row Name 04/13/25 1834          Range of Motion Comprehensive    General Range of Motion bilateral lower extremity ROM WFL  -EL       Row Name 04/13/25 1834          Strength Comprehensive (MMT)    General Manual Muscle Testing (MMT) Assessment no strength deficits identified  -               User Key  (r) = Recorded By, (t) = Taken By, (c) = Cosigned By      Initials Name Provider Type    Robert Lundy PT Physical Therapist                   Goals/Plan    No documentation.                  Clinical Impression       Row Name 04/13/25 1834          Pain    Pretreatment Pain Rating 0/10 - no pain  -EL     Posttreatment Pain Rating 0/10 - no pain  -EL       Row Name 04/13/25 1834          Plan of Care Review    Plan of Care Reviewed With patient  -EL     Outcome Evaluation Pt is a 63 YO F admitted with posterior MI. Pt with significant OH upon admission. Improved this date. Pt reports living at home with spouse, is independent with all ADLs, ambulation with RWx. Pt this date dmeonstrates good mobitliy, requiring no significant assistance to come to standing and to ambulate with RWx. Pt appears safe to return home with family assist at this time. Pt is current with HHPT, plan to continue.  -[facility] Name 04/13/25 1834          Therapy Assessment/Plan (PT)    Therapy Frequency (PT) evaluation only  -EL       Row Name 04/13/25 1834          Positioning and  Restraints    Pre-Treatment Position in bed  -EL     Post Treatment Position bed  -EL     In Bed notified nsg;supine;call light within reach;encouraged to call for assist  -EL               User Key  (r) = Recorded By, (t) = Taken By, (c) = Cosigned By      Initials Name Provider Type    Robert Lundy PT Physical Therapist                   Outcome Measures       Row Name 04/13/25 1838          How much help from another person do you currently need...    Turning from your back to your side while in flat bed without using bedrails? 4  -EL     Moving from lying on back to sitting on the side of a flat bed without bedrails? 4  -EL     Moving to and from a bed to a chair (including a wheelchair)? 3  -EL     Standing up from a chair using your arms (e.g., wheelchair, bedside chair)? 3  -EL     Climbing 3-5 steps with a railing? 3  -EL     To walk in hospital room? 3  -EL     AM-PAC 6 Clicks Score (PT) 20  -EL     Highest Level of Mobility Goal 6 --> Walk 10 steps or more  -       Row Name 04/13/25 1838          Functional Assessment    Outcome Measure Options AM-PAC 6 Clicks Basic Mobility (PT)  -EL               User Key  (r) = Recorded By, (t) = Taken By, (c) = Cosigned By      Initials Name Provider Type    Robert Lundy PT Physical Therapist                                 Physical Therapy Education       Title: PT OT SLP Therapies (Done)       Topic: Physical Therapy (Done)       Point: Mobility training (Done)       Learning Progress Summary            Patient Acceptance, E,TB, VU by  at 4/13/2025 1838                      Point: Home exercise program (Resolved)       Learner Progress:  Not documented in this visit.              Point: Body mechanics (Resolved)       Learner Progress:  Not documented in this visit.              Point: Precautions (Done)       Learning Progress Summary            Patient Acceptance, E,TB, VU by  at 4/13/2025 1838                                      User Key       Initials  Effective Dates Name Provider Type Discipline     06/23/20 -  Robert Everett PT Physical Therapist PT                  PT Recommendation and Plan     Outcome Evaluation: Pt is a 65 YO F admitted with posterior MI. Pt with significant OH upon admission. Improved this date. Pt reports living at home with spouse, is independent with all ADLs, ambulation with RWx. Pt this date dmeonstrates good mobitliy, requiring no significant assistance to come to standing and to ambulate with RWx. Pt appears safe to return home with family assist at this time. Pt is current with HHPT, plan to continue.     Time Calculation:   PT Evaluation Complexity  History, PT Evaluation Complexity: 1-2 personal factors and/or comorbidities  Examination of Body Systems (PT Eval Complexity): total of 3 or more elements  Clinical Presentation (PT Evaluation Complexity): evolving  Clinical Decision Making (PT Evaluation Complexity): moderate complexity  Overall Complexity (PT Evaluation Complexity): moderate complexity     PT Charges       Row Name 04/13/25 1838             Time Calculation    Start Time 1251  -EL      Stop Time 1305  -EL      Time Calculation (min) 14 min  -EL      PT Received On 04/13/25  -EL                User Key  (r) = Recorded By, (t) = Taken By, (c) = Cosigned By      Initials Name Provider Type    EL Robert Everett PT Physical Therapist                  Therapy Charges for Today       Code Description Service Date Service Provider Modifiers Qty    68380696210 HC PT EVAL MOD COMPLEXITY 3 4/13/2025 Robert Everett PT GP 1            PT G-Codes  Outcome Measure Options: AM-PAC 6 Clicks Basic Mobility (PT)  AM-PAC 6 Clicks Score (PT): 20       Robert Everett PT  4/13/2025

## 2025-04-13 NOTE — PLAN OF CARE
Problem: Adult Inpatient Plan of Care  Goal: Plan of Care Review  Outcome: Progressing  Flowsheets  Taken 4/13/2025 0635  Progress: no change  Taken 4/12/2025 0701  Plan of Care Reviewed With: patient  Goal: Patient-Specific Goal (Individualized)  Outcome: Progressing  Goal: Absence of Hospital-Acquired Illness or Injury  Outcome: Progressing  Intervention: Identify and Manage Fall Risk  Recent Flowsheet Documentation  Taken 4/13/2025 0600 by Sara Ma RN  Safety Promotion/Fall Prevention: safety round/check completed  Taken 4/13/2025 0500 by Sara Ma RN  Safety Promotion/Fall Prevention: safety round/check completed  Taken 4/13/2025 0400 by Sara Ma RN  Safety Promotion/Fall Prevention: safety round/check completed  Taken 4/13/2025 0200 by Sara Ma RN  Safety Promotion/Fall Prevention: safety round/check completed  Taken 4/13/2025 0100 by Sara Ma RN  Safety Promotion/Fall Prevention: safety round/check completed  Taken 4/13/2025 0000 by Sara Ma RN  Safety Promotion/Fall Prevention: safety round/check completed  Taken 4/12/2025 2200 by Sara Ma RN  Safety Promotion/Fall Prevention: safety round/check completed  Taken 4/12/2025 2000 by Sara Ma RN  Safety Promotion/Fall Prevention: safety round/check completed  Taken 4/12/2025 1927 by Sara Ma RN  Safety Promotion/Fall Prevention: safety round/check completed  Intervention: Prevent Skin Injury  Recent Flowsheet Documentation  Taken 4/13/2025 0500 by Sara Ma RN  Body Position: position changed independently  Taken 4/13/2025 0400 by Sara Ma RN  Body Position: position changed independently  Skin Protection:   incontinence pads utilized   transparent dressing maintained  Taken 4/13/2025 0300 by Sara Ma RN  Body Position: position changed independently  Taken 4/13/2025 0100 by Sara Ma RN  Body Position: position changed independently  Taken 4/13/2025 0000 by Sara Ma RN  Body  Position: position changed independently  Skin Protection:   incontinence pads utilized   transparent dressing maintained  Taken 4/12/2025 2300 by Sara Ma RN  Body Position: position changed independently  Taken 4/12/2025 2100 by Sara Ma RN  Body Position: position changed independently  Taken 4/12/2025 1927 by Sara Ma RN  Body Position: position changed independently  Skin Protection:   incontinence pads utilized   transparent dressing maintained  Taken 4/12/2025 1900 by Sara Ma RN  Body Position: position changed independently  Intervention: Prevent and Manage VTE (Venous Thromboembolism) Risk  Recent Flowsheet Documentation  Taken 4/13/2025 0400 by Sara Ma RN  VTE Prevention/Management:   bilateral   SCDs (sequential compression devices) off  Taken 4/13/2025 0000 by Sara Ma RN  VTE Prevention/Management:   bilateral   SCDs (sequential compression devices) off  Taken 4/12/2025 1927 by Sara Ma RN  VTE Prevention/Management:   bilateral   SCDs (sequential compression devices) off  Intervention: Prevent Infection  Recent Flowsheet Documentation  Taken 4/13/2025 0400 by Sara Ma RN  Infection Prevention:   environmental surveillance performed   hand hygiene promoted   personal protective equipment utilized   rest/sleep promoted   single patient room provided  Taken 4/13/2025 0000 by Sara Ma RN  Infection Prevention:   environmental surveillance performed   hand hygiene promoted   personal protective equipment utilized   rest/sleep promoted   single patient room provided  Taken 4/12/2025 1927 by Sara Ma RN  Infection Prevention:   environmental surveillance performed   hand hygiene promoted   personal protective equipment utilized   rest/sleep promoted   single patient room provided  Goal: Optimal Comfort and Wellbeing  Outcome: Progressing  Intervention: Monitor Pain and Promote Comfort  Recent Flowsheet Documentation  Taken 4/12/2025 1857 by Anil  AZUCENA Ruiz  Pain Management Interventions: pain medication given  Intervention: Provide Person-Centered Care  Recent Flowsheet Documentation  Taken 4/13/2025 0400 by Sara Ma RN  Trust Relationship/Rapport:   care explained   choices provided   empathic listening provided   questions encouraged   reassurance provided   thoughts/feelings acknowledged  Taken 4/13/2025 0000 by Sara Ma RN  Trust Relationship/Rapport:   care explained   choices provided   empathic listening provided   questions encouraged   reassurance provided   thoughts/feelings acknowledged  Taken 4/12/2025 1927 by Sara Ma RN  Trust Relationship/Rapport:   care explained   choices provided   empathic listening provided   questions encouraged   reassurance provided   thoughts/feelings acknowledged  Goal: Readiness for Transition of Care  Outcome: Progressing     Problem: Skin Injury Risk Increased  Goal: Skin Health and Integrity  Outcome: Progressing  Intervention: Optimize Skin Protection  Recent Flowsheet Documentation  Taken 4/13/2025 0400 by Sara Ma RN  Activity Management: activity encouraged  Pressure Reduction Techniques: frequent weight shift encouraged  Head of Bed (HOB) Positioning: HOB at 20-30 degrees  Pressure Reduction Devices:   positioning supports utilized   pressure-redistributing mattress utilized  Skin Protection:   incontinence pads utilized   transparent dressing maintained  Taken 4/13/2025 0000 by Sara Ma RN  Activity Management: activity encouraged  Pressure Reduction Techniques: frequent weight shift encouraged  Head of Bed (HOB) Positioning: HOB at 20-30 degrees  Pressure Reduction Devices:   positioning supports utilized   pressure-redistributing mattress utilized  Skin Protection:   incontinence pads utilized   transparent dressing maintained  Taken 4/12/2025 1927 by Sara Ma RN  Activity Management: activity encouraged  Pressure Reduction Techniques: frequent weight shift  encouraged  Head of Bed (HOB) Positioning: HOB at 30-45 degrees  Pressure Reduction Devices:   positioning supports utilized   pressure-redistributing mattress utilized  Skin Protection:   incontinence pads utilized   transparent dressing maintained     Problem: Comorbidity Management  Goal: Blood Pressure in Desired Range  Outcome: Progressing  Intervention: Maintain Blood Pressure Management  Recent Flowsheet Documentation  Taken 4/13/2025 0400 by Sara Ma RN  Medication Review/Management: medications reviewed  Taken 4/13/2025 0000 by Sara Ma RN  Medication Review/Management: medications reviewed  Taken 4/12/2025 1927 by Sara Ma RN  Medication Review/Management: medications reviewed     Problem: Fall Injury Risk  Goal: Absence of Fall and Fall-Related Injury  Outcome: Progressing  Intervention: Identify and Manage Contributors  Recent Flowsheet Documentation  Taken 4/13/2025 0400 by Sara Ma RN  Medication Review/Management: medications reviewed  Taken 4/13/2025 0000 by Sara Ma RN  Medication Review/Management: medications reviewed  Taken 4/12/2025 1927 by Sara Ma RN  Medication Review/Management: medications reviewed  Intervention: Promote Injury-Free Environment  Recent Flowsheet Documentation  Taken 4/13/2025 0600 by Sara Ma RN  Safety Promotion/Fall Prevention: safety round/check completed  Taken 4/13/2025 0500 by Sara Ma RN  Safety Promotion/Fall Prevention: safety round/check completed  Taken 4/13/2025 0400 by Sara Ma RN  Safety Promotion/Fall Prevention: safety round/check completed  Taken 4/13/2025 0200 by Sara Ma RN  Safety Promotion/Fall Prevention: safety round/check completed  Taken 4/13/2025 0100 by Sara Ma RN  Safety Promotion/Fall Prevention: safety round/check completed  Taken 4/13/2025 0000 by Sara Ma RN  Safety Promotion/Fall Prevention: safety round/check completed  Taken 4/12/2025 2200 by Sara Ma RN  Safety  Promotion/Fall Prevention: safety round/check completed  Taken 4/12/2025 2000 by Sara Ma, RN  Safety Promotion/Fall Prevention: safety round/check completed  Taken 4/12/2025 1927 by Sara Ma, RN  Safety Promotion/Fall Prevention: safety round/check completed

## 2025-04-13 NOTE — CONSULTS
Nutrition Services    Patient Name: Tracie Gross  YOB: 1960  MRN: [id number]  Admission date: 4/11/2025    Comment:    --Monitor/Encourage PO intake     CLINICAL NUTRITION ASSESSMENT      Reason for Assessment 4/13: Nursing admission screen, MST=5     H&P      Past Medical History:   Diagnosis Date    Allergic 01/01/1998    Anemia     Ankle sprain     Anxiety     Arthritis     Arthritis of back 0ll1/01/2013    Arthritis of neck 01/01/2005    Asthma     Bipolar affective disorder     Bleeding disorder 1986    Breast cancer 1985    s/p R mastectomy    Bursitis of hip [id number]    Cervical disc disorder 01/01/2006    CTS (carpal tunnel syndrome)     Depression 09/01/2000    Fracture of wrist 01/01/1995    Fracture, foot     Frozen shoulder     GERD (gastroesophageal reflux disease) 1993    H/O breast reconstruction     SEVERAL    H/O laminectomy     Hamartoma     Hip arthrosis 01/01/2013    History of medical problems     History of transfusion 1986    Hx of bilateral oophorectomy     Hyperlipidemia     Hypertension     Hypothyroidism     Infectious viral hepatitis     Inflammatory bowel disease 1992    Man. EGD/colonoscopy annually (9/2021)    Injury of back 12/01/1996    Irritable bowel syndrome     Kienbock's disease, right     WRIST    Knee swelling 01/01/2007    Low back pain 1991    Low back strain     Lumbosacral disc disease 01/01/1995    Had 2 lumber surgeries    Lupus     Ravenell    Monahan syndrome     Man. EGD/colonoscopy annually (9/2021). MRI alternating w/ EUS annually for pancreatic screen    MRSA infection     Neuroma of foot     Obesity     Osteopenia     Osteoporosis     maintained on Prolia through Karen    Peptic ulceration     Periarthritis of shoulder 04/01/2022    Pneumonia     Pulmonary fibrosis     Raynaud disease     Renal insufficiency     Rotator cuff syndrome [id number]    Scleroderma     Shortness of breath 00/00/2020    Sleep apnea     cpap    Squamous cell cancer of  lip     Tear of meniscus of knee     Tendinitis of knee     Thoracic disc disorder     Von Willebrand disease     Wrist sprain        Past Surgical History:   Procedure Laterality Date    APPENDECTOMY      ARM DEBRIDEMENT Right     X 3    BACK SURGERY      Vetas- cervical fusion    BACK SURGERY      lumbar decomp    BREAST BIOPSY      BREAST LUMPECTOMY      BREAST RECONSTRUCTION Right     BREAST RECONSTRUCTION Right     BRONCHOSCOPY N/A 2025    Procedure: BRONCHOSCOPY with bilateral lung washing;  Surgeon: Isaac Vazquez MD;  Location: [facility] ENDOSCOPY;  Service: Pulmonary;  Laterality: N/A;  postop:    CARDIAC CATHETERIZATION      no stents placed    CARPAL TUNNEL RELEASE  Rt wrist     SECTION  ,     CHOLECYSTECTOMY      COLONOSCOPY      COLONOSCOPY N/A 2020    Procedure: COLONOSCOPY;  Surgeon: Cayden Conway MD;  Location: [facility] ENDOSCOPY;  Service: Gastroenterology;  Laterality: N/A;  rectal ulcer    COLONOSCOPY N/A 2021    Procedure: COLONOSCOPY WITH POLYPECTOMY X 1;  Surgeon: Cayden Conway MD;  Location: [facility] ENDOSCOPY;  Service: Gastroenterology;  Laterality: N/A;  Post: COLON POLYP    COLONOSCOPY N/A 2023    Procedure: COLONOSCOPY with polypectomy x 1, endoscopic clipping x 1;  Surgeon: Cayden Conway MD;  Location: [facility] ENDOSCOPY;  Service: Gastroenterology;  Laterality: N/A;    COLONOSCOPY N/A 10/01/2024    Procedure: COLONOSCOPY;  Surgeon: Cayden Conway MD;  Location: [facility] ENDOSCOPY;  Service: Gastroenterology;  Laterality: N/A;  normal    COSMETIC SURGERY  [id number]    ENDOSCOPY      ENDOSCOPY N/A 2020    Procedure: ESOPHAGOGASTRODUODENOSCOPY;  Surgeon: Cayden Conway MD;  Location: [facility] ENDOSCOPY;  Service: Gastroenterology;  Laterality: N/A;  Normal EGD    ENDOSCOPY N/A 2021    Procedure: ESOPHAGOGASTRODUODENOSCOPY;  Surgeon: Cayden Conway MD;  Location: [facility] ENDOSCOPY;  Service:  Gastroenterology;  Laterality: N/A;  Post: normal egd    ENDOSCOPY N/A 10/01/2024    Procedure: ESOPHAGOGASTRODUODENOSCOPY;  Surgeon: Cayden Conway MD;  Location: [facility] ENDOSCOPY;  Service: Gastroenterology;  Laterality: N/A;  normal    EXPLORATORY LAPAROTOMY      scar tissue removal    EYE SURGERY  2000    FINGER SURGERY Right     ring finger mass excision    HAND SURGERY  Rt wrist  10/15    HIP SURGERY  1/12    HYSTERECTOMY      PARTIAL    JOINT REPLACEMENT Right 2012,2015    hip and knee    KNEE ARTHROSCOPY Left 01/07/2020    Procedure: LEFT KNEE SCOPE with partial lateral meniscectomy;  Surgeon: Chau Perez MD;  Location: [facility] MAIN OR;  Service: Orthopedics    KNEE ARTHROSCOPY  Rt knee  2014    KNEE SURGERY  Rtf knee  2015    LAMINECTOMY  2/93    LASIK      LASIK/ LASIK ENHANCEMENT    MASTECTOMY RADICAL Right     NECK SURGERY  01/01/06    OOPHORECTOMY Bilateral     SHOULDER SURGERY  11/01/2023    SPINE SURGERY      SPLENECTOMY      SUBTOTAL HYSTERECTOMY      TOTAL HIP ARTHROPLASTY Left 05/17/2023    TOTAL SHOULDER ARTHROPLASTY W/ DISTAL CLAVICLE EXCISION Right 11/01/2023    Procedure: TOTAL SHOULDER REVERSE ARTHROPLASTY;  Surgeon: Floyd Ruiz MD;  Location: [facility] MAIN OR;  Service: Orthopedics;  Laterality: Right;    TRIGGER POINT INJECTION  7/23    TUBAL ABDOMINAL LIGATION  1981    UPPER ENDOSCOPIC ULTRASOUND W/ FNA N/A 12/09/2021    Procedure: ENDOSCOPIC ULTRASOUND WITH ESOPHAGOGASTRODUODENOSCOPY;  Surgeon: Luis E Negron MD;  Location: [facility] ENDOSCOPY;  Service: Gastroenterology;  Laterality: N/A;  Post: GASTROPARESIS, DILATED COMMON BILE DUCT, FUNDIC POLYP, DUODENITIS, NORMAL PANCREAS, HIATAL HERNIA    WRIST SURGERY Right     distal radius head removal (bone dead)  plates and screws decomp lunate        Current Problems   Posterior MI  Acute coronary syndrome  -Cardiology is following   Syncopal episode/hypotension  Htn  DANIELLE on CKDIII  Pulmonary fibrosis  Recent  "pneumonia  Hypothyroidism  Hx vWB disease   Mood   Anxiety     Encounter Information        Trending Narrative     4/13: Pt was admitted with hypotension, syncope, Cardiology is following. At my visit pt reports that intake is improved. Pt declines ONS as well as power foods offered. Pt takes calcium at home. Variable weight's are documented making actual loss difficult to determine. NFPE completed and not consistent with nutrition diagnosis of malnutrition at this time using AND/ASPEN criteria       Anthropometrics        Current Height, Weight Height: 162.6 cm (64\")  Weight: 97.8 kg (215 lb 9.6 oz) (04/13/25 0500)       Usual Body Weight (UBW) Unknown        Trending Weight Hx     This admission: 4/13: 215#              PTA: 4/13: no major weight change over 11 months - variable weight's documented     Wt Readings from Last 30 Encounters:   04/13/25 0500 97.8 kg (215 lb 9.6 oz)   04/12/25 0631 95.9 kg (211 lb 6.7 oz)   04/11/25 1141 97 kg (213 lb 13.5 oz)   04/10/25 1505 97 kg (213 lb 12.8 oz)   03/31/25 0511 99.9 kg (220 lb 3.8 oz)   03/30/25 0500 102 kg (225 lb 12 oz)   03/27/25 0405 107 kg (235 lb 3.7 oz)   03/26/25 0515 106 kg (233 lb 14.5 oz)   03/25/25 0539 105 kg (232 lb 9.4 oz)   03/24/25 0612 105 kg (231 lb 11.3 oz)   03/23/25 0415 104 kg (229 lb 4.5 oz)   03/21/25 2247 108 kg (238 lb 12.1 oz)   03/21/25 0437 112 kg (246 lb 14.6 oz)   03/20/25 0528 112 kg (246 lb 7.6 oz)   03/19/25 0431 114 kg (251 lb 5.2 oz)   03/18/25 0500 116 kg (256 lb 2.8 oz)   03/17/25 0500 117 kg (258 lb 9.6 oz)   03/16/25 0500 113 kg (249 lb 1.9 oz)   03/15/25 1928 113 kg (249 lb 1.9 oz)   03/15/25 1516 105 kg (231 lb 7.7 oz)   02/12/25 1517 105 kg (232 lb)   01/29/25 1134 111 kg (244 lb 6.4 oz)   01/14/25 1443 106 kg (233 lb)   12/17/24 0957 106 kg (233 lb 11.2 oz)   12/03/24 1324 103 kg (227 lb)   10/02/24 1531 103 kg (227 lb 12.8 oz)   09/23/24 0943 95.3 kg (210 lb)   06/21/24 1108 95.3 kg (210 lb)   08/15/24 1502 102 kg (224 " lb)   08/07/24 1126 105 kg (231 lb 3.2 oz)   07/15/24 0957 95.3 kg (210 lb)   06/25/24 1531 99.8 kg (220 lb)   05/20/24 1411 97.1 kg (214 lb)   04/08/24 1527 97.1 kg (214 lb)   02/07/24 1129 101 kg (223 lb)   01/16/24 1539 98.9 kg (218 lb)   01/04/24 1315 104 kg (229 lb)   12/18/23 1423 104 kg (230 lb)   11/29/23 1541 105 kg (230 lb 6.4 oz)   11/16/23 1004 99.8 kg (220 lb)   11/01/23 1941 99.8 kg (220 lb)   11/01/23 1434 99.8 kg (220 lb)   10/24/23 1223 100 kg (221 lb)   08/29/23 1504 101 kg (223 lb)   06/20/23 1432 103 kg (227 lb)   05/24/23 1513 103 kg (228 lb)   04/06/23 1458 103 kg (226 lb)   03/23/23 1443 103 kg (226 lb 3.2 oz)   02/20/23 0830 104 kg (229 lb)      BMI kg/m2 Body mass index is 37.01 kg/m².       Labs        Pertinent Labs Reviewed. Management per MD    Results from last 7 days   Lab Units 04/12/25  0119 04/11/25  1217   SODIUM mmol/L 139 139   POTASSIUM mmol/L 4.6 3.5   CHLORIDE mmol/L 101 96*   CO2 mmol/L 30.9* 28.4   BUN mg/dL 18 23   CREATININE mg/dL 1.05* 1.46*   CALCIUM mg/dL 9.2 9.9   BILIRUBIN mg/dL  --  0.2   ALK PHOS U/L  --  108   ALT (SGPT) U/L  --  26   AST (SGOT) U/L  --  27   GLUCOSE mg/dL 100* 103*     Results from last 7 days   Lab Units 04/12/25  0119 04/11/25  1308 04/11/25  1217   MAGNESIUM mg/dL 2.9*  --  1.5*   PHOSPHORUS mg/dL  --   --  3.7   HEMOGLOBIN g/dL 11.2*  --  11.8*   HEMATOCRIT % 34.3  --  37.1   TRIGLYCERIDES mg/dL  --  171*  --      Lab Results   Component Value Date    HGBA1C 5.91 (H) 04/11/2025        Medications    Scheduled Medications calcium 500 mg vitamin D 5 mcg (200 UT), 1 tablet, Oral, Daily  gabapentin, 600 mg, Oral, TID  hydroxychloroquine, 200 mg, Oral, BID  lamoTRIgine, 200 mg, Oral, Nightly  levothyroxine, 175 mcg, Oral, Daily  mupirocin, 1 Application, Each Nare, BID  predniSONE, 20 mg, Oral, Daily  QUEtiapine, 200 mg, Oral, Nightly        Infusions      PRN Medications   acetaminophen **OR** acetaminophen **OR** acetaminophen    ALPRAZolam     atropine    senna-docusate sodium **AND** polyethylene glycol **AND** bisacodyl **AND** bisacodyl    Calcium Replacement - Follow Nurse / BPA Driven Protocol    Magnesium Low Dose Replacement - Follow Nurse / BPA Driven Protocol    nitroglycerin    oxyCODONE    Phosphorus Replacement - Follow Nurse / BPA Driven Protocol    Potassium Replacement - Follow Nurse / BPA Driven Protocol    [COMPLETED] Insert Peripheral IV **AND** sodium chloride     Physical Findings        Trending Physical   Appearance, NFPE 4/13: NFPE completed and not consistent with nutrition diagnosis of malnutrition at this time using AND/ASPEN criteria     --  Edema    No edema    Bowel Function   BM 4/12, pt has stool softeners ordered PRN    Tubes   No feeding tube    Chewing/Swallowing   No issues reported    Skin   Intact    --  Current Nutrition Orders & Evaluation of Intake       Oral Nutrition     Food Allergies NKFA   Current PO Diet Diet: Cardiac; Healthy Heart (2-3 Na+); Fluid Consistency: Thin (IDDSI 0)   Supplement None ordered    PO Evaluation     Trending % PO Intake 4/13: %    --  Nutritional Risk Screening        NRS-2002 Score          Nutrition Diagnosis         Nutrition Dx Problem 1 Inadequate oral intake related to a decreased appetite as evidenced by variable intake       Nutrition Dx Problem 2        Intervention Goal         Intervention Goal(s) To consistently consume % of meals      Nutrition Intervention        RD Action NFPE complete, encourage PO intakes      Nutrition Prescription          Diet Prescription Cardiac   Supplement Prescription None    --  Monitor/Evaluation        Monitor PO intake         Electronically signed by:  Clare Moreno RD  04/13/25 09:41 EDT

## 2025-04-13 NOTE — PLAN OF CARE
Goal Outcome Evaluation:  Plan of Care Reviewed With: patient           Outcome Evaluation: Pt is a 65 YO F admitted with posterior MI. Pt with significant OH upon admission. Improved this date. Pt reports living at home with spouse, is independent with all ADLs, ambulation with RWx. Pt this date dmeonstrates good mobitliy, requiring no significant assistance to come to standing and to ambulate with RWx. Pt appears safe to return home with family assist at this time. Pt is current with HHPT, plan to continue.

## 2025-04-14 ENCOUNTER — DOCUMENTATION (OUTPATIENT)
Dept: SOCIAL WORK | Facility: HOSPITAL | Age: 65
End: 2025-04-14
Payer: MEDICARE

## 2025-04-14 ENCOUNTER — TRANSITIONAL CARE MANAGEMENT TELEPHONE ENCOUNTER (OUTPATIENT)
Dept: CALL CENTER | Facility: HOSPITAL | Age: 65
End: 2025-04-14
Payer: MEDICARE

## 2025-04-14 DIAGNOSIS — I24.9 ACUTE CORONARY SYNDROME: Primary | ICD-10-CM

## 2025-04-14 NOTE — PROGRESS NOTES
Start PACC Note    Home Health Referral    Evaluated patient on Home Care and services available. Patient offered choice of available HHC and [facility]    Care Types:   Isolation Precautions: [unfilled]    Social Determinants of Health:  Tobacco Use: Medium Risk (4/11/2025)    Patient History     Smoking Tobacco Use: Former     Smokeless Tobacco Use: Never     Passive Exposure: Past     Social History     Substance and Sexual Activity   Alcohol Use Not Currently    Comment: Rarely     Social History     Substance and Sexual Activity   Drug Use No       Does the patient have any financial resource strain?   Does the patient have any food insecurities?   Does the patient have any housing instabilities?     If any of the above is noted as yes - consider a MSW evaluation once the patient returns home.    START PATIENT REGISTRATION INFORMATION  Order Information  Order Signing Physician: Dr. Floyd Silva  Service Ordered RN?: Yes  Service Ordered PT?: No  Service Ordered OT?: No  Service Ordered ST?: No  Service Ordered MSW?: No  Service Ordered HHA?: No  Following Physician: Floyd Silva MD  Following Physician Phone: [phone]  Overseeing Physician: Floyd Silva MD  (Required for Residents Only)  Agreeable to Follow? Yes  Date/Time of Call 04/14/25 13:01 EDT, Spoke with: per md    Care Coordination  Same Day SOC?: No  Primary Care Physician: Floyd Silva MD  Primary Care Physician Phone: [phone]  Primary Care Physician Address: [address] [place] [address] [facility]  Visit Instructions: hhc to resume previous services post hospitalization  Service Discharge Location Type: Home  Service Facility Name: N/A  Service Floor Facility: N/A  Service Room No: N/A    Demographics  Patient Last Name: Renato  Patient First Name: Tracie  Language/Communication Barrier: none  Service Address: [address] [place] [address]  Service City:  [place]  Service State: [place]  Service Zip: [address]  Service Home Phone: [phone]  Other Phone Numbers:   Telephone Information:   Mobile [phone]     Emergency Contact:   Extended Emergency Contact Information  Primary Emergency Contact: Brian Gross  Address: [address] [facility]  Home Phone: [phone]  Mobile Phone: [phone]  Relation: Spouse  Hearing or visual needs: None  Other needs: None  Preferred language: English   needed? No  Secondary Emergency Contact: Roverto Hopkins  Mobile Phone: [phone]  Relation: Other    Admission Information  Admit Date: 04/11/2025  Patient Status at Discharge:   Admitting Diagnosis: chest pain, acute coronary syndrome    Caregiver Information  Caregiver First Name:   Caregiver Last Name:   Caregiver Relationship to Patient:   Caregiver Phone Number:   Caregiver Notes:     HITE[employer]  [employer]  No      END PATIENT REGISTRATION INFORMATION    Start PACC Summary    Additional Comments: none    END PACC Summary    Discharge Date: Pending    Referral Source: [facility]    Signed By: Kecia Duarte RN, 4/14/2025, 13:01 EDT     Date/Time: 04/14/25 13:01 EDT    End PACC Note

## 2025-04-14 NOTE — OUTREACH NOTE
Call Center TCM Note      Flowsheet Row Responses   [facility] patient discharged from? [facility]   Does the patient have one of the following disease processes/diagnoses(primary or secondary)? Acute MI (STEMI,NSTEMI)   TCM attempt successful? Yes   Call start time 1323   Call end time 1327   Discharge diagnosis Angina at rest, posterior MI, stress test negative   Person spoke with today (if not patient) and relationship pt   Meds reviewed with patient/caregiver? Yes   Is the patient having any side effects they believe may be caused by any medication additions or changes? No   Does the patient have all prescriptions related to this admission filled (includes statins,anticoagulants,HTN meds,anti-arrhythmia meds) N/A   Is the patient taking all medications as directed (includes completed medication regime)? Yes   Comments Oncology follows pt   Does the patient have an appointment with their PCP within 7-14 days of discharge? Yes  [4/23/2025 11:15 AM]   What is the Home health agency?  [facility]   Has home health visited the patient within 72 hours of discharge? Call prior to 72 hours   Psychosocial issues? No   Did the patient receive a copy of their discharge instructions? Yes   Nursing interventions Reviewed instructions with patient   What is the patient's perception of their health status since discharge? Improving   Nursing interventions Nurse provided patient education   Is the patient/caregiver able to teach back signs and symptoms of when to call for help immediately: Sudden chest discomfort, Shortness of breath at any time, Nausea or vomiting   Nursing interventions Nurse provided patient education   Is the patient/caregiver able to teach back lifestyle changes to help prevent MIs Heart healthy diet   Is the patient/caregiver able to teach back ways to prevent a second heart attack: Take medications, Follow up with MD   If the patient is a current smoker, are they able to teach back resources for cessation?  Not a smoker   Is the patient/caregiver able to teach back the hierarchy of who to call/visit for symptoms/problems? PCP, Specialist, Home health nurse, Urgent Care, ED, 911 Yes   TCM call completed? Yes   Wrap up additional comments Pt denies chest pain/SOB. Reviewed AVS/meds with pt. Pt verified PCP fu appt. Oncology follows pt.   Call end time 1327   Would this patient benefit from a Referral to [facility] Social Work? No   Is the patient interested in additional calls from an ambulatory ? No            Hemalatha ROCKWELL - Registered Nurse    4/14/2025, 13:29 EDT

## 2025-04-14 NOTE — CASE MANAGEMENT/SOCIAL WORK
Case Management Discharge Note      Final Note: Patient discharged prior to CM assessment           Selected Continued Care - Prior Encounters Includes continued care and service providers with selected services from prior encounters from 1/11/2025 to 4/13/2025      Discharged on 3/31/2025 Admission date: 3/15/2025 - Discharge disposition: Home-Health Care [place]      Home Medical Care       Service Provider Services Address Phone Fax Patient Preferred    [facility], Home Rehabilitation, Home Nursing [address] [facility] [phone] --                          Transportation Services  Private: Car (with family/frien)    Final Discharge Disposition Code: 06 - home with home health care

## 2025-04-17 RX ORDER — FERROUS SULFATE 325(65) MG
1 TABLET ORAL
Qty: 30 TABLET | Refills: 0 | Status: SHIPPED | OUTPATIENT
Start: 2025-04-17

## 2025-04-18 ENCOUNTER — TELEPHONE (OUTPATIENT)
Dept: FAMILY MEDICINE CLINIC | Facility: CLINIC | Age: 65
End: 2025-04-18
Payer: MEDICARE

## 2025-04-18 NOTE — TELEPHONE ENCOUNTER
Caller: [facility] PA [facility]    Best call back number:     [phone]       Patient is needing:     PATIENT HAS INITIALIZED A PA FOR  CYCLOBENZAPRINE 10MG TABLET.     PA REFERENCE NUMBER: [id number]--41 HOUR TURN AROUND TIME.     [facility] COULD NOT TELL ME IF THE PATIENT NEEDED A PRESCRIPTION OR ANY OTHER INFORMATION.

## 2025-04-20 NOTE — DISCHARGE SUMMARY
"             [facility] Medicine Services  Discharge Summary    Date of Service: 2025  Patient Name: Tracie Gross  : 1960  MRN: [id number]    Date of Admission: 2025  Discharge Diagnosis: Angina at rest  Date of Discharge: 2025  Primary Care Physician: Floyd Silva MD      Presenting Problem:   Abnormal EKG [R94.31]  Hypotension, unspecified hypotension type [I95.9]  Dyspnea, unspecified type [R06.00]  Angina at rest [I20.89]  ACS (acute coronary syndrome) [I24.9]    Active and Resolved Hospital Problems:  Active Hospital Problems    Diagnosis POA    **Angina at rest [I20.89] Yes    ACS (acute coronary syndrome) [I24.9] Yes      Resolved Hospital Problems   No resolved problems to display.         Hospital Course     HPI:    \"Tracie Gross is a 64 y.o. female with a CMH of pulmonary fibrosis, htn, hld, CKD, von Willebrand's disease, hypothyroid, IBS who presented to [facility] on 2025 with  low blood pressure, chest pressure, syncope. Pt was recent admitted here 3/15-3/31 for AHRF, treated for pneumonia in the setting of pulmonary fibrosis and discharged in stable condition on steroid taper. Today she reports having multiple recent low blood pressure readings at home, has been around 80-90 systolic but with a low reading of 60/40. She also reports a syncopal episode about 4 days ago, states she was sitting on the toilet at the time. Denies falling/hitting her head, says she just kind of slumped over. Does note she gets more lightheaded/dizzy with standing. She has had generalized weakness during this time. Reports some chronic SOA though notes it has been at recent baseline. She does report some substernal chest pressure over the past day or so, denies overt chest pain.   In ED workup notable for troponin 120 -> 107, BNP 2900, ECG with c/f acute posterior MI. Cr 1.46, WBC 11.39. Cardiology consulted in ED and pt taken to cath lab with concern for acute NSTEMI. " "Hospitalist service consulted for admission following cath. \"    Hospital Course:    Admitted for initial concern of posteriod MI. Taken to cath lab for evaluation of coronaries, found to have 0% stenosis, normal coronaries. Improved rapidly afterwards for supportive care of likely dehydration before discharge home.        DISCHARGE Follow Up Recommendations for labs and diagnostics:     PCP        Day of Discharge     Vital Signs:       Physical Exam:  Physical Exam  Constitutional:       Appearance: Normal appearance.   Cardiovascular:      Rate and Rhythm: Normal rate and regular rhythm.      Pulses: Normal pulses.      Heart sounds: Normal heart sounds.   Pulmonary:      Effort: Pulmonary effort is normal.      Breath sounds: Normal breath sounds.   Abdominal:      General: Abdomen is flat.      Palpations: Abdomen is soft.   Neurological:      Mental Status: She is alert.            Pertinent  and/or Most Recent Results     LAB RESULTS:                              Brief Urine Lab Results  (Last result in the past 365 days)        Color   Clarity   Blood   Leuk Est   Nitrite   Protein   CREAT   Urine HCG        03/15/25 1610 Dark Yellow   Cloudy   Negative   Trace   Negative   30 mg/dL (1+)                 Microbiology Results (last 10 days)       ** No results found for the last 240 hours. **            XR Chest 1 View  Result Date: 4/11/2025  Impression: Impression: Improvement in appearance of the lungs since prior study. There probably are residual areas of airspace disease. Electronically Signed: Alen Rasmussen MD  4/11/2025 12:55 PM EDT  Workstation ID: [id number]    IR insert non-tunneled central line 5+  Result Date: 3/28/2025  Impression: Successful placement of left internal jugular central venous catheter. Electronically Signed: Reji Romero MD  3/28/2025 8:55 AM EDT  Workstation ID: [id number]    CT Chest Without Contrast Diagnostic  Result Date: 3/23/2025  Impression: Impression: 1.Multifocal patchy " opacities noted throughout the lungs bilaterally, worse in the perihilar region. Findings are concerning for multifocal pneumonia. Less likely, this may represent pulmonary hemorrhage 2.Additional nonemergent findings as characterized above 3.Scattered prominent to mildly enlarged mediastinal lymph nodes, likely reactive. Electronically Signed: Rafael Agustinconstancekarlos, DO  3/23/2025 5:15 PM EDT  Workstation ID: [id number]    XR Chest 1 View  Result Date: 3/23/2025  Impression: Impression: 1.Worsening multifocal patchy opacities scattered throughout the lungs. This most likely represents multifocal pneumonia, although pulmonary edema is also a possibility 2.Trace bilateral pleural effusions. Electronically Signed: Rafael Suzi, DO  3/23/2025 12:30 PM EDT  Workstation ID: [id number]      Results for orders placed during the hospital encounter of 03/15/25    Duplex Venous Lower Extremity - Bilateral CAR    Interpretation Summary    Normal bilateral lower extremity venous duplex scan.      Results for orders placed during the hospital encounter of 03/15/25    Duplex Venous Lower Extremity - Bilateral CAR    Interpretation Summary    Normal bilateral lower extremity venous duplex scan.      Results for orders placed during the hospital encounter of 04/11/25    Adult Transthoracic Echo Complete W/ Cont if Necessary Per Protocol    Interpretation Summary    Left ventricular ejection fraction appears to be 56 - 60%.    The right ventricular cavity is mildly dilated.      Labs Pending at Discharge:  Pending Results       None            Procedures Performed  Procedure(s):  Left Heart Cath  Coronary angiography         Consults:   Consults       Date and Time Order Name Status Description    4/11/2025  1:35 PM Cardiology (on-call MD unless specified) Completed     3/19/2025  8:53 AM Inpatient Gastroenterology Consult Completed     3/15/2025  8:33 PM Inpatient Nephrology Consult Completed     3/15/2025  7:55 PM Inpatient  Pulmonology Consult Completed               Discharge Details        Discharge Medications        Continue These Medications        Instructions Start Date   albuterol sulfate  (90 Base) MCG/ACT inhaler  Commonly known as: PROVENTIL HFA;VENTOLIN HFA;PROAIR HFA   2 puffs, Inhalation, Every 4 Hours PRN      ALPRAZolam 1 MG tablet  Commonly known as: XANAX   1 mg, Oral, Nightly PRN      bisoprolol-hydrochlorothiazide 10-6.25 MG per tablet  Commonly known as: ZIAC   0.5 tablets, Oral, Daily      Calcium + Vitamin D3 600-5 MG-MCG tablet  Generic drug: Calcium Carb-Cholecalciferol   1 tablet, Oral, 2 Times Daily      cyclobenzaprine 10 MG tablet  Commonly known as: FLEXERIL   10 mg, Oral, 3 Times Daily PRN      dexlansoprazole 60 MG capsule  Commonly known as: DEXILANT   60 mg, Daily With Breakfast      Flaxseed Oil Max Str 1300 MG capsule   1 tablet, 2 Times Daily      fluticasone 50 MCG/ACT nasal spray  Commonly known as: FLONASE   2 sprays, Each Nare, Daily      gabapentin 600 MG tablet  Commonly known as: NEURONTIN   1,200 mg, 2 Times Daily      gabapentin 600 MG tablet  Commonly known as: NEURONTIN   600 mg, Daily      guaiFENesin 600 MG 12 hr tablet  Commonly known as: Mucinex   Take 2 tablets twice daily for congestion      hydroxychloroquine 200 MG tablet  Commonly known as: PLAQUENIL   200 mg, Oral, 2 Times Daily      lamoTRIgine 200 MG tablet  Commonly known as: LaMICtal   200 mg, Oral, Nightly      levothyroxine 175 MCG tablet  Commonly known as: SYNTHROID, LEVOTHROID   175 mcg, Oral, Daily      meclizine 25 MG tablet  Commonly known as: ANTIVERT   25 mg, Oral, 3 Times Daily PRN      NON FORMULARY   Apply  topically to the appropriate area as directed. Lidocaine powder  Ketamine powder  Diclofenac powder      ondansetron 4 MG tablet  Commonly known as: ZOFRAN   4 mg, Oral, Every 8 Hours PRN      oxyCODONE 10 MG tablet  Commonly known as: ROXICODONE   10 mg, Oral, Every 6 Hours PRN      predniSONE 20 MG  tablet  Commonly known as: DELTASONE   20 mg, Daily      PROLIA SC   Every 6 Months      QUEtiapine 100 MG tablet  Commonly known as: SEROquel   200 mg, Oral, Nightly      saccharomyces boulardii 250 MG capsule  Commonly known as: FLORASTOR   250 mg, Daily With Breakfast      sulfamethoxazole-trimethoprim 800-160 MG per tablet  Commonly known as: BACTRIM DS,SEPTRA DS   1 tablet, Every Other Day             Stop These Medications      furosemide 20 MG tablet  Commonly known as: LASIX     hydrOXYzine 25 MG tablet  Commonly known as: ATARAX     lisinopril 5 MG tablet  Commonly known as: PRINIVIL,ZESTRIL     NIFEdipine XL 30 MG 24 hr tablet  Commonly known as: PROCARDIA XL              Allergies   Allergen Reactions    Aspirin Other (See Comments)     VONWILLENBRAND DISEASE     Baclofen Hives         Discharge Disposition:   Home or Self Care    Diet:  Hospital:No active diet order        Discharge Activity:         CODE STATUS:  Code Status and Medical Interventions: CPR (Attempt to Resuscitate); Full Support   Ordered at: 04/11/25 1529     Code Status (Patient has no pulse and is not breathing):    CPR (Attempt to Resuscitate)     Medical Interventions (Patient has pulse or is breathing):    Full Support     Level Of Support Discussed With:    Patient         Future Appointments   Date Time Provider Department Center   4/23/2025 11:15 AM Andi Rivas MD [facility] [place]   8/13/2025  3:00 PM Suzan Jones MD [facility] [place]   8/13/2025  3:00 PM LAB MD [facility] [place]       Additional Instructions for the Follow-ups that You Need to Schedule       Ambulatory Referral to Home Health   As directed      Face to Face Visit Date: 4/14/2025   Follow-up provider for Plan of Care?: I treated the patient in an acute care facility and will not continue treatment after discharge.   Follow-up provider: ANDI RIVAS [[id number]]   Reason/Clinical Findings: nursing, resume services   Describe  mobility limitations that make leaving home difficult: unable to drive   Nursing/Therapeutic Services Requested: Skilled Nursing   Skilled nursing orders: Other ([facility])   Frequency: 1 Week 1                Time spent on Discharge including face to face service:  55 minutes    Signature: Electronically signed by George Hubbard MD, 04/20/25, 18:26 EDT.  [facility] Hospitalist Team

## 2025-04-21 ENCOUNTER — HOSPITAL ENCOUNTER (OUTPATIENT)
Facility: HOSPITAL | Age: 65
Discharge: HOME | End: 2025-04-21
Attending: EMERGENCY MEDICINE | Admitting: EMERGENCY MEDICINE
Payer: MEDICARE

## 2025-04-21 VITALS
OXYGEN SATURATION: 96 % | TEMPERATURE: 98.4 F | BODY MASS INDEX: 35.85 KG/M2 | RESPIRATION RATE: 18 BRPM | SYSTOLIC BLOOD PRESSURE: 147 MMHG | HEIGHT: 64 IN | HEART RATE: 96 BPM | DIASTOLIC BLOOD PRESSURE: 81 MMHG | WEIGHT: 210 LBS

## 2025-04-21 DIAGNOSIS — S81.819A SKIN TEAR OF LOWER LEG WITHOUT COMPLICATION, INITIAL ENCOUNTER: Primary | ICD-10-CM

## 2025-04-21 PROCEDURE — G0463 HOSPITAL OUTPT CLINIC VISIT: HCPCS | Performed by: NURSE PRACTITIONER

## 2025-04-22 NOTE — DISCHARGE INSTRUCTIONS
Cover the area with a dry nonstick dressing.  Do not get the area wet for at least the next 7 days.  Do not apply any antibiotic ointment to the area as this will cause the Steri-Strips to release prematurely.    Observe for infection, redness, red streaking, fever, purulent discharge.  Keep your appointment with your primary care doctor in 2 days and have them recheck the wound.  Also consider wound management.

## 2025-04-22 NOTE — FSED PROVIDER NOTE
"Subjective   History of Present Illness  64-year-old female presents with a skin tear sustained when her dog scratched her right medial lower leg.  The patient has a history of lupus and scleroderma.  She also has pulmonary fibrosis.  She was recently hospitalized for 17 days with pneumonia and a UTI and \"no immune system\".  She stopped the CellCept 3 weeks ago.  Patient currently takes Bactrim every other day.    History provided by:  Patient and spouse   used: No        Review of Systems   Skin:  Positive for wound (To right lower medial leg from her dog's toenail causing a skin tear.).   All other systems reviewed and are negative.      Past Medical History:   Diagnosis Date    Allergic 01/01/1998    Anemia     Ankle sprain     Anxiety     Arthritis     Arthritis of back 0ll1/01/2013    Arthritis of neck 01/01/2005    Asthma     Bipolar affective disorder     Bleeding disorder 1986    Breast cancer 1985    s/p R mastectomy    Bursitis of hip [id number]    Cervical disc disorder 01/01/2006    CTS (carpal tunnel syndrome)     Depression 09/01/2000    Fracture of wrist 01/01/1995    Fracture, foot     Frozen shoulder     GERD (gastroesophageal reflux disease) 1993    H/O breast reconstruction     SEVERAL    H/O laminectomy     Hamartoma     Hip arthrosis 01/01/2013    History of medical problems     History of transfusion 1986    Hx of bilateral oophorectomy     Hyperlipidemia     Hypertension     Hypothyroidism     Infectious viral hepatitis     Inflammatory bowel disease 1992    Man. EGD/colonoscopy annually (9/2021)    Injury of back 12/01/1996    Irritable bowel syndrome     Kienbock's disease, right     WRIST    Knee swelling 01/01/2007    Low back pain 1991    Low back strain     Lumbosacral disc disease 01/01/1995    Had 2 lumber surgeries    Lupus     Ravenell    Monahan syndrome     Man. EGD/colonoscopy annually (9/2021). MRI alternating w/ EUS annually for pancreatic screen    MRSA " infection     Neuroma of foot     Obesity     Osteopenia     Osteoporosis     maintained on Prolia through Karen    Peptic ulceration     Periarthritis of shoulder 2022    Pneumonia     Pulmonary fibrosis     Raynaud disease     Renal insufficiency     Rotator cuff syndrome [id number]    Scleroderma     Shortness of breath     Sleep apnea     cpap    Squamous cell cancer of lip     Tear of meniscus of knee     Tendinitis of knee     Thoracic disc disorder     Von Willebrand disease     Wrist sprain        Allergies   Allergen Reactions    Aspirin Other (See Comments)     VONWILLENBRAND DISEASE     Baclofen Hives       Past Surgical History:   Procedure Laterality Date    APPENDECTOMY      ARM DEBRIDEMENT Right     X 3    BACK SURGERY      Vetas- cervical fusion    BACK SURGERY      lumbar decomp    BREAST BIOPSY      BREAST LUMPECTOMY      BREAST RECONSTRUCTION Right     BREAST RECONSTRUCTION Right     BRONCHOSCOPY N/A 2025    Procedure: BRONCHOSCOPY with bilateral lung washing;  Surgeon: Isaac Vazquez MD;  Location: [facility] ENDOSCOPY;  Service: Pulmonary;  Laterality: N/A;  postop:    CARDIAC CATHETERIZATION      no stents placed    CARDIAC CATHETERIZATION N/A 2025    Procedure: Left Heart Cath;  Surgeon: Elian Benavides MD;  Location: [facility] CATH INVASIVE LOCATION;  Service: Cardiology;  Laterality: N/A;    CARDIAC CATHETERIZATION N/A 2025    Procedure: Coronary angiography;  Surgeon: Elian Benavides MD;  Location: [facility] CATH INVASIVE LOCATION;  Service: Cardiology;  Laterality: N/A;    CARPAL TUNNEL RELEASE  Rt wrist     SECTION  1980    CHOLECYSTECTOMY      COLONOSCOPY      COLONOSCOPY N/A 2020    Procedure: COLONOSCOPY;  Surgeon: Cayden Conway MD;  Location: [facility] ENDOSCOPY;  Service: Gastroenterology;  Laterality: N/A;  rectal ulcer    COLONOSCOPY N/A 2021    Procedure: COLONOSCOPY WITH POLYPECTOMY X 1;  Surgeon: Cayden Conway MD;   Location: [facility] ENDOSCOPY;  Service: Gastroenterology;  Laterality: N/A;  Post: COLON POLYP    COLONOSCOPY N/A 01/06/2023    Procedure: COLONOSCOPY with polypectomy x 1, endoscopic clipping x 1;  Surgeon: Cayden Conway MD;  Location: [facility] ENDOSCOPY;  Service: Gastroenterology;  Laterality: N/A;    COLONOSCOPY N/A 10/01/2024    Procedure: COLONOSCOPY;  Surgeon: Cayden Conway MD;  Location: [facility] ENDOSCOPY;  Service: Gastroenterology;  Laterality: N/A;  normal    COSMETIC SURGERY  [id number]    ENDOSCOPY      ENDOSCOPY N/A 08/14/2020    Procedure: ESOPHAGOGASTRODUODENOSCOPY;  Surgeon: Cayden Conway MD;  Location: [facility] ENDOSCOPY;  Service: Gastroenterology;  Laterality: N/A;  Normal EGD    ENDOSCOPY N/A 09/17/2021    Procedure: ESOPHAGOGASTRODUODENOSCOPY;  Surgeon: Cayden Conway MD;  Location: [facility] ENDOSCOPY;  Service: Gastroenterology;  Laterality: N/A;  Post: normal egd    ENDOSCOPY N/A 10/01/2024    Procedure: ESOPHAGOGASTRODUODENOSCOPY;  Surgeon: Cayden Conway MD;  Location: [facility] ENDOSCOPY;  Service: Gastroenterology;  Laterality: N/A;  normal    EXPLORATORY LAPAROTOMY      scar tissue removal    EYE SURGERY  2000    FINGER SURGERY Right     ring finger mass excision    HAND SURGERY  Rt wrist  10/15    HIP SURGERY  1/12    HYSTERECTOMY      PARTIAL    JOINT REPLACEMENT Right 2012,2015    hip and knee    KNEE ARTHROSCOPY Left 01/07/2020    Procedure: LEFT KNEE SCOPE with partial lateral meniscectomy;  Surgeon: Chau Perez MD;  Location: [facility] MAIN OR;  Service: Orthopedics    KNEE ARTHROSCOPY  Rt knee  2014    KNEE SURGERY  Rtf knee  2015    LAMINECTOMY  2/93    LASIK      LASIK/ LASIK ENHANCEMENT    MASTECTOMY RADICAL Right     NECK SURGERY  01/01/06    OOPHORECTOMY Bilateral     SHOULDER SURGERY  11/01/2023    SPINE SURGERY      SPLENECTOMY      SUBTOTAL HYSTERECTOMY      TOTAL HIP ARTHROPLASTY Left 05/17/2023    TOTAL SHOULDER ARTHROPLASTY W/ DISTAL  CLAVICLE EXCISION Right 11/01/2023    Procedure: TOTAL SHOULDER REVERSE ARTHROPLASTY;  Surgeon: Floyd Ruiz MD;  Location: [facility] MAIN OR;  Service: Orthopedics;  Laterality: Right;    TRIGGER POINT INJECTION  7/23    TUBAL ABDOMINAL LIGATION  1981    UPPER ENDOSCOPIC ULTRASOUND W/ FNA N/A 12/09/2021    Procedure: ENDOSCOPIC ULTRASOUND WITH ESOPHAGOGASTRODUODENOSCOPY;  Surgeon: Luis E Negron MD;  Location: [facility] ENDOSCOPY;  Service: Gastroenterology;  Laterality: N/A;  Post: GASTROPARESIS, DILATED COMMON BILE DUCT, FUNDIC POLYP, DUODENITIS, NORMAL PANCREAS, HIATAL HERNIA    WRIST SURGERY Right     distal radius head removal (bone dead)  plates and screws decomp lunate       Family History   Problem Relation Age of Onset    Colon cancer Mother     Heart disease Mother     Cancer Mother         Colon    Arthritis Mother     Kidney disease Father     Heart disease Father     Depression Father         Bladder cancer    Hyperlipidemia Father     Vision loss Father     Hypertension Father     Colon cancer Sister     Diabetes Sister     Heart disease Sister     Von Willebrand disease Sister     Von Willebrand disease Brother     Cancer Brother     Von Willebrand disease Son     Breast cancer Son     Diabetes Paternal Grandmother     Stroke Paternal Grandmother     Colon cancer Other     Colon cancer Son     Von Willebrand disease Son     Breast cancer Son     Cancer Son     Cancer Sister         Melanoma, colon, bladder    Vision loss Sister     Stroke Sister     Arthritis Sister     Asthma Sister     Diabetes Sister     Heart disease Sister     Hyperlipidemia Sister     Kidney disease Sister     Broken bones Sister     Cancer Paternal Aunt     Cancer Maternal Aunt     Cancer Maternal Aunt     Hyperlipidemia Sister     Thyroid disease Sister     Arthritis Sister     Hypertension Sister     Kidney disease Sister     Broken bones Sister     Rheumatologic disease Sister     Thyroid disease Sister     Cancer  Sister     Clotting disorder Sister     Cancer Maternal Aunt     Cancer Maternal Aunt     Cancer Paternal Aunt        Social History     Socioeconomic History    Marital status:      Spouse name: Brian    Number of children: 2   Tobacco Use    Smoking status: Former     Current packs/day: 0.00     Average packs/day: 0.3 packs/day for 10.0 years (2.5 ttl pk-yrs)     Types: Cigarettes     Start date: 1984     Quit date: 1994     Years since quittin.3     Passive exposure: Past    Smokeless tobacco: Never    Tobacco comments:     Off and on for  those years   Vaping Use    Vaping status: Never Used   Substance and Sexual Activity    Alcohol use: Not Currently     Comment: Rarely    Drug use: No    Sexual activity: Never           Objective   Physical Exam  Vitals and nursing note reviewed.   Constitutional:       General: She is not in acute distress.     Appearance: Normal appearance. She is not ill-appearing, toxic-appearing or diaphoretic.   Pulmonary:      Effort: Pulmonary effort is normal.   Skin:     General: Skin is warm and dry.   Neurological:      Mental Status: She is alert and oriented to person, place, and time.   Psychiatric:         Mood and Affect: Mood normal.         Behavior: Behavior normal.         Laceration Repair    Date/Time: 2025 8:32 PM    Performed by: Ro Vaughan APRN  Authorized by: Goldie Lee MD    Consent:     Consent obtained:  Verbal    Consent given by:  Patient    Risks, benefits, and alternatives were discussed: yes      Risks discussed:  Infection, pain and poor cosmetic result    Alternatives discussed:  No treatment and delayed treatment  Universal protocol:     Procedure explained and questions answered to patient or proxy's satisfaction: yes      Patient identity confirmed:  Verbally with patient  Anesthesia:     Anesthesia method:  None  Laceration details:     Location:  Leg    Leg location:  R lower leg    Length (cm):  7    Depth (mm):   "0.2  Pre-procedure details:     Preparation:  Patient was prepped and draped in usual sterile fashion  Exploration:     Limited defect created (wound extended): no      Hemostasis achieved with:  Direct pressure  Treatment:     Area cleansed with:  Shur-Clens and soap and water    Amount of cleaning:  Extensive    Irrigation solution:  Sterile water    Irrigation volume:  1000    Irrigation method:  Pressure wash    Debridement:  None    Undermining:  None    Scar revision: no    Skin repair:     Repair method:  Steri-Strips    Number of Steri-Strips:  4  Approximation:     Approximation:  Loose  Repair type:     Repair type:  Simple  Post-procedure details:     Dressing:  Non-adherent dressing    Procedure completion:  Tolerated             ED Course                                           Medical Decision Making  64-year-old female presents with a skin tear sustained when her dog scratched her right medial lower leg.  The patient has a history of lupus and scleroderma.  She also has pulmonary fibrosis.  She was recently hospitalized for 17 days with pneumonia and a UTI and \"no immune system\".  She stopped the CellCept 3 weeks ago.  Patient currently takes Bactrim every other day.  Differential diagnoses include skin tear, laceration.  Discussed this case with Dr. Lee.  Patient is already on Bactrim, additional antibiotics are not being prescribed at this time.  She was irrigated with 1 L of sterile water the area was cleansed with soap and water.  Steri-Strips were applied with loose approximation to allow for any infection.  Patient and  are in agreement with this treatment.  She has a follow-up with her primary care doctor in 2 days.  He will observe the wound as well.  I have advised discussing with her primary care if possible refer to wound care.  Patient and  verbalized understanding.  Reasons for return were discussed with the patient and her  and they both verbalized " understanding.    Problems Addressed:  Skin tear of lower leg without complication, initial encounter: acute illness or injury        Final diagnoses:   Skin tear of lower leg without complication, initial encounter       ED Disposition  ED Disposition       ED Disposition   Discharge    Condition   Stable    Comment   --               Floyd Silva MD  [address]  [place] [address] [phone]    Schedule an appointment as soon as possible for a visit            Medication List      No changes were made to your prescriptions during this visit.

## 2025-04-23 ENCOUNTER — READMISSION MANAGEMENT (OUTPATIENT)
Dept: CALL CENTER | Facility: HOSPITAL | Age: 65
End: 2025-04-23
Payer: MEDICARE

## 2025-04-23 ENCOUNTER — OFFICE VISIT (OUTPATIENT)
Dept: FAMILY MEDICINE CLINIC | Facility: CLINIC | Age: 65
End: 2025-04-23
Payer: MEDICARE

## 2025-04-23 VITALS
SYSTOLIC BLOOD PRESSURE: 116 MMHG | BODY MASS INDEX: 37.56 KG/M2 | RESPIRATION RATE: 18 BRPM | OXYGEN SATURATION: 97 % | HEIGHT: 64 IN | HEART RATE: 95 BPM | WEIGHT: 220 LBS | DIASTOLIC BLOOD PRESSURE: 79 MMHG

## 2025-04-23 DIAGNOSIS — I95.9 HYPOTENSION, UNSPECIFIED HYPOTENSION TYPE: Primary | ICD-10-CM

## 2025-04-23 DIAGNOSIS — S81.811D LACERATION OF RIGHT LOWER EXTREMITY, SUBSEQUENT ENCOUNTER: ICD-10-CM

## 2025-04-23 DIAGNOSIS — M79.89 LEG SWELLING: ICD-10-CM

## 2025-04-23 RX ORDER — HYDROCORTISONE 20 MG/1
TABLET ORAL
COMMUNITY
Start: 2025-04-15

## 2025-04-23 NOTE — PROGRESS NOTES
"Transitional Care Follow Up Visit  Subjective     Tracie Gross is a 64 y.o. female who presents for a transitional care management visit.    Within 48 business hours after discharge our office contacted her via telephone to coordinate her care and needs.      I reviewed and discussed the details of that call along with the discharge summary, hospital problems, inpatient lab results, inpatient diagnostic studies, and consultation reports with Tracie.     Current outpatient and discharge medications have been reconciled for the patient.  Reviewed by: Herberth Brown MA          4/13/2025     3:56 PM   Date of TCM Phone Call   [facility]   Date of Admission 4/11/2025   Date of Discharge 4/13/2025   Discharge Disposition Home or Self Care     Risk for Readmission (LACE) Score: 13 (4/13/2025  6:00 AM)      History of Present Illness   Course During Hospital Stay:  ***     {Common H&P Review Areas:38222}    Review of Systems:  A review of systems was performed, and positive findings are noted in the HPI.    Objective   Physical Exam    Assessment & Plan   {Assess/PlanSmartLinks:12638}             Transcribed from ambient dictation for Floyd Silva MD by eHrberth Brown MA.  04/23/25   10:35 EDT    {ALVERTO Provider Statement:33083::\"Patient or patient representative verbalized consent to the visit recording.\",\"I have personally performed the services described in this document as transcribed by the above individual, and it is both accurate and complete.\"}  "

## 2025-04-23 NOTE — OUTREACH NOTE
AMI Week 2 Survey      Flowsheet Row Responses   Religion facility patient discharged from? [facility]   Does the patient have one of the following disease processes/diagnoses(primary or secondary)? Acute MI (STEMI,NSTEMI)   Week 2 attempt successful? No   Unsuccessful attempts Attempt 1  [At PCP appt]            OMER DEAN - Registered Nurse

## 2025-04-23 NOTE — PROGRESS NOTES
Transitional Care Follow Up Visit  Subjective     Tracie Gross is a 64 y.o. female who presents for a transitional care management visit.    Within 48 business hours after discharge our office contacted her via telephone to coordinate her care and needs.      I reviewed and discussed the details of that call along with the discharge summary, hospital problems, inpatient lab results, inpatient diagnostic studies, and consultation reports with Tracie.     Current outpatient and discharge medications have been reconciled for the patient.  Reviewed by: Floyd Silva MD        4/13/2025     3:56 PM   Date of TCM Phone Call   [facility]   Date of Admission 4/11/2025   Date of Discharge 4/13/2025   Discharge Disposition Home or Self Care   Transitional Care call completed 4/14/25    Risk for Readmission (LACE) Score: 13 (4/13/2025  6:00 AM)    History of Present Illness   Course During Hospital Stay:  Hospital records reviewed    Patient presented to [facility] ER at my direction on 4/11/25 due to hypotension. BP as low as 60/40 at home but 99/67 in ER. EKG revealed ST depression and troponin found to be elevated to 120->107 and BNP 2900. Patient was subsequently taken straight to cath lab for evaluation. LHC revealed clean coronaries and patient subsequently admitted for hypotension w/ demand ischemia (type2 NSTEMI). BMP revealed DANIELLE w/ Cr 1.46. Patient would improve w/ IVF and holding antihypertensives. She was able to discharge home on 4/13/25 w/ PCP f/u and direction to D/C lasix, lisinopril and nifedipine    Overall feeling much better. Still w/ some weakness but overall feeling well. Home health was ordered at discharge. Skilled nursing has done assessment but still waiting on PT.    HTN: 116/79 today w/ HR 95. Generally running 110s at home. Maintained on Ziac 5/3.125mg daily. Rare LH/dizziness w/ standing that quickly resolves    Leg swelling: patient reports leg swelling has been well  controlled recently but is interested in what she should do if swelling returns now that she is off lasix    Laceration: patient reports dog scratching her at the vet recently. Caused skin to tear and was evaluated at urgent care.      The following portions of the patient's history were reviewed and updated as appropriate: allergies, current medications, past family history, past medical history, past social history, past surgical history, and problem list.    Review of Systems:  A review of systems was performed, and positive findings are noted in the HPI.    Objective   Physical Exam  Constitutional:       General: She is not in acute distress.     Appearance: She is well-developed. She is obese.   HENT:      Head: Normocephalic and atraumatic.      Right Ear: Tympanic membrane and external ear normal. There is no impacted cerumen.      Left Ear: Tympanic membrane and external ear normal. There is no impacted cerumen.      Nose: Nose normal.      Mouth/Throat:      Mouth: Mucous membranes are moist.      Pharynx: No oropharyngeal exudate or posterior oropharyngeal erythema.   Eyes:      General: No scleral icterus.        Right eye: No discharge.         Left eye: No discharge.      Extraocular Movements: Extraocular movements intact.      Conjunctiva/sclera: Conjunctivae normal.      Pupils: Pupils are equal, round, and reactive to light.   Neck:      Thyroid: No thyromegaly.   Cardiovascular:      Rate and Rhythm: Normal rate and regular rhythm.      Heart sounds: Normal heart sounds. No murmur heard.  Pulmonary:      Effort: Pulmonary effort is normal. No respiratory distress.      Breath sounds: Normal breath sounds. No wheezing or rales.   Abdominal:      General: Bowel sounds are normal. There is no distension.      Palpations: Abdomen is soft.      Tenderness: There is no abdominal tenderness.   Musculoskeletal:         General: No deformity. Normal range of motion.      Cervical back: Normal range of  motion and neck supple.   Skin:     General: Skin is warm and dry.      Findings: Lesion (skin tear to RLE. Tear well approximated by steri strips. No erythema or drainage) present. No rash.   Neurological:      General: No focal deficit present.      Mental Status: She is alert and oriented to person, place, and time. Mental status is at baseline.      Cranial Nerves: No cranial nerve deficit.      Sensory: No sensory deficit.   Psychiatric:         Behavior: Behavior normal.         Thought Content: Thought content normal.     Assessment & Plan   Diagnoses and all orders for this visit:    1. Hypotension, unspecified hypotension type (Primary): 116/79 today w/ HR 95   - Cont Ziac 5/3.125mg daily   - Plan for HH PT    2. Leg swelling   - Recommend compression stockings and elevate legs   - Ok for lasix 20mg PRN    3. Laceration of right lower extremity, subsequent encounter: no evidence for infection. Already maintained on Bactrim QOD for ppx   - Cont supportive care     46 minutes spent on the day of service face-to-face with the patient obtaining history, performing physical, reviewing chart/data, placing orders, and documenting.

## 2025-04-24 ENCOUNTER — DOCUMENTATION (OUTPATIENT)
Dept: CARDIAC REHAB | Facility: HOSPITAL | Age: 65
End: 2025-04-24
Payer: MEDICARE

## 2025-04-24 LAB — FUNGUS WND CULT: ABNORMAL

## 2025-04-25 ENCOUNTER — TELEPHONE (OUTPATIENT)
Dept: FAMILY MEDICINE CLINIC | Facility: CLINIC | Age: 65
End: 2025-04-25
Payer: MEDICARE

## 2025-04-28 ENCOUNTER — TRANSCRIBE ORDERS (OUTPATIENT)
Dept: ADMINISTRATIVE | Facility: HOSPITAL | Age: 65
End: 2025-04-28
Payer: MEDICARE

## 2025-04-28 DIAGNOSIS — J84.115 RESPIRATORY BRONCHIOLITIS INTERSTITIAL LUNG DISEASE: Primary | ICD-10-CM

## 2025-04-30 ENCOUNTER — READMISSION MANAGEMENT (OUTPATIENT)
Dept: CALL CENTER | Facility: HOSPITAL | Age: 65
End: 2025-04-30
Payer: MEDICARE

## 2025-04-30 NOTE — OUTREACH NOTE
AMI Week 3 Survey      Flowsheet Row Responses   [facility] patient discharged from? [facility]   Does the patient have one of the following disease processes/diagnoses(primary or secondary)? Acute MI (STEMI,NSTEMI)   Week 3 attempt successful? Yes   Call start time 0844   Call end time 0847   Discharge diagnosis Angina at rest, posterior MI, stress test negative   Meds reviewed with patient/caregiver? Yes   Is the patient having any side effects they believe may be caused by any medication additions or changes? No   Does the patient have all prescriptions related to this admission filled (includes statins,anticoagulants,HTN meds,anti-arrhythmia meds) Yes   Is the patient taking all medications as directed (includes completed medication regime)? Yes   Does the patient have a primary care provider?  Yes   Has the patient kept scheduled appointments due by today? Yes   What is the Home health agency?  [facility]   Psychosocial issues? No   Did the patient receive a copy of their discharge instructions? Yes   Nursing interventions Reviewed instructions with patient   What is the patient's perception of their health status since discharge? Improving   Nursing interventions Nurse provided patient education   Is the patient/caregiver able to teach back signs and symptoms of when to call for help immediately: Sudden chest discomfort, Shortness of breath at any time, Nausea or vomiting, Dizziness or lightheadedness   Nursing interventions Nurse provided patient education   Is the patient/caregiver able to teach back lifestyle changes to help prevent MIs Heart healthy diet, Regular exercise as approved by provider, Maintaining a healthy weight, Reducing stress   Is the patient/caregiver able to teach back ways to prevent a second heart attack: Take medications, Follow up with MD, Manage risk factors   If the patient is a current smoker, are they able to teach back resources for cessation? Not a smoker   Is the  patient/caregiver able to teach back the hierarchy of who to call/visit for symptoms/problems? PCP, Specialist, Home health nurse, Urgent Care, ED, 911 Yes   Week 3 call completed? Yes   Is the patient interested in additional calls from an ambulatory ? No   Would this patient benefit from a Referral to [facility] Social Work? No   Call end time [phone]            Kellee AGUERO - Registered Nurse

## 2025-05-05 LAB
MYCOBACTERIUM SPEC CULT: NORMAL
NIGHT BLUE STAIN TISS: NORMAL

## 2025-05-09 ENCOUNTER — OFFICE (AMBULATORY)
Dept: URBAN - METROPOLITAN AREA CLINIC 64 | Facility: CLINIC | Age: 65
End: 2025-05-09
Payer: MEDICARE

## 2025-05-09 VITALS
WEIGHT: 293 LBS | SYSTOLIC BLOOD PRESSURE: 97 MMHG | HEIGHT: 65 IN | DIASTOLIC BLOOD PRESSURE: 63 MMHG | HEART RATE: 97 BPM

## 2025-05-09 DIAGNOSIS — Z15.09 GENETIC SUSCEPTIBILITY TO OTHER MALIGNANT NEOPLASM: ICD-10-CM

## 2025-05-09 DIAGNOSIS — R93.5 ABNORMAL FINDINGS ON DIAGNOSTIC IMAGING OF OTHER ABDOMINAL R: ICD-10-CM

## 2025-05-09 DIAGNOSIS — Z80.0 FAMILY HISTORY OF MALIGNANT NEOPLASM OF DIGESTIVE ORGANS: ICD-10-CM

## 2025-05-09 DIAGNOSIS — D68.00 VON WILLEBRAND DISEASE, UNSPECIFIED: ICD-10-CM

## 2025-05-09 PROCEDURE — 99214 OFFICE O/P EST MOD 30 MIN: CPT | Performed by: NURSE PRACTITIONER

## 2025-05-13 ENCOUNTER — TELEPHONE (OUTPATIENT)
Dept: FAMILY MEDICINE CLINIC | Facility: CLINIC | Age: 65
End: 2025-05-13
Payer: MEDICARE

## 2025-05-13 NOTE — TELEPHONE ENCOUNTER
Nurse with  Home Care called to report patient has a skin tear on the inside of her right ankle. Patient said Dr Silva was aware of it. Patient has been using Octo-Foam dressing and changing every few days but not putting any cream/ointment on the skin tear. Nurse with home health wants verbal orders from Dr Silva if that is satisfactory or if the nurse needs to do something different. Please advise.

## 2025-05-14 NOTE — TELEPHONE ENCOUNTER
Hub to relay:  I called and lvm for kadie to call our office back for this message.    I last saw it 3 weeks ago. Ok to address w/ additional measures as wound care deems fit

## 2025-05-22 ENCOUNTER — TELEPHONE (OUTPATIENT)
Dept: CARDIAC REHAB | Facility: HOSPITAL | Age: 65
End: 2025-05-22
Payer: MEDICARE

## 2025-05-22 ENCOUNTER — TELEPHONE (OUTPATIENT)
Dept: FAMILY MEDICINE CLINIC | Facility: CLINIC | Age: 65
End: 2025-05-22

## 2025-05-22 NOTE — TELEPHONE ENCOUNTER
Caller: [facility]    Relationship to patient: Home Health    Best call back number: [phone]    Patient is needing: HOME HEALTH CALLING TO DISCUSS LASIX AS PATIENT HAS GAINED 4 POUNDS IN ONE DAY. HOME HEALTH WANTS TO KNOW IF PCP WANTS TO INCREASE A PRN DOSE. PLEASE CALL BACK TO ADVISE.

## 2025-05-23 DIAGNOSIS — M34.9 SCLERODERMA: ICD-10-CM

## 2025-05-23 DIAGNOSIS — Z00.00 MEDICARE ANNUAL WELLNESS VISIT, SUBSEQUENT: ICD-10-CM

## 2025-05-23 DIAGNOSIS — M32.9 SLE (SYSTEMIC LUPUS ERYTHEMATOSUS RELATED SYNDROME): ICD-10-CM

## 2025-05-23 RX ORDER — CALCIUM CARBONATE/VITAMIN D3 600MG-5MCG
1 TABLET ORAL 2 TIMES DAILY
Qty: 60 TABLET | Refills: 6 | Status: SHIPPED | OUTPATIENT
Start: 2025-05-23

## 2025-05-23 RX ORDER — CYCLOBENZAPRINE HCL 10 MG
10 TABLET ORAL 3 TIMES DAILY PRN
Qty: 45 TABLET | Refills: 1 | Status: SHIPPED | OUTPATIENT
Start: 2025-05-23

## 2025-05-27 RX ORDER — DEXTROMETHORPHAN HYDROBROMIDE AND PROMETHAZINE HYDROCHLORIDE 15; 6.25 MG/5ML; MG/5ML
5 SYRUP ORAL 4 TIMES DAILY PRN
Qty: 240 ML | Refills: 1 | Status: SHIPPED | OUTPATIENT
Start: 2025-05-27

## 2025-06-03 ENCOUNTER — HOSPITAL ENCOUNTER (OUTPATIENT)
Dept: CT IMAGING | Facility: HOSPITAL | Age: 65
Discharge: HOME | End: 2025-06-03
Admitting: INTERNAL MEDICINE
Payer: MEDICARE

## 2025-06-03 DIAGNOSIS — J84.115 RESPIRATORY BRONCHIOLITIS INTERSTITIAL LUNG DISEASE: ICD-10-CM

## 2025-06-03 PROCEDURE — 71250 CT THORAX DX C-: CPT

## 2025-06-05 ENCOUNTER — OFFICE VISIT (OUTPATIENT)
Dept: FAMILY MEDICINE CLINIC | Facility: CLINIC | Age: 65
End: 2025-06-05
Payer: MEDICARE

## 2025-06-05 VITALS
OXYGEN SATURATION: 95 % | HEIGHT: 64 IN | BODY MASS INDEX: 41.28 KG/M2 | HEART RATE: 76 BPM | RESPIRATION RATE: 18 BRPM | DIASTOLIC BLOOD PRESSURE: 79 MMHG | WEIGHT: 241.8 LBS | SYSTOLIC BLOOD PRESSURE: 113 MMHG

## 2025-06-05 DIAGNOSIS — G89.29 OTHER CHRONIC PAIN: ICD-10-CM

## 2025-06-05 DIAGNOSIS — F31.9 BIPOLAR AFFECTIVE DISORDER, REMISSION STATUS UNSPECIFIED: ICD-10-CM

## 2025-06-05 DIAGNOSIS — I95.9 HYPOTENSION, UNSPECIFIED HYPOTENSION TYPE: Primary | ICD-10-CM

## 2025-06-05 DIAGNOSIS — J84.10 PULMONARY FIBROSIS: ICD-10-CM

## 2025-06-05 RX ORDER — HYDROXYZINE HYDROCHLORIDE 25 MG/1
TABLET, FILM COATED ORAL
COMMUNITY

## 2025-06-05 RX ORDER — FLUTICASONE FUROATE, UMECLIDINIUM BROMIDE AND VILANTEROL TRIFENATATE 100; 62.5; 25 UG/1; UG/1; UG/1
1 POWDER RESPIRATORY (INHALATION)
COMMUNITY
Start: 2025-05-29

## 2025-06-05 RX ORDER — FUROSEMIDE 20 MG/1
TABLET ORAL
COMMUNITY

## 2025-06-05 NOTE — PROGRESS NOTES
Chief Complaint   Patient presents with    Hypotension     HPI  Tracie Gross is a 64 y.o. female that presents for   Chief Complaint   Patient presents with    Hypotension     Hypotension: 113/79 today. Numerous medications discontinued after hospitalization in April. Now maintained on Ziac 5/3.125 daily. Reports daily HA as well as LH/dizziness if getting up too quickly.     Bipolar disorder: Seroquel was reduced to 50mg nightly 4 months ago but this resulted in increased depression. She has since increased back to 100mg nightly along w/ lamotrigine 200mg daily. Reasonably controlled at this time    Pulmonary fibrosis: felt to be associated w/ scleroderma. Patient reports SOB and orthopnea. She is maintained on Trelegy and hydrocortisone 20mg daily, which is quite helpful. Follows w/ pulmonary- Draw w/ next appt in July. 6/2025 CT chest pending.     Chronic pain: patient continues to report chronic pain of low back, L knee. Has diffuse joint pain but these are most bothersome of late. Maintained on gabapentin 1200/600/1200 and oxycodone 10mg PRN (1-2x/day). Avoiding NSAIDs 2/2 CKD and steroids 2/2 hx AVN. Last steroid injection of L knee was 6/2024.     Review of Systems  Pertinent positives of ROS documented in HPI    The following portions of the patient's history were reviewed and updated as appropriate: problem list, past medical history, past surgical history, allergies, current medications, past social history and past family history.    Problem List Tab  Patient History Tab  Immunizations Tab  Medications Tab  Chart Review Tab  Care Everywhere Tab  Synopsis Tab    PE  Vitals:    06/05/25 1136   BP: 113/79   Pulse: 76   Resp: 18   SpO2: 95%     Body mass index is 41.5 kg/m².  General: Obese, NAD  Head: AT/NC  Eyes: EOMI, anicteric sclera  Resp: Bibasilar crackles, SCR, BS equal  CV: RRR w/o m/r/g; 2+ pulses  GI: Soft, NT/ND, +BS  MSK: FROM, no deformity, no edema  Skin: Warm, dry, intact  Neuro: Alert and  oriented. No focal deficits  Psych: Appropriate mood and affect    Imaging  XR Chest 1 View  Result Date: 4/11/2025  Impression: Improvement in appearance of the lungs since prior study. There probably are residual areas of airspace disease. Electronically Signed: Alen Rasmussen MD  4/11/2025 12:55 PM EDT  Workstation ID: [id number]    IR insert non-tunneled central line 5+  Result Date: 3/28/2025  Successful placement of left internal jugular central venous catheter. Electronically Signed: Reji Romero MD  3/28/2025 8:55 AM EDT  Workstation ID: [id number]    CT Chest Without Contrast Diagnostic  Result Date: 3/23/2025  Impression: 1.Multifocal patchy opacities noted throughout the lungs bilaterally, worse in the perihilar region. Findings are concerning for multifocal pneumonia. Less likely, this may represent pulmonary hemorrhage 2.Additional nonemergent findings as characterized above 3.Scattered prominent to mildly enlarged mediastinal lymph nodes, likely reactive. Electronically Signed: Rafael Archer DO  3/23/2025 5:15 PM EDT  Workstation ID: [id number]    XR Chest 1 View  Result Date: 3/23/2025  Impression: 1.Worsening multifocal patchy opacities scattered throughout the lungs. This most likely represents multifocal pneumonia, although pulmonary edema is also a possibility 2.Trace bilateral pleural effusions. Electronically Signed: Rafael Archer DO  3/23/2025 12:30 PM EDT  Workstation ID: [id number]    XR Chest 1 View  Result Date: 3/19/2025  Impression: Multifocal patchy nodular densities in both lungs, thought to be similar to 3/15/2025. Correlate for pneumonia. Electronically Signed: Barb Millan MD  3/19/2025 2:35 PM EDT  Workstation ID: [id number]    US Renal Bilateral  Result Date: 3/16/2025  Impression: No hydronephrosis or acute sonographic abnormality. Electronically Signed: Tomy Orellana MD  3/16/2025 5:38 PM EDT  Workstation ID: [id number]    NM Lung Ventilation Perfusion  Result Date:  3/16/2025  Impression: Negative for pulmonary embolism. Electronically Signed: Nimesh Dash MD  3/16/2025 3:17 PM EDT  Workstation ID: [id number]    XR Chest 1 View  Result Date: 3/15/2025  Impression: Mild hazy left greater than right basal opacities, which could represent atelectasis or pneumonia. Electronically Signed: Josh SHIRIN Lorenzo  3/15/2025 4:18 PM EDT  Workstation ID: [id number]    MRI Abdomen With & Without Contrast  Result Date: 3/15/2025  Impression: 1. Multiple small round eccentric lesions scattered throughout the colon suspicious for polyps based on patient history. Recommend correlation with colonoscopy. 2. No suspicious adenopathy. 3. Similar chronic dilation of the biliary tree with blunting at the ampulla. Chronicity favors physiologic changes status post cholecystectomy and/or nonobstructing ampullary stricture. 4. Lipomatous infiltration and pancreatic atrophy. Negative for active inflammation or suspicious mass. 5. Cardiomegaly. Mild body wall edema. Electronically Signed: Nimesh Dash MD  3/15/2025 2:37 PM EDT  Workstation ID: [id number]    CT Head Without Contrast  Result Date: 2/25/2025  Impression: No acute intracranial process. Electronically Signed: Simran Riojas MD  2/25/2025 6:14 AM EST  Workstation ID: [id number]    Mammo Screening Modified With Tomosynthesis Left With CAD  Result Date: 2/24/2025  No mammographic evidence of malignancy.  Recommend annual screening mammography.  BI-RADS ASSESSMENT: Category 1: Negative  Note: It has been reported that there is approximately a 15% false negative rate in mammography.  Therefore, management of a palpable abnormality should not be deferred because of a negative mammogram.  2/24/2025 4:14 PM by Terrance Muhammad on Workstation: [id number]      DEXA Bone Density Axial  Result Date: 2/24/2025  Osteoporosis   Copies of the computerized summary reports can be obtained from [employer] via the health information department of [facility].   2/24/2025  3:54 PM by Terrance Muhammad on Workstation: [id number]      Assessment & Plan   Tracie Gross is a 64 y.o. female that presents for   Chief Complaint   Patient presents with    Hypotension     Diagnoses and all orders for this visit:    1. Hypotension, unspecified hypotension type (Primary): /79 today w/ HR 76   - D/C Ziac 5/3.125 daily   - Monitor BP and HR at home- restart if >100    2. Bipolar affective disorder, remission status unspecified: reasonably controlled at this time   - Cont home Lamictal 200mg daily and Seroquel 100mg nightly    3. Pulmonary fibrosis: felt to be 2/2 scleroderma   - Cont home Trelegy and HCT 20mg daily per pulm   - Cont pulm f/u- Draw    4. Other chronic pain: limited options for pain control at this point. No NSAIDs 2/2 CKD. Steroids limited by AVN and already on HCT   - Cont home gabapentin 1200/600/1200 and oxycodone 10mg PRN (1-2x/day)       Return in about 12 weeks (around 8/28/2025) for Medicare Wellness.  Answers submitted by the patient for this visit:  Weight Management (Submitted on 5/29/2025)  Chief Complaint: Weight Management  Weight: increased  Weight change (lbs): 10  Weight loss treatment: portion control, starting exercise program  Eating habit changes: No changes  Energy level: unchanged  Physical activity tolerance: improved  Additional information: Health

## 2025-06-10 RX ORDER — CEPHALEXIN 500 MG/1
500 CAPSULE ORAL 3 TIMES DAILY
Qty: 21 CAPSULE | Refills: 0 | Status: SHIPPED | OUTPATIENT
Start: 2025-06-10 | End: 2025-06-17

## 2025-06-13 RX ORDER — FERROUS SULFATE 325(65) MG
1 TABLET ORAL
Qty: 30 TABLET | Refills: 1 | OUTPATIENT
Start: 2025-06-13

## 2025-06-19 DIAGNOSIS — J30.9 ALLERGIC RHINITIS, UNSPECIFIED SEASONALITY, UNSPECIFIED TRIGGER: ICD-10-CM

## 2025-06-20 RX ORDER — FLUTICASONE PROPIONATE 50 MCG
SPRAY, SUSPENSION (ML) NASAL
Qty: 16 G | Refills: 1 | Status: SHIPPED | OUTPATIENT
Start: 2025-06-20

## 2025-06-28 DIAGNOSIS — G47.00 INSOMNIA, UNSPECIFIED TYPE: ICD-10-CM

## 2025-06-28 DIAGNOSIS — R63.5 WEIGHT GAIN: ICD-10-CM

## 2025-06-30 RX ORDER — ALPRAZOLAM 1 MG/1
1 TABLET ORAL NIGHTLY PRN
Qty: 30 TABLET | Refills: 2 | Status: SHIPPED | OUTPATIENT
Start: 2025-06-30

## 2025-07-15 DIAGNOSIS — M87.052 AVASCULAR NECROSIS OF FEMORAL HEAD, LEFT: ICD-10-CM

## 2025-07-15 RX ORDER — OXYCODONE HYDROCHLORIDE 10 MG/1
10 TABLET ORAL EVERY 6 HOURS PRN
Qty: 120 TABLET | Refills: 0 | Status: SHIPPED | OUTPATIENT
Start: 2025-07-15

## 2025-07-31 ENCOUNTER — ANESTHESIA EVENT (OUTPATIENT)
Dept: GASTROENTEROLOGY | Facility: HOSPITAL | Age: 65
End: 2025-07-31
Payer: MEDICARE

## 2025-07-31 ENCOUNTER — HOSPITAL ENCOUNTER (OUTPATIENT)
Facility: HOSPITAL | Age: 65
Setting detail: HOSPITAL OUTPATIENT SURGERY
Discharge: HOME | End: 2025-07-31
Attending: INTERNAL MEDICINE | Admitting: INTERNAL MEDICINE
Payer: MEDICARE

## 2025-07-31 ENCOUNTER — ON CAMPUS - OUTPATIENT (AMBULATORY)
Dept: URBAN - METROPOLITAN AREA HOSPITAL 85 | Facility: HOSPITAL | Age: 65
End: 2025-07-31
Payer: MEDICARE

## 2025-07-31 ENCOUNTER — ANESTHESIA (OUTPATIENT)
Dept: GASTROENTEROLOGY | Facility: HOSPITAL | Age: 65
End: 2025-07-31
Payer: MEDICARE

## 2025-07-31 VITALS
OXYGEN SATURATION: 94 % | SYSTOLIC BLOOD PRESSURE: 118 MMHG | HEART RATE: 77 BPM | WEIGHT: 247 LBS | DIASTOLIC BLOOD PRESSURE: 71 MMHG | HEIGHT: 64 IN | RESPIRATION RATE: 15 BRPM | TEMPERATURE: 98 F | BODY MASS INDEX: 42.17 KG/M2

## 2025-07-31 DIAGNOSIS — Z15.09 GENETIC SUSCEPTIBILITY TO OTHER MALIGNANT NEOPLASM: ICD-10-CM

## 2025-07-31 DIAGNOSIS — R93.5 ABNORMAL ABDOMINAL ULTRASOUND: ICD-10-CM

## 2025-07-31 DIAGNOSIS — Z80.0 FAMILY HISTORY OF MALIGNANT NEOPLASM OF DIGESTIVE ORGANS: ICD-10-CM

## 2025-07-31 DIAGNOSIS — Z80.0 FAMILY HISTORY OF MALIGNANT NEOPLASM OF GASTROINTESTINAL TRACT: ICD-10-CM

## 2025-07-31 DIAGNOSIS — D12.2 BENIGN NEOPLASM OF ASCENDING COLON: ICD-10-CM

## 2025-07-31 DIAGNOSIS — Z08 ENCOUNTER FOR FOLLOW-UP EXAMINATION AFTER COMPLETED TREATMEN: ICD-10-CM

## 2025-07-31 PROCEDURE — 45385 COLONOSCOPY W/LESION REMOVAL: CPT | Mod: 33 | Performed by: INTERNAL MEDICINE

## 2025-07-31 PROCEDURE — 25010000002 PROPOFOL 200 MG/20ML EMULSION: Performed by: NURSE ANESTHETIST, CERTIFIED REGISTERED

## 2025-07-31 PROCEDURE — 88305 TISSUE EXAM BY PATHOLOGIST: CPT | Performed by: INTERNAL MEDICINE

## 2025-07-31 PROCEDURE — 25010000002 LIDOCAINE PF 1% 1 % SOLUTION: Performed by: NURSE ANESTHETIST, CERTIFIED REGISTERED

## 2025-07-31 PROCEDURE — 25010000002 PROPOFOL 10 MG/ML EMULSION: Performed by: NURSE ANESTHETIST, CERTIFIED REGISTERED

## 2025-07-31 PROCEDURE — 25810000003 SODIUM CHLORIDE 0.9 % SOLUTION: Performed by: NURSE ANESTHETIST, CERTIFIED REGISTERED

## 2025-07-31 RX ORDER — PROPOFOL 10 MG/ML
INJECTION, EMULSION INTRAVENOUS AS NEEDED
Status: DISCONTINUED | OUTPATIENT
Start: 2025-07-31 | End: 2025-07-31 | Stop reason: SURG

## 2025-07-31 RX ORDER — LIDOCAINE HYDROCHLORIDE 10 MG/ML
INJECTION, SOLUTION EPIDURAL; INFILTRATION; INTRACAUDAL; PERINEURAL AS NEEDED
Status: DISCONTINUED | OUTPATIENT
Start: 2025-07-31 | End: 2025-07-31 | Stop reason: SURG

## 2025-07-31 RX ORDER — SODIUM CHLORIDE 9 MG/ML
INJECTION, SOLUTION INTRAVENOUS CONTINUOUS PRN
Status: DISCONTINUED | OUTPATIENT
Start: 2025-07-31 | End: 2025-07-31 | Stop reason: SURG

## 2025-07-31 RX ORDER — ONDANSETRON 2 MG/ML
4 INJECTION INTRAMUSCULAR; INTRAVENOUS ONCE AS NEEDED
Status: DISCONTINUED | OUTPATIENT
Start: 2025-07-31 | End: 2025-07-31 | Stop reason: HOSPADM

## 2025-07-31 RX ADMIN — PROPOFOL 175 MCG/KG/MIN: 10 INJECTION, EMULSION INTRAVENOUS at 11:25

## 2025-07-31 RX ADMIN — SODIUM CHLORIDE: 9 INJECTION, SOLUTION INTRAVENOUS at 11:17

## 2025-07-31 RX ADMIN — LIDOCAINE HYDROCHLORIDE 50 MG: 10 INJECTION, SOLUTION EPIDURAL; INFILTRATION; INTRACAUDAL; PERINEURAL at 11:25

## 2025-07-31 RX ADMIN — PROPOFOL 80 MG: 10 INJECTION, EMULSION INTRAVENOUS at 11:25

## 2025-07-31 NOTE — ANESTHESIA PREPROCEDURE EVALUATION
Anesthesia Evaluation     Patient summary reviewed   NPO Solid Status: > 8 hours  NPO Liquid Status: > 8 hours           Airway   Mallampati: I  TM distance: >3 FB  Neck ROM: full  No difficulty expected  Dental - normal exam     Pulmonary - normal exam   (+) pneumonia , asthma,shortness of breath, sleep apnea    ROS comment: Recently diagnosed pulm fibrosis  On daily inhalers  No acute issues  Cardiovascular - normal exam    ECG reviewed    (+) hypertension, valvular problems/murmurs, hyperlipidemia      Neuro/Psych  (+) numbness, psychiatric history  GI/Hepatic/Renal/Endo    (+) obesity, morbid obesity, GERD, PUD, hepatitis, liver disease, renal disease-, thyroid problem hypothyroidism    Musculoskeletal     Abdominal  - normal exam    Bowel sounds: normal.   Substance History      OB/GYN          Other   arthritis,   history of cancer    ROS/Med Hx Other: NORMAL EF 11/2024              Anesthesia Plan    ASA 4     general   total IV anesthesia  intravenous induction     Anesthetic plan, risks, benefits, and alternatives have been provided, discussed and informed consent has been obtained with: patient.  Pre-procedure education provided  Plan discussed with CRNA.    CODE STATUS:

## 2025-07-31 NOTE — DISCHARGE INSTRUCTIONS
A responsible adult should stay with you and you should rest quietly for the rest of the day.    Do not drink alcohol, drive, operate any heavy machinery or power tools or make any legal/important decisions for the next 24 hours.     If you begin to experience severe pain, increased shortness of breath, racing heartbeat or a fever above 101 F, seek immediate medical attention.     Follow up with MD as instructed. Call office for results in 3 to 5 days if needed.     Office number: [phone]    Findings:   Sessile 1 cm polyp in the ascending colon removed with hot snare polypectomy.  There was a second adjacent 1 cm polyp in the ascending colon removed with hot snare polypectomy.  No other polyps were noted.  There was a shallow area of excoriation or ulceration in the rectum suggestive of rectal prolapse.     Impression:  1.  Ascending colon polyps     Recommendations:  1.  Regular high-fiber diet  2.  Repeat colonoscopy in 1 year  3.  Follow-up in my office as needed

## 2025-07-31 NOTE — H&P
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     History of present illness:      Tracie Gross is a 64 y.o. female who presents today for Procedure(s):  COLONOSCOPY for the indications listed below.     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     Pre-operatively, I reviewed the indication(s) for the procedure, the risks of the procedure [including but not limited to: unexpected bleeding possibly requiring hospitalization and/or unplanned repeat procedures, perforation possibly requiring surgical treatment, missed lesions and complications of sedation/MAC (also explained by anesthesia staff)].     I have evaluated the patient for risks associated with the planned anesthesia and the procedure to be performed and find the patient an acceptable candidate for IV sedation.    Multiple opportunities were provided for any questions or concerns, and all questions were answered satisfactorily before any anesthesia was administered. We will proceed with the planned procedure.    Past Medical History:  Past Medical History:   Diagnosis Date    Allergic 01/01/1998    Anemia     Ankle sprain     Anxiety     Arthritis     Arthritis of back 0ll1/01/2013    Arthritis of neck 01/01/2005    Asthma     Bipolar affective disorder     Bleeding disorder 1986    Breast cancer 1985    s/p R mastectomy    Bursitis of hip [id number]    Cervical disc disorder 01/01/2006    CTS (carpal tunnel syndrome)     Depression 09/01/2000    Fracture of wrist 01/01/1995    Fracture, foot     Frozen shoulder     GERD (gastroesophageal reflux disease) 1993    H/O breast reconstruction     SEVERAL    H/O  laminectomy     Hamartoma     Hip arthrosis 01/01/2013    History of medical problems     History of transfusion 1986    Hx of bilateral oophorectomy     Hyperlipidemia     Hypertension     Hypothyroidism     Infectious viral hepatitis     Inflammatory bowel disease 1992    Man. EGD/colonoscopy annually (9/2021)    Injury of back 12/01/1996    Irritable bowel syndrome     Kienbock's disease, right     WRIST    Knee swelling 01/01/2007    Low back pain 1991    Low back strain     Lumbosacral disc disease 01/01/1995    Had 2 lumber surgeries    Lupus     [place]    Monahan syndrome     Man. EGD/colonoscopy annually (9/2021). MRI alternating w/ EUS annually for pancreatic screen    MRSA infection     Neuroma of foot     Obesity     Osteopenia     Osteoporosis     maintained on Prolia through Karen    Peptic ulceration     Periarthritis of shoulder 04/01/2022    Pneumonia     Pulmonary fibrosis     Raynaud disease     Renal insufficiency     Rotator cuff syndrome [id number]    Scleroderma     Shortness of breath 00/00/2020    Sleep apnea     cpap    Squamous cell cancer of lip     Tear of meniscus of knee     Tendinitis of knee     Thoracic disc disorder     Von Willebrand disease     Wrist sprain        Past Surgical History:  Past Surgical History:   Procedure Laterality Date    APPENDECTOMY      ARM DEBRIDEMENT Right     X 3    BACK SURGERY      Vetas- cervical fusion    BACK SURGERY      lumbar decomp    BREAST BIOPSY      BREAST LUMPECTOMY      BREAST RECONSTRUCTION Right     BREAST RECONSTRUCTION Right     BRONCHOSCOPY N/A 03/24/2025    Procedure: BRONCHOSCOPY with bilateral lung washing;  Surgeon: Isaac Vazquez MD;  Location: [facility] ENDOSCOPY;  Service: Pulmonary;  Laterality: N/A;  postop:    CARDIAC CATHETERIZATION  1990    no stents placed    CARDIAC CATHETERIZATION N/A 4/11/2025    Procedure: Left Heart Cath;  Surgeon: Elian Benavides MD;  Location: [facility] CATH INVASIVE LOCATION;  Service: Cardiology;   Laterality: N/A;    CARDIAC CATHETERIZATION N/A 2025    Procedure: Coronary angiography;  Surgeon: Elian Benavides MD;  Location: [facility] CATH INVASIVE LOCATION;  Service: Cardiology;  Laterality: N/A;    CARPAL TUNNEL RELEASE  Rt wrist     SECTION  ,     CHOLECYSTECTOMY      COLONOSCOPY      COLONOSCOPY N/A 2020    Procedure: COLONOSCOPY;  Surgeon: Cayden Conway MD;  Location: [facility] ENDOSCOPY;  Service: Gastroenterology;  Laterality: N/A;  rectal ulcer    COLONOSCOPY N/A 2021    Procedure: COLONOSCOPY WITH POLYPECTOMY X 1;  Surgeon: Cayden Conway MD;  Location: [facility] ENDOSCOPY;  Service: Gastroenterology;  Laterality: N/A;  Post: COLON POLYP    COLONOSCOPY N/A 2023    Procedure: COLONOSCOPY with polypectomy x 1, endoscopic clipping x 1;  Surgeon: Cayden Conway MD;  Location: [facility] ENDOSCOPY;  Service: Gastroenterology;  Laterality: N/A;    COLONOSCOPY N/A 10/01/2024    Procedure: COLONOSCOPY;  Surgeon: Cayden Conway MD;  Location: [facility] ENDOSCOPY;  Service: Gastroenterology;  Laterality: N/A;  normal    COSMETIC SURGERY  [id number]    ENDOSCOPY      ENDOSCOPY N/A 2020    Procedure: ESOPHAGOGASTRODUODENOSCOPY;  Surgeon: Cayden Conway MD;  Location: [facility] ENDOSCOPY;  Service: Gastroenterology;  Laterality: N/A;  Normal EGD    ENDOSCOPY N/A 2021    Procedure: ESOPHAGOGASTRODUODENOSCOPY;  Surgeon: Cayden Conway MD;  Location: [facility] ENDOSCOPY;  Service: Gastroenterology;  Laterality: N/A;  Post: normal egd    ENDOSCOPY N/A 10/01/2024    Procedure: ESOPHAGOGASTRODUODENOSCOPY;  Surgeon: Cayden Conway MD;  Location: [facility] ENDOSCOPY;  Service: Gastroenterology;  Laterality: N/A;  normal    EXPLORATORY LAPAROTOMY      scar tissue removal    EYE SURGERY      FINGER SURGERY Right     ring finger mass excision    HAND SURGERY  Rt wrist  10/15    HIP SURGERY      HYSTERECTOMY      PARTIAL    JOINT  REPLACEMENT Right     hip and knee    KNEE ARTHROSCOPY Left 2020    Procedure: LEFT KNEE SCOPE with partial lateral meniscectomy;  Surgeon: Chau Perez MD;  Location: [facility] MAIN OR;  Service: Orthopedics    KNEE ARTHROSCOPY  Rt knee      KNEE SURGERY  Rtf knee      LAMINECTOMY      LASIK      LASIK/ LASIK ENHANCEMENT    MASTECTOMY RADICAL Right     NECK SURGERY  06    OOPHORECTOMY Bilateral     SHOULDER SURGERY  2023    SPINE SURGERY      SPLENECTOMY      SUBTOTAL HYSTERECTOMY      TOTAL HIP ARTHROPLASTY Left 2023    TOTAL SHOULDER ARTHROPLASTY W/ DISTAL CLAVICLE EXCISION Right 2023    Procedure: TOTAL SHOULDER REVERSE ARTHROPLASTY;  Surgeon: Floyd Ruiz MD;  Location: [facility] MAIN OR;  Service: Orthopedics;  Laterality: Right;    TRIGGER POINT INJECTION      TUBAL ABDOMINAL LIGATION      UPPER ENDOSCOPIC ULTRASOUND W/ FNA N/A 2021    Procedure: ENDOSCOPIC ULTRASOUND WITH ESOPHAGOGASTRODUODENOSCOPY;  Surgeon: Luis E Negron MD;  Location: [facility] ENDOSCOPY;  Service: Gastroenterology;  Laterality: N/A;  Post: GASTROPARESIS, DILATED COMMON BILE DUCT, FUNDIC POLYP, DUODENITIS, NORMAL PANCREAS, HIATAL HERNIA    WRIST SURGERY Right     distal radius head removal (bone dead)  plates and screws decomp lunate       Social History:  Social History     Tobacco Use    Smoking status: Former     Current packs/day: 0.00     Average packs/day: 0.3 packs/day for 10.0 years (2.5 ttl pk-yrs)     Types: Cigarettes     Start date: 1984     Quit date: 1994     Years since quittin.6     Passive exposure: Past    Smokeless tobacco: Never    Tobacco comments:     Off and on for  those Year's   Vaping Use    Vaping status: Never Used   Substance Use Topics    Alcohol use: Not Currently     Comment: Rarely    Drug use: No       Family History:  Family History   Problem Relation Age of Onset    Colon cancer Mother     Heart disease Mother     Cancer  Mother         Colon    Arthritis Mother     Kidney disease Father     Heart disease Father     Depression Father         Bladder cancer    Hyperlipidemia Father     Vision loss Father     Hypertension Father     Colon cancer Sister     Diabetes Sister     Heart disease Sister     Von Willebrand disease Sister     Von Willebrand disease Brother     Cancer Brother     Von Willebrand disease Son     Breast cancer Son     Diabetes Paternal Grandmother     Stroke Paternal Grandmother     Colon cancer Other     Colon cancer Son     Von Willebrand disease Son     Breast cancer Son     Cancer Son     Cancer Sister         Melanoma, colon, bladder    Vision loss Sister     Stroke Sister     Arthritis Sister     Asthma Sister     Diabetes Sister     Heart disease Sister     Hyperlipidemia Sister     Kidney disease Sister     Broken bones Sister     Cancer Paternal Aunt     Cancer Maternal Aunt     Cancer Maternal Aunt     Hyperlipidemia Sister     Thyroid disease Sister     Arthritis Sister     Hypertension Sister     Kidney disease Sister     Broken bones Sister     Rheumatologic disease Sister     Thyroid disease Sister     Cancer Sister     Clotting disorder Sister     Cancer Maternal Aunt     Cancer Maternal Aunt     Cancer Paternal Aunt        Medications:  No medications prior to admission.       Scheduled Meds:  Continuous Infusions:No current facility-administered medications for this encounter.    PRN Meds:.    ALLERGIES:  Aspirin and Baclofen    ROS:  The following systems were reviewed and negative;   Constitution:  No fevers, chills, no unintentional weight loss  Skin: no rash, no jaundice  Eyes:  No blurry vision, no eye pain  HENT:  No change in hearing or smell  Resp:  No dyspnea or cough  CV:  No chest pain or palpitations  :  No dysuria, hematuria  Musculoskeletal:  No leg cramps or arthralgias  Neuro:  No tremor, no numbness  Psych:  No depression or confsuion    Objective     Vital Signs:   Vitals:     "07/17/25 1532   Weight: 109 kg (240 lb)   Height: 162.6 cm (64\")       Physical Exam:       General Appearance:    Awake and alert, in no acute distress   Head:    Normocephalic, without obvious abnormality, atraumatic   Throat:   No oral lesions, no thrush, oral mucosa moist   Lungs  Cardiac:  Abdomen:  Extremities:     Respirations regular, even and unlabored    Regular rate and rhythm, no murmur, gallop, rub    Non-distended, good bowel sounds, non tender, no masses     No edema, pulses 2+   Skin:   No rash, no jaundice       Results Review:  Lab Results (last 24 hours)       ** No results found for the last 24 hours. **            Imaging Results (Last 24 Hours)       ** No results found for the last 24 hours. **             I reviewed the patient's labs and imaging.    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 *       Procedure(s):  COLONOSCOPY      I discussed the patients findings and my recommendations with the patient.    Cayden Conway MD  07/31/25  07:06 EDT    "

## 2025-07-31 NOTE — ANESTHESIA POSTPROCEDURE EVALUATION
Patient: Tracie Gross    Procedure Summary       Date: 07/31/25 Room / Location: [facility] ENDOSCOPY 1 / [facility] ENDOSCOPY    Anesthesia Start: 1117 Anesthesia Stop: 1143    Procedure: COLONOSCOPY WITH HOT SNARE POLYPECTOMY X 2 Diagnosis:       Family history of malignant neoplasm of gastrointestinal tract      Genetic susceptibility to other malignant neoplasm      Abnormal abdominal ultrasound      (Family history of malignant neoplasm of gastrointestinal tract [Z80.0])      (Genetic susceptibility to other malignant neoplasm [Z15.09])      (Abnormal abdominal ultrasound [R93.5])    Surgeons: Cayden Conway MD Provider: Lina Jesus MD    Anesthesia Type: general ASA Status: 4            Anesthesia Type: general    Vitals  Vitals Value Taken Time   /70 07/31/25 12:06   Temp     Pulse 68 07/31/25 12:10   Resp     SpO2 93 % 07/31/25 12:10   Vitals shown include unfiled device data.        Post Anesthesia Care and Evaluation    Patient location during evaluation: PACU  Patient participation: complete - patient participated  Level of consciousness: awake  Pain scale: See nurse's notes for pain score.  Pain management: adequate    Airway patency: patent  Anesthetic complications: No anesthetic complications  PONV Status: none  Cardiovascular status: acceptable  Respiratory status: acceptable and spontaneous ventilation  Hydration status: acceptable    Comments: Patient seen and examined postoperatively; vital signs stable; SpO2 greater than or equal to 90%; cardiopulmonary status stable; nausea/vomiting adequately controlled; pain adequately controlled; no apparent anesthesia complications; patient discharged from anesthesia care when discharge criteria were met

## 2025-08-01 LAB
LAB AP CASE REPORT: NORMAL
PATH REPORT.FINAL DX SPEC: NORMAL
PATH REPORT.GROSS SPEC: NORMAL

## 2025-08-04 RX ORDER — HYDROXYZINE HYDROCHLORIDE 25 MG/1
TABLET, FILM COATED ORAL
Status: CANCELLED | OUTPATIENT
Start: 2025-08-04

## 2025-08-12 ENCOUNTER — OFFICE VISIT (OUTPATIENT)
Dept: FAMILY MEDICINE CLINIC | Facility: CLINIC | Age: 65
End: 2025-08-12
Payer: MEDICARE

## 2025-08-12 VITALS
SYSTOLIC BLOOD PRESSURE: 153 MMHG | RESPIRATION RATE: 18 BRPM | OXYGEN SATURATION: 94 % | HEART RATE: 71 BPM | HEIGHT: 64 IN | WEIGHT: 251 LBS | BODY MASS INDEX: 42.85 KG/M2 | DIASTOLIC BLOOD PRESSURE: 83 MMHG

## 2025-08-12 DIAGNOSIS — M79.672 FOOT PAIN, BILATERAL: Primary | ICD-10-CM

## 2025-08-12 DIAGNOSIS — M79.671 FOOT PAIN, BILATERAL: Primary | ICD-10-CM

## 2025-08-12 DIAGNOSIS — J84.10 PULMONARY FIBROSIS: ICD-10-CM

## 2025-08-12 RX ORDER — HYDROXYZINE HYDROCHLORIDE 25 MG/1
25 TABLET, FILM COATED ORAL EVERY 8 HOURS PRN
Qty: 60 TABLET | Refills: 2 | Status: SHIPPED | OUTPATIENT
Start: 2025-08-12

## 2025-08-13 ENCOUNTER — LAB (OUTPATIENT)
Dept: LAB | Facility: HOSPITAL | Age: 65
End: 2025-08-13
Payer: MEDICARE

## 2025-08-13 DIAGNOSIS — C50.919 MALIGNANT NEOPLASM OF FEMALE BREAST, UNSPECIFIED ESTROGEN RECEPTOR STATUS, UNSPECIFIED LATERALITY, UNSPECIFIED SITE OF BREAST: ICD-10-CM

## 2025-08-13 DIAGNOSIS — Z15.09 LYNCH SYNDROME: Primary | ICD-10-CM

## 2025-08-13 LAB
BASOPHILS # BLD AUTO: 0.09 10*3/MM3 (ref 0–0.2)
BASOPHILS NFR BLD AUTO: 0.8 % (ref 0–1.5)
DEPRECATED RDW RBC AUTO: 52.2 FL (ref 37–54)
EOSINOPHIL # BLD AUTO: 0.67 10*3/MM3 (ref 0–0.4)
EOSINOPHIL NFR BLD AUTO: 6.1 % (ref 0.3–6.2)
ERYTHROCYTE [DISTWIDTH] IN BLOOD BY AUTOMATED COUNT: 13.7 % (ref 12.3–15.4)
HCT VFR BLD AUTO: 37.7 % (ref 34–46.6)
HGB BLD-MCNC: 12.1 G/DL (ref 12–15.9)
HOLD SPECIMEN: NORMAL
IMM GRANULOCYTES # BLD AUTO: 0.08 10*3/MM3 (ref 0–0.05)
IMM GRANULOCYTES NFR BLD AUTO: 0.7 % (ref 0–0.5)
LYMPHOCYTES # BLD AUTO: 2.61 10*3/MM3 (ref 0.7–3.1)
LYMPHOCYTES NFR BLD AUTO: 23.9 % (ref 19.6–45.3)
MCH RBC QN AUTO: 32.5 PG (ref 26.6–33)
MCHC RBC AUTO-ENTMCNC: 32.1 G/DL (ref 31.5–35.7)
MCV RBC AUTO: 101.3 FL (ref 79–97)
MONOCYTES # BLD AUTO: 1.57 10*3/MM3 (ref 0.1–0.9)
MONOCYTES NFR BLD AUTO: 14.4 % (ref 5–12)
NEUTROPHILS NFR BLD AUTO: 5.89 10*3/MM3 (ref 1.7–7)
NEUTROPHILS NFR BLD AUTO: 54.1 % (ref 42.7–76)
PLATELET # BLD AUTO: 314 10*3/MM3 (ref 140–450)
PMV BLD AUTO: 12.1 FL (ref 6–12)
RBC # BLD AUTO: 3.72 10*6/MM3 (ref 3.77–5.28)
WBC NRBC COR # BLD AUTO: 10.91 10*3/MM3 (ref 3.4–10.8)

## 2025-08-13 PROCEDURE — 36415 COLL VENOUS BLD VENIPUNCTURE: CPT

## 2025-08-13 PROCEDURE — 85025 COMPLETE CBC W/AUTO DIFF WBC: CPT

## 2025-08-14 ENCOUNTER — LAB (OUTPATIENT)
Dept: FAMILY MEDICINE CLINIC | Facility: CLINIC | Age: 65
End: 2025-08-14
Payer: MEDICARE

## 2025-08-14 DIAGNOSIS — Z96.641 STATUS POST TOTAL HIP REPLACEMENT, RIGHT: ICD-10-CM

## 2025-08-14 DIAGNOSIS — M79.7 FIBROMYALGIA: ICD-10-CM

## 2025-08-14 DIAGNOSIS — M25.561 PAIN IN BOTH KNEES, UNSPECIFIED CHRONICITY: ICD-10-CM

## 2025-08-14 DIAGNOSIS — M75.122 COMPLETE TEAR OF LEFT ROTATOR CUFF, UNSPECIFIED WHETHER TRAUMATIC: ICD-10-CM

## 2025-08-14 DIAGNOSIS — M70.62 TROCHANTERIC BURSITIS OF LEFT HIP: ICD-10-CM

## 2025-08-14 DIAGNOSIS — N18.30 STAGE 3 CHRONIC KIDNEY DISEASE, UNSPECIFIED WHETHER STAGE 3A OR 3B CKD: ICD-10-CM

## 2025-08-14 DIAGNOSIS — I24.9 ACS (ACUTE CORONARY SYNDROME): ICD-10-CM

## 2025-08-14 DIAGNOSIS — M19.012 OSTEOARTHRITIS OF LEFT SHOULDER, UNSPECIFIED OSTEOARTHRITIS TYPE: ICD-10-CM

## 2025-08-14 DIAGNOSIS — I20.89 ANGINA AT REST: ICD-10-CM

## 2025-08-14 DIAGNOSIS — E03.9 HYPOTHYROIDISM, UNSPECIFIED TYPE: ICD-10-CM

## 2025-08-14 DIAGNOSIS — M25.562 PAIN IN BOTH KNEES, UNSPECIFIED CHRONICITY: ICD-10-CM

## 2025-08-14 DIAGNOSIS — M87.052 AVASCULAR NECROSIS OF FEMORAL HEAD, LEFT: ICD-10-CM

## 2025-08-14 DIAGNOSIS — I73.00 RAYNAUD'S DISEASE WITHOUT GANGRENE: ICD-10-CM

## 2025-08-14 DIAGNOSIS — M19.011 OSTEOARTHRITIS OF RIGHT GLENOHUMERAL JOINT: ICD-10-CM

## 2025-08-14 DIAGNOSIS — I10 PRIMARY HYPERTENSION: ICD-10-CM

## 2025-08-14 DIAGNOSIS — E53.8 VITAMIN B12 DEFICIENCY: Primary | ICD-10-CM

## 2025-08-14 DIAGNOSIS — B35.1 ONYCHOMYCOSIS: ICD-10-CM

## 2025-08-14 DIAGNOSIS — J84.10 PULMONARY FIBROSIS: ICD-10-CM

## 2025-08-14 DIAGNOSIS — E66.01 MORBID OBESITY: ICD-10-CM

## 2025-08-14 LAB
ALBUMIN SERPL-MCNC: 3.9 G/DL (ref 3.5–5.2)
ALBUMIN/GLOB SERPL: 1.3 G/DL
ALP SERPL-CCNC: 86 U/L (ref 39–117)
ALT SERPL W P-5'-P-CCNC: 10 U/L (ref 1–33)
ANION GAP SERPL CALCULATED.3IONS-SCNC: 17 MMOL/L (ref 5–15)
AST SERPL-CCNC: 30 U/L (ref 1–32)
BASOPHILS # BLD AUTO: 0.15 10*3/MM3 (ref 0–0.2)
BASOPHILS NFR BLD AUTO: 1.5 % (ref 0–1.5)
BILIRUB SERPL-MCNC: <0.2 MG/DL (ref 0–1.2)
BUN SERPL-MCNC: 10 MG/DL (ref 8–23)
BUN/CREAT SERPL: 8.1 (ref 7–25)
CALCIUM SPEC-SCNC: 9.6 MG/DL (ref 8.6–10.5)
CHLORIDE SERPL-SCNC: 97 MMOL/L (ref 98–107)
CO2 SERPL-SCNC: 28 MMOL/L (ref 22–29)
CREAT SERPL-MCNC: 1.23 MG/DL (ref 0.57–1)
CRP SERPL-MCNC: 2.85 MG/DL (ref 0–0.5)
DEPRECATED RDW RBC AUTO: 48.8 FL (ref 37–54)
EGFRCR SERPLBLD CKD-EPI 2021: 48.6 ML/MIN/1.73
EOSINOPHIL # BLD AUTO: 0.67 10*3/MM3 (ref 0–0.4)
EOSINOPHIL NFR BLD AUTO: 6.6 % (ref 0.3–6.2)
ERYTHROCYTE [DISTWIDTH] IN BLOOD BY AUTOMATED COUNT: 13.5 % (ref 12.3–15.4)
ERYTHROCYTE [SEDIMENTATION RATE] IN BLOOD: 36 MM/HR (ref 0–30)
GLOBULIN UR ELPH-MCNC: 3.1 GM/DL
GLUCOSE SERPL-MCNC: 125 MG/DL (ref 65–99)
HCT VFR BLD AUTO: 40.2 % (ref 34–46.6)
HGB BLD-MCNC: 12.9 G/DL (ref 12–15.9)
IMM GRANULOCYTES # BLD AUTO: 0.13 10*3/MM3 (ref 0–0.05)
IMM GRANULOCYTES NFR BLD AUTO: 1.3 % (ref 0–0.5)
LYMPHOCYTES # BLD AUTO: 2.15 10*3/MM3 (ref 0.7–3.1)
LYMPHOCYTES NFR BLD AUTO: 21.3 % (ref 19.6–45.3)
MCH RBC QN AUTO: 31.2 PG (ref 26.6–33)
MCHC RBC AUTO-ENTMCNC: 32.1 G/DL (ref 31.5–35.7)
MCV RBC AUTO: 97.1 FL (ref 79–97)
MONOCYTES # BLD AUTO: 1.51 10*3/MM3 (ref 0.1–0.9)
MONOCYTES NFR BLD AUTO: 15 % (ref 5–12)
NEUTROPHILS NFR BLD AUTO: 5.49 10*3/MM3 (ref 1.7–7)
NEUTROPHILS NFR BLD AUTO: 54.3 % (ref 42.7–76)
NRBC BLD AUTO-RTO: 0.2 /100 WBC (ref 0–0.2)
PLAT MORPH BLD: NORMAL
PLATELET # BLD AUTO: 258 10*3/MM3 (ref 140–450)
PMV BLD AUTO: 12.6 FL (ref 6–12)
POTASSIUM SERPL-SCNC: 3.7 MMOL/L (ref 3.5–5.2)
PROT SERPL-MCNC: 7 G/DL (ref 6–8.5)
RBC # BLD AUTO: 4.14 10*6/MM3 (ref 3.77–5.28)
RBC MORPH BLD: NORMAL
SODIUM SERPL-SCNC: 142 MMOL/L (ref 136–145)
URATE SERPL-MCNC: 6.8 MG/DL (ref 2.4–5.7)
WBC MORPH BLD: NORMAL
WBC NRBC COR # BLD AUTO: 10.1 10*3/MM3 (ref 3.4–10.8)

## 2025-08-14 PROCEDURE — 85652 RBC SED RATE AUTOMATED: CPT | Performed by: FAMILY MEDICINE

## 2025-08-14 PROCEDURE — 36415 COLL VENOUS BLD VENIPUNCTURE: CPT | Performed by: FAMILY MEDICINE

## 2025-08-14 PROCEDURE — 85007 BL SMEAR W/DIFF WBC COUNT: CPT | Performed by: FAMILY MEDICINE

## 2025-08-14 PROCEDURE — 84550 ASSAY OF BLOOD/URIC ACID: CPT | Performed by: FAMILY MEDICINE

## 2025-08-14 PROCEDURE — 80053 COMPREHEN METABOLIC PANEL: CPT | Performed by: FAMILY MEDICINE

## 2025-08-14 PROCEDURE — 86140 C-REACTIVE PROTEIN: CPT | Performed by: FAMILY MEDICINE

## 2025-08-14 PROCEDURE — 85025 COMPLETE CBC W/AUTO DIFF WBC: CPT | Performed by: FAMILY MEDICINE

## 2025-08-18 DIAGNOSIS — M32.9 SLE (SYSTEMIC LUPUS ERYTHEMATOSUS RELATED SYNDROME): ICD-10-CM

## 2025-08-18 DIAGNOSIS — M34.9 SCLERODERMA: ICD-10-CM

## 2025-08-18 RX ORDER — CYCLOBENZAPRINE HCL 10 MG
10 TABLET ORAL 3 TIMES DAILY PRN
Qty: 45 TABLET | Refills: 1 | Status: SHIPPED | OUTPATIENT
Start: 2025-08-18

## 2025-08-21 ENCOUNTER — LAB (OUTPATIENT)
Dept: LAB | Facility: HOSPITAL | Age: 65
End: 2025-08-21
Payer: MEDICARE

## 2025-08-21 ENCOUNTER — OFFICE VISIT (OUTPATIENT)
Dept: ONCOLOGY | Facility: CLINIC | Age: 65
End: 2025-08-21
Payer: MEDICARE

## 2025-08-21 VITALS
TEMPERATURE: 97.2 F | HEART RATE: 67 BPM | OXYGEN SATURATION: 93 % | HEIGHT: 64 IN | BODY MASS INDEX: 44.01 KG/M2 | SYSTOLIC BLOOD PRESSURE: 131 MMHG | WEIGHT: 257.8 LBS | DIASTOLIC BLOOD PRESSURE: 83 MMHG

## 2025-08-21 DIAGNOSIS — Z15.09 LYNCH SYNDROME: ICD-10-CM

## 2025-08-21 DIAGNOSIS — M81.0 OSTEOPOROSIS, UNSPECIFIED OSTEOPOROSIS TYPE, UNSPECIFIED PATHOLOGICAL FRACTURE PRESENCE: ICD-10-CM

## 2025-08-21 DIAGNOSIS — C50.919 MALIGNANT NEOPLASM OF FEMALE BREAST, UNSPECIFIED ESTROGEN RECEPTOR STATUS, UNSPECIFIED LATERALITY, UNSPECIFIED SITE OF BREAST: Primary | ICD-10-CM

## 2025-08-21 LAB
ERYTHROCYTE [SEDIMENTATION RATE] IN BLOOD: 29 MM/HR (ref 0–30)
MAGNESIUM SERPL-MCNC: 1.7 MG/DL (ref 1.6–2.4)

## 2025-08-25 ENCOUNTER — HOSPITAL ENCOUNTER (OUTPATIENT)
Dept: ONCOLOGY | Facility: HOSPITAL | Age: 65
Discharge: HOME | End: 2025-08-25
Payer: MEDICARE

## 2025-08-25 ENCOUNTER — LAB (OUTPATIENT)
Dept: LAB | Facility: HOSPITAL | Age: 65
End: 2025-08-25
Payer: MEDICARE

## 2025-08-25 DIAGNOSIS — C50.919 MALIGNANT NEOPLASM OF FEMALE BREAST, UNSPECIFIED ESTROGEN RECEPTOR STATUS, UNSPECIFIED LATERALITY, UNSPECIFIED SITE OF BREAST: ICD-10-CM

## 2025-08-25 DIAGNOSIS — M81.0 OSTEOPOROSIS, UNSPECIFIED OSTEOPOROSIS TYPE, UNSPECIFIED PATHOLOGICAL FRACTURE PRESENCE: ICD-10-CM

## 2025-08-25 DIAGNOSIS — C50.919 MALIGNANT NEOPLASM OF FEMALE BREAST, UNSPECIFIED ESTROGEN RECEPTOR STATUS, UNSPECIFIED LATERALITY, UNSPECIFIED SITE OF BREAST: Primary | ICD-10-CM

## 2025-08-25 DIAGNOSIS — Z15.09 LYNCH SYNDROME: ICD-10-CM

## 2025-08-25 PROCEDURE — 25010000002 DENOSUMAB 60 MG/ML SOLUTION PREFILLED SYRINGE: Performed by: INTERNAL MEDICINE

## 2025-08-25 PROCEDURE — 88108 CYTOPATH CONCENTRATE TECH: CPT | Performed by: NURSE PRACTITIONER

## 2025-08-25 PROCEDURE — 96372 THER/PROPH/DIAG INJ SC/IM: CPT

## 2025-08-25 RX ADMIN — DENOSUMAB 60 MG: 60 INJECTION SUBCUTANEOUS at 15:08

## 2025-08-28 LAB
LAB AP CASE REPORT: NORMAL
LAB AP DIAGNOSIS COMMENT: NORMAL
LAB AP NON-GYN INTERPRETATION: NORMAL
PATH REPORT.FINAL DX SPEC: NORMAL
PATH REPORT.GROSS SPEC: NORMAL

## 2025-08-29 DIAGNOSIS — N39.0 URINARY TRACT INFECTION WITHOUT HEMATURIA, SITE UNSPECIFIED: Primary | ICD-10-CM

## (undated) DEVICE — PK TRY HEART CATH 50

## (undated) DEVICE — 9165 UNIVERSAL PATIENT PLATE: Brand: 3M™

## (undated) DEVICE — GLV SURG TRIUMPH ORTHO W/ALOE PF LTX 8 STRL

## (undated) DEVICE — SOL IRRIG SOD CHL 0.9PCT 3000ML

## (undated) DEVICE — PK ENDO GI 50

## (undated) DEVICE — CUFF SCD HEMOFORCE SEQ CALF STD MD

## (undated) DEVICE — SUT MNCRYL 4/0 RB1 27IN

## (undated) DEVICE — CATH DIAG IMPULSE PIG .056 6F 110CM

## (undated) DEVICE — TRAP WIDEEYE POLYP

## (undated) DEVICE — PK KN ARTHROSCOPY 50

## (undated) DEVICE — BITEBLOCK ENDO W/STRAP 60F A/ LF DISP

## (undated) DEVICE — FMS FLUID MANAGEMENT SYSTEM 4.5MM FMS OUTFLOW CANNULA: Brand: FMS

## (undated) DEVICE — NDL HYPO PRECISIONGLIDE REG 25G 1 1/2

## (undated) DEVICE — SYR LUERLOK 50ML

## (undated) DEVICE — COVER,MAYO STAND,STERILE: Brand: MEDLINE

## (undated) DEVICE — NDL HYPO PRECISIONGLIDE/REG 18G 11/2 PNK

## (undated) DEVICE — PAPR PRNT PK SONY W RIBN UPC55

## (undated) DEVICE — PINNACLE INTRODUCER SHEATH: Brand: PINNACLE

## (undated) DEVICE — FMS FLUID MANAGEMENT SYSTEM INFLOW TUBING (FMS VUE): Brand: FMS

## (undated) DEVICE — 3M™ STERI-STRIP™ REINFORCED ADHESIVE SKIN CLOSURES, R1546, 1/4 IN X 4 IN (6 MM X 100 MM), 10 STRIPS/ENVELOPE: Brand: 3M™ STERI-STRIP™

## (undated) DEVICE — SNAR POLYP HOTSNARE/BRAIDED OVL/MINI 7F 2.8X10MM 230CM 1P/U

## (undated) DEVICE — DEV INFL COMPAK W/ACCESSPLUS IN4530

## (undated) DEVICE — PK PROC TURNOVER

## (undated) DEVICE — SPNG GZ AVANT 6PLY 4X4IN STRL PK/2

## (undated) DEVICE — DGW .035 MC J3MM 150CM T H AMP: Brand: EMERALD

## (undated) DEVICE — GLV SURG TRIUMPH GREEN W/ALOE PF LTX 8 STRL

## (undated) DEVICE — OCCLUSIVE GAUZE STRIP OVERWRAP,3% BISMUTH TRIBROMOPHENATE IN PETROLATUM BLEND: Brand: XEROFORM

## (undated) DEVICE — CATH DIAG IMPULSE FR4 6F 100CM

## (undated) DEVICE — BAPTIST FLOYD BRONCHOSCOPY: Brand: MEDLINE INDUSTRIES, INC.

## (undated) DEVICE — ADHS LIQ MASTISOL 2/3ML

## (undated) DEVICE — QUICK CATCH IN-LINE SUCTION POLYP TRAP IS USED FOR SUCTION RETRIEVAL OF ENDOSCOPICALLY REMOVED POLYPS.

## (undated) DEVICE — FMS FLUID MANAGEMENT SYSTEM OUTFLOW TUBING WITHOUT ONE-WAY VALVE (FMS VUE OR FMS DUO PLUS): Brand: FMS

## (undated) DEVICE — CATH DIAG IMPULSE FL4 6F 100CM